# Patient Record
Sex: FEMALE | Race: WHITE | Employment: UNEMPLOYED | URBAN - METROPOLITAN AREA
[De-identification: names, ages, dates, MRNs, and addresses within clinical notes are randomized per-mention and may not be internally consistent; named-entity substitution may affect disease eponyms.]

---

## 2017-01-06 ENCOUNTER — TELEPHONE (OUTPATIENT)
Dept: FAMILY MEDICINE | Facility: CLINIC | Age: 52
End: 2017-01-06

## 2017-01-06 DIAGNOSIS — F10.21 ALCOHOLISM IN RECOVERY (H): Primary | ICD-10-CM

## 2017-01-06 NOTE — TELEPHONE ENCOUNTER
Reason for Call:  Medication or medication refill:    Do you use a [facility]?  Name of the pharmacy and phone number for the current request:  [facility]- [phone]    Name of the medication requested: rx for alcoholism    Other request: pt is wondering if she can get a rx for alcoholism without being seen. Pt has a pe scheduled with AF on 3/7/17.     Can we leave a detailed message on this number? YES    Phone number patient can be reached at:     Best Time:     Call taken on 1/6/2017 at 12:39 PM by Annie Ivory

## 2017-01-09 RX ORDER — DISULFIRAM 500 MG/1
1 TABLET ORAL
Qty: 90 TABLET | Refills: 3 | Status: ON HOLD | OUTPATIENT
Start: 2017-01-09 | End: 2017-03-30

## 2017-01-09 NOTE — TELEPHONE ENCOUNTER
I will ok a Rx for antabuse for her to start once daily.  She needs to keep her appointment with me in March.  Remind her that she has to have not drank for a minimum of 12 hours prior to using this medication or she will get sick.  She also will get very sick if she drinks while she is taking the medication.    Electronically signed by:  Efren Bustos M.D.  1/9/2017

## 2017-02-23 ENCOUNTER — TELEPHONE (OUTPATIENT)
Dept: FAMILY MEDICINE | Facility: CLINIC | Age: 52
End: 2017-02-23

## 2017-02-23 NOTE — TELEPHONE ENCOUNTER
I received this message via link and copied into patients chart.  This is nothing noted that we tried to call the patient .     Daniela PAGE

## 2017-02-23 NOTE — TELEPHONE ENCOUNTER
[2/23/2017 12:13 PM] Jessica Botello:   Hi there-our volunteer in the surgery [facility] received a call from a patient.   The patient said that the phone rang three times, then disconnected.    When the patient checked her phone it showed the number to our surgery lounge/volunteer's desk.    The patient is Monalisa Olguin.   I see that she has a PE coming up on 3-4-17.    I've been told that clinic does pre calls for physicals.   Starting with you to see if you can help?    Thanks-

## 2017-03-14 ENCOUNTER — OFFICE VISIT (OUTPATIENT)
Dept: FAMILY MEDICINE | Facility: CLINIC | Age: 52
End: 2017-03-14
Payer: COMMERCIAL

## 2017-03-14 ENCOUNTER — TELEPHONE (OUTPATIENT)
Dept: NURSING | Facility: CLINIC | Age: 52
End: 2017-03-14

## 2017-03-14 VITALS
HEART RATE: 97 BPM | DIASTOLIC BLOOD PRESSURE: 60 MMHG | TEMPERATURE: 98.4 F | WEIGHT: 106 LBS | OXYGEN SATURATION: 98 % | SYSTOLIC BLOOD PRESSURE: 108 MMHG | BODY MASS INDEX: 20.81 KG/M2 | HEIGHT: 60 IN | RESPIRATION RATE: 20 BRPM

## 2017-03-14 DIAGNOSIS — K13.1 CHEEK AND LIP BITING: Primary | ICD-10-CM

## 2017-03-14 DIAGNOSIS — F10.21 ALCOHOLISM IN RECOVERY (H): ICD-10-CM

## 2017-03-14 PROCEDURE — 99213 OFFICE O/P EST LOW 20 MIN: CPT | Performed by: FAMILY MEDICINE

## 2017-03-14 RX ORDER — DIPHENHYDRAMINE HYDROCHLORIDE AND LIDOCAINE HYDROCHLORIDE AND ALUMINUM HYDROXIDE AND MAGNESIUM HYDRO
5-10 KIT EVERY 6 HOURS PRN
Qty: 237 ML | Refills: 1 | Status: SHIPPED | OUTPATIENT
Start: 2017-03-14 | End: 2017-06-13

## 2017-03-14 ASSESSMENT — PAIN SCALES - GENERAL: PAINLEVEL: NO PAIN (0)

## 2017-03-14 NOTE — NURSING NOTE
Chief Complaint   Patient presents with     Mouth Lesions     started 2-3 days ago. Feels like road bumps.        Initial /60 (BP Location: Left arm, Patient Position: Chair, Cuff Size: Adult Regular)  Pulse 97  Temp 98.4  F (36.9  C)  Resp 20  Ht 5' (1.524 m)  Wt 106 lb (48.1 kg)  LMP 05/16/2012  SpO2 98%  Breastfeeding? No  BMI 20.7 kg/m2 Estimated body mass index is 20.7 kg/(m^2) as calculated from the following:    Height as of this encounter: 5' (1.524 m).    Weight as of this encounter: 106 lb (48.1 kg).  Medication Reconciliation: complete

## 2017-03-14 NOTE — PROGRESS NOTES
SUBJECTIVE:                                                    Monalisa Olguin is a 51 year old female who presents to clinic today for the following health issues:      Mouth sores for 2-3 days.     Problem list and histories reviewed & adjusted, as indicated.  Additional history: as documented    Reviewed and updated as needed this visit by clinical staff  Tobacco  Allergies  Meds  Problems  Med Hx  Surg Hx  Fam Hx  Soc Hx        Reviewed and updated as needed this visit by Provider  Allergies  Meds  Problems       Patient does not recall that she has been biting or chewing on her lip or cheek.  However, she does acknowledge a history of doing this and she notes that she currently is back to drinking 7-8 beers/day, mostly at night.  She knows for sure during the day she is not biting on the inside of her cheek or mouth.      The lesions started about 3 days ago, have been getting a little worse and she is noticing more of them.  She smokes.      Mild sore throat with some postnasal drip.      Patient has not been taking her abstinence medication for alcoholism.  She knows that she needs to get back into treatment.      ROS:  General: negative for, fever, chills, dizziness, fatigue, weakness  Skin: negative for skin lesions or for vaginal lesions that are similar to the ones in her mouth.    ENT: as above  Resp: No shortness of breath, No dyspnea on exertion and No cough    OBJECTIVE:                                                    /60 (BP Location: Left arm, Patient Position: Chair, Cuff Size: Adult Regular)  Pulse 97  Temp 98.4  F (36.9  C)  Resp 20  Ht 5' (1.524 m)  Wt 106 lb (48.1 kg)  LMP 05/16/2012  SpO2 98%  Breastfeeding? No  BMI 20.7 kg/m2  Body mass index is 20.7 kg/(m^2).  GENERAL APPEARANCE: healthy, alert and no distress  HENT: ear canals and TM's normal, mouth has sores noted on the inside of the cheek and on the inside of the lip, all in areas that are accessible to the teeth.   No lesions above or below the gumline or in the posterior aspect of the cheek.  Left side of the tongue has a traumatic ulcer noted.  The lesions are dark and appear consistent with blood blisters and are traumatic in nature.  NECK: no adenopathy, no asymmetry, masses, or scars and thyroid normal to palpation          ASSESSMENT/PLAN:                                                        ICD-10-CM    1. Cheek and lip biting K13.1 DPH-Lido-AlHydr-MgHydr-Simeth (FIRST-MOUTHWASH BLM) SUSP   2. Alcoholism in recovery (H) F10.20      PLAN:  1.  Lesions are the result of patient chewing on the inside of her cheek and lip and are the result of trauma.  It is likely that she does not realize she is doing this due to her intoxication from alcohol when she drinks.  Patient agrees with this assessment.  Will give her magic mouthwash for topical pain relief from sores.  She was encouraged to seek alcohol treatment as she mentioned today as she is likely doing this to herself when she is intoxicated.     Follow up with Provider - only if lesions do not improve over the next few days or are note completely resolved in 10-14 days.     Quirino Wood MD   [facility]

## 2017-03-14 NOTE — TELEPHONE ENCOUNTER
"Call Type: Triage Call    Presenting Problem: Heydi has \" black spots in mouth.\"  This started  last night.   Sore in mouth are painful.  [facility] Triage/Mouth  Lesions/disposition is to be seen within 24 hours and Heydi agreed.  Triage Note:  Guideline Title: Mouth Lesions  Recommended Disposition: See Provider within 24 hours  Original Inclination: Wanted to speak with a nurse  Override Disposition:  Intended Action: Call PCP/HCP  Physician Contacted: No  Unexplained, prolonged bleeding from gums AND not previously evaluated ?  YES  Any mouth, lip, or tooth injury ? NO  Symptoms resulting from face trauma ? NO  Creamy-white sore patches in the mouth or throat AND patches brush off or come off  when eating, leaving a new, bleeding surface ? NO  Signs of dehydration ? NO  Painful pale yellow or white spot(s) on the lining of the mouth, gums, or tongue  unresponsive to 72 hours of home care or that are unusually large ? NO  Gums are newly red, swollen, or painful ? NO  Tenderness, pain, burning, mild tingling OR crusted lesions on lips or other area  ? NO  Symptoms of jaw or teeth symptoms not related to injury ? NO  Physician Instructions:  Care Advice: Continue all prescription medication as recommended until  evaluated by provider.  CAUTIONS  Call provider if bleeding from gums is severe, chronic, continues after  treatment  or if having other unexplained symptoms.  SYMPTOM / CONDITION MANAGEMENT  List, or take, all current prescription(s), nonprescription or alternative  medication(s) to provider for evaluation.  Use a soft bristle toothbrush to gently brush teeth at least twice a day.  Floss at least once a day.  DO NOT take aspirin, or aspirin-containing medications, ibuprofen or  naproxen until consulting with provider.  These medications can increase  risk of bleeding or bruising and delays wound healing.  "

## 2017-03-14 NOTE — MR AVS SNAPSHOT
After Visit Summary   3/14/2017    Monalisa Olguin    MRN: [id number]           Patient Information     Date Of Birth          1965        Visit Information        Provider Department      3/14/2017 8:45 AM Quirino Wood MD [facility]        Today's Diagnoses     Cheek and lip biting    -  1    Alcoholism in recovery (H)           Follow-ups after your visit        Your next 10 appointments already scheduled     Apr 10, 2017  9:00 AM CDT   [facility] with Stella Vaughan [facility]-C   [facility]    [address] [place] [id number] [phone]              Who to contact     If you have questions or need follow up information about today's clinic visit or your schedule please contact [facility] directly at [phone].  Normal or non-critical lab and imaging results will be communicated to you by MyChart, letter or phone within 4 business days after the clinic has received the results. If you do not hear from us within 7 days, please contact the clinic through MyChart or phone. If you have a critical or abnormal lab result, we will notify you by phone as soon as possible.  Submit refill requests through [employer] or call your pharmacy and they will forward the refill request to us. Please allow 3 business days for your refill to be completed.          Additional Information About Your Visit        MyChart Information     [employer] gives you secure access to your electronic health record. If you see a primary care provider, you can also send messages to your care team and make appointments. If you have questions, please call your primary care clinic.  If you do not have a primary care provider, please call [phone] and they will assist you.        Care EveryWhere ID     This is your Care EveryWhere ID. This could be used by other organizations to access your [place] medical records  [id number]         Your Vitals Were     Pulse Temperature Respirations Height Last Period Pulse Oximetry    97 98.4  F (36.9  C) 20 5' (1.524 m) 05/16/2012 98%    Breastfeeding? BMI (Body Mass Index)                No 20.7 kg/m2           Blood Pressure from Last 3 Encounters:   03/14/17 108/60   10/31/16 (!) 144/92   06/14/16 129/84    Weight from Last 3 Encounters:   03/14/17 106 lb (48.1 kg)   10/31/16 117 lb (53.1 kg)   03/03/16 113 lb (51.3 kg)              Today, you had the following     No orders found for display         Today's Medication Changes          These changes are accurate as of: 3/14/17  2:05 PM.  If you have any questions, ask your nurse or doctor.               Start taking these medicines.        Dose/Directions    FIRST-MOUTHWASH BLM Susp   Used for:  Cheek and lip biting   Started by:  Quirino Wood MD        Dose:  5-10 mL   Swish and swallow 5-10 mLs in mouth every 6 hours as needed   Quantity:  237 mL   Refills:  1            Where to get your medicines      These medications were sent to [facility], [place] - [address] [place] [address] [address] [place] [address] [facility] [address]     Phone:  [phone]     FIRST-MOUTHWASH BLM Susp                Primary Care Provider Office Phone # Fax #    Efren Bustos [phone]       [address]   [place] [address]        Thank you!     Thank you for choosing [facility]  for your care. Our goal is always to provide you with excellent care. Hearing back from our patients is one way we can continue to improve our services. Please take a few minutes to complete the written survey that you may receive in the mail after your visit with us. Thank you!             Your Updated Medication List - Protect others around you: Learn how to safely use, store and throw away your medicines at www.disposemymeds.org.          This list is accurate as of: 3/14/17  2:05 PM.  Always use your most  recent med list.                   Brand Name Dispense Instructions for use    buPROPion 150 MG 12 hr tablet    WELLBUTRIN SR    180 tablet    Take 1 tablet (150 mg) by mouth 2 times daily       busPIRone 15 MG tablet    BUSPAR    180 tablet    Take 1 tablet (15 mg) by mouth 2 times daily       Disulfiram 500 MG Tabs     90 tablet    Take 1 tablet by mouth daily (with breakfast)       enalapril 10 MG tablet    VASOTEC    90 tablet    Take 1 tablet (10 mg) by mouth daily       FIRST-MOUTHWASH BLM Susp     237 mL    Swish and swallow 5-10 mLs in mouth every 6 hours as needed       FLUoxetine 20 MG capsule    PROzac    180 capsule    Take 2 capsules (40 mg) by mouth daily       Folic Acid-Vit B6-Vit B12 2.2-25-1 MG Tabs     90 tablet    Take 1 tablet by mouth daily       naltrexone 50 MG tablet    DEPADE;REVIA    90 tablet    Take 1 tablet (50 mg) by mouth daily Take 1/2 tablet by mouth daily for one week then take 1 tablet daily after       omeprazole 20 MG CR capsule    priLOSEC     Take by mouth daily       pantoprazole 40 MG EC tablet    PROTONIX    90 tablet    Take 1 tablet (40 mg) by mouth daily Take 1 tablet daily       simvastatin 10 MG tablet    ZOCOR    90 tablet    Take 1 tablet (10 mg) by mouth At Bedtime       temazepam 15 MG capsule    RESTORIL    60 capsule    Take 1-2 capsules (15-30 mg) by mouth nightly as needed for sleep       traMADol 50 MG tablet    ULTRAM    28 tablet    Take 1 tablet (50 mg) by mouth every 6 hours as needed for moderate pain       traZODone 50 MG tablet    DESYREL    180 tablet    1-2 tablets by mouth at bedtime as needed       Vitamin B1 100 MG Tabs     90 tablet    Take 100 mg by mouth daily

## 2017-03-20 ENCOUNTER — TELEPHONE (OUTPATIENT)
Dept: FAMILY MEDICINE | Facility: CLINIC | Age: 52
End: 2017-03-20

## 2017-03-22 DIAGNOSIS — G47.09 OTHER INSOMNIA: ICD-10-CM

## 2017-03-22 NOTE — TELEPHONE ENCOUNTER
traZODone (DESYREL) 50 MG tablet       Last Written Prescription Date: 4/18/16  Last Fill Quantity: 183; # refills: 3  Last Office Visit with [facility], [facility] or [facility] prescribing provider:  3/14/17   Next 5 appointments (look out 90 days)     Apr 10, 2017  9:00 AM CDT   PHYSICAL with Stella Vaughan PA-C   [facility]    [address] [place] [id number] [phone]                   Last PHQ-9 score on record=   PHQ-9 SCORE 8/18/2016   Total Score -   Total Score 12       Lab Results   Component Value Date     01/20/2016     Lab Results   Component Value Date    ALT 32 01/20/2016

## 2017-03-24 RX ORDER — TRAZODONE HYDROCHLORIDE 50 MG/1
TABLET, FILM COATED ORAL
Qty: 180 TABLET | Refills: 1 | Status: SHIPPED | OUTPATIENT
Start: 2017-03-24 | End: 2017-05-10 | Stop reason: ALTCHOICE

## 2017-03-24 NOTE — TELEPHONE ENCOUNTER
Prescription approved per [facility] Refill Protocol.    Kenyetta Sinclair RN

## 2017-03-26 ENCOUNTER — APPOINTMENT (OUTPATIENT)
Dept: ULTRASOUND IMAGING | Facility: CLINIC | Age: 52
DRG: 432 | End: 2017-03-26
Attending: PHYSICIAN ASSISTANT
Payer: COMMERCIAL

## 2017-03-26 ENCOUNTER — HOSPITAL ENCOUNTER (INPATIENT)
Facility: CLINIC | Age: 52
LOS: 4 days | Discharge: HOME | DRG: 432 | End: 2017-03-30
Attending: PHYSICIAN ASSISTANT | Admitting: INTERNAL MEDICINE
Payer: COMMERCIAL

## 2017-03-26 ENCOUNTER — APPOINTMENT (OUTPATIENT)
Dept: CT IMAGING | Facility: CLINIC | Age: 52
DRG: 432 | End: 2017-03-26
Attending: PHYSICIAN ASSISTANT
Payer: COMMERCIAL

## 2017-03-26 DIAGNOSIS — K43.9 VENTRAL HERNIA WITHOUT OBSTRUCTION OR GANGRENE: ICD-10-CM

## 2017-03-26 DIAGNOSIS — E05.90 HYPERTHYROIDISM: ICD-10-CM

## 2017-03-26 DIAGNOSIS — F10.20 ALCOHOLISM (H): ICD-10-CM

## 2017-03-26 DIAGNOSIS — K76.6 PORTAL HYPERTENSION (H): Primary | ICD-10-CM

## 2017-03-26 DIAGNOSIS — E87.6 HYPOKALEMIA: ICD-10-CM

## 2017-03-26 DIAGNOSIS — R17 JAUNDICE: ICD-10-CM

## 2017-03-26 DIAGNOSIS — R10.13 ABDOMINAL PAIN, EPIGASTRIC: ICD-10-CM

## 2017-03-26 DIAGNOSIS — I10 HYPERTENSION GOAL BP (BLOOD PRESSURE) < 130/80: ICD-10-CM

## 2017-03-26 DIAGNOSIS — D64.9 ANEMIA, UNSPECIFIED: ICD-10-CM

## 2017-03-26 DIAGNOSIS — K59.00 CONSTIPATION, UNSPECIFIED CONSTIPATION TYPE: ICD-10-CM

## 2017-03-26 DIAGNOSIS — E86.0 DEHYDRATION: ICD-10-CM

## 2017-03-26 PROBLEM — K81.0 ACUTE CHOLECYSTITIS: Status: ACTIVE | Noted: 2017-03-26

## 2017-03-26 PROBLEM — R91.8 PULMONARY NODULES: Status: ACTIVE | Noted: 2017-03-26

## 2017-03-26 PROBLEM — R11.2 NAUSEA AND VOMITING: Status: ACTIVE | Noted: 2017-03-26

## 2017-03-26 PROBLEM — Z72.0 TOBACCO ABUSE: Status: ACTIVE | Noted: 2017-03-26

## 2017-03-26 PROBLEM — K70.9 LIVER DISEASE, CHRONIC, DUE TO ALCOHOL (H): Status: ACTIVE | Noted: 2017-03-26

## 2017-03-26 PROBLEM — D69.6 THROMBOCYTOPENIA (H): Status: ACTIVE | Noted: 2017-03-26

## 2017-03-26 LAB
ALBUMIN SERPL-MCNC: 3.1 G/DL (ref 3.4–5)
ALP SERPL-CCNC: 673 U/L (ref 40–150)
ALT SERPL W P-5'-P-CCNC: 77 U/L (ref 0–50)
AMMONIA PLAS-SCNC: 28 UMOL/L (ref 10–50)
ANION GAP SERPL CALCULATED.3IONS-SCNC: 11 MMOL/L (ref 3–14)
APTT PPP: 40 SEC (ref 22–37)
AST SERPL W P-5'-P-CCNC: 193 U/L (ref 0–45)
BASOPHILS # BLD AUTO: 0 10E9/L (ref 0–0.2)
BASOPHILS NFR BLD AUTO: 0.2 %
BILIRUB DIRECT SERPL-MCNC: 8.8 MG/DL (ref 0–0.2)
BILIRUB SERPL-MCNC: 10.9 MG/DL (ref 0.2–1.3)
BILIRUB SERPL-MCNC: 11.1 MG/DL (ref 0.2–1.3)
BUN SERPL-MCNC: 2 MG/DL (ref 7–30)
CALCIUM SERPL-MCNC: 8.6 MG/DL (ref 8.5–10.1)
CHLORIDE SERPL-SCNC: 96 MMOL/L (ref 94–109)
CO2 SERPL-SCNC: 28 MMOL/L (ref 20–32)
CREAT SERPL-MCNC: 0.53 MG/DL (ref 0.52–1.04)
DIFFERENTIAL METHOD BLD: ABNORMAL
EOSINOPHIL # BLD AUTO: 0 10E9/L (ref 0–0.7)
EOSINOPHIL NFR BLD AUTO: 0 %
ERYTHROCYTE [DISTWIDTH] IN BLOOD BY AUTOMATED COUNT: 19.7 % (ref 10–15)
GFR SERPL CREATININE-BSD FRML MDRD: ABNORMAL ML/MIN/1.7M2
GGT SERPL-CCNC: 2823 U/L (ref 0–40)
GLUCOSE SERPL-MCNC: 98 MG/DL (ref 70–99)
HCT VFR BLD AUTO: 28.3 % (ref 35–47)
HEMOCCULT STL QL: NEGATIVE
HGB BLD-MCNC: 9.4 G/DL (ref 11.7–15.7)
IMM GRANULOCYTES # BLD: 0 10E9/L (ref 0–0.4)
IMM GRANULOCYTES NFR BLD: 0.5 %
INR PPP: 1.13 (ref 0.86–1.14)
LIPASE SERPL-CCNC: 163 U/L (ref 73–393)
LYMPHOCYTES # BLD AUTO: 0.5 10E9/L (ref 0.8–5.3)
LYMPHOCYTES NFR BLD AUTO: 11.7 %
MAGNESIUM SERPL-MCNC: 1.5 MG/DL (ref 1.6–2.3)
MCH RBC QN AUTO: 32.1 PG (ref 26.5–33)
MCHC RBC AUTO-ENTMCNC: 33.2 G/DL (ref 31.5–36.5)
MCV RBC AUTO: 97 FL (ref 78–100)
MONOCYTES # BLD AUTO: 1 10E9/L (ref 0–1.3)
MONOCYTES NFR BLD AUTO: 23.7 %
NEUTROPHILS # BLD AUTO: 2.6 10E9/L (ref 1.6–8.3)
NEUTROPHILS NFR BLD AUTO: 63.9 %
PHOSPHATE SERPL-MCNC: 1.3 MG/DL (ref 2.5–4.5)
PLATELET # BLD AUTO: 75 10E9/L (ref 150–450)
POTASSIUM SERPL-SCNC: 2.6 MMOL/L (ref 3.4–5.3)
PROT SERPL-MCNC: 6.1 G/DL (ref 6.8–8.8)
RBC # BLD AUTO: 2.93 10E12/L (ref 3.8–5.2)
RETICS # AUTO: 84.1 10E9/L (ref 25–95)
RETICS/RBC NFR AUTO: 2.8 % (ref 0.5–2)
SODIUM SERPL-SCNC: 135 MMOL/L (ref 133–144)
T4 FREE SERPL-MCNC: 1.58 NG/DL (ref 0.76–1.46)
TSH SERPL DL<=0.005 MIU/L-ACNC: 0.04 MU/L (ref 0.4–4)
WBC # BLD AUTO: 4 10E9/L (ref 4–11)

## 2017-03-26 PROCEDURE — 80053 COMPREHEN METABOLIC PANEL: CPT | Performed by: PHYSICIAN ASSISTANT

## 2017-03-26 PROCEDURE — 96375 TX/PRO/DX INJ NEW DRUG ADDON: CPT

## 2017-03-26 PROCEDURE — 82977 ASSAY OF GGT: CPT | Performed by: PHYSICIAN ASSISTANT

## 2017-03-26 PROCEDURE — 85025 COMPLETE CBC W/AUTO DIFF WBC: CPT | Performed by: PHYSICIAN ASSISTANT

## 2017-03-26 PROCEDURE — 85045 AUTOMATED RETICULOCYTE COUNT: CPT | Performed by: INTERNAL MEDICINE

## 2017-03-26 PROCEDURE — 82247 BILIRUBIN TOTAL: CPT | Performed by: INTERNAL MEDICINE

## 2017-03-26 PROCEDURE — 83690 ASSAY OF LIPASE: CPT | Performed by: PHYSICIAN ASSISTANT

## 2017-03-26 PROCEDURE — 25500064 ZZH RX 255 OP 636: Performed by: PHYSICIAN ASSISTANT

## 2017-03-26 PROCEDURE — 85610 PROTHROMBIN TIME: CPT | Performed by: PHYSICIAN ASSISTANT

## 2017-03-26 PROCEDURE — 12000007 ZZH R&B INTERMEDIATE

## 2017-03-26 PROCEDURE — 96376 TX/PRO/DX INJ SAME DRUG ADON: CPT

## 2017-03-26 PROCEDURE — 76705 ECHO EXAM OF ABDOMEN: CPT

## 2017-03-26 PROCEDURE — 85730 THROMBOPLASTIN TIME PARTIAL: CPT | Performed by: PHYSICIAN ASSISTANT

## 2017-03-26 PROCEDURE — 84439 ASSAY OF FREE THYROXINE: CPT | Performed by: PHYSICIAN ASSISTANT

## 2017-03-26 PROCEDURE — 99207 ZZC CDG-MDM COMPONENT: MEETS HIGH - UP CODED: CPT | Performed by: INTERNAL MEDICINE

## 2017-03-26 PROCEDURE — 84443 ASSAY THYROID STIM HORMONE: CPT | Performed by: PHYSICIAN ASSISTANT

## 2017-03-26 PROCEDURE — 71260 CT THORAX DX C+: CPT

## 2017-03-26 PROCEDURE — 96365 THER/PROPH/DIAG IV INF INIT: CPT

## 2017-03-26 PROCEDURE — 99285 EMERGENCY DEPT VISIT HI MDM: CPT | Mod: 25

## 2017-03-26 PROCEDURE — 96361 HYDRATE IV INFUSION ADD-ON: CPT

## 2017-03-26 PROCEDURE — 82248 BILIRUBIN DIRECT: CPT | Performed by: INTERNAL MEDICINE

## 2017-03-26 PROCEDURE — 82607 VITAMIN B-12: CPT | Performed by: INTERNAL MEDICINE

## 2017-03-26 PROCEDURE — 83550 IRON BINDING TEST: CPT | Performed by: INTERNAL MEDICINE

## 2017-03-26 PROCEDURE — 99223 1ST HOSP IP/OBS HIGH 75: CPT | Mod: AI | Performed by: INTERNAL MEDICINE

## 2017-03-26 PROCEDURE — 84100 ASSAY OF PHOSPHORUS: CPT | Performed by: INTERNAL MEDICINE

## 2017-03-26 PROCEDURE — 74177 CT ABD & PELVIS W/CONTRAST: CPT

## 2017-03-26 PROCEDURE — 25000125 ZZHC RX 250: Performed by: PHYSICIAN ASSISTANT

## 2017-03-26 PROCEDURE — 25000128 H RX IP 250 OP 636: Performed by: PHYSICIAN ASSISTANT

## 2017-03-26 PROCEDURE — 82728 ASSAY OF FERRITIN: CPT | Performed by: INTERNAL MEDICINE

## 2017-03-26 PROCEDURE — 82272 OCCULT BLD FECES 1-3 TESTS: CPT | Performed by: INTERNAL MEDICINE

## 2017-03-26 PROCEDURE — 83540 ASSAY OF IRON: CPT | Performed by: INTERNAL MEDICINE

## 2017-03-26 PROCEDURE — 99285 EMERGENCY DEPT VISIT HI MDM: CPT | Performed by: PHYSICIAN ASSISTANT

## 2017-03-26 PROCEDURE — 83735 ASSAY OF MAGNESIUM: CPT | Performed by: INTERNAL MEDICINE

## 2017-03-26 PROCEDURE — 82140 ASSAY OF AMMONIA: CPT | Performed by: PHYSICIAN ASSISTANT

## 2017-03-26 RX ORDER — TRAZODONE HYDROCHLORIDE 50 MG/1
50 TABLET, FILM COATED ORAL
Status: DISCONTINUED | OUTPATIENT
Start: 2017-03-26 | End: 2017-03-30 | Stop reason: HOSPADM

## 2017-03-26 RX ORDER — SODIUM CHLORIDE 9 MG/ML
INJECTION, SOLUTION INTRAVENOUS CONTINUOUS
Status: DISCONTINUED | OUTPATIENT
Start: 2017-03-26 | End: 2017-03-28

## 2017-03-26 RX ORDER — ONDANSETRON 2 MG/ML
4 INJECTION INTRAMUSCULAR; INTRAVENOUS EVERY 6 HOURS PRN
Status: DISCONTINUED | OUTPATIENT
Start: 2017-03-26 | End: 2017-03-30 | Stop reason: HOSPADM

## 2017-03-26 RX ORDER — POTASSIUM CHLORIDE 1500 MG/1
20-40 TABLET, EXTENDED RELEASE ORAL
Status: DISCONTINUED | OUTPATIENT
Start: 2017-03-26 | End: 2017-03-27

## 2017-03-26 RX ORDER — PROCHLORPERAZINE MALEATE 5 MG
5-10 TABLET ORAL EVERY 6 HOURS PRN
Status: DISCONTINUED | OUTPATIENT
Start: 2017-03-26 | End: 2017-03-30 | Stop reason: HOSPADM

## 2017-03-26 RX ORDER — HYDROMORPHONE HYDROCHLORIDE 1 MG/ML
0.2 INJECTION, SOLUTION INTRAMUSCULAR; INTRAVENOUS; SUBCUTANEOUS
Status: DISCONTINUED | OUTPATIENT
Start: 2017-03-26 | End: 2017-03-28

## 2017-03-26 RX ORDER — SODIUM CHLORIDE 9 MG/ML
1000 INJECTION, SOLUTION INTRAVENOUS CONTINUOUS
Status: DISCONTINUED | OUTPATIENT
Start: 2017-03-26 | End: 2017-03-26

## 2017-03-26 RX ORDER — NALOXONE HYDROCHLORIDE 0.4 MG/ML
.1-.4 INJECTION, SOLUTION INTRAMUSCULAR; INTRAVENOUS; SUBCUTANEOUS
Status: DISCONTINUED | OUTPATIENT
Start: 2017-03-26 | End: 2017-03-30 | Stop reason: HOSPADM

## 2017-03-26 RX ORDER — POTASSIUM CHLORIDE 1.5 G/1.58G
20-40 POWDER, FOR SOLUTION ORAL
Status: DISCONTINUED | OUTPATIENT
Start: 2017-03-26 | End: 2017-03-27

## 2017-03-26 RX ORDER — MAGNESIUM SULFATE HEPTAHYDRATE 40 MG/ML
4 INJECTION, SOLUTION INTRAVENOUS EVERY 4 HOURS PRN
Status: DISCONTINUED | OUTPATIENT
Start: 2017-03-26 | End: 2017-03-30 | Stop reason: HOSPADM

## 2017-03-26 RX ORDER — PROCHLORPERAZINE 25 MG
25 SUPPOSITORY, RECTAL RECTAL EVERY 12 HOURS PRN
Status: DISCONTINUED | OUTPATIENT
Start: 2017-03-26 | End: 2017-03-30 | Stop reason: HOSPADM

## 2017-03-26 RX ORDER — LIDOCAINE 40 MG/G
CREAM TOPICAL
Status: DISCONTINUED | OUTPATIENT
Start: 2017-03-26 | End: 2017-03-26

## 2017-03-26 RX ORDER — ONDANSETRON 4 MG/1
4 TABLET, ORALLY DISINTEGRATING ORAL EVERY 6 HOURS PRN
Status: DISCONTINUED | OUTPATIENT
Start: 2017-03-26 | End: 2017-03-30 | Stop reason: HOSPADM

## 2017-03-26 RX ORDER — ONDANSETRON 2 MG/ML
4 INJECTION INTRAMUSCULAR; INTRAVENOUS EVERY 30 MIN PRN
Status: DISCONTINUED | OUTPATIENT
Start: 2017-03-26 | End: 2017-03-26

## 2017-03-26 RX ORDER — POTASSIUM CHLORIDE 7.45 MG/ML
10 INJECTION INTRAVENOUS ONCE
Status: COMPLETED | OUTPATIENT
Start: 2017-03-26 | End: 2017-03-26

## 2017-03-26 RX ORDER — POTASSIUM CHLORIDE 29.8 MG/ML
20 INJECTION INTRAVENOUS
Status: DISCONTINUED | OUTPATIENT
Start: 2017-03-26 | End: 2017-03-26 | Stop reason: CLARIF

## 2017-03-26 RX ORDER — IOPAMIDOL 755 MG/ML
100 INJECTION, SOLUTION INTRAVASCULAR ONCE
Status: COMPLETED | OUTPATIENT
Start: 2017-03-26 | End: 2017-03-26

## 2017-03-26 RX ORDER — POTASSIUM CHLORIDE 7.45 MG/ML
10 INJECTION INTRAVENOUS
Status: DISCONTINUED | OUTPATIENT
Start: 2017-03-26 | End: 2017-03-27

## 2017-03-26 RX ORDER — NICOTINE 21 MG/24HR
1 PATCH, TRANSDERMAL 24 HOURS TRANSDERMAL DAILY
Status: DISCONTINUED | OUTPATIENT
Start: 2017-03-27 | End: 2017-03-28

## 2017-03-26 RX ORDER — HYDROMORPHONE HYDROCHLORIDE 1 MG/ML
0.5 INJECTION, SOLUTION INTRAMUSCULAR; INTRAVENOUS; SUBCUTANEOUS
Status: DISCONTINUED | OUTPATIENT
Start: 2017-03-26 | End: 2017-03-26

## 2017-03-26 RX ADMIN — SODIUM CHLORIDE 60 ML: 9 INJECTION, SOLUTION INTRAVENOUS at 19:42

## 2017-03-26 RX ADMIN — SODIUM CHLORIDE 1000 ML: 9 INJECTION, SOLUTION INTRAVENOUS at 20:08

## 2017-03-26 RX ADMIN — HYDROMORPHONE HYDROCHLORIDE 0.5 MG: 1 INJECTION, SOLUTION INTRAMUSCULAR; INTRAVENOUS; SUBCUTANEOUS at 20:57

## 2017-03-26 RX ADMIN — SODIUM CHLORIDE 1000 ML: 9 INJECTION, SOLUTION INTRAVENOUS at 20:56

## 2017-03-26 RX ADMIN — IOPAMIDOL 50 ML: 755 INJECTION, SOLUTION INTRAVENOUS at 19:42

## 2017-03-26 RX ADMIN — ONDANSETRON 4 MG: 2 INJECTION, SOLUTION INTRAMUSCULAR; INTRAVENOUS at 19:42

## 2017-03-26 RX ADMIN — POTASSIUM CHLORIDE 10 MEQ: 7.46 INJECTION, SOLUTION INTRAVENOUS at 20:07

## 2017-03-26 RX ADMIN — SODIUM CHLORIDE 1000 ML: 9 INJECTION, SOLUTION INTRAVENOUS at 19:06

## 2017-03-26 RX ADMIN — HYDROMORPHONE HYDROCHLORIDE 0.5 MG: 1 INJECTION, SOLUTION INTRAMUSCULAR; INTRAVENOUS; SUBCUTANEOUS at 19:42

## 2017-03-26 RX ADMIN — ONDANSETRON 4 MG: 2 INJECTION, SOLUTION INTRAMUSCULAR; INTRAVENOUS at 19:05

## 2017-03-26 ASSESSMENT — ENCOUNTER SYMPTOMS
COUGH: 1
VOMITING: 1
DYSURIA: 1
ABDOMINAL PAIN: 1
APPETITE CHANGE: 1

## 2017-03-26 NOTE — ED NOTES
Here with complaints of abd pain and jaundice. States her eyes have been yellow that started a week ago and has progressed

## 2017-03-26 NOTE — IP AVS SNAPSHOT
[address] [facility]    [address] [facility] [address] [place] [address]    Phone:  [phone]                                       After Visit Summary   3/26/2017    Monalisa Olguin    MRN: [id number]           After Visit Summary Signature Page     I have received my discharge instructions, and my questions have been answered. I have discussed any challenges I see with this plan with the nurse or doctor.    ..........................................................................................................................................  Patient/Patient Representative Signature      ..........................................................................................................................................  Patient Representative Print Name and Relationship to Patient    ..................................................               ................................................  Date                                            Time    ..........................................................................................................................................  Reviewed by Signature/Title    ...................................................              ..............................................  Date                                                            Time

## 2017-03-26 NOTE — ED PROVIDER NOTES
"  History     Chief Complaint   Patient presents with     Jaundice     The history is provided by the patient.     Monalisa Olguin is a 51 year old female who presents to the ED with jaundice and abdominal pain. She noticed yellowing of her eyes last week that has progressed since then. Her eye sight has gotten worse. She started having upper mid abdominal pain and bloating 2 days ago and mentions a bump to the area as well. She has a lack of appetite only been eating half a can of chicken noodle soup for the past 3 days. She mentions hot and cold flashes but this is not new, thinks it's from menopause. She has a decrease in fluid intake, noting last time she voided was this morning and she did have a burning sensation with it. She was dizzy earlier today but that has resolved. In the last 6 months she has lost 10-15 pounds unintentionally. She has a history of a chronic cough, denying blood. When she does cough she gets this \"tingle\" in her throat that causes her to vomit. She normally vomits 3 times a day. This is not new for her. Last emesis was 4-5 days ago. Patient has a history of alcoholism. She drinks about 8 cans of beer and a couple of glasses of whiskey a day. In the last week she has barely had an alcoholic drinks. Last drink was today only drinking 1.5 cans of beer. She has had withdrawals in the past, most recent one was last Sunday, 1 week ago. Patient denies being in withdrawal currently. She has no history of seizures. She is a smoker, smokes 0.5-1 pack a day. Patient is also concerned with a bruise to left side of back by kidney area with no known injury. She denies any lumps to breast. No recent falls. She has no history of jaundice. 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   .       Patient Active Problem List   Diagnosis     Kidney donor     Esophageal reflux     Moderate Depression [296.32]     HYPERLIPIDEMIA LDL GOAL <100     CKD (chronic kidney disease) stage 3, GFR 30-59 ml/min "     Hypertension goal BP (blood pressure) < 130/80     Anxiety     Crushing injury of thumb, left     Alcohol abuse     Alcoholism in recovery (H)     Laceration of head without foreign body, unspecified part of head, sequela     Alcohol induced liver disease     Acute cholecystitis - possible     Hypokalemia     Anemia     Thrombocytopenia (H)     Tobacco abuse     Abdominal pain, epigastric     Nausea and vomiting     Portal hypertension (H)     Pulmonary nodules     Hyperthyroidism     Past Medical History:   Diagnosis Date     Alcohol abuse 2013     Alcohol dependence 2013     Alcohol withdrawal 2013     Anxiety 10/10/2011     CKD (chronic kidney disease) stage 3, GFR 30-59 ml/min     last GFR was 42     Esophageal reflux 10/7/2002     Hypertension goal BP (blood pressure) < 130/80 2011     Moderate Depression [296.32] 2009    stable on wellbutrin     Other internal derangement of knee(717.89)     ACL Internal derangement, knee/ original injury in 5th grade, torn cartilage     Pap smear     no abnormals, due for paps q 2-3 yrs     Unspecified essential hypertension        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     HC KNEE SCOPE, DIAGNOSTIC  01    Arthroscopy, Lt Knee       Family History   Problem Relation Age of Onset     DIABETES Sister 17     older sister also had kidney and pancreas transplant     Hypertension Mother      HEART DISEASE Paternal Grandmother      HEART DISEASE Paternal Grandfather      CEREBROVASCULAR DISEASE Maternal Grandmother      CEREBROVASCULAR DISEASE Maternal Grandfather      Alcohol/Drug Maternal Grandfather      Substance Abuse Maternal Grandfather      HEART DISEASE Father      Silent MI stents x 2     HEART DISEASE Sister      MI secondary to diabetes     CANCER Paternal Aunt      ?kind     DIABETES Sister 9  "    youngest, juvenile type I (onset age 9 with no complications)     Genitourinary Problems Sister 43     kidney transplant from renal failure       Social History   Substance Use Topics     Smoking status: Current Every Day Smoker     Packs/day: 0.50     Years: 10.00     Smokeless tobacco: Never Used      Comment: pt quit 1992 after smoking for 8 yrs, started again in 2004     Alcohol use 0.0 oz/week     0 Standard drinks or equivalent per week      Comment: daily         Immunization History   Administered Date(s) Administered     Influenza (IIV3) 10/26/2005, 11/06/2006     Mantoux 08/26/2013     Pneumococcal 23 valent 05/26/2013     TDAP Vaccine (Adacel) 08/30/2007, 06/14/2016          Allergies   Allergen Reactions     Albuterol      Tongue \"hardened and painful\"       No current outpatient prescriptions on file.       I have reviewed the Medications, Allergies, Past Medical and Surgical History, and Social History in the Epic system.    Review of Systems   Constitutional: Positive for appetite change (decreased).        Positive for hot and cold flashes but that's not new   Eyes:        Positive for yellowing of the eyes, bilaterally.    Respiratory: Positive for cough (chronic, no blood noted).         She is a smoker.    Gastrointestinal: Positive for abdominal pain (epigastric, bump to the area ) and vomiting (3-4 days ago. this is not new).   Genitourinary: Positive for decreased urine volume and dysuria.   Skin:        Positive for a bruise to left side of lower back.    All other systems reviewed and are negative.      Physical Exam   BP: 120/76  Heart Rate: 103  Temp: 98.8  F (37.1  C)  Resp: 14  Weight: 47.6 kg (105 lb)  SpO2: 97 %  Physical Exam  Cachectic appearing.  Full body jaundice noted.  Scleral icterus.   Head: Normocephalic, atraumatic, nontender to palpation  Eyes: PERRLA, conjunctiva and sclera clear  Ears: Bilateral TM's and canals are clear.  TM's translucent without erythema or " effusion.  Nose: Nares normal and patent bilaterally.  Mucous membranes are non-erythematous and non-edematous.  No sinus tenderness.  Throat: Mucous membranes are clear.  No tonsilar hypertrophy, exudate, or erythema.  Neck: Supple.  FROM without pain.  No adenopathy.  No thyromegaly.   Heart:  RRR with normal S1 and S2.  No S3 or S4.  No murmur, rub, gallop, or click.  PMI is nondisplaced.   .LUNGS: negative findings: chest symmetric with normal A/P diameter, no chest deformities noted, normal respiratory rate and rhythm, no chest wall tenderness, positive findings: rhonchi R mid anterior and L mid anterior   ABDOMEN: Generalized abdominal bloating noted. She appears to have swelling and the epigastric area. Upon palpation there is a firm area that extends down to 2 cm superior to the umbilicus. I'm concerned that this may be a mass. She does have a vertical incision at that area which is related to her previous nephrectomy. She also has tenderness in the right upper quadrant with a positive Gaston sign. No rebound or guarding. Some generalized discomfort throughout the remainder of the abdomen, but not near as bad as the epigastric and right upper quadrant area.  Extremities: extremities, peripheral pulses and reflexes normal, no edema, redness or tenderness in the calves or thighs, Raulito's sign is negative, no sign of DVT.         ED Course     ED Course     Procedures             Critical Care time:  none        Results for orders placed or performed during the hospital encounter of 03/26/17   CT Chest/Abdomen/Pelvis w Contrast    Narrative    CT CHEST, ABDOMEN, PELVIS WITH CONTRAST 3/26/2017 7:59 PM    TECHNIQUE: Images from thoracic inlet to pubic symphysis 50 mL Isovue  370 IV contrast  Radiation dose for this scan was reduced using  automated exposure control, adjustment of the mA and/or kV according  to patient size, or iterative reconstruction technique.    HISTORY: Chronic cough, bilateral rhonchi,  smoker, large epigastric  mass, jaundice.    COMPARISON: 3/17/2015 chest x-ray.    FINDINGS:   Chest:  No mediastinal, hilar, or axillary adenopathy. 0.4 x 0.6 cm  nodular density right lower lobe series 3 image 35 is indeterminate.  The lungs are otherwise clear.    Abdomen and Pelvis: Slightly heterogeneous liver with diffuse fatty  infiltration. Borderline splenomegaly 13 cm in length. The gallbladder  is distended approximately 7 x 4.5 x 5 cm in size. Multiple  gallstones. Gallbladder wall appears mildly thickened.    There is small midline ventral hernia at the level of the gallbladder  containing fat and small vessels. There is some collateral vessels  compatible with portal hypertension.    Left nephrectomy changes. Normal-appearing right kidney and adrenal  glands. Pancreas appears normal. There is mild upper abdominal fat  stranding, no periaortic or pelvic adenopathy.    No acute bowel abnormality, there is some fat deposition in the wall  of the right colon which is nonspecific, can be seen with chronic or  previous colitis. No aggressive bone lesions.      Impression    IMPRESSION:  1. Gallbladder mildly distended with gallstones, suspect mild  gallbladder wall thickening. Cholecystitis could have this appearance  and if clinically indicated nuclear medicine hepatobiliary scan may be  helpful for further evaluation.  2. Diffuse liver disease with fatty infiltration and findings  suggesting portal hypertension. Borderline splenomegaly and vascular  collaterals.  3. Left nephrectomy.  4. Indeterminate nodular density right lung base up to 0.6 cm.  Recommendations for an incidental lung nodule = or > 6mm to 8mm:    Low risk patients: Initial follow-up CT at 6-12 months, then  consider CT at 18-24 months if no change.    High risk patients: Initial follow-up CT at 6-12 months, then CT at  18-24 months if no change.    *Low Risk: Minimal or absent history of smoking or other known risk  factors.  *Nonsolid  (ground-glass) or partly solid nodules may require longer  follow-up to exclude indolent adenocarcinoma.  *Recommendations based on Guidelines for the Management of Incidental  Pulmonary Nodules Detected at CT: From the [employer] Society 2017,  Radiology 2017.    JORDAN RAMOS MD   US Abdomen Limited    Narrative    US ABDOMEN LIMITED  3/26/2017 10:57 PM     HISTORY:  Right upper quadrant pain.    COMPARISON: None.    FINDINGS: Sludge in the gallbladder. There are some shadowing  gallstones in the gallbladder as well. Gallbladder wall is thickened  at 7 mm. Sonographic Gaston sign is present. No bile duct dilatation.  Findings suggest cholecystitis.    Liver has diffuse fatty infiltration, but is normal in size. Pancreas  not well seen.    Right kidney measures 13.3 cm pole to pole and appears normal. No mass  or obstruction.    The abdominal aorta and IVC are normal where seen, but partially  obscured.      Impression    IMPRESSION:   1 Probable cholecystitis with cholelithiasis and gallbladder wall  thickening with positive sonographic Gaston sign.  2. No bile duct dilatation.  3. Diffuse fatty infiltration of the liver.   CBC with platelets differential   Result Value Ref Range    WBC 4.0 4.0 - 11.0 10e9/L    RBC Count 2.93 (L) 3.8 - 5.2 10e12/L    Hemoglobin 9.4 (L) 11.7 - 15.7 g/dL    Hematocrit 28.3 (L) 35.0 - 47.0 %    MCV 97 78 - 100 fl    MCH 32.1 26.5 - 33.0 pg    MCHC 33.2 31.5 - 36.5 g/dL    RDW 19.7 (H) 10.0 - 15.0 %    Platelet Count 75 (L) 150 - 450 10e9/L    Diff Method Automated Method     % Neutrophils 63.9 %    % Lymphocytes 11.7 %    % Monocytes 23.7 %    % Eosinophils 0.0 %    % Basophils 0.2 %    % Immature Granulocytes 0.5 %    Absolute Neutrophil 2.6 1.6 - 8.3 10e9/L    Absolute Lymphocytes 0.5 (L) 0.8 - 5.3 10e9/L    Absolute Monocytes 1.0 0.0 - 1.3 10e9/L    Absolute Eosinophils 0.0 0.0 - 0.7 10e9/L    Absolute Basophils 0.0 0.0 - 0.2 10e9/L    Abs Immature Granulocytes 0.0 0 - 0.4  10e9/L   INR   Result Value Ref Range    INR 1.13 0.86 - 1.14   Partial thromboplastin time   Result Value Ref Range    PTT 40 (H) 22 - 37 sec   Comprehensive metabolic panel   Result Value Ref Range    Sodium 135 133 - 144 mmol/L    Potassium 2.6 (LL) 3.4 - 5.3 mmol/L    Chloride 96 94 - 109 mmol/L    Carbon Dioxide 28 20 - 32 mmol/L    Anion Gap 11 3 - 14 mmol/L    Glucose 98 70 - 99 mg/dL    Urea Nitrogen 2 (L) 7 - 30 mg/dL    Creatinine 0.53 0.52 - 1.04 mg/dL    GFR Estimate >90  Non  GFR Calc   >60 mL/min/1.7m2    GFR Estimate If Black >90   GFR Calc   >60 mL/min/1.7m2    Calcium 8.6 8.5 - 10.1 mg/dL    Bilirubin Total 11.1 (H) 0.2 - 1.3 mg/dL    Albumin 3.1 (L) 3.4 - 5.0 g/dL    Protein Total 6.1 (L) 6.8 - 8.8 g/dL    Alkaline Phosphatase 673 (H) 40 - 150 U/L    ALT 77 (H) 0 - 50 U/L     (H) 0 - 45 U/L   Lipase   Result Value Ref Range    Lipase 163 73 - 393 U/L   Ammonia   Result Value Ref Range    Ammonia 28 10 - 50 umol/L   TSH with free T4 reflex   Result Value Ref Range    TSH 0.04 (L) 0.40 - 4.00 mU/L   GGT   Result Value Ref Range    GGT 2823 (H) 0 - 40 U/L   T4 free   Result Value Ref Range    T4 Free 1.58 (H) 0.76 - 1.46 ng/dL   Reticulocyte count   Result Value Ref Range    % Retic 2.8 (H) 0.5 - 2.0 %    Absolute Retic 84.1 25 - 95 10e9/L        Medications   traZODone (DESYREL) tablet 50 mg (not administered)   naloxone (NARCAN) injection 0.1-0.4 mg (not administered)   0.9% sodium chloride infusion (not administered)   HYDROmorphone (PF) (DILAUDID) injection 0.2 mg (not administered)   ondansetron (ZOFRAN-ODT) ODT tab 4 mg (not administered)     Or   ondansetron (ZOFRAN) injection 4 mg (not administered)   prochlorperazine (COMPAZINE) injection 5-10 mg (not administered)     Or   prochlorperazine (COMPAZINE) tablet 5-10 mg (not administered)     Or   prochlorperazine (COMPAZINE) Suppository 25 mg (not administered)   potassium chloride  (K-DUR/KLOR-CON M) CR  tablet 20-40 mEq (not administered)   potassium chloride (KLOR-CON) Packet 20-40 mEq (not administered)   potassium chloride 10 mEq in 100 mL intermittent infusion (not administered)   potassium chloride 10 mEq in 100 mL intermittent infusion with 10 mg lidocaine (not administered)   magnesium sulfate 4 g in 100 mL sterile water (premade) (not administered)   potassium phosphate 15 mmol in D5W 250 mL intermittent infusion (not administered)   potassium phosphate 20 mmol in D5W 500 mL intermittent infusion (not administered)   potassium phosphate 25 mmol in D5W 500 mL intermittent infusion (not administered)   NaCl 0.9 % 1,000 mL with multivitamin-ADULT (INFUVITE) 10 mL, thiamine 100 mg, folic acid 1 mg infusion (not administered)   nicotine Patch in Place (not administered)   nicotine patch REMOVAL (not administered)   nicotine (NICODERM CQ) 14 MG/24HR 24 hr patch 1 patch (not administered)   metoprolol (LOPRESSOR) injection 5 mg (not administered)   pantoprazole (PROTONIX) 40 mg IV push injection (not administered)   melatonin tablet 3 mg (not administered)   piperacillin-tazobactam (ZOSYN) infusion 3.375 g (not administered)   0.9% sodium chloride BOLUS (0 mLs Intravenous Stopped 3/26/17 2052)     Followed by   0.9% sodium chloride BOLUS (0 mLs Intravenous Stopped 3/26/17 1944)   sodium chloride (PF) 0.9% PF flush 3 mL (3 mLs Intracatheter Given 3/26/17 1941)   sodium chloride 0.9 % for CT scan flush dose 500 mL (60 mLs Intravenous Given 3/26/17 1942)   iopamidol (ISOVUE-370) solution 100 mL (50 mLs Intravenous Given 3/26/17 1942)   potassium chloride 10 mEq in 100 mL intermittent infusion (0 mEq Intravenous Stopped 3/26/17 2110)         Assessments & Plan (with Medical Decision Making)     Abdominal pain, epigastric  Alcoholism (H)  Jaundice  Hypokalemia  Dehydration  Ventral hernia without obstruction or gangrene  Anemia, unspecified     51 year old female who has a history of chronic alcoholism presents for  evaluation of nausea, vomiting, unable to tolerate clear fluids, anorexia, jaundice, weight loss, and abdominal pain. Symptoms much worse in the past 2-3 days.  Patient generally drinks 8-10 alcoholic beverages per day. She denies ever having any withdrawal. She's been to treatment 2 different times, and has been abstinent for alcohol after treatment for a number of months. She has always return to drinking alcohol on a daily basis. On exam the patient has a mildly elevated heart rate and temperature of 99.9. Blood pressure and oxygen saturations are within normal limits. She has rather significant jaundice and scleral icterus noted. Rhonchi in the bilateral mid anterior region with pulmonary exam. Abdominal exam with a possible mass in the epigastric area, right upper quadrant/epigastric discomfort, and a positive Gaston sign. Generalized abdominal bloating noted is concerning for possible ascites.  Laboratory evaluation with a new normocytic number chromic anemia with hemoglobin down to 9.4 and thrombocytopenia with platelets down to 75,000.   Hypokalemia with a potassium of 2.6. Total bilirubin elevated to 11.1. Remainder of the LFTs are significantly elevated as well.  GGT with a rather significant elevation 2 2823 suggesting that alcoholic hepatitis likely has a contusion factor to her symptom set.  INR normal. TSH and T4 with findings suggestive of possible early hyperthyroidism.  CT of the abdomen showed a mildly distended gallbladder and possible stones. Cholecystitis could not be ruled out. Diffuse fatty infiltration of liver and possible portal hypertension findings. Right lung nodule, but no significant mass. The possible mass in the epigastric area appears to be a ventral hernia.  Initial treatment with 2 L of IV normal saline rehydration and Zofran for nausea. This did help her significantly. As soon as her potassium came back low, we initiated IV potassium to correct this. She requested pain medication  for her epigastric discomfort, therefore IV Dilaudid was given with good success.  Patient does not have any symptoms suggestive of hemorrhage at this time. I am uncertain if her anemia is related to GI hemorrhage versus other. Stool occult is pending at this time.  It could be related to nutritional deficiency as well, and further testing for this would be done as an inpatient. Given her multiple significant abnormalities, Dr. Bowen, inpatient hospitalist, was consulted regarding her condition. He agreed to accept her care as an inpatient. We ordered a right upper quadrant ultrasound to further evaluate the gallbladder abnormalities. IV antibiotics will likely be started given the concern for cholecystitis. She likely will need an MRCP in the morning. The patient was in agreement with this plan.      I have reviewed the nursing notes.    I have reviewed the findings, diagnosis, plan and need for follow up with the patient.    Current Discharge Medication List          Final diagnoses:   Abdominal pain, epigastric   Alcoholism (H)   Jaundice   Hypokalemia   Dehydration   Ventral hernia without obstruction or gangrene   Anemia, unspecified       This document serves as a record of services personally performed by Alon Nieves PA*. It was created on their behalf by Natalie Moeller, a trained medical scribe. The creation of this record is based on the provider's personal observations and the statements of the patient. This document has been checked and approved by the attending provider.   Note: Chart documentation done in part with Dragon Voice Recognition software. Although reviewed after completion, some word and grammatical errors may remain.        3/26/2017  Alon Nieves PA-C   [facility] EMERGENCY DEPARTMENT     Alon Nieves PA-C  03/26/17 [id number]

## 2017-03-26 NOTE — IP AVS SNAPSHOT
MRN:[id number]                      After Visit Summary   3/26/2017    Monalisa Olguin    MRN: [id number]           Thank you!     Thank you for choosing [facility] for your care. Our goal is always to provide you with excellent care. Hearing back from our patients is one way we can continue to improve our services. Please take a few minutes to complete the written survey that you may receive in the mail after you visit with us. Thank you!        Patient Information     Date Of Birth          1965        About your hospital stay     You were admitted on:  March 26, 2017 You last received care in the:  [address] [facility]    You were discharged on:  March 30, 2017        Reason for your hospital stay       Hospitalized due to liver injury and improved                  Who to Call     For medical emergencies, please call 911.  For non-urgent questions about your medical care, please call your primary care provider or clinic, [phone]          Attending Provider     Provider Specialty    Alon Nieves PA-C Family Practice    Mayur Moncada MD Internal Medicine       Primary Care Provider Office Phone # Fax #    Efren Bustos [phone]       [facility] [address] [place] [address]        After Care Instructions     Activity       Your activity upon discharge: activity as tolerated            Diet       Follow this diet upon discharge: Low fat                  Follow-up Appointments     Follow-up and recommended labs and tests        Follow up with primary care provider, Efren Bustos MD, within 7 days for hospital follow- up and regarding new diagnosis.  The following labs/tests are recommended: recheck LFTs within 4 days.  Recheck thyroid function tests in 4-6 weeks.                  Your next 10 appointments already scheduled     Apr 10, 2017  9:00 AM CDT   PHYSICAL with Stella Vaughan PA-C   [facility] ([facility])    [address] [id number] [phone]              Pending Results     No orders found from 3/24/2017 to 3/27/2017.            Statement of Approval     Ordered          03/30/17 1133  I have reviewed and agree with all the recommendations and orders detailed in this document.  EFFECTIVE NOW     Approved and electronically signed by:  Mayur Moncada MD             Admission Information     Date & Time Provider Department Dept. Phone    3/26/2017 Mayur Moncada MD [address] [facility] [phone]      Your Vitals Were     Blood Pressure Pulse Temperature Respirations Weight Last Period    95/54 76 99  F (37.2  C) (Oral) 16 49 kg (108 lb 0.4 oz) 05/16/2012    Pulse Oximetry BMI (Body Mass Index)                95% 21.1 kg/m2          MyChart Information     [facility] gives you secure access to your electronic health record. If you see a primary care provider, you can also send messages to your care team and make appointments. If you have questions, please call your primary care clinic.  If you do not have a primary care provider, please call [phone] and they will assist you.        Care EveryWhere ID     This is your Care EveryWhere ID. This could be used by other organizations to access your [facility] medical records  [id number]           Review of your medicines      START taking        Dose / Directions    propranolol 10 MG tablet   Commonly known as:  INDERAL   Used for:  Hypertension goal BP (blood pressure) < 130/80        Dose:  10 mg   Take 1 tablet (10 mg) by mouth 2 times daily   Quantity:  60 tablet   Refills:  0       senna-docusate 8.6-50 MG per tablet   Commonly known as:  SENOKOT-S;PERICOLACE   Used for:  Constipation, unspecified constipation type        Dose:  1 tablet   Take 1 tablet by mouth 2 times daily as needed for constipation   Quantity:  100 tablet   Refills:  0         CONTINUE these medicines  which have NOT CHANGED        Dose / Directions    buPROPion 150 MG 12 hr tablet   Commonly known as:  WELLBUTRIN SR   Used for:  Major depressive disorder, recurrent episode, moderate (H)        Dose:  150 mg   Take 1 tablet (150 mg) by mouth 2 times daily   Quantity:  180 tablet   Refills:  3       busPIRone 15 MG tablet   Commonly known as:  BUSPAR   Used for:  Anxiety        Dose:  15 mg   Take 1 tablet (15 mg) by mouth 2 times daily   Quantity:  180 tablet   Refills:  3       FIRST-MOUTHWASH BLM Susp   Used for:  Cheek and lip biting        Dose:  5-10 mL   Swish and swallow 5-10 mLs in mouth every 6 hours as needed   Quantity:  237 mL   Refills:  1       FLUoxetine 20 MG capsule   Commonly known as:  PROzac   Used for:  Anxiety        Dose:  40 mg   Take 2 capsules (40 mg) by mouth daily   Quantity:  180 capsule   Refills:  3       Folic Acid-Vit B6-Vit B12 2.2-25-1 MG Tabs   Used for:  Alcoholism in recovery (H)        Dose:  1 tablet   Take 1 tablet by mouth daily   Quantity:  90 tablet   Refills:  3       naltrexone 50 MG tablet   Commonly known as:  DEPADE;REVIA   Used for:  Alcohol dependence in remission (H)        Dose:  50 mg   Take 1 tablet (50 mg) by mouth daily Take 1/2 tablet by mouth daily for one week then take 1 tablet daily after   Quantity:  90 tablet   Refills:  3       omeprazole 20 MG CR capsule   Commonly known as:  priLOSEC        Take by mouth daily   Refills:  0       temazepam 15 MG capsule   Commonly known as:  RESTORIL   Used for:  Other insomnia        Dose:  15-30 mg   Take 1-2 capsules (15-30 mg) by mouth nightly as needed for sleep   Quantity:  60 capsule   Refills:  5       traMADol 50 MG tablet   Commonly known as:  ULTRAM   Used for:  Laceration of head without foreign body, unspecified part of head, sequela        Dose:  50 mg   Take 1 tablet (50 mg) by mouth every 6 hours as needed for moderate pain   Quantity:  28 tablet   Refills:  0       traZODone 50 MG tablet   Commonly  known as:  DESYREL   Used for:  Other insomnia        TAKE ONE OR TWO TABLETS BY MOUTH EVERY NIGHT AT BEDTIME AS NEEDED   Quantity:  180 tablet   Refills:  1       Vitamin B1 100 MG Tabs   Used for:  Alcohol dependence in remission (H)        Dose:  100 mg   Take 100 mg by mouth daily   Quantity:  90 tablet   Refills:  3         STOP taking     Disulfiram 500 MG Tabs           enalapril 10 MG tablet   Commonly known as:  VASOTEC           pantoprazole 40 MG EC tablet   Commonly known as:  PROTONIX           simvastatin 10 MG tablet   Commonly known as:  ZOCOR                Where to get your medicines      These medications were sent to [facility] [place], [place] - [address]   [address] , [facility] [address]     Phone:  [phone]     propranolol 10 MG tablet         Some of these will need a paper prescription and others can be bought over the counter. Ask your nurse if you have questions.     You don't need a prescription for these medications     senna-docusate 8.6-50 MG per tablet                Protect others around you: Learn how to safely use, store and throw away your medicines at www.disposemymeds.org.             Medication List: This is a list of all your medications and when to take them. Check marks below indicate your daily home schedule. Keep this list as a reference.      Medications           Morning Afternoon Evening Bedtime As Needed    buPROPion 150 MG 12 hr tablet   Commonly known as:  WELLBUTRIN SR   Take 1 tablet (150 mg) by mouth 2 times daily                                      busPIRone 15 MG tablet   Commonly known as:  BUSPAR   Take 1 tablet (15 mg) by mouth 2 times daily   Last time this was given:  15 mg on 3/30/2017  8:10 AM                                      FIRST-MOUTHWASH BLM Susp   Swish and swallow 5-10 mLs in mouth every 6 hours as needed                                   FLUoxetine 20 MG capsule   Commonly known as:  PROzac   Take 2 capsules (40  mg) by mouth daily                                   Folic Acid-Vit B6-Vit B12 2.2-25-1 MG Tabs   Take 1 tablet by mouth daily                                   naltrexone 50 MG tablet   Commonly known as:  DEPADE;REVIA   Take 1 tablet (50 mg) by mouth daily Take 1/2 tablet by mouth daily for one week then take 1 tablet daily after                                   omeprazole 20 MG CR capsule   Commonly known as:  priLOSEC   Take by mouth daily   Last time this was given:  20 mg on 3/30/2017  6:07 AM                                   propranolol 10 MG tablet   Commonly known as:  INDERAL   Take 1 tablet (10 mg) by mouth 2 times daily   Last time this was given:  10 mg on 3/30/2017  8:11 AM                                      senna-docusate 8.6-50 MG per tablet   Commonly known as:  SENOKOT-S;PERICOLACE   Take 1 tablet by mouth 2 times daily as needed for constipation   Last time this was given:  1 tablet on 3/30/2017  8:11 AM                                   temazepam 15 MG capsule   Commonly known as:  RESTORIL   Take 1-2 capsules (15-30 mg) by mouth nightly as needed for sleep                                   traMADol 50 MG tablet   Commonly known as:  ULTRAM   Take 1 tablet (50 mg) by mouth every 6 hours as needed for moderate pain                                   traZODone 50 MG tablet   Commonly known as:  DESYREL   TAKE ONE OR TWO TABLETS BY MOUTH EVERY NIGHT AT BEDTIME AS NEEDED   Last time this was given:  50 mg on 3/29/2017  9:14 PM                                   Vitamin B1 100 MG Tabs   Take 100 mg by mouth daily

## 2017-03-27 ENCOUNTER — APPOINTMENT (OUTPATIENT)
Dept: MRI IMAGING | Facility: CLINIC | Age: 52
DRG: 432 | End: 2017-03-27
Attending: INTERNAL MEDICINE
Payer: COMMERCIAL

## 2017-03-27 PROBLEM — E83.39 HYPOPHOSPHATEMIA: Status: ACTIVE | Noted: 2017-03-27

## 2017-03-27 PROBLEM — E46 MALNUTRITION (H): Status: ACTIVE | Noted: 2017-03-27

## 2017-03-27 LAB
ALBUMIN SERPL-MCNC: 2.5 G/DL (ref 3.4–5)
ALP SERPL-CCNC: 569 U/L (ref 40–150)
ALT SERPL W P-5'-P-CCNC: 61 U/L (ref 0–50)
ANION GAP SERPL CALCULATED.3IONS-SCNC: 9 MMOL/L (ref 3–14)
AST SERPL W P-5'-P-CCNC: 153 U/L (ref 0–45)
BILIRUB SERPL-MCNC: 9.6 MG/DL (ref 0.2–1.3)
BUN SERPL-MCNC: 2 MG/DL (ref 7–30)
CALCIUM SERPL-MCNC: 7.5 MG/DL (ref 8.5–10.1)
CHLORIDE SERPL-SCNC: 104 MMOL/L (ref 94–109)
CO2 SERPL-SCNC: 27 MMOL/L (ref 20–32)
CREAT SERPL-MCNC: 0.52 MG/DL (ref 0.52–1.04)
ERYTHROCYTE [DISTWIDTH] IN BLOOD BY AUTOMATED COUNT: 20.7 % (ref 10–15)
FERRITIN SERPL-MCNC: 349 NG/ML (ref 8–252)
FOLATE SERPL-MCNC: 18.5 NG/ML
GFR SERPL CREATININE-BSD FRML MDRD: ABNORMAL ML/MIN/1.7M2
GLUCOSE SERPL-MCNC: 123 MG/DL (ref 70–99)
HCT VFR BLD AUTO: 27.2 % (ref 35–47)
HGB BLD-MCNC: 8.9 G/DL (ref 11.7–15.7)
IRON SATN MFR SERPL: 55 % (ref 15–46)
IRON SERPL-MCNC: 122 UG/DL (ref 35–180)
MAGNESIUM SERPL-MCNC: 3.1 MG/DL (ref 1.6–2.3)
MCH RBC QN AUTO: 31.8 PG (ref 26.5–33)
MCHC RBC AUTO-ENTMCNC: 32.7 G/DL (ref 31.5–36.5)
MCV RBC AUTO: 97 FL (ref 78–100)
PHOSPHATE SERPL-MCNC: 1.6 MG/DL (ref 2.5–4.5)
PHOSPHATE SERPL-MCNC: 1.7 MG/DL (ref 2.5–4.5)
PLATELET # BLD AUTO: 75 10E9/L (ref 150–450)
POTASSIUM SERPL-SCNC: 3.9 MMOL/L (ref 3.4–5.3)
PROT SERPL-MCNC: 5 G/DL (ref 6.8–8.8)
RBC # BLD AUTO: 2.8 10E12/L (ref 3.8–5.2)
SODIUM SERPL-SCNC: 140 MMOL/L (ref 133–144)
TIBC SERPL-MCNC: 221 UG/DL (ref 240–430)
VIT B12 SERPL-MCNC: 2853 PG/ML (ref 193–986)
WBC # BLD AUTO: 3.5 10E9/L (ref 4–11)

## 2017-03-27 PROCEDURE — 99232 SBSQ HOSP IP/OBS MODERATE 35: CPT | Performed by: INTERNAL MEDICINE

## 2017-03-27 PROCEDURE — 74183 MRI ABD W/O CNTR FLWD CNTR: CPT

## 2017-03-27 PROCEDURE — 25000125 ZZHC RX 250: Performed by: INTERNAL MEDICINE

## 2017-03-27 PROCEDURE — 12000007 ZZH R&B INTERMEDIATE

## 2017-03-27 PROCEDURE — 36415 COLL VENOUS BLD VENIPUNCTURE: CPT | Performed by: PEDIATRICS

## 2017-03-27 PROCEDURE — 25000132 ZZH RX MED GY IP 250 OP 250 PS 637: Performed by: INTERNAL MEDICINE

## 2017-03-27 PROCEDURE — 36415 COLL VENOUS BLD VENIPUNCTURE: CPT | Performed by: INTERNAL MEDICINE

## 2017-03-27 PROCEDURE — 84100 ASSAY OF PHOSPHORUS: CPT | Performed by: PEDIATRICS

## 2017-03-27 PROCEDURE — 25500064 ZZH RX 255 OP 636: Performed by: RADIOLOGY

## 2017-03-27 PROCEDURE — 82746 ASSAY OF FOLIC ACID SERUM: CPT | Performed by: INTERNAL MEDICINE

## 2017-03-27 PROCEDURE — 85027 COMPLETE CBC AUTOMATED: CPT | Performed by: INTERNAL MEDICINE

## 2017-03-27 PROCEDURE — 80053 COMPREHEN METABOLIC PANEL: CPT | Performed by: INTERNAL MEDICINE

## 2017-03-27 PROCEDURE — 25000125 ZZHC RX 250: Performed by: RADIOLOGY

## 2017-03-27 PROCEDURE — A9585 GADOBUTROL INJECTION: HCPCS | Performed by: RADIOLOGY

## 2017-03-27 PROCEDURE — 25000128 H RX IP 250 OP 636: Performed by: INTERNAL MEDICINE

## 2017-03-27 PROCEDURE — 99222 1ST HOSP IP/OBS MODERATE 55: CPT | Performed by: SPECIALIST

## 2017-03-27 PROCEDURE — 83735 ASSAY OF MAGNESIUM: CPT | Performed by: INTERNAL MEDICINE

## 2017-03-27 RX ORDER — GADOBUTROL 604.72 MG/ML
7.5 INJECTION INTRAVENOUS ONCE
Status: COMPLETED | OUTPATIENT
Start: 2017-03-27 | End: 2017-03-27

## 2017-03-27 RX ADMIN — METOPROLOL TARTRATE 5 MG: 5 INJECTION INTRAVENOUS at 15:30

## 2017-03-27 RX ADMIN — FOLIC ACID: 5 INJECTION, SOLUTION INTRAMUSCULAR; INTRAVENOUS; SUBCUTANEOUS at 00:31

## 2017-03-27 RX ADMIN — POTASSIUM CHLORIDE 40 MEQ: 1500 TABLET, EXTENDED RELEASE ORAL at 02:03

## 2017-03-27 RX ADMIN — Medication 3 MG: at 00:06

## 2017-03-27 RX ADMIN — TAZOBACTAM SODIUM AND PIPERACILLIN SODIUM 3.38 G: 375; 3 INJECTION, SOLUTION INTRAVENOUS at 05:58

## 2017-03-27 RX ADMIN — MAGNESIUM SULFATE IN WATER 4 G: 40 INJECTION, SOLUTION INTRAVENOUS at 01:41

## 2017-03-27 RX ADMIN — POTASSIUM CHLORIDE 40 MEQ: 1500 TABLET, EXTENDED RELEASE ORAL at 00:05

## 2017-03-27 RX ADMIN — METOPROLOL TARTRATE 5 MG: 5 INJECTION INTRAVENOUS at 00:06

## 2017-03-27 RX ADMIN — TAZOBACTAM SODIUM AND PIPERACILLIN SODIUM 3.38 G: 375; 3 INJECTION, SOLUTION INTRAVENOUS at 00:31

## 2017-03-27 RX ADMIN — POTASSIUM PHOSPHATE, MONOBASIC AND POTASSIUM PHOSPHATE, DIBASIC 20 MMOL: 224; 236 INJECTION, SOLUTION INTRAVENOUS at 10:19

## 2017-03-27 RX ADMIN — SODIUM CHLORIDE 30 ML: 9 INJECTION, SOLUTION INTRAVENOUS at 12:13

## 2017-03-27 RX ADMIN — METOPROLOL TARTRATE 5 MG: 5 INJECTION INTRAVENOUS at 13:01

## 2017-03-27 RX ADMIN — POTASSIUM PHOSPHATE, MONOBASIC AND POTASSIUM PHOSPHATE, DIBASIC 20 MMOL: 224; 236 INJECTION, SOLUTION INTRAVENOUS at 21:49

## 2017-03-27 RX ADMIN — POTASSIUM PHOSPHATE, MONOBASIC AND POTASSIUM PHOSPHATE, DIBASIC 20 MMOL: 224; 236 INJECTION, SOLUTION INTRAVENOUS at 01:53

## 2017-03-27 RX ADMIN — PANTOPRAZOLE SODIUM 40 MG: 40 INJECTION, POWDER, FOR SOLUTION INTRAVENOUS at 09:09

## 2017-03-27 RX ADMIN — METOPROLOL TARTRATE 5 MG: 5 INJECTION INTRAVENOUS at 20:11

## 2017-03-27 RX ADMIN — TRAZODONE HYDROCHLORIDE 50 MG: 50 TABLET, FILM COATED ORAL at 21:28

## 2017-03-27 RX ADMIN — TAZOBACTAM SODIUM AND PIPERACILLIN SODIUM 3.38 G: 375; 3 INJECTION, SOLUTION INTRAVENOUS at 13:01

## 2017-03-27 RX ADMIN — GADOBUTROL 5 ML: 604.72 INJECTION INTRAVENOUS at 12:12

## 2017-03-27 RX ADMIN — METOPROLOL TARTRATE 5 MG: 5 INJECTION INTRAVENOUS at 03:49

## 2017-03-27 RX ADMIN — TAZOBACTAM SODIUM AND PIPERACILLIN SODIUM 3.38 G: 375; 3 INJECTION, SOLUTION INTRAVENOUS at 18:24

## 2017-03-27 NOTE — PLAN OF CARE
Problem: Goal Outcome Summary  Goal: Goal Outcome Summary  Outcome: Improving  Pt.has not had any nausea and vomiting.She is taking ice chips and has been told to only take few ice chips to moisten her mouth.Pt.is voiding in good amouts of margie clear urine.She complains of feeling hungry.Pt.has area of firmness in her mid upper abdomen.She had normal bowel sounds with one loose stool.Her color is jaundiced. will round of pt.later.

## 2017-03-27 NOTE — ED NOTES
ED Nursing criteria listed below was addressed during verbal handoff:     Abnormal vitals: Yes  Abnormal results: Yes  Med Reconciliation completed: Yes  Meds given in ED: Yes  Any Overdue Meds: Yes  Core Measures: Yes  Isolation: Yes  Special needs: Yes  Skin assessment: Yes    Observation Patient  Education provided: N/A

## 2017-03-27 NOTE — ED NOTES
Critical lab value called from lab;  Potassium 2.6.  Results to be given to provider and primary RN

## 2017-03-27 NOTE — ED NOTES
Pt up to the bathroom, tolerated well. States she had a small BM. Pt back in bed, reports pain in left shoulder is gone and pain in IV site is improved. Denies any nausea at this time. Pt is resting on cot watching TV.

## 2017-03-27 NOTE — PROGRESS NOTES
[facility]    Hospitalist Progress Note    Date of Service (when I saw the patient): 03/27/2017    Assessment & Plan   Monalisa Olguin is a 51 year old female who was admitted on 3/26/2017.     Active Problems:    Alcohol induced liver disease    Assessment: LFT's including her transminases, bili and alk phos are all improving.  Suspect her abnormal LFT's are most likely related to chronic liver disease from alcohol but suspect she does have acute cholecystitis as well based on low grade fever and US findings with stones and wall thickening.     Plan: continue etoh avoidance, follow lft's      Acute cholecystitis - probable    Assessment: US with wall thickening and gallstones in the context of acute onset of epigastric abdominal pain and low grade fever is consistent with acute cholecystitis.  Her bili was markedly elevated and is now coming down.  Suspect her LFT's could have been from heavy etoh use but could also have been due to a stone.      Plan: NPO, MRCP to look for stone, surgery consult, continue zosyn      Hypokalemia    Assessment: potassium now normal    Plan: stop telemetry and recheck potassium tomorrow am      Abdominal pain, epigastric    Assessment: improving but  on exam.  She has a reducible ventral hernia that does not seem tender.  Suspect her pain is related to acute cholecystitis.    Plan: continue zosyn and await surgery consult      Alcohol abuse    Assessment: chronic.  No signs of withdrawal.  Has been drinking steady for the last 9 months but has been through treatment 3 times previously.      Plan: continue thiamine and folic acid      Nausea and vomiting    Assessment: no longer vomiting    Plan: continue NPO except ice chips for now and would defer any advancement in her diet to surgery for now      Portal hypertension (H)    Assessment: noted on CT with mild splenomegaly.  No ascites.   Started IV metoprolol due to possible  hyperthyroidism and associated tachycardia and HTN but would for portal HTN as well    Plan: continue IV metoprolol for now      Hyperthyroidism    Assessment: has had weight loss but more likely related to poor oral intake.  She was tachycardic and hypertensive which has improved with IV beta blocker.      Plan: continue beta blocker for now and will need recheck TSH and free T4.  Would also add tapazole if repeat labs continue to be consistent with hyperthyroidism and could then complete her evaluation as an outpatient      Kidney donor    Assessment: noted past history    Plan: no acute intervention      Esophageal reflux    Assessment: chronic but controlled    Plan: IV PPI      Hypertension goal BP (blood pressure) < 130/80    Assessment: now improved with IV metoprolol    Plan: no change      Anemia    Assessment: stool heme negative.  Retic count appropriately elevated. Low iron binding capacity and suspect she has anemia of chronic inflammation and also related to malnutrition with associated drop in her WBC as well as platelets    Plan: follow      Thrombocytopenia (H)    Assessment: could be partly nutritional or also due to splenomegaly and sequestration    Plan: follow      Tobacco abuse    Assessment: chronic    Plan: continue nicotine patch      Pulmonary nodules    Assessment: incidental finding on CT    Plan: outpatient f/u    DVT Prophylaxis: Pneumatic Compression Devices  Code Status: Full Code    Disposition: Expected discharge in 2-3 days once acute cholecystitis resolving, tolerating advancing diet and plan in place from surgery.    Joey Bowen MD    Interval History   No longer vomiting.  Abdominal pain resolved except with palpation.     -Data reviewed today: I reviewed all new labs and imaging results over the last 24 hours. I personally reviewed no images or EKG's today.    Physical Exam   Temp: 99.2  F (37.3  C) Temp src: Oral BP: 104/66 Pulse: 86 Heart Rate: 86 Resp: 16 SpO2: 95 %  O2 Device: None (Room air)    Vitals:    03/26/17 1748   Weight: 47.6 kg (105 lb)     Vital Signs with Ranges  Temp:  [98.8  F (37.1  C)-100.4  F (38  C)] 99.2  F (37.3  C)  Pulse:  [] 86  Heart Rate:  [] 86  Resp:  [14-28] 16  BP: (104-154)/() 104/66  SpO2:  [89 %-97 %] 95 %       Lungs:  CTA auscultation throughout        Cardiovascular:   RRR with no murmur, rub or gallop     Abdomen:   +BS.  Firm with mild epigastric and RUQ tenderness.  Reducible ventral hernia present in her epigastrium     Remains jaundiced    Medications     NaCl 100 mL/hr at 03/27/17 0004       IV Fluid with vitamins   Intravenous Daily     nicotine   Transdermal Q8H     nicotine   Transdermal Daily     nicotine  1 patch Transdermal Daily     metoprolol  5 mg Intravenous Q4H     pantoprazole  40 mg Intravenous Daily     piperacillin-tazobactam  3.375 g Intravenous Q6H       Data     Recent Labs  Lab 03/27/17  0511 03/26/17  1840   WBC 3.5* 4.0   HGB 8.9* 9.4*   MCV 97 97   PLT 75* 75*   INR  --  1.13    135   POTASSIUM 3.9 2.6*   CHLORIDE 104 96   CO2 27 28   BUN 2* 2*   CR 0.52 0.53   ANIONGAP 9 11   ELSIE 7.5* 8.6   * 98   ALBUMIN 2.5* 3.1*   PROTTOTAL 5.0* 6.1*   BILITOTAL 9.6* 10.9*  11.1*   ALKPHOS 569* 673*   ALT 61* 77*   * 193*   LIPASE  --  163       Recent Results (from the past 24 hour(s))   CT Chest/Abdomen/Pelvis w Contrast    Narrative    CT CHEST, ABDOMEN, PELVIS WITH CONTRAST 3/26/2017 7:59 PM    TECHNIQUE: Images from thoracic inlet to pubic symphysis 50 mL Isovue  370 IV contrast  Radiation dose for this scan was reduced using  automated exposure control, adjustment of the mA and/or kV according  to patient size, or iterative reconstruction technique.    HISTORY: Chronic cough, bilateral rhonchi, smoker, large epigastric  mass, jaundice.    COMPARISON: 3/17/2015 chest x-ray.    FINDINGS:   Chest:  No mediastinal, hilar, or axillary adenopathy. 0.4 x 0.6 cm  nodular density right lower  lobe series 3 image 35 is indeterminate.  The lungs are otherwise clear.    Abdomen and Pelvis: Slightly heterogeneous liver with diffuse fatty  infiltration. Borderline splenomegaly 13 cm in length. The gallbladder  is distended approximately 7 x 4.5 x 5 cm in size. Multiple  gallstones. Gallbladder wall appears mildly thickened.    There is small midline ventral hernia at the level of the gallbladder  containing fat and small vessels. There is some collateral vessels  compatible with portal hypertension.    Left nephrectomy changes. Normal-appearing right kidney and adrenal  glands. Pancreas appears normal. There is mild upper abdominal fat  stranding, no periaortic or pelvic adenopathy.    No acute bowel abnormality, there is some fat deposition in the wall  of the right colon which is nonspecific, can be seen with chronic or  previous colitis. No aggressive bone lesions.      Impression    IMPRESSION:  1. Gallbladder mildly distended with gallstones, suspect mild  gallbladder wall thickening. Cholecystitis could have this appearance  and if clinically indicated nuclear medicine hepatobiliary scan may be  helpful for further evaluation.  2. Diffuse liver disease with fatty infiltration and findings  suggesting portal hypertension. Borderline splenomegaly and vascular  collaterals.  3. Left nephrectomy.  4. Indeterminate nodular density right lung base up to 0.6 cm.  Recommendations for an incidental lung nodule = or > 6mm to 8mm:    Low risk patients: Initial follow-up CT at 6-12 months, then  consider CT at 18-24 months if no change.    High risk patients: Initial follow-up CT at 6-12 months, then CT at  18-24 months if no change.    *Low Risk: Minimal or absent history of smoking or other known risk  factors.  *Nonsolid (ground-glass) or partly solid nodules may require longer  follow-up to exclude indolent adenocarcinoma.  *Recommendations based on Guidelines for the Management of Incidental  Pulmonary Nodules  Detected at CT: From the Fleischner Society 2017,  Radiology 2017.    JORDAN RAMOS MD   US Abdomen Limited    Narrative    US ABDOMEN LIMITED  3/26/2017 10:57 PM     HISTORY:  Right upper quadrant pain.    COMPARISON: None.    FINDINGS: Sludge in the gallbladder. There are some shadowing  gallstones in the gallbladder as well. Gallbladder wall is thickened  at 7 mm. Sonographic Gaston sign is present. No bile duct dilatation.  Findings suggest cholecystitis.    Liver has diffuse fatty infiltration, but is normal in size. Pancreas  not well seen.    Right kidney measures 13.3 cm pole to pole and appears normal. No mass  or obstruction.    The abdominal aorta and IVC are normal where seen, but partially  obscured.      Impression    IMPRESSION:   1 Probable cholecystitis with cholelithiasis and gallbladder wall  thickening with positive sonographic Gaston sign.  2. No bile duct dilatation.  3. Diffuse fatty infiltration of the liver.

## 2017-03-27 NOTE — ED NOTES
Pt reports having sores in her mouth for about a week was seen in the clinic and given magic mouth wash that has been helpful.

## 2017-03-27 NOTE — H&P
[facility]    History and Physical  Hospitalist       Date of Admission:  3/26/2017  Date of Service (when I saw the patient): 03/26/17    Assessment & Plan       Active Problems:    Alcohol induced liver disease    Assessment: markedly elevated LFT's with concerning hyperbilirubinemia, alk phos and GGT.  Transaminases also elevated but less so.  She has been actively drinking.  The LFT's are likely from etoh but with abrupt onset of abdominal pain and thickened gallbladder wall need to also consider gallstone induced liver disease.  She has ongoing nausea and vomiting and significant hypokalemia so will need inpatient management for further evaluation of her LFT's, treatment and further evaluation for possible acute cholecystitis and replacement of her potassium.    Plan: admit to inpatient, fractionate her bilirubin, follow lft's      Acute cholecystitis - possible    Assessment: wall thickening on CT with epigastric pain.  No fever or leucocytosis. She has hot and cold flashes but has had that for about one year that she attributes to menopause.  The thickened wall could be due to portal HTN but with abrupt onset of abdominal pain need to cover for possible acute cholecystitis for now.    Plan: start zosyn, will get RUQ US and order MRCP for tomorrow       Hypokalemia    Assessment: due to vomiting and poor po intake    Plan: replace and follow.  Monitor on tele      Abdominal pain, epigastric    Assessment: could be etoh induced pancreatitis or secondary to cholecystitis.  No evidence of pancreatitis.    Plan: IV PPI and w/u gallbladder as outlined above      Alcohol abuse    Assessment: chronic and ongoing.  Has been through treatment 3 times with the last one being last year.  She has been drinking about 8 drinks per day since last June.     Plan: start thiamine and folic acid and watch for signs of withdrawal      Nausea and vomiting    Assessment: due to etoh or  cholecystitis    Plan: fluids IV and antiemetics for now      Portal hypertension (H)    Assessment: noted on CT and suggests chronic liver disease.      Plan: start IV metoprolol for now with her elevated systemic and portal pressures as well as possible hyperthyroidism      Hyperthyroidism    Assessment: incidental finding on labs.  She has had some weight loss although she has not been eating due to nausea and vomiting.  Doubt she has symptomatic hyperthyroidism at this point.  No palpable nodules or goiter on exam    Plan: start IV metoprolol and will monitor for symptoms of hyperthyroidism and will need repeat labs at some point.  Hold off on methimazole at this point.        Kidney donor    Assessment: noted past history    Plan: no intervention      Esophageal reflux    Assessment: chronic    Plan: IV PPI      Hypertension goal BP (blood pressure) < 130/80    Assessment: BP elevated     Plan: IV metoprolol for now      Anemia    Assessment: no symptoms suggesting GIB but could have esophageal varices.  Could be related to chronic liver disease    Plan: check occult blood, retic count, check iron studies as well as B12 and folic acid      Thrombocytopenia (H)    Assessment: likely due to portal HTN and marrow suppression from etoh.  No active bleeding    Plan: follow      Tobacco abuse    Assessment: chronic    Plan: nicotine patch      Pulmonary nodules    Assessment: incidental finding on CT    Plan: outpatient monitoring    DVT Prophylaxis: Pneumatic Compression Devices  Code Status: Full Code    Disposition: Expected discharge in 2-3 days once potassium replaced, tolerating advancing diet, LFT's improving, abdominal pain improving.    Joey Bowen MD    Primary Care Physician   Efren Bustos MD    Chief Complaint   Abdominal pain    History is obtained from the patient    History of Present Illness   Monalisa Olguin is a 51 year old female who presents with abdominal pain. She has not felt  well for about one month.  She has had a poor appetite and has had vomiting intermittently over the last month. She has also been losing weight.  She has chronic loose stools that have become lighter in color.  Her skin started turning yellow about one week ago. She has chronic hot and cold flashes she attributes to menopause.  She has had a mild cough.  Early today she had the onset of epigastric abdominal pain that is sharp without radiation.  She has not tried to eat due to the pain.  She presented to the ED due to the pain is now admitted with plans as outlined above.     Past Medical History    I have reviewed this patient's medical history and updated it with pertinent information if needed.   Past Medical History:   Diagnosis Date     Alcohol abuse 2013     Alcohol dependence 2013     Alcohol withdrawal 2013     Anxiety 10/10/2011     CKD (chronic kidney disease) stage 3, GFR 30-59 ml/min     last GFR was 42     Esophageal reflux 10/7/2002     Hypertension goal BP (blood pressure) < 130/80 2011     Moderate Depression [296.32] 2009    stable on wellbutrin     Other internal derangement of knee(717.89)     ACL Internal derangement, knee/ original injury in 5th grade, torn cartilage     Pap smear     no abnormals, due for paps q 2-3 yrs     Unspecified essential hypertension        Past Surgical History   I have reviewed this patient's surgical history and updated it with pertinent information if needed.  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     HC KNEE SCOPE, DIAGNOSTIC  01    Arthroscopy, Lt Knee       Prior to Admission Medications   Prior to Admission Medications   Prescriptions Last Dose Informant Patient Reported? Taking?   DPH-Lido-AlHydr-MgHydr-Simeth (FIRST-MOUTHWASH BLM) SUSP Unknown at Unknown time  No No   Sig: Swish and  "swallow 5-10 mLs in mouth every 6 hours as needed   Disulfiram 500 MG TABS Unknown at Unknown time  No No   Sig: Take 1 tablet by mouth daily (with breakfast)   FLUoxetine (PROZAC) 20 MG capsule 3/26/2017 at 0800  No Yes   Sig: Take 2 capsules (40 mg) by mouth daily   Folic Acid-Vit B6-Vit B12 2.2-25-1 MG TABS 3/26/2017 at 0800  No Yes   Sig: Take 1 tablet by mouth daily   Thiamine Mononitrate (VITAMIN B1) 100 MG TABS 3/26/2017 at 0800  No Yes   Sig: Take 100 mg by mouth daily   buPROPion (WELLBUTRIN SR) 150 MG 12 hr tablet 3/26/2017 at 0800  No Yes   Sig: Take 1 tablet (150 mg) by mouth 2 times daily   busPIRone (BUSPAR) 15 MG tablet 3/26/2017 at 0800  No Yes   Sig: Take 1 tablet (15 mg) by mouth 2 times daily   enalapril (VASOTEC) 10 MG tablet 3/26/2017 at 0800  No Yes   Sig: Take 1 tablet (10 mg) by mouth daily   naltrexone (DEPADE;REVIA) 50 MG tablet 3/26/2017 at 0800  No Yes   Sig: Take 1 tablet (50 mg) by mouth daily Take 1/2 tablet by mouth daily for one week then take 1 tablet daily after   omeprazole (PRILOSEC) 20 MG capsule 3/26/2017 at 0800  Yes Yes   Sig: Take by mouth daily   pantoprazole (PROTONIX) 40 MG enteric coated tablet 3/26/2017 at 0800  No Yes   Sig: Take 1 tablet (40 mg) by mouth daily Take 1 tablet daily   simvastatin (ZOCOR) 10 MG tablet 3/25/2017 at 2100  No Yes   Sig: Take 1 tablet (10 mg) by mouth At Bedtime   temazepam (RESTORIL) 15 MG capsule 3/25/2017 at 2100  No Yes   Sig: Take 1-2 capsules (15-30 mg) by mouth nightly as needed for sleep   traMADol (ULTRAM) 50 MG tablet Unknown at Unknown time  No No   Sig: Take 1 tablet (50 mg) by mouth every 6 hours as needed for moderate pain   traZODone (DESYREL) 50 MG tablet 3/25/2017 at 2100  No Yes   Sig: TAKE ONE OR TWO TABLETS BY MOUTH EVERY NIGHT AT BEDTIME AS NEEDED      Facility-Administered Medications: None     Allergies   Allergies   Allergen Reactions     Albuterol      Tongue \"hardened and painful\"       Social History   I have " reviewed this patient's social history and updated it with pertinent information if needed. Monalisa Olguin  reports that she has been smoking.  She has a 5.00 pack-year smoking history. She has never used smokeless tobacco. She reports that she drinks alcohol. She reports that she does not use illicit drugs.    Family History   I have reviewed this patient's family history and updated it with pertinent information if needed.   Family History   Problem Relation Age of Onset     DIABETES Sister 17     older sister also had kidney and pancreas transplant     Hypertension Mother      HEART DISEASE Paternal Grandmother      HEART DISEASE Paternal Grandfather      CEREBROVASCULAR DISEASE Maternal Grandmother      CEREBROVASCULAR DISEASE Maternal Grandfather      Alcohol/Drug Maternal Grandfather      Substance Abuse Maternal Grandfather      HEART DISEASE Father      Silent MI stents x 2     HEART DISEASE Sister      MI secondary to diabetes     CANCER Paternal Aunt      ?kind     DIABETES Sister 9     youngest, juvenile type I (onset age 9 with no complications)     Genitourinary Problems Sister 43     kidney transplant from renal failure       Review of Systems   The 10 point Review of Systems is negative other than noted in the HPI or here.     Physical Exam   Temp: 98.8  F (37.1  C)   BP: (!) 154/93   Heart Rate: 95 Resp: 14 SpO2: 95 %      Vital Signs with Ranges  Temp:  [98.8  F (37.1  C)] 98.8  F (37.1  C)  Heart Rate:  [] 95  Resp:  [14-28] 14  BP: (120-154)/() 154/93  SpO2:  [94 %-97 %] 95 %  105 lbs 0 oz    Constitutional:   awake, alert, cooperative, no apparent distress, and appears stated age     Eyes:   Lids and lashes normal, pupils equal, round and reactive to light, extra ocular muscles intact, sclera icteric, conjunctiva normal     ENT:   Normocephalic, without obvious abnormality, atraumatic, sinuses nontender on palpation, external ears without lesions, oral pharynx with moist mucous  membranes, tonsils without erythema or exudates, gums normal and good dentition.     Neck:   Supple, symmetrical, trachea midline, no adenopathy, thyroid symmetric, not enlarged and no tenderness, skin normal     Hematologic / Lymphatic:   no cervical lymphadenopathy and no supraclavicular lymphadenopathy     Back:   Symmetric, no curvature, spinous processes are non-tender on palpation, paraspinous muscles are non-tender on palpation, no costal vertebral tenderness     Lungs:   No increased work of breathing, good air exchange, clear to auscultation bilaterally, no crackles or wheezing     Cardiovascular:   Normal apical impulse, regular rate and rhythm, normal S1 and S2, no S3 or S4, and no murmur noted     Abdomen:   +BS.  Firm.  Small ventral hernia palpable but reducible and nontender.  Liver edge palpable 3 fingers below the right lower rib.  No focal tenderness on exam.     Neurologic:   Awake, alert, oriented to name, place and time.  Cranial nerves II-XII are grossly intact.  Motor is 5 out of 5 bilaterally.    Sensory is intact.   No asterixis.     Skin:   Diffusely jaundiced.       Data   Data reviewed today:  I personally reviewed no images or EKG's today.    Recent Labs  Lab 03/26/17  1840   WBC 4.0   HGB 9.4*   MCV 97   PLT 75*   INR 1.13      POTASSIUM 2.6*   CHLORIDE 96   CO2 28   BUN 2*   CR 0.53   ANIONGAP 11   ELSIE 8.6   GLC 98   ALBUMIN 3.1*   PROTTOTAL 6.1*   BILITOTAL 11.1*   ALKPHOS 673*   ALT 77*   *   LIPASE 163       Recent Results (from the past 24 hour(s))   CT Chest/Abdomen/Pelvis w Contrast    Narrative    CT CHEST, ABDOMEN, PELVIS WITH CONTRAST 3/26/2017 7:59 PM    TECHNIQUE: Images from thoracic inlet to pubic symphysis 50 mL Isovue  370 IV contrast  Radiation dose for this scan was reduced using  automated exposure control, adjustment of the mA and/or kV according  to patient size, or iterative reconstruction technique.    HISTORY: Chronic cough, bilateral rhonchi,  smoker, large epigastric  mass, jaundice.    COMPARISON: 3/17/2015 chest x-ray.    FINDINGS:   Chest:  No mediastinal, hilar, or axillary adenopathy. 0.4 x 0.6 cm  nodular density right lower lobe series 3 image 35 is indeterminate.  The lungs are otherwise clear.    Abdomen and Pelvis: Slightly heterogeneous liver with diffuse fatty  infiltration. Borderline splenomegaly 13 cm in length. The gallbladder  is distended approximately 7 x 4.5 x 5 cm in size. Multiple  gallstones. Gallbladder wall appears mildly thickened.    There is small midline ventral hernia at the level of the gallbladder  containing fat and small vessels. There is some collateral vessels  compatible with portal hypertension.    Left nephrectomy changes. Normal-appearing right kidney and adrenal  glands. Pancreas appears normal. There is mild upper abdominal fat  stranding, no periaortic or pelvic adenopathy.    No acute bowel abnormality, there is some fat deposition in the wall  of the right colon which is nonspecific, can be seen with chronic or  previous colitis. No aggressive bone lesions.      Impression    IMPRESSION:  1. Gallbladder mildly distended with gallstones, suspect mild  gallbladder wall thickening. Cholecystitis could have this appearance  and if clinically indicated nuclear medicine hepatobiliary scan may be  helpful for further evaluation.  2. Diffuse liver disease with fatty infiltration and findings  suggesting portal hypertension. Borderline splenomegaly and vascular  collaterals.  3. Left nephrectomy.  4. Indeterminate nodular density right lung base up to 0.6 cm.  Recommendations for an incidental lung nodule = or > 6mm to 8mm:    Low risk patients: Initial follow-up CT at 6-12 months, then  consider CT at 18-24 months if no change.    High risk patients: Initial follow-up CT at 6-12 months, then CT at  18-24 months if no change.    *Low Risk: Minimal or absent history of smoking or other known risk  factors.  *Nonsolid  (ground-glass) or partly solid nodules may require longer  follow-up to exclude indolent adenocarcinoma.  *Recommendations based on Guidelines for the Management of Incidental  Pulmonary Nodules Detected at CT: From the [employer] 2017,  Radiology 2017.    JORDAN RAMOS MD

## 2017-03-27 NOTE — ED NOTES
Pt returned from CT. Placed on cardiac monitor. Pt 's potassium infusing without problems. Warm packs placed to eyes. Pt states they burn and itch. Nausea improved. Pain improved.

## 2017-03-27 NOTE — PLAN OF CARE
Problem: Goal Outcome Summary  Goal: Goal Outcome Summary  Outcome: No Change  Patients diet advanced to clear liquids tonight. Patient tolerated well. No complaints of pain or nausea at this time. Patients vitals stable on room air. Lungs clear. Bowels present and active throughout. Patients abdomen is distended, firm and a mass can be palpated in the mid region. Patients Phos will be rechecked at 1900.

## 2017-03-27 NOTE — CONSULTS
[facility] Surgery Consult    Monalisa Olguin MRN# [id number]   Age: 51 year old YOB: 1965     Date of Admission:  3/26/2017    Home clinic: [facility]  Primary care provider: Efren Bustos    Consult requested by Dr. Bowen    Reason for consultation - Acute Cholecystitis            Impression and Plan:   Impression:   This is a 51-year-old lady presenting with a one-week history of painless jaundice who developed right upper quadrant abdominal pain yesterday for which she was admitted. She drinks about 6 packs a day of beer. In the ER she had an ultrasound that reveals gallstones however ultrasound revealed a normal common duct and MRCP did not reveal any signs of common duct stones or ductal dilitation. She only has minimal tenderness on exam in the right upper quadrant. I suspect most recent symptoms are related to alcoholic hepatitis and not an acute cholecystitis.She has never had any prior gallbladder symptoms.  Though, she may have an underlying minimal chronic cholecystitis but that cannot be determined at this time.       Plan:   1) Would finish course of abx.  2) Ok to start clears  3) Consider elective cholecystectomy as outpatient.             Chief Complaint:   Painless Jaundice for 1 week.    History is obtained from the patient          History of Present Illness:   This 51 year old white female with a history of alcohol abuse who presents with a one-week history of jaundice. She also reports some anorexia and weight loss for the past year. She states this jaundice progressively worsened over the week and then she developed right upper quadrant pain yesterday which brought her to the ER. He denies any nausea vomiting fevers or chills.  She had an ultrasound that reveals gallstones which I'm now asked to see her. She currently states she is feeling much better since being admitted and is resting comfortably in bed.         Past Medical History:     Past  Medical History:   Diagnosis Date     Alcohol abuse 2013     Alcohol dependence 2013     Alcohol withdrawal 2013     Anxiety 10/10/2011     CKD (chronic kidney disease) stage 3, GFR 30-59 ml/min     last GFR was 42     Esophageal reflux 10/7/2002     Hypertension goal BP (blood pressure) < 130/80 2011     Moderate Depression [296.32] 2009    stable on wellbutrin     Other internal derangement of knee(717.89)     ACL Internal derangement, knee/ original injury in 5th grade, torn cartilage     Pap smear     no abnormals, due for paps q 2-3 yrs     Unspecified essential hypertension             Past Surgical History:     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     HC KNEE SCOPE, DIAGNOSTIC  01    Arthroscopy, Lt Knee            Social History:     Social History   Substance Use Topics     Smoking status: Current Every Day Smoker     Packs/day: 0.50     Years: 10.00     Smokeless tobacco: Never Used      Comment: pt quit  after smoking for 8 yrs, started again in      Alcohol use 0.0 oz/week     0 Standard drinks or equivalent per week      Comment: daily             Family History:     Family History   Problem Relation Age of Onset     DIABETES Sister 17     older sister also had kidney and pancreas transplant     Hypertension Mother      HEART DISEASE Paternal Grandmother      HEART DISEASE Paternal Grandfather      CEREBROVASCULAR DISEASE Maternal Grandmother      CEREBROVASCULAR DISEASE Maternal Grandfather      Alcohol/Drug Maternal Grandfather      Substance Abuse Maternal Grandfather      HEART DISEASE Father      Silent MI stents x 2     HEART DISEASE Sister      MI secondary to diabetes     CANCER Paternal Aunt      ?kind     DIABETES Sister 9     youngest, juvenile type I (onset age 9 with no complications)     Genitourinary  "Problems Sister 43     kidney transplant from renal failure            Immunizations:     VACCINE/DOSE   Diptheria   DPT   DTAP   HBIG   Hepatitis A   Hepatitis B   HIB   Influenza   Measles   Meningococcal   MMR   Mumps   Pneumococcal   Polio   Rubella   Small Pox   TDAP   Varicella   Zoster            Allergies:     Allergies   Allergen Reactions     Albuterol      Tongue \"hardened and painful\"            Medications:     Current Facility-Administered Medications   Medication     traZODone (DESYREL) tablet 50 mg     naloxone (NARCAN) injection 0.1-0.4 mg     0.9% sodium chloride infusion     HYDROmorphone (PF) (DILAUDID) injection 0.2 mg     ondansetron (ZOFRAN-ODT) ODT tab 4 mg    Or     ondansetron (ZOFRAN) injection 4 mg     prochlorperazine (COMPAZINE) injection 5-10 mg    Or     prochlorperazine (COMPAZINE) tablet 5-10 mg    Or     prochlorperazine (COMPAZINE) Suppository 25 mg     magnesium sulfate 4 g in 100 mL sterile water (premade)     potassium phosphate 15 mmol in D5W 250 mL intermittent infusion     potassium phosphate 20 mmol in D5W 500 mL intermittent infusion     potassium phosphate 25 mmol in D5W 500 mL intermittent infusion     NaCl 0.9 % 1,000 mL with multivitamin-ADULT (INFUVITE) 10 mL, thiamine 100 mg, folic acid 1 mg infusion     nicotine Patch in Place     nicotine patch REMOVAL     nicotine (NICODERM CQ) 14 MG/24HR 24 hr patch 1 patch     metoprolol (LOPRESSOR) injection 5 mg     pantoprazole (PROTONIX) 40 mg IV push injection     melatonin tablet 3 mg     piperacillin-tazobactam (ZOSYN) infusion 3.375 g             Review of Systems:   The review of systems was positive for the following findings.  Jaundice.    The remainder of the review of systems was unremarkable.          Physical Exam:   PE:  B/P: 109/68, T: 98.4, P: 85, R: 14  General: well developed, well nourished jaundiced WF who appears her stated age  HEENT: NC/AT, EOMI, (+)icterus, (-)injection  Neck: Supple, No JVD  Chest: " CTA  Heart: S1, S2, (-)m/r/g  Abd: Soft, non distended, minimal RUQ tenderness to deep palpation, well healed midline scar. No hernias  Ext; Warm, no edema  Psych: AAOx3  Neuro: No focal deficits            Data:   All laboratory data reviewed  Results for orders placed or performed during the hospital encounter of 03/26/17   CT Chest/Abdomen/Pelvis w Contrast    Narrative    CT CHEST, ABDOMEN, PELVIS WITH CONTRAST 3/26/2017 7:59 PM    TECHNIQUE: Images from thoracic inlet to pubic symphysis 50 mL Isovue  370 IV contrast  Radiation dose for this scan was reduced using  automated exposure control, adjustment of the mA and/or kV according  to patient size, or iterative reconstruction technique.    HISTORY: Chronic cough, bilateral rhonchi, smoker, large epigastric  mass, jaundice.    COMPARISON: 3/17/2015 chest x-ray.    FINDINGS:   Chest:  No mediastinal, hilar, or axillary adenopathy. 0.4 x 0.6 cm  nodular density right lower lobe series 3 image 35 is indeterminate.  The lungs are otherwise clear.    Abdomen and Pelvis: Slightly heterogeneous liver with diffuse fatty  infiltration. Borderline splenomegaly 13 cm in length. The gallbladder  is distended approximately 7 x 4.5 x 5 cm in size. Multiple  gallstones. Gallbladder wall appears mildly thickened.    There is small midline ventral hernia at the level of the gallbladder  containing fat and small vessels. There is some collateral vessels  compatible with portal hypertension.    Left nephrectomy changes. Normal-appearing right kidney and adrenal  glands. Pancreas appears normal. There is mild upper abdominal fat  stranding, no periaortic or pelvic adenopathy.    No acute bowel abnormality, there is some fat deposition in the wall  of the right colon which is nonspecific, can be seen with chronic or  previous colitis. No aggressive bone lesions.      Impression    IMPRESSION:  1. Gallbladder mildly distended with gallstones, suspect mild  gallbladder wall  thickening. Cholecystitis could have this appearance  and if clinically indicated nuclear medicine hepatobiliary scan may be  helpful for further evaluation.  2. Diffuse liver disease with fatty infiltration and findings  suggesting portal hypertension. Borderline splenomegaly and vascular  collaterals.  3. Left nephrectomy.  4. Indeterminate nodular density right lung base up to 0.6 cm.  Recommendations for an incidental lung nodule = or > 6mm to 8mm:    Low risk patients: Initial follow-up CT at 6-12 months, then  consider CT at 18-24 months if no change.    High risk patients: Initial follow-up CT at 6-12 months, then CT at  18-24 months if no change.    *Low Risk: Minimal or absent history of smoking or other known risk  factors.  *Nonsolid (ground-glass) or partly solid nodules may require longer  follow-up to exclude indolent adenocarcinoma.  *Recommendations based on Guidelines for the Management of Incidental  Pulmonary Nodules Detected at CT: From the Fleischner Society 2017,  Radiology 2017.    JORDAN RAMOS MD   US Abdomen Limited    Narrative    US ABDOMEN LIMITED  3/26/2017 10:57 PM     HISTORY:  Right upper quadrant pain.    COMPARISON: None.    FINDINGS: Sludge in the gallbladder. There are some shadowing  gallstones in the gallbladder as well. Gallbladder wall is thickened  at 7 mm. Sonographic Gaston sign is present. No bile duct dilatation.  Findings suggest cholecystitis.    Liver has diffuse fatty infiltration, but is normal in size. Pancreas  not well seen.    Right kidney measures 13.3 cm pole to pole and appears normal. No mass  or obstruction.    The abdominal aorta and IVC are normal where seen, but partially  obscured.      Impression    IMPRESSION:   1 Probable cholecystitis with cholelithiasis and gallbladder wall  thickening with positive sonographic Gaston sign.  2. No bile duct dilatation.  3. Diffuse fatty infiltration of the liver.    TIFFANY RAMIREZ MD   MR Abdomen MRCP w/o & w  Contrast    Narrative    MR ABDOMEN MRCP WITHOUT AND WITH CONTRAST   3/27/2017 12:14 PM     HISTORY: Hyperbilirubinemia with thickened gallbladder wall on CT.    TECHNIQUE: Multiplanar, multiecho MRI was performed using T1, T2, and  in- and out-of-phase imaging. Pre- and postcontrast T1 images were  acquired after the administration of contrast IV (5 mL Gadavist ).  Multisignal, multiplanar imaging is also performed through the biliary  system.    COMPARISON: CT abdomen and pelvis dated 3/26/2017 and ultrasound of  the abdomen dated 3/26/2017.    FINDINGS: Signal intensity of the liver is mildly heterogeneous. No  focal mass lesion in the liver is seen. No enhancing lesions are  identified. Minimal signal dropout in the liver is noted and could  represent diffuse fatty infiltration of the liver. No intrahepatic  biliary ductal dilatation.    The common bile duct is grossly of normal caliber and tapers normally  in the head of the pancreas. No filling defects are seen in the common  bile duct or proper hepatic duct. Biliary ducts are somewhat limited  in evaluation.    Dependently layering low-signal intensity structures within the  gallbladder likely represents sludge. Calculi are considered less  likely. No obvious gallbladder wall thickening is seen. There is  minimal gallbladder wall enhancement.    Small amount of free fluid is seen around the liver and around the  proximal small bowel as well as in the right paracolic gutter. Minimal  free fluid is also seen around the spleen. The spleen is mildly  enlarged and measures 12.5 x 7.7 x 12.9 cm in the AP, transverse, and  craniocaudal dimensions, respectively. Left kidney is absent.  Visualized portions of the upper abdominal contents are otherwise  unremarkable.      Impression    IMPRESSION:  1. No evidence for biliary ductal dilatation or intraluminal filling  defects within the common or proper hepatic duct to suggest  obstructive process. No mass in the head  of the pancreas is seen.  2. Gallbladder contains dependently layering material likely  representing sludge, although calculi are also possible.  3. Small amount of free fluid in the perihepatic and perisplenic  locations as well as in the right upper abdomen and in the right  paracolic gutter. This is of uncertain clinical significance and  etiology.  4. Slightly decreased signal on the out-of-phase imaging within the  liver could represent diffuse fatty infiltration of liver. Minimal  irregularity of the liver contour is noted.  5. The spleen is mildly enlarged. Portal venous hypertension is not  excluded.  6. No other significant abnormalities identified.   CBC with platelets differential   Result Value Ref Range    WBC 4.0 4.0 - 11.0 10e9/L    RBC Count 2.93 (L) 3.8 - 5.2 10e12/L    Hemoglobin 9.4 (L) 11.7 - 15.7 g/dL    Hematocrit 28.3 (L) 35.0 - 47.0 %    MCV 97 78 - 100 fl    MCH 32.1 26.5 - 33.0 pg    MCHC 33.2 31.5 - 36.5 g/dL    RDW 19.7 (H) 10.0 - 15.0 %    Platelet Count 75 (L) 150 - 450 10e9/L    Diff Method Automated Method     % Neutrophils 63.9 %    % Lymphocytes 11.7 %    % Monocytes 23.7 %    % Eosinophils 0.0 %    % Basophils 0.2 %    % Immature Granulocytes 0.5 %    Absolute Neutrophil 2.6 1.6 - 8.3 10e9/L    Absolute Lymphocytes 0.5 (L) 0.8 - 5.3 10e9/L    Absolute Monocytes 1.0 0.0 - 1.3 10e9/L    Absolute Eosinophils 0.0 0.0 - 0.7 10e9/L    Absolute Basophils 0.0 0.0 - 0.2 10e9/L    Abs Immature Granulocytes 0.0 0 - 0.4 10e9/L   INR   Result Value Ref Range    INR 1.13 0.86 - 1.14   Partial thromboplastin time   Result Value Ref Range    PTT 40 (H) 22 - 37 sec   Comprehensive metabolic panel   Result Value Ref Range    Sodium 135 133 - 144 mmol/L    Potassium 2.6 (LL) 3.4 - 5.3 mmol/L    Chloride 96 94 - 109 mmol/L    Carbon Dioxide 28 20 - 32 mmol/L    Anion Gap 11 3 - 14 mmol/L    Glucose 98 70 - 99 mg/dL    Urea Nitrogen 2 (L) 7 - 30 mg/dL    Creatinine 0.53 0.52 - 1.04 mg/dL    GFR  Estimate >90  Non  GFR Calc   >60 mL/min/1.7m2    GFR Estimate If Black >90   GFR Calc   >60 mL/min/1.7m2    Calcium 8.6 8.5 - 10.1 mg/dL    Bilirubin Total 11.1 (H) 0.2 - 1.3 mg/dL    Albumin 3.1 (L) 3.4 - 5.0 g/dL    Protein Total 6.1 (L) 6.8 - 8.8 g/dL    Alkaline Phosphatase 673 (H) 40 - 150 U/L    ALT 77 (H) 0 - 50 U/L     (H) 0 - 45 U/L   Lipase   Result Value Ref Range    Lipase 163 73 - 393 U/L   Ammonia   Result Value Ref Range    Ammonia 28 10 - 50 umol/L   TSH with free T4 reflex   Result Value Ref Range    TSH 0.04 (L) 0.40 - 4.00 mU/L   GGT   Result Value Ref Range    GGT 2823 (H) 0 - 40 U/L   T4 free   Result Value Ref Range    T4 Free 1.58 (H) 0.76 - 1.46 ng/dL   Occult blood stool   Result Value Ref Range    Occult Blood Negative NEG   Comprehensive metabolic panel   Result Value Ref Range    Sodium 140 133 - 144 mmol/L    Potassium 3.9 3.4 - 5.3 mmol/L    Chloride 104 94 - 109 mmol/L    Carbon Dioxide 27 20 - 32 mmol/L    Anion Gap 9 3 - 14 mmol/L    Glucose 123 (H) 70 - 99 mg/dL    Urea Nitrogen 2 (L) 7 - 30 mg/dL    Creatinine 0.52 0.52 - 1.04 mg/dL    GFR Estimate >90  Non  GFR Calc   >60 mL/min/1.7m2    GFR Estimate If Black >90   GFR Calc   >60 mL/min/1.7m2    Calcium 7.5 (L) 8.5 - 10.1 mg/dL    Bilirubin Total 9.6 (H) 0.2 - 1.3 mg/dL    Albumin 2.5 (L) 3.4 - 5.0 g/dL    Protein Total 5.0 (L) 6.8 - 8.8 g/dL    Alkaline Phosphatase 569 (H) 40 - 150 U/L    ALT 61 (H) 0 - 50 U/L     (H) 0 - 45 U/L   CBC with platelets   Result Value Ref Range    WBC 3.5 (L) 4.0 - 11.0 10e9/L    RBC Count 2.80 (L) 3.8 - 5.2 10e12/L    Hemoglobin 8.9 (L) 11.7 - 15.7 g/dL    Hematocrit 27.2 (L) 35.0 - 47.0 %    MCV 97 78 - 100 fl    MCH 31.8 26.5 - 33.0 pg    MCHC 32.7 31.5 - 36.5 g/dL    RDW 20.7 (H) 10.0 - 15.0 %    Platelet Count 75 (L) 150 - 450 10e9/L   Phosphorus   Result Value Ref Range    Phosphorus 1.3 (L) 2.5 - 4.5 mg/dL    Magnesium   Result Value Ref Range    Magnesium 1.5 (L) 1.6 - 2.3 mg/dL   Iron and iron binding capacity   Result Value Ref Range    Iron 122 35 - 180 ug/dL    Iron Binding Cap 221 (L) 240 - 430 ug/dL    Iron Saturation Index 55 (H) 15 - 46 %   Ferritin   Result Value Ref Range    Ferritin 349 (H) 8 - 252 ng/mL   Reticulocyte count   Result Value Ref Range    % Retic 2.8 (H) 0.5 - 2.0 %    Absolute Retic 84.1 25 - 95 10e9/L   Bilirubin Direct and Total   Result Value Ref Range    Bilirubin Direct 8.8 (H) 0.0 - 0.2 mg/dL    Bilirubin Total 10.9 (H) 0.2 - 1.3 mg/dL   Magnesium   Result Value Ref Range    Magnesium 3.1 (H) 1.6 - 2.3 mg/dL   Phosphorus   Result Value Ref Range    Phosphorus 1.6 (L) 2.5 - 4.5 mg/dL     All imaging studies reviewed by me.     Andrea Escalante MD, FACS

## 2017-03-27 NOTE — PROGRESS NOTES
S-(situation): Patient arrives to room 270 via cart from ED    B-(background): Liver disease-Alcohol induced    A-(assessment): Pt is A&O.  Having abdominal discomfort, especially when palpated.  Skin has a yellowish hue.  No open areas.  States was being treated for sores in her mouth but are better at this time. Lung sounds are diminished, sats are 94% on R/A.  Has pain in upper mid abdominal area when palpated.  The area of pain is firm.  No skin issues.    R-(recommendations): Orders reviewed with pt. Will monitor patient per MD orders.     Inpatient nursing criteria listed below were met:    Health care directives status obtained and documented: Yes  Core Measures assessed (SSI): Yes  SCD's Documented: Yes  Vaccine assessment done and vaccines ordered if appropriate: Yes  Skin issues/needs documented:NA  Isolation needs addressed, if appropriate: NA  Fall Prevention: Care plan updated, Education given and documented Yes  MRSA swab completed for patient 55 years and older (exclude WENDI and TKA): NA  My Chart patient sign up addressed and documented: No  Care Plan initiated: Yes  Education Assessment documented:Yes  Education Documented (Pre-existing chronic infection such as, MRSA/VRE need education on admission): Yes  New medication patient education completed and documented (Possible Side Effects of Common Medications handout): Yes  Home medications if not able to send immediately home with family stored here: NA   Reminder note placed in discharge instructions: NA  Discharge planning review completed (admission navigator) Yes

## 2017-03-28 PROBLEM — R10.13 ABDOMINAL PAIN, EPIGASTRIC: Status: RESOLVED | Noted: 2017-03-26 | Resolved: 2017-03-28

## 2017-03-28 PROBLEM — K70.11 ALCOHOLIC HEPATITIS WITH ASCITES (H): Status: ACTIVE | Noted: 2017-03-26

## 2017-03-28 PROBLEM — K82.8 SLUDGE IN GALLBLADDER: Status: ACTIVE | Noted: 2017-03-26

## 2017-03-28 LAB
ALBUMIN SERPL-MCNC: 2.5 G/DL (ref 3.4–5)
ALP SERPL-CCNC: 507 U/L (ref 40–150)
ALT SERPL W P-5'-P-CCNC: 52 U/L (ref 0–50)
ANION GAP SERPL CALCULATED.3IONS-SCNC: 9 MMOL/L (ref 3–14)
AST SERPL W P-5'-P-CCNC: 120 U/L (ref 0–45)
BILIRUB SERPL-MCNC: 11 MG/DL (ref 0.2–1.3)
BUN SERPL-MCNC: <1 MG/DL (ref 7–30)
CALCIUM SERPL-MCNC: 7.9 MG/DL (ref 8.5–10.1)
CHLORIDE SERPL-SCNC: 109 MMOL/L (ref 94–109)
CO2 SERPL-SCNC: 24 MMOL/L (ref 20–32)
CREAT SERPL-MCNC: 0.52 MG/DL (ref 0.52–1.04)
ERYTHROCYTE [DISTWIDTH] IN BLOOD BY AUTOMATED COUNT: 22.2 % (ref 10–15)
GFR SERPL CREATININE-BSD FRML MDRD: ABNORMAL ML/MIN/1.7M2
GLUCOSE SERPL-MCNC: 75 MG/DL (ref 70–99)
HCT VFR BLD AUTO: 27.4 % (ref 35–47)
HGB BLD-MCNC: 8.9 G/DL (ref 11.7–15.7)
MAGNESIUM SERPL-MCNC: 2 MG/DL (ref 1.6–2.3)
MCH RBC QN AUTO: 32 PG (ref 26.5–33)
MCHC RBC AUTO-ENTMCNC: 32.5 G/DL (ref 31.5–36.5)
MCV RBC AUTO: 99 FL (ref 78–100)
PHOSPHATE SERPL-MCNC: 2.3 MG/DL (ref 2.5–4.5)
PLATELET # BLD AUTO: 75 10E9/L (ref 150–450)
POTASSIUM SERPL-SCNC: 3.8 MMOL/L (ref 3.4–5.3)
PROT SERPL-MCNC: 4.8 G/DL (ref 6.8–8.8)
RBC # BLD AUTO: 2.78 10E12/L (ref 3.8–5.2)
SODIUM SERPL-SCNC: 142 MMOL/L (ref 133–144)
WBC # BLD AUTO: 3.9 10E9/L (ref 4–11)

## 2017-03-28 PROCEDURE — 84100 ASSAY OF PHOSPHORUS: CPT | Performed by: PEDIATRICS

## 2017-03-28 PROCEDURE — 25000125 ZZHC RX 250: Performed by: INTERNAL MEDICINE

## 2017-03-28 PROCEDURE — 25000132 ZZH RX MED GY IP 250 OP 250 PS 637: Performed by: INTERNAL MEDICINE

## 2017-03-28 PROCEDURE — 85027 COMPLETE CBC AUTOMATED: CPT | Performed by: PEDIATRICS

## 2017-03-28 PROCEDURE — 25000132 ZZH RX MED GY IP 250 OP 250 PS 637: Performed by: PEDIATRICS

## 2017-03-28 PROCEDURE — 80053 COMPREHEN METABOLIC PANEL: CPT | Performed by: PEDIATRICS

## 2017-03-28 PROCEDURE — 99233 SBSQ HOSP IP/OBS HIGH 50: CPT | Performed by: PEDIATRICS

## 2017-03-28 PROCEDURE — 12000000 ZZH R&B MED SURG/OB

## 2017-03-28 PROCEDURE — 36415 COLL VENOUS BLD VENIPUNCTURE: CPT | Performed by: PEDIATRICS

## 2017-03-28 PROCEDURE — 25000128 H RX IP 250 OP 636: Performed by: INTERNAL MEDICINE

## 2017-03-28 PROCEDURE — 83735 ASSAY OF MAGNESIUM: CPT | Performed by: PEDIATRICS

## 2017-03-28 RX ORDER — THIAMINE HCL 100 MG
100 TABLET ORAL DAILY
Status: DISCONTINUED | OUTPATIENT
Start: 2017-03-28 | End: 2017-03-28 | Stop reason: CLARIF

## 2017-03-28 RX ORDER — BUPROPION HYDROCHLORIDE 150 MG/1
150 TABLET, EXTENDED RELEASE ORAL 2 TIMES DAILY
Status: DISCONTINUED | OUTPATIENT
Start: 2017-03-28 | End: 2017-03-28 | Stop reason: CLARIF

## 2017-03-28 RX ORDER — PROPRANOLOL HYDROCHLORIDE 10 MG/1
10 TABLET ORAL 2 TIMES DAILY
Status: DISCONTINUED | OUTPATIENT
Start: 2017-03-28 | End: 2017-03-30 | Stop reason: HOSPADM

## 2017-03-28 RX ORDER — LANOLIN ALCOHOL/MO/W.PET/CERES
100 CREAM (GRAM) TOPICAL DAILY
Status: DISCONTINUED | OUTPATIENT
Start: 2017-03-28 | End: 2017-03-30 | Stop reason: HOSPADM

## 2017-03-28 RX ORDER — TRAMADOL HYDROCHLORIDE 50 MG/1
50 TABLET ORAL EVERY 6 HOURS PRN
Status: DISCONTINUED | OUTPATIENT
Start: 2017-03-28 | End: 2017-03-30 | Stop reason: HOSPADM

## 2017-03-28 RX ORDER — BUSPIRONE HYDROCHLORIDE 5 MG/1
15 TABLET ORAL 2 TIMES DAILY
Status: DISCONTINUED | OUTPATIENT
Start: 2017-03-28 | End: 2017-03-30 | Stop reason: HOSPADM

## 2017-03-28 RX ORDER — OXYCODONE HYDROCHLORIDE 5 MG/1
5 TABLET ORAL
Status: DISCONTINUED | OUTPATIENT
Start: 2017-03-28 | End: 2017-03-28

## 2017-03-28 RX ORDER — BUPROPION HYDROCHLORIDE 150 MG/1
150 TABLET, FILM COATED, EXTENDED RELEASE ORAL 2 TIMES DAILY
Status: DISCONTINUED | OUTPATIENT
Start: 2017-03-28 | End: 2017-03-30 | Stop reason: HOSPADM

## 2017-03-28 RX ADMIN — TAZOBACTAM SODIUM AND PIPERACILLIN SODIUM 3.38 G: 375; 3 INJECTION, SOLUTION INTRAVENOUS at 00:25

## 2017-03-28 RX ADMIN — POTASSIUM PHOSPHATE, MONOBASIC AND POTASSIUM PHOSPHATE, DIBASIC 15 MMOL: 224; 236 INJECTION, SOLUTION INTRAVENOUS at 05:11

## 2017-03-28 RX ADMIN — FOLIC ACID: 5 INJECTION, SOLUTION INTRAMUSCULAR; INTRAVENOUS; SUBCUTANEOUS at 00:25

## 2017-03-28 RX ADMIN — OMEPRAZOLE 20 MG: 20 CAPSULE, DELAYED RELEASE ORAL at 16:52

## 2017-03-28 RX ADMIN — SODIUM CHLORIDE: 9 INJECTION, SOLUTION INTRAVENOUS at 11:40

## 2017-03-28 RX ADMIN — Medication 100 MG: at 15:31

## 2017-03-28 RX ADMIN — TAZOBACTAM SODIUM AND PIPERACILLIN SODIUM 3.38 G: 375; 3 INJECTION, SOLUTION INTRAVENOUS at 05:14

## 2017-03-28 RX ADMIN — BUPROPION HYDROCHLORIDE 150 MG: 150 TABLET, FILM COATED, EXTENDED RELEASE ORAL at 21:28

## 2017-03-28 RX ADMIN — PROPRANOLOL HYDROCHLORIDE 10 MG: 10 TABLET ORAL at 21:29

## 2017-03-28 RX ADMIN — TAZOBACTAM SODIUM AND PIPERACILLIN SODIUM 3.38 G: 375; 3 INJECTION, SOLUTION INTRAVENOUS at 10:15

## 2017-03-28 RX ADMIN — BUSPIRONE HYDROCHLORIDE 15 MG: 5 TABLET ORAL at 21:28

## 2017-03-28 RX ADMIN — TRAZODONE HYDROCHLORIDE 50 MG: 50 TABLET, FILM COATED ORAL at 21:29

## 2017-03-28 RX ADMIN — PANTOPRAZOLE SODIUM 40 MG: 40 INJECTION, POWDER, FOR SOLUTION INTRAVENOUS at 09:45

## 2017-03-28 RX ADMIN — METOPROLOL TARTRATE 5 MG: 5 INJECTION INTRAVENOUS at 00:25

## 2017-03-28 NOTE — PLAN OF CARE
Problem: Goal Outcome Summary  Goal: Goal Outcome Summary  Outcome: Improving  Denies pain.  VSS, BP into 90's, lopressor held.  Voiding qs.  Taking clear liquids.  IVF continue.  Phosphorus replaced several times, see MAR.

## 2017-03-28 NOTE — PROGRESS NOTES
[facility]    Hospitalist Progress Note    Date of Service (when I saw the patient): 03/28/2017    Assessment & Plan   Monalisa Olguin is a 51 year old female who was admitted on 3/26/2017.  Clinical presentation and test results appear to be most consistent with alcoholic hepatitis with ascites.  There is now low suspicion for acute cholecystitis, biliary obstruction, or infection.  She is improving clinically.  She is pancytopenic probably due to a combination of alcoholism and splenomegaly.  Active bleeding or hemolysis have not been suspected.  Radiographic findings are consistent with portal hypertension from liver disease.  So far, acute GI bleeding has not been suspected.  She incidentally has been discovered to be hyperthyroid and was initially hypertensive and tachycardic.  Reported weight loss could be due to hyperthyroidism although her liver disease and alcoholism may also contribute to weight loss.  Malnutrition is suspected associated with alcoholism, and she has had recurring hypophosphatemia.  Although significant alcohol abuse is suspected, she has not demonstrated clinical course concerning for alcohol withdrawal so far.    Principal Problem:    Alcoholic hepatitis with ascites  Active Problems:    Alcohol abuse    Sludge in gallbladder    Hypokalemia    Anemia    Thrombocytopenia (H)    Nausea and vomiting    Portal hypertension (H)    Hyperthyroidism    Hypophosphatemia    Malnutrition (H) - probable    Kidney donor    Esophageal reflux    Hypertension goal BP (blood pressure) < 130/80    Tobacco abuse    Pulmonary nodules    Discontinue antibiotics at this time in the absence of a definite infection unless there is increasing suspicion for infection and/or sepsis  Discontinue IV fluids and advance to a low-fat diet today as tolerated, nutritional evaluation prior to discharge if available  Resume oral PPI and avoid NSAIDs  Start oral propranolol for treatment of  a combination of portal hypertension and hyperthyroidism and will not restart chronic Vasotec at this time  Advised against specific medication treatment of hyperthyroidism with either methimazole or propylthiouracil because of significant hepatitis and pancytopenia and advised close outpatient follow-up of thyroid function  Discussed chemical dependency evaluation and treatment and the patient declined any resources for this purpose at this time indicating she preferred to make arrangements of her own accord, advised complete abstinence from use of alcohol  Would not resume statin therapy at this time because of hepatitis    DVT Prophylaxis: Pneumatic Compression Devices  Code Status: Full Code    Disposition: Expected discharge in 1-2 days.    Total floor time today 43 minutes including 25 minutes spent in direct face-to-face counseling and discussion.  Mayur Moncada MD    Interval History   She is feeling better today overall.  She has no ongoing abdominal pain.  However, she is feeling more bloated after attempting to eat today.  She was hungry this morning.  She denies any nausea today and has not vomited.  She is starting to have loose stools again after eating.  She has no other new complaints.  She denies any bleeding.  She denies any confusion or tremor.  She denies any chest pain, dizziness, or palpitations.  She has not had fever for over 24 hours.  Heart rate and blood pressure have been generally stable.  Oxygenation has been normal.  Urine output has been excellent.  Alcohol use is again reviewed with her today and she reports that she had been drinking at least 8 alcoholic drinks per day for the last 6-9 months.  She reports that she previously has undergone chemical dependency evaluation and treatment of alcoholism on 3 occasions and says that she has the resources that she needs to pursue it after hospital discharge.    -Data reviewed today: I reviewed all new labs and imaging results over the  last 24 hours. I personally reviewed no images or EKG's today.    Physical Exam   Temp: 98.5  F (36.9  C) Temp src: Oral BP: 100/62 Pulse: 93 Heart Rate: 91 Resp: 20 SpO2: 97 % O2 Device: None (Room air)    Vitals:    03/26/17 1748 03/28/17 0517   Weight: 47.6 kg (105 lb) 48 kg (105 lb 13.1 oz)     Vital Signs with Ranges  Temp:  [97.8  F (36.6  C)-99.2  F (37.3  C)] 98.5  F (36.9  C)  Pulse:  [77-93] 93  Heart Rate:  [77-93] 91  Resp:  [14-20] 20  BP: ()/(62-73) 100/62  SpO2:  [94 %-97 %] 97 %  I/O last 3 completed shifts:  In: 5261 [P.O.:1600; I.V.:3661]  Out: 4900 [Urine:4900]    Constitutional: No acute distress, appears to be resting comfortably in bed  Respiratory: Normal respiratory effort, clear lungs  Cardiovascular: Regular rate and rhythm  GI: Hypoactive bowel sounds, soft abdomen, no significant tenderness  Skin/Integumen: Jaundice present  Neuro: Alert, normal mental status without confusion, no involuntary movements including tremor    Medications     NaCl 100 mL/hr at 03/28/17 1140       IV Fluid with vitamins   Intravenous Daily     nicotine   Transdermal Q8H     nicotine   Transdermal Daily     nicotine  1 patch Transdermal Daily     metoprolol  5 mg Intravenous Q4H     pantoprazole  40 mg Intravenous Daily     piperacillin-tazobactam  3.375 g Intravenous Q6H       Data   Data reviewed today:  I personally reviewed no images or EKG's today.    Recent Labs  Lab 03/28/17  0357 03/27/17  0511 03/26/17  1840   WBC 3.9* 3.5* 4.0   HGB 8.9* 8.9* 9.4*   MCV 99 97 97   PLT 75* 75* 75*   INR  --   --  1.13    140 135   POTASSIUM 3.8 3.9 2.6*   CHLORIDE 109 104 96   CO2 24 27 28   BUN <1* 2* 2*   CR 0.52 0.52 0.53   ANIONGAP 9 9 11   ELSIE 7.9* 7.5* 8.6   GLC 75 123* 98   ALBUMIN 2.5* 2.5* 3.1*   PROTTOTAL 4.8* 5.0* 6.1*   BILITOTAL 11.0* 9.6* 10.9*  11.1*   ALKPHOS 507* 569* 673*   ALT 52* 61* 77*   * 153* 193*   LIPASE  --   --  163

## 2017-03-28 NOTE — PROGRESS NOTES
CLINICAL NUTRITION SERVICES  -  ASSESSMENT NOTE      RECOMMENDATIONS FOR MD/PROVIDER TO ORDER:   None   Recommendations Ordered by Registered Dietitian (RD):   Recommend snacks between meals, will add AM and PM snack.   Future/Additional Recommendations:   None   Malnutrition: Severe malnutrition in the context of acute and chronic illness and environmental circumstances       REASON FOR ASSESSMENT  Monalisa Olguin is a 51 year old female seen by Registered Dietitian for Admission Nutrition Risk Screen - unintentional weight loss of 10 lbs or more in the past 2 months; reduced oral intake over the last month.      NUTRITION HISTORY  Information obtained from chart and pt. Per ED note- poor appetite, eating only soup within the last 3 days and unintentional wt loss of 10-15 lbs within the last 6 months. Pt has dx of alcohol induced liver disease and acute cholecystitis. Pt also has hx of hypokalemia, abdominal pain, alcohol abuse, HTN, hyperthyroidism, GERD, anemia, tobacco use. Initial complaints of nausea and vomiting, however as of this morning, pt denies any nausea, is hungry and wants to eat. Diet was advanced this morning to Low Fat. Pt agreed to adding snacks between meals: yogurt for AM snack and Ensure for PM snack.      CURRENT NUTRITION ORDERS  Diet Order:     Low Fat     Current Intake/Tolerance:  100% per flowsheets, pt states appetite is improving.      PHYSICAL FINDINGS  Observed  No nutrition-related physical findings observed  Obtained from Chart/Interdisciplinary Team  None noted    ANTHROPOMETRICS  Height: 5'  Weight: 105 lbs 13.13 oz  Body mass index is 20.67 kg/(m^2).  Weight Status:  Normal BMI  IBW: 100 lbs  % IBW: 105%  Weight History:   Wt Readings from Last 10 Encounters:   03/28/17 48 kg (105 lb 13.1 oz)   03/14/17 48.1 kg (106 lb)   10/31/16 53.1 kg (117 lb)   03/03/16 51.3 kg (113 lb)   04/25/15 52.6 kg (116 lb)   03/18/15 52.6 kg (116 lb)   03/18/15 53.5 kg (118 lb)   03/17/15 52.2 kg  (115 lb 1.6 oz)   03/02/15 56.2 kg (124 lb)   12/08/14 63 kg (139 lb)   Loss of 12 lbs in the past 5 months, which is 10.3% of BW.    LABS  Labs reviewed  Phos (L)- 2.3 (being replaced)  LFTs elevated    MEDICATIONS  Medications reviewed  Metoprolol, Protonix, Mg-Phos, K-Phos, Zofran  IVF @ 100mL/hr    Dosing Weight 105 lbs (47.7 kg)    ASSESSED NUTRITION NEEDS:  Estimated Energy Needs: 5324-1473 kcals (25-30 Kcal/Kg)  Justification: maintenance  Estimated Protein Needs: 38-48 grams protein (0.8-1 g pro/Kg)  Justification: maintenance  Estimated Fluid Needs: 3642-3510  mL (1 mL/Kcal)  Justification: maintenance    MALNUTRITION:  % Weight Loss:  > 10% in 6 months (severe malnutrition)  % Intake:  </= 50% for >/= 1 month (severe malnutrition)  Subcutaneous Fat Loss:  None observed  Muscle Loss:  None observed  Fluid Retention:  None noted    Malnutrition Diagnosis: Severe malnutrition  In Context of:  Acute illness or injury; Chronic illness or disease; Environmental or social circumstances    NUTRITION DIAGNOSIS:  Unintended weight loss related to decreased oral intake, episodes of nausea & vomiting as evidenced by loss 10.3% loss of BW over past 5 months, reports of poor intake.    NUTRITION INTERVENTIONS  Recommendations / Nutrition Prescription  -Recommend snacks between meals, will add AM and PM snack.  -Continue to monitor PO intake. Did discuss the importance of maintaining adequate energy/nutrient intake.     Implementation  Nutrition education: Provided education on maintaining adequate nutrient/energy intake.     Nutrition Goals  Intake of meals/snacks/beverages > 75%      MONITORING AND EVALUATION:  Progress towards goals will be monitored and evaluated per protocol and Practice Guidelines      Tabitha Garza RD, LD  Clinical Dietitian  [phone]

## 2017-03-28 NOTE — PROGRESS NOTES
S-(situation): Admitted 3/26/17, possible acute cholecystitis    B-(background): Admitted with pain and tenderness in epigastric region. Pt. Is jaundice, full code, allergy to albuterol, kidney donor, esophageal reflux, alcohol abuse, anxiety and depression in past medical history     A-(assessment): abdomen firm and rigid in all quadrants. Elevated LFT's. Active bowel sounds in all four quadrants, flatus present, last BM 3/28/17 was loose, no nausea currently.     R-(recommendations): Educate pt. On proper nutritional intake and meal options and examples in order to maintain proper diet and intake at home. Continue to monitor LFT's    SN Joshua

## 2017-03-29 PROBLEM — E87.6 HYPOKALEMIA: Status: RESOLVED | Noted: 2017-03-26 | Resolved: 2017-03-29

## 2017-03-29 PROBLEM — R65.10 SIRS (SYSTEMIC INFLAMMATORY RESPONSE SYNDROME) (H): Status: ACTIVE | Noted: 2017-03-29

## 2017-03-29 PROBLEM — E43 SEVERE MALNUTRITION (H): Status: ACTIVE | Noted: 2017-03-27

## 2017-03-29 PROBLEM — R11.2 NAUSEA AND VOMITING: Status: RESOLVED | Noted: 2017-03-26 | Resolved: 2017-03-29

## 2017-03-29 LAB
ALBUMIN SERPL-MCNC: 2.5 G/DL (ref 3.4–5)
ALP SERPL-CCNC: 443 U/L (ref 40–150)
ALT SERPL W P-5'-P-CCNC: 44 U/L (ref 0–50)
ANION GAP SERPL CALCULATED.3IONS-SCNC: 6 MMOL/L (ref 3–14)
AST SERPL W P-5'-P-CCNC: 96 U/L (ref 0–45)
BILIRUB SERPL-MCNC: 12.3 MG/DL (ref 0.2–1.3)
BUN SERPL-MCNC: 2 MG/DL (ref 7–30)
CALCIUM SERPL-MCNC: 8.5 MG/DL (ref 8.5–10.1)
CHLORIDE SERPL-SCNC: 108 MMOL/L (ref 94–109)
CO2 SERPL-SCNC: 29 MMOL/L (ref 20–32)
CREAT SERPL-MCNC: 0.65 MG/DL (ref 0.52–1.04)
GFR SERPL CREATININE-BSD FRML MDRD: ABNORMAL ML/MIN/1.7M2
GLUCOSE SERPL-MCNC: 85 MG/DL (ref 70–99)
HGB BLD-MCNC: 9 G/DL (ref 11.7–15.7)
PHOSPHATE SERPL-MCNC: 1.9 MG/DL (ref 2.5–4.5)
PHOSPHATE SERPL-MCNC: 3.5 MG/DL (ref 2.5–4.5)
PLATELET # BLD AUTO: 84 10E9/L (ref 150–450)
POTASSIUM SERPL-SCNC: 4.1 MMOL/L (ref 3.4–5.3)
PROT SERPL-MCNC: 4.8 G/DL (ref 6.8–8.8)
SODIUM SERPL-SCNC: 143 MMOL/L (ref 133–144)
WBC # BLD AUTO: 5 10E9/L (ref 4–11)

## 2017-03-29 PROCEDURE — 84100 ASSAY OF PHOSPHORUS: CPT | Performed by: PEDIATRICS

## 2017-03-29 PROCEDURE — 85027 COMPLETE CBC AUTOMATED: CPT | Performed by: PEDIATRICS

## 2017-03-29 PROCEDURE — 12000000 ZZH R&B MED SURG/OB

## 2017-03-29 PROCEDURE — 25000132 ZZH RX MED GY IP 250 OP 250 PS 637: Performed by: INTERNAL MEDICINE

## 2017-03-29 PROCEDURE — 36415 COLL VENOUS BLD VENIPUNCTURE: CPT | Performed by: PEDIATRICS

## 2017-03-29 PROCEDURE — 25000125 ZZHC RX 250: Performed by: INTERNAL MEDICINE

## 2017-03-29 PROCEDURE — 25000132 ZZH RX MED GY IP 250 OP 250 PS 637: Performed by: PEDIATRICS

## 2017-03-29 PROCEDURE — 99232 SBSQ HOSP IP/OBS MODERATE 35: CPT | Performed by: PEDIATRICS

## 2017-03-29 PROCEDURE — 80053 COMPREHEN METABOLIC PANEL: CPT | Performed by: PEDIATRICS

## 2017-03-29 RX ORDER — AMOXICILLIN 250 MG
1 CAPSULE ORAL 2 TIMES DAILY
Status: DISCONTINUED | OUTPATIENT
Start: 2017-03-29 | End: 2017-03-30 | Stop reason: HOSPADM

## 2017-03-29 RX ADMIN — Medication 100 MG: at 08:13

## 2017-03-29 RX ADMIN — BUPROPION HYDROCHLORIDE 150 MG: 150 TABLET, FILM COATED, EXTENDED RELEASE ORAL at 21:14

## 2017-03-29 RX ADMIN — Medication 3 MG: at 01:09

## 2017-03-29 RX ADMIN — BUPROPION HYDROCHLORIDE 150 MG: 150 TABLET, FILM COATED, EXTENDED RELEASE ORAL at 08:13

## 2017-03-29 RX ADMIN — POTASSIUM PHOSPHATE, MONOBASIC AND POTASSIUM PHOSPHATE, DIBASIC 20 MMOL: 224; 236 INJECTION, SOLUTION INTRAVENOUS at 09:42

## 2017-03-29 RX ADMIN — PROPRANOLOL HYDROCHLORIDE 10 MG: 10 TABLET ORAL at 08:13

## 2017-03-29 RX ADMIN — OMEPRAZOLE 20 MG: 20 CAPSULE, DELAYED RELEASE ORAL at 17:19

## 2017-03-29 RX ADMIN — BUSPIRONE HYDROCHLORIDE 15 MG: 5 TABLET ORAL at 08:12

## 2017-03-29 RX ADMIN — TRAZODONE HYDROCHLORIDE 50 MG: 50 TABLET, FILM COATED ORAL at 21:14

## 2017-03-29 RX ADMIN — PROPRANOLOL HYDROCHLORIDE 10 MG: 10 TABLET ORAL at 21:14

## 2017-03-29 RX ADMIN — SENNOSIDES AND DOCUSATE SODIUM 1 TABLET: 8.6; 5 TABLET ORAL at 21:14

## 2017-03-29 RX ADMIN — OMEPRAZOLE 20 MG: 20 CAPSULE, DELAYED RELEASE ORAL at 06:30

## 2017-03-29 RX ADMIN — SENNOSIDES AND DOCUSATE SODIUM 1 TABLET: 8.6; 5 TABLET ORAL at 11:12

## 2017-03-29 RX ADMIN — BUSPIRONE HYDROCHLORIDE 15 MG: 5 TABLET ORAL at 21:14

## 2017-03-29 NOTE — PROGRESS NOTES
S-(situation): Note    B-(background): Hepatitis with ascities    A-(assessment): Please review VS, pt did have a slight temp this michael.  Up walking in the hallways and states that her abd feels better after this activity.  Eating fairly well.  Vdg in 200-300ml amts.  Pt did shower this michael and vaughn the act well.    R-(recommendations): Cont. poc as ordered.

## 2017-03-29 NOTE — PLAN OF CARE
Problem: Goal Outcome Summary  Goal: Goal Outcome Summary  Outcome: No Change  Temp 100.4.  Pt on RA.  Denies pain, states has abd fullness.  Tolerating regular diet.  Has 2 saline locks.

## 2017-03-29 NOTE — PLAN OF CARE
Problem: Goal Outcome Summary  Goal: Goal Outcome Summary  Outcome: Therapy, progress toward functional goals as expected  Pt is A&O.  VSS.  Afebrile today.  Up and walking in hallway x3.  Denies discomfort.  Still has slight distention in upper mid abdomen.  Area is firm.  Pt has a yellow hue to her skin.  Will stay another night to monitor phosphorous levels.

## 2017-03-29 NOTE — PROGRESS NOTES
[facility]    Hospitalist Progress Note    Date of Service (when I saw the patient): 03/29/2017    Assessment & Plan   Monalisa Olguin is a 51 year old female who was admitted on 3/26/2017.  Alcoholic hepatitis with ascites is suspected, and she is clinically improved.  However, she continues to have fever with clinical course consistent with systemic inflammatory response syndrome.  Infection has not been identified with certainty, and fever could be due to hepatitis and/or thyrotoxicosis amongst other possibilities.  Nevertheless, infection and particularly acute cholangitis remains possible.  Her bilirubin has risen over the last 24 hours at the same time that fever has started to recur after discontinuing previous empiric antibiotics raising concern for possible cholangitis.  However, rising bilirubin could also be due to discontinuation of previous IV fluid therapy.  Oral intake is improving, but underlying severe malnutrition is suspected, and she continues to have recurring hypophosphatemia even after IV phosphorus replacement probably due to refeeding syndrome although her phosphorus level is gradually trending upward.  Signs of portal hypertension are stable, and active bleeding has not been suspected.  Hemodynamic signs of hyperthyroidism and probable mild thyrotoxicosis also appear stable with low-dose beta blocker therapy although cause for thyrotoxicosis remains uncertain.    Principal Problem:    Alcoholic hepatitis with ascites  Active Problems:    SIRS (systemic inflammatory response syndrome) (H)    Alcoholism (H)    Sludge in gallbladder    Thrombocytopenia (H)    Portal hypertension (H)    Hyperthyroidism    Hypophosphatemia    Severe malnutrition (H)    Kidney donor    Esophageal reflux    Hypertension goal BP (blood pressure) < 130/80    Anemia    Tobacco abuse    Pulmonary nodules    Advised ongoing hospitalization for close monitoring of fever, bilirubin, and  phosphorus  If fever 100.4 or higher recurs, will order blood cultures and pro-calcitonin level and may reconsider empiric antibiotic therapy for treatment of acute cholangitis depending upon her clinical status  If bilirubin continues to rise, and particularly if fever recurs, would also reconsider empiric antibiotic therapy for treatment of acute cholangitis  Replace phosphorus today, advance diet as tolerated, and recheck phosphorus tomorrow    DVT Prophylaxis: Low Risk/Ambulatory with no VTE prophylaxis indicated  Code Status: Full Code    Disposition: possible discharge in 1-2 days once fever has stabilized or resolved, bilirubin has stabilized or is trending downward, and phosphorus levels have stabilized.    Mayur Moncada MD    Interval History   She is feeling better today.  She continues to have some right upper abdominal discomfort, but the pain is less severe and less constant.  Tramadol seems to help relieve the pain.  Nausea has resolved and she is tolerating oral intake.  She has less abdominal bloating.  She reports that she had small firm bowel movements but is concerned that she may be constipated.  She developed fever overnight to 100.4 that resolved without intervention.  She remained stable hemodynamically after starting propranolol.  Her respiratory status has been stable.  She is voiding well.  She has no new complaints.    -Data reviewed today: I reviewed all new labs and imaging results over the last 24 hours. I personally reviewed no images or EKG's today.    Physical Exam   Temp: 98.9  F (37.2  C) Temp src: Oral BP: 101/64 Pulse: 77 Heart Rate: 86 Resp: 18 SpO2: 95 % O2 Device: None (Room air)    Vitals:    03/26/17 1748 03/28/17 0517 03/29/17 0100   Weight: 47.6 kg (105 lb) 48 kg (105 lb 13.1 oz) 49 kg (108 lb 0.4 oz)     Vital Signs with Ranges  Temp:  [98.5  F (36.9  C)-100.4  F (38  C)] 98.9  F (37.2  C)  Pulse:  [77-93] 77  Heart Rate:  [84-93] 86  Resp:  [18-20] 18  BP:  ()/(56-73) 101/64  SpO2:  [94 %-97 %] 95 %  I/O last 3 completed shifts:  In: 2630 [P.O.:1840; I.V.:790]  Out: 4500 [Urine:4500]    Constitutional: Obviously jaundiced, no acute distress  Respiratory: Normal respiratory effort, clear lungs  Cardiovascular: Regular rate and rhythm, good radial pulse, normal capillary refill  GI: Normal bowel sounds, no abdominal distention, soft abdomen, right upper quadrant tenderness present over the liver which is enlarged with nodular texture, no abdominal guarding or rebound tenderness  Skin/Integumen: Jaundice present, no rash  Neuro: Alert and maintains wakefulness and attention, no overt confusion, no involuntary movements    Medications        propranolol  10 mg Oral BID     omeprazole  20 mg Oral BID AC     busPIRone  15 mg Oral BID     sodium chloride (PF)  3 mL Intracatheter Q8H     thiamine  100 mg Oral Daily     buPROPion  150 mg Oral BID       Data   Data reviewed today:  I personally reviewed no images or EKG's today.    Recent Labs  Lab 03/29/17  0620 03/28/17  0357 03/27/17  0511 03/26/17  1840   WBC 5.0 3.9* 3.5* 4.0   HGB 9.0* 8.9* 8.9* 9.4*   MCV  --  99 97 97   PLT 84* 75* 75* 75*   INR  --   --   --  1.13    142 140 135   POTASSIUM 4.1 3.8 3.9 2.6*   CHLORIDE 108 109 104 96   CO2 29 24 27 28   BUN 2* <1* 2* 2*   CR 0.65 0.52 0.52 0.53   ANIONGAP 6 9 9 11   ELSIE 8.5 7.9* 7.5* 8.6   GLC 85 75 123* 98   ALBUMIN 2.5* 2.5* 2.5* 3.1*   PROTTOTAL 4.8* 4.8* 5.0* 6.1*   BILITOTAL 12.3* 11.0* 9.6* 10.9*  11.1*   ALKPHOS 443* 507* 569* 673*   ALT 44 52* 61* 77*   AST 96* 120* 153* 193*   LIPASE  --   --   --  163     Phosphorus level 1.9 today, decreased compared with 2.3 late yesterday but improved compared with 1.3 at admission

## 2017-03-30 VITALS
RESPIRATION RATE: 16 BRPM | DIASTOLIC BLOOD PRESSURE: 54 MMHG | HEART RATE: 76 BPM | TEMPERATURE: 99 F | WEIGHT: 108.03 LBS | SYSTOLIC BLOOD PRESSURE: 95 MMHG | BODY MASS INDEX: 21.1 KG/M2 | OXYGEN SATURATION: 95 %

## 2017-03-30 LAB
ALBUMIN SERPL-MCNC: 2.5 G/DL (ref 3.4–5)
ALP SERPL-CCNC: 416 U/L (ref 40–150)
ALT SERPL W P-5'-P-CCNC: 42 U/L (ref 0–50)
ANION GAP SERPL CALCULATED.3IONS-SCNC: 7 MMOL/L (ref 3–14)
AST SERPL W P-5'-P-CCNC: 94 U/L (ref 0–45)
BILIRUB SERPL-MCNC: 14 MG/DL (ref 0.2–1.3)
BUN SERPL-MCNC: 7 MG/DL (ref 7–30)
CALCIUM SERPL-MCNC: 8.8 MG/DL (ref 8.5–10.1)
CHLORIDE SERPL-SCNC: 105 MMOL/L (ref 94–109)
CO2 SERPL-SCNC: 30 MMOL/L (ref 20–32)
CREAT SERPL-MCNC: 0.63 MG/DL (ref 0.52–1.04)
GFR SERPL CREATININE-BSD FRML MDRD: ABNORMAL ML/MIN/1.7M2
GLUCOSE SERPL-MCNC: 80 MG/DL (ref 70–99)
HGB BLD-MCNC: 9.4 G/DL (ref 11.7–15.7)
INR PPP: 1.17 (ref 0.86–1.14)
PHOSPHATE SERPL-MCNC: 3.3 MG/DL (ref 2.5–4.5)
PLATELET # BLD AUTO: 104 10E9/L (ref 150–450)
POTASSIUM SERPL-SCNC: 4 MMOL/L (ref 3.4–5.3)
PROT SERPL-MCNC: 5.2 G/DL (ref 6.8–8.8)
SODIUM SERPL-SCNC: 142 MMOL/L (ref 133–144)
WBC # BLD AUTO: 6.2 10E9/L (ref 4–11)

## 2017-03-30 PROCEDURE — 85610 PROTHROMBIN TIME: CPT | Performed by: PEDIATRICS

## 2017-03-30 PROCEDURE — 80053 COMPREHEN METABOLIC PANEL: CPT | Performed by: PEDIATRICS

## 2017-03-30 PROCEDURE — 36415 COLL VENOUS BLD VENIPUNCTURE: CPT | Performed by: PEDIATRICS

## 2017-03-30 PROCEDURE — 84100 ASSAY OF PHOSPHORUS: CPT | Performed by: PEDIATRICS

## 2017-03-30 PROCEDURE — 90686 IIV4 VACC NO PRSV 0.5 ML IM: CPT | Performed by: PEDIATRICS

## 2017-03-30 PROCEDURE — 85027 COMPLETE CBC AUTOMATED: CPT | Performed by: PEDIATRICS

## 2017-03-30 PROCEDURE — 25000128 H RX IP 250 OP 636: Performed by: PEDIATRICS

## 2017-03-30 PROCEDURE — 99239 HOSP IP/OBS DSCHRG MGMT >30: CPT | Performed by: PEDIATRICS

## 2017-03-30 PROCEDURE — 25000132 ZZH RX MED GY IP 250 OP 250 PS 637: Performed by: INTERNAL MEDICINE

## 2017-03-30 PROCEDURE — 25000132 ZZH RX MED GY IP 250 OP 250 PS 637: Performed by: PEDIATRICS

## 2017-03-30 RX ORDER — AMOXICILLIN 250 MG
1 CAPSULE ORAL 2 TIMES DAILY PRN
Qty: 100 TABLET | COMMUNITY
Start: 2017-03-30 | End: 2017-06-13

## 2017-03-30 RX ORDER — PROPRANOLOL HYDROCHLORIDE 10 MG/1
10 TABLET ORAL 2 TIMES DAILY
Qty: 60 TABLET | Refills: 0 | Status: SHIPPED | OUTPATIENT
Start: 2017-03-30 | End: 2017-04-17

## 2017-03-30 RX ADMIN — BUSPIRONE HYDROCHLORIDE 15 MG: 5 TABLET ORAL at 08:10

## 2017-03-30 RX ADMIN — OMEPRAZOLE 20 MG: 20 CAPSULE, DELAYED RELEASE ORAL at 06:07

## 2017-03-30 RX ADMIN — Medication 3 MG: at 00:39

## 2017-03-30 RX ADMIN — Medication 100 MG: at 08:11

## 2017-03-30 RX ADMIN — SENNOSIDES AND DOCUSATE SODIUM 1 TABLET: 8.6; 5 TABLET ORAL at 08:11

## 2017-03-30 RX ADMIN — BUPROPION HYDROCHLORIDE 150 MG: 150 TABLET, FILM COATED, EXTENDED RELEASE ORAL at 08:10

## 2017-03-30 RX ADMIN — PROPRANOLOL HYDROCHLORIDE 10 MG: 10 TABLET ORAL at 08:11

## 2017-03-30 RX ADMIN — INFLUENZA A VIRUS A/CALIFORNIA/7/2009 X-179A (H1N1) ANTIGEN (FORMALDEHYDE INACTIVATED), INFLUENZA A VIRUS A/HONG KONG/4801/2014 X-263B (H3N2) ANTIGEN (FORMALDEHYDE INACTIVATED), INFLUENZA B VIRUS B/PHUKET/3073/2013 ANTIGEN (FORMALDEHYDE INACTIVATED), AND INFLUENZA B VIRUS B/BRISBANE/60/2008 ANTIGEN (FORMALDEHYDE INACTIVATED) 0.5 ML: 15; 15; 15; 15 INJECTION, SUSPENSION INTRAMUSCULAR at 12:39

## 2017-03-30 NOTE — DISCHARGE SUMMARY
[facility]    Discharge Summary  Hospitalist    Date of Admission:  3/26/2017  Date of Discharge:  3/30/2017  Discharging Provider: Mayur Moncada  Date of Service (when I saw the patient): 03/30/17    Discharge Diagnoses     Alcoholic hepatitis with ascites    SIRS (systemic inflammatory response syndrome) (H)    Alcoholism (H)    Sludge in gallbladder    Thrombocytopenia (H)    Portal hypertension (H)    Hyperthyroidism    Hypophosphatemia    Severe malnutrition (H)    Kidney donor    Esophageal reflux    Hypertension goal BP (blood pressure) < 130/80    Anemia    Tobacco abuse    Pulmonary nodules    Hypokalemia    Abdominal pain, epigastric    Nausea and vomiting      History of Present Illness   Monalisa Olguin is an 51 year old female with alcoholism who presented with one-month history of decreased appetite and weight loss, one week history of jaundice, and one day history of worsening abdominal pain and vomiting.  Because of initial concern for alcohol-induced liver disease versus cholecystitis, she was hospitalized. See admission history and physical for details.    Hospital Course   Monalisa Olguin was admitted on 3/26/2017.  The following problems were addressed during her hospitalization:    Problem #1 acute alcoholic hepatitis with ascites.  Hepatic panel was abnormal with elevated bilirubin, alkaline phosphatase, and mildly elevated AST and ALT.  GGT was elevated at 2823.  Lipase and ammonia level were normal.  INR was 1.13.  Abdomen CT demonstrated fatty liver, findings consistent with portal hypertension, possible gallstones, and possible gallbladder wall thickening.  Incidental note was made of a small amount of ascites.  Abdominal ultrasound also demonstrated gallbladder wall thickening and possible gallstones but no extrahepatic bile duct dilation.  MRCP did not demonstrate any gallbladder wall thickening or gallstones although incidental note was made of  sludge in the gallbladder.  There were no radiographic signs of bile duct obstruction on MRCP.  MRCP also demonstrated small amount of free fluid in the abdomen and splenomegaly.  She initially had low-grade fever with signs of systemic inflammatory response syndrome and was started empirically on parenteral antibiotics.  General surgery was consulted with clinical impression of alcoholic hepatitis and recommended nonoperative management.  Cholecystitis was not suspected after investigation, so antibiotics were subsequently discontinued.  Because of suspected portal hypertension, she was started on low-dose beta blocker therapy.  Chronic omeprazole therapy was continued.  She was strongly advised to completely abstain from use of alcohol.  Chronic statin therapy was discontinued at this time, and she was advised to not start disulfiram at this time.  On the day of discharge, case was discussed by telephone with Dr. Mcfarland of the hepatology service at the [facility].  Close outpatient follow-up of liver function was recommended, and serologic testing for viral hepatitis was suggested.  It was anticipated that alcoholic hepatitis would improve gradually over time as long as she abstained from alcohol use although the extent of hepatic recovery could not be predicted with certainty.    Problem #2 hyperthyroidism and possible thyrotoxicosis.  TSH was suppressed and free T4 was elevated.  Clinical thyroid examination was normal.  She was initially tachycardic and hypertensive and reported weight loss over the last month or so but did not otherwise demonstrate overt signs of severe thyrotoxicosis.  She was started on beta blocker therapy.  Outpatient follow-up of thyroid function in 4-6 weeks was recommended with additional radiographic imaging of the thyroid if clinically indicated.  It was unclear whether she was suffering from sick euthyroid syndrome in the context of other acute medical illness or other  cause of hyperthyroidism.    Problem #3 systemic inflammatory response syndrome.  She had low-grade fever and was leukopenic initially.  She was transiently treated with parenteral antibiotics, but infection was not identified, so antibiotic therapy was discontinued.  There was concern that antibiotic therapy could've exacerbated hyperbilirubinemia.  Fever has subsided for over 24 hrs. prior to discharge.  Clinical course was consistent with systemic inflammatory response syndrome, probably due to alcoholic hepatitis and possibly thyrotoxicosis.    Problem #4 alcoholism and severe malnutrition with hypophosphatemia and probable refeeding syndrome.  She reported heavy use of alcohol chronically.  Complete abstinence from use of alcohol was recommended.  She declined resources for chemical dependency evaluation and treatment but indicated that she and her  were investigating options for inpatient chemical dependency treatment at a medical facility after discharge.  She received nutritional and vitamin supplementation including thiamine supplementation during this hospital stay.  She was evaluated by a registered dietitian with nutritional evaluation consistent with severe malnutrition.  She was initially moderately to severely hypophosphatemic.  She received phosphorus replacement while advancing diet.  As she tolerated adequate oral intake by discharge, phosphorus level remained normal for over 24 hours without replacement.  Course was consistent with probable refeeding syndrome.    Problem #5 pancytopenia.  She was initially leukopenic and persistently anemic and thrombocytopenic during her hospital stay.  Leukopenia resolved as her other medical problems improved.  She remained anemic without overt signs of active bleeding during her hospital stay, and hemolysis was not suspected.  She also remain moderately thrombocytopenic with trend toward improvement by discharge.  Radiographic imaging demonstrated  splenomegaly and findings consistent with portal hypertension.  Pancytopenia was attributed to a combination of alcoholism and hypersplenism from portal hypertension.    Problem #6 pulmonary nodule.  Incidental note was made of a pulmonary nodule on radiographic imaging.  Because of her active smoking status, she was considered to be in a high risk category such that repeat radiographic imaging of her pulmonary nodule was advised by radiology in 6-12 months.    Mayur Moncada MD    Significant Results and Procedures   No procedures performed during this admission    Pending Results   none    Code Status   Full Code       Primary Care Physician   Efren Bustos MD    Physical Exam   108 lbs .41 oz  BP 95/54  Pulse 76  Temp 99  F (37.2  C) (Oral)  Resp 16  Wt 49 kg (108 lb 0.4 oz)  LMP 05/16/2012  SpO2 95%  BMI 21.1 kg/m2    No acute distress sitting in bed  Obvious scleral icterus and jaundice  Mild right upper abdominal tenderness with hepatosplenomegaly    Discharge Disposition   Discharged to home  Condition at discharge: Stable    Consultations This Hospital Stay   SURGERY GENERAL IP CONSULT    Time Spent on this Encounter   I, Mayur Moncada, personally saw the patient today and spent greater than 30 minutes discharging this patient.    Discharge Orders     Reason for your hospital stay   Hospitalized due to liver injury and improved     Follow-up and recommended labs and tests    Follow up with primary care provider, Efren Bustos MD, within 7 days for hospital follow- up and regarding new diagnosis.  The following labs/tests are recommended: recheck LFTs within 4 days.  Recheck thyroid function tests in 4-6 weeks.     Activity   Your activity upon discharge: activity as tolerated     Full Code     Diet   Follow this diet upon discharge: Low fat       Discharge Medications   Current Discharge Medication List      START taking these medications    Details   propranolol (INDERAL) 10 MG tablet  Take 1 tablet (10 mg) by mouth 2 times daily  Qty: 60 tablet, Refills: 0    Associated Diagnoses: Portal hypertension (H); Hyperthyroidism; Hypertension goal BP (blood pressure) < 130/80      senna-docusate (SENOKOT-S;PERICOLACE) 8.6-50 MG per tablet Take 1 tablet by mouth 2 times daily as needed for constipation  Qty: 100 tablet    Associated Diagnoses: Constipation, unspecified constipation type         CONTINUE these medications which have NOT CHANGED    Details   traZODone (DESYREL) 50 MG tablet TAKE ONE OR TWO TABLETS BY MOUTH EVERY NIGHT AT BEDTIME AS NEEDED  Qty: 180 tablet, Refills: 1    Associated Diagnoses: Other insomnia      omeprazole (PRILOSEC) 20 MG capsule Take by mouth daily      temazepam (RESTORIL) 15 MG capsule Take 1-2 capsules (15-30 mg) by mouth nightly as needed for sleep  Qty: 60 capsule, Refills: 5    Associated Diagnoses: Other insomnia      Folic Acid-Vit B6-Vit B12 2.2-25-1 MG TABS Take 1 tablet by mouth daily  Qty: 90 tablet, Refills: 3    Associated Diagnoses: Alcoholism in recovery (H)      FLUoxetine (PROZAC) 20 MG capsule Take 2 capsules (40 mg) by mouth daily  Qty: 180 capsule, Refills: 3    Associated Diagnoses: Anxiety      busPIRone (BUSPAR) 15 MG tablet Take 1 tablet (15 mg) by mouth 2 times daily  Qty: 180 tablet, Refills: 3    Associated Diagnoses: Anxiety      buPROPion (WELLBUTRIN SR) 150 MG 12 hr tablet Take 1 tablet (150 mg) by mouth 2 times daily  Qty: 180 tablet, Refills: 3    Associated Diagnoses: Major depressive disorder, recurrent episode, moderate (H)      naltrexone (DEPADE;REVIA) 50 MG tablet Take 1 tablet (50 mg) by mouth daily Take 1/2 tablet by mouth daily for one week then take 1 tablet daily after  Qty: 90 tablet, Refills: 3    Associated Diagnoses: Alcohol dependence in remission (H)      Thiamine Mononitrate (VITAMIN B1) 100 MG TABS Take 100 mg by mouth daily  Qty: 90 tablet, Refills: 3    Associated Diagnoses: Alcohol dependence in remission (H)     "  DPH-Lido-AlHydr-MgHydr-Simeth (FIRST-MOUTHWASH BLM) SUSP Swish and swallow 5-10 mLs in mouth every 6 hours as needed  Qty: 237 mL, Refills: 1    Associated Diagnoses: Cheek and lip biting      traMADol (ULTRAM) 50 MG tablet Take 1 tablet (50 mg) by mouth every 6 hours as needed for moderate pain  Qty: 28 tablet, Refills: 0    Associated Diagnoses: Laceration of head without foreign body, unspecified part of head, sequela         STOP taking these medications       Disulfiram 500 MG TABS Comments:   Reason for Stopping:         enalapril (VASOTEC) 10 MG tablet Comments:   Reason for Stopping:         simvastatin (ZOCOR) 10 MG tablet Comments:   Reason for Stopping:         pantoprazole (PROTONIX) 40 MG enteric coated tablet Comments:   Reason for Stopping:             Allergies   Allergies   Allergen Reactions     Albuterol      Tongue \"hardened and painful\"     Data   Most Recent 3 CBC's:  Recent Labs   Lab Test  03/30/17   0608  03/29/17   0620  03/28/17   0357  03/27/17   0511  03/26/17   1840   WBC  6.2  5.0  3.9*  3.5*  4.0   HGB  9.4*  9.0*  8.9*  8.9*  9.4*   MCV   --    --   99  97  97   PLT  104*  84*  75*  75*  75*      Most Recent 3 BMP's:  Recent Labs   Lab Test  03/30/17   0608  03/29/17   0620  03/28/17   0357   NA  142  143  142   POTASSIUM  4.0  4.1  3.8   CHLORIDE  105  108  109   CO2  30  29  24   BUN  7  2*  <1*   CR  0.63  0.65  0.52   ANIONGAP  7  6  9   ELSIE  8.8  8.5  7.9*   GLC  80  85  75     Most Recent 2 LFT's:  Recent Labs   Lab Test  03/30/17   0608  03/29/17   0620   AST  94*  96*   ALT  42  44   ALKPHOS  416*  443*   BILITOTAL  14.0*  12.3*     Most Recent INR's and Anticoagulation Dosing History:  Anticoagulation Dose History     Recent Dosing and Labs Latest Ref Rng & Units 5/2/2013 5/3/2013 5/6/2013 3/26/2017 3/30/2017    INR 0.86 - 1.14 0.89 0.93 0.90 1.13 1.17(H)        Most Recent TSH, T4 and A1c Labs:  Recent Labs   Lab Test  03/26/17   1840   TSH  0.04*   T4  1.58*     Results " for orders placed or performed during the hospital encounter of 03/26/17   CT Chest/Abdomen/Pelvis w Contrast    Narrative    CT CHEST, ABDOMEN, PELVIS WITH CONTRAST 3/26/2017 7:59 PM    TECHNIQUE: Images from thoracic inlet to pubic symphysis 50 mL Isovue  370 IV contrast  Radiation dose for this scan was reduced using  automated exposure control, adjustment of the mA and/or kV according  to patient size, or iterative reconstruction technique.    HISTORY: Chronic cough, bilateral rhonchi, smoker, large epigastric  mass, jaundice.    COMPARISON: 3/17/2015 chest x-ray.    FINDINGS:   Chest:  No mediastinal, hilar, or axillary adenopathy. 0.4 x 0.6 cm  nodular density right lower lobe series 3 image 35 is indeterminate.  The lungs are otherwise clear.    Abdomen and Pelvis: Slightly heterogeneous liver with diffuse fatty  infiltration. Borderline splenomegaly 13 cm in length. The gallbladder  is distended approximately 7 x 4.5 x 5 cm in size. Multiple  gallstones. Gallbladder wall appears mildly thickened.    There is small midline ventral hernia at the level of the gallbladder  containing fat and small vessels. There is some collateral vessels  compatible with portal hypertension.    Left nephrectomy changes. Normal-appearing right kidney and adrenal  glands. Pancreas appears normal. There is mild upper abdominal fat  stranding, no periaortic or pelvic adenopathy.    No acute bowel abnormality, there is some fat deposition in the wall  of the right colon which is nonspecific, can be seen with chronic or  previous colitis. No aggressive bone lesions.      Impression    IMPRESSION:  1. Gallbladder mildly distended with gallstones, suspect mild  gallbladder wall thickening. Cholecystitis could have this appearance  and if clinically indicated nuclear medicine hepatobiliary scan may be  helpful for further evaluation.  2. Diffuse liver disease with fatty infiltration and findings  suggesting portal hypertension.  Borderline splenomegaly and vascular  collaterals.  3. Left nephrectomy.  4. Indeterminate nodular density right lung base up to 0.6 cm.  Recommendations for an incidental lung nodule = or > 6mm to 8mm:    Low risk patients: Initial follow-up CT at 6-12 months, then  consider CT at 18-24 months if no change.    High risk patients: Initial follow-up CT at 6-12 months, then CT at  18-24 months if no change.    *Low Risk: Minimal or absent history of smoking or other known risk  factors.  *Nonsolid (ground-glass) or partly solid nodules may require longer  follow-up to exclude indolent adenocarcinoma.  *Recommendations based on Guidelines for the Management of Incidental  Pulmonary Nodules Detected at CT: From the Fleischner Society 2017,  Radiology 2017.    JORDAN RAMOS MD   US Abdomen Limited    Narrative    US ABDOMEN LIMITED  3/26/2017 10:57 PM     HISTORY:  Right upper quadrant pain.    COMPARISON: None.    FINDINGS: Sludge in the gallbladder. There are some shadowing  gallstones in the gallbladder as well. Gallbladder wall is thickened  at 7 mm. Sonographic Gaston sign is present. No bile duct dilatation.  Findings suggest cholecystitis.    Liver has diffuse fatty infiltration, but is normal in size. Pancreas  not well seen.    Right kidney measures 13.3 cm pole to pole and appears normal. No mass  or obstruction.    The abdominal aorta and IVC are normal where seen, but partially  obscured.      Impression    IMPRESSION:   1 Probable cholecystitis with cholelithiasis and gallbladder wall  thickening with positive sonographic Gaston sign.  2. No bile duct dilatation.  3. Diffuse fatty infiltration of the liver.    TIFFANY RAMIREZ MD   MR Abdomen MRCP w/o & w Contrast    Narrative    MR ABDOMEN MRCP WITHOUT AND WITH CONTRAST   3/27/2017 12:14 PM     HISTORY: Hyperbilirubinemia with thickened gallbladder wall on CT.    TECHNIQUE: Multiplanar, multiecho MRI was performed using T1, T2, and  in- and out-of-phase  imaging. Pre- and postcontrast T1 images were  acquired after the administration of contrast IV (5 mL Gadavist ).  Multisignal, multiplanar imaging is also performed through the biliary  system.    COMPARISON: CT abdomen and pelvis dated 3/26/2017 and ultrasound of  the abdomen dated 3/26/2017.    FINDINGS: Signal intensity of the liver is mildly heterogeneous. No  focal mass lesion in the liver is seen. No enhancing lesions are  identified. Minimal signal dropout in the liver is noted and could  represent diffuse fatty infiltration of the liver. No intrahepatic  biliary ductal dilatation.    The common bile duct is grossly of normal caliber and tapers normally  in the head of the pancreas. No filling defects are seen in the common  bile duct or proper hepatic duct. Biliary ducts are somewhat limited  in evaluation.    Dependently layering low-signal intensity structures within the  gallbladder likely represents sludge. Calculi are considered less  likely. No obvious gallbladder wall thickening is seen. There is  minimal gallbladder wall enhancement.    Small amount of free fluid is seen around the liver and around the  proximal small bowel as well as in the right paracolic gutter. Minimal  free fluid is also seen around the spleen. The spleen is mildly  enlarged and measures 12.5 x 7.7 x 12.9 cm in the AP, transverse, and  craniocaudal dimensions, respectively. Left kidney is absent.  Visualized portions of the upper abdominal contents are otherwise  unremarkable.      Impression    IMPRESSION:  1. No evidence for biliary ductal dilatation or intraluminal filling  defects within the common or proper hepatic duct to suggest  obstructive process. No mass in the head of the pancreas is seen.  2. Gallbladder contains dependently layering material likely  representing sludge, although calculi are also possible.  3. Small amount of free fluid in the perihepatic and perisplenic  locations as well as in the right upper  abdomen and in the right  paracolic gutter. This is of uncertain clinical significance and  etiology.  4. Slightly decreased signal on the out-of-phase imaging within the  liver could represent diffuse fatty infiltration of liver. Minimal  irregularity of the liver contour is noted.  5. The spleen is mildly enlarged. Portal venous hypertension is not  excluded.  6. No other significant abnormalities identified.    PRATIK LAKHANI MD

## 2017-03-30 NOTE — PLAN OF CARE
Problem: Goal Outcome Summary  Goal: Goal Outcome Summary  Outcome: Improving  S-(situation): shift note     B-(background): ETOH induced liver disease        A-(assessment): Pt is A&O.  Denies discomfort.  VSS.  Afebrile.  Up and walking in the hallway during the night.  States voiding in good amounts.  Independent in activity.  Not a fall risk at this time.  Upper abdomen is firm and still distended.       R-(recommendations): Will cont to monitor the above.

## 2017-03-30 NOTE — PLAN OF CARE
Problem: Individualization  Goal: Patient Preferences  Outcome: Improving  Pt is up in the hallway walking, states that she is feeling a lot better.  VSS.  Good urine output

## 2017-03-30 NOTE — PROGRESS NOTES
S-(situation): Note    B-(background): ETOH induced hepatitis    A-(assessment): VSS, and assessment is intake.  Note that pt is up walking in the hallway, minimal discomfort.  Eating fairly well.      R-(recommendations): Cont poc as ordered.

## 2017-04-03 ENCOUNTER — TELEPHONE (OUTPATIENT)
Dept: FAMILY MEDICINE | Facility: CLINIC | Age: 52
End: 2017-04-03

## 2017-04-03 ENCOUNTER — TELEPHONE (OUTPATIENT)
Dept: INTERNAL MEDICINE | Facility: CLINIC | Age: 52
End: 2017-04-03

## 2017-04-03 DIAGNOSIS — F10.20 ALCOHOLISM (H): ICD-10-CM

## 2017-04-03 DIAGNOSIS — R17 JAUNDICE: ICD-10-CM

## 2017-04-03 LAB
ALBUMIN SERPL-MCNC: 2.5 G/DL (ref 3.4–5)
ALP SERPL-CCNC: 369 U/L (ref 40–150)
ALT SERPL W P-5'-P-CCNC: 44 U/L (ref 0–50)
AST SERPL W P-5'-P-CCNC: 111 U/L (ref 0–45)
BILIRUB DIRECT SERPL-MCNC: 12.7 MG/DL (ref 0–0.2)
BILIRUB SERPL-MCNC: 15.9 MG/DL (ref 0.2–1.3)
PROT SERPL-MCNC: 5.4 G/DL (ref 6.8–8.8)

## 2017-04-03 PROCEDURE — 80076 HEPATIC FUNCTION PANEL: CPT | Performed by: PHYSICIAN ASSISTANT

## 2017-04-03 PROCEDURE — 36415 COLL VENOUS BLD VENIPUNCTURE: CPT | Performed by: PHYSICIAN ASSISTANT

## 2017-04-03 NOTE — TELEPHONE ENCOUNTER
Inpatient Visit Date: 03/30/17  Diagnosis / Reason for Visit: Portal Hypertension (H), Abdominal Pain, Epigastric

## 2017-04-03 NOTE — TELEPHONE ENCOUNTER
"Patient did ask RN if there was a certain number of grams she is suppose to stay under for a \"low fat diet.\". She said she is having a hard time finding things she likes. RN did huddle with Willam. He said as far as a certain \"number\" to stay under, no there is not. As little fat as she can in her diet. RN also reviewed dietician's note and it does not state a certain amount of fat a day.     Attempted to call patient no answer. Unable to leave message. If patient calls back, there is information below regarding low fat diets via clinic references.       Low-Fat Diet    A low-fat diet will help you lose weight. It also can lower cholesterol and prevent symptoms of gallbladder disease. The average American diet contains up to 50% fat. This means that 50% of all calories come from fat (80 grams to 100 grams of fat per day). Choosing normal portions of foods from the list below can help lower your fat intake. The [place] [employer] recommends 20% to 35% of your daily calories come from fat. The American Heart Association suggests limiting the amount of unhealthy fats in your diet to fewer than 7% of calories from saturated fats and fewer than 1% from trans fats. The remaining 65% to 80% of calories will come from protein and carbohydrates. This is much healthier for you.  Beverages  Ok: Nonfat milk, coffee, tea, carbonated beverages  Avoid: Whole and reduced-fat milk, evaporated and condensed milk; hot chocolate mixes, milk shakes, malts, eggnog  Bread  Ok: Whole wheat or rye bread, melanie or soda crackers, brain toast, plain rolls, bagels, English muffins  Avoid: Rolls and breads containing whole milk or egg, waffles, pancakes, biscuits, corn bread; cheese crackers, other flavored crackers, pastries, doughnuts  Cereal  Ok: Oatmeal, whole wheat, bran, multi grain, rice  Avoid: Granola or other cereals containing oil, coconut, or more than 2 grams of fat per serving  Desserts  Ok: Gelatin, slushy, kaiden food cake, " "puddings or sherbet made with nonfat milk, meringues, and nonfat yogurt  Avoid: Any other commercially prepared desserts or desserts containing fat, whole milk, cream, chocolate, and coconut  Fats  Ok: You may have up to 3 teaspoons of fat daily. This can be in the form of butter, margarine, mayonnaise, or healthy oils (canola or olive)  Avoid: Cream, non-dairy creams, cream cheese, gravies, and cream sauces  Fruits  Ok: All fruits prepared without fat  Avoid: Coconut, olives  Meats, poultry, fish  Ok: Limit meat to 6 oz daily (broiled, roasted, baked, grilled, or boiled). Select lean cuts, well-trimmed of fat: beef, fish, lamb, pork and canned fish packed in water; chicken and turkey with the skin removed.  Avoid: Fried meats, fish, poultry, fried eggs and fish canned in oils; fatty meats such as ewing, sausage, corned beef, hot dogs, luncheon meats, or meats with gravies and sauces  Cheese and eggs  Ok: Cheeses labeled \"low fat\"; 3 whole eggs per week, egg whites and egg substitutes as desired  Avoid: All other cheeses  Potatoes, beans, pasta  Ok: Dried beans, split peas, lentils, potatoes, rice, pasta prepared without added fat  Avoid: French fries, potato chips, potatoes prepared with butter, refried beans  Soups  Ok: Bouillon or broth soups without fat and with allowed vegetables  Avoid: Cream based soups  Vegetables  Ok: Fresh, frozen, canned or dried vegetables all prepared without added fat  Avoid: Fried vegetables and those prepared with butter, cream, sauces  Miscellaneous  Ok: Salt, sugar, jelly, hard candy, marshmallows, honey, syrup, spices and herbs, mustard, catsup, lemon, vinegar (to maintain an overall healthy diet, try to limit sweets and added sugars)  Avoid: Pizza, chocolate, nuts, coconut, cream candies, sunflower, sesame, and other seeds, fried foods, and cream sauces and gravies    3993-4733 [employer]. [address] [place] [address], [place] [address]. All rights reserved. This " information is not intended as a substitute for professional medical care. Always follow your healthcare professional's instructions.

## 2017-04-03 NOTE — TELEPHONE ENCOUNTER
Pt was notified of critical lab. She is going to come in for an appt with Internal Medicine in [place] on 4/4 at 9 am  Pt was instructed to drink lots of water until her appt tomorrow.  Per   Pt understood appt time and recommendations.  MP/MA

## 2017-04-03 NOTE — PROGRESS NOTES
Please call lab - not sure why these came to me.  Not my patient. May be an inpatient - otherwise Team B patient

## 2017-04-03 NOTE — TELEPHONE ENCOUNTER
"Hospital/TCU/ED for chronic condition Discharge Protocol    \"Hi, my name is Brissa Haines, a registered nurse, and I am calling from [facility] (formerly [facility]).  I am calling to follow up and see how things are going for you after your recent emergency visit/hospital/TCU stay.\"    Tell me how you are doing now that you are home?\" doing better. Still have some pain. I'm on a low fat diet which is hard to follow up on.\"      Discharge Instructions    \"Let's review your discharge instructions.  What is/are the follow-up recommendations?  Pt. Response: Follow up in clinic    \"Has an appointment with your primary care provider been scheduled?\"   Yes. (confirm)    \"When you see the provider, I would recommend that you bring your medications with you.\"    Medications    \"Tell me what changed about your medicines when you discharged?\"    Changes to chronic meds?    0-1    \"What questions do you have about your medications?\"    None     New diagnoses of heart failure, COPD, diabetes, or MI?    No              Medication reconciliation completed? Yes  Was MTM referral placed (*Make sure to put transitions as reason for referral)?   No, patient wanted to wait until she was seen by Stella on 4/10/17    Call Summary    \"What questions or concerns do you have about your recent visit and your follow-up care?\"     none    \"If you have questions or things don't continue to improve, we encourage you contact us through the main clinic number (give number).  Even if the clinic is not open, triage nurses are available 24/7 to help you.     We would like you to know that our clinic has extended hours (provide information).  We also have urgent care (provide details on closest location and hours/contact info)\"      \"Thank you for your time and take care!\"    Brissa Haines RN  [facility]  "

## 2017-04-04 ENCOUNTER — OFFICE VISIT (OUTPATIENT)
Dept: FAMILY MEDICINE | Facility: OTHER | Age: 52
End: 2017-04-04
Payer: COMMERCIAL

## 2017-04-04 VITALS
HEART RATE: 74 BPM | TEMPERATURE: 98 F | DIASTOLIC BLOOD PRESSURE: 62 MMHG | OXYGEN SATURATION: 98 % | WEIGHT: 104 LBS | SYSTOLIC BLOOD PRESSURE: 98 MMHG | HEIGHT: 60 IN | RESPIRATION RATE: 18 BRPM | BODY MASS INDEX: 20.42 KG/M2

## 2017-04-04 DIAGNOSIS — K70.11 ALCOHOLIC HEPATITIS WITH ASCITES (H): Primary | ICD-10-CM

## 2017-04-04 DIAGNOSIS — F10.20 ALCOHOLISM (H): ICD-10-CM

## 2017-04-04 DIAGNOSIS — G47.09 OTHER INSOMNIA: ICD-10-CM

## 2017-04-04 DIAGNOSIS — N18.30 CKD (CHRONIC KIDNEY DISEASE) STAGE 3, GFR 30-59 ML/MIN (H): ICD-10-CM

## 2017-04-04 DIAGNOSIS — F41.9 ANXIETY: ICD-10-CM

## 2017-04-04 LAB
ALBUMIN SERPL-MCNC: 2.5 G/DL (ref 3.4–5)
ALP SERPL-CCNC: 350 U/L (ref 40–150)
ALT SERPL W P-5'-P-CCNC: 46 U/L (ref 0–50)
ANION GAP SERPL CALCULATED.3IONS-SCNC: 9 MMOL/L (ref 3–14)
AST SERPL W P-5'-P-CCNC: 104 U/L (ref 0–45)
BILIRUB SERPL-MCNC: 14.9 MG/DL (ref 0.2–1.3)
BUN SERPL-MCNC: 7 MG/DL (ref 7–30)
CALCIUM SERPL-MCNC: 8.6 MG/DL (ref 8.5–10.1)
CHLORIDE SERPL-SCNC: 104 MMOL/L (ref 94–109)
CO2 SERPL-SCNC: 27 MMOL/L (ref 20–32)
CREAT SERPL-MCNC: 0.65 MG/DL (ref 0.52–1.04)
GFR SERPL CREATININE-BSD FRML MDRD: ABNORMAL ML/MIN/1.7M2
GLUCOSE SERPL-MCNC: 96 MG/DL (ref 70–99)
INR PPP: 1.11 (ref 0.86–1.14)
PHOSPHATE SERPL-MCNC: 1.9 MG/DL (ref 2.5–4.5)
POTASSIUM SERPL-SCNC: 3.7 MMOL/L (ref 3.4–5.3)
PROT SERPL-MCNC: 5.6 G/DL (ref 6.8–8.8)
SODIUM SERPL-SCNC: 140 MMOL/L (ref 133–144)

## 2017-04-04 PROCEDURE — 85610 PROTHROMBIN TIME: CPT | Performed by: INTERNAL MEDICINE

## 2017-04-04 PROCEDURE — 84100 ASSAY OF PHOSPHORUS: CPT | Performed by: INTERNAL MEDICINE

## 2017-04-04 PROCEDURE — 36415 COLL VENOUS BLD VENIPUNCTURE: CPT | Performed by: INTERNAL MEDICINE

## 2017-04-04 PROCEDURE — 80053 COMPREHEN METABOLIC PANEL: CPT | Performed by: INTERNAL MEDICINE

## 2017-04-04 PROCEDURE — 99214 OFFICE O/P EST MOD 30 MIN: CPT | Performed by: INTERNAL MEDICINE

## 2017-04-04 RX ORDER — TEMAZEPAM 7.5 MG/1
7.5 CAPSULE ORAL
Qty: 30 CAPSULE | Refills: 0 | Status: SHIPPED | OUTPATIENT
Start: 2017-04-04 | End: 2017-04-05

## 2017-04-04 ASSESSMENT — PAIN SCALES - GENERAL: PAINLEVEL: NO PAIN (0)

## 2017-04-04 NOTE — PROGRESS NOTES
SUBJECTIVE:                                                    Monalisa Olguin is a 51 year old female who presents to clinic today for the following health issues:  The patient is a pleasant 51-year-old female who has a chronic history of alcoholism. Review of her records suggest that this goes back greater than a decade. She notes that she was recently in the hospital with an exacerbation of hyperbilirubinemia and jaundice. I was asked to see her emergently due to her elevated bilirubin. She notes that she has gone through withdrawal already. She is not having any shakes or tremors. She is been alcohol free for about 5 days now. Her medications are reviewed. Her laboratory work is reviewed which demonstrates stable kidney function. It is of note that she is a kidney donor and only has one kidney to play with. Her phosphorus is now back up to normal. She has a normal INR with no significant bruising or bleeding. Her ammonia level is stable and she has completely intact cognition.        Hospital Follow-up Visit:    Hospital/Nursing Home/IP Rehab Facility: [facility]  Date of Admission: 3/26/2017  Date of Discharge: 3/28/2017  Reason(s) for Admission: abd pain             Problems taking medications regularly:  None       Medication changes since discharge: None       Problems adhering to non-medication therapy:  None    Summary of hospitalization:  [facility] discharge summary reviewed  Diagnostic Tests/Treatments reviewed.  Follow up needed: none  Other Healthcare Providers Involved in Patient s Care:         None  Update since discharge: worsened.     Post Discharge Medication Reconciliation: discharge medications reconciled, continue medications without change.  Plan of care communicated with patient     Coding guidelines for this visit:  Type of Medical   Decision Making Face-to-Face Visit       within 7 Days of discharge Face-to-Face Visit        within 14 days of discharge    Moderate Complexity [id number]   High Complexity [id number]          Medication Followup of     Taking Medication as prescribed: yes    Side Effects:  None    Medication Helping Symptoms:  yes         Problem list and histories reviewed & adjusted, as indicated.  Additional history: as documented    Patient Active Problem List   Diagnosis     Kidney donor     Esophageal reflux     Moderate Depression [296.32]     HYPERLIPIDEMIA LDL GOAL <100     CKD (chronic kidney disease) stage 3, GFR 30-59 ml/min     Hypertension goal BP (blood pressure) < 130/80     Anxiety     Crushing injury of thumb, left     Alcoholism (H)     Alcoholism in recovery (H)     Laceration of head without foreign body, unspecified part of head, sequela     Alcoholic hepatitis with ascites     Sludge in gallbladder     Anemia     Thrombocytopenia (H)     Tobacco abuse     Portal hypertension (H)     Pulmonary nodules     Hyperthyroidism     Hypophosphatemia     Severe malnutrition (H)     SIRS (systemic inflammatory response syndrome) (H)     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     HC KNEE SCOPE, DIAGNOSTIC  01    Arthroscopy, Lt Knee       Social History   Substance Use Topics     Smoking status: Current Every Day Smoker     Packs/day: 0.50     Years: 10.00     Smokeless tobacco: Never Used      Comment: pt quit  after smoking for 8 yrs, started again in      Alcohol use 0.0 oz/week     0 Standard drinks or equivalent per week      Comment: daily      Family History   Problem Relation Age of Onset     DIABETES Sister 17     older sister also had kidney and pancreas transplant     Hypertension Mother      HEART DISEASE Paternal Grandmother      HEART DISEASE Paternal Grandfather      CEREBROVASCULAR DISEASE Maternal Grandmother      CEREBROVASCULAR DISEASE Maternal Grandfather       "Alcohol/Drug Maternal Grandfather      Substance Abuse Maternal Grandfather      HEART DISEASE Father      Silent MI stents x 2     HEART DISEASE Sister      MI secondary to diabetes     CANCER Paternal Aunt      ?kind     DIABETES Sister 9     youngest, juvenile type I (onset age 9 with no complications)     Genitourinary Problems Sister 43     kidney transplant from renal failure         Current Outpatient Prescriptions   Medication Sig Dispense Refill     temazepam (RESTORIL) 7.5 MG capsule Take 1 capsule (7.5 mg) by mouth nightly as needed for sleep 30 capsule 0     propranolol (INDERAL) 10 MG tablet Take 1 tablet (10 mg) by mouth 2 times daily 60 tablet 0     senna-docusate (SENOKOT-S;PERICOLACE) 8.6-50 MG per tablet Take 1 tablet by mouth 2 times daily as needed for constipation 100 tablet      traZODone (DESYREL) 50 MG tablet TAKE ONE OR TWO TABLETS BY MOUTH EVERY NIGHT AT BEDTIME AS NEEDED 180 tablet 1     DPH-Lido-AlHydr-MgHydr-Simeth (FIRST-MOUTHWASH BLM) SUSP Swish and swallow 5-10 mLs in mouth every 6 hours as needed 237 mL 1     omeprazole (PRILOSEC) 20 MG capsule Take by mouth daily       traMADol (ULTRAM) 50 MG tablet Take 1 tablet (50 mg) by mouth every 6 hours as needed for moderate pain 28 tablet 0     Folic Acid-Vit B6-Vit B12 2.2-25-1 MG TABS Take 1 tablet by mouth daily 90 tablet 3     FLUoxetine (PROZAC) 20 MG capsule Take 2 capsules (40 mg) by mouth daily 180 capsule 3     busPIRone (BUSPAR) 15 MG tablet Take 1 tablet (15 mg) by mouth 2 times daily 180 tablet 3     buPROPion (WELLBUTRIN SR) 150 MG 12 hr tablet Take 1 tablet (150 mg) by mouth 2 times daily 180 tablet 3     naltrexone (DEPADE;REVIA) 50 MG tablet Take 1 tablet (50 mg) by mouth daily Take 1/2 tablet by mouth daily for one week then take 1 tablet daily after 90 tablet 3     Thiamine Mononitrate (VITAMIN B1) 100 MG TABS Take 100 mg by mouth daily 90 tablet 3     Allergies   Allergen Reactions     Albuterol      Tongue \"hardened and " "painful\"       Reviewed and updated as needed this visit by clinical staff  Tobacco  Allergies  Meds       Reviewed and updated as needed this visit by Provider         ROS:  C: NEGATIVE for fever, chills, change in weight  I: NEGATIVE for worrisome rashes, moles or lesions. She is a bright yellow and has appropriate jaundice  E: NEGATIVE for vision changes or irritation  E/M: NEGATIVE for ear, mouth and throat problems  R: NEGATIVE for significant cough or SOB  B: NEGATIVE for masses, tenderness or discharge  CV: NEGATIVE for chest pain, palpitations or peripheral edema  GI: NEGATIVE for nausea, abdominal pain, heartburn, or change in bowel habits  : NEGATIVE for frequency, dysuria, or hematuria  M: NEGATIVE for significant arthralgias or myalgia  N: NEGATIVE for weakness, dizziness or paresthesias  E: NEGATIVE for temperature intolerance, skin/hair changes  H: NEGATIVE for bleeding problems  P: NEGATIVE for changes in mood or affect    OBJECTIVE:                                                    BP 98/62 (BP Location: Left arm, Patient Position: Chair, Cuff Size: Adult Regular)  Pulse 74  Temp 98  F (36.7  C) (Tympanic)  Resp 18  Ht 5' (1.524 m)  Wt 104 lb (47.2 kg)  LMP 05/16/2012  SpO2 98%  BMI 20.31 kg/m2  Body mass index is 20.31 kg/(m^2).  GENERAL: healthy, alert and no distress  EYES: Eyes grossly normal to inspection, PERRL and conjunctivae and sclerae normal  HENT: ear canals and TM's normal, nose and mouth without ulcers or lesions  NECK: no adenopathy, no asymmetry, masses, or scars and thyroid normal to palpation  RESP: lungs clear to auscultation - no rales, rhonchi or wheezes  CV: regular rate and rhythm, normal S1 S2, no S3 or S4, no murmur, click or rub, no peripheral edema and peripheral pulses strong  ABDOMEN: soft, nontender, no hepatosplenomegaly, no masses and bowel sounds normal  MS: no gross musculoskeletal defects noted, no edema  SKIN: no suspicious lesions or rashes and " jaundice  NEURO: Normal strength and tone, mentation intact and speech normal  PSYCH: mentation appears normal, affect normal/bright    Diagnostic Test Results:  Pending      ASSESSMENT/PLAN:                                                          ICD-10-CM    1. Alcoholic hepatitis with ascites K70.11 INR   2. Anxiety F41.9    3. Other insomnia G47.09 temazepam (RESTORIL) 7.5 MG capsule   4. CKD (chronic kidney disease) stage 3, GFR 30-59 ml/min N18.3 Comprehensive metabolic panel     Phosphorus   5. Alcoholism (H) F10.20                                   FOLLOW UP    I have asked the patient to make an appointment for follow up with me or her primary care provider in the upcoming week. I have specifically told her that it is imperative that she drinks copious quantities of water and explained the concept of hepatorenal syndrome. Given her lower kidney, this is imperative. She'll be contacted with her lab results when available.    Valdez Mcallister, [facility]

## 2017-04-04 NOTE — MR AVS SNAPSHOT
After Visit Summary   4/4/2017    Monalisa Olguin    MRN: [id number]           Patient Information     Date Of Birth          1965        Visit Information        Provider Department      4/4/2017 9:00 AM Valdez Mcallister DO [facility]        Today's Diagnoses     Alcoholic hepatitis with ascites    -  1    Anxiety        Other insomnia        CKD (chronic kidney disease) stage 3, GFR 30-59 ml/min        Alcoholism (H)           Follow-ups after your visit        Your next 10 appointments already scheduled     May 10, 2017  3:40 PM CDT   PHYSICAL with Efren Bustos MD   [facility]    [address] [place] [address] [phone]              Who to contact     If you have questions or need follow up information about today's clinic visit or your schedule please contact [facility] directly at [phone].  Normal or non-critical lab and imaging results will be communicated to you by MyChart, letter or phone within 4 business days after the clinic has received the results. If you do not hear from us within 7 days, please contact the clinic through [employer] or phone. If you have a critical or abnormal lab result, we will notify you by phone as soon as possible.  Submit refill requests through [employer] or call your pharmacy and they will forward the refill request to us. Please allow 3 business days for your refill to be completed.          Additional Information About Your Visit        MyChart Information     [employer] gives you secure access to your electronic health record. If you see a primary care provider, you can also send messages to your care team and make appointments. If you have questions, please call your primary care clinic.  If you do not have a primary care provider, please call [phone] and they will assist you.        Care EveryWhere ID     This is your Care EveryWhere ID. This  could be used by other organizations to access your [facility] medical records  [id number]        Your Vitals Were     Pulse Temperature Respirations Height Last Period Pulse Oximetry    74 98  F (36.7  C) (Tympanic) 18 5' (1.524 m) 05/16/2012 98%    BMI (Body Mass Index)                   20.31 kg/m2            Blood Pressure from Last 3 Encounters:   04/04/17 98/62   03/30/17 95/54   03/14/17 108/60    Weight from Last 3 Encounters:   04/04/17 104 lb (47.2 kg)   03/29/17 108 lb 0.4 oz (49 kg)   03/14/17 106 lb (48.1 kg)              We Performed the Following     Comprehensive metabolic panel     DEPRESSION ACTION PLAN (DAP)     INR     Phosphorus          Today's Medication Changes          These changes are accurate as of: 4/4/17 11:59 PM.  If you have any questions, ask your nurse or doctor.               These medicines have changed or have updated prescriptions.        Dose/Directions    temazepam 7.5 MG capsule   Commonly known as:  RESTORIL   This may have changed:    - medication strength  - how much to take   Used for:  Other insomnia   Changed by:  Valdez Mcallister DO        Dose:  7.5 mg   Take 1 capsule (7.5 mg) by mouth nightly as needed for sleep   Quantity:  30 capsule   Refills:  0            Where to get your medicines      Some of these will need a paper prescription and others can be bought over the counter.  Ask your nurse if you have questions.     Bring a paper prescription for each of these medications     temazepam 7.5 MG capsule                Primary Care Provider Office Phone # Fax #    Efren Bustos [phone]       [address] [place] [address]        Thank you!     Thank you for choosing [facility]  for your care. Our goal is always to provide you with excellent care. Hearing back from our patients is one way we can continue to improve our services. Please take a few minutes to complete the written  survey that you may receive in the mail after your visit with us. Thank you!             Your Updated Medication List - Protect others around you: Learn how to safely use, store and throw away your medicines at www.disposemymeds.org.          This list is accurate as of: 4/4/17 11:59 PM.  Always use your most recent med list.                   Brand Name Dispense Instructions for use    Folic Acid-Vit B6-Vit B12 2.2-25-1 MG Tabs     90 tablet    Take 1 tablet by mouth daily       lidocaine visc 2% & diphenhydramine 12.5mg/5mL & maalox/mylanta w/simethicone (1:1:1 v/v/v) Susp compounding kit     237 mL    Swish and swallow 5-10 mLs in mouth every 6 hours as needed       naltrexone 50 MG tablet    DEPADE;REVIA    90 tablet    Take 1 tablet (50 mg) by mouth daily Take 1/2 tablet by mouth daily for one week then take 1 tablet daily after       omeprazole 20 MG CR capsule    priLOSEC     Take by mouth daily       propranolol 10 MG tablet    INDERAL    60 tablet    Take 1 tablet (10 mg) by mouth 2 times daily       senna-docusate 8.6-50 MG per tablet    SENOKOT-S;PERICOLACE    100 tablet    Take 1 tablet by mouth 2 times daily as needed for constipation       temazepam 7.5 MG capsule    RESTORIL    30 capsule    Take 1 capsule (7.5 mg) by mouth nightly as needed for sleep       thiamine 100 MG tablet     90 tablet    Take 100 mg by mouth daily       traMADol 50 MG tablet    ULTRAM    28 tablet    Take 1 tablet (50 mg) by mouth every 6 hours as needed for moderate pain       traZODone 50 MG tablet    DESYREL    180 tablet    TAKE ONE OR TWO TABLETS BY MOUTH EVERY NIGHT AT BEDTIME AS NEEDED

## 2017-04-05 ENCOUNTER — TELEPHONE (OUTPATIENT)
Dept: FAMILY MEDICINE | Facility: OTHER | Age: 52
End: 2017-04-05

## 2017-04-05 ENCOUNTER — TELEPHONE (OUTPATIENT)
Dept: FAMILY MEDICINE | Facility: CLINIC | Age: 52
End: 2017-04-05

## 2017-04-05 DIAGNOSIS — E83.39 HYPOPHOSPHATEMIA: Primary | ICD-10-CM

## 2017-04-05 DIAGNOSIS — F51.01 PRIMARY INSOMNIA: Primary | ICD-10-CM

## 2017-04-05 RX ORDER — ZOLPIDEM TARTRATE 5 MG/1
5 TABLET ORAL
Qty: 15 TABLET | Refills: 0 | Status: SHIPPED | OUTPATIENT
Start: 2017-04-05 | End: 2017-04-15

## 2017-04-05 ASSESSMENT — PATIENT HEALTH QUESTIONNAIRE - PHQ9: SUM OF ALL RESPONSES TO PHQ QUESTIONS 1-9: 11

## 2017-04-05 NOTE — TELEPHONE ENCOUNTER
Temazepam is non-formulary,   I called insurance   Formulary consists of Zolpidem, trazodone. Patient is already taking trazodone  She is requesting a new prescription for Zolpidem.     Thanks  Kayleigh Guzmán [facility]   [phone]

## 2017-04-05 NOTE — PROGRESS NOTES
"Monalisa Olguin  Gender: female  : 1965  [address]  [facility] [address] [phone] (home)     Medical Record: [id number]  Pharmacy:    [facility] - [place], [place] - [address]   [facility] [place], [place] - [address] [facility]   [facility]  [facility] [place] - [place], [place] - [address]  Primary Care Provider: Efren Bustos    Parent's names are: OSWALDO BAXTER (mother) and Data Unavailable (father).      [facility]  2017    Discharge Phone Call:  Key Words/Key Times      Introduction - AIDET (Acknowledge, Introduce, Duration, Explanation)      Empathy-   We are calling to see how you are since your recent stay in the hospital?     Call back COMMENTS: \"I have to make changes in my life.  I'm feeling a lot better than when I was in the hospital.\"       Clinical Questions -  (f/u appts, medication side effects/purpose, ability to care for self at home) \"For your safety, it is important to us that you understand the purpose and side effects of your medications, can you tell me what your new medications are?\"     Call back COMMENTS:  She understands her med changes.  Her F/U appt was yesterday.      Staff Recognition -  We like to recognize staff and physicians who have done an excellent job.  Do you remember any people from your care team that you would like recognize?     Call back COMMENTS:  \"Everyone was fabulous. I never felt like I was a burden or needing help too often.\"      Very Good Care -  We want to provide very good care to all patients.  How was your care?     Call back COMMENTS: very good      Opportunities for Improvement -  Our goal is to be the best.  Do you have any suggestions for things that we could improve upon?     Call back COMMENTS: no      Thank You            Edwina Coffman RN        17                "

## 2017-04-05 NOTE — TELEPHONE ENCOUNTER
"Since, I am never certain if people actually read their \"My Chart\" messages, could you please contact the patient and relay the below message?        Dear Monalisa, your recent test results are attached.  Your bilirubin is now down to 14.9 from the previous 15.9 only 2 days ago.  Your phosphorus level has once again dropped and is 1.9.   I called a prescription for a phosphate supplement to the [facility] pharmacy.  You should recheck your lab work in the upcoming week with either myself or your primary care provider.    Feel free to contact me via the office or My Chart if you have any questions regarding the above.    Sincerely,  Valdez Mcallister, DO [facility]  "

## 2017-04-05 NOTE — PROGRESS NOTES
Dear Monalisa, your recent test results are attached.  Your bilirubin is now down to 14.9 from the previous 15.9 only 2 days ago.  Your phosphorus level is once again dropped and is 1.9.   I called a prescription for a phosphate supplement to the [facility] pharmacy.  You should recheck your lab work in the upcoming week with either myself or your primary care provider.    Feel free to contact me via the office or My Chart if you have any questions regarding the above.    Sincerely,  Valdez Mcallister DO [facility]

## 2017-04-11 ENCOUNTER — TELEPHONE (OUTPATIENT)
Dept: OBGYN | Facility: CLINIC | Age: 52
End: 2017-04-11

## 2017-04-11 NOTE — TELEPHONE ENCOUNTER
Spoke to patient, she would like to just see Juli for a routine physical and an appt was scheduled  Tash Blas CMA

## 2017-04-11 NOTE — TELEPHONE ENCOUNTER
Per RM please inquire if patient was meaning to see him for her physical. Per chart see recently switched primary to Dr. Mcallister. Per RM we can see her for GYN related part of her physical including pelvic exam and mammo but she will need to see her primary for management outside of GYN.  Left message for patient to return call to clinic.  Tash Blas, CMA

## 2017-04-11 NOTE — TELEPHONE ENCOUNTER
Patient stated she normally see's Dr Bustos actually, but he cancelled her last physical appt with him and she can never get in to see him.  She was told after her hospital visit with Dr Mcallister regarding her liver issue, that she really needs to get in soon and have a physical.  Dr Cadena was the first provider in Dr Bustos's team with an opening.    Thank you,  Teresa AZVALA

## 2017-04-14 DIAGNOSIS — F41.9 ANXIETY: ICD-10-CM

## 2017-04-14 DIAGNOSIS — K21.9 GASTROESOPHAGEAL REFLUX DISEASE WITHOUT ESOPHAGITIS: ICD-10-CM

## 2017-04-14 DIAGNOSIS — F33.1 MAJOR DEPRESSIVE DISORDER, RECURRENT EPISODE, MODERATE (H): ICD-10-CM

## 2017-04-15 ENCOUNTER — TELEPHONE (OUTPATIENT)
Dept: FAMILY MEDICINE | Facility: CLINIC | Age: 52
End: 2017-04-15

## 2017-04-15 DIAGNOSIS — F51.01 PRIMARY INSOMNIA: ICD-10-CM

## 2017-04-15 NOTE — TELEPHONE ENCOUNTER
Ambien 5mg      Last Written Prescription Date: 04/05/2017  Last Fill Quantity: 15,  # refills: 0   Last Office Visit with G, [facility] or [facility] prescribing provider: 04/10/2017                                         Next 5 appointments (look out 90 days)     May 10, 2017  3:40 PM CDT   PHYSICAL with Efren Bustos MD   [facility]    [address] [place] [id number] [phone]                  Swati Amos CPhT  [facility] [place]  [phone]

## 2017-04-17 DIAGNOSIS — E05.90 HYPERTHYROIDISM: ICD-10-CM

## 2017-04-17 DIAGNOSIS — I10 HYPERTENSION GOAL BP (BLOOD PRESSURE) < 130/80: ICD-10-CM

## 2017-04-17 DIAGNOSIS — K76.6 PORTAL HYPERTENSION (H): ICD-10-CM

## 2017-04-17 RX ORDER — BUSPIRONE HYDROCHLORIDE 15 MG/1
TABLET ORAL
Qty: 180 TABLET | Refills: 0 | Status: SHIPPED | OUTPATIENT
Start: 2017-04-17 | End: 2017-05-10

## 2017-04-17 RX ORDER — BUPROPION HYDROCHLORIDE 150 MG/1
TABLET, EXTENDED RELEASE ORAL
Qty: 180 TABLET | Refills: 0 | Status: SHIPPED | OUTPATIENT
Start: 2017-04-17 | End: 2017-05-10 | Stop reason: ALTCHOICE

## 2017-04-17 RX ORDER — PANTOPRAZOLE SODIUM 40 MG/1
TABLET, DELAYED RELEASE ORAL
Qty: 90 TABLET | Refills: 0 | Status: SHIPPED | OUTPATIENT
Start: 2017-04-17 | End: 2017-05-10

## 2017-04-17 NOTE — TELEPHONE ENCOUNTER
This is not my patient-must be an error. Will forward to PCP-looks like she has an appointment early May.    Electronically signed by Stella Vaughan PA-C  4/17/2017

## 2017-04-17 NOTE — TELEPHONE ENCOUNTER
Buspirone (BUSPAR) 15 MG tablet       Last Written Prescription Date: 03/03/2016  Last Fill Quantity: 180; # refills: 3  Last Office Visit with [facility] or [facility] prescribing provider:  04/10/2017   Next 5 appointments (look out 90 days)     May 10, 2017  3:40 PM CDT   PHYSICAL with Efren Bustos MD   [facility]    [address] [place] [id number]   [phone]                   Last PHQ-9 score on record=   PHQ-9 SCORE 4/4/2017   Total Score -   Total Score 11       Lab Results   Component Value Date     04/04/2017     Lab Results   Component Value Date    ALT 46 04/04/2017     Bupropion (WELLBUTRIN SR) 150 MG 12 hr tablet       Last Written Prescription Date: 03/03/2016  Last Fill Quantity: 180; # refills: 3  Last Office Visit with [facility], [facility] or [facility] prescribing provider:  04/10/2017   Next 5 appointments (look out 90 days)     May 10, 2017  3:40 PM CDT   PHYSICAL with Efren Bustos MD   [facility] ([address] [id number] [phone]                   Last PHQ-9 score on record=   PHQ-9 SCORE 4/4/2017   Total Score -   Total Score 11       Lab Results   Component Value Date     04/04/2017     Lab Results   Component Value Date    ALT 46 04/04/2017     Fluoxetine (PROZAC) 20 MG capsule     Last Written Prescription Date: 03/03/2016  Last Fill Quantity: 180, # refills: 3  Last Office Visit with [facility] primary care provider:  04/10/2017   Next 5 appointments (look out 90 days)     May 10, 2017  3:40 PM CDT   PHYSICAL with Efren Bustos MD   [facility] ([address] [id number] [phone]                   Last PHQ-9 score on record=   PHQ-9 SCORE 4/4/2017   Total Score -   Total Score 11       Pntoprazole Sodum PO      Last Written Prescription Date:  03/03/2016  Last Fill Quantity:  unknown,   # refills: unknown  Last Office Visit with [facility] or [facility] prescribing provider: 04/10/2017  Future Office visit:    Next 5 appointments (look out 90 days)     May 10, 2017  3:40 PM CDT   PHYSICAL with Efren Bustos MD   [facility]    [address] [place] [id number]   [phone]                   Routing refill request to provider for review/approval because:  Drug not active on patient's medication list    Lazara Hamm, CMA

## 2017-04-17 NOTE — TELEPHONE ENCOUNTER
Buspar, Prozac and Wellbutrin Routing refill request to provider for review/approval because:  PHQ 9 completed on 04/04/2017, score of 11.     Protonix Routing refill request to provider for review/approval because:  Drug not active on patient's medication list.    PCP is currently out of office, will route to covering provider.  Patient does have physical scheduled with PCP on 05/10/2017.    Kenyetta Sinclair RN

## 2017-04-17 NOTE — TELEPHONE ENCOUNTER
Propranolol 10mg      Last Written Prescription Date: 3/30/2017 from hospital discharge  Last Fill Quantity: 60, # refills: 0    Last Office Visit with [facility] or [facility] prescribing provider:  4/10/2017   Future Office Visit:    Next 5 appointments (look out 90 days)     May 10, 2017  3:40 PM CDT   PHYSICAL with Efren Bustos MD   [facility]    [address] [place] [id number] [phone]                    BP Readings from Last 3 Encounters:   04/04/17 98/62   03/30/17 95/54   03/14/17 108/60     Unable to approve per medication refill protocol. Forwarded to PCP since hospital discharge prescription.    Gianna Stallworth [facility]  [phone]

## 2017-04-18 RX ORDER — ZOLPIDEM TARTRATE 5 MG/1
5 TABLET ORAL
Qty: 7 TABLET | Refills: 0 | Status: SHIPPED | OUTPATIENT
Start: 2017-04-18 | End: 2017-04-28

## 2017-04-18 RX ORDER — PROPRANOLOL HYDROCHLORIDE 10 MG/1
10 TABLET ORAL 2 TIMES DAILY
Qty: 60 TABLET | Refills: 0 | Status: SHIPPED | OUTPATIENT
Start: 2017-04-18 | End: 2017-06-13

## 2017-04-18 NOTE — TELEPHONE ENCOUNTER
She shouldn't be out of this med until the 30th of April, and Dr. Bustos will be back by then. I did approve 1 month but she shouldn't need it quite yet. Please follow up with Juli.   Candelaria Berrios MD

## 2017-04-18 NOTE — TELEPHONE ENCOUNTER
She is a higher risk patient due to liver failure and alcoholism.  I will fill 1 week worth of medication, she needs to follow up on this with her primary, as this is a new med.   Candelaria Berrios MD

## 2017-04-19 NOTE — TELEPHONE ENCOUNTER
Left message for patient to return call to clinic.(see message below)  Script brought to [facility] pharmacy.  Stella MOSQUEDA MA

## 2017-04-28 DIAGNOSIS — F51.01 PRIMARY INSOMNIA: ICD-10-CM

## 2017-04-28 NOTE — TELEPHONE ENCOUNTER
Ambien   Last Written Prescription Date: 04/19/2017  Last Fill Quantity: 7,  # refills: 0   Last Office Visit with [facility], [facility] or [facility] prescribing provider: 04/04/2017                                         Next 5 appointments (look out 90 days)     May 10, 2017  3:40 PM CDT   PHYSICAL with Efren Bustos MD   [facility]    [address] [place] [id number] [phone]                Thanks  Kayleigh Guzmán [facility]   [phone]

## 2017-05-01 RX ORDER — ZOLPIDEM TARTRATE 5 MG/1
5 TABLET ORAL
Qty: 30 TABLET | Refills: 5 | Status: SHIPPED | OUTPATIENT
Start: 2017-05-01 | End: 2017-05-10 | Stop reason: ALTCHOICE

## 2017-05-06 ENCOUNTER — TELEPHONE (OUTPATIENT)
Dept: FAMILY MEDICINE | Facility: CLINIC | Age: 52
End: 2017-05-06

## 2017-05-06 NOTE — TELEPHONE ENCOUNTER
Call Regarding Preventive Health Screening Colonoscopy and Mammogram    Attempt 1    Message on voicemail     Comments:       Outreach   Angelique Nava

## 2017-05-10 ENCOUNTER — OFFICE VISIT (OUTPATIENT)
Dept: FAMILY MEDICINE | Facility: CLINIC | Age: 52
End: 2017-05-10
Payer: COMMERCIAL

## 2017-05-10 ENCOUNTER — DOCUMENTATION ONLY (OUTPATIENT)
Dept: FAMILY MEDICINE | Facility: CLINIC | Age: 52
End: 2017-05-10

## 2017-05-10 VITALS
HEIGHT: 61 IN | DIASTOLIC BLOOD PRESSURE: 60 MMHG | BODY MASS INDEX: 20.01 KG/M2 | SYSTOLIC BLOOD PRESSURE: 106 MMHG | OXYGEN SATURATION: 100 % | RESPIRATION RATE: 18 BRPM | WEIGHT: 106 LBS | TEMPERATURE: 98.2 F | HEART RATE: 86 BPM

## 2017-05-10 DIAGNOSIS — Z12.39 SCREENING FOR MALIGNANT NEOPLASM OF BREAST: ICD-10-CM

## 2017-05-10 DIAGNOSIS — F10.21 ALCOHOLISM IN RECOVERY (H): ICD-10-CM

## 2017-05-10 DIAGNOSIS — F41.1 GAD (GENERALIZED ANXIETY DISORDER): ICD-10-CM

## 2017-05-10 DIAGNOSIS — K21.00 GASTROESOPHAGEAL REFLUX DISEASE WITH ESOPHAGITIS: ICD-10-CM

## 2017-05-10 DIAGNOSIS — G47.09 OTHER INSOMNIA: ICD-10-CM

## 2017-05-10 DIAGNOSIS — K70.11 ALCOHOLIC HEPATITIS WITH ASCITES (H): ICD-10-CM

## 2017-05-10 DIAGNOSIS — Z12.11 SPECIAL SCREENING FOR MALIGNANT NEOPLASMS, COLON: ICD-10-CM

## 2017-05-10 DIAGNOSIS — N18.30 CKD (CHRONIC KIDNEY DISEASE) STAGE 3, GFR 30-59 ML/MIN (H): ICD-10-CM

## 2017-05-10 DIAGNOSIS — Z00.01 ENCOUNTER FOR ROUTINE ADULT HEALTH EXAMINATION WITH ABNORMAL FINDINGS: Primary | ICD-10-CM

## 2017-05-10 DIAGNOSIS — F33.1 MAJOR DEPRESSIVE DISORDER, RECURRENT EPISODE, MODERATE (H): ICD-10-CM

## 2017-05-10 DIAGNOSIS — K82.8 SLUDGE IN GALLBLADDER: ICD-10-CM

## 2017-05-10 PROCEDURE — 99396 PREV VISIT EST AGE 40-64: CPT | Performed by: FAMILY MEDICINE

## 2017-05-10 RX ORDER — VENLAFAXINE HYDROCHLORIDE 75 MG/1
CAPSULE, EXTENDED RELEASE ORAL
Qty: 180 CAPSULE | Refills: 3 | Status: SHIPPED | OUTPATIENT
Start: 2017-05-10 | End: 2017-05-16

## 2017-05-10 RX ORDER — BUSPIRONE HYDROCHLORIDE 15 MG/1
7.5 TABLET ORAL
COMMUNITY
Start: 2015-12-26 | End: 2017-05-10 | Stop reason: DRUGHIGH

## 2017-05-10 RX ORDER — BUPROPION HYDROCHLORIDE 300 MG/1
300 TABLET ORAL EVERY MORNING
Qty: 90 TABLET | Refills: 3 | Status: SHIPPED | OUTPATIENT
Start: 2017-05-10 | End: 2018-06-05

## 2017-05-10 RX ORDER — CYANOCOBALAMIN/FOLIC AC/VIT B6 0.5-2.2-25
1 TABLET ORAL
COMMUNITY
Start: 2015-12-29 | End: 2017-05-23

## 2017-05-10 RX ORDER — BUSPIRONE HYDROCHLORIDE 15 MG/1
15 TABLET ORAL 2 TIMES DAILY
Qty: 180 TABLET | Refills: 3 | Status: SHIPPED | OUTPATIENT
Start: 2017-05-10 | End: 2018-06-05

## 2017-05-10 RX ORDER — TEMAZEPAM 15 MG/1
15-30 CAPSULE ORAL
Qty: 60 CAPSULE | Refills: 1 | Status: SHIPPED | OUTPATIENT
Start: 2017-05-10 | End: 2017-07-07

## 2017-05-10 ASSESSMENT — PAIN SCALES - GENERAL: PAINLEVEL: NO PAIN (0)

## 2017-05-10 ASSESSMENT — ANXIETY QUESTIONNAIRES
6. BECOMING EASILY ANNOYED OR IRRITABLE: SEVERAL DAYS
5. BEING SO RESTLESS THAT IT IS HARD TO SIT STILL: NOT AT ALL
IF YOU CHECKED OFF ANY PROBLEMS ON THIS QUESTIONNAIRE, HOW DIFFICULT HAVE THESE PROBLEMS MADE IT FOR YOU TO DO YOUR WORK, TAKE CARE OF THINGS AT HOME, OR GET ALONG WITH OTHER PEOPLE: SOMEWHAT DIFFICULT
7. FEELING AFRAID AS IF SOMETHING AWFUL MIGHT HAPPEN: NOT AT ALL
2. NOT BEING ABLE TO STOP OR CONTROL WORRYING: MORE THAN HALF THE DAYS
GAD7 TOTAL SCORE: 6
3. WORRYING TOO MUCH ABOUT DIFFERENT THINGS: SEVERAL DAYS
1. FEELING NERVOUS, ANXIOUS, OR ON EDGE: SEVERAL DAYS

## 2017-05-10 ASSESSMENT — PATIENT HEALTH QUESTIONNAIRE - PHQ9: 5. POOR APPETITE OR OVEREATING: SEVERAL DAYS

## 2017-05-10 NOTE — NURSING NOTE
"Chief Complaint   Patient presents with     Physical       Initial /60  Pulse 86  Temp 98.2  F (36.8  C) (Temporal)  Resp 18  Ht 5' 0.7\" (1.542 m)  Wt 106 lb (48.1 kg)  LMP 05/16/2012  SpO2 100%  BMI 20.23 kg/m2 Estimated body mass index is 20.23 kg/(m^2) as calculated from the following:    Height as of this encounter: 5' 0.7\" (1.542 m).    Weight as of this encounter: 106 lb (48.1 kg).  Medication Reconciliation: complete     Camryn Meek CMA      "

## 2017-05-10 NOTE — PROGRESS NOTES
Patient did not show up for lab. Orders were canceled and reordered as future for 1 week.  Please contact patient to come back in.  Thanks, Dana Garcia

## 2017-05-10 NOTE — MR AVS SNAPSHOT
After Visit Summary   5/10/2017    Monalisa Olguin    MRN: [id number]           Patient Information     Date Of Birth          1965        Visit Information        Provider Department      5/10/2017 3:40 PM Efren Bustos MD [facility]        Today's Diagnoses     Encounter for routine adult health examination with abnormal findings    -  1    Other insomnia        Screening for malignant neoplasm of breast        Special screening for malignant neoplasms, colon        CKD (chronic kidney disease) stage 3, GFR 30-59 ml/min        Alcoholism in recovery (H)        Alcoholic hepatitis with ascites        Sludge in gallbladder        Major depressive disorder, recurrent episode, moderate (H)        MONA (generalized anxiety disorder)        Gastroesophageal reflux disease with esophagitis          Care Instructions      Preventive Health Recommendations  Female Ages 50 - 64    Yearly exam: See your health care provider every year in order to  o Review health changes.   o Discuss preventive care.    o Review your medicines if your doctor has prescribed any.      Get a Pap test every three years (unless you have an abnormal result and your provider advises testing more often).    If you get Pap tests with HPV test, you only need to test every 5 years, unless you have an abnormal result.     You do not need a Pap test if your uterus was removed (hysterectomy) and you have not had cancer.    You should be tested each year for STDs (sexually transmitted diseases) if you're at risk.     Have a mammogram every 1 to 2 years.    Have a colonoscopy at age 50, or have a yearly FIT test (stool test). These exams screen for colon cancer.      Have a cholesterol test every 5 years, or more often if advised.    Have a diabetes test (fasting glucose) every three years. If you are at risk for diabetes, you should have this test more often.     If you are at risk for osteoporosis (brittle bone  disease), think about having a bone density scan (DEXA).    Shots: Get a flu shot each year. Get a tetanus shot every 10 years.    Nutrition:     Eat at least 5 servings of fruits and vegetables each day.    Eat whole-grain bread, whole-wheat pasta and brown rice instead of white grains and rice.    Talk to your provider about Calcium and Vitamin D.     Lifestyle    Exercise at least 150 minutes a week (30 minutes a day, 5 days a week). This will help you control your weight and prevent disease.    Limit alcohol to one drink per day.    No smoking.     Wear sunscreen to prevent skin cancer.     See your dentist every six months for an exam and cleaning.    See your eye doctor every 1 to 2 years.          Follow-ups after your visit        Additional Services     GASTROENTEROLOGY ADULT REF PROCEDURE ONLY       Last Lab Result: Creatinine (mg/dL)       Date                     Value                 04/04/2017               0.65             ----------  Body mass index is 20.23 kg/(m^2).     Needed:  No  Language:  English    Patient will be contacted to schedule procedure.     Please be aware that coverage of these services is subject to the terms and limitations of your health insurance plan.  Call member services at your health plan with any benefit or coverage questions.  Any procedures must be performed at a [place] facility OR coordinated by your clinic's referral office.    Please bring the following with you to your appointment:    (1) Any X-Rays, CTs or MRIs which have been performed.  Contact the facility where they were done to arrange for  prior to your scheduled appointment.    (2) List of current medications   (3) This referral request   (4) Any documents/labs given to you for this referral                  Your next 10 appointments already scheduled     May 17, 2017  8:15 AM CDT   MA SCREENING DIGITAL BILATERAL with PHMA1   [facility] ([facility])    [address] [id number] [phone]           Do not use any powder, lotion or deodorant under your arms or on your breast. If you do, we will ask you to remove it before your exam.  Wear comfortable, two-piece clothing.  If you have any allergies, tell your care team.  Bring any previous mammograms from other facilities or have them mailed to the breast center.            May 17, 2017  8:30 AM CDT   US ABDOMEN LIMITED with PHUS1   [facility] ([facility])    [address] [facility] [id number] [phone]           Please bring a list of your medicines (including vitamins, minerals and over-the-counter drugs). Also, tell your doctor about any allergies you may have. Wear comfortable clothes and leave your valuables at home.  Adults: No eating or drinking for 8 hours before the exam. You may take medicine with a small sip of water.  Children: - Children 6+ years: No food or drink for 6 hours before exam. - Children 1-5 years: No food or drink for 4 hours before exam. - Infants, breast-fed: may have breast milk up to 2 hours before exam. - Infants, formula: may have bottle until 4 hours before exam.  Please call the Imaging Department at your exam site with any questions.              Future tests that were ordered for you today     Open Future Orders        Priority Expected Expires Ordered    US Abdomen Limited Routine  5/10/2018 5/10/2017            Who to contact     If you have questions or need follow up information about today's clinic visit or your schedule please contact [facility] directly at [phone].  Normal or non-critical lab and imaging results will be communicated to you by MyChart, letter or phone within 4 business days after the clinic has received the results. If you do not hear from us within 7 days, please contact the clinic through MyChart or phone. If you have a critical or abnormal lab result, we will notify  "you by phone as soon as possible.  Submit refill requests through [employer] or call your pharmacy and they will forward the refill request to us. Please allow 3 business days for your refill to be completed.          Additional Information About Your Visit        [employer] Information     [employer] gives you secure access to your electronic health record. If you see a primary care provider, you can also send messages to your care team and make appointments. If you have questions, please call your primary care clinic.  If you do not have a primary care provider, please call [phone] and they will assist you.        Care EveryWhere ID     This is your Care EveryWhere ID. This could be used by other organizations to access your [facility] medical records  [id number]        Your Vitals Were     Pulse Temperature Respirations Height Last Period Pulse Oximetry    86 98.2  F (36.8  C) (Temporal) 18 5' 0.7\" (1.542 m) 05/16/2012 100%    BMI (Body Mass Index)                   20.23 kg/m2            Blood Pressure from Last 3 Encounters:   05/10/17 106/60   04/04/17 98/62   03/30/17 95/54    Weight from Last 3 Encounters:   05/10/17 106 lb (48.1 kg)   04/04/17 104 lb (47.2 kg)   03/29/17 108 lb 0.4 oz (49 kg)              We Performed the Following     *MA Screening Digital Bilateral     CBC with platelets     Comprehensive metabolic panel     GASTROENTEROLOGY ADULT REF PROCEDURE ONLY     Lipid panel reflex to direct LDL     Phosphorus          Today's Medication Changes          These changes are accurate as of: 5/10/17  6:03 PM.  If you have any questions, ask your nurse or doctor.               Start taking these medicines.        Dose/Directions    buPROPion 300 MG 24 hr tablet   Commonly known as:  WELLBUTRIN XL   Used for:  Major depressive disorder, recurrent episode, moderate (H)   Replaces:  buPROPion 150 MG 12 hr tablet   Started by:  Efren Bustos MD        Dose:  300 mg   Take 1 tablet (300 mg) by mouth every " morning   Quantity:  90 tablet   Refills:  3       temazepam 15 MG capsule   Commonly known as:  RESTORIL   Used for:  Other insomnia   Started by:  Efren Bustos MD        Dose:  15-30 mg   Take 1-2 capsules (15-30 mg) by mouth nightly as needed for sleep   Quantity:  60 capsule   Refills:  1       venlafaxine 75 MG 24 hr capsule   Commonly known as:  EFFEXOR-XR   Used for:  Major depressive disorder, recurrent episode, moderate (H)   Started by:  Efren Bustos MD        Take 1 capsule every morning for one week while weaning off of fluoxetine, then increase to 2 capsules every morning.   Quantity:  180 capsule   Refills:  3         These medicines have changed or have updated prescriptions.        Dose/Directions    busPIRone 15 MG tablet   Commonly known as:  BUSPAR   This may have changed:  See the new instructions.   Used for:  MONA (generalized anxiety disorder)   Changed by:  Efren Bustos MD        Dose:  15 mg   Take 1 tablet (15 mg) by mouth 2 times daily   Quantity:  180 tablet   Refills:  3       * omeprazole 20 MG CR capsule   Commonly known as:  priLOSEC   This may have changed:  Another medication with the same name was added. Make sure you understand how and when to take each.   Changed by:  Donna Serna MD        Take by mouth daily   Refills:  0       * omeprazole 20 MG CR capsule   Commonly known as:  priLOSEC   This may have changed:  You were already taking a medication with the same name, and this prescription was added. Make sure you understand how and when to take each.   Used for:  Gastroesophageal reflux disease with esophagitis   Changed by:  Efren Bustos MD        Dose:  20 mg   Take 1 capsule (20 mg) by mouth daily   Quantity:  90 capsule   Refills:  3       * Notice:  This list has 2 medication(s) that are the same as other medications prescribed for you. Read the directions carefully, and ask your doctor or other care provider to review them with you.       Stop taking these medicines if you haven't already. Please contact your care team if you have questions.     buPROPion 150 MG 12 hr tablet   Commonly known as:  WELLBUTRIN SR   Replaced by:  buPROPion 300 MG 24 hr tablet   Stopped by:  Efren Bustos MD           FLUoxetine 20 MG capsule   Commonly known as:  PROzac   Stopped by:  Efren Bustos MD           traZODone 50 MG tablet   Commonly known as:  DESYREL   Stopped by:  Efren Bustos MD           zolpidem 5 MG tablet   Commonly known as:  AMBIEN   Stopped by:  Efren Bustos MD                Where to get your medicines      These medications were sent to [facility] [place] - [address]   [address] [facility] [address]     Phone:  [phone]     buPROPion 300 MG 24 hr tablet    busPIRone 15 MG tablet    omeprazole 20 MG CR capsule    venlafaxine 75 MG 24 hr capsule         Some of these will need a paper prescription and others can be bought over the counter.  Ask your nurse if you have questions.     Bring a paper prescription for each of these medications     temazepam 15 MG capsule                Primary Care Provider Office Phone # Fax #    Efren Bustos [phone]       [place] [address] [place] [address]        Thank you!     Thank you for choosing [facility]  for your care. Our goal is always to provide you with excellent care. Hearing back from our patients is one way we can continue to improve our services. Please take a few minutes to complete the written survey that you may receive in the mail after your visit with us. Thank you!             Your Updated Medication List - Protect others around you: Learn how to safely use, store and throw away your medicines at www.disposemymeds.org.          This list is accurate as of: 5/10/17  6:03 PM.  Always use your most recent med list.                   Brand Name Dispense  Instructions for use    buPROPion 300 MG 24 hr tablet    WELLBUTRIN XL    90 tablet    Take 1 tablet (300 mg) by mouth every morning       busPIRone 15 MG tablet    BUSPAR    180 tablet    Take 1 tablet (15 mg) by mouth 2 times daily       * FA-Vitamin B-6-Vitamin B-12 2.2-25-0.5 MG Tabs      Take 1 tablet by mouth       * Folic Acid-Vit B6-Vit B12 2.2-25-1 MG Tabs     90 tablet    Take 1 tablet by mouth daily       lidocaine visc 2% & diphenhydramine 12.5mg/5mL & maalox/mylanta w/simethicone (1:1:1 v/v/v) Susp compounding kit     237 mL    Swish and swallow 5-10 mLs in mouth every 6 hours as needed       naltrexone 50 MG tablet    DEPADE;REVIA    90 tablet    Take 1 tablet (50 mg) by mouth daily Take 1/2 tablet by mouth daily for one week then take 1 tablet daily after       * omeprazole 20 MG CR capsule    priLOSEC     Take by mouth daily       * omeprazole 20 MG CR capsule    priLOSEC    90 capsule    Take 1 capsule (20 mg) by mouth daily       phosphorus tablet 250 mg 250 MG per tablet    K PHOS NEUTRAL    28 tablet    Take 2 tablets (500 mg) by mouth 2 times daily       propranolol 10 MG tablet    INDERAL    60 tablet    Take 1 tablet (10 mg) by mouth 2 times daily       senna-docusate 8.6-50 MG per tablet    SENOKOT-S;PERICOLACE    100 tablet    Take 1 tablet by mouth 2 times daily as needed for constipation       temazepam 15 MG capsule    RESTORIL    60 capsule    Take 1-2 capsules (15-30 mg) by mouth nightly as needed for sleep       thiamine 100 MG tablet     90 tablet    Take 100 mg by mouth daily       venlafaxine 75 MG 24 hr capsule    EFFEXOR-XR    180 capsule    Take 1 capsule every morning for one week while weaning off of fluoxetine, then increase to 2 capsules every morning.       * Notice:  This list has 4 medication(s) that are the same as other medications prescribed for you. Read the directions carefully, and ask your doctor or other care provider to review them with you.

## 2017-05-10 NOTE — PROGRESS NOTES
SUBJECTIVE:     CC: Monalisa Olguin is an 51 year old woman who presents for preventive health visit.     Healthy Habits:    Do you get at least three servings of calcium containing foods daily (dairy, green leafy vegetables, etc.)? No    Amount of exercise or daily activities, outside of work: Walking    Problems taking medications regularly No    Medication side effects: No    Have you had an eye exam in the past two years? yes    Do you see a dentist twice per year? yes    Do you have sleep apnea, excessive snoring or daytime drowsiness?yes        Hyperlipidemia Follow-Up      Rate your low fat/cholesterol diet?: good    Taking statin?  No    Other lipid medications/supplements?:  none     Hypertension Follow-up      Outpatient blood pressures are not being checked.    Low Salt Diet: no added salt       Today's PHQ-2 Score:   PHQ-2 ( 1999 Pfizer) 3/17/2015 12/8/2014   Q1: Little interest or pleasure in doing things 0 0   Q2: Feeling down, depressed or hopeless 0 0   PHQ-2 Score 0 0       Abuse: Current or Past(Physical, Sexual or Emotional)- No  Do you feel safe in your environment - Yes    Social History   Substance Use Topics     Smoking status: Current Every Day Smoker     Packs/day: 0.50     Years: 10.00     Smokeless tobacco: Never Used      Comment: pt quit 1992 after smoking for 8 yrs, started again in 2004     Alcohol use 0.0 oz/week     0 Standard drinks or equivalent per week      Comment: daily      The patient does not drink >3 drinks per day nor >7 drinks per week.    Recent Labs   Lab Test  03/03/16   0953  08/26/13   1037  06/04/13   1002   CHOL  133  170  235*   HDL  58  54  108   LDL  56  96  102   TRIG  95  104  122   CHOLHDLRATIO   --   3.0  2.0   NHDL  75   --    --        Reviewed orders with patient.  Reviewed health maintenance and updated orders accordingly - Yes    Mammo Decision Support:  Patient over age 50, mutual decision to screen reflected in health maintenance.    Pertinent  mammograms are reviewed under the imaging tab.  History of abnormal Pap smear: NO - age 30-65 PAP every 5 years with negative HPV co-testing recommended    Reviewed and updated as needed this visit by clinical staff  Tobacco  Allergies         Reviewed and updated as needed this visit by Provider        Past Medical History:   Diagnosis Date     Alcohol abuse 2013     Alcohol dependence 2013     Alcohol withdrawal 2013     Anxiety 10/10/2011     CKD (chronic kidney disease) stage 3, GFR 30-59 ml/min     last GFR was 42     Esophageal reflux 10/7/2002     Hypertension goal BP (blood pressure) < 130/80 2011     Moderate Depression [296.32] 2009    stable on wellbutrin     Other internal derangement of knee(717.89)     ACL Internal derangement, knee/ original injury in 5th grade, torn cartilage     Pap smear     no abnormals, due for paps q 2-3 yrs     Unspecified essential hypertension       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     HC KNEE SCOPE, DIAGNOSTIC  01    Arthroscopy, Lt Knee     Obstetric History       T3      TAB0   SAB2   E0   M0   L3       # Outcome Date GA Lbr Camron/2nd Weight Sex Delivery Anes PTL Lv   5 Term 05 38w0d 08:00 7 lb 10 oz (3.459 kg) M    Y      Name: Satinder   4 Term 96 40w0d 06:00 7 lb 6 oz (3.345 kg) F    Y      Name: Connie   3 SAB 95 4w0d    AB, COMPLETE      2 SAB 95 6w0d    AB, COMPLETE      1 Term 90 40w0d  7 lb 9 oz (3.43 kg) F CS   Y      Name: Dionicio      Obstetric Comments   LMP  04  Pt. age 38  Not a planned pregnancy       ROS:  C: NEGATIVE for fever, chills, change in weight  I: NEGATIVE for worrisome rashes, moles or lesions  E: NEGATIVE for vision changes or irritation  ENT: NEGATIVE for ear, mouth and throat problems  R: NEGATIVE for significant  cough or SOB  B: NEGATIVE for masses, tenderness or discharge  CV: NEGATIVE for chest pain, palpitations or peripheral edema  GI: NEGATIVE for nausea, abdominal pain, heartburn, or change in bowel habits  : NEGATIVE for unusual urinary or vaginal symptoms. Periods are regular.  M: NEGATIVE for significant arthralgias or myalgia  N: NEGATIVE for weakness, dizziness or paresthesias  PSYCHIATRIC: POSITIVE foranhedonia, depressed mood, hopelessness and hypersomnia    Problem list, Medication list, Allergies, and Medical/Social/Surgical histories reviewed in [facility] and updated as appropriate.  Labs reviewed in [facility]  BP Readings from Last 3 Encounters:   05/10/17 106/60   17 98/62   17 95/54    Wt Readings from Last 3 Encounters:   05/10/17 106 lb (48.1 kg)   17 104 lb (47.2 kg)   17 108 lb 0.4 oz (49 kg)                  Patient Active Problem List   Diagnosis     Kidney donor     Esophageal reflux     Moderate Depression [296.32]     HYPERLIPIDEMIA LDL GOAL <100     CKD (chronic kidney disease) stage 3, GFR 30-59 ml/min     Hypertension goal BP (blood pressure) < 130/80     Anxiety     Crushing injury of thumb, left     Alcoholism (H)     Alcoholism in recovery (H)     Laceration of head without foreign body, unspecified part of head, sequela     Alcoholic hepatitis with ascites     Sludge in gallbladder     Anemia     Thrombocytopenia (H)     Tobacco abuse     Portal hypertension (H)     Pulmonary nodules     Hyperthyroidism     Hypophosphatemia     Severe malnutrition (H)     SIRS (systemic inflammatory response syndrome) (H)     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     HC KNEE SCOPE, DIAGNOSTIC  01    Arthroscopy, Lt Knee       Social History   Substance Use Topics     Smoking status: Current Every Day Smoker     Packs/day: 0.50     Years: 10.00      Smokeless tobacco: Never Used      Comment: pt quit 1992 after smoking for 8 yrs, started again in 2004     Alcohol use 0.0 oz/week     0 Standard drinks or equivalent per week      Comment: daily      Family History   Problem Relation Age of Onset     Hypertension Mother      HEART DISEASE Paternal Grandmother      HEART DISEASE Paternal Grandfather      CEREBROVASCULAR DISEASE Maternal Grandmother      CEREBROVASCULAR DISEASE Maternal Grandfather      Alcohol/Drug Maternal Grandfather      Substance Abuse Maternal Grandfather      HEART DISEASE Father      Silent MI stents x 2     DIABETES Sister 17     older sister also had kidney and pancreas transplant     HEART DISEASE Sister      MI secondary to diabetes     Genitourinary Problems Sister 43     kidney transplant from renal failure     DIABETES Sister 9     youngest, juvenile type I (onset age 9 with no complications)     CANCER Paternal Aunt      ?kind         Current Outpatient Prescriptions   Medication Sig Dispense Refill     temazepam (RESTORIL) 15 MG capsule Take 1-2 capsules (15-30 mg) by mouth nightly as needed for sleep 60 capsule 1     venlafaxine (EFFEXOR-XR) 75 MG 24 hr capsule Take 1 capsule every morning for one week while weaning off of fluoxetine, then increase to 2 capsules every morning. 180 capsule 3     busPIRone (BUSPAR) 15 MG tablet Take 7.5 mg by mouth       Folic Acid-Vit B6-Vit B12 (FA-VITAMIN B-6-VITAMIN B-12) 2.2-25-0.5 MG TABS Take 1 tablet by mouth       busPIRone (BUSPAR) 15 MG tablet Take 1 tablet (15 mg) by mouth 2 times daily 180 tablet 3     buPROPion (WELLBUTRIN XL) 300 MG 24 hr tablet Take 1 tablet (300 mg) by mouth every morning 90 tablet 3     omeprazole (PRILOSEC) 20 MG CR capsule Take 1 capsule (20 mg) by mouth daily 90 capsule 3     zolpidem (AMBIEN) 5 MG tablet Take 1 tablet (5 mg) by mouth nightly as needed for sleep 30 tablet 5     propranolol (INDERAL) 10 MG tablet Take 1 tablet (10 mg) by mouth 2 times daily 60  "tablet 0     FLUoxetine (PROZAC) 20 MG capsule TAKE TWO CAPSULES BY MOUTH EVERY  capsule 0     buPROPion (WELLBUTRIN SR) 150 MG 12 hr tablet TAKE ONE TABLET BY MOUTH TWICE A  tablet 0     busPIRone (BUSPAR) 15 MG tablet TAKE ONE TABLET BY MOUTH TWICE A  tablet 0     phosphorus tablet 250 mg (K PHOS NEUTRAL) 250 MG per tablet Take 2 tablets (500 mg) by mouth 2 times daily 28 tablet 0     senna-docusate (SENOKOT-S;PERICOLACE) 8.6-50 MG per tablet Take 1 tablet by mouth 2 times daily as needed for constipation 100 tablet      traZODone (DESYREL) 50 MG tablet TAKE ONE OR TWO TABLETS BY MOUTH EVERY NIGHT AT BEDTIME AS NEEDED 180 tablet 1     DPH-Lido-AlHydr-MgHydr-Simeth (FIRST-MOUTHWASH BLM) SUSP Swish and swallow 5-10 mLs in mouth every 6 hours as needed 237 mL 1     omeprazole (PRILOSEC) 20 MG capsule Take by mouth daily       Folic Acid-Vit B6-Vit B12 2.2-25-1 MG TABS Take 1 tablet by mouth daily 90 tablet 3     naltrexone (DEPADE;REVIA) 50 MG tablet Take 1 tablet (50 mg) by mouth daily Take 1/2 tablet by mouth daily for one week then take 1 tablet daily after 90 tablet 3     Thiamine Mononitrate (VITAMIN B1) 100 MG TABS Take 100 mg by mouth daily 90 tablet 3     Allergies   Allergen Reactions     Albuterol      Tongue \"hardened and painful\"     Recent Labs   Lab Test  04/04/17   0937  04/03/17   1257  03/30/17   0608   03/26/17   1840  03/03/16   0953   08/26/13   1037  06/04/13   1002  05/24/13   2245   LDL   --    --    --    --    --   56   --   96  102   --    HDL   --    --    --    --    --   58   --   54  108   --    TRIG   --    --    --    --    --   95   --   104  122   --    ALT  46  44  42   < >  77*   --    < >  32  59*  80*   CR  0.65   --   0.63   < >  0.53  0.89   < >  1.12*   --   1.07*   GFRESTIMATED  >90  Non  GFR Calc     --   >90  Non  GFR Calc     < >  >90  Non  GFR Calc    67   < >  52*   --   55*   GFRESTBLACK  >90   " "American GFR Calc     --   >90   GFR Calc     < >  >90   GFR Calc    81   < >  63   --   67   POTASSIUM  3.7   --   4.0   < >  2.6*  3.7   < >  3.8   --   4.4   TSH   --    --    --    --   0.04*   --    --    --    --   0.64    < > = values in this interval not displayed.      OBJECTIVE:     /60  Pulse 86  Temp 98.2  F (36.8  C) (Temporal)  Resp 18  Ht 5' 0.7\" (1.542 m)  Wt 106 lb (48.1 kg)  LMP 05/16/2012  SpO2 100%  BMI 20.23 kg/m2  EXAM:  GENERAL: healthy, alert and no distress  EYES: Eyes grossly normal to inspection, PERRL and conjunctivae and sclerae are slightly icteric.  HENT: ear canals and TM's normal, nose and mouth without ulcers or lesions  NECK: no adenopathy, no asymmetry, masses, or scars and thyroid normal to palpation  RESP: lungs clear to auscultation - no rales, rhonchi or wheezes  BREAST: no breast exam done, discussed self breast awareness and what to be concerned about ie retraction of a nipple, dimpling of the skin or any nipple discharge or aerolar rash.   CV: regular rate and rhythm, normal S1 S2, no S3 or S4, no murmur, click or rub, no peripheral edema and peripheral pulses strong  ABDOMEN: soft, nontender, she has some hepatomegaly but no tenderness, no masses and bowel sounds normal   (female): Exam deferred, patient not due for a pap smear and she is without complaints or concerns today.   MS: no gross musculoskeletal defects noted, no edema  SKIN: no suspicious lesions or rashes, no jaundice noted.   NEURO: Normal strength and tone, mentation intact and speech normal  PSYCH: mentation appears normal, affect normal/bright    ASSESSMENT/PLAN:     (Z00.01) Encounter for routine adult health examination with abnormal findings  (primary encounter diagnosis)  Comment: sober since getting out of the hospital in March  Plan: depression is not great but she feels better not drinking and knows that this is the best for her. Encouraged her to get " "out walking and out of the house.  She doesn't have a lot of friends right now due to \"burning some bridges\" when she was drinking heavily.      (G47.09) Other insomnia  Comment: trazodone is not working as well for her  Plan: temazepam (RESTORIL) 15 MG capsule        Will switch her back to temazepam.     (Z12.39) Screening for malignant neoplasm of breast  Comment: due for a mammogram  Plan: *MA Screening Digital Bilateral        Scheduled.     (Z12.11) Special screening for malignant neoplasms, colon  Comment: due for her screening colonoscopy  Plan: GASTROENTEROLOGY ADULT REF PROCEDURE ONLY        Scheduled.     (N18.3) CKD (chronic kidney disease) stage 3, GFR 30-59 ml/min  Comment: this has actually improved.  So will change her diagnosis  Plan: change diagnosis in chart    (F10.20) Alcoholism in recovery (H)  (K70.11) Alcoholic hepatitis with ascites  Comment: stable, using naloxone  Plan: Comprehensive metabolic panel, Phosphorus, US         Abdomen Limited        Continue her naloxone for 6 months. Will get a follow up US to see if she has had some improvement ad to look at the gallbladder, obtain labs CBC with platelets, Lipid panel reflex to     (K82.8) Sludge in gallbladder  Comment: in the hospital her US showed sludge and stones, but her pain has improved but still periodically has pain.   Plan: will repeat US to look for GB wall thickening and see if the sludge has improved and if there really are stones.     (F33.1) Major depressive disorder, recurrent episode, moderate (H)  Comment: PHQ-9 is still elevated.   Plan: venlafaxine (EFFEXOR-XR) 75 MG 24 hr capsule,         buPROPion (WELLBUTRIN XL) 300 MG 24 hr tablet        Will continue the wellbutrin and wean her off the fluoxetine and start venlafaxine.     (F41.1) MONA (generalized anxiety disorder)  Comment: stable.   Plan: busPIRone (BUSPAR) 15 MG tablet, busPIRone         (BUSPAR) 15 MG tablet        Continue her buspar and change from fluoxetine " "to venlafaxine.      (K21.0) Gastroesophageal reflux disease with esophagitis  Comment: stable not drinking and on the PPI  Plan: omeprazole (PRILOSEC) 20 MG CR capsule        Refill sent.        COUNSELING:   Reviewed preventive health counseling, as reflected in patient instructions       Regular exercise       Healthy diet/nutrition       Vision screening       Alcohol Use         reports that she has been smoking.  She has a 5.00 pack-year smoking history. She has never used smokeless tobacco.  Tobacco Cessation Action Plan: Information offered: Patient not interested at this time  Estimated body mass index is 20.23 kg/(m^2) as calculated from the following:    Height as of this encounter: 5' 0.7\" (1.542 m).    Weight as of this encounter: 106 lb (48.1 kg).       Counseling Resources:  ATP IV Guidelines  Pooled Cohorts Equation Calculator  Breast Cancer Risk Calculator  FRAX Risk Assessment  ICSI Preventive Guidelines  Dietary Guidelines for Americans, 2010  USDA's MyPlate  ASA Prophylaxis  Lung CA Screening    Electronically signed by:  Efren Bustos M.D.  5/10/2017     "

## 2017-05-11 ENCOUNTER — TELEPHONE (OUTPATIENT)
Dept: FAMILY MEDICINE | Facility: CLINIC | Age: 52
End: 2017-05-11

## 2017-05-11 ASSESSMENT — PATIENT HEALTH QUESTIONNAIRE - PHQ9: SUM OF ALL RESPONSES TO PHQ QUESTIONS 1-9: 18

## 2017-05-11 ASSESSMENT — ANXIETY QUESTIONNAIRES: GAD7 TOTAL SCORE: 6

## 2017-05-11 NOTE — TELEPHONE ENCOUNTER
----- Message from Efren Bustos MD sent at 5/11/2017  9:17 AM CDT -----  Can you call and remind Heydi that she forgot to do her labs and that they are in as a future order.  She can have them done when she has her follow up US done.  She should be fasting.     Eliceo

## 2017-05-11 NOTE — TELEPHONE ENCOUNTER
Called patient and let her know provider message. She will come in for her labs when she does her US. Patient also scheduled a follow up visit with ADF per provider in 1 month on June 13th @ 3 pm.    Camryn Meek CMA

## 2017-05-12 ENCOUNTER — TELEPHONE (OUTPATIENT)
Dept: FAMILY MEDICINE | Facility: CLINIC | Age: 52
End: 2017-05-12

## 2017-05-12 NOTE — TELEPHONE ENCOUNTER
Left message for patient to return call to schedule EGD/colonoscopy. If Taniya or Tracy are not available, please transfer to same day surgery        Ok to schedule w alexa

## 2017-05-15 DIAGNOSIS — F10.21 ALCOHOL DEPENDENCE IN REMISSION (H): ICD-10-CM

## 2017-05-15 DIAGNOSIS — F10.21 ALCOHOLISM IN RECOVERY (H): ICD-10-CM

## 2017-05-15 NOTE — TELEPHONE ENCOUNTER
Called patient to schedule colonoscopy. Patient states that she needs to look in to cost before she schedules. States she will call when she is ready.

## 2017-05-16 ENCOUNTER — TELEPHONE (OUTPATIENT)
Dept: FAMILY MEDICINE | Facility: CLINIC | Age: 52
End: 2017-05-16

## 2017-05-16 ENCOUNTER — APPOINTMENT (OUTPATIENT)
Dept: CT IMAGING | Facility: CLINIC | Age: 52
End: 2017-05-16
Attending: FAMILY MEDICINE
Payer: COMMERCIAL

## 2017-05-16 ENCOUNTER — HOSPITAL ENCOUNTER (EMERGENCY)
Facility: CLINIC | Age: 52
Discharge: HOME | End: 2017-05-16
Attending: FAMILY MEDICINE | Admitting: FAMILY MEDICINE
Payer: COMMERCIAL

## 2017-05-16 VITALS
DIASTOLIC BLOOD PRESSURE: 83 MMHG | OXYGEN SATURATION: 100 % | RESPIRATION RATE: 18 BRPM | WEIGHT: 110.06 LBS | SYSTOLIC BLOOD PRESSURE: 119 MMHG | HEART RATE: 85 BPM | HEIGHT: 61 IN | BODY MASS INDEX: 20.78 KG/M2 | TEMPERATURE: 97.9 F

## 2017-05-16 DIAGNOSIS — R44.1 VISUAL HALLUCINATION: ICD-10-CM

## 2017-05-16 DIAGNOSIS — T50.905A MEDICATION REACTION, INITIAL ENCOUNTER: ICD-10-CM

## 2017-05-16 DIAGNOSIS — F32.9 MAJOR DEPRESSIVE DISORDER, SINGLE EPISODE, UNSPECIFIED: ICD-10-CM

## 2017-05-16 DIAGNOSIS — T43.215A ADVERSE EFFECT OF SELECTIVE SEROTONIN AND NOREPINEPHRINE REUPTAKE INHIBITORS, INITIAL ENCOUNTER: ICD-10-CM

## 2017-05-16 DIAGNOSIS — F32.2 SEVERE DEPRESSION (H): ICD-10-CM

## 2017-05-16 LAB
ALBUMIN SERPL-MCNC: 2.9 G/DL (ref 3.4–5)
ALBUMIN UR-MCNC: NEGATIVE MG/DL
ALP SERPL-CCNC: 273 U/L (ref 40–150)
ALT SERPL W P-5'-P-CCNC: 43 U/L (ref 0–50)
ANION GAP SERPL CALCULATED.3IONS-SCNC: 10 MMOL/L (ref 3–14)
APPEARANCE UR: ABNORMAL
AST SERPL W P-5'-P-CCNC: 75 U/L (ref 0–45)
BACTERIA #/AREA URNS HPF: ABNORMAL /HPF
BASOPHILS # BLD AUTO: 0 10E9/L (ref 0–0.2)
BASOPHILS NFR BLD AUTO: 0.6 %
BILIRUB SERPL-MCNC: 3 MG/DL (ref 0.2–1.3)
BILIRUB UR QL STRIP: NEGATIVE
BUN SERPL-MCNC: 10 MG/DL (ref 7–30)
CALCIUM SERPL-MCNC: 8.5 MG/DL (ref 8.5–10.1)
CHLORIDE SERPL-SCNC: 101 MMOL/L (ref 94–109)
CO2 SERPL-SCNC: 24 MMOL/L (ref 20–32)
COLOR UR AUTO: ABNORMAL
CREAT SERPL-MCNC: 0.74 MG/DL (ref 0.52–1.04)
DIFFERENTIAL METHOD BLD: ABNORMAL
EOSINOPHIL # BLD AUTO: 0 10E9/L (ref 0–0.7)
EOSINOPHIL NFR BLD AUTO: 0 %
ERYTHROCYTE [DISTWIDTH] IN BLOOD BY AUTOMATED COUNT: 13 % (ref 10–15)
ETHANOL SERPL-MCNC: <0.01 G/DL
GFR SERPL CREATININE-BSD FRML MDRD: 83 ML/MIN/1.7M2
GLUCOSE SERPL-MCNC: 82 MG/DL (ref 70–99)
GLUCOSE UR STRIP-MCNC: NEGATIVE MG/DL
HCT VFR BLD AUTO: 30.9 % (ref 35–47)
HGB BLD-MCNC: 9.7 G/DL (ref 11.7–15.7)
HGB UR QL STRIP: NEGATIVE
IMM GRANULOCYTES # BLD: 0 10E9/L (ref 0–0.4)
IMM GRANULOCYTES NFR BLD: 0.2 %
KETONES UR STRIP-MCNC: NEGATIVE MG/DL
LEUKOCYTE ESTERASE UR QL STRIP: ABNORMAL
LYMPHOCYTES # BLD AUTO: 1.8 10E9/L (ref 0.8–5.3)
LYMPHOCYTES NFR BLD AUTO: 28.4 %
MCH RBC QN AUTO: 33.3 PG (ref 26.5–33)
MCHC RBC AUTO-ENTMCNC: 31.4 G/DL (ref 31.5–36.5)
MCV RBC AUTO: 106 FL (ref 78–100)
MONOCYTES # BLD AUTO: 0.8 10E9/L (ref 0–1.3)
MONOCYTES NFR BLD AUTO: 11.8 %
MUCOUS THREADS #/AREA URNS LPF: PRESENT /LPF
NEUTROPHILS # BLD AUTO: 3.7 10E9/L (ref 1.6–8.3)
NEUTROPHILS NFR BLD AUTO: 59 %
NITRATE UR QL: NEGATIVE
PH UR STRIP: 7 PH (ref 5–7)
PLATELET # BLD AUTO: 132 10E9/L (ref 150–450)
POTASSIUM SERPL-SCNC: 4.1 MMOL/L (ref 3.4–5.3)
PROT SERPL-MCNC: 6.2 G/DL (ref 6.8–8.8)
RBC # BLD AUTO: 2.91 10E12/L (ref 3.8–5.2)
RBC #/AREA URNS AUTO: 0 /HPF (ref 0–2)
SODIUM SERPL-SCNC: 135 MMOL/L (ref 133–144)
SP GR UR STRIP: 1.01 (ref 1–1.03)
SQUAMOUS #/AREA URNS AUTO: 1 /HPF (ref 0–1)
URN SPEC COLLECT METH UR: ABNORMAL
UROBILINOGEN UR STRIP-MCNC: 4 MG/DL (ref 0–2)
WBC # BLD AUTO: 6.3 10E9/L (ref 4–11)
WBC #/AREA URNS AUTO: 7 /HPF (ref 0–2)

## 2017-05-16 PROCEDURE — 99285 EMERGENCY DEPT VISIT HI MDM: CPT | Mod: Z6 | Performed by: FAMILY MEDICINE

## 2017-05-16 PROCEDURE — 96361 HYDRATE IV INFUSION ADD-ON: CPT | Performed by: FAMILY MEDICINE

## 2017-05-16 PROCEDURE — 25000128 H RX IP 250 OP 636: Performed by: FAMILY MEDICINE

## 2017-05-16 PROCEDURE — 70450 CT HEAD/BRAIN W/O DYE: CPT

## 2017-05-16 PROCEDURE — 85025 COMPLETE CBC W/AUTO DIFF WBC: CPT | Performed by: FAMILY MEDICINE

## 2017-05-16 PROCEDURE — 80320 DRUG SCREEN QUANTALCOHOLS: CPT | Performed by: FAMILY MEDICINE

## 2017-05-16 PROCEDURE — 96360 HYDRATION IV INFUSION INIT: CPT | Performed by: FAMILY MEDICINE

## 2017-05-16 PROCEDURE — 81001 URINALYSIS AUTO W/SCOPE: CPT | Performed by: FAMILY MEDICINE

## 2017-05-16 PROCEDURE — 99284 EMERGENCY DEPT VISIT MOD MDM: CPT | Mod: 25 | Performed by: FAMILY MEDICINE

## 2017-05-16 PROCEDURE — 80053 COMPREHEN METABOLIC PANEL: CPT | Performed by: FAMILY MEDICINE

## 2017-05-16 RX ORDER — SODIUM CHLORIDE 9 MG/ML
INJECTION, SOLUTION INTRAVENOUS CONTINUOUS
Status: DISCONTINUED | OUTPATIENT
Start: 2017-05-16 | End: 2017-05-16 | Stop reason: HOSPADM

## 2017-05-16 RX ORDER — SERTRALINE HYDROCHLORIDE 25 MG/1
TABLET, FILM COATED ORAL
Qty: 30 TABLET | Refills: 0 | Status: SHIPPED | OUTPATIENT
Start: 2017-05-16 | End: 2017-05-26

## 2017-05-16 RX ORDER — LIDOCAINE 40 MG/G
CREAM TOPICAL
Status: DISCONTINUED | OUTPATIENT
Start: 2017-05-16 | End: 2017-05-16 | Stop reason: HOSPADM

## 2017-05-16 RX ADMIN — SODIUM CHLORIDE, PRESERVATIVE FREE: 5 INJECTION INTRAVENOUS at 12:09

## 2017-05-16 NOTE — ED AVS SNAPSHOT
[facility] Emergency Department    [address] [place] [address]    Phone:  [phone]    Fax:  [phone]                                       Monalisa Olguin   MRN: [id number]    Department:  [facility] Emergency Department   Date of Visit:  5/16/2017           Patient Information     Date Of Birth          1965        Your diagnoses for this visit were:     Medication reaction, initial encounter     Severe depression        You were seen by Joel Gary MD.      Follow-up Information     Follow up with Efren Bustos MD. Schedule an appointment as soon as possible for a visit in 2 days.    Specialty:  Family Practice    Why:  If not improving.    Contact information:    [facility]  [address] [place] [id number] [phone]        Discharge References/Attachments     DEPRESSION (ENGLISH)      Future Appointments        Provider Department Dept Phone Center    5/17/2017 8:30 AM [facility] ULTRASOUND ROOM 1 [facility] Ultrasound [phone] [facility]    5/17/2017  9:15 AM [facility] MAMMO ROOM 1 [facility] Imaging [phone] [facility]    6/13/2017 3:00 PM Efren Bustos MD, MD [facility] [phone] [facility]      24 Hour Appointment Hotline       To make an appointment at any [facility], call [phone] ([phone]). If you don't have a family doctor or clinic, we will help you find one. [facility] are conveniently located to serve the needs of you and your family.             Review of your medicines      START taking        Dose / Directions Last dose taken    sertraline 25 MG tablet   Commonly known as:  ZOLOFT   Quantity:  30 tablet        Take 1 tab PO Qday for 7 days then 2 tabs PO Qday   Refills:  0          Our records show that you are taking the medicines listed below. If these are incorrect, please call your family doctor or clinic.         Dose / Directions Last dose taken    buPROPion 300 MG 24 hr tablet   Commonly known as:  WELLBUTRIN XL   Dose:  300 mg   Quantity:  90 tablet        Take 1 tablet (300 mg) by mouth every morning   Refills:  3        busPIRone 15 MG tablet   Commonly known as:  BUSPAR   Dose:  15 mg   Quantity:  180 tablet        Take 1 tablet (15 mg) by mouth 2 times daily   Refills:  3        * FA-Vitamin B-6-Vitamin B-12 2.2-25-0.5 MG Tabs   Dose:  1 tablet        Take 1 tablet by mouth   Refills:  0        * Folic Acid-Vit B6-Vit B12 2.2-25-1 MG Tabs   Dose:  1 tablet   Quantity:  90 tablet        Take 1 tablet by mouth daily   Refills:  3        lidocaine visc 2% & diphenhydramine 12.5mg/5mL & maalox/mylanta w/simethicone (1:1:1 v/v/v) Susp compounding kit   Dose:  5-10 mL   Quantity:  237 mL        Swish and swallow 5-10 mLs in mouth every 6 hours as needed   Refills:  1        naltrexone 50 MG tablet   Commonly known as:  DEPADE;REVIA   Dose:  50 mg   Quantity:  90 tablet        Take 1 tablet (50 mg) by mouth daily Take 1/2 tablet by mouth daily for one week then take 1 tablet daily after   Refills:  3        * omeprazole 20 MG CR capsule   Commonly known as:  priLOSEC        Take by mouth daily   Refills:  0        * omeprazole 20 MG CR capsule   Commonly known as:  priLOSEC   Dose:  20 mg   Quantity:  90 capsule        Take 1 capsule (20 mg) by mouth daily   Refills:  3        phosphorus tablet 250 mg 250 MG per tablet   Commonly known as:  K PHOS NEUTRAL   Dose:  500 mg   Quantity:  28 tablet        Take 2 tablets (500 mg) by mouth 2 times daily   Refills:  0        propranolol 10 MG tablet   Commonly known as:  INDERAL   Dose:  10 mg   Quantity:  60 tablet        Take 1 tablet (10 mg) by mouth 2 times daily   Refills:  0        senna-docusate 8.6-50 MG per tablet   Commonly known as:  SENOKOT-S;PERICOLACE   Dose:  1 tablet   Quantity:  100 tablet        Take 1 tablet by mouth 2 times daily as needed for constipation    Refills:  0        temazepam 15 MG capsule   Commonly known as:  RESTORIL   Dose:  15-30 mg   Quantity:  60 capsule        Take 1-2 capsules (15-30 mg) by mouth nightly as needed for sleep   Refills:  1        thiamine 100 MG tablet   Dose:  100 mg   Quantity:  90 tablet        Take 100 mg by mouth daily   Refills:  3        * Notice:  This list has 4 medication(s) that are the same as other medications prescribed for you. Read the directions carefully, and ask your doctor or other care provider to review them with you.      STOP taking        Dose Reason for stopping Comments    venlafaxine 75 MG 24 hr capsule   Commonly known as:  EFFEXOR-XR                      Prescriptions were sent or printed at these locations (1 Prescription)                   [facility] [place], [place] - [address]    [address] , [place] [address]    Telephone:  [phone]   Fax:  [phone]   Hours:                  E-Prescribed (1 of 1)         sertraline (ZOLOFT) 25 MG tablet                Procedures and tests performed during your visit     Alcohol ethyl    CBC with platelets differential    CT Head w/o Contrast    Comprehensive metabolic panel    Peripheral IV catheter    UA with Microscopic      Orders Needing Specimen Collection     None      Pending Results     Date and Time Order Name Status Description    5/16/2017 1139 CT Head w/o Contrast Preliminary             Pending Culture Results     No orders found from 5/14/2017 to 5/17/2017.            Pending Results Instructions     If you had any lab results that were not finalized at the time of your Discharge, you can call the ED Lab Result RN at [phone]. You will be contacted by this team for any positive Lab results or changes in treatment. The nurses are available 7 days a week from 10A to 6:30P.  You can leave a message 24 hours per day and they will return your call.        Thank you for choosing [facility]       Thank you for choosing  [facility] for your care. Our goal is always to provide you with excellent care. Hearing back from our patients is one way we can continue to improve our services. Please take a few minutes to complete the written survey that you may receive in the mail after you visit with us. Thank you!        [facility] Information     [employer] gives you secure access to your electronic health record. If you see a primary care provider, you can also send messages to your care team and make appointments. If you have questions, please call your primary care clinic.  If you do not have a primary care provider, please call [phone] and they will assist you.        Care EveryWhere ID     This is your Care EveryWhere ID. This could be used by other organizations to access your [facility] medical records  [id number]        After Visit Summary       This is your record. Keep this with you and show to your community pharmacist(s) and doctor(s) at your next visit.

## 2017-05-16 NOTE — TELEPHONE ENCOUNTER
"Eron,  Charline Olguin, is a 51 year old female who calls with confusion.    NURSING ASSESSMENT:  Description:   is calling to report patient was seen last week on Wed, 5/10/17, and some of her medications were changed.  He came home from work on Friday, 5/12/17, to find out patient had fallen while trying to cook some supper but was also not preparing the food right.  He is reporting she \"has lost her mind\".  She is incoherent, and hallucinating.  Patient called 911 last night because she thought someone was breaking into their house,  is reporting they are in a very safe house and no one would ever break into their home.Her balance is very off and she has had to use a walker to walk.  Eron states he took away her medications on Saturday so he could monitor what she was taking.  She fell again on Saturday, 5/13/17, and hurt her ankle.  Eron gave her an ibuprofen and is reporting 30 minutes later was so much better that he believes the improvement to be from the ibuprofen.  He is instructed to take patient to the ED for further evaluation.  He does not want to go, and would like PCP to fit her in today.  He is again educated on the many reasons she needs to be seen in the ED, but again states he would like a call after PCP has been informed.  He states he will go to the ED and wait for a call on his cell phone from PCP.  Onset/duration:  5 days  Precip. factors:  EC believes it is due to medication changes  Associated symptoms:  See above  Improves/worsens symptoms:  worsening  Pain scale (0-10)   0/10  Last exam/Treatment:  5/10/17  Allergies:   Allergies   Allergen Reactions     Albuterol      Tongue \"hardened and painful\"       NURSING PLAN: Huddle with provider, plan includes patient needs to go to ED    RECOMMENDED DISPOSITION:  To ED, another person to drive   Will comply with recommendation: Yes  If further questions/concerns or if symptoms do not improve, worsen or new symptoms develop, " call your PCP or [facility] as soon as possible.      Guideline used:  confusion  Telephone Triage Protocols for Nurses, Fifth Edition, Savannah Landeros RN

## 2017-05-16 NOTE — TELEPHONE ENCOUNTER
Rerouting to triage as Eron would like a call sooner than later since Dr Bustos does not start clinic until after 9am.  Thank you,  Elva Quinonez  Patient Representative

## 2017-05-16 NOTE — TELEPHONE ENCOUNTER
Vit B,    Last Written Prescription Date: 3/3/16  Last Fill Quantity: 90,  # refills: 3   Last Office Visit with [facility], [facility] or [facility] prescribing provider: 5/10/17                                         Next 5 appointments (look out 90 days)     Jun 13, 2017  3:00 PM CDT   Office Visit with Efren Bustos MD   [facility] ([address] [address]   [phone]                    [facility]         Last Written Prescription Date: 3/17/16  Last Fill Quantity: 90,  # refills: 3   Last Office Visit with [facility], [facility] or [facility] prescribing provider: 5/10/17                                         Next 5 appointments (look out 90 days)     Jun 13, 2017  3:00 PM CDT   Office Visit with Efren Bustos MD   [facility] ([address] [id number] [phone]

## 2017-05-16 NOTE — ED NOTES
"Pt states the reason for the medication switch is because fluoxetine was no longer effective. \"I was becoming more isolated, I stopped bathing and washing my hair\". She turned to her spouse and stated. \"this  is all your fault\" referring to coming in to the ER. He stated that she didn't really have an awareness of how bad things had become.    She currently complains of a bad taste in her mouth even after brushing teeth.  "

## 2017-05-16 NOTE — ED AVS SNAPSHOT
[facility] Emergency Department    [address] [place] [address]    Phone:  [phone]    Fax:  [phone]                                       Monalisa Olguin   MRN: [id number]    Department:  [facility] Emergency Department   Date of Visit:  5/16/2017           After Visit Summary Signature Page     I have received my discharge instructions, and my questions have been answered. I have discussed any challenges I see with this plan with the nurse or doctor.    ..........................................................................................................................................  Patient/Patient Representative Signature      ..........................................................................................................................................  Patient Representative Print Name and Relationship to Patient    ..................................................               ................................................  Date                                            Time    ..........................................................................................................................................  Reviewed by Signature/Title    ...................................................              ..............................................  Date                                                            Time

## 2017-05-16 NOTE — ED NOTES
She is weaning off of fluoxetine and starting on venlafaxine. She has been seeing things and people and talking about things that aren't really going on.  Her  and daughter are very concerned.

## 2017-05-16 NOTE — TELEPHONE ENCOUNTER
"Reason for Call:  Medication or medication refill:    Do you use a [facility]?  Name of the pharmacy and phone number for the current request:      Name of the medication requested:  isn't sure, patient \"takes a lot\".  Eron states that since changing the medications last week that the patient is doing very crazy things & hallucinating, please advise.    Other request:     Can we leave a detailed message on this number? YES    Phone number patient can be reached at: Home number on file [phone] (home)  Eron    Best Time:     Call taken on 5/16/2017 at 8:03 AM by Elva Quinonez      "

## 2017-05-16 NOTE — ED PROVIDER NOTES
"  History     Chief Complaint   Patient presents with     Hallucinations     HPI  Monalisa Olguin is a 51 year old female who presents with concerns of new hallucinations and out of body experience.  Patient states this started about a week ago at the same time when her medications were being changed.  She is being weaned off of her Prozac and started on Effexor.  Patient states that some episodes where she was seeing things.  Last night she thought she was seen fuzzy animals and thought people were in the house and ends up calling the .  She states she is not having any auditory hallucinations and none of these hallucinations are telling her to herself or hurt anyone else.  She is not suicidal or homicidal.  Patient denies any other symptoms including headache, chest pain or shortness of breath.  She has had some urinary frequency though.    I have reviewed the Medications, Allergies, Past Medical and Surgical History, and Social History in the Epic system.    Review of Systems   All other systems reviewed and are negative.      Physical Exam   BP: 124/85  Pulse: 85  Temp: 97.9  F (36.6  C)  Resp: 18  Height: 154.9 cm (5' 1\")  Weight: 49.9 kg (110 lb 1 oz)  SpO2: 100 %  Physical Exam   Constitutional: She is oriented to person, place, and time. She appears well-developed and well-nourished. No distress.   HENT:   Mouth/Throat: Oropharynx is clear and moist.   Eyes: Conjunctivae are normal.   Neck: Normal range of motion. Neck supple.   Cardiovascular: Normal rate, regular rhythm, normal heart sounds and intact distal pulses.  Exam reveals no gallop and no friction rub.    No murmur heard.  Pulmonary/Chest: Effort normal and breath sounds normal. No respiratory distress. She has no wheezes. She has no rales. She exhibits no tenderness.   Abdominal: Soft. Bowel sounds are normal. She exhibits no distension and no mass. There is no tenderness. There is no guarding.   Musculoskeletal: Normal range of motion. " She exhibits no edema or tenderness.   Neurological: She is alert and oriented to person, place, and time.   Skin: Skin is warm and dry. No rash noted. She is not diaphoretic.   Psychiatric: She has a normal mood and affect. Judgment normal. Her mood appears not anxious. Thought content is paranoid. She expresses no homicidal and no suicidal ideation. She expresses no suicidal plans and no homicidal plans.   Nursing note and vitals reviewed.      ED Course     ED Course     Procedures         Results for orders placed or performed during the hospital encounter of 05/16/17   CT Head w/o Contrast    Narrative    CT SCAN OF THE HEAD WITHOUT CONTRAST   5/16/2017 12:58 PM     HISTORY: Dizziness, hallucinations.    TECHNIQUE:  Axial images of the head and coronal reformations without  IV contrast material. Radiation dose for this scan was reduced using  automated exposure control, adjustment of the mA and/or kV according  to patient size, or iterative reconstruction technique.    COMPARISON: 6/14/2016.    FINDINGS:  There is mild atrophy of the brain, unchanged. There is no  evidence of intracranial hemorrhage, mass, acute infarct or anomaly.     The visualized portions of the sinuses and mastoids appear normal.  There is no evidence of trauma.       Impression    IMPRESSION:     1. No acute abnormality.  2. Mild atrophy of the brain, unchanged.   CBC with platelets differential   Result Value Ref Range    WBC 6.3 4.0 - 11.0 10e9/L    RBC Count 2.91 (L) 3.8 - 5.2 10e12/L    Hemoglobin 9.7 (L) 11.7 - 15.7 g/dL    Hematocrit 30.9 (L) 35.0 - 47.0 %     (H) 78 - 100 fl    MCH 33.3 (H) 26.5 - 33.0 pg    MCHC 31.4 (L) 31.5 - 36.5 g/dL    RDW 13.0 10.0 - 15.0 %    Platelet Count 132 (L) 150 - 450 10e9/L    Diff Method Automated Method     % Neutrophils 59.0 %    % Lymphocytes 28.4 %    % Monocytes 11.8 %    % Eosinophils 0.0 %    % Basophils 0.6 %    % Immature Granulocytes 0.2 %    Absolute Neutrophil 3.7 1.6 - 8.3  10e9/L    Absolute Lymphocytes 1.8 0.8 - 5.3 10e9/L    Absolute Monocytes 0.8 0.0 - 1.3 10e9/L    Absolute Eosinophils 0.0 0.0 - 0.7 10e9/L    Absolute Basophils 0.0 0.0 - 0.2 10e9/L    Abs Immature Granulocytes 0.0 0 - 0.4 10e9/L   Comprehensive metabolic panel   Result Value Ref Range    Sodium 135 133 - 144 mmol/L    Potassium 4.1 3.4 - 5.3 mmol/L    Chloride 101 94 - 109 mmol/L    Carbon Dioxide 24 20 - 32 mmol/L    Anion Gap 10 3 - 14 mmol/L    Glucose 82 70 - 99 mg/dL    Urea Nitrogen 10 7 - 30 mg/dL    Creatinine 0.74 0.52 - 1.04 mg/dL    GFR Estimate 83 >60 mL/min/1.7m2    GFR Estimate If Black >90   GFR Calc   >60 mL/min/1.7m2    Calcium 8.5 8.5 - 10.1 mg/dL    Bilirubin Total 3.0 (H) 0.2 - 1.3 mg/dL    Albumin 2.9 (L) 3.4 - 5.0 g/dL    Protein Total 6.2 (L) 6.8 - 8.8 g/dL    Alkaline Phosphatase 273 (H) 40 - 150 U/L    ALT 43 0 - 50 U/L    AST 75 (H) 0 - 45 U/L   UA with Microscopic   Result Value Ref Range    Color Urine Esperanza     Appearance Urine Slightly Cloudy     Glucose Urine Negative NEG mg/dL    Bilirubin Urine Negative NEG    Ketones Urine Negative NEG mg/dL    Specific Gravity Urine 1.013 1.003 - 1.035    Blood Urine Negative NEG    pH Urine 7.0 5.0 - 7.0 pH    Protein Albumin Urine Negative NEG mg/dL    Urobilinogen mg/dL 4.0 (H) 0.0 - 2.0 mg/dL    Nitrite Urine Negative NEG    Leukocyte Esterase Urine Trace (A) NEG    Source Unspecified Urine     WBC Urine 7 (H) 0 - 2 /HPF    RBC Urine 0 0 - 2 /HPF    Bacteria Urine Many (A) NEG /HPF    Squamous Epithelial /HPF Urine 1 0 - 1 /HPF    Mucous Urine Present (A) NEG /LPF   Alcohol ethyl   Result Value Ref Range    Ethanol g/dL <0.01 <0.01 g/dL     review of the labs are unremarkable.  Her liver function tests appear to be stable.  CT of her head was also normal.  There doesn't appear to be any organic cause for the syncope.  I discussed the case with pharmacy staff in the state 3 to 5% of former alcoholics can have hallucinations on  the Effexor.  I discussed the case with the patient's primary care doctor, Dr. Bustos, and he recommended starting the patient on Zoloft 25 mg a day for week and then up to 50 mg thereafter.  Is hopeful that the hallucination should stop over the next couple of days.  If she continues to hallucinate, I will have the patient follow-up with her doctor and clinic.    Assessments & Plan (with Medical Decision Making)  medication reaction, severe depression      I have reviewed the nursing notes.    I have reviewed the findings, diagnosis, plan and need for follow up with the patient.        5/16/2017   [facility] EMERGENCY DEPARTMENT     Joel Gary MD  05/16/17 [id number]

## 2017-05-17 RX ORDER — THIAMINE MONONITRATE (VIT B1) 100 MG
TABLET ORAL
Qty: 90 TABLET | Refills: 3 | Status: SHIPPED | OUTPATIENT
Start: 2017-05-17 | End: 2018-06-05

## 2017-05-17 NOTE — TELEPHONE ENCOUNTER
Ok per Dr. Bustos to place the patient on his schedule on Thursday 05/17/17 @ 2:00pm for the medication reaction recheck.  Thank you   Daniela PAGE

## 2017-05-17 NOTE — TELEPHONE ENCOUNTER
Pt calling requesting an appointment within 2 days with  for an ER fu for reaction to Effexor. Only acute and DrKelseyOnly slots open- please call Heydi at home number - OK to Lm. Thank You.    Lazara Irving Scheduling

## 2017-05-17 NOTE — TELEPHONE ENCOUNTER
Prescription approved per [facility] Refill Protocol.    Kenyetta Sinclair RN

## 2017-05-18 ENCOUNTER — TELEPHONE (OUTPATIENT)
Dept: FAMILY MEDICINE | Facility: CLINIC | Age: 52
End: 2017-05-18

## 2017-05-18 NOTE — TELEPHONE ENCOUNTER
Reason for Call:  Other appointment    Detailed comments: patient calling, she missed her appt today and needs to be seen asap for her f/u from the ER, please let patient know if she can be worked in on Monday 5/22, as patient is aware that Dr. Bustos is out of the office tomorrow 5/19. Please call and advise patient is sorry she missed her appt today    Phone Number Patient can be reached at: Home number on file [phone] (home)    Best Time: any    Can we leave a detailed message on this number? YES    Call taken on 5/18/2017 at 3:15 PM by Shanique Martinez

## 2017-05-18 NOTE — TELEPHONE ENCOUNTER
You can use one of my acute visits on Tuesday the 23rd or 24th.     Electronically signed by:  Efren Bustos M.D.  5/18/2017

## 2017-05-23 ENCOUNTER — OFFICE VISIT (OUTPATIENT)
Dept: FAMILY MEDICINE | Facility: CLINIC | Age: 52
End: 2017-05-23
Payer: COMMERCIAL

## 2017-05-23 DIAGNOSIS — K70.11 ALCOHOLIC HEPATITIS WITH ASCITES (H): ICD-10-CM

## 2017-05-23 DIAGNOSIS — F10.21 ALCOHOLISM IN RECOVERY (H): ICD-10-CM

## 2017-05-23 DIAGNOSIS — R44.3 HALLUCINATIONS: ICD-10-CM

## 2017-05-23 DIAGNOSIS — R17 SCLERAL ICTERUS: ICD-10-CM

## 2017-05-23 LAB
ALBUMIN SERPL-MCNC: 2.9 G/DL (ref 3.4–5)
ALP SERPL-CCNC: 268 U/L (ref 40–150)
ALT SERPL W P-5'-P-CCNC: 34 U/L (ref 0–50)
AMMONIA PLAS-SCNC: 22 UMOL/L (ref 10–50)
ANION GAP SERPL CALCULATED.3IONS-SCNC: 8 MMOL/L (ref 3–14)
AST SERPL W P-5'-P-CCNC: 60 U/L (ref 0–45)
BILIRUB SERPL-MCNC: 2.1 MG/DL (ref 0.2–1.3)
BUN SERPL-MCNC: 10 MG/DL (ref 7–30)
CALCIUM SERPL-MCNC: 9.2 MG/DL (ref 8.5–10.1)
CHLORIDE SERPL-SCNC: 109 MMOL/L (ref 94–109)
CHOLEST SERPL-MCNC: 178 MG/DL
CO2 SERPL-SCNC: 27 MMOL/L (ref 20–32)
CREAT SERPL-MCNC: 0.87 MG/DL (ref 0.52–1.04)
ERYTHROCYTE [DISTWIDTH] IN BLOOD BY AUTOMATED COUNT: 13 % (ref 10–15)
GFR SERPL CREATININE-BSD FRML MDRD: 69 ML/MIN/1.7M2
GLUCOSE SERPL-MCNC: 106 MG/DL (ref 70–99)
HCT VFR BLD AUTO: 31.1 % (ref 35–47)
HDLC SERPL-MCNC: 71 MG/DL
HGB BLD-MCNC: 9.5 G/DL (ref 11.7–15.7)
LDLC SERPL CALC-MCNC: 95 MG/DL
MCH RBC QN AUTO: 32.4 PG (ref 26.5–33)
MCHC RBC AUTO-ENTMCNC: 30.5 G/DL (ref 31.5–36.5)
MCV RBC AUTO: 106 FL (ref 78–100)
NONHDLC SERPL-MCNC: 107 MG/DL
PHOSPHATE SERPL-MCNC: 4.6 MG/DL (ref 2.5–4.5)
PLATELET # BLD AUTO: 112 10E9/L (ref 150–450)
POTASSIUM SERPL-SCNC: 4.1 MMOL/L (ref 3.4–5.3)
PROT SERPL-MCNC: 5.8 G/DL (ref 6.8–8.8)
RBC # BLD AUTO: 2.93 10E12/L (ref 3.8–5.2)
SODIUM SERPL-SCNC: 144 MMOL/L (ref 133–144)
TRIGL SERPL-MCNC: 59 MG/DL
WBC # BLD AUTO: 6 10E9/L (ref 4–11)

## 2017-05-23 PROCEDURE — 36415 COLL VENOUS BLD VENIPUNCTURE: CPT | Performed by: FAMILY MEDICINE

## 2017-05-23 PROCEDURE — 85027 COMPLETE CBC AUTOMATED: CPT | Performed by: FAMILY MEDICINE

## 2017-05-23 PROCEDURE — 80061 LIPID PANEL: CPT | Performed by: FAMILY MEDICINE

## 2017-05-23 PROCEDURE — 80053 COMPREHEN METABOLIC PANEL: CPT | Performed by: FAMILY MEDICINE

## 2017-05-23 PROCEDURE — 82140 ASSAY OF AMMONIA: CPT | Performed by: FAMILY MEDICINE

## 2017-05-23 PROCEDURE — 84100 ASSAY OF PHOSPHORUS: CPT | Performed by: FAMILY MEDICINE

## 2017-05-23 PROCEDURE — 99214 OFFICE O/P EST MOD 30 MIN: CPT | Performed by: FAMILY MEDICINE

## 2017-05-23 NOTE — MR AVS SNAPSHOT
After Visit Summary   5/23/2017    Monalisa Olguin    MRN: [id number]           Patient Information     Date Of Birth          1965        Visit Information        Provider Department      5/23/2017 3:20 PM Efren Bustos MD [facility]        Today's Diagnoses     Alcoholism in recovery (H)        Alcoholic hepatitis with ascites           Follow-ups after your visit        Your next 10 appointments already scheduled     Jun 13, 2017  3:00 PM CDT   Office Visit with Efren Bustos MD   [facility]    [address] [place] [id number] [phone]           Bring a current list of meds and any records pertaining to this visit.  For Physicals, please bring immunization records and any forms needing to be filled out.  Please arrive 10 minutes early to complete paperwork.              Who to contact     If you have questions or need follow up information about today's clinic visit or your schedule please contact [facility] directly at [phone].  Normal or non-critical lab and imaging results will be communicated to you by [employer], letter or phone within 4 business days after the clinic has received the results. If you do not hear from us within 7 days, please contact the clinic through [employer] or phone. If you have a critical or abnormal lab result, we will notify you by phone as soon as possible.  Submit refill requests through [employer] or call your pharmacy and they will forward the refill request to us. Please allow 3 business days for your refill to be completed.          Additional Information About Your Visit        [employer] Information     [employer] gives you secure access to your electronic health record. If you see a primary care provider, you can also send messages to your care team and make appointments. If you have questions, please call your primary care clinic.  If you do not have a  primary care provider, please call [phone] and they will assist you.        Care EveryWhere ID     This is your Care EveryWhere ID. This could be used by other organizations to access your [facility] medical records  [id number]        Your Vitals Were     Last Period                   05/16/2012            Blood Pressure from Last 3 Encounters:   05/23/17 (P) 114/60   05/16/17 119/83   05/10/17 106/60    Weight from Last 3 Encounters:   05/23/17 (P) 111 lb (50.3 kg)   05/16/17 110 lb 1 oz (49.9 kg)   05/10/17 106 lb (48.1 kg)              We Performed the Following     Ammonia     CBC with platelets     Comprehensive metabolic panel (BMP + Alb, Alk Phos, ALT, AST, Total. Bili, TP)     Lipid Profile with reflex to direct LDL     Phosphorus        Primary Care Provider Office Phone # Fax #    Efren Bustos [phone]       [place] [address] [place] [address]        Thank you!     Thank you for choosing [facility]  for your care. Our goal is always to provide you with excellent care. Hearing back from our patients is one way we can continue to improve our services. Please take a few minutes to complete the written survey that you may receive in the mail after your visit with us. Thank you!             Your Updated Medication List - Protect others around you: Learn how to safely use, store and throw away your medicines at www.disposemymeds.org.          This list is accurate as of: 5/23/17  4:07 PM.  Always use your most recent med list.                   Brand Name Dispense Instructions for use    buPROPion 300 MG 24 hr tablet    WELLBUTRIN XL    90 tablet    Take 1 tablet (300 mg) by mouth every morning       busPIRone 15 MG tablet    BUSPAR    180 tablet    Take 1 tablet (15 mg) by mouth 2 times daily       * FA-Vitamin B-6-Vitamin B-12 2.2-25-0.5 MG Tabs      Take 1 tablet by mouth       * TL RICARDO RX 2.2-25-1 MG Tabs   Generic drug:  Folic  Acid-Vit B6-Vit B12     90 tablet    TAKE ONE TABLET BY MOUTH EVERY DAY       lidocaine visc 2% & diphenhydramine 12.5mg/5mL & maalox/mylanta w/simethicone (1:1:1 v/v/v) Susp compounding kit     237 mL    Swish and swallow 5-10 mLs in mouth every 6 hours as needed       naltrexone 50 MG tablet    DEPADE;REVIA    90 tablet    Take 1 tablet (50 mg) by mouth daily Take 1/2 tablet by mouth daily for one week then take 1 tablet daily after       * omeprazole 20 MG CR capsule    priLOSEC     Take by mouth daily       * omeprazole 20 MG CR capsule    priLOSEC    90 capsule    Take 1 capsule (20 mg) by mouth daily       phosphorus tablet 250 mg 250 MG per tablet    K PHOS NEUTRAL    28 tablet    Take 2 tablets (500 mg) by mouth 2 times daily       propranolol 10 MG tablet    INDERAL    60 tablet    Take 1 tablet (10 mg) by mouth 2 times daily       senna-docusate 8.6-50 MG per tablet    SENOKOT-S;PERICOLACE    100 tablet    Take 1 tablet by mouth 2 times daily as needed for constipation       sertraline 25 MG tablet    ZOLOFT    30 tablet    Take 1 tab PO Qday for 7 days then 2 tabs PO Qday       temazepam 15 MG capsule    RESTORIL    60 capsule    Take 1-2 capsules (15-30 mg) by mouth nightly as needed for sleep       thiamine 100 MG tablet     90 tablet    TAKE ONE TABLET BY MOUTH EVERY DAY       * Notice:  This list has 4 medication(s) that are the same as other medications prescribed for you. Read the directions carefully, and ask your doctor or other care provider to review them with you.

## 2017-05-23 NOTE — PROGRESS NOTES
Patient seen, note dictated, (ID#[id number]).  Electronically signed by:  Efren Bustos M.D.  5/23/2017

## 2017-05-25 ENCOUNTER — TELEPHONE (OUTPATIENT)
Dept: FAMILY MEDICINE | Facility: CLINIC | Age: 52
End: 2017-05-25

## 2017-05-26 DIAGNOSIS — F33.1 MAJOR DEPRESSIVE DISORDER, RECURRENT EPISODE, MODERATE (H): Primary | ICD-10-CM

## 2017-05-26 NOTE — TELEPHONE ENCOUNTER
Sertraline     Last Written Prescription Date: 05/16/2017  Last Fill Quantity: 30, # refills: 0  Last Office Visit with FM primary care provider:  05/23/2017   Next 5 appointments (look out 90 days)     Jun 13, 2017  3:00 PM CDT   [facility]    [address] [place] [id number] [phone]                   Last PHQ-9 score on record=   PHQ-9 SCORE 5/10/2017   Total Score -   Total Score 18           Celeste Espinoza CPhT  [facility]  Float Technician  [place]

## 2017-06-13 ENCOUNTER — OFFICE VISIT (OUTPATIENT)
Dept: FAMILY MEDICINE | Facility: CLINIC | Age: 52
End: 2017-06-13
Payer: COMMERCIAL

## 2017-06-13 ENCOUNTER — TELEPHONE (OUTPATIENT)
Dept: FAMILY MEDICINE | Facility: CLINIC | Age: 52
End: 2017-06-13

## 2017-06-13 VITALS
SYSTOLIC BLOOD PRESSURE: 122 MMHG | DIASTOLIC BLOOD PRESSURE: 64 MMHG | TEMPERATURE: 97.7 F | BODY MASS INDEX: 22.67 KG/M2 | HEART RATE: 98 BPM | WEIGHT: 120 LBS | OXYGEN SATURATION: 97 % | RESPIRATION RATE: 16 BRPM

## 2017-06-13 DIAGNOSIS — F32.9 MAJOR DEPRESSION: ICD-10-CM

## 2017-06-13 DIAGNOSIS — I10 HYPERTENSION GOAL BP (BLOOD PRESSURE) < 130/80: ICD-10-CM

## 2017-06-13 DIAGNOSIS — F33.1 MAJOR DEPRESSIVE DISORDER, RECURRENT EPISODE, MODERATE (H): ICD-10-CM

## 2017-06-13 DIAGNOSIS — R05.9 COUGH: ICD-10-CM

## 2017-06-13 DIAGNOSIS — E05.90 HYPERTHYROIDISM: ICD-10-CM

## 2017-06-13 DIAGNOSIS — D51.3 OTHER DIETARY VITAMIN B12 DEFICIENCY ANEMIA: ICD-10-CM

## 2017-06-13 DIAGNOSIS — K76.0 FATTY LIVER: ICD-10-CM

## 2017-06-13 DIAGNOSIS — F41.9 ANXIETY: Primary | ICD-10-CM

## 2017-06-13 DIAGNOSIS — F41.1 GAD (GENERALIZED ANXIETY DISORDER): ICD-10-CM

## 2017-06-13 DIAGNOSIS — K70.11 ALCOHOLIC HEPATITIS WITH ASCITES (H): ICD-10-CM

## 2017-06-13 DIAGNOSIS — K82.8 SLUDGE IN GALLBLADDER: ICD-10-CM

## 2017-06-13 DIAGNOSIS — K76.6 PORTAL HYPERTENSION (H): ICD-10-CM

## 2017-06-13 LAB
ALBUMIN SERPL-MCNC: 2.9 G/DL (ref 3.4–5)
ALP SERPL-CCNC: 280 U/L (ref 40–150)
ALT SERPL W P-5'-P-CCNC: 32 U/L (ref 0–50)
ANION GAP SERPL CALCULATED.3IONS-SCNC: 5 MMOL/L (ref 3–14)
AST SERPL W P-5'-P-CCNC: 55 U/L (ref 0–45)
BILIRUB SERPL-MCNC: 1.4 MG/DL (ref 0.2–1.3)
BUN SERPL-MCNC: 9 MG/DL (ref 7–30)
CALCIUM SERPL-MCNC: 8.8 MG/DL (ref 8.5–10.1)
CHLORIDE SERPL-SCNC: 113 MMOL/L (ref 94–109)
CO2 SERPL-SCNC: 28 MMOL/L (ref 20–32)
CREAT SERPL-MCNC: 0.84 MG/DL (ref 0.52–1.04)
ERYTHROCYTE [DISTWIDTH] IN BLOOD BY AUTOMATED COUNT: 13 % (ref 10–15)
GFR SERPL CREATININE-BSD FRML MDRD: 71 ML/MIN/1.7M2
GLUCOSE SERPL-MCNC: 99 MG/DL (ref 70–99)
HCT VFR BLD AUTO: 32.4 % (ref 35–47)
HGB BLD-MCNC: 9.6 G/DL (ref 11.7–15.7)
MCH RBC QN AUTO: 30.2 PG (ref 26.5–33)
MCHC RBC AUTO-ENTMCNC: 29.6 G/DL (ref 31.5–36.5)
MCV RBC AUTO: 102 FL (ref 78–100)
PLATELET # BLD AUTO: 120 10E9/L (ref 150–450)
POTASSIUM SERPL-SCNC: 4.2 MMOL/L (ref 3.4–5.3)
PROT SERPL-MCNC: 6.2 G/DL (ref 6.8–8.8)
RBC # BLD AUTO: 3.18 10E12/L (ref 3.8–5.2)
SODIUM SERPL-SCNC: 146 MMOL/L (ref 133–144)
WBC # BLD AUTO: 6.2 10E9/L (ref 4–11)

## 2017-06-13 PROCEDURE — 85027 COMPLETE CBC AUTOMATED: CPT | Performed by: FAMILY MEDICINE

## 2017-06-13 PROCEDURE — 36415 COLL VENOUS BLD VENIPUNCTURE: CPT | Performed by: FAMILY MEDICINE

## 2017-06-13 PROCEDURE — 99214 OFFICE O/P EST MOD 30 MIN: CPT | Performed by: FAMILY MEDICINE

## 2017-06-13 PROCEDURE — 80053 COMPREHEN METABOLIC PANEL: CPT | Performed by: FAMILY MEDICINE

## 2017-06-13 RX ORDER — PROPRANOLOL HYDROCHLORIDE 10 MG/1
10 TABLET ORAL 2 TIMES DAILY
Qty: 180 TABLET | Refills: 3 | Status: SHIPPED | OUTPATIENT
Start: 2017-06-13 | End: 2017-06-13 | Stop reason: DRUGHIGH

## 2017-06-13 RX ORDER — PROPRANOLOL HYDROCHLORIDE 10 MG/1
10 TABLET ORAL 2 TIMES DAILY
Qty: 180 TABLET | Refills: 3 | Status: ON HOLD | OUTPATIENT
Start: 2017-06-13 | End: 2019-01-01

## 2017-06-13 RX ORDER — SERTRALINE HYDROCHLORIDE 100 MG/1
100 TABLET, FILM COATED ORAL DAILY
Qty: 90 TABLET | Refills: 3 | Status: SHIPPED | OUTPATIENT
Start: 2017-06-13 | End: 2017-11-02 | Stop reason: ALTCHOICE

## 2017-06-13 ASSESSMENT — PATIENT HEALTH QUESTIONNAIRE - PHQ9: 5. POOR APPETITE OR OVEREATING: SEVERAL DAYS

## 2017-06-13 ASSESSMENT — ANXIETY QUESTIONNAIRES
6. BECOMING EASILY ANNOYED OR IRRITABLE: MORE THAN HALF THE DAYS
GAD7 TOTAL SCORE: 10
2. NOT BEING ABLE TO STOP OR CONTROL WORRYING: SEVERAL DAYS
5. BEING SO RESTLESS THAT IT IS HARD TO SIT STILL: MORE THAN HALF THE DAYS
3. WORRYING TOO MUCH ABOUT DIFFERENT THINGS: SEVERAL DAYS
1. FEELING NERVOUS, ANXIOUS, OR ON EDGE: MORE THAN HALF THE DAYS
IF YOU CHECKED OFF ANY PROBLEMS ON THIS QUESTIONNAIRE, HOW DIFFICULT HAVE THESE PROBLEMS MADE IT FOR YOU TO DO YOUR WORK, TAKE CARE OF THINGS AT HOME, OR GET ALONG WITH OTHER PEOPLE: VERY DIFFICULT
7. FEELING AFRAID AS IF SOMETHING AWFUL MIGHT HAPPEN: SEVERAL DAYS

## 2017-06-13 ASSESSMENT — PAIN SCALES - GENERAL: PAINLEVEL: NO PAIN (0)

## 2017-06-13 NOTE — PROGRESS NOTES
SUBJECTIVE:                                                    Monalisa Olguin is a 51 year old female who presents to clinic today for the following health issues:      Depression/Anxiety Followup    Status since last visit: Worsened     See PHQ-9 for current symptoms.  Other associated symptoms: None    Complicating factors:   Significant life event:  No   Current substance abuse:  None  Anxiety or Panic symptoms:  Yes-  panic    PHQ-9  English PHQ-9   Any Language            Amount of exercise or physical activity: None    Problems taking medications regularly: No    Medication side effects: none    Diet: regular (no restrictions)      PROBLEMS TO ADD ON...      She feels like she has had a bit of improvement but she is so tired all the time that she feels like she could sleep all day long, but then is up at night.    She is coughing more regularly since she stopped drinking and continues to smoke 1/2 to 1 ppd.  No street drugs or other illicit substances.  She is not producing any sputum and has no fevers chills or night sweats.     Problem list and histories reviewed & adjusted, as indicated.  Additional history: as documented    Patient Active Problem List   Diagnosis     Kidney donor     Esophageal reflux     Moderate Depression [296.32]     HYPERLIPIDEMIA LDL GOAL <100     Hypertension goal BP (blood pressure) < 130/80     Anxiety     Crushing injury of thumb, left     Alcoholism (H)     Alcoholism in recovery (H)     Laceration of head without foreign body, unspecified part of head, sequela     Alcoholic hepatitis with ascites     Sludge in gallbladder     Anemia     Thrombocytopenia (H)     Tobacco abuse     Portal hypertension (H)     Pulmonary nodules     Hyperthyroidism     Hypophosphatemia     Severe malnutrition (H)     SIRS (systemic inflammatory response syndrome) (H)     Scleral icterus     Hallucinations     Past Surgical History:   Procedure Laterality Date     C  DELIVERY ONLY       , Low Cervical     C RMV,KIDNEY,DONOR,LIVING      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     HC KNEE SCOPE, DIAGNOSTIC  01    Arthroscopy, Lt Knee       Social History   Substance Use Topics     Smoking status: Current Every Day Smoker     Packs/day: 0.50     Years: 10.00     Smokeless tobacco: Never Used      Comment: pt quit  after smoking for 8 yrs, started again in      Alcohol use No      Comment: stopped drinking 3/26/2017     Family History   Problem Relation Age of Onset     Hypertension Mother      HEART DISEASE Paternal Grandmother      HEART DISEASE Paternal Grandfather      CEREBROVASCULAR DISEASE Maternal Grandmother      CEREBROVASCULAR DISEASE Maternal Grandfather      Alcohol/Drug Maternal Grandfather      Substance Abuse Maternal Grandfather      HEART DISEASE Father      Silent MI stents x 2     DIABETES Sister 17     older sister also had kidney and pancreas transplant     HEART DISEASE Sister      MI secondary to diabetes     Genitourinary Problems Sister 43     kidney transplant from renal failure     DIABETES Sister 9     youngest, juvenile type I (onset age 9 with no complications)     CANCER Paternal Aunt      ?kind         Current Outpatient Prescriptions   Medication Sig Dispense Refill     [DISCONTINUED] propranolol (INDERAL) 10 MG tablet Take 1 tablet (10 mg) by mouth 2 times daily 180 tablet 3     [DISCONTINUED] sertraline (ZOLOFT) 50 MG tablet Take 2 tablets (100 mg) by mouth daily Note that I am changing her to the 50 mg tablet so she will only need to take one daily. 180 tablet 3     TL RICARDO RX 2.2-25-1 MG TABS TAKE ONE TABLET BY MOUTH EVERY DAY 90 tablet 3     VITAMIN B-1 100 MG tablet TAKE ONE TABLET BY MOUTH EVERY DAY 90 tablet 3     temazepam (RESTORIL) 15 MG capsule Take 1-2 capsules (15-30 mg) by mouth nightly as needed for sleep 60 capsule 1     busPIRone (BUSPAR) 15 MG tablet Take 1 tablet (15 mg) by mouth 2 times daily 180 tablet 3     buPROPion (WELLBUTRIN  "XL) 300 MG 24 hr tablet Take 1 tablet (300 mg) by mouth every morning 90 tablet 3     omeprazole (PRILOSEC) 20 MG CR capsule Take 1 capsule (20 mg) by mouth daily 90 capsule 3     naltrexone (DEPADE;REVIA) 50 MG tablet Take 1 tablet (50 mg) by mouth daily Take 1/2 tablet by mouth daily for one week then take 1 tablet daily after 90 tablet 3     sertraline (ZOLOFT) 100 MG tablet Take 1 tablet (100 mg) by mouth daily 90 tablet 3     propranolol (INDERAL) 10 MG tablet Take 1 tablet (10 mg) by mouth 2 times daily 180 tablet 3     [DISCONTINUED] sertraline (ZOLOFT) 50 MG tablet Take 1 tablet (50 mg) by mouth daily Note that I am changing her to the 50 mg tablet so she will only need to take one daily. 90 tablet 3     [DISCONTINUED] propranolol (INDERAL) 10 MG tablet Take 1 tablet (10 mg) by mouth 2 times daily 60 tablet 0     Allergies   Allergen Reactions     Albuterol      Tongue \"hardened and painful\"     Recent Labs   Lab Test  06/13/17   1602  05/23/17   1610  05/16/17   1202   03/26/17   1840  03/03/16   0953   08/26/13   1037   05/24/13   2245   LDL   --   95   --    --    --   56   --   96   < >   --    HDL   --   71   --    --    --   58   --   54   < >   --    TRIG   --   59   --    --    --   95   --   104   < >   --    ALT  32  34  43   < >  77*   --    < >  32   < >  80*   CR  0.84  0.87  0.74   < >  0.53  0.89   < >  1.12*   --   1.07*   GFRESTIMATED  71  69  83   < >  >90  Non  GFR Calc    67   < >  52*   --   55*   GFRESTBLACK  86  83  >90   GFR Calc     < >  >90   GFR Calc    81   < >  63   --   67   POTASSIUM  4.2  4.1  4.1   < >  2.6*  3.7   < >  3.8   --   4.4   TSH   --    --    --    --   0.04*   --    --    --    --   0.64    < > = values in this interval not displayed.      BP Readings from Last 3 Encounters:   06/13/17 122/64   05/23/17 (P) 114/60   05/16/17 119/83    Wt Readings from Last 3 Encounters:   06/13/17 120 lb (54.4 kg)   05/23/17 (P) 111 " lb (50.3 kg)   05/16/17 110 lb 1 oz (49.9 kg)                  Labs reviewed in EPIC    Reviewed and updated as needed this visit by clinical staff  Tobacco  Allergies  Meds  Problems       Reviewed and updated as needed this visit by Provider         ROS:  Constitutional, HEENT, cardiovascular, pulmonary, gi and gu systems are negative, except as otherwise noted.    OBJECTIVE:                                                    /64  Pulse 98  Temp 97.7  F (36.5  C) (Tympanic)  Resp 16  Wt 120 lb (54.4 kg)  LMP 05/16/2012  SpO2 97%  BMI (P) 22.67 kg/m2  Body mass index is 22.67 kg/(m^2) (pended).  GENERAL: healthy, alert and no distress  EYES: Eyes grossly normal to inspection and scleral icterus has resolved.   NECK: no adenopathy, no asymmetry, masses, or scars and thyroid normal to palpation  CV: regular rate and rhythm, normal S1 S2, no S3 or S4, no murmur, click or rub, no peripheral edema and peripheral pulses strong  ABDOMEN: soft, nontender, she still has some hepatomegaly and is slightly tender over the liver edge along the nipple line.  She has no masses and bowel sounds normal  MS: no gross musculoskeletal defects noted, no edema    Diagnostic Test Results:  Results for orders placed or performed in visit on 06/13/17 (from the past 24 hour(s))   Comprehensive metabolic panel   Result Value Ref Range    Sodium 146 (H) 133 - 144 mmol/L    Potassium 4.2 3.4 - 5.3 mmol/L    Chloride 113 (H) 94 - 109 mmol/L    Carbon Dioxide 28 20 - 32 mmol/L    Anion Gap 5 3 - 14 mmol/L    Glucose 99 70 - 99 mg/dL    Urea Nitrogen 9 7 - 30 mg/dL    Creatinine 0.84 0.52 - 1.04 mg/dL    GFR Estimate 71 >60 mL/min/1.7m2    GFR Estimate If Black 86 >60 mL/min/1.7m2    Calcium 8.8 8.5 - 10.1 mg/dL    Bilirubin Total 1.4 (H) 0.2 - 1.3 mg/dL    Albumin 2.9 (L) 3.4 - 5.0 g/dL    Protein Total 6.2 (L) 6.8 - 8.8 g/dL    Alkaline Phosphatase 280 (H) 40 - 150 U/L    ALT 32 0 - 50 U/L    AST 55 (H) 0 - 45 U/L   CBC with  platelets   Result Value Ref Range    WBC 6.2 4.0 - 11.0 10e9/L    RBC Count 3.18 (L) 3.8 - 5.2 10e12/L    Hemoglobin 9.6 (L) 11.7 - 15.7 g/dL    Hematocrit 32.4 (L) 35.0 - 47.0 %     (H) 78 - 100 fl    MCH 30.2 26.5 - 33.0 pg    MCHC 29.6 (L) 31.5 - 36.5 g/dL    RDW 13.0 10.0 - 15.0 %    Platelet Count 120 (L) 150 - 450 10e9/L     CXR - patient forgot to stop to have her xray done.      ASSESSMENT/PLAN:                                                    (F33.1) Major depressive disorder, recurrent episode, moderate (H)  (F41.1) MONA (generalized anxiety disorder)  Comment: some improvement in symptoms but not adequate yet.  She is not having any hallucinations any more.    Plan: sertraline (ZOLOFT) 100 MG tablet        Increase this to 100 mg daily and see me in a month    (I10) Hypertension goal BP (blood pressure) < 130/80  (K76.6) Portal hypertension (H)  Comment: stable  Plan:  propranolol (INDERAL) 10 MG         tablet        Continue the inderal    (E05.90) Hyperthyroidism  Comment: her TSH was suppressed and the free T4 mildly elevated back in March.  Plan:  propranolol (INDERAL) 10 MG         tablet        Will recheck labs at her next visit.     (D51.3) Other dietary vitamin B12 deficiency anemia  Comment: due to her alcohol use but this is stable.   Plan: CBC with platelets        Rechecked labs, will recheck again in 3-4 months. She continues to take her vitamin supplements.      (K82.8) Sludge in gallbladder  Comment: her US in March showed a thickened GB wall   Plan: US Abdomen Complete,          Will repeat the US to look for signs of chronic cholecystitis.      (K70.11) Alcoholic hepatitis with ascites  Comment: her labs have continued to improve since her hospitalization.   Plan: Comprehensive metabolic panel        Drawn, improved. Recheck in 3-4 months    (R05) Cough  Comment: new for her but lungs sound clear.    Plan: XR Chest 2 Views        She was supposed to have this done today but  forgot to stop after having her labs drawn.      (K76.0) Fatty liver  Comment: was seen on her last US  Plan: repeat the US to make sure the liver size is improving as is the appearance.           Tobacco Cessation:   reports that she has been smoking.  She has a 5.00 pack-year smoking history. She has never used smokeless tobacco.  Tobacco Cessation Action Plan: Information offered: Patient not interested at this time    FUTURE LABS:       - Schedule non-fasting labs in 3-4 months  FUTURE APPOINTMENTS:       - Follow-up visit in one month    Electronically signed by:  Efren Bustos M.D.  6/13/2017

## 2017-06-13 NOTE — NURSING NOTE
"Chief Complaint   Patient presents with     Depression     recheck and not feeling like she wants to do anything.       Initial /64  Pulse 98  Temp 97.7  F (36.5  C) (Tympanic)  Resp 16  Wt 120 lb (54.4 kg)  LMP 05/16/2012  SpO2 97%  BMI (P) 22.67 kg/m2 Estimated body mass index is 22.67 kg/(m^2) (pended) as calculated from the following:    Height as of 5/23/17: (P) 5' 1\" (1.549 m).    Weight as of this encounter: 120 lb (54.4 kg).  Medication Reconciliation: complete    "

## 2017-06-13 NOTE — MR AVS SNAPSHOT
After Visit Summary   6/13/2017    Monalisa Olguin    MRN: [id number]           Patient Information     Date Of Birth          1965        Visit Information        Provider Department      6/13/2017 3:00 PM Efren Bustos MD [facility]        Today's Diagnoses     Sludge in gallbladder    -  1    Portal hypertension (H)        Hyperthyroidism        Hypertension goal BP (blood pressure) < 130/80        Major depressive disorder, recurrent episode, moderate (H)        Other dietary vitamin B12 deficiency anemia        Alcoholic hepatitis with ascites        Cough        Fatty liver           Follow-ups after your visit        Your next 10 appointments already scheduled     Jun 21, 2017  9:10 AM CDT   US ABDOMEN COMPLETE with PHUS1   [facility] ([facility])    [address] [place] [address] [id number] [phone]           Please bring a list of your medicines (including vitamins, minerals and over-the-counter drugs). Also, tell your doctor about any allergies you may have. Wear comfortable clothes and leave your valuables at home.  Adults: No eating or drinking for 8 hours before the exam. You may take medicine with a small sip of water.  Children: - Children 6+ years: No food or drink for 6 hours before exam. - Children 1-5 years: No food or drink for 4 hours before exam. - Infants, breast-fed: may have breast milk up to 2 hours before exam. - Infants, formula: may have bottle until 4 hours before exam.  Please call the Imaging Department at your exam site with any questions.            Jul 11, 2017  3:00 PM CDT   [facility] Visit with Efren [facility]ustos MD   [facility]    [address] [facility] [id number] [phone]           Bring a current list of meds and any records pertaining to this visit.  For Physicals, please bring immunization records and any forms needing to be  filled out.  Please arrive 10 minutes early to complete paperwork.              Future tests that were ordered for you today     Open Future Orders        Priority Expected Expires Ordered    US Abdomen Complete Routine  6/13/2018 6/13/2017    US Abdomen Limited Routine  6/13/2018 6/13/2017            Who to contact     If you have questions or need follow up information about today's clinic visit or your schedule please contact [facility] directly at [phone].  Normal or non-critical lab and imaging results will be communicated to you by kissnofroghart, letter or phone within 4 business days after the clinic has received the results. If you do not hear from us within 7 days, please contact the clinic through "[employer] or phone. If you have a critical or abnormal lab result, we will notify you by phone as soon as possible.  Submit refill requests through "[employer] or call your pharmacy and they will forward the refill request to us. Please allow 3 business days for your refill to be completed.          Additional Information About Your Visit        "[employer] Information     "[employer] gives you secure access to your electronic health record. If you see a primary care provider, you can also send messages to your care team and make appointments. If you have questions, please call your primary care clinic.  If you do not have a primary care provider, please call [phone] and they will assist you.        Care EveryWhere ID     This is your Care EveryWhere ID. This could be used by other organizations to access your [facility] medical records  [id number]        Your Vitals Were     Pulse Temperature Respirations Last Period Pulse Oximetry BMI (Body Mass Index)    98 97.7  F (36.5  C) (Tympanic) 16 05/16/2012 97% 22.67 kg/m2       Blood Pressure from Last 3 Encounters:   06/13/17 122/64   05/23/17 (P) 114/60   05/16/17 119/83    Weight from Last 3 Encounters:   06/13/17 120 lb (54.4 kg)   05/23/17 (P) 111 lb (50.3  kg)   05/16/17 110 lb 1 oz (49.9 kg)              We Performed the Following     CBC with platelets     Comprehensive metabolic panel     XR Chest 2 Views          Today's Medication Changes          These changes are accurate as of: 6/13/17  3:58 PM.  If you have any questions, ask your nurse or doctor.               These medicines have changed or have updated prescriptions.        Dose/Directions    sertraline 50 MG tablet   Commonly known as:  ZOLOFT   This may have changed:  how much to take   Used for:  Major depressive disorder, recurrent episode, moderate (H)   Changed by:  Efren Bustos MD        Dose:  100 mg   Take 2 tablets (100 mg) by mouth daily Note that I am changing her to the 50 mg tablet so she will only need to take one daily.   Quantity:  180 tablet   Refills:  3         Stop taking these medicines if you haven't already. Please contact your care team if you have questions.     propranolol 10 MG tablet   Commonly known as:  INDERAL   Stopped by:  Efren Bustos MD                Where to get your medicines      These medications were sent to [address]   [address] [place] [address] , [facility] [address]     Phone:  [phone]     sertraline 50 MG tablet                Primary Care Provider Office Phone # Fax #    Efren Bustos [phone]       [address]   [facility] [place] [address]        Thank you!     Thank you for choosing [facility]  for your care. Our goal is always to provide you with excellent care. Hearing back from our patients is one way we can continue to improve our services. Please take a few minutes to complete the written survey that you may receive in the mail after your visit with us. Thank you!             Your Updated Medication List - Protect others around you: Learn how to safely use, store and throw away your medicines at www.disposemymeds.org.           This list is accurate as of: 6/13/17  3:58 PM.  Always use your most recent med list.                   Brand Name Dispense Instructions for use    buPROPion 300 MG 24 hr tablet    WELLBUTRIN XL    90 tablet    Take 1 tablet (300 mg) by mouth every morning       busPIRone 15 MG tablet    BUSPAR    180 tablet    Take 1 tablet (15 mg) by mouth 2 times daily       naltrexone 50 MG tablet    DEPADE;REVIA    90 tablet    Take 1 tablet (50 mg) by mouth daily Take 1/2 tablet by mouth daily for one week then take 1 tablet daily after       omeprazole 20 MG CR capsule    priLOSEC    90 capsule    Take 1 capsule (20 mg) by mouth daily       sertraline 50 MG tablet    ZOLOFT    180 tablet    Take 2 tablets (100 mg) by mouth daily Note that I am changing her to the 50 mg tablet so she will only need to take one daily.       temazepam 15 MG capsule    RESTORIL    60 capsule    Take 1-2 capsules (15-30 mg) by mouth nightly as needed for sleep       thiamine 100 MG tablet     90 tablet    TAKE ONE TABLET BY MOUTH EVERY DAY       TL RICARDO RX 2.2-25-1 MG Tabs   Generic drug:  Folic Acid-Vit B6-Vit B12     90 tablet    TAKE ONE TABLET BY MOUTH EVERY DAY

## 2017-06-13 NOTE — LETTER
[address]   [address]  Tel. [phone]/ Fax [phone]    October 12, 2017        Monalisa Olguin  [address] [address] [place] [id number]          Dear Ms. Monalisa Olguin:    Your previous orders for lab work have been extended.  Please call to schedule a lab appointment and return to the clinic to have the labs drawn at your earliest convenience.    If you are required to be fasting for these tests,  remember to have nothing by mouth (except water) after midnight on the night before your test.    If you have any questions or concerns, please contact our office.    Sincerely,       Efren Bustos M.D.

## 2017-06-14 ASSESSMENT — PATIENT HEALTH QUESTIONNAIRE - PHQ9: SUM OF ALL RESPONSES TO PHQ QUESTIONS 1-9: 15

## 2017-06-14 ASSESSMENT — ANXIETY QUESTIONNAIRES: GAD7 TOTAL SCORE: 10

## 2017-06-21 ENCOUNTER — HOSPITAL ENCOUNTER (OUTPATIENT)
Dept: ULTRASOUND IMAGING | Facility: CLINIC | Age: 52
Discharge: HOME | End: 2017-06-21
Attending: FAMILY MEDICINE | Admitting: FAMILY MEDICINE
Payer: COMMERCIAL

## 2017-06-21 ENCOUNTER — HOSPITAL ENCOUNTER (OUTPATIENT)
Dept: MAMMOGRAPHY | Facility: CLINIC | Age: 52
End: 2017-06-21
Attending: FAMILY MEDICINE
Payer: COMMERCIAL

## 2017-06-21 DIAGNOSIS — K82.8 SLUDGE IN GALLBLADDER: ICD-10-CM

## 2017-06-21 PROCEDURE — 76705 ECHO EXAM OF ABDOMEN: CPT

## 2017-06-21 PROCEDURE — G0202 SCR MAMMO BI INCL CAD: HCPCS

## 2017-06-22 NOTE — PROGRESS NOTES
Monalisa has a thickened gallbladder with a large gall stone.  She needs an appointment with the general surgeon to have her gallbladder removed.      Electronically signed by:  Efren Bustos M.D.  6/22/2017

## 2017-06-23 ENCOUNTER — TELEPHONE (OUTPATIENT)
Dept: FAMILY MEDICINE | Facility: CLINIC | Age: 52
End: 2017-06-23

## 2017-06-23 DIAGNOSIS — K80.20 GALL STONES: Primary | ICD-10-CM

## 2017-06-23 NOTE — TELEPHONE ENCOUNTER
----- Message from Efren Bustos MD sent at 6/22/2017 10:36 AM CDT -----  Monalisa has a thickened gallbladder with a large gall stone.  She needs an appointment with the general surgeon to have her gallbladder removed.      Electronically signed by:  Efren Bustos M.D.  6/22/2017

## 2017-06-23 NOTE — TELEPHONE ENCOUNTER
Called and left message for patient to call clinic.  Referral was placed and when patient calls back please give her the providers message and send her to specialty scheduling([phone]) to schedule her General Surgery Consult appointment  Parish Rodriguez MA

## 2017-07-07 DIAGNOSIS — G47.09 OTHER INSOMNIA: ICD-10-CM

## 2017-07-07 NOTE — TELEPHONE ENCOUNTER
Temazepam       Last Written Prescription Date: 05/10/2017  Last Fill Quantity: 60,  # refills: 1   Last Office Visit with G, [facility] or [facility] prescribing provider: 06/13/2017                                         Next 5 appointments (look out 90 days)     Jul 11, 2017  3:00 PM CDT   Office Visit with Efren Bustos MD   [facility] ([facility])    [address] [place] [id number] [phone]                  Thanks  Kayleigh Guzmán [facility]   [phone]

## 2017-07-10 RX ORDER — TEMAZEPAM 15 MG/1
15-30 CAPSULE ORAL
Qty: 60 CAPSULE | Refills: 5 | Status: SHIPPED | OUTPATIENT
Start: 2017-07-10 | End: 2017-09-12

## 2017-07-17 ENCOUNTER — OFFICE VISIT (OUTPATIENT)
Dept: SURGERY | Facility: CLINIC | Age: 52
End: 2017-07-17
Payer: COMMERCIAL

## 2017-07-17 VITALS — TEMPERATURE: 98 F | WEIGHT: 122.5 LBS | BODY MASS INDEX: 23.15 KG/M2 | OXYGEN SATURATION: 99 % | HEART RATE: 101 BPM

## 2017-07-17 DIAGNOSIS — K76.6 PORTAL HYPERTENSION (H): ICD-10-CM

## 2017-07-17 DIAGNOSIS — K70.11 ALCOHOLIC HEPATITIS WITH ASCITES (H): ICD-10-CM

## 2017-07-17 DIAGNOSIS — K80.10 CHRONIC CHOLECYSTITIS WITH CALCULUS: Primary | ICD-10-CM

## 2017-07-17 DIAGNOSIS — D69.6 THROMBOCYTOPENIA (H): ICD-10-CM

## 2017-07-17 LAB
ALBUMIN SERPL-MCNC: 3 G/DL (ref 3.4–5)
ALP SERPL-CCNC: 314 U/L (ref 40–150)
ALT SERPL W P-5'-P-CCNC: 36 U/L (ref 0–50)
AST SERPL W P-5'-P-CCNC: 73 U/L (ref 0–45)
BILIRUB DIRECT SERPL-MCNC: 0.6 MG/DL (ref 0–0.2)
BILIRUB SERPL-MCNC: 1 MG/DL (ref 0.2–1.3)
ERYTHROCYTE [DISTWIDTH] IN BLOOD BY AUTOMATED COUNT: 14.9 % (ref 10–15)
ETHANOL SERPL-MCNC: 0.04 G/DL
HCT VFR BLD AUTO: 28.9 % (ref 35–47)
HGB BLD-MCNC: 8.6 G/DL (ref 11.7–15.7)
INR PPP: 0.95 (ref 0.86–1.14)
LIPASE SERPL-CCNC: 617 U/L (ref 73–393)
MCH RBC QN AUTO: 28 PG (ref 26.5–33)
MCHC RBC AUTO-ENTMCNC: 29.8 G/DL (ref 31.5–36.5)
MCV RBC AUTO: 94 FL (ref 78–100)
PLATELET # BLD AUTO: 58 10E9/L (ref 150–450)
PROT SERPL-MCNC: 6.3 G/DL (ref 6.8–8.8)
RBC # BLD AUTO: 3.07 10E12/L (ref 3.8–5.2)
WBC # BLD AUTO: 4 10E9/L (ref 4–11)

## 2017-07-17 PROCEDURE — 85610 PROTHROMBIN TIME: CPT | Performed by: SPECIALIST

## 2017-07-17 PROCEDURE — 80076 HEPATIC FUNCTION PANEL: CPT | Performed by: SPECIALIST

## 2017-07-17 PROCEDURE — 83690 ASSAY OF LIPASE: CPT | Performed by: SPECIALIST

## 2017-07-17 PROCEDURE — 85027 COMPLETE CBC AUTOMATED: CPT | Performed by: SPECIALIST

## 2017-07-17 PROCEDURE — 36415 COLL VENOUS BLD VENIPUNCTURE: CPT | Performed by: SPECIALIST

## 2017-07-17 PROCEDURE — 80320 DRUG SCREEN QUANTALCOHOLS: CPT | Performed by: SPECIALIST

## 2017-07-17 PROCEDURE — 99204 OFFICE O/P NEW MOD 45 MIN: CPT | Performed by: SPECIALIST

## 2017-07-17 NOTE — MR AVS SNAPSHOT
After Visit Summary   7/17/2017    Monalisa Olguin    MRN: [id number]           Patient Information     Date Of Birth          1965        Visit Information        Provider Department      7/17/2017 11:00 AM Andrea Escalante MD [facility]        Today's Diagnoses     Chronic cholecystitis with calculus    -  1    Alcoholic hepatitis with ascites        Thrombocytopenia (H)        Portal hypertension (H)           Follow-ups after your visit        Who to contact     If you have questions or need follow up information about today's clinic visit or your schedule please contact [facility] directly at [phone].  Normal or non-critical lab and imaging results will be communicated to you by [employer], letter or phone within 4 business days after the clinic has received the results. If you do not hear from us within 7 days, please contact the clinic through [employer] or phone. If you have a critical or abnormal lab result, we will notify you by phone as soon as possible.  Submit refill requests through [employer] or call your pharmacy and they will forward the refill request to us. Please allow 3 business days for your refill to be completed.          Additional Information About Your Visit        MyChart Information     [employer] gives you secure access to your electronic health record. If you see a primary care provider, you can also send messages to your care team and make appointments. If you have questions, please call your primary care clinic.  If you do not have a primary care provider, please call [phone] and they will assist you.        Care EveryWhere ID     This is your Care EveryWhere ID. This could be used by other organizations to access your [place] medical records  [id number]        Your Vitals Were     Pulse Temperature Last Period Pulse Oximetry BMI (Body Mass Index)       101 98  F (36.7  C) (Temporal) 05/16/2012 99% 23.15 kg/m2        Blood  Pressure from Last 3 Encounters:   06/13/17 122/64   05/23/17 (P) 114/60   05/16/17 119/83    Weight from Last 3 Encounters:   07/17/17 122 lb 8 oz (55.6 kg)   06/13/17 120 lb (54.4 kg)   05/23/17 (P) 111 lb (50.3 kg)              We Performed the Following     Alcohol ethyl     CBC with platelets     Hepatic panel (Albumin, ALT, AST, Bili, Alk Phos, TP)     INR     Lipase        Primary Care Provider Office Phone # Fax #    Efren Bustos [phone]       [facility] [address] [place] [address]        Equal Access to Services     ADRIENNE GILLIAM : Hadii km granadoso Sanjuana, waaxda luqadaha, qaybta kaalmada adeegyada, salo jara. So [facility] [phone].    ATENCIÓN: Si habla español, tiene a perez disposición servicios gratuitos de asistencia lingüística. Llame al [phone].    We comply with applicable federal civil rights laws and [place] laws. We do not discriminate on the basis of race, color, national origin, age, disability sex, sexual orientation or gender identity.            Thank you!     Thank you for choosing [facility]  for your care. Our goal is always to provide you with excellent care. Hearing back from our patients is one way we can continue to improve our services. Please take a few minutes to complete the written survey that you may receive in the mail after your visit with us. Thank you!             Your Updated Medication List - Protect others around you: Learn how to safely use, store and throw away your medicines at www.disposemymeds.org.          This list is accurate as of: 7/17/17 12:09 PM.  Always use your most recent med list.                   Brand Name Dispense Instructions for use Diagnosis    buPROPion 300 MG 24 hr tablet    WELLBUTRIN XL    90 tablet    Take 1 tablet (300 mg) by mouth every morning    Major depressive disorder, recurrent episode, moderate (H)       busPIRone 15 MG  tablet    BUSPAR    180 tablet    Take 1 tablet (15 mg) by mouth 2 times daily    MONA (generalized anxiety disorder)       naltrexone 50 MG tablet    DEPADE;REVIA    90 tablet    Take 1 tablet (50 mg) by mouth daily Take 1/2 tablet by mouth daily for one week then take 1 tablet daily after    Alcohol dependence in remission (H)       omeprazole 20 MG CR capsule    priLOSEC    90 capsule    Take 1 capsule (20 mg) by mouth daily    Gastroesophageal reflux disease with esophagitis       propranolol 10 MG tablet    INDERAL    180 tablet    Take 1 tablet (10 mg) by mouth 2 times daily    Hypertension goal BP (blood pressure) < 130/80       sertraline 100 MG tablet    ZOLOFT    90 tablet    Take 1 tablet (100 mg) by mouth daily    Anxiety, Major depression       temazepam 15 MG capsule    RESTORIL    60 capsule    Take 1-2 capsules (15-30 mg) by mouth nightly as needed for sleep    Other insomnia       thiamine 100 MG tablet     90 tablet    TAKE ONE TABLET BY MOUTH EVERY DAY    Alcohol dependence in remission (H)       TL RICARDO RX 2.2-25-1 MG Tabs   Generic drug:  Folic Acid-Vit B6-Vit B12     90 tablet    TAKE ONE TABLET BY MOUTH EVERY DAY    Alcoholism in recovery (H)

## 2017-07-17 NOTE — NURSING NOTE
[facility] Surgical Services    Monalisa Olguin has been given the following teaching information:  Before Your Surgery booklet  [place]: Understanding Laproscopic Gallbladder Surgery  Instructions for Showering or Bathing before Surgery  Request for surgery sheet faxed to Daniela at [facility]

## 2017-07-17 NOTE — NURSING NOTE
"Chief Complaint   Patient presents with     Bloated     abdomen feels bloated     Consult     referring Juli       Initial Pulse 101  Temp 98  F (36.7  C) (Temporal)  Wt 122 lb 8 oz (55.6 kg)  LMP 05/16/2012  SpO2 99%  BMI (P) 23.15 kg/m2 Estimated body mass index is 23.15 kg/(m^2) (pended) as calculated from the following:    Height as of 5/23/17: (P) 5' 1\" (1.549 m).    Weight as of this encounter: 122 lb 8 oz (55.6 kg).  Medication Reconciliation: complete    "

## 2017-07-18 ENCOUNTER — TELEPHONE (OUTPATIENT)
Dept: SURGERY | Facility: OTHER | Age: 52
End: 2017-07-18

## 2017-07-18 NOTE — TELEPHONE ENCOUNTER
Surgery Scheduled    Date of Surgery 08/29/17 Time of Surgery 1:30pm  Procedure: Laparoscopic Cholecystectomy  Hospital/Surgical Facility: [facility]  Surgeon: Dr Escalante  Type of Anesthesia Anticipated: General  Pre-Op: 08/23/17 with Dr Zhang   Post-Op: 09/07/17 with Dr Escalante  Pre-Certification -to be completed  Consent Signed -to be completed  Hospital Stay -same day procedure    Surgery Packet (and/or) Colonscopy Prep (was given/or mailed) to patient. Patient was also instructed to arrive 1 hour(s) prior to surgery.  Patient understood and agrees to the plan.      Daniela Nagel  Specialty

## 2017-07-18 NOTE — TELEPHONE ENCOUNTER
Reason for Call:  Other appointment    Detailed comments: patient is to be getting a call by the end of the week to set up surgery with Dr Escalante patient is wanting to know if someone can call her soon to set up surgery.    Phone Number Patient can be reached at: Home number on file [phone] (home) or Cell number on file:    Telephone Information:   Mobile [phone]       Best Time: anytime    Can we leave a detailed message on this number? YES    Call taken on 7/18/2017 at 1:59 PM by Lou Sandy

## 2017-07-20 ENCOUNTER — TELEPHONE (OUTPATIENT)
Dept: SURGERY | Facility: OTHER | Age: 52
End: 2017-07-20

## 2017-07-20 DIAGNOSIS — K80.10 CALCULUS OF GALLBLADDER WITH CHOLECYSTITIS: Primary | ICD-10-CM

## 2017-07-20 NOTE — TELEPHONE ENCOUNTER
"VORB Dr. Escalante-\" call and inform patient as discussed at office visit, her platelet count is to low for surgery to be done at [place], will refer to [facility] .\"  VM left for patient to call back, when patient calls back please inform patient of Dr. Escalante's order and that referral has been placed for [facility].........................................................Laura Metz CMA  ([facility])  "

## 2017-07-24 NOTE — TELEPHONE ENCOUNTER
Call to patient-patient informed of Dr. Escalante's below verbal order, patient verbally states she understands and will await call back from [facility]. Patient verbally informed to call back if she does not hear from [facility] in a timely manner..........................Laura Metz CMA  ([facility])

## 2017-07-29 ENCOUNTER — NURSE TRIAGE (OUTPATIENT)
Dept: NURSING | Facility: CLINIC | Age: 52
End: 2017-07-29

## 2017-07-30 NOTE — TELEPHONE ENCOUNTER
"Pt scheduled for lap donna on 8/29. Saw surgeon for her cholecystitis w/ stones on 7/17. Having RUQ pain long term. Tonight pt says she is having \"a bad attack\". RUQ abd pain is very severe. Pt also noted at 7/17 visit to have alcoholic hepatitis and pancreatitis. Long hx ETOH abuse. Took ibuprofen 400mg for this pain 2 hrs ago w/ no relief. Advised pt she can take ibuprofen 600mg q 6 hrs if no hx GI bleeding but as pain is very severe rec ED. Amy Garcia RN/FNA    Reason for Disposition    [1] SEVERE pain (e.g., excruciating) AND [2] present > 1 hour    Additional Information    Negative: Severe difficulty breathing (e.g., struggling for each breath, speaks in single words)    Negative: Shock suspected (e.g., cold/pale/clammy skin, too weak to stand, low BP, rapid pulse)    Negative: Difficult to awaken or acting confused  (e.g., disoriented, slurred speech)    Negative: Passed out (i.e., lost consciousness, collapsed and was not responding)    Negative: Visible sweat on face or sweat dripping down face    Negative: Sounds like a life-threatening emergency to the triager    Negative: Followed an abdomen (stomach) injury    Negative: Chest pain    Protocols used: ABDOMINAL PAIN - UPPER-ADULT-    "

## 2017-07-31 NOTE — TELEPHONE ENCOUNTER
Called patient, she stated she was notified a week or so ago that she was unable to have her surgery in [place] due to low platelet count.  Dr Escalante referred her to [facility] and she is still waiting for a call from them.  I gave the patient the scheduling phone # for [facility] so she could contact them directly to get things set up per her schedule.  Also spoke with Marisol at [place] surgery department and cancelled her previously scheduled surgery on 08/29 with Dr Escalante.

## 2017-07-31 NOTE — TELEPHONE ENCOUNTER
Patient would like details of her surgery. She was not aware that she was set up in [place] and she actually thought she was supposed to have surgery in the [facility]? Please call her at [phone]

## 2017-08-01 ENCOUNTER — OFFICE VISIT (OUTPATIENT)
Dept: SURGERY | Facility: CLINIC | Age: 52
End: 2017-08-01

## 2017-08-01 VITALS
BODY MASS INDEX: 22.36 KG/M2 | HEART RATE: 89 BPM | HEIGHT: 61 IN | OXYGEN SATURATION: 97 % | SYSTOLIC BLOOD PRESSURE: 107 MMHG | WEIGHT: 118.4 LBS | DIASTOLIC BLOOD PRESSURE: 66 MMHG | TEMPERATURE: 99.4 F

## 2017-08-01 DIAGNOSIS — K80.20 GALLSTONES: Primary | ICD-10-CM

## 2017-08-01 ASSESSMENT — PAIN SCALES - GENERAL: PAINLEVEL: NO PAIN (0)

## 2017-08-01 NOTE — LETTER
8/1/2017       RE: Monalisa Olguin  [address]  [place] [address]     Dear Colleague,    Thank you for referring your patient, Monalisa Olguin, to the [facility] GENERAL SURGERY at [facility]. Please see a copy of my visit note below.    Monalisa Olguin is a 51 year old female with a 1 month history of abdominal pain localized to the right upper quadrant.  Symptoms are associated with fatty food intake.  Associated symptoms: nausea  Other constitutional symptoms:  none  Common duct symptoms:  None  Diet tolerance:  good  Patient was not seen in the Emergency Room for these symptoms.  LFT's:  abnormal    Image studies included:  Ultrasound  Findings:  Sluge and gallstones in the gallbladder observed    Past medical and surgical history, medications, allergies, family history, and social history were reviewed with the patient.  Of significance is history of hepatitis thought secondary to Alcohol abuse. Hx ascites and pancreatitis. Thrombocytopenia.    ROS: 10 point review of systems negative except noted in HPI  PHYSICAL EXAM  General appearance- alert, and in no distress.  Skin- Skin color, texture, and turgor decreased, some pallor.  Neck- Neck is supple with no obvious adenopathy.  Lungs- Respiratory effort unlabored.  Gait- Normal.  Abdomen - soft and non tender with well healed scar at the upper midline. No hernia appreciated today.    Impression: Symptomatic cholelithiasis in high risk liver disease patient who continues to abuse alcohol.  Rec:  Consult with Hepatology to eval extent of disease and any optimization that may lower risk for lap donna.  Understands high risks of surgery and need to abstain from alcohol.  Also recommend non fat diet.  Will see me after GI consult if decides wants to proceed with surgery.  The total time spent with this patient was 30 minutes.  Of this time, greater than 50% was spent counseling and coordinating care.           Again, thank you for allowing me to participate in the care of your patient.      Sincerely,    Romeo Pastor MD

## 2017-08-01 NOTE — MR AVS SNAPSHOT
After Visit Summary   8/1/2017    Monalisa Olguin    MRN: [id number]           Patient Information     Date Of Birth          1965        Visit Information        Provider Department      8/1/2017 3:30 PM Romeo Pastor MD [facility] Surgery        Today's Diagnoses     Gallstones    -  1      Care Instructions    Patient to be scheduled with Hepatology- Dr. Kovacs or colleague [phone] to evaluate liver              Follow-ups after your visit        Your next 10 appointments already scheduled     Aug 15, 2017  2:00 PM CDT   (Arrive by 1:45 PM)   Return Visit with Romeo Pastor MD   [facility] ([facility])    [address] [address] [phone]              Who to contact     Please call your clinic at [phone] to:    Ask questions about your health    Make or cancel appointments    Discuss your medicines    Learn about your test results    Speak to your doctor   If you have compliments or concerns about an experience at your clinic, or if you wish to file a complaint, please contact [facility] Patient Relations at [phone] or email us at [email or web address]         Additional Information About Your Visit        MyChart Information     [employer] gives you secure access to your electronic health record. If you see a primary care provider, you can also send messages to your care team and make appointments. If you have questions, please call your primary care clinic.  If you do not have a primary care provider, please call [phone] and they will assist you.      [employer] is an electronic gateway that provides easy, online access to your medical records. With [employer], you can request a clinic appointment, read your test results, renew a prescription or communicate with your care team.     To access your existing account, please contact your [place]  [facility] or call [phone] for assistance.        Care EveryWhere ID     This is your Care EveryWhere ID. This could be used by other organizations to access your [place] medical records  [id number]        Your Vitals Were     Pulse Temperature Height Last Period Pulse Oximetry BMI (Body Mass Index)    89 99.4  F (37.4  C) 1.549 m (5' 1\") 05/16/2012 97% 22.37 kg/m2       Blood Pressure from Last 3 Encounters:   08/01/17 107/66   06/13/17 122/64   05/23/17 (P) 114/60    Weight from Last 3 Encounters:   08/01/17 53.7 kg (118 lb 6.4 oz)   07/17/17 55.6 kg (122 lb 8 oz)   06/13/17 54.4 kg (120 lb)              Today, you had the following     No orders found for display       Primary Care Provider Office Phone # Fax #    Efren Bustos [phone]       [facility] [address] [place] [address]        Equal Access to Services     [facility]: Hadii km ku hadasho Soamilcar, waaxda luqadaha, qaybta kaalmada rajeev, salo viera . So [facility] [phone].    ATENCIÓN: Si habla español, tiene a perez disposición servicios gratuitos de asistencia lingüística. Nadira al [phone].    We comply with applicable federal civil rights laws and [place] laws. We do not discriminate on the basis of race, color, national origin, age, disability sex, sexual orientation or gender identity.            Thank you!     Thank you for choosing [facility]  for your care. Our goal is always to provide you with excellent care. Hearing back from our patients is one way we can continue to improve our services. Please take a few minutes to complete the written survey that you may receive in the mail after your visit with us. Thank you!             Your Updated Medication List - Protect others around you: Learn how to safely use, store and throw away your medicines at www.disposemymeds.org.          This list is accurate as of: " 8/1/17  4:35 PM.  Always use your most recent med list.                   Brand Name Dispense Instructions for use Diagnosis    buPROPion 300 MG 24 hr tablet    WELLBUTRIN XL    90 tablet    Take 1 tablet (300 mg) by mouth every morning    Major depressive disorder, recurrent episode, moderate (H)       busPIRone 15 MG tablet    BUSPAR    180 tablet    Take 1 tablet (15 mg) by mouth 2 times daily    MONA (generalized anxiety disorder)       naltrexone 50 MG tablet    DEPADE;REVIA    90 tablet    Take 1 tablet (50 mg) by mouth daily Take 1/2 tablet by mouth daily for one week then take 1 tablet daily after    Alcohol dependence in remission (H)       omeprazole 20 MG CR capsule    priLOSEC    90 capsule    Take 1 capsule (20 mg) by mouth daily    Gastroesophageal reflux disease with esophagitis       propranolol 10 MG tablet    INDERAL    180 tablet    Take 1 tablet (10 mg) by mouth 2 times daily    Hypertension goal BP (blood pressure) < 130/80       sertraline 100 MG tablet    ZOLOFT    90 tablet    Take 1 tablet (100 mg) by mouth daily    Anxiety, Major depression       temazepam 15 MG capsule    RESTORIL    60 capsule    Take 1-2 capsules (15-30 mg) by mouth nightly as needed for sleep    Other insomnia       thiamine 100 MG tablet     90 tablet    TAKE ONE TABLET BY MOUTH EVERY DAY    Alcohol dependence in remission (H)       TL RICARDO RX 2.2-25-1 MG Tabs   Generic drug:  Folic Acid-Vit B6-Vit B12     90 tablet    TAKE ONE TABLET BY MOUTH EVERY DAY    Alcoholism in recovery (H)

## 2017-08-01 NOTE — PATIENT INSTRUCTIONS
Patient to be scheduled with Hepatology- Dr. Kovacs or colleague [phone] to evaluate liver

## 2017-08-01 NOTE — PROGRESS NOTES
Monalisa Olguin is a 51 year old female with a 1 month history of abdominal pain localized to the right upper quadrant.  Symptoms are associated with fatty food intake.  Associated symptoms: nausea  Other constitutional symptoms:  none  Common duct symptoms:  None  Diet tolerance:  good  Patient was not seen in the Emergency Room for these symptoms.  LFT's:  abnormal    Image studies included:  Ultrasound  Findings:  Sluge and gallstones in the gallbladder observed    Past medical and surgical history, medications, allergies, family history, and social history were reviewed with the patient.  Of significance is history of hepatitis thought secondary to Alcohol abuse. Hx ascites and pancreatitis. Thrombocytopenia.    ROS: 10 point review of systems negative except noted in HPI  PHYSICAL EXAM  General appearance- alert, and in no distress.  Skin- Skin color, texture, and turgor decreased, some pallor.  Neck- Neck is supple with no obvious adenopathy.  Lungs- Respiratory effort unlabored.  Gait- Normal.  Abdomen - soft and non tender with well healed scar at the upper midline. No hernia appreciated today.    Impression: Symptomatic cholelithiasis in high risk liver disease patient who continues to abuse alcohol.  Rec:  Consult with Hepatology to eval extent of disease and any optimization that may lower risk for lap donna.  Understands high risks of surgery and need to abstain from alcohol.  Also recommend non fat diet.  Will see me after GI consult if decides wants to proceed with surgery.  The total time spent with this patient was 30 minutes.  Of this time, greater than 50% was spent counseling and coordinating care.

## 2017-08-07 DIAGNOSIS — F33.1 MAJOR DEPRESSIVE DISORDER, RECURRENT EPISODE, MODERATE (H): ICD-10-CM

## 2017-08-07 NOTE — TELEPHONE ENCOUNTER
sertraline (ZOLOFT) 100 MG tablet 90 tablet 3 6/13/2017  --     Sig: Take 1 tablet (100 mg) by mouth daily     Patient has refills.  Parish Elliott MA

## 2017-08-23 NOTE — TELEPHONE ENCOUNTER
8/23/2017      Patient scheduled for Colonoscopy and Mammogram was scheduled after outreach made an attempt.          Outreach ,  Rafaela Majano

## 2017-08-30 ENCOUNTER — ALLIED HEALTH/NURSE VISIT (OUTPATIENT)
Dept: PHARMACY | Facility: CLINIC | Age: 52
End: 2017-08-30
Payer: COMMERCIAL

## 2017-08-30 DIAGNOSIS — K76.6 PORTAL HYPERTENSION (H): ICD-10-CM

## 2017-08-30 DIAGNOSIS — K21.9 GASTROESOPHAGEAL REFLUX DISEASE, ESOPHAGITIS PRESENCE NOT SPECIFIED: ICD-10-CM

## 2017-08-30 DIAGNOSIS — F33.1 MAJOR DEPRESSIVE DISORDER, RECURRENT EPISODE, MODERATE (H): Primary | ICD-10-CM

## 2017-08-30 DIAGNOSIS — G47.00 INSOMNIA, UNSPECIFIED TYPE: ICD-10-CM

## 2017-08-30 DIAGNOSIS — F41.9 ANXIETY: ICD-10-CM

## 2017-08-30 DIAGNOSIS — E05.90 HYPERTHYROIDISM: ICD-10-CM

## 2017-08-30 DIAGNOSIS — F10.21 ALCOHOLISM IN RECOVERY (H): ICD-10-CM

## 2017-08-30 PROCEDURE — 99605 MTMS BY PHARM NP 15 MIN: CPT | Mod: U4 | Performed by: PHARMACIST

## 2017-08-30 PROCEDURE — 99607 MTMS BY PHARM ADDL 15 MIN: CPT | Mod: U4 | Performed by: PHARMACIST

## 2017-08-30 ASSESSMENT — PATIENT HEALTH QUESTIONNAIRE - PHQ9: SUM OF ALL RESPONSES TO PHQ QUESTIONS 1-9: 11

## 2017-08-30 NOTE — MR AVS SNAPSHOT
After Visit Summary   8/30/2017    Monalisa Olguin    MRN: [id number]           Patient Information     Date Of Birth          1965        Visit Information        Provider Department      8/30/2017 2:00 PM Robert Doan [facility]        Today's Diagnoses     Moderate Depression [296.32]    -  1    Insomnia, unspecified type        Alcoholism in recovery (H)        Portal hypertension (H)        Hyperthyroidism        Gastroesophageal reflux disease, esophagitis presence not specified        Anxiety           Follow-ups after your visit        Your next 10 appointments already scheduled     Sep 08, 2017   Procedure with Sai Johnston MD   [facility] ([facility])    [address] [place] [id number] [phone]              Who to contact     If you have questions or need follow up information about today's clinic visit or your schedule please contact [facility] directly at [phone].  Normal or non-critical lab and imaging results will be communicated to you by MyChart, letter or phone within 4 business days after the clinic has received the results. If you do not hear from us within 7 days, please contact the clinic through eshteryhart or phone. If you have a critical or abnormal lab result, we will notify you by phone as soon as possible.  Submit refill requests through [employer] or call your pharmacy and they will forward the refill request to us. Please allow 3 business days for your refill to be completed.          Additional Information About Your Visit        MyChart Information     [employer] gives you secure access to your electronic health record. If you see a primary care provider, you can also send messages to your care team and make appointments. If you have questions, please call your primary care clinic.  If you do not have a primary care provider, please call [phone] and they will  assist you.        Care EveryWhere ID     This is your Care EveryWhere ID. This could be used by other organizations to access your [facility] medical records  [id number]        Your Vitals Were     Last Period                   05/16/2012            Blood Pressure from Last 3 Encounters:   08/01/17 107/66   06/13/17 122/64   05/23/17 (P) 114/60    Weight from Last 3 Encounters:   08/01/17 118 lb 6.4 oz (53.7 kg)   07/17/17 122 lb 8 oz (55.6 kg)   06/13/17 120 lb (54.4 kg)              Today, you had the following     No orders found for display       Primary Care Provider Office Phone # Fax #    Efren Bustos [phone]       [address] [place] [address]        Equal Access to Services     [facility]: Hadii km hodgson hadasho Soomaali, waaxda luqadaha, qaybta kaalmada adeegyada, salo viera . So [facility] [phone].    ATENCIÓN: Si habla español, tiene a perez disposición servicios gratuitos de asistencia lingüística. Nadira al [phone].    We comply with applicable federal civil rights laws and [place] laws. We do not discriminate on the basis of race, color, national origin, age, disability sex, sexual orientation or gender identity.            Thank you!     Thank you for choosing [facility]  for your care. Our goal is always to provide you with excellent care. Hearing back from our patients is one way we can continue to improve our services. Please take a few minutes to complete the written survey that you may receive in the mail after your visit with us. Thank you!             Your Updated Medication List - Protect others around you: Learn how to safely use, store and throw away your medicines at www.disposemymeds.org.          This list is accurate as of: 8/30/17  3:55 PM.  Always use your most recent med list.                   Brand Name Dispense Instructions for use Diagnosis    buPROPion 300 MG 24 hr tablet    WELLBUTRIN XL     90 tablet    Take 1 tablet (300 mg) by mouth every morning    Major depressive disorder, recurrent episode, moderate (H)       busPIRone 15 MG tablet    BUSPAR    180 tablet    Take 1 tablet (15 mg) by mouth 2 times daily    MONA (generalized anxiety disorder)       naltrexone 50 MG tablet    DEPADE;REVIA    90 tablet    Take 1 tablet (50 mg) by mouth daily Take 1/2 tablet by mouth daily for one week then take 1 tablet daily after    Alcohol dependence in remission (H)       omeprazole 20 MG CR capsule    priLOSEC    90 capsule    Take 1 capsule (20 mg) by mouth daily    Gastroesophageal reflux disease with esophagitis       propranolol 10 MG tablet    INDERAL    180 tablet    Take 1 tablet (10 mg) by mouth 2 times daily    Hypertension goal BP (blood pressure) < 130/80       sertraline 100 MG tablet    ZOLOFT    90 tablet    Take 1 tablet (100 mg) by mouth daily    Anxiety, Major depression       temazepam 15 MG capsule    RESTORIL    60 capsule    Take 1-2 capsules (15-30 mg) by mouth nightly as needed for sleep    Other insomnia       thiamine 100 MG tablet     90 tablet    TAKE ONE TABLET BY MOUTH EVERY DAY    Alcohol dependence in remission (H)       TL RICARDO RX 2.2-25-1 MG Tabs   Generic drug:  Folic Acid-Vit B6-Vit B12     90 tablet    TAKE ONE TABLET BY MOUTH EVERY DAY    Alcoholism in recovery (H)

## 2017-08-30 NOTE — PROGRESS NOTES
SUBJECTIVE/OBJECTIVE:                           Monalisa Olguin is a 51 year old female coming in for an initial visit for Medication Therapy Management.  She was referred to me from her [employer] insurance plan.       Chief Complaint: Comprehensive Medication Review.    Allergies/ADRs: Reviewed in Epic  Tobacco: 0-1 pack per day - is not interested in quittingTobacco Cessation Action Plan: Information offered: Patient not interested at this time  Alcohol: history of alcohol dependence  Caffeine: 0-1 diet sodas/day  Activity: Low to medium activity level.   PMH: Reviewed in Epic    Medication Adherence: no issues reported and sets up own med boxes    Depression/Anxiety/Insomnia:  Current medications include: Bupropion  mg once daily, sertraline 100 mg daily (dose recently increase), Buspirone 15 mg twice daily, and temazepam 30 mg at bedtime PRN (uses every night). Pt reports that depression symptoms are improved. Still has her moments, but doing better and this has not stopped. Sleep has improved somewhat at current benzo dose - okay falling asleep, but gets up at least twice a night.  Denies any side effects.  PHQ-9 SCORE 4/4/2017 5/10/2017 6/13/2017   Total Score - - -   Total Score 11 18 15     Alcoholism: Pt is taking TL Sofiya vitamin daily and thiamine 100 mg daily. She is prescribed naltrexone 50 mg daily, but did not start taking.  She wonders if this is to make her sick if she drinks alcohol or just stem cravings (her counselor told her the latter, but she thinks she heard the former when it was prescribed).  States that she is on probation and is not supposed to drink. States she is doing well without cessation meds at this time, but thinks she may need some additional help to continue to remain abstinent.     Portal HTN/Hyperthyroidism: Current medications include propranolol 10 mg twice daily.  Patient does not self-monitor BP.  Denies any breathing issues. Pt is supposed to get TSH  rechecked. Patient reports no current medication side effects.    GERD: Current medications include: Prilosec (omeprazole) 20 mg once daily. Pt c/o no current symptoms.  Patient feels that current regimen is effective.    Current labs include:  BP Readings from Last 3 Encounters:   08/01/17 107/66   06/13/17 122/64   05/23/17 (P) 114/60     Today's Vitals: LMP 05/16/2012 - telephone encounter, no vitals    Lab Results   Component Value Date    CHOL 178 05/23/2017     Lab Results   Component Value Date    TRIG 59 05/23/2017     Lab Results   Component Value Date    HDL 71 05/23/2017     Lab Results   Component Value Date    LDL 95 05/23/2017       Liver Function Studies -   Recent Labs   Lab Test  07/17/17   1119   PROTTOTAL  6.3*   ALBUMIN  3.0*   BILITOTAL  1.0   ALKPHOS  314*   AST  73*   ALT  36       Lab Results   Component Value Date    UCRR 101 03/17/2010       Last Basic Metabolic Panel:  Lab Results   Component Value Date     06/13/2017      Lab Results   Component Value Date    POTASSIUM 4.2 06/13/2017     Lab Results   Component Value Date    CHLORIDE 113 06/13/2017     Lab Results   Component Value Date    BUN 9 06/13/2017     Lab Results   Component Value Date    CR 0.84 06/13/2017     GFR Estimate   Date Value Ref Range Status   06/13/2017 71 >60 mL/min/1.7m2 Final     Comment:     Non  GFR Calc   05/23/2017 69 >60 mL/min/1.7m2 Final     Comment:     Non  GFR Calc   05/16/2017 83 >60 mL/min/1.7m2 Final     Comment:     Non  GFR Calc     Most Recent Immunizations   Administered Date(s) Administered     Influenza (IIV3) 11/06/2006     Influenza Vaccine IM 3yrs+ 4 Valent IIV4 03/30/2017     Mantoux 08/26/2013     Pneumococcal 23 valent 05/26/2013     TDAP Vaccine (Adacel) 06/14/2016       ASSESSMENT:                             Current medications were reviewed today.     Medication Adherence: no issues identified    Depression/Anxiety/Insomnia:   Improved per patient. May benefit from further dose titration of sertraline, but patient prefers to wait until she sees PCP.    Alcoholism: Improved. Currently alcohol-free per patient. Discussed mechanism of action of naltrexone with patient.      Portal HTN/Hypertension: Stable. Pt is meeting BP goal of <140/90 mmHg. TSH lab already ordered - reminded patient.     GERD: Stable.     PLAN:                            1. Continue current medications.  2. Future consideration - sertraline dose increase?    I spent 30 minutes with this patient today. A copy of the visit note was provided to the patient's primary care provider.    Will follow up in 6 months or sooner if needed.    The patient was sent via [employer] a summary of these recommendations as an after visit summary.     Francis Doan PharmD  Medication Therapy Management Provider  Pager (voicemail): [phone]

## 2017-09-08 ENCOUNTER — HOSPITAL ENCOUNTER (OUTPATIENT)
Facility: CLINIC | Age: 52
Discharge: HOME | End: 2017-09-08
Attending: INTERNAL MEDICINE | Admitting: INTERNAL MEDICINE
Payer: COMMERCIAL

## 2017-09-08 ENCOUNTER — SURGERY (OUTPATIENT)
Age: 52
End: 2017-09-08

## 2017-09-08 VITALS
HEIGHT: 61 IN | SYSTOLIC BLOOD PRESSURE: 115 MMHG | DIASTOLIC BLOOD PRESSURE: 53 MMHG | WEIGHT: 120 LBS | RESPIRATION RATE: 16 BRPM | OXYGEN SATURATION: 97 % | TEMPERATURE: 98.5 F | BODY MASS INDEX: 22.66 KG/M2

## 2017-09-08 LAB — COLONOSCOPY: NORMAL

## 2017-09-08 PROCEDURE — 25000125 ZZHC RX 250: Performed by: INTERNAL MEDICINE

## 2017-09-08 PROCEDURE — 40000296 ZZH STATISTIC ENDO RECOVERY CLASS 1:2 FIRST HOUR: Performed by: INTERNAL MEDICINE

## 2017-09-08 PROCEDURE — 25000128 H RX IP 250 OP 636: Performed by: INTERNAL MEDICINE

## 2017-09-08 PROCEDURE — 40000297 ZZH STATISTIC ENDO RECOVERY CLASS 1:2 EACH ADDL HOUR: Performed by: INTERNAL MEDICINE

## 2017-09-08 PROCEDURE — G0500 MOD SEDAT ENDO SERVICE >5YRS: HCPCS

## 2017-09-08 PROCEDURE — 45378 DIAGNOSTIC COLONOSCOPY: CPT | Performed by: INTERNAL MEDICINE

## 2017-09-08 PROCEDURE — G0121 COLON CA SCRN NOT HI RSK IND: HCPCS | Mod: 74 | Performed by: INTERNAL MEDICINE

## 2017-09-08 RX ORDER — LIDOCAINE 40 MG/G
CREAM TOPICAL
Status: DISCONTINUED | OUTPATIENT
Start: 2017-09-08 | End: 2017-09-08 | Stop reason: HOSPADM

## 2017-09-08 RX ORDER — FENTANYL CITRATE 50 UG/ML
INJECTION, SOLUTION INTRAMUSCULAR; INTRAVENOUS PRN
Status: DISCONTINUED | OUTPATIENT
Start: 2017-09-08 | End: 2017-09-08 | Stop reason: HOSPADM

## 2017-09-08 RX ORDER — ONDANSETRON 2 MG/ML
4 INJECTION INTRAMUSCULAR; INTRAVENOUS
Status: DISCONTINUED | OUTPATIENT
Start: 2017-09-08 | End: 2017-09-08 | Stop reason: HOSPADM

## 2017-09-08 RX ADMIN — MIDAZOLAM HYDROCHLORIDE 1 MG: 1 INJECTION, SOLUTION INTRAMUSCULAR; INTRAVENOUS at 14:55

## 2017-09-08 RX ADMIN — MIDAZOLAM HYDROCHLORIDE 1 MG: 1 INJECTION, SOLUTION INTRAMUSCULAR; INTRAVENOUS at 14:51

## 2017-09-08 RX ADMIN — MIDAZOLAM HYDROCHLORIDE 1 MG: 1 INJECTION, SOLUTION INTRAMUSCULAR; INTRAVENOUS at 15:00

## 2017-09-08 RX ADMIN — FENTANYL CITRATE 25 MCG: 50 INJECTION, SOLUTION INTRAMUSCULAR; INTRAVENOUS at 14:58

## 2017-09-08 RX ADMIN — FENTANYL CITRATE 25 MCG: 50 INJECTION, SOLUTION INTRAMUSCULAR; INTRAVENOUS at 14:51

## 2017-09-08 RX ADMIN — MIDAZOLAM HYDROCHLORIDE 1 MG: 1 INJECTION, SOLUTION INTRAMUSCULAR; INTRAVENOUS at 14:53

## 2017-09-08 RX ADMIN — LIDOCAINE HYDROCHLORIDE 0.1 ML: 10 INJECTION, SOLUTION EPIDURAL; INFILTRATION; INTRACAUDAL; PERINEURAL at 13:59

## 2017-09-08 RX ADMIN — MIDAZOLAM HYDROCHLORIDE 1 MG: 1 INJECTION, SOLUTION INTRAMUSCULAR; INTRAVENOUS at 14:54

## 2017-09-08 NOTE — CONSULTS
"[facility] GI Pre-Procedure Physical Assessment    Monalisa Olguin MRN# [id number]   Age: 51 year old YOB: 1965      Date of Surgery: 9/8/2017  Location [facility]      Date of Exam 9/8/2017 Facility (Same day)       Primary care provider: Efren Bustos         Active problem list:   Patient Active Problem List   Diagnosis     Kidney donor     Esophageal reflux     Moderate Depression [296.32]     HYPERLIPIDEMIA LDL GOAL <100     Hypertension goal BP (blood pressure) < 130/80     Anxiety     Crushing injury of thumb, left     Alcoholism (H)     Alcoholism in recovery (H)     Laceration of head without foreign body, unspecified part of head, sequela     Alcoholic hepatitis with ascites     Sludge in gallbladder     Anemia     Thrombocytopenia (H)     Tobacco abuse     Portal hypertension (H)     Pulmonary nodules     Hyperthyroidism     Hypophosphatemia     Severe malnutrition (H)     SIRS (systemic inflammatory response syndrome) (H)     Scleral icterus     Hallucinations            Medications (include herbals and vitamins):   Any Plavix use in the last 7 days?  No     Current Facility-Administered Medications   Medication     lidocaine 1 % 1 mL     lidocaine (LMX4) kit     sodium chloride (PF) 0.9% PF flush 3 mL     sodium chloride (PF) 0.9% PF flush 3 mL     sodium chloride (PF) 0.9% PF flush 3 mL     ondansetron (ZOFRAN) injection 4 mg             Allergies:      Allergies   Allergen Reactions     Albuterol      Tongue \"hardened and painful\"     Allergy to Latex?  No  Allergy to tape?    No          Social History:     Social History   Substance Use Topics     Smoking status: Current Every Day Smoker     Packs/day: 0.50     Years: 10.00     Smokeless tobacco: Never Used      Comment: pt quit 1992 after smoking for 8 yrs, started again in 2004     Alcohol use No      Comment: stopped drinking 3/26/2017            Physical Exam:   All vitals have been reviewed  Blood " "pressure 107/72, temperature 98.5  F (36.9  C), temperature source Oral, resp. rate 16, height 5' 1\" (1.549 m), weight 120 lb (54.4 kg), last menstrual period 05/16/2012, SpO2 97 %, not currently breastfeeding.  Airway assessment:   Patient is able to stick out tongue      Lungs:   No increased work of breathing, good air exchange, clear to auscultation bilaterally, no crackles or wheezing      Cardiovascular:   Normal apical impulse, regular rate and rhythm, normal S1 and S2, no S3 or S4, and no murmur noted           Lab / Radiology Results:   All laboratory data reviewed          Assessment:   Appropriately NPO  Chief complaint or anatomic assessment of involved area: Screening         Plan:   Moderate (conscious) sedation     Risks, benefits, alternatives to sedation and blood explained and consent obtained  Risks, benefits, alternatives to procedure explained and consent obtained  Orders and progress notes are in the chart  Discharge from Phase 1 and / or Phase 2 recovery when patient meets criteria    I have reviewed the history and physical, lab finding(s), diagnostic data, medicaitons, and the plan for sedation.  I have determined this patient to be an appropriate candidate for the planned sedation / procedure and have reassessed the patient immediately prior to sedation / procedure.    I have personally and medically directed the administration of medications used.    Sai Johnston MD, MD     "

## 2017-09-11 ENCOUNTER — TELEPHONE (OUTPATIENT)
Dept: FAMILY MEDICINE | Facility: CLINIC | Age: 52
End: 2017-09-11

## 2017-09-11 NOTE — TELEPHONE ENCOUNTER
We an do the 1 pm doctor only slot on 9/27/17.    Electronically signed by:  Efren Bustos M.D.  9/11/2017

## 2017-09-11 NOTE — TELEPHONE ENCOUNTER
Reason for Call:  Same Day Appointment, Requested Provider:  Efren Bustos M.D.    PCP: Efren Bustos    Reason for visit: pre-op    Duration of symptoms: n/a    Have you been treated for this in the past? Yes    Additional comments: Patient is having a colonoscopy done on 9/29 here at [facility] and needs a pre-op physical done because she needs to put under. She is wondering if Dr. Bustos would be to work her into his schedule some time before 9/29 to have this done. Please advise.     Can we leave a detailed message on this number? YES    Phone number patient can be reached at: Home number on file [phone] (home)    Best Time: any    Call taken on 9/11/2017 at 11:53 AM by Rah Jeffers

## 2017-09-12 ENCOUNTER — NURSE TRIAGE (OUTPATIENT)
Dept: NURSING | Facility: CLINIC | Age: 52
End: 2017-09-12

## 2017-09-12 ENCOUNTER — HOSPITAL ENCOUNTER (EMERGENCY)
Facility: CLINIC | Age: 52
Discharge: HOME | End: 2017-09-12
Attending: FAMILY MEDICINE | Admitting: FAMILY MEDICINE
Payer: COMMERCIAL

## 2017-09-12 VITALS
BODY MASS INDEX: 22.67 KG/M2 | TEMPERATURE: 98.6 F | WEIGHT: 120 LBS | DIASTOLIC BLOOD PRESSURE: 74 MMHG | SYSTOLIC BLOOD PRESSURE: 127 MMHG | HEART RATE: 89 BPM | OXYGEN SATURATION: 96 % | RESPIRATION RATE: 14 BRPM

## 2017-09-12 DIAGNOSIS — N39.0 URINARY TRACT INFECTION, SITE UNSPECIFIED: ICD-10-CM

## 2017-09-12 DIAGNOSIS — K70.0 ALCOHOL INDUCED FATTY LIVER: ICD-10-CM

## 2017-09-12 DIAGNOSIS — R41.0 CONFUSION: ICD-10-CM

## 2017-09-12 LAB
ALBUMIN SERPL-MCNC: 3.2 G/DL (ref 3.4–5)
ALBUMIN UR-MCNC: NEGATIVE MG/DL
ALP SERPL-CCNC: 325 U/L (ref 40–150)
ALT SERPL W P-5'-P-CCNC: 42 U/L (ref 0–50)
AMMONIA PLAS-SCNC: 60 UMOL/L (ref 10–50)
AMPHETAMINES UR QL: NOT DETECTED NG/ML
ANION GAP SERPL CALCULATED.3IONS-SCNC: 13 MMOL/L (ref 3–14)
APPEARANCE UR: ABNORMAL
AST SERPL W P-5'-P-CCNC: 78 U/L (ref 0–45)
BACTERIA #/AREA URNS HPF: ABNORMAL /HPF
BARBITURATES UR QL SCN: NOT DETECTED NG/ML
BASOPHILS # BLD AUTO: 0 10E9/L (ref 0–0.2)
BASOPHILS NFR BLD AUTO: 0.5 %
BENZODIAZ UR QL SCN: ABNORMAL NG/ML
BILIRUB SERPL-MCNC: 1 MG/DL (ref 0.2–1.3)
BILIRUB UR QL STRIP: NEGATIVE
BUN SERPL-MCNC: 15 MG/DL (ref 7–30)
BUPRENORPHINE UR QL: NOT DETECTED NG/ML
CALCIUM SERPL-MCNC: 8.9 MG/DL (ref 8.5–10.1)
CANNABINOIDS UR QL: NOT DETECTED NG/ML
CHLORIDE SERPL-SCNC: 111 MMOL/L (ref 94–109)
CO2 SERPL-SCNC: 24 MMOL/L (ref 20–32)
COCAINE UR QL SCN: NOT DETECTED NG/ML
COLOR UR AUTO: YELLOW
CREAT SERPL-MCNC: 0.92 MG/DL (ref 0.52–1.04)
D-METHAMPHET UR QL: NOT DETECTED NG/ML
DIFFERENTIAL METHOD BLD: ABNORMAL
EOSINOPHIL # BLD AUTO: 0.3 10E9/L (ref 0–0.7)
EOSINOPHIL NFR BLD AUTO: 5.3 %
ERYTHROCYTE [DISTWIDTH] IN BLOOD BY AUTOMATED COUNT: 17 % (ref 10–15)
ETHANOL SERPL-MCNC: <0.01 G/DL
GFR SERPL CREATININE-BSD FRML MDRD: 64 ML/MIN/1.7M2
GLUCOSE SERPL-MCNC: 102 MG/DL (ref 70–99)
GLUCOSE UR STRIP-MCNC: NEGATIVE MG/DL
HCT VFR BLD AUTO: 29.7 % (ref 35–47)
HGB BLD-MCNC: 8.6 G/DL (ref 11.7–15.7)
HGB UR QL STRIP: NEGATIVE
IMM GRANULOCYTES # BLD: 0 10E9/L (ref 0–0.4)
IMM GRANULOCYTES NFR BLD: 0.2 %
INR PPP: 1.02 (ref 0.86–1.14)
KETONES UR STRIP-MCNC: NEGATIVE MG/DL
LEUKOCYTE ESTERASE UR QL STRIP: ABNORMAL
LYMPHOCYTES # BLD AUTO: 1.4 10E9/L (ref 0.8–5.3)
LYMPHOCYTES NFR BLD AUTO: 24.1 %
MCH RBC QN AUTO: 25.9 PG (ref 26.5–33)
MCHC RBC AUTO-ENTMCNC: 29 G/DL (ref 31.5–36.5)
MCV RBC AUTO: 90 FL (ref 78–100)
METHADONE UR QL SCN: NOT DETECTED NG/ML
MONOCYTES # BLD AUTO: 0.6 10E9/L (ref 0–1.3)
MONOCYTES NFR BLD AUTO: 10.5 %
MUCOUS THREADS #/AREA URNS LPF: PRESENT /LPF
NEUTROPHILS # BLD AUTO: 3.3 10E9/L (ref 1.6–8.3)
NEUTROPHILS NFR BLD AUTO: 59.4 %
NITRATE UR QL: POSITIVE
OPIATES UR QL SCN: NOT DETECTED NG/ML
OXYCODONE UR QL SCN: NOT DETECTED NG/ML
PCP UR QL SCN: NOT DETECTED NG/ML
PH UR STRIP: 6 PH (ref 5–7)
PLATELET # BLD AUTO: 111 10E9/L (ref 150–450)
POTASSIUM SERPL-SCNC: 3.7 MMOL/L (ref 3.4–5.3)
PROPOXYPH UR QL: NOT DETECTED NG/ML
PROT SERPL-MCNC: 6.3 G/DL (ref 6.8–8.8)
RBC # BLD AUTO: 3.32 10E12/L (ref 3.8–5.2)
RBC #/AREA URNS AUTO: <1 /HPF (ref 0–2)
SODIUM SERPL-SCNC: 148 MMOL/L (ref 133–144)
SOURCE: ABNORMAL
SP GR UR STRIP: 1.02 (ref 1–1.03)
SQUAMOUS #/AREA URNS AUTO: 2 /HPF (ref 0–1)
TRICYCLICS UR QL SCN: NOT DETECTED NG/ML
UROBILINOGEN UR STRIP-MCNC: 4 MG/DL (ref 0–2)
WBC # BLD AUTO: 5.6 10E9/L (ref 4–11)
WBC #/AREA URNS AUTO: 16 /HPF (ref 0–2)

## 2017-09-12 PROCEDURE — 87086 URINE CULTURE/COLONY COUNT: CPT | Performed by: FAMILY MEDICINE

## 2017-09-12 PROCEDURE — 85025 COMPLETE CBC W/AUTO DIFF WBC: CPT | Performed by: FAMILY MEDICINE

## 2017-09-12 PROCEDURE — 99283 EMERGENCY DEPT VISIT LOW MDM: CPT | Performed by: FAMILY MEDICINE

## 2017-09-12 PROCEDURE — 87088 URINE BACTERIA CULTURE: CPT | Performed by: FAMILY MEDICINE

## 2017-09-12 PROCEDURE — 81001 URINALYSIS AUTO W/SCOPE: CPT | Performed by: FAMILY MEDICINE

## 2017-09-12 PROCEDURE — 80320 DRUG SCREEN QUANTALCOHOLS: CPT | Performed by: FAMILY MEDICINE

## 2017-09-12 PROCEDURE — 99284 EMERGENCY DEPT VISIT MOD MDM: CPT | Mod: Z6 | Performed by: FAMILY MEDICINE

## 2017-09-12 PROCEDURE — 80306 DRUG TEST PRSMV INSTRMNT: CPT | Performed by: FAMILY MEDICINE

## 2017-09-12 PROCEDURE — 82140 ASSAY OF AMMONIA: CPT | Performed by: FAMILY MEDICINE

## 2017-09-12 PROCEDURE — 87186 SC STD MICRODIL/AGAR DIL: CPT | Performed by: FAMILY MEDICINE

## 2017-09-12 PROCEDURE — 80053 COMPREHEN METABOLIC PANEL: CPT | Performed by: FAMILY MEDICINE

## 2017-09-12 PROCEDURE — 85610 PROTHROMBIN TIME: CPT | Performed by: FAMILY MEDICINE

## 2017-09-12 RX ORDER — LIDOCAINE 40 MG/G
CREAM TOPICAL
Status: DISCONTINUED | OUTPATIENT
Start: 2017-09-12 | End: 2017-09-12 | Stop reason: HOSPADM

## 2017-09-12 RX ORDER — CIPROFLOXACIN 250 MG/1
250 TABLET, FILM COATED ORAL 2 TIMES DAILY
Qty: 14 TABLET | Refills: 0 | Status: SHIPPED | OUTPATIENT
Start: 2017-09-12 | End: 2017-09-14 | Stop reason: ALTCHOICE

## 2017-09-12 NOTE — TELEPHONE ENCOUNTER
called in and stated that patient has been dealing with some mild confusion over several months now but had colonoscopy on 09/08/17 and since then and after having anesthesia patient is much more confused, she is having hallucinations, worse at night and also her balance is very impaired. Recommended ER at [facility].  Reason for Disposition    [1] Longstanding confusion (e.g., dementia, stroke) AND [2] worsening    Additional Information    Negative: [1] Difficult to awaken or acting confused (disoriented, slurred speech) AND [2] present now AND [3] diabetic    Negative: [1] Difficult to awaken or acting confused (disoriented, slurred speech) AND [2] present now AND [3] new onset    Negative: [1] Weakness of the face, arm, or leg on one side of the body AND [2] new onset    Negative: [1] Numbness of the face, arm, or leg on one side of the body AND [2] new onset    Negative: [1] Loss of speech or garbled speech AND [2] new onset    Negative: Difficulty breathing or bluish lips    Negative: Shock suspected (e.g., cold/pale/clammy skin, too weak to stand, low BP, rapid pulse)    Negative: Seeing, hearing, or feeling things that are not there (i.e., visual, auditory, or tactile hallucinations)    Negative: Followed a head injury    Negative: Drug overdose suspected    Negative: Sounds like a life-threatening emergency to the triager    Negative: Alcohol use, abuse or dependence: question or problem related to    Negative: Drug abuse or dependence: question or problem related to    Negative: Headache or vomiting    Negative: Stiff neck (can't touch chin to chest)    Negative: Bizarre or paranoid behavior    Negative: Fever > 100.5 F (38.1 C)    Negative: Patient sounds very sick or weak to the triager    Negative: [1] Acting confused (e.g., disoriented, slurred speech) AND [2] brief (now gone)    Protocols used: CONFUSION - DELIRIUM-ADULT-    Esperanza Groves, RN, BSN  [facility]

## 2017-09-12 NOTE — ED AVS SNAPSHOT
[facility]    [address] [place] [address]    Phone:  [phone]    Fax:  [phone]                                       Monalisa Olguin   MRN: [id number]    Department:  [facility] Emergency Department   Date of Visit:  9/12/2017           After Visit Summary Signature Page     I have received my discharge instructions, and my questions have been answered. I have discussed any challenges I see with this plan with the nurse or doctor.    ..........................................................................................................................................  Patient/Patient Representative Signature      ..........................................................................................................................................  Patient Representative Print Name and Relationship to Patient    ..................................................               ................................................  Date                                            Time    ..........................................................................................................................................  Reviewed by Signature/Title    ...................................................              ..............................................  Date                                                            Time

## 2017-09-12 NOTE — ED AVS SNAPSHOT
[facility] Emergency Department    [address]     [place] [address]    Phone:  [phone]    Fax:  [phone]                                       Monalisa Olguin   MRN: [id number]    Department:  [facility] Emergency Department   Date of Visit:  9/12/2017           Patient Information     Date Of Birth          1965        Your diagnoses for this visit were:     Confusion     Urinary tract infection, site unspecified     Alcohol induced fatty liver        You were seen by Vianca Schultz MD.      Follow-up Information     Follow up with Efren Bustos MD In 3 days.    Specialty:  Family Practice    Why:  if not improving    Contact information:    [address]   [place] [address] [phone]          Follow up with [facility] Emergency Department.    Specialty:  EMERGENCY MEDICINE    Why:  If symptoms worsen    Contact information:    [address]   [place] [address] [phone]    Additional information:    From [address]: Exit at [address] on south side of [place]. Turn right on [address]. Turn left at stoplight on [address]. [facility] will be in view two blocks ahead        Discharge Instructions       Thank you for giving us the opportunity to see you. The impression is that you have a urinary tract infection, and this may be contributing to your increased confusion.    Take Cipro 250 mg twice a day for 7 days.    Drink plenty of fluids.    Or formal urine culture is pending.  We will call you if your plan of care needs to change.     If you are not seeing an improvement within 3-5 days, please follow up with your primary care provider or clinic.  Please follow-up for recheck regardless in 7-10 days.    After discharge, please closely monitor for any new or worsening symptoms. Return to the Emergency Department at any time if your symptoms worsen.        Discharge References/Attachments     URINARY  TRACT INFECTIONS (UTIS), UNDERSTANDING (ENGLISH)    CONFUSION (ENGLISH)      Future Appointments        Provider Department Dep Phone Center    9/27/2017 1:00 PM Efren Bustos MD, MD [facility] [phone] [facility]    11/28/2017 8:30 AM Edgardo Kovacs MD [facility] Hepatology [phone] [facility]      24 Hour Appointment Hotline       To make an appointment at any [facility], call [phone] ([phone]). If you don't have a family doctor or clinic, we will help you find one. [facility] are conveniently located to serve the needs of you and your family.             Review of your medicines      Our records show that you are taking the medicines listed below. If these are incorrect, please call your family doctor or clinic.        Dose / Directions Last dose taken    buPROPion 300 MG 24 hr tablet   Commonly known as:  WELLBUTRIN XL   Dose:  300 mg   Quantity:  90 tablet        Take 1 tablet (300 mg) by mouth every morning   Refills:  3        busPIRone 15 MG tablet   Commonly known as:  BUSPAR   Dose:  15 mg   Quantity:  180 tablet        Take 1 tablet (15 mg) by mouth 2 times daily   Refills:  3        naltrexone 50 MG tablet   Commonly known as:  DEPADE;REVIA   Quantity:  90 tablet        TAKE ONE-HALF TABLET BY MOUTH EVERY DAY FOR ONE WEEK THEN TAKE ONE TABLET DAILY AFTER   Refills:  3        omeprazole 20 MG CR capsule   Commonly known as:  priLOSEC   Dose:  20 mg   Quantity:  90 capsule        Take 1 capsule (20 mg) by mouth daily   Refills:  3        propranolol 10 MG tablet   Commonly known as:  INDERAL   Dose:  10 mg   Quantity:  180 tablet        Take 1 tablet (10 mg) by mouth 2 times daily   Refills:  3        sertraline 100 MG tablet   Commonly known as:  ZOLOFT   Dose:  100 mg   Quantity:  90 tablet        Take 1 tablet (100 mg) by mouth daily   Refills:  3        thiamine 100 MG tablet   Quantity:  90 tablet        TAKE ONE TABLET BY MOUTH EVERY DAY   Refills:  3         TL RICARDO RX 2.2-25-1 MG Tabs   Quantity:  90 tablet   Generic drug:  Folic Acid-Vit B6-Vit B12        TAKE ONE TABLET BY MOUTH EVERY DAY   Refills:  3                Procedures and tests performed during your visit     Alcohol ethyl    Ammonia    CBC with platelets differential    Comprehensive metabolic panel    INR    Peripheral IV catheter    UA reflex to Microscopic    Urine Culture Aerobic Bacterial    Urine Drugs of Abuse Screen Panel 13      Orders Needing Specimen Collection     None      Pending Results     Date and Time Order Name Status Description    9/12/2017 2100 Urine Culture Aerobic Bacterial In process             Pending Culture Results     Date and Time Order Name Status Description    9/12/2017 2100 Urine Culture Aerobic Bacterial In process             Pending Results Instructions     If you had any lab results that were not finalized at the time of your Discharge, you can call the ED Lab Result RN at [phone]. You will be contacted by this team for any positive Lab results or changes in treatment. The nurses are available 7 days a week from 10A to 6:30P.  You can leave a message 24 hours per day and they will return your call.        Thank you for choosing [facility]       Thank you for choosing [facility] for your care. Our goal is always to provide you with excellent care. Hearing back from our patients is one way we can continue to improve our services. Please take a few minutes to complete the written survey that you may receive in the mail after you visit with us. Thank you!        [employer] Information     [employer] gives you secure access to your electronic health record. If you see a primary care provider, you can also send messages to your care team and make appointments. If you have questions, please call your primary care clinic.  If you do not have a primary care provider, please call [phone] and they will assist you.        Care EveryWhere ID     This is your Care EveryWhere  ID. This could be used by other organizations to access your [facility] medical records  [id number]        Equal Access to Services     ADRIENNE GILLIAM : Obed Wei, rachna george, galileo boo, salo jara. So [facility] [phone].    ATENCIÓN: Si habla español, tiene a perez disposición servicios gratuitos de asistencia lingüística. Llame al [phone].    We comply with applicable federal civil rights laws and [place] laws. We do not discriminate on the basis of race, color, national origin, age, disability sex, sexual orientation or gender identity.            After Visit Summary       This is your record. Keep this with you and show to your community pharmacist(s) and doctor(s) at your next visit.

## 2017-09-13 ENCOUNTER — TELEPHONE (OUTPATIENT)
Dept: FAMILY MEDICINE | Facility: CLINIC | Age: 52
End: 2017-09-13

## 2017-09-13 NOTE — ED PROVIDER NOTES
[facility] ED Provider Note   CC:     Chief Complaint   Patient presents with     Altered Mental Status     HPI:  Monalisa Olguin is a 51 year old female with a history of cirrhosis, who presented to the emergency department with her  who has concerns of patients safety due to confusion, dizziness, and being lethargic. Patients  said that this is similar to her past experience and she had been confused, dizzy, fatigued, and has been running into walls while walking for the past 3-4 days. She notes that she had a colonoscopy 4 days ago and believes it could be a reaction to the medication.  said that she seemed to be oriented on Sunday, but Sunday night she woke him up thinking her sisters were in town. Patient stated she quit drinking alcohol about 3-4 weeks ago. She has had small amounts of blood in stool, dull abdominal pain, swollen ankles, and swollen abdomen, she also notes that she has gained 30 pounds and a poor appetite, and most of the weight around the abdomen.  Patient has not fallen recently and denies any recent head injury.   is concerned about the patient's walking downstairs and her unsteadiness.      Problem List:  Patient Active Problem List    Diagnosis     Scleral icterus     Hallucinations     SIRS (systemic inflammatory response syndrome) (H)     Hypophosphatemia     Severe malnutrition (H)     Alcoholic hepatitis with ascites     Sludge in gallbladder     Anemia     Thrombocytopenia (H)     Tobacco abuse     Portal hypertension (H)     Pulmonary nodules     Hyperthyroidism     Laceration of head without foreign body, unspecified part of head, sequela     Alcoholism in recovery (H)     Alcoholism (H)     Crushing injury of thumb, left     Anxiety     Hypertension goal BP (blood pressure) < 130/80     HYPERLIPIDEMIA LDL GOAL <100     Moderate Depression [296.32]     Esophageal reflux     Kidney  "donor       MEDS:   Previous Medications    BUPROPION (WELLBUTRIN XL) 300 MG 24 HR TABLET    Take 1 tablet (300 mg) by mouth every morning    BUSPIRONE (BUSPAR) 15 MG TABLET    Take 1 tablet (15 mg) by mouth 2 times daily    NALTREXONE (DEPADE;REVIA) 50 MG TABLET    TAKE ONE-HALF TABLET BY MOUTH EVERY DAY FOR ONE WEEK THEN TAKE ONE TABLET DAILY AFTER    OMEPRAZOLE (PRILOSEC) 20 MG CR CAPSULE    Take 1 capsule (20 mg) by mouth daily    PROPRANOLOL (INDERAL) 10 MG TABLET    Take 1 tablet (10 mg) by mouth 2 times daily    SERTRALINE (ZOLOFT) 100 MG TABLET    Take 1 tablet (100 mg) by mouth daily    TL RICARDO RX 2.2-25-1 MG TABS    TAKE ONE TABLET BY MOUTH EVERY DAY    VITAMIN B-1 100 MG TABLET    TAKE ONE TABLET BY MOUTH EVERY DAY       ALLERGIES:    Allergies   Allergen Reactions     Albuterol      Tongue \"hardened and painful\"       Past medical, surgical, and social histories, triage and nursing notes were all reviewed.    Review of Systems   All other systems were reviewed and are negative    Physical Exam   Vitals were reviewed  Temp: 98.6  F (37  C) Temp src: Oral BP: 127/74 Pulse: 89   Resp: 14 SpO2: 96 % O2 Device: None (Room air)      GENERAL APPEARANCE: Vitals noted.  Patient alert, oriented ×2, and in no acute distress.  FACE: normal facies  EYES: Pupils are equal; no definite icterus  HENT: normal external exam  NECK: no adenopathy or asymmetry  RESP: normal respiratory effort; clear breath sounds bilaterally  CV: regular rate and rhythm; no significant murmurs, gallops or rubs  ABD: soft, minimal tenderness; no rebound or guarding; bowel sounds are normal  MS: no gross deformities noted; normal muscle tone.  EXT: No calf tenderness or pitting edema  SKIN: no worrisome rash  NEURO: no facial droop; no focal deficits, speech is normal  PSYCH: normal mood and affect. Patient was oriented to person and place, but was disoriented to day and year. She stated it was Sunday and the year was 1917.      Available " Lab/Imaging Results     Results for orders placed or performed during the hospital encounter of 09/12/17 (from the past 24 hour(s))   CBC with platelets differential   Result Value Ref Range    WBC 5.6 4.0 - 11.0 10e9/L    RBC Count 3.32 (L) 3.8 - 5.2 10e12/L    Hemoglobin 8.6 (L) 11.7 - 15.7 g/dL    Hematocrit 29.7 (L) 35.0 - 47.0 %    MCV 90 78 - 100 fl    MCH 25.9 (L) 26.5 - 33.0 pg    MCHC 29.0 (L) 31.5 - 36.5 g/dL    RDW 17.0 (H) 10.0 - 15.0 %    Platelet Count 111 (L) 150 - 450 10e9/L    Diff Method Automated Method     % Neutrophils 59.4 %    % Lymphocytes 24.1 %    % Monocytes 10.5 %    % Eosinophils 5.3 %    % Basophils 0.5 %    % Immature Granulocytes 0.2 %    Absolute Neutrophil 3.3 1.6 - 8.3 10e9/L    Absolute Lymphocytes 1.4 0.8 - 5.3 10e9/L    Absolute Monocytes 0.6 0.0 - 1.3 10e9/L    Absolute Eosinophils 0.3 0.0 - 0.7 10e9/L    Absolute Basophils 0.0 0.0 - 0.2 10e9/L    Abs Immature Granulocytes 0.0 0 - 0.4 10e9/L   INR   Result Value Ref Range    INR 1.02 0.86 - 1.14   Comprehensive metabolic panel   Result Value Ref Range    Sodium 148 (H) 133 - 144 mmol/L    Potassium 3.7 3.4 - 5.3 mmol/L    Chloride 111 (H) 94 - 109 mmol/L    Carbon Dioxide 24 20 - 32 mmol/L    Anion Gap 13 3 - 14 mmol/L    Glucose 102 (H) 70 - 99 mg/dL    Urea Nitrogen 15 7 - 30 mg/dL    Creatinine 0.92 0.52 - 1.04 mg/dL    GFR Estimate 64 >60 mL/min/1.7m2    GFR Estimate If Black 78 >60 mL/min/1.7m2    Calcium 8.9 8.5 - 10.1 mg/dL    Bilirubin Total 1.0 0.2 - 1.3 mg/dL    Albumin 3.2 (L) 3.4 - 5.0 g/dL    Protein Total 6.3 (L) 6.8 - 8.8 g/dL    Alkaline Phosphatase 325 (H) 40 - 150 U/L    ALT 42 0 - 50 U/L    AST 78 (H) 0 - 45 U/L   Alcohol ethyl   Result Value Ref Range    Ethanol g/dL <0.01 <0.01 g/dL   Ammonia   Result Value Ref Range    Ammonia 60 (H) 10 - 50 umol/L   UA reflex to Microscopic   Result Value Ref Range    Color Urine Yellow     Appearance Urine Slightly Cloudy     Glucose Urine Negative NEG^Negative mg/dL     Bilirubin Urine Negative NEG^Negative    Ketones Urine Negative NEG^Negative mg/dL    Specific Gravity Urine 1.020 1.003 - 1.035    Blood Urine Negative NEG^Negative    pH Urine 6.0 5.0 - 7.0 pH    Protein Albumin Urine Negative NEG^Negative mg/dL    Urobilinogen mg/dL 4.0 (H) 0.0 - 2.0 mg/dL    Nitrite Urine Positive (A) NEG^Negative    Leukocyte Esterase Urine Moderate (A) NEG^Negative    Source Unspecified Urine     RBC Urine <1 0 - 2 /HPF    WBC Urine 16 (H) 0 - 2 /HPF    Bacteria Urine Many (A) NEG^Negative /HPF    Squamous Epithelial /HPF Urine 2 (H) 0 - 1 /HPF    Mucous Urine Present (A) NEG^Negative /LPF   Urine Drugs of Abuse Screen Panel 13   Result Value Ref Range    Cannabinoids (11-puy-9-carboxy-9-THC) Not Detected NDET^Not Detected ng/mL    Phencyclidine (Phencyclidine) Not Detected NDET^Not Detected ng/mL    Cocaine (Benzoylecgonine) Not Detected NDET^Not Detected ng/mL    Methamphetamine (d-Methamphetamine) Not Detected NDET^Not Detected ng/mL    Opiates (Morphine) Not Detected NDET^Not Detected ng/mL    Amphetamine (d-Amphetamine) Not Detected NDET^Not Detected ng/mL    Benzodiazepines (Nordiazepam) Detected, Abnormal Result (A) NDET^Not Detected ng/mL    Tricyclic Antidepressants (Desipramine) Not Detected NDET^Not Detected ng/mL    Methadone (Methadone) Not Detected NDET^Not Detected ng/mL    Barbiturates (Butalbital) Not Detected NDET^Not Detected ng/mL    Oxycodone (Oxycodone) Not Detected NDET^Not Detected ng/mL    Propoxyphene (Norpropoxyphene) Not Detected NDET^Not Detected ng/mL    Buprenorphine (Buprenorphine) Not Detected NDET^Not Detected ng/mL         Impression     Final diagnoses:   Confusion   Urinary tract infection, site unspecified   Alcohol induced fatty liver       ED Course & Medical Decision Making   Monalisa Olguin is a 51 year old female who presented to the emergency department with increase in confusion over the last 3-5 days.  Patient had a colonoscopy under sedation,  and her  noticed that she has been more confused since that procedure.  Her mental status seemed to clear Sunday, but it came back again Monday.  The  reports that she has been having some hallucinations, and unsteady gait.  Patient reports that she has had weight gain from fluid buildup in her abdomen.  Her last ultrasound did not reveal any ascites, and she had a nonspecific thickening of the gallbladder wall with gallstones.  She does not have any epigastric or right upper quadrant pain.  She has not had any fever, chills, chest pain, dysuria.  Patient was seen shortly after arrival.  She was disoriented to date thinking that it was 1917 and that it was Monday.  She was able to answer questions fairly directly and to the point.  Blood work revealed a normal CBC with unchanged hemoglobin of 8.6.  Platelet count is slightly low at 111.  INR levels 1.02, comprehensive metabolic panel reveals elevation of the alkaline phosphatase and AST.  Alcohol level is less than 0.01.  Ammonia level is 60, and urine drug screen is positive for benzodiazepines.  Urinalysis is positive for nitrites with 16 white blood cells, many bacteria, and 2 squamous epithelial cells.  A urine culture is pending.  Patient denies drinking for the last 3 weeks.  She should be beyond the window for developing DTs.  Her ammonia level is slightly elevated but she has no asterixis or other signs of hepatic encephalopathy.  I suspect that her confusion may be just due to her urinary tract infection.  Begin Cipro 250 mg twice a day for 7 days.  Drink plenty of fluids.  Recheck in the clinic in 2-3 days if not improving.  Regardless follow up in 7-10 days for recheck.  Turn to the ED at any time if symptoms worsen.      Written after-visit summary and instructions were given at the time of discharge.      New Prescriptions    No medications on file       This document serves as a record of services personally performed by Vianca Schultz  MD. It was created on their behalf by Jessie Gonzalez, a trained medical scribe. The creation of this record is based on the provider's personal observations and the statements of the patient. This document has been checked and approved by the attending provider.    This note was completed in part using Dragon voice recognition, and may contain word and grammatical errors.        Vianca Schultz MD  09/12/17 [id number]

## 2017-09-13 NOTE — ED NOTES
Pt comes in with  for complaints of confusion, dizziness, and lethargy.  states pt stopped drinking alcohol a couple weeks ago. Pt confirms this and states she is in outpatient treatment.  states pt has been having hallucinations.

## 2017-09-13 NOTE — TELEPHONE ENCOUNTER
is out until 9/18/2017. We need to schedule her with someone else.  Please schedule with another provider.  MP/MA

## 2017-09-13 NOTE — TELEPHONE ENCOUNTER
Reason for Call:  Same Day Appointment, Requested Provider:  Efren Bustos M.D.    PCP: Efren Bustos    Reason for visit: ED follow up - uti    Duration of symptoms: ongoing    Have you been treated for this in the past? Yes    Additional comments: patient has surgery coming up and was told she needs to be seen prior to that.   Patient is somewhat confused as to what she needs exactly.She is aware that doctor is out and is wondering if someone else can work her in during the next couple of days.     Can we leave a detailed message on this number? YES    Phone number patient can be reached at: Home number on file [phone] (home)    Best Time: any    Call taken on 9/13/2017 at 9:30 AM by Rahul Brooks

## 2017-09-13 NOTE — DISCHARGE INSTRUCTIONS
Thank you for giving us the opportunity to see you. The impression is that you have a urinary tract infection, and this may be contributing to your increased confusion.    Take Cipro 250 mg twice a day for 7 days.    Drink plenty of fluids.    Or formal urine culture is pending.  We will call you if your plan of care needs to change.     If you are not seeing an improvement within 3-5 days, please follow up with your primary care provider or clinic.  Please follow-up for recheck regardless in 7-10 days.    After discharge, please closely monitor for any new or worsening symptoms. Return to the Emergency Department at any time if your symptoms worsen.

## 2017-09-14 ENCOUNTER — TELEPHONE (OUTPATIENT)
Dept: EMERGENCY MEDICINE | Facility: CLINIC | Age: 52
End: 2017-09-14

## 2017-09-14 LAB
BACTERIA SPEC CULT: ABNORMAL
SPECIMEN SOURCE: ABNORMAL

## 2017-09-14 RX ORDER — NITROFURANTOIN 25; 75 MG/1; MG/1
100 CAPSULE ORAL 2 TIMES DAILY
Qty: 10 CAPSULE | Refills: 0 | Status: SHIPPED | OUTPATIENT
Start: 2017-09-14 | End: 2017-09-19

## 2017-09-14 NOTE — TELEPHONE ENCOUNTER
[facility] Emergency Department Lab result notification [Adult-Female]    [facility] ED lab result protocol used  Urine Culture    Reason for call  Notify of lab results, assess symptoms,  review ED providers recommendations/discharge instructions (if necessary) and advise per ED lab result f/u protocol    Lab Result (including Rx patient on, if applicable)  Final Urine Culture Report on 9/14/17  [facility] ED discharge antibiotic:  Ciprofloxacin (Cipro) 250 mg tablet,  1 tablet (250 mg) by mouth 2 times daily for 7 days  #1. Bacteria, >100,000 colonies/mL Escherichia coli,  is [RESISTANT] to ED discharge antibiotic.   Change in treatment as per [facility] ED Lab result protocol.  Information table from ED Provider visit on 9/13/17  ED diagnosis Confusion, UTI   ED provider Nicolás Schultz MD   Symptoms reported at ED visit (Chief complaint, HPI)   Altered Mental Status    HPI:  Monalisa Olguin is a 51 year old female with a history of cirrhosis, who presented to the emergency department with her  who has concerns of patients safety due to confusion, dizziness, and being lethargic. Patients  said that this is similar to her past experience and she had been confused, dizzy, fatigued, and has been running into walls while walking for the past 3-4 days. She notes that she had a colonoscopy 4 days ago and believes it could be a reaction to the medication.  said that she seemed to be oriented on Sunday, but Sunday night she woke him up thinking her sisters were in town. Patient stated she quit drinking alcohol about 3-4 weeks ago. She has had small amounts of blood in stool, dull abdominal pain, swollen ankles, and swollen abdomen, she also notes that she has gained 30 pounds and a poor appetite, and most of the weight around the abdomen.  Patient has not fallen recently and denies any recent head injury.   is concerned about the patient's walking downstairs and her unsteadiness.   ED  providers Impression and Plan (applicable information) Monalisa Olguin is a 51 year old female who presented to the emergency department with increase in confusion over the last 3-5 days.  Patient had a colonoscopy under sedation, and her  noticed that she has been more confused since that procedure.  Her mental status seemed to clear Sunday, but it came back again Monday.  The  reports that she has been having some hallucinations, and unsteady gait.  Patient reports that she has had weight gain from fluid buildup in her abdomen.  Her last ultrasound did not reveal any ascites, and she had a nonspecific thickening of the gallbladder wall with gallstones.  She does not have any epigastric or right upper quadrant pain.  She has not had any fever, chills, chest pain, dysuria.  Patient was seen shortly after arrival.  She was disoriented to date thinking that it was 1917 and that it was Monday.  She was able to answer questions fairly directly and to the point.  Blood work revealed a normal CBC with unchanged hemoglobin of 8.6.  Platelet count is slightly low at 111.  INR levels 1.02, comprehensive metabolic panel reveals elevation of the alkaline phosphatase and AST.  Alcohol level is less than 0.01.  Ammonia level is 60, and urine drug screen is positive for benzodiazepines.  Urinalysis is positive for nitrites with 16 white blood cells, many bacteria, and 2 squamous epithelial cells.  A urine culture is pending.  Patient denies drinking for the last 3 weeks.  She should be beyond the window for developing DTs.  Her ammonia level is slightly elevated but she has no asterixis or other signs of hepatic encephalopathy.  I suspect that her confusion may be just due to her urinary tract infection.  Begin Cipro 250 mg twice a day for 7 days.  Drink plenty of fluids.  Recheck in the clinic in 2-3 days if not improving.  Regardless follow up in 7-10 days for recheck.  Turn to the ED at any time if symptoms  "worsen.    Significant Medical hx, if applicable Kidney donor   Coumadin/Warfarin [Yes /No] No   Creatinine Level (mg/dl) 0.92   Creatinine clearance (ml/min), if applicable 62.86 mL/min      Pregnant (Yes/No/NA) No   Breastfeeding (Yes/No/NA) No   Allergies Albuterol   Weight, if applicable 120 1bs      RN Assessment (Patient s current Symptoms), include time called.  [Insert Left message here if message left]  10:50 am Pt states, \"I still feel a little goofy.\" Denies dysuria, frequency, fever, back or flank pain, nausea and vomiting.    RN Recommendations/Instructions per [facility] ED lab result protocol  Patient notified of lab result and treatment recommendations.  Rx for nitrofurantoin 100 mg BID for 5 days sent to [Pharmacy - [facility]].  RN reviewed information about stopping the Ciprofloxacin and starting the Nitrofurantoin. Advised to finish full course of antibiotics as prescribed and drink plenty of fluids. And follow up with your PCP as the ED provider recommended. Has appt with PCP tomorrow and will keep this.      Please Contact your PCP clinic or return to the Emergency department if your:    Symptoms return.    Symptoms do not improve after 3 days on antibiotic.    Symptoms do not resolve after completing antibiotic.    Symptoms worsen or other concerning symptom's.    PCP follow-up Questions asked: YES       [RN Name]  Jennifer Ravi RN  [facility] RN  Lung Nodule and ED Lab Result F/u RN  Epic pool (ED late result f/u RN): P [id number]  # [phone]      "

## 2017-09-15 ENCOUNTER — TELEPHONE (OUTPATIENT)
Dept: FAMILY MEDICINE | Facility: CLINIC | Age: 52
End: 2017-09-15

## 2017-09-15 NOTE — TELEPHONE ENCOUNTER
"Patient is calling to report she has been having hallucinations for some time, she does not know because she cannot remember. Her  is in the background telling patient she has had many hallucinations, but the only one she can remember is that she called the police because she thought there were \"fuzzy things running around the house\".  She states she must have been confused before going to the ED for a UTI, but she believes the abx made the hallucinations worse.  Patient couldn't remember she had an appointment today that she No Showed.  She is informed that her  should drive her to the ED so they can try to figure out what is going on.  Patient understands and agrees to this plan.  Closing this encounter.  Ericka Landeros RN      "

## 2017-09-22 ENCOUNTER — APPOINTMENT (OUTPATIENT)
Dept: GENERAL RADIOLOGY | Facility: CLINIC | Age: 52
DRG: 854 | End: 2017-09-22
Attending: INTERNAL MEDICINE
Payer: COMMERCIAL

## 2017-09-22 ENCOUNTER — HOSPITAL ENCOUNTER (INPATIENT)
Facility: CLINIC | Age: 52
LOS: 3 days | Discharge: HOME | DRG: 854 | End: 2017-09-25
Attending: INTERNAL MEDICINE | Admitting: STUDENT IN AN ORGANIZED HEALTH CARE EDUCATION/TRAINING PROGRAM
Payer: COMMERCIAL

## 2017-09-22 ENCOUNTER — APPOINTMENT (OUTPATIENT)
Dept: MRI IMAGING | Facility: CLINIC | Age: 52
DRG: 854 | End: 2017-09-22
Attending: INTERNAL MEDICINE
Payer: COMMERCIAL

## 2017-09-22 ENCOUNTER — HOSPITAL ENCOUNTER (EMERGENCY)
Facility: CLINIC | Age: 52
Discharge: ANOTHER HOSPITAL | End: 2017-09-22
Attending: FAMILY MEDICINE | Admitting: FAMILY MEDICINE
Payer: COMMERCIAL

## 2017-09-22 ENCOUNTER — APPOINTMENT (OUTPATIENT)
Dept: ULTRASOUND IMAGING | Facility: CLINIC | Age: 52
End: 2017-09-22
Attending: FAMILY MEDICINE
Payer: COMMERCIAL

## 2017-09-22 VITALS
OXYGEN SATURATION: 92 % | TEMPERATURE: 97.9 F | BODY MASS INDEX: 22.66 KG/M2 | SYSTOLIC BLOOD PRESSURE: 94 MMHG | RESPIRATION RATE: 23 BRPM | HEIGHT: 61 IN | DIASTOLIC BLOOD PRESSURE: 55 MMHG | WEIGHT: 120 LBS

## 2017-09-22 DIAGNOSIS — A41.9 SEPSIS, DUE TO UNSPECIFIED ORGANISM: ICD-10-CM

## 2017-09-22 DIAGNOSIS — R10.11 ABDOMINAL PAIN, RIGHT UPPER QUADRANT: ICD-10-CM

## 2017-09-22 DIAGNOSIS — K80.50 BILIARY COLIC: Primary | ICD-10-CM

## 2017-09-22 LAB
ALBUMIN SERPL-MCNC: 2.3 G/DL (ref 3.4–5)
ALBUMIN SERPL-MCNC: 2.8 G/DL (ref 3.4–5)
ALBUMIN UR-MCNC: NEGATIVE MG/DL
ALP SERPL-CCNC: 202 U/L (ref 40–150)
ALP SERPL-CCNC: 262 U/L (ref 40–150)
ALT SERPL W P-5'-P-CCNC: 38 U/L (ref 0–50)
ALT SERPL W P-5'-P-CCNC: 44 U/L (ref 0–50)
AMYLASE SERPL-CCNC: 34 U/L (ref 30–110)
ANION GAP SERPL CALCULATED.3IONS-SCNC: 10 MMOL/L (ref 3–14)
ANION GAP SERPL CALCULATED.3IONS-SCNC: 15 MMOL/L (ref 3–14)
APPEARANCE UR: CLEAR
APTT PPP: 35 SEC (ref 22–37)
AST SERPL W P-5'-P-CCNC: 70 U/L (ref 0–45)
AST SERPL W P-5'-P-CCNC: 86 U/L (ref 0–45)
BASOPHILS # BLD AUTO: 0 10E9/L (ref 0–0.2)
BASOPHILS NFR BLD AUTO: 0.3 %
BILIRUB DIRECT SERPL-MCNC: 1.1 MG/DL (ref 0–0.2)
BILIRUB SERPL-MCNC: 1.8 MG/DL (ref 0.2–1.3)
BILIRUB SERPL-MCNC: 1.9 MG/DL (ref 0.2–1.3)
BILIRUB UR QL STRIP: NEGATIVE
BUN SERPL-MCNC: 11 MG/DL (ref 7–30)
BUN SERPL-MCNC: 12 MG/DL (ref 7–30)
CALCIUM SERPL-MCNC: 7.2 MG/DL (ref 8.5–10.1)
CALCIUM SERPL-MCNC: 7.9 MG/DL (ref 8.5–10.1)
CHLORIDE SERPL-SCNC: 105 MMOL/L (ref 94–109)
CHLORIDE SERPL-SCNC: 108 MMOL/L (ref 94–109)
CO2 SERPL-SCNC: 18 MMOL/L (ref 20–32)
CO2 SERPL-SCNC: 19 MMOL/L (ref 20–32)
COLOR UR AUTO: ABNORMAL
CREAT SERPL-MCNC: 1.3 MG/DL (ref 0.52–1.04)
CREAT SERPL-MCNC: 1.33 MG/DL (ref 0.52–1.04)
CRP SERPL-MCNC: 26.9 MG/L (ref 0–8)
DIFFERENTIAL METHOD BLD: ABNORMAL
EOSINOPHIL # BLD AUTO: 0.5 10E9/L (ref 0–0.7)
EOSINOPHIL NFR BLD AUTO: 4.7 %
ERYTHROCYTE [DISTWIDTH] IN BLOOD BY AUTOMATED COUNT: 18.2 % (ref 10–15)
ERYTHROCYTE [DISTWIDTH] IN BLOOD BY AUTOMATED COUNT: 18.4 % (ref 10–15)
ERYTHROCYTE [SEDIMENTATION RATE] IN BLOOD BY WESTERGREN METHOD: 30 MM/H (ref 0–30)
GFR SERPL CREATININE-BSD FRML MDRD: 42 ML/MIN/1.7M2
GFR SERPL CREATININE-BSD FRML MDRD: 43 ML/MIN/1.7M2
GGT SERPL-CCNC: 478 U/L (ref 0–40)
GLUCOSE SERPL-MCNC: 90 MG/DL (ref 70–99)
GLUCOSE SERPL-MCNC: 91 MG/DL (ref 70–99)
GLUCOSE UR STRIP-MCNC: NEGATIVE MG/DL
HCT VFR BLD AUTO: 25.7 % (ref 35–47)
HCT VFR BLD AUTO: 27.3 % (ref 35–47)
HGB BLD-MCNC: 8 G/DL (ref 11.7–15.7)
HGB BLD-MCNC: 8.3 G/DL (ref 11.7–15.7)
HGB UR QL STRIP: NEGATIVE
HYALINE CASTS #/AREA URNS LPF: 4 /LPF (ref 0–2)
IMM GRANULOCYTES # BLD: 0 10E9/L (ref 0–0.4)
IMM GRANULOCYTES NFR BLD: 0.4 %
INR PPP: 1.17 (ref 0.86–1.14)
INR PPP: 1.54 (ref 0.86–1.14)
KETONES UR STRIP-MCNC: NEGATIVE MG/DL
LACTATE BLD-SCNC: 1.2 MMOL/L (ref 0.7–2)
LACTATE BLD-SCNC: 2.8 MMOL/L (ref 0.7–2)
LEUKOCYTE ESTERASE UR QL STRIP: NEGATIVE
LIPASE SERPL-CCNC: 80 U/L (ref 73–393)
LYMPHOCYTES # BLD AUTO: 0.4 10E9/L (ref 0.8–5.3)
LYMPHOCYTES NFR BLD AUTO: 4 %
MCH RBC QN AUTO: 25.7 PG (ref 26.5–33)
MCH RBC QN AUTO: 26.4 PG (ref 26.5–33)
MCHC RBC AUTO-ENTMCNC: 30.4 G/DL (ref 31.5–36.5)
MCHC RBC AUTO-ENTMCNC: 31.1 G/DL (ref 31.5–36.5)
MCV RBC AUTO: 85 FL (ref 78–100)
MCV RBC AUTO: 85 FL (ref 78–100)
MONOCYTES # BLD AUTO: 0.9 10E9/L (ref 0–1.3)
MONOCYTES NFR BLD AUTO: 9.4 %
MUCOUS THREADS #/AREA URNS LPF: PRESENT /LPF
NEUTROPHILS # BLD AUTO: 7.8 10E9/L (ref 1.6–8.3)
NEUTROPHILS NFR BLD AUTO: 81.2 %
NITRATE UR QL: NEGATIVE
PH UR STRIP: 5 PH (ref 5–7)
PLATELET # BLD AUTO: 65 10E9/L (ref 150–450)
PLATELET # BLD AUTO: 91 10E9/L (ref 150–450)
POTASSIUM SERPL-SCNC: 3.9 MMOL/L (ref 3.4–5.3)
POTASSIUM SERPL-SCNC: 4.1 MMOL/L (ref 3.4–5.3)
PROT SERPL-MCNC: 4.9 G/DL (ref 6.8–8.8)
PROT SERPL-MCNC: 5.7 G/DL (ref 6.8–8.8)
RBC # BLD AUTO: 3.03 10E12/L (ref 3.8–5.2)
RBC # BLD AUTO: 3.23 10E12/L (ref 3.8–5.2)
RBC #/AREA URNS AUTO: <1 /HPF (ref 0–2)
SODIUM SERPL-SCNC: 136 MMOL/L (ref 133–144)
SODIUM SERPL-SCNC: 138 MMOL/L (ref 133–144)
SOURCE: ABNORMAL
SP GR UR STRIP: 1.02 (ref 1–1.03)
SQUAMOUS #/AREA URNS AUTO: <1 /HPF (ref 0–1)
TSH SERPL DL<=0.005 MIU/L-ACNC: 0.5 MU/L (ref 0.4–4)
UROBILINOGEN UR STRIP-MCNC: 4 MG/DL (ref 0–2)
WBC # BLD AUTO: 7.5 10E9/L (ref 4–11)
WBC # BLD AUTO: 9.6 10E9/L (ref 4–11)
WBC #/AREA URNS AUTO: 8 /HPF (ref 0–2)

## 2017-09-22 PROCEDURE — 85610 PROTHROMBIN TIME: CPT | Performed by: FAMILY MEDICINE

## 2017-09-22 PROCEDURE — 85730 THROMBOPLASTIN TIME PARTIAL: CPT | Performed by: FAMILY MEDICINE

## 2017-09-22 PROCEDURE — 25000128 H RX IP 250 OP 636: Performed by: STUDENT IN AN ORGANIZED HEALTH CARE EDUCATION/TRAINING PROGRAM

## 2017-09-22 PROCEDURE — 99285 EMERGENCY DEPT VISIT HI MDM: CPT | Mod: 25 | Performed by: FAMILY MEDICINE

## 2017-09-22 PROCEDURE — 82248 BILIRUBIN DIRECT: CPT | Performed by: STUDENT IN AN ORGANIZED HEALTH CARE EDUCATION/TRAINING PROGRAM

## 2017-09-22 PROCEDURE — 86140 C-REACTIVE PROTEIN: CPT | Performed by: FAMILY MEDICINE

## 2017-09-22 PROCEDURE — 80053 COMPREHEN METABOLIC PANEL: CPT | Performed by: FAMILY MEDICINE

## 2017-09-22 PROCEDURE — 25000128 H RX IP 250 OP 636: Performed by: DERMATOLOGY

## 2017-09-22 PROCEDURE — 25000128 H RX IP 250 OP 636: Performed by: FAMILY MEDICINE

## 2017-09-22 PROCEDURE — 71020 XR CHEST 2 VW: CPT

## 2017-09-22 PROCEDURE — 25000132 ZZH RX MED GY IP 250 OP 250 PS 637: Performed by: DERMATOLOGY

## 2017-09-22 PROCEDURE — A9585 GADOBUTROL INJECTION: HCPCS | Performed by: STUDENT IN AN ORGANIZED HEALTH CARE EDUCATION/TRAINING PROGRAM

## 2017-09-22 PROCEDURE — 96365 THER/PROPH/DIAG IV INF INIT: CPT | Performed by: FAMILY MEDICINE

## 2017-09-22 PROCEDURE — 80053 COMPREHEN METABOLIC PANEL: CPT | Performed by: STUDENT IN AN ORGANIZED HEALTH CARE EDUCATION/TRAINING PROGRAM

## 2017-09-22 PROCEDURE — 12000006 ZZH R&B IMCU INTERMEDIATE UMMC

## 2017-09-22 PROCEDURE — 99291 CRITICAL CARE FIRST HOUR: CPT | Mod: Z6 | Performed by: FAMILY MEDICINE

## 2017-09-22 PROCEDURE — 85027 COMPLETE CBC AUTOMATED: CPT | Performed by: STUDENT IN AN ORGANIZED HEALTH CARE EDUCATION/TRAINING PROGRAM

## 2017-09-22 PROCEDURE — 87086 URINE CULTURE/COLONY COUNT: CPT | Performed by: FAMILY MEDICINE

## 2017-09-22 PROCEDURE — 82977 ASSAY OF GGT: CPT | Performed by: DERMATOLOGY

## 2017-09-22 PROCEDURE — 36415 COLL VENOUS BLD VENIPUNCTURE: CPT | Performed by: STUDENT IN AN ORGANIZED HEALTH CARE EDUCATION/TRAINING PROGRAM

## 2017-09-22 PROCEDURE — 85652 RBC SED RATE AUTOMATED: CPT | Performed by: FAMILY MEDICINE

## 2017-09-22 PROCEDURE — 87040 BLOOD CULTURE FOR BACTERIA: CPT | Performed by: STUDENT IN AN ORGANIZED HEALTH CARE EDUCATION/TRAINING PROGRAM

## 2017-09-22 PROCEDURE — 82150 ASSAY OF AMYLASE: CPT | Performed by: FAMILY MEDICINE

## 2017-09-22 PROCEDURE — 82977 ASSAY OF GGT: CPT | Performed by: STUDENT IN AN ORGANIZED HEALTH CARE EDUCATION/TRAINING PROGRAM

## 2017-09-22 PROCEDURE — 81001 URINALYSIS AUTO W/SCOPE: CPT | Performed by: FAMILY MEDICINE

## 2017-09-22 PROCEDURE — 84443 ASSAY THYROID STIM HORMONE: CPT | Performed by: STUDENT IN AN ORGANIZED HEALTH CARE EDUCATION/TRAINING PROGRAM

## 2017-09-22 PROCEDURE — 85610 PROTHROMBIN TIME: CPT | Performed by: STUDENT IN AN ORGANIZED HEALTH CARE EDUCATION/TRAINING PROGRAM

## 2017-09-22 PROCEDURE — 76705 ECHO EXAM OF ABDOMEN: CPT

## 2017-09-22 PROCEDURE — 25000132 ZZH RX MED GY IP 250 OP 250 PS 637: Performed by: FAMILY MEDICINE

## 2017-09-22 PROCEDURE — 99223 1ST HOSP IP/OBS HIGH 75: CPT | Mod: AI | Performed by: STUDENT IN AN ORGANIZED HEALTH CARE EDUCATION/TRAINING PROGRAM

## 2017-09-22 PROCEDURE — 96367 TX/PROPH/DG ADDL SEQ IV INF: CPT | Performed by: FAMILY MEDICINE

## 2017-09-22 PROCEDURE — 83690 ASSAY OF LIPASE: CPT | Performed by: FAMILY MEDICINE

## 2017-09-22 PROCEDURE — 96361 HYDRATE IV INFUSION ADD-ON: CPT | Performed by: FAMILY MEDICINE

## 2017-09-22 PROCEDURE — 25000132 ZZH RX MED GY IP 250 OP 250 PS 637: Performed by: STUDENT IN AN ORGANIZED HEALTH CARE EDUCATION/TRAINING PROGRAM

## 2017-09-22 PROCEDURE — 87040 BLOOD CULTURE FOR BACTERIA: CPT | Performed by: FAMILY MEDICINE

## 2017-09-22 PROCEDURE — 74183 MRI ABD W/O CNTR FLWD CNTR: CPT

## 2017-09-22 PROCEDURE — 83605 ASSAY OF LACTIC ACID: CPT | Performed by: FAMILY MEDICINE

## 2017-09-22 PROCEDURE — 85025 COMPLETE CBC W/AUTO DIFF WBC: CPT | Performed by: FAMILY MEDICINE

## 2017-09-22 RX ORDER — SODIUM CHLORIDE 9 MG/ML
1000 INJECTION, SOLUTION INTRAVENOUS CONTINUOUS
Status: DISCONTINUED | OUTPATIENT
Start: 2017-09-22 | End: 2017-09-22 | Stop reason: HOSPADM

## 2017-09-22 RX ORDER — VANCOMYCIN HYDROCHLORIDE 1 G/200ML
1000 INJECTION, SOLUTION INTRAVENOUS EVERY 24 HOURS
Status: DISCONTINUED | OUTPATIENT
Start: 2017-09-22 | End: 2017-09-22 | Stop reason: HOSPADM

## 2017-09-22 RX ORDER — LORAZEPAM 1 MG/1
1-4 TABLET ORAL EVERY 30 MIN PRN
Status: DISCONTINUED | OUTPATIENT
Start: 2017-09-22 | End: 2017-09-22

## 2017-09-22 RX ORDER — LANOLIN ALCOHOL/MO/W.PET/CERES
100 CREAM (GRAM) TOPICAL DAILY
Status: COMPLETED | OUTPATIENT
Start: 2017-09-22 | End: 2017-09-24

## 2017-09-22 RX ORDER — OXYCODONE HYDROCHLORIDE 5 MG/1
5-10 TABLET ORAL
Status: DISCONTINUED | OUTPATIENT
Start: 2017-09-22 | End: 2017-09-25 | Stop reason: HOSPADM

## 2017-09-22 RX ORDER — ACETAMINOPHEN 500 MG
500 TABLET ORAL ONCE
Status: COMPLETED | OUTPATIENT
Start: 2017-09-22 | End: 2017-09-22

## 2017-09-22 RX ORDER — SODIUM CHLORIDE 9 MG/ML
INJECTION, SOLUTION INTRAVENOUS CONTINUOUS
Status: DISCONTINUED | OUTPATIENT
Start: 2017-09-22 | End: 2017-09-23

## 2017-09-22 RX ORDER — SERTRALINE HYDROCHLORIDE 100 MG/1
100 TABLET, FILM COATED ORAL DAILY
Status: DISCONTINUED | OUTPATIENT
Start: 2017-09-22 | End: 2017-09-25 | Stop reason: HOSPADM

## 2017-09-22 RX ORDER — BUPROPION HYDROCHLORIDE 300 MG/1
300 TABLET ORAL EVERY MORNING
Status: DISCONTINUED | OUTPATIENT
Start: 2017-09-22 | End: 2017-09-25 | Stop reason: HOSPADM

## 2017-09-22 RX ORDER — NALOXONE HYDROCHLORIDE 0.4 MG/ML
.1-.4 INJECTION, SOLUTION INTRAMUSCULAR; INTRAVENOUS; SUBCUTANEOUS
Status: DISCONTINUED | OUTPATIENT
Start: 2017-09-22 | End: 2017-09-25 | Stop reason: HOSPADM

## 2017-09-22 RX ORDER — FOLIC ACID 1 MG/1
1 TABLET ORAL DAILY
Status: DISCONTINUED | OUTPATIENT
Start: 2017-09-22 | End: 2017-09-25 | Stop reason: HOSPADM

## 2017-09-22 RX ORDER — BUSPIRONE HYDROCHLORIDE 15 MG/1
15 TABLET ORAL 2 TIMES DAILY
Status: DISCONTINUED | OUTPATIENT
Start: 2017-09-22 | End: 2017-09-25 | Stop reason: HOSPADM

## 2017-09-22 RX ORDER — GADOBUTROL 604.72 MG/ML
7.5 INJECTION INTRAVENOUS ONCE
Status: COMPLETED | OUTPATIENT
Start: 2017-09-22 | End: 2017-09-22

## 2017-09-22 RX ORDER — AMOXICILLIN 250 MG
1-2 CAPSULE ORAL 2 TIMES DAILY
Status: DISCONTINUED | OUTPATIENT
Start: 2017-09-22 | End: 2017-09-23

## 2017-09-22 RX ORDER — PIPERACILLIN SODIUM, TAZOBACTAM SODIUM 3; .375 G/15ML; G/15ML
3.38 INJECTION, POWDER, LYOPHILIZED, FOR SOLUTION INTRAVENOUS EVERY 6 HOURS
Status: DISCONTINUED | OUTPATIENT
Start: 2017-09-22 | End: 2017-09-24

## 2017-09-22 RX ADMIN — ACETAMINOPHEN 500 MG: 500 TABLET ORAL at 01:52

## 2017-09-22 RX ADMIN — PIPERACILLIN AND TAZOBACTAM 3.38 G: 3; .375 INJECTION, POWDER, LYOPHILIZED, FOR SOLUTION INTRAVENOUS; PARENTERAL at 10:27

## 2017-09-22 RX ADMIN — OMEPRAZOLE 20 MG: 20 CAPSULE, DELAYED RELEASE ORAL at 10:37

## 2017-09-22 RX ADMIN — SODIUM CHLORIDE 1000 ML: 9 INJECTION, SOLUTION INTRAVENOUS at 10:27

## 2017-09-22 RX ADMIN — BUSPIRONE HYDROCHLORIDE 15 MG: 15 TABLET ORAL at 10:38

## 2017-09-22 RX ADMIN — Medication 100 MG: at 10:40

## 2017-09-22 RX ADMIN — TAZOBACTAM SODIUM AND PIPERACILLIN SODIUM 3.38 G: 375; 3 INJECTION, SOLUTION INTRAVENOUS at 02:03

## 2017-09-22 RX ADMIN — SODIUM CHLORIDE 1000 ML: 9 INJECTION, SOLUTION INTRAVENOUS at 01:28

## 2017-09-22 RX ADMIN — SODIUM CHLORIDE: 9 INJECTION, SOLUTION INTRAVENOUS at 16:14

## 2017-09-22 RX ADMIN — PIPERACILLIN AND TAZOBACTAM 3.38 G: 3; .375 INJECTION, POWDER, LYOPHILIZED, FOR SOLUTION INTRAVENOUS; PARENTERAL at 16:10

## 2017-09-22 RX ADMIN — BUSPIRONE HYDROCHLORIDE 15 MG: 15 TABLET ORAL at 22:55

## 2017-09-22 RX ADMIN — SODIUM CHLORIDE 100 ML: 9 INJECTION, SOLUTION INTRAVENOUS at 22:19

## 2017-09-22 RX ADMIN — PIPERACILLIN AND TAZOBACTAM 3.38 G: 3; .375 INJECTION, POWDER, LYOPHILIZED, FOR SOLUTION INTRAVENOUS; PARENTERAL at 22:50

## 2017-09-22 RX ADMIN — SODIUM CHLORIDE 1000 ML: 9 INJECTION, SOLUTION INTRAVENOUS at 04:22

## 2017-09-22 RX ADMIN — SODIUM CHLORIDE 1000 ML: 9 INJECTION, SOLUTION INTRAVENOUS at 03:21

## 2017-09-22 RX ADMIN — FOLIC ACID 1 MG: 1 TABLET ORAL at 10:40

## 2017-09-22 RX ADMIN — VANCOMYCIN HYDROCHLORIDE 1000 MG: 1 INJECTION, SOLUTION INTRAVENOUS at 02:51

## 2017-09-22 RX ADMIN — SODIUM CHLORIDE: 9 INJECTION, SOLUTION INTRAVENOUS at 10:27

## 2017-09-22 RX ADMIN — GADOBUTROL 7.5 ML: 604.72 INJECTION INTRAVENOUS at 22:19

## 2017-09-22 RX ADMIN — SERTRALINE HYDROCHLORIDE 100 MG: 100 TABLET ORAL at 10:37

## 2017-09-22 RX ADMIN — BUPROPION HYDROCHLORIDE 300 MG: 300 TABLET, FILM COATED, EXTENDED RELEASE ORAL at 10:38

## 2017-09-22 ASSESSMENT — PAIN DESCRIPTION - DESCRIPTORS
DESCRIPTORS: ACHING
DESCRIPTORS: CRAMPING

## 2017-09-22 NOTE — CONSULTS
GASTROENTEROLOGY CONSULTATION      Date of Admission:  9/22/2017          ASSESSMENT AND RECOMMENDATIONS:   Assessment:  51 year old female with a history of alcoholic hepatitis and cholelithiasis with right upper quadrant pain, fever, hypotension and elevated in LFTs. Ultrasound on 9/22 showed dilation of common bile duct to 7mm and cholelithiasis without evidence of gallstones outside of the gallbladder. Given her fever and hypotension, there is concern for biliary infection: cholecystitis versus cholangitis. However her pain may be due to cholelithiasis or choledocholithiasis. She has a history of cholelithiasis and was being worked up for elective cholecystectomy. Patient is clinically improving and is currently stable, but would benefit from further imaging of common bile duct at this time to rule out choledocholithiasis causing obstruction. At this time, MRCP is the best option. Endoscopic ultrasound is currently not indicated as patient did not tolerate sedation during screening colonoscopy and would therefore require MAC in an operating room for an EUS. If MRCP is negative for obstruction, would continue to monitor clinically. If she worsened or developed recurrent fevers with negative MRCP, we recommend scheduling EUS with possible ERCP in the OR. If MRCP shows evidence of obstruction, we recommend laparoscopic cholecystectomy with intraoperative cholangiogram, with subsequent ERCP if cholangiogram showed evidence of common bile duct stone. Her current MELD-Na is 17 and 30 day post-operative mortality risk is 6%. If patient is not a surgical candidate due to liver disease, she would need ERCP.  Cholangitis is of concern, however patient has chronic elevation in LFTs secondary to alcohol hepatitis and a normal white count. She does not meet criteria for urgent ERCP at this time.    Other possible causes of her abodminal pain, fever, and hypotension include hepatitis (alcohol verus viral versus autoimmune),  liver abscess, diverticulitis, colitis, peptic ulcer disease, right lower lobe pneumonia. No evidence of mass or stricture causing obstruction on ultrasound.     Recommendations  -MRCP stat  -General surgery consult for laparoscopic cholecystectomy with intraoperative cholangiogram  -C diff testing  -Continue IV antibiotics  -Continue to trend LFTs     Thank you for involving us in this patient's care. Please do not hesitate to contact the GI service with any questions or concerns.     Pt care plan discussed with Dr. Brewer, GI staff physician.    Pallavi Ann MD  Internal Medicine-Pediatrics, PGY-1  [phone]  -------------------------------------------------------------------------------------------------------------------          Chief Complaint:   We were asked by Dr. Che of internal medicine to evaluate this patient with fever, right upper quadrant pain concerning for ascending cholangitis.    History is obtained from the patient and the medical record.          History of Present Illness:   Monalisa Olguin is a 51 year old female with a history of alcoholic hepatitis, cholelithiasis, nephrectomy who presented to outside hospital with acute onset right upper quadrant pain at 6pm associated with abdominal cramping and nausea. For the past 2-4 months she reports intermittent abdominal pain in the right upper quadrants associated with decreased appetite, nausea, and a feeling of abdominal fullness. The evening before presentation, the pain was much worse than previous episodes and was associated with subjective fever, chills, sweating. She denies bloody or bilious emesis, diarrhea, blood in stool, cough, dysuria. She denies previous episodes of similar pain, and reports the closest experience she has had was childbirth. Her last drink was 3 weeks ago. She reports hallucinations and confusion about 1 week ago, that she attributes to the sedation medication for her colonoscopy. At that time she was  seen in the ED and treated for a UTI. She reports the colonoscopy was unable to be completed secondary to inability for her to be sedated adequately. She is a current everyday smoker, 1/2 pack per day. She denies illicit drug use. She donated a kidney to her sister about 15 years ago and has had a C section. She has a history of anxiety, depression, and reflux for which she takes the medications listed below.            Past Medical History:   Reviewed and edited as appropriate  Past Medical History:   Diagnosis Date     Alcohol abuse 2013     Alcohol dependence 2013     Alcohol withdrawal 2013     Anxiety 10/10/2011     CKD (chronic kidney disease) stage 3, GFR 30-59 ml/min     last GFR was 42     CKD (chronic kidney disease) stage 3, GFR 30-59 ml/min 2011     Esophageal reflux 10/7/2002     Hypertension goal BP (blood pressure) < 130/80 2011     Int derangement knee NEC     ACL Internal derangement, knee/ original injury in 5th grade, torn cartilage     Moderate Depression [296.32] 2009    stable on wellbutrin     Pap smear     no abnormals, due for paps q 2-3 yrs     Unspecified essential hypertension             Past Surgical History:   Reviewed and edited as appropriate   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     COLONOSCOPY N/A 2017    Procedure: COLONOSCOPY;  Colonoscopy;  Surgeon: Sai Johnston MD;  Location: [facility]     HC KNEE SCOPE, DIAGNOSTIC  01    Arthroscopy, Lt Knee            Previous Endoscopy:     Results for orders placed or performed during the hospital encounter of 17   COLONOSCOPY   Result Value Ref Range    COLONOSCOPY       [address] [id number] [phone]     Endoscopy  Department  _______________________________________________________________________________  Patient Name: Monalisa Olguin          Procedure Date: 9/8/2017 2:39 PM  MRN: [id number]                       Account Number: [id number]  YOB: 1965              Admit Type: Outpatient  Age: 51                               Room: Room 1  Gender: Female                        Note Status: Finalized  Attending MD: Sai Johnston MD      Total Sedation Time:   _______________________________________________________________________________     Procedure:           Colonoscopy  Indications:         Screening for colorectal malignant neoplasm, This is the                        patient's first colonoscopy  Providers:           Sai Johnston MD  Referring MD:        Efren Bustos MD  Medicines:           Midazolam 5 mg IV, Fentanyl 50 micrograms IV  Complications:       No immedia te complications.  _______________________________________________________________________________  Procedure:           Pre-Anesthesia Assessment:                       - ASA Grade Assessment: I - A normal, healthy patient.                       After obtaining informed consent, the colonoscope was                        passed under direct vision. Throughout the procedure,                        the patient's blood pressure, pulse, and oxygen                        saturations were monitored continuously. The Colonoscope                        was introduced through the anus with the intention of                        advancing to the cecum. The scope was advanced to the                        transverse colon before the procedure was aborted.                        Medications were given. The patient tolerated the                        procedure well. The quality of the bowel preparation was                        excellent.                                                                                    Findings:        Internal hemorrhoids were found during retroflexion. The hemorrhoids        were Grade I (internal hemorrhoids that do not prolapse).       The proximal transverse colon and the hepatic flexure were significantly        angulated and produced significant looping of the colonoscope despite        different maneuvers, positions, etc. The procedure was aborted due to        patient discomfort (additional medications were given to the point of        vital sgn tolerence and despite this patient discomfort continued).       No recurrent neoplastic or malignant processes were noted during this        study to the extent it was visualized.                                                                                   Moderate Sedation:       Moderate (conscious) sedation was administered by the endoscopy nurse        and supervised by the endoscopist. The following parameters were        monitored: oxygen saturation, heart rate, blood pressure, res piratory        rate, EKG, adequacy of pulmonary ventilation, and response to care.        Total physician intraservice time was 16 minutes.  Impression:          - Internal hemorrhoids.                       - There was significant looping of the colon.                       - No specimens collected.                       - The exam was suboptimal due to patient discomfort.  Recommendation:      - Discharge patient to home.                       - Reschedule colonoscopy with MAC                       - This patient may now resume his/her routine                        activities, diet, and medications.                       - Patient has a contact number available for                        emergencies. The signs and symptoms of potential delayed                        complications were discussed with the patient. Return to                        normal activities tomorrow. Written discharge                        instructions were provided to the patient.                    "                                                                   _________________  Sai Johnston MD  9/8/2017 3:15:28 PM  I was physically present for the entire viewing portion of the exam.Sai Johnston MD  Number of Addenda: 0    Note Initiated On: 9/8/2017 2:39 PM              Social History:   Reviewed and edited as appropriate  Social History     Social History     Marital status:      Spouse name: Eron     Number of children: 3     Years of education: 14     Occupational History     Homemaker      Social History Main Topics     Smoking status: Current Every Day Smoker     Packs/day: 0.50     Years: 10.00     Smokeless tobacco: Never Used      Comment: pt quit 1992 after smoking for 8 yrs, started again in 2004     Alcohol use No      Comment: stopped drinking 8/17     Drug use: No     Sexual activity: Yes     Partners: Male     Birth control/ protection: Surgical      Comment:  had vasectomy     Other Topics Concern      Service No     Blood Transfusions No     Caffeine Concern No     Occupational Exposure No     Hobby Hazards No     Sleep Concern No     Stress Concern No     Weight Concern No     Special Diet No     Back Care No     Exercise No     Bike Helmet No     Seat Belt Yes     Self-Exams No     Parent/Sibling W/ Cabg, Mi Or Angioplasty Before 65f 55m? Yes     Social History Narrative     and lives at home with her  and her son, her two daughters are out of the house            Family History:   Reviewed and edited as appropriate  No known history of gastrointestinal/liver disease or  gastrointestinal malignancies       Allergies:   Reviewed and edited as appropriate     Allergies   Allergen Reactions     Albuterol      Tongue \"hardened and painful\"            Medications:     Current Facility-Administered Medications   Medication     busPIRone (BUSPAR) tablet 15 mg     buPROPion (WELLBUTRIN XL) 24 hr tablet 300 mg     sertraline (ZOLOFT) tablet 100 mg     " omeprazole (priLOSEC) CR capsule 20 mg     naloxone (NARCAN) injection 0.1-0.4 mg     0.9% sodium chloride infusion     oxyCODONE (ROXICODONE) IR tablet 5-10 mg     senna-docusate (SENOKOT-S;PERICOLACE) 8.6-50 MG per tablet 1-2 tablet     piperacillin-tazobactam (ZOSYN) 3.375 g vial to attach to  mL bag     thiamine tablet 100 mg     folic acid (FOLVITE) tablet 1 mg     [START ON 9/23/2017] influenza quadrivalent (PF) vacc age 3 yrs and older (FLUZONE or Flulaval) injection 0.5 mL             Review of Systems:   A complete review of systems was performed and is negative except as noted in the HPI           Physical Exam:   BP (!) 85/51 (BP Location: Left arm)  Temp 98  F (36.7  C) (Oral)  Resp 17  Wt 58.2 kg (128 lb 3.2 oz)  LMP 05/16/2012  SpO2 98%  BMI 24.22 kg/m2  Wt:   Wt Readings from Last 2 Encounters:   09/22/17 58.2 kg (128 lb 3.2 oz)   09/22/17 54.4 kg (120 lb)      Constitutional: cooperative, pleasant, not dyspneic/diaphoretic, no acute distress  Eyes: Sclera icteric  Ears/nose/mouth/throat: Normal oropharynx without ulcers or exudate, mucus membranes moist, hearing intact  Neck: supple, thyroid normal size  CV: No edema  Respiratory: Unlabored breathing  Lymph: No axillary, submandibular, supraclavicular or inguinal lymphadenopathy  Abd: Nondistended, +bs, no hepatosplenomegaly, no peritoneal signs, tender to palpation in RUQ, negative Gaston's sign  Skin: warm, perfused, jaundiced  Neuro: AAO x 3, No asterixis  Psych: Normal affect  MSK: No gross deformities         Data:   Labs and imaging below were independently reviewed and interpreted    BMP  Recent Labs  Lab 09/22/17  0735 09/22/17  0120    138   POTASSIUM 3.9 4.1   CHLORIDE 108 105   ELSIE 7.2* 7.9*   CO2 19* 18*   BUN 12 11   CR 1.33* 1.30*   GLC 91 90     CBC  Recent Labs  Lab 09/22/17  0735 09/22/17  0120   WBC 7.5 9.6   RBC 3.03* 3.23*   HGB 8.0* 8.3*   HCT 25.7* 27.3*   MCV 85 85   MCH 26.4* 25.7*   MCHC 31.1* 30.4*   RDW  18.4* 18.2*   PLT 65* 91*     INR  Recent Labs  Lab 09/22/17  0735 09/22/17  0120   INR 1.54* 1.17*     LFTs  Recent Labs  Lab 09/22/17  0735 09/22/17  0120   ALKPHOS 202* 262*   AST 70* 86*   ALT 38 44   BILITOTAL 1.8* 1.9*   PROTTOTAL 4.9* 5.7*   ALBUMIN 2.3* 2.8*      PANC  Recent Labs  Lab 09/22/17  0120   LIPASE 80   AMYLASE 34       Imaging:  Abdominal ultrasound at outside facility 9/22  IMPRESSION:  1. Small mobile gallstone in the gallbladder. No evidence of acute  cholecystitis  2. Mild dilatation of the common duct which measures up to 0.7 cm in  diameter.

## 2017-09-22 NOTE — IP AVS SNAPSHOT
[address] [address]    Phone:  [phone]                                       After Visit Summary   9/22/2017    Monalisa Olguin    MRN: [id number]           After Visit Summary Signature Page     I have received my discharge instructions, and my questions have been answered. I have discussed any challenges I see with this plan with the nurse or doctor.    ..........................................................................................................................................  Patient/Patient Representative Signature      ..........................................................................................................................................  Patient Representative Print Name and Relationship to Patient    ..................................................               ................................................  Date                                            Time    ..........................................................................................................................................  Reviewed by Signature/Title    ...................................................              ..............................................  Date                                                            Time

## 2017-09-22 NOTE — PROGRESS NOTES
"CLINICAL NUTRITION SERVICES - ASSESSMENT NOTE     Nutrition Prescription    RECOMMENDATIONS FOR MDs/PROVIDERS TO ORDER:  Diet adv v nutrition support within 2-3 days.    Malnutrition Status:    Non-severe malnutrition in the context of acute on chronic illness    Recommendations already ordered by Registered Dietitian (RD):  None at this time    Future/Additional Recommendations:  If pts diet cannot be advanced in the next 2-3 days, would recommend nutrition support as follows: Isosource 1.5 @ goal 40 ml/hr (960 ml/day) to provide 1440 kcals (30 kcal/kg/day), 65 g PRO (1.4 g/kg/day), 730 ml free H2O, 169 g CHO and 14 g Fiber daily.       REASON FOR ASSESSMENT  Monalisa Olguin is a/an 51 year old female assessed by the dietitian for Admission Nutrition Risk Screen for reduced oral intake over the last month    NUTRITION HISTORY  Per chart review: history of alcoholic hepatitis and cholelithiasis, 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, For the past 2-4 months she reports intermittent abdominal pain in the right upper quadrants associated with decreased appetite, nausea, and a feeling of abdominal fullness.   Admitted for: right upper quadrant pain, fever, hypotension and elevated in LFTs, However her pain may be due to cholelithiasis or choledocholithiasis    Per Ulstrasound:   1. Small mobile gallstone in the gallbladder. No evidence of acute  cholecystitis  2. Mild dilatation of the common duct which measures up to 0.7 cm in  diameter.    CURRENT NUTRITION ORDERS  Diet: NPO  Intake/Tolerance: <75% of meals. Pt reports being able to only consume jello and popsicles and sometimes is able to have a more substantial meals of meat, potatoes and toasts. Her last meal was about 3-4 days ago and her appetite has not improved.     LABS  CRP: 26.9 (H), Alkphos: [phone] (H-trending down), Lipase: WNL, Labs reviewed    MEDICATIONS  Folic acid, thiamin, Medications reviewed    ANTHROPOMETRICS  Height: 5'1\"  Most Recent Weight: 58.2 " kg (128 lb 3.2 oz)    IBW: 47.7 kg  BMI: Normal BMI  Weight History: Pt reports being an alcoholic and so she was losing wt prior, but since she stopped drinking she has began to gain weight.    Wt Readings from Last 10 Encounters:   09/22/17 58.2 kg (128 lb 3.2 oz)   09/22/17 54.4 kg (120 lb)   09/12/17 54.4 kg (120 lb)   09/08/17 54.4 kg (120 lb)   08/01/17 53.7 kg (118 lb 6.4 oz)   07/17/17 55.6 kg (122 lb 8 oz)   06/13/17 54.4 kg (120 lb)   05/23/17 (P) 50.3 kg (111 lb)   05/16/17 49.9 kg (110 lb 1 oz)   05/10/17 48.1 kg (106 lb)     Dosing Weight: 48 kg (adjusted)    ASSESSED NUTRITION NEEDS  Estimated Energy Needs: 3212-9027 kcals/day (25 - 30 kcals/kg)  Justification: Maintenance  Estimated Protein Needs: 58--72 grams protein/day (1.2 - 1.5 grams of pro/kg)  Justification: Maintenance  Estimated Fluid Needs: 1 mL/kcal/day  Justification: Maintenance    PHYSICAL FINDINGS  See malnutrition section below.    MALNUTRITION  % Intake: < 75% for >/= 3 months (non-severe)  % Weight Loss: None noted  Subcutaneous Fat Loss: None observed  Muscle Loss: Temporal:  mild and Facial & jaw region:  mild  Fluid Accumulation/Edema: None noted  Malnutrition Diagnosis: Non-severe malnutrition in the context of acute on chronic illness    NUTRITION DIAGNOSIS  Inadequate oral intake related to decreased PO intakes, poor appetite, abdominal pain and early satiety as evidenced by pt consuming <75% of meals over the past 2-4 months as well as pt report.     INTERVENTIONS  Implementation  Nutrition Education: Provided education on role of RD and nutritional POC as well as a low fat diet and higher fiber. Provided pt with Gallbladder nutrition therapy handout.      Goals  Diet adv v nutrition support within 2-3 days.     Monitoring/Evaluation  Progress toward goals will be monitored and evaluated per protocol.      Lisy Garland RD, MS, LD  6B- Pager: [phone]

## 2017-09-22 NOTE — LETTER
Transition Communication Hand-off for Care Transitions to Next Level of Care Provider    Name: Monalisa Olguin  MRN #: [id number]  Primary Care Provider: Efren Bustos MD     Primary Clinic: [address] [place] [address]  [place] [address]     Reason for Hospitalization:  Fever, RUQ abd pain, sepsis  Hx HTN, alcoholic liver cirrhosis, esoph reflux, CKD, gallstone, sludge in GB  Choledocholithiasis  Choledocholithiasis  biliary colic  biliary colic  Admit Date/Time: 9/22/2017  6:50 AM  Discharge Date: 9/25/17  Payor Source: Payor: [employer] / Plan: [employer] OPEN ACCESS FULLY INSURED / Product Type: HMO /            Reason for Communication Hand-off Referral: continuation of cares    Discharge Plan: Home       Concern for non-adherence with plan of care:   Y/N n  Discharge Needs Assessment:      Already enrolled in Tele-monitoring program and name of program:    Follow-up specialty is recommended: Yes    Follow-up plan:  Future Appointments  Date Time Provider Department Center   9/27/2017 1:00 PM Efren Bustos MD [facility]   11/28/2017 8:30 AM Edgardo Kovacs MD [facility]       Any outstanding tests or procedures:              Key Recommendations:      Amanda Bray    AVS/Discharge Summary is the source of truth; this is a helpful guide for improved communication of patient story

## 2017-09-22 NOTE — IP AVS SNAPSHOT
MRN:[id number]                      After Visit Summary   9/22/2017    Monalisa Olguin    MRN: [id number]           Thank you!     Thank you for choosing [facility] for your care. Our goal is always to provide you with excellent care. Hearing back from our patients is one way we can continue to improve our services. Please take a few minutes to complete the written survey that you may receive in the mail after you visit with us. Thank you!        Patient Information     Date Of Birth          1965        Designated Caregiver       Most Recent Value    Caregiver    Will someone help with your care after discharge? yes    Name of designated caregiver Eron Olguin    Phone number of caregiver [phone](h)    Caregiver address [address] [address] [place] [address]. [place] [address]      About your hospital stay     You were admitted on:  September 22, 2017 You last received care in the:  [address]    You were discharged on:  September 25, 2017        Reason for your hospital stay       You were admitted to the hospital for increasing right upper abdominal pain with fever. No gall stones were identified on imaging studies, and it was recommended that you have you gall bladder removed due to concerns of infection of the gallbladder. Your gall bladder was removed and have been tolerating good oral intake without nausea and your pain has been well controlled with oral pain medication.    Return to the hospital or call 911 if the redness near your belly button continues to expand past the marked line or if you experience severe abdominal pain or increasing fevers/chills.                  Who to Call     For medical emergencies, please call 911.  For non-urgent questions about your medical care, please call your primary care provider or clinic, [phone]  For questions related to your surgery, please call your surgery clinic        Attending Provider     Provider Monica Bean MD  Internal Medicine    Eh Che MD Internal Medicine       Primary Care Provider Office Phone # Fax #    Efren ZAVALA MD Juli [phone]      After Care Instructions     Activity       Your activity upon discharge: Please see surgery discharge instructions            Diet       Follow this diet upon discharge: Orders Placed This Encounter      Please see surgery discharge instructions            Discharge Instructions       Activity  - No strenuous exercise for 4 weeks.  - No lifting, pushing, pulling more than 15 pounds for 4 weeks.   - Do not strain with bowel movements.  - Do not drive until you can press the brake pedal quickly and fully without pain.   - Do not operate a motor vehicle while taking narcotic pain medications.     Incisions  - You may shower and get incisions wet starting 48 hrs after surgery.  - Do not scrub incisions or submerge wounds (e.g. bath, pool, hot tub, etc.) for 2 weeks.   - Remove wound dressing 48 hours after surgery.   - If purple Dermabond glue was used, avoid applying any lotions or ointments.   - If steri-strips were used, they will fall off on their own.   - Leave incision open to air.  Cover with gauze only if needed for comfort or to protect clothing from drainage.     Medications  - Do not take any additional Tylenol (acetaminophen) while using a narcotic pain medication which includes acetaminophen.  - Do not take more than 4,000mg of acetaminophen in any 24 hour period, as this can cause liver damage.  - Take stool softeners such as Senna or Miralax while you are using narcotics, but stop if you develop diarrhea.   - Wean yourself off of narcotic pain medications.     Follow-Up:  - Call your primary care provider to touch base regarding your recent admission.    - Call or return sooner than your regularly scheduled visit if you develop any of the following: fever >101.5, uncontrolled pain, uncontrolled nausea or vomiting, as well as increased  redness, swelling, or drainage from your wound. Call [phone] and ask to speak with the Surgery resident on call if you are having troubles in the evenings, at night, or on weekends.    -  Follow-up with your surgeon team in 2-3 weeks. Call [phone] or [phone] to schedule your follow-up appointment. If you have questions about your follow-up appointment, please call the General Surgery Clinic at [phone].    If you are receiving home care please inform your home care nurse of our contact number.    You may also call the Surgery Call-Center to speak with a Triage Nurse or to make an appointment: [phone].                  Follow-up Appointments     Adult [facility] Follow-up and recommended labs and tests       Follow up with your primary care provider Dr. Efren Bustos on 9/27/17, please schedule for lab appointment (with hepatic panel) prior to seeing PCP.   Follow up with hepatologist Dr. Edgardo Kovacs on 11/28/17     Appointments on [place] and/or [facility] (with [facility] or [facility] provider or service). Call [phone] if you haven't heard regarding these appointments within 7 days of discharge.            Follow Up and recommended labs and tests       Liver enzymes                  Your next 10 appointments already scheduled     Sep 27, 2017  1:00 PM CDT   Pre-Op physical with Efren Bustos MD   [facility] ([facility])    [address] [place] [address]   [phone]            Sep 29, 2017   Procedure with Bryant Nunes MD   [facility] ([facility])    [address] [place] [address]   [phone]            Nov 28, 2017  8:30 AM CST   (Arrive by 8:15 AM)   New General Liver with Edgardo Kovacs MD   [facility] ([facility])    [address] [place] [address] [phone]              Future tests that were ordered for you      ABO/Rh Type and Screen           Platelets have available           Red blood cell have available           Hepatic panel                 Additional Information     If you use hormonal birth control (such as the pill, patch, ring or implants): You'll need a second form of birth control for 7 days (condoms, a diaphragm or contraceptive foam). While in the hospital, you received a medicine called Bridion. Your normal birth control will not work as well for a week after taking this medicine.          Pending Results     Date and Time Order Name Status Description    9/24/2017 0839 Surgical pathology exam In process     9/22/2017 0718 Blood culture Preliminary     9/22/2017 0718 Blood culture Preliminary     9/22/2017 0135 Blood culture Preliminary     9/22/2017 0135 Blood culture Preliminary             Statement of Approval     Ordered          09/25/17 1242  I have reviewed and agree with all the recommendations and orders detailed in this document.  EFFECTIVE NOW     Approved and electronically signed by:  Elijah Shen MD             Admission Information     Date & Time Provider Department Dept. Phone    9/22/2017 Eh Che MD [facility] [phone]      Your Vitals Were     Blood Pressure Temperature Respirations Weight Last Period Pulse Oximetry    125/72 (BP Location: Left arm) 97.5  F (36.4  C) (Oral) 19 60 kg (132 lb 3.2 oz) 05/16/2012 94%    BMI (Body Mass Index)                   24.98 kg/m2           MyChart Information     [facility] gives you secure access to your electronic health record. If you see a primary care provider, you can also send messages to your care team and make appointments. If you have questions, please call your primary care clinic.  If you do not have a primary care provider, please call [phone] and they will assist you.        Care EveryWhere ID     This is your Care EveryWhere ID. This could be used by other organizations to access your [facility]  medical records  [id number]        Equal Access to Services     ADRIENNE GILLIAM : Hadii aad ku hadtysonpablo Wei, walibbyda doris, qaybta mary annmadedrick boo, salo jara. So [facility] [phone].    ATENCIÓN: Si habla español, tiene a perez disposición servicios gratuitos de asistencia lingüística. Llame al [phone].    We comply with applicable federal civil rights laws and [place] laws. We do not discriminate on the basis of race, color, national origin, age, disability sex, sexual orientation or gender identity.               Review of your medicines      START taking        Dose / Directions    oxyCODONE 5 MG IR tablet   Commonly known as:  ROXICODONE   Used for:  Biliary colic        Dose:  5-10 mg   Take 1-2 tablets (5-10 mg) by mouth every 4 hours as needed for moderate to severe pain   Quantity:  2 tablet   Refills:  0         CONTINUE these medicines which have NOT CHANGED        Dose / Directions    buPROPion 300 MG 24 hr tablet   Commonly known as:  WELLBUTRIN XL   Used for:  Major depressive disorder, recurrent episode, moderate (H)        Dose:  300 mg   Take 1 tablet (300 mg) by mouth every morning   Quantity:  90 tablet   Refills:  3       busPIRone 15 MG tablet   Commonly known as:  BUSPAR   Used for:  MONA (generalized anxiety disorder)        Dose:  15 mg   Take 1 tablet (15 mg) by mouth 2 times daily   Quantity:  180 tablet   Refills:  3       naltrexone 50 MG tablet   Commonly known as:  DEPADE;REVIA   Used for:  Alcohol dependence in remission (H)        TAKE ONE-HALF TABLET BY MOUTH EVERY DAY FOR ONE WEEK THEN TAKE ONE TABLET DAILY AFTER   Quantity:  90 tablet   Refills:  3       omeprazole 20 MG CR capsule   Commonly known as:  priLOSEC   Used for:  Gastroesophageal reflux disease with esophagitis        Dose:  20 mg   Take 1 capsule (20 mg) by mouth daily   Quantity:  90 capsule   Refills:  3       propranolol 10 MG tablet   Commonly known as:  INDERAL   Used for:   Hypertension goal BP (blood pressure) < 130/80        Dose:  10 mg   Take 1 tablet (10 mg) by mouth 2 times daily   Quantity:  180 tablet   Refills:  3       sertraline 100 MG tablet   Commonly known as:  ZOLOFT   Used for:  Anxiety, Major depression        Dose:  100 mg   Take 1 tablet (100 mg) by mouth daily   Quantity:  90 tablet   Refills:  3       TEMAZEPAM PO        Dose:  15 mg   Take 15 mg by mouth nightly as needed for sleep   Refills:  0       thiamine 100 MG tablet   Used for:  Alcohol dependence in remission (H)        TAKE ONE TABLET BY MOUTH EVERY DAY   Quantity:  90 tablet   Refills:  3       TL RICARDO RX 2.2-25-1 MG Tabs   Used for:  Alcoholism in recovery (H)   Generic drug:  Folic Acid-Vit B6-Vit B12        TAKE ONE TABLET BY MOUTH EVERY DAY   Quantity:  90 tablet   Refills:  3            Where to get your medicines      Some of these will need a paper prescription and others can be bought over the counter. Ask your nurse if you have questions.     Bring a paper prescription for each of these medications     oxyCODONE 5 MG IR tablet                Protect others around you: Learn how to safely use, store and throw away your medicines at www.disposemymeds.org.             Medication List: This is a list of all your medications and when to take them. Check marks below indicate your daily home schedule. Keep this list as a reference.      Medications           Morning Afternoon Evening Bedtime As Needed    buPROPion 300 MG 24 hr tablet   Commonly known as:  WELLBUTRIN XL   Take 1 tablet (300 mg) by mouth every morning   Last time this was given:  300 mg on 9/25/2017  8:22 AM                                busPIRone 15 MG tablet   Commonly known as:  BUSPAR   Take 1 tablet (15 mg) by mouth 2 times daily   Last time this was given:  15 mg on 9/25/2017  8:23 AM                                naltrexone 50 MG tablet   Commonly known as:  DEPADE;REVIA   TAKE ONE-HALF TABLET BY MOUTH EVERY DAY FOR ONE WEEK  THEN TAKE ONE TABLET DAILY AFTER                                omeprazole 20 MG CR capsule   Commonly known as:  priLOSEC   Take 1 capsule (20 mg) by mouth daily   Last time this was given:  20 mg on 9/25/2017  8:23 AM                                oxyCODONE 5 MG IR tablet   Commonly known as:  ROXICODONE   Take 1-2 tablets (5-10 mg) by mouth every 4 hours as needed for moderate to severe pain   Last time this was given:  5 mg on 9/25/2017  1:50 PM                                propranolol 10 MG tablet   Commonly known as:  INDERAL   Take 1 tablet (10 mg) by mouth 2 times daily                                sertraline 100 MG tablet   Commonly known as:  ZOLOFT   Take 1 tablet (100 mg) by mouth daily   Last time this was given:  100 mg on 9/25/2017  8:22 AM                                TEMAZEPAM PO   Take 15 mg by mouth nightly as needed for sleep   Last time this was given:  15 mg on 9/24/2017  9:31 PM                                thiamine 100 MG tablet   TAKE ONE TABLET BY MOUTH EVERY DAY   Last time this was given:  100 mg on 9/24/2017  1:26 PM                                TL RICARDO RX 2.2-25-1 MG Tabs   TAKE ONE TABLET BY MOUTH EVERY DAY   Generic drug:  Folic Acid-Vit B6-Vit B12

## 2017-09-22 NOTE — ED NOTES
Truck keys left with security with directions to give to her  Eron, when he comes to get them and  the truck.

## 2017-09-22 NOTE — CONSULTS
General Surgery Consultation    Monalisa Olguin MRN# [id number]   Age: 51 year old YOB: 1965     Date of Admission:  9/22/2017    Date of Consult:   9/22/2017    Reason for consult: Abdominal pain, right upper quadrant       Requesting service: Medicine; requesting provider: Dr. Eh Che                    Assessment and Plan:   Assessment:   Monalisa Olguin is a 51 year old woman with history of liver disease due to chronic alcoholism who presented to OSH with acute onset abdominal pain and fever. She is currently afebrile and stable with imaging was negative for acute cholecystitis, but questionable findings of slight CBD dilatation without presence of stone. Likely this is biliary colic as symptoms and fever have resolved.        Plan:   - MRCP as per GI team  - Remains a poor surgical candidate  due to liver disease  - Would recommend surgery referral as outpatient for elective cholecystectomy after optimization of liver disease.      Discussed with staff, Dr. Pastor.      Bryant Caldwell MD  PGY-2 General Surgery  [facility]  General Surgery Service            Chief Complaint:   Abdominal pain         History of Present Illness:   Monalisa Olguin is a 51 year old woman with history of liver disease due to chronic alcoholism who presented to OSH with acute onset abdominal pain and fever. She has had chronic abdominal pain in her RUQ for several months but last night it became acutely worse and she developed a fever. She had been nauseated and had not eaten for several days prior to presentation. The pain was so intense she was unable to sleep at which point she presented to an OSH. There she was febrile met criteria for sepsis. They resuscitated her with IV fluids and started antibiotics and transferred her to [facility] for more definitive care. At time of consult, she was feeling much better. Her fever was gone and she was no longer having abdominal pain.               Past Medical  History:     Past Medical History:   Diagnosis Date     Alcohol abuse 2013     Alcohol dependence 2013     Alcohol withdrawal 2013     Anxiety 10/10/2011     CKD (chronic kidney disease) stage 3, GFR 30-59 ml/min     last GFR was 42     CKD (chronic kidney disease) stage 3, GFR 30-59 ml/min 2011     Esophageal reflux 10/7/2002     Hypertension goal BP (blood pressure) < 130/80 2011     Int derangement knee NEC     ACL Internal derangement, knee/ original injury in 5th grade, torn cartilage     Moderate Depression [296.32] 2009    stable on wellbutrin     Pap smear     no abnormals, due for paps q 2-3 yrs     Unspecified essential hypertension              Past Surgical History:     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     COLONOSCOPY N/A 2017    Procedure: COLONOSCOPY;  Colonoscopy;  Surgeon: Sai Johnston MD;  Location: [facility]     HC KNEE SCOPE, DIAGNOSTIC  01    Arthroscopy, Lt Knee             Social History:     Social History   Substance Use Topics     Smoking status: Current Every Day Smoker     Packs/day: 0.50     Years: 10.00     Smokeless tobacco: Never Used      Comment: pt quit  after smoking for 8 yrs, started again in      Alcohol use No      Comment: stopped drinking              Family History:     Family History   Problem Relation Age of Onset     Hypertension Mother      HEART DISEASE Paternal Grandmother      HEART DISEASE Paternal Grandfather      CEREBROVASCULAR DISEASE Maternal Grandmother      CEREBROVASCULAR DISEASE Maternal Grandfather      Alcohol/Drug Maternal Grandfather      Substance Abuse Maternal Grandfather      HEART DISEASE Father      Silent MI stents x 2     DIABETES Sister 17     older sister also had kidney and pancreas transplant     HEART DISEASE Sister      MI secondary to  "diabetes     Genitourinary Problems Sister 43     kidney transplant from renal failure     DIABETES Sister 9     youngest, juvenile type I (onset age 9 with no complications)     CANCER Paternal Aunt      ?kind                Allergies:     Allergies   Allergen Reactions     Albuterol      Tongue \"hardened and painful\"             Medications:     Current Facility-Administered Medications   Medication     busPIRone (BUSPAR) tablet 15 mg     buPROPion (WELLBUTRIN XL) 24 hr tablet 300 mg     sertraline (ZOLOFT) tablet 100 mg     omeprazole (priLOSEC) CR capsule 20 mg     naloxone (NARCAN) injection 0.1-0.4 mg     0.9% sodium chloride infusion     oxyCODONE (ROXICODONE) IR tablet 5-10 mg     senna-docusate (SENOKOT-S;PERICOLACE) 8.6-50 MG per tablet 1-2 tablet     piperacillin-tazobactam (ZOSYN) 3.375 g vial to attach to  mL bag     thiamine tablet 100 mg     folic acid (FOLVITE) tablet 1 mg     [START ON 9/23/2017] influenza quadrivalent (PF) vacc age 3 yrs and older (FLUZONE or Flulaval) injection 0.5 mL               Review of Systems:   10-point ROS otherwise negative except as noted above.          Physical Exam:   All vitals have been reviewed    Temp:  [97.3  F (36.3  C)-101.1  F (38.4  C)] 97.3  F (36.3  C)  Heart Rate:  [74-98] 74  Resp:  [12-26] 16  BP: (73-95)/(47-59) 94/55  SpO2:  [92 %-100 %] 100 %        Intake/Output Summary (Last 24 hours) at 09/22/17 1738  Last data filed at 09/22/17 1600   Gross per 24 hour   Intake             1210 ml   Output             2100 ml   Net             -890 ml         Physical Exam:  Gen: alert, responsive, NAD, comfortable in bed  Pulm: NLB on RA  Abd: Soft, non-distended with very minimal tenderness in the RUQ. No organomegaly appreciated.   Ext: WWP          Data:   All laboratory data reviewed      Results:  BMP  Recent Labs  Lab 09/22/17  0735 09/22/17  0120    138   POTASSIUM 3.9 4.1   CHLORIDE 108 105   CO2 19* 18*   BUN 12 11   CR 1.33* 1.30*   GLC 91 " 90     CBC  Recent Labs  Lab 09/22/17  0735 09/22/17  0120   WBC 7.5 9.6   HGB 8.0* 8.3*   PLT 65* 91*     LFT  Recent Labs  Lab 09/22/17  0735 09/22/17  0120   AST 70* 86*   ALT 38 44   ALKPHOS 202* 262*   BILITOTAL 1.8* 1.9*   ALBUMIN 2.3* 2.8*   INR 1.54* 1.17*       Recent Labs  Lab 09/22/17  0735 09/22/17  0120   GLC 91 90         Imaging:      Abdominal US    IMPRESSION:  1. Small mobile gallstone in the gallbladder. No evidence of acute  cholecystitis  2. Mild dilatation of the common duct which measures up to 0.7 cm in  diameter.     MARTY REYES MD

## 2017-09-22 NOTE — PHARMACY-VANCOMYCIN DOSING SERVICE
Pharmacy Vancomycin Initial Note  Date of Service 2017  Patient's  1965  51 year old, female    Indication: Sepsis    Current estimated CrCl = Estimated Creatinine Clearance: 44.5 mL/min (based on Cr of 1.3).    Creatinine for last 3 days  2017:  1:20 AM Creatinine 1.30 mg/dL    Recent Vancomycin Level(s) for last 3 days  No results found for requested labs within last 72 hours.      Vancomycin IV Administrations (past 72 hours)                   vancomycin (VANCOCIN) 1000 mg in dextrose 5% 200 mL PREMIX (mg) 1,000 mg New Bag 17 0251                Nephrotoxins and other renal medications (Future)    Start     Dose/Rate Route Frequency Ordered Stop    17 0215  vancomycin (VANCOCIN) 1000 mg in dextrose 5% 200 mL PREMIX      1,000 mg Intravenous EVERY 24 HOURS 17 0151            Contrast Orders - past 72 hours     None                Plan:  1.  Start vancomycin  1000 mg IV q24h.   2.  Goal Trough Level: 15-20 mg/L   3.  Pharmacy will check trough levels as appropriate in 3-5 Days.    4. Serum creatinine levels will be ordered daily for the first week of therapy and at least twice weekly for subsequent weeks.    5. [place] method utilized to dose vancomycin therapy: Method 2    Kj Li, PharmD  2017

## 2017-09-22 NOTE — H&P
[facility], [place]    Internal Medicine History and Physical - [facility] Service       Date of Admission:  9/22/2017    Chief Complaint   Right Upper Quadrant Pain and Fever     History is obtained from the patient and chart review    History of Present Illness         Monalisa Olguin is a 51 year old female who has a history of alcoholic hepatitis vs early EtOH cirrhosis, GERD, Depression and is admitted for right upper quadrant pain and fevers.         Per chart review patient has had several encounters over the last ~1 week for increased confusion after her attempted colonoscopy on 9/8/17. She presented to the ED for confusion on 9/12/17 and was treated for a UTI. She again called her primary care clinic on 9/15/17 because of increased hallucinations and was instructed to go to the ED at that time.         On 9/22/17 the patient presented to the ED for worsening RUQ pain over the last 2 days. Per patient she has had intermittent RUQ pain associated with food over the last several months, but over the last two days it has become almost constant. It is associated with lower abdominal fullness. She finally came to the ED because of a fever at home, in addition to chills over the last 2-3 days.         She states that she has been nauseous, and has had intermittent non-bloody vomiting (only sometimes when she takes food/medications) and has had decreased appetite.         She denies shortness of breath, chest pain, has chronic non-productive cough related to GERD, denies dysuria, increased urinary frequency, was constipated over the last 2-3 days, but over the last day has had 2-3 loose non-bloody stools. Denies any black or red stools. Denies any black or red in her low volume vomiting.     Review of Systems   The 10 point Review of Systems is negative other than noted in the HPI or here.     Past Medical History    I have reviewed this patient's medical history and updated it with  pertinent information if needed.   Past Medical History:   Diagnosis Date     Alcohol abuse 2013     Alcohol dependence 2013     Alcohol withdrawal 2013     Anxiety 10/10/2011     CKD (chronic kidney disease) stage 3, GFR 30-59 ml/min     last GFR was 42     CKD (chronic kidney disease) stage 3, GFR 30-59 ml/min 2011     Esophageal reflux 10/7/2002     Hypertension goal BP (blood pressure) < 130/80 2011     Int derangement knee NEC     ACL Internal derangement, knee/ original injury in 5th grade, torn cartilage     Moderate Depression [296.32] 2009    stable on wellbutrin     Pap smear     no abnormals, due for paps q 2-3 yrs     Unspecified essential hypertension      Past Surgical History   I have reviewed this patient's surgical history and updated it with pertinent information if needed.  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     COLONOSCOPY N/A 2017    Procedure: COLONOSCOPY;  Colonoscopy;  Surgeon: Sai Johnston MD;  Location: [facility]     HC KNEE SCOPE, DIAGNOSTIC  01    Arthroscopy, Lt Knee      Social History   Social History   Substance Use Topics     Smoking status: Current Every Day Smoker     Packs/day: 0.50     Years: 10.00     Smokeless tobacco: Never Used      Comment: pt quit  after smoking for 8 yrs, started again in      Alcohol use No      Comment: stopped drinking      Family History   I have reviewed this patient's family history and updated it with pertinent information if needed.   Family History   Problem Relation Age of Onset     Hypertension Mother      HEART DISEASE Paternal Grandmother      HEART DISEASE Paternal Grandfather      CEREBROVASCULAR DISEASE Maternal Grandmother      CEREBROVASCULAR DISEASE Maternal Grandfather      Alcohol/Drug Maternal Grandfather      Substance Abuse Maternal  "Grandfather      HEART DISEASE Father      Silent MI stents x 2     DIABETES Sister 17     older sister also had kidney and pancreas transplant     HEART DISEASE Sister      MI secondary to diabetes     Genitourinary Problems Sister 43     kidney transplant from renal failure     DIABETES Sister 9     youngest, juvenile type I (onset age 9 with no complications)     CANCER Paternal Aunt      ?kind     Prior to Admission Medications   Prior to Admission Medications   Prescriptions Last Dose Informant Patient Reported? Taking?   TL RICARDO RX 2.2-25-1 MG TABS   No No   Sig: TAKE ONE TABLET BY MOUTH EVERY DAY   VITAMIN B-1 100 MG tablet   No No   Sig: TAKE ONE TABLET BY MOUTH EVERY DAY   buPROPion (WELLBUTRIN XL) 300 MG 24 hr tablet   No No   Sig: Take 1 tablet (300 mg) by mouth every morning   busPIRone (BUSPAR) 15 MG tablet   No No   Sig: Take 1 tablet (15 mg) by mouth 2 times daily   naltrexone (DEPADE;REVIA) 50 MG tablet   No No   Sig: TAKE ONE-HALF TABLET BY MOUTH EVERY DAY FOR ONE WEEK THEN TAKE ONE TABLET DAILY AFTER   omeprazole (PRILOSEC) 20 MG CR capsule   No No   Sig: Take 1 capsule (20 mg) by mouth daily   propranolol (INDERAL) 10 MG tablet   No No   Sig: Take 1 tablet (10 mg) by mouth 2 times daily   sertraline (ZOLOFT) 100 MG tablet   No No   Sig: Take 1 tablet (100 mg) by mouth daily      Facility-Administered Medications: None     Allergies   Allergies   Allergen Reactions     Albuterol      Tongue \"hardened and painful\"     Physical Exam   Vital Signs: Temp: 98  F (36.7  C) Temp src: Oral BP: (!) 85/51   Heart Rate: 86 Resp: 17 SpO2: 98 % O2 Device: None (Room air)    Weight: 128 lbs 3.2 oz    General Appearance: Alert, NAD, Lying in bed   Eyes: no appreciable scleral icterus, PERRL  HEENT: mmm, no LAD  Respiratory: CTAB  Cardiovascular: RRR, no murmurs  GI: soft, mild tenderness in the RUQ, no rebound or guarding, +BS  Skin: clean, dry, intact  Musculoskeletal: normal muscle, bulk, and tone  Neurologic: " AOx3, facial muscles intact, no asterixis   Psychiatric: slowed speech, strange affect     Assessment & Plan   Monalisa Olguin is a 51 year old female admitted on 9/22/2017. She has a history of alcoholic hepatitis is admitted for worsening RUQ pain and fever.     # Sepsis, most likely secondary to cholecystitis vs cholangitis: Pattern is most consistent with a cholestatic rather than a hepatocellular picture. Less consistent with acute alcoholic hepatitis without significant AST/ALT elevation.        Fever and tachypnea on admission, though without elevated WBC. ALK Phos persistently elevated though stable from prior and only low level of T. Bili elevation. RUQ U/S with only minimally dilated common duct. No GB thickening and negative Gaston's sign. No evidence of ascites on exam. For other work-up for prior history of hepatitis: Prior negative Hep B, Hep C in 2013.   - GI consulted appreciate recs  - General Surgery consulted appreciate recs  - Pending MRCP   - Continue pip-tazo pending ongoing work-up   - Pending blood cultures x2  - Pending Urine culture though asymptomatic   - CXR most consistent with atelectasis, no signs or symptoms of pneumonia   - Hold propranolol   - Trend CMP, INR    MELD-Na score: 17 at 9/22/2017  7:35 AM  MELD score: 16 at 9/22/2017  7:35 AM  Calculated from:  Serum Creatinine: 1.33 mg/dL at 9/22/2017  7:35 AM  Serum Sodium: 136 mmol/L at 9/22/2017  7:35 AM  Total Bilirubin: 1.8 mg/dL at 9/22/2017  7:35 AM  INR(ratio): 1.54 at 9/22/2017  7:35 AM  Age: 51 years    # ALFREDO: Baseline Cr 0.92, currently up to 1.33. Most likely secondary to dehydration with recent decreased po intake and sepsis.    - monitor    # Alcohol Dependence: Per patient she has not been drinking for the last 3 weeks.   - [facility]  - Thiamine, Folate   - Hold naltrexone    # ?Confusion/Metabolic Encephalopathy: Recent history of confusion per chart review. No confusion on my exam, though patient seems to be a  poor historian. Recent confusion may have been related to sedation with procedure 9/7/17. No evidence of hepatic encephalopathy at this time.   - PT/OT     # Chronic Anemia/Thrombocytopenia: Chronic, mostly stable apart from dilution after fluid resuscitation. Likely related to underlying liver disease.   - Continue to monitor.     Chronic:  # Depression:   - Continue bupropion and buspirone  # GERD:   - Continue omeprazole 20mg daily     Diet: NPO per Anesthesia Guidelines for Procedure/Surgery Except for: Meds  Fluids: NS at 200 ml/hr  DVT Prophylaxis: Pneumatic Compression Devices  Code Status: Full Code    Disposition Plan   Expected discharge: 2 - 3 days; recommended to prior living arrangement once SIRS/Sepsis treated.     Entered: Sonia Cornejo 09/22/2017, 7:56 AM   Information in the above section will display in the discharge planner report.    The patient was discussed with Dr. Chinedu Cornejo  [address]   Pager: [phone]  Please see sticky note for cross cover information    Data   Data     Recent Labs  Lab 09/22/17  0735 09/22/17  0120   WBC 7.5 9.6   HGB 8.0* 8.3*   MCV 85 85   PLT 65* 91*   INR 1.54* 1.17*    138   POTASSIUM 3.9 4.1   CHLORIDE 108 105   CO2 19* 18*   BUN 12 11   CR 1.33* 1.30*   ANIONGAP 10 15*   ELSIE 7.2* 7.9*   GLC 91 90   ALBUMIN 2.3* 2.8*   PROTTOTAL 4.9* 5.7*   BILITOTAL 1.8* 1.9*   ALKPHOS 202* 262*   ALT 38 44   AST 70* 86*   LIPASE  --  80     Recent Results (from the past 24 hour(s))   US Abdomen Limited    Narrative    ULTRASOUND ABDOMEN LIMITED RIGHT UPPER QUADRANT  9/22/2017 2:54 AM     HISTORY: Right upper quadrant pain and fever.    COMPARISON: 6/21/2017.    FINDINGS: Normal hepatic echogenicity. No hepatic masses. 1.3 cm  mobile gallstone in the gallbladder. No gallbladder wall thickening or  pericholecystic fluid. No focal tenderness over the gallbladder. No  intrahepatic biliary dilatation. The common  duct is mildly dilated,  measuring 0.7 cm in diameter. The right kidney has normal size and  echogenicity, measuring 11.5 cm in length. No right intrarenal  collecting system dilatation, calculi or masses. No free fluid in the  upper right hemiabdomen.      Impression    IMPRESSION:  1. Small mobile gallstone in the gallbladder. No evidence of acute  cholecystitis  2. Mild dilatation of the common duct which measures up to 0.7 cm in  diameter.    MARTY REYES MD   XR Chest 2 Views    Narrative    EXAM: XR CHEST 2 VW  9/22/2017 11:10 AM     HISTORY:  patient admitted with fever, cough       COMPARISON:  Radiograph 3/17/2015    FINDINGS: PA and lateral radiographs of the chest. The mediastinal  border, cardiac silhouette, and pulmonary vasculature are within  normal limits. Poorly defined reticular opacities predominantly in the  lower lung fields. No pneumothorax. No pleural effusions. Surgical  clips project over the mid abdomen.      Impression    IMPRESSION: Poorly defined reticular opacities predominantly in the  lower lung field. Findings are nonspecific but include pulmonary edema  or atypical infection.    I have personally reviewed the examination and initial interpretation  and I agree with the findings.    TYRONE TILLEY MD

## 2017-09-22 NOTE — ED NOTES
Significant value called from Seven in lab:  Lactate 2.8  Read back by myself. Result to Dr. Gary.

## 2017-09-22 NOTE — PROGRESS NOTES
Admission          9/22/2017  6:50 AM  -----------------------------------------------------------  Reason for admission: Sepsis  Primary team notified of pt arrival.  Admitted from: OSH  Via: stretcher  Accompanied by: Ambulance personnel  Belongings: Placed in closet  Admission Profile: Pending  Teaching: orientation to unit and call light- call light within reach, call don't fall, use of console, meal times, when to call for the RN, and enforced importance of safety   Access: PIV  Telemetry:Placed on pt  Ht./Wt.: complete  2 RN Skin Assessment Completed By: Obdulia Miles RN  Pt status: Stable  BP (!) 85/51 (BP Location: Left arm)  Temp 98  F (36.7  C) (Oral)  Resp 17  Wt 58.2 kg (128 lb 3.2 oz)  LMP 05/16/2012  SpO2 98%  BMI 24.22 kg/m2

## 2017-09-22 NOTE — ED NOTES
Pt's BP has trended down since patient has arrived. I noticed pt had a regular sized BP cuff on, which is too large for her. I placed a small adult cuff on her.

## 2017-09-22 NOTE — ED NOTES
I spoke with Nas at [facility].[phone]. He said that we should be receiving a call back from Dr Monica Orellana.

## 2017-09-22 NOTE — ED PROVIDER NOTES
"  History     Chief Complaint   Patient presents with     Abdominal Pain     HPI  Monalisa Olguin is a 51 year old female who presents with recurrent right upper quadrant abdominal pain that started earlier this evening.  Patient also noticed a fever tonight.  Patient has known liver cirrhosis from alcohol abuse.  She states she's been getting intermittent right upper quadrant abdominal pain for a long time.  The reason she came in today is because she had a fever which is new for her.  She denies any nausea or vomiting.  She denies any change in urination, she states she does not normally urinate much anyway.  Patient denies any respiratory complaints, no shortness of breath or cough.  There been no URI like symptoms.  She states she's been eating and drinking normally recently.  She denies any abdominal distention.  She denies any back pain.  Patient has a known history of cholelithiasis.  She has had multiple ultrasounds of the Marge stone and her gallbladder.  She was seen by surgery down at the [facility] but they were wanting to hold off on surgery until they could decrease her risk factors because of her liver cirrhosis.  She is currently being evaluated by hepatology and supposed to be seeing them again in early November.    I have reviewed the Medications, Allergies, Past Medical and Surgical History, and Social History in the Epic system.    Allergies:   Allergies   Allergen Reactions     Albuterol      Tongue \"hardened and painful\"         No current facility-administered medications on file prior to encounter.   Current Outpatient Prescriptions on File Prior to Encounter:  naltrexone (DEPADE;REVIA) 50 MG tablet TAKE ONE-HALF TABLET BY MOUTH EVERY DAY FOR ONE WEEK THEN TAKE ONE TABLET DAILY AFTER   sertraline (ZOLOFT) 100 MG tablet Take 1 tablet (100 mg) by mouth daily   propranolol (INDERAL) 10 MG tablet Take 1 tablet (10 mg) by mouth 2 times daily   TL RICARDO RX 2.2-25-1 MG TABS TAKE ONE TABLET BY MOUTH " EVERY DAY   VITAMIN B-1 100 MG tablet TAKE ONE TABLET BY MOUTH EVERY DAY   busPIRone (BUSPAR) 15 MG tablet Take 1 tablet (15 mg) by mouth 2 times daily   buPROPion (WELLBUTRIN XL) 300 MG 24 hr tablet Take 1 tablet (300 mg) by mouth every morning   omeprazole (PRILOSEC) 20 MG CR capsule Take 1 capsule (20 mg) by mouth daily       Patient Active Problem List   Diagnosis     Kidney donor     Esophageal reflux     Moderate Depression [296.32]     HYPERLIPIDEMIA LDL GOAL <100     Hypertension goal BP (blood pressure) < 130/80     Anxiety     Crushing injury of thumb, left     Alcoholism (H)     Alcoholism in recovery (H)     Laceration of head without foreign body, unspecified part of head, sequela     Alcoholic hepatitis with ascites     Sludge in gallbladder     Anemia     Thrombocytopenia (H)     Tobacco abuse     Portal hypertension (H)     Pulmonary nodules     Hyperthyroidism     Hypophosphatemia     Severe malnutrition (H)     SIRS (systemic inflammatory response syndrome) (H)     Scleral icterus     Hallucinations       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     COLONOSCOPY N/A 2017    Procedure: COLONOSCOPY;  Colonoscopy;  Surgeon: Sai Johnston MD;  Location:  GI     HC KNEE SCOPE, DIAGNOSTIC  01    Arthroscopy, Lt Knee       Social History   Substance Use Topics     Smoking status: Current Every Day Smoker     Packs/day: 0.50     Years: 10.00     Smokeless tobacco: Never Used      Comment: pt quit  after smoking for 8 yrs, started again in      Alcohol use No      Comment: stopped drinking        Most Recent Immunizations   Administered Date(s) Administered     Influenza (IIV3) 2006     Influenza Vaccine IM 3yrs+ 4 Valent IIV4 2017     Mantoux 2013     Pneumococcal 23 valent 2013     TDAP Vaccine (Adacel) 2016  "      BMI: Estimated body mass index is 22.67 kg/(m^2) as calculated from the following:    Height as of this encounter: 1.549 m (5' 1\").    Weight as of this encounter: 54.4 kg (120 lb).        Review of Systems   All other systems reviewed and are negative.      Physical Exam   BP: 95/59  Heart Rate: 98  Temp: 101.1  F (38.4  C)  Resp: 22  Height: 154.9 cm (5' 1\")  Weight: 54.4 kg (120 lb)  SpO2: 96 %  Physical Exam   Constitutional: She appears well-developed and well-nourished. No distress.   HENT:   Head: Normocephalic and atraumatic.   Mouth/Throat: Oropharynx is clear and moist. No oropharyngeal exudate.   Eyes: Conjunctivae are normal.   Cardiovascular: Normal rate, regular rhythm and normal heart sounds.  Exam reveals no gallop and no friction rub.    No murmur heard.  Pulmonary/Chest: Effort normal and breath sounds normal. No respiratory distress. She has no wheezes. She has no rales. She exhibits no tenderness.   Abdominal: Soft. Bowel sounds are normal. She exhibits no distension and no mass. There is tenderness (right upper quadrant). There is no rebound and no guarding.   Musculoskeletal: Normal range of motion.   Skin: Skin is warm and dry. No rash noted. She is not diaphoretic.   Nursing note and vitals reviewed.      ED Course     ED Course     Procedures      Critical Care time:  was 30 minutes for this patient excluding procedures.      The patient has signs of Severe Sepsis as evidenced by:    1. 2 SIRS criteria, AND  2. Suspected infection, AND   3. Organ dysfunction:  SBP <90, MAP < 65, or SBP decrease of >40 from baseline due to infection, Lactic Acid >2 and Urine output <0.5/kg/hr for more than 2 hours despite fluid resuscitation    Time severe sepsis diagnosis confirmed = 145 as this was the time when SBP <90 or MAP <65 and Lactate resulted, and the level was >2      3 Hour Severe Sepsis Bundle Completion:  1. Initial Lactic Acid Result:   Recent Labs   Lab Test  09/22/17   0120   LACT  " 2.8*     2. Blood Cultures before Antibiotics: Yes  3. Broad Spectrum Antibiotics Administered: Yes     Anti-infectives (Future)    Start     Dose/Rate Route Frequency Ordered Stop    09/22/17 0215  vancomycin (VANCOCIN) 1000 mg in dextrose 5% 200 mL PREMIX      1,000 mg Intravenous EVERY 24 HOURS 09/22/17 0151          4. 2000 ml of IV fluids.    the patient was transferred out of the ED prior to the 6 hour swati.        Results for orders placed or performed during the hospital encounter of 09/22/17   US Abdomen Limited    Narrative    ULTRASOUND ABDOMEN LIMITED RIGHT UPPER QUADRANT  9/22/2017 2:54 AM     HISTORY: Right upper quadrant pain and fever.    COMPARISON: 6/21/2017.    FINDINGS: Normal hepatic echogenicity. No hepatic masses. 1.3 cm  mobile gallstone in the gallbladder. No gallbladder wall thickening or  pericholecystic fluid. No focal tenderness over the gallbladder. No  intrahepatic biliary dilatation. The common duct is mildly dilated,  measuring 0.7 cm in diameter. The right kidney has normal size and  echogenicity, measuring 11.5 cm in length. No right intrarenal  collecting system dilatation, calculi or masses. No free fluid in the  upper right hemiabdomen.      Impression    IMPRESSION:  1. Small mobile gallstone in the gallbladder. No evidence of acute  cholecystitis  2. Mild dilatation of the common duct which measures up to 0.7 cm in  diameter.    MARTY REYES MD   Comprehensive metabolic panel   Result Value Ref Range    Sodium 138 133 - 144 mmol/L    Potassium 4.1 3.4 - 5.3 mmol/L    Chloride 105 94 - 109 mmol/L    Carbon Dioxide 18 (L) 20 - 32 mmol/L    Anion Gap 15 (H) 3 - 14 mmol/L    Glucose 90 70 - 99 mg/dL    Urea Nitrogen 11 7 - 30 mg/dL    Creatinine 1.30 (H) 0.52 - 1.04 mg/dL    GFR Estimate 43 (L) >60 mL/min/1.7m2    GFR Estimate If Black 52 (L) >60 mL/min/1.7m2    Calcium 7.9 (L) 8.5 - 10.1 mg/dL    Bilirubin Total 1.9 (H) 0.2 - 1.3 mg/dL    Albumin 2.8 (L) 3.4 - 5.0 g/dL     Protein Total 5.7 (L) 6.8 - 8.8 g/dL    Alkaline Phosphatase 262 (H) 40 - 150 U/L    ALT 44 0 - 50 U/L    AST 86 (H) 0 - 45 U/L   CBC with platelets differential   Result Value Ref Range    WBC 9.6 4.0 - 11.0 10e9/L    RBC Count 3.23 (L) 3.8 - 5.2 10e12/L    Hemoglobin 8.3 (L) 11.7 - 15.7 g/dL    Hematocrit 27.3 (L) 35.0 - 47.0 %    MCV 85 78 - 100 fl    MCH 25.7 (L) 26.5 - 33.0 pg    MCHC 30.4 (L) 31.5 - 36.5 g/dL    RDW 18.2 (H) 10.0 - 15.0 %    Platelet Count 91 (L) 150 - 450 10e9/L    Diff Method Automated Method     % Neutrophils 81.2 %    % Lymphocytes 4.0 %    % Monocytes 9.4 %    % Eosinophils 4.7 %    % Basophils 0.3 %    % Immature Granulocytes 0.4 %    Absolute Neutrophil 7.8 1.6 - 8.3 10e9/L    Absolute Lymphocytes 0.4 (L) 0.8 - 5.3 10e9/L    Absolute Monocytes 0.9 0.0 - 1.3 10e9/L    Absolute Eosinophils 0.5 0.0 - 0.7 10e9/L    Absolute Basophils 0.0 0.0 - 0.2 10e9/L    Abs Immature Granulocytes 0.0 0 - 0.4 10e9/L   Lactic acid whole blood   Result Value Ref Range    Lactic Acid 2.8 (H) 0.7 - 2.0 mmol/L   UA with Microscopic   Result Value Ref Range    Color Urine Esperanza     Appearance Urine Clear     Glucose Urine Negative NEG^Negative mg/dL    Bilirubin Urine Negative NEG^Negative    Ketones Urine Negative NEG^Negative mg/dL    Specific Gravity Urine 1.019 1.003 - 1.035    Blood Urine Negative NEG^Negative    pH Urine 5.0 5.0 - 7.0 pH    Protein Albumin Urine Negative NEG^Negative mg/dL    Urobilinogen mg/dL 4.0 (H) 0.0 - 2.0 mg/dL    Nitrite Urine Negative NEG^Negative    Leukocyte Esterase Urine Negative NEG^Negative    Source Catheterized Urine     WBC Urine 8 (H) 0 - 2 /HPF    RBC Urine <1 0 - 2 /HPF    Squamous Epithelial /HPF Urine <1 0 - 1 /HPF    Mucous Urine Present (A) NEG^Negative /LPF    Hyaline Casts 4 (H) 0 - 2 /LPF   CRP inflammation   Result Value Ref Range    CRP Inflammation 26.9 (H) 0.0 - 8.0 mg/L   Erythrocyte sedimentation rate auto   Result Value Ref Range    Sed Rate 30 0 - 30  mm/h   Partial thromboplastin time   Result Value Ref Range    PTT 35 22 - 37 sec   INR   Result Value Ref Range    INR 1.17 (H) 0.86 - 1.14   Lipase   Result Value Ref Range    Lipase 80 73 - 393 U/L   Amylase   Result Value Ref Range    Amylase 34 30 - 110 U/L     Medications   sodium chloride (PF) 0.9% PF flush 3 mL (3 mLs Intravenous Given 9/22/17 0127)   vancomycin (VANCOCIN) 1000 mg in dextrose 5% 200 mL PREMIX (0 mg Intravenous Stopped 9/22/17 0355)   0.9% sodium chloride BOLUS (1,000 mLs Intravenous New Bag 9/22/17 0321)     Followed by   0.9% sodium chloride infusion (not administered)   0.9% sodium chloride BOLUS (0 mLs Intravenous Stopped 9/22/17 0227)   acetaminophen (TYLENOL) tablet 500 mg (500 mg Oral Given 9/22/17 0152)   piperacillin-tazobactam (ZOSYN) infusion 3.375 g (0 g Intravenous Stopped 9/22/17 0230)     when patient arrives she did have a temperature of over 101 and she was somewhat hypotensive but not tachycardic.  Sepsis protocol was started by nursing and labs were drawn.  Lactic acid did come back elevated.  While waiting for additional tests to come back, she was started on broad-spectrum antibiotics.  She was started on Zoysyn and vancomycin.  Initial labs do not show any source or .2 the source of the infection.  I did not do a chest x-ray is the patient had no respiratory complaints.  She did recently have a urinary tract infection that grew out E. coli about 10 days ago, a cath urine was done today but this showed no obvious signs of infection.  Her liver function tests are chronically elevated but about stable.  She does have the stone gallstones I did repeat the ultrasound to look for possible new signs of infection in this area.  The ultrasound actually looked improved, there is no signs of any wall thickening.  Her blood pressures continue to be soft after 1 L fluids and she was given an additional leader.  I was not being super aggressive with fluids as I was very worried about  her 3rd spacing.  With her liver disease and low albumin levels this is a big risk factor.  In summary, we have a patient with liver cirrhosis the presents with a fever and elevated lactic acid and soft blood pressures.  This all seems to be consistent with sepsis.  We don't have an obvious source but I'm very suspicious with her having right upper quadrant abdominal pain, unknown stone, this could be early cholecystitis or you have to even wonder about cholangitis.  I don't think this is spontaneous bacterial peritonitis as a patient does not have generalized peritoneal signs and there doesn't appear to be any obvious sign of ascites.  I had a long discussion with our hospitalist here about the case.  He does not feel comfortable keeping the patient here as he feels like if this is something to do with the gallbladder, this is a high-risk surgery that needs to be done at a higher level of care and the patient probably needs hepatology involvement also.  He would recommend transfer down to the [place] for this care.  I discussed the case with the hospitalist service, spoke with Dr. Orellana, will accept the pt.    Assessments & Plan (with Medical Decision Making)  Sepsis, ruq abd pain     I have reviewed the nursing notes.    I have reviewed the findings, diagnosis, plan and need for follow up with the patient.              9/22/2017   [facility] EMERGENCY DEPARTMENT     Joel Gary MD  09/22/17 0419

## 2017-09-22 NOTE — ED NOTES
One blood culture drawn by JUSTIN MARIE at 0120 Right upper arm. On hold in lab. No urine collected as of yet.

## 2017-09-22 NOTE — PLAN OF CARE
Afebrile, BPs 80s-90s/50s, HR 70s-80s, O2 sats mid-upper 90s on RA. Denies pain today, no nausea. A&Ox4, sleeping between cares. MSSA scores 4, 3. GI consult completed. NPO all day, MRCP planned for tonight sometime after 7:00. Watery, loose stools. Good urine output, margie.

## 2017-09-23 LAB
ALBUMIN SERPL-MCNC: 2.1 G/DL (ref 3.4–5)
ALP SERPL-CCNC: 191 U/L (ref 40–150)
ALT SERPL W P-5'-P-CCNC: 34 U/L (ref 0–50)
ANION GAP SERPL CALCULATED.3IONS-SCNC: 7 MMOL/L (ref 3–14)
AST SERPL W P-5'-P-CCNC: 49 U/L (ref 0–45)
BACTERIA SPEC CULT: NORMAL
BILIRUB SERPL-MCNC: 1.4 MG/DL (ref 0.2–1.3)
BUN SERPL-MCNC: 13 MG/DL (ref 7–30)
C DIFF TOX B STL QL: NEGATIVE
CALCIUM SERPL-MCNC: 7.5 MG/DL (ref 8.5–10.1)
CHLORIDE SERPL-SCNC: 121 MMOL/L (ref 94–109)
CO2 SERPL-SCNC: 16 MMOL/L (ref 20–32)
CREAT SERPL-MCNC: 1.04 MG/DL (ref 0.52–1.04)
ERYTHROCYTE [DISTWIDTH] IN BLOOD BY AUTOMATED COUNT: 18.7 % (ref 10–15)
GFR SERPL CREATININE-BSD FRML MDRD: 56 ML/MIN/1.7M2
GLUCOSE BLDC GLUCOMTR-MCNC: 73 MG/DL (ref 70–99)
GLUCOSE BLDC GLUCOMTR-MCNC: 73 MG/DL (ref 70–99)
GLUCOSE SERPL-MCNC: 65 MG/DL (ref 70–99)
HCT VFR BLD AUTO: 25.9 % (ref 35–47)
HGB BLD-MCNC: 7.8 G/DL (ref 11.7–15.7)
INR PPP: 1.56 (ref 0.86–1.14)
MCH RBC QN AUTO: 26 PG (ref 26.5–33)
MCHC RBC AUTO-ENTMCNC: 30.1 G/DL (ref 31.5–36.5)
MCV RBC AUTO: 86 FL (ref 78–100)
PLATELET # BLD AUTO: 71 10E9/L (ref 150–450)
POTASSIUM SERPL-SCNC: 3.8 MMOL/L (ref 3.4–5.3)
PROT SERPL-MCNC: 4.7 G/DL (ref 6.8–8.8)
RBC # BLD AUTO: 3 10E12/L (ref 3.8–5.2)
SODIUM SERPL-SCNC: 144 MMOL/L (ref 133–144)
SPECIMEN SOURCE: NORMAL
SPECIMEN SOURCE: NORMAL
WBC # BLD AUTO: 6 10E9/L (ref 4–11)

## 2017-09-23 PROCEDURE — 99233 SBSQ HOSP IP/OBS HIGH 50: CPT | Mod: GC | Performed by: STUDENT IN AN ORGANIZED HEALTH CARE EDUCATION/TRAINING PROGRAM

## 2017-09-23 PROCEDURE — 36415 COLL VENOUS BLD VENIPUNCTURE: CPT | Performed by: STUDENT IN AN ORGANIZED HEALTH CARE EDUCATION/TRAINING PROGRAM

## 2017-09-23 PROCEDURE — 85610 PROTHROMBIN TIME: CPT | Performed by: STUDENT IN AN ORGANIZED HEALTH CARE EDUCATION/TRAINING PROGRAM

## 2017-09-23 PROCEDURE — 25000132 ZZH RX MED GY IP 250 OP 250 PS 637: Performed by: STUDENT IN AN ORGANIZED HEALTH CARE EDUCATION/TRAINING PROGRAM

## 2017-09-23 PROCEDURE — 25000128 H RX IP 250 OP 636: Performed by: STUDENT IN AN ORGANIZED HEALTH CARE EDUCATION/TRAINING PROGRAM

## 2017-09-23 PROCEDURE — 00000146 ZZHCL STATISTIC GLUCOSE BY METER IP

## 2017-09-23 PROCEDURE — 25000132 ZZH RX MED GY IP 250 OP 250 PS 637: Performed by: DERMATOLOGY

## 2017-09-23 PROCEDURE — 12000006 ZZH R&B IMCU INTERMEDIATE UMMC

## 2017-09-23 PROCEDURE — 87493 C DIFF AMPLIFIED PROBE: CPT | Performed by: STUDENT IN AN ORGANIZED HEALTH CARE EDUCATION/TRAINING PROGRAM

## 2017-09-23 PROCEDURE — 85027 COMPLETE CBC AUTOMATED: CPT | Performed by: STUDENT IN AN ORGANIZED HEALTH CARE EDUCATION/TRAINING PROGRAM

## 2017-09-23 PROCEDURE — 80053 COMPREHEN METABOLIC PANEL: CPT | Performed by: STUDENT IN AN ORGANIZED HEALTH CARE EDUCATION/TRAINING PROGRAM

## 2017-09-23 RX ORDER — CEFAZOLIN SODIUM 1 G/3ML
1 INJECTION, POWDER, FOR SOLUTION INTRAMUSCULAR; INTRAVENOUS SEE ADMIN INSTRUCTIONS
Status: CANCELLED | OUTPATIENT
Start: 2017-09-23

## 2017-09-23 RX ORDER — CEFAZOLIN SODIUM 2 G/100ML
2 INJECTION, SOLUTION INTRAVENOUS
Status: CANCELLED | OUTPATIENT
Start: 2017-09-23

## 2017-09-23 RX ORDER — NICOTINE POLACRILEX 4 MG
15-30 LOZENGE BUCCAL
Status: DISCONTINUED | OUTPATIENT
Start: 2017-09-23 | End: 2017-09-25 | Stop reason: HOSPADM

## 2017-09-23 RX ORDER — TEMAZEPAM 15 MG/1
15 CAPSULE ORAL
Status: DISCONTINUED | OUTPATIENT
Start: 2017-09-23 | End: 2017-09-25 | Stop reason: HOSPADM

## 2017-09-23 RX ORDER — CALCIUM CARBONATE 500 MG/1
500 TABLET, CHEWABLE ORAL 3 TIMES DAILY PRN
Status: DISCONTINUED | OUTPATIENT
Start: 2017-09-23 | End: 2017-09-25 | Stop reason: HOSPADM

## 2017-09-23 RX ORDER — ACETAMINOPHEN 325 MG/1
325 TABLET ORAL EVERY 4 HOURS PRN
Status: DISCONTINUED | OUTPATIENT
Start: 2017-09-23 | End: 2017-09-25 | Stop reason: HOSPADM

## 2017-09-23 RX ORDER — DEXTROSE MONOHYDRATE 25 G/50ML
25-50 INJECTION, SOLUTION INTRAVENOUS
Status: DISCONTINUED | OUTPATIENT
Start: 2017-09-23 | End: 2017-09-25 | Stop reason: HOSPADM

## 2017-09-23 RX ORDER — SODIUM CHLORIDE, SODIUM LACTATE, POTASSIUM CHLORIDE, CALCIUM CHLORIDE 600; 310; 30; 20 MG/100ML; MG/100ML; MG/100ML; MG/100ML
INJECTION, SOLUTION INTRAVENOUS CONTINUOUS
Status: DISCONTINUED | OUTPATIENT
Start: 2017-09-24 | End: 2017-09-24

## 2017-09-23 RX ORDER — AMOXICILLIN 250 MG
1-2 CAPSULE ORAL 2 TIMES DAILY PRN
Status: DISCONTINUED | OUTPATIENT
Start: 2017-09-23 | End: 2017-09-25 | Stop reason: HOSPADM

## 2017-09-23 RX ADMIN — Medication 100 MG: at 07:51

## 2017-09-23 RX ADMIN — PIPERACILLIN AND TAZOBACTAM 3.38 G: 3; .375 INJECTION, POWDER, LYOPHILIZED, FOR SOLUTION INTRAVENOUS; PARENTERAL at 21:00

## 2017-09-23 RX ADMIN — OMEPRAZOLE 20 MG: 20 CAPSULE, DELAYED RELEASE ORAL at 07:50

## 2017-09-23 RX ADMIN — SODIUM CHLORIDE: 9 INJECTION, SOLUTION INTRAVENOUS at 06:30

## 2017-09-23 RX ADMIN — PIPERACILLIN AND TAZOBACTAM 3.38 G: 3; .375 INJECTION, POWDER, LYOPHILIZED, FOR SOLUTION INTRAVENOUS; PARENTERAL at 04:50

## 2017-09-23 RX ADMIN — PIPERACILLIN AND TAZOBACTAM 3.38 G: 3; .375 INJECTION, POWDER, LYOPHILIZED, FOR SOLUTION INTRAVENOUS; PARENTERAL at 10:02

## 2017-09-23 RX ADMIN — BUSPIRONE HYDROCHLORIDE 15 MG: 15 TABLET ORAL at 07:51

## 2017-09-23 RX ADMIN — FOLIC ACID 1 MG: 1 TABLET ORAL at 07:51

## 2017-09-23 RX ADMIN — PIPERACILLIN AND TAZOBACTAM 3.38 G: 3; .375 INJECTION, POWDER, LYOPHILIZED, FOR SOLUTION INTRAVENOUS; PARENTERAL at 15:23

## 2017-09-23 RX ADMIN — CALCIUM CARBONATE (ANTACID) CHEW TAB 500 MG 500 MG: 500 CHEW TAB at 11:12

## 2017-09-23 RX ADMIN — SODIUM CHLORIDE: 9 INJECTION, SOLUTION INTRAVENOUS at 01:43

## 2017-09-23 RX ADMIN — BUSPIRONE HYDROCHLORIDE 15 MG: 15 TABLET ORAL at 20:18

## 2017-09-23 RX ADMIN — TEMAZEPAM 15 MG: 15 CAPSULE ORAL at 23:31

## 2017-09-23 RX ADMIN — BUPROPION HYDROCHLORIDE 300 MG: 300 TABLET, FILM COATED, EXTENDED RELEASE ORAL at 07:50

## 2017-09-23 RX ADMIN — SERTRALINE HYDROCHLORIDE 100 MG: 100 TABLET ORAL at 07:51

## 2017-09-23 RX ADMIN — ACETAMINOPHEN 325 MG: 325 TABLET, FILM COATED ORAL at 20:59

## 2017-09-23 ASSESSMENT — PAIN DESCRIPTION - DESCRIPTORS: DESCRIPTORS: ACHING

## 2017-09-23 NOTE — PROVIDER NOTIFICATION
MD notified that Initial C. Diff negative. Will d/c enteric precautions unless otherwise instructed.

## 2017-09-23 NOTE — PLAN OF CARE
Neuro: A&Ox4.   Cardiac: SR, HR in the 70s, SBP 100s.   Respiratory: Sating sufficient on RA most of evening, about 0400 put on 2L NC to keep sats in mid 90s (desats only to 88-89%) No SOB reported.  GI/: Adequate urine output, multiple mixed stools/urine collections. Stool appeared green/bile colored, C diff sample sent was negative. MRCP completed yesterday evening. No N/V. Afebrile.  Diet/appetite: Tolerating NPO diet with ice chips.   Activity:  Assist of stand-by to bathroom.  Pain: No complaints of pain.  MIVF at 200ml/hr.  Skin: Intact, no new deficits noted.  Plan: Continue with POC. Notify primary team with changes.

## 2017-09-23 NOTE — PROGRESS NOTES
[facility], [place]    Internal Medicine [facility] Note [facility]    Main Plans for Today   - NPO at midnight for cholecystectomy on 9/24/17    Assessment & Plan   Monalisa Olguin is a 51 year old female admitted on 9/22/2017. She has a history of alcoholic hepatitis is admitted for worsening RUQ pain and fever.      # Sepsis, most likely secondary to cholecystitis vs cholangitis: Pattern is most consistent with a cholestatic rather than a hepatocellular picture. Less consistent with acute alcoholic hepatitis without significant AST/ALT elevation.        Fever and tachypnea on admission, though without elevated WBC. ALK Phos persistently elevated though stable from prior and only low level of T. Bili elevation. RUQ U/S with only minimally dilated common duct. No GB thickening and negative Gaston's sign. No evidence of ascites on exam. For other work-up for prior history of hepatitis: Prior negative Hep B, Hep C in 2013.   - GI consulted appreciate recs  - General Surgery consulted appreciate recs  - Pending final results of MRCP   - Continue pip-tazo pending ongoing work-up, treatment   - Likely transition to ciprofloxacin, metronidazole po    - Blood cultures NGTD, UCx mixed urogenital scot   - CXR most consistent with atelectasis, no signs or symptoms of pneumonia   - Hold propranolol   - Trend CMP, INR     MELD-Na score: 17 at 9/22/2017  7:35 AM  MELD score: 16 at 9/22/2017  7:35 AM  Calculated from:  Serum Creatinine: 1.33 mg/dL at 9/22/2017  7:35 AM  Serum Sodium: 136 mmol/L at 9/22/2017  7:35 AM  Total Bilirubin: 1.8 mg/dL at 9/22/2017  7:35 AM  INR(ratio): 1.54 at 9/22/2017  7:35 AM  Age: 51 years     # ALFREDO: Baseline Cr 0.92, currently up to 1.33. Most likely secondary to dehydration with recent decreased po intake and sepsis. Improved with iv fluids   - Monitor     # Alcohol Dependence: Per patient she has not been drinking for the last 3 weeks.   - [facility] protocol  -  Thiamine, Folate   - Hold naltrexone     # ?Confusion/Metabolic Encephalopathy: Recent history of confusion per chart review. No confusion on my exam, though patient seems to be a poor historian. Recent confusion may have been related to sedation with procedure 9/7/17. No evidence of hepatic encephalopathy at this time.   - PT/OT      # Chronic Anemia/Thrombocytopenia: Chronic, mostly stable apart from dilution after fluid resuscitation. Likely related to underlying liver disease.   - Continue to monitor.      Chronic:  # Depression:   - Continue bupropion and buspirone  # GERD:   - Continue omeprazole 20mg daily     Diet: Regular Diet Adult  NPO per Anesthesia Guidelines for Procedure/Surgery Except for: Meds  Fluids: Start  ml/hr at midnight when NPO prior to surgery   DVT Prophylaxis: Pneumatic Compression Devices  Code Status: Full Code    Disposition Plan   Expected discharge: 2 - 3 days, recommended to prior living arrangement once completion of surgery.     Entered: Sonia Cornejo 09/23/2017, 2:56 PM   Information in the above section will display in the discharge planner report.      The patient's care was discussed with the Attending Physician, Dr. Che.    Sonia Cornejo  [facility]  Maroon: 2  Pager: [phone]  Please see sticky note for cross cover information    Interval History       Per patient her RUQ pain is improved. She is now hungry. Denies significant nausea. Had few loose bowel movements over the last day. Afebrile overnight.       Nursing Notes Reviewed. 4-point ROS otherwise negative.     Physical Exam   Vital Signs: Temp: 96.9  F (36.1  C) Temp src: Axillary BP: 135/88   Heart Rate: 73 Resp: 16 SpO2: 92 % O2 Device: None (Room air) Oxygen Delivery: 2 LPM  Weight: 129 lbs 4.8 oz  General Appearance: Alert, NAD, lying in bed   Respiratory: CTAB, no crackles, no wheezes  Cardiovascular: RRR, no appreciable murmurs  GI: soft, mild tenderness throughout,  mildly distended, active bowel sounds  Skin: clean, dry, intact   Other: AOx3, normal affect      Data   Medications        busPIRone  15 mg Oral BID     buPROPion  300 mg Oral QAM     sertraline  100 mg Oral Daily     omeprazole  20 mg Oral Daily     piperacillin-tazobactam  3.375 g Intravenous Q6H     thiamine  100 mg Oral Daily     folic acid  1 mg Oral Daily     influenza quadrivalent (PF) vacc age 3 yrs and older  0.5 mL Intramuscular Prior to discharge     Data     Recent Labs  Lab 09/23/17  0609 09/22/17  0735 09/22/17  0120   WBC 6.0 7.5 9.6   HGB 7.8* 8.0* 8.3*   MCV 86 85 85   PLT 71* 65* 91*   INR 1.56* 1.54* 1.17*    136 138   POTASSIUM 3.8 3.9 4.1   CHLORIDE 121* 108 105   CO2 16* 19* 18*   BUN 13 12 11   CR 1.04 1.33* 1.30*   ANIONGAP 7 10 15*   ELSIE 7.5* 7.2* 7.9*   GLC 65* 91 90   ALBUMIN 2.1* 2.3* 2.8*   PROTTOTAL 4.7* 4.9* 5.7*   BILITOTAL 1.4* 1.8* 1.9*   ALKPHOS 191* 202* 262*   ALT 34 38 44   AST 49* 70* 86*   LIPASE  --   --  80     No results found for this or any previous visit (from the past 24 hour(s)).

## 2017-09-23 NOTE — PROGRESS NOTES
General Surgery Progress Note  Monalisa Olguin  [id number]  9/23/2017    Subjective:  No acute events overnight. Reports no pain. No fevers/chills, nausea/emesis. Voiding and stooling without issue.    Objective:  /78 (BP Location: Left arm)  Temp 96.6  F (35.9  C) (Axillary)  Resp 18  Wt 58.7 kg (129 lb 4.8 oz)  LMP 05/16/2012  SpO2 96%  BMI 24.43 kg/m2    I/O last 3 completed shifts:  In: 3526.67 [I.V.:3526.67]  Out: 3850 [Urine:400; Other:3400; Stool:50]    NAD, resting in bed, alert  RRR  NLB on RA  Abd soft, nondistended, nontender.  WWP    Labs: Reviewed.    Assessment/Plan:  51F with ETOH hepatitis and biliary colic. Pending MRCP read today, performed last night. If MRCP shows choledocholithiasis, recommend ERCP with GI. If MRCP negative for choledocholithiasis, we will plan for OR for laparoscopic cholecystectomy tomorrow.     If no procedure planned by GI, OK to eat today and NPO at midnight with IVF in preparation for lap donna tomorrow.      Discussed with staff, Dr Pastor.    Bryant Caldwell MD  PGY-2 General Surgery

## 2017-09-23 NOTE — PROGRESS NOTES
[facility]  GASTROENTEROLOGY PROGRESS NOTE  Monalisa Olguin [id number]     SUBJECTIVE:  Pt reports feeling better. Denies abdominal pain. No fevers or chills.    OBJECTIVE:  VS: /88 (BP Location: Left arm)  Temp 96.9  F (36.1  C) (Axillary)  Resp 16  Wt 58.7 kg (129 lb 4.8 oz)  LMP 05/16/2012  SpO2 92%  BMI 24.43 kg/m2   GEN: A&Ox3, NAD, comfortable  CV:  RRR, no M/G/R  PULM:  CTAB  ABD: normal BS, soft without guarding, rigidity or obvious fluid wave. Complains of tendness diffusely with deep palpation not localized to RUQ. No Gaston's.    REVIEW OF LABORATORY, PATHOLOGY AND IMAGING RESULTS:  BMP  Recent Labs  Lab 09/23/17  0609 09/22/17  0735 09/22/17  0120    136 138   POTASSIUM 3.8 3.9 4.1   CHLORIDE 121* 108 105   ELSIE 7.5* 7.2* 7.9*   CO2 16* 19* 18*   BUN 13 12 11   CR 1.04 1.33* 1.30*   GLC 65* 91 90     CBC  Recent Labs  Lab 09/23/17  0609 09/22/17  0735 09/22/17  0120   WBC 6.0 7.5 9.6   RBC 3.00* 3.03* 3.23*   HGB 7.8* 8.0* 8.3*   HCT 25.9* 25.7* 27.3*   MCV 86 85 85   MCH 26.0* 26.4* 25.7*   MCHC 30.1* 31.1* 30.4*   RDW 18.7* 18.4* 18.2*   PLT 71* 65* 91*     INR  Recent Labs  Lab 09/23/17  0609 09/22/17  0735 09/22/17  0120   INR 1.56* 1.54* 1.17*     LFTs  Recent Labs  Lab 09/23/17  0609 09/22/17  0735 09/22/17  0120   ALKPHOS 191* 202* 262*   AST 49* 70* 86*   ALT 34 38 44   BILITOTAL 1.4* 1.8* 1.9*   PROTTOTAL 4.7* 4.9* 5.7*   ALBUMIN 2.1* 2.3* 2.8*      PANC  Recent Labs  Lab 09/22/17  0120   LIPASE 80   AMYLASE 34       MRCP personally reviewed. No apparent CBD stone. Trace perihepatic ascites.    IMPRESSION:  Monalisa Olguin is a 51 year old female with known gallbladder stones previously planned for outpt surgical evaluation here due to history of liver disease.  Alcoholic hepatitis vs cirrhosis. Previous acute alcoholic hepatitis. No EtOH for 3+ weeks.  Admitted with RUQ pain and fevers.   LFTs were elevated mildly and now improving.  Abdomen benign, afebrile and  clinically stable.   DDx includes cholecystitis vs cholangitis. Difficult to interpret LFTs in setting of alcoholic liver disease. MRCP does not appear to show CBD stone, however may have passed.    Regarding liver disease, currently MELD = 13, which calculates to 4.6% 30 day operative mortality. Is Marky-Smith B, however this is due to low albumin which may also be nutritional. INR 1.56, bili 1.4. No significant ascites and no hx encephalopathy.    RECOMMENDATIONS:  Agree with plans for lap donna. No plans for endoscopic intervention at this time unless stone found on intraoperative cholangiogram.    BERTO Brewer MD  Associate Professor of Medicine  Division of Gastroenterology, Hepatology and Nutrition  [facility]

## 2017-09-23 NOTE — PLAN OF CARE
Assumed care of patient at 0700. A&Ox4. Denied pain. SR, rates 60-80s. Lungs clear with dim bases on RA. Denied nausea. Gave tums x1 for heartburn. Up independently in room and ambulated hallways x2. Adequate UOP, multiple loose/mucous/green BMs. Regular diet with plan to be NPO at [place] for gall bladder surgery in am. Continue with current plan of care and notify MD of any questions or changes.

## 2017-09-24 ENCOUNTER — ANESTHESIA EVENT (OUTPATIENT)
Dept: SURGERY | Facility: CLINIC | Age: 52
DRG: 854 | End: 2017-09-24
Payer: COMMERCIAL

## 2017-09-24 ENCOUNTER — APPOINTMENT (OUTPATIENT)
Dept: GENERAL RADIOLOGY | Facility: CLINIC | Age: 52
DRG: 854 | End: 2017-09-24
Attending: INTERNAL MEDICINE
Payer: COMMERCIAL

## 2017-09-24 ENCOUNTER — ANESTHESIA (OUTPATIENT)
Dept: SURGERY | Facility: CLINIC | Age: 52
DRG: 854 | End: 2017-09-24
Payer: COMMERCIAL

## 2017-09-24 LAB
ABO + RH BLD: NORMAL
ABO + RH BLD: NORMAL
ALBUMIN SERPL-MCNC: 2.2 G/DL (ref 3.4–5)
ALP SERPL-CCNC: 205 U/L (ref 40–150)
ALT SERPL W P-5'-P-CCNC: 35 U/L (ref 0–50)
ANION GAP SERPL CALCULATED.3IONS-SCNC: 7 MMOL/L (ref 3–14)
AST SERPL W P-5'-P-CCNC: 48 U/L (ref 0–45)
BILIRUB SERPL-MCNC: 1.4 MG/DL (ref 0.2–1.3)
BLD GP AB SCN SERPL QL: NORMAL
BLOOD BANK CMNT PATIENT-IMP: NORMAL
BUN SERPL-MCNC: 12 MG/DL (ref 7–30)
CALCIUM SERPL-MCNC: 8.1 MG/DL (ref 8.5–10.1)
CHLORIDE SERPL-SCNC: 115 MMOL/L (ref 94–109)
CO2 SERPL-SCNC: 19 MMOL/L (ref 20–32)
CREAT SERPL-MCNC: 1.04 MG/DL (ref 0.52–1.04)
ERYTHROCYTE [DISTWIDTH] IN BLOOD BY AUTOMATED COUNT: 18.7 % (ref 10–15)
GFR SERPL CREATININE-BSD FRML MDRD: 56 ML/MIN/1.7M2
GLUCOSE BLDC GLUCOMTR-MCNC: 127 MG/DL (ref 70–99)
GLUCOSE BLDC GLUCOMTR-MCNC: 66 MG/DL (ref 70–99)
GLUCOSE BLDC GLUCOMTR-MCNC: 81 MG/DL (ref 70–99)
GLUCOSE BLDC GLUCOMTR-MCNC: 87 MG/DL (ref 70–99)
GLUCOSE SERPL-MCNC: 201 MG/DL (ref 70–99)
HBA1C MFR BLD: NORMAL % (ref 4.3–6)
HCG UR QL: NEGATIVE
HCT VFR BLD AUTO: 25.8 % (ref 35–47)
HGB BLD-MCNC: 7.8 G/DL (ref 11.7–15.7)
INR PPP: 1.33 (ref 0.86–1.14)
MCH RBC QN AUTO: 25.8 PG (ref 26.5–33)
MCHC RBC AUTO-ENTMCNC: 30.2 G/DL (ref 31.5–36.5)
MCV RBC AUTO: 85 FL (ref 78–100)
PLATELET # BLD AUTO: 88 10E9/L (ref 150–450)
POTASSIUM SERPL-SCNC: 3.7 MMOL/L (ref 3.4–5.3)
PROT SERPL-MCNC: 4.8 G/DL (ref 6.8–8.8)
RBC # BLD AUTO: 3.02 10E12/L (ref 3.8–5.2)
SODIUM SERPL-SCNC: 141 MMOL/L (ref 133–144)
SPECIMEN EXP DATE BLD: NORMAL
WBC # BLD AUTO: 6.4 10E9/L (ref 4–11)

## 2017-09-24 PROCEDURE — 36000057 ZZH SURGERY LEVEL 3 1ST 30 MIN - UMMC: Performed by: SURGERY

## 2017-09-24 PROCEDURE — 37000008 ZZH ANESTHESIA TECHNICAL FEE, 1ST 30 MIN: Performed by: SURGERY

## 2017-09-24 PROCEDURE — 27210794 ZZH OR GENERAL SUPPLY STERILE: Performed by: SURGERY

## 2017-09-24 PROCEDURE — 25000128 H RX IP 250 OP 636: Performed by: SURGERY

## 2017-09-24 PROCEDURE — 25000132 ZZH RX MED GY IP 250 OP 250 PS 637: Performed by: STUDENT IN AN ORGANIZED HEALTH CARE EDUCATION/TRAINING PROGRAM

## 2017-09-24 PROCEDURE — 71000014 ZZH RECOVERY PHASE 1 LEVEL 2 FIRST HR: Performed by: SURGERY

## 2017-09-24 PROCEDURE — S0020 INJECTION, BUPIVICAINE HYDRO: HCPCS | Performed by: SURGERY

## 2017-09-24 PROCEDURE — 81025 URINE PREGNANCY TEST: CPT | Performed by: ANESTHESIOLOGY

## 2017-09-24 PROCEDURE — 25000125 ZZHC RX 250: Performed by: SURGERY

## 2017-09-24 PROCEDURE — 25000128 H RX IP 250 OP 636: Performed by: ANESTHESIOLOGY

## 2017-09-24 PROCEDURE — 83036 HEMOGLOBIN GLYCOSYLATED A1C: CPT | Performed by: STUDENT IN AN ORGANIZED HEALTH CARE EDUCATION/TRAINING PROGRAM

## 2017-09-24 PROCEDURE — 85027 COMPLETE CBC AUTOMATED: CPT | Performed by: STUDENT IN AN ORGANIZED HEALTH CARE EDUCATION/TRAINING PROGRAM

## 2017-09-24 PROCEDURE — 86850 RBC ANTIBODY SCREEN: CPT | Performed by: STUDENT IN AN ORGANIZED HEALTH CARE EDUCATION/TRAINING PROGRAM

## 2017-09-24 PROCEDURE — C9399 UNCLASSIFIED DRUGS OR BIOLOG: HCPCS | Performed by: NURSE ANESTHETIST, CERTIFIED REGISTERED

## 2017-09-24 PROCEDURE — 99232 SBSQ HOSP IP/OBS MODERATE 35: CPT | Mod: GC | Performed by: STUDENT IN AN ORGANIZED HEALTH CARE EDUCATION/TRAINING PROGRAM

## 2017-09-24 PROCEDURE — 36415 COLL VENOUS BLD VENIPUNCTURE: CPT | Performed by: STUDENT IN AN ORGANIZED HEALTH CARE EDUCATION/TRAINING PROGRAM

## 2017-09-24 PROCEDURE — 85610 PROTHROMBIN TIME: CPT | Performed by: STUDENT IN AN ORGANIZED HEALTH CARE EDUCATION/TRAINING PROGRAM

## 2017-09-24 PROCEDURE — 25000128 H RX IP 250 OP 636: Performed by: STUDENT IN AN ORGANIZED HEALTH CARE EDUCATION/TRAINING PROGRAM

## 2017-09-24 PROCEDURE — 36000059 ZZH SURGERY LEVEL 3 EA 15 ADDTL MIN UMMC: Performed by: SURGERY

## 2017-09-24 PROCEDURE — 80053 COMPREHEN METABOLIC PANEL: CPT | Performed by: STUDENT IN AN ORGANIZED HEALTH CARE EDUCATION/TRAINING PROGRAM

## 2017-09-24 PROCEDURE — 00000146 ZZHCL STATISTIC GLUCOSE BY METER IP

## 2017-09-24 PROCEDURE — 25000125 ZZHC RX 250: Performed by: NURSE ANESTHETIST, CERTIFIED REGISTERED

## 2017-09-24 PROCEDURE — 12000001 ZZH R&B MED SURG/OB UMMC

## 2017-09-24 PROCEDURE — 25000132 ZZH RX MED GY IP 250 OP 250 PS 637: Performed by: DERMATOLOGY

## 2017-09-24 PROCEDURE — 71010 XR CHEST PORT 1 VW: CPT

## 2017-09-24 PROCEDURE — 37000009 ZZH ANESTHESIA TECHNICAL FEE, EACH ADDTL 15 MIN: Performed by: SURGERY

## 2017-09-24 PROCEDURE — 25000128 H RX IP 250 OP 636: Performed by: NURSE ANESTHETIST, CERTIFIED REGISTERED

## 2017-09-24 PROCEDURE — 86900 BLOOD TYPING SEROLOGIC ABO: CPT | Performed by: STUDENT IN AN ORGANIZED HEALTH CARE EDUCATION/TRAINING PROGRAM

## 2017-09-24 PROCEDURE — 86901 BLOOD TYPING SEROLOGIC RH(D): CPT | Performed by: STUDENT IN AN ORGANIZED HEALTH CARE EDUCATION/TRAINING PROGRAM

## 2017-09-24 PROCEDURE — 27210995 ZZH RX 272: Performed by: SURGERY

## 2017-09-24 PROCEDURE — 40000170 ZZH STATISTIC PRE-PROCEDURE ASSESSMENT II: Performed by: SURGERY

## 2017-09-24 PROCEDURE — 88304 TISSUE EXAM BY PATHOLOGIST: CPT | Performed by: SURGERY

## 2017-09-24 PROCEDURE — 25800025 ZZH RX 258: Performed by: STUDENT IN AN ORGANIZED HEALTH CARE EDUCATION/TRAINING PROGRAM

## 2017-09-24 PROCEDURE — 25000566 ZZH SEVOFLURANE, EA 15 MIN: Performed by: SURGERY

## 2017-09-24 RX ORDER — DEXAMETHASONE SODIUM PHOSPHATE 4 MG/ML
INJECTION, SOLUTION INTRA-ARTICULAR; INTRALESIONAL; INTRAMUSCULAR; INTRAVENOUS; SOFT TISSUE PRN
Status: DISCONTINUED | OUTPATIENT
Start: 2017-09-24 | End: 2017-09-24

## 2017-09-24 RX ORDER — ONDANSETRON 4 MG/1
4 TABLET, ORALLY DISINTEGRATING ORAL EVERY 30 MIN PRN
Status: DISCONTINUED | OUTPATIENT
Start: 2017-09-24 | End: 2017-09-25 | Stop reason: HOSPADM

## 2017-09-24 RX ORDER — BUPIVACAINE HYDROCHLORIDE 5 MG/ML
INJECTION, SOLUTION PERINEURAL PRN
Status: DISCONTINUED | OUTPATIENT
Start: 2017-09-24 | End: 2017-09-24 | Stop reason: HOSPADM

## 2017-09-24 RX ORDER — ONDANSETRON 2 MG/ML
4 INJECTION INTRAMUSCULAR; INTRAVENOUS EVERY 30 MIN PRN
Status: DISCONTINUED | OUTPATIENT
Start: 2017-09-24 | End: 2017-09-25 | Stop reason: HOSPADM

## 2017-09-24 RX ORDER — MAGNESIUM HYDROXIDE 1200 MG/15ML
LIQUID ORAL PRN
Status: DISCONTINUED | OUTPATIENT
Start: 2017-09-24 | End: 2017-09-24 | Stop reason: HOSPADM

## 2017-09-24 RX ORDER — CEFAZOLIN SODIUM 2 G/100ML
2 INJECTION, SOLUTION INTRAVENOUS
Status: DISCONTINUED | OUTPATIENT
Start: 2017-09-24 | End: 2017-09-25 | Stop reason: HOSPADM

## 2017-09-24 RX ORDER — ONDANSETRON 2 MG/ML
INJECTION INTRAMUSCULAR; INTRAVENOUS PRN
Status: DISCONTINUED | OUTPATIENT
Start: 2017-09-24 | End: 2017-09-24

## 2017-09-24 RX ORDER — FENTANYL CITRATE 50 UG/ML
25-50 INJECTION, SOLUTION INTRAMUSCULAR; INTRAVENOUS EVERY 5 MIN PRN
Status: DISCONTINUED | OUTPATIENT
Start: 2017-09-24 | End: 2017-09-24 | Stop reason: HOSPADM

## 2017-09-24 RX ORDER — PROPOFOL 10 MG/ML
INJECTION, EMULSION INTRAVENOUS PRN
Status: DISCONTINUED | OUTPATIENT
Start: 2017-09-24 | End: 2017-09-24

## 2017-09-24 RX ORDER — HYDRALAZINE HYDROCHLORIDE 20 MG/ML
2.5-5 INJECTION INTRAMUSCULAR; INTRAVENOUS EVERY 10 MIN PRN
Status: DISCONTINUED | OUTPATIENT
Start: 2017-09-24 | End: 2017-09-24

## 2017-09-24 RX ORDER — SODIUM CHLORIDE, SODIUM LACTATE, POTASSIUM CHLORIDE, CALCIUM CHLORIDE 600; 310; 30; 20 MG/100ML; MG/100ML; MG/100ML; MG/100ML
INJECTION, SOLUTION INTRAVENOUS CONTINUOUS
Status: DISCONTINUED | OUTPATIENT
Start: 2017-09-24 | End: 2017-09-24

## 2017-09-24 RX ORDER — LABETALOL HYDROCHLORIDE 5 MG/ML
INJECTION, SOLUTION INTRAVENOUS PRN
Status: DISCONTINUED | OUTPATIENT
Start: 2017-09-24 | End: 2017-09-24

## 2017-09-24 RX ORDER — SODIUM CHLORIDE 9 MG/ML
INJECTION, SOLUTION INTRAVENOUS CONTINUOUS PRN
Status: DISCONTINUED | OUTPATIENT
Start: 2017-09-24 | End: 2017-09-24

## 2017-09-24 RX ORDER — CEFAZOLIN SODIUM 1 G/3ML
1 INJECTION, POWDER, FOR SOLUTION INTRAMUSCULAR; INTRAVENOUS SEE ADMIN INSTRUCTIONS
Status: DISCONTINUED | OUTPATIENT
Start: 2017-09-24 | End: 2017-09-25 | Stop reason: HOSPADM

## 2017-09-24 RX ORDER — SODIUM CHLORIDE, SODIUM LACTATE, POTASSIUM CHLORIDE, CALCIUM CHLORIDE 600; 310; 30; 20 MG/100ML; MG/100ML; MG/100ML; MG/100ML
INJECTION, SOLUTION INTRAVENOUS CONTINUOUS
Status: DISCONTINUED | OUTPATIENT
Start: 2017-09-24 | End: 2017-09-24 | Stop reason: HOSPADM

## 2017-09-24 RX ORDER — LABETALOL HYDROCHLORIDE 5 MG/ML
5 INJECTION, SOLUTION INTRAVENOUS
Status: DISCONTINUED | OUTPATIENT
Start: 2017-09-24 | End: 2017-09-24

## 2017-09-24 RX ORDER — LIDOCAINE HYDROCHLORIDE 20 MG/ML
INJECTION, SOLUTION INFILTRATION; PERINEURAL PRN
Status: DISCONTINUED | OUTPATIENT
Start: 2017-09-24 | End: 2017-09-24

## 2017-09-24 RX ORDER — HYDROMORPHONE HYDROCHLORIDE 1 MG/ML
.3-.5 INJECTION, SOLUTION INTRAMUSCULAR; INTRAVENOUS; SUBCUTANEOUS
Status: DISCONTINUED | OUTPATIENT
Start: 2017-09-24 | End: 2017-09-24 | Stop reason: HOSPADM

## 2017-09-24 RX ADMIN — OXYCODONE HYDROCHLORIDE 10 MG: 5 TABLET ORAL at 22:38

## 2017-09-24 RX ADMIN — FENTANYL CITRATE 150 MCG: 50 INJECTION, SOLUTION INTRAMUSCULAR; INTRAVENOUS at 08:00

## 2017-09-24 RX ADMIN — FOLIC ACID 1 MG: 1 TABLET ORAL at 13:20

## 2017-09-24 RX ADMIN — BUSPIRONE HYDROCHLORIDE 15 MG: 15 TABLET ORAL at 13:16

## 2017-09-24 RX ADMIN — HYDROMORPHONE HYDROCHLORIDE 0.5 MG: 1 INJECTION, SOLUTION INTRAMUSCULAR; INTRAVENOUS; SUBCUTANEOUS at 09:00

## 2017-09-24 RX ADMIN — Medication 25 ML: at 06:24

## 2017-09-24 RX ADMIN — BUPROPION HYDROCHLORIDE 300 MG: 300 TABLET, FILM COATED, EXTENDED RELEASE ORAL at 13:19

## 2017-09-24 RX ADMIN — TEMAZEPAM 15 MG: 15 CAPSULE ORAL at 21:31

## 2017-09-24 RX ADMIN — PROPOFOL 90 MG: 10 INJECTION, EMULSION INTRAVENOUS at 08:10

## 2017-09-24 RX ADMIN — HYDROMORPHONE HYDROCHLORIDE 0.5 MG: 1 INJECTION, SOLUTION INTRAMUSCULAR; INTRAVENOUS; SUBCUTANEOUS at 08:47

## 2017-09-24 RX ADMIN — LIDOCAINE HYDROCHLORIDE 100 MG: 20 INJECTION, SOLUTION INFILTRATION; PERINEURAL at 08:08

## 2017-09-24 RX ADMIN — LABETALOL HYDROCHLORIDE 10 MG: 5 INJECTION, SOLUTION INTRAVENOUS at 08:50

## 2017-09-24 RX ADMIN — SUGAMMADEX 120 MG: 100 INJECTION, SOLUTION INTRAVENOUS at 09:15

## 2017-09-24 RX ADMIN — ONDANSETRON 4 MG: 2 INJECTION INTRAMUSCULAR; INTRAVENOUS at 09:15

## 2017-09-24 RX ADMIN — SODIUM CHLORIDE: 9 INJECTION, SOLUTION INTRAVENOUS at 07:58

## 2017-09-24 RX ADMIN — OMEPRAZOLE 20 MG: 20 CAPSULE, DELAYED RELEASE ORAL at 13:20

## 2017-09-24 RX ADMIN — SODIUM CHLORIDE, POTASSIUM CHLORIDE, SODIUM LACTATE AND CALCIUM CHLORIDE: 600; 310; 30; 20 INJECTION, SOLUTION INTRAVENOUS at 00:09

## 2017-09-24 RX ADMIN — DEXAMETHASONE SODIUM PHOSPHATE 6 MG: 4 INJECTION, SOLUTION INTRA-ARTICULAR; INTRALESIONAL; INTRAMUSCULAR; INTRAVENOUS; SOFT TISSUE at 08:17

## 2017-09-24 RX ADMIN — OXYCODONE HYDROCHLORIDE 5 MG: 5 TABLET ORAL at 14:44

## 2017-09-24 RX ADMIN — OXYCODONE HYDROCHLORIDE 5 MG: 5 TABLET ORAL at 18:55

## 2017-09-24 RX ADMIN — PIPERACILLIN AND TAZOBACTAM 3.38 G: 3; .375 INJECTION, POWDER, LYOPHILIZED, FOR SOLUTION INTRAVENOUS; PARENTERAL at 03:36

## 2017-09-24 RX ADMIN — ROCURONIUM BROMIDE 40 MG: 10 INJECTION INTRAVENOUS at 08:12

## 2017-09-24 RX ADMIN — ACETAMINOPHEN 325 MG: 325 TABLET, FILM COATED ORAL at 13:40

## 2017-09-24 RX ADMIN — BUSPIRONE HYDROCHLORIDE 15 MG: 15 TABLET ORAL at 19:42

## 2017-09-24 RX ADMIN — SERTRALINE HYDROCHLORIDE 100 MG: 100 TABLET ORAL at 13:18

## 2017-09-24 RX ADMIN — PIPERACILLIN AND TAZOBACTAM 3.38 G: 3; .375 INJECTION, POWDER, LYOPHILIZED, FOR SOLUTION INTRAVENOUS; PARENTERAL at 10:15

## 2017-09-24 RX ADMIN — FENTANYL CITRATE 50 MCG: 50 INJECTION, SOLUTION INTRAMUSCULAR; INTRAVENOUS at 09:19

## 2017-09-24 RX ADMIN — Medication 100 MG: at 13:26

## 2017-09-24 RX ADMIN — SODIUM CHLORIDE, POTASSIUM CHLORIDE, SODIUM LACTATE AND CALCIUM CHLORIDE: 600; 310; 30; 20 INJECTION, SOLUTION INTRAVENOUS at 10:19

## 2017-09-24 RX ADMIN — OXYCODONE HYDROCHLORIDE 5 MG: 5 TABLET ORAL at 13:16

## 2017-09-24 RX ADMIN — CEFAZOLIN 1 G: 1 INJECTION, POWDER, FOR SOLUTION INTRAMUSCULAR; INTRAVENOUS at 08:26

## 2017-09-24 ASSESSMENT — PAIN DESCRIPTION - DESCRIPTORS
DESCRIPTORS: ACHING

## 2017-09-24 ASSESSMENT — ENCOUNTER SYMPTOMS
DYSRHYTHMIAS: 0
ORTHOPNEA: 0

## 2017-09-24 ASSESSMENT — LIFESTYLE VARIABLES: TOBACCO_USE: 1

## 2017-09-24 NOTE — PROVIDER NOTIFICATION
Terrence SPENCER cross-cover notified that going through OR check list-states to notify MD if pt was on beta blocker PTA which pt was taking propranolol at home but not ordered since admission. Also appear to need type/screen done.    Type and cross ordered, will discuss beta blocker with day team.

## 2017-09-24 NOTE — PROGRESS NOTES
POST OP CHECK       Pain well controlled, denies N/V, tolerating regular diet, voided independently and ambulated     Vitals:    09/24/17 1106 09/24/17 1152 09/24/17 1200 09/24/17 1539   BP: 117/85 127/85 126/84 (!) 133/92   BP Location: Left arm Left arm Left arm Left arm   Resp: 16 16 16 19   Temp: 97.5  F (36.4  C)   98.1  F (36.7  C)   TempSrc: Oral   Oral   SpO2: 94% 92% 92% 95%   Weight:           I/O last 3 completed shifts:  In: 2171.25 [P.O.:490; I.V.:1681.25]  Out: 3510 [Urine:1100; Other:2400; Blood:10]      Alert and oriented x3  Non labored breathing  Soft abdomen, minimal distension, appropriately tender, no signs of generalized peritonitis   No LE edema, bilateral palpable DP     A/P: 50 yo female POD 0 Prydeinig cholecystectomy doing well     -Continue cares    Adilene Mendoza, PGY-1

## 2017-09-24 NOTE — ANESTHESIA POSTPROCEDURE EVALUATION
Patient: Monalisa Olguin    Procedure(s):  laparoscopic cholecystectomy - Wound Class: II-Clean Contaminated    Diagnosis:biliary colic  Diagnosis Additional Information: No value filed.    Anesthesia Type:  General, ETT    Note:  Anesthesia Post Evaluation    Patient location during evaluation: PACU  Patient participation: Able to fully participate in evaluation  Level of consciousness: awake and alert  Pain management: satisfactory to patient  Airway patency: patent  Cardiovascular status: acceptable and stable  Respiratory status: acceptable and spontaneous ventilation  Hydration status: euvolemic  PONV: controlled     Anesthetic complications: None          Last vitals:  Vitals:    09/24/17 1000 09/24/17 1015 09/24/17 1030   BP: 119/72 108/74 124/78   Resp: 14 13 13   Temp: 36.4  C (97.5  F)  36.5  C (97.7  F)   SpO2: 93% 93% 94%         Electronically Signed By: Nando Amato MD  September 24, 2017  10:52 AM

## 2017-09-24 NOTE — PLAN OF CARE
Problem: Patient Care Overview  Goal: Plan of Care/Patient Progress Review  Pt left for surgery via a litter escorted by PACU staff.

## 2017-09-24 NOTE — ANESTHESIA PREPROCEDURE EVALUATION
Anesthesia Evaluation     . Pt has had prior anesthetic.     No history of anesthetic complications          ROS/MED HX    ENT/Pulmonary:     (+)tobacco use, Current use 0.5ppd packs/day  , . .    Neurologic: Comment: Multiple hallucinations recently s/p colonoscopy/endoscopy.     (+)delerium resolved Yes,     Cardiovascular:     (+) hypertension----. : . . . :. .      (-) SCHMIDT, orthopnea/PND, arrhythmias, irregular heartbeat/palpitations and valvular problems/murmurs   METS/Exercise Tolerance:     Hematologic:     (+) Anemia, -      Musculoskeletal:   (+) arthritis, , , -       GI/Hepatic: Comment: EtOH hepatitis with ascites    (+) GERD hepatitis type Alcoholic, liver disease,       Renal/Genitourinary: Comment: Stage III CRF  Previous kidney donor to sister    (+) chronic renal disease, type: CRI, Pt does not require dialysis,       Endo:     (+) thyroid problem  hyperthyroidism, .      Psychiatric:     (+) psychiatric history anxiety and depression      Infectious Disease:   (+) Recent Fever, Other Infectious Disease Sepsis with recent hospital admission      Malignancy:      - no malignancy   Other:                   Procedure: Procedure(s):  laparoscopic cholecystectomy - Wound Class:     HPI: Monalisa Olguin is a 51 year old female with complex PMH as noted above with recent RUQ pain now presenting for lap donna.     PMHx/PSHx:  Past Medical History:   Diagnosis Date     Alcohol abuse 5/2/2013     Alcohol dependence 5/25/2013     Alcohol withdrawal 5/2/2013     Anxiety 10/10/2011     CKD (chronic kidney disease) stage 3, GFR 30-59 ml/min 2006    last GFR was 42     CKD (chronic kidney disease) stage 3, GFR 30-59 ml/min 2/22/2011     Esophageal reflux 10/7/2002     Hypertension goal BP (blood pressure) < 130/80 7/12/2011     Int derangement knee NEC     ACL Internal derangement, knee/ original injury in 5th grade, torn cartilage     Moderate Depression [296.32] 12/22/2009    stable on wellbutrin     Pap smear  "    no abnormals, due for paps q 2-3 yrs     Unspecified essential hypertension        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     COLONOSCOPY N/A 2017    Procedure: COLONOSCOPY;  Colonoscopy;  Surgeon: Sai Johnston MD;  Location: [facility] KNEE SCOPE, DIAGNOSTIC  01    Arthroscopy, Lt Knee         No current facility-administered medications on file prior to encounter.   Current Outpatient Prescriptions on File Prior to Encounter:  naltrexone (DEPADE;REVIA) 50 MG tablet TAKE ONE-HALF TABLET BY MOUTH EVERY DAY FOR ONE WEEK THEN TAKE ONE TABLET DAILY AFTER   sertraline (ZOLOFT) 100 MG tablet Take 1 tablet (100 mg) by mouth daily   propranolol (INDERAL) 10 MG tablet Take 1 tablet (10 mg) by mouth 2 times daily   TL RICARDO RX 2.2-25-1 MG TABS TAKE ONE TABLET BY MOUTH EVERY DAY   VITAMIN B-1 100 MG tablet TAKE ONE TABLET BY MOUTH EVERY DAY   busPIRone (BUSPAR) 15 MG tablet Take 1 tablet (15 mg) by mouth 2 times daily   buPROPion (WELLBUTRIN XL) 300 MG 24 hr tablet Take 1 tablet (300 mg) by mouth every morning   omeprazole (PRILOSEC) 20 MG CR capsule Take 1 capsule (20 mg) by mouth daily       Social Hx:   Social History   Substance Use Topics     Smoking status: Current Every Day Smoker     Packs/day: 0.50     Years: 10.00     Smokeless tobacco: Never Used      Comment: pt quit  after smoking for 8 yrs, started again in      Alcohol use No      Comment: stopped drinking        Allergies:   Allergies   Allergen Reactions     Albuterol      Tongue \"hardened and painful\"         NPO Status: Per ASA Guidelines    Labs:    Blood Bank:  Lab Results   Component Value Date    ABO O 2005    RH  Pos 2005    AS Neg 2005     BMP:  Recent Labs   Lab Test  17   0609   NA  144   POTASSIUM  3.8   CHLORIDE  121*   CO2  16*   BUN  13   CR  1.04   GLC "  65*   ELSIE  7.5*     CBC:   Recent Labs   Lab Test  09/23/17   0609   WBC  6.0   RBC  3.00*   HGB  7.8*   HCT  25.9*   MCV  86   MCH  26.0*   MCHC  30.1*   RDW  18.7*   PLT  71*     Coags:  Recent Labs   Lab Test  09/23/17   0609   09/22/17   0120   INR  1.56*   < >  1.17*   PTT   --    --   35    < > = values in this interval not displayed.       Physical Exam  Normal systems: cardiovascular and pulmonary    Airway   Mallampati: II  TM distance: >3 FB  Neck ROM: full    Dental     Cardiovascular       Pulmonary                     Anesthesia Plan      History & Physical Review      ASA Status:  3 .        Plan for General and ETT with Intravenous induction. Maintenance will be Balanced.    PONV prophylaxis:  Ondansetron (or other 5HT-3) and Dexamethasone or Solumedrol  Additional equipment: 2nd IV      Postoperative Care  Postoperative pain management:  IV analgesics, Oral pain medications and Multi-modal analgesia.      Consents  Anesthetic plan, risks, benefits and alternatives discussed with:  Patient..        Anjel Farfan MD  Anesthesiology Resident CA2, PGY3

## 2017-09-24 NOTE — ANESTHESIA CARE TRANSFER NOTE
Patient: Monalisa Olguin    Procedure(s):  laparoscopic cholecystectomy - Wound Class: II-Clean Contaminated    Diagnosis: biliary colic  Diagnosis Additional Information: No value filed.    Anesthesia Type:   General, ETT     Note:  Airway :Face Mask  Patient transferred to:PACU  Comments: Anesthesia Care Transfer Note    Patient: Monalisa Olguin    Transferred to: PACU    Patient vital signs: stable    Airway: none    Monitors on, VSS, stable spontaneous respirations  Satinder Gerard CRNA  9/24/2017 9:37 AM            Vitals: (Last set prior to Anesthesia Care Transfer)    CRNA VITALS  9/24/2017 0901 - 9/24/2017 0937      9/24/2017             Pulse: 86    SpO2: 94 %    Resp Rate (observed): (!)  3                Electronically Signed By: IVETTE Mohamud CRNA  September 24, 2017  9:37 AM

## 2017-09-24 NOTE — BRIEF OP NOTE
[facility], [place]    Brief Operative Note    Pre-operative diagnosis: biliary colic  Post-operative diagnosis Biliary colic  Procedure: Procedure(s):  laparoscopic cholecystectomy - Wound Class: II-Clean Contaminated  Surgeon: Surgeon(s) and Role:     * Romeo Pastor MD - Primary     * Sheela Ferrell MD - Resident - Assisting  Anesthesia: General   Estimated blood loss: 10 cc  Drains: None  Specimens:   ID Type Source Tests Collected by Time Destination   A :  Tissue Gallbladder and Contents SURGICAL PATHOLOGY EXAM Romeo Pastor MD 9/24/2017  8:49 AM      Findings:   Cirrhotic, friable liver, minimally edematous gallbladder. Inherent risk of bleeding.  Complications: None.  Implants: None.

## 2017-09-24 NOTE — PLAN OF CARE
Neuro: A&Ox4.   Cardiac: SR HR 70s-80s. VSS.SBP 110s-130.   Respiratory: Sating 95%+ on RA, occ. 2L NC while asleep. Mild SOB in beginning of night, MD made aware, but resolved overnight.   GI/: Adequate urine output, frequent mixed urine/stool. Stool green/yellow. Glucose 66 this AM checked d/t NPO status. Was 66, gave 25mls D50 and up to 127.  Diet/appetite: Tolerating regular diet, NPO at midnight. No N/V.   Activity:  Up ad sundar to the bathroom. Very steady on her feet.  Pain: Mild headache, otherwise no complaints of pain.   Skin: Intact, no new deficits noted.     Plan: Continue with POC. Notify primary team with changes.

## 2017-09-24 NOTE — PLAN OF CARE
Problem: Patient Care Overview  Goal: Individualization & Mutuality  Pt came back to 6B after she had laparoscopic cholecystectomy in OR escorted by float float RN and house orderly via a litter. Pt is sleepy, awakes with verbal stimulation. AVSS.Denied pain.Abd incisions covered with tape, clean and dry.

## 2017-09-24 NOTE — PROGRESS NOTES
[facility], [place]    Internal Medicine Progress Note - [facility] Service    Main Plans for Today     Laparoscopic cholecystectomy    Assessment & Plan   Monalisa Olguin is a 51 year old female admitted on 9/22/2017. She has a history of alcoholic hepatitis is admitted for worsening RUQ pain and fever.       # Sepsis, most likely secondary to cholecystitis vs cholangitis: Pattern is most consistent with a cholestatic rather than a hepatocellular picture. Less consistent with acute alcoholic hepatitis without significant AST/ALT elevation.        Fever and tachypnea on admission, though without elevated WBC. ALK Phos persistently elevated though stable from prior and only low level of T. Bili elevation. RUQ U/S with only minimally dilated common duct. No GB thickening and negative Gaston's sign. No evidence of ascites on exam. For other work-up for prior history of hepatitis: Prior negative Hep B, Hep C in 2013.   - GI consulted appreciate recs  - General Surgery consulted appreciate recs  - MRCP without evidence of bile duct stone, will proceed with cholecystectomy   - Stop Abx following definitive treatment for cholecystitis    - Blood cultures NGTD, UCx mixed urogenital scot   - Hold propranolol   - Trend CMP, INR      MELD-Na score: 17 at 9/22/2017  7:35 AM  MELD score: 16 at 9/22/2017  7:35 AM  Calculated from:  Serum Creatinine: 1.33 mg/dL at 9/22/2017  7:35 AM  Serum Sodium: 136 mmol/L at 9/22/2017  7:35 AM  Total Bilirubin: 1.8 mg/dL at 9/22/2017  7:35 AM  INR(ratio): 1.54 at 9/22/2017  7:35 AM  Age: 51 years      # ALFREDO: Baseline Cr 0.92, currently up to 1.33. Most likely secondary to dehydration with recent decreased po intake and sepsis. Improved with iv fluids   - Monitor      # Alcohol Dependence: Per patient she has not been drinking for the last 3 weeks.   - [facility] protocol  - Thiamine, Folate   - Hold naltrexone      # ?Confusion/Metabolic Encephalopathy: Recent history of  confusion per chart review. No confusion on my exam, though patient seems to be a poor historian. Recent confusion may have been related to sedation with procedure 9/7/17. No evidence of hepatic encephalopathy at this time.   - PT/OT       # Chronic Anemia/Thrombocytopenia: Chronic, mostly stable apart from dilution after fluid resuscitation. Likely related to underlying liver disease.   - Continue to monitor.       Chronic:  # Depression:   - Continue bupropion and buspirone  # GERD:   - Continue omeprazole 20mg daily     Diet: Regular Diet Adult  Fluids: PO  DVT Prophylaxis: Low Risk/Ambulatory with no VTE prophylaxis indicated  Code Status: Full Code    Disposition Plan   Expected discharge: Tomorrow, recommended to prior living arrangement once adequate pain management/ tolerating PO medications.     Entered: Elijah Shen 09/24/2017, 12:35 PM   Information in the above section will display in the discharge planner report.      The patient's care was discussed with the Attending Physician, Dr. Che.    Elijah Shen  [facility]  Maroon: 2  Pager: [phone]  Please see sticky note for cross cover information    Interval History   Patient reports doing well overnight. She states that she is ready for surgery and has no concerns at this time. Patient denies f/c/ns/n/v/CP/SOB. She endorses BM and is voiding without issue.    Nursing notes reviewed. 4-point ROS negative unless otherwise stated above.    Physical Exam   Vital Signs: Temp: 97.5  F (36.4  C) Temp src: Oral BP: 126/84   Heart Rate: 80 Resp: 16 SpO2: 92 % O2 Device: Nasal cannula Oxygen Delivery: 2 LPM  Weight: 132 lbs 3.2 oz  General Appearance: NAD, walking around room at will  Respiratory: CTAB, no increased work of breathing  Cardiovascular: RRR, S1, S2, no murmurs appreciated  GI: mild RUQ pain, otherwise s/nt/nd  Skin: clean/intact, no erythema  Other: Alert and oriented, pleasant affect, no asterixis    Data     Recent  Labs  Lab 09/24/17  0630 09/23/17  0609 09/22/17  0735 09/22/17  0120   WBC 6.4 6.0 7.5 9.6   HGB 7.8* 7.8* 8.0* 8.3*   MCV 85 86 85 85   PLT 88* 71* 65* 91*   INR 1.33* 1.56* 1.54* 1.17*    144 136 138   POTASSIUM 3.7 3.8 3.9 4.1   CHLORIDE 115* 121* 108 105   CO2 19* 16* 19* 18*   BUN 12 13 12 11   CR 1.04 1.04 1.33* 1.30*   ANIONGAP 7 7 10 15*   ELSIE 8.1* 7.5* 7.2* 7.9*   * 65* 91 90   ALBUMIN 2.2* 2.1* 2.3* 2.8*   PROTTOTAL 4.8* 4.7* 4.9* 5.7*   BILITOTAL 1.4* 1.4* 1.8* 1.9*   ALKPHOS 205* 191* 202* 262*   ALT 35 34 38 44   AST 48* 49* 70* 86*   LIPASE  --   --   --  80     Physician Attestation   I, Eh Che, saw this patient with the resident and agree with the resident s findings and plan of care as documented in the resident s note with my edits above.      I personally reviewed vital signs, medications and labs.    Eh Che  Date of Service (when I saw the patient): 9/24/17

## 2017-09-24 NOTE — OR NURSING
Dr. Leal at bedside to assess pt. MD states ok to transfer to 6B and will place sign out when able.

## 2017-09-24 NOTE — PLAN OF CARE
Problem: Patient Care Overview  Goal: Plan of Care/Patient Progress Review  Pt is 51 years old S/P laparoscopic cholecystectomy this morning.Abd incision covered with tape, clean and dry.A&Ox4.Tacycardic at times. B/P runs high, hx of HTN .C/O right abd incisional pain, good pain relief with a total of 10 mg oxycodone tab po PRN and tylenol 325 mg tab po.Pt  had clear liquid diet ,tolerated and diet was advanced to regular, pt ate 50% of her lunch and 50% of her dinner and tolerated.Loose stool x2 today. Up to bathroom with stand by assist, voided adequate amount.pt is on cardiac monitor with sinus, sinus tach 's and a rare PAC.Report given to [facility] RN. Pt willl transfer to   via a wheelchair escorted by transport personnel. Plan for dc home tomorrow.

## 2017-09-25 ENCOUNTER — CARE COORDINATION (OUTPATIENT)
Dept: CARE COORDINATION | Facility: CLINIC | Age: 52
End: 2017-09-25

## 2017-09-25 VITALS
RESPIRATION RATE: 19 BRPM | BODY MASS INDEX: 24.98 KG/M2 | TEMPERATURE: 97.5 F | SYSTOLIC BLOOD PRESSURE: 125 MMHG | WEIGHT: 132.2 LBS | DIASTOLIC BLOOD PRESSURE: 72 MMHG | OXYGEN SATURATION: 94 %

## 2017-09-25 LAB
ALBUMIN SERPL-MCNC: 2.6 G/DL (ref 3.4–5)
ALP SERPL-CCNC: 236 U/L (ref 40–150)
ALT SERPL W P-5'-P-CCNC: 37 U/L (ref 0–50)
ANION GAP SERPL CALCULATED.3IONS-SCNC: 9 MMOL/L (ref 3–14)
AST SERPL W P-5'-P-CCNC: 49 U/L (ref 0–45)
BILIRUB SERPL-MCNC: 1.3 MG/DL (ref 0.2–1.3)
BUN SERPL-MCNC: 10 MG/DL (ref 7–30)
CALCIUM SERPL-MCNC: 8.3 MG/DL (ref 8.5–10.1)
CHLORIDE SERPL-SCNC: 115 MMOL/L (ref 94–109)
CO2 SERPL-SCNC: 21 MMOL/L (ref 20–32)
CREAT SERPL-MCNC: 0.88 MG/DL (ref 0.52–1.04)
ERYTHROCYTE [DISTWIDTH] IN BLOOD BY AUTOMATED COUNT: 18.7 % (ref 10–15)
GFR SERPL CREATININE-BSD FRML MDRD: 68 ML/MIN/1.7M2
GLUCOSE BLDC GLUCOMTR-MCNC: 161 MG/DL (ref 70–99)
GLUCOSE BLDC GLUCOMTR-MCNC: 99 MG/DL (ref 70–99)
GLUCOSE SERPL-MCNC: 121 MG/DL (ref 70–99)
HCT VFR BLD AUTO: 26.4 % (ref 35–47)
HGB BLD-MCNC: 7.9 G/DL (ref 11.7–15.7)
INR PPP: 1.25 (ref 0.86–1.14)
MCH RBC QN AUTO: 25.3 PG (ref 26.5–33)
MCHC RBC AUTO-ENTMCNC: 29.9 G/DL (ref 31.5–36.5)
MCV RBC AUTO: 85 FL (ref 78–100)
PLATELET # BLD AUTO: 91 10E9/L (ref 150–450)
POTASSIUM SERPL-SCNC: 3.8 MMOL/L (ref 3.4–5.3)
PROT SERPL-MCNC: 5.5 G/DL (ref 6.8–8.8)
RBC # BLD AUTO: 3.12 10E12/L (ref 3.8–5.2)
SODIUM SERPL-SCNC: 144 MMOL/L (ref 133–144)
WBC # BLD AUTO: 10.2 10E9/L (ref 4–11)

## 2017-09-25 PROCEDURE — 0FT44ZZ RESECTION OF GALLBLADDER, PERCUTANEOUS ENDOSCOPIC APPROACH: ICD-10-PCS | Performed by: SURGERY

## 2017-09-25 PROCEDURE — 25000132 ZZH RX MED GY IP 250 OP 250 PS 637: Performed by: STUDENT IN AN ORGANIZED HEALTH CARE EDUCATION/TRAINING PROGRAM

## 2017-09-25 PROCEDURE — 25000132 ZZH RX MED GY IP 250 OP 250 PS 637: Performed by: DERMATOLOGY

## 2017-09-25 PROCEDURE — 99239 HOSP IP/OBS DSCHRG MGMT >30: CPT | Mod: GC | Performed by: STUDENT IN AN ORGANIZED HEALTH CARE EDUCATION/TRAINING PROGRAM

## 2017-09-25 PROCEDURE — 80053 COMPREHEN METABOLIC PANEL: CPT | Performed by: STUDENT IN AN ORGANIZED HEALTH CARE EDUCATION/TRAINING PROGRAM

## 2017-09-25 PROCEDURE — 25000128 H RX IP 250 OP 636: Performed by: STUDENT IN AN ORGANIZED HEALTH CARE EDUCATION/TRAINING PROGRAM

## 2017-09-25 PROCEDURE — 36415 COLL VENOUS BLD VENIPUNCTURE: CPT | Performed by: STUDENT IN AN ORGANIZED HEALTH CARE EDUCATION/TRAINING PROGRAM

## 2017-09-25 PROCEDURE — 00000146 ZZHCL STATISTIC GLUCOSE BY METER IP

## 2017-09-25 PROCEDURE — 90686 IIV4 VACC NO PRSV 0.5 ML IM: CPT | Performed by: STUDENT IN AN ORGANIZED HEALTH CARE EDUCATION/TRAINING PROGRAM

## 2017-09-25 PROCEDURE — 85610 PROTHROMBIN TIME: CPT | Performed by: STUDENT IN AN ORGANIZED HEALTH CARE EDUCATION/TRAINING PROGRAM

## 2017-09-25 PROCEDURE — 85027 COMPLETE CBC AUTOMATED: CPT | Performed by: STUDENT IN AN ORGANIZED HEALTH CARE EDUCATION/TRAINING PROGRAM

## 2017-09-25 RX ORDER — OXYCODONE HYDROCHLORIDE 5 MG/1
5-10 TABLET ORAL EVERY 4 HOURS PRN
Qty: 2 TABLET | Refills: 0 | Status: SHIPPED | OUTPATIENT
Start: 2017-09-25 | End: 2017-09-27

## 2017-09-25 RX ADMIN — OXYCODONE HYDROCHLORIDE 5 MG: 5 TABLET ORAL at 13:50

## 2017-09-25 RX ADMIN — FOLIC ACID 1 MG: 1 TABLET ORAL at 08:23

## 2017-09-25 RX ADMIN — ACETAMINOPHEN 325 MG: 325 TABLET, FILM COATED ORAL at 13:50

## 2017-09-25 RX ADMIN — OMEPRAZOLE 20 MG: 20 CAPSULE, DELAYED RELEASE ORAL at 08:23

## 2017-09-25 RX ADMIN — BUPROPION HYDROCHLORIDE 300 MG: 300 TABLET, FILM COATED, EXTENDED RELEASE ORAL at 08:22

## 2017-09-25 RX ADMIN — SERTRALINE HYDROCHLORIDE 100 MG: 100 TABLET ORAL at 08:22

## 2017-09-25 RX ADMIN — BUSPIRONE HYDROCHLORIDE 15 MG: 15 TABLET ORAL at 08:23

## 2017-09-25 RX ADMIN — INFLUENZA A VIRUS A/MICHIGAN/45/2015 X-275 (H1N1) ANTIGEN (FORMALDEHYDE INACTIVATED), INFLUENZA A VIRUS A/HONG KONG/4801/2014 X-263B (H3N2) ANTIGEN (FORMALDEHYDE INACTIVATED), INFLUENZA B VIRUS B/PHUKET/3073/2013 ANTIGEN (FORMALDEHYDE INACTIVATED), AND INFLUENZA B VIRUS B/BRISBANE/60/2008 ANTIGEN (FORMALDEHYDE INACTIVATED) 0.5 ML: 15; 15; 15; 15 INJECTION, SUSPENSION INTRAMUSCULAR at 13:36

## 2017-09-25 NOTE — DISCHARGE SUMMARY
[facility], [place]    Internal Medicine Discharge Summary- [facility] Service    Date of Admission:  9/22/2017  Date of Discharge:  9/25/2017  Discharging Attending Provider: Eh Che MD  Discharge Team: Terrence 2    Discharge Diagnoses   Cholecystitis now s/p laparoscopic cholecystectomy  Sepsis  ALFREDO  Chronic alcohol dependence/ alcoholic hepatitis    Follow-ups Needed After Discharge   Follow up with primary care provider Dr. Efren Bustos 9/27/17 with hepatic panel prior  Follow up with surgery in two weeks for incision check  Follow up with hepatologist Dr. Edgardo Kovacs on 11/28/17    Hospital Course   Monalisa Olguin is a 51 year old female admitted on 9/22/2017. She has a history of alcoholic hepatitis and was admitted for worsening RUQ pain and fever.       # Sepsis   # Presumed cholecystitis  With RUQ pain and fever and cholestatic liver enzyme elevation there was a suspicion of hepatobiliary pathology. US did not show any common bile duct abnormalities, no cholelithiasis or gallbladder wall thickening. She was started on piperacillin/tazobactam, blood cultures negative. GI was consulted who recommended MRCP which did not reveal any evidence of bile duct stone, general surgery also consulted who elected to proceed with cholecystectomy. Patient tolerated procedure well and noted improvement in her symptoms following the surgery. Patient will f/up with general surgery in two weeks for incision check.     # Alcoholic Hepatitis  # Alcohol Dependence   # Chronic Anemia/Thrombocytopenia   Per patient she has not been drinking for the last 3 weeks. Did not exhibit signs of withdrawal during her hospitalization. Patient will follow up with PCP with hepatic panel within 1 week to ensure on-going improvement.     # ALFREDO  Baseline Cr 0.92, currently up to 1.33. Most likely secondary to dehydration with recent decreased po intake and sepsis. Improved with iv fluids.        Chronic:  #  Depression:   - Continued bupropion and buspirone  # GERD:   - Continued omeprazole 20mg daily     Consultations This Hospital Stay   GI PANCREATICOBILIARY ADULT IP CONSULT  SURGERY GENERAL ADULT IP CONSULT     Code Status   Full Code     The patient was discussed with Dr. Chinedu Patino, DO  IM PGY-3  ______________________________________________________________________    Physical Exam   Vital Signs: Temp: 97.5  F (36.4  C) Temp src: Oral BP: 125/72   Heart Rate: 78 Resp: 19 SpO2: 94 % O2 Device: None (Room air) Oxygen Delivery: 2 LPM  Weight: 132 lbs 3.2 oz    General Appearance: NAD, resting comfortably in bed  Respiratory: R lung CTA, mildly reduced lung sound lower left lobe, no increased work of breathing on RA  Cardiovascular: RRR, S1, S2, no murmurs appreciated  GI: slightly distended and tender- appropriate for 1 day s/p cholecystectomy; periumbilical erythema noted and marked with pen on morning rounds, noted not to have advanced by time of discharge  Skin: clean/intact, no breakdown noted  Other: alert and oriented, pleasant affect    Significant Results and Procedures      Most Recent 3 CBC's:  Recent Labs   Lab Test  09/25/17 0821 09/24/17   0630  09/23/17   0609   WBC  10.2  6.4  6.0   HGB  7.9*  7.8*  7.8*   MCV  85  85  86   PLT  91*  88*  71*   Most Recent 3 BMP's:  Recent Labs   Lab Test  09/25/17   0821  09/24/17   0630  09/23/17   0609   NA  144  141  144   POTASSIUM  3.8  3.7  3.8   CHLORIDE  115*  115*  121*   CO2  21  19*  16*   BUN  10  12  13   CR  0.88  1.04  1.04   ANIONGAP  9  7  7   ELSIE  8.3*  8.1*  7.5*   GLC  121*  201*  65*     Most Recent 2 LFT's:  Recent Labs   Lab Test  09/25/17   0821  09/24/17   0630   AST  49*  48*   ALT  37  35   ALKPHOS  236*  205*   BILITOTAL  1.3  1.4*     Results for orders placed or performed during the hospital encounter of 09/22/17   XR Chest 2 Views    Narrative    EXAM: XR CHEST 2 VW  9/22/2017 11:10 AM     HISTORY:  patient  admitted with fever, cough       COMPARISON:  Radiograph 3/17/2015    FINDINGS: PA and lateral radiographs of the chest. The mediastinal  border, cardiac silhouette, and pulmonary vasculature are within  normal limits. Poorly defined reticular opacities predominantly in the  lower lung fields. No pneumothorax. No pleural effusions. Surgical  clips project over the mid abdomen.      Impression    IMPRESSION: Poorly defined reticular opacities predominantly in the  lower lung field. Findings are nonspecific but include pulmonary edema  or atypical infection.    I have personally reviewed the examination and initial interpretation  and I agree with the findings.    TYRONE TILLEY MD   MR Abdomen MRCP w/o & w Contrast    Narrative    MRCP Without and With Contrast    CLINICAL HISTORY: Patient admitted with RUQ pain concerning for  cholecystitis vs choledocholithiasis    DATE: 9/22/2017 10:10 PM    TECHNIQUE:     Images were acquired with and without intravenous gadolinium contrast  through the upper abdomen. The following MR images were acquired  without intravenous contrast: TrueFISP, multiplanar T2-weighted, axial  T1 in/out of phase, T2-weighted MRCP images, axial diffusion-weighted  and axial apparent diffusion coefficient. T1-weighted images were  obtained before contrast at the multiple time points following  contrast injection. 3-D reformatted images were generated by the  technologist. Contrast dose: 7.5mL Gadavist    Comparison study: 9/22/2017 abdominal ultrasound; 3/27/2017 abdominal  MRI; 3/26/2017 CT abdomen/pelvis    FINDINGS:    Biliary Tree: Mild dilation of the common bile duct (up to 8 mm) and  common hepatic duct discretion without filling defect, similar to that  seen on 3/27/2017. No intrahepatic or ductal dilatation. Abrupt, but  fairly smooth tapering, of the common bile duct in the pancreatic head  near the ampulla (series 11, image 58 and series 8, image 11).     Pancreas: Normal T1 parenchymal  signal. No ductal dilation or mass. No  pancreas divisum.    Liver: Equivocal mild surface contour nodularity. No enhancing hepatic  lesion. No evidence of steatosis or iron deposition.  Somewhat  heterogeneous enhancement during the arterial phase becomes  homogeneous during the portal venous and more delayed phases. Findings  may be secondary to hepatic inflammation.    Gallbladder: Layering hypointense sludge and small stones in the  gallbladder lumen. No gallbladder wall thickening or pericholecystic  fluid.    Spleen: Enlarged, measuring up to 15.1 cm in craniocaudal dimension.    Kidneys: Absent left kidney. The right kidney is unremarkable.    Adrenal glands: Normal.    Bowel: Normal where visualized.    Lymph nodes: No abdominal lymphadenopathy.    Blood vessels: The major abdominal vessels are patent where  visualized. Perisplenic portosystemic collaterals.    Lung bases: Bibasilar linear atelectasis versus scarring. Trace  pleural effusions.    Bones and soft tissues: No aggressive osseous lesion.    Mesentery and abdominal wall: Subcutaneous edema about the abdominal  wall. No mesenteric mass. Subxiphoid ventral abdominal hernia  containing fat with mild inflammatory changes.    Ascites: Trace perihepatic and perisplenic ascites.      Impression    IMPRESSION:   1. Stable to minimally increased (since 3/27/2017 abdominal MRI) mild  dilation of the extrahepatic biliary tree. Relatively abrupt abrupt,  but fairly smooth, narrowing of the distal common bile duct at the  pancreatic head, more pronounced than on prior MRCP, though there is  no convincing evidence of choledocholithiasis or obstructing  pancreatic head lesion.  2. Layering hypointense stones and sludge in the gallbladder lumen. No  findings to suggest cholecystitis.  3a. Mildly nodular hepatic contour, compatible with chronic intrinsic  parenchymal disease and possible developing cirrhosis. Splenomegaly  and trace upper abdominal ascites  suggestive of portal hypertension.  3b. Mildly heterogeneous hepatic parenchymal signal and enhancement  may be secondary to inflammation.  4. Left nephrectomy.    I have personally reviewed the examination and initial interpretation  and I agree with the findings.    NILESH GARCIA MD   XR Chest Port 1 View    Narrative    Exam:  XR CHEST PORT 1 VW, 9/24/2017 6:55 PM    History: shortness of breath, recent fluid resuscitation    Comparison:  Chest radiograph from 9/20/2017.    Findings:  Single AP view chest. Trace left greater than right pleural  effusions. Trace bilateral pleural effusions. Increased left greater  than right basilar opacities. Mild perihilar interstitial pulmonary  opacities. No pneumothorax. Surgical clips in the upper abdomen.      Impression    Impression:   1. Increased bilateral retrocardiac atelectasis and/or consolidation.  2. Trace left greater than right pleural effusions and adjacent  opacities.    I have personally reviewed the examination and initial interpretation  and I agree with the findings.    TYRONE TILLEY MD     Pending Results   These results will be followed up by Jason Bustos and Fermín  Unresulted Labs Ordered in the Past 30 Days of this Admission     Date and Time Order Name Status Description    9/24/2017 0839 Surgical pathology exam In process     9/22/2017 0718 Blood culture Preliminary     9/22/2017 0718 Blood culture Preliminary     9/22/2017 0135 Blood culture Preliminary     9/22/2017 0135 Blood culture Preliminary         Primary Care Physician   Efren Bustos MD     Discharge Disposition   Discharged to home  Condition at discharge: Stable    Discharge Orders     Hepatic panel     Discharge Instructions   Activity  - No strenuous exercise for 4 weeks.  - No lifting, pushing, pulling more than 15 pounds for 4 weeks.   - Do not strain with bowel movements.  - Do not drive until you can press the brake pedal quickly and fully without pain.   - Do not operate a  motor vehicle while taking narcotic pain medications.     Incisions  - You may shower and get incisions wet starting 48 hrs after surgery.  - Do not scrub incisions or submerge wounds (e.g. bath, pool, hot tub, etc.) for 2 weeks.   - Remove wound dressing 48 hours after surgery.   - If purple Dermabond glue was used, avoid applying any lotions or ointments.   - If steri-strips were used, they will fall off on their own.   - Leave incision open to air.  Cover with gauze only if needed for comfort or to protect clothing from drainage.     Medications  - Do not take any additional Tylenol (acetaminophen) while using a narcotic pain medication which includes acetaminophen.  - Do not take more than 4,000mg of acetaminophen in any 24 hour period, as this can cause liver damage.  - Take stool softeners such as Senna or Miralax while you are using narcotics, but stop if you develop diarrhea.   - Wean yourself off of narcotic pain medications.     Follow-Up:  - Call your primary care provider to touch base regarding your recent admission.    - Call or return sooner than your regularly scheduled visit if you develop any of the following: fever >101.5, uncontrolled pain, uncontrolled nausea or vomiting, as well as increased redness, swelling, or drainage from your wound. Call [phone] and ask to speak with the Surgery resident on call if you are having troubles in the evenings, at night, or on weekends.    -  Follow-up with your surgeon team in 2-3 weeks. Call [phone] or [phone] to schedule your follow-up appointment. If you have questions about your follow-up appointment, please call the General Surgery Clinic at [phone].    If you are receiving home care please inform your home care nurse of our contact number.    You may also call the Surgery Call-Center to speak with a Triage Nurse or to make an appointment: [phone].     Reason for your hospital stay   You were admitted to the hospital for  increasing right upper abdominal pain with fever. No gall stones were identified on imaging studies, and it was recommended that you have you gall bladder removed due to concerns of infection of the gallbladder. Your gall bladder was removed and have been tolerating good oral intake without nausea and your pain has been well controlled with oral pain medication.    Return to the hospital or call 911 if the redness near your belly button continues to expand past the marked line or if you experience severe abdominal pain or increasing fevers/chills.     Adult [facility] Follow-up and recommended labs and tests   Follow up with your primary care provider Dr. Efren Bustos on 9/27/17, please schedule for lab appointment (with hepatic panel) prior to seeing PCP.   Follow up with hepatologist Dr. Edgardo Kovacs on 11/28/17     Appointments on [place] and/or [facility] (with [facility] or [facility] provider or service). Call [phone] if you haven't heard regarding these appointments within 7 days of discharge.     Follow Up and recommended labs and tests   Liver enzymes     Activity   Your activity upon discharge: Please see surgery discharge instructions     Full Code     ABO/Rh Type and Screen     Red blood cell have available     Platelets have available     Diet   Follow this diet upon discharge: Orders Placed This Encounter     Please see surgery discharge instructions       Discharge Medications   Current Discharge Medication List      START taking these medications    Details   oxyCODONE (ROXICODONE) 5 MG IR tablet Take 1-2 tablets (5-10 mg) by mouth every 4 hours as needed for moderate to severe pain  Qty: 2 tablet, Refills: 0    Associated Diagnoses: Biliary colic         CONTINUE these medications which have NOT CHANGED    Details   TEMAZEPAM PO Take 15 mg by mouth nightly as needed for sleep      naltrexone (DEPADE;REVIA) 50 MG tablet TAKE ONE-HALF TABLET BY MOUTH EVERY DAY FOR ONE WEEK THEN TAKE ONE TABLET DAILY  "AFTER  Qty: 90 tablet, Refills: 3    Associated Diagnoses: Alcohol dependence in remission (H)      sertraline (ZOLOFT) 100 MG tablet Take 1 tablet (100 mg) by mouth daily  Qty: 90 tablet, Refills: 3    Associated Diagnoses: Anxiety; Major depression      propranolol (INDERAL) 10 MG tablet Take 1 tablet (10 mg) by mouth 2 times daily  Qty: 180 tablet, Refills: 3    Associated Diagnoses: Hypertension goal BP (blood pressure) < 130/80      TL RICARDO RX 2.2-25-1 MG TABS TAKE ONE TABLET BY MOUTH EVERY DAY  Qty: 90 tablet, Refills: 3    Associated Diagnoses: Alcoholism in recovery (H)      VITAMIN B-1 100 MG tablet TAKE ONE TABLET BY MOUTH EVERY DAY  Qty: 90 tablet, Refills: 3    Associated Diagnoses: Alcohol dependence in remission (H)      busPIRone (BUSPAR) 15 MG tablet Take 1 tablet (15 mg) by mouth 2 times daily  Qty: 180 tablet, Refills: 3    Associated Diagnoses: MONA (generalized anxiety disorder)      buPROPion (WELLBUTRIN XL) 300 MG 24 hr tablet Take 1 tablet (300 mg) by mouth every morning  Qty: 90 tablet, Refills: 3    Associated Diagnoses: Major depressive disorder, recurrent episode, moderate (H)      omeprazole (PRILOSEC) 20 MG CR capsule Take 1 capsule (20 mg) by mouth daily  Qty: 90 capsule, Refills: 3    Associated Diagnoses: Gastroesophageal reflux disease with esophagitis           Allergies   Allergies   Allergen Reactions     Albuterol      Tongue \"hardened and painful\"     "

## 2017-09-25 NOTE — OP NOTE
DATE OF PROCEDURE:  09/24/2017      PREOPERATIVE DIAGNOSIS:  Biliary colic, cholelithiasis.      POSTOPERATIVE DIAGNOSIS:  Biliary colic, cholelithiasis.      PROCEDURE:  Laparoscopic cholecystectomy.      SURGEON:  Romeo Pastor MD      ANESTHESIA:  General endotracheal.      INDICATIONS FOR PROCEDURE:  Monalisa Olguin presents with medical condition of liver disease with biliary colic as well.  She was seen by the GI team who agreed that she was optimized for surgery, and as such I recommended semi-elective laparoscopic cholecystectomy.  The patient had full understanding of the risks, goals, potential complications and the increased risks given her liver disease.  She has abstained for the last 3 weeks and I encouraged her to continue that progress.      OPERATIVE FINDINGS:  Cirrhotic liver with some friability at the liver bed and gallbladder removal, elected to proceed with simple laparoscopic cholecystectomy when operative time seemed to be increased.  Refer to below.      DESCRIPTION OF PROCEDURE:  The patient was brought to the operating room, put under general anesthesia, abdomen widely prepped and draped in the usual sterile fashion.  Infraumbilical skin incision was made.  Open technique used.  Thomas placed, abdomen insufflated, and 5 ports placed in routine position and technique.  Findings as noted above.  There was some cirrhosis of the liver.  Gallbladder was pulled out laterally.  Cystic duct, cystic artery doubly clipped with a 5-mm clip applier and cut with some leakage of bile as we removed the liver from the liver bed.  There was some friability there given her liver disease.  I was not surprised but given this increased operative time I elected to proceed with simple laparoscopic cholecystectomy.  Gallbladder was placed in an Endocatch bag without difficulty.  The right upper quadrant was irrigated with copious amounts of normal saline.  We returned to the liver bed and used electrocautery to  gain hemostasis there.  Estimated blood loss possibly 10 mL.  The gallbladder was retrieved through the infraumbilical port site.  The abdomen was then deflated after 20 mL of Marcaine was placed at the liver bed for postoperative pain control.  Fascial defect was closed with 0 Vicryl, skin with subcuticular.  Steri-Strips were applied.  The patient tolerated the procedure well and was taken to the recovery room where she was without difficulty or apparent complication.         WAYNE SHERMAN MD             D: 2017 09:07   T: 2017 09:19   MT: alyssa      Name:     RANJEET WELLINGTON   MRN:      [id number]        Account:        [id number]   :      1965           Procedure Date: 2017      Document: [id number]       cc: [facility] Surgery Billing

## 2017-09-25 NOTE — LETTER
[address]   [address]      September 26, 2017      Monalisa Olguin  [address] [place] [facility] [address]    Dear MEL Sheldon a Clinic Care Coordinator that works with your [facility]. I wanted to thank you for spending the time to talk with me following your hospitalization.  Below is a description of what Clinic Care Coordination is and how I can further assist you.     The Clinic Care Coordinator role is a Registered Nurse and/or  who understands the health care system. The goal of Clinic Care Coordination is to help you manage your health and improve access to the [facility] in the most efficient manner.  The Registered Nurse can assist you in meeting your health care goals by providing education, coordinating services, and strengthening the communication among your providers. The  can assist you with financial, behavioral, psychosocial, and chemical dependency and counseling/psychiatric resources.    Please feel free to keep this letter and contact information to contact me at [phone] with any further questions or concerns that may arise. We at [facility] are focused on providing you with the highest-quality healthcare experience possible and that all starts with you.       Sincerely,     Melissa Behl BSN, RN, PHN  [facility] Care Coordinator  [phone]  [email or web address]      Enclosed: I have enclosed a copy of the Complex Care Plan. This has helpful information and goals that we have talked about. Please keep this in an easy to access place to use as needed.

## 2017-09-25 NOTE — LETTER
[facility] Home  Complex Care Plan  About Me  Patient Name:  Monalisa Wellington    YOB: 1965  Age:   51 year old   [place] MRN: [id number] Telephone Information:     Home Phone [phone]   Mobile [phone]       Address:    [address]  [place] [address] Email address:  [email or web address]      Emergency Contact(s)  Name Relationship Lgl Grd Work Phone Home Phone Mobile Phone   1. MICHAEL WELLINGTON Spouse   [phone]   2. OSWALDO BAXTER Mother   [phone]           Primary language:  English     needed? No   [facility] [employer]:  [phone] op. 1  Other communication barriers: No  Preferred Method of Communication:  LetterAjay, Phone  Current living arrangement: I live in a private home with family  Mobility Status/ Medical Equipment: Independent  Other information to know about me:    Health Maintenance  Health Maintenance Reviewed: Up to date    My Access Plan  Medical Emergency 911   Primary Clinic Line [facility]- [phone]   24 Hour Appointment Line [phone] or  [phone] ([phone]) (toll-free)   24 Hour Nurse Line [phone] (toll-free)   Preferred Urgent Care Other   Preferred Hospital [facility]  [phone]   Preferred Pharmacy [facility] - [place], [place] - [address] [facility] [address]      Behavioral Health Crisis Line The National Suicide Prevention Lifeline at 1-289.997.5732 or 911     My Care Team Members  Patient Care Team       Relationship Specialty Notifications Start End    Efren Bustos MD PCP - General   8/22/03     Phone: [phone] Fax: [phone]         [address] [place] [address]    Andrea Escalante MD Referring Physician General Surgery  7/31/17     Comment:  Referring to General Surgery    Phone: [phone] Fax: [phone]         [address] [place] [address]    Romeo Pastor  MD Rj MD Surgery  7/31/17     Phone: [phone] Fax: [phone] [address] [facility] [address]    Behl, Melissa K, RN Clinic Care Coordinator Nurse Admissions 9/26/17     Comment:  Phone: [phone]         My Care Plans  Self Management and Treatment Plan  Goals and (Comments)  Goal #1: I will follow up with my primary provider as ary    Action Plans on File: Depression  Advance Care Plans/Directives Type:        My Medical and Care Information  Problem List   Patient Active Problem List   Diagnosis     Kidney donor     Esophageal reflux     Moderate Depression [296.32]     HYPERLIPIDEMIA LDL GOAL <100     Hypertension goal BP (blood pressure) < 130/80     Anxiety     [place] injury of thumb, left     Alcoholism (H)     Alcoholism in recovery (H)     Laceration of head without foreign body, unspecified part of head, sequela     Alcoholic hepatitis with ascites     Sludge in gallbladder     Anemia     Thrombocytopenia (H)     Tobacco abuse     Portal hypertension (H)     Pulmonary nodules     Hyperthyroidism     Hypophosphatemia     Severe malnutrition (H)     SIRS (systemic inflammatory response syndrome) (H)     Scleral icterus     Hallucinations     Choledocholithiasis      Current Medications and Allergies:  See printed Medication Report.    Care Coordination Start Date: 09/26/17   Frequency of Care Coordination: every 1-2 weeks   Form Last Updated: 09/26/2017

## 2017-09-25 NOTE — PROGRESS NOTES
"Cross cover note:    This patient was seen after her nurse was concerned about increased confusion, and wandering around her room. She says it is difficult to sleep with all the different noises around, and she doesn't want to bug other people. Mental status exam revealed that she is pleasant, alert, oriented to person and place, but believed it was Monday, the 1st. She knew the president is \"Trump.\" She does become tangential at times, for instance, talking about how \"my  doesn't want me to clean.\" She also knew she was being treated for \"gallstones.\" She is redirectable. Neuro exam showed CN II-XII grossly intact, normal sensation and strength, and normal coordination. Little concern for any acute neurologic deficit at this time. Will discuss with the day team.    Duc Sullivan, MS4    Resident Addendum: Agree with medical student assessment and plan as documented above.    Elida Sommers MD PhD  PGY-1, Internal Medicine  [phone]     "

## 2017-09-25 NOTE — PLAN OF CARE
Problem: Patient Care Overview  Goal: Plan of Care/Patient Progress Review  Outcome: Declining  Pt A/Ox2 overnight. Disoriented to place and time intermittently. Pt appeared increasingly confused displaying odd tendencies. Pt would constantly fidgit with her cords and mumble about meeting up with people that were not in hospital. Cross cover paged to assess pt, MD's feel pt is at baseline confusion level. Pt had lap elizabeth done yesterday, 3 surgical sites on abd are CDI. Pt VSS, on CAPNO overnight. Pt C/O pain in abd, PRN oxy given x2 overnight per pt request, relief. On reg diet. R PIV SLKelsey Spencer is for pt to DC to home today pending pts medical stability.

## 2017-09-25 NOTE — PROGRESS NOTES
GENERAL SURGERY PROGRESS NOTE    Patient: Monalisa Olguin  MRN: [id number]    Subjective  No acute overnight events. Pain well controlled. Voiding. No BM. No flatus. No n/v. Feels bloated. Ate dinner last night.    Objective  Temp:  [96.3  F (35.7  C)-98.1  F (36.7  C)] 97.5  F (36.4  C)  Heart Rate:  [66-87] 78  Resp:  [13-19] 19  BP: (108-142)/(52-92) 125/72  SpO2:  [92 %-99 %] 94 %    Eating breakfast on room entry. Non-labored breathing on room air. Some echymoses around the umbilicus but appropriate. Soft, minimal&appropriate diffuse abdominal tenderness, non-distended.    I/O last 3 completed shifts:  In: 1720 [P.O.:720; I.V.:1000]  Out: 1760 [Urine:1250; Other:500; Blood:10]    Labs: Glucose 161    Assessment & Plan  52 y/o F w/ alcoholic hepatitis POD 1 s/p lap donna for biliary colic ready for discharge home.  - Discharge instructions & follow up instructions ordered  - Okay to discharge home today w/ appropriate pain management  - We will sign off now    Thank you for the consult. We appreciate the opportunity to take part in this patient's care.    --  Adrián Murphy MD  Gen Surg PGY1

## 2017-09-25 NOTE — PLAN OF CARE
A&Ox4, VSS on RA. D/c home with ride. All instructions for outpatient appointments given and patient stated she understood all instructions in AVS. Oxycodone Rx faxed to 3rd floor pharmacy. Pt aware and able to  prescription when leaving. All belongings with patient in bag. PIV removed, catheter in tact.

## 2017-09-25 NOTE — PROGRESS NOTES
Clinic Care Coordination Contact    Situation: Patient chart reviewed by care coordinator.    Background: RN CC received CTS referral for patient.    Assessment: Patient remains inpatient at [facility].    Plan/Recommendations: RN CC will monitor for hospital discharge and then outreach to patient.    Melissa Behl BSN, RN, N  [facility] Care Coordinator  [phone]  [email or web address]

## 2017-09-25 NOTE — PLAN OF CARE
Problem: Patient Care Overview  Goal: Plan of Care/Patient Progress Review  Outcome: Adequate for Discharge Date Met:  09/25/17  Afebrile, VSS, alert and oriented x4.  Oxycodone and Tylenol given for pain x1.  Denies nausea.  Lap sites CDI.  Appetite good.  Voiding adequate amounts.  Pt. will discharge home today.  Continue to monitor.

## 2017-09-25 NOTE — PROGRESS NOTES
9/25/2017 Gastroenterology Brief Note    Chart reviewed  Patient here with biliary colic in the setting of liver disease  MRCP 9/22 without evidence for choledocholithiasis or obstructing lesion  S/p lap cholecystectomy yesterday  Tbili improving    No further intervention from advanced endoscopy standpoint  Patient likely discharging home today    The inpatient gastroenterology service will sign off at this time. Please call or repost with questions or if status changes. Thank you for allowing us to participate in the care of this patient.    Follow up with hepatology as scheduled (Dr. Kovacs, 11/28)    Above in collaboration with Dr. Ricardo Valdes PA-C  Advanced Endoscopy/Pancreaticobiliary Service  Pager *[phone]

## 2017-09-26 ENCOUNTER — CARE COORDINATION (OUTPATIENT)
Dept: CARE COORDINATION | Facility: CLINIC | Age: 52
End: 2017-09-26

## 2017-09-26 ENCOUNTER — CARE COORDINATION (OUTPATIENT)
Dept: SURGERY | Facility: CLINIC | Age: 52
End: 2017-09-26

## 2017-09-26 NOTE — PROGRESS NOTES
Monalisa Wellington is a patient of Dr. Romeo Sherman that underwent a Laparoscopic cholecystectomy approximately 2 days (9/24) ago.  Attempted to contact patient via telephone for a status update and review post op teaching.  LM on  to call office.  Await return call.      Of note:  Pathology:  See below  Wound:  Steri-Strips  Follow-up:  Routine, Follow up with primary care provider Dr. Efren Bustos 9/27/17 with hepatic panel prior, Follow up with hepatologist Dr. Edgardo Kovacs on 11/28/17  Restrictions:  Routine  New medications:  Oxycodone      Patient Name: MONALISA WELLINGTON   MR#: [id number]   Specimen #: [id number]   Collected: 9/24/2017   Received: 9/24/2017   Reported: 9/28/2017 10:32   Ordering Phy(s): ROMEO SHERMAN     For improved result formatting, select 'View Enhanced Report Format'   under Linked Documents section.     SPECIMEN(S):   Gallbladder and contents     FINAL DIAGNOSIS:   GALLBLADDER AND CONTENTS, CHOLECYSTECTOMY:   - Chronic cholecystitis   - One benign lymph node     I have personally reviewed all specimens and/or slides, including the   listed special stains, and used them with my medical judgement to   determine or confirm the final diagnosis.     Electronically signed out by:     Pasha Kline M.D

## 2017-09-26 NOTE — PROGRESS NOTES
Clinic Care Coordination Contact  OUTREACH    Referral Information:  Referral Source: CTS  Reason for Contact: RN CC call to patient for hospital follow up  Care Conference: No     Universal Utilization:   ED Visits in last year: 4  Hospital visits in last year: 2  Last PCP appointment: 06/13/17  Missed Appointments: 5  Concerns: frequent ED/IP   Multiple Providers or Specialists: yes    Clinical Concerns:  Current Medical Concerns: Patient was inpatient at [facility] 9/22/17-9/25/17 for SIRS/Sepsis, acute kidney injury, confusion, alcohol dependence, chronic anemia/thrombocytopenia, chronic depression and GERD who underwent a cholecystectomy on 9/24/17.  Patient reports pain in her abdomen since discharge, 7-8/10 with activity and 3/10 at rest.  Patient took oxycodone last night for pain and has been utilizing Tylenol for pain today.  RN CC advised patient not to use Tylenol unless instructed due to her liver issues.  RN CC will send message to PCP to inquire what pain medication patient may use.    Patient states her incisions are free from signs/symptoms of infection.  Patient states her umbilical incision had redness around it yesterday, but the provider had marked the redness and patient states it is improving.  Patient denies fever and has had a bowel movement since home.    Current Behavioral Concerns: Patient with diagnoses of moderate depression, anxiety and alcoholism in recovery.    Education Provided to patient: RN CC reviewed hospital discharge instructions and educated patient on care coordination services and only using pain medications if approved by a provider due to her multiple health issues.   Clinical Pathway Name: None    Medication Management:  Patient using Tylenol at this time for pain.  RN CC will inquire with PCP what pain medication is advised due to patient's alcoholic hepatitis and recent acute kidney injury.     Functional Status:  Mobility Status: Independent     Transportation: Patient's  spouse provides.           Psychosocial:  Current living arrangement:: I live in a private home with family  Financial/Insurance: No concerns at this time.       Resources and Interventions:  Current Resources:  ;          Advanced Care Plans/Directives on file:: No        Goals:   Goal 1 Statement: I will follow up with my primary provider as ary   Goal 1 Progression Percent: 0%  Goal 1 Progression Date: 09/26/17              Barriers: mental health diagnoses, pain  Strengths: Patient willing to engage with care coordination, has support of spouse  Patient/Caregiver understanding: Patient verbalized understanding of care coordination services and hospital discharge instructions.  Patient states she was not aware that Tylenol could affect her liver.  Frequency of Care Coordination: every 1-2 weeks  Upcoming appointment: 09/27/17     Plan:   Patient will see PCP tomorrow as planned for hospital f/u.  Patient will only take pain medications if advised/prescribed by a provider.  RN CC will mail out introduction letter, brochure and complex care plan.  RN CC will forward message to PCP regarding patient's Tylenol usage and clarify what pain medication patient may use.  RN CC will then follow up with patient regarding provider's response.    Melissa Behl BSN, RN, PHN  [facility] Care Coordinator  [phone]  [email or web address]

## 2017-09-26 NOTE — LETTER
Patient:  Monalisa Wellington  :   1965  MRN:     [id number]        Ms.Barbara Brooke Wellington  [address] [facility] [address]        2017    Dear ,    We are writing to inform you of your pathology results following your recent gallbladder surgery. The results show chronic cholecystitis. This means you had a chronically inflamed gallbladder. This is a normal finding following surgery, and no follow up is needed based on the pathology results.    If you have any further questions or concerns, please contact the General Surgery Clinic at [phone], option 3.    Sincerely,        Dr. Romeo Sherman                        Patient Name: MONALISA WELLINGTON   MR#: [id number]   Specimen #: [id number]   Collected: 2017   Received: 2017   Reported: 2017 10:32   Ordering Phy(s): ROMEO HSERMAN     For improved result formatting, select 'View Enhanced Report Format'   under Linked Documents section.     SPECIMEN(S):   Gallbladder and contents     FINAL DIAGNOSIS:   GALLBLADDER AND CONTENTS, CHOLECYSTECTOMY:   - Chronic cholecystitis   - One benign lymph node     I have personally reviewed all specimens and/or slides, including the   listed special stains, and used them with my medical judgement to   determine or confirm the final diagnosis.     Electronically signed out by:     Pasha Kline M.D

## 2017-09-26 NOTE — PROGRESS NOTES
Patient has clinic visit within 24-48 hours of Discharge so no post DC follow up call is needed              Sep 27, 2017  1:00 PM CDT   Pre-Op physical with Efren Bustos MD   [facility] ([facility])     [address] [place] [id number] [phone]                 Sep 29, 2017   Procedure with Bryant Nunes MD   [facility] ([facility])     [address] [place] [id number] [phone]

## 2017-09-26 NOTE — PROGRESS NOTES
We can use oxycodone 5 mg tabs 1 po q 6 hours prn pain.  We can give her #30 and I want her to use them sparingly.    Electronically signed by:  Efren Bustos M.D.  9/26/2017

## 2017-09-26 NOTE — PROGRESS NOTES
Patient was inpatient at [facility] 9/22/17-9/25/17 for SIRS/Sepsis, acute kidney injury, confusion, alcohol dependence, chronic anemia/thrombocytopenia, chronic depression and GERD who underwent a cholecystectomy on 9/24/17.  Patient reports pain in her abdomen since discharge, 7-8/10 with activity and 3/10 at rest.  Patient took oxycodone last night for pain and has been utilizing Tylenol for pain today.    Please advise what patient may use for pain control, as she was only dispensed #2 oxycodone and has been using Tylenol.    Melissa Behl BSN, RN, PHN  [facility] Care Coordinator  [phone]  [email or web address]

## 2017-09-27 ENCOUNTER — OFFICE VISIT (OUTPATIENT)
Dept: FAMILY MEDICINE | Facility: CLINIC | Age: 52
End: 2017-09-27
Payer: COMMERCIAL

## 2017-09-27 ENCOUNTER — TELEPHONE (OUTPATIENT)
Dept: FAMILY MEDICINE | Facility: CLINIC | Age: 52
End: 2017-09-27

## 2017-09-27 VITALS
HEART RATE: 77 BPM | DIASTOLIC BLOOD PRESSURE: 56 MMHG | TEMPERATURE: 97.3 F | WEIGHT: 130 LBS | RESPIRATION RATE: 16 BRPM | OXYGEN SATURATION: 99 % | BODY MASS INDEX: 24.56 KG/M2 | SYSTOLIC BLOOD PRESSURE: 92 MMHG

## 2017-09-27 DIAGNOSIS — K70.31 ALCOHOLIC CIRRHOSIS OF LIVER WITH ASCITES (H): Primary | ICD-10-CM

## 2017-09-27 DIAGNOSIS — R30.0 DYSURIA: ICD-10-CM

## 2017-09-27 DIAGNOSIS — K80.50 BILIARY COLIC: ICD-10-CM

## 2017-09-27 DIAGNOSIS — Z98.890 POST-OPERATIVE STATE: ICD-10-CM

## 2017-09-27 DIAGNOSIS — D69.6 THROMBOCYTOPENIA (H): ICD-10-CM

## 2017-09-27 DIAGNOSIS — D50.8 OTHER IRON DEFICIENCY ANEMIA: ICD-10-CM

## 2017-09-27 LAB
ALBUMIN SERPL-MCNC: 2.5 G/DL (ref 3.4–5)
ALBUMIN UR-MCNC: NEGATIVE MG/DL
ALP SERPL-CCNC: 223 U/L (ref 40–150)
ALT SERPL W P-5'-P-CCNC: 40 U/L (ref 0–50)
ANION GAP SERPL CALCULATED.3IONS-SCNC: 7 MMOL/L (ref 3–14)
APPEARANCE UR: ABNORMAL
AST SERPL W P-5'-P-CCNC: 64 U/L (ref 0–45)
BACTERIA #/AREA URNS HPF: ABNORMAL /HPF
BILIRUB SERPL-MCNC: 1.9 MG/DL (ref 0.2–1.3)
BILIRUB UR QL STRIP: NEGATIVE
BUN SERPL-MCNC: 9 MG/DL (ref 7–30)
CALCIUM SERPL-MCNC: 8 MG/DL (ref 8.5–10.1)
CHLORIDE SERPL-SCNC: 110 MMOL/L (ref 94–109)
CO2 SERPL-SCNC: 25 MMOL/L (ref 20–32)
COLOR UR AUTO: YELLOW
CREAT SERPL-MCNC: 0.88 MG/DL (ref 0.52–1.04)
ERYTHROCYTE [DISTWIDTH] IN BLOOD BY AUTOMATED COUNT: 19.7 % (ref 10–15)
GFR SERPL CREATININE-BSD FRML MDRD: 68 ML/MIN/1.7M2
GLUCOSE SERPL-MCNC: 99 MG/DL (ref 70–99)
GLUCOSE UR STRIP-MCNC: NEGATIVE MG/DL
HCT VFR BLD AUTO: 28.4 % (ref 35–47)
HGB BLD-MCNC: 8.4 G/DL (ref 11.7–15.7)
HGB UR QL STRIP: NEGATIVE
KETONES UR STRIP-MCNC: NEGATIVE MG/DL
LEUKOCYTE ESTERASE UR QL STRIP: ABNORMAL
MCH RBC QN AUTO: 25.7 PG (ref 26.5–33)
MCHC RBC AUTO-ENTMCNC: 29.6 G/DL (ref 31.5–36.5)
MCV RBC AUTO: 87 FL (ref 78–100)
NITRATE UR QL: NEGATIVE
PH UR STRIP: 6 PH (ref 5–7)
PLATELET # BLD AUTO: 99 10E9/L (ref 150–450)
POTASSIUM SERPL-SCNC: 3.7 MMOL/L (ref 3.4–5.3)
PROT SERPL-MCNC: 5.1 G/DL (ref 6.8–8.8)
RBC # BLD AUTO: 3.27 10E12/L (ref 3.8–5.2)
RBC #/AREA URNS AUTO: 1 /HPF (ref 0–2)
SODIUM SERPL-SCNC: 142 MMOL/L (ref 133–144)
SOURCE: ABNORMAL
SP GR UR STRIP: 1.01 (ref 1–1.03)
SQUAMOUS #/AREA URNS AUTO: 19 /HPF (ref 0–1)
TRANS CELLS #/AREA URNS HPF: 3 /HPF
UROBILINOGEN UR STRIP-MCNC: 0 MG/DL (ref 0–2)
WBC # BLD AUTO: 10.3 10E9/L (ref 4–11)
WBC #/AREA URNS AUTO: 7 /HPF (ref 0–2)

## 2017-09-27 PROCEDURE — 81001 URINALYSIS AUTO W/SCOPE: CPT | Performed by: FAMILY MEDICINE

## 2017-09-27 PROCEDURE — 85027 COMPLETE CBC AUTOMATED: CPT | Performed by: FAMILY MEDICINE

## 2017-09-27 PROCEDURE — 36415 COLL VENOUS BLD VENIPUNCTURE: CPT | Performed by: FAMILY MEDICINE

## 2017-09-27 PROCEDURE — 80053 COMPREHEN METABOLIC PANEL: CPT | Performed by: FAMILY MEDICINE

## 2017-09-27 PROCEDURE — 99214 OFFICE O/P EST MOD 30 MIN: CPT | Performed by: FAMILY MEDICINE

## 2017-09-27 RX ORDER — OXYCODONE HYDROCHLORIDE 5 MG/1
5-10 TABLET ORAL EVERY 4 HOURS PRN
Qty: 30 TABLET | Refills: 0 | Status: SHIPPED | OUTPATIENT
Start: 2017-09-27 | End: 2017-10-17

## 2017-09-27 RX ORDER — OXYCODONE HYDROCHLORIDE 5 MG/1
5-10 TABLET ORAL EVERY 4 HOURS PRN
Qty: 2 TABLET | Refills: 0 | Status: SHIPPED | OUTPATIENT
Start: 2017-09-27 | End: 2017-09-27

## 2017-09-27 ASSESSMENT — PAIN SCALES - GENERAL: PAINLEVEL: MODERATE PAIN (4)

## 2017-09-27 NOTE — MR AVS SNAPSHOT
After Visit Summary   9/27/2017    Monalisa Olguin    MRN: [id number]           Patient Information     Date Of Birth          1965        Visit Information        Provider Department      9/27/2017 1:00 PM Efren Bustos MD [facility]        Today's Diagnoses     Post-operative state        Dysuria        Biliary colic           Follow-ups after your visit        Your next 10 appointments already scheduled     Nov 28, 2017  8:30 AM CST   (Arrive by 8:15 AM)   New General Liver with Edgardo Kovacs MD   [facility] ([facility])    [address] [place] [address] [phone]              Who to contact     If you have questions or need follow up information about today's clinic visit or your schedule please contact [facility] directly at [phone].  Normal or non-critical lab and imaging results will be communicated to you by MyChart, letter or phone within 4 business days after the clinic has received the results. If you do not hear from us within 7 days, please contact the clinic through [employer] or phone. If you have a critical or abnormal lab result, we will notify you by phone as soon as possible.  Submit refill requests through [employer] or call your pharmacy and they will forward the refill request to us. Please allow 3 business days for your refill to be completed.          Additional Information About Your Visit        MyChart Information     [employer] gives you secure access to your electronic health record. If you see a primary care provider, you can also send messages to your care team and make appointments. If you have questions, please call your primary care clinic.  If you do not have a primary care provider, please call [phone] and they will assist you.        Care EveryWhere ID     This is your Care EveryWhere ID. This could be used by other organizations to access your [place]  medical records  [id number]        Your Vitals Were     Pulse Temperature Respirations Last Period Pulse Oximetry BMI (Body Mass Index)    77 97.3  F (36.3  C) (Tympanic) 16 05/16/2012 99% 24.56 kg/m2       Blood Pressure from Last 3 Encounters:   09/27/17 92/56   09/25/17 125/72   09/22/17 94/55    Weight from Last 3 Encounters:   09/27/17 130 lb (59 kg)   09/24/17 132 lb 3.2 oz (60 kg)   09/22/17 120 lb (54.4 kg)              We Performed the Following     *UA reflex to Microscopic and Culture ([place]; [facility]; [facility]; [facility]; [facility]; [facility]; [place]; [place])     CBC with platelets     Comprehensive metabolic panel          Where to get your medicines      Some of these will need a paper prescription and others can be bought over the counter.  Ask your nurse if you have questions.     Bring a paper prescription for each of these medications     oxyCODONE 5 MG IR tablet          Primary Care Provider Office Phone # Fax #    Efren Bustos [phone]       [address] [place] [address]        Equal Access to Services     ADRIENNE GILLIAM AH: Obed granadoso Sokeithali, waaxda luqadaha, qaybta kaalmada adeegyada, salo jara. So [facility] [phone].    ATENCIÓN: Si habla español, tiene a perez disposición servicios gratuitos de asistencia lingüística. Llame al [phone].    We comply with applicable federal civil rights laws and [place] laws. We do not discriminate on the basis of race, color, national origin, age, disability sex, sexual orientation or gender identity.            Thank you!     Thank you for choosing [facility]  for your care. Our goal is always to provide you with excellent care. Hearing back from our patients is one way we can continue to improve our services. Please take a few minutes to complete the written survey that you may receive in the mail after your visit with us. Thank  you!             Your Updated Medication List - Protect others around you: Learn how to safely use, store and throw away your medicines at www.disposemymeds.org.          This list is accurate as of: 9/27/17  1:30 PM.  Always use your most recent med list.                   Brand Name Dispense Instructions for use Diagnosis    buPROPion 300 MG 24 hr tablet    WELLBUTRIN XL    90 tablet    Take 1 tablet (300 mg) by mouth every morning    Major depressive disorder, recurrent episode, moderate (H)       busPIRone 15 MG tablet    BUSPAR    180 tablet    Take 1 tablet (15 mg) by mouth 2 times daily    MONA (generalized anxiety disorder)       naltrexone 50 MG tablet    DEPADE;REVIA    90 tablet    TAKE ONE-HALF TABLET BY MOUTH EVERY DAY FOR ONE WEEK THEN TAKE ONE TABLET DAILY AFTER    Alcohol dependence in remission (H)       omeprazole 20 MG CR capsule    priLOSEC    90 capsule    Take 1 capsule (20 mg) by mouth daily    Gastroesophageal reflux disease with esophagitis       oxyCODONE 5 MG IR tablet    ROXICODONE    2 tablet    Take 1-2 tablets (5-10 mg) by mouth every 4 hours as needed for moderate to severe pain    Biliary colic       propranolol 10 MG tablet    INDERAL    180 tablet    Take 1 tablet (10 mg) by mouth 2 times daily    Hypertension goal BP (blood pressure) < 130/80       sertraline 100 MG tablet    ZOLOFT    90 tablet    Take 1 tablet (100 mg) by mouth daily    Anxiety, Major depression       TEMAZEPAM PO      Take 15 mg by mouth nightly as needed for sleep        thiamine 100 MG tablet     90 tablet    TAKE ONE TABLET BY MOUTH EVERY DAY    Alcohol dependence in remission (H)       TL RICARDO RX 2.2-25-1 MG Tabs   Generic drug:  Folic Acid-Vit B6-Vit B12     90 tablet    TAKE ONE TABLET BY MOUTH EVERY DAY    Alcoholism in recovery (H)

## 2017-09-27 NOTE — NURSING NOTE
"Chief Complaint   Patient presents with     Surgical Followup     laparoscopic cholecystectomy  9/25/2017       Initial BP 92/56  Pulse 77  Temp 97.3  F (36.3  C) (Tympanic)  Resp 16  Wt 130 lb (59 kg)  LMP 05/16/2012  SpO2 99%  BMI 24.56 kg/m2 Estimated body mass index is 24.56 kg/(m^2) as calculated from the following:    Height as of 9/22/17: 5' 1\" (1.549 m).    Weight as of this encounter: 130 lb (59 kg).  Medication Reconciliation: complete    "

## 2017-09-27 NOTE — TELEPHONE ENCOUNTER
I gave her there wrong number on her oxycodone, so need to refill it for her.  Should have given her 30 not 2.    Electronically signed by:  Efren Bustos M.D.  9/27/2017

## 2017-09-27 NOTE — LETTER
[address]   [address]  Tel. [phone]/ Fax [phone]    December 12, 2017        Monalisa Olguin  [address] [facility] [address]          Dear Ms. Monalisa Olguin:    Your previous orders for lab work have been extended.  Please call to schedule a lab appointment and return to the clinic to have the labs drawn at your earliest convenience.    If you are required to be fasting for these tests,  remember to have nothing by mouth (except water) after midnight on the night before your test.    If you have any questions or concerns, please contact our office.    Sincerely,       Efren Bustos M.D.

## 2017-09-27 NOTE — PROGRESS NOTES
Clinic Care Coordination Contact    Situation: Patient chart reviewed by care coordinator.    Background: RN CC monitoring for provider response from message sent on 9/26/17.    Assessment: Patient was seen by PCP today, oxycodone prescribed during visit.    Plan/Recommendations: RN CC will follow up with patient next week.    Melissa Behl BSN, RN, PHN  [facility] Care Coordinator  [phone]  [email or web address]

## 2017-09-27 NOTE — TELEPHONE ENCOUNTER
Reason for Call:  Other prescription    Detailed comments: Patient states wrong prescription was refilled.  Oxycodone should not have been refilled.  Codeine without Tylenol should have been.  [facility]    Phone Number Patient can be reached at: Home number on file [phone] (home)    Best Time: any    Can we leave a detailed message on this number? YES    Call taken on 9/27/2017 at 2:26 PM by Rahul Brooks

## 2017-09-27 NOTE — PROGRESS NOTES
ICD-10-CM    1. Post-operative state Z98.890 Comprehensive metabolic panel     CBC with platelets   2. Dysuria R30.0 *UA reflex to Microscopic and Culture ([place]; [facility]; [facility]; [facility]; [facility]; [facility]; [place]; [place])   3. Biliary colic K80.50 DISCONTINUED: oxyCODONE (ROXICODONE) 5 MG IR tablet   4. Thrombocytopenia (H) D69.6    5. Other iron deficiency anemia D50.8       I forgot to address her thrombocytopenia and anemia. Both of which could be related to her alcoholic liver disease and chronic illness.  We will need to follow this up to see if it is recovering.  She has liver disease from her drinking so hopefully this will recover as she recovers from her alcoholism.  As her diet improves hopefully so will the blood indices.  We can recheck labs in 1-2 months.        Patient seen, note dictated, (ID#[id number]).  Electronically signed by:  Efren Bustos M.D.  9/27/2017

## 2017-09-28 LAB
BACTERIA SPEC CULT: NO GROWTH
BACTERIA SPEC CULT: NO GROWTH
BACTERIA SPEC CULT: NORMAL
BACTERIA SPEC CULT: NORMAL
COPATH REPORT: NORMAL
Lab: NORMAL
Lab: NORMAL
SPECIMEN SOURCE: NORMAL

## 2017-09-28 NOTE — PROGRESS NOTES
SUBJECTIVE:  Monalisa Olguin is in for followup of her cholecystectomy.  She had an urgent cholecystectomy for increasing abdominal pain.  It was done down at the .  She was evaluated initially on 09/22 for biliary colic.  She had some sepsis and was transferred down to the [facility] for that sepsis.  She ended up having an urgent laparoscopic cholecystectomy and then was discharged, it looks like, on 09/25.  She said her pain is better.  Initially, they had sent her home only was 2 oxycodone tablets, and she used those the night that she got home.   She is completely out now.  Her pain is better.  It seems to be okay when she is just kind of sitting and resting, but if she tries to get up and do anything around the house, the increased activity does increase her pain.  She had more last night when she tried to sleep, so did not sleep very well.  She has some redness around the umbilical incision, but that has resolved.  She has some bruising and some swelling in the lower part of the abdomen.  It looks like some edema.  Her appetite is not the greatest, but she has been trying to eat.  She is having a bowel movement and voiding without difficulty.  She wanted to do a UA just because she had a little bit of burning, so that is pending.      OBJECTIVE:  On exam, her vital signs are stable with a blood pressure of 92/56, pulse of 77 and respirations of 16.  She looks pretty good.  She has decent color.  She has no jaundice and no scleral icterus, although she looks a little pale.  Her hemoglobin when she left the hospital was only 7.9.  She also had a low albumin level.         Initially, her electrolytes were off.  Chloride was 115, bicarbonate was 19 and glucose was 206.  GFR was 56.  Her calcium was 8.1.  Bilirubin was 1.4, albumin 2.2, protein 4.8 and alkaline phosphatase 205.  ALT was normal.  AST was 48.  The day after surgery, those numbers came down a little bit.  Her sugar was back down to 121.  Chloride was  still at 115.  Calcium was a little bit lower at 8.3.  Bilirubin level was normal.  Albumin was 2.6, protein 5.5, alkaline phosphatase 236, ALT normal and AST 14.9.  Hemoglobin was 7.9 at discharge.  Platelets were 91.  White count was 3.1.        Her lungs are clear to auscultation throughout the anterior and posterior lung fields.  Heart is regular without murmur.  She has no scleral icterus or jaundice.  She has bruising around all 3 of the port sites, but there is no discharge or drainage.  She has a little bit of ecchymosis on the lower part of the right abdomen just from extravasation of blood into the subcutaneous tissue and a little bit of edema, but she has good bowel sounds throughout.  Her pain is appropriate for being 2 days out from surgery.      Repeat labs show her micro today showed 7 white cells, a few bacteria, 1 red cell and small leukocytes.  Culture is pending.  Comprehensive profile showed chloride is down to 110.  All the other electrolytes are normal.  Her GFR is back up to normal.  Calcium is still low at 8.  Total bilirubin is up a little bit at 1.9.  Albumin is 2.5 and protein 5.1.  Alkaline phosphatase is down at 223.  ALT is 40 and AST 64.  Those are all stable.  Her red count is up a little bit at 3.25.  Hemoglobin is 8.4.  Her platelets are still 99,000.  Labs are stable.      ASSESSMENT:  Postop followup, status post urgent cholecystectomy for acute cholecystitis and sepsis.      PLAN:  She is going to use Roxicodone 5 mg tablets 1-2 at a time just when she absolutely needs it for moderate to severe pain.  I told her that she should use it at bedtime if she needs to if that is her worst time of the day.  I reassured her that she should continue to get better and better.  I congratulated the patient for being sober for more than 2 months now.  She says that her naltrexone really helps that, and she takes every day faithfully.  She has no urge or desire to drink at all right now.         I spent 25 minutes with the patient, with more than 50% of that time discussing her surgery and postoperative cares.         PETER PATTERSON MD             D: 2017 16:37   T: 2017 13:05   MT: [id number]      Name:     RANJEET WELLINGTON   MRN:      [id number]        Account:      [id number]   :      1965           Visit Date:   2017      Document: [id number]

## 2017-10-09 DIAGNOSIS — F41.9 ANXIETY: ICD-10-CM

## 2017-10-09 DIAGNOSIS — E83.39 HYPOPHOSPHATEMIA: ICD-10-CM

## 2017-10-09 NOTE — TELEPHONE ENCOUNTER
Prozac      Last Written Prescription Date:  4/17/17  Last Fill Quantity: 180,   # refills: 0  Last Office Visit with [facility], [facility] or [facility] prescribing provider: 9/27/17  Future Office visit:       Routing refill request to provider for review/approval because:  Drug not active on patient's medication list                Phosphorus          Last Written Prescription Date:  4/5/17  Last Fill Quantity: 28,   # refills: 0  Last Office Visit with [facility], [facility] or [facility] prescribing provider: 9/27/17  Future Office visit:       Routing refill request to provider for review/approval because:  Drug not active on patient's medication list

## 2017-10-10 DIAGNOSIS — K70.11 ALCOHOLIC HEPATITIS WITH ASCITES (H): Primary | ICD-10-CM

## 2017-10-10 RX ORDER — SOD PHOS DI, MONO/K PHOS MONO 250 MG
TABLET ORAL
Qty: 120 TABLET | Refills: 3 | Status: SHIPPED | OUTPATIENT
Start: 2017-10-10 | End: 2017-11-02

## 2017-10-12 ENCOUNTER — TELEPHONE (OUTPATIENT)
Dept: FAMILY MEDICINE | Facility: CLINIC | Age: 52
End: 2017-10-12

## 2017-10-12 ENCOUNTER — CARE COORDINATION (OUTPATIENT)
Dept: CARE COORDINATION | Facility: CLINIC | Age: 52
End: 2017-10-12

## 2017-10-12 DIAGNOSIS — R41.89 COGNITIVE CHANGE: Primary | ICD-10-CM

## 2017-10-12 NOTE — TELEPHONE ENCOUNTER
"Therapist has told her that she may have \"Wet Brain\" and she is wondering if there is anything that can be done to confirm this or not.  She is seeing the Hepatologist for her liver disease and is wondering if she can hold off on the upper and lower endoscopies for now.  I told her that those are less critical but if she has met her deductible for the year it may be worth it to get them done before the end of the calendar year.     Electronically signed by:  Efren Bustos M.D.  10/12/2017    "

## 2017-10-12 NOTE — PROGRESS NOTES
Clinic Care Coordination Contact  [facility]/Voicemail    Referral Source: CTS  Clinical Data: Care Coordinator Outreach  Outreach attempted x 1.  Left message on voicemail with call back information and requested return call.  Plan: Care Coordinator mailed out care coordination introduction letter on 9/26/17. Care Coordinator will try to reach patient again in 5-10 business days.    Melissa Behl BSN, RN, N  [facility] Care Coordinator  [phone]  [email or web address]

## 2017-10-12 NOTE — TELEPHONE ENCOUNTER
Reason for Call:  Other questions    Detailed comments: patient would like a call back from Dr. Bustos to discuss what is going on with her health, having a lot of tests done for alcoholism. Patient is wanted to know  what order she should do it. Patient did have kidneys removed.     Phone Number Patient can be reached at: Home number on file [phone] (home)    Best Time: any     Can we leave a detailed message on this number? YES    Call taken on 10/12/2017 at 4:20 PM by Shanique Martinez

## 2017-10-17 ENCOUNTER — HOSPITAL ENCOUNTER (EMERGENCY)
Facility: CLINIC | Age: 52
Discharge: HOME | End: 2017-10-18
Attending: FAMILY MEDICINE | Admitting: FAMILY MEDICINE
Payer: COMMERCIAL

## 2017-10-17 DIAGNOSIS — R41.82 ALTERED MENTAL STATUS, UNSPECIFIED ALTERED MENTAL STATUS TYPE: ICD-10-CM

## 2017-10-17 LAB
ALBUMIN SERPL-MCNC: 3.2 G/DL (ref 3.4–5)
ALBUMIN UR-MCNC: NEGATIVE MG/DL
ALP SERPL-CCNC: 291 U/L (ref 40–150)
ALT SERPL W P-5'-P-CCNC: 79 U/L (ref 0–50)
AMPHETAMINES UR QL: NOT DETECTED NG/ML
ANION GAP SERPL CALCULATED.3IONS-SCNC: 7 MMOL/L (ref 3–14)
APAP SERPL-MCNC: <2 MG/L (ref 10–20)
APPEARANCE UR: CLEAR
AST SERPL W P-5'-P-CCNC: 206 U/L (ref 0–45)
BARBITURATES UR QL SCN: NOT DETECTED NG/ML
BASOPHILS # BLD AUTO: 0 10E9/L (ref 0–0.2)
BASOPHILS NFR BLD AUTO: 0.5 %
BENZODIAZ UR QL SCN: ABNORMAL NG/ML
BILIRUB SERPL-MCNC: 1.5 MG/DL (ref 0.2–1.3)
BILIRUB UR QL STRIP: NEGATIVE
BUN SERPL-MCNC: 11 MG/DL (ref 7–30)
BUPRENORPHINE UR QL: NOT DETECTED NG/ML
CALCIUM SERPL-MCNC: 8.6 MG/DL (ref 8.5–10.1)
CANNABINOIDS UR QL: NOT DETECTED NG/ML
CHLORIDE SERPL-SCNC: 110 MMOL/L (ref 94–109)
CO2 SERPL-SCNC: 25 MMOL/L (ref 20–32)
COCAINE UR QL SCN: NOT DETECTED NG/ML
COLOR UR AUTO: YELLOW
CREAT SERPL-MCNC: 1.06 MG/DL (ref 0.52–1.04)
D-METHAMPHET UR QL: NOT DETECTED NG/ML
DIFFERENTIAL METHOD BLD: ABNORMAL
EOSINOPHIL # BLD AUTO: 0.1 10E9/L (ref 0–0.7)
EOSINOPHIL NFR BLD AUTO: 2.5 %
ERYTHROCYTE [DISTWIDTH] IN BLOOD BY AUTOMATED COUNT: 19.6 % (ref 10–15)
ETHANOL SERPL-MCNC: <0.01 G/DL
GFR SERPL CREATININE-BSD FRML MDRD: 55 ML/MIN/1.7M2
GLUCOSE SERPL-MCNC: 112 MG/DL (ref 70–99)
GLUCOSE UR STRIP-MCNC: NEGATIVE MG/DL
HCT VFR BLD AUTO: 28.2 % (ref 35–47)
HGB BLD-MCNC: 8.1 G/DL (ref 11.7–15.7)
HGB UR QL STRIP: NEGATIVE
IMM GRANULOCYTES # BLD: 0 10E9/L (ref 0–0.4)
IMM GRANULOCYTES NFR BLD: 0 %
KETONES UR STRIP-MCNC: NEGATIVE MG/DL
LEUKOCYTE ESTERASE UR QL STRIP: NEGATIVE
LYMPHOCYTES # BLD AUTO: 1.4 10E9/L (ref 0.8–5.3)
LYMPHOCYTES NFR BLD AUTO: 35.3 %
MCH RBC QN AUTO: 25.5 PG (ref 26.5–33)
MCHC RBC AUTO-ENTMCNC: 28.7 G/DL (ref 31.5–36.5)
MCV RBC AUTO: 89 FL (ref 78–100)
METHADONE UR QL SCN: NOT DETECTED NG/ML
MONOCYTES # BLD AUTO: 0.6 10E9/L (ref 0–1.3)
MONOCYTES NFR BLD AUTO: 13.5 %
NEUTROPHILS # BLD AUTO: 2 10E9/L (ref 1.6–8.3)
NEUTROPHILS NFR BLD AUTO: 48.2 %
NITRATE UR QL: NEGATIVE
OPIATES UR QL SCN: NOT DETECTED NG/ML
OXYCODONE UR QL SCN: NOT DETECTED NG/ML
PCP UR QL SCN: NOT DETECTED NG/ML
PH UR STRIP: 7 PH (ref 5–7)
PLATELET # BLD AUTO: 100 10E9/L (ref 150–450)
POTASSIUM SERPL-SCNC: 3.8 MMOL/L (ref 3.4–5.3)
PROPOXYPH UR QL: NOT DETECTED NG/ML
PROT SERPL-MCNC: 6.3 G/DL (ref 6.8–8.8)
RBC # BLD AUTO: 3.18 10E12/L (ref 3.8–5.2)
RBC #/AREA URNS AUTO: <1 /HPF (ref 0–2)
SALICYLATES SERPL-MCNC: <2 MG/DL
SODIUM SERPL-SCNC: 142 MMOL/L (ref 133–144)
SOURCE: ABNORMAL
SP GR UR STRIP: 1.01 (ref 1–1.03)
TRICYCLICS UR QL SCN: NOT DETECTED NG/ML
UROBILINOGEN UR STRIP-MCNC: 4 MG/DL (ref 0–2)
WBC # BLD AUTO: 4.1 10E9/L (ref 4–11)
WBC #/AREA URNS AUTO: <1 /HPF (ref 0–2)

## 2017-10-17 PROCEDURE — 85025 COMPLETE CBC W/AUTO DIFF WBC: CPT | Performed by: FAMILY MEDICINE

## 2017-10-17 PROCEDURE — 80329 ANALGESICS NON-OPIOID 1 OR 2: CPT | Performed by: FAMILY MEDICINE

## 2017-10-17 PROCEDURE — 93010 ELECTROCARDIOGRAM REPORT: CPT | Mod: Z6 | Performed by: FAMILY MEDICINE

## 2017-10-17 PROCEDURE — 80053 COMPREHEN METABOLIC PANEL: CPT | Performed by: FAMILY MEDICINE

## 2017-10-17 PROCEDURE — 99285 EMERGENCY DEPT VISIT HI MDM: CPT | Mod: 25 | Performed by: FAMILY MEDICINE

## 2017-10-17 PROCEDURE — 80306 DRUG TEST PRSMV INSTRMNT: CPT | Performed by: FAMILY MEDICINE

## 2017-10-17 PROCEDURE — 25000128 H RX IP 250 OP 636: Performed by: FAMILY MEDICINE

## 2017-10-17 PROCEDURE — 96361 HYDRATE IV INFUSION ADD-ON: CPT | Performed by: FAMILY MEDICINE

## 2017-10-17 PROCEDURE — 80320 DRUG SCREEN QUANTALCOHOLS: CPT | Performed by: FAMILY MEDICINE

## 2017-10-17 PROCEDURE — 96360 HYDRATION IV INFUSION INIT: CPT | Performed by: FAMILY MEDICINE

## 2017-10-17 PROCEDURE — 99284 EMERGENCY DEPT VISIT MOD MDM: CPT | Mod: 25 | Performed by: FAMILY MEDICINE

## 2017-10-17 PROCEDURE — 90791 PSYCH DIAGNOSTIC EVALUATION: CPT

## 2017-10-17 PROCEDURE — 93005 ELECTROCARDIOGRAM TRACING: CPT | Performed by: FAMILY MEDICINE

## 2017-10-17 PROCEDURE — 81001 URINALYSIS AUTO W/SCOPE: CPT | Mod: 59 | Performed by: FAMILY MEDICINE

## 2017-10-17 RX ORDER — SODIUM CHLORIDE 9 MG/ML
1000 INJECTION, SOLUTION INTRAVENOUS CONTINUOUS
Status: DISCONTINUED | OUTPATIENT
Start: 2017-10-17 | End: 2017-10-18 | Stop reason: HOSPADM

## 2017-10-17 RX ORDER — TRAZODONE HYDROCHLORIDE 50 MG/1
50 TABLET, FILM COATED ORAL DAILY
COMMUNITY
Start: 2017-10-09 | End: 2017-11-02

## 2017-10-17 RX ADMIN — SODIUM CHLORIDE 1000 ML: 9 INJECTION, SOLUTION INTRAVENOUS at 22:57

## 2017-10-17 ASSESSMENT — ENCOUNTER SYMPTOMS
NAUSEA: 0
VOMITING: 0
FREQUENCY: 0
DYSURIA: 0
COUGH: 0
FEVER: 0
ABDOMINAL PAIN: 0
SHORTNESS OF BREATH: 0
DIFFICULTY URINATING: 0

## 2017-10-17 NOTE — ED AVS SNAPSHOT
[facility] Emergency Department    [address] [place] [address]    Phone:  [phone]    Fax:  [phone]                                       Monalisa Olguin   MRN: [id number]    Department:  [facility] Emergency Department   Date of Visit:  10/17/2017           After Visit Summary Signature Page     I have received my discharge instructions, and my questions have been answered. I have discussed any challenges I see with this plan with the nurse or doctor.    ..........................................................................................................................................  Patient/Patient Representative Signature      ..........................................................................................................................................  Patient Representative Print Name and Relationship to Patient    ..................................................               ................................................  Date                                            Time    ..........................................................................................................................................  Reviewed by Signature/Title    ...................................................              ..............................................  Date                                                            Time

## 2017-10-17 NOTE — TELEPHONE ENCOUNTER
Referral placed for patient to see a neuropsychologist for evaluation per Dr. Bustos.   Daniela PAGE

## 2017-10-17 NOTE — TELEPHONE ENCOUNTER
I spoke with Lazara Mckeon the patients alcohol and drug counselor.  She states that Heydi complains of cognitive, balance and memory issues and gets easily confused in group counseling sessions during some of the lesions they go through together.  I would recommend that we do a Neurologic assessment of Heydi to see if these cognitive issues are related to her chronic alcoholism or if there is anything else going on. Can you set her up for that evaluation?    Electronically signed by:  Efren Bustos M.D.  10/17/2017

## 2017-10-17 NOTE — ED AVS SNAPSHOT
[facility] Emergency Department    [address] [place] [address]    Phone:  [phone]    Fax:  [phone]                                       Monalisa Olguin   MRN: [id number]    Department:  [facility] Emergency Department   Date of Visit:  10/17/2017           Patient Information     Date Of Birth          1965        Your diagnoses for this visit were:     Altered mental status, unspecified altered mental status type due to meds       You were seen by Patrick Jurado MD.      Follow-up Information     Follow up with Efren Bustos MD.    Specialty:  Family Practice    Contact information:    [address] [place] [id number] [phone]          Discharge Instructions       Take your medications as directed.    Recheck in clinic with Dr. Bustos.  Your blood tests were reassuring tonight.  It was nice visiting with you again.  I wish you the very best going forward.    Thank you for choosing [facility]. We appreciate the opportunity to meet your urgent medical needs. Please let us know if we could have done anything to make your stay more satisfying.    After discharge, please closely monitor for any new or worsening symptoms. Return to the Emergency Department if you develop any acute worsening signs or symptoms.    If you had lab work, cultures or imaging studies done during your stay, the final results may still be pending. We will call you if your plan of care needs to change. However, if you are not improving as expected, please follow up with your primary care provider or clinic.     Start any prescription medications that were prescribed to you and take them as directed.     Please see additional handouts that may be pertinent to your condition.        Future Appointments        Provider Department Dept Phone Center    11/28/2017 8:30 AM Edgardo Kovacs MD [facility] Hepatology [phone] [facility]      24 Hour Appointment  Hotline       To make an appointment at any [facility]3], call [phone] ([phone]). If you don't have a family doctor or clinic, we will help you find one. [facility] are conveniently located to serve the needs of you and your family.             Review of your medicines      Our records show that you are taking the medicines listed below. If these are incorrect, please call your family doctor or clinic.        Dose / Directions Last dose taken    buPROPion 300 MG 24 hr tablet   Commonly known as:  WELLBUTRIN XL   Dose:  300 mg   Quantity:  90 tablet        Take 1 tablet (300 mg) by mouth every morning   Refills:  3        busPIRone 15 MG tablet   Commonly known as:  BUSPAR   Dose:  15 mg   Quantity:  180 tablet        Take 1 tablet (15 mg) by mouth 2 times daily   Refills:  3        FLUoxetine 20 MG capsule   Commonly known as:  PROzac   Quantity:  180 capsule        TAKE TWO CAPSULES BY MOUTH EVERY DAY   Refills:  3        naltrexone 50 MG tablet   Commonly known as:  DEPADE;REVIA   Quantity:  90 tablet        TAKE ONE-HALF TABLET BY MOUTH EVERY DAY FOR ONE WEEK THEN TAKE ONE TABLET DAILY AFTER   Refills:  3        omeprazole 20 MG CR capsule   Commonly known as:  priLOSEC   Dose:  20 mg   Quantity:  90 capsule        Take 1 capsule (20 mg) by mouth daily   Refills:  3        propranolol 10 MG tablet   Commonly known as:  INDERAL   Dose:  10 mg   Quantity:  180 tablet        Take 1 tablet (10 mg) by mouth 2 times daily   Refills:  3        sertraline 100 MG tablet   Commonly known as:  ZOLOFT   Dose:  100 mg   Quantity:  90 tablet        Take 1 tablet (100 mg) by mouth daily   Refills:  3        TEMAZEPAM PO   Dose:  15 mg        Take 15 mg by mouth nightly as needed for sleep   Refills:  0        thiamine 100 MG tablet   Quantity:  90 tablet        TAKE ONE TABLET BY MOUTH EVERY DAY   Refills:  3        TL RICARDO RX 2.2-25-1 MG Tabs   Quantity:  90 tablet   Generic drug:  Folic Acid-Vit B6-Vit  B12        TAKE ONE TABLET BY MOUTH EVERY DAY   Refills:  3        traZODone 50 MG tablet   Commonly known as:  DESYREL   Dose:  50 mg        Take 50 mg by mouth daily   Refills:  0        VIRT-PHOS 250 NEUTRAL 250 MG per tablet   Quantity:  120 tablet   Generic drug:  phosphorus tablet 250 mg        TAKE TWO TABLETS BY MOUTH TWICE A DAY   Refills:  3                Procedures and tests performed during your visit     Acetaminophen level    CBC with platelets differential    Cardiac Continuous Monitoring    Comprehensive metabolic panel    EKG 12-lead, tracing only    Ethanol level    Peripheral IV: Standard    Pulse oximetry nursing    Salicylate level    UA with Microscopic    Urine Drugs of Abuse Screen Panel 13      Orders Needing Specimen Collection     None      Pending Results     No orders found for last 3 day(s).            Pending Culture Results     No orders found for last 3 day(s).            Pending Results Instructions     If you had any lab results that were not finalized at the time of your Discharge, you can call the ED Lab Result RN at [phone]. You will be contacted by this team for any positive Lab results or changes in treatment. The nurses are available 7 days a week from 10A to 6:30P.  You can leave a message 24 hours per day and they will return your call.        Thank you for choosing [facility]       Thank you for choosing [facility] for your care. Our goal is always to provide you with excellent care. Hearing back from our patients is one way we can continue to improve our services. Please take a few minutes to complete the written survey that you may receive in the mail after you visit with us. Thank you!        [facility] Information     [facility] gives you secure access to your electronic health record. If you see a primary care provider, you can also send messages to your care team and make appointments. If you have questions, please call your primary care clinic.  If you do not have a  primary care provider, please call [phone] and they will assist you.        Care EveryWhere ID     This is your Care EveryWhere ID. This could be used by other organizations to access your [facility] medical records  [id number]        Equal Access to Services     ADRIENNE GILLIAM : Obed Wei, rachna george, salo moseley. So [facility] [phone].    ATENCIÓN: Si habla español, tiene a perez disposición servicios gratuitos de asistencia lingüística. Llame al [phone].    We comply with applicable federal civil rights laws and [place] laws. We do not discriminate on the basis of race, color, national origin, age, disability, sex, sexual orientation, or gender identity.            After Visit Summary       This is your record. Keep this with you and show to your community pharmacist(s) and doctor(s) at your next visit.

## 2017-10-18 ENCOUNTER — CARE COORDINATION (OUTPATIENT)
Dept: CARE COORDINATION | Facility: CLINIC | Age: 52
End: 2017-10-18

## 2017-10-18 ENCOUNTER — TELEPHONE (OUTPATIENT)
Dept: FAMILY MEDICINE | Facility: CLINIC | Age: 52
End: 2017-10-18

## 2017-10-18 VITALS
OXYGEN SATURATION: 94 % | RESPIRATION RATE: 20 BRPM | HEIGHT: 61 IN | WEIGHT: 130 LBS | SYSTOLIC BLOOD PRESSURE: 124 MMHG | BODY MASS INDEX: 24.55 KG/M2 | DIASTOLIC BLOOD PRESSURE: 76 MMHG | HEART RATE: 85 BPM

## 2017-10-18 NOTE — ED NOTES
While completing ordered tasks at bedside, pt has been sharing that she has had a lot of stress recently, that the police were at her home regarding a pending half-way sentencing and that she has recently completed her second outpatient detox program for ETOH.  She reports that she has been sober for 3 months.  She states that since her second sleep aid was prescribed, she has been having auditory hallucinations.  She states that she can hear her mother and her sister speaking to each other in the room and sometimes to her.  She denies any auditory suggestions, just that she hears them and they keep her company.

## 2017-10-18 NOTE — ED NOTES
Pt in with altered mental status,  states she has slurred speech and he is concerned she took to many of her medications

## 2017-10-18 NOTE — DISCHARGE INSTRUCTIONS
Take your medications as directed.    Recheck in clinic with Dr. Bustos.  Your blood tests were reassuring tonight.  It was nice visiting with you again.  I wish you the very best going forward.    Thank you for choosing [facility]. We appreciate the opportunity to meet your urgent medical needs. Please let us know if we could have done anything to make your stay more satisfying.    After discharge, please closely monitor for any new or worsening symptoms. Return to the Emergency Department if you develop any acute worsening signs or symptoms.    If you had lab work, cultures or imaging studies done during your stay, the final results may still be pending. We will call you if your plan of care needs to change. However, if you are not improving as expected, please follow up with your primary care provider or clinic.     Start any prescription medications that were prescribed to you and take them as directed.     Please see additional handouts that may be pertinent to your condition.

## 2017-10-18 NOTE — PROGRESS NOTES
Clinic Care Coordination Contact  [facility]/Voicemail    Referral Source: CTS  Clinical Data: Care Coordinator Outreach  Outreach attempted x 1.  Left message on voicemail with call back information and requested return call.  Plan: Care Coordinator mailed out care coordination introduction letter on 9/26/17. Care Coordinator will try to reach patient again in 1-2 business days.    Melissa Behl BSN, RN, N  [facility] Care Coordinator  [phone]  [email or web address]

## 2017-10-18 NOTE — TELEPHONE ENCOUNTER
Attempted to contact Lazara, but unavailable. I left her a message to call me tomorrow.      Electronically signed by:  Efren Bustos M.D.  10/18/2017

## 2017-10-18 NOTE — ED PROVIDER NOTES
"  History     Chief Complaint   Patient presents with     Altered Mental Status     Drug Overdose     The history is provided by the patient.     Monalisa Olguin is a 52 year old female with a history of depression, anxiety, alcoholism, anemia, hyperthyroidism, hypophosphatemia, and hallucinations, who presents to the emergency department via EMS with concerns of an altered mental status and possible drug overdose. Patient  states that she has slurred speech. He is also concerned that she took to many of her medications.  Patient reports that her counselor is worried about her, but is unsure why. She denies alcohol use or abuse of her medication.     She is a bit somnolent but she awakes and is able to carry on a coherent conversation.  She states she took her morning meds as usual and did take her evening meds a little sooner than normal because she wanted to gets better rest   She states that she is supposed to take 1 or 2 trazodone tabs at bedtime to help her sleep but she took 3 tonight so she could get better sleep as she has lots of things going on.  She wasn't trying to harm herself in any way.      She has an upcoming court date next week and may be looking at some intermediate time.  Denies being suicidal.  She was recently hospitalized with sepsis and transferred to the [facility] where she underwent a laparoscopic cholecystectomy.  She does have alcoholic liver disease as well.  She denies any recent fevers.  No cough or sore throat.  Some constipation.  No troubles urinating.  No chest pain, shortness of breath or abdominal pain.  She does get some leg pain if she sits too long while doing crafts.      I have reviewed the Medications, Allergies, Past Medical and Surgical History, and Social History in the Epic system.    Allergies:   Allergies   Allergen Reactions     Albuterol      Tongue \"hardened and painful\"         No current facility-administered medications on file prior to encounter. "   Current Outpatient Prescriptions on File Prior to Encounter:  VIRT-PHOS 250 NEUTRAL [id number] MG per tablet TAKE TWO TABLETS BY MOUTH TWICE A DAY   FLUoxetine (PROZAC) 20 MG capsule TAKE TWO CAPSULES BY MOUTH EVERY DAY   TEMAZEPAM PO Take 15 mg by mouth nightly as needed for sleep   naltrexone (DEPADE;REVIA) 50 MG tablet TAKE ONE-HALF TABLET BY MOUTH EVERY DAY FOR ONE WEEK THEN TAKE ONE TABLET DAILY AFTER   sertraline (ZOLOFT) 100 MG tablet Take 1 tablet (100 mg) by mouth daily   propranolol (INDERAL) 10 MG tablet Take 1 tablet (10 mg) by mouth 2 times daily   TL RICARDO RX 2.2-25-1 MG TABS TAKE ONE TABLET BY MOUTH EVERY DAY   VITAMIN B-1 100 MG tablet TAKE ONE TABLET BY MOUTH EVERY DAY   busPIRone (BUSPAR) 15 MG tablet Take 1 tablet (15 mg) by mouth 2 times daily   buPROPion (WELLBUTRIN XL) 300 MG 24 hr tablet Take 1 tablet (300 mg) by mouth every morning   omeprazole (PRILOSEC) 20 MG CR capsule Take 1 capsule (20 mg) by mouth daily       Patient Active Problem List   Diagnosis     Kidney donor     Esophageal reflux     Moderate Depression [296.32]     HYPERLIPIDEMIA LDL GOAL <100     Hypertension goal BP (blood pressure) < 130/80     Anxiety     Crushing injury of thumb, left     Alcoholism (H)     Alcoholism in recovery (H)     Laceration of head without foreign body, unspecified part of head, sequela     Alcoholic hepatitis with ascites     Sludge in gallbladder     Anemia     Thrombocytopenia (H)     Tobacco abuse     Portal hypertension (H)     Pulmonary nodules     Hyperthyroidism     Hypophosphatemia     Severe malnutrition (H)     SIRS (systemic inflammatory response syndrome) (H)     Scleral icterus     Hallucinations     Choledocholithiasis       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     COLONOSCOPY N/A 2017    Procedure: COLONOSCOPY;  Colonoscopy;  Surgeon:  "Sai Johnston MD;  Location:  GI     HC KNEE SCOPE, DIAGNOSTIC  11/14/01    Arthroscopy, Lt Knee     LAPAROSCOPIC CHOLECYSTECTOMY N/A 9/24/2017    Procedure: LAPAROSCOPIC CHOLECYSTECTOMY;  laparoscopic cholecystectomy;  Surgeon: Romeo Pastor MD;  Location:  OR       Social History   Substance Use Topics     Smoking status: Current Every Day Smoker     Packs/day: 0.50     Years: 10.00     Smokeless tobacco: Never Used      Comment: pt quit 1992 after smoking for 8 yrs, started again in 2004     Alcohol use No      Comment: stopped drinking 8/17       Most Recent Immunizations   Administered Date(s) Administered     Influenza (IIV3) 11/06/2006     Influenza Vaccine IM 3yrs+ 4 Valent IIV4 09/25/2017     Mantoux 08/26/2013     Pneumococcal 23 valent 05/26/2013     TDAP Vaccine (Adacel) 06/14/2016       BMI: Estimated body mass index is 24.56 kg/(m^2) as calculated from the following:    Height as of this encounter: 1.549 m (5' 1\").    Weight as of this encounter: 59 kg (130 lb).      Review of Systems   Constitutional: Negative for fever.   Respiratory: Negative for cough and shortness of breath.    Gastrointestinal: Negative for abdominal pain, nausea and vomiting.   Genitourinary: Negative for difficulty urinating, dysuria and frequency.   All other systems reviewed and are negative.      Physical Exam   BP: (P) 99/64  Heart Rate: 80  Resp: 16  Height: 154.9 cm (5' 1\")  Weight: 59 kg (130 lb)  SpO2: 99 %       Physical Exam   Constitutional: She is oriented to person, place, and time. She appears well-developed and well-nourished. No distress.   Somnolent but arouses to voice and is able to carry on a coherent conversation.  She was chewing gum and then ended up swallowing it.   HENT:   Mouth/Throat: Oropharynx is clear and moist.   Eyes: EOM are normal.   Neck: Neck supple.   Cardiovascular: Normal rate, regular rhythm and normal heart sounds.    No murmur heard.  Pulmonary/Chest: Effort normal and " breath sounds normal. No respiratory distress.   Abdominal: Soft. There is tenderness (RLQ since kidney surgery). There is no rebound.   Musculoskeletal: Normal range of motion. She exhibits no edema or tenderness.   Neurological: She is oriented to person, place, and time. She has normal strength. She is not disoriented. No cranial nerve deficit or sensory deficit. GCS eye subscore is 3. GCS verbal subscore is 5. GCS motor subscore is 6.   Skin: Skin is warm and dry.   Psychiatric: She has a normal mood and affect. Her speech is delayed and slurred (slight). She is actively hallucinating (hears voices of relatives.  not threatening, they keep her company). She expresses no suicidal (denies) ideation.       ED Course      IV placed.  Labs drawn.  1 L normal saline started while waiting for labs to come back.  UA will be sent along with a urine tox screen.      Further history is obtained.  She opened up the nursing staff and does admit to hearing some voices of relatives including her sister who lives in [place], and her mom who lives in [place], [place].  The voices are not threatening and she states that they keep her company.  This has been going on since she started her sleep medications.  She has been sober for 3 months and the intermediate time that she is looking at his related to her alcohol use in the past.  Will ask DEC to assess her tonight.    DEC  spoke with her.  She is not suicidal.  She tends to be a bit forgetful and has poor memory likely due to being a bit overmedicated.  She is remorseful about the problems that her alcohol use has caused for her family.  She has a 13-year-old child that she loves dearly and wants to be around for.  The voices that she hears on occasion are pleasant.  The DEC  was unable to determine whether they are true hallucinations or just thoughts in her mind, possibly due to her medications.  In any event they are not distressing and she is not suicidal.  She is more  awake and feels ready to go home.         ED Course     Procedures               EKG Interpretation:      Interpreted by Patrick Jurado  Time reviewed: 2230  Symptoms at time of EKG: Somnolence   Rhythm: normal sinus   Rate: 86  Axis: normal  Ectopy: none  Conduction: normal  ST Segments/ T Waves: No ST-T wave changes  Q Waves: none  Comparison to prior: Unchanged from 2011    Clinical Impression: normal EKG            Critical Care time:  none        Results for orders placed or performed during the hospital encounter of 10/17/17 (from the past 24 hour(s))   Urine Drugs of Abuse Screen Panel 13   Result Value Ref Range    Cannabinoids (40-gqt-0-carboxy-9-THC) Not Detected NDET^Not Detected ng/mL    Phencyclidine (Phencyclidine) Not Detected NDET^Not Detected ng/mL    Cocaine (Benzoylecgonine) Not Detected NDET^Not Detected ng/mL    Methamphetamine (d-Methamphetamine) Not Detected NDET^Not Detected ng/mL    Opiates (Morphine) Not Detected NDET^Not Detected ng/mL    Amphetamine (d-Amphetamine) Not Detected NDET^Not Detected ng/mL    Benzodiazepines (Nordiazepam) Detected, Abnormal Result (A) NDET^Not Detected ng/mL    Tricyclic Antidepressants (Desipramine) Not Detected NDET^Not Detected ng/mL    Methadone (Methadone) Not Detected NDET^Not Detected ng/mL    Barbiturates (Butalbital) Not Detected NDET^Not Detected ng/mL    Oxycodone (Oxycodone) Not Detected NDET^Not Detected ng/mL    Propoxyphene (Norpropoxyphene) Not Detected NDET^Not Detected ng/mL    Buprenorphine (Buprenorphine) Not Detected NDET^Not Detected ng/mL   UA with Microscopic   Result Value Ref Range    Color Urine Yellow     Appearance Urine Clear     Glucose Urine Negative NEG^Negative mg/dL    Bilirubin Urine Negative NEG^Negative    Ketones Urine Negative NEG^Negative mg/dL    Specific Gravity Urine 1.009 1.003 - 1.035    Blood Urine Negative NEG^Negative    pH Urine 7.0 5.0 - 7.0 pH    Protein Albumin Urine Negative NEG^Negative mg/dL     Urobilinogen mg/dL 4.0 (H) 0.0 - 2.0 mg/dL    Nitrite Urine Negative NEG^Negative    Leukocyte Esterase Urine Negative NEG^Negative    Source Catheterized Urine     WBC Urine <1 0 - 2 /HPF    RBC Urine <1 0 - 2 /HPF   CBC with platelets differential   Result Value Ref Range    WBC 4.1 4.0 - 11.0 10e9/L    RBC Count 3.18 (L) 3.8 - 5.2 10e12/L    Hemoglobin 8.1 (L) 11.7 - 15.7 g/dL    Hematocrit 28.2 (L) 35.0 - 47.0 %    MCV 89 78 - 100 fl    MCH 25.5 (L) 26.5 - 33.0 pg    MCHC 28.7 (L) 31.5 - 36.5 g/dL    RDW 19.6 (H) 10.0 - 15.0 %    Platelet Count 100 (L) 150 - 450 10e9/L    Diff Method Automated Method     % Neutrophils 48.2 %    % Lymphocytes 35.3 %    % Monocytes 13.5 %    % Eosinophils 2.5 %    % Basophils 0.5 %    % Immature Granulocytes 0.0 %    Absolute Neutrophil 2.0 1.6 - 8.3 10e9/L    Absolute Lymphocytes 1.4 0.8 - 5.3 10e9/L    Absolute Monocytes 0.6 0.0 - 1.3 10e9/L    Absolute Eosinophils 0.1 0.0 - 0.7 10e9/L    Absolute Basophils 0.0 0.0 - 0.2 10e9/L    Abs Immature Granulocytes 0.0 0 - 0.4 10e9/L   Comprehensive metabolic panel   Result Value Ref Range    Sodium 142 133 - 144 mmol/L    Potassium 3.8 3.4 - 5.3 mmol/L    Chloride 110 (H) 94 - 109 mmol/L    Carbon Dioxide 25 20 - 32 mmol/L    Anion Gap 7 3 - 14 mmol/L    Glucose 112 (H) 70 - 99 mg/dL    Urea Nitrogen 11 7 - 30 mg/dL    Creatinine 1.06 (H) 0.52 - 1.04 mg/dL    GFR Estimate 55 (L) >60 mL/min/1.7m2    GFR Estimate If Black 66 >60 mL/min/1.7m2    Calcium 8.6 8.5 - 10.1 mg/dL    Bilirubin Total 1.5 (H) 0.2 - 1.3 mg/dL    Albumin 3.2 (L) 3.4 - 5.0 g/dL    Protein Total 6.3 (L) 6.8 - 8.8 g/dL    Alkaline Phosphatase 291 (H) 40 - 150 U/L    ALT 79 (H) 0 - 50 U/L     (H) 0 - 45 U/L   Salicylate level   Result Value Ref Range    Salicylate Level <2 mg/dL   Acetaminophen level   Result Value Ref Range    Acetaminophen Level <2 mg/L   Ethanol level   Result Value Ref Range    Ethanol g/dL <0.01 <0.01 g/dL         Medications   0.9% sodium  chloride BOLUS (0 mLs Intravenous Stopped 10/18/17 0031)     Followed by   0.9% sodium chloride infusion (not administered)         Assessments & Plan (with Medical Decision Making)    (R41.82) Altered mental status, unspecified altered mental status type  Comment: due to meds  Plan: She will follow-up with her primary physician for medication modification.  She should only use her medications as prescribed.  Was likely a bit more somnolent tonight because she took an extra trazodone hoping to get better sleep.          I have reviewed the nursing notes.    I have reviewed the findings, diagnosis, plan and need for follow up with the patient.       New Prescriptions    No medications on file       Final diagnoses:   Altered mental status, unspecified altered mental status type - due to meds     This document serves as a record of services personally performed by Patrick Jurado MD. It was created on their behalf by Jessie Gonzalez, a trained medical scribe. The creation of this record is based on the provider's personal observations and the statements of the patient. This document has been checked and approved by the attending provider.  Note: Chart documentation done in part with Dragon Voice Recognition software. Although reviewed after completion, some word and grammatical errors may remain.  10/17/2017   [facility] EMERGENCY DEPARTMENT     Patrick Jurado MD  10/18/17 0037       Patrick Jurado MD  10/18/17 0038

## 2017-10-18 NOTE — ED NOTES
Pt reports that she has a lot going on right now and wanted to get some sleep tonight so she took an extra sleeping pill, took her meds earlier tonight than usual.  Pt reports a hx of dizziness and insominia.  She uses a pill organizer so states she is not sure what medications she takes by name, unsure of what exactly she took this evening but states that she was not trying to harm herself, just wanted to get some much needed sleep. She is lethargic and closes her eyes between interactions but converses appropriately.

## 2017-10-18 NOTE — TELEPHONE ENCOUNTER
Reason for Call:  Other call back    Detailed comments: watson from the [facility] is calling and requesting a call back from Dr. Bustos     Phone Number Patient can be reached at: Home number on file [phone] (home)    Best Time: any    Can we leave a detailed message on this number? YES    Call taken on 10/18/2017 at 1:08 PM by Radha Garcia

## 2017-10-19 NOTE — PROGRESS NOTES
Clinic Care Coordination Contact  [facility]/Voicemail    Referral Source: CTS  Clinical Data: Care Coordinator Outreach  Outreach attempted x 2.  Left message on voicemail with call back information and requested return call.  Plan: Care Coordinator mailed out care coordination introduction letter on 9/26/17. Care Coordinator will try to reach patient again in 5-10 business days.    Melissa Behl BSN, RN, N  [facility] Care Coordinator  [phone]  [email or web address]

## 2017-10-20 ENCOUNTER — TELEPHONE (OUTPATIENT)
Dept: FAMILY MEDICINE | Facility: CLINIC | Age: 52
End: 2017-10-20

## 2017-10-20 NOTE — TELEPHONE ENCOUNTER
Spoke to patient, per patient she thinks things are getting better. She believes that she was having troubles with some medication. She cut back on all of her medications except her omeprazole. Upon more conversation she has stopped all her other medications except omeprazole. She plans on wanting to reintroduce vitamins first and then consider her other medications,. Her balance is better now and overall if feeling better. Will route to PCP as VIOLETA Blas, RAFAEL

## 2017-10-26 ENCOUNTER — TELEPHONE (OUTPATIENT)
Dept: FAMILY MEDICINE | Facility: CLINIC | Age: 52
End: 2017-10-26

## 2017-10-26 NOTE — TELEPHONE ENCOUNTER
": 1965  PHONE #'s: [phone] (home)     PRESENTING PROBLEM:  I was trying to get in to see Dr. Bustos. I need a CT scan of my head. MY ETOH counselor said I need one- \" called it a wet brain Syndrome. Wondering if that is making me having some of my symptoms of dizziness? \"    NURSING ASSESSMENT  Description:  SX, I get dizzy and I bump into things when I walk and turn around and might get dizzy and bump into something. GOing on for at least 2 weeks.   Onset/duration:  2 weeks. Weakness in arms and knees. When I am cleaning the house I get exhausted. I get drained.   Precip. factors:  Has many PMH issues.  Anxiety, Depression, alcoholism   Assoc. Sx:  NO headaches. Is having some blurred vision. I did get glasses.   Improves/worsens Sx:  same  Pain scale (1-10)   0/10  Sx specific meds:  Trazadone, Prozac, Temazepam, Inderal Buspar, Wellbutrin  LMP/preg/breast feeding:  NA  Last exam/Tx:  17 with Dr. Bustos    RECOMMENDED DISPOSITION:  See within 2 weeks - Dr. Bustos has already left for today and will NOT be back in office until 17.  RN gave her appt for 17 at 11:30 AM as wants to ask him to order a head CT. \" I was just in to see him end of last month.\"  Instructed to be seen in the ER if she should suddenly become very weak, feeling faint, passed out, chest pain, difficulty speaking, or walking.    Seek Emergency Care Immediately If Any of the Following Occur:    * Weakness on one side of the body, face, arm or legs.   * Difficulty speaking  * Difficulty swallowing  * One side of face is drooping, cannot smile.  * While holding both arms out in front of yourself, one arm will drift downward.   * Confusion or disorientation  * Severe headache  * Visual problems/disturbances    Will comply with recommendation: YESYES   If further questions/concerns or if Sx do not improve, worsen or new Sx develop, call your PCP or [facility] as soon as possible.    NOTES:  Disposition was " determined by the first positive assessment question, therefore all previous assessment questions were negative.  Informed to check provider manual or call insurance company to assure coverage.    Guideline used: Dizziness   Telephone Triage Protocols for Nurses, Fifth Edition, Savannah Byrd RN

## 2017-10-30 ENCOUNTER — TELEPHONE (OUTPATIENT)
Dept: FAMILY MEDICINE | Facility: CLINIC | Age: 52
End: 2017-10-30

## 2017-10-30 NOTE — TELEPHONE ENCOUNTER
Pt calling back from last Thursday to see if Dr. Bustos has decided anything about ordering a CT scan for her?  RN informed pt that Dr. Bustos is out of office until 11/1/17.  RN will resend another message to be sure he is getting it on Wednesday , 11/1/17.......................................DANY Carvalho

## 2017-11-02 ENCOUNTER — OFFICE VISIT (OUTPATIENT)
Dept: FAMILY MEDICINE | Facility: CLINIC | Age: 52
End: 2017-11-02
Payer: COMMERCIAL

## 2017-11-02 VITALS
BODY MASS INDEX: 23.62 KG/M2 | WEIGHT: 125 LBS | RESPIRATION RATE: 16 BRPM | SYSTOLIC BLOOD PRESSURE: 106 MMHG | HEART RATE: 88 BPM | TEMPERATURE: 97.9 F | DIASTOLIC BLOOD PRESSURE: 66 MMHG | OXYGEN SATURATION: 99 %

## 2017-11-02 DIAGNOSIS — R40.4 TRANSIENT ALTERATION OF AWARENESS: Primary | ICD-10-CM

## 2017-11-02 DIAGNOSIS — E83.39 HYPOPHOSPHATEMIA: ICD-10-CM

## 2017-11-02 LAB
ALBUMIN SERPL-MCNC: 3.5 G/DL (ref 3.4–5)
ALP SERPL-CCNC: 267 U/L (ref 40–150)
ALT SERPL W P-5'-P-CCNC: 68 U/L (ref 0–50)
AMMONIA PLAS-SCNC: 37 UMOL/L (ref 10–50)
ANION GAP SERPL CALCULATED.3IONS-SCNC: 6 MMOL/L (ref 3–14)
AST SERPL W P-5'-P-CCNC: 139 U/L (ref 0–45)
BILIRUB SERPL-MCNC: 1.5 MG/DL (ref 0.2–1.3)
BUN SERPL-MCNC: 9 MG/DL (ref 7–30)
CALCIUM SERPL-MCNC: 9.1 MG/DL (ref 8.5–10.1)
CHLORIDE SERPL-SCNC: 111 MMOL/L (ref 94–109)
CO2 SERPL-SCNC: 28 MMOL/L (ref 20–32)
CREAT SERPL-MCNC: 0.91 MG/DL (ref 0.52–1.04)
GFR SERPL CREATININE-BSD FRML MDRD: 65 ML/MIN/1.7M2
GLUCOSE SERPL-MCNC: 97 MG/DL (ref 70–99)
POTASSIUM SERPL-SCNC: 3.7 MMOL/L (ref 3.4–5.3)
PROT SERPL-MCNC: 6.6 G/DL (ref 6.8–8.8)
SODIUM SERPL-SCNC: 145 MMOL/L (ref 133–144)

## 2017-11-02 PROCEDURE — 99213 OFFICE O/P EST LOW 20 MIN: CPT | Performed by: FAMILY MEDICINE

## 2017-11-02 PROCEDURE — 80053 COMPREHEN METABOLIC PANEL: CPT | Performed by: FAMILY MEDICINE

## 2017-11-02 PROCEDURE — 82140 ASSAY OF AMMONIA: CPT | Performed by: FAMILY MEDICINE

## 2017-11-02 PROCEDURE — 36415 COLL VENOUS BLD VENIPUNCTURE: CPT | Performed by: FAMILY MEDICINE

## 2017-11-02 ASSESSMENT — PAIN SCALES - GENERAL: PAINLEVEL: MODERATE PAIN (5)

## 2017-11-02 NOTE — MR AVS SNAPSHOT
After Visit Summary   11/2/2017    Monalisa Olguin    MRN: [id number]           Patient Information     Date Of Birth          1965        Visit Information        Provider Department      11/2/2017 11:30 AM Efren Bustos MD [facility]        Today's Diagnoses     Transient alteration of awareness    -  1    Hypophosphatemia           Follow-ups after your visit        Your next 10 appointments already scheduled     Nov 06, 2017  7:15 PM CST   MR BRAIN W/O & W CONTRAST with PHMR1   [facility] ([facility])    [address] [place] [id number] [phone]           Take your medicines as usual, unless your doctor tells you not to. Bring a list of your current medicines to your exam (including vitamins, minerals and over-the-counter drugs).  You will be given intravenous contrast for this exam. To prepare:   The day before your exam, drink extra fluids at least six 8-ounce glasses (unless your doctor tells you to restrict your fluids).   Have a blood test (creatinine test) within 30 days of your exam. Go to your clinic or Diagnostic Imaging Department for this test.  The MRI machine uses a strong magnet. Please wear clothes without metal (snaps, zippers). A sweatsuit works well, or we may give you a hospital gown.  Please remove any body piercings and hair extensions before you arrive. You will also remove watches, jewelry, hairpins, wallets, dentures, partial dental plates and hearing aids. You may wear contact lenses, and you may be able to wear your rings. We have a safe place to keep your personal items, but it is safer to leave them at home.   **IMPORTANT** THE INSTRUCTIONS BELOW ARE ONLY FOR THOSE PATIENTS WHO HAVE BEEN TOLD THEY WILL RECEIVE SEDATION OR GENERAL ANESTHESIA DURING THEIR MRI PROCEDURE:  IF YOU WILL RECEIVE SEDATION (take medicine to help you relax during your exam):   You must get the medicine from your doctor  before you arrive. Bring the medicine to the exam. Do not take it at home.   Arrive one hour early. Bring someone who can take you home after the test. Your medicine will make you sleepy. After the exam, you may not drive, take a bus or take a taxi by yourself.   No eating 8 hours before your exam. You may have clear liquids up until 4 hours before your exam. (Clear liquids include water, clear tea, black coffee and fruit juice without pulp.)  IF YOU WILL RECEIVE ANESTHESIA (be asleep for your exam):   Arrive 1 1/2 hours early. Bring someone who can take you home after the test. You may not drive, take a bus or take a taxi by yourself.   No eating 8 hours before your exam. You may have clear liquids up until 4 hours before your exam. (Clear liquids include water, clear tea, black coffee and fruit juice without pulp.)  Please call the Imaging Department at your exam site with any questions.            Nov 28, 2017  8:30 AM CST   (Arrive by 8:15 AM)   [facility] Liver with Edgardo Kovacs MD   [facility] ([facility])    [address] [place] [id number] [phone]              Future tests that were ordered for you today     Open Future Orders        Priority Expected Expires Ordered    MR Brain w/o & w Contrast Routine  11/2/2018 11/2/2017            Who to contact     If you have questions or need follow up information about today's clinic visit or your schedule please contact [facility] directly at [phone].  Normal or non-critical lab and imaging results will be communicated to you by MyChart, letter or phone within 4 business days after the clinic has received the results. If you do not hear from us within 7 days, please contact the clinic through [employer] or phone. If you have a critical or abnormal lab result, we will notify you by phone as soon as possible.  Submit refill requests through [employer] or call your pharmacy and they will  forward the refill request to us. Please allow 3 business days for your refill to be completed.          Additional Information About Your Visit        [employer] Information     [employer] gives you secure access to your electronic health record. If you see a primary care provider, you can also send messages to your care team and make appointments. If you have questions, please call your primary care clinic.  If you do not have a primary care provider, please call [phone] and they will assist you.        Care EveryWhere ID     This is your Care EveryWhere ID. This could be used by other organizations to access your [facility] medical records  [id number]        Your Vitals Were     Pulse Temperature Respirations Last Period Pulse Oximetry BMI (Body Mass Index)    88 97.9  F (36.6  C) (Tympanic) 16 05/16/2012 99% 23.62 kg/m2       Blood Pressure from Last 3 Encounters:   11/02/17 106/66   10/18/17 124/76   09/27/17 92/56    Weight from Last 3 Encounters:   11/02/17 125 lb (56.7 kg)   10/17/17 130 lb (59 kg)   09/27/17 130 lb (59 kg)              We Performed the Following     Ammonia     Comprehensive metabolic panel          Today's Medication Changes          These changes are accurate as of: 11/2/17 12:13 PM.  If you have any questions, ask your nurse or doctor.               These medicines have changed or have updated prescriptions.        Dose/Directions    phosphorus tablet 250 mg 250 MG per tablet   Commonly known as:  VIRT-PHOS 250 NEUTRAL   This may have changed:  See the new instructions.   Used for:  Hypophosphatemia   Changed by:  Efren Bustos MD        TAKE TWO TABLETS BY MOUTH TWICE A DAY   Quantity:  120 tablet   Refills:  3         Stop taking these medicines if you haven't already. Please contact your care team if you have questions.     sertraline 100 MG tablet   Commonly known as:  ZOLOFT   Stopped by:  Efren Bustos MD                Where to get your medicines      These medications  were sent to [facility] [place] - [address]  [address] [facility] [address]     Phone:  [phone]     phosphorus tablet 250 mg 250 MG per tablet                Primary Care Provider Office Phone # Fax #    Efren Bustos [phone]       [address]   [facility] [id number]        Equal Access to Services     [facility]: Hadii aad ku hadasho Soomaali, waaxda luqadaha, qaybta kaalmada adeegyada, waxay idiin haynabiln adeeg khluis enriqueanthony laevita . So [facility] [phone].    ATENCIÓN: Si virginiala rajat, tiene a perez disposición servicios gratuitos de asistencia lingüística. [facility] [phone].    We comply with applicable federal civil rights laws and [place] laws. We do not discriminate on the basis of race, color, national origin, age, disability, sex, sexual orientation, or gender identity.            Thank you!     Thank you for choosing [facility]  for your care. Our goal is always to provide you with excellent care. Hearing back from our patients is one way we can continue to improve our services. Please take a few minutes to complete the written survey that you may receive in the mail after your visit with us. Thank you!             Your Updated Medication List - Protect others around you: Learn how to safely use, store and throw away your medicines at www.disposemymeds.org.          This list is accurate as of: 11/2/17 12:13 PM.  Always use your most recent med list.                   Brand Name Dispense Instructions for use Diagnosis    buPROPion 300 MG 24 hr tablet    WELLBUTRIN XL    90 tablet    Take 1 tablet (300 mg) by mouth every morning    Major depressive disorder, recurrent episode, moderate (H)       busPIRone 15 MG tablet    BUSPAR    180 tablet    Take 1 tablet (15 mg) by mouth 2 times daily    MONA (generalized anxiety disorder)       FLUoxetine 20 MG capsule    PROzac    180 capsule    TAKE TWO CAPSULES BY MOUTH EVERY  DAY    Anxiety       naltrexone 50 MG tablet    DEPADE;REVIA    90 tablet    TAKE ONE-HALF TABLET BY MOUTH EVERY DAY FOR ONE WEEK THEN TAKE ONE TABLET DAILY AFTER    Alcohol dependence in remission (H)       omeprazole 20 MG CR capsule    priLOSEC    90 capsule    Take 1 capsule (20 mg) by mouth daily    Gastroesophageal reflux disease with esophagitis       phosphorus tablet 250 mg 250 MG per tablet    VIRT-PHOS 250 NEUTRAL    120 tablet    TAKE TWO TABLETS BY MOUTH TWICE A DAY    Hypophosphatemia       propranolol 10 MG tablet    INDERAL    180 tablet    Take 1 tablet (10 mg) by mouth 2 times daily    Hypertension goal BP (blood pressure) < 130/80       thiamine 100 MG tablet     90 tablet    TAKE ONE TABLET BY MOUTH EVERY DAY    Alcohol dependence in remission (H)       TL RICARDO RX 2.2-25-1 MG Tabs   Generic drug:  Folic Acid-Vit B6-Vit B12     90 tablet    TAKE ONE TABLET BY MOUTH EVERY DAY    Alcoholism in recovery (H)

## 2017-11-02 NOTE — PROGRESS NOTES
SUBJECTIVE:   Monalisa Olguin is a 52 year old female who presents to clinic today for the following health issues:  Chief Complaint   Patient presents with     Orders     Wants to get a CT of the Head and talk about other things as well.         Problem list and histories reviewed & adjusted, as indicated.  Additional history: Heydi has been experiencing altered mental status and was recently reported to the police by other drivers due to erratic driving. She endorses feeling confused and her  has noticed the change in her mental status. He was concerned that her sleeping pills were the cause of her symptoms so she has not been taking them for a few days. Heydi also has a history of alcohol abuse and has been sober for about 3 months. She was previously switched from venlafaxine to fluoxetine and instructed to discontinue venlafaxine, but has continued to take both of these nedications as well as sertraline which was prescribed by another provider. Other associated symptoms include poor sleep secondary to discontinuation of her sleeping aid and dry mouth.    Patient Active Problem List   Diagnosis     Kidney donor     Esophageal reflux     Moderate Depression [296.32]     HYPERLIPIDEMIA LDL GOAL <100     Hypertension goal BP (blood pressure) < 130/80     Anxiety     Crushing injury of thumb, left     Alcoholism (H)     Alcoholism in recovery (H)     Laceration of head without foreign body, unspecified part of head, sequela     Alcoholic hepatitis with ascites     Sludge in gallbladder     Anemia     Thrombocytopenia (H)     Tobacco abuse     Portal hypertension (H)     Pulmonary nodules     Hyperthyroidism     Hypophosphatemia     Severe malnutrition (H)     SIRS (systemic inflammatory response syndrome) (H)     Scleral icterus     Hallucinations     Choledocholithiasis     Past Surgical History:   Procedure Laterality Date     C  DELIVERY ONLY      , Low Cervical     C  RMV,KIDNEY,DONOR,LIVING  2000    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     COLONOSCOPY N/A 9/8/2017    Procedure: COLONOSCOPY;  Colonoscopy;  Surgeon: Sai Johnston MD;  Location: [facility]     HC KNEE SCOPE, DIAGNOSTIC  11/14/01    Arthroscopy, Lt Knee     LAPAROSCOPIC CHOLECYSTECTOMY N/A 9/24/2017    Procedure: LAPAROSCOPIC CHOLECYSTECTOMY;  laparoscopic cholecystectomy;  Surgeon: Romeo Pastor MD;  Location: [facility] OR       Social History   Substance Use Topics     Smoking status: Current Every Day Smoker     Packs/day: 0.50     Years: 10.00     Smokeless tobacco: Never Used      Comment: pt quit 1992 after smoking for 8 yrs, started again in 2004     Alcohol use No      Comment: stopped drinking 8/17     Family History   Problem Relation Age of Onset     Hypertension Mother      HEART DISEASE Paternal Grandmother      HEART DISEASE Paternal Grandfather      CEREBROVASCULAR DISEASE Maternal Grandmother      CEREBROVASCULAR DISEASE Maternal Grandfather      Alcohol/Drug Maternal Grandfather      Substance Abuse Maternal Grandfather      HEART DISEASE Father      Silent MI stents x 2     DIABETES Sister 17     older sister also had kidney and pancreas transplant     HEART DISEASE Sister      MI secondary to diabetes     Genitourinary Problems Sister 43     kidney transplant from renal failure     DIABETES Sister 9     youngest, juvenile type I (onset age 9 with no complications)     CANCER Paternal Aunt      ?kind         Current Outpatient Prescriptions   Medication Sig Dispense Refill     phosphorus tablet 250 mg (VIRT-PHOS 250 NEUTRAL) 250 MG per tablet TAKE TWO TABLETS BY MOUTH TWICE A  tablet 3     FLUoxetine (PROZAC) 20 MG capsule TAKE TWO CAPSULES BY MOUTH EVERY  capsule 3     naltrexone (DEPADE;REVIA) 50 MG tablet TAKE ONE-HALF TABLET BY MOUTH EVERY DAY FOR ONE WEEK THEN TAKE ONE TABLET DAILY AFTER 90 tablet 3     propranolol (INDERAL) 10 MG tablet Take 1 tablet (10 mg) by mouth 2 times daily 180 tablet  3     TL RICARDO RX 2.2-25-1 MG TABS TAKE ONE TABLET BY MOUTH EVERY DAY 90 tablet 3     VITAMIN B-1 100 MG tablet TAKE ONE TABLET BY MOUTH EVERY DAY 90 tablet 3     busPIRone (BUSPAR) 15 MG tablet Take 1 tablet (15 mg) by mouth 2 times daily 180 tablet 3     buPROPion (WELLBUTRIN XL) 300 MG 24 hr tablet Take 1 tablet (300 mg) by mouth every morning 90 tablet 3     omeprazole (PRILOSEC) 20 MG CR capsule Take 1 capsule (20 mg) by mouth daily 90 capsule 3         Reviewed and updated as needed this visit by clinical staffTobacco  Allergies  Meds  Problems  Med Hx  Surg Hx  Fam Hx  Soc Hx        Reviewed and updated as needed this visit by Provider         ROS:  Constitutional, HEENT, cardiovascular, pulmonary, gi and gu systems are negative, except as otherwise noted.      OBJECTIVE:   /66  Pulse 88  Temp 97.9  F (36.6  C) (Tympanic)  Resp 16  Wt 125 lb (56.7 kg)  LMP 05/16/2012  SpO2 99%  BMI 23.62 kg/m2  Body mass index is 23.62 kg/(m^2).  GENERAL: healthy, alert and no distress  RESP: lungs clear to auscultation - no rales, rhonchi or wheezes  CV: regular rate and rhythm, normal S1 S2, no S3 or S4, no murmur, click or rub, no peripheral edema and peripheral pulses strong  ABDOMEN: soft, nontender, no hepatosplenomegaly, no masses and bowel sounds normal  MS: no gross musculoskeletal defects noted, no edema  NEURO: Normal strength and tone, mentation intact and speech normal  PSYCH: mentation appears normal, affect normal/bright    Diagnostic Test Results:  none     ASSESSMENT/PLAN:     (R40.4) Transient alteration of awareness  (primary encounter diagnosis)  Comment: Recent confusion noticed by patient and her . Likely related to the use of several SSRIs simultaneously. Also possibly related to her history of alcohol abuse which could have caused some encephalopathy. We also need to rule out any other intracranial pathology.   Plan: Disposed of the venlafaxine and sertraline that was brought  in by the patient. Discussed with her that she should only be taking fluoxetine and bupropion for her depression management.    Comprehensive metabolic panel, Ammonia,              MR Brain w/o & w Contrast.  The patient should not be driving until we figure out why she is having issues with her cognition and balance. She will not only put herself at risk but those around her.            (E83.39) Hypophosphatemia  Comment: chronic   Plan: phosphorus tablet 250 mg (VIRT-PHOS 250         NEUTRAL) 250 MG per tablet            Patient was seen and examined by myself and Dr. Bustos.  The note was then scribed by me.     Christine Mesa, MS3    This patient was seen and examined by myself as well as the medical student.  The medical student scribed the note and I have reviewed it, edited it appropriately, and agree with the final documentation.     Electronically signed by:  Efren Bustos M.D.  11/2/2017

## 2017-11-03 NOTE — PROGRESS NOTES
Let Heydi know that her labs are stable and not so off that I think they are contributing to her confusion and instability with gait and balance.  We will await the MRI results.  She really should not be driving at this time until we figure out why this is happening.  This information should be called to her and her .  If she gets into a car and has an accident she could get a DUI despite not drinking any more.  The consequences could be devastating.  I do not recommend that she drive at all.      Electronically signed by:  Efren Bustos M.D.  11/3/2017

## 2017-11-06 ENCOUNTER — TELEPHONE (OUTPATIENT)
Dept: FAMILY MEDICINE | Facility: CLINIC | Age: 52
End: 2017-11-06

## 2017-11-06 ENCOUNTER — HOSPITAL ENCOUNTER (OUTPATIENT)
Dept: MRI IMAGING | Facility: CLINIC | Age: 52
Discharge: HOME | End: 2017-11-06
Attending: FAMILY MEDICINE | Admitting: FAMILY MEDICINE
Payer: COMMERCIAL

## 2017-11-06 DIAGNOSIS — R40.4 TRANSIENT ALTERATION OF AWARENESS: ICD-10-CM

## 2017-11-06 PROCEDURE — 70553 MRI BRAIN STEM W/O & W/DYE: CPT

## 2017-11-06 PROCEDURE — A9585 GADOBUTROL INJECTION: HCPCS | Performed by: RADIOLOGY

## 2017-11-06 PROCEDURE — 25000128 H RX IP 250 OP 636: Performed by: RADIOLOGY

## 2017-11-06 RX ORDER — GADOBUTROL 604.72 MG/ML
7.5 INJECTION INTRAVENOUS ONCE
Status: COMPLETED | OUTPATIENT
Start: 2017-11-06 | End: 2017-11-06

## 2017-11-06 RX ADMIN — GADOBUTROL 6 ML: 604.72 INJECTION INTRAVENOUS at 19:50

## 2017-11-06 NOTE — TELEPHONE ENCOUNTER
----- Message from Efren Bustos MD sent at 11/3/2017 12:09 PM CDT -----  Let Heydi know that her labs are stable and not so off that I think they are contributing to her confusion and instability with gait and balance.  We will await the MRI results.  She really should not be driving at this time until we figure out why this is happening.  This information should be called to her and her .  If she gets into a car and has an accident she could get a DUI despite not drinking any more.  The consequences could be devastating.  I do not recommend that she drive at all.      Electronically signed by:  Efren Bustos M.D.  11/3/2017

## 2017-11-06 NOTE — TELEPHONE ENCOUNTER
Pt  Eron was advised of the results and recommendations from Dr. Bustos and verbalized understanding. Pt was advised that once MRI results are reviewed they would be notified. Pt  was in agreement.

## 2017-11-07 ENCOUNTER — CARE COORDINATION (OUTPATIENT)
Dept: CARE COORDINATION | Facility: CLINIC | Age: 52
End: 2017-11-07

## 2017-11-07 NOTE — PROGRESS NOTES
Clinic Care Coordination Contact  [facility]/Voicemail    Referral Source: CTS  Clinical Data: Care Coordinator Outreach  Outreach attempted x 3.  Left message on voicemail with call back information and requested return call.  Plan: Care Coordinator mailed out care coordination introduction letter on 9/26/17. Care Coordinator will do no further outreaches at this time.    Melissa Behl BSN, RN, N  [facility] Care Coordinator  [phone]  [email or web address]

## 2017-11-08 ENCOUNTER — TELEPHONE (OUTPATIENT)
Dept: FAMILY MEDICINE | Facility: CLINIC | Age: 52
End: 2017-11-08

## 2017-11-08 NOTE — TELEPHONE ENCOUNTER
----- Message from Efren Bustos MD sent at 11/8/2017  6:46 AM CST -----  Call Heydi and let her know that the MRI did not show anything acute like a stroke or previous stroke.  There is also no significant cerebral (white matter) atrophy noted.  If she continues to have confusion and instability, we will need to get her into the Neurologist for an evaluation.  I think a lot of her issues were due to the fact that she was taking all three of those antidepressants when she should have only been on the one.      Electronically signed by:  Efren Bustos M.D.  11/8/2017

## 2017-11-09 ENCOUNTER — TELEPHONE (OUTPATIENT)
Dept: FAMILY MEDICINE | Facility: CLINIC | Age: 52
End: 2017-11-09

## 2017-11-09 DIAGNOSIS — R41.82 ALTERED MENTAL STATUS, UNSPECIFIED ALTERED MENTAL STATUS TYPE: Primary | ICD-10-CM

## 2017-11-09 NOTE — TELEPHONE ENCOUNTER
Daniela, can you see how soon it would be to get  Monalisa in to see a Neurologist?  Her MRI was essentially normal and she is still having balance issues and confusion.  Thanks, Eliceo     Electronically signed by:  Efren Bustos M.D.  11/9/2017

## 2017-11-09 NOTE — TELEPHONE ENCOUNTER
Reason for Call:  Other call back    Detailed comments: Pt is calling wondering if her MRI has been sent to a neurologist? Where you at with it? Please call her back and advise.     Phone Number Patient can be reached at: Home number on file [phone] (home)    Best Time: anytime    Can we leave a detailed message on this number? YES    Call taken on 11/9/2017 at 2:50 PM by Mai Sahu

## 2017-11-10 NOTE — TELEPHONE ENCOUNTER
Referral placed information faxed to [facility] clinic they will contact patient or spouse with date and time of appointment for the soonest consult.     Daniela PAGE

## 2017-11-10 NOTE — TELEPHONE ENCOUNTER
[facility] called and states they received the referral and called the patient to schedule but they had no idea about the referral and has a lot of questions. She is wondering if a nurse could reach out to the patient and relay the results message below to her as to why she was referred to Neurology. Please advise.     Thank you  Rah Jeffers  Patient Representative

## 2017-11-13 DIAGNOSIS — G47.09 OTHER INSOMNIA: ICD-10-CM

## 2017-11-13 NOTE — TELEPHONE ENCOUNTER
Spoke to patient and she now understands and will call specialty back if she needs too.   Noa Little MA 11/13/2017

## 2017-11-14 ENCOUNTER — TRANSFERRED RECORDS (OUTPATIENT)
Dept: HEALTH INFORMATION MANAGEMENT | Facility: CLINIC | Age: 52
End: 2017-11-14

## 2017-11-14 ENCOUNTER — RADIANT APPOINTMENT (OUTPATIENT)
Dept: GENERAL RADIOLOGY | Facility: CLINIC | Age: 52
End: 2017-11-14
Attending: ORTHOPAEDIC SURGERY
Payer: COMMERCIAL

## 2017-11-14 ENCOUNTER — OFFICE VISIT (OUTPATIENT)
Dept: ORTHOPEDICS | Facility: CLINIC | Age: 52
End: 2017-11-14
Payer: COMMERCIAL

## 2017-11-14 VITALS — TEMPERATURE: 98.6 F | WEIGHT: 125 LBS | BODY MASS INDEX: 23.6 KG/M2 | HEIGHT: 61 IN

## 2017-11-14 DIAGNOSIS — S69.91XA WRIST INJURY, RIGHT, INITIAL ENCOUNTER: Primary | ICD-10-CM

## 2017-11-14 DIAGNOSIS — S62.101A WRIST FRACTURE, RIGHT, CLOSED, INITIAL ENCOUNTER: ICD-10-CM

## 2017-11-14 DIAGNOSIS — S52.591A OTHER CLOSED FRACTURE OF DISTAL END OF RIGHT RADIUS, INITIAL ENCOUNTER: ICD-10-CM

## 2017-11-14 DIAGNOSIS — S69.91XA WRIST INJURY, RIGHT, INITIAL ENCOUNTER: ICD-10-CM

## 2017-11-14 PROCEDURE — 73110 X-RAY EXAM OF WRIST: CPT | Mod: TC

## 2017-11-14 PROCEDURE — 29075 APPL CST ELBW FNGR SHORT ARM: CPT | Performed by: ORTHOPAEDIC SURGERY

## 2017-11-14 PROCEDURE — 99203 OFFICE O/P NEW LOW 30 MIN: CPT | Mod: 25 | Performed by: ORTHOPAEDIC SURGERY

## 2017-11-14 RX ORDER — HYDROCODONE BITARTRATE AND ACETAMINOPHEN 5; 325 MG/1; MG/1
1-2 TABLET ORAL EVERY 6 HOURS PRN
Qty: 30 TABLET | Refills: 0 | Status: SHIPPED | OUTPATIENT
Start: 2017-11-14 | End: 2017-11-21

## 2017-11-14 ASSESSMENT — PAIN SCALES - GENERAL: PAINLEVEL: MODERATE PAIN (4)

## 2017-11-14 NOTE — NURSING NOTE
"Chief Complaint   Patient presents with     Consult       Initial Temp 98.6  F (37  C) (Temporal)  Ht 1.549 m (5' 1\")  Wt 56.7 kg (125 lb)  LMP 05/16/2012  BMI 23.62 kg/m2 Estimated body mass index is 23.62 kg/(m^2) as calculated from the following:    Height as of this encounter: 1.549 m (5' 1\").    Weight as of this encounter: 56.7 kg (125 lb).  Medication Reconciliation: complete   JOSE RAMON Toscano  "

## 2017-11-14 NOTE — PROGRESS NOTES
ORTHOPEDIC CLINIC CONSULT      Monalisa Olguin is a 52 year old female who is seen in consultation at the request of Self.  History of Present illness:  Monalisa presents for evaluation of:   1.) Right wrist injury    Onset:  17 (s/p 1 day)    Symptoms brought on by Fell while changing a light bulb.    Character:  sharp, throbbing and swelling.    Progression of symptoms:  Better as long as it is not moved.    Previous similar pain: no .   Pain Level:  4/10.   Previous treatments:  rest/inactivity, support wrap and Ibuprofen.  Currently on Blood thinners? No  Diagnosis of Diabetes? No    Patient was standing on her bed while trying to change that light bulb when she fell onto the floor. This occurred yesterday. She has pain and swelling anytime she moves it.    Patient's past medical, surgical, social and family histories reviewed.       Past Medical History:   Diagnosis Date     Alcohol abuse 2013     Alcohol dependence 2013     Alcohol withdrawal 2013     Anxiety 10/10/2011     CKD (chronic kidney disease) stage 3, GFR 30-59 ml/min     last GFR was 42     CKD (chronic kidney disease) stage 3, GFR 30-59 ml/min 2011     Esophageal reflux 10/7/2002     Hypertension goal BP (blood pressure) < 130/80 2011     Moderate Depression [296.32] 2009    stable on wellbutrin     Other internal derangement of knee(717.89)     ACL Internal derangement, knee/ original injury in 5th grade, torn cartilage     Pap smear     no abnormals, due for paps q 2-3 yrs     Unspecified essential hypertension        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     COLONOSCOPY N/A 2017    Procedure: COLONOSCOPY;  Colonoscopy;  Surgeon: Sai Johnston MD;  Location: [facility]     HC KNEE SCOPE, DIAGNOSTIC  01    Arthroscopy, Lt Knee     LAPAROSCOPIC CHOLECYSTECTOMY  "N/A 9/24/2017    Procedure: LAPAROSCOPIC CHOLECYSTECTOMY;  laparoscopic cholecystectomy;  Surgeon: Romeo Pastor MD;  Location: [facility]       Home Medications:  Prior to Admission medications    Medication Sig Start Date End Date Taking? Authorizing Provider   HYDROcodone-acetaminophen (NORCO) 5-325 MG per tablet Take 1-2 tablets by mouth every 6 hours as needed for moderate to severe pain 11/14/17  Yes eJn Pathak PA-C   phosphorus tablet 250 mg (VIRT-PHOS 250 NEUTRAL) 250 MG per tablet TAKE TWO TABLETS BY MOUTH TWICE A DAY 11/2/17  Yes Efren Bustos MD   FLUoxetine (PROZAC) 20 MG capsule TAKE TWO CAPSULES BY MOUTH EVERY DAY 10/10/17  Yes Efren Bustos MD   naltrexone (DEPADE;REVIA) 50 MG tablet TAKE ONE-HALF TABLET BY MOUTH EVERY DAY FOR ONE WEEK THEN TAKE ONE TABLET DAILY AFTER 9/1/17  Yes Efren Bustos MD   propranolol (INDERAL) 10 MG tablet Take 1 tablet (10 mg) by mouth 2 times daily 6/13/17  Yes Efren Bustos MD   TL RICARDO RX 2.2-25-1 MG TABS TAKE ONE TABLET BY MOUTH EVERY DAY 5/17/17  Yes Efren Bustos MD   VITAMIN B-1 100 MG tablet TAKE ONE TABLET BY MOUTH EVERY DAY 5/17/17  Yes Efren Bustos MD   busPIRone (BUSPAR) 15 MG tablet Take 1 tablet (15 mg) by mouth 2 times daily 5/10/17  Yes Efren Bustos MD   buPROPion (WELLBUTRIN XL) 300 MG 24 hr tablet Take 1 tablet (300 mg) by mouth every morning 5/10/17  Yes Efren Bustos MD   omeprazole (PRILOSEC) 20 MG CR capsule Take 1 capsule (20 mg) by mouth daily 5/10/17  Yes Efren Bustos MD       Allergies   Allergen Reactions     Albuterol      Tongue \"hardened and painful\"       Social History     Occupational History     Homemaker      Social History Main Topics     Smoking status: Current Every Day Smoker     Packs/day: 0.50     Years: 10.00     Smokeless tobacco: Never Used      Comment: pt quit 1992 after smoking for 8 yrs, started again in 2004     Alcohol use No      Comment: stopped drinking " "8/17     Drug use: No     Sexual activity: Yes     Partners: Male     Birth control/ protection: Surgical      Comment:  had vasectomy       Family History   Problem Relation Age of Onset     Hypertension Mother      HEART DISEASE Paternal Grandmother      HEART DISEASE Paternal Grandfather      CEREBROVASCULAR DISEASE Maternal Grandmother      CEREBROVASCULAR DISEASE Maternal Grandfather      Alcohol/Drug Maternal Grandfather      Substance Abuse Maternal Grandfather      HEART DISEASE Father      Silent MI stents x 2     DIABETES Sister 17     older sister also had kidney and pancreas transplant     HEART DISEASE Sister      MI secondary to diabetes     Genitourinary Problems Sister 43     kidney transplant from renal failure     DIABETES Sister 9     youngest, juvenile type I (onset age 9 with no complications)     CANCER Paternal Aunt      ?kind       REVIEW OF SYSTEMS    General: negative for fever or fatigue    Psych:  positive for history of anxiety and depression, patient with a history of alcoholism in remission since August 2017    Ophthalmic:  Corrective lenses?  Yes    ENT:  Hearing difficulty? 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    GENERAL APPEARANCE:  Healthy, alert, no distress    SKIN:  negative for suspicious lesions or rashes    NEURO: Normal strength and tone, mentation intact and speech normal    PSYCH:   Mentation appears Normal and affect " normal/bright    RESPIRATORY: positive for respiratory distress.    EYES: negative for Conjunctivitis    JOINT/EXTREMITIES:    Right wrist.  No visible deformity.  Patient holding wrist with left arm.   No redness or cuts  Pain with movement of arm thumb or touch  Ring present.  This was removed without excoriation    Radiographs: Right wrist x-ray: Patient with impact type injury with transverse fracture to the distal radius. Alignment remains relatively well-maintained with no involvement of the joint line at the present time. There is a nondisplaced ulnar styloid fracture.   Independent visualization of the films was made.     Impression:      ICD-10-CM    1. Wrist injury, right, initial encounter S69.91XA XR Wrist Right G/E 3 Views     HYDROcodone-acetaminophen (NORCO) 5-325 MG per tablet   2. Wrist fracture, right, closed, initial encounter S62.101A XR Wrist Right G/E 3 Views     HYDROcodone-acetaminophen (NORCO) 5-325 MG per tablet     Right wrist fracture that is maintained in good alignment.     Plan:  Short arm waterproof cast was placed. Care of the cast was explained to the patient. Patient was given some moleskin for comfort of the fiber glass cast near the thumb.  This cast was molded   Small script of Norco provided. (30 tablets)  Patient follow up 1 week with repeat x-rays    Patient is evaluated with and plan developed in conjunction with Dr. Almendarez    Scribed by  Jen Pathak PA-C   11/14/2017  4:06 PM        I attest I have seen and evaluated the patient.  I agree with above impression and plan.    Edgardo Almendarez MD

## 2017-11-14 NOTE — LETTER
2017         RE: Monalisa Olguin  [id number] [facility] [address]        Dear Colleague,    Thank you for referring your patient, Monalisa Olguin, to the [facility]. Please see a copy of my visit note below.    ORTHOPEDIC CLINIC CONSULT      Monalisa Olguin is a 52 year old female who is seen in consultation at the request of Self.  History of Present illness:  Monalisa presents for evaluation of:   1.) Right wrist injury    Onset:  18 (s/p 1 day)    Symptoms brought on by Fell while changing a light bulb.    Character:  sharp, throbbing and swelling.    Progression of symptoms:  Better as long as it is not moved.    Previous similar pain: no .   Pain Level:  4/10.   Previous treatments:  rest/inactivity, support wrap and Ibuprofen.  Currently on Blood thinners? No  Diagnosis of Diabetes? No    Patient was standing on her bed while trying to change that light bulb when she fell onto the floor. This occurred yesterday. She has pain and swelling anytime she moves it.    Patient's past medical, surgical, social and family histories reviewed.       Past Medical History:   Diagnosis Date     Alcohol abuse 2013     Alcohol dependence 2013     Alcohol withdrawal 2013     Anxiety 10/10/2011     CKD (chronic kidney disease) stage 3, GFR 30-59 ml/min     last GFR was 42     CKD (chronic kidney disease) stage 3, GFR 30-59 ml/min 2011     Esophageal reflux 10/7/2002     Hypertension goal BP (blood pressure) < 130/80 2011     Moderate Depression [296.32] 2009    stable on wellbutrin     Other internal derangement of knee(717.89)     ACL Internal derangement, knee/ original injury in 5th grade, torn cartilage     Pap smear     no abnormals, due for paps q 2-3 yrs     Unspecified essential hypertension        Past Surgical History:   Procedure Laterality Date     C  DELIVERY ONLY      , Low Cervical     C  "RMV,KIDNEY,DONOR,LIVING  2000    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     COLONOSCOPY N/A 9/8/2017    Procedure: COLONOSCOPY;  Colonoscopy;  Surgeon: Sai Johnston MD;  Location: [facility]     HC KNEE SCOPE, DIAGNOSTIC  11/14/01    Arthroscopy, Lt Knee     LAPAROSCOPIC CHOLECYSTECTOMY N/A 9/24/2017    Procedure: LAPAROSCOPIC CHOLECYSTECTOMY;  laparoscopic cholecystectomy;  Surgeon: Romeo Pastor MD;  Location: [facility]       Home Medications:  Prior to Admission medications    Medication Sig Start Date End Date Taking? Authorizing Provider   HYDROcodone-acetaminophen (NORCO) 5-325 MG per tablet Take 1-2 tablets by mouth every 6 hours as needed for moderate to severe pain 11/14/17  Yes Jen Pathak PA-C   phosphorus tablet 250 mg (VIRT-PHOS 250 NEUTRAL) 250 MG per tablet TAKE TWO TABLETS BY MOUTH TWICE A DAY 11/2/17  Yes Efren Bustos MD   FLUoxetine (PROZAC) 20 MG capsule TAKE TWO CAPSULES BY MOUTH EVERY DAY 10/10/17  Yes Efren Bustos MD   naltrexone (DEPADE;REVIA) 50 MG tablet TAKE ONE-HALF TABLET BY MOUTH EVERY DAY FOR ONE WEEK THEN TAKE ONE TABLET DAILY AFTER 9/1/17  Yes Efren Bustos MD   propranolol (INDERAL) 10 MG tablet Take 1 tablet (10 mg) by mouth 2 times daily 6/13/17  Yes Efren Bustos MD   TL RICARDO RX 2.2-25-1 MG TABS TAKE ONE TABLET BY MOUTH EVERY DAY 5/17/17  Yes Efren Bustos MD   VITAMIN B-1 100 MG tablet TAKE ONE TABLET BY MOUTH EVERY DAY 5/17/17  Yes Efren Bustos MD   busPIRone (BUSPAR) 15 MG tablet Take 1 tablet (15 mg) by mouth 2 times daily 5/10/17  Yes Efren Bustos MD   buPROPion (WELLBUTRIN XL) 300 MG 24 hr tablet Take 1 tablet (300 mg) by mouth every morning 5/10/17  Yes Efren Bustos MD   omeprazole (PRILOSEC) 20 MG CR capsule Take 1 capsule (20 mg) by mouth daily 5/10/17  Yes Efren Bustos MD       Allergies   Allergen Reactions     Albuterol      Tongue \"hardened and painful\"       Social History     Occupational History     Homemaker  " "    Social History Main Topics     Smoking status: Current Every Day Smoker     Packs/day: 0.50     Years: 10.00     Smokeless tobacco: Never Used      Comment: pt quit 1992 after smoking for 8 yrs, started again in 2004     Alcohol use No      Comment: stopped drinking 8/17     Drug use: No     Sexual activity: Yes     Partners: Male     Birth control/ protection: Surgical      Comment:  had vasectomy       Family History   Problem Relation Age of Onset     Hypertension Mother      HEART DISEASE Paternal Grandmother      HEART DISEASE Paternal Grandfather      CEREBROVASCULAR DISEASE Maternal Grandmother      CEREBROVASCULAR DISEASE Maternal Grandfather      Alcohol/Drug Maternal Grandfather      Substance Abuse Maternal Grandfather      HEART DISEASE Father      Silent MI stents x 2     DIABETES Sister 17     older sister also had kidney and pancreas transplant     HEART DISEASE Sister      MI secondary to diabetes     Genitourinary Problems Sister 43     kidney transplant from renal failure     DIABETES Sister 9     youngest, juvenile type I (onset age 9 with no complications)     CANCER Paternal Aunt      ?kind       REVIEW OF SYSTEMS    General: negative for fever or fatigue    Psych:  positive for history of anxiety and depression, patient with a history of alcoholism in remission since August 2017    Ophthalmic:  Corrective lenses?  Yes    ENT:  Hearing difficulty? No     CV: Patient with history of hypertension only    Endocrine:  negative diabetes     Urology:  negative kidney disease, one kidney since donor year 2000    Resp:  Normal respiratory effort     Skin: negative for cuts/sores or redness    Musculoskeletal: as above    Neurologic:negative for numbness/tingling    Hematologic: negative for bleeding disorder, does not use of prescription anticoagulants       Physical Exam:    Vitals: Temp 98.6  F (37  C) (Temporal)  Ht 1.549 m (5' 1\")  Wt 56.7 kg (125 lb)  LMP 05/16/2012  BMI 23.62 " kg/m2  BMI= Body mass index is 23.62 kg/(m^2).    GENERAL APPEARANCE:  Healthy, alert, no distress    SKIN:  negative for suspicious lesions or rashes    NEURO: Normal strength and tone, mentation intact and speech normal    PSYCH:   Mentation appears Normal and affect normal/bright    RESPIRATORY: positive for respiratory distress.    EYES: negative for Conjunctivitis    JOINT/EXTREMITIES:    Right wrist.  No visible deformity.  Patient holding wrist with left arm.   No redness or cuts  Pain with movement of arm thumb or touch  Ring present.  This was removed without excoriation    Radiographs: Right wrist x-ray: Patient with impact type injury with transverse fracture to the distal radius. Alignment remains relatively well-maintained with no involvement of the joint line at the present time. There is a nondisplaced ulnar styloid fracture.   Independent visualization of the films was made.     Impression:      ICD-10-CM    1. Wrist injury, right, initial encounter S69.91XA XR Wrist Right G/E 3 Views     HYDROcodone-acetaminophen (NORCO) 5-325 MG per tablet   2. Wrist fracture, right, closed, initial encounter S62.101A XR Wrist Right G/E 3 Views     HYDROcodone-acetaminophen (NORCO) 5-325 MG per tablet     Right wrist fracture that is maintained in good alignment.     Plan:  Short arm waterproof cast was placed. Care of the cast was explained to the patient. Patient was given some moleskin for comfort of the fiber glass cast near the thumb.  This cast was molded   Small script of Norco provided. (30 tablets)  Patient follow up 1 week with repeat x-rays    Patient is evaluated with and plan developed in conjunction with Dr. Almendarez    Scribed by  Jen Pathak PA-C   11/14/2017  4:06 PM        I attest I have seen and evaluated the patient.  I agree with above impression and plan.    Edgardo Almendarez MD              Again, thank you for allowing me to participate in the care of your patient.        Sincerely,        Edgardo  Kendall Almendarez MD

## 2017-11-14 NOTE — MR AVS SNAPSHOT
After Visit Summary   11/14/2017    Monalisa Olguin    MRN: [id number]           Patient Information     Date Of Birth          1965        Visit Information        Provider Department      11/14/2017 3:50 PM Edgardo Almendarez MD [facility]        Today's Diagnoses     Wrist injury, right, initial encounter    -  1    Wrist fracture, right, closed, initial encounter        Other closed fracture of distal end of right radius, initial encounter           Follow-ups after your visit        Your next 10 appointments already scheduled     Dec 12, 2017  1:45 PM CST   XR WRIST RIGHT G/E 3 VIEWS with [id number]   [facility])    [address] [place] [id number]              Please bring a list of your current medicines to your exam. (Include vitamins, minerals and over-thecounter medicines.) Leave your valuables at home.  Tell your doctor if there is a chance you may be pregnant.  You do not need to do anything special for this exam.            Dec 12, 2017  2:10 PM CST   [facility] Visit with Edgardo [facility]mendarez MD   [facility]    [address] [place] [id number]   [phone]            Dec 14, 2017 10:00 AM CST   Pre-Op physical with IVETTE Verdin CNP   [address] [address]   [phone]            Dec 27, 2017   Procedure with Sai Johnston MD   [facility] ([facility])    [address] [place] [id number]   [phone]            May 21, 2018  1:30 PM CDT   (Arrive by 1:15 PM)   Return General Liver with Edgardo Kovacs MD   [facility] ([facility])    [address] [address] [phone]              Future tests that were ordered for you today     Open Future Orders   "      Priority Expected Expires Ordered    XR Wrist Right G/E 3 Views Routine 12/8/2017 12/8/2018 12/8/2017            Who to contact     If you have questions or need follow up information about today's clinic visit or your schedule please contact [facility] directly at [phone].  Normal or non-critical lab and imaging results will be communicated to you by MyChart, letter or phone within 4 business days after the clinic has received the results. If you do not hear from us within 7 days, please contact the clinic through [employer] or phone. If you have a critical or abnormal lab result, we will notify you by phone as soon as possible.  Submit refill requests through [employer]! or call your pharmacy and they will forward the refill request to us. Please allow 3 business days for your refill to be completed.          Additional Information About Your Visit        MyChart Information     [employer]! gives you secure access to your electronic health record. If you see a primary care provider, you can also send messages to your care team and make appointments. If you have questions, please call your primary care clinic.  If you do not have a primary care provider, please call [phone] and they will assist you.        Care EveryWhere ID     This is your Care EveryWhere ID. This could be used by other organizations to access your [place] medical records  [id number]        Your Vitals Were     Temperature Height Last Period BMI (Body Mass Index)          98.6  F (37  C) (Temporal) 1.549 m (5' 1\") 05/16/2012 23.62 kg/m2         Blood Pressure from Last 3 Encounters:   11/29/17 115/68   11/28/17 112/74   11/28/17 122/72    Weight from Last 3 Encounters:   11/29/17 54.9 kg (121 lb)   11/28/17 54.9 kg (121 lb)   11/28/17 54.7 kg (120 lb 9.6 oz)              We Performed the Following     Forearm (Shortarm) Cast     Short Arm Cast Supplies          Today's Medication Changes          These changes are accurate " as of: 11/14/17 11:59 PM.  If you have any questions, ask your nurse or doctor.               Start taking these medicines.        Dose/Directions    HYDROcodone-acetaminophen 5-325 MG per tablet   Commonly known as:  NORCO   Used for:  Wrist injury, right, initial encounter, Wrist fracture, right, closed, initial encounter   Started by:  Edgardo Almendarez MD        Dose:  1-2 tablet   Take 1-2 tablets by mouth every 6 hours as needed for moderate to severe pain   Quantity:  30 tablet   Refills:  0            Where to get your medicines      Some of these will need a paper prescription and others can be bought over the counter.  Ask your nurse if you have questions.     Bring a paper prescription for each of these medications     HYDROcodone-acetaminophen 5-325 MG per tablet                Primary Care Provider Office Phone # Fax #    Efren Bustos [phone]       [address] [place] [address]        Equal Access to Services     [facility]: Hadii km hodgson hadasho Soamilcar, waaxda luqadaha, qaybta kaalmada adeegyada, salo ramos hayjo viera . So [facility] [phone].    ATENCIÓN: Si habla español, tiene a perez disposición servicios gratuitos de asistencia lingüística. Nadira al [phone].    We comply with applicable federal civil rights laws and [place] laws. We do not discriminate on the basis of race, color, national origin, age, disability, sex, sexual orientation, or gender identity.            Thank you!     Thank you for choosing [facility]  for your care. Our goal is always to provide you with excellent care. Hearing back from our patients is one way we can continue to improve our services. Please take a few minutes to complete the written survey that you may receive in the mail after your visit with us. Thank you!             Your Updated Medication List - Protect others around you: Learn how to safely use, store and throw away your  medicines at www.disposemymeds.org.          This list is accurate as of: 11/14/17 11:59 PM.  Always use your most recent med list.                   Brand Name Dispense Instructions for use Diagnosis    buPROPion 300 MG 24 hr tablet    WELLBUTRIN XL    90 tablet    Take 1 tablet (300 mg) by mouth every morning    Major depressive disorder, recurrent episode, moderate (H)       busPIRone 15 MG tablet    BUSPAR    180 tablet    Take 1 tablet (15 mg) by mouth 2 times daily    MONA (generalized anxiety disorder)       HYDROcodone-acetaminophen 5-325 MG per tablet    NORCO    30 tablet    Take 1-2 tablets by mouth every 6 hours as needed for moderate to severe pain    Wrist injury, right, initial encounter, Wrist fracture, right, closed, initial encounter       naltrexone 50 MG tablet    DEPADE;REVIA    90 tablet    TAKE ONE-HALF TABLET BY MOUTH EVERY DAY FOR ONE WEEK THEN TAKE ONE TABLET DAILY AFTER    Alcohol dependence in remission (H)       omeprazole 20 MG CR capsule    priLOSEC    90 capsule    Take 1 capsule (20 mg) by mouth daily    Gastroesophageal reflux disease with esophagitis       phosphorus tablet 250 mg 250 MG per tablet    VIRT-PHOS 250 NEUTRAL    120 tablet    TAKE TWO TABLETS BY MOUTH TWICE A DAY    Hypophosphatemia       propranolol 10 MG tablet    INDERAL    180 tablet    Take 1 tablet (10 mg) by mouth 2 times daily    Hypertension goal BP (blood pressure) < 130/80       thiamine 100 MG tablet     90 tablet    TAKE ONE TABLET BY MOUTH EVERY DAY    Alcohol dependence in remission (H)       TL RICARDO RX 2.2-25-1 MG Tabs   Generic drug:  Folic Acid-Vit B6-Vit B12     90 tablet    TAKE ONE TABLET BY MOUTH EVERY DAY    Alcoholism in recovery (H)

## 2017-11-15 NOTE — TELEPHONE ENCOUNTER
Requested Prescriptions   Pending Prescriptions Disp Refills     traZODone (DESYREL) 50 MG tablet [Pharmacy Med Name: TRAZODONE HCL 50MG TABS] 180 tablet 1     Sig: TAKE ONE TO TWO TABLETS BY MOUTH EVERY NIGHT AT BEDTIME AS NEEDED    Serotonin Modulators Passed    11/13/2017  2:05 PM       Passed - Recent or future visit with authorizing provider's specialty    Patient had office visit in the last year or has a visit in the next 30 days with authorizing provider.  See chart review.              Passed - Patient is age 18 or older       Passed - No active pregnancy on record       Passed - No positive pregnancy test in past 12 months        Routing refill request to provider for review/approval because:  Drug not active on patient's medication list.    Kenyetta Sinclair RN

## 2017-11-16 ENCOUNTER — TELEPHONE (OUTPATIENT)
Dept: FAMILY MEDICINE | Facility: CLINIC | Age: 52
End: 2017-11-16

## 2017-11-16 DIAGNOSIS — F41.9 ANXIETY: ICD-10-CM

## 2017-11-16 DIAGNOSIS — F33.1 MAJOR DEPRESSIVE DISORDER, RECURRENT EPISODE, MODERATE (H): Primary | ICD-10-CM

## 2017-11-16 RX ORDER — TRAZODONE HYDROCHLORIDE 50 MG/1
50 TABLET, FILM COATED ORAL AT BEDTIME
Qty: 90 TABLET | Refills: 3 | Status: SHIPPED | OUTPATIENT
Start: 2017-11-16 | End: 2017-11-28

## 2017-11-16 NOTE — TELEPHONE ENCOUNTER
Patient is due for a PHQ-9.  Index start date:10/10/2017  Index end date:12/10/2017    Please call patient.

## 2017-11-21 ENCOUNTER — OFFICE VISIT (OUTPATIENT)
Dept: ORTHOPEDICS | Facility: CLINIC | Age: 52
End: 2017-11-21
Payer: COMMERCIAL

## 2017-11-21 ENCOUNTER — RADIANT APPOINTMENT (OUTPATIENT)
Dept: GENERAL RADIOLOGY | Facility: CLINIC | Age: 52
End: 2017-11-21
Attending: ORTHOPAEDIC SURGERY
Payer: COMMERCIAL

## 2017-11-21 VITALS — HEIGHT: 61 IN | TEMPERATURE: 97.6 F

## 2017-11-21 DIAGNOSIS — S62.101A WRIST FRACTURE, RIGHT, CLOSED, INITIAL ENCOUNTER: ICD-10-CM

## 2017-11-21 DIAGNOSIS — S62.101A WRIST FRACTURE, RIGHT, CLOSED, INITIAL ENCOUNTER: Primary | ICD-10-CM

## 2017-11-21 PROCEDURE — 99213 OFFICE O/P EST LOW 20 MIN: CPT | Performed by: ORTHOPAEDIC SURGERY

## 2017-11-21 PROCEDURE — 73110 X-RAY EXAM OF WRIST: CPT | Mod: TC

## 2017-11-21 ASSESSMENT — PAIN SCALES - GENERAL: PAINLEVEL: NO PAIN (0)

## 2017-11-21 NOTE — MR AVS SNAPSHOT
After Visit Summary   11/21/2017    Monalisa Olguin    MRN: [id number]           Patient Information     Date Of Birth          1965        Visit Information        Provider Department      11/21/2017 3:00 PM Edgardo Almendarez MD [facility]        Today's Diagnoses     Wrist fracture, right, closed, initial encounter    -  1       Follow-ups after your visit        Your next 10 appointments already scheduled     Nov 28, 2017  8:30 AM CST   (Arrive by 8:15 AM)   New General Liver with Edgardo Kovacs MD   [facility] ([facility])    [address] [place] [id number] [phone]            Nov 28, 2017  1:00 PM CST   Return Visit with Edgardo Almendarez MD   [facility]    [address] [place] [address] [phone]              Who to contact     If you have questions or need follow up information about today's clinic visit or your schedule please contact [facility] directly at [phone].  Normal or non-critical lab and imaging results will be communicated to you by [employer], letter or phone within 4 business days after the clinic has received the results. If you do not hear from us within 7 days, please contact the clinic through [employer] or phone. If you have a critical or abnormal lab result, we will notify you by phone as soon as possible.  Submit refill requests through [employer] or call your pharmacy and they will forward the refill request to us. Please allow 3 business days for your refill to be completed.          Additional Information About Your Visit        MyChart Information     [employer] gives you secure access to your electronic health record. If you see a primary care provider, you can also send messages to your care team and make appointments. If you have questions, please call your primary care clinic.  If you do not have a  "primary care provider, please call [phone] and they will assist you.        Care EveryWhere ID     This is your Care EveryWhere ID. This could be used by other organizations to access your [facility] medical records  [id number]        Your Vitals Were     Temperature Height Last Period             97.6  F (36.4  C) (Temporal) 1.549 m (5' 1\") 05/16/2012          Blood Pressure from Last 3 Encounters:   11/02/17 106/66   10/18/17 124/76   09/27/17 92/56    Weight from Last 3 Encounters:   11/14/17 56.7 kg (125 lb)   11/02/17 56.7 kg (125 lb)   10/17/17 59 kg (130 lb)               Primary Care Provider Office Phone # Fax #    Efren Bustos [phone] [address] [place] [address]        Equal Access to Services     [facility] : Hadii aad ku hadasho Soomaali, waaxda luqadaha, qaybta kaalmada adeegyada, waxay venicein haynabiln mary jimenesn . So [facility] [phone].    ATENCIÓN: Si habla español, tiene a perez disposición servicios gratuitos de asistencia lingüística. Llame al [phone].    We comply with applicable federal civil rights laws and [place] laws. We do not discriminate on the basis of race, color, national origin, age, disability, sex, sexual orientation, or gender identity.            Thank you!     Thank you for choosing [facility]  for your care. Our goal is always to provide you with excellent care. Hearing back from our patients is one way we can continue to improve our services. Please take a few minutes to complete the written survey that you may receive in the mail after your visit with us. Thank you!             Your Updated Medication List - Protect others around you: Learn how to safely use, store and throw away your medicines at www.disposemymeds.org.          This list is accurate as of: 11/21/17  5:02 PM.  Always use your most recent med list.                   Brand Name Dispense Instructions for use Diagnosis    buPROPion 300 " MG 24 hr tablet    WELLBUTRIN XL    90 tablet    Take 1 tablet (300 mg) by mouth every morning    Major depressive disorder, recurrent episode, moderate (H)       busPIRone 15 MG tablet    BUSPAR    180 tablet    Take 1 tablet (15 mg) by mouth 2 times daily    MONA (generalized anxiety disorder)       FLUoxetine 20 MG capsule    PROzac    180 capsule    TAKE TWO CAPSULES BY MOUTH EVERY DAY    Anxiety       naltrexone 50 MG tablet    DEPADE;REVIA    90 tablet    TAKE ONE-HALF TABLET BY MOUTH EVERY DAY FOR ONE WEEK THEN TAKE ONE TABLET DAILY AFTER    Alcohol dependence in remission (H)       omeprazole 20 MG CR capsule    priLOSEC    90 capsule    Take 1 capsule (20 mg) by mouth daily    Gastroesophageal reflux disease with esophagitis       phosphorus tablet 250 mg 250 MG per tablet    VIRT-PHOS 250 NEUTRAL    120 tablet    TAKE TWO TABLETS BY MOUTH TWICE A DAY    Hypophosphatemia       propranolol 10 MG tablet    INDERAL    180 tablet    Take 1 tablet (10 mg) by mouth 2 times daily    Hypertension goal BP (blood pressure) < 130/80       thiamine 100 MG tablet     90 tablet    TAKE ONE TABLET BY MOUTH EVERY DAY    Alcohol dependence in remission (H)       TL RICARDO RX 2.2-25-1 MG Tabs   Generic drug:  Folic Acid-Vit B6-Vit B12     90 tablet    TAKE ONE TABLET BY MOUTH EVERY DAY    Alcoholism in recovery (H)       traZODone 50 MG tablet    DESYREL    90 tablet    Take 1 tablet (50 mg) by mouth At Bedtime    Other insomnia

## 2017-11-21 NOTE — LETTER
"    11/21/2017         RE: Monalisa Olguin  [address] [facility] [address]        Dear Colleague,    Thank you for referring your patient, Monalisa Olguin, to the [facility]. Please see a copy of my visit note below.    HISTORY OF PRESENT ILLNESS:    Monalisa Olguin is a 52 year old female who is seen in follow up for   Chief Complaint   Patient presents with     RECHECK     Right wrist injury.  Onset:  11/13/17 (s/p 8 days)  Symptoms brought on by Fell while changing a light bulb.     Present symptoms: Swelling is gone down quite a bit. Pain levels are improved. No issues with the cast. She has utilized the moleskin to make things comfortable especially at the thumb  Treatments tried to this point: Immobilization with a short arm cast  Patient evaluation done with Dr. Almendarez    Physical Exam:  Vitals: Temp 97.6  F (36.4  C) (Temporal)  Ht 1.549 m (5' 1\")  LMP 05/16/2012  BMI= There is no height or weight on file to calculate BMI.  Constitutional: healthy, alert and no acute distress   Psychiatric: mentation appears normal and affect normal/bright  NEURO: no focal deficits  SKIN: no excoriation or erythema. No signs of infection.  JOINT/EXTREMITIES:  Affected extremity pulses are easily palpable.  Right Wrist Exam: WRIST:  Inspection: Cast remains clean and in good condition. Patient has moleskin in areas typically known for rubbing such as the thumb and one area at the proximal forearm  Range of Motion: able to mobilize fingers readily    IMAGING INTERPRETATION:  No change in alignment from x-rays one week ago on Right wrist x-rays.   Independent visualization of the images was performed.     ASSESSMENT:  Chief Complaint   Patient presents with     RECHECK     Right wrist injury.  Onset:  11/13/17 (s/p 8 days)  Symptoms brought on by Fell while changing a light bulb.         ICD-10-CM    1. Wrist fracture, right, closed, initial encounter S62.101A XR Wrist Right G/E 3 Views "     Patient with right wrist fracture that has maintained alignment for the past week    Plan:   Patient should not weight-bear with the right wrist.  Patient can utilize the right for light activity such as computer work  Return to clinic in 1 week with repeat x-rays    Scribed by  Jen Pathak PA-C   11/21/2017  4:43 PM      I attest I have seen and evaluated the patient.  I agree with above impression and plan.    Edgardo Almendarez MD    Again, thank you for allowing me to participate in the care of your patient.        Sincerely,        Edgardo Almendarez MD

## 2017-11-21 NOTE — PROGRESS NOTES
"HISTORY OF PRESENT ILLNESS:    Monalisa Olguin is a 52 year old female who is seen in follow up for   Chief Complaint   Patient presents with     RECHECK     Right wrist injury.  Onset:  11/13/17 (s/p 8 days)  Symptoms brought on by Fell while changing a light bulb.     Present symptoms: Swelling is gone down quite a bit. Pain levels are improved. No issues with the cast. She has utilized the moleskin to make things comfortable especially at the thumb  Treatments tried to this point: Immobilization with a short arm cast  Patient evaluation done with Dr. Almendarez    Physical Exam:  Vitals: Temp 97.6  F (36.4  C) (Temporal)  Ht 1.549 m (5' 1\")  LMP 05/16/2012  BMI= There is no height or weight on file to calculate BMI.  Constitutional: healthy, alert and no acute distress   Psychiatric: mentation appears normal and affect normal/bright  NEURO: no focal deficits  SKIN: no excoriation or erythema. No signs of infection.  JOINT/EXTREMITIES:  Affected extremity pulses are easily palpable.  Right Wrist Exam: WRIST:  Inspection: Cast remains clean and in good condition. Patient has moleskin in areas typically known for rubbing such as the thumb and one area at the proximal forearm  Range of Motion: able to mobilize fingers readily    IMAGING INTERPRETATION:  No change in alignment from x-rays one week ago on Right wrist x-rays.   Independent visualization of the images was performed.     ASSESSMENT:  Chief Complaint   Patient presents with     RECHECK     Right wrist injury.  Onset:  11/13/17 (s/p 8 days)  Symptoms brought on by Fell while changing a light bulb.         ICD-10-CM    1. Wrist fracture, right, closed, initial encounter S62.101A XR Wrist Right G/E 3 Views     Patient with right wrist fracture that has maintained alignment for the past week    Plan:   Patient should not weight-bear with the right wrist.  Patient can utilize the right for light activity such as computer work  Return to clinic in 1 week with " repeat x-rays    Scribed by  Jen Pathak PA-C   11/21/2017  4:43 PM      I attest I have seen and evaluated the patient.  I agree with above impression and plan.    Edgardo Almendarez MD

## 2017-11-21 NOTE — NURSING NOTE
"Chief Complaint   Patient presents with     RECHECK     Right wrist injury.  Onset:  11/13/17 (s/p 8 days)  Symptoms brought on by Fell while changing a light bulb.         Initial Temp 97.6  F (36.4  C) (Temporal)  Ht 1.549 m (5' 1\")  LMP 05/16/2012 Estimated body mass index is 23.62 kg/(m^2) as calculated from the following:    Height as of 11/14/17: 1.549 m (5' 1\").    Weight as of 11/14/17: 56.7 kg (125 lb).  Medication Reconciliation: complete   JOSE RAMON Toscano  "

## 2017-11-22 ENCOUNTER — TELEPHONE (OUTPATIENT)
Dept: GASTROENTEROLOGY | Facility: CLINIC | Age: 52
End: 2017-11-22

## 2017-11-22 ASSESSMENT — PATIENT HEALTH QUESTIONNAIRE - PHQ9: SUM OF ALL RESPONSES TO PHQ QUESTIONS 1-9: 12

## 2017-11-22 NOTE — TELEPHONE ENCOUNTER
Pt completed PHQ-9. Will route to PCP due to score being above 5. Lili Weldon CMA ([facility])

## 2017-11-22 NOTE — TELEPHONE ENCOUNTER
Left message for pt reminding them of upcoming appointment.  Instructed pt to bring updated medications list.  Evens Morton, CMA

## 2017-11-24 ENCOUNTER — TELEPHONE (OUTPATIENT)
Dept: ORTHOPEDICS | Facility: CLINIC | Age: 52
End: 2017-11-24

## 2017-11-24 NOTE — TELEPHONE ENCOUNTER
Reason for Call:  Other     Detailed comments: Lazara, Chemical dependency counselor, is calling from the [facility] in [facility] regarding recent medication of opiates that was prescribed by Dr. Almendarez. Counselor wants to make sure you are aware of chemical dependency issues and would like you to call her. They will be faxing a release of info as well. Please call her at [phone]         Can we leave a detailed message on this number? YES    Call taken on 11/24/2017 at 1:19 PM by Ericka Garza

## 2017-11-27 NOTE — TELEPHONE ENCOUNTER
I have attempted to contact pt in regards to message below. I left a message for her to return my call. I will call back another time. Lili Weldon CMA ([facility])

## 2017-11-27 NOTE — TELEPHONE ENCOUNTER
I would consider increasing her fluoxetine to 60 mg q hs.  See if Heydi would like to do that or if she wants to wait and see how she is doing in a few more months?     Electronically signed by:  Efren Bustos M.D.  11/27/2017

## 2017-11-27 NOTE — TELEPHONE ENCOUNTER
Yes, we are aware of many people who are chemically dependent and need opioids for a short term.  She had a new broken wrist and is appropriate to order these for the first couple weeks.  She has not yet asked for any refills.

## 2017-11-27 NOTE — TELEPHONE ENCOUNTER
Patient calling back, returning Lili's call. I tried to reach Lili and was unable. Please advise [phone] may leave a message.

## 2017-11-28 ENCOUNTER — RADIANT APPOINTMENT (OUTPATIENT)
Dept: GENERAL RADIOLOGY | Facility: CLINIC | Age: 52
End: 2017-11-28
Attending: ORTHOPAEDIC SURGERY
Payer: COMMERCIAL

## 2017-11-28 ENCOUNTER — OFFICE VISIT (OUTPATIENT)
Dept: GASTROENTEROLOGY | Facility: CLINIC | Age: 52
End: 2017-11-28
Attending: INTERNAL MEDICINE
Payer: COMMERCIAL

## 2017-11-28 ENCOUNTER — OFFICE VISIT (OUTPATIENT)
Dept: FAMILY MEDICINE | Facility: CLINIC | Age: 52
End: 2017-11-28
Payer: COMMERCIAL

## 2017-11-28 ENCOUNTER — OFFICE VISIT (OUTPATIENT)
Dept: ORTHOPEDICS | Facility: CLINIC | Age: 52
End: 2017-11-28
Payer: COMMERCIAL

## 2017-11-28 VITALS
TEMPERATURE: 97.9 F | OXYGEN SATURATION: 99 % | SYSTOLIC BLOOD PRESSURE: 112 MMHG | HEART RATE: 87 BPM | DIASTOLIC BLOOD PRESSURE: 74 MMHG | BODY MASS INDEX: 22.86 KG/M2 | WEIGHT: 121 LBS

## 2017-11-28 VITALS
SYSTOLIC BLOOD PRESSURE: 122 MMHG | WEIGHT: 120.6 LBS | HEIGHT: 61 IN | DIASTOLIC BLOOD PRESSURE: 72 MMHG | OXYGEN SATURATION: 98 % | TEMPERATURE: 98 F | HEART RATE: 78 BPM | BODY MASS INDEX: 22.77 KG/M2

## 2017-11-28 VITALS — HEIGHT: 61 IN | TEMPERATURE: 98 F | WEIGHT: 120.6 LBS | BODY MASS INDEX: 22.77 KG/M2

## 2017-11-28 DIAGNOSIS — K70.30 ALCOHOLIC CIRRHOSIS OF LIVER WITHOUT ASCITES (H): Primary | ICD-10-CM

## 2017-11-28 DIAGNOSIS — S62.101A WRIST FRACTURE, RIGHT, CLOSED, INITIAL ENCOUNTER: ICD-10-CM

## 2017-11-28 DIAGNOSIS — S62.101D CLOSED FRACTURE OF RIGHT WRIST WITH ROUTINE HEALING, SUBSEQUENT ENCOUNTER: Primary | ICD-10-CM

## 2017-11-28 DIAGNOSIS — Z01.818 PREOP GENERAL PHYSICAL EXAM: Primary | ICD-10-CM

## 2017-11-28 DIAGNOSIS — F10.20 ALCOHOLISM (H): ICD-10-CM

## 2017-11-28 DIAGNOSIS — S62.101A WRIST FRACTURE, RIGHT, CLOSED, INITIAL ENCOUNTER: Primary | ICD-10-CM

## 2017-11-28 DIAGNOSIS — Z53.9 ERRONEOUS ENCOUNTER--DISREGARD: ICD-10-CM

## 2017-11-28 DIAGNOSIS — K70.11 ALCOHOLIC HEPATITIS WITH ASCITES (H): ICD-10-CM

## 2017-11-28 DIAGNOSIS — K70.30 ALCOHOLIC CIRRHOSIS OF LIVER WITHOUT ASCITES (H): ICD-10-CM

## 2017-11-28 DIAGNOSIS — I10 HYPERTENSION GOAL BP (BLOOD PRESSURE) < 130/80: ICD-10-CM

## 2017-11-28 DIAGNOSIS — F10.21 ALCOHOLISM IN RECOVERY (H): ICD-10-CM

## 2017-11-28 LAB
ALBUMIN UR-MCNC: NEGATIVE MG/DL
APPEARANCE UR: ABNORMAL
BILIRUB UR QL STRIP: NEGATIVE
CAOX CRY #/AREA URNS HPF: ABNORMAL /HPF
COLOR UR AUTO: YELLOW
CREAT UR-MCNC: 229 MG/DL
GLUCOSE UR STRIP-MCNC: NEGATIVE MG/DL
HGB UR QL STRIP: NEGATIVE
HYALINE CASTS #/AREA URNS LPF: 1 /LPF (ref 0–2)
KETONES UR STRIP-MCNC: NEGATIVE MG/DL
LEUKOCYTE ESTERASE UR QL STRIP: NEGATIVE
MUCOUS THREADS #/AREA URNS LPF: PRESENT /LPF
NITRATE UR QL: NEGATIVE
PH UR STRIP: 5 PH (ref 5–7)
PROT UR-MCNC: 0.19 G/L
PROT/CREAT 24H UR: 0.08 G/G CR (ref 0–0.2)
RBC #/AREA URNS AUTO: <1 /HPF (ref 0–2)
SOURCE: ABNORMAL
SP GR UR STRIP: 1.02 (ref 1–1.03)
SQUAMOUS #/AREA URNS AUTO: 1 /HPF (ref 0–1)
UROBILINOGEN UR STRIP-MCNC: 0 MG/DL (ref 0–2)
WBC #/AREA URNS AUTO: <1 /HPF (ref 0–2)

## 2017-11-28 PROCEDURE — 90670 PCV13 VACCINE IM: CPT | Mod: ZF | Performed by: INTERNAL MEDICINE

## 2017-11-28 PROCEDURE — 81001 URINALYSIS AUTO W/SCOPE: CPT | Performed by: ORTHOPAEDIC SURGERY

## 2017-11-28 PROCEDURE — 99212 OFFICE O/P EST SF 10 MIN: CPT | Mod: 25,ZF

## 2017-11-28 PROCEDURE — 84156 ASSAY OF PROTEIN URINE: CPT | Performed by: ORTHOPAEDIC SURGERY

## 2017-11-28 PROCEDURE — 73110 X-RAY EXAM OF WRIST: CPT | Mod: TC

## 2017-11-28 PROCEDURE — 25000128 H RX IP 250 OP 636: Mod: ZF | Performed by: INTERNAL MEDICINE

## 2017-11-28 PROCEDURE — G0009 ADMIN PNEUMOCOCCAL VACCINE: HCPCS | Mod: ZF

## 2017-11-28 PROCEDURE — 80321 ALCOHOLS BIOMARKERS 1OR 2: CPT | Performed by: INTERNAL MEDICINE

## 2017-11-28 PROCEDURE — 99214 OFFICE O/P EST MOD 30 MIN: CPT | Performed by: FAMILY MEDICINE

## 2017-11-28 PROCEDURE — 99213 OFFICE O/P EST LOW 20 MIN: CPT | Mod: 57 | Performed by: ORTHOPAEDIC SURGERY

## 2017-11-28 RX ADMIN — PNEUMOCOCCAL 13-VALENT CONJUGATE VACCINE 0.5 ML: 2.2; 2.2; 2.2; 2.2; 2.2; 4.4; 2.2; 2.2; 2.2; 2.2; 2.2; 2.2; 2.2 INJECTION, SUSPENSION INTRAMUSCULAR at 11:54

## 2017-11-28 ASSESSMENT — PAIN SCALES - GENERAL
PAINLEVEL: NO PAIN (0)

## 2017-11-28 NOTE — MR AVS SNAPSHOT
After Visit Summary   11/28/2017    Monalisa Olguin    MRN: [id number]           Patient Information     Date Of Birth          1965        Visit Information        Provider Department      11/28/2017 1:20 PM Edgardo Almendarez MD [facility]        Today's Diagnoses     Closed fracture of right wrist with routine healing, subsequent encounter    -  1       Follow-ups after your visit        Your next 10 appointments already scheduled     Nov 28, 2017  2:40 PM CST   [facility] with Edwin Lee [facility]   [facility]    [address] [place] [id number]   [phone]            Dec 11, 2017  2:30 PM CST   Return Visit with Edgardo Almendarez MD   [facility]    [address] [place] [id number] [phone]            May 21, 2018  1:30 PM CDT   (Arrive by 1:15 PM)   Return General Liver with Edgardo Kovacs MD   [facility] Hep[facility] ([facility] [place])    [address] [place] [address] [phone]              Who to contact     If you have questions or need follow up information about today's clinic visit or your schedule please contact [facility] directly at [phone].  Normal or non-critical lab and imaging results will be communicated to you by MyChart, letter or phone within 4 business days after the clinic has received the results. If you do not hear from us within 7 days, please contact the clinic through MyChart or phone. If you have a critical or abnormal lab result, we will notify you by phone as soon as possible.  Submit refill requests through [employer] or call your pharmacy and they will forward the refill request to us. Please allow 3 business days for your refill to be completed.          Additional Information About Your Visit        [employer] Information     [employer] gives you secure  "access to your electronic health record. If you see a primary care provider, you can also send messages to your care team and make appointments. If you have questions, please call your primary care clinic.  If you do not have a primary care provider, please call [phone] and they will assist you.        Care EveryWhere ID     This is your Care EveryWhere ID. This could be used by other organizations to access your [facility] medical records  [id number]        Your Vitals Were     Temperature Height Last Period BMI (Body Mass Index)          98  F (36.7  C) (Temporal) 1.549 m (5' 1\") 05/16/2012 22.79 kg/m2         Blood Pressure from Last 3 Encounters:   11/28/17 122/72   11/02/17 106/66   10/18/17 124/76    Weight from Last 3 Encounters:   11/28/17 54.7 kg (120 lb 9.6 oz)   11/28/17 54.7 kg (120 lb 9.6 oz)   11/14/17 56.7 kg (125 lb)              Today, you had the following     No orders found for display       Primary Care Provider Office Phone # Fax #    Efren Bustos [phone]       [address] [place] [address]        Equal Access to Services     ADRIENNE GILLIAM : Hadii aad ku hadasho Soomaali, waaxda luqadaha, qaybta kaalmada adeegyada, waxay venicein haynabiln mary vela laevita ah. So [facility] [phone].    ATENCIÓN: Si habla español, tiene a perez disposición servicios gratuitos de asistencia lingüística. Llame al [phone].    We comply with applicable federal civil rights laws and [place] laws. We do not discriminate on the basis of race, color, national origin, age, disability, sex, sexual orientation, or gender identity.            Thank you!     Thank you for choosing [facility]  for your care. Our goal is always to provide you with excellent care. Hearing back from our patients is one way we can continue to improve our services. Please take a few minutes to complete the written survey that you may receive in the mail after your visit with us. Thank " you!             Your Updated Medication List - Protect others around you: Learn how to safely use, store and throw away your medicines at www.disposemymeds.org.          This list is accurate as of: 11/28/17  2:18 PM.  Always use your most recent med list.                   Brand Name Dispense Instructions for use Diagnosis    buPROPion 300 MG 24 hr tablet    WELLBUTRIN XL    90 tablet    Take 1 tablet (300 mg) by mouth every morning    Major depressive disorder, recurrent episode, moderate (H)       busPIRone 15 MG tablet    BUSPAR    180 tablet    Take 1 tablet (15 mg) by mouth 2 times daily    MONA (generalized anxiety disorder)       FLUoxetine 20 MG capsule    PROzac    180 capsule    TAKE TWO CAPSULES BY MOUTH EVERY DAY    Anxiety       naltrexone 50 MG tablet    DEPADE;REVIA    90 tablet    TAKE ONE-HALF TABLET BY MOUTH EVERY DAY FOR ONE WEEK THEN TAKE ONE TABLET DAILY AFTER    Alcohol dependence in remission (H)       omeprazole 20 MG CR capsule    priLOSEC    90 capsule    Take 1 capsule (20 mg) by mouth daily    Gastroesophageal reflux disease with esophagitis       phosphorus tablet 250 mg 250 MG per tablet    VIRT-PHOS 250 NEUTRAL    120 tablet    TAKE TWO TABLETS BY MOUTH TWICE A DAY    Hypophosphatemia       propranolol 10 MG tablet    INDERAL    180 tablet    Take 1 tablet (10 mg) by mouth 2 times daily    Hypertension goal BP (blood pressure) < 130/80       thiamine 100 MG tablet     90 tablet    TAKE ONE TABLET BY MOUTH EVERY DAY    Alcohol dependence in remission (H)       TL RICARDO RX 2.2-25-1 MG Tabs   Generic drug:  Folic Acid-Vit B6-Vit B12     90 tablet    TAKE ONE TABLET BY MOUTH EVERY DAY    Alcoholism in recovery (H)

## 2017-11-28 NOTE — PROGRESS NOTES
"HISTORY OF PRESENT ILLNESS:    Monalisa Olguin is a 52 year old female who is seen in follow up for   Chief Complaint   Patient presents with     RECHECK     Right wrist injury.  Onset:  11/13/17 (s/p 15 days)  Symptoms brought on by Fell while changing a light bulb.     Present symptoms: Patient reports she is currently comfortable in her cast. She has not needed to use any other narcotic pain medications prescribed to her in the past week. Patient did come last evening to obtain a copy of her narcotic pain medication and why it was prescribed. She needs to submit this to her chemical dependency counselor.  Treatments tried to this point: Short arm cast  Patient evaluation done with Dr. Almendarez    Physical Exam:  Vitals: Temp 98  F (36.7  C) (Temporal)  Ht 1.549 m (5' 1\")  Wt 54.7 kg (120 lb 9.6 oz)  LMP 05/16/2012  BMI 22.79 kg/m2  BMI= Body mass index is 22.79 kg/(m^2).  Constitutional: healthy, alert and no acute distress   Psychiatric: mentation appears normal and affect normal/bright  NEURO: no focal deficits  SKIN: no excoriation or erythema. No signs of infection.  JOINT/EXTREMITIES:  Fingers are blanchable  Right wrist:  Cast intact and in good condition. Patient is well able to wiggle her fingers.    IMAGING INTERPRETATION:  X-ray images in comparison to initial x-rays does show that the fracture is losing alignment and distal segment slipping dorsally.  Independent visualization of the images was performed.     ASSESSMENT:    Chief Complaint   Patient presents with     RECHECK     Right wrist injury.  Onset:  11/13/17 (s/p 15 days)  Symptoms brought on by Fell while changing a light bulb.         ICD-10-CM    1. Closed fracture of right wrist with routine healing, subsequent encounter S62.101D      Patient's x-rays today indicate that the fracture is slipping out of acceptable alignment.    Plan:   It is recommended patient undergo open reduction internal fixation with plate fixation of the right " wrist. Patient will be splinted postoperatively.  We will like to do this procedure as soon as possible and hopefully by tomorrow.  Return to clinic one week postoperatively.    Scribed by  Jen Pathak PA-C   2017  1:55 PM      I attest I have seen and evaluated the patient.  I agree with above impression and plan.    Edgardo Almendarez MD    Please schedule for surgery, pre op H&P, and post ops.      Patient Name:  Monalisa Olguin ([id number]).  :  1965  Gender:  female  Patient Type:  Same Day Surgery  Surgeon:  Edgardo Almendarez MD  Physician requests assist from:  PA    Procedures:    Right wrist open reduction internal fixation     Diagnosis:  Closed fracture of right wrist with routine healing, subsequent encounter    Mini C-arm:   Yes  Special instruments/supplies:  Variable plate system, splint supplies likely four-inch  Anesthesia:  Gen.    Time needed:  90 minutes    [facility] Discussed:  na    Post op 1:

## 2017-11-28 NOTE — LETTER
11/28/2017         RE: Monalisa Olguin  [address] [facility] [address]        Dear Colleague,    Thank you for referring your patient, Monalisa Olguin, to the [facility]. Please see a copy of my visit note below.      This encounter was opened in error. Please disregard.    Again, thank you for allowing me to participate in the care of your patient.        Sincerely,        Edgardo Almendarez MD

## 2017-11-28 NOTE — MR AVS SNAPSHOT
After Visit Summary   11/28/2017    Monalisa Olguin    MRN: [id number]           Patient Information     Date Of Birth          1965        Visit Information        Provider Department      11/28/2017 2:40 PM Edwin Lee MD [employer]        Today's Diagnoses     Preop general physical exam    -  1    Hypertension goal BP (blood pressure) < 130/80        Alcoholism in recovery (H)        Wrist fracture, right, closed, initial encounter          Care Instructions      Before Your Surgery      Call your surgeon if there is any change in your health. This includes signs of a cold or flu (such as a sore throat, runny nose, cough, rash or fever).    Do not smoke, drink alcohol or take over the counter medicine (unless your surgeon or primary care doctor tells you to) for the 24 hours before and after surgery.    If you take prescribed drugs: Follow your doctor s orders about which medicines to take and which to stop until after surgery.    Eating and drinking prior to surgery: follow the instructions from your surgeon    Take a shower or bath the night before surgery. Use the soap your surgeon gave you to gently clean your skin. If you do not have soap from your surgeon, use your regular soap. Do not shave or scrub the surgery site.  Wear clean pajamas and have clean sheets on your bed.           Follow-ups after your visit        Your next 10 appointments already scheduled     Nov 29, 2017   Procedure with Edgardo Almendarez MD   [facility] ([facility])    [address] [place] [address] Dr Viet ALLEN [address]   [phone]           From [address]: Exit at [address] on south side of [place]. Turn right on [address]. Turn left at stoplight on [address]. [facility] will be in view two blocks ahead            Nov 29, 2017  1:45 PM CST   XR SURGERY ISA LESS THAN 5 MIN FLUORO W STILLS with PHMINI   [facility]  [place] ([facility])    [address] [place] [id number] [phone]           Please bring a list of your current medicines to your exam. (Include vitamins, minerals and over-thecounter medicines.) Leave your valuables at home.  Tell your doctor if there is a chance you may be pregnant.  You do not need to do anything special for this exam.            Dec 11, 2017  2:30 PM CST   Return Visit with Edgardo Almendarez MD   [facility] ([facility])    [address] [place] [id number] [phone]            May 21, 2018  1:30 PM CDT   (Arrive by 1:15 PM)   Return General Liver with Edgardo Kovacs MD   [facility] Hepatology ([facility] [place])    [address] [place] [id number] [phone]              Future tests that were ordered for you today     Open Future Orders        Priority Expected Expires Ordered    XR Surgery ISA L/T 5 Min Fluoro w Stills Routine 11/29/2017 11/29/2018 11/29/2017            Who to contact     If you have questions or need follow up information about today's clinic visit or your schedule please contact [facility] directly at [phone].  Normal or non-critical lab and imaging results will be communicated to you by LawBitehart, letter or phone within 4 business days after the clinic has received the results. If you do not hear from us within 7 days, please contact the clinic through LawBitehart or phone. If you have a critical or abnormal lab result, we will notify you by phone as soon as possible.  Submit refill requests through AgeCheq or call your pharmacy and they will forward the refill request to us. Please allow 3 business days for your refill to be completed.          Additional Information About Your Visit        AgeCheq Information     AgeCheq gives you secure access to your electronic health record. If you see a primary care provider, you can also  send messages to your care team and make appointments. If you have questions, please call your primary care clinic.  If you do not have a primary care provider, please call [phone] and they will assist you.        Care EveryWhere ID     This is your Care EveryWhere ID. This could be used by other organizations to access your [place] medical records  [id number]        Your Vitals Were     Pulse Temperature Last Period Pulse Oximetry BMI (Body Mass Index)       87 97.9  F (36.6  C) (Temporal) 05/16/2012 99% 22.86 kg/m2        Blood Pressure from Last 3 Encounters:   11/28/17 112/74   11/28/17 122/72   11/02/17 106/66    Weight from Last 3 Encounters:   11/28/17 121 lb (54.9 kg)   11/28/17 120 lb 9.6 oz (54.7 kg)   11/28/17 120 lb 9.6 oz (54.7 kg)              Today, you had the following     No orders found for display       Primary Care Provider Office Phone # Fax #    Efren Bustos [phone]       [address] [place] [address]        Equal Access to Services     [facility] AH: Hadii aad ku hadasho Sokeithali, waaxda luqadaha, qaybta kaalmada adeegyada, salo viera ah. So [facility] [phone].    ATENCIÓN: Si habla español, tiene a perez disposición servicios gratuitos de asistencia lingüística. Llame al [phone].    We comply with applicable federal civil rights laws and [place] laws. We do not discriminate on the basis of race, color, national origin, age, disability, sex, sexual orientation, or gender identity.            Thank you!     Thank you for choosing [facility]  for your care. Our goal is always to provide you with excellent care. Hearing back from our patients is one way we can continue to improve our services. Please take a few minutes to complete the written survey that you may receive in the mail after your visit with us. Thank you!             Your Updated Medication List - Protect others around you: Learn how to  safely use, store and throw away your medicines at www.disposemymeds.org.          This list is accurate as of: 11/28/17 11:59 PM.  Always use your most recent med list.                   Brand Name Dispense Instructions for use Diagnosis    ALEVE PO      Take 220 mg by mouth 2 times daily as needed for moderate pain        buPROPion 300 MG 24 hr tablet    WELLBUTRIN XL    90 tablet    Take 1 tablet (300 mg) by mouth every morning    Major depressive disorder, recurrent episode, moderate (H)       busPIRone 15 MG tablet    BUSPAR    180 tablet    Take 1 tablet (15 mg) by mouth 2 times daily    MONA (generalized anxiety disorder)       FLUoxetine 20 MG capsule    PROzac    180 capsule    TAKE TWO CAPSULES BY MOUTH EVERY DAY    Anxiety       naltrexone 50 MG tablet    DEPADE;REVIA    90 tablet    TAKE ONE-HALF TABLET BY MOUTH EVERY DAY FOR ONE WEEK THEN TAKE ONE TABLET DAILY AFTER    Alcohol dependence in remission (H)       omeprazole 20 MG CR capsule    priLOSEC    90 capsule    Take 1 capsule (20 mg) by mouth daily    Gastroesophageal reflux disease with esophagitis       phosphorus tablet 250 mg 250 MG per tablet    VIRT-PHOS 250 NEUTRAL    120 tablet    TAKE TWO TABLETS BY MOUTH TWICE A DAY    Hypophosphatemia       propranolol 10 MG tablet    INDERAL    180 tablet    Take 1 tablet (10 mg) by mouth 2 times daily    Hypertension goal BP (blood pressure) < 130/80       thiamine 100 MG tablet     90 tablet    TAKE ONE TABLET BY MOUTH EVERY DAY    Alcohol dependence in remission (H)       TL RICARDO RX 2.2-25-1 MG Tabs   Generic drug:  Folic Acid-Vit B6-Vit B12     90 tablet    TAKE ONE TABLET BY MOUTH EVERY DAY    Alcoholism in recovery (H)

## 2017-11-28 NOTE — LETTER
2017      RE: Monalisa Olguin  [address] [facility] [address]       I had the pleasure of seeing Monalisa Olguin for consultation in the Liver Clinic at the [facility] on 2017.  Ms. Olguin presents for consultation regarding alcoholic liver disease.      It sounds as though she has had a long struggle with alcoholism which has included 2 DUIs, as well as some shelter time as consequence of the DUIs.  She presented to her local hospital in April with jaundice at that time.  A clinical diagnosis of alcoholic hepatitis was made, and she appears to have largely stopped drinking at that period of time.  With that, her liver tests improved to the point where bilirubin and transaminases were actually normal.  Most recently, her transaminases have been somewhat abnormal, and her bilirubin has crept up as well.  She did have a positive blood alcohol level in July.  She is currently involved with outpatient alcohol treatment, and she states that many times she has gone through inpatient treatment.      At this point, she feels fairly well.  She denies any abdominal pain, itching or skin rash or fatigue.  She denies any increased abdominal girth or lower extremity edema.  She denies any fevers or chills, cough or shortness of breath.  She denies any nausea, vomiting, diarrhea or constipation.  Her appetite has been diminished, but her weight has remained relatively stable.  She has not had any gastrointestinal or any overt signs of encephalopathy and has not had an upper GI endoscopy.  She had been scheduled for one, but she had to cancel it.        Past medical history significant for previous laparoscopic cholecystectomy which was done here.  She has had a previous  and knee surgery.  She did donate a kidney to her sister.  Her other medical problems include some depression, and she has had some hallucinations, whether that was related to the alcohol or not is  unclear.  She also has had hypertension in the past, as well as some hyperlipidemia.        Social History:  She is currently not working.  She previously worked as a .  She does smoke about a half pack per day.  She came to the clinic appointment with her .  As I mentioned, she a very long history of alcoholism and is currently in outpatient treatment.        Family history is negative for liver disease and for alcoholism.        A complete review of systems is negative other than that noted above.     Current Outpatient Prescriptions   Medication     phosphorus tablet 250 mg (VIRT-PHOS 250 NEUTRAL) 250 MG per tablet     FLUoxetine (PROZAC) 20 MG capsule     naltrexone (DEPADE;REVIA) 50 MG tablet     propranolol (INDERAL) 10 MG tablet     TL RICARDO RX 2.2-25-1 MG TABS     VITAMIN B-1 100 MG tablet     busPIRone (BUSPAR) 15 MG tablet     buPROPion (WELLBUTRIN XL) 300 MG 24 hr tablet     omeprazole (PRILOSEC) 20 MG CR capsule     No current facility-administered medications for this visit.      B/P: 122/72, T: 98, P: 78, R: Data Unavailable    In general, she appears healthy.  HEENT exam shows no scleral icterus or temple muscle wasting.  Neck is without thyromegaly.  Chest is clear.  Cardiac exam reveals no S3, S4 or murmur.  Her abdominal exam shows no obvious ascites.  No masses or tenderness to palpation are present.  Liver is 10-11 cm in span with a prominent left lobe.  No spleen tip is palpable, and extremity exam shows no edema.  Skin exam shows some palmar erythema with 1-2 spider angioma on her chest.  Neurologic exam shows no asterixis.     Recent Results (from the past 168 hour(s))   Routine UA with micro reflex to culture    Collection Time: 11/28/17  9:40 AM   Result Value Ref Range    Color Urine Yellow     Appearance Urine Slightly Cloudy     Glucose Urine Negative NEG^Negative mg/dL    Bilirubin Urine Negative NEG^Negative    Ketones Urine Negative NEG^Negative mg/dL    Specific  Gravity Urine 1.025 1.003 - 1.035    Blood Urine Negative NEG^Negative    pH Urine 5.0 5.0 - 7.0 pH    Protein Albumin Urine Negative NEG^Negative mg/dL    Urobilinogen mg/dL 0.0 0.0 - 2.0 mg/dL    Nitrite Urine Negative NEG^Negative    Leukocyte Esterase Urine Negative NEG^Negative    Source Midstream Urine     WBC Urine <1 0 - 2 /HPF    RBC Urine <1 0 - 2 /HPF    Squamous Epithelial /HPF Urine 1 0 - 1 /HPF    Mucous Urine Present (A) NEG^Negative /LPF    Hyaline Casts 1 0 - 2 /LPF    Calcium Oxalate Moderate (A) NEG^Negative /HPF      Her other recent laboratory tests show her white count is 4.1, hemoglobin is 8.1, platelets were 100,000.  Sodium 145, potassium 3.7, BUN is 9, creatinine 0.9, AST is 139, ALT is 68, alkaline phosphatase is 67, albumin is 3.5 with a total protein of 6.6 and total bilirubin is 1.5.      My impression is that Ms. Olguin has alcoholic cirrhosis.  She did have an MRI in September that showed no mass lesions in the liver, and this certainly is for her HCC screening.  She does need an upper GI endoscopy to screen for esophageal varices.  She will get Prevnar 13 vaccine and will then be updated on her vaccination schedule.  I have also recommended she have a bone density study in [place], as she is at risk being menopausal and with alcoholic liver disease.  We did spend more than 50% of our 50-minute visit discussing the fact that what is going to make the major difference in her disease course is her ability to abstain from alcohol.  I did point out that her liver tests are pretty good right now, but she will need ongoing screening for HCC, esophageal varices and will need to stay up to date on her vaccines.  I have encouraged her to continue with her outpatient alcohol treatment.  My plan will be to see the patient back in the clinic again in 6 months.      Thank you very much for allowing me to participate in the care of this patient.  If you have any questions regarding my  recommendations, please do not hesitate to contact me.       Edgardo Kovacs MD      Professor of Medicine  [employer]      Executive Medical Director, Solid Organ Transplant Program  [facility]     Edgardo Kovacs MD

## 2017-11-28 NOTE — MR AVS SNAPSHOT
After Visit Summary   11/28/2017    Monalisa Olguin    MRN: [id number]           Patient Information     Date Of Birth          1965        Visit Information        Provider Department      11/28/2017 8:30 AM Edgardo Kovacs MD [facility]        Today's Diagnoses     Alcoholic cirrhosis of liver without ascites (H)    -  1    Alcoholic hepatitis with ascites        Alcoholism (H)           Follow-ups after your visit        Follow-up notes from your care team     Return in about 6 months (around 5/28/2018).      Your next 10 appointments already scheduled     Nov 30, 2017  9:45 AM CST   XR WRIST RIGHT G/E 3 VIEWS with PHXRSP1   [facility])    [address] [place] [phone]              Please bring a list of your current medicines to your exam. (Include vitamins, minerals and over-thecounter medicines.) Leave your valuables at home.  Tell your doctor if there is a chance you may be pregnant.  You do not need to do anything special for this exam.            Nov 30, 2017 10:00 AM CST   Return Visit with Edgardo Almendarez MD   [facility] ([facility])    [address] [place] [id number] [phone]            May 21, 2018  1:30 PM CDT   (Arrive by 1:15 PM)   Return General Liver with Edgardo Kovacs MD   [facility] ([facility])    [address] [place] [id number] [phone]              Future tests that were ordered for you today     Open Future Orders        Priority Expected Expires Ordered    Routine UA with micro reflex to culture Routine  12/28/2017 11/28/2017    Protein  random urine with Creat Ratio Routine  12/28/2017 11/28/2017    XR Wrist Right G/E 3 Views Routine 11/24/2017 11/24/2018 11/24/2017            Who to contact     If you have questions or need follow up information about today's clinic visit or your  "schedule please contact [facility]PAT[facility] directly at [phone].  Normal or non-critical lab and imaging results will be communicated to you by MyChart, letter or phone within 4 business days after the clinic has received the results. If you do not hear from us within 7 days, please contact the clinic through Adilityhart or phone. If you have a critical or abnormal lab result, we will notify you by phone as soon as possible.  Submit refill requests through Myfacepage or call your pharmacy and they will forward the refill request to us. Please allow 3 business days for your refill to be completed.          Additional Information About Your Visit        AdilityharpluriSelect Information     Myfacepage gives you secure access to your electronic health record. If you see a primary care provider, you can also send messages to your care team and make appointments. If you have questions, please call your primary care clinic.  If you do not have a primary care provider, please call [phone] and they will assist you.        Care EveryWhere ID     This is your Care EveryWhere ID. This could be used by other organizations to access your [place] medical records  [id number]        Your Vitals Were     Pulse Temperature Height Last Period Pulse Oximetry BMI (Body Mass Index)    78 98  F (36.7  C) (Oral) 1.549 m (5' 1\") 05/16/2012 98% 22.79 kg/m2       Blood Pressure from Last 3 Encounters:   11/28/17 122/72   11/02/17 106/66   10/18/17 124/76    Weight from Last 3 Encounters:   11/28/17 54.7 kg (120 lb 9.6 oz)   11/14/17 56.7 kg (125 lb)   11/02/17 56.7 kg (125 lb)              We Performed the Following     Ethyl Glucuronide Urine        Primary Care Provider Office Phone # Fax #    Efren Bustos [phone]        [facility] [address] [address]        Equal Access to Services     [facility]RIEN[facility]ILL[facility] AH: Obed Wei, waaxda luqadaha, qaybta kaalmasalo cash " ah. So [facility] [phone].    ATENCIÓN: Si fannie earl, tiene a perez disposición servicios gratuitos de asistencia lingüística. Nadira al [phone].    We comply with applicable federal civil rights laws and [place] laws. We do not discriminate on the basis of race, color, national origin, age, disability, sex, sexual orientation, or gender identity.            Thank you!     Thank you for choosing [facility]PAT[facility]  for your care. Our goal is always to provide you with excellent care. Hearing back from our patients is one way we can continue to improve our services. Please take a few minutes to complete the written survey that you may receive in the mail after your visit with us. Thank you!             Your Updated Medication List - Protect others around you: Learn how to safely use, store and throw away your medicines at www.disposemymeds.org.          This list is accurate as of: 11/28/17  9:56 AM.  Always use your most recent med list.                   Brand Name Dispense Instructions for use Diagnosis    buPROPion 300 MG 24 hr tablet    WELLBUTRIN XL    90 tablet    Take 1 tablet (300 mg) by mouth every morning    Major depressive disorder, recurrent episode, moderate (H)       busPIRone 15 MG tablet    BUSPAR    180 tablet    Take 1 tablet (15 mg) by mouth 2 times daily    MONA (generalized anxiety disorder)       FLUoxetine 20 MG capsule    PROzac    180 capsule    TAKE TWO CAPSULES BY MOUTH EVERY DAY    Anxiety       naltrexone 50 MG tablet    DEPADE;REVIA    90 tablet    TAKE ONE-HALF TABLET BY MOUTH EVERY DAY FOR ONE WEEK THEN TAKE ONE TABLET DAILY AFTER    Alcohol dependence in remission (H)       omeprazole 20 MG CR capsule    priLOSEC    90 capsule    Take 1 capsule (20 mg) by mouth daily    Gastroesophageal reflux disease with esophagitis       phosphorus tablet 250 mg 250 MG per tablet    VIRT-PHOS 250 NEUTRAL    120 tablet    TAKE TWO TABLETS BY MOUTH TWICE A DAY    Hypophosphatemia        propranolol 10 MG tablet    INDERAL    180 tablet    Take 1 tablet (10 mg) by mouth 2 times daily    Hypertension goal BP (blood pressure) < 130/80       thiamine 100 MG tablet     90 tablet    TAKE ONE TABLET BY MOUTH EVERY DAY    Alcohol dependence in remission (H)       TL RICARDO RX 2.2-25-1 MG Tabs   Generic drug:  Folic Acid-Vit B6-Vit B12     90 tablet    TAKE ONE TABLET BY MOUTH EVERY DAY    Alcoholism in recovery (H)

## 2017-11-28 NOTE — LETTER
2017       RE: Mnoalisa Olguin  [address]  [facility] [address]     Dear Colleague,    Thank you for referring your patient, Monalisa Olguin, to the [facility] HEPATOLOGY at [facility]. Please see a copy of my visit note below.    I had the pleasure of seeing Monalisa Olguin for consultation in the Liver Clinic at the [facility] on 2017.  Ms. Olguin presents for consultation regarding alcoholic liver disease.      It sounds as though she has had a long struggle with alcoholism which has included 2 DUIs, as well as some [facility] time as consequence of the DUIs.  She presented to her local hospital in April with jaundice at that time.  A clinical diagnosis of alcoholic hepatitis was made, and she appears to have largely stopped drinking at that period of time.  With that, her liver tests improved to the point where bilirubin and transaminases were actually normal.  Most recently, her transaminases have been somewhat abnormal, and her bilirubin has crept up as well.  She did have a positive blood alcohol level in July.  She is currently involved with outpatient alcohol treatment, and she states that many times she has gone through inpatient treatment.      At this point, she feels fairly well.  She denies any abdominal pain, itching or skin rash or fatigue.  She denies any increased abdominal girth or lower extremity edema.  She denies any fevers or chills, cough or shortness of breath.  She denies any nausea, vomiting, diarrhea or constipation.  Her appetite has been diminished, but her weight has remained relatively stable.  She has not had any gastrointestinal or any overt signs of encephalopathy and has not had an upper GI endoscopy.  She had been scheduled for one, but she had to cancel it.        Past medical history significant for previous laparoscopic cholecystectomy which was done here.  She has had a previous  and  knee surgery.  She did donate a kidney to her sister.  Her other medical problems include some depression, and she has had some hallucinations, whether that was related to the alcohol or not is unclear.  She also has had hypertension in the past, as well as some hyperlipidemia.        Social History:  She is currently not working.  She previously worked as a .  She does smoke about a half pack per day.  She came to the clinic appointment with her .  As I mentioned, she a very long history of alcoholism and is currently in outpatient treatment.        Family history is negative for liver disease and for alcoholism.        A complete review of systems is negative other than that noted above.     Current Outpatient Prescriptions   Medication     phosphorus tablet 250 mg (VIRT-PHOS 250 NEUTRAL) 250 MG per tablet     FLUoxetine (PROZAC) 20 MG capsule     naltrexone (DEPADE;REVIA) 50 MG tablet     propranolol (INDERAL) 10 MG tablet     TL RICARDO RX 2.2-25-1 MG TABS     VITAMIN B-1 100 MG tablet     busPIRone (BUSPAR) 15 MG tablet     buPROPion (WELLBUTRIN XL) 300 MG 24 hr tablet     omeprazole (PRILOSEC) 20 MG CR capsule     No current facility-administered medications for this visit.      B/P: 122/72, T: 98, P: 78, R: Data Unavailable    In general, she appears healthy.  HEENT exam shows no scleral icterus or temple muscle wasting.  Neck is without thyromegaly.  Chest is clear.  Cardiac exam reveals no S3, S4 or murmur.  Her abdominal exam shows no obvious ascites.  No masses or tenderness to palpation are present.  Liver is 10-11 cm in span with a prominent left lobe.  No spleen tip is palpable, and extremity exam shows no edema.  Skin exam shows some palmar erythema with 1-2 spider angioma on her chest.  Neurologic exam shows no asterixis.     Recent Results (from the past 168 hour(s))   Routine UA with micro reflex to culture    Collection Time: 11/28/17  9:40 AM   Result Value Ref Range    Color  Urine Yellow     Appearance Urine Slightly Cloudy     Glucose Urine Negative NEG^Negative mg/dL    Bilirubin Urine Negative NEG^Negative    Ketones Urine Negative NEG^Negative mg/dL    Specific Gravity Urine 1.025 1.003 - 1.035    Blood Urine Negative NEG^Negative    pH Urine 5.0 5.0 - 7.0 pH    Protein Albumin Urine Negative NEG^Negative mg/dL    Urobilinogen mg/dL 0.0 0.0 - 2.0 mg/dL    Nitrite Urine Negative NEG^Negative    Leukocyte Esterase Urine Negative NEG^Negative    Source Midstream Urine     WBC Urine <1 0 - 2 /HPF    RBC Urine <1 0 - 2 /HPF    Squamous Epithelial /HPF Urine 1 0 - 1 /HPF    Mucous Urine Present (A) NEG^Negative /LPF    Hyaline Casts 1 0 - 2 /LPF    Calcium Oxalate Moderate (A) NEG^Negative /HPF      Her other recent laboratory tests show her white count is 4.1, hemoglobin is 8.1, platelets were 100,000.  Sodium 145, potassium 3.7, BUN is 9, creatinine 0.9, AST is 139, ALT is 68, alkaline phosphatase is 67, albumin is 3.5 with a total protein of 6.6 and total bilirubin is 1.5.      My impression is that Ms. Olguin has alcoholic cirrhosis.  She did have an MRI in September that showed no mass lesions in the liver, and this certainly is for her HCC screening.  She does need an upper GI endoscopy to screen for esophageal varices.  She will get Prevnar 13 vaccine and will then be updated on her vaccination schedule.  I have also recommended she have a bone density study in [place], as she is at risk being menopausal and with alcoholic liver disease.  We did spend more than 50% of our 50-minute visit discussing the fact that what is going to make the major difference in her disease course is her ability to abstain from alcohol.  I did point out that her liver tests are pretty good right now, but she will need ongoing screening for HCC, esophageal varices and will need to stay up to date on her vaccines.  I have encouraged her to continue with her outpatient alcohol treatment.  My plan will be  to see the patient back in the clinic again in 6 months.      Thank you very much for allowing me to participate in the care of this patient.  If you have any questions regarding my recommendations, please do not hesitate to contact me.       Edgardo Kovacs MD      Professor of Medicine  [employer] [place] [employer]      Executive Medical Director, Solid Organ Transplant Program  [facility]     Again, thank you for allowing me to participate in the care of your patient.      Sincerely,    Edgardo Kovacs MD

## 2017-11-28 NOTE — MR AVS SNAPSHOT
After Visit Summary   11/28/2017    Monalisa Olguin    MRN: [id number]           Patient Information     Date Of Birth          1965        Visit Information        Provider Department      11/28/2017 1:00 PM Edgardo Almendarez MD [facility]        Today's Diagnoses     Wrist fracture, right, closed, initial encounter    -  1    Alcoholic cirrhosis of liver without ascites (H)        ERRONEOUS ENCOUNTER--DISREGARD           Follow-ups after your visit        Your next 10 appointments already scheduled     Dec 11, 2017  2:30 PM CST   [facility] with Edg[facility]arez [facility]   [facility]    [address] [place] [id number] [phone]            May 21, 2018  1:30 PM CDT   (Arrive by 1:15 PM)   Return General Liver with Edgardo Kovacs MD   [facility] ([facility])    [address] [place] [id number] [phone]              Who to contact     If you have questions or need follow up information about today's clinic visit or your schedule please contact [facility] directly at [phone].  Normal or non-critical lab and imaging results will be communicated to you by MyChart, letter or phone within 4 business days after the clinic has received the results. If you do not hear from us within 7 days, please contact the clinic through [employer] or phone. If you have a critical or abnormal lab result, we will notify you by phone as soon as possible.  Submit refill requests through [email or web address] or call your pharmacy and they will forward the refill request to us. Please allow 3 business days for your refill to be completed.          Additional Information About Your Visit        MyChart Information     [email or web address] gives you secure access to your electronic health record. If you see a primary care provider, you can also send messages to your care team and make  appointments. If you have questions, please call your primary care clinic.  If you do not have a primary care provider, please call [phone] and they will assist you.        Care EveryWhere ID     This is your Care EveryWhere ID. This could be used by other organizations to access your [place] medical records  [id number]        Your Vitals Were     Last Period                   05/16/2012            Blood Pressure from Last 3 Encounters:   11/29/17 115/68   11/28/17 112/74   11/28/17 122/72    Weight from Last 3 Encounters:   11/29/17 54.9 kg (121 lb)   11/28/17 54.9 kg (121 lb)   11/28/17 54.7 kg (120 lb 9.6 oz)              We Performed the Following     Creatinine urine calculation only     Protein  random urine with Creat Ratio     Routine UA with micro reflex to culture        Primary Care Provider Office Phone # Fax #    Efren Bustos [phone]       [address] [place] [address]        Equal Access to Services     ADRIENNE GILLIAM AH: Hadii aad ku hadasho Soomaali, waaxda luqadaha, qaybta kaalmada adeegyada, waxay venicein hayjo viera . So [facility] [phone].    ATENCIÓN: Si habla español, tiene a perez disposición servicios gratuitos de asistencia lingüística. [facility] [phone].    We comply with applicable federal civil rights laws and [place] laws. We do not discriminate on the basis of race, color, national origin, age, disability, sex, sexual orientation, or gender identity.            Thank you!     Thank you for choosing [facility]  for your care. Our goal is always to provide you with excellent care. Hearing back from our patients is one way we can continue to improve our services. Please take a few minutes to complete the written survey that you may receive in the mail after your visit with us. Thank you!             Your Updated Medication List - Protect others around you: Learn how to safely use, store and throw away your  medicines at www.disposemymeds.org.          This list is accurate as of: 11/28/17 11:59 PM.  Always use your most recent med list.                   Brand Name Dispense Instructions for use Diagnosis    ALEVE PO      Take 220 mg by mouth 2 times daily as needed for moderate pain        buPROPion 300 MG 24 hr tablet    WELLBUTRIN XL    90 tablet    Take 1 tablet (300 mg) by mouth every morning    Major depressive disorder, recurrent episode, moderate (H)       busPIRone 15 MG tablet    BUSPAR    180 tablet    Take 1 tablet (15 mg) by mouth 2 times daily    MONA (generalized anxiety disorder)       FLUoxetine 20 MG capsule    PROzac    180 capsule    TAKE TWO CAPSULES BY MOUTH EVERY DAY    Anxiety       hydrOXYzine 25 MG tablet    ATARAX    30 tablet    Take 1 tablet (25 mg) by mouth every 6 hours as needed for itching or anxiety    Wrist fracture, right, closed, initial encounter       naltrexone 50 MG tablet    DEPADE;REVIA    90 tablet    TAKE ONE-HALF TABLET BY MOUTH EVERY DAY FOR ONE WEEK THEN TAKE ONE TABLET DAILY AFTER    Alcohol dependence in remission (H)       omeprazole 20 MG CR capsule    priLOSEC    90 capsule    Take 1 capsule (20 mg) by mouth daily    Gastroesophageal reflux disease with esophagitis       phosphorus tablet 250 mg 250 MG per tablet    VIRT-PHOS 250 NEUTRAL    120 tablet    TAKE TWO TABLETS BY MOUTH TWICE A DAY    Hypophosphatemia       propranolol 10 MG tablet    INDERAL    180 tablet    Take 1 tablet (10 mg) by mouth 2 times daily    Hypertension goal BP (blood pressure) < 130/80       senna-docusate 8.6-50 MG per tablet    SENOKOT-S;PERICOLACE    30 tablet    Take 1-2 tablets by mouth 2 times daily Take while on oral narcotics to prevent or treat constipation.    Wrist fracture, right, closed, initial encounter       sertraline 100 MG tablet    ZOLOFT          temazepam 15 MG capsule    RESTORIL          thiamine 100 MG tablet     90 tablet    TAKE ONE TABLET BY MOUTH EVERY DAY     Alcohol dependence in remission (H)       TL RICARDO RX 2.2-25-1 MG Tabs   Generic drug:  Folic Acid-Vit B6-Vit B12     90 tablet    TAKE ONE TABLET BY MOUTH EVERY DAY    Alcoholism in recovery (H)       traMADol 50 MG tablet    ULTRAM    20 tablet    Take 1-2 tablets ( mg) by mouth every 6 hours as needed for pain maximum 4 tablet(s) per day    Wrist fracture, right, closed, initial encounter

## 2017-11-28 NOTE — LETTER
December 1, 2017       TO: Monalisa Olguin  [address] [facility] [address]       Dear Ms. Olguin,    We are writing to inform you of your test results. Looks OK.     Resulted Orders   Protein  random urine with Creat Ratio   Result Value Ref Range    Protein Random Urine 0.19 g/L    Protein Total Urine g/gr Creatinine 0.08 0 - 0.2 g/g Cr   Routine UA with micro reflex to culture   Result Value Ref Range    Color Urine Yellow     Appearance Urine Slightly Cloudy     Glucose Urine Negative NEG^Negative mg/dL    Bilirubin Urine Negative NEG^Negative    Ketones Urine Negative NEG^Negative mg/dL    Specific Gravity Urine 1.025 1.003 - 1.035    Blood Urine Negative NEG^Negative    pH Urine 5.0 5.0 - 7.0 pH    Protein Albumin Urine Negative NEG^Negative mg/dL    Urobilinogen mg/dL 0.0 0.0 - 2.0 mg/dL    Nitrite Urine Negative NEG^Negative    Leukocyte Esterase Urine Negative NEG^Negative    Source Midstream Urine     WBC Urine <1 0 - 2 /HPF    RBC Urine <1 0 - 2 /HPF    Squamous Epithelial /HPF Urine 1 0 - 1 /HPF    Mucous Urine Present (A) NEG^Negative /LPF    Hyaline Casts 1 0 - 2 /LPF    Calcium Oxalate Moderate (A) NEG^Negative /HPF   Creatinine urine calculation only   Result Value Ref Range    Creatinine Urine 229 mg/dL         It was a pleasure to see you at your recent visit. Please let me know if you have any questions or concerns.     Clinic Staff - [phone]     Sincerely,     Edgardo Kovacs MD  [address] [place] [id number]

## 2017-11-28 NOTE — LETTER
"    11/28/2017         RE: Monalisa Olguin  [address] [facility] [address]        Dear Colleague,    Thank you for referring your patient, Monalisa Olguin, to the [facility]. Please see a copy of my visit note below.    HISTORY OF PRESENT ILLNESS:    Monalisa Olguin is a 52 year old female who is seen in follow up for   Chief Complaint   Patient presents with     RECHECK     Right wrist injury.  Onset:  11/13/17 (s/p 15 days)  Symptoms brought on by [place]ell while changing a light bulb.     Present symptoms: Patient reports she is currently comfortable in her cast. She has not needed to use any other narcotic pain medications prescribed to her in the past week. Patient did come last evening to obtain a copy of her narcotic pain medication and why it was prescribed. She needs to submit this to her chemical dependency counselor.  Treatments tried to this point: Short arm cast  Patient evaluation done with Dr. Almendarez    Physical Exam:  Vitals: Temp 98  F (36.7  C) (Temporal)  Ht 1.549 m (5' 1\")  Wt 54.7 kg (120 lb 9.6 oz)  LMP 05/16/2012  BMI 22.79 kg/m2  BMI= Body mass index is 22.79 kg/(m^2).  Constitutional: healthy, alert and no acute distress   Psychiatric: mentation appears normal and affect normal/bright  NEURO: no focal deficits  SKIN: no excoriation or erythema. No signs of infection.  JOINT/EXTREMITIES:  Fingers are blanchable  Right wrist:  Cast intact and in good condition. Patient is well able to wiggle her fingers.    IMAGING INTERPRETATION:  X-ray images in comparison to initial x-rays does show that the fracture is losing alignment and distal segment slipping dorsally.  Independent visualization of the images was performed.     ASSESSMENT:    Chief Complaint   Patient presents with     RECHECK     Right wrist injury.  Onset:  11/13/17 (s/p 15 days)  Symptoms brought on by [place]ell while changing a light bulb.         ICD-10-CM    1. Closed fracture of right wrist with " routine healing, subsequent encounter S62.101D      Patient's x-rays today indicate that the fracture is slipping out of acceptable alignment.    Plan:   It is recommended patient undergo open reduction internal fixation with plate fixation of the right wrist. Patient will be splinted postoperatively.  We will like to do this procedure as soon as possible and hopefully by tomorrow.  Return to clinic one week postoperatively.    Scribed by  Jen Pathak PA-C   2017  1:55 PM      I attest I have seen and evaluated the patient.  I agree with above impression and plan.    Edgardo Almendarez MD    Please schedule for surgery, pre op H&P, and post ops.      Patient Name:  Monalisa Olguin ([id number]).  :  1965  Gender:  female  Patient Type:  Same Day Surgery  Surgeon:  Edgardo Almendarez MD  Physician requests assist from:  PA    Procedures:    Right wrist open reduction internal fixation     Diagnosis:  Closed fracture of right wrist with routine healing, subsequent encounter    Mini C-arm:   Yes  Special instruments/supplies:  Variable plate system, splint supplies likely four-inch  Anesthesia:  Gen.    Time needed:  90 minutes    [facility] Discussed:  na    Post op 1:              Again, thank you for allowing me to participate in the care of your patient.        Sincerely,        Edgardo Almendarez MD

## 2017-11-28 NOTE — NURSING NOTE
"Chief Complaint   Patient presents with     RECHECK     Right wrist injury.  Onset:  11/13/17 (s/p 15 days)  Symptoms brought on by [place]ell while changing a light bulb.         Initial Temp 98  F (36.7  C) (Temporal)  Ht 1.549 m (5' 1\")  Wt 54.7 kg (120 lb 9.6 oz)  LMP 05/16/2012  BMI 22.79 kg/m2 Estimated body mass index is 22.79 kg/(m^2) as calculated from the following:    Height as of this encounter: 1.549 m (5' 1\").    Weight as of this encounter: 54.7 kg (120 lb 9.6 oz).  Medication Reconciliation: complete   JOSE RAMON Toscano  "

## 2017-11-28 NOTE — PROGRESS NOTES
[address] [address]  [phone]  Dept: [phone]    PRE-OP EVALUATION:  Today's date: 2017    Monalisa Olguin (: 1965) presents for pre-operative evaluation assessment as requested by Dr. Almendarez.  She requires evaluation and anesthesia risk assessment prior to undergoing surgery/procedure for treatment of Right wrist pain .  Proposed procedure: Open reduction internal fixation wrist    Date of Surgery/ Procedure: 2017  Time of Surgery/ Procedure: 1:45pm  Hospital/Surgical Facility: [facility]    Primary Physician: Efren Bustos  Type of Anesthesia Anticipated: General    Patient has a Health Care Directive or Living Will:  NO    Preop Questions 2017   1.  Do you have a history of heart attack, stroke, stent, bypass or surgery on an artery in the head, neck, heart or legs? No   2.  Do you ever have any pain or discomfort in your chest? No   3.  Do you have a history of  Heart Failure? No   4.   Are you troubled by shortness of breath when:  walking on a level surface, or up a slight hill, or at night? No   5.  Do you currently have a cold, bronchitis or other respiratory infection? No   6.  Do you have a cough, shortness of breath, or wheezing? No   7.  Do you sometimes get pains in the calves of your legs when you walk? No   8. Do you or anyone in your family have previous history of blood clots? No   9.  Do you or does anyone in your family have a serious bleeding problem such as prolonged bleeding following surgeries or cuts? No   10. Have you ever had problems with anemia or been told to take iron pills? No   11. Have you had any abnormal blood loss such as black, tarry or bloody stools, or abnormal vaginal bleeding? No   12. Have you ever had a blood transfusion? No   13. Have you or any of your relatives ever had problems with anesthesia? No   14. Do you have sleep apnea, excessive snoring or daytime drowsiness? No   15.  Do you have any prosthetic heart valves? No   16. Do you have prosthetic joints? No   17. Is there any chance that you may be pregnant? No           HPI:                                                      Brief HPI related to upcoming procedure: needs L wrist fixed      Can get up a single flight of stairs without dyspnea. Estimated METS > 4.      MEDICAL HISTORY:                                                    Patient Active Problem List    Diagnosis Date Noted     Closed fracture of right wrist with routine healing, subsequent encounter 11/28/2017     Priority: Medium     Wrist fracture, right, closed, initial encounter 11/14/2017     Priority: Medium     Choledocholithiasis 09/22/2017     Priority: Medium     Scleral icterus 05/23/2017     Priority: Medium     Hallucinations 05/23/2017     Priority: Medium     SIRS (systemic inflammatory response syndrome) (H) 03/29/2017     Priority: Medium     Hypophosphatemia 03/27/2017     Priority: Medium     Severe malnutrition (H) 03/27/2017     Priority: Medium     Alcoholic hepatitis with ascites 03/26/2017     Priority: Medium     Sludge in gallbladder 03/26/2017     Priority: Medium     Anemia 03/26/2017     Priority: Medium     Thrombocytopenia (H) 03/26/2017     Priority: Medium     Tobacco abuse 03/26/2017     Priority: Medium     Portal hypertension (H) 03/26/2017     Priority: Medium     Pulmonary nodules 03/26/2017     Priority: Medium     Hyperthyroidism 03/26/2017     Priority: Medium     Laceration of head without foreign body, unspecified part of head, sequela 06/14/2016     Priority: Medium     Alcoholism in recovery (H) 03/17/2016     Priority: Medium     Alcoholism (H) 05/02/2013     Priority: Medium     Crushing injury of thumb, left 06/25/2012     Priority: Medium     Anxiety 10/10/2011     Priority: Medium     Hypertension goal BP (blood pressure) < 130/80 07/12/2011     Priority: Medium     HYPERLIPIDEMIA LDL GOAL <100 10/31/2010     Priority:  Medium     Moderate Depression [296.32] 2009     Priority: Medium     Esophageal reflux 10/07/2002     Priority: Medium     Kidney donor 2001     Priority: Medium      Past Medical History:   Diagnosis Date     Alcohol abuse 2013     Alcohol dependence 2013     Alcohol withdrawal 2013     Anxiety 10/10/2011     CKD (chronic kidney disease) stage 3, GFR 30-59 ml/min     last GFR was 42     CKD (chronic kidney disease) stage 3, GFR 30-59 ml/min 2011     Esophageal reflux 10/7/2002     Hypertension goal BP (blood pressure) < 130/80 2011     Moderate Depression [296.32] 2009    stable on wellbutrin     Other internal derangement of knee(717.89)     ACL Internal derangement, knee/ original injury in 5th grade, torn cartilage     Pap smear     no abnormals, due for paps q 2-3 yrs     Unspecified essential hypertension      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     COLONOSCOPY N/A 2017    Procedure: COLONOSCOPY;  Colonoscopy;  Surgeon: Sai Johnston MD;  Location: [facility]     HC KNEE SCOPE, DIAGNOSTIC  01    Arthroscopy, Lt Knee     LAPAROSCOPIC CHOLECYSTECTOMY N/A 2017    Procedure: LAPAROSCOPIC CHOLECYSTECTOMY;  laparoscopic cholecystectomy;  Surgeon: Romeo Pastor MD;  Location: [facility]     Current Outpatient Prescriptions   Medication Sig Dispense Refill     phosphorus tablet 250 mg (VIRT-PHOS 250 NEUTRAL) 250 MG per tablet TAKE TWO TABLETS BY MOUTH TWICE A  tablet 3     FLUoxetine (PROZAC) 20 MG capsule TAKE TWO CAPSULES BY MOUTH EVERY  capsule 3     naltrexone (DEPADE;REVIA) 50 MG tablet TAKE ONE-HALF TABLET BY MOUTH EVERY DAY FOR ONE WEEK THEN TAKE ONE TABLET DAILY AFTER 90 tablet 3     propranolol (INDERAL) 10 MG tablet Take 1 tablet (10 mg) by mouth 2 times daily 180 tablet 3     TL RICARDO RX  "2.2-25-1 MG TABS TAKE ONE TABLET BY MOUTH EVERY DAY 90 tablet 3     VITAMIN B-1 100 MG tablet TAKE ONE TABLET BY MOUTH EVERY DAY 90 tablet 3     busPIRone (BUSPAR) 15 MG tablet Take 1 tablet (15 mg) by mouth 2 times daily 180 tablet 3     buPROPion (WELLBUTRIN XL) 300 MG 24 hr tablet Take 1 tablet (300 mg) by mouth every morning 90 tablet 3     omeprazole (PRILOSEC) 20 MG CR capsule Take 1 capsule (20 mg) by mouth daily 90 capsule 3     OTC products: NSAIDS    Allergies   Allergen Reactions     Albuterol      Tongue \"hardened and painful\"      Latex Allergy: NO    Social History   Substance Use Topics     Smoking status: Current Every Day Smoker     Packs/day: 0.50     Years: 10.00     Smokeless tobacco: Never Used      Comment: pt quit 1992 after smoking for 8 yrs, started again in 2004     Alcohol use No      Comment: stopped drinking 8/17     History   Drug Use No       REVIEW OF SYSTEMS:                                                    C: NEGATIVE for fever, chills, change in weight  E/M: NEGATIVE for ear, mouth and throat problems  R: NEGATIVE for significant cough or SOB  CV: NEGATIVE for chest pain, palpitations or peripheral edema    EXAM:                                                    /74 (BP Location: Right arm, Patient Position: Chair, Cuff Size: Adult Regular)  Pulse 87  Temp 97.9  F (36.6  C) (Temporal)  Wt 121 lb (54.9 kg)  LMP 05/16/2012  SpO2 99%  BMI 22.86 kg/m2  GENERAL APPEARANCE: healthy, alert and no distress  HENT: ear canals and TM's normal and nose and mouth without ulcers or lesions  RESP: lungs clear to auscultation - no rales, rhonchi or wheezes  CV: regular rate and rhythm, normal S1 S2, no S3 or S4 and no murmur, click or rub   ABDOMEN: soft, nontender, no HSM or masses and bowel sounds normal  NEURO: Normal strength and tone, sensory exam grossly normal, mentation intact and speech normal    DIAGNOSTICS:                                                    EKG: Not " indicated due to non-vascular surgery and low risk of event (age <65 and without cardiac risk factors)    Recent Labs   Lab Test  11/02/17   1222  10/17/17   2245  09/27/17   1337  09/25/17   0821  09/24/17   0630   HGB   --   8.1*  8.4*  7.9*  7.8*   PLT   --   100*  99*  91*  88*   INR   --    --    --   1.25*  1.33*   NA  145*  142  142  144  141   POTASSIUM  3.7  3.8  3.7  3.8  3.7   CR  0.91  1.06*  0.88  0.88  1.04   A1C   --    --    --    --   Canceled, Test credited        IMPRESSION:                                                        The proposed surgical procedure is considered LOW risk.    REVISED CARDIAC RISK INDEX  The patient has the following serious cardiovascular risks for perioperative complications such as (MI, PE, VFib and 3  AV Block):  No serious cardiac risks  INTERPRETATION: 0 risks: Class I (very low risk - 0.4% complication rate)    The patient has the following additional risks for perioperative complications:  No identified additional risks      ICD-10-CM    1. Preop general physical exam Z01.818    2. Hypertension goal BP (blood pressure) < 130/80 I10    3. Alcoholism in recovery (H) F10.20    4. Wrist fracture, right, closed, initial encounter S62.101A        RECOMMENDATIONS:                                                    BP has normalized with sobriety  Sober for > 3mos      APPROVAL GIVEN to proceed with proposed procedure, without further diagnostic evaluation       Signed Electronically by: Edwin Lee MD    Copy of this evaluation report is provided to requesting physician.    [facility] Preop Guidelines

## 2017-11-28 NOTE — PROGRESS NOTES
I had the pleasure of seeing Monalisa Olguin for consultation in the Liver Clinic at the [facility] on 2017.  Ms. Olguin presents for consultation regarding alcoholic liver disease.      It sounds as though she has had a long struggle with alcoholism which has included 2 DUIs, as well as some [facility] time as consequence of the DUIs.  She presented to her local hospital in April with jaundice at that time.  A clinical diagnosis of alcoholic hepatitis was made, and she appears to have largely stopped drinking at that period of time.  With that, her liver tests improved to the point where bilirubin and transaminases were actually normal.  Most recently, her transaminases have been somewhat abnormal, and her bilirubin has crept up as well.  She did have a positive blood alcohol level in July.  She is currently involved with outpatient alcohol treatment, and she states that many times she has gone through inpatient treatment.      At this point, she feels fairly well.  She denies any abdominal pain, itching or skin rash or fatigue.  She denies any increased abdominal girth or lower extremity edema.  She denies any fevers or chills, cough or shortness of breath.  She denies any nausea, vomiting, diarrhea or constipation.  Her appetite has been diminished, but her weight has remained relatively stable.  She has not had any gastrointestinal or any overt signs of encephalopathy and has not had an upper GI endoscopy.  She had been scheduled for one, but she had to cancel it.        Past medical history significant for previous laparoscopic cholecystectomy which was done here.  She has had a previous  and knee surgery.  She did donate a kidney to her sister.  Her other medical problems include some depression, and she has had some hallucinations, whether that was related to the alcohol or not is unclear.  She also has had hypertension in the past, as well as some hyperlipidemia.         Social History:  She is currently not working.  She previously worked as a .  She does smoke about a half pack per day.  She came to the clinic appointment with her .  As I mentioned, she a very long history of alcoholism and is currently in outpatient treatment.        Family history is negative for liver disease and for alcoholism.        A complete review of systems is negative other than that noted above.     Current Outpatient Prescriptions   Medication     phosphorus tablet 250 mg (VIRT-PHOS 250 NEUTRAL) 250 MG per tablet     FLUoxetine (PROZAC) 20 MG capsule     naltrexone (DEPADE;REVIA) 50 MG tablet     propranolol (INDERAL) 10 MG tablet     TL RICARDO RX 2.2-25-1 MG TABS     VITAMIN B-1 100 MG tablet     busPIRone (BUSPAR) 15 MG tablet     buPROPion (WELLBUTRIN XL) 300 MG 24 hr tablet     omeprazole (PRILOSEC) 20 MG CR capsule     No current facility-administered medications for this visit.      B/P: 122/72, T: 98, P: 78, R: Data Unavailable    In general, she appears healthy.  HEENT exam shows no scleral icterus or temple muscle wasting.  Neck is without thyromegaly.  Chest is clear.  Cardiac exam reveals no S3, S4 or murmur.  Her abdominal exam shows no obvious ascites.  No masses or tenderness to palpation are present.  Liver is 10-11 cm in span with a prominent left lobe.  No spleen tip is palpable, and extremity exam shows no edema.  Skin exam shows some palmar erythema with 1-2 spider angioma on her chest.  Neurologic exam shows no asterixis.     Recent Results (from the past 168 hour(s))   Routine UA with micro reflex to culture    Collection Time: 11/28/17  9:40 AM   Result Value Ref Range    Color Urine Yellow     Appearance Urine Slightly Cloudy     Glucose Urine Negative NEG^Negative mg/dL    Bilirubin Urine Negative NEG^Negative    Ketones Urine Negative NEG^Negative mg/dL    Specific Gravity Urine 1.025 1.003 - 1.035    Blood Urine Negative NEG^Negative    pH Urine 5.0 5.0 -  7.0 pH    Protein Albumin Urine Negative NEG^Negative mg/dL    Urobilinogen mg/dL 0.0 0.0 - 2.0 mg/dL    Nitrite Urine Negative NEG^Negative    Leukocyte Esterase Urine Negative NEG^Negative    Source Midstream Urine     WBC Urine <1 0 - 2 /HPF    RBC Urine <1 0 - 2 /HPF    Squamous Epithelial /HPF Urine 1 0 - 1 /HPF    Mucous Urine Present (A) NEG^Negative /LPF    Hyaline Casts 1 0 - 2 /LPF    Calcium Oxalate Moderate (A) NEG^Negative /HPF      Her other recent laboratory tests show her white count is 4.1, hemoglobin is 8.1, platelets were 100,000.  Sodium 145, potassium 3.7, BUN is 9, creatinine 0.9, AST is 139, ALT is 68, alkaline phosphatase is 67, albumin is 3.5 with a total protein of 6.6 and total bilirubin is 1.5.      My impression is that Ms. Olguin has alcoholic cirrhosis.  She did have an MRI in September that showed no mass lesions in the liver, and this certainly is for her HCC screening.  She does need an upper GI endoscopy to screen for esophageal varices.  She will get Prevnar 13 vaccine and will then be updated on her vaccination schedule.  I have also recommended she have a bone density study in [place], as she is at risk being menopausal and with alcoholic liver disease.  We did spend more than 50% of our 50-minute visit discussing the fact that what is going to make the major difference in her disease course is her ability to abstain from alcohol.  I did point out that her liver tests are pretty good right now, but she will need ongoing screening for HCC, esophageal varices and will need to stay up to date on her vaccines.  I have encouraged her to continue with her outpatient alcohol treatment.  My plan will be to see the patient back in the clinic again in 6 months.      Thank you very much for allowing me to participate in the care of this patient.  If you have any questions regarding my recommendations, please do not hesitate to contact me.       Edgardo Kovacs MD      Professor of  Medicine  [employer]      Executive Medical Director, Solid Organ Transplant Program  [facility]

## 2017-11-28 NOTE — NURSING NOTE
Chief Complaint   Patient presents with     Consult     Alcoholic hepatitis with ascites   Pt roomed, vitals, meds, and allergies reviewed with pt. Pt ready for provider.  Evens Morton, CMA

## 2017-11-28 NOTE — NURSING NOTE
"Chief Complaint   Patient presents with     Pre-Op Exam       Initial /74 (BP Location: Right arm, Patient Position: Chair, Cuff Size: Adult Regular)  Pulse 87  Temp 97.9  F (36.6  C) (Temporal)  Wt 121 lb (54.9 kg)  LMP 05/16/2012  SpO2 99%  BMI 22.86 kg/m2 Estimated body mass index is 22.86 kg/(m^2) as calculated from the following:    Height as of an earlier encounter on 11/28/17: 5' 1\" (1.549 m).    Weight as of this encounter: 121 lb (54.9 kg).  Medication Reconciliation: complete   Lazara Hamm CMA    There are no preventive care reminders to display for this patient.    Health Maintenance reviewed at today's visit patient asked to schedule/complete:   Patient is not due for any HM issues.       "

## 2017-11-29 ENCOUNTER — ANESTHESIA EVENT (OUTPATIENT)
Dept: SURGERY | Facility: CLINIC | Age: 52
End: 2017-11-29
Payer: COMMERCIAL

## 2017-11-29 ENCOUNTER — ANESTHESIA (OUTPATIENT)
Dept: SURGERY | Facility: CLINIC | Age: 52
End: 2017-11-29
Payer: COMMERCIAL

## 2017-11-29 ENCOUNTER — HOSPITAL ENCOUNTER (OUTPATIENT)
Facility: CLINIC | Age: 52
Discharge: HOME | End: 2017-11-29
Attending: ORTHOPAEDIC SURGERY | Admitting: ORTHOPAEDIC SURGERY
Payer: COMMERCIAL

## 2017-11-29 ENCOUNTER — HOSPITAL ENCOUNTER (OUTPATIENT)
Dept: GENERAL RADIOLOGY | Facility: CLINIC | Age: 52
End: 2017-11-29
Attending: ORTHOPAEDIC SURGERY | Admitting: ORTHOPAEDIC SURGERY
Payer: COMMERCIAL

## 2017-11-29 VITALS
DIASTOLIC BLOOD PRESSURE: 68 MMHG | HEART RATE: 78 BPM | WEIGHT: 121 LBS | OXYGEN SATURATION: 95 % | BODY MASS INDEX: 22.84 KG/M2 | RESPIRATION RATE: 16 BRPM | TEMPERATURE: 98.1 F | HEIGHT: 61 IN | SYSTOLIC BLOOD PRESSURE: 115 MMHG

## 2017-11-29 DIAGNOSIS — S62.101A WRIST FRACTURE, RIGHT, CLOSED, INITIAL ENCOUNTER: Primary | ICD-10-CM

## 2017-11-29 DIAGNOSIS — S62.101A: ICD-10-CM

## 2017-11-29 PROCEDURE — S0020 INJECTION, BUPIVICAINE HYDRO: HCPCS | Performed by: ORTHOPAEDIC SURGERY

## 2017-11-29 PROCEDURE — 25400 REPAIR RADIUS OR ULNA: CPT | Mod: AS | Performed by: PHYSICIAN ASSISTANT

## 2017-11-29 PROCEDURE — 71000015 ZZH RECOVERY PHASE 1 LEVEL 2 EA ADDTL HR: Performed by: ORTHOPAEDIC SURGERY

## 2017-11-29 PROCEDURE — 25000125 ZZHC RX 250: Performed by: ORTHOPAEDIC SURGERY

## 2017-11-29 PROCEDURE — 27211024 ZZHC OR SUPPLY OTHER OPNP: Performed by: ORTHOPAEDIC SURGERY

## 2017-11-29 PROCEDURE — 37000008 ZZH ANESTHESIA TECHNICAL FEE, 1ST 30 MIN: Performed by: ORTHOPAEDIC SURGERY

## 2017-11-29 PROCEDURE — 25000132 ZZH RX MED GY IP 250 OP 250 PS 637: Performed by: ORTHOPAEDIC SURGERY

## 2017-11-29 PROCEDURE — 25000125 ZZHC RX 250: Performed by: NURSE ANESTHETIST, CERTIFIED REGISTERED

## 2017-11-29 PROCEDURE — 36000060 ZZH SURGERY LEVEL 3 W FLUORO 1ST 30 MIN: Performed by: ORTHOPAEDIC SURGERY

## 2017-11-29 PROCEDURE — 25000128 H RX IP 250 OP 636: Performed by: NURSE ANESTHETIST, CERTIFIED REGISTERED

## 2017-11-29 PROCEDURE — 40000306 ZZH STATISTIC PRE PROC ASSESS II: Performed by: ORTHOPAEDIC SURGERY

## 2017-11-29 PROCEDURE — 25000566 ZZH SEVOFLURANE, EA 15 MIN: Performed by: ORTHOPAEDIC SURGERY

## 2017-11-29 PROCEDURE — 36000058 ZZH SURGERY LEVEL 3 EA 15 ADDTL MIN: Performed by: ORTHOPAEDIC SURGERY

## 2017-11-29 PROCEDURE — 27210995 ZZH RX 272: Performed by: ORTHOPAEDIC SURGERY

## 2017-11-29 PROCEDURE — 37000009 ZZH ANESTHESIA TECHNICAL FEE, EACH ADDTL 15 MIN: Performed by: ORTHOPAEDIC SURGERY

## 2017-11-29 PROCEDURE — 71000027 ZZH RECOVERY PHASE 2 EACH 15 MINS: Performed by: ORTHOPAEDIC SURGERY

## 2017-11-29 PROCEDURE — 25400 REPAIR RADIUS OR ULNA: CPT | Mod: RT | Performed by: ORTHOPAEDIC SURGERY

## 2017-11-29 PROCEDURE — C1713 ANCHOR/SCREW BN/BN,TIS/BN: HCPCS | Performed by: ORTHOPAEDIC SURGERY

## 2017-11-29 PROCEDURE — 25000128 H RX IP 250 OP 636: Performed by: PHYSICIAN ASSISTANT

## 2017-11-29 PROCEDURE — 71000014 ZZH RECOVERY PHASE 1 LEVEL 2 FIRST HR: Performed by: ORTHOPAEDIC SURGERY

## 2017-11-29 PROCEDURE — 40000277 XR SURGERY CARM FLUORO LESS THAN 5 MIN W STILLS

## 2017-11-29 PROCEDURE — 27210794 ZZH OR GENERAL SUPPLY STERILE: Performed by: ORTHOPAEDIC SURGERY

## 2017-11-29 PROCEDURE — 25000128 H RX IP 250 OP 636: Performed by: ORTHOPAEDIC SURGERY

## 2017-11-29 RX ORDER — CEFAZOLIN SODIUM 2 G/100ML
2 INJECTION, SOLUTION INTRAVENOUS
Status: COMPLETED | OUTPATIENT
Start: 2017-11-29 | End: 2017-11-29

## 2017-11-29 RX ORDER — HYDROMORPHONE HYDROCHLORIDE 1 MG/ML
.3-.5 INJECTION, SOLUTION INTRAMUSCULAR; INTRAVENOUS; SUBCUTANEOUS EVERY 10 MIN PRN
Status: DISCONTINUED | OUTPATIENT
Start: 2017-11-29 | End: 2017-11-29 | Stop reason: HOSPADM

## 2017-11-29 RX ORDER — ACETAMINOPHEN 325 MG/1
650 TABLET ORAL
Status: DISCONTINUED | OUTPATIENT
Start: 2017-11-29 | End: 2017-11-29 | Stop reason: HOSPADM

## 2017-11-29 RX ORDER — AMOXICILLIN 250 MG
1-2 CAPSULE ORAL 2 TIMES DAILY
Qty: 30 TABLET | Refills: 0 | Status: SHIPPED | OUTPATIENT
Start: 2017-11-29 | End: 2017-12-20

## 2017-11-29 RX ORDER — MEPERIDINE HYDROCHLORIDE 25 MG/ML
12.5 INJECTION INTRAMUSCULAR; INTRAVENOUS; SUBCUTANEOUS
Status: DISCONTINUED | OUTPATIENT
Start: 2017-11-29 | End: 2017-11-29 | Stop reason: HOSPADM

## 2017-11-29 RX ORDER — HYDROXYZINE HYDROCHLORIDE 25 MG/1
25 TABLET, FILM COATED ORAL
Status: COMPLETED | OUTPATIENT
Start: 2017-11-29 | End: 2017-11-29

## 2017-11-29 RX ORDER — SERTRALINE HYDROCHLORIDE 100 MG/1
TABLET, FILM COATED ORAL
Status: ON HOLD | COMMUNITY
Start: 2017-10-25 | End: 2018-03-28

## 2017-11-29 RX ORDER — TEMAZEPAM 15 MG/1
CAPSULE ORAL
Status: ON HOLD | COMMUNITY
Start: 2017-11-13 | End: 2018-03-28

## 2017-11-29 RX ORDER — SODIUM CHLORIDE, SODIUM LACTATE, POTASSIUM CHLORIDE, CALCIUM CHLORIDE 600; 310; 30; 20 MG/100ML; MG/100ML; MG/100ML; MG/100ML
INJECTION, SOLUTION INTRAVENOUS CONTINUOUS
Status: DISCONTINUED | OUTPATIENT
Start: 2017-11-29 | End: 2017-11-29 | Stop reason: HOSPADM

## 2017-11-29 RX ORDER — BUPIVACAINE HYDROCHLORIDE 5 MG/ML
INJECTION, SOLUTION PERINEURAL PRN
Status: DISCONTINUED | OUTPATIENT
Start: 2017-11-29 | End: 2017-11-29 | Stop reason: HOSPADM

## 2017-11-29 RX ORDER — NALOXONE HYDROCHLORIDE 0.4 MG/ML
.1-.4 INJECTION, SOLUTION INTRAMUSCULAR; INTRAVENOUS; SUBCUTANEOUS
Status: DISCONTINUED | OUTPATIENT
Start: 2017-11-29 | End: 2017-11-29 | Stop reason: HOSPADM

## 2017-11-29 RX ORDER — ONDANSETRON 2 MG/ML
INJECTION INTRAMUSCULAR; INTRAVENOUS PRN
Status: DISCONTINUED | OUTPATIENT
Start: 2017-11-29 | End: 2017-11-29

## 2017-11-29 RX ORDER — LIDOCAINE HYDROCHLORIDE 20 MG/ML
INJECTION, SOLUTION INFILTRATION; PERINEURAL PRN
Status: DISCONTINUED | OUTPATIENT
Start: 2017-11-29 | End: 2017-11-29

## 2017-11-29 RX ORDER — CEFAZOLIN SODIUM 1 G/3ML
1 INJECTION, POWDER, FOR SOLUTION INTRAMUSCULAR; INTRAVENOUS SEE ADMIN INSTRUCTIONS
Status: DISCONTINUED | OUTPATIENT
Start: 2017-11-29 | End: 2017-11-29 | Stop reason: HOSPADM

## 2017-11-29 RX ORDER — FENTANYL CITRATE 50 UG/ML
INJECTION, SOLUTION INTRAMUSCULAR; INTRAVENOUS PRN
Status: DISCONTINUED | OUTPATIENT
Start: 2017-11-29 | End: 2017-11-29

## 2017-11-29 RX ORDER — DEXAMETHASONE SODIUM PHOSPHATE 10 MG/ML
INJECTION, SOLUTION INTRAMUSCULAR; INTRAVENOUS PRN
Status: DISCONTINUED | OUTPATIENT
Start: 2017-11-29 | End: 2017-11-29

## 2017-11-29 RX ORDER — DIMENHYDRINATE 50 MG/ML
25 INJECTION, SOLUTION INTRAMUSCULAR; INTRAVENOUS
Status: DISCONTINUED | OUTPATIENT
Start: 2017-11-29 | End: 2017-11-29 | Stop reason: HOSPADM

## 2017-11-29 RX ORDER — PROPOFOL 10 MG/ML
INJECTION, EMULSION INTRAVENOUS PRN
Status: DISCONTINUED | OUTPATIENT
Start: 2017-11-29 | End: 2017-11-29

## 2017-11-29 RX ORDER — OXYCODONE HYDROCHLORIDE 5 MG/1
5 TABLET ORAL
Status: COMPLETED | OUTPATIENT
Start: 2017-11-29 | End: 2017-11-29

## 2017-11-29 RX ORDER — TRAMADOL HYDROCHLORIDE 50 MG/1
50-100 TABLET ORAL EVERY 6 HOURS PRN
Qty: 20 TABLET | Refills: 0 | Status: SHIPPED | OUTPATIENT
Start: 2017-11-29 | End: 2017-12-20

## 2017-11-29 RX ORDER — HYDROXYZINE HYDROCHLORIDE 25 MG/1
25 TABLET, FILM COATED ORAL EVERY 6 HOURS PRN
Qty: 30 TABLET | Refills: 1 | Status: SHIPPED | OUTPATIENT
Start: 2017-11-29 | End: 2017-12-20

## 2017-11-29 RX ORDER — ONDANSETRON 2 MG/ML
4 INJECTION INTRAMUSCULAR; INTRAVENOUS EVERY 30 MIN PRN
Status: DISCONTINUED | OUTPATIENT
Start: 2017-11-29 | End: 2017-11-29 | Stop reason: HOSPADM

## 2017-11-29 RX ORDER — FENTANYL CITRATE 50 UG/ML
25-50 INJECTION, SOLUTION INTRAMUSCULAR; INTRAVENOUS
Status: DISCONTINUED | OUTPATIENT
Start: 2017-11-29 | End: 2017-11-29 | Stop reason: HOSPADM

## 2017-11-29 RX ORDER — ONDANSETRON 4 MG/1
4 TABLET, ORALLY DISINTEGRATING ORAL EVERY 30 MIN PRN
Status: DISCONTINUED | OUTPATIENT
Start: 2017-11-29 | End: 2017-11-29 | Stop reason: HOSPADM

## 2017-11-29 RX ADMIN — LIDOCAINE HYDROCHLORIDE 0.2 ML: 10 INJECTION, SOLUTION EPIDURAL; INFILTRATION; INTRACAUDAL; PERINEURAL at 13:54

## 2017-11-29 RX ADMIN — ACETAMINOPHEN 650 MG: 325 TABLET ORAL at 18:15

## 2017-11-29 RX ADMIN — FENTANYL CITRATE 25 MCG: 50 INJECTION, SOLUTION INTRAMUSCULAR; INTRAVENOUS at 14:26

## 2017-11-29 RX ADMIN — SODIUM CHLORIDE, POTASSIUM CHLORIDE, SODIUM LACTATE AND CALCIUM CHLORIDE: 600; 310; 30; 20 INJECTION, SOLUTION INTRAVENOUS at 13:54

## 2017-11-29 RX ADMIN — HYDROMORPHONE HYDROCHLORIDE 0.5 MG: 1 INJECTION, SOLUTION INTRAMUSCULAR; INTRAVENOUS; SUBCUTANEOUS at 16:59

## 2017-11-29 RX ADMIN — HYDROMORPHONE HYDROCHLORIDE 0.5 MG: 1 INJECTION, SOLUTION INTRAMUSCULAR; INTRAVENOUS; SUBCUTANEOUS at 16:18

## 2017-11-29 RX ADMIN — FENTANYL CITRATE 50 MCG: 50 INJECTION, SOLUTION INTRAMUSCULAR; INTRAVENOUS at 17:08

## 2017-11-29 RX ADMIN — HYDROMORPHONE HYDROCHLORIDE 0.5 MG: 1 INJECTION, SOLUTION INTRAMUSCULAR; INTRAVENOUS; SUBCUTANEOUS at 14:43

## 2017-11-29 RX ADMIN — LIDOCAINE HYDROCHLORIDE 60 MG: 20 INJECTION, SOLUTION INFILTRATION; PERINEURAL at 14:19

## 2017-11-29 RX ADMIN — HYDROMORPHONE HYDROCHLORIDE 0.5 MG: 1 INJECTION, SOLUTION INTRAMUSCULAR; INTRAVENOUS; SUBCUTANEOUS at 16:28

## 2017-11-29 RX ADMIN — FENTANYL CITRATE 50 MCG: 50 INJECTION, SOLUTION INTRAMUSCULAR; INTRAVENOUS at 17:13

## 2017-11-29 RX ADMIN — FENTANYL CITRATE 50 MCG: 50 INJECTION, SOLUTION INTRAMUSCULAR; INTRAVENOUS at 16:07

## 2017-11-29 RX ADMIN — HYDROMORPHONE HYDROCHLORIDE 0.5 MG: 1 INJECTION, SOLUTION INTRAMUSCULAR; INTRAVENOUS; SUBCUTANEOUS at 17:17

## 2017-11-29 RX ADMIN — PROPOFOL 150 MG: 10 INJECTION, EMULSION INTRAVENOUS at 14:22

## 2017-11-29 RX ADMIN — MIDAZOLAM HYDROCHLORIDE 2 MG: 1 INJECTION, SOLUTION INTRAMUSCULAR; INTRAVENOUS at 14:14

## 2017-11-29 RX ADMIN — HYDROXYZINE HYDROCHLORIDE 25 MG: 25 TABLET ORAL at 18:15

## 2017-11-29 RX ADMIN — SODIUM CHLORIDE, POTASSIUM CHLORIDE, SODIUM LACTATE AND CALCIUM CHLORIDE: 600; 310; 30; 20 INJECTION, SOLUTION INTRAVENOUS at 14:00

## 2017-11-29 RX ADMIN — HYDROMORPHONE HYDROCHLORIDE 0.5 MG: 1 INJECTION, SOLUTION INTRAMUSCULAR; INTRAVENOUS; SUBCUTANEOUS at 17:27

## 2017-11-29 RX ADMIN — OXYCODONE HYDROCHLORIDE 5 MG: 5 TABLET ORAL at 17:33

## 2017-11-29 RX ADMIN — ONDANSETRON 4 MG: 2 INJECTION INTRAMUSCULAR; INTRAVENOUS at 14:53

## 2017-11-29 RX ADMIN — DEXAMETHASONE SODIUM PHOSPHATE 10 MG: 10 INJECTION, SOLUTION INTRAMUSCULAR; INTRAVENOUS at 14:51

## 2017-11-29 RX ADMIN — CEFAZOLIN SODIUM 2 G: 2 INJECTION, SOLUTION INTRAVENOUS at 14:23

## 2017-11-29 RX ADMIN — ROCURONIUM BROMIDE 10 MG: 10 INJECTION INTRAVENOUS at 14:40

## 2017-11-29 RX ADMIN — SODIUM CHLORIDE, POTASSIUM CHLORIDE, SODIUM LACTATE AND CALCIUM CHLORIDE: 600; 310; 30; 20 INJECTION, SOLUTION INTRAVENOUS at 14:53

## 2017-11-29 RX ADMIN — FENTANYL CITRATE 50 MCG: 50 INJECTION, SOLUTION INTRAMUSCULAR; INTRAVENOUS at 14:28

## 2017-11-29 RX ADMIN — FENTANYL CITRATE 50 MCG: 50 INJECTION, SOLUTION INTRAMUSCULAR; INTRAVENOUS at 16:00

## 2017-11-29 RX ADMIN — FENTANYL CITRATE 25 MCG: 50 INJECTION, SOLUTION INTRAMUSCULAR; INTRAVENOUS at 14:21

## 2017-11-29 ASSESSMENT — LIFESTYLE VARIABLES: TOBACCO_USE: 1

## 2017-11-29 NOTE — IP AVS SNAPSHOT
[facility]    [address] [place] [address]    Phone:  [phone]                                       After Visit Summary   11/29/2017    Monalisa Olguin    MRN: [id number]           After Visit Summary Signature Page     I have received my discharge instructions, and my questions have been answered. I have discussed any challenges I see with this plan with the nurse or doctor.    ..........................................................................................................................................  Patient/Patient Representative Signature      ..........................................................................................................................................  Patient Representative Print Name and Relationship to Patient    ..................................................               ................................................  Date                                            Time    ..........................................................................................................................................  Reviewed by Signature/Title    ...................................................              ..............................................  Date                                                            Time

## 2017-11-29 NOTE — IP AVS SNAPSHOT
MRN:[id number]                      After Visit Summary   11/29/2017    Monalisa Olguin    MRN: [id number]           Thank you!     Thank you for choosing [facility] for your care. Our goal is always to provide you with excellent care. Hearing back from our patients is one way we can continue to improve our services. Please take a few minutes to complete the written survey that you may receive in the mail after you visit with us. Thank you!        Patient Information     Date Of Birth          1965        About your hospital stay     You were admitted on:  November 29, 2017 You last received care in the:  [facility]    You were discharged on:  November 29, 2017       Who to Call     For medical emergencies, please call 911.  For non-urgent questions about your medical care, please call your primary care provider or clinic, [phone]  For questions related to your surgery, please call your surgery clinic        Attending Provider     Provider Specialty    Edgardo Almendarez MD Orthopedics       Primary Care Provider Office Phone # Fax #    Efren LUCAS Bustos [phone]      After Care Instructions     Discharge Instructions       Review outpatient procedure discharge instructions with patient as directed by Provider                  Your next 10 appointments already scheduled     Dec 11, 2017  2:30 PM CST   [facility]    [address] [place] [id number] [phone]            May 21, 2018  1:30 PM CDT   (Arrive by 1:15 PM)   Return General Liver with Edgardo Kovacs MD   [facility] ([facility])    [address] [place] [id number] [phone]              Further instructions from your care team       [facility] Orthopedic Discharge Instructions For Wrist  Fracture Surgery    Call the Bone and Joint Service Line for after-surgery issues:    [phone]  Pain Control: New home prescriptions Tramadol 50 mg tablet:  Use one tablet every 6 hours if needed for pain.  Atarax 25 mg tablet:  Take one tablet every 6 hours if needed for pain.  Senakot s:   Stool softener.  Take as directed.       Take your pain medications as prescribed. These medications may make you sleepy. Do not drive, operate equipment, or drink alcohol when taking these.  You may take Tylenol (Generic name is acetaminophen) as directed on the bottle for additional relief or in place of the prescribed pain medications as your pain gets better. Ibuprofen,and Aleve can be used in supplement to the pain prescription and tylenol if you need . If the medications cause a reaction such as nausea or skin rash, stop taking them and contact your doctor. Please plan accordingly, pain medications will not be re-filled on the weekends or at night. Call the office during the day if you need more medications.   General Care After surgery you may feel tired/sleepy. This is normal. Please have someone stay with you for 24 hours after surgery. You should avoid driving for 1-2 days after surgery, as your reaction time may be slow. You should not drive at all if you have had surgery on your arms, right leg and/or are taking narcotic pain medications until released by your doctor. If you have any question along the way please contact the office. If you feel it is an issue cannot wait for normal office hours, contact the on-call physician.   Normal Findings after Surgery Your fingers may be slightly swollen.  You will have some difficulty making a fist.  Some minor tingling may be present from local anesthetic injected at surgery.  This should improve.  Low grade fevers less than 100.5 degrees Fahrenheit are normal.      Wound Care/Dressings Leave the splint in place.  The splint can be loosened if needed.  Call the office if  you have questions.   Bathing You cannot get the splint wet.  Cover the splint carefully if you are going to shower.   Diet Start with non-alcoholic liquids at first, particularly water or sports drinks after surgery. Progress to bland foods such as crackers and bread and finally to your normal diet if you have no problems.    Activity Keep your hand elevated above your heart as much as you can.  I encourage you to move your fingers.  You can use the hand for light activities.   Braces    Physical Therapy This will be discussed in the future.   Additional Instructions    Follow up Appointment This should have already been made for you.  If not, call the office and make an appointment for 7-10 days after surgery.     [facility]   Adult Discharge Orders & Instructions     For 24 hours after surgery    1. Get plenty of rest.  A responsible adult must stay with you for at least 24 hours after you leave the hospital.   2. Do not drive or use heavy equipment.  If you have weakness or tingling, don't drive or use heavy equipment until this feeling goes away.  3. Do not drink alcohol.  4. Avoid strenuous or risky activities.  Ask for help when climbing stairs.   5. You may feel lightheaded.  IF so, sit for a few minutes before standing.  Have someone help you get up.   6. If you have nausea (feel sick to your stomach): Drink only clear liquids such as apple juice, ginger ale, broth or 7-Up.  Rest may also help.  Be sure to drink enough fluids.  Move to a regular diet as you feel able.  7. You may have a slight fever. Call the doctor if your fever is over 100 F (37.7 C) (taken under the tongue) or lasts longer than 24 hours.  8. You may have a dry mouth, a sore throat, muscle aches or trouble sleeping.  These should go away after 24 hours.  9. Do not make important or legal decisions.   Call your doctor for any of the followin.  Signs of infection (fever, growing tenderness at the surgery site, a large  "amount of drainage or bleeding, severe pain, foul-smelling drainage, redness, swelling).    2. It has been over 8 to 10 hours since surgery and you are still not able to urinate (pass water).    3.  Headache for over 24 hours.    4.  Numbness, tingling or weakness the day after surgery (if you had spinal anesthesia).  Nurse advice line [phone]        Pending Results     Date and Time Order Name Status Description    11/29/2017 1350 XR Surgery ISA L/T 5 Min Fluoro w Stills In process     11/28/2017 0913 ETHYL GLUCURONIDE URINE In process             Admission Information     Date & Time Provider Department Dept. Phone    11/29/2017 Edgardo Almendarez MD [facility] [phone]      Your Vitals Were     Blood Pressure Pulse Temperature Respirations Height Weight    111/65 78 98.1  F (36.7  C) (Oral) 15 1.549 m (5' 1\") 54.9 kg (121 lb)    Last Period Pulse Oximetry BMI (Body Mass Index)             05/16/2012 95% 22.86 kg/m2         [employer] Information     [employer] gives you secure access to your electronic health record. If you see a primary care provider, you can also send messages to your care team and make appointments. If you have questions, please call your primary care clinic.  If you do not have a primary care provider, please call [phone] and they will assist you.        Care EveryWhere ID     This is your Care EveryWhere ID. This could be used by other organizations to access your [facility] medical records  [id number]        Equal Access to Services     ADRIENNE GILLIAM : Hadii aad ku hadasho Soomaali, waaxda luqadaha, qaybta kaalmada adeegyada, salo viera . So [facility] [phone].    ATENCIÓN: Si habla español, tiene a perez disposición servicios gratuitos de asistencia lingüística. Llame al [phone].    We comply with applicable federal civil rights laws and [place] laws. We do not discriminate on the basis of race, color, national origin, " age, disability, sex, sexual orientation, or gender identity.               Review of your medicines      START taking        Dose / Directions    hydrOXYzine 25 MG tablet   Commonly known as:  ATARAX   Used for:  Wrist fracture, right, closed, initial encounter        Dose:  25 mg   Take 1 tablet (25 mg) by mouth every 6 hours as needed for itching or anxiety   Quantity:  30 tablet   Refills:  1       senna-docusate 8.6-50 MG per tablet   Commonly known as:  SENOKOT-S;PERICOLACE   Used for:  Wrist fracture, right, closed, initial encounter        Dose:  1-2 tablet   Take 1-2 tablets by mouth 2 times daily Take while on oral narcotics to prevent or treat constipation.   Quantity:  30 tablet   Refills:  0       traMADol 50 MG tablet   Commonly known as:  ULTRAM   Used for:  Wrist fracture, right, closed, initial encounter        Dose:   mg   Take 1-2 tablets ( mg) by mouth every 6 hours as needed for pain maximum 4 tablet(s) per day   Quantity:  20 tablet   Refills:  0         CONTINUE these medicines which have NOT CHANGED        Dose / Directions    ALEVE PO        Dose:  220 mg   Take 220 mg by mouth 2 times daily as needed for moderate pain   Refills:  0       buPROPion 300 MG 24 hr tablet   Commonly known as:  WELLBUTRIN XL   Used for:  Major depressive disorder, recurrent episode, moderate (H)        Dose:  300 mg   Take 1 tablet (300 mg) by mouth every morning   Quantity:  90 tablet   Refills:  3       busPIRone 15 MG tablet   Commonly known as:  BUSPAR   Used for:  MONA (generalized anxiety disorder)        Dose:  15 mg   Take 1 tablet (15 mg) by mouth 2 times daily   Quantity:  180 tablet   Refills:  3       FLUoxetine 20 MG capsule   Commonly known as:  PROzac   Used for:  Anxiety        TAKE TWO CAPSULES BY MOUTH EVERY DAY   Quantity:  180 capsule   Refills:  3       naltrexone 50 MG tablet   Commonly known as:  DEPADE;REVIA   Used for:  Alcohol dependence in remission (H)        TAKE ONE-HALF  TABLET BY MOUTH EVERY DAY FOR ONE WEEK THEN TAKE ONE TABLET DAILY AFTER   Quantity:  90 tablet   Refills:  3       omeprazole 20 MG CR capsule   Commonly known as:  priLOSEC   Used for:  Gastroesophageal reflux disease with esophagitis        Dose:  20 mg   Take 1 capsule (20 mg) by mouth daily   Quantity:  90 capsule   Refills:  3       phosphorus tablet 250 mg 250 MG per tablet   Commonly known as:  VIRT-PHOS 250 NEUTRAL   Used for:  Hypophosphatemia        TAKE TWO TABLETS BY MOUTH TWICE A DAY   Quantity:  120 tablet   Refills:  3       propranolol 10 MG tablet   Commonly known as:  INDERAL   Used for:  Hypertension goal BP (blood pressure) < 130/80        Dose:  10 mg   Take 1 tablet (10 mg) by mouth 2 times daily   Quantity:  180 tablet   Refills:  3       sertraline 100 MG tablet   Commonly known as:  ZOLOFT        Refills:  0       temazepam 15 MG capsule   Commonly known as:  RESTORIL        Refills:  0       thiamine 100 MG tablet   Used for:  Alcohol dependence in remission (H)        TAKE ONE TABLET BY MOUTH EVERY DAY   Quantity:  90 tablet   Refills:  3       TL RICARDO RX 2.2-25-1 MG Tabs   Used for:  Alcoholism in recovery (H)   Generic drug:  Folic Acid-Vit B6-Vit B12        TAKE ONE TABLET BY MOUTH EVERY DAY   Quantity:  90 tablet   Refills:  3            Where to get your medicines      Some of these will need a paper prescription and others can be bought over the counter. Ask your nurse if you have questions.     Bring a paper prescription for each of these medications     hydrOXYzine 25 MG tablet    senna-docusate 8.6-50 MG per tablet    traMADol 50 MG tablet                Protect others around you: Learn how to safely use, store and throw away your medicines at www.disposemymeds.org.             Medication List: This is a list of all your medications and when to take them. Check marks below indicate your daily home schedule. Keep this list as a reference.      Medications           Morning  Afternoon Evening Bedtime As Needed    ALEVE PO   Take 220 mg by mouth 2 times daily as needed for moderate pain                                buPROPion 300 MG 24 hr tablet   Commonly known as:  WELLBUTRIN XL   Take 1 tablet (300 mg) by mouth every morning                                busPIRone 15 MG tablet   Commonly known as:  BUSPAR   Take 1 tablet (15 mg) by mouth 2 times daily                                FLUoxetine 20 MG capsule   Commonly known as:  PROzac   TAKE TWO CAPSULES BY MOUTH EVERY DAY                                hydrOXYzine 25 MG tablet   Commonly known as:  ATARAX   Take 1 tablet (25 mg) by mouth every 6 hours as needed for itching or anxiety   Last time this was given:  25 mg on 11/29/2017  6:15 PM   Next Dose Due:  12:15am                                  naltrexone 50 MG tablet   Commonly known as:  DEPADE;REVIA   TAKE ONE-HALF TABLET BY MOUTH EVERY DAY FOR ONE WEEK THEN TAKE ONE TABLET DAILY AFTER                                omeprazole 20 MG CR capsule   Commonly known as:  priLOSEC   Take 1 capsule (20 mg) by mouth daily                                phosphorus tablet 250 mg 250 MG per tablet   Commonly known as:  VIRT-PHOS 250 NEUTRAL   TAKE TWO TABLETS BY MOUTH TWICE A DAY                                propranolol 10 MG tablet   Commonly known as:  INDERAL   Take 1 tablet (10 mg) by mouth 2 times daily                                senna-docusate 8.6-50 MG per tablet   Commonly known as:  SENOKOT-S;PERICOLACE   Take 1-2 tablets by mouth 2 times daily Take while on oral narcotics to prevent or treat constipation.                                sertraline 100 MG tablet   Commonly known as:  ZOLOFT                                temazepam 15 MG capsule   Commonly known as:  RESTORIL                                thiamine 100 MG tablet   TAKE ONE TABLET BY MOUTH EVERY DAY                                TL RICARDO RX 2.2-25-1 MG Tabs   TAKE ONE TABLET BY MOUTH EVERY DAY   Generic  drug:  Folic Acid-Vit B6-Vit B12                                traMADol 50 MG tablet   Commonly known as:  ULTRAM   Take 1-2 tablets ( mg) by mouth every 6 hours as needed for pain maximum 4 tablet(s) per day

## 2017-11-29 NOTE — ANESTHESIA CARE TRANSFER NOTE
Patient: Monalisa Olguin    Procedure(s):  OPEN REDUCTION INTERNAL FIXATION RIGHT WRIST - Wound Class: I-Clean    Diagnosis: closed fracture right wrist with routine healing  Diagnosis Additional Information: No value filed.    Anesthesia Type:   General, LMA     Note:  Airway :Nasal Cannula  Patient transferred to:PACU  Handoff Report: Identifed the Patient, Identified the Reponsible Provider, Reviewed the pertinent medical history, Discussed the surgical course, Reviewed Intra-OP anesthesia mangement and issues during anesthesia, Set expectations for post-procedure period and Allowed opportunity for questions and acknowledgement of understanding      Vitals: (Last set prior to Anesthesia Care Transfer)    CRNA VITALS  11/29/2017 1516 - 11/29/2017 1551      11/29/2017             Pulse: 93    SpO2: 100 %                Electronically Signed By: IVETTE Pulido CRNA  November 29, 2017  3:51 PM

## 2017-11-29 NOTE — BRIEF OP NOTE
[facility] Orthopedic Brief Operative Note    Pre-operative diagnosis: closed fracture right wrist with mal alignment   Post-operative diagnosis: Same   Procedure: Procedure(s):  OPEN REDUCTION INTERNAL FIXATION WRIST   Surgeon: Edgardo Almendarez MD   Assistant(s): Jessi Pathak   Anesthesia: Local anesthesia and LMA   Estimated blood loss: Less than 50 ml   Total IV fluids: (See anesthesia record)   Drains: None   Specimens: None   Implants: See op note   Findings:    Complications: None   Weight bearing status: Non-weight bearing   Comments: See dictated operative report for full details

## 2017-11-29 NOTE — H&P (VIEW-ONLY)
[address] [address]  [phone]  Dept: [phone]    PRE-OP EVALUATION:  Today's date: 2017    Monalisa Olguin (: 1965) presents for pre-operative evaluation assessment as requested by Dr. Almendarez.  She requires evaluation and anesthesia risk assessment prior to undergoing surgery/procedure for treatment of Right wrist pain .  Proposed procedure: Open reduction internal fixation wrist    Date of Surgery/ Procedure: 2017  Time of Surgery/ Procedure: 1:45pm  Hospital/Surgical Facility: [facility]    Primary Physician: Efren Bustos  Type of Anesthesia Anticipated: General    Patient has a Health Care Directive or Living Will:  NO    Preop Questions 2017   1.  Do you have a history of heart attack, stroke, stent, bypass or surgery on an artery in the head, neck, heart or legs? No   2.  Do you ever have any pain or discomfort in your chest? No   3.  Do you have a history of  Heart Failure? No   4.   Are you troubled by shortness of breath when:  walking on a level surface, or up a slight hill, or at night? No   5.  Do you currently have a cold, bronchitis or other respiratory infection? No   6.  Do you have a cough, shortness of breath, or wheezing? No   7.  Do you sometimes get pains in the calves of your legs when you walk? No   8. Do you or anyone in your family have previous history of blood clots? No   9.  Do you or does anyone in your family have a serious bleeding problem such as prolonged bleeding following surgeries or cuts? No   10. Have you ever had problems with anemia or been told to take iron pills? No   11. Have you had any abnormal blood loss such as black, tarry or bloody stools, or abnormal vaginal bleeding? No   12. Have you ever had a blood transfusion? No   13. Have you or any of your relatives ever had problems with anesthesia? No   14. Do you have sleep apnea, excessive snoring or daytime drowsiness? No   15.  Do you have any prosthetic heart valves? No   16. Do you have prosthetic joints? No   17. Is there any chance that you may be pregnant? No           HPI:                                                      Brief HPI related to upcoming procedure: needs L wrist fixed      Can get up a single flight of stairs without dyspnea. Estimated METS > 4.      MEDICAL HISTORY:                                                    Patient Active Problem List    Diagnosis Date Noted     Closed fracture of right wrist with routine healing, subsequent encounter 11/28/2017     Priority: Medium     Wrist fracture, right, closed, initial encounter 11/14/2017     Priority: Medium     Choledocholithiasis 09/22/2017     Priority: Medium     Scleral icterus 05/23/2017     Priority: Medium     Hallucinations 05/23/2017     Priority: Medium     SIRS (systemic inflammatory response syndrome) (H) 03/29/2017     Priority: Medium     Hypophosphatemia 03/27/2017     Priority: Medium     Severe malnutrition (H) 03/27/2017     Priority: Medium     Alcoholic hepatitis with ascites 03/26/2017     Priority: Medium     Sludge in gallbladder 03/26/2017     Priority: Medium     Anemia 03/26/2017     Priority: Medium     Thrombocytopenia (H) 03/26/2017     Priority: Medium     Tobacco abuse 03/26/2017     Priority: Medium     Portal hypertension (H) 03/26/2017     Priority: Medium     Pulmonary nodules 03/26/2017     Priority: Medium     Hyperthyroidism 03/26/2017     Priority: Medium     Laceration of head without foreign body, unspecified part of head, sequela 06/14/2016     Priority: Medium     Alcoholism in recovery (H) 03/17/2016     Priority: Medium     Alcoholism (H) 05/02/2013     Priority: Medium     Crushing injury of thumb, left 06/25/2012     Priority: Medium     Anxiety 10/10/2011     Priority: Medium     Hypertension goal BP (blood pressure) < 130/80 07/12/2011     Priority: Medium     HYPERLIPIDEMIA LDL GOAL <100 10/31/2010     Priority:  Medium     Moderate Depression [296.32] 2009     Priority: Medium     Esophageal reflux 10/07/2002     Priority: Medium     Kidney donor 2001     Priority: Medium      Past Medical History:   Diagnosis Date     Alcohol abuse 2013     Alcohol dependence 2013     Alcohol withdrawal 2013     Anxiety 10/10/2011     CKD (chronic kidney disease) stage 3, GFR 30-59 ml/min     last GFR was 42     CKD (chronic kidney disease) stage 3, GFR 30-59 ml/min 2011     Esophageal reflux 10/7/2002     Hypertension goal BP (blood pressure) < 130/80 2011     Moderate Depression [296.32] 2009    stable on wellbutrin     Other internal derangement of knee(717.89)     ACL Internal derangement, knee/ original injury in 5th grade, torn cartilage     Pap smear     no abnormals, due for paps q 2-3 yrs     Unspecified essential hypertension      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     COLONOSCOPY N/A 2017    Procedure: COLONOSCOPY;  Colonoscopy;  Surgeon: Sai Johnston MD;  Location: [facility]     HC KNEE SCOPE, DIAGNOSTIC  01    Arthroscopy, Lt Knee     LAPAROSCOPIC CHOLECYSTECTOMY N/A 2017    Procedure: LAPAROSCOPIC CHOLECYSTECTOMY;  laparoscopic cholecystectomy;  Surgeon: Romeo Pastor MD;  Location: [facility]     Current Outpatient Prescriptions   Medication Sig Dispense Refill     phosphorus tablet 250 mg (VIRT-PHOS 250 NEUTRAL) 250 MG per tablet TAKE TWO TABLETS BY MOUTH TWICE A  tablet 3     FLUoxetine (PROZAC) 20 MG capsule TAKE TWO CAPSULES BY MOUTH EVERY  capsule 3     naltrexone (DEPADE;REVIA) 50 MG tablet TAKE ONE-HALF TABLET BY MOUTH EVERY DAY FOR ONE WEEK THEN TAKE ONE TABLET DAILY AFTER 90 tablet 3     propranolol (INDERAL) 10 MG tablet Take 1 tablet (10 mg) by mouth 2 times daily 180 tablet 3     TL RICARDO RX  "2.2-25-1 MG TABS TAKE ONE TABLET BY MOUTH EVERY DAY 90 tablet 3     VITAMIN B-1 100 MG tablet TAKE ONE TABLET BY MOUTH EVERY DAY 90 tablet 3     busPIRone (BUSPAR) 15 MG tablet Take 1 tablet (15 mg) by mouth 2 times daily 180 tablet 3     buPROPion (WELLBUTRIN XL) 300 MG 24 hr tablet Take 1 tablet (300 mg) by mouth every morning 90 tablet 3     omeprazole (PRILOSEC) 20 MG CR capsule Take 1 capsule (20 mg) by mouth daily 90 capsule 3     OTC products: NSAIDS    Allergies   Allergen Reactions     Albuterol      Tongue \"hardened and painful\"      Latex Allergy: NO    Social History   Substance Use Topics     Smoking status: Current Every Day Smoker     Packs/day: 0.50     Years: 10.00     Smokeless tobacco: Never Used      Comment: pt quit 1992 after smoking for 8 yrs, started again in 2004     Alcohol use No      Comment: stopped drinking 8/17     History   Drug Use No       REVIEW OF SYSTEMS:                                                    C: NEGATIVE for fever, chills, change in weight  E/M: NEGATIVE for ear, mouth and throat problems  R: NEGATIVE for significant cough or SOB  CV: NEGATIVE for chest pain, palpitations or peripheral edema    EXAM:                                                    /74 (BP Location: Right arm, Patient Position: Chair, Cuff Size: Adult Regular)  Pulse 87  Temp 97.9  F (36.6  C) (Temporal)  Wt 121 lb (54.9 kg)  LMP 05/16/2012  SpO2 99%  BMI 22.86 kg/m2  GENERAL APPEARANCE: healthy, alert and no distress  HENT: ear canals and TM's normal and nose and mouth without ulcers or lesions  RESP: lungs clear to auscultation - no rales, rhonchi or wheezes  CV: regular rate and rhythm, normal S1 S2, no S3 or S4 and no murmur, click or rub   ABDOMEN: soft, nontender, no HSM or masses and bowel sounds normal  NEURO: Normal strength and tone, sensory exam grossly normal, mentation intact and speech normal    DIAGNOSTICS:                                                    EKG: Not " indicated due to non-vascular surgery and low risk of event (age <65 and without cardiac risk factors)    Recent Labs   Lab Test  11/02/17   1222  10/17/17   2245  09/27/17   1337  09/25/17   0821  09/24/17   0630   HGB   --   8.1*  8.4*  7.9*  7.8*   PLT   --   100*  99*  91*  88*   INR   --    --    --   1.25*  1.33*   NA  145*  142  142  144  141   POTASSIUM  3.7  3.8  3.7  3.8  3.7   CR  0.91  1.06*  0.88  0.88  1.04   A1C   --    --    --    --   Canceled, Test credited        IMPRESSION:                                                        The proposed surgical procedure is considered LOW risk.    REVISED CARDIAC RISK INDEX  The patient has the following serious cardiovascular risks for perioperative complications such as (MI, PE, VFib and 3  AV Block):  No serious cardiac risks  INTERPRETATION: 0 risks: Class I (very low risk - 0.4% complication rate)    The patient has the following additional risks for perioperative complications:  No identified additional risks      ICD-10-CM    1. Preop general physical exam Z01.818    2. Hypertension goal BP (blood pressure) < 130/80 I10    3. Alcoholism in recovery (H) F10.20    4. Wrist fracture, right, closed, initial encounter S62.101A        RECOMMENDATIONS:                                                    BP has normalized with sobriety  Sober for > 3mos      APPROVAL GIVEN to proceed with proposed procedure, without further diagnostic evaluation       Signed Electronically by: Edwin Lee MD    Copy of this evaluation report is provided to requesting physician.    [facility] Preop Guidelines

## 2017-11-29 NOTE — ANESTHESIA PREPROCEDURE EVALUATION
Anesthesia Evaluation     . Pt has had prior anesthetic. Type: General           ROS/MED HX    ENT/Pulmonary:     (+)tobacco use, Current use , . .    Neurologic:       Cardiovascular:     (+) hypertension-range: 130/80, ---. : . . . :. .       METS/Exercise Tolerance:     Hematologic:     (+) Anemia, Other Hematologic Disorder-Systemic inflammatory response syndrome / Thrombocytopenia      Musculoskeletal:   (+) , , other musculoskeletal- closed right wrist fx      GI/Hepatic: Comment: Portal hypertension    (+) GERD Asymptomatic on medication, cholecystitis/cholelithiasis, hepatitis type Alcoholic, liver disease,       Renal/Genitourinary:     (+) chronic renal disease, type: CRI,       Endo:     (+) thyroid problem  hyperthyroidism, .      Psychiatric: Comment: Alcoholism- in recovery since Sept 2017    (+) psychiatric history anxiety and depression      Infectious Disease:  - neg infectious disease ROS       Malignancy:      - no malignancy   Other:    - neg other ROS                 Physical Exam  Normal systems: cardiovascular, pulmonary and dental    Airway   Mallampati: I  TM distance: >3 FB  Neck ROM: full    Dental   (+) chipped    Cardiovascular   Rhythm and rate: regular and normal      Pulmonary    breath sounds clear to auscultation                    Anesthesia Plan      History & Physical Review  History and physical reviewed and following examination; no interval change.    ASA Status:  3 .    NPO Status:  > 8 hours    Plan for General and LMA with Intravenous and Propofol induction. Maintenance will be Inhalation.    PONV prophylaxis:  Ondansetron (or other 5HT-3) and Dexamethasone or Solumedrol       Postoperative Care  Postoperative pain management:  IV analgesics and Oral pain medications.      Consents  Anesthetic plan, risks, benefits and alternatives discussed with:  Patient..                          .

## 2017-11-29 NOTE — ANESTHESIA POSTPROCEDURE EVALUATION
Patient: Monalisa Olguin    Procedure(s):  OPEN REDUCTION INTERNAL FIXATION RIGHT WRIST - Wound Class: I-Clean    Diagnosis:closed fracture right wrist with routine healing  Diagnosis Additional Information: No value filed.    Anesthesia Type:  General, LMA    Note:  Anesthesia Post Evaluation    Patient location during evaluation: PACU  Patient participation: Able to fully participate in evaluation  Level of consciousness: awake and alert  Pain management: adequate  Airway patency: patent  Cardiovascular status: acceptable  Respiratory status: acceptable  Hydration status: acceptable  PONV: none     Anesthetic complications: None          Last vitals:  Vitals:    11/29/17 1610 11/29/17 1615 11/29/17 1620   BP: 118/73 114/74 117/76   Pulse:      Resp: 15 15 13   Temp:   97.9  F (36.6  C)   SpO2: 99% 95% 96%         Electronically Signed By: IVETTE Pulido CRNA  November 29, 2017  4:26 PM

## 2017-11-29 NOTE — DISCHARGE INSTRUCTIONS
[facility] Discharge Instructions For Wrist Fracture Surgery    Call the Bone and Joint Service Line for after-surgery issues:    [phone]  Pain Control: New home prescriptions Tramadol 50 mg tablet:  Use one tablet every 6 hours if needed for pain.  Atarax 25 mg tablet:  Take one tablet every 6 hours if needed for pain.  Senakot s:   Stool softener.  Take as directed.       Take your pain medications as prescribed. These medications may make you sleepy. Do not drive, operate equipment, or drink alcohol when taking these.  You may take Tylenol (Generic name is acetaminophen) as directed on the bottle for additional relief or in place of the prescribed pain medications as your pain gets better. Ibuprofen,and Aleve can be used in supplement to the pain prescription and tylenol if you need . If the medications cause a reaction such as nausea or skin rash, stop taking them and contact your doctor. Please plan accordingly, pain medications will not be re-filled on the weekends or at night. Call the office during the day if you need more medications.   General Care After surgery you may feel tired/sleepy. This is normal. Please have someone stay with you for 24 hours after surgery. You should avoid driving for 1-2 days after surgery, as your reaction time may be slow. You should not drive at all if you have had surgery on your arms, right leg and/or are taking narcotic pain medications until released by your doctor. If you have any question along the way please contact the office. If you feel it is an issue cannot wait for normal office hours, contact the on-call physician.   Normal Findings after Surgery Your fingers may be slightly swollen.  You will have some difficulty making a fist.  Some minor tingling may be present from local anesthetic injected at surgery.  This should improve.  Low grade fevers less than 100.5 degrees Fahrenheit are normal.      Wound Care/Dressings Leave  the splint in place.  The splint can be loosened if needed.  Call the office if you have questions.   Bathing You cannot get the splint wet.  Cover the splint carefully if you are going to shower.   Diet Start with non-alcoholic liquids at first, particularly water or sports drinks after surgery. Progress to bland foods such as crackers and bread and finally to your normal diet if you have no problems.    Activity Keep your hand elevated above your heart as much as you can.  I encourage you to move your fingers.  You can use the hand for light activities.   Braces    Physical Therapy This will be discussed in the future.   Additional Instructions    Follow up Appointment This should have already been made for you.  If not, call the office and make an appointment for 7-10 days after surgery.     [facility]   Adult Discharge Orders & Instructions     For 24 hours after surgery    1. Get plenty of rest.  A responsible adult must stay with you for at least 24 hours after you leave the hospital.   2. Do not drive or use heavy equipment.  If you have weakness or tingling, don't drive or use heavy equipment until this feeling goes away.  3. Do not drink alcohol.  4. Avoid strenuous or risky activities.  Ask for help when climbing stairs.   5. You may feel lightheaded.  IF so, sit for a few minutes before standing.  Have someone help you get up.   6. If you have nausea (feel sick to your stomach): Drink only clear liquids such as apple juice, ginger ale, broth or 7-Up.  Rest may also help.  Be sure to drink enough fluids.  Move to a regular diet as you feel able.  7. You may have a slight fever. Call the doctor if your fever is over 100 F (37.7 C) (taken under the tongue) or lasts longer than 24 hours.  8. You may have a dry mouth, a sore throat, muscle aches or trouble sleeping.  These should go away after 24 hours.  9. Do not make important or legal decisions.   Call your doctor for any of the  followin.  Signs of infection (fever, growing tenderness at the surgery site, a large amount of drainage or bleeding, severe pain, foul-smelling drainage, redness, swelling).    2. It has been over 8 to 10 hours since surgery and you are still not able to urinate (pass water).    3.  Headache for over 24 hours.    4.  Numbness, tingling or weakness the day after surgery (if you had spinal anesthesia).  Nurse advice line [phone]

## 2017-11-30 LAB — ETHYL GLUCURONIDE UR QL: NORMAL

## 2017-11-30 NOTE — OP NOTE
DATE OF PROCEDURE:  11/29/2017      PREOPERATIVE DIAGNOSIS:  Two weeks status post right distal radius fracture, with malalignment.      POSTOPERATIVE DIAGNOSIS:  Two weeks status post right distal radius fracture, with malalignment.      PROCEDURE:  Osteoclesis with open reduction and internal fixation of the right distal radius fracture using a [place] VariAx  distal radius plating system.      SURGEON:  Edgardo Almendarez MD      ANESTHESIA:  General with local infiltration 0.5% Marcaine.      ASSISTANT:  JOSELINE Alvarez, who aided with retraction and maintenance of reduction during fixation technique.      INDICATIONS:  Ms. Monalisa Olguin is a 52-year-old woman who sustained a distal radius fracture was initially treated with short arm casting and molding.  The fracture was recognized as having dorsal comminution and was therefore followed closely with serial x-rays in the office.  With the dorsal comminution, the fracture displaced dorsally 1 mm and then the normal orientation of the distal articular surface was lost.  In order to try and avoid any morbidity, it was felt that reduction and internal fixation was necessary.  The rationale for this was explained to the patient in the office.  The decision was made to proceed with a surgical approach.  She was seen in the preoperative hold area.  Her  was present.  The extremity marked and consent was obtained.  The cast was split in the preoperative hold area.      DETAILS OF PROCEDURE:  The patient was brought to the operating room, placed upon the operating table and general anesthesia was induced.  Right upper arm tourniquet was applied.  The cast was now removed.  Timeout protocol was followed.      Skin condition was excellent.      We performed at this point an osteoclesis in order to loosen up the fracture site.  We then prepped and draped the arm in a standard fashion for wrist surgery.      We utilized x-ray to see whether or not we are approaching  acceptable alignment with the osteoclesis alone.  This was felt to be the case.      We now exsanguinated the extremity with an Esmarch.  Tourniquet was inflated.      A longitudinal incision was made following the flexor carpi radialis tendon sheath.  Sharp dissection was being made through skin only.  Blunt dissection was carried down to the tendon.  The overlying tenosynovium and sheath was incised longitudinally, which allowed us to mobilize the flexor carpi radialis.  With this mobilized, we then incised the deeper fascia longitudinally, which then allowed us to mobilize the flexor wand from radial to ulnar.  We placed Hohmann retractors.      The fracture site was palpable at this point.      A longitudinal incision was made along the radial aspect of the pronator quadratus, which was then peeled off the volar radius.  This was mobilized enough so that the entire fracture site could be visualized as well as the volar surface of the distal radius fragment.  It was recognized that the fracture wanted to be displaced dorsally.  Therefore, we introduced a [place] into the fracture site which allowed us to lead the distal fragment from its dorsal position into a more anatomic position.  This also allowed us more freedom in manipulating the wrist and loosening up the dorsal comminution and healing that probably had occurred.      We then, at this point, studied our reduction with this simple open approach and we were very satisfied with the improvement in alignment.      We then chose the appropriate size plate inlay along the volar surface of the distal radius.  We placed a single screw in the proximal hole cluster that allowed us to reposition the plate if necessary.  We then obtained an x-ray was showing us the positioning of the plate.  We did reposition the plate slightly more distal.  We also recognized what assistance was necessary to align the fracture appropriately.      With my assistant performing  longitudinal traction and radial and ulnar deviation, we then placed a single nonlocking screw into the distal hole cluster (which had excellent purchase).  With the proximal distal screw placed, we repeated x-ray to verify a reduction that was acceptable.      We filled the remaining 2 distal screw holes with locking screws and another single proximal screw hole with a nonlocking screw.      The image was used to verify positioning of all components.  We were careful to reposition the forearm to make sure that the screws were not within the joint.      We also assessed wrist range of motion in flexion, extension, supination and pronation and were very satisfied that there were no restrictions.      At this point in time, the wound was irrigated.  We did infiltrate the soft tissues with 0.5% Marcaine.      We repaired the pronator quadratus using #2-0 Vicryl.  We repaired the bed of the flexor carpi radialis with 2-0 Vicryl and then repaired the superficial sheath over the flexor carpi radialis.      Tourniquet was released at this point.  No unusual bleeding was encountered.  Pressure alone was able to control bleeding.      The wound was again irrigated.  The skin was closed with subcutaneous 2-0 Vicryl and Steri-Strips.  Dressings of Xeroform, 4 x 4 and sterile Webril were applied followed by a well-padded volar and dorsal plaster splint.      The patient was then awakened, transferred to the [facility] and taken to the recovery room where stable vital signs were noted.         TYE FRANK MD             D: 2017 15:56   T: 2017 22:29   MT: [id number]      Name:     RANJEET WELLINGTON   MRN:      [id number]        Account:        [id number]   :      1965           Procedure Date: 2017      Document: [id number]

## 2017-12-04 ENCOUNTER — TELEPHONE (OUTPATIENT)
Dept: FAMILY MEDICINE | Facility: CLINIC | Age: 52
End: 2017-12-04

## 2017-12-04 DIAGNOSIS — K21.9 GASTROESOPHAGEAL REFLUX DISEASE, ESOPHAGITIS PRESENCE NOT SPECIFIED: Primary | ICD-10-CM

## 2017-12-04 DIAGNOSIS — M85.9 DISORDER OF BONE DENSITY AND STRUCTURE, UNSPECIFIED: ICD-10-CM

## 2017-12-04 NOTE — TELEPHONE ENCOUNTER
Efren Bustos MD Patten, Kimberly K                   Can you make sure that she is set up for an upper endoscopy to evaluate her for esophageal varices?  She also needs a DEXA scan for bone density.  Thanks, Eliceo

## 2017-12-04 NOTE — TELEPHONE ENCOUNTER
Patient was notified and she will start taking 60 mg every day. She stated she doesn't want to run out. So I put in order for you to sign.   'Noa Little MA 12/4/2017

## 2017-12-05 ENCOUNTER — HOSPITAL ENCOUNTER (OUTPATIENT)
Dept: BONE DENSITY | Facility: CLINIC | Age: 52
Discharge: HOME | End: 2017-12-05
Attending: FAMILY MEDICINE | Admitting: FAMILY MEDICINE
Payer: COMMERCIAL

## 2017-12-05 DIAGNOSIS — M85.9 DISORDER OF BONE DENSITY AND STRUCTURE, UNSPECIFIED: ICD-10-CM

## 2017-12-05 PROCEDURE — 77080 DXA BONE DENSITY AXIAL: CPT

## 2017-12-06 ENCOUNTER — TELEPHONE (OUTPATIENT)
Dept: FAMILY MEDICINE | Facility: CLINIC | Age: 52
End: 2017-12-06

## 2017-12-07 ENCOUNTER — TELEPHONE (OUTPATIENT)
Dept: FAMILY MEDICINE | Facility: CLINIC | Age: 52
End: 2017-12-07

## 2017-12-07 DIAGNOSIS — M85.80 OSTEOPENIA, UNSPECIFIED LOCATION: Primary | ICD-10-CM

## 2017-12-07 RX ORDER — ALENDRONATE SODIUM 35 MG/1
TABLET ORAL
Qty: 12 TABLET | Refills: 3 | Status: SHIPPED | OUTPATIENT
Start: 2017-12-07 | End: 2018-06-05

## 2017-12-07 NOTE — TELEPHONE ENCOUNTER
Heydi returns call, nurse not available.  Relayed message from Dr Bustos as written.  Patient is agreeable to starting medication.  Uses [facility] pharmacy and she will check there later.

## 2017-12-07 NOTE — TELEPHONE ENCOUNTER
Rx sent to the pharmacy for her.  Remind her that it is only once a week, (every Monday).    Electronically signed by:  Efren Busots M.D.  12/7/2017

## 2017-12-07 NOTE — TELEPHONE ENCOUNTER
----- Message from Efren Bustos MD sent at 12/6/2017  4:48 PM CST -----  Monalisa has some mild thinning of her hip bone on the right bu the spine and left hip is normal.  We should consider starting her on a medication to help build back up her bone density.  If she is interested in that, especially since she just had a fall with a broken bone, she should let me know.    Electronically signed by:  Efren Bustos M.D.  12/6/2017

## 2017-12-08 ENCOUNTER — TELEPHONE (OUTPATIENT)
Dept: SURGERY | Facility: CLINIC | Age: 52
End: 2017-12-08

## 2017-12-08 NOTE — TELEPHONE ENCOUNTER
Called pt to notify her that the procedure she is having Monday 12/11/2017 (EGD) needs to be with MAC (anesthesia) per  who works with [facility]. Left message for pt to call back

## 2017-12-11 ENCOUNTER — ANESTHESIA (OUTPATIENT)
Dept: GASTROENTEROLOGY | Facility: CLINIC | Age: 52
End: 2017-12-11
Payer: COMMERCIAL

## 2017-12-11 ENCOUNTER — ANESTHESIA EVENT (OUTPATIENT)
Dept: GASTROENTEROLOGY | Facility: CLINIC | Age: 52
End: 2017-12-11
Payer: COMMERCIAL

## 2017-12-11 ASSESSMENT — LIFESTYLE VARIABLES: TOBACCO_USE: 1

## 2017-12-11 NOTE — ANESTHESIA PREPROCEDURE EVALUATION
Anesthesia Evaluation     . Pt has had prior anesthetic. Type: General           ROS/MED HX    ENT/Pulmonary:     (+)tobacco use, Current use , . .    Neurologic:       Cardiovascular:     (+) hypertension-range: 130/80, ---. : . . . :. .       METS/Exercise Tolerance:     Hematologic:     (+) Anemia, Other Hematologic Disorder-Systemic inflammatory response syndrome / Thrombocytopenia      Musculoskeletal:   (+) , , other musculoskeletal- closed right wrist fx      GI/Hepatic: Comment: Portal hypertension    (+) GERD Asymptomatic on medication, cholecystitis/cholelithiasis, hepatitis type Alcoholic, liver disease,       Renal/Genitourinary:     (+) chronic renal disease, type: CRI,       Endo:     (+) thyroid problem  hyperthyroidism, .      Psychiatric: Comment: Alcoholism- in recovery since Sept 2017    (+) psychiatric history anxiety and depression      Infectious Disease:  - neg infectious disease ROS       Malignancy:      - no malignancy   Other:    (+) No chance of pregnancy C-spine cleared: N/A, no H/O Chronic Pain,  - neg other ROS                 Physical Exam  Normal systems: cardiovascular, pulmonary and dental    Airway   Mallampati: II  TM distance: >3 FB  Neck ROM: full    Dental     Cardiovascular   Rhythm and rate: regular and normal      Pulmonary    breath sounds clear to auscultation                    Anesthesia Plan      History & Physical Review  History and physical reviewed and following examination; no interval change.    ASA Status:  3 .    NPO Status:  > 8 hours    Plan for MAC with Intravenous and Propofol induction. Maintenance will be TIVA.  Reason for MAC:  Deep or markedly invasive procedure (G8)         Postoperative Care  Postoperative pain management:  IV analgesics and Oral pain medications.      Consents  Anesthetic plan, risks, benefits and alternatives discussed with:  Patient and Spouse..                          .

## 2017-12-11 NOTE — H&P (VIEW-ONLY)
[address] [address]  [phone]  Dept: [phone]    PRE-OP EVALUATION:  Today's date: 2017    Monalisa Olguin (: 1965) presents for pre-operative evaluation assessment as requested by Dr. Almendarez.  She requires evaluation and anesthesia risk assessment prior to undergoing surgery/procedure for treatment of Right wrist pain .  Proposed procedure: Open reduction internal fixation wrist    Date of Surgery/ Procedure: 2017  Time of Surgery/ Procedure: 1:45pm  Hospital/Surgical Facility: [facility]    Primary Physician: Efren Bustos  Type of Anesthesia Anticipated: General    Patient has a Health Care Directive or Living Will:  NO    Preop Questions 2017   1.  Do you have a history of heart attack, stroke, stent, bypass or surgery on an artery in the head, neck, heart or legs? No   2.  Do you ever have any pain or discomfort in your chest? No   3.  Do you have a history of  Heart Failure? No   4.   Are you troubled by shortness of breath when:  walking on a level surface, or up a slight hill, or at night? No   5.  Do you currently have a cold, bronchitis or other respiratory infection? No   6.  Do you have a cough, shortness of breath, or wheezing? No   7.  Do you sometimes get pains in the calves of your legs when you walk? No   8. Do you or anyone in your family have previous history of blood clots? No   9.  Do you or does anyone in your family have a serious bleeding problem such as prolonged bleeding following surgeries or cuts? No   10. Have you ever had problems with anemia or been told to take iron pills? No   11. Have you had any abnormal blood loss such as black, tarry or bloody stools, or abnormal vaginal bleeding? No   12. Have you ever had a blood transfusion? No   13. Have you or any of your relatives ever had problems with anesthesia? No   14. Do you have sleep apnea, excessive snoring or daytime drowsiness? No   15.  Do you have any prosthetic heart valves? No   16. Do you have prosthetic joints? No   17. Is there any chance that you may be pregnant? No           HPI:                                                      Brief HPI related to upcoming procedure: needs L wrist fixed      Can get up a single flight of stairs without dyspnea. Estimated METS > 4.      MEDICAL HISTORY:                                                    Patient Active Problem List    Diagnosis Date Noted     Closed fracture of right wrist with routine healing, subsequent encounter 11/28/2017     Priority: Medium     Wrist fracture, right, closed, initial encounter 11/14/2017     Priority: Medium     Choledocholithiasis 09/22/2017     Priority: Medium     Scleral icterus 05/23/2017     Priority: Medium     Hallucinations 05/23/2017     Priority: Medium     SIRS (systemic inflammatory response syndrome) (H) 03/29/2017     Priority: Medium     Hypophosphatemia 03/27/2017     Priority: Medium     Severe malnutrition (H) 03/27/2017     Priority: Medium     Alcoholic hepatitis with ascites 03/26/2017     Priority: Medium     Sludge in gallbladder 03/26/2017     Priority: Medium     Anemia 03/26/2017     Priority: Medium     Thrombocytopenia (H) 03/26/2017     Priority: Medium     Tobacco abuse 03/26/2017     Priority: Medium     Portal hypertension (H) 03/26/2017     Priority: Medium     Pulmonary nodules 03/26/2017     Priority: Medium     Hyperthyroidism 03/26/2017     Priority: Medium     Laceration of head without foreign body, unspecified part of head, sequela 06/14/2016     Priority: Medium     Alcoholism in recovery (H) 03/17/2016     Priority: Medium     Alcoholism (H) 05/02/2013     Priority: Medium     Crushing injury of thumb, left 06/25/2012     Priority: Medium     Anxiety 10/10/2011     Priority: Medium     Hypertension goal BP (blood pressure) < 130/80 07/12/2011     Priority: Medium     HYPERLIPIDEMIA LDL GOAL <100 10/31/2010     Priority:  Medium     Moderate Depression [296.32] 2009     Priority: Medium     Esophageal reflux 10/07/2002     Priority: Medium     Kidney donor 2001     Priority: Medium      Past Medical History:   Diagnosis Date     Alcohol abuse 2013     Alcohol dependence 2013     Alcohol withdrawal 2013     Anxiety 10/10/2011     CKD (chronic kidney disease) stage 3, GFR 30-59 ml/min     last GFR was 42     CKD (chronic kidney disease) stage 3, GFR 30-59 ml/min 2011     Esophageal reflux 10/7/2002     Hypertension goal BP (blood pressure) < 130/80 2011     Moderate Depression [296.32] 2009    stable on wellbutrin     Other internal derangement of knee(717.89)     ACL Internal derangement, knee/ original injury in 5th grade, torn cartilage     Pap smear     no abnormals, due for paps q 2-3 yrs     Unspecified essential hypertension      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     COLONOSCOPY N/A 2017    Procedure: COLONOSCOPY;  Colonoscopy;  Surgeon: Sai Johnston MD;  Location: [facility]     HC KNEE SCOPE, DIAGNOSTIC  01    Arthroscopy, Lt Knee     LAPAROSCOPIC CHOLECYSTECTOMY N/A 2017    Procedure: LAPAROSCOPIC CHOLECYSTECTOMY;  laparoscopic cholecystectomy;  Surgeon: Romeo Pastor MD;  Location: [facility]     Current Outpatient Prescriptions   Medication Sig Dispense Refill     phosphorus tablet 250 mg (VIRT-PHOS 250 NEUTRAL) 250 MG per tablet TAKE TWO TABLETS BY MOUTH TWICE A  tablet 3     FLUoxetine (PROZAC) 20 MG capsule TAKE TWO CAPSULES BY MOUTH EVERY  capsule 3     naltrexone (DEPADE;REVIA) 50 MG tablet TAKE ONE-HALF TABLET BY MOUTH EVERY DAY FOR ONE WEEK THEN TAKE ONE TABLET DAILY AFTER 90 tablet 3     propranolol (INDERAL) 10 MG tablet Take 1 tablet (10 mg) by mouth 2 times daily 180 tablet 3     TL RICARDO RX  "2.2-25-1 MG TABS TAKE ONE TABLET BY MOUTH EVERY DAY 90 tablet 3     VITAMIN B-1 100 MG tablet TAKE ONE TABLET BY MOUTH EVERY DAY 90 tablet 3     busPIRone (BUSPAR) 15 MG tablet Take 1 tablet (15 mg) by mouth 2 times daily 180 tablet 3     buPROPion (WELLBUTRIN XL) 300 MG 24 hr tablet Take 1 tablet (300 mg) by mouth every morning 90 tablet 3     omeprazole (PRILOSEC) 20 MG CR capsule Take 1 capsule (20 mg) by mouth daily 90 capsule 3     OTC products: NSAIDS    Allergies   Allergen Reactions     Albuterol      Tongue \"hardened and painful\"      Latex Allergy: NO    Social History   Substance Use Topics     Smoking status: Current Every Day Smoker     Packs/day: 0.50     Years: 10.00     Smokeless tobacco: Never Used      Comment: pt quit 1992 after smoking for 8 yrs, started again in 2004     Alcohol use No      Comment: stopped drinking 8/17     History   Drug Use No       REVIEW OF SYSTEMS:                                                    C: NEGATIVE for fever, chills, change in weight  E/M: NEGATIVE for ear, mouth and throat problems  R: NEGATIVE for significant cough or SOB  CV: NEGATIVE for chest pain, palpitations or peripheral edema    EXAM:                                                    /74 (BP Location: Right arm, Patient Position: Chair, Cuff Size: Adult Regular)  Pulse 87  Temp 97.9  F (36.6  C) (Temporal)  Wt 121 lb (54.9 kg)  LMP 05/16/2012  SpO2 99%  BMI 22.86 kg/m2  GENERAL APPEARANCE: healthy, alert and no distress  HENT: ear canals and TM's normal and nose and mouth without ulcers or lesions  RESP: lungs clear to auscultation - no rales, rhonchi or wheezes  CV: regular rate and rhythm, normal S1 S2, no S3 or S4 and no murmur, click or rub   ABDOMEN: soft, nontender, no HSM or masses and bowel sounds normal  NEURO: Normal strength and tone, sensory exam grossly normal, mentation intact and speech normal    DIAGNOSTICS:                                                    EKG: Not " indicated due to non-vascular surgery and low risk of event (age <65 and without cardiac risk factors)    Recent Labs   Lab Test  11/02/17   1222  10/17/17   2245  09/27/17   1337  09/25/17   0821  09/24/17   0630   HGB   --   8.1*  8.4*  7.9*  7.8*   PLT   --   100*  99*  91*  88*   INR   --    --    --   1.25*  1.33*   NA  145*  142  142  144  141   POTASSIUM  3.7  3.8  3.7  3.8  3.7   CR  0.91  1.06*  0.88  0.88  1.04   A1C   --    --    --    --   Canceled, Test credited        IMPRESSION:                                                        The proposed surgical procedure is considered LOW risk.    REVISED CARDIAC RISK INDEX  The patient has the following serious cardiovascular risks for perioperative complications such as (MI, PE, VFib and 3  AV Block):  No serious cardiac risks  INTERPRETATION: 0 risks: Class I (very low risk - 0.4% complication rate)    The patient has the following additional risks for perioperative complications:  No identified additional risks      ICD-10-CM    1. Preop general physical exam Z01.818    2. Hypertension goal BP (blood pressure) < 130/80 I10    3. Alcoholism in recovery (H) F10.20    4. Wrist fracture, right, closed, initial encounter S62.101A        RECOMMENDATIONS:                                                    BP has normalized with sobriety  Sober for > 3mos      APPROVAL GIVEN to proceed with proposed procedure, without further diagnostic evaluation       Signed Electronically by: Edwin Lee MD    Copy of this evaluation report is provided to requesting physician.    [facility] Preop Guidelines

## 2017-12-12 ENCOUNTER — RADIANT APPOINTMENT (OUTPATIENT)
Dept: GENERAL RADIOLOGY | Facility: CLINIC | Age: 52
End: 2017-12-12
Attending: ORTHOPAEDIC SURGERY
Payer: COMMERCIAL

## 2017-12-12 ENCOUNTER — OFFICE VISIT (OUTPATIENT)
Dept: ORTHOPEDICS | Facility: CLINIC | Age: 52
End: 2017-12-12
Payer: COMMERCIAL

## 2017-12-12 VITALS — TEMPERATURE: 97.7 F | HEIGHT: 61 IN

## 2017-12-12 DIAGNOSIS — S62.101D CLOSED FRACTURE OF RIGHT WRIST WITH ROUTINE HEALING, SUBSEQUENT ENCOUNTER: Primary | ICD-10-CM

## 2017-12-12 DIAGNOSIS — S62.101D CLOSED FRACTURE OF RIGHT WRIST WITH ROUTINE HEALING, SUBSEQUENT ENCOUNTER: ICD-10-CM

## 2017-12-12 PROCEDURE — 99024 POSTOP FOLLOW-UP VISIT: CPT | Performed by: PHYSICIAN ASSISTANT

## 2017-12-12 PROCEDURE — 73110 X-RAY EXAM OF WRIST: CPT | Mod: TC

## 2017-12-12 ASSESSMENT — PAIN SCALES - GENERAL: PAINLEVEL: MILD PAIN (3)

## 2017-12-12 NOTE — PROGRESS NOTES
"HISTORY OF PRESENT ILLNESS:    Monalisa Olguin is a 52 year old female who is seen in follow up for   Chief Complaint   Patient presents with     RECHECK     OPEN REDUCTION INTERNAL FIXATION RIGHT WRIST.  DOS: 11/29/17 (13 days postop)     Surgical Followup     PREOPERATIVE DIAGNOSIS:  Two weeks status post right distal radius fracture, with malalignment. POSTOPERATIVE DIAGNOSIS:  Two weeks status post right distal radius fracture, with malalignment. PROCEDURE:  Osteoclesis with open reduction and internal fixation of the right distal radius fracture using a [employer] VariAx  distal radius plating system.   Interval: Patient reports her pain levels are much improved since obtaining surgery.  She states she did have to remove her splint within this past 2 week period due to irritation of her skin underneath.  She allowed it to rest in the air and the irritation did go away.  She did rewrap over the incisions and was able to replace the splint with Ace bandage.  This did provide some good support.  Patient is no longer taking any narcotic pain medication.    Physical Exam:  Vitals: Temp 97.7  F (36.5  C) (Temporal)  Ht 1.549 m (5' 1\")  LMP 05/16/2012  BMI= There is no height or weight on file to calculate BMI.  Constitutional: healthy, alert and no acute distress   Psychiatric: mentation appears normal and affect normal/bright  NEURO: no focal deficits  Location of surgery: Right wrist  Incision sites: Patient's postsurgical wrist splint was removed.  Patient does have Kerlix wrap around her incision.  The Steri-Strips are still intact.  Skin appears to be in good condition with no irritation or lesions.  Range of motion: Patient is able to flex her wrist and extend a little stiff.  She states some mild discomfort with radial and ulnar deviation.    IMAGING INTERPRETATION: Patient's wrist x-rays today reveal the plate remains well positioned.  The fracture maintains an acceptable alignment.  The lateral view " maintains a neutral joint alignment.  Independent visualization of the images was performed.     ASSESSMENT:    Chief Complaint   Patient presents with     RECHECK     OPEN REDUCTION INTERNAL FIXATION RIGHT WRIST.  DOS: 11/29/17 (13 days postop)     Surgical Followup     PREOPERATIVE DIAGNOSIS:  Two weeks status post right distal radius fracture, with malalignment. POSTOPERATIVE DIAGNOSIS:  Two weeks status post right distal radius fracture, with malalignment. PROCEDURE:  Osteoclesis with open reduction and internal fixation of the right distal radius fracture using a [employer] VariAx  distal radius plating system.       ICD-10-CM    1. Closed fracture of right wrist with routine healing, subsequent encounter S62.101D XR Wrist Right G/E 3 Views     Patient is 13 days status post open reduction internal fixation of right distal radius fracture.  Patient is reporting very little pain.  Her fracture alignment is well-maintained.    Plan:   Postoperative splint was removed and a removable wrist splint is provided to the patient.  Patient should utilize the splint most of the time only removing for showering and to provide gentle range of motion in the next several weeks.  Patient is advised that the Steri-Strips will gradually follow up with showering.  There is enough healing of the incision to help them along.  Patient does not need any refills of pain medication.  She is using only over-the-counter pain medication sparingly.  Return to clinic 3-4 weeks at which time repeat x-rays will be obtained.  Will assess patient's range of motion at that time.    Jen Pathak PA-C   12/12/2017  5:08 PM

## 2017-12-12 NOTE — LETTER
"    12/12/2017         RE: Monalisa Olguin  [id number] [facility] [address]        Dear Colleague,    Thank you for referring your patient, Monalisa Olguin, to the [facility]. Please see a copy of my visit note below.    HISTORY OF PRESENT ILLNESS:    Monalisa Olguin is a 52 year old female who is seen in follow up for   Chief Complaint   Patient presents with     RECHECK     OPEN REDUCTION INTERNAL FIXATION RIGHT WRIST.  DOS: 11/29/17 (13 days postop)     Surgical Followup     PREOPERATIVE DIAGNOSIS:  Two weeks status post right distal radius fracture, with malalignment. POSTOPERATIVE DIAGNOSIS:  Two weeks status post right distal radius fracture, with malalignment. PROCEDURE:  Osteoclesis with open reduction and internal fixation of the right distal radius fracture using a [email or web address] VariAx  distal radius plating system.   Interval: Patient reports her pain levels are much improved since obtaining surgery.  She states she did have to remove her splint within this past 2 week period due to irritation of her skin underneath.  She allowed it to rest in the air and the irritation did go away.  She did rewrap over the incisions and was able to replace the splint with Ace bandage.  This did provide some good support.  Patient is no longer taking any narcotic pain medication.    Physical Exam:  Vitals: Temp 97.7  F (36.5  C) (Temporal)  Ht 1.549 m (5' 1\")  LMP 05/16/2012  BMI= There is no height or weight on file to calculate BMI.  Constitutional: healthy, alert and no acute distress   Psychiatric: mentation appears normal and affect normal/bright  NEURO: no focal deficits  Location of surgery: Right wrist  Incision sites: Patient's postsurgical wrist splint was removed.  Patient does have Kerlix wrap around her incision.  The Steri-Strips are still intact.  Skin appears to be in good condition with no irritation or lesions.  Range of motion: Patient is able to flex her wrist and extend a " little stiff.  She states some mild discomfort with radial and ulnar deviation.    IMAGING INTERPRETATION: Patient's wrist x-rays today reveal the plate remains well positioned.  The fracture maintains an acceptable alignment.  The lateral view maintains a neutral joint alignment.  Independent visualization of the images was performed.     ASSESSMENT:    Chief Complaint   Patient presents with     RECHECK     OPEN REDUCTION INTERNAL FIXATION RIGHT WRIST.  DOS: 11/29/17 (13 days postop)     Surgical Followup     PREOPERATIVE DIAGNOSIS:  Two weeks status post right distal radius fracture, with malalignment. POSTOPERATIVE DIAGNOSIS:  Two weeks status post right distal radius fracture, with malalignment. PROCEDURE:  Osteoclesis with open reduction and internal fixation of the right distal radius fracture using a [employer] VariAx  distal radius plating system.       ICD-10-CM    1. Closed fracture of right wrist with routine healing, subsequent encounter S62.101D XR Wrist Right G/E 3 Views     Patient is 13 days status post open reduction internal fixation of right distal radius fracture.  Patient is reporting very little pain.  Her fracture alignment is well-maintained.    Plan:   Postoperative splint was removed and a removable wrist splint is provided to the patient.  Patient should utilize the splint most of the time only removing for showering and to provide gentle range of motion in the next several weeks.  Patient is advised that the Steri-Strips will gradually follow up with showering.  There is enough healing of the incision to help them along.  Patient does not need any refills of pain medication.  She is using only over-the-counter pain medication sparingly.  Return to clinic 3-4 weeks at which time repeat x-rays will be obtained.  Will assess patient's range of motion at that time.    Jen Pathak PA-C   12/12/2017  5:08 PM        Again, thank you for allowing me to participate in the care of your patient.         Sincerely,        Edgardo Almendarez MD

## 2017-12-12 NOTE — NURSING NOTE
"Chief Complaint   Patient presents with     RECHECK     OPEN REDUCTION INTERNAL FIXATION RIGHT WRIST.  DOS: 11/29/17 (13 days postop)     Surgical Followup     PREOPERATIVE DIAGNOSIS:  Two weeks status post right distal radius fracture, with malalignment. POSTOPERATIVE DIAGNOSIS:  Two weeks status post right distal radius fracture, with malalignment. PROCEDURE:  Osteoclesis with open reduction and internal fixation of the right distal radius fracture using a [employer] VariAx  distal radius plating system.       Initial Temp 97.7  F (36.5  C) (Temporal)  Ht 1.549 m (5' 1\")  LMP 05/16/2012 Estimated body mass index is 22.86 kg/(m^2) as calculated from the following:    Height as of 11/29/17: 1.549 m (5' 1\").    Weight as of 11/29/17: 54.9 kg (121 lb).  Medication Reconciliation: complete   JOSE RAMON Toscano  "

## 2017-12-19 ENCOUNTER — TRANSFERRED RECORDS (OUTPATIENT)
Dept: HEALTH INFORMATION MANAGEMENT | Facility: CLINIC | Age: 52
End: 2017-12-19

## 2017-12-20 ENCOUNTER — OFFICE VISIT (OUTPATIENT)
Dept: FAMILY MEDICINE | Facility: CLINIC | Age: 52
End: 2017-12-20
Payer: COMMERCIAL

## 2017-12-20 VITALS
WEIGHT: 128.4 LBS | TEMPERATURE: 97.3 F | SYSTOLIC BLOOD PRESSURE: 102 MMHG | HEART RATE: 91 BPM | BODY MASS INDEX: 24.26 KG/M2 | DIASTOLIC BLOOD PRESSURE: 64 MMHG | OXYGEN SATURATION: 99 %

## 2017-12-20 DIAGNOSIS — D69.6 THROMBOCYTOPENIA (H): ICD-10-CM

## 2017-12-20 DIAGNOSIS — K70.31 ALCOHOLIC CIRRHOSIS OF LIVER WITH ASCITES (H): ICD-10-CM

## 2017-12-20 DIAGNOSIS — K21.9 GASTROESOPHAGEAL REFLUX DISEASE, ESOPHAGITIS PRESENCE NOT SPECIFIED: ICD-10-CM

## 2017-12-20 DIAGNOSIS — F10.21 ALCOHOLISM IN RECOVERY (H): ICD-10-CM

## 2017-12-20 DIAGNOSIS — D50.8 OTHER IRON DEFICIENCY ANEMIA: ICD-10-CM

## 2017-12-20 DIAGNOSIS — Z01.818 PREOP GENERAL PHYSICAL EXAM: Primary | ICD-10-CM

## 2017-12-20 DIAGNOSIS — E05.90 HYPERTHYROIDISM: ICD-10-CM

## 2017-12-20 PROBLEM — K82.8 SLUDGE IN GALLBLADDER: Status: RESOLVED | Noted: 2017-03-26 | Resolved: 2017-12-20

## 2017-12-20 PROBLEM — R44.3 HALLUCINATIONS: Status: RESOLVED | Noted: 2017-05-23 | Resolved: 2017-12-20

## 2017-12-20 PROBLEM — R65.10 SIRS (SYSTEMIC INFLAMMATORY RESPONSE SYNDROME) (H): Status: RESOLVED | Noted: 2017-03-29 | Resolved: 2017-12-20

## 2017-12-20 PROBLEM — R17 SCLERAL ICTERUS: Status: RESOLVED | Noted: 2017-05-23 | Resolved: 2017-12-20

## 2017-12-20 PROBLEM — K80.50 CHOLEDOCHOLITHIASIS: Status: RESOLVED | Noted: 2017-09-22 | Resolved: 2017-12-20

## 2017-12-20 PROBLEM — E83.39 HYPOPHOSPHATEMIA: Status: RESOLVED | Noted: 2017-03-27 | Resolved: 2017-12-20

## 2017-12-20 PROBLEM — S62.101D CLOSED FRACTURE OF RIGHT WRIST WITH ROUTINE HEALING, SUBSEQUENT ENCOUNTER: Status: RESOLVED | Noted: 2017-11-28 | Resolved: 2017-12-20

## 2017-12-20 PROBLEM — E43 SEVERE MALNUTRITION (H): Status: RESOLVED | Noted: 2017-03-27 | Resolved: 2017-12-20

## 2017-12-20 PROBLEM — S62.101A WRIST FRACTURE, RIGHT, CLOSED, INITIAL ENCOUNTER: Status: RESOLVED | Noted: 2017-11-14 | Resolved: 2017-12-20

## 2017-12-20 LAB
ALBUMIN SERPL-MCNC: 3 G/DL (ref 3.4–5)
ALP SERPL-CCNC: 222 U/L (ref 40–150)
ALT SERPL W P-5'-P-CCNC: 23 U/L (ref 0–50)
ANION GAP SERPL CALCULATED.3IONS-SCNC: 5 MMOL/L (ref 3–14)
AST SERPL W P-5'-P-CCNC: 32 U/L (ref 0–45)
BILIRUB SERPL-MCNC: 1.1 MG/DL (ref 0.2–1.3)
BUN SERPL-MCNC: 7 MG/DL (ref 7–30)
CALCIUM SERPL-MCNC: 8.1 MG/DL (ref 8.5–10.1)
CHLORIDE SERPL-SCNC: 113 MMOL/L (ref 94–109)
CO2 SERPL-SCNC: 26 MMOL/L (ref 20–32)
CREAT SERPL-MCNC: 1.02 MG/DL (ref 0.52–1.04)
ERYTHROCYTE [DISTWIDTH] IN BLOOD BY AUTOMATED COUNT: 17.2 % (ref 10–15)
FERRITIN SERPL-MCNC: 11 NG/ML (ref 8–252)
GFR SERPL CREATININE-BSD FRML MDRD: 57 ML/MIN/1.7M2
GLUCOSE SERPL-MCNC: 93 MG/DL (ref 70–99)
HCT VFR BLD AUTO: 27.2 % (ref 35–47)
HGB BLD-MCNC: 7.9 G/DL (ref 11.7–15.7)
IRON SATN MFR SERPL: 10 % (ref 15–46)
IRON SERPL-MCNC: 38 UG/DL (ref 35–180)
MCH RBC QN AUTO: 25.7 PG (ref 26.5–33)
MCHC RBC AUTO-ENTMCNC: 29 G/DL (ref 31.5–36.5)
MCV RBC AUTO: 89 FL (ref 78–100)
PLATELET # BLD AUTO: 125 10E9/L (ref 150–450)
POTASSIUM SERPL-SCNC: 3.7 MMOL/L (ref 3.4–5.3)
PROT SERPL-MCNC: 6.4 G/DL (ref 6.8–8.8)
RBC # BLD AUTO: 3.07 10E12/L (ref 3.8–5.2)
SODIUM SERPL-SCNC: 144 MMOL/L (ref 133–144)
TIBC SERPL-MCNC: 400 UG/DL (ref 240–430)
TSH SERPL DL<=0.005 MIU/L-ACNC: 1.02 MU/L (ref 0.4–4)
WBC # BLD AUTO: 3.8 10E9/L (ref 4–11)

## 2017-12-20 PROCEDURE — 84443 ASSAY THYROID STIM HORMONE: CPT | Performed by: FAMILY MEDICINE

## 2017-12-20 PROCEDURE — 99214 OFFICE O/P EST MOD 30 MIN: CPT | Performed by: FAMILY MEDICINE

## 2017-12-20 PROCEDURE — 36415 COLL VENOUS BLD VENIPUNCTURE: CPT | Performed by: FAMILY MEDICINE

## 2017-12-20 PROCEDURE — 80053 COMPREHEN METABOLIC PANEL: CPT | Performed by: FAMILY MEDICINE

## 2017-12-20 PROCEDURE — 85027 COMPLETE CBC AUTOMATED: CPT | Performed by: FAMILY MEDICINE

## 2017-12-20 PROCEDURE — 82728 ASSAY OF FERRITIN: CPT | Performed by: FAMILY MEDICINE

## 2017-12-20 PROCEDURE — 83550 IRON BINDING TEST: CPT | Performed by: FAMILY MEDICINE

## 2017-12-20 PROCEDURE — 83540 ASSAY OF IRON: CPT | Performed by: FAMILY MEDICINE

## 2017-12-20 ASSESSMENT — PAIN SCALES - GENERAL: PAINLEVEL: NO PAIN (0)

## 2017-12-20 NOTE — NURSING NOTE
"Chief Complaint   Patient presents with     Pre-Op Exam       Initial /64 (BP Location: Right arm, Patient Position: Chair, Cuff Size: Adult Regular)  Pulse 91  Temp 97.3  F (36.3  C) (Temporal)  Wt 128 lb 6.4 oz (58.2 kg)  LMP 05/16/2012  SpO2 99%  BMI 24.26 kg/m2 Estimated body mass index is 24.26 kg/(m^2) as calculated from the following:    Height as of 12/12/17: 5' 1\" (1.549 m).    Weight as of this encounter: 128 lb 6.4 oz (58.2 kg).  Medication Reconciliation: complete   Lazara Hamm CMA    There are no preventive care reminders to display for this patient.    Health Maintenance reviewed at today's visit patient asked to schedule/complete:   Patient is not due for any HM issues.       "

## 2017-12-20 NOTE — MR AVS SNAPSHOT
After Visit Summary   12/20/2017    Monalisa Olguin    MRN: [id number]           Patient Information     Date Of Birth          1965        Visit Information        Provider Department      12/20/2017 1:20 PM Alexis Pedraza MD [facility]        Today's Diagnoses     Preop general physical exam    -  1    Alcoholism in recovery (H)        Gastroesophageal reflux disease, esophagitis presence not specified          Care Instructions      Before Your Surgery      Call your surgeon if there is any change in your health. This includes signs of a cold or flu (such as a sore throat, runny nose, cough, rash or fever).    Do not smoke, drink alcohol or take over the counter medicine (unless your surgeon or primary care doctor tells you to) for the 24 hours before and after surgery.    If you take prescribed drugs: Follow your doctor s orders about which medicines to take and which to stop until after surgery.    Eating and drinking prior to surgery: follow the instructions from your surgeon    Take a shower or bath the night before surgery. Use the soap your surgeon gave you to gently clean your skin. If you do not have soap from your surgeon, use your regular soap. Do not shave or scrub the surgery site.  Wear clean pajamas and have clean sheets on your bed.           Follow-ups after your visit        Your next 10 appointments already scheduled     Dec 27, 2017   Procedure with Sai Johnston MD   [facility] ([facility])    [address] [place] [id number]   [phone]            Jan 02, 2018  2:10 PM CST   Return Visit with Edgardo Almendarez MD   [facility] ([facility]    [address] [place] [address]   [phone]            May 21, 2018  1:30 PM CDT   (Arrive by 1:15 PM)   Return General Liver with Edgardo Kovacs MD   [facility] ([facility] [place])    [address] [place] [address]  [address]  [address] [facility] [address] [phone]              Who to contact     If you have questions or need follow up information about today's clinic visit or your schedule please contact [facility] directly at [phone].  Normal or non-critical lab and imaging results will be communicated to you by MyChart, letter or phone within 4 business days after the clinic has received the results. If you do not hear from us within 7 days, please contact the clinic through MyChart or phone. If you have a critical or abnormal lab result, we will notify you by phone as soon as possible.  Submit refill requests through [employer] or call your pharmacy and they will forward the refill request to us. Please allow 3 business days for your refill to be completed.          Additional Information About Your Visit        MyChart Information     [employer] gives you secure access to your electronic health record. If you see a primary care provider, you can also send messages to your care team and make appointments. If you have questions, please call your primary care clinic.  If you do not have a primary care provider, please call [phone] and they will assist you.        Care EveryWhere ID     This is your Care EveryWhere ID. This could be used by other organizations to access your [place] medical records  [id number]        Your Vitals Were     Pulse Temperature Last Period Pulse Oximetry BMI (Body Mass Index)       91 97.3  F (36.3  C) (Temporal) 05/16/2012 99% 24.26 kg/m2        Blood Pressure from Last 3 Encounters:   12/20/17 102/64   11/29/17 115/68   11/28/17 112/74    Weight from Last 3 Encounters:   12/20/17 128 lb 6.4 oz (58.2 kg)   11/29/17 121 lb (54.9 kg)   11/28/17 121 lb (54.9 kg)              Today, you had the following     No orders found for display       Primary Care Provider Office Phone # Fax #    Efren Bustos [phone] [address] [place]  [place] [address]        Equal Access to Services     [facility] : Hadii km hodgson jessy Wei, walibbyda luqkamala, qaamericota katishasalo cashluis enriqueanthony jara. [facility] [phone].    ATENCIÓN: Si habla español, tiene a perez disposición servicios gratuitos de asistencia lingüística. Annelame al [phone].    We comply with applicable federal civil rights laws and [place] laws. We do not discriminate on the basis of race, color, national origin, age, disability, sex, sexual orientation, or gender identity.            Thank you!     Thank you for choosing [facility]  for your care. Our goal is always to provide you with excellent care. Hearing back from our patients is one way we can continue to improve our services. Please take a few minutes to complete the written survey that you may receive in the mail after your visit with us. Thank you!             Your Updated Medication List - Protect others around you: Learn how to safely use, store and throw away your medicines at www.disposemymeds.org.          This list is accurate as of: 12/20/17  1:46 PM.  Always use your most recent med list.                   Brand Name Dispense Instructions for use Diagnosis    alendronate 35 MG tablet    FOSAMAX    12 tablet    Take 1 tablet (35 mg) by mouth with 8oz water every Monday (once every 7 days) 30 minutes before breakfast and remain upright during this time.    Osteopenia, unspecified location       ALEVE PO      Take 220 mg by mouth 2 times daily as needed for moderate pain        buPROPion 300 MG 24 hr tablet    WELLBUTRIN XL    90 tablet    Take 1 tablet (300 mg) by mouth every morning    Major depressive disorder, recurrent episode, moderate (H)       busPIRone 15 MG tablet    BUSPAR    180 tablet    Take 1 tablet (15 mg) by mouth 2 times daily    MONA (generalized anxiety disorder)       FLUoxetine 20 MG capsule    PROzac    180 capsule    Take 3 capsules (60 mg) by mouth daily     Anxiety, Major depressive disorder, recurrent episode, moderate (H)       naltrexone 50 MG tablet    DEPADE;REVIA    90 tablet    TAKE ONE-HALF TABLET BY MOUTH EVERY DAY FOR ONE WEEK THEN TAKE ONE TABLET DAILY AFTER    Alcohol dependence in remission (H)       omeprazole 20 MG CR capsule    priLOSEC    90 capsule    Take 1 capsule (20 mg) by mouth daily    Gastroesophageal reflux disease with esophagitis       phosphorus tablet 250 mg 250 MG per tablet    VIRT-PHOS 250 NEUTRAL    120 tablet    TAKE TWO TABLETS BY MOUTH TWICE A DAY    Hypophosphatemia       propranolol 10 MG tablet    INDERAL    180 tablet    Take 1 tablet (10 mg) by mouth 2 times daily    Hypertension goal BP (blood pressure) < 130/80       sertraline 100 MG tablet    ZOLOFT          temazepam 15 MG capsule    RESTORIL          thiamine 100 MG tablet     90 tablet    TAKE ONE TABLET BY MOUTH EVERY DAY    Alcohol dependence in remission (H)       TL RICARDO RX 2.2-25-1 MG Tabs   Generic drug:  Folic Acid-Vit B6-Vit B12     90 tablet    TAKE ONE TABLET BY MOUTH EVERY DAY    Alcoholism in recovery (H)

## 2017-12-26 NOTE — H&P (VIEW-ONLY)
[address] [address]  [phone]  Dept: [phone]    PRE-OP EVALUATION:  Today's date: 2017    Monalisa Olguin (: 1965) presents for pre-operative evaluation assessment as requested by Dr. Johnston.  She requires evaluation and anesthesia risk assessment prior to undergoing surgery/procedure for treatment of EGD .  Proposed procedure: Combined esophagoscopy, gastroscopy, duodenoscopy    Date of Surgery/ Procedure: 2017  Time of Surgery/ Procedure: 1:00pm   Hospital/Surgical Facility: [facility]    Primary Physician: Efren Bustos  Type of Anesthesia Anticipated: to be determined    Patient has a Health Care Directive or Living Will:  NO    Preop Questions 2017   1.  Do you have a history of heart attack, stroke, stent, bypass or surgery on an artery in the head, neck, heart or legs? No   2.  Do you ever have any pain or discomfort in your chest? No   3.  Do you have a history of  Heart Failure? YES - father  from a heart attack   4.   Are you troubled by shortness of breath when:  walking on a level surface, or up a slight hill, or at night? No   5.  Do you currently have a cold, bronchitis or other respiratory infection? YES - states that she has a cold right now   6.  Do you have a cough, shortness of breath, or wheezing? YES - cough   7.  Do you sometimes get pains in the calves of your legs when you walk? YES - when she is walking for long periods of time   8. Do you or anyone in your family have previous history of blood clots? No   9.  Do you or does anyone in your family have a serious bleeding problem such as prolonged bleeding following surgeries or cuts? No   10. Have you ever had problems with anemia or been told to take iron pills? No   11. Have you had any abnormal blood loss such as black, tarry or bloody stools, or abnormal vaginal bleeding? No   12. Have you ever had a blood transfusion? No   13. Have you or  any of your relatives ever had problems with anesthesia? YES - self when she has colonoscopy was unable to be put under with the medication they used   14. Do you have sleep apnea, excessive snoring or daytime drowsiness? No   15. Do you have any prosthetic heart valves? No   16. Do you have prosthetic joints? No   17. Is there any chance that you may be pregnant? No           HPI:                                                      Brief HPI related to upcoming procedure:     Monalisa is here today for pre-op physical for upper endoscopy for history of alcoholism and GERD/gastritis.  It expected be the same day procedure with MAC/general anesthesia. There is no personal or family history of anesthesia complication. There is no family or personal history of pre-matured CAD or MI. She has a long hx of alcohol abuse; been sobered for 4 months.  Also on multiple medication for depression/anxiety and is doing well.  As not been on steroid orally in the last 6 months.  She does not take Aspirin or other form of blood thinner.  She takes Aleve rarely in the last pill was weeks ago.  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  Stated that she was well informed about the procedure and is ready to have the procedure done.    She generally is doing well and has no concern today.  She is getting over a cold.  No headache or dizziness. No running nose, nasal congestion, ST, coughing, fever or chill.  No chest pain or SOB.  No N/V/D/C and denies of having problem with urination.  She smokes about 1/2 ppd but denies of having breathing problem.  She is menopausal, her last menstrual period was 10 years ago.    MEDICAL HISTORY:                                                    Patient Active Problem List    Diagnosis Date Noted     Alcoholic hepatitis with ascites 03/26/2017     Priority: Medium     Anemia 03/26/2017     Priority: Medium     Thrombocytopenia (H) 03/26/2017     Priority: Medium     Tobacco  abuse 2017     Priority: Medium     Portal hypertension (H) 2017     Priority: Medium     Pulmonary nodules 2017     Priority: Medium     Hyperthyroidism 2017     Priority: Medium     Alcoholism in recovery (H) 2016     Priority: Medium     Anxiety 10/10/2011     Priority: Medium     Hypertension goal BP (blood pressure) < 130/80 2011     Priority: Medium     HYPERLIPIDEMIA LDL GOAL <100 10/31/2010     Priority: Medium     Moderate Depression [296.32] 2009     Priority: Medium     Esophageal reflux 10/07/2002     Priority: Medium     Kidney donor 2001     Priority: Medium      Past Medical History:   Diagnosis Date     Alcohol abuse 2013     Alcohol dependence 2013     Alcohol withdrawal 2013     Anxiety 10/10/2011     CKD (chronic kidney disease) stage 3, GFR 30-59 ml/min     last GFR was 42     CKD (chronic kidney disease) stage 3, GFR 30-59 ml/min 2011     Depressive disorder      Esophageal reflux 10/7/2002     Hypertension goal BP (blood pressure) < 130/80 2011     Moderate Depression [296.32] 2009    stable on wellbutrin     Other internal derangement of knee(717.89)     ACL Internal derangement, knee/ original injury in 5th grade, torn cartilage     Pap smear     no abnormals, due for paps q 2-3 yrs     Unspecified essential hypertension      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     COLONOSCOPY N/A 2017    Procedure: COLONOSCOPY;  Colonoscopy;  Surgeon: Sai Johnston MD;  Location: [facility]     HC KNEE SCOPE, DIAGNOSTIC  01    Arthroscopy, Lt Knee     LAPAROSCOPIC CHOLECYSTECTOMY N/A 2017    Procedure: LAPAROSCOPIC CHOLECYSTECTOMY;  laparoscopic cholecystectomy;  Surgeon: Romeo Pastor MD;  Location: [facility]     OPEN REDUCTION INTERNAL FIXATION WRIST Right 2017  "   Procedure: OPEN REDUCTION INTERNAL FIXATION WRIST;  OPEN REDUCTION INTERNAL FIXATION RIGHT WRIST;  Surgeon: Edgardo Almendarez MD;  Location: [facility]     Current Outpatient Prescriptions   Medication Sig Dispense Refill     alendronate (FOSAMAX) 35 MG tablet Take 1 tablet (35 mg) by mouth with 8oz water every Monday (once every 7 days) 30 minutes before breakfast and remain upright during this time. 12 tablet 3     FLUoxetine (PROZAC) 20 MG capsule Take 3 capsules (60 mg) by mouth daily 180 capsule 3     temazepam (RESTORIL) 15 MG capsule        sertraline (ZOLOFT) 100 MG tablet        Naproxen Sodium (ALEVE PO) Take 220 mg by mouth 2 times daily as needed for moderate pain       phosphorus tablet 250 mg (VIRT-PHOS 250 NEUTRAL) 250 MG per tablet TAKE TWO TABLETS BY MOUTH TWICE A  tablet 3     naltrexone (DEPADE;REVIA) 50 MG tablet TAKE ONE-HALF TABLET BY MOUTH EVERY DAY FOR ONE WEEK THEN TAKE ONE TABLET DAILY AFTER 90 tablet 3     propranolol (INDERAL) 10 MG tablet Take 1 tablet (10 mg) by mouth 2 times daily 180 tablet 3     TL RICARDO RX 2.2-25-1 MG TABS TAKE ONE TABLET BY MOUTH EVERY DAY 90 tablet 3     VITAMIN B-1 100 MG tablet TAKE ONE TABLET BY MOUTH EVERY DAY 90 tablet 3     busPIRone (BUSPAR) 15 MG tablet Take 1 tablet (15 mg) by mouth 2 times daily 180 tablet 3     buPROPion (WELLBUTRIN XL) 300 MG 24 hr tablet Take 1 tablet (300 mg) by mouth every morning 90 tablet 3     omeprazole (PRILOSEC) 20 MG CR capsule Take 1 capsule (20 mg) by mouth daily 90 capsule 3     OTC products: None, except as noted above    Allergies   Allergen Reactions     Albuterol      Tongue \"hardened and painful\"      Latex Allergy: NO    Social History   Substance Use Topics     Smoking status: Current Every Day Smoker     Packs/day: 0.50     Years: 10.00     Smokeless tobacco: Never Used      Comment: pt quit 1992 after smoking for 8 yrs, started again in 2004     Alcohol use No      Comment: stopped drinking 8/17     History "   Drug Use No       REVIEW OF SYSTEMS:                                                    Constitutional, neuro, ENT, endocrine, pulmonary, cardiac, gastrointestinal, genitourinary, musculoskeletal, integument and psychiatric systems are negative, except as otherwise noted.      EXAM:                                                    /64 (BP Location: Right arm, Patient Position: Chair, Cuff Size: Adult Regular)  Pulse 91  Temp 97.3  F (36.3  C) (Temporal)  Wt 128 lb 6.4 oz (58.2 kg)  LMP 05/16/2012  SpO2 99%  BMI 24.26 kg/m2      GENERAL APPEARANCE: healthy, alert and no distress     EYES: EOMI,- PERRL     HENT: ear canals and TM's normal and nose and mouth without ulcers or lesions. Nares are non-congested. Oropharynx is pink and moist. No tender with palpation to the sinuses.     NECK: no adenopathy, no asymmetry and thyroid normal to palpation.  No tender with palpation to the cervical spine and its para-spinous muscle bilaterally.     RESP: lungs clear to auscultation - no rales, rhonchi or wheezes     CV: regular rates and rhythm and no murmur.     ABDOMEN:  soft, nontender, no palpable masses and bowel sounds normal     MS: extremities normal- no gross deformities noted.  No edema.  All 4 extremities  are equally in strength.     NEURO: Normal strength and tone,  mentation intact and speech normal     PSYCH: mentation appears normal. and affect normal/bright     LYMPHATICS: No cervical adenopathy    DIAGNOSTICS:                                                      EKG: Not indicated due to non-vascular surgery and last ekg on 11/2017 showed normal showed normal sinus rhythm with no change in ST segments are T-wave (within 30 days for CAD history or last year for cardiac risk factors)     Labs Resulted Today:  none      Recent Labs   Lab Test  11/02/17   1222  10/17/17   2245  09/27/17   1337  09/25/17   0821  09/24/17   0630   HGB   --   8.1*  8.4*  7.9*  7.8*   PLT   --   100*  99*  91*  88*    INR   --    --    --   1.25*  1.33*   NA  145*  142  142  144  141   POTASSIUM  3.7  3.8  3.7  3.8  3.7   CR  0.91  1.06*  0.88  0.88  1.04   A1C   --    --    --    --   Canceled, Test credited     No pregnancy test was needed because he is post menopause with the last LMP was 10 years ago    IMPRESSION:                                                    Reason for surgery/procedure: History of alcohol abuse with GERD/gastritis  Diagnosis/reason for consult: pre-op physical to evaluate for anesthesia and its krea-operative risks.    The proposed surgical procedure is considered INTERMEDIATE risk.    REVISED CARDIAC RISK INDEX  The patient has the following serious cardiovascular risks for perioperative complications such as (MI, PE, VFib and 3  AV Block):  No serious cardiac risks  INTERPRETATION: 1 risks: Class II (low risk - 0.9% complication rate)    The patient has the following additional risks for perioperative complications:  No identified additional risks      ICD-10-CM    1. Preop general physical exam Z01.818    2. Alcoholism in recovery (H) F10.20    3. Gastroesophageal reflux disease, esophagitis presence not specified K21.9        RECOMMENDATIONS:                                                      APPROVAL GIVEN to proceed with proposed procedure, without further diagnostic evaluation.    Monalisa is overall doing well.  She is clear for the procedure as scheduled.  No further work up is needed - recent EKG and labs were normal.  Instructed her to fast at least 8 hrs before the procedure time. Not take any blood thinner.   I recommend to stay away from ASA and NSAIDs for 7 days before the procedure. I went over her medication - may take all of prescribed medications as prescribed. A written instruction was given as well. Recommended appropriate DVT prophylactic during and after the surgery per hospital's protocol.  All of her questions were answered.         Signed Electronically by: Alexis Mae Mai  MD    Copy of this evaluation report is provided to requesting physician.    [place] Preop Guidelines

## 2017-12-27 ENCOUNTER — HOSPITAL ENCOUNTER (OUTPATIENT)
Facility: CLINIC | Age: 52
Discharge: HOME | End: 2017-12-27
Attending: INTERNAL MEDICINE | Admitting: INTERNAL MEDICINE
Payer: COMMERCIAL

## 2017-12-27 VITALS
RESPIRATION RATE: 16 BRPM | DIASTOLIC BLOOD PRESSURE: 77 MMHG | HEIGHT: 61 IN | WEIGHT: 128.4 LBS | OXYGEN SATURATION: 97 % | SYSTOLIC BLOOD PRESSURE: 112 MMHG | BODY MASS INDEX: 24.24 KG/M2

## 2017-12-27 LAB — UPPER GI ENDOSCOPY: NORMAL

## 2017-12-27 PROCEDURE — 40000296 ZZH STATISTIC ENDO RECOVERY CLASS 1:2 FIRST HOUR: Performed by: INTERNAL MEDICINE

## 2017-12-27 PROCEDURE — 25000128 H RX IP 250 OP 636: Performed by: NURSE ANESTHETIST, CERTIFIED REGISTERED

## 2017-12-27 PROCEDURE — 25000125 ZZHC RX 250: Performed by: NURSE ANESTHETIST, CERTIFIED REGISTERED

## 2017-12-27 PROCEDURE — 25000125 ZZHC RX 250: Performed by: INTERNAL MEDICINE

## 2017-12-27 PROCEDURE — 88305 TISSUE EXAM BY PATHOLOGIST: CPT | Mod: 26 | Performed by: INTERNAL MEDICINE

## 2017-12-27 PROCEDURE — 43239 EGD BIOPSY SINGLE/MULTIPLE: CPT | Performed by: INTERNAL MEDICINE

## 2017-12-27 PROCEDURE — 88305 TISSUE EXAM BY PATHOLOGIST: CPT | Performed by: INTERNAL MEDICINE

## 2017-12-27 RX ORDER — LIDOCAINE 40 MG/G
CREAM TOPICAL
Status: DISCONTINUED | OUTPATIENT
Start: 2017-12-27 | End: 2017-12-27 | Stop reason: HOSPADM

## 2017-12-27 RX ORDER — PROPOFOL 10 MG/ML
INJECTION, EMULSION INTRAVENOUS PRN
Status: DISCONTINUED | OUTPATIENT
Start: 2017-12-27 | End: 2017-12-27

## 2017-12-27 RX ORDER — LIDOCAINE HYDROCHLORIDE 20 MG/ML
INJECTION, SOLUTION INFILTRATION; PERINEURAL PRN
Status: DISCONTINUED | OUTPATIENT
Start: 2017-12-27 | End: 2017-12-27

## 2017-12-27 RX ORDER — ONDANSETRON 2 MG/ML
4 INJECTION INTRAMUSCULAR; INTRAVENOUS
Status: DISCONTINUED | OUTPATIENT
Start: 2017-12-27 | End: 2017-12-27 | Stop reason: HOSPADM

## 2017-12-27 RX ORDER — ONDANSETRON 2 MG/ML
4 INJECTION INTRAMUSCULAR; INTRAVENOUS EVERY 30 MIN PRN
Status: DISCONTINUED | OUTPATIENT
Start: 2017-12-27 | End: 2017-12-27 | Stop reason: HOSPADM

## 2017-12-27 RX ORDER — NALOXONE HYDROCHLORIDE 0.4 MG/ML
.1-.4 INJECTION, SOLUTION INTRAMUSCULAR; INTRAVENOUS; SUBCUTANEOUS
Status: DISCONTINUED | OUTPATIENT
Start: 2017-12-27 | End: 2017-12-27 | Stop reason: HOSPADM

## 2017-12-27 RX ORDER — SODIUM CHLORIDE, SODIUM LACTATE, POTASSIUM CHLORIDE, CALCIUM CHLORIDE 600; 310; 30; 20 MG/100ML; MG/100ML; MG/100ML; MG/100ML
INJECTION, SOLUTION INTRAVENOUS CONTINUOUS
Status: DISCONTINUED | OUTPATIENT
Start: 2017-12-27 | End: 2017-12-27 | Stop reason: HOSPADM

## 2017-12-27 RX ORDER — ONDANSETRON 4 MG/1
4 TABLET, ORALLY DISINTEGRATING ORAL EVERY 30 MIN PRN
Status: DISCONTINUED | OUTPATIENT
Start: 2017-12-27 | End: 2017-12-27 | Stop reason: HOSPADM

## 2017-12-27 RX ORDER — PROPOFOL 10 MG/ML
INJECTION, EMULSION INTRAVENOUS CONTINUOUS PRN
Status: DISCONTINUED | OUTPATIENT
Start: 2017-12-27 | End: 2017-12-27

## 2017-12-27 RX ORDER — MEPERIDINE HYDROCHLORIDE 25 MG/ML
12.5 INJECTION INTRAMUSCULAR; INTRAVENOUS; SUBCUTANEOUS
Status: DISCONTINUED | OUTPATIENT
Start: 2017-12-27 | End: 2017-12-27 | Stop reason: HOSPADM

## 2017-12-27 RX ADMIN — PROPOFOL 150 MCG/KG/MIN: 10 INJECTION, EMULSION INTRAVENOUS at 13:52

## 2017-12-27 RX ADMIN — SODIUM CHLORIDE, POTASSIUM CHLORIDE, SODIUM LACTATE AND CALCIUM CHLORIDE: 600; 310; 30; 20 INJECTION, SOLUTION INTRAVENOUS at 12:36

## 2017-12-27 RX ADMIN — PROPOFOL 70 MG: 10 INJECTION, EMULSION INTRAVENOUS at 13:50

## 2017-12-27 RX ADMIN — LIDOCAINE HYDROCHLORIDE 1 ML: 10 INJECTION, SOLUTION EPIDURAL; INFILTRATION; INTRACAUDAL; PERINEURAL at 12:36

## 2017-12-27 RX ADMIN — LIDOCAINE HYDROCHLORIDE 80 MG: 20 INJECTION, SOLUTION INFILTRATION; PERINEURAL at 13:49

## 2017-12-27 NOTE — ANESTHESIA POSTPROCEDURE EVALUATION
Patient: Monalisa Olguin    Procedure(s):  ESOPHAGOSCOPY, GASTROSCOPY, DUODENOSCOPY (EGD) with biopsies - Wound Class: II-Clean Contaminated    Diagnosis:gerd  Diagnosis Additional Information: No value filed.    Anesthesia Type:  MAC    Note:  Anesthesia Post Evaluation    Last vitals:  Vitals:    12/27/17 1222 12/27/17 1410   BP: 108/62 105/72   Resp: 14 14   SpO2: 99% 96%         Electronically Signed By: IVETTE Navarro CRNA  December 27, 2017  2:25 PM

## 2017-12-27 NOTE — ANESTHESIA POSTPROCEDURE EVALUATION
Patient: Monalisa Olguin    Procedure(s):  ESOPHAGOSCOPY, GASTROSCOPY, DUODENOSCOPY (EGD) with biopsies - Wound Class: II-Clean Contaminated    Diagnosis:gerd  Diagnosis Additional Information: No value filed.    Anesthesia Type:  MAC    Note:  Anesthesia Post Evaluation    Patient location during evaluation: Phase 2  Patient participation: Able to fully participate in evaluation  Level of consciousness: awake and alert  Pain management: adequate  Airway patency: patent  Cardiovascular status: blood pressure returned to baseline  Respiratory status: spontaneous ventilation  Hydration status: euvolemic  PONV: none     Anesthetic complications: None    Comments: Patient resting without complaint.  She was very happy with her anesthetic, no anesthesia complications.         Last vitals:  Vitals:    12/27/17 1222 12/27/17 1410   BP: 108/62 105/72   Resp: 14 14   SpO2: 99% 96%         Electronically Signed By: IVETTE Navarro CRNA  December 27, 2017  2:26 PM

## 2017-12-28 ENCOUNTER — MEDICAL CORRESPONDENCE (OUTPATIENT)
Dept: HEALTH INFORMATION MANAGEMENT | Facility: CLINIC | Age: 52
End: 2017-12-28

## 2017-12-29 LAB — COPATH REPORT: NORMAL

## 2018-01-04 DIAGNOSIS — K70.9 LIVER DISEASE, CHRONIC, DUE TO ALCOHOL (H): Primary | ICD-10-CM

## 2018-01-16 ENCOUNTER — TRANSFERRED RECORDS (OUTPATIENT)
Dept: HEALTH INFORMATION MANAGEMENT | Facility: CLINIC | Age: 53
End: 2018-01-16

## 2018-02-26 ENCOUNTER — TELEPHONE (OUTPATIENT)
Dept: FAMILY MEDICINE | Facility: CLINIC | Age: 53
End: 2018-02-26

## 2018-02-26 NOTE — TELEPHONE ENCOUNTER
Received a letter from Patients insurance company that she is overdue on her refill for Naltrexone. Dr uBstos wanted me to call and see if she is taking it still and if she is not then why did she stop it.     Called patient and she said that she has not been taking the Naltrexone. She doesn't have cravings anymore. She said that she met with an RN or someone and they agreed that she didn't have to take it if she wasn't having cravings.  I asked where this was and she said she didn't remember.     Talked with  and he said to take it off her medication list.  MP/MA

## 2018-03-21 DIAGNOSIS — K70.9 LIVER DISEASE, CHRONIC, DUE TO ALCOHOL (H): ICD-10-CM

## 2018-03-21 LAB
ALBUMIN SERPL-MCNC: 2.8 G/DL (ref 3.4–5)
ALP SERPL-CCNC: 199 U/L (ref 40–150)
ALT SERPL W P-5'-P-CCNC: 19 U/L (ref 0–50)
AMMONIA PLAS-SCNC: 38 UMOL/L (ref 10–50)
AST SERPL W P-5'-P-CCNC: 38 U/L (ref 0–45)
BILIRUB DIRECT SERPL-MCNC: 0.8 MG/DL (ref 0–0.2)
BILIRUB SERPL-MCNC: 1.6 MG/DL (ref 0.2–1.3)
BUN SERPL-MCNC: 8 MG/DL (ref 7–30)
CREAT SERPL-MCNC: 0.93 MG/DL (ref 0.52–1.04)
ERYTHROCYTE [DISTWIDTH] IN BLOOD BY AUTOMATED COUNT: 19.2 % (ref 10–15)
GFR SERPL CREATININE-BSD FRML MDRD: 63 ML/MIN/1.7M2
HCT VFR BLD AUTO: 22.8 % (ref 35–47)
HGB BLD-MCNC: 6 G/DL (ref 11.7–15.7)
IRON SATN MFR SERPL: 7 % (ref 15–46)
IRON SERPL-MCNC: 27 UG/DL (ref 35–180)
MCH RBC QN AUTO: 22.7 PG (ref 26.5–33)
MCHC RBC AUTO-ENTMCNC: 26.3 G/DL (ref 31.5–36.5)
MCV RBC AUTO: 86 FL (ref 78–100)
PLATELET # BLD AUTO: 91 10E9/L (ref 150–450)
PROT SERPL-MCNC: 5.6 G/DL (ref 6.8–8.8)
RBC # BLD AUTO: 2.64 10E12/L (ref 3.8–5.2)
TIBC SERPL-MCNC: 410 UG/DL (ref 240–430)
WBC # BLD AUTO: 3.1 10E9/L (ref 4–11)

## 2018-03-21 PROCEDURE — 82140 ASSAY OF AMMONIA: CPT | Performed by: INTERNAL MEDICINE

## 2018-03-21 PROCEDURE — 86803 HEPATITIS C AB TEST: CPT | Performed by: INTERNAL MEDICINE

## 2018-03-21 PROCEDURE — 86706 HEP B SURFACE ANTIBODY: CPT | Performed by: INTERNAL MEDICINE

## 2018-03-21 PROCEDURE — 84520 ASSAY OF UREA NITROGEN: CPT | Performed by: INTERNAL MEDICINE

## 2018-03-21 PROCEDURE — 82565 ASSAY OF CREATININE: CPT | Performed by: INTERNAL MEDICINE

## 2018-03-21 PROCEDURE — 80076 HEPATIC FUNCTION PANEL: CPT | Performed by: INTERNAL MEDICINE

## 2018-03-21 PROCEDURE — 36415 COLL VENOUS BLD VENIPUNCTURE: CPT | Performed by: INTERNAL MEDICINE

## 2018-03-21 PROCEDURE — 85027 COMPLETE CBC AUTOMATED: CPT | Performed by: INTERNAL MEDICINE

## 2018-03-21 PROCEDURE — 83550 IRON BINDING TEST: CPT | Performed by: INTERNAL MEDICINE

## 2018-03-21 PROCEDURE — 87340 HEPATITIS B SURFACE AG IA: CPT | Performed by: INTERNAL MEDICINE

## 2018-03-21 PROCEDURE — 83540 ASSAY OF IRON: CPT | Performed by: INTERNAL MEDICINE

## 2018-03-22 LAB
HBV SURFACE AB SERPL IA-ACNC: 0.54 M[IU]/ML
HBV SURFACE AG SERPL QL IA: NONREACTIVE
HCV AB SERPL QL IA: NONREACTIVE

## 2018-03-27 ENCOUNTER — HOSPITAL ENCOUNTER (OUTPATIENT)
Facility: CLINIC | Age: 53
Setting detail: OBSERVATION
Discharge: HOME | End: 2018-03-28
Attending: FAMILY MEDICINE | Admitting: FAMILY MEDICINE
Payer: COMMERCIAL

## 2018-03-27 ENCOUNTER — TELEPHONE (OUTPATIENT)
Dept: FAMILY MEDICINE | Facility: CLINIC | Age: 53
End: 2018-03-27

## 2018-03-27 DIAGNOSIS — D50.0 CHRONIC BLOOD LOSS ANEMIA: ICD-10-CM

## 2018-03-27 DIAGNOSIS — K29.21 ALCOHOLIC GASTRITIS WITH HEMORRHAGE, UNSPECIFIED CHRONICITY: ICD-10-CM

## 2018-03-27 DIAGNOSIS — D69.6 THROMBOCYTOPENIA (H): Primary | ICD-10-CM

## 2018-03-27 DIAGNOSIS — K70.30 ALCOHOLIC CIRRHOSIS OF LIVER WITHOUT ASCITES (H): Primary | ICD-10-CM

## 2018-03-27 DIAGNOSIS — D62 ANEMIA DUE TO BLOOD LOSS, ACUTE: ICD-10-CM

## 2018-03-27 DIAGNOSIS — R04.0 EPISTAXIS: ICD-10-CM

## 2018-03-27 DIAGNOSIS — D50.8 OTHER IRON DEFICIENCY ANEMIA: ICD-10-CM

## 2018-03-27 PROBLEM — F10.229 ACUTE ALCOHOLIC INTOXICATION IN ALCOHOLISM (H): Status: ACTIVE | Noted: 2018-03-27

## 2018-03-27 PROBLEM — K92.2 GI BLEED: Status: ACTIVE | Noted: 2018-03-27

## 2018-03-27 LAB
ABO + RH BLD: NORMAL
ABO + RH BLD: NORMAL
ALBUMIN SERPL-MCNC: 3.2 G/DL (ref 3.4–5)
ALP SERPL-CCNC: 248 U/L (ref 40–150)
ALT SERPL W P-5'-P-CCNC: 20 U/L (ref 0–50)
AMMONIA PLAS-SCNC: 35 UMOL/L (ref 10–50)
ANION GAP SERPL CALCULATED.3IONS-SCNC: 10 MMOL/L (ref 3–14)
AST SERPL W P-5'-P-CCNC: 46 U/L (ref 0–45)
BASOPHILS # BLD AUTO: 0 10E9/L (ref 0–0.2)
BASOPHILS NFR BLD AUTO: 0.4 %
BILIRUB SERPL-MCNC: 1.2 MG/DL (ref 0.2–1.3)
BLD GP AB SCN SERPL QL: NORMAL
BLD PROD TYP BPU: NORMAL
BLD UNIT ID BPU: 0
BLOOD BANK CMNT PATIENT-IMP: NORMAL
BLOOD PRODUCT CODE: NORMAL
BPU ID: NORMAL
BUN SERPL-MCNC: 6 MG/DL (ref 7–30)
CALCIUM SERPL-MCNC: 7.5 MG/DL (ref 8.5–10.1)
CHLORIDE SERPL-SCNC: 104 MMOL/L (ref 94–109)
CO2 SERPL-SCNC: 22 MMOL/L (ref 20–32)
CREAT SERPL-MCNC: 0.69 MG/DL (ref 0.52–1.04)
DIFFERENTIAL METHOD BLD: ABNORMAL
EOSINOPHIL # BLD AUTO: 0 10E9/L (ref 0–0.7)
EOSINOPHIL NFR BLD AUTO: 0 %
ERYTHROCYTE [DISTWIDTH] IN BLOOD BY AUTOMATED COUNT: 18.6 % (ref 10–15)
ETHANOL SERPL-MCNC: 0.23 G/DL
GFR SERPL CREATININE-BSD FRML MDRD: 90 ML/MIN/1.7M2
GLUCOSE SERPL-MCNC: 90 MG/DL (ref 70–99)
HCT VFR BLD AUTO: 22.1 % (ref 35–47)
HEMOCCULT STL QL: POSITIVE
HGB BLD-MCNC: 6.2 G/DL (ref 11.7–15.7)
IMM GRANULOCYTES # BLD: 0 10E9/L (ref 0–0.4)
IMM GRANULOCYTES NFR BLD: 0.2 %
LYMPHOCYTES # BLD AUTO: 2.5 10E9/L (ref 0.8–5.3)
LYMPHOCYTES NFR BLD AUTO: 44.4 %
MCH RBC QN AUTO: 23.2 PG (ref 26.5–33)
MCHC RBC AUTO-ENTMCNC: 28.1 G/DL (ref 31.5–36.5)
MCV RBC AUTO: 83 FL (ref 78–100)
MONOCYTES # BLD AUTO: 0.4 10E9/L (ref 0–1.3)
MONOCYTES NFR BLD AUTO: 7.7 %
NEUTROPHILS # BLD AUTO: 2.6 10E9/L (ref 1.6–8.3)
NEUTROPHILS NFR BLD AUTO: 47.3 %
NUM BPU REQUESTED: 2
PLATELET # BLD AUTO: 126 10E9/L (ref 150–450)
POTASSIUM SERPL-SCNC: 3.5 MMOL/L (ref 3.4–5.3)
PROT SERPL-MCNC: 6 G/DL (ref 6.8–8.8)
RBC # BLD AUTO: 2.67 10E12/L (ref 3.8–5.2)
SODIUM SERPL-SCNC: 136 MMOL/L (ref 133–144)
SPECIMEN EXP DATE BLD: NORMAL
TRANSFUSION STATUS PATIENT QL: NORMAL
WBC # BLD AUTO: 5.6 10E9/L (ref 4–11)

## 2018-03-27 PROCEDURE — 25000125 ZZHC RX 250: Performed by: FAMILY MEDICINE

## 2018-03-27 PROCEDURE — 99207 ZZC CDG-MDM COMPONENT: MEETS HIGH - UP CODED: CPT | Performed by: FAMILY MEDICINE

## 2018-03-27 PROCEDURE — 25000132 ZZH RX MED GY IP 250 OP 250 PS 637: Performed by: FAMILY MEDICINE

## 2018-03-27 PROCEDURE — G0378 HOSPITAL OBSERVATION PER HR: HCPCS

## 2018-03-27 PROCEDURE — 85025 COMPLETE CBC W/AUTO DIFF WBC: CPT | Performed by: FAMILY MEDICINE

## 2018-03-27 PROCEDURE — 86900 BLOOD TYPING SEROLOGIC ABO: CPT | Performed by: FAMILY MEDICINE

## 2018-03-27 PROCEDURE — 82272 OCCULT BLD FECES 1-3 TESTS: CPT | Performed by: FAMILY MEDICINE

## 2018-03-27 PROCEDURE — 86901 BLOOD TYPING SEROLOGIC RH(D): CPT | Performed by: FAMILY MEDICINE

## 2018-03-27 PROCEDURE — P9016 RBC LEUKOCYTES REDUCED: HCPCS | Performed by: FAMILY MEDICINE

## 2018-03-27 PROCEDURE — 80053 COMPREHEN METABOLIC PANEL: CPT | Performed by: FAMILY MEDICINE

## 2018-03-27 PROCEDURE — 96374 THER/PROPH/DIAG INJ IV PUSH: CPT

## 2018-03-27 PROCEDURE — 99285 EMERGENCY DEPT VISIT HI MDM: CPT | Mod: 25 | Performed by: FAMILY MEDICINE

## 2018-03-27 PROCEDURE — 86850 RBC ANTIBODY SCREEN: CPT | Performed by: FAMILY MEDICINE

## 2018-03-27 PROCEDURE — 99220 ZZC INITIAL OBSERVATION CARE,LEVL III: CPT | Performed by: FAMILY MEDICINE

## 2018-03-27 PROCEDURE — 36430 TRANSFUSION BLD/BLD COMPNT: CPT

## 2018-03-27 PROCEDURE — 82140 ASSAY OF AMMONIA: CPT | Performed by: FAMILY MEDICINE

## 2018-03-27 PROCEDURE — 86923 COMPATIBILITY TEST ELECTRIC: CPT | Performed by: FAMILY MEDICINE

## 2018-03-27 PROCEDURE — 96376 TX/PRO/DX INJ SAME DRUG ADON: CPT

## 2018-03-27 PROCEDURE — 80320 DRUG SCREEN QUANTALCOHOLS: CPT | Performed by: FAMILY MEDICINE

## 2018-03-27 PROCEDURE — 99285 EMERGENCY DEPT VISIT HI MDM: CPT | Mod: 25

## 2018-03-27 PROCEDURE — 25000128 H RX IP 250 OP 636: Performed by: FAMILY MEDICINE

## 2018-03-27 RX ORDER — PROPRANOLOL HYDROCHLORIDE 10 MG/1
10 TABLET ORAL 2 TIMES DAILY
Status: DISCONTINUED | OUTPATIENT
Start: 2018-03-27 | End: 2018-03-28 | Stop reason: HOSPADM

## 2018-03-27 RX ORDER — MULTIPLE VITAMINS W/ MINERALS TAB 9MG-400MCG
1 TAB ORAL DAILY
Status: DISCONTINUED | OUTPATIENT
Start: 2018-03-28 | End: 2018-03-28 | Stop reason: HOSPADM

## 2018-03-27 RX ORDER — ONDANSETRON 4 MG/1
4 TABLET, ORALLY DISINTEGRATING ORAL EVERY 6 HOURS PRN
Status: DISCONTINUED | OUTPATIENT
Start: 2018-03-27 | End: 2018-03-28 | Stop reason: HOSPADM

## 2018-03-27 RX ORDER — TEMAZEPAM 7.5 MG/1
7.5 CAPSULE ORAL
Status: DISCONTINUED | OUTPATIENT
Start: 2018-03-27 | End: 2018-03-28 | Stop reason: HOSPADM

## 2018-03-27 RX ORDER — BUPROPION HYDROCHLORIDE 150 MG/1
300 TABLET ORAL EVERY MORNING
Status: DISCONTINUED | OUTPATIENT
Start: 2018-03-28 | End: 2018-03-28 | Stop reason: HOSPADM

## 2018-03-27 RX ORDER — ONDANSETRON 2 MG/ML
4 INJECTION INTRAMUSCULAR; INTRAVENOUS EVERY 6 HOURS PRN
Status: DISCONTINUED | OUTPATIENT
Start: 2018-03-27 | End: 2018-03-28 | Stop reason: HOSPADM

## 2018-03-27 RX ORDER — LANOLIN ALCOHOL/MO/W.PET/CERES
100 CREAM (GRAM) TOPICAL DAILY
Status: DISCONTINUED | OUTPATIENT
Start: 2018-03-28 | End: 2018-03-28 | Stop reason: HOSPADM

## 2018-03-27 RX ORDER — ACETAMINOPHEN 325 MG/1
650 TABLET ORAL EVERY 4 HOURS PRN
Status: DISCONTINUED | OUTPATIENT
Start: 2018-03-27 | End: 2018-03-28 | Stop reason: HOSPADM

## 2018-03-27 RX ORDER — BUSPIRONE HYDROCHLORIDE 5 MG/1
15 TABLET ORAL 2 TIMES DAILY
Status: DISCONTINUED | OUTPATIENT
Start: 2018-03-27 | End: 2018-03-28 | Stop reason: HOSPADM

## 2018-03-27 RX ORDER — NALOXONE HYDROCHLORIDE 0.4 MG/ML
.1-.4 INJECTION, SOLUTION INTRAMUSCULAR; INTRAVENOUS; SUBCUTANEOUS
Status: DISCONTINUED | OUTPATIENT
Start: 2018-03-27 | End: 2018-03-28 | Stop reason: HOSPADM

## 2018-03-27 RX ORDER — ACETAMINOPHEN 650 MG/1
650 SUPPOSITORY RECTAL EVERY 4 HOURS PRN
Status: DISCONTINUED | OUTPATIENT
Start: 2018-03-27 | End: 2018-03-27

## 2018-03-27 RX ADMIN — SODIUM CHLORIDE 80 MG: 9 INJECTION, SOLUTION INTRAVENOUS at 17:25

## 2018-03-27 RX ADMIN — SODIUM CHLORIDE 8 MG/HR: 9 INJECTION, SOLUTION INTRAVENOUS at 18:43

## 2018-03-27 RX ADMIN — PROPRANOLOL HYDROCHLORIDE 10 MG: 10 TABLET ORAL at 21:27

## 2018-03-27 RX ADMIN — Medication 1 MG: at 21:27

## 2018-03-27 RX ADMIN — ACETAMINOPHEN 650 MG: 325 TABLET ORAL at 18:48

## 2018-03-27 RX ADMIN — BUSPIRONE HYDROCHLORIDE 15 MG: 5 TABLET ORAL at 21:27

## 2018-03-27 ASSESSMENT — PAIN DESCRIPTION - DESCRIPTORS: DESCRIPTORS: DULL;DISCOMFORT

## 2018-03-27 NOTE — PROGRESS NOTES
S-(situation): Patient registered to Observation. Patient arrived to room 271 via cart from ED    B-(background): GI bleed; anemia; hgb 6.2     A-(assessment): VSS on room air.  Pt denies dizziness, lightheadedness, SOB.  Denies pain.  Blood product transfusing upon arrival    R-(recommendations): Orders and observation goals reviewed with patient    Nursing Observation criteria listed below was met:    Skin issues/needs documented:Yes  Isolation needs addressed, if appropriate: NA  Fall Prevention: Education given and documented: Yes  Education Assessment documented:Yes  Education Documented (Pre-existing chronic infection such as, MRSA/VRE need education on admission): Yes  New medication patient education completed and documented (Possible Side Effects of Common Medications handout): Yes  Home medications if not able to send immediately home with family stored here: yes; omeprazole, melatonin, tylenol  Reminder note placed in discharge instructions: yes  Patient has discharge needs (If yes, please explain): No

## 2018-03-27 NOTE — IP AVS SNAPSHOT
[address] [facility]    [address] [place] [address]    Phone:  [phone]                                       After Visit Summary   3/27/2018    Monalisa Olguin    MRN: [id number]           After Visit Summary Signature Page     I have received my discharge instructions, and my questions have been answered. I have discussed any challenges I see with this plan with the nurse or doctor.    ..........................................................................................................................................  Patient/Patient Representative Signature      ..........................................................................................................................................  Patient Representative Print Name and Relationship to Patient    ..................................................               ................................................  Date                                            Time    ..........................................................................................................................................  Reviewed by Signature/Title    ...................................................              ..............................................  Date                                                            Time

## 2018-03-27 NOTE — TELEPHONE ENCOUNTER
I got a phone call from Dr. Jose Miguel SANCHEZ stating that Heydi's CBC was significantly abnormal with a hemoglobin of 6.0 and platelets of 91,000 with a white count of 3.1.  He thought that she should come into the emergency department to be evaluated and admitted for blood transfusions to correct her lab abnormalities.  I told him that I would contact the patient and see if she would come in to the ED.  I tried calling her phone at home and there was no answer the left a message for her to call the clinic.  They did contact her  to him at work and related to him the lab abnormalities and the importance of getting her to the hospital for an evaluation.  He did state that she has been doing well up until last Friday when he came home and she had been drinking.  She has chronic alcoholic liver disease.  He does not think she has been drinking as heavily as she had been prior to quitting.    Daniela stated that he would try to get a hold of her or go home and bring her in to the emergency department for evaluation.  I did contact the charge nurse Camryn in the ED and told her that they should be expecting this patient's arrival sometime this afternoon.    Electronically signed by:  Efren Bustos M.D.  3/27/2018

## 2018-03-27 NOTE — ED NOTES
ED Nursing criteria listed below was addressed during verbal handoff:     Abnormal vitals: No  Abnormal results: Yes  Med Reconciliation completed: Yes  Meds given in ED: Yes  Any Overdue Meds: No  Core Measures: N/A  Isolation: N/A  Special needs: No  Skin assessment: N/A    Observation Patient  Education provided: Yes

## 2018-03-27 NOTE — TELEPHONE ENCOUNTER
Reason for Call:  Request for results:    Name of test or procedure: Labs    Date of test of procedure: 03/21    Location of the test or procedure: [facility] to leave the result message on voice mail or with a family member? YES    Phone number Patient can be reached at:  Home number on file [phone] (home)    Additional comments: Pt states she was told by Dr Moody office that her Hemoglobin is very low and to f/u with her PCP.  Monalisa made an appt for her physical with Dr Bustos in June but was told this might be to long to wait to deal with her hemoglobin.  Please call and advise.    Call taken on 3/27/2018 at 9:11 AM by Joseline Hall

## 2018-03-27 NOTE — ED PROVIDER NOTES
[facility] ED Provider Note   CC:     Chief Complaint   Patient presents with     Abnormal Labs     Alcohol Intoxication     HPI:  Monalisa Olguin is a 52 year old female who presented to the emergency department with abnormal lab tests that were drawn last Thursday.  Patient had a drop in her hemoglobin to 6.0.  Her previous hemoglobin level was 7.9 on December 20, and prior to that, her hemoglobin was 8.0.  Patient denies any recent bleeding except for some intermittent mild nosebleeds.  Patient states that she does not have any current abdominal pain today.  She has pain about every other day.  She has a history of alcohol dependence, and has been court ordered to remain sober.  She had been sober up until about 10 days ago and started drinking again.  She is drinking heavily on the weekends.  She is accompanied by her  who states that she started drinking bourbon again, and thinks that she drank more in the time that he received a phone call to the time that they brought her in.  Patient appears to be more intoxicated now.  She denies any significant nosebleed today, vomiting of any blood, melena or bloody stools.  She had a brownish colored bowel movement today.  Patient has a history of DWI, from 2014, and has had prolonged court ordered testing due to the fact that she broke her probation conditions.  Patient has a history of cirrhosis related to alcohol abuse.  Patient had a upper endoscopy in December 2017 showing Arevalo's esophagus.    Problem List:  Patient Active Problem List    Diagnosis     Alcoholic hepatitis with ascites     Anemia     Thrombocytopenia (H)     Tobacco abuse     Portal hypertension (H)     Pulmonary nodules     Hyperthyroidism     Alcoholism in recovery (H)     Anxiety     Hypertension goal BP (blood pressure) < 130/80     HYPERLIPIDEMIA LDL GOAL <100     Moderate Depression [296.32]     Esophageal reflux      Kidney donor       MEDS:   Current Discharge Medication List      CONTINUE these medications which have NOT CHANGED    Details   alendronate (FOSAMAX) 35 MG tablet Take 1 tablet (35 mg) by mouth with 8oz water every Monday (once every 7 days) 30 minutes before breakfast and remain upright during this time.  Qty: 12 tablet, Refills: 3    Associated Diagnoses: Osteopenia, unspecified location      FLUoxetine (PROZAC) 20 MG capsule Take 3 capsules (60 mg) by mouth daily  Qty: 180 capsule, Refills: 3    Associated Diagnoses: Anxiety; Major depressive disorder, recurrent episode, moderate (H)      temazepam (RESTORIL) 15 MG capsule       sertraline (ZOLOFT) 100 MG tablet       phosphorus tablet 250 mg (VIRT-PHOS 250 NEUTRAL) 250 MG per tablet TAKE TWO TABLETS BY MOUTH TWICE A DAY  Qty: 120 tablet, Refills: 3    Associated Diagnoses: Hypophosphatemia      propranolol (INDERAL) 10 MG tablet Take 1 tablet (10 mg) by mouth 2 times daily  Qty: 180 tablet, Refills: 3    Associated Diagnoses: Hypertension goal BP (blood pressure) < 130/80      TL RICARDO RX 2.2-25-1 MG TABS TAKE ONE TABLET BY MOUTH EVERY DAY  Qty: 90 tablet, Refills: 3    Associated Diagnoses: Alcoholism in recovery (H)      VITAMIN B-1 100 MG tablet TAKE ONE TABLET BY MOUTH EVERY DAY  Qty: 90 tablet, Refills: 3    Associated Diagnoses: Alcohol dependence in remission (H)      busPIRone (BUSPAR) 15 MG tablet Take 1 tablet (15 mg) by mouth 2 times daily  Qty: 180 tablet, Refills: 3    Associated Diagnoses: MONA (generalized anxiety disorder)      buPROPion (WELLBUTRIN XL) 300 MG 24 hr tablet Take 1 tablet (300 mg) by mouth every morning  Qty: 90 tablet, Refills: 3    Associated Diagnoses: Major depressive disorder, recurrent episode, moderate (H)      omeprazole (PRILOSEC) 20 MG CR capsule Take 1 capsule (20 mg) by mouth daily  Qty: 90 capsule, Refills: 3    Associated Diagnoses: Gastroesophageal reflux disease with esophagitis      Naproxen Sodium (ALEVE PO)  "Take 220 mg by mouth 2 times daily as needed for moderate pain             ALLERGIES:    Allergies   Allergen Reactions     Albuterol      Tongue \"hardened and painful\"       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     COLONOSCOPY N/A 2017    Procedure: COLONOSCOPY;  Colonoscopy;  Surgeon: Sai Johnston MD;  Location:  GI     ESOPHAGOSCOPY, GASTROSCOPY, DUODENOSCOPY (EGD), COMBINED N/A 2017    Procedure: COMBINED ESOPHAGOSCOPY, GASTROSCOPY, DUODENOSCOPY (EGD), BIOPSY SINGLE OR MULTIPLE;  ESOPHAGOSCOPY, GASTROSCOPY, DUODENOSCOPY (EGD) with biopsies;  Surgeon: Sai Johnston MD;  Location: [facility]     HC KNEE SCOPE, DIAGNOSTIC  01    Arthroscopy, Lt Knee     LAPAROSCOPIC CHOLECYSTECTOMY N/A 2017    Procedure: LAPAROSCOPIC CHOLECYSTECTOMY;  laparoscopic cholecystectomy;  Surgeon: Romeo Pastor MD;  Location: [facility] OR     OPEN REDUCTION INTERNAL FIXATION WRIST Right 2017    Procedure: OPEN REDUCTION INTERNAL FIXATION WRIST;  OPEN REDUCTION INTERNAL FIXATION RIGHT WRIST;  Surgeon: Edgardo Almendarez MD;  Location: [facility] OR       Social History   Substance Use Topics     Smoking status: Current Every Day Smoker     Packs/day: 0.25     Years: 10.00     Smokeless tobacco: Never Used      Comment: pt quit  after smoking for 8 yrs, started again in      Alcohol use 0.0 oz/week     0 Standard drinks or equivalent per week      Comment: started drinking 2 weeks ago         Review of Systems   Except as noted in HPI, all other systems were reviewed and are negative    Physical Exam     Vitals were reviewed  Patient Vitals for the past 8 hrs:   BP Temp Temp src Pulse Heart Rate Resp SpO2 Height Weight   18 1940 99/53 97.1  F (36.2  C) - - 87 14 96 % - -   18 1818 122/66 97.8  F (36.6  C) Oral - 84 14 97 % 1.549 m (5' 1\") 58.7 kg (129 lb 6.4 " "oz)   03/27/18 1730 119/78 97.2  F (36.2  C) Oral 82 82 - 99 % - -   03/27/18 1700 114/69 97.9  F (36.6  C) Oral 91 91 16 98 % - -   03/27/18 1645 109/72 98.2  F (36.8  C) Oral 87 87 16 96 % - -   03/27/18 1556 - - - - - - 96 % - -   03/27/18 1554 113/66 - - - - - - - -   03/27/18 1450 121/71 98.4  F (36.9  C) Oral 94 - 15 98 % 1.549 m (5' 1\") 59 kg (130 lb)     GENERAL APPEARANCE: Alert, slurred speech  FACE: normal facies  EYES: Pupils are equal; sclera is icteric  HENT: normal external exam  NECK: no adenopathy or asymmetry  RESP: normal respiratory effort; clear breath sounds bilaterally  CV: regular rate and rhythm; no significant murmurs, gallops or rubs  ABD: soft, abdominal distention; mild epigastric tenderness; no rebound or guarding; bowel sounds are normal  MS: no gross deformities noted; normal muscle tone.  EXT: No calf tenderness or pitting edema  SKIN: no worrisome rash  NEURO: no facial droop; no focal deficits, speech is slurred  PSYCH: normal mood and affect      Available Lab/Imaging Results     Results for orders placed or performed during the hospital encounter of 03/27/18 (from the past 24 hour(s))   CBC with platelets differential   Result Value Ref Range    WBC 5.6 4.0 - 11.0 10e9/L    RBC Count 2.67 (L) 3.8 - 5.2 10e12/L    Hemoglobin 6.2 (LL) 11.7 - 15.7 g/dL    Hematocrit 22.1 (L) 35.0 - 47.0 %    MCV 83 78 - 100 fl    MCH 23.2 (L) 26.5 - 33.0 pg    MCHC 28.1 (L) 31.5 - 36.5 g/dL    RDW 18.6 (H) 10.0 - 15.0 %    Platelet Count 126 (L) 150 - 450 10e9/L    Diff Method Automated Method     % Neutrophils 47.3 %    % Lymphocytes 44.4 %    % Monocytes 7.7 %    % Eosinophils 0.0 %    % Basophils 0.4 %    % Immature Granulocytes 0.2 %    Absolute Neutrophil 2.6 1.6 - 8.3 10e9/L    Absolute Lymphocytes 2.5 0.8 - 5.3 10e9/L    Absolute Monocytes 0.4 0.0 - 1.3 10e9/L    Absolute Eosinophils 0.0 0.0 - 0.7 10e9/L    Absolute Basophils 0.0 0.0 - 0.2 10e9/L    Abs Immature Granulocytes 0.0 0 - 0.4 " 10e9/L   ABO/Rh type and screen   Result Value Ref Range    Units Ordered 2     ABO O     RH(D) Pos     Antibody Screen Neg     Test Valid Only At [facility]        Specimen Expires 03/30/2018     Crossmatch Red Blood Cells    Comprehensive metabolic panel   Result Value Ref Range    Sodium 136 133 - 144 mmol/L    Potassium 3.5 3.4 - 5.3 mmol/L    Chloride 104 94 - 109 mmol/L    Carbon Dioxide 22 20 - 32 mmol/L    Anion Gap 10 3 - 14 mmol/L    Glucose 90 70 - 99 mg/dL    Urea Nitrogen 6 (L) 7 - 30 mg/dL    Creatinine 0.69 0.52 - 1.04 mg/dL    GFR Estimate 90 >60 mL/min/1.7m2    GFR Estimate If Black >90 >60 mL/min/1.7m2    Calcium 7.5 (L) 8.5 - 10.1 mg/dL    Bilirubin Total 1.2 0.2 - 1.3 mg/dL    Albumin 3.2 (L) 3.4 - 5.0 g/dL    Protein Total 6.0 (L) 6.8 - 8.8 g/dL    Alkaline Phosphatase 248 (H) 40 - 150 U/L    ALT 20 0 - 50 U/L    AST 46 (H) 0 - 45 U/L   Ammonia   Result Value Ref Range    Ammonia 35 10 - 50 umol/L   Alcohol level blood   Result Value Ref Range    Ethanol g/dL 0.23 (H) <0.01 g/dL   Blood component   Result Value Ref Range    Unit Number [id number]     Blood Component Type Red Blood Cells Leukocyte Reduced     Division Number 00     Status of Unit Released to care unit 03/27/2018 1642     Blood Product Code [id number]     Unit Status ISS    Blood component   Result Value Ref Range    Unit Number [id number]     Blood Component Type Red Blood Cells Leukocyte Reduced     Division Number 00     Status of Unit Ready for patient 03/27/2018 1628     Blood Product Code [id number]     Unit Status LUIS    Occult blood stool   Result Value Ref Range    Occult Blood Positive (A) NEG^Negative         Impression     Final diagnoses:   Anemia due to blood loss, acute   Alcoholic gastritis with hemorrhage       ED Course & Medical Decision Making   Monalisa Olguin is a 52 year old female who presented to the emergency department with low hemoglobin after her blood was drawn last Thursday.   Patient was notified on Friday that she should come to the emergency room, but she refused.  She states that she did not want to pay the money to come to the emergency department but was willing to follow-up in the clinic.  She has been binge drinking on the weekends over the last 2 weekends.  Dr. Bustos was able to get a hold of the patient's  today and convinced him to bring the patient into the emergency department.  Patient denied any active bleeding except for intermittent nosebleed last week.  She had bumped her head and her nose and had some intermittent bleeding last Sunday, 8 days ago.  She states that she has not had any melena or hematochezia and there is been no hematemesis.  Patient had been drinking up until the time she came to the ED, the  states that she seems more slurred and intoxicated then when he 1st picked her up.  Patient was seen shortly after arrival.  Vital signs were stable with blood pressure 121/71, heart rate of 94, temperature 98.4.  Patient has known cirrhosis and has mild abdominal distention and tenderness in the epigastric and right upper quadrant.  She has no rebound tenderness.  Her rectal exam reveals light brown stool but is positive for blood.  The patient's hemoglobin was 6.0 last Thursday and 6.2 today.  Patient has a stable GI bleed.  She was given Protonix 80 mg IV, followed by 8 mg per hour drip.  Patient also has acute alcohol intoxication.  Patient will receive 2 units of packed red blood cells today.  I discussed the case with Dr. Cummings who has accepted care for the patient.  Observation orders have been written.  We will repeat a CBC in the morning.  Patient needs to continue with sobriety.          Current Discharge Medication List            This note was completed in part using Dragon voice recognition, and may contain word and grammatical errors.        Vianca Schultz MD  03/27/18 2003

## 2018-03-27 NOTE — TELEPHONE ENCOUNTER
Heydi needs to get into see the hematologist to figure out why her hemoglobin continues to drop.  She may need transfusions but I would like her to see the hematologist first so that we do not mess anything up by giving her blood products.  I did order a few other labs that need to be drawn too.  we need her seen ASAP.    Electronically signed by:  Efren Bustos M.D.  3/27/2018

## 2018-03-27 NOTE — IP AVS SNAPSHOT
MRN:[id number]                      After Visit Summary   3/27/2018    Monalisa Olguin    MRN: [id number]           Thank you!     Thank you for choosing [facility] for your care. Our goal is always to provide you with excellent care. Hearing back from our patients is one way we can continue to improve our services. Please take a few minutes to complete the written survey that you may receive in the mail after you visit with us. Thank you!        Patient Information     Date Of Birth          1965        About your hospital stay     You were admitted on:  March 27, 2018 You last received care in the:  [address] [facility]    You were discharged on:  March 28, 2018       Who to Call     For medical emergencies, please call 911.  For non-urgent questions about your medical care, please call your primary care provider or clinic, [phone]          Attending Provider     Provider Specialty    Vianca Schultz MD Emergency Medicine    Brandon Cummings MD Family Practice       Primary Care Provider Office Phone # Fax #    Efren Bustos [phone]      After Care Instructions     Activity       Your activity upon discharge: activity as tolerated            Diet       Follow this diet upon discharge: Orders Placed This Encounter      Regular Diet Adult                  Follow-up Appointments     Follow-up and recommended labs and tests        Follow up with primary care provider, Efren Bustos MD, within 2 weeks, for hospital follow- up. The following labs/tests are recommended: hemoglobin, consider iron studies. Follow up with nose specialist for evaluation and treatment of nosebleeds.                  Your next 10 appointments already scheduled     Mar 28, 2018  1:45 PM CDT   LAB with NL LAB [facility] ([facility])    [address] [place] [id number]   [phone]           Please do not eat  10-12 hours before your appointment if you are coming in fasting for labs on lipids, cholesterol, or glucose (sugar). This does not apply to pregnant women. Water, hot tea and black coffee (with nothing added) are okay. Do not drink other fluids, diet soda or chew gum.            Apr 03, 2018 10:30 AM CDT   New Visit with Marc Sullivan MD   [facility] ([address] [address]   [phone]            Apr 11, 2018 11:15 AM CDT   Office Visit with Efren Bustos MD   [facility] ([facility]    [address] [place] [id number] [phone]           Bring a current list of meds and any records pertaining to this visit. For Physicals, please bring immunization records and any forms needing to be filled out. Please arrive 10 minutes early to complete paperwork.            Jun 05, 2018  1:20 PM CDT   PHYSICAL with Efren Bustos MD   [facility] ([facility])    [address] [place] [id number] [phone]            Jun 06, 2018  3:00 PM CDT   (Arrive by 2:45 PM)   Return General Liver with Edgardo Kovacs MD   [facility] ([facility] [place])    [address] [place] [address]  [address] [place] [id number] [phone]              Additional Services     OTOLARYNGOLOGY REFERRAL       Your provider has referred you to: [facility]: [facility] [phone]   [email or web address]    Please be aware that coverage of these services is subject to the terms and limitations of your health insurance plan.  Call member services at your health plan with any benefit or coverage questions.      Please bring the following with you to your appointment:    (1) Any X-Rays, CTs or MRIs which have been performed.  Contact the facility where they were done to arrange for  prior to your  "scheduled appointment.   (2) List of current medications  (3) This referral request   (4) Any documents/labs given to you for this referral                  Pending Results     Date and Time Order Name Status Description    3/27/2018 2030 Red blood cell prepare order unit In process             Statement of Approval     Ordered          03/28/18 0944  I have reviewed and agree with all the recommendations and orders detailed in this document.  EFFECTIVE NOW     Approved and electronically signed by:  Mayur Moncada MD             Admission Information     Date & Time Provider Department Dept. Phone    3/27/2018 Brandon Cummings MD [address] [facility] [phone]      Your Vitals Were     Blood Pressure Pulse Temperature Respirations Height Weight    112/67 (BP Location: Left arm) 82 98.3  F (36.8  C) (Oral) 16 1.549 m (5' 1\") 58.7 kg (129 lb 6.4 oz)    Last Period Pulse Oximetry BMI (Body Mass Index)             05/16/2012 94% 24.45 kg/m2         [employer] Information     [employer] gives you secure access to your electronic health record. If you see a primary care provider, you can also send messages to your care team and make appointments. If you have questions, please call your primary care clinic.  If you do not have a primary care provider, please call [phone] and they will assist you.        Care EveryWhere ID     This is your Care EveryWhere ID. This could be used by other organizations to access your [facility] medical records  [id number]        Equal Access to Services     ADRIENNE GILLIAM : Hadii aad ku hadasho Sokeithali, waaxda luqadaha, qaybta kaalmada adeegyada, salo jara. So [facility] [phone].    ATENCIÓN: Si habla español, tiene a perez disposición servicios gratuitos de asistencia lingüística. Llame al [phone].    We comply with applicable federal civil rights laws and [place] laws. We do not discriminate on the basis of race, color, national " origin, age, disability, sex, sexual orientation, or gender identity.               Review of your medicines      START taking        Dose / Directions    acetaminophen 325 MG tablet   Commonly known as:  TYLENOL        Dose:  650 mg   Take 2 tablets (650 mg) by mouth every 4 hours as needed for mild pain   Quantity:  100 tablet   Refills:  0         CONTINUE these medicines which may have CHANGED, or have new prescriptions. If we are uncertain of the size of tablets/capsules you have at home, strength may be listed as something that might have changed.        Dose / Directions    sertraline 100 MG tablet   Commonly known as:  ZOLOFT   This may have changed:  additional instructions        Do not restart this medication until confirming with [facility] whether you should be combining this medication with fluoxetine   Quantity:  30 tablet   Refills:  0       temazepam 15 MG capsule   Commonly known as:  RESTORIL   This may have changed:    - how much to take  - how to take this  - when to take this  - reasons to take this        Dose:  15 mg   Take 1 capsule (15 mg) by mouth nightly as needed for sleep   Quantity:  30 capsule   Refills:  0         CONTINUE these medicines which have NOT CHANGED        Dose / Directions    alendronate 35 MG tablet   Commonly known as:  FOSAMAX   Used for:  Osteopenia, unspecified location        Take 1 tablet (35 mg) by mouth with 8oz water every Monday (once every 7 days) 30 minutes before breakfast and remain upright during this time.   Quantity:  12 tablet   Refills:  3       buPROPion 300 MG 24 hr tablet   Commonly known as:  WELLBUTRIN XL   Used for:  Major depressive disorder, recurrent episode, moderate (H)        Dose:  300 mg   Take 1 tablet (300 mg) by mouth every morning   Quantity:  90 tablet   Refills:  3       busPIRone 15 MG tablet   Commonly known as:  BUSPAR   Used for:  MONA (generalized anxiety disorder)        Dose:  15 mg   Take 1 tablet (15 mg) by mouth  2 times daily   Quantity:  180 tablet   Refills:  3       FLUoxetine 20 MG capsule   Commonly known as:  PROzac   Used for:  Anxiety, Major depressive disorder, recurrent episode, moderate (H)        Dose:  60 mg   Take 3 capsules (60 mg) by mouth daily   Quantity:  180 capsule   Refills:  3       omeprazole 20 MG CR capsule   Commonly known as:  priLOSEC   Used for:  Gastroesophageal reflux disease with esophagitis        Dose:  20 mg   Take 1 capsule (20 mg) by mouth daily   Quantity:  90 capsule   Refills:  3       phosphorus tablet 250 mg 250 MG per tablet   Commonly known as:  VIRT-PHOS 250 NEUTRAL   Used for:  Hypophosphatemia        TAKE TWO TABLETS BY MOUTH TWICE A DAY   Quantity:  120 tablet   Refills:  3       propranolol 10 MG tablet   Commonly known as:  INDERAL   Used for:  Hypertension goal BP (blood pressure) < 130/80        Dose:  10 mg   Take 1 tablet (10 mg) by mouth 2 times daily   Quantity:  180 tablet   Refills:  3       thiamine 100 MG tablet   Used for:  Alcohol dependence in remission (H)        TAKE ONE TABLET BY MOUTH EVERY DAY   Quantity:  90 tablet   Refills:  3       TL RICARDO RX 2.2-25-1 MG Tabs   Used for:  Alcoholism in recovery (H)   Generic drug:  Folic Acid-Vit B6-Vit B12        TAKE ONE TABLET BY MOUTH EVERY DAY   Quantity:  90 tablet   Refills:  3         STOP taking     ALEVE PO                Where to get your medicines      Some of these will need a paper prescription and others can be bought over the counter. Ask your nurse if you have questions.     You don't need a prescription for these medications     acetaminophen 325 MG tablet                Protect others around you: Learn how to safely use, store and throw away your medicines at www.disposemymeds.org.             Medication List: This is a list of all your medications and when to take them. Check marks below indicate your daily home schedule. Keep this list as a reference.      Medications           Morning Afternoon  Evening Bedtime As Needed    acetaminophen 325 MG tablet   Commonly known as:  TYLENOL   Take 2 tablets (650 mg) by mouth every 4 hours as needed for mild pain   Last time this was given:  650 mg on 3/28/2018  9:57 AM                                   alendronate 35 MG tablet   Commonly known as:  FOSAMAX   Take 1 tablet (35 mg) by mouth with 8oz water every Monday (once every 7 days) 30 minutes before breakfast and remain upright during this time.                                   buPROPion 300 MG 24 hr tablet   Commonly known as:  WELLBUTRIN XL   Take 1 tablet (300 mg) by mouth every morning   Last time this was given:  300 mg on 3/28/2018  9:11 AM                                   busPIRone 15 MG tablet   Commonly known as:  BUSPAR   Take 1 tablet (15 mg) by mouth 2 times daily   Last time this was given:  15 mg on 3/28/2018  9:11 AM                                      FLUoxetine 20 MG capsule   Commonly known as:  PROzac   Take 3 capsules (60 mg) by mouth daily   Last time this was given:  60 mg on 3/28/2018  9:11 AM                                   omeprazole 20 MG CR capsule   Commonly known as:  priLOSEC   Take 1 capsule (20 mg) by mouth daily                                   phosphorus tablet 250 mg 250 MG per tablet   Commonly known as:  VIRT-PHOS 250 NEUTRAL   TAKE TWO TABLETS BY MOUTH TWICE A DAY                                      propranolol 10 MG tablet   Commonly known as:  INDERAL   Take 1 tablet (10 mg) by mouth 2 times daily   Last time this was given:  10 mg on 3/28/2018  9:11 AM                                      sertraline 100 MG tablet   Commonly known as:  ZOLOFT   Do not restart this medication until confirming with [facility] whether you should be combining this medication with fluoxetine                                   temazepam 15 MG capsule   Commonly known as:  RESTORIL   Take 1 capsule (15 mg) by mouth nightly as needed for sleep                                    thiamine 100 MG tablet   TAKE ONE TABLET BY MOUTH EVERY DAY   Last time this was given:  100 mg on 3/28/2018  9:11 AM                                   TL RICARDO RX 2.2-25-1 MG Tabs   TAKE ONE TABLET BY MOUTH EVERY DAY   Generic drug:  Folic Acid-Vit B6-Vit B12

## 2018-03-27 NOTE — PROGRESS NOTES
Return home medications to patient at discharge. Medications are currently stored in patient's medication bin

## 2018-03-27 NOTE — ED NOTES
Has been drinking through the weekend from Thursday-Saturday and then started again today.  Drinking C[employer]am throughout day today.  Had blood work last Thursday clinic called Friday wanting her to come in for low Hgb but she didn't want to come in and clinic called with no answer today then called spouse at work.  Feeling tired but states it is not unusual

## 2018-03-28 VITALS
BODY MASS INDEX: 24.43 KG/M2 | OXYGEN SATURATION: 94 % | RESPIRATION RATE: 16 BRPM | HEART RATE: 82 BPM | SYSTOLIC BLOOD PRESSURE: 112 MMHG | TEMPERATURE: 98.3 F | HEIGHT: 61 IN | WEIGHT: 129.4 LBS | DIASTOLIC BLOOD PRESSURE: 67 MMHG

## 2018-03-28 PROBLEM — K70.30 ALCOHOLIC CIRRHOSIS OF LIVER WITHOUT ASCITES (H): Status: ACTIVE | Noted: 2018-03-28

## 2018-03-28 PROBLEM — D50.0 CHRONIC BLOOD LOSS ANEMIA: Status: ACTIVE | Noted: 2017-03-26

## 2018-03-28 PROBLEM — R19.5 HEME POSITIVE STOOL: Status: ACTIVE | Noted: 2018-03-27

## 2018-03-28 PROBLEM — R04.0 EPISTAXIS: Status: ACTIVE | Noted: 2018-03-28

## 2018-03-28 PROBLEM — R16.1 SPLENOMEGALY: Status: ACTIVE | Noted: 2018-03-28

## 2018-03-28 LAB
ERYTHROCYTE [DISTWIDTH] IN BLOOD BY AUTOMATED COUNT: 17.8 % (ref 10–15)
HCT VFR BLD AUTO: 26 % (ref 35–47)
HGB BLD-MCNC: 7.7 G/DL (ref 11.7–15.7)
HGB BLD-MCNC: 7.9 G/DL (ref 11.7–15.7)
HGB BLD-MCNC: 8.1 G/DL (ref 11.7–15.7)
MCH RBC QN AUTO: 25.6 PG (ref 26.5–33)
MCHC RBC AUTO-ENTMCNC: 30.4 G/DL (ref 31.5–36.5)
MCV RBC AUTO: 84 FL (ref 78–100)
PLATELET # BLD AUTO: 97 10E9/L (ref 150–450)
RBC # BLD AUTO: 3.09 10E12/L (ref 3.8–5.2)
WBC # BLD AUTO: 4.2 10E9/L (ref 4–11)

## 2018-03-28 PROCEDURE — 36415 COLL VENOUS BLD VENIPUNCTURE: CPT | Performed by: FAMILY MEDICINE

## 2018-03-28 PROCEDURE — 25000128 H RX IP 250 OP 636: Performed by: FAMILY MEDICINE

## 2018-03-28 PROCEDURE — 85018 HEMOGLOBIN: CPT | Performed by: FAMILY MEDICINE

## 2018-03-28 PROCEDURE — 25000132 ZZH RX MED GY IP 250 OP 250 PS 637: Performed by: FAMILY MEDICINE

## 2018-03-28 PROCEDURE — 85027 COMPLETE CBC AUTOMATED: CPT | Performed by: FAMILY MEDICINE

## 2018-03-28 PROCEDURE — 85018 HEMOGLOBIN: CPT | Mod: 91 | Performed by: PEDIATRICS

## 2018-03-28 PROCEDURE — 99217 ZZC OBSERVATION CARE DISCHARGE: CPT | Performed by: PEDIATRICS

## 2018-03-28 PROCEDURE — 25000125 ZZHC RX 250: Performed by: FAMILY MEDICINE

## 2018-03-28 PROCEDURE — 36415 COLL VENOUS BLD VENIPUNCTURE: CPT | Performed by: PEDIATRICS

## 2018-03-28 PROCEDURE — 96376 TX/PRO/DX INJ SAME DRUG ADON: CPT

## 2018-03-28 PROCEDURE — G0378 HOSPITAL OBSERVATION PER HR: HCPCS

## 2018-03-28 RX ORDER — SERTRALINE HYDROCHLORIDE 100 MG/1
TABLET, FILM COATED ORAL
Qty: 30 TABLET | COMMUNITY
Start: 2018-03-28 | End: 2018-04-11 | Stop reason: ALTCHOICE

## 2018-03-28 RX ORDER — ACETAMINOPHEN 325 MG/1
650 TABLET ORAL EVERY 4 HOURS PRN
Qty: 100 TABLET | Status: ON HOLD | COMMUNITY
Start: 2018-03-28 | End: 2019-01-01

## 2018-03-28 RX ORDER — TEMAZEPAM 15 MG/1
15 CAPSULE ORAL
Qty: 30 CAPSULE | COMMUNITY
Start: 2018-03-28 | End: 2018-06-05

## 2018-03-28 RX ADMIN — MULTIPLE VITAMINS W/ MINERALS TAB 1 TABLET: TAB at 09:11

## 2018-03-28 RX ADMIN — ACETAMINOPHEN 650 MG: 325 TABLET ORAL at 09:57

## 2018-03-28 RX ADMIN — BUSPIRONE HYDROCHLORIDE 15 MG: 5 TABLET ORAL at 09:11

## 2018-03-28 RX ADMIN — SODIUM CHLORIDE 8 MG/HR: 9 INJECTION, SOLUTION INTRAVENOUS at 02:45

## 2018-03-28 RX ADMIN — Medication 100 MG: at 09:11

## 2018-03-28 RX ADMIN — FLUOXETINE 60 MG: 20 CAPSULE ORAL at 09:11

## 2018-03-28 RX ADMIN — PROPRANOLOL HYDROCHLORIDE 10 MG: 10 TABLET ORAL at 09:11

## 2018-03-28 RX ADMIN — BUPROPION HYDROCHLORIDE 300 MG: 150 TABLET, FILM COATED, EXTENDED RELEASE ORAL at 09:11

## 2018-03-28 NOTE — PLAN OF CARE
"Problem: Patient Care Overview  Goal: Plan of Care/Patient Progress Review  Outcome: Improving  S-(situation): Patient discharged to home via self with     B-(background): Observation goals met YES    A-(assessment): VSS, afebrile.  Denies pain.  No signs or symptoms of bleeding.  Patient reports feeling \"good\".      R-(recommendations): Discharge instructions reviewed with patient. Listed belongings gathered and returned to patient.   Patient Education resolved: Yes  New medications-Pt. Has been educated about reason of use and side effects Yes  Home and hospital acquired medications returned to patient NA  Medication Bin checked and emptied on discharge Yes            "

## 2018-03-28 NOTE — PLAN OF CARE
Problem: Patient Care Overview  Goal: Plan of Care/Patient Progress Review  S-(situation): shift note    B-(background): GI bleed    A-(assessment): Pt is A&O.  VSS.  Afebrile.  No stools noted this shift.  Hgb up to 7.7.  No dizziness noted when up.  Denies discomfort.  Up with stand by assist of 1.      R-(recommendations): Will cont to monitor the above.

## 2018-03-28 NOTE — DISCHARGE SUMMARY
[facility] Observation Discharge Summary    Monalisa Olguin MRN# [id number]   Age: 52 year old YOB: 1965     Date of Observation:  3/27/2018  Date of Discharge:  3/28/2018   Initial Physician:  Brandon Cummings MD  Discharge Physician:  Mayur Moncada MD     Home clinic: [facility]  Primary care provider: Efren Bustos          Admission Diagnoses:   Anemia due to blood loss, acute [D62]  Alcoholic gastritis with hemorrhage, unspecified chronicity [K29.21]          Discharge Diagnoses:   Principal diagnosis: Chronic blood loss anemia    Secondary diagnoses:    Chronic blood loss anemia    Thrombocytopenia (H)    Portal hypertension (H)    Heme positive stool    Alcoholic cirrhosis of liver without ascites (H) - per Hepatology consult Nov 2017    Epistaxis    Moderate Depression [296.32]    Tobacco abuse    Acute alcoholic intoxication in alcoholism (H)    Splenomegaly    * No resolved hospital problems. *            Procedures:   No procedures performed during this hospital stay           Discharge Medications:     Outpatient Prescriptions Marked as Taking for the 3/27/18 encounter (Hospital Encounter)   Medication Sig     acetaminophen (TYLENOL) 325 MG tablet Take 2 tablets (650 mg) by mouth every 4 hours as needed for mild pain     sertraline (ZOLOFT) 100 MG tablet Do not restart this medication until confirming with [facility] whether you should be combining this medication with fluoxetine     temazepam (RESTORIL) 15 MG capsule Take 1 capsule (15 mg) by mouth nightly as needed for sleep     alendronate (FOSAMAX) 35 MG tablet Take 1 tablet (35 mg) by mouth with 8oz water every Monday (once every 7 days) 30 minutes before breakfast and remain upright during this time.     FLUoxetine (PROZAC) 20 MG capsule Take 3 capsules (60 mg) by mouth daily     phosphorus tablet 250 mg (VIRT-PHOS 250 NEUTRAL) 250 MG per tablet TAKE TWO TABLETS BY MOUTH TWICE A DAY      propranolol (INDERAL) 10 MG tablet Take 1 tablet (10 mg) by mouth 2 times daily     TL RICARDO RX 2.2-25-1 MG TABS TAKE ONE TABLET BY MOUTH EVERY DAY     VITAMIN B-1 100 MG tablet TAKE ONE TABLET BY MOUTH EVERY DAY     busPIRone (BUSPAR) 15 MG tablet Take 1 tablet (15 mg) by mouth 2 times daily     buPROPion (WELLBUTRIN XL) 300 MG 24 hr tablet Take 1 tablet (300 mg) by mouth every morning     omeprazole (PRILOSEC) 20 MG CR capsule Take 1 capsule (20 mg) by mouth daily       Current Discharge Medication List      START taking these medications    Details   acetaminophen (TYLENOL) 325 MG tablet Take 2 tablets (650 mg) by mouth every 4 hours as needed for mild pain  Qty: 100 tablet    Associated Diagnoses: Alcoholic cirrhosis of liver without ascites (H)         CONTINUE these medications which have CHANGED    Details   sertraline (ZOLOFT) 100 MG tablet Do not restart this medication until confirming with [facility] whether you should be combining this medication with fluoxetine  Qty: 30 tablet      temazepam (RESTORIL) 15 MG capsule Take 1 capsule (15 mg) by mouth nightly as needed for sleep  Qty: 30 capsule         CONTINUE these medications which have NOT CHANGED    Details   alendronate (FOSAMAX) 35 MG tablet Take 1 tablet (35 mg) by mouth with 8oz water every Monday (once every 7 days) 30 minutes before breakfast and remain upright during this time.  Qty: 12 tablet, Refills: 3    Associated Diagnoses: Osteopenia, unspecified location      FLUoxetine (PROZAC) 20 MG capsule Take 3 capsules (60 mg) by mouth daily  Qty: 180 capsule, Refills: 3    Associated Diagnoses: Anxiety; Major depressive disorder, recurrent episode, moderate (H)      phosphorus tablet 250 mg (VIRT-PHOS 250 NEUTRAL) 250 MG per tablet TAKE TWO TABLETS BY MOUTH TWICE A DAY  Qty: 120 tablet, Refills: 3    Associated Diagnoses: Hypophosphatemia      propranolol (INDERAL) 10 MG tablet Take 1 tablet (10 mg) by mouth 2 times daily  Qty: 180  tablet, Refills: 3    Associated Diagnoses: Hypertension goal BP (blood pressure) < 130/80      TL RICARDO RX 2.2-25-1 MG TABS TAKE ONE TABLET BY MOUTH EVERY DAY  Qty: 90 tablet, Refills: 3    Associated Diagnoses: Alcoholism in recovery (H)      VITAMIN B-1 100 MG tablet TAKE ONE TABLET BY MOUTH EVERY DAY  Qty: 90 tablet, Refills: 3    Associated Diagnoses: Alcohol dependence in remission (H)      busPIRone (BUSPAR) 15 MG tablet Take 1 tablet (15 mg) by mouth 2 times daily  Qty: 180 tablet, Refills: 3    Associated Diagnoses: MONA (generalized anxiety disorder)      buPROPion (WELLBUTRIN XL) 300 MG 24 hr tablet Take 1 tablet (300 mg) by mouth every morning  Qty: 90 tablet, Refills: 3    Associated Diagnoses: Major depressive disorder, recurrent episode, moderate (H)      omeprazole (PRILOSEC) 20 MG CR capsule Take 1 capsule (20 mg) by mouth daily  Qty: 90 capsule, Refills: 3    Associated Diagnoses: Gastroesophageal reflux disease with esophagitis         STOP taking these medications       Naproxen Sodium (ALEVE PO) Comments:   Reason for Stopping:                     Consultations:   No consultations were requested during this hospital stay          Brief History of Illness:   52 year old female patient with alcoholism, suspected alcoholic cirrhosis with portal hypertension and splenomegaly, and chronic mental health problems presented to the ER after she had been advised to go to the ER by her PCP because  her hemoglobin was found to be 6.  Due to concern for severe anemia and heme positive stool complicating her chronic liver problems, hospitalization for observation was advised. See ER note and history and physical for details.          Hospital Course:   Patient was observed in the hospital.  Initial hemoglobin was 6.2 and she was mildly thrombocytopenic.  Stool testing for occult blood was positive although she did not have grossly bloody stool.  During her hospital stay, she reported chronically recurring  frequent nosebleeds 1 of which had been particularly heavy recently.  She did not have active epistaxis during this hospital stay.  She was hemodynamically stable.  She was initially acutely intoxicated with alcohol.  She was transfused 2 units packed red blood cells.  Repeat hemoglobin subsequently trended upward.  She otherwise had an unremarkable clinical course during her hospital stay.  After investigation, chronic blood loss anemia and heme positive stool due to frequent epistaxis with swallowed blood was suspected.  GI bleeding was not suspected.  Outpatient follow-up with otolaryngology to evaluate cause for recurring nosebleeds was recommended.  Outpatient follow-up with her PCP for reevaluation of anemia including possible determination of iron studies was recommended.  Abstinence from use of alcohol was recommended.  Resources for chemical dependency evaluation and treatment were offered but declined by the patient.  During her hospital stay, it was noted that she appeared to be taking a moderate dose of sertraline and a moderate to high dose of fluoxetine.  It was unclear as to whether this was intentional.  She did not exhibit overt signs or symptoms of serotonin syndrome.  Nevertheless, because of risk of it, she was advised to not resume sertraline therapy until follow-up with her psychiatrist.    I evaluated this patient on the morning of discharge.  Vital signs were stable.  She appeared to be resting comfortably without signs of distress.  She was alert and answered questions appropriately.  She was not tremulous.  Nares were dry without blood.    Data   Most Recent 3 CBC's:  Recent Labs   Lab Test  03/28/18   0920  03/28/18   0513  03/28/18   0056  03/27/18   1510  03/21/18   1331   WBC   --   4.2   --   5.6  3.1*   HGB  8.1*  7.9*  7.7*  6.2*  6.0*   MCV   --   84   --   83  86   PLT   --   97*   --   126*  91*      Most Recent 3 BMP's:  Recent Labs   Lab Test  03/27/18   1510  03/21/18   1331   12/20/17   1403  11/02/17   1222   NA  136   --   144  145*   POTASSIUM  3.5   --   3.7  3.7   CHLORIDE  104   --   113*  111*   CO2  22   --   26  28   BUN  6*  8  7  9   CR  0.69  0.93  1.02  0.91   ANIONGAP  10   --   5  6   ELSIE  7.5*   --   8.1*  9.1   GLC  90   --   93  97     Most Recent 2 LFT's:  Recent Labs   Lab Test  03/27/18   1510  03/21/18   1331   AST  46*  38   ALT  20  19   ALKPHOS  248*  199*   BILITOTAL  1.2  1.6*             Discharge Instructions and Follow-Up:   Discharge diet: Regular   Discharge activity: Activity as tolerated   Discharge follow-up: Follow up with primary care provider in 1-2 weeks  Follow-up with otolaryngology for evaluation of recurrent epistaxis      Pending test results:   Unresulted Labs Ordered in the Past 30 Days of this Admission     Date and Time Order Name Status Description    3/27/2018 2030 Red blood cell prepare order unit In process                Discharge Disposition:   Discharged to home      Attestation:  I have reviewed today's vital signs, notes, medications, and test results.    Mayur Moncada MD

## 2018-03-28 NOTE — H&P
[facility]    History and Physical  Hospitalist       Date of Admission:  3/27/2018    Assessment & Plan   Monalisa Olguin is a 52 year old female who presents with a low hemoglobin.  Routine follow-up labs scheduled last week with a hemoglobin noted to be low at 6.0 she did not respond to phone calls and ultimately was brought to the ER today by her  after another phone call by her primary care provider, Dr. Bustos.  In the emergency department her vital signs are stable.  Hemoglobin is low at 6.2.  She is mildly intoxicated with an alcohol level of 0.23 with a recent 2 week history of increased alcohol usage after being sober for some months after court initiated sobriety.  She denies any obvious bleeding other than some nosebleeds.  She does not feel weak, dizzy, lightheaded, she denies any blood in the stool and except  for the nosebleeds, would not think that there was a problem.  History significant for a normal EGD performed last December.  Colonoscopy performed in September was suboptimal and she is will have a follow-up colonoscopy with MAC anesthesia.  Patient will be registered observation and is in the process of receiving 2 units of packed red blood cells.  Will monitor hemoglobin overnight and she is been started on a Protonix infusion overnight.  The plan would be if she is stable, to discharge tomorrow and continue oral omeprazole and with a follow-up colonoscopy.  If if she were to suddenly start bleeding, might  need transfer for more emergent treatment.    Active Problems:    Anemia    Assessment: Present with a hemoglobin of 6.0.  Otherwise asymptomatic with no obvious source of blood loss other than some nosebleeds and her ongoing alcohol yesterday.  2 units of packed red blood cells ordered.  Will recheck hemoglobin later.    Plan: Treat for GI bleed now.  Will need outpatient follow-up colonoscopy.  Intoxicated.  Recent    Acute alcoholic intoxication  in alcoholism (H)    Assessment: Drinking at home despite court-ordered sobriety    Plan: Encouraged outpatient management with her treatment team no obvious signs of    GI bleed-possible    Assessment: Obvious bleeding other than nosebleeds.  Stool was mildly guaiac positive in the ED but no obvious melena.  EGD in December was unremarkable.  Colonoscopy in September was suboptimal    Plan: Monitor hemoglobin as above.  Will need outpatient colonoscopy with MAC anesthesia    Moderate Depression [296.32]    Assessment: Currently stable, taking a number of antidepressants and antianxiety medicines, monitored and treated by [facility]    Plan: Continue home meds    Thrombocytopenia (H)    Assessment: Mildly low, stable    Plan: Monitor    Tobacco abuse    Assessment: Minimal use    Plan: Declines nicotine patch  # Pain Assessment:  Current Pain Score 3/27/2018   Patient currently in pain? denies   Pain score (0-10) 0   Pain location -   Pain descriptors -   Monalisa delgado pain level was assessed and she currently denies pain.      DVT Prophylaxis: Low Risk/Ambulatory with no VTE prophylaxis indicated  Code Status: Full Code    Disposition: Expected discharge in 1 days once hemoglobin stable.    Brandon Cummings MD    Primary Care Physician   Efren Bustos MD    Chief Complaint    52-year-old female with low hemoglobin    History is obtained from the patient, electronic health record and emergency department physician    History of Present Illness   Monalisa Olguin is a 52 year old female who presents with a low hemoglobin. She has routine lab work ordered last week for follow-up of alcohol-induced cirrhosis  with a hemoglobin at that time noted to be low at 6.1.  She had not responded to phone calls for follow-up in today for  was called and brought her to the emergency department.  She has been drinking more heavily over the past 2 weeks.  She has had court ordered sobriety since 2014 after a  [place].  Had been sober up until 2 weeks ago but then started drinking again.  She drank heavily last week and then has been drinking today and comes emergency department inebriated.  She has had some nosebleeds, pretty much every day but denies any obvious blood in her stools.  She had a normal EGD  in December of last year and a suboptimal colonoscopy in September with a need for a follow-up colonoscopy with MAC anesthesia.  Her hemoglobin in December was 7.9.  She denies feeling weak, dizzy, short of air, chest pain, abdominal pain.  She has depression/anxiety, followed by [facility].  She was seen in outpatient treatment for her alcoholism and will need follow-up with them.    Past Medical History    I have reviewed this patient's medical history and updated it with pertinent information if needed.   Past Medical History:   Diagnosis Date     Alcohol abuse 2013     Alcohol dependence 2013     Alcohol withdrawal 2013     Anxiety 10/10/2011     CKD (chronic kidney disease) stage 3, GFR 30-59 ml/min     last GFR was 42     CKD (chronic kidney disease) stage 3, GFR 30-59 ml/min 2011     Depressive disorder      Esophageal reflux 10/7/2002     Hypertension goal BP (blood pressure) < 130/80 2011     Moderate Depression [296.32] 2009    stable on wellbutrin     Other internal derangement of knee(717.89)     ACL Internal derangement, knee/ original injury in 5th grade, torn cartilage     Pap smear     no abnormals, due for paps q 2-3 yrs     Unspecified essential hypertension        Past Surgical History   I have reviewed this patient's surgical history and updated it with pertinent information if needed.  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     COLONOSCOPY N/A 2017    Procedure: COLONOSCOPY;  Colonoscopy;  Surgeon: Sai Johnston,  MD;  Location: [facility]     ESOPHAGOSCOPY, GASTROSCOPY, DUODENOSCOPY (EGD), COMBINED N/A 12/27/2017    Procedure: COMBINED ESOPHAGOSCOPY, GASTROSCOPY, DUODENOSCOPY (EGD), BIOPSY SINGLE OR MULTIPLE;  ESOPHAGOSCOPY, GASTROSCOPY, DUODENOSCOPY (EGD) with biopsies;  Surgeon: Sai Johnston MD;  Location: [facility]     HC KNEE SCOPE, DIAGNOSTIC  11/14/01    Arthroscopy, Lt Knee     LAPAROSCOPIC CHOLECYSTECTOMY N/A 9/24/2017    Procedure: LAPAROSCOPIC CHOLECYSTECTOMY;  laparoscopic cholecystectomy;  Surgeon: Romeo Pastor MD;  Location: [facility]     OPEN REDUCTION INTERNAL FIXATION WRIST Right 11/29/2017    Procedure: OPEN REDUCTION INTERNAL FIXATION WRIST;  OPEN REDUCTION INTERNAL FIXATION RIGHT WRIST;  Surgeon: Edgardo Almendarez MD;  Location: [facility]       Prior to Admission Medications   Prior to Admission Medications   Prescriptions Last Dose Informant Patient Reported? Taking?   FLUoxetine (PROZAC) 20 MG capsule 3/27/2018 at 0800  No Yes   Sig: Take 3 capsules (60 mg) by mouth daily   Naproxen Sodium (ALEVE PO) Unknown at Unknown time Self Yes No   Sig: Take 220 mg by mouth 2 times daily as needed for moderate pain   TL RICARDO RX 2.2-25-1 MG TABS 3/27/2018 at 0800  No Yes   Sig: TAKE ONE TABLET BY MOUTH EVERY DAY   VITAMIN B-1 100 MG tablet 3/27/2018 at 0800  No Yes   Sig: TAKE ONE TABLET BY MOUTH EVERY DAY   alendronate (FOSAMAX) 35 MG tablet 3/26/2018 at 0700  No Yes   Sig: Take 1 tablet (35 mg) by mouth with 8oz water every Monday (once every 7 days) 30 minutes before breakfast and remain upright during this time.   buPROPion (WELLBUTRIN XL) 300 MG 24 hr tablet 3/27/2018 at 0800  No Yes   Sig: Take 1 tablet (300 mg) by mouth every morning   busPIRone (BUSPAR) 15 MG tablet 3/27/2018 at 0800  No Yes   Sig: Take 1 tablet (15 mg) by mouth 2 times daily   omeprazole (PRILOSEC) 20 MG CR capsule 3/27/2018 at 0800  No Yes   Sig: Take 1 capsule (20 mg) by mouth daily   phosphorus tablet 250 mg (VIRT-PHOS 250 NEUTRAL) 250 MG  "per tablet 3/27/2018 at 0800  No Yes   Sig: TAKE TWO TABLETS BY MOUTH TWICE A DAY   propranolol (INDERAL) 10 MG tablet 3/27/2018 at 0800  No Yes   Sig: Take 1 tablet (10 mg) by mouth 2 times daily   sertraline (ZOLOFT) 100 MG tablet 3/27/2018 at 0800  Yes Yes   temazepam (RESTORIL) 15 MG capsule 3/27/2018 at 0800  Yes Yes      Facility-Administered Medications: None     Allergies   Allergies   Allergen Reactions     Albuterol      Tongue \"hardened and painful\"       Social History   I have reviewed this patient's social history and updated it with pertinent information if needed. Monalisa Olguin  reports that she has been smoking.  She has a 2.50 pack-year smoking history. She has never used smokeless tobacco. She reports that she drinks alcohol. She reports that she does not use illicit drugs.    Family History   I have reviewed this patient's family history and updated it with pertinent information if needed.   Family History   Problem Relation Age of Onset     Hypertension Mother      HEART DISEASE Paternal Grandmother      HEART DISEASE Paternal Grandfather      CEREBROVASCULAR DISEASE Maternal Grandmother      CEREBROVASCULAR DISEASE Maternal Grandfather      Alcohol/Drug Maternal Grandfather      Substance Abuse Maternal Grandfather      HEART DISEASE Father      Silent MI stents x 2     DIABETES Sister 17     older sister also had kidney and pancreas transplant     HEART DISEASE Sister      MI secondary to diabetes     Genitourinary Problems Sister 43     kidney transplant from renal failure     DIABETES Sister 9     youngest, juvenile type I (onset age 9 with no complications)     CANCER Paternal Aunt      ?kind       Review of Systems   The 10 point Review of Systems is negative other than noted in the HPI or here.     Physical Exam   Temp: 97.8  F (36.6  C) Temp src: Oral BP: 122/66 Pulse: 82 Heart Rate: 84 Resp: 14 SpO2: 97 % O2 Device: None (Room air)    Vital Signs with Ranges  Temp:  [97.2  F (36.2 "  C)-98.4  F (36.9  C)] 97.8  F (36.6  C)  Pulse:  [82-94] 82  Heart Rate:  [82-91] 84  Resp:  [14-16] 14  BP: (109-122)/(66-78) 122/66  SpO2:  [96 %-99 %] 97 %  129 lbs 6.4 oz    Constitutional: No obvious distress, sitting upright.  Seems mildly intoxicated  Eyes: Nonicteric normal range of motion  HEENT:  normal mouth and throat, no signs of any bleeding  Respiratory:  lungs are clear  Cardiovascular: Regular rate rhythm, no murmur heard   GI: Soft, nontender  Lymph/Hematologic: Normal cervical nodes  Genitourinary: Not examined.  ER notes guaiac positive, brown stool   Skin: no lesions or rashes  Musculoskeletal: Moving arms and legs without difficulty, no deformity  Neurologic: No focal deficits.  Cranial nerves normal  Psychiatric: Seems mildly intoxicated, otherwise alert and oriented, pleasant    Data   Data reviewed today:  I personally reviewed no images or EKG's today.    Recent Labs  Lab 03/27/18  1510 03/21/18  1331   WBC 5.6 3.1*   HGB 6.2* 6.0*   MCV 83 86   * 91*     --    POTASSIUM 3.5  --    CHLORIDE 104  --    CO2 22  --    BUN 6* 8   CR 0.69 0.93   ANIONGAP 10  --    ELSIE 7.5*  --    GLC 90  --    ALBUMIN 3.2* 2.8*   PROTTOTAL 6.0* 5.6*   BILITOTAL 1.2 1.6*   ALKPHOS 248* 199*   ALT 20 19   AST 46* 38       No results found for this or any previous visit (from the past 24 hour(s)).

## 2018-03-29 ENCOUNTER — TELEPHONE (OUTPATIENT)
Dept: FAMILY MEDICINE | Facility: CLINIC | Age: 53
End: 2018-03-29

## 2018-03-29 NOTE — TELEPHONE ENCOUNTER
ED / Discharge Outreach Protocol    Patient Contact    Attempt # 1    Was call answered?  No.  Unable to leave message.    Kenyetta Sinclair RN

## 2018-03-29 NOTE — TELEPHONE ENCOUNTER
Patient called to schedule an appointment for a hospital follow-up or appeared on a report showing that they were recently discharged from the hospital.    Patient was admitted to [facility]:    Discharged date: 03/28/18  Reason for hospital admission:  alcoholic cirrhosis of liver without ascites/anemia due to acute blood loss  Does patient have future appointment scheduled with provider? Yes   Date of future appointment:  04/05/18      This information will be used to help the care team plan for the patients upcoming visit.  The triage RN may determine that a follow up call is necessary and reach out to the patient via the phone number listed in the chart.     Please route this message on routine priority to the Triage RN pool.

## 2018-04-03 NOTE — TELEPHONE ENCOUNTER
Follow up encounter documentation:    Reason for follow up: Monalisa Olguin appeared on our list for recent discharge from an Emergency Room, inpatient admission, or TCU/nursing home facility.    Visit date: 3/28/2018  Location: [facility]   Reason for visit: alcoholic cirrhosis of liver without blood loss  Discharge instructions include: Follow up in clinic with provider  Follow up phone call indicated? Yes. Details: I spoke with pt. She is feeling better. Follow up scheduled for 4/11/2018  No further questions or concerns.  Matilde Howard, RN, BSN

## 2018-04-08 ENCOUNTER — NURSE TRIAGE (OUTPATIENT)
Dept: NURSING | Facility: CLINIC | Age: 53
End: 2018-04-08

## 2018-04-08 ENCOUNTER — TELEPHONE (OUTPATIENT)
Dept: FAMILY MEDICINE | Facility: CLINIC | Age: 53
End: 2018-04-08

## 2018-04-08 DIAGNOSIS — D50.0 CHRONIC BLOOD LOSS ANEMIA: Primary | ICD-10-CM

## 2018-04-09 DIAGNOSIS — D69.6 THROMBOCYTOPENIA (H): ICD-10-CM

## 2018-04-09 DIAGNOSIS — D50.8 OTHER IRON DEFICIENCY ANEMIA: ICD-10-CM

## 2018-04-09 LAB
BASOPHILS # BLD AUTO: 0 10E9/L (ref 0–0.2)
BASOPHILS NFR BLD AUTO: 0.2 %
DIFFERENTIAL METHOD BLD: ABNORMAL
EOSINOPHIL # BLD AUTO: 0 10E9/L (ref 0–0.7)
EOSINOPHIL NFR BLD AUTO: 0 %
ERYTHROCYTE [DISTWIDTH] IN BLOOD BY AUTOMATED COUNT: 19.4 % (ref 10–15)
FOLATE SERPL-MCNC: >100 NG/ML
HCT VFR BLD AUTO: 26 % (ref 35–47)
HGB BLD-MCNC: 7.8 G/DL (ref 11.7–15.7)
IMM GRANULOCYTES # BLD: 0 10E9/L (ref 0–0.4)
IMM GRANULOCYTES NFR BLD: 0.2 %
LYMPHOCYTES # BLD AUTO: 1.7 10E9/L (ref 0.8–5.3)
LYMPHOCYTES NFR BLD AUTO: 41.4 %
MCH RBC QN AUTO: 25.8 PG (ref 26.5–33)
MCHC RBC AUTO-ENTMCNC: 30 G/DL (ref 31.5–36.5)
MCV RBC AUTO: 86 FL (ref 78–100)
MONOCYTES # BLD AUTO: 0.3 10E9/L (ref 0–1.3)
MONOCYTES NFR BLD AUTO: 8.4 %
NEUTROPHILS # BLD AUTO: 2 10E9/L (ref 1.6–8.3)
NEUTROPHILS NFR BLD AUTO: 49.8 %
PLATELET # BLD AUTO: 95 10E9/L (ref 150–450)
RBC # BLD AUTO: 3.02 10E12/L (ref 3.8–5.2)
RETICS # AUTO: 79.1 10E9/L (ref 25–95)
RETICS/RBC NFR AUTO: 2.6 % (ref 0.5–2)
WBC # BLD AUTO: 4.1 10E9/L (ref 4–11)

## 2018-04-09 PROCEDURE — 85025 COMPLETE CBC W/AUTO DIFF WBC: CPT | Performed by: FAMILY MEDICINE

## 2018-04-09 PROCEDURE — 85045 AUTOMATED RETICULOCYTE COUNT: CPT | Performed by: FAMILY MEDICINE

## 2018-04-09 PROCEDURE — 82746 ASSAY OF FOLIC ACID SERUM: CPT | Performed by: FAMILY MEDICINE

## 2018-04-09 PROCEDURE — 85060 BLOOD SMEAR INTERPRETATION: CPT | Performed by: FAMILY MEDICINE

## 2018-04-09 PROCEDURE — 99000 SPECIMEN HANDLING OFFICE-LAB: CPT | Performed by: FAMILY MEDICINE

## 2018-04-09 PROCEDURE — 84425 ASSAY OF VITAMIN B-1: CPT | Mod: 90 | Performed by: FAMILY MEDICINE

## 2018-04-09 PROCEDURE — 36415 COLL VENOUS BLD VENIPUNCTURE: CPT | Performed by: FAMILY MEDICINE

## 2018-04-09 NOTE — TELEPHONE ENCOUNTER
I have contacted pt. She would like her CBC and Hemoglobin rechecked as she was just in the hospital for blood loss. Okay for orders? Lili Weldon CMA ([facility])

## 2018-04-09 NOTE — TELEPHONE ENCOUNTER
Clinic Action Needed: YES. Please follow up with Heydi at [phone]. She is requesting a LAB ONLY appointment for tomorrow 4/8/18 s/p hospitalization. Heydi declined triage.     She has a wide open schedule tomorrow except for a dental appt from 11am-12pm, which she said she could cancel if needed.    [facility] Triage Call  Presenting Problem: LAB appt    Guideline Used: information only- adult    Patient Recommendations/Teaching: follow up with clinic tomorrow.     Routed to: Dr. Bustos's care team    Kristen Guy, RN  [facility]

## 2018-04-09 NOTE — TELEPHONE ENCOUNTER
Heydi calling to request a LAB ONLY appointment. Requested a message be sent to Dr. Bustos. This nurse to send EPIC message to PCP. Caller declined triage. Advised if life threatening symptoms, seek medical care.    Kristen Guy RN  [facility]    Reason for Disposition    Caller requesting an appointment, triage offered and declined     Requesting LAB ONLY appointment, declined triage    Additional Information    Negative: Lab calling with strep throat test results and triager can call in prescription    Negative: Lab calling with urinalysis test results and triager can call in prescription    Negative: Medication questions    Negative: ED call to PCP    Negative: Physician call to PCP    Negative: Call about patient who is currently hospitalized    Negative: Lab or radiology calling with CRITICAL test results    Negative: [1] Prescription not at pharmacy AND [2] was prescribed today by PCP    Negative: [1] Follow-up call from patient regarding patient's clinical status AND [2] information urgent    Negative: [1] Caller requests to speak ONLY to PCP AND [2] URGENT question    Negative: [1] Caller requests to speak to PCP now AND [2] won't tell us reason for call  (Exception: if 10 pm to 6 am, caller must first discuss reason for the call)    Negative: Notification of hospital admission    Negative: Notification of death    Negative: Caller requesting lab results    Negative: Lab or radiology calling with test results    Negative: [1] Follow-up call from patient regarding patient's clinical status AND [2] information NON-URGENT    Negative: [1] Caller requests to speak ONLY to PCP AND [2] NON-URGENT question    Protocols used: [facility]CP CALL - [facility]

## 2018-04-10 LAB — COPATH REPORT: NORMAL

## 2018-04-11 ENCOUNTER — OFFICE VISIT (OUTPATIENT)
Dept: FAMILY MEDICINE | Facility: CLINIC | Age: 53
End: 2018-04-11
Payer: COMMERCIAL

## 2018-04-11 VITALS
BODY MASS INDEX: 23.81 KG/M2 | OXYGEN SATURATION: 98 % | TEMPERATURE: 98.5 F | SYSTOLIC BLOOD PRESSURE: 114 MMHG | HEART RATE: 88 BPM | DIASTOLIC BLOOD PRESSURE: 68 MMHG | RESPIRATION RATE: 16 BRPM | WEIGHT: 126 LBS

## 2018-04-11 DIAGNOSIS — F10.21 ALCOHOL DEPENDENCE IN REMISSION (H): ICD-10-CM

## 2018-04-11 DIAGNOSIS — Z23 NEED FOR VACCINATION: ICD-10-CM

## 2018-04-11 DIAGNOSIS — D50.8 OTHER IRON DEFICIENCY ANEMIA: ICD-10-CM

## 2018-04-11 DIAGNOSIS — K70.30 ALCOHOLIC CIRRHOSIS OF LIVER WITHOUT ASCITES (H): ICD-10-CM

## 2018-04-11 DIAGNOSIS — D69.6 THROMBOCYTOPENIA (H): ICD-10-CM

## 2018-04-11 PROCEDURE — 90471 IMMUNIZATION ADMIN: CPT | Performed by: FAMILY MEDICINE

## 2018-04-11 PROCEDURE — 99214 OFFICE O/P EST MOD 30 MIN: CPT | Mod: 25 | Performed by: FAMILY MEDICINE

## 2018-04-11 PROCEDURE — 90746 HEPB VACCINE 3 DOSE ADULT IM: CPT | Performed by: FAMILY MEDICINE

## 2018-04-11 PROCEDURE — 90632 HEPA VACCINE ADULT IM: CPT | Performed by: FAMILY MEDICINE

## 2018-04-11 PROCEDURE — 90472 IMMUNIZATION ADMIN EACH ADD: CPT | Performed by: FAMILY MEDICINE

## 2018-04-11 RX ORDER — NALTREXONE HYDROCHLORIDE 50 MG/1
50 TABLET, FILM COATED ORAL DAILY
Qty: 90 TABLET | Refills: 3 | Status: SHIPPED | OUTPATIENT
Start: 2018-04-11 | End: 2018-09-19

## 2018-04-11 ASSESSMENT — ANXIETY QUESTIONNAIRES
6. BECOMING EASILY ANNOYED OR IRRITABLE: MORE THAN HALF THE DAYS
7. FEELING AFRAID AS IF SOMETHING AWFUL MIGHT HAPPEN: NOT AT ALL
IF YOU CHECKED OFF ANY PROBLEMS ON THIS QUESTIONNAIRE, HOW DIFFICULT HAVE THESE PROBLEMS MADE IT FOR YOU TO DO YOUR WORK, TAKE CARE OF THINGS AT HOME, OR GET ALONG WITH OTHER PEOPLE: VERY DIFFICULT
3. WORRYING TOO MUCH ABOUT DIFFERENT THINGS: SEVERAL DAYS
2. NOT BEING ABLE TO STOP OR CONTROL WORRYING: MORE THAN HALF THE DAYS
5. BEING SO RESTLESS THAT IT IS HARD TO SIT STILL: SEVERAL DAYS
GAD7 TOTAL SCORE: 9
1. FEELING NERVOUS, ANXIOUS, OR ON EDGE: MORE THAN HALF THE DAYS

## 2018-04-11 ASSESSMENT — PAIN SCALES - GENERAL: PAINLEVEL: NO PAIN (0)

## 2018-04-11 ASSESSMENT — PATIENT HEALTH QUESTIONNAIRE - PHQ9: 5. POOR APPETITE OR OVEREATING: SEVERAL DAYS

## 2018-04-11 NOTE — MR AVS SNAPSHOT
After Visit Summary   4/11/2018    Monalisa Olguin    MRN: [id number]           Patient Information     Date Of Birth          1965        Visit Information        Provider Department      4/11/2018 11:15 AM Efren Bustos MD [facility]         Follow-ups after your visit        Your next 10 appointments already scheduled     Jun 05, 2018  1:20 PM CDT   PHYSICAL with Efren Bustos MD   [facility] ([facility]    [address] [place] [id number] [phone]            Jun 06, 2018  3:00 PM CDT   (Arrive by 2:45 PM)   Return General Liver with Edgardo Kovacs MD   [facility] ([facility])    [address] [place] [id number] [phone]              Who to contact     If you have questions or need follow up information about today's clinic visit or your schedule please contact [facility] directly at [phone].  Normal or non-critical lab and imaging results will be communicated to you by MyChart, letter or phone within 4 business days after the clinic has received the results. If you do not hear from us within 7 days, please contact the clinic through [employer] or phone. If you have a critical or abnormal lab result, we will notify you by phone as soon as possible.  Submit refill requests through [employer] or call your pharmacy and they will forward the refill request to us. Please allow 3 business days for your refill to be completed.          Additional Information About Your Visit        [employer] Information     [employer] gives you secure access to your electronic health record. If you see a primary care provider, you can also send messages to your care team and make appointments. If you have questions, please call your primary care clinic.  If you do not have a primary care provider, please call [phone] and they will assist you.        Care  EveryWhere ID     This is your Care EveryWhere ID. This could be used by other organizations to access your [facility] medical records  [id number]        Your Vitals Were     Pulse Temperature Respirations Last Period Pulse Oximetry BMI (Body Mass Index)    88 98.5  F (36.9  C) (Temporal) 16 05/16/2012 98% 23.81 kg/m2       Blood Pressure from Last 3 Encounters:   04/11/18 114/68   03/28/18 112/67   12/27/17 112/77    Weight from Last 3 Encounters:   04/11/18 126 lb (57.2 kg)   03/27/18 129 lb 6.4 oz (58.7 kg)   12/27/17 128 lb 6.4 oz (58.2 kg)              Today, you had the following     No orders found for display       Primary Care Provider Office Phone # Fax #    Efren Bustos [phone]       [address] [place] [address]        Equal Access to Services     ADRIENNE GILLIAM : Hadii km ku hadasho Soomaali, waaxda luqadaha, qaybta kaalmada adeegyada, salo viera . So [facility] [phone].    ATENCIÓN: Si habla español, tiene a perez disposición servicios gratuitos de asistencia lingüística. Llame al [phone].    We comply with applicable federal civil rights laws and [place] laws. We do not discriminate on the basis of race, color, national origin, age, disability, sex, sexual orientation, or gender identity.            Thank you!     Thank you for choosing [facility]  for your care. Our goal is always to provide you with excellent care. Hearing back from our patients is one way we can continue to improve our services. Please take a few minutes to complete the written survey that you may receive in the mail after your visit with us. Thank you!             Your Updated Medication List - Protect others around you: Learn how to safely use, store and throw away your medicines at www.disposemymeds.org.          This list is accurate as of 4/11/18 11:33 AM.  Always use your most recent med list.                   Brand Name Dispense  Instructions for use Diagnosis    acetaminophen 325 MG tablet    TYLENOL    100 tablet    Take 2 tablets (650 mg) by mouth every 4 hours as needed for mild pain    Alcoholic cirrhosis of liver without ascites (H)       alendronate 35 MG tablet    FOSAMAX    12 tablet    Take 1 tablet (35 mg) by mouth with 8oz water every Monday (once every 7 days) 30 minutes before breakfast and remain upright during this time.    Osteopenia, unspecified location       buPROPion 300 MG 24 hr tablet    WELLBUTRIN XL    90 tablet    Take 1 tablet (300 mg) by mouth every morning    Major depressive disorder, recurrent episode, moderate (H)       busPIRone 15 MG tablet    BUSPAR    180 tablet    Take 1 tablet (15 mg) by mouth 2 times daily    MONA (generalized anxiety disorder)       FLUoxetine 20 MG capsule    PROzac    180 capsule    Take 3 capsules (60 mg) by mouth daily    Anxiety, Major depressive disorder, recurrent episode, moderate (H)       omeprazole 20 MG CR capsule    priLOSEC    90 capsule    Take 1 capsule (20 mg) by mouth daily    Gastroesophageal reflux disease with esophagitis       phosphorus tablet 250 mg 250 MG per tablet    VIRT-PHOS 250 NEUTRAL    120 tablet    TAKE TWO TABLETS BY MOUTH TWICE A DAY    Hypophosphatemia       propranolol 10 MG tablet    INDERAL    180 tablet    Take 1 tablet (10 mg) by mouth 2 times daily    Hypertension goal BP (blood pressure) < 130/80       sertraline 100 MG tablet    ZOLOFT    30 tablet    Do not restart this medication until confirming with [facility] whether you should be combining this medication with fluoxetine        temazepam 15 MG capsule    RESTORIL    30 capsule    Take 1 capsule (15 mg) by mouth nightly as needed for sleep        thiamine 100 MG tablet     90 tablet    TAKE ONE TABLET BY MOUTH EVERY DAY    Alcohol dependence in remission (H)       TL RICARDO RX 2.2-25-1 MG Tabs   Generic drug:  Folic Acid-Vit B6-Vit B12     90 tablet    TAKE ONE TABLET BY MOUTH  EVERY DAY    Alcoholism in recovery (H)

## 2018-04-11 NOTE — NURSING NOTE
Prior to injection verified patient identity using patient's name and date of birth.  Due to injection administration, patient instructed to remain in clinic for 15 minutes  afterwards, and to report any adverse reaction to me immediately.  Tash Blas CMA

## 2018-04-11 NOTE — NURSING NOTE
"Chief Complaint   Patient presents with     Hospital F/U     [facility]. Anemia 3/27/2018-3/28/2018       Initial /68  Pulse 88  Temp 98.5  F (36.9  C) (Temporal)  Resp 16  Wt 126 lb (57.2 kg)  LMP 05/16/2012  SpO2 98%  BMI 23.81 kg/m2 Estimated body mass index is 23.81 kg/(m^2) as calculated from the following:    Height as of 3/27/18: 5' 1\" (1.549 m).    Weight as of this encounter: 126 lb (57.2 kg).  Medication Reconciliation: complete    "

## 2018-04-11 NOTE — PROGRESS NOTES
SUBJECTIVE:   Monalisa Olguin is a 52 year old female who presents to clinic today for the following health issues:          Hospital Follow-up Visit:    Hospital/Nursing Home/IP Rehab Facility: [facility]  Date of Admission: 3/27/2018  Date of Discharge: 3/28/2018  Reason(s) for Admission: Anemia            Problems taking medications regularly:  None       Medication changes since discharge: None       Problems adhering to non-medication therapy:  None    Summary of hospitalization:  [facility] discharge summary reviewed  Diagnostic Tests/Treatments reviewed.  Follow up needed: none  Other Healthcare Providers Involved in Patient s Care:         None  Update since discharge: stable, but continues to drink beer    Post Discharge Medication Reconciliation: discharge medications reconciled, continue medications without change.  Plan of care communicated with patient     Coding guidelines for this visit:  Type of Medical   Decision Making Face-to-Face Visit       within 7 Days of discharge Face-to-Face Visit        within 14 days of discharge   Moderate Complexity [id number]   High Complexity 30059 [id number]              PROBLEMS TO ADD ON...  The patient does admit to drinking beer.  She states she is not drinking every day but many days throughout the week.  She was sober for 7 months while she was on probation but once her probation was up she started drinking again.  She knows that this is detrimental to her health but has a hard time resisting the urge to drink.  She is not taking her naltrexone anymore.  I spoke with the patient at length about her drinking and what it is doing to her health.  She is a 13-year-old son at home and 2 grown daughters.  She gets him off to school in the morning and then goes back to bed due to her fatigue.  She will sleep until 930-1030 and then is up then is having problems sleeping at nighttime.    She states that she has been seeing a specialist at  [facility] and is seeing them on a monthly basis.  She does have a follow-up with Dr. Kovacs the liver specialist done at the [facility].  Her follow-up in May was canceled and rescheduled to  of this year.  She has not had follow-up with a hematologist for her pancytopenia.    When she was in the hospital she received 2 units of packed red cells that brought her hemoglobin up slightly but not significantly.  She does state that she has been having bloody noses almost on a daily basis.  Even slight blowing will cause her nose to bleed.  She bleeds almost every night while in bed asleep and gets blood all over her pillowcases.  She has had no other spontaneous bleeding from anywhere else.    Problem list and histories reviewed & adjusted, as indicated.  Additional history: as documented    Patient Active Problem List   Diagnosis     Kidney donor     Esophageal reflux     Moderate Depression [296.32]     HYPERLIPIDEMIA LDL GOAL <100     Hypertension goal BP (blood pressure) < 130/80     Anxiety     Alcoholism in recovery (H)     Alcoholic hepatitis with ascites     Chronic blood loss anemia     Thrombocytopenia (H)     Tobacco abuse     Portal hypertension (H)     Pulmonary nodules     Hyperthyroidism     Acute alcoholic intoxication in alcoholism (H)     Heme positive stool     Alcoholic cirrhosis of liver without ascites (H) - per Hepatology consult 2017     Splenomegaly     Epistaxis     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     COLONOSCOPY N/A 2017    Procedure: COLONOSCOPY;  Colonoscopy;  Surgeon: Sai Johnston MD;  Location:  GI     ESOPHAGOSCOPY, GASTROSCOPY, DUODENOSCOPY (EGD), COMBINED N/A 2017    Procedure: COMBINED ESOPHAGOSCOPY, GASTROSCOPY, DUODENOSCOPY (EGD), BIOPSY SINGLE OR MULTIPLE;  ESOPHAGOSCOPY, GASTROSCOPY,  DUODENOSCOPY (EGD) with biopsies;  Surgeon: Sai Johnston MD;  Location: [facility]     HC KNEE SCOPE, DIAGNOSTIC  11/14/01    Arthroscopy, Lt Knee     LAPAROSCOPIC CHOLECYSTECTOMY N/A 9/24/2017    Procedure: LAPAROSCOPIC CHOLECYSTECTOMY;  laparoscopic cholecystectomy;  Surgeon: Romeo Pastor MD;  Location: [facility] OR     OPEN REDUCTION INTERNAL FIXATION WRIST Right 11/29/2017    Procedure: OPEN REDUCTION INTERNAL FIXATION WRIST;  OPEN REDUCTION INTERNAL FIXATION RIGHT WRIST;  Surgeon: Edgardo Almendarez MD;  Location: [facility] OR       Social History   Substance Use Topics     Smoking status: Current Every Day Smoker     Packs/day: 0.25     Years: 10.00     Smokeless tobacco: Never Used      Comment: pt quit 1992 after smoking for 8 yrs, started again in 2004     Alcohol use 0.0 oz/week     0 Standard drinks or equivalent per week      Comment: started drinking 2 weeks ago     Family History   Problem Relation Age of Onset     Hypertension Mother      HEART DISEASE Paternal Grandmother      HEART DISEASE Paternal Grandfather      CEREBROVASCULAR DISEASE Maternal Grandmother      CEREBROVASCULAR DISEASE Maternal Grandfather      Alcohol/Drug Maternal Grandfather      Substance Abuse Maternal Grandfather      HEART DISEASE Father      Silent MI stents x 2     DIABETES Sister 17     older sister also had kidney and pancreas transplant     HEART DISEASE Sister      MI secondary to diabetes     Genitourinary Problems Sister 43     kidney transplant from renal failure     DIABETES Sister 9     youngest, juvenile type I (onset age 9 with no complications)     CANCER Paternal Aunt      ?kind         Current Outpatient Prescriptions   Medication Sig Dispense Refill     naltrexone (DEPADE;REVIA) 50 MG tablet Take 1 tablet (50 mg) by mouth daily 90 tablet 3     acetaminophen (TYLENOL) 325 MG tablet Take 2 tablets (650 mg) by mouth every 4 hours as needed for mild pain 100 tablet      temazepam (RESTORIL) 15 MG capsule Take 1  "capsule (15 mg) by mouth nightly as needed for sleep 30 capsule      alendronate (FOSAMAX) 35 MG tablet Take 1 tablet (35 mg) by mouth with 8oz water every Monday (once every 7 days) 30 minutes before breakfast and remain upright during this time. 12 tablet 3     FLUoxetine (PROZAC) 20 MG capsule Take 3 capsules (60 mg) by mouth daily 180 capsule 3     phosphorus tablet 250 mg (VIRT-PHOS 250 NEUTRAL) 250 MG per tablet TAKE TWO TABLETS BY MOUTH TWICE A  tablet 3     propranolol (INDERAL) 10 MG tablet Take 1 tablet (10 mg) by mouth 2 times daily 180 tablet 3     TL RICARDO RX 2.2-25-1 MG TABS TAKE ONE TABLET BY MOUTH EVERY DAY 90 tablet 3     VITAMIN B-1 100 MG tablet TAKE ONE TABLET BY MOUTH EVERY DAY 90 tablet 3     busPIRone (BUSPAR) 15 MG tablet Take 1 tablet (15 mg) by mouth 2 times daily 180 tablet 3     buPROPion (WELLBUTRIN XL) 300 MG 24 hr tablet Take 1 tablet (300 mg) by mouth every morning 90 tablet 3     omeprazole (PRILOSEC) 20 MG CR capsule Take 1 capsule (20 mg) by mouth daily 90 capsule 3     Allergies   Allergen Reactions     Albuterol      Tongue \"hardened and painful\"     Recent Labs   Lab Test  03/27/18   1510  03/21/18   1331  12/20/17   1403   09/24/17   0630   09/22/17   0735   05/23/17   1610   03/03/16   0953   08/26/13   1037   A1C   --    --    --    --   Canceled, Test credited   --    --    --    --    --    --    --    --    LDL   --    --    --    --    --    --    --    --   95   --   56   --   96   HDL   --    --    --    --    --    --    --    --   71   --   58   --   54   TRIG   --    --    --    --    --    --    --    --   59   --   95   --   104   ALT  20  19  23   < >  35   < >  38   < >  34   < >   --    < >  32   CR  0.69  0.93  1.02   < >  1.04   < >  1.33*   < >  0.87   < >  0.89   < >  1.12*   GFRESTIMATED  90  63  57*   < >  56*   < >  42*   < >  69   < >  67   < >  52*   GFRESTBLACK  >90  77  69   < >  68   < >  51*   < >  83   < >  81   < >  63   POTASSIUM  3.5   --  "  3.7   < >  3.7   < >  3.9   < >  4.1   < >  3.7   < >  3.8   TSH   --    --   1.02   --    --    --   0.50   --    --    < >   --    --    --     < > = values in this interval not displayed.      BP Readings from Last 3 Encounters:   04/11/18 114/68   03/28/18 112/67   12/27/17 112/77    Wt Readings from Last 3 Encounters:   04/11/18 126 lb (57.2 kg)   03/27/18 129 lb 6.4 oz (58.7 kg)   12/27/17 128 lb 6.4 oz (58.2 kg)                  Labs reviewed in EPIC    Reviewed and updated as needed this visit by clinical staff  Tobacco  Allergies  Meds       Reviewed and updated as needed this visit by Provider         ROS:  Constitutional, HEENT, cardiovascular, pulmonary, gi and gu systems are negative, except as otherwise noted.    OBJECTIVE:     /68  Pulse 88  Temp 98.5  F (36.9  C) (Temporal)  Resp 16  Wt 126 lb (57.2 kg)  LMP 05/16/2012  SpO2 98%  BMI 23.81 kg/m2  Body mass index is 23.81 kg/(m^2).  GENERAL: healthy, alert and no distress  EYES: Eyes grossly normal to inspection, PERRL and conjunctivae and sclerae normal  HENT: Inspection of her nose reveals areas of recent bleeding from both septal regions.  She has had recent bleeding from the left.  She was agreeable to have both sides cauterized with silver nitrate sticks.  She tolerated this quite well.  There is no active bleeding noted.  Her eyes have mild scleral icterus but otherwise she does not have any jaundice.  RESP: lungs clear to auscultation - no rales, rhonchi or wheezes  CV: regular rate and rhythm, normal S1 S2, no S3 or S4, no murmur, click or rub, no peripheral edema and peripheral pulses strong  ABDOMEN: Bowel sounds are present throughout.  She has a palpable liver edge 2-3 fingerbreadths below the costal margin.  It is slightly tender.    Diagnostic Test Results:  none     ASSESSMENT/PLAN:   (K70.30) Alcoholic cirrhosis of liver without ascites (H) - per Hepatology consult Nov 2017  Comment: Patient with alcoholic cirrhosis  who had been sober for close to 8 months and now is drinking periodically again.  Plan: HEPATITIS A VACCINE, ADULT  [[id number]], HEPATITIS         B VACCINE,  ADULT  [[id number]], 1st  Administration        [[id number]], Each additional admin.  (Right click         and add QUANTITY)  [[id number]]        The hepatologist that she saw Dr. Fermín meehan at the [facility] had recommended that we vaccinate her against hepatitis B and A.  These will be started today.    (F10.21) Alcohol dependence in remission (H)  Comment: She had been taking naltrexone 50 mg daily up until a few months ago.  She is agreeable to restart this medication and knows that she cannot drink while on it or she will have nausea and vomiting and get very sick from it.  Plan: naltrexone (DEPADE;REVIA) 50 MG tablet        She will restart the 50 mg tablets 1 daily.    (Z23) Need for vaccination  Comment: Recommendations for her vaccines were made today and she is agreeable.  Plan: HEPATITIS A VACCINE, ADULT  [[id number]], HEPATITIS         B VACCINE,  ADULT  [[id number]], 1st  Administration        [58956], Each additional admin.  (Right click         and add QUANTITY)  [[id number]]        Both hepatitis a and B were started she can get her next dose of hep A in 6 months for next dose of hep B in 2 months.    (D50.8) Other iron deficiency anemia  Comment: Her anemia is most likely secondary to her chronic alcoholism and suppression of the bone marrow.  She has not had any significant bleeding but does have a history of esophageal varices and now recent nosebleeds.  Plan: She needs to see the hematologist here at [facility] for follow-up to discuss further treatment.    (D69.6) Thrombocytopenia (H)  Comment: Her platelets are 95,000 on blood draw from the other day this could explain some of her nosebleeds.   Plan:  Morphology done on her blood smear essentially showed pancytopenia and the pathologist thought it could be secondary to chronic alcohol use.  I did discuss this with  the patient today that she is most likely suppressing her own bone marrow.  Again she needs to see the hematologist to discuss therapeutic options.    Follow-up will be with the hematologist as noted above and Dr Kovacs, in June.  I did recommend that she continue seeing the therapist at [facility].    Electronically signed by:  Efren Bustos M.D.  4/11/2018

## 2018-04-12 LAB — VIT B1 SERPL-MCNC: 55 NMOL/L (ref 4–15)

## 2018-04-12 ASSESSMENT — PATIENT HEALTH QUESTIONNAIRE - PHQ9: SUM OF ALL RESPONSES TO PHQ QUESTIONS 1-9: 18

## 2018-04-12 ASSESSMENT — ANXIETY QUESTIONNAIRES: GAD7 TOTAL SCORE: 9

## 2018-04-17 DIAGNOSIS — K70.11 ALCOHOLIC HEPATITIS WITH ASCITES (H): Primary | ICD-10-CM

## 2018-04-18 ENCOUNTER — TRANSFERRED RECORDS (OUTPATIENT)
Dept: HEALTH INFORMATION MANAGEMENT | Facility: CLINIC | Age: 53
End: 2018-04-18

## 2018-05-03 ENCOUNTER — ONCOLOGY VISIT (OUTPATIENT)
Dept: ONCOLOGY | Facility: CLINIC | Age: 53
End: 2018-05-03
Payer: COMMERCIAL

## 2018-05-03 ENCOUNTER — HOSPITAL ENCOUNTER (OUTPATIENT)
Dept: LAB | Facility: CLINIC | Age: 53
Discharge: HOME | End: 2018-05-03
Attending: INTERNAL MEDICINE | Admitting: INTERNAL MEDICINE
Payer: COMMERCIAL

## 2018-05-03 VITALS
RESPIRATION RATE: 18 BRPM | BODY MASS INDEX: 23.39 KG/M2 | DIASTOLIC BLOOD PRESSURE: 66 MMHG | HEIGHT: 61 IN | SYSTOLIC BLOOD PRESSURE: 105 MMHG | TEMPERATURE: 98 F | WEIGHT: 123.9 LBS | HEART RATE: 80 BPM | OXYGEN SATURATION: 99 %

## 2018-05-03 DIAGNOSIS — K70.30 ALCOHOLIC CIRRHOSIS OF LIVER WITHOUT ASCITES (H): ICD-10-CM

## 2018-05-03 DIAGNOSIS — D50.0 CHRONIC BLOOD LOSS ANEMIA: ICD-10-CM

## 2018-05-03 DIAGNOSIS — D50.0 IRON DEFICIENCY ANEMIA DUE TO CHRONIC BLOOD LOSS: ICD-10-CM

## 2018-05-03 DIAGNOSIS — D50.8 OTHER IRON DEFICIENCY ANEMIA: ICD-10-CM

## 2018-05-03 DIAGNOSIS — D69.6 THROMBOCYTOPENIA (H): Primary | ICD-10-CM

## 2018-05-03 DIAGNOSIS — R16.1 SPLENOMEGALY: ICD-10-CM

## 2018-05-03 LAB
ABO + RH BLD: NORMAL
ABO + RH BLD: NORMAL
BASOPHILS # BLD AUTO: 0 10E9/L (ref 0–0.2)
BASOPHILS NFR BLD AUTO: 0.3 %
BLD GP AB SCN SERPL QL: NORMAL
BLD PROD TYP BPU: NORMAL
BLOOD BANK CMNT PATIENT-IMP: NORMAL
DIFFERENTIAL METHOD BLD: ABNORMAL
EOSINOPHIL # BLD AUTO: 0 10E9/L (ref 0–0.7)
EOSINOPHIL NFR BLD AUTO: 0 %
ERYTHROCYTE [DISTWIDTH] IN BLOOD BY AUTOMATED COUNT: 18.7 % (ref 10–15)
HCT VFR BLD AUTO: 22.2 % (ref 35–47)
HGB BLD-MCNC: 6.3 G/DL (ref 11.7–15.7)
IMM GRANULOCYTES # BLD: 0 10E9/L (ref 0–0.4)
IMM GRANULOCYTES NFR BLD: 0.3 %
LYMPHOCYTES # BLD AUTO: 1.5 10E9/L (ref 0.8–5.3)
LYMPHOCYTES NFR BLD AUTO: 38.6 %
MCH RBC QN AUTO: 25.7 PG (ref 26.5–33)
MCHC RBC AUTO-ENTMCNC: 28.4 G/DL (ref 31.5–36.5)
MCV RBC AUTO: 91 FL (ref 78–100)
MONOCYTES # BLD AUTO: 0.3 10E9/L (ref 0–1.3)
MONOCYTES NFR BLD AUTO: 8.2 %
NEUTROPHILS # BLD AUTO: 2 10E9/L (ref 1.6–8.3)
NEUTROPHILS NFR BLD AUTO: 52.6 %
NUM BPU REQUESTED: 2
PLATELET # BLD AUTO: 77 10E9/L (ref 150–450)
RBC # BLD AUTO: 2.45 10E12/L (ref 3.8–5.2)
SPECIMEN EXP DATE BLD: NORMAL
WBC # BLD AUTO: 3.8 10E9/L (ref 4–11)

## 2018-05-03 PROCEDURE — 36415 COLL VENOUS BLD VENIPUNCTURE: CPT | Performed by: INTERNAL MEDICINE

## 2018-05-03 PROCEDURE — 99204 OFFICE O/P NEW MOD 45 MIN: CPT | Performed by: INTERNAL MEDICINE

## 2018-05-03 PROCEDURE — 86900 BLOOD TYPING SEROLOGIC ABO: CPT | Performed by: INTERNAL MEDICINE

## 2018-05-03 PROCEDURE — 86901 BLOOD TYPING SEROLOGIC RH(D): CPT | Performed by: INTERNAL MEDICINE

## 2018-05-03 PROCEDURE — 86850 RBC ANTIBODY SCREEN: CPT | Performed by: INTERNAL MEDICINE

## 2018-05-03 PROCEDURE — 85025 COMPLETE CBC W/AUTO DIFF WBC: CPT | Performed by: INTERNAL MEDICINE

## 2018-05-03 RX ORDER — FERROUS SULFATE 325(65) MG
325 TABLET ORAL 2 TIMES DAILY
Qty: 60 TABLET | Refills: 2 | Status: SHIPPED | OUTPATIENT
Start: 2018-05-03 | End: 2018-10-24

## 2018-05-03 ASSESSMENT — PAIN SCALES - GENERAL
PAINLEVEL: NO PAIN (0)
PAINLEVEL: NO PAIN (0)

## 2018-05-03 NOTE — LETTER
5/3/2018         RE: Monalisa Olguin  [address] [facility] [id number]        Dear Colleague,    Thank you for referring your patient, Monalisa Olguin, to the [facility]. Please see a copy of my visit note below.    DATE OF VISIT: May 3, 2018    REASON FOR REFERRAL:   Anemia  Thrombocytopenia    CHIEF COMPLAINT:   Chief Complaint   Patient presents with     Hematology     Thrombocytopenia and Anemia     Consult     Ref: Denisegersoni       HISTORY OF PRESENT ILLNESS:     60-year-old female with past medical history significant for alcohol abuse, chronic cirrhosis with splenomegaly, anxiety/depression, CK, hypertension and was referred to hematology clinic today for further evaluation of chronic thrombocytopenia and normocytic anemia.    On review of medical records, patient has had thrombocytopenia since 2015 and anemia since 2017. She has been following with gastroenterology for underlying alcoholic cirrhosis as well as some. Also has undergone workup for GI blood loss with EGD and colonoscopy in December 2017 which didn't show any obvious bleeding site. Since then patient has been admitted to the hospital in March with alcohol intoxication and was noted to be severely anemic with hemoglobin in the 6's. She was transfused through 2 units of packed red blood cells. Stool guaic was positive for blood. Patient was discharged home after hemoglobin stabilized. Iron studies performed indicated iron deficiency with ferritin at 11 and saturation 7%.    Presents to the clinic today to establish care. Continues to drink alcohol almost daily. Complains of chronic intermittent epistaxis for which she is had cauterization done by her primary care provider and is in the process of seeing an ENT physician. Also notices intermittent dark stools. Denies presence of blood in the stool or melena, hematochezia, nausea/vomiting, dizziness. Complains of chronic generalized fatigue and decreased energy  levels.      REVIEW OF SYSTEMS:      ROS: 14 point ROS neg other than the symptoms noted above in the HPI.    PAST MEDICAL HISTORY:   Past Medical History:   Diagnosis Date     Alcohol abuse 2013     Alcohol dependence 2013     Alcohol withdrawal 2013     Anxiety 10/10/2011     CKD (chronic kidney disease) stage 3, GFR 30-59 ml/min     last GFR was 42     CKD (chronic kidney disease) stage 3, GFR 30-59 ml/min 2011     Depressive disorder      Esophageal reflux 10/7/2002     Hypertension goal BP (blood pressure) < 130/80 2011     Moderate Depression [296.32] 2009    stable on wellbutrin     Other internal derangement of knee(717.89)     ACL Internal derangement, knee/ original injury in 5th grade, torn cartilage     Pap smear     no abnormals, due for paps q 2-3 yrs     Unspecified essential hypertension        PAST SURGICAL HISTORY:   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     COLONOSCOPY N/A 2017    Procedure: COLONOSCOPY;  Colonoscopy;  Surgeon: Sai Johnston MD;  Location:  GI     ESOPHAGOSCOPY, GASTROSCOPY, DUODENOSCOPY (EGD), COMBINED N/A 2017    Procedure: COMBINED ESOPHAGOSCOPY, GASTROSCOPY, DUODENOSCOPY (EGD), BIOPSY SINGLE OR MULTIPLE;  ESOPHAGOSCOPY, GASTROSCOPY, DUODENOSCOPY (EGD) with biopsies;  Surgeon: Sai Johnston MD;  Location:  GI     HC KNEE SCOPE, DIAGNOSTIC  01    Arthroscopy, Lt Knee     LAPAROSCOPIC CHOLECYSTECTOMY N/A 2017    Procedure: LAPAROSCOPIC CHOLECYSTECTOMY;  laparoscopic cholecystectomy;  Surgeon: Romeo Pastor MD;  Location: [facility] OR     OPEN REDUCTION INTERNAL FIXATION WRIST Right 2017    Procedure: OPEN REDUCTION INTERNAL FIXATION WRIST;  OPEN REDUCTION INTERNAL FIXATION RIGHT WRIST;  Surgeon: Edgardo Almendarez MD;  Location:  OR       ALLERGIES:   Allergies as of  05/03/2018 - Jonny as Reviewed 05/03/2018   Allergen Reaction Noted     Albuterol  08/12/2011       MEDICATIONS:   Current Outpatient Prescriptions   Medication Sig Dispense Refill     acetaminophen (TYLENOL) 325 MG tablet Take 2 tablets (650 mg) by mouth every 4 hours as needed for mild pain 100 tablet      alendronate (FOSAMAX) 35 MG tablet Take 1 tablet (35 mg) by mouth with 8oz water every Monday (once every 7 days) 30 minutes before breakfast and remain upright during this time. 12 tablet 3     buPROPion (WELLBUTRIN XL) 300 MG 24 hr tablet Take 1 tablet (300 mg) by mouth every morning 90 tablet 3     busPIRone (BUSPAR) 15 MG tablet Take 1 tablet (15 mg) by mouth 2 times daily 180 tablet 3     ferrous sulfate (IRON) 325 (65 Fe) MG tablet Take 1 tablet (325 mg) by mouth 2 times daily 60 tablet 2     FLUoxetine (PROZAC) 20 MG capsule Take 3 capsules (60 mg) by mouth daily 180 capsule 3     naltrexone (DEPADE;REVIA) 50 MG tablet Take 1 tablet (50 mg) by mouth daily 90 tablet 3     omeprazole (PRILOSEC) 20 MG CR capsule Take 1 capsule (20 mg) by mouth daily 90 capsule 3     phosphorus tablet 250 mg (VIRT-PHOS 250 NEUTRAL) 250 MG per tablet TAKE TWO TABLETS BY MOUTH TWICE A  tablet 3     propranolol (INDERAL) 10 MG tablet Take 1 tablet (10 mg) by mouth 2 times daily 180 tablet 3     temazepam (RESTORIL) 15 MG capsule Take 1 capsule (15 mg) by mouth nightly as needed for sleep 30 capsule      TL RICARDO RX 2.2-25-1 MG TABS TAKE ONE TABLET BY MOUTH EVERY DAY 90 tablet 3     VITAMIN B-1 100 MG tablet TAKE ONE TABLET BY MOUTH EVERY DAY 90 tablet 3        FAMILY HISTORY:   Family History   Problem Relation Age of Onset     Hypertension Mother      HEART DISEASE Paternal Grandmother      HEART DISEASE Paternal Grandfather      CEREBROVASCULAR DISEASE Maternal Grandmother      CEREBROVASCULAR DISEASE Maternal Grandfather      Alcohol/Drug Maternal Grandfather      Substance Abuse Maternal Grandfather      HEART DISEASE  "Father      Silent MI stents x 2     DIABETES Sister 17     older sister also had kidney and pancreas transplant     HEART DISEASE Sister      MI secondary to diabetes     Genitourinary Problems Sister 43     kidney transplant from renal failure     DIABETES Sister 9     youngest, juvenile type I (onset age 9 with no complications)     CANCER Paternal Aunt      ?kind       SOCIAL HISTORY:   Social History     Social History     Marital status:      Spouse name: Eron     Number of children: 3     Years of education: 14     Occupational History     Homemaker      Social History Main Topics     Smoking status: Current Every Day Smoker     Packs/day: 0.25     Years: 10.00     Smokeless tobacco: Never Used      Comment: pt quit 1992 after smoking for 8 yrs, started again in 2004     Alcohol use 0.0 oz/week     0 Standard drinks or equivalent per week      Comment: started drinking 2 weeks ago     Drug use: No     Sexual activity: Yes     Partners: Male     Birth control/ protection: Surgical      Comment:  had vasectomy     Other Topics Concern      Service No     Blood Transfusions No     Caffeine Concern No     Occupational Exposure No     Hobby Hazards No     Sleep Concern No     Stress Concern No     Weight Concern No     Special Diet No     Back Care No     Exercise No     Bike Helmet No     Seat Belt Yes     Self-Exams No     Parent/Sibling W/ Cabg, Mi Or Angioplasty Before 65f 55m? Yes     Social History Narrative     and lives at home with her  and her son, her two daughters are out of the house       PHYSICAL EXAMINATION:   /66 (BP Location: Right arm, Patient Position: Chair, Cuff Size: Adult Regular)  Pulse 80  Temp 98  F (36.7  C) (Temporal)  Resp 18  Ht 1.549 m (5' 1\")  Wt 56.2 kg (123 lb 14.4 oz)  LMP 05/16/2012  SpO2 99%  BMI 23.41 kg/m2  Wt Readings from Last 10 Encounters:   05/03/18 56.2 kg (123 lb 14.4 oz)   04/11/18 57.2 kg (126 lb)   03/27/18 58.7 kg " (129 lb 6.4 oz)   12/27/17 58.2 kg (128 lb 6.4 oz)   12/20/17 58.2 kg (128 lb 6.4 oz)   11/29/17 54.9 kg (121 lb)   11/28/17 54.9 kg (121 lb)   11/28/17 54.7 kg (120 lb 9.6 oz)   11/28/17 54.7 kg (120 lb 9.6 oz)   11/14/17 56.7 kg (125 lb)      Exam:  Constitutional: alert and no distress.   Head: Normocephalic. No masses, lesions, tenderness or abnormalities  Neck: Neck supple. No adenopathy.  ENT: ENT exam normal, no neck nodes or sinus tenderness  Cardiovascular: RRR. No murmurs, clicks gallops or rub  Respiratory:  Lungs clear  Gastrointestinal: Abdomen soft, non-tender. BS normal.   Musculoskeletal: extremities normal  Skin: no suspicious lesions or rashes  Neurologic: Gait normal. Sensation grossly WNL.  Psychiatric: mentation appears normal and affect normal/bright  Hematologic/Lymphatic/Immunologic: Normal cervical lymph nodes        LABORATORY RESULTS:    Recent Labs   Lab Test  03/27/18   1510  03/21/18   1331  12/20/17   1403   05/23/17   1610   04/04/17   0937   03/28/17   0357   03/27/17   0511   NA  136   --   144   < >  144   < >  140   < >  142   --   140   POTASSIUM  3.5   --   3.7   < >  4.1   < >  3.7   < >  3.8   --   3.9   CHLORIDE  104   --   113*   < >  109   < >  104   < >  109   --   104   BUN  6*  8  7   < >  10   < >  7   < >  <1*   --   2*   CR  0.69  0.93  1.02   < >  0.87   < >  0.65   < >  0.52   --   0.52   GLC  90   --   93   < >  106*   < >  96   < >  75   --   123*   ELSIE  7.5*   --   8.1*   < >  9.2   < >  8.6   < >  7.9*   --   7.5*   MAG   --    --    --    --    --    --    --    --   2.0   --   3.1*   PHOS   --    --    --    --   4.6*   --   1.9*   < >  2.3*   < >   --     < > = values in this interval not displayed.     Recent Labs   Lab Test  04/09/18   1203  03/28/18   0920  03/28/18   0513   03/27/18   1510   10/17/17   6385   WBC  4.1   --   4.2   --   5.6   < >  4.1   HGB  7.8*  8.1*  7.9*   < >  6.2*   < >  8.1*   PLT  95*   --   97*   --   126*   < >  100*   MCV  86    --   84   --   83   < >  89   NEUTROPHIL  49.8   --    --    --   47.3   --   48.2    < > = values in this interval not displayed.     Recent Labs   Lab Test  03/27/18   1510  03/21/18   1331  12/20/17   1403   BILITOTAL  1.2  1.6*  1.1   ALKPHOS  248*  199*  222*   ALT  20  19  23   AST  46*  38  32   ALBUMIN  3.2*  2.8*  3.0*     TSH   Date Value Ref Range Status   12/20/2017 1.02 0.40 - 4.00 mU/L Final     Component      Latest Ref Rng & Units 12/20/2017 3/21/2018 3/27/2018   Iron      35 - 180 ug/dL 38 27 (L)    Iron Binding Cap      240 - 430 ug/dL 400 410    Iron Saturation Index      15 - 46 % 10 (L) 7 (L)    Ferritin      8 - 252 ng/mL 11     Occult Blood      NEG:Negative   Positive (A)       Peripheral Smear Morphology:   - Marked normocytic hypochromic anemia.   - Thrombocytopenia.     COMMENT:   The findings along with results of the current iron studies are indicative    of iron deficiency.  Sources of low   grade bleeding may need to be investigated.   Thrombocytopenia may be seen in association with conditions of decreased   production by the bone marrow (e.g.   marrow infiltrative process, myelodysplasia, suppression by alcohol)and/or conditions of increased platelets destruction (e.g. ITP, TTP, DIC, SLE, drugs, infections including anaplasmosis, lymphomas), among other   conditions.  There is no evidence of platelets clumping and/or satellitism    (as seen in spurious thrombocytopenia).  There is no evidence of red cell fragments (as seen in  DIC or TTP).  Clinical correlation is recommended.     IMAGING RESULTS:  MRI abdomen 09/2017      1. Stable to minimally increased (since 3/27/2017 abdominal MRI) mild dilation of the extrahepatic biliary tree. Relatively abrupt abrupt, but fairly smooth, narrowing of the distal common bile duct at the pancreatic head, more pronounced than on prior MRCP, though there is no convincing evidence of choledocholithiasis or obstructing  pancreatic head lesion.    2.  Layering hypointense stones and sludge in the gallbladder lumen. No findings to suggest cholecystitis.    3a. Mildly nodular hepatic contour, compatible with chronic intrinsic parenchymal disease and possible developing cirrhosis. Splenomegaly and trace upper abdominal ascites suggestive of portal hypertension.    3b. Mildly heterogeneous hepatic parenchymal signal and enhancement may be secondary to inflammation.    4. Left nephrectomy    ASSESSMENT AND PLAN:    52-year-old female with past medical history significant for alcoholic cirrhosis with splenomegaly was referred to hematology clinic today for further option of normocytic anemia and moderate thrombocytopenia.      Normocytic anemia    It is multifactorial in nature. Contributing factors are iron deficiency anemia from blood loss ( GI tract and epistaxis) in combination with marrow suppression from alcohol and underlying cirrhosis with hypersplenism. The patient has had EGD and colonoscopy in December 2017 with no obvious bleeding identified. However the stool guaiac was positive for blood. Iron studies showed iron deficiency with ferritin at 11 and iron saturation 7%    Plan: CBC with platelets differential today.   Start her on p.o. ferrous sulfate (IRON) 325 (65 Fe) MG tablet at least twice daily. We'll recheck iron levels in 2 months to assess response and consider IV iron if response is not adequate or patient has not tolerated oral iron.  Patient is meeting with GI in June and can consider further workup for GI tract blood loss if indicated.     Thrombocytopenia   Secondary to alcohol abuse and underlying cirrhosis with hypersplenism  Monitor    Alcoholic cirrhosis of liver with portal hypertension /Splenomegaly  Councellad on alcohol abstinence  Following with gastroenterology    RTC in 2 months with follow-up with labs.     The patient is ready to learn, no apparent learning barriers were identified, Diagnosis and treatment plans were explained to  the patient. The patient expressed understanding of the content. The patient questions were answered to her satisfaction.    Chart documentation with Dragon Voice recognition Software. Although reviewed after completion, some words and grammatical errors may remain.    Usman Lee MD  Attending Physician   Hematology/Medical Oncology        Called infusion to schedule.  Patient to come in tomorrow (5/4/18) at 8:00.   left for patient to return call.    Lester Do RN, BSN, OCN  5/3/2018, 3:43 PM     Again, thank you for allowing me to participate in the care of your patient.        Sincerely,        Usman Lee MD

## 2018-05-03 NOTE — MR AVS SNAPSHOT
After Visit Summary   5/3/2018    Monalisa Olguin    MRN: [id number]           Patient Information     Date Of Birth          1965        Visit Information        Provider Department      5/3/2018 9:00 AM Usman Lee MD [facility]        Today's Diagnoses     Chronic blood loss anemia    -  1    Thrombocytopenia (H)        Alcoholic cirrhosis of liver without ascites (H) - per Hepatology consult Nov 2017        Splenomegaly          Care Instructions      Please follow up with Dr. Lee in 2 months.  Please schedule labs about a week prior to follow up appointment.    Labs today!    Start Iron pills!    Lab Date/Time:    Follow Up Date/Time:     If you have any questions or concerns please feel free to call.    If you need to reschedule please call:  Clinic or Lab Appointment - [phone]  Infusion - [phone]  Imaging - [phone]    Lester Do RN, BSN, OCN  [facility]   Oncology/Hematology Care Coordinator RN  [facility]  [phone]              Follow-ups after your visit        Your next 10 appointments already scheduled     Jun 05, 2018  1:20 PM CDT   PHYSICAL with Efren Bustos MD   [facility] ([facility])    [address] [place] [id number] [phone]            Jun 06, 2018  3:00 PM CDT   (Arrive by 2:45 PM)   Return General Liver with Edgardo Kovacs MD   [facility] ([facility])    [address]  [address] [place] [id number] [phone]              Future tests that were ordered for you today     Open Future Orders        Priority Expected Expires Ordered    CBC with platelets differential Routine 7/3/2018 5/3/2019 5/3/2018    Iron and iron binding capacity Routine 7/3/2018 5/3/2019 5/3/2018    Ferritin Routine 7/3/2018 5/3/2019 5/3/2018            Who to contact     If you have questions or need follow up information about today's  "clinic visit or your schedule please contact [facility] directly at [phone].  Normal or non-critical lab and imaging results will be communicated to you by MyChart, letter or phone within 4 business days after the clinic has received the results. If you do not hear from us within 7 days, please contact the clinic through [employer] or phone. If you have a critical or abnormal lab result, we will notify you by phone as soon as possible.  Submit refill requests through "[employer] or call your pharmacy and they will forward the refill request to us. Please allow 3 business days for your refill to be completed.          Additional Information About Your Visit        [facility] Information     "[employer] gives you secure access to your electronic health record. If you see a primary care provider, you can also send messages to your care team and make appointments. If you have questions, please call your primary care clinic.  If you do not have a primary care provider, please call [phone] and they will assist you.        Care EveryWhere ID     This is your Care EveryWhere ID. This could be used by other organizations to access your [place] medical records  [id number]        Your Vitals Were     Pulse Temperature Respirations Height Last Period Pulse Oximetry    80 98  F (36.7  C) (Temporal) 18 1.549 m (5' 1\") 05/16/2012 99%    BMI (Body Mass Index)                   23.41 kg/m2            Blood Pressure from Last 3 Encounters:   05/03/18 105/66   04/11/18 114/68   03/28/18 112/67    Weight from Last 3 Encounters:   05/03/18 56.2 kg (123 lb 14.4 oz)   04/11/18 57.2 kg (126 lb)   03/27/18 58.7 kg (129 lb 6.4 oz)              We Performed the Following     CBC with platelets differential        Primary Care Provider Office Phone # Fax #    Efren Bustos [phone]       [address] [place] [phone]        Equal Access to Services     ADRIENNE GILLIAM AH: Obed jiménez " Soamilcar, walibbyda luqadaha, qaybta kaalmada rajeev, salo jimenesandres marek. So [facility] [phone].    ATENCIÓN: Si fannie earl, tiene a perez disposición servicios gratuitos de asistencia lingüística. Nadira al [phone].    We comply with applicable federal civil rights laws and [place] laws. We do not discriminate on the basis of race, color, national origin, age, disability, sex, sexual orientation, or gender identity.            Thank you!     Thank you for choosing [facility]  for your care. Our goal is always to provide you with excellent care. Hearing back from our patients is one way we can continue to improve our services. Please take a few minutes to complete the written survey that you may receive in the mail after your visit with us. Thank you!             Your Updated Medication List - Protect others around you: Learn how to safely use, store and throw away your medicines at www.disposemymeds.org.          This list is accurate as of 5/3/18  9:46 AM.  Always use your most recent med list.                   Brand Name Dispense Instructions for use Diagnosis    acetaminophen 325 MG tablet    TYLENOL    100 tablet    Take 2 tablets (650 mg) by mouth every 4 hours as needed for mild pain    Alcoholic cirrhosis of liver without ascites (H)       alendronate 35 MG tablet    FOSAMAX    12 tablet    Take 1 tablet (35 mg) by mouth with 8oz water every Monday (once every 7 days) 30 minutes before breakfast and remain upright during this time.    Osteopenia, unspecified location       buPROPion 300 MG 24 hr tablet    WELLBUTRIN XL    90 tablet    Take 1 tablet (300 mg) by mouth every morning    Major depressive disorder, recurrent episode, moderate (H)       busPIRone 15 MG tablet    BUSPAR    180 tablet    Take 1 tablet (15 mg) by mouth 2 times daily    MONA (generalized anxiety disorder)       FLUoxetine 20 MG capsule    PROzac    180 capsule    Take 3 capsules (60 mg) by  mouth daily    Anxiety, Major depressive disorder, recurrent episode, moderate (H)       naltrexone 50 MG tablet    DEPADE;REVIA    90 tablet    Take 1 tablet (50 mg) by mouth daily    Alcohol dependence in remission (H)       omeprazole 20 MG CR capsule    priLOSEC    90 capsule    Take 1 capsule (20 mg) by mouth daily    Gastroesophageal reflux disease with esophagitis       phosphorus tablet 250 mg 250 MG per tablet    VIRT-PHOS 250 NEUTRAL    120 tablet    TAKE TWO TABLETS BY MOUTH TWICE A DAY    Hypophosphatemia       propranolol 10 MG tablet    INDERAL    180 tablet    Take 1 tablet (10 mg) by mouth 2 times daily    Hypertension goal BP (blood pressure) < 130/80       temazepam 15 MG capsule    RESTORIL    30 capsule    Take 1 capsule (15 mg) by mouth nightly as needed for sleep        thiamine 100 MG tablet     90 tablet    TAKE ONE TABLET BY MOUTH EVERY DAY    Alcohol dependence in remission (H)       TL RICARDO RX 2.2-25-1 MG Tabs   Generic drug:  Folic Acid-Vit B6-Vit B12     90 tablet    TAKE ONE TABLET BY MOUTH EVERY DAY    Alcoholism in recovery (H)

## 2018-05-03 NOTE — NURSING NOTE
"Oncology Rooming Note    May 3, 2018 9:15 AM   Monalisa Olguin is a 52 year old female who presents for:    Chief Complaint   Patient presents with     Hematology     Thrombocytopenia and Anemia     Consult     Ref: Juli     Initial Vitals: /66 (BP Location: Right arm, Patient Position: Chair, Cuff Size: Adult Regular)  Pulse 80  Temp 98  F (36.7  C) (Temporal)  Resp 18  Ht 1.549 m (5' 1\")  Wt 56.2 kg (123 lb 14.4 oz)  LMP 05/16/2012  SpO2 99%  BMI 23.41 kg/m2 Estimated body mass index is 23.41 kg/(m^2) as calculated from the following:    Height as of this encounter: 1.549 m (5' 1\").    Weight as of this encounter: 56.2 kg (123 lb 14.4 oz). Body surface area is 1.56 meters squared.  No Pain (0) Comment: Data Unavailable   Patient's last menstrual period was 05/16/2012.  Allergies reviewed: Yes  Medications reviewed: Yes    Medications: Medication refills not needed today.  Pharmacy name entered into [facility]:    [facility] [address]     Nausea, Vomiting: No  Fever/Chills:  No  Mouth Sores: No  Diarrhea/Constipation: Yes (Please explain): Diarrhea past 4 days  Urination: No Concerns  Skin/Excessive dryness: No Concerns  Cracking, Peeling: Yes (Please explain):  Dry cracking  Unusual Bruising/Bleeding: Yes (Please explain): Nose bleeds since december  Respiratory/SOB: Concerns (explain) - SOB  Neuropathy/Numbness&Tingling-Hands/Feet: No  Cognitive Disturbance-Memory: Yes (Please explain):   Sleep Concerns: Concerns (explain) - Insomnia  Night Sweats:  Yes (Please explain):   Fatigue/Weakness: Yes (Please explain):   Light Headed or Dizzy: Yes (Please explain): since august  Appetite:  Concerns (explain) - poor  Able to drink 6 to 8 glasses of fluid: No Concerns- trying to  Sexuality: Concerns (explain) - dryness in the vagina      Clinical concerns: See above Dr. armenta was notified.    10  minutes for nursing intake (face to face time)     Kusum JULIAN" CMA

## 2018-05-03 NOTE — NURSING NOTE
DISCHARGE PLAN:  Next appointments: See patient instruction section  Departure Mode: Ambulatory  Accompanied by: self  10 minutes for nursing discharge (face to face time)     Monalisa Olguin is here today for hematology consult for thrombocytopenia & anemia.  Writing nurse seen patient after Medical Oncology appointment to address questions/concerns/coordinate care. Patient ambulated by nurse to  to schedule follow up and/or lab appointments. Labs today. Start iron supplement today, sent to pharmacy. Pt to have a follow up in 2 weeks with labs. See patient instructions and Oncologist's Progress note for further details. Questions and concerns addressed to patient's satisfaction. Patient verbalized and demonstrated understanding of plan.  Contact information provided and patient is encouraged to call with any that arise in the interim of care.    Kusum JULIAN [facility]    Oncology/Hematology at [facility]  [phone]  5/3/2018, 3:30 PM

## 2018-05-03 NOTE — PROGRESS NOTES
Called infusion to schedule.  Patient to come in tomorrow (5/4/18) at 8:00.   left for patient to return call.    Lester Do RN, BSN, OCN  5/3/2018, 3:43 PM

## 2018-05-03 NOTE — PROGRESS NOTES
DATE OF VISIT: May 3, 2018    REASON FOR REFERRAL:   Anemia  Thrombocytopenia    CHIEF COMPLAINT:   Chief Complaint   Patient presents with     Hematology     Thrombocytopenia and Anemia     Consult     Ref: Juli       HISTORY OF PRESENT ILLNESS:     60-year-old female with past medical history significant for alcohol abuse, chronic cirrhosis with splenomegaly, anxiety/depression, CK, hypertension and was referred to hematology clinic today for further evaluation of chronic thrombocytopenia and normocytic anemia.    On review of medical records, patient has had thrombocytopenia since 2015 and anemia since 2017. She has been following with gastroenterology for underlying alcoholic cirrhosis as well as some. Also has undergone workup for GI blood loss with EGD and colonoscopy in December 2017 which didn't show any obvious bleeding site. Since then patient has been admitted to the hospital in March with alcohol intoxication and was noted to be severely anemic with hemoglobin in the 6's. She was transfused through 2 units of packed red blood cells. Stool guaic was positive for blood. Patient was discharged home after hemoglobin stabilized. Iron studies performed indicated iron deficiency with ferritin at 11 and saturation 7%.    Presents to the clinic today to establish care. Continues to drink alcohol almost daily. Complains of chronic intermittent epistaxis for which she is had cauterization done by her primary care provider and is in the process of seeing an ENT physician. Also notices intermittent dark stools. Denies presence of blood in the stool or melena, hematochezia, nausea/vomiting, dizziness. Complains of chronic generalized fatigue and decreased energy levels.      REVIEW OF SYSTEMS:      ROS: 14 point ROS neg other than the symptoms noted above in the HPI.    PAST MEDICAL HISTORY:   Past Medical History:   Diagnosis Date     Alcohol abuse 5/2/2013     Alcohol dependence 5/25/2013     Alcohol withdrawal  2013     Anxiety 10/10/2011     CKD (chronic kidney disease) stage 3, GFR 30-59 ml/min     last GFR was 42     CKD (chronic kidney disease) stage 3, GFR 30-59 ml/min 2011     Depressive disorder      Esophageal reflux 10/7/2002     Hypertension goal BP (blood pressure) < 130/80 2011     Moderate Depression [296.32] 2009    stable on wellbutrin     Other internal derangement of knee(717.89)     ACL Internal derangement, knee/ original injury in 5th grade, torn cartilage     Pap smear     no abnormals, due for paps q 2-3 yrs     Unspecified essential hypertension        PAST SURGICAL HISTORY:   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     COLONOSCOPY N/A 2017    Procedure: COLONOSCOPY;  Colonoscopy;  Surgeon: Sai Johnston MD;  Location: [facility]     ESOPHAGOSCOPY, GASTROSCOPY, DUODENOSCOPY (EGD), COMBINED N/A 2017    Procedure: COMBINED ESOPHAGOSCOPY, GASTROSCOPY, DUODENOSCOPY (EGD), BIOPSY SINGLE OR MULTIPLE;  ESOPHAGOSCOPY, GASTROSCOPY, DUODENOSCOPY (EGD) with biopsies;  Surgeon: Sai Johnston MD;  Location: [facility]     HC KNEE SCOPE, DIAGNOSTIC  01    Arthroscopy, Lt Knee     LAPAROSCOPIC CHOLECYSTECTOMY N/A 2017    Procedure: LAPAROSCOPIC CHOLECYSTECTOMY;  laparoscopic cholecystectomy;  Surgeon: Romeo Pastor MD;  Location: [facility]     OPEN REDUCTION INTERNAL FIXATION WRIST Right 2017    Procedure: OPEN REDUCTION INTERNAL FIXATION WRIST;  OPEN REDUCTION INTERNAL FIXATION RIGHT WRIST;  Surgeon: Edgardo Almendarez MD;  Location: [facility]       ALLERGIES:   Allergies as of 2018 - Jonny as Reviewed 2018   Allergen Reaction Noted     Albuterol  2011       MEDICATIONS:   Current Outpatient Prescriptions   Medication Sig Dispense Refill     acetaminophen (TYLENOL) 325 MG tablet Take 2 tablets (650 mg) by mouth  every 4 hours as needed for mild pain 100 tablet      alendronate (FOSAMAX) 35 MG tablet Take 1 tablet (35 mg) by mouth with 8oz water every Monday (once every 7 days) 30 minutes before breakfast and remain upright during this time. 12 tablet 3     buPROPion (WELLBUTRIN XL) 300 MG 24 hr tablet Take 1 tablet (300 mg) by mouth every morning 90 tablet 3     busPIRone (BUSPAR) 15 MG tablet Take 1 tablet (15 mg) by mouth 2 times daily 180 tablet 3     ferrous sulfate (IRON) 325 (65 Fe) MG tablet Take 1 tablet (325 mg) by mouth 2 times daily 60 tablet 2     FLUoxetine (PROZAC) 20 MG capsule Take 3 capsules (60 mg) by mouth daily 180 capsule 3     naltrexone (DEPADE;REVIA) 50 MG tablet Take 1 tablet (50 mg) by mouth daily 90 tablet 3     omeprazole (PRILOSEC) 20 MG CR capsule Take 1 capsule (20 mg) by mouth daily 90 capsule 3     phosphorus tablet 250 mg (VIRT-PHOS 250 NEUTRAL) 250 MG per tablet TAKE TWO TABLETS BY MOUTH TWICE A  tablet 3     propranolol (INDERAL) 10 MG tablet Take 1 tablet (10 mg) by mouth 2 times daily 180 tablet 3     temazepam (RESTORIL) 15 MG capsule Take 1 capsule (15 mg) by mouth nightly as needed for sleep 30 capsule      TL RICARDO RX 2.2-25-1 MG TABS TAKE ONE TABLET BY MOUTH EVERY DAY 90 tablet 3     VITAMIN B-1 100 MG tablet TAKE ONE TABLET BY MOUTH EVERY DAY 90 tablet 3        FAMILY HISTORY:   Family History   Problem Relation Age of Onset     Hypertension Mother      HEART DISEASE Paternal Grandmother      HEART DISEASE Paternal Grandfather      CEREBROVASCULAR DISEASE Maternal Grandmother      CEREBROVASCULAR DISEASE Maternal Grandfather      Alcohol/Drug Maternal Grandfather      Substance Abuse Maternal Grandfather      HEART DISEASE Father      Silent MI stents x 2     DIABETES Sister 17     older sister also had kidney and pancreas transplant     HEART DISEASE Sister      MI secondary to diabetes     Genitourinary Problems Sister 43     kidney transplant from renal failure      "DIABETES Sister 9     youngest, juvenile type I (onset age 9 with no complications)     CANCER Paternal Aunt      ?kind       SOCIAL HISTORY:   Social History     Social History     Marital status:      Spouse name: Eron     Number of children: 3     Years of education: 14     Occupational History     Homemaker      Social History Main Topics     Smoking status: Current Every Day Smoker     Packs/day: 0.25     Years: 10.00     Smokeless tobacco: Never Used      Comment: pt quit 1992 after smoking for 8 yrs, started again in 2004     Alcohol use 0.0 oz/week     0 Standard drinks or equivalent per week      Comment: started drinking 2 weeks ago     Drug use: No     Sexual activity: Yes     Partners: Male     Birth control/ protection: Surgical      Comment:  had vasectomy     Other Topics Concern      Service No     Blood Transfusions No     Caffeine Concern No     Occupational Exposure No     Hobby Hazards No     Sleep Concern No     Stress Concern No     Weight Concern No     Special Diet No     Back Care No     Exercise No     Bike Helmet No     Seat Belt Yes     Self-Exams No     Parent/Sibling W/ Cabg, Mi Or Angioplasty Before 65f 55m? Yes     Social History Narrative     and lives at home with her  and her son, her two daughters are out of the house       PHYSICAL EXAMINATION:   /66 (BP Location: Right arm, Patient Position: Chair, Cuff Size: Adult Regular)  Pulse 80  Temp 98  F (36.7  C) (Temporal)  Resp 18  Ht 1.549 m (5' 1\")  Wt 56.2 kg (123 lb 14.4 oz)  LMP 05/16/2012  SpO2 99%  BMI 23.41 kg/m2  Wt Readings from Last 10 Encounters:   05/03/18 56.2 kg (123 lb 14.4 oz)   04/11/18 57.2 kg (126 lb)   03/27/18 58.7 kg (129 lb 6.4 oz)   12/27/17 58.2 kg (128 lb 6.4 oz)   12/20/17 58.2 kg (128 lb 6.4 oz)   11/29/17 54.9 kg (121 lb)   11/28/17 54.9 kg (121 lb)   11/28/17 54.7 kg (120 lb 9.6 oz)   11/28/17 54.7 kg (120 lb 9.6 oz)   11/14/17 56.7 kg (125 lb)    "   Exam:  Constitutional: alert and no distress.   Head: Normocephalic. No masses, lesions, tenderness or abnormalities  Neck: Neck supple. No adenopathy.  ENT: ENT exam normal, no neck nodes or sinus tenderness  Cardiovascular: RRR. No murmurs, clicks gallops or rub  Respiratory:  Lungs clear  Gastrointestinal: Abdomen soft, non-tender. BS normal.   Musculoskeletal: extremities normal  Skin: no suspicious lesions or rashes  Neurologic: Gait normal. Sensation grossly WNL.  Psychiatric: mentation appears normal and affect normal/bright  Hematologic/Lymphatic/Immunologic: Normal cervical lymph nodes        LABORATORY RESULTS:    Recent Labs   Lab Test  03/27/18   1510  03/21/18   1331  12/20/17   1403   05/23/17   1610   04/04/17   0937   03/28/17   0357   03/27/17   0511   NA  136   --   144   < >  144   < >  140   < >  142   --   140   POTASSIUM  3.5   --   3.7   < >  4.1   < >  3.7   < >  3.8   --   3.9   CHLORIDE  104   --   113*   < >  109   < >  104   < >  109   --   104   BUN  6*  8  7   < >  10   < >  7   < >  <1*   --   2*   CR  0.69  0.93  1.02   < >  0.87   < >  0.65   < >  0.52   --   0.52   GLC  90   --   93   < >  106*   < >  96   < >  75   --   123*   ELSIE  7.5*   --   8.1*   < >  9.2   < >  8.6   < >  7.9*   --   7.5*   MAG   --    --    --    --    --    --    --    --   2.0   --   3.1*   PHOS   --    --    --    --   4.6*   --   1.9*   < >  2.3*   < >   --     < > = values in this interval not displayed.     Recent Labs   Lab Test  04/09/18   1203  03/28/18   0920  03/28/18   0513   03/27/18   1510   10/17/17   2245   WBC  4.1   --   4.2   --   5.6   < >  4.1   HGB  7.8*  8.1*  7.9*   < >  6.2*   < >  8.1*   PLT  95*   --   97*   --   126*   < >  100*   MCV  86   --   84   --   83   < >  89   NEUTROPHIL  49.8   --    --    --   47.3   --   48.2    < > = values in this interval not displayed.     Recent Labs   Lab Test  03/27/18   1510  03/21/18   1331  12/20/17   1403   BILITOTAL  1.2  1.6*  1.1    ALKPHOS  248*  199*  222*   ALT  20  19  23   AST  46*  38  32   ALBUMIN  3.2*  2.8*  3.0*     TSH   Date Value Ref Range Status   12/20/2017 1.02 0.40 - 4.00 mU/L Final     Component      Latest Ref Rng & Units 12/20/2017 3/21/2018 3/27/2018   Iron      35 - 180 ug/dL 38 27 (L)    Iron Binding Cap      240 - 430 ug/dL 400 410    Iron Saturation Index      15 - 46 % 10 (L) 7 (L)    Ferritin      8 - 252 ng/mL 11     Occult Blood      NEG:Negative   Positive (A)       Peripheral Smear Morphology:   - Marked normocytic hypochromic anemia.   - Thrombocytopenia.     COMMENT:   The findings along with results of the current iron studies are indicative    of iron deficiency.  Sources of low   grade bleeding may need to be investigated.   Thrombocytopenia may be seen in association with conditions of decreased   production by the bone marrow (e.g.   marrow infiltrative process, myelodysplasia, suppression by alcohol)and/or conditions of increased platelets destruction (e.g. ITP, TTP, DIC, SLE, drugs, infections including anaplasmosis, lymphomas), among other   conditions.  There is no evidence of platelets clumping and/or satellitism    (as seen in spurious thrombocytopenia).  There is no evidence of red cell fragments (as seen in  DIC or TTP).  Clinical correlation is recommended.     IMAGING RESULTS:  MRI abdomen 09/2017      1. Stable to minimally increased (since 3/27/2017 abdominal MRI) mild dilation of the extrahepatic biliary tree. Relatively abrupt abrupt, but fairly smooth, narrowing of the distal common bile duct at the pancreatic head, more pronounced than on prior MRCP, though there is no convincing evidence of choledocholithiasis or obstructing  pancreatic head lesion.    2. Layering hypointense stones and sludge in the gallbladder lumen. No findings to suggest cholecystitis.    3a. Mildly nodular hepatic contour, compatible with chronic intrinsic parenchymal disease and possible developing cirrhosis.  Splenomegaly and trace upper abdominal ascites suggestive of portal hypertension.    3b. Mildly heterogeneous hepatic parenchymal signal and enhancement may be secondary to inflammation.    4. Left nephrectomy    ASSESSMENT AND PLAN:    52-year-old female with past medical history significant for alcoholic cirrhosis with splenomegaly was referred to hematology clinic today for further option of normocytic anemia and moderate thrombocytopenia.      Normocytic anemia    It is multifactorial in nature. Contributing factors are iron deficiency anemia from blood loss ( GI tract and epistaxis) in combination with marrow suppression from alcohol and underlying cirrhosis with hypersplenism. The patient has had EGD and colonoscopy in December 2017 with no obvious bleeding identified. However the stool guaiac was positive for blood. Iron studies showed iron deficiency with ferritin at 11 and iron saturation 7%    Plan: CBC with platelets differential today.   Start her on p.o. ferrous sulfate (IRON) 325 (65 Fe) MG tablet at least twice daily. We'll recheck iron levels in 2 months to assess response and consider IV iron if response is not adequate or patient has not tolerated oral iron.  Patient is meeting with GI in June and can consider further workup for GI tract blood loss if indicated.     Thrombocytopenia   Secondary to alcohol abuse and underlying cirrhosis with hypersplenism  Monitor    Alcoholic cirrhosis of liver with portal hypertension /Splenomegaly  Councellad on alcohol abstinence  Following with gastroenterology    RTC in 2 months with follow-up with labs.     The patient is ready to learn, no apparent learning barriers were identified, Diagnosis and treatment plans were explained to the patient. The patient expressed understanding of the content. The patient questions were answered to her satisfaction.    Chart documentation with Dragon Voice recognition Software. Although reviewed after completion, some words  and grammatical errors may remain.    Usman Lee MD  Attending Physician   Hematology/Medical Oncology

## 2018-05-03 NOTE — PATIENT INSTRUCTIONS
Please follow up with Dr. Lee in 2 months.  Please schedule labs about a week prior to follow up appointment.    Labs today!    Start Iron pills!    Lab Date/Time:    Follow Up Date/Time:     If you have any questions or concerns please feel free to call.    If you need to reschedule please call:  Clinic or Lab Appointment - [phone]  Infusion - [phone]  Imaging - [phone]    Lester Do RN, BSN, OCN  [facility]   Oncology/Hematology Care Coordinator RN  [facility]  [phone]

## 2018-05-04 ENCOUNTER — DOCUMENTATION ONLY (OUTPATIENT)
Dept: SPIRITUAL SERVICES | Facility: CLINIC | Age: 53
End: 2018-05-04

## 2018-05-04 ENCOUNTER — INFUSION THERAPY VISIT (OUTPATIENT)
Dept: INFUSION THERAPY | Facility: CLINIC | Age: 53
End: 2018-05-04
Attending: INTERNAL MEDICINE
Payer: COMMERCIAL

## 2018-05-04 VITALS
HEART RATE: 84 BPM | SYSTOLIC BLOOD PRESSURE: 102 MMHG | DIASTOLIC BLOOD PRESSURE: 49 MMHG | TEMPERATURE: 99.7 F | RESPIRATION RATE: 20 BRPM | OXYGEN SATURATION: 96 % | HEIGHT: 61 IN | BODY MASS INDEX: 23.22 KG/M2 | WEIGHT: 123 LBS

## 2018-05-04 DIAGNOSIS — D50.9 IDA (IRON DEFICIENCY ANEMIA): Primary | ICD-10-CM

## 2018-05-04 DIAGNOSIS — Z71.81 SPIRITUAL OR RELIGIOUS COUNSELING: Primary | ICD-10-CM

## 2018-05-04 DIAGNOSIS — D50.8 OTHER IRON DEFICIENCY ANEMIA: ICD-10-CM

## 2018-05-04 DIAGNOSIS — D50.0 CHRONIC BLOOD LOSS ANEMIA: ICD-10-CM

## 2018-05-04 LAB
BLD PROD TYP BPU: NORMAL
BLD UNIT ID BPU: 0
BLOOD PRODUCT CODE: NORMAL
BPU ID: NORMAL
TRANSFUSION STATUS PATIENT QL: NORMAL

## 2018-05-04 PROCEDURE — 25000128 H RX IP 250 OP 636: Performed by: INTERNAL MEDICINE

## 2018-05-04 PROCEDURE — 86923 COMPATIBILITY TEST ELECTRIC: CPT | Performed by: INTERNAL MEDICINE

## 2018-05-04 PROCEDURE — 96365 THER/PROPH/DIAG IV INF INIT: CPT

## 2018-05-04 PROCEDURE — 36430 TRANSFUSION BLD/BLD COMPNT: CPT

## 2018-05-04 PROCEDURE — P9016 RBC LEUKOCYTES REDUCED: HCPCS | Performed by: INTERNAL MEDICINE

## 2018-05-04 RX ADMIN — SODIUM CHLORIDE 250 ML: 9 INJECTION, SOLUTION INTRAVENOUS at 08:39

## 2018-05-04 RX ADMIN — FERRIC CARBOXYMALTOSE INJECTION 750 MG: 50 INJECTION, SOLUTION INTRAVENOUS at 08:41

## 2018-05-04 ASSESSMENT — PAIN SCALES - GENERAL: PAINLEVEL: MODERATE PAIN (4)

## 2018-05-04 NOTE — PROGRESS NOTES
"SPIRITUAL HEALTH SERVICES  SPIRITUAL ASSESSMENT Progress Note  [facility]       introduced himself to Monalisa Reyesconsuelo and informed her of his availability.  Rounding - Pt indicated she was \":very satisfied\" with her care.    Mehran Espino M.Div., [facility]  Staff   Office tel: [phone]    "

## 2018-05-04 NOTE — PROGRESS NOTES
Patient here today for Injectafer and 2 units PRBC's. No premeds given and tolerated infusion well. No signs or symptoms of reaction. Patients Hgb-6.3. Tolerated 2 units of blood. Lung sounds clear pre/post each unit of blood.  Discharged to home in stable condition.

## 2018-05-04 NOTE — MR AVS SNAPSHOT
After Visit Summary   5/4/2018    Monalisa Olguin    MRN: [id number]           Patient Information     Date Of Birth          1965        Visit Information        Provider Department      5/4/2018 8:00 AM NL INFUSION CHAIR 2 [facility]        Today's Diagnoses     SINAI (iron deficiency anemia)    -  1    Other iron deficiency anemia        Chronic blood loss anemia          Care Instructions    Patient to follow up with infusion as needed.          Follow-ups after your visit        Your next 10 appointments already scheduled     Jun 05, 2018  1:20 PM CDT   PHYSICAL with Efren Bustos MD   [facility])    [address] [place] [address]   [phone]            Jun 06, 2018  3:00 PM CDT   (Arrive by 2:45 PM)   Return General Liver with Edgardo Kovacs MD   [facility] ([facility])    [address] [place] [address]   [phone]            Jun 28, 2018  9:30 AM CDT   [facility]    [address] [place] [address]   [phone]           Please do not eat 10-12 hours before your appointment if you are coming in fasting for labs on lipids, cholesterol, or glucose (sugar). This does not apply to pregnant women. Water, hot tea and black coffee (with nothing added) are okay. Do not drink other fluids, diet soda or chew gum.            Jul 05, 2018 10:00 AM CDT   [facility]    [address] [place] [address]   [phone]              Future tests that were ordered for you today     Open Future Orders        Priority Expected Expires Ordered    Hemoglobin Routine 5/7/2018 5/3/2019 5/3/2018    CBC with platelets differential Routine 7/3/2018 5/3/2019 5/3/2018    Iron and iron binding  "capacity Routine 7/3/2018 5/3/2019 5/3/2018    Ferritin Routine 7/3/2018 5/3/2019 5/3/2018            Who to contact     If you have questions or need follow up information about today's clinic visit or your schedule please contact [facility] directly at [phone].  Normal or non-critical lab and imaging results will be communicated to you by MyChart, letter or phone within 4 business days after the clinic has received the results. If you do not hear from us within 7 days, please contact the clinic through [employer] or phone. If you have a critical or abnormal lab result, we will notify you by phone as soon as possible.  Submit refill requests through [employer] or call your pharmacy and they will forward the refill request to us. Please allow 3 business days for your refill to be completed.          Additional Information About Your Visit        [employer] Information     [employer] gives you secure access to your electronic health record. If you see a primary care provider, you can also send messages to your care team and make appointments. If you have questions, please call your primary care clinic.  If you do not have a primary care provider, please call [phone] and they will assist you.        Care EveryWhere ID     This is your Care EveryWhere ID. This could be used by other organizations to access your [place] medical records  [id number]        Your Vitals Were     Pulse Temperature Respirations Height Last Period Pulse Oximetry    84 99.7  F (37.6  C) (Oral) 20 1.549 m (5' 1\") 05/16/2012 96%    BMI (Body Mass Index)                   23.24 kg/m2            Blood Pressure from Last 3 Encounters:   05/04/18 102/49   05/03/18 105/66   04/11/18 114/68    Weight from Last 3 Encounters:   05/04/18 55.8 kg (123 lb)   05/03/18 56.2 kg (123 lb 14.4 oz)   04/11/18 57.2 kg (126 lb)              We Performed the Following     ABO/Rh type and screen     Blood component     Blood component     " Blood component     Blood component     Red blood cell prepare order unit     Transfuse red blood cell unit     Transfuse red blood cell unit        Primary Care Provider Office Phone # Fax #    Efren Bustos [phone] [address] [place] [address]        Equal Access to Services     [facility]: Hadii aad ku hadasho Soomaali, waaxda luqadaha, qaybta kaalmada adeegyada, waxay idiin hayaan adeeg khluis enriquesh la'nabiln . So [facility] [phone].    ATENCIÓN: Si habla español, tiene a perez disposición servicios gratuitos de asistencia lingüística. Llame al [phone].    We comply with applicable federal civil rights laws and [place] laws. We do not discriminate on the basis of race, color, national origin, age, disability, sex, sexual orientation, or gender identity.            Thank you!     Thank you for choosing [facility]  for your care. Our goal is always to provide you with excellent care. Hearing back from our patients is one way we can continue to improve our services. Please take a few minutes to complete the written survey that you may receive in the mail after your visit with us. Thank you!             Your Updated Medication List - Protect others around you: Learn how to safely use, store and throw away your medicines at www.disposemymeds.org.          This list is accurate as of 5/4/18  3:40 PM.  Always use your most recent med list.                   Brand Name Dispense Instructions for use Diagnosis    acetaminophen 325 MG tablet    TYLENOL    100 tablet    Take 2 tablets (650 mg) by mouth every 4 hours as needed for mild pain    Alcoholic cirrhosis of liver without ascites (H)       alendronate 35 MG tablet    FOSAMAX    12 tablet    Take 1 tablet (35 mg) by mouth with 8oz water every Monday (once every 7 days) 30 minutes before breakfast and remain upright during this time.    Osteopenia, unspecified location       ASPIRIN PO      Take 325  mg by mouth daily        buPROPion 300 MG 24 hr tablet    WELLBUTRIN XL    90 tablet    Take 1 tablet (300 mg) by mouth every morning    Major depressive disorder, recurrent episode, moderate (H)       busPIRone 15 MG tablet    BUSPAR    180 tablet    Take 1 tablet (15 mg) by mouth 2 times daily    MONA (generalized anxiety disorder)       ferrous sulfate 325 (65 Fe) MG tablet    IRON    60 tablet    Take 1 tablet (325 mg) by mouth 2 times daily        FLUoxetine 20 MG capsule    PROzac    180 capsule    Take 3 capsules (60 mg) by mouth daily    Anxiety, Major depressive disorder, recurrent episode, moderate (H)       naltrexone 50 MG tablet    DEPADE;REVIA    90 tablet    Take 1 tablet (50 mg) by mouth daily    Alcohol dependence in remission (H)       omeprazole 20 MG CR capsule    priLOSEC    90 capsule    Take 1 capsule (20 mg) by mouth daily    Gastroesophageal reflux disease with esophagitis       phosphorus tablet 250 mg 250 MG per tablet    VIRT-PHOS 250 NEUTRAL    120 tablet    TAKE TWO TABLETS BY MOUTH TWICE A DAY    Hypophosphatemia       propranolol 10 MG tablet    INDERAL    180 tablet    Take 1 tablet (10 mg) by mouth 2 times daily    Hypertension goal BP (blood pressure) < 130/80       temazepam 15 MG capsule    RESTORIL    30 capsule    Take 1 capsule (15 mg) by mouth nightly as needed for sleep        thiamine 100 MG tablet     90 tablet    TAKE ONE TABLET BY MOUTH EVERY DAY    Alcohol dependence in remission (H)       TL RICARDO RX 2.2-25-1 MG Tabs   Generic drug:  Folic Acid-Vit B6-Vit B12     90 tablet    TAKE ONE TABLET BY MOUTH EVERY DAY    Alcoholism in recovery (H)

## 2018-05-10 ENCOUNTER — TELEPHONE (OUTPATIENT)
Dept: ONCOLOGY | Facility: CLINIC | Age: 53
End: 2018-05-10

## 2018-05-10 NOTE — TELEPHONE ENCOUNTER
Writing nurse returning call.  Reviewed Dr. Lee's plan.  Patient reported that it probably wasn't him that talked about it.  Again nothing noted in chart and verbally reviewed with Dr. Lee and nothing was discussed.  Patient is ok with this and will try and figure out who she talked to and in the mean time is ok without getting a scan.  No other questions or concerns.  Did review upcoming hematology appointments.    Lester Do RN, BSN, OCN  5/10/2018, 4:59 PM

## 2018-05-10 NOTE — TELEPHONE ENCOUNTER
Reason for Call: Request for an order or referral:    Order or referral being requested: MRI of the abdomen- Radiology called to state that the patient called them to set up the MRI due to what Dr. Lee had suggested but there is no order placed for this?     Date needed: at your convenience    Has the patient been seen by the PCP for this problem? YES    Additional comments: none    Phone number Patient can be reached at:  Home number on file [phone] (home)    Best Time:  any    Can we leave a detailed message on this number?  YES    Call taken on 5/10/2018 at 10:11 AM by Ericka Garza

## 2018-06-05 ENCOUNTER — OFFICE VISIT (OUTPATIENT)
Dept: FAMILY MEDICINE | Facility: CLINIC | Age: 53
End: 2018-06-05
Payer: COMMERCIAL

## 2018-06-05 VITALS
HEART RATE: 82 BPM | BODY MASS INDEX: 23.62 KG/M2 | RESPIRATION RATE: 16 BRPM | SYSTOLIC BLOOD PRESSURE: 108 MMHG | TEMPERATURE: 98.7 F | WEIGHT: 125 LBS | DIASTOLIC BLOOD PRESSURE: 70 MMHG | OXYGEN SATURATION: 97 %

## 2018-06-05 DIAGNOSIS — F10.20 ALCOHOL DEPENDENCE, CONTINUOUS DRINKING BEHAVIOR (H): ICD-10-CM

## 2018-06-05 DIAGNOSIS — M85.80 OSTEOPENIA, UNSPECIFIED LOCATION: ICD-10-CM

## 2018-06-05 DIAGNOSIS — T14.8XXA BRUISING: ICD-10-CM

## 2018-06-05 DIAGNOSIS — K70.9 LIVER DISEASE, CHRONIC, DUE TO ALCOHOL (H): ICD-10-CM

## 2018-06-05 DIAGNOSIS — F41.1 GAD (GENERALIZED ANXIETY DISORDER): ICD-10-CM

## 2018-06-05 DIAGNOSIS — G47.09 OTHER INSOMNIA: ICD-10-CM

## 2018-06-05 DIAGNOSIS — F33.1 MAJOR DEPRESSIVE DISORDER, RECURRENT EPISODE, MODERATE (H): ICD-10-CM

## 2018-06-05 DIAGNOSIS — Z00.01 ENCOUNTER FOR ROUTINE ADULT HEALTH EXAMINATION WITH ABNORMAL FINDINGS: Primary | ICD-10-CM

## 2018-06-05 DIAGNOSIS — E83.39 HYPOPHOSPHATEMIA: ICD-10-CM

## 2018-06-05 LAB
ALBUMIN SERPL-MCNC: 3.2 G/DL (ref 3.4–5)
ALP SERPL-CCNC: 267 U/L (ref 40–150)
ALT SERPL W P-5'-P-CCNC: 24 U/L (ref 0–50)
AST SERPL W P-5'-P-CCNC: 50 U/L (ref 0–45)
BILIRUB DIRECT SERPL-MCNC: 1.1 MG/DL (ref 0–0.2)
BILIRUB SERPL-MCNC: 2.1 MG/DL (ref 0.2–1.3)
ERYTHROCYTE [DISTWIDTH] IN BLOOD BY AUTOMATED COUNT: 21.6 % (ref 10–15)
GGT SERPL-CCNC: 733 U/L (ref 0–40)
HCT VFR BLD AUTO: 33.3 % (ref 35–47)
HGB BLD-MCNC: 10.4 G/DL (ref 11.7–15.7)
MCH RBC QN AUTO: 31.4 PG (ref 26.5–33)
MCHC RBC AUTO-ENTMCNC: 31.2 G/DL (ref 31.5–36.5)
MCV RBC AUTO: 101 FL (ref 78–100)
PLATELET # BLD AUTO: 75 10E9/L (ref 150–450)
PROT SERPL-MCNC: 6.4 G/DL (ref 6.8–8.8)
RBC # BLD AUTO: 3.31 10E12/L (ref 3.8–5.2)
WBC # BLD AUTO: 4.1 10E9/L (ref 4–11)

## 2018-06-05 PROCEDURE — 36415 COLL VENOUS BLD VENIPUNCTURE: CPT | Performed by: FAMILY MEDICINE

## 2018-06-05 PROCEDURE — 82977 ASSAY OF GGT: CPT | Performed by: FAMILY MEDICINE

## 2018-06-05 PROCEDURE — 85027 COMPLETE CBC AUTOMATED: CPT | Performed by: FAMILY MEDICINE

## 2018-06-05 PROCEDURE — 99213 OFFICE O/P EST LOW 20 MIN: CPT | Mod: 25 | Performed by: FAMILY MEDICINE

## 2018-06-05 PROCEDURE — 80076 HEPATIC FUNCTION PANEL: CPT | Performed by: FAMILY MEDICINE

## 2018-06-05 PROCEDURE — 99396 PREV VISIT EST AGE 40-64: CPT | Performed by: FAMILY MEDICINE

## 2018-06-05 RX ORDER — TRAZODONE HYDROCHLORIDE 50 MG/1
100 TABLET, FILM COATED ORAL
Qty: 60 TABLET | Refills: 5 | Status: SHIPPED | OUTPATIENT
Start: 2018-06-05 | End: 2019-07-01

## 2018-06-05 RX ORDER — BUPROPION HYDROCHLORIDE 300 MG/1
300 TABLET ORAL EVERY MORNING
Qty: 90 TABLET | Refills: 3 | Status: SHIPPED | OUTPATIENT
Start: 2018-06-05 | End: 2018-09-19

## 2018-06-05 RX ORDER — BUSPIRONE HYDROCHLORIDE 15 MG/1
15 TABLET ORAL 2 TIMES DAILY
Qty: 180 TABLET | Refills: 3 | Status: ON HOLD | OUTPATIENT
Start: 2018-06-05 | End: 2019-01-01

## 2018-06-05 RX ORDER — LANOLIN ALCOHOL/MO/W.PET/CERES
100 CREAM (GRAM) TOPICAL DAILY
Qty: 90 TABLET | Refills: 3 | Status: ON HOLD | OUTPATIENT
Start: 2018-06-05 | End: 2019-01-01

## 2018-06-05 RX ORDER — ALENDRONATE SODIUM 35 MG/1
TABLET ORAL
Qty: 12 TABLET | Refills: 3 | Status: ON HOLD | OUTPATIENT
Start: 2018-06-05 | End: 2019-01-01

## 2018-06-05 ASSESSMENT — PAIN SCALES - GENERAL: PAINLEVEL: SEVERE PAIN (7)

## 2018-06-05 ASSESSMENT — PATIENT HEALTH QUESTIONNAIRE - PHQ9
SUM OF ALL RESPONSES TO PHQ QUESTIONS 1-9: 14
10. IF YOU CHECKED OFF ANY PROBLEMS, HOW DIFFICULT HAVE THESE PROBLEMS MADE IT FOR YOU TO DO YOUR WORK, TAKE CARE OF THINGS AT HOME, OR GET ALONG WITH OTHER PEOPLE: VERY DIFFICULT
SUM OF ALL RESPONSES TO PHQ QUESTIONS 1-9: 14

## 2018-06-05 NOTE — PROGRESS NOTES
SUBJECTIVE:   CC: Monalisa Olguin is an 52 year old woman who presents for preventive health visit.     Physical   Annual:     Getting at least 3 servings of Calcium per day::  NO    Bi-annual eye exam::  Yes    Dental care twice a year::  Yes    Sleep apnea or symptoms of sleep apnea::  Daytime drowsiness and Excessive snoring    Diet::  Regular (no restrictions)    Frequency of exercise::  None    Taking medications regularly::  Yes    Medication side effects::  Muscle aches and Lightheadedness    Additional concerns today::  No                Today's PHQ-2 Score:   PHQ-2 ( 1999 [employer]) 6/5/2018   Q1: Little interest or pleasure in doing things 2   Q2: Feeling down, depressed or hopeless 2   PHQ-2 Score 4   Q1: Little interest or pleasure in doing things More than half the days   Q2: Feeling down, depressed or hopeless More than half the days   PHQ-2 Score 4       Abuse: Current or Past(Physical, Sexual or Emotional)- No  Do you feel safe in your environment - Yes    Social History   Substance Use Topics     Smoking status: Current Every Day Smoker     Packs/day: 0.25     Years: 10.00     Smokeless tobacco: Never Used      Comment: pt quit 1992 after smoking for 8 yrs, started again in 2004     Alcohol use 0.0 oz/week     0 Standard drinks or equivalent per week      Comment: started drinking 2 weeks ago     Alcohol Use 6/5/2018   If you drink alcohol do you typically have greater than 3 drinks per day OR greater than 7 drinks per week? Yes   AUDIT SCORE  20     AUDIT - Alcohol Use Disorders Identification Test - Reproduced from the World Health Organization Audit 2001 (Second Edition) 6/5/2018   1.  How often do you have a drink containing alcohol? 4 or more times a week   2.  How many drinks containing alcohol do you have on a typical day when you are drinking? 3 or 4   3.  How often do you have five or more drinks on one occasion? Less than monthly   4.  How often during the last year have you found that  you were not able to stop drinking once you had started? Less than monthly   5.  How often during the last year have you failed to do what was normally expected of you because of drinking? Less than monthly   6.  How often during the last year have you needed a first drink in the morning to get yourself going after a heavy drinking session? Never   7.  How often during the last year have you had a feeling of guilt or remorse after drinking? Monthly   8.  How often during the last year have you been unable to remember what happened the night before because of your drinking? Monthly   9.  Have you or someone else been injured because of your drinking? Yes, during the last year   10. Has a relative, friend, doctor or other health care worker been concerned about your drinking or suggested you cut down? Yes, during the last year   TOTAL SCORE 20       Reviewed orders with patient.  Reviewed health maintenance and updated orders accordingly - Yes  Labs reviewed in [facility]    Patient over age 50, mutual decision to screen reflected in health maintenance.    Pertinent mammograms are reviewed under the imaging tab.  History of abnormal Pap smear: NO - age 30-65 PAP every 5 years with negative HPV co-testing recommended    Reviewed and updated as needed this visit by clinical staff  Tobacco  Allergies  Meds         Reviewed and updated as needed this visit by Provider        Past Medical History:   Diagnosis Date     Alcohol abuse 5/2/2013     Alcohol dependence 5/25/2013     Alcohol withdrawal 5/2/2013     Anxiety 10/10/2011     CKD (chronic kidney disease) stage 3, GFR 30-59 ml/min 2006    last GFR was 42     CKD (chronic kidney disease) stage 3, GFR 30-59 ml/min 2/22/2011     Depressive disorder      Esophageal reflux 10/7/2002     Hypertension goal BP (blood pressure) < 130/80 7/12/2011     Moderate Depression [296.32] 12/22/2009    stable on wellbutrin     Other internal derangement of knee(717.89)     ACL Internal  derangement, knee/ original injury in 5th grade, torn cartilage     Pap smear 2011    no abnormals, due for paps q 2-3 yrs     Unspecified essential hypertension         Review of Systems  CONSTITUTIONAL: NEGATIVE for fever, chills, change in weight  INTEGUMENTARY/SKIN: NEGATIVE for worrisome rashes, moles or lesions  EYES: NEGATIVE for vision changes or irritation  ENT: NEGATIVE for ear, mouth and throat problems  RESP: NEGATIVE for significant cough or SOB  BREAST: NEGATIVE for masses, tenderness or discharge  CV: NEGATIVE for chest pain, palpitations or peripheral edema  GI: NEGATIVE for nausea, abdominal pain, heartburn, or change in bowel habits  : NEGATIVE for unusual urinary or vaginal symptoms. No vaginal bleeding.  MUSCULOSKELETAL: NEGATIVE for significant arthralgias or myalgia  NEURO: NEGATIVE for weakness, dizziness or paresthesias  PSYCHIATRIC: NEGATIVE for changes in mood or affect      OBJECTIVE:   /70  Pulse 82  Temp 98.7  F (37.1  C) (Temporal)  Resp 16  Wt 125 lb (56.7 kg)  LMP 05/16/2012  SpO2 97%  BMI 23.62 kg/m2  Physical Exam  GENERAL APPEARANCE: alert and no distress  EYES: mild scleral icterus. Eyes otherwise normal on inspection  HENT: ear canals and TM's normal, nose and mouth without ulcers or lesions, oropharynx clear and oral mucous membranes moist  NECK: no adenopathy, no asymmetry, masses, or scars and thyroid normal to palpation  RESP: lungs clear to auscultation - no rales, rhonchi or wheezes  CV: regular rate and rhythm, normal S1 S2, no S3 or S4, no murmur, click or rub, no peripheral edema and peripheral pulses strong  ABDOMEN: soft. Hepatomegaly 3 cm below the rib cage. Tender to palpation over the liver edge. no masses and bowel sounds normal  MS: no musculoskeletal defects are noted and gait is age appropriate without ataxia  SKIN: telangiectasias present on her chest.   NEURO: Normal strength and tone, sensory exam grossly normal, mentation intact and speech  normal  PSYCH: mentation appears normal, affect somewhat blunted    ASSESSMENT/PLAN:   (Z00.01) Encounter for routine adult health examination with abnormal findings  (primary encounter diagnosis)  Comment: Patient presents for issues as described below. She is up to date on preventative screening.  Plan: continue yearly well exams    (F10.20) Alcohol dependence, continuous drinking behavior (H)  Comment: Patient has returned to her drinking behavior and drinks only beer, about 24 cans weekly. She is aware of the danger this poses to her health and finds it very difficult to quit. She has associated anemia for which she has received PRBC infusions recently and is being managed by Dr. Lee for this condition. She has an appointment with him in 1 month. She also has an appointment with GI tomorrow regarding her GI complications  Plan: thiamine (VITAMIN B-1) 100 MG tablet, CBC with         platelets, GGT. we got her set up for her right upper quadrant ultrasound that Dr. Kovacs wanted for her follow-up appointment tomorrow.            (M85.80) Osteopenia, unspecified location  Comment: Patient with previous diagnosis and need for a refill of her medication  Plan: alendronate (FOSAMAX) 35 MG tablet            (E83.39) Hypophosphatemia  Comment: Likely a complication of her alcohol use.   Plan: phosphorus tablet 250 mg (PHOSPHA 250 NEUTRAL)         250 MG per tablet            (G47.09) Other insomnia  Comment: The patient describes great difficulty falling asleep and staying asleep. She has been on several medications for this in the past. She was recently on temazepam and did not like this medication. Trazodone has worked the best for her but she has had varying doses of this medication over the past few months.   Plan: traZODone (DESYREL) 50 MG tablet       To be taken nightly to aid in sleeping.      (T14.8XXA) Bruising  Comment: Patient describes easy bruising lately possibly related to poor synthetic function as a  result of alcoholism  Plan: CBC with platelets            (K70.9) Liver disease, chronic, due to alcohol (H)  Comment: Patient with history of alcoholic liver disease who has recently resumed drinking behavior and has right upper quadrant pain with palpation of her liver edge and an enlarged liver.  Plan: **Hepatic panel FUTURE 2mo, GGT            (F33.1) Major depressive disorder, recurrent episode, moderate (H)  Comment:  The patient describes her depressive symptoms as better since April but still moderate in severity which is reflected in her PHQ-9 scores. Her symptoms are worsened by the fact that she is now unemployed and finds it hard to stay motivated to do things. She knows that her drinking behavior is not helping her depression and is likely making it worse but still finds it very hard to quit drinking. She is in counseling therapy as well at [facility] and enjoys her relationship with her counselor. She is on maximum medical therapy with 3 agents at high doses.   Plan: buPROPion (WELLBUTRIN XL) 300 MG 24 hr tablet        Continue buspar and fluoxetine as well.    (F41.1) MONA (generalized anxiety disorder)  Comment: stable but is an ongoing problem for the patient. She is on maximal medical therapy for this and sees a counselor.   Plan: busPIRone (BUSPAR) 15 MG tablet            I am very concerned about Monalisa, she continues to drink despite knowing that this is going to kill her.  She asked me today if death from cirrhosis or liver failure is painful.  She is getting counseling on a weekly basis but until she stopped drinking I do not think any of her medications for anxiety depression or the therapy are to make a difference.    COUNSELING:  Reviewed preventive health counseling, as reflected in patient instructions       Regular exercise       Healthy diet/nutrition       Alcohol Use       Osteoporosis Prevention/Bone Health       Colon cancer screening         reports that she has been smoking.  She has  "a 2.50 pack-year smoking history. She has never used smokeless tobacco.  Tobacco Cessation Action Plan: Information offered: Patient not interested at this time  Estimated body mass index is 23.62 kg/(m^2) as calculated from the following:    Height as of 5/4/18: 5' 1\" (1.549 m).    Weight as of this encounter: 125 lb (56.7 kg).       Counseling Resources:  ATP IV Guidelines  Pooled Cohorts Equation Calculator  Breast Cancer Risk Calculator  FRAX Risk Assessment  ICSI Preventive Guidelines  Dietary Guidelines for Americans, 2010  USDA's MyPlate  ASA Prophylaxis  Lung CA Screening       This document serves as a record of the services and decisions personally performed by Efren Bustos MD. It was created on his behalf by Christine Mesa, a trained medical student. The creation of this record is based on the provider's observations and the statements of the patient.      Christine Mesa, MS4  June 5, 2018    I agree with the PFSH and ROS as completed by the MS.  The remainder of the encounter was performed by me and scribed by the MS.  The scribed note accurately reflects my personal services and the decisions made by me.     Electronically signed by:  Efren Bustos M.D.  6/5/2018     Answers for HPI/ROS submitted by the patient on 6/5/2018   PHQ-2 Score: 4  If you checked off any problems, how difficult have these problems made it for you to do your work, take care of things at home, or get along with other people?: Very difficult  PHQ9 TOTAL SCORE: 14    "

## 2018-06-05 NOTE — MR AVS SNAPSHOT
After Visit Summary   6/5/2018    Monalisa Olguin    MRN: [id number]           Patient Information     Date Of Birth          1965        Visit Information        Provider Department      6/5/2018 1:20 PM Efren Bustos MD [employer]        Today's Diagnoses     Encounter for routine adult health examination with abnormal findings    -  1    Alcohol dependence, continuous drinking behavior (H)        Osteopenia, unspecified location        Hypophosphatemia        Other insomnia        Bruising        Liver disease, chronic, due to alcohol (H)        Major depressive disorder, recurrent episode, moderate (H)        MONA (generalized anxiety disorder)          Care Instructions      Preventive Health Recommendations  Female Ages 50 - 64    Yearly exam: See your health care provider every year in order to  o Review health changes.   o Discuss preventive care.    o Review your medicines if your doctor has prescribed any.      Get a Pap test every three years (unless you have an abnormal result and your provider advises testing more often).    If you get Pap tests with HPV test, you only need to test every 5 years, unless you have an abnormal result.     You do not need a Pap test if your uterus was removed (hysterectomy) and you have not had cancer.    You should be tested each year for STDs (sexually transmitted diseases) if you're at risk.     Have a mammogram every 1 to 2 years.    Have a colonoscopy at age 50, or have a yearly FIT test (stool test). These exams screen for colon cancer.      Have a cholesterol test every 5 years, or more often if advised.    Have a diabetes test (fasting glucose) every three years. If you are at risk for diabetes, you should have this test more often.     If you are at risk for osteoporosis (brittle bone disease), think about having a bone density scan (DEXA).    Shots: Get a flu shot each year. Get a tetanus shot every 10 years.    Nutrition:      Eat at least 5 servings of fruits and vegetables each day.    Eat whole-grain bread, whole-wheat pasta and brown rice instead of white grains and rice.    Talk to your provider about Calcium and Vitamin D.     Lifestyle    Exercise at least 150 minutes a week (30 minutes a day, 5 days a week). This will help you control your weight and prevent disease.    Limit alcohol to one drink per day.    No smoking.     Wear sunscreen to prevent skin cancer.     See your dentist every six months for an exam and cleaning.    See your eye doctor every 1 to 2 years.            Follow-ups after your visit        Your next 10 appointments already scheduled     Jun 06, 2018 11:15 AM CDT   US ABDOMEN COMPLETE with BEUS1   [facility]    [address] [facility] [address] [facility] [address] [phone]           Please bring a list of your medicines (including vitamins, minerals and over-the-counter drugs). Also, tell your doctor about any allergies you may have. Wear comfortable clothes and leave your valuables at home.  Adults: No eating or drinking for 8 hours before the exam. You may take medicine with a small sip of water.  Children: - Children 6+ years: No food or drink for 6 hours before exam. - Children 1-5 years: No food or drink for 4 hours before exam. - Infants, breast-fed: may have breast milk up to 2 hours before exam. - Infants, formula: may have bottle until 4 hours before exam.  Please call the Imaging Department at your exam site with any questions.            Jun 06, 2018  3:00 PM CDT   (Arrive by 2:45 PM)   Return General Liver with Edgardo Kovacs MD   [facility] ([facility])    [address] [place] [address]  [address] [place] [address] [phone]            Jun 28, 2018  9:30 AM CDT   LAB with NL LAB [facility] ([facility])    [address] [place] [address] [phone]           Please  do not eat 10-12 hours before your appointment if you are coming in fasting for labs on lipids, cholesterol, or glucose (sugar). This does not apply to pregnant women. Water, hot tea and black coffee (with nothing added) are okay. Do not drink other fluids, diet soda or chew gum.            Jul 05, 2018 10:00 AM CDT   [facility] with Us[facility] Lee MD   [facility]    [address] [place] [address] [phone]              Who to contact     If you have questions or need follow up information about today's clinic visit or your schedule please contact [facility] directly at [phone].  Normal or non-critical lab and imaging results will be communicated to you by Gamarhart, letter or phone within 4 business days after the clinic has received the results. If you do not hear from us within 7 days, please contact the clinic through Nomikut or phone. If you have a critical or abnormal lab result, we will notify you by phone as soon as possible.  Submit refill requests through Umbrella Here or call your pharmacy and they will forward the refill request to us. Please allow 3 business days for your refill to be completed.          Additional Information About Your Visit        Umbrella Here Information     Umbrella Here gives you secure access to your electronic health record. If you see a primary care provider, you can also send messages to your care team and make appointments. If you have questions, please call your primary care clinic.  If you do not have a primary care provider, please call [phone] and they will assist you.        Care EveryWhere ID     This is your Care EveryWhere ID. This could be used by other organizations to access your [facility] medical records  [id number]        Your Vitals Were     Pulse Temperature Respirations Last Period Pulse Oximetry BMI (Body Mass Index)    82 98.7  F (37.1  C) (Temporal) 16 05/16/2012 97% 23.62 kg/m2        Blood Pressure from Last 3 Encounters:   06/05/18 108/70   05/04/18 102/49   05/03/18 105/66    Weight from Last 3 Encounters:   06/05/18 125 lb (56.7 kg)   05/04/18 123 lb (55.8 kg)   05/03/18 123 lb 14.4 oz (56.2 kg)              We Performed the Following     **Hepatic panel FUTURE 2mo     CBC with platelets     GGT          Today's Medication Changes          These changes are accurate as of 6/5/18  4:36 PM.  If you have any questions, ask your nurse or doctor.               Start taking these medicines.        Dose/Directions    traZODone 50 MG tablet   Commonly known as:  DESYREL   Used for:  Other insomnia   Started by:  Efren Bustos MD        Dose:  100 mg   Take 2 tablets (100 mg) by mouth nightly as needed for sleep   Quantity:  60 tablet   Refills:  5         These medicines have changed or have updated prescriptions.        Dose/Directions    thiamine 100 MG tablet   This may have changed:  See the new instructions.   Used for:  Alcohol dependence, continuous drinking behavior (H)   Changed by:  Efren Bustos MD        Dose:  100 mg   Take 1 tablet (100 mg) by mouth daily   Quantity:  90 tablet   Refills:  3            Where to get your medicines      These medications were sent to [facility] [place] [place] [facility] [address] [address]     Phone:  [phone]     buPROPion 300 MG 24 hr tablet    busPIRone 15 MG tablet    phosphorus tablet 250 mg 250 MG per tablet    thiamine 100 MG tablet    traZODone 50 MG tablet         Some of these will need a paper prescription and others can be bought over the counter.  Ask your nurse if you have questions.     Bring a paper prescription for each of these medications     alendronate 35 MG tablet                Primary Care Provider Office Phone # Fax #    Efren Bustos [phone]       [address] [address]        Equal Access to Services     ADRIENNE GILLIAM AH: Obed  km Wei, walibbyda luglenisadaha, qaybta kaalmada rajeev, waxstaci idiin haynabilandres chenluis enriqueanthony viera marek. So [facility] [phone].    ATENCIÓN: Si habla rajat, tiene a perez disposición servicios gratuitos de asistencia lingüística. Nadira al [phone].    We comply with applicable federal civil rights laws and [place] laws. We do not discriminate on the basis of race, color, national origin, age, disability, sex, sexual orientation, or gender identity.            Thank you!     Thank you for choosing [facility]  for your care. Our goal is always to provide you with excellent care. Hearing back from our patients is one way we can continue to improve our services. Please take a few minutes to complete the written survey that you may receive in the mail after your visit with us. Thank you!             Your Updated Medication List - Protect others around you: Learn how to safely use, store and throw away your medicines at www.disposemymeds.org.          This list is accurate as of 6/5/18  4:36 PM.  Always use your most recent med list.                   Brand Name Dispense Instructions for use Diagnosis    acetaminophen 325 MG tablet    TYLENOL    100 tablet    Take 2 tablets (650 mg) by mouth every 4 hours as needed for mild pain    Alcoholic cirrhosis of liver without ascites (H)       alendronate 35 MG tablet    FOSAMAX    12 tablet    Take 1 tablet (35 mg) by mouth with 8oz water every Monday (once every 7 days) 30 minutes before breakfast and remain upright during this time.    Osteopenia, unspecified location       ASPIRIN PO      Take 325 mg by mouth daily        buPROPion 300 MG 24 hr tablet    WELLBUTRIN XL    90 tablet    Take 1 tablet (300 mg) by mouth every morning    Major depressive disorder, recurrent episode, moderate (H)       busPIRone 15 MG tablet    BUSPAR    180 tablet    Take 1 tablet (15 mg) by mouth 2 times daily    MONA (generalized anxiety disorder)       ferrous sulfate  325 (65 Fe) MG tablet    IRON    60 tablet    Take 1 tablet (325 mg) by mouth 2 times daily        FLUoxetine 20 MG capsule    PROzac    180 capsule    Take 3 capsules (60 mg) by mouth daily    Anxiety, Major depressive disorder, recurrent episode, moderate (H)       naltrexone 50 MG tablet    DEPADE;REVIA    90 tablet    Take 1 tablet (50 mg) by mouth daily    Alcohol dependence in remission (H)       omeprazole 20 MG CR capsule    priLOSEC    90 capsule    Take 1 capsule (20 mg) by mouth daily    Gastroesophageal reflux disease with esophagitis       phosphorus tablet 250 mg 250 MG per tablet    PHOSPHA 250 NEUTRAL    120 tablet    TAKE TWO TABLETS BY MOUTH TWICE A DAY    Hypophosphatemia       propranolol 10 MG tablet    INDERAL    180 tablet    Take 1 tablet (10 mg) by mouth 2 times daily    Hypertension goal BP (blood pressure) < 130/80       thiamine 100 MG tablet     90 tablet    Take 1 tablet (100 mg) by mouth daily    Alcohol dependence, continuous drinking behavior (H)       TL RICARDO RX 2.2-25-1 MG Tabs   Generic drug:  Folic Acid-Vit B6-Vit B12     90 tablet    TAKE ONE TABLET BY MOUTH EVERY DAY    Alcoholism in recovery (H)       traZODone 50 MG tablet    DESYREL    60 tablet    Take 2 tablets (100 mg) by mouth nightly as needed for sleep    Other insomnia

## 2018-06-06 ENCOUNTER — RADIANT APPOINTMENT (OUTPATIENT)
Dept: ULTRASOUND IMAGING | Facility: CLINIC | Age: 53
End: 2018-06-06
Attending: INTERNAL MEDICINE
Payer: COMMERCIAL

## 2018-06-06 ENCOUNTER — OFFICE VISIT (OUTPATIENT)
Dept: GASTROENTEROLOGY | Facility: CLINIC | Age: 53
End: 2018-06-06
Attending: INTERNAL MEDICINE
Payer: COMMERCIAL

## 2018-06-06 VITALS
DIASTOLIC BLOOD PRESSURE: 58 MMHG | OXYGEN SATURATION: 96 % | HEART RATE: 83 BPM | SYSTOLIC BLOOD PRESSURE: 103 MMHG | TEMPERATURE: 98.2 F | RESPIRATION RATE: 16 BRPM | WEIGHT: 126.4 LBS | BODY MASS INDEX: 23.86 KG/M2 | HEIGHT: 61 IN

## 2018-06-06 DIAGNOSIS — K70.11 ALCOHOLIC HEPATITIS WITH ASCITES (H): ICD-10-CM

## 2018-06-06 DIAGNOSIS — K70.30 ALCOHOLIC CIRRHOSIS OF LIVER WITHOUT ASCITES (H): Primary | ICD-10-CM

## 2018-06-06 PROCEDURE — 76700 US EXAM ABDOM COMPLETE: CPT

## 2018-06-06 PROCEDURE — G0463 HOSPITAL OUTPT CLINIC VISIT: HCPCS | Mod: ZF

## 2018-06-06 ASSESSMENT — PAIN SCALES - GENERAL: PAINLEVEL: SEVERE PAIN (6)

## 2018-06-06 ASSESSMENT — PATIENT HEALTH QUESTIONNAIRE - PHQ9: SUM OF ALL RESPONSES TO PHQ QUESTIONS 1-9: 14

## 2018-06-06 NOTE — MR AVS SNAPSHOT
After Visit Summary   6/6/2018    Monalisa Olguin    MRN: [id number]           Patient Information     Date Of Birth          1965        Visit Information        Provider Department      6/6/2018 3:00 PM Edgardo Kovacs MD [facility]        Today's Diagnoses     Alcoholic cirrhosis of liver without ascites (H)    -  1       Follow-ups after your visit        Follow-up notes from your care team     Return in about 6 months (around 12/6/2018).      Your next 10 appointments already scheduled     Jun 28, 2018  9:30 AM CDT   LAB with NL LAB [facility] ([facility])    [address] [place] [id number]   [phone]           Please do not eat 10-12 hours before your appointment if you are coming in fasting for labs on lipids, cholesterol, or glucose (sugar). This does not apply to pregnant women. Water, hot tea and black coffee (with nothing added) are okay. Do not drink other fluids, diet soda or chew gum.            Jul 05, 2018 10:00 AM CDT   [facility]man Lee [facility]   [facility]    [address] [place] [id number]   [phone]            Dec 04, 2018  8:45 AM CST   (Arrive by 8:30 AM)   Return General Liver with MD BEV [facility] ([facility])    [address] [place] [id number] [phone]              Future tests that were ordered for you today     Open Standing Orders        Priority Remaining Interval Expires Ordered    US abdomen complete [GUM986] Routine 2/2 Every 6 Months 6/6/2019 6/6/2018          Open Future Orders        Priority Expected Expires Ordered    Hepatic Panel [LAB20] Routine 12/3/2018 6/6/2019 6/6/2018    Basic metabolic panel [LAB15] Routine 12/3/2018 6/6/2019 6/6/2018    CBC with platelets [UTO342] Routine 12/3/2018 6/6/2019 6/6/2018    INR [TBY5002] Routine 12/3/2018  "6/6/2019 6/6/2018    AFP tumor marker [IDD788] Routine 12/3/2018 6/6/2019 6/6/2018            Who to contact     If you have questions or need follow up information about today's clinic visit or your schedule please contact [facility] directly at [phone].  Normal or non-critical lab and imaging results will be communicated to you by MyChart, letter or phone within 4 business days after the clinic has received the results. If you do not hear from us within 7 days, please contact the clinic through [employer] or phone. If you have a critical or abnormal lab result, we will notify you by phone as soon as possible.  Submit refill requests through [employer] or call your pharmacy and they will forward the refill request to us. Please allow 3 business days for your refill to be completed.          Additional Information About Your Visit        [employer] Information     [employer] gives you secure access to your electronic health record. If you see a primary care provider, you can also send messages to your care team and make appointments. If you have questions, please call your primary care clinic.  If you do not have a primary care provider, please call [phone] and they will assist you.        Care EveryWhere ID     This is your Care EveryWhere ID. This could be used by other organizations to access your [place] medical records  [id number]        Your Vitals Were     Pulse Temperature Respirations Height Last Period Pulse Oximetry    83 98.2  F (36.8  C) (Oral) 16 1.549 m (5' 1\") 05/16/2012 96%    BMI (Body Mass Index)                   23.88 kg/m2            Blood Pressure from Last 3 Encounters:   06/06/18 103/58   06/05/18 108/70   05/04/18 102/49    Weight from Last 3 Encounters:   06/06/18 57.3 kg (126 lb 6.4 oz)   06/05/18 56.7 kg (125 lb)   05/04/18 55.8 kg (123 lb)              We Performed the Following     Schedule follow up appointments        Primary Care Provider Office Phone # Fax #    Efren ZAVALA " MD Juli [phone]       [address] [place] [address]        Equal Access to Services     ADRIENNE GILLIAM : Obed km hodgson jessy Wei, domodedrick riversharryha, galileo kadavid boo, salo jimenesandres marek. So [facility] [phone].    ATENCIÓN: Si habla español, tiene a perez disposición servicios gratuitos de asistencia lingüística. Llame al [phone].    We comply with applicable federal civil rights laws and [place] laws. We do not discriminate on the basis of race, color, national origin, age, disability, sex, sexual orientation, or gender identity.            Thank you!     Thank you for choosing [facility]  for your care. Our goal is always to provide you with excellent care. Hearing back from our patients is one way we can continue to improve our services. Please take a few minutes to complete the written survey that you may receive in the mail after your visit with us. Thank you!             Your Updated Medication List - Protect others around you: Learn how to safely use, store and throw away your medicines at www.disposemymeds.org.          This list is accurate as of 6/6/18  4:01 PM.  Always use your most recent med list.                   Brand Name Dispense Instructions for use Diagnosis    acetaminophen 325 MG tablet    TYLENOL    100 tablet    Take 2 tablets (650 mg) by mouth every 4 hours as needed for mild pain    Alcoholic cirrhosis of liver without ascites (H)       alendronate 35 MG tablet    FOSAMAX    12 tablet    Take 1 tablet (35 mg) by mouth with 8oz water every Monday (once every 7 days) 30 minutes before breakfast and remain upright during this time.    Osteopenia, unspecified location       ASPIRIN PO      Take 325 mg by mouth daily        buPROPion 300 MG 24 hr tablet    WELLBUTRIN XL    90 tablet    Take 1 tablet (300 mg) by mouth every morning    Major depressive disorder, recurrent episode, moderate (H)       busPIRone 15 MG  tablet    BUSPAR    180 tablet    Take 1 tablet (15 mg) by mouth 2 times daily    MONA (generalized anxiety disorder)       ferrous sulfate 325 (65 Fe) MG tablet    IRON    60 tablet    Take 1 tablet (325 mg) by mouth 2 times daily        FLUoxetine 20 MG capsule    PROzac    180 capsule    Take 3 capsules (60 mg) by mouth daily    Anxiety, Major depressive disorder, recurrent episode, moderate (H)       naltrexone 50 MG tablet    DEPADE;REVIA    90 tablet    Take 1 tablet (50 mg) by mouth daily    Alcohol dependence in remission (H)       omeprazole 20 MG CR capsule    priLOSEC    90 capsule    Take 1 capsule (20 mg) by mouth daily    Gastroesophageal reflux disease with esophagitis       phosphorus tablet 250 mg 250 MG per tablet    PHOSPHA 250 NEUTRAL    120 tablet    TAKE TWO TABLETS BY MOUTH TWICE A DAY    Hypophosphatemia       propranolol 10 MG tablet    INDERAL    180 tablet    Take 1 tablet (10 mg) by mouth 2 times daily    Hypertension goal BP (blood pressure) < 130/80       thiamine 100 MG tablet     90 tablet    Take 1 tablet (100 mg) by mouth daily    Alcohol dependence, continuous drinking behavior (H)       TL RICARDO RX 2.2-25-1 MG Tabs   Generic drug:  Folic Acid-Vit B6-Vit B12     90 tablet    TAKE ONE TABLET BY MOUTH EVERY DAY    Alcoholism in recovery (H)       traZODone 50 MG tablet    DESYREL    60 tablet    Take 2 tablets (100 mg) by mouth nightly as needed for sleep    Other insomnia

## 2018-06-06 NOTE — LETTER
6/6/2018      RE: Monalisa Olguin  [address] [facility] [address]       [facility]    Hepatology follow-up    Follow-up visit for alcoholic cirrhosis.     Subjective:  52 year old female with PMH of alcoholic cirrhosis, alcohol abuse, anxiety/depression, hypertension, chronic anemia and thrombocytopenia.     Ms. Olguin initially presented to the hospital in April 2017 with jaundice and a diagnosis of alcoholic hepatitis was made. She mentions she had an outpatient EGD in 12/2017 and colonoscopy in 09/2017 following that at [facility] last fall which was wnl. No varices were visible on EGD. Her last US was done 06/06/18 which did not reveal any focal liver lesion. She had trace ascites adjacent to the right liver and coarse liver along with mild splenomegaly. Her last DEXA scan was on 12/5/17 which revealed moderate osteopenia of the right hip and mild osteopenia of the left hip and normal bone mineral density of the lumbar spine.    Currently the patient denies any lower extremity edema, jaundice, pruritis, confusion, fevers or chills, hematochezia and hematemesis but does acknowledge melena intermittently. She is currently taking Ibuprofen 4 tabs a day along with aspirin full dose for headaches. She also feels weak and mentions she has noticed some abdominal distention. She is currently still drinking alcohol currently. She drinks about 3 cans of beer a day. She quit alcohol for 9 months but then resumed as she mentions she was significantly depressed. Weight is currently stable.    The patient continues to be anemic, she has had a colonoscopy and EGD with GI in 12/2017 which did not reveal any obvious bleeding source she has been transfused with multiple PRBC transfusions both in the  inpatient and outatpatient settings and her iron studies reveal a picture of iron deficiency anemia. She is currently on PO iron supplementation.     Medical hx Surgical hx   Past  Medical History:   Diagnosis Date     Alcohol abuse 2013     Alcohol dependence 2013     Alcohol withdrawal 2013     Anxiety 10/10/2011     CKD (chronic kidney disease) stage 3, GFR 30-59 ml/min      CKD (chronic kidney disease) stage 3, GFR 30-59 ml/min 2011     Depressive disorder      Esophageal reflux 10/7/2002     Hypertension goal BP (blood pressure) < 130/80 2011     Moderate Depression [296.32] 2009     Other internal derangement of knee(717.89)      Pap smear      Unspecified essential hypertension       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     COLONOSCOPY N/A 2017    Procedure: COLONOSCOPY;  Colonoscopy;  Surgeon: Sai Johnston MD;  Location: [facility]     ESOPHAGOSCOPY, GASTROSCOPY, DUODENOSCOPY (EGD), COMBINED N/A 2017    Procedure: COMBINED ESOPHAGOSCOPY, GASTROSCOPY, DUODENOSCOPY (EGD), BIOPSY SINGLE OR MULTIPLE;  ESOPHAGOSCOPY, GASTROSCOPY, DUODENOSCOPY (EGD) with biopsies;  Surgeon: Sai Johnston MD;  Location: [facility]     HC KNEE SCOPE, DIAGNOSTIC  01    Arthroscopy, Lt Knee     LAPAROSCOPIC CHOLECYSTECTOMY N/A 2017    Procedure: LAPAROSCOPIC CHOLECYSTECTOMY;  laparoscopic cholecystectomy;  Surgeon: Romeo Pastor MD;  Location: [facility]     OPEN REDUCTION INTERNAL FIXATION WRIST Right 2017    Procedure: OPEN REDUCTION INTERNAL FIXATION WRIST;  OPEN REDUCTION INTERNAL FIXATION RIGHT WRIST;  Surgeon: Edgardo Almendarez MD;  Location: [facility]          Medications  Prior to Admission medications    Medication Sig Start Date End Date Taking? Authorizing Provider   acetaminophen (TYLENOL) 325 MG tablet Take 2 tablets (650 mg) by mouth every 4 hours as needed for mild pain 3/28/18  Yes Mayru Moncada MD   alendronate (FOSAMAX) 35 MG tablet Take 1 tablet (35 mg) by mouth with 8oz water every Monday  "(once every 7 days) 30 minutes before breakfast and remain upright during this time. 6/5/18  Yes Efren Bustos MD   ASPIRIN PO Take 325 mg by mouth daily   Yes Reported, Patient   buPROPion (WELLBUTRIN XL) 300 MG 24 hr tablet Take 1 tablet (300 mg) by mouth every morning 6/5/18  Yes Efren Bustos MD   busPIRone (BUSPAR) 15 MG tablet Take 1 tablet (15 mg) by mouth 2 times daily 6/5/18  Yes Efren Bustos MD   ferrous sulfate (IRON) 325 (65 Fe) MG tablet Take 1 tablet (325 mg) by mouth 2 times daily 5/3/18  Yes Usman Lee MD   FLUoxetine (PROZAC) 20 MG capsule Take 3 capsules (60 mg) by mouth daily 12/4/17  Yes Efren Bustos MD   naltrexone (DEPADE;REVIA) 50 MG tablet Take 1 tablet (50 mg) by mouth daily 4/11/18  Yes Efren Bustos MD   omeprazole (PRILOSEC) 20 MG CR capsule Take 1 capsule (20 mg) by mouth daily 5/10/17  Yes Efren Bustos MD   phosphorus tablet 250 mg (PHOSPHA 250 NEUTRAL) 250 MG per tablet TAKE TWO TABLETS BY MOUTH TWICE A DAY 6/5/18  Yes Efren Bustos MD   propranolol (INDERAL) 10 MG tablet Take 1 tablet (10 mg) by mouth 2 times daily 6/13/17  Yes Efren Bustos MD   thiamine (VITAMIN B-1) 100 MG tablet Take 1 tablet (100 mg) by mouth daily 6/5/18  Yes Efren Bustos MD   TL RICARDO RX 2.2-25-1 MG TABS TAKE ONE TABLET BY MOUTH EVERY DAY 5/17/17  Yes Efren Bustos MD   traZODone (DESYREL) 50 MG tablet Take 2 tablets (100 mg) by mouth nightly as needed for sleep 6/5/18  Yes Efren Bustos MD       Allergies  Allergies   Allergen Reactions     Albuterol      Tongue \"hardened and painful\"       Review of systems  A 10-point review of systems was negative    Examination  /58 (BP Location: Right arm, Patient Position: Sitting, Cuff Size: Adult Regular)  Pulse 83  Temp 98.2  F (36.8  C) (Oral)  Resp 16  Ht 1.549 m (5' 1\")  Wt 57.3 kg (126 lb 6.4 oz)  LMP 05/16/2012  SpO2 96%  BMI 23.88 kg/m2  Body mass index is 23.88 " kg/(m^2).    Gen- well, NAD, A+Ox3, normal color  Lym- no palpable LAD  CVS- RRR  RS- CTA  Abd- Soft, mildly tender in the RUQ, non distended, + BS.   Extr- hands normal, no DONELL  Skin- no rash or jaundice  Neuro- no asterixis  Psych- normal mood    Laboratory  Lab Results   Component Value Date     03/27/2018    POTASSIUM 3.5 03/27/2018    CHLORIDE 104 03/27/2018    CO2 22 03/27/2018    BUN 6 03/27/2018    CR 0.69 03/27/2018       Lab Results   Component Value Date    BILITOTAL 2.1 06/05/2018    ALT 24 06/05/2018    AST 50 06/05/2018    ALKPHOS 267 06/05/2018       Lab Results   Component Value Date    ALBUMIN 3.2 06/05/2018    PROTTOTAL 6.4 06/05/2018        Lab Results   Component Value Date    WBC 4.1 06/05/2018    HGB 10.4 06/05/2018     06/05/2018    PLT 75 06/05/2018       Lab Results   Component Value Date    INR 1.25 09/25/2017       Radiology    Assessment and Plan.   52 year old female with PMH of alcohol abuse, anxiety/depression, hypertension, chronic anemia and thrombocytopenia presents today for follow up of alcoholic cirrhosis.   She is currently uptodate on her vaccinations, her DEXA scan was on 12/5/17 which revealed moderate osteopenia of the right hip and mild osteopenia of the left hip and normal bone mineral density of the lumbar spine. We recommended discontinuing her Fosomax.   Her last colonoscopy was in December of 2017 along with her EGD which did not reveal any esophageal varices and as such will recommend discontinuing the propranolol.   Discontinuing the propranolol might also help her with her depression. She is currently on Fluoxetine 20 mg and we recommend that the dose be doubled. She was counseled it may take a few weeks before she see an effect.   Her last US was today on 06/06/17 which reveal trace ascites but no mass, she will follow up with a repeat US in 6 months.   She was counseled to stop drinking alcohol absolutely and that the increase in fluoxetine to help  her with her symptoms of depression.   She was also recommended to start taking Vitamin C 500mg to help with iron absorption while she is on the PPI.   She was also counseled on stopping Ibuprofen and full dose ASA, she can take tylenol < 3 gm.       Imelda Balderas MD, Resident PGY-2, Int Medicine.   Hepatology  [facility]    The patient was seen and examined.  The above assessment and plan was developed jointly with the fellow.      Edgardo Kovacs MD      Professor of Medicine  [employer]      Executive Medical Director, Solid Organ Transplant Program  [facility]

## 2018-06-06 NOTE — NURSING NOTE
"Chief Complaint   Patient presents with     RECHECK     alcohol hepatitis       /58 (BP Location: Right arm, Patient Position: Sitting, Cuff Size: Adult Regular)  Pulse 83  Temp 98.2  F (36.8  C) (Oral)  Resp 16  Ht 1.549 m (5' 1\")  Wt 57.3 kg (126 lb 6.4 oz)  LMP 05/16/2012  SpO2 96%  BMI 23.88 kg/m2    Teri Marti [facility]  6/6/2018 2:58 PM      "

## 2018-06-06 NOTE — LETTER
6/6/2018       RE: Monalisa Olguin  [address] [place] [address]     Dear Colleague,    Thank you for referring your patient, Monalisa Olguin, to the [facility] HEPATOLOGY at [facility]. Please see a copy of my visit note below.    [facility]    Hepatology follow-up    Follow-up visit for alcoholic cirrhosis.     Subjective:  52 year old female with PMH of alcoholic cirrhosis, alcohol abuse, anxiety/depression, hypertension, chronic anemia and thrombocytopenia.     Ms. Olguin initially presented to the hospital in April 2017 with jaundice and a diagnosis of alcoholic hepatitis was made. She mentions she had an outpatient EGD in 12/2017 and colonoscopy in 09/2017 following that at [facility] last fall which was wnl. No varices were visible on EGD. Her last US was done 06/06/18 which did not reveal any focal liver lesion. She had trace ascites adjacent to the right liver and coarse liver along with mild splenomegaly. Her last DEXA scan was on 12/5/17 which revealed moderate osteopenia of the right hip and mild osteopenia of the left hip and normal bone mineral density of the lumbar spine.    Currently the patient denies any lower extremity edema, jaundice, pruritis, confusion, fevers or chills, hematochezia and hematemesis but does acknowledge melena intermittently. She is currently taking Ibuprofen 4 tabs a day along with aspirin full dose for headaches. She also feels weak and mentions she has noticed some abdominal distention. She is currently still drinking alcohol currently. She drinks about 3 cans of beer a day. She quit alcohol for 9 months but then resumed as she mentions she was significantly depressed. Weight is currently stable.    The patient continues to be anemic, she has had a colonoscopy and EGD with GI in 12/2017 which did not reveal any obvious bleeding source she has been transfused with multiple PRBC transfusions  both in the  inpatient and outatpatient settings and her iron studies reveal a picture of iron deficiency anemia. She is currently on PO iron supplementation.     Medical hx Surgical hx   Past Medical History:   Diagnosis Date     Alcohol abuse 2013     Alcohol dependence 2013     Alcohol withdrawal 2013     Anxiety 10/10/2011     CKD (chronic kidney disease) stage 3, GFR 30-59 ml/min      CKD (chronic kidney disease) stage 3, GFR 30-59 ml/min 2011     Depressive disorder      Esophageal reflux 10/7/2002     Hypertension goal BP (blood pressure) < 130/80 2011     Moderate Depression [296.32] 2009     Other internal derangement of knee(717.89)      Pap smear      Unspecified essential hypertension       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     COLONOSCOPY N/A 2017    Procedure: COLONOSCOPY;  Colonoscopy;  Surgeon: Sai Johnston MD;  Location:  GI     ESOPHAGOSCOPY, GASTROSCOPY, DUODENOSCOPY (EGD), COMBINED N/A 2017    Procedure: COMBINED ESOPHAGOSCOPY, GASTROSCOPY, DUODENOSCOPY (EGD), BIOPSY SINGLE OR MULTIPLE;  ESOPHAGOSCOPY, GASTROSCOPY, DUODENOSCOPY (EGD) with biopsies;  Surgeon: Sai Johnston MD;  Location: [facility]     HC KNEE SCOPE, DIAGNOSTIC  01    Arthroscopy, Lt Knee     LAPAROSCOPIC CHOLECYSTECTOMY N/A 2017    Procedure: LAPAROSCOPIC CHOLECYSTECTOMY;  laparoscopic cholecystectomy;  Surgeon: Romeo Pastor MD;  Location: [facility] OR     OPEN REDUCTION INTERNAL FIXATION WRIST Right 2017    Procedure: OPEN REDUCTION INTERNAL FIXATION WRIST;  OPEN REDUCTION INTERNAL FIXATION RIGHT WRIST;  Surgeon: Edgardo Almendarez MD;  Location:  OR          Medications  Prior to Admission medications    Medication Sig Start Date End Date Taking? Authorizing Provider   acetaminophen (TYLENOL) 325 MG tablet Take 2  "tablets (650 mg) by mouth every 4 hours as needed for mild pain 3/28/18  Yes Mayur Moncada MD   alendronate (FOSAMAX) 35 MG tablet Take 1 tablet (35 mg) by mouth with 8oz water every Monday (once every 7 days) 30 minutes before breakfast and remain upright during this time. 6/5/18  Yes Efren Bustos MD   ASPIRIN PO Take 325 mg by mouth daily   Yes Reported, Patient   buPROPion (WELLBUTRIN XL) 300 MG 24 hr tablet Take 1 tablet (300 mg) by mouth every morning 6/5/18  Yes Efren Bustos MD   busPIRone (BUSPAR) 15 MG tablet Take 1 tablet (15 mg) by mouth 2 times daily 6/5/18  Yes Efren Bustos MD   ferrous sulfate (IRON) 325 (65 Fe) MG tablet Take 1 tablet (325 mg) by mouth 2 times daily 5/3/18  Yes Usman Lee MD   FLUoxetine (PROZAC) 20 MG capsule Take 3 capsules (60 mg) by mouth daily 12/4/17  Yes Efren Bustos MD   naltrexone (DEPADE;REVIA) 50 MG tablet Take 1 tablet (50 mg) by mouth daily 4/11/18  Yes Efren Bustos MD   omeprazole (PRILOSEC) 20 MG CR capsule Take 1 capsule (20 mg) by mouth daily 5/10/17  Yes Efren Bustos MD   phosphorus tablet 250 mg (PHOSPHA 250 NEUTRAL) 250 MG per tablet TAKE TWO TABLETS BY MOUTH TWICE A DAY 6/5/18  Yes Efren Bustos MD   propranolol (INDERAL) 10 MG tablet Take 1 tablet (10 mg) by mouth 2 times daily 6/13/17  Yes Efren Bustos MD   thiamine (VITAMIN B-1) 100 MG tablet Take 1 tablet (100 mg) by mouth daily 6/5/18  Yes Efren Bustos MD   TL RICARDO RX 2.2-25-1 MG TABS TAKE ONE TABLET BY MOUTH EVERY DAY 5/17/17  Yes Efren Bustos MD   traZODone (DESYREL) 50 MG tablet Take 2 tablets (100 mg) by mouth nightly as needed for sleep 6/5/18  Yes Efren Bustos MD       Allergies  Allergies   Allergen Reactions     Albuterol      Tongue \"hardened and painful\"       Review of systems  A 10-point review of systems was negative    Examination  /58 (BP Location: Right arm, Patient Position: Sitting, Cuff Size: Adult " "Regular)  Pulse 83  Temp 98.2  F (36.8  C) (Oral)  Resp 16  Ht 1.549 m (5' 1\")  Wt 57.3 kg (126 lb 6.4 oz)  LMP 05/16/2012  SpO2 96%  BMI 23.88 kg/m2  Body mass index is 23.88 kg/(m^2).    Gen- well, NAD, A+Ox3, normal color  Lym- no palpable LAD  CVS- RRR  RS- CTA  Abd- Soft, mildly tender in the RUQ, non distended, + BS.   Extr- hands normal, no DONELL  Skin- no rash or jaundice  Neuro- no asterixis  Psych- normal mood    Laboratory  Lab Results   Component Value Date     03/27/2018    POTASSIUM 3.5 03/27/2018    CHLORIDE 104 03/27/2018    CO2 22 03/27/2018    BUN 6 03/27/2018    CR 0.69 03/27/2018       Lab Results   Component Value Date    BILITOTAL 2.1 06/05/2018    ALT 24 06/05/2018    AST 50 06/05/2018    ALKPHOS 267 06/05/2018       Lab Results   Component Value Date    ALBUMIN 3.2 06/05/2018    PROTTOTAL 6.4 06/05/2018        Lab Results   Component Value Date    WBC 4.1 06/05/2018    HGB 10.4 06/05/2018     06/05/2018    PLT 75 06/05/2018       Lab Results   Component Value Date    INR 1.25 09/25/2017       Radiology    Assessment and Plan.   52 year old female with PMH of alcohol abuse, anxiety/depression, hypertension, chronic anemia and thrombocytopenia presents today for follow up of alcoholic cirrhosis.   She is currently uptodate on her vaccinations, her DEXA scan was on 12/5/17 which revealed moderate osteopenia of the right hip and mild osteopenia of the left hip and normal bone mineral density of the lumbar spine. We recommended discontinuing her Fosomax.   Her last colonoscopy was in December of 2017 along with her EGD which did not reveal any esophageal varices and as such will recommend discontinuing the propranolol.   Discontinuing the propranolol might also help her with her depression. She is currently on Fluoxetine 20 mg and we recommend that the dose be doubled. She was counseled it may take a few weeks before she see an effect.   Her last US was today on 06/06/17 which " reveal trace ascites but no mass, she will follow up with a repeat US in 6 months.   She was counseled to stop drinking alcohol absolutely and that the increase in fluoxetine to help her with her symptoms of depression.   She was also recommended to start taking Vitamin C 500mg to help with iron absorption while she is on the PPI.   She was also counseled on stopping Ibuprofen and full dose ASA, she can take tylenol < 3 gm.       Imelda Balderas MD, Resident PGY-2, Int Medicine.   Hepatology  [facility]    The patient was seen and examined.  The above assessment and plan was developed jointly with the fellow.      Edgardo Kovacs MD      Professor of Medicine  [employer]      Executive Medical Director, Solid Organ Transplant Program  [facility]

## 2018-06-06 NOTE — PROGRESS NOTES
[facility]    Hepatology follow-up    Follow-up visit for alcoholic cirrhosis.     Subjective:  52 year old female with PMH of alcoholic cirrhosis, alcohol abuse, anxiety/depression, hypertension, chronic anemia and thrombocytopenia.     Ms. Olguin initially presented to the hospital in April 2017 with jaundice and a diagnosis of alcoholic hepatitis was made. She mentions she had an outpatient EGD in 12/2017 and colonoscopy in 09/2017 following that at [facility] last fall which was wnl. No varices were visible on EGD. Her last US was done 06/06/18 which did not reveal any focal liver lesion. She had trace ascites adjacent to the right liver and coarse liver along with mild splenomegaly. Her last DEXA scan was on 12/5/17 which revealed moderate osteopenia of the right hip and mild osteopenia of the left hip and normal bone mineral density of the lumbar spine.    Currently the patient denies any lower extremity edema, jaundice, pruritis, confusion, fevers or chills, hematochezia and hematemesis but does acknowledge melena intermittently. She is currently taking Ibuprofen 4 tabs a day along with aspirin full dose for headaches. She also feels weak and mentions she has noticed some abdominal distention. She is currently still drinking alcohol currently. She drinks about 3 cans of beer a day. She quit alcohol for 9 months but then resumed as she mentions she was significantly depressed. Weight is currently stable.    The patient continues to be anemic, she has had a colonoscopy and EGD with GI in 12/2017 which did not reveal any obvious bleeding source she has been transfused with multiple PRBC transfusions both in the  inpatient and outatpatient settings and her iron studies reveal a picture of iron deficiency anemia. She is currently on PO iron supplementation.     Medical hx Surgical hx   Past Medical History:   Diagnosis Date     Alcohol abuse 5/2/2013     Alcohol dependence 5/25/2013      Alcohol withdrawal 2013     Anxiety 10/10/2011     CKD (chronic kidney disease) stage 3, GFR 30-59 ml/min      CKD (chronic kidney disease) stage 3, GFR 30-59 ml/min 2011     Depressive disorder      Esophageal reflux 10/7/2002     Hypertension goal BP (blood pressure) < 130/80 2011     Moderate Depression [296.32] 2009     Other internal derangement of knee(717.89)      Pap smear      Unspecified essential hypertension       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     COLONOSCOPY N/A 2017    Procedure: COLONOSCOPY;  Colonoscopy;  Surgeon: Sai Johnston MD;  Location: [facility]     ESOPHAGOSCOPY, GASTROSCOPY, DUODENOSCOPY (EGD), COMBINED N/A 2017    Procedure: COMBINED ESOPHAGOSCOPY, GASTROSCOPY, DUODENOSCOPY (EGD), BIOPSY SINGLE OR MULTIPLE;  ESOPHAGOSCOPY, GASTROSCOPY, DUODENOSCOPY (EGD) with biopsies;  Surgeon: Sai Johnston MD;  Location: [facility]     HC KNEE SCOPE, DIAGNOSTIC  01    Arthroscopy, Lt Knee     LAPAROSCOPIC CHOLECYSTECTOMY N/A 2017    Procedure: LAPAROSCOPIC CHOLECYSTECTOMY;  laparoscopic cholecystectomy;  Surgeon: Romeo aPstor MD;  Location: [facility] OR     OPEN REDUCTION INTERNAL FIXATION WRIST Right 2017    Procedure: OPEN REDUCTION INTERNAL FIXATION WRIST;  OPEN REDUCTION INTERNAL FIXATION RIGHT WRIST;  Surgeon: Edgardo Almendarez MD;  Location: [facility]          Medications  Prior to Admission medications    Medication Sig Start Date End Date Taking? Authorizing Provider   acetaminophen (TYLENOL) 325 MG tablet Take 2 tablets (650 mg) by mouth every 4 hours as needed for mild pain 3/28/18  Yes Mayur Moncada MD   alendronate (FOSAMAX) 35 MG tablet Take 1 tablet (35 mg) by mouth with 8oz water every Monday (once every 7 days) 30 minutes before breakfast and remain upright during this time. 18  Yes  "Efren Bustos MD   ASPIRIN PO Take 325 mg by mouth daily   Yes Reported, Patient   buPROPion (WELLBUTRIN XL) 300 MG 24 hr tablet Take 1 tablet (300 mg) by mouth every morning 6/5/18  Yes Efren Bustos MD   busPIRone (BUSPAR) 15 MG tablet Take 1 tablet (15 mg) by mouth 2 times daily 6/5/18  Yes Efren Bustos MD   ferrous sulfate (IRON) 325 (65 Fe) MG tablet Take 1 tablet (325 mg) by mouth 2 times daily 5/3/18  Yes Usman Lee MD   FLUoxetine (PROZAC) 20 MG capsule Take 3 capsules (60 mg) by mouth daily 12/4/17  Yes Efren Bustos MD   naltrexone (DEPADE;REVIA) 50 MG tablet Take 1 tablet (50 mg) by mouth daily 4/11/18  Yes Efren Bustos MD   omeprazole (PRILOSEC) 20 MG CR capsule Take 1 capsule (20 mg) by mouth daily 5/10/17  Yes Efren Bustos MD   phosphorus tablet 250 mg (PHOSPHA 250 NEUTRAL) 250 MG per tablet TAKE TWO TABLETS BY MOUTH TWICE A DAY 6/5/18  Yes Efrne Bustos MD   propranolol (INDERAL) 10 MG tablet Take 1 tablet (10 mg) by mouth 2 times daily 6/13/17  Yes Efren Bustos MD   thiamine (VITAMIN B-1) 100 MG tablet Take 1 tablet (100 mg) by mouth daily 6/5/18  Yes Efren Bustos MD   TL RICARDO RX 2.2-25-1 MG TABS TAKE ONE TABLET BY MOUTH EVERY DAY 5/17/17  Yes Efren Bustos MD   traZODone (DESYREL) 50 MG tablet Take 2 tablets (100 mg) by mouth nightly as needed for sleep 6/5/18  Yes Efren Bustos MD       Allergies  Allergies   Allergen Reactions     Albuterol      Tongue \"hardened and painful\"       Review of systems  A 10-point review of systems was negative    Examination  /58 (BP Location: Right arm, Patient Position: Sitting, Cuff Size: Adult Regular)  Pulse 83  Temp 98.2  F (36.8  C) (Oral)  Resp 16  Ht 1.549 m (5' 1\")  Wt 57.3 kg (126 lb 6.4 oz)  LMP 05/16/2012  SpO2 96%  BMI 23.88 kg/m2  Body mass index is 23.88 kg/(m^2).    Gen- well, NAD, A+Ox3, normal color  Lym- no palpable LAD  CVS- RRR  RS- CTA  Abd- Soft, mildly " tender in the RUQ, non distended, + BS.   Extr- hands normal, no DONELL  Skin- no rash or jaundice  Neuro- no asterixis  Psych- normal mood    Laboratory  Lab Results   Component Value Date     03/27/2018    POTASSIUM 3.5 03/27/2018    CHLORIDE 104 03/27/2018    CO2 22 03/27/2018    BUN 6 03/27/2018    CR 0.69 03/27/2018       Lab Results   Component Value Date    BILITOTAL 2.1 06/05/2018    ALT 24 06/05/2018    AST 50 06/05/2018    ALKPHOS 267 06/05/2018       Lab Results   Component Value Date    ALBUMIN 3.2 06/05/2018    PROTTOTAL 6.4 06/05/2018        Lab Results   Component Value Date    WBC 4.1 06/05/2018    HGB 10.4 06/05/2018     06/05/2018    PLT 75 06/05/2018       Lab Results   Component Value Date    INR 1.25 09/25/2017       Radiology    Assessment and Plan.   52 year old female with PMH of alcohol abuse, anxiety/depression, hypertension, chronic anemia and thrombocytopenia presents today for follow up of alcoholic cirrhosis.   She is currently uptodate on her vaccinations, her DEXA scan was on 12/5/17 which revealed moderate osteopenia of the right hip and mild osteopenia of the left hip and normal bone mineral density of the lumbar spine. We recommended discontinuing her Fosomax.   Her last colonoscopy was in December of 2017 along with her EGD which did not reveal any esophageal varices and as such will recommend discontinuing the propranolol.   Discontinuing the propranolol might also help her with her depression. She is currently on Fluoxetine 20 mg and we recommend that the dose be doubled. She was counseled it may take a few weeks before she see an effect.   Her last US was today on 06/06/17 which reveal trace ascites but no mass, she will follow up with a repeat US in 6 months.   She was counseled to stop drinking alcohol absolutely and that the increase in fluoxetine to help her with her symptoms of depression.   She was also recommended to start taking Vitamin C 500mg to help with  iron absorption while she is on the PPI.   She was also counseled on stopping Ibuprofen and full dose ASA, she can take tylenol < 3 gm.       Imelda Balderas MD, Resident PGY-2, Int Medicine.   Hepatology  [facility]    The patient was seen and examined.  The above assessment and plan was developed jointly with the fellow.      Edgardo Kovacs MD      Professor of Medicine  [employer]      Executive Medical Director, Solid Organ Transplant Program  [facility]

## 2018-06-06 NOTE — PATIENT INSTRUCTIONS
Please stop taking ibuprofen and full dose aspirin.  You can take Tylenol up to 3 g per day.  Please stop drinking alcohol.  Please discuss with your primary care physician about increasing the dose of fluoxetine to 40 mg daily.  Please start taking vitamin C 500 mg to help with iron absorption.  Please discuss with your primary care physician about stopping Fosamax and propranolol.

## 2018-06-21 DIAGNOSIS — S62.101A WRIST FRACTURE, RIGHT, CLOSED, INITIAL ENCOUNTER: ICD-10-CM

## 2018-06-21 RX ORDER — AMOXICILLIN 250 MG
1-2 CAPSULE ORAL 2 TIMES DAILY
Qty: 90 TABLET | Refills: 6 | Status: SHIPPED | OUTPATIENT
Start: 2018-06-21 | End: 2018-09-19

## 2018-06-21 NOTE — TELEPHONE ENCOUNTER
Senna-docusate       Last Written Prescription Date:  3/30/17  Last Fill Quantity: 100,   # refills: 0  Last Office Visit: 6/5/18  Future Office visit:    Next 5 appointments (look out 90 days)     Jul 05, 2018 10:00 AM CDT   Return Visit with Usman Lee MD   [facility]    [address] [place] [id number]   [phone]                   Routing refill request to provider for review/approval because:  Drug not active on patient's medication list

## 2018-07-05 ENCOUNTER — ONCOLOGY VISIT (OUTPATIENT)
Dept: ONCOLOGY | Facility: CLINIC | Age: 53
End: 2018-07-05
Payer: COMMERCIAL

## 2018-07-05 VITALS
DIASTOLIC BLOOD PRESSURE: 62 MMHG | TEMPERATURE: 96.6 F | WEIGHT: 126.2 LBS | SYSTOLIC BLOOD PRESSURE: 104 MMHG | RESPIRATION RATE: 16 BRPM | BODY MASS INDEX: 23.83 KG/M2 | HEART RATE: 94 BPM | HEIGHT: 61 IN | OXYGEN SATURATION: 98 %

## 2018-07-05 DIAGNOSIS — D69.6 THROMBOCYTOPENIA (H): ICD-10-CM

## 2018-07-05 DIAGNOSIS — D50.8 OTHER IRON DEFICIENCY ANEMIA: Primary | ICD-10-CM

## 2018-07-05 LAB
BASOPHILS # BLD AUTO: 0 10E9/L (ref 0–0.2)
BASOPHILS NFR BLD AUTO: 0 %
DIFFERENTIAL METHOD BLD: ABNORMAL
EOSINOPHIL NFR BLD AUTO: 0 %
ERYTHROCYTE [DISTWIDTH] IN BLOOD BY AUTOMATED COUNT: 16.2 % (ref 10–15)
FERRITIN SERPL-MCNC: 86 NG/ML (ref 8–252)
HCT VFR BLD AUTO: 27.2 % (ref 35–47)
HGB BLD-MCNC: 8.5 G/DL (ref 11.7–15.7)
IMM GRANULOCYTES # BLD: 0 10E9/L (ref 0–0.4)
IMM GRANULOCYTES NFR BLD: 0.5 %
IRON SATN MFR SERPL: 13 % (ref 15–46)
IRON SERPL-MCNC: 33 UG/DL (ref 35–180)
LYMPHOCYTES # BLD AUTO: 0.8 10E9/L (ref 0.8–5.3)
LYMPHOCYTES NFR BLD AUTO: 20.1 %
MCH RBC QN AUTO: 33.7 PG (ref 26.5–33)
MCHC RBC AUTO-ENTMCNC: 31.3 G/DL (ref 31.5–36.5)
MCV RBC AUTO: 108 FL (ref 78–100)
MONOCYTES # BLD AUTO: 0.5 10E9/L (ref 0–1.3)
MONOCYTES NFR BLD AUTO: 13.4 %
NEUTROPHILS # BLD AUTO: 2.5 10E9/L (ref 1.6–8.3)
NEUTROPHILS NFR BLD AUTO: 66 %
NRBC # BLD AUTO: 0 10*3/UL
NRBC BLD AUTO-RTO: 0 /100
PLATELET # BLD AUTO: 53 10E9/L (ref 150–450)
RBC # BLD AUTO: 2.52 10E12/L (ref 3.8–5.2)
TIBC SERPL-MCNC: 263 UG/DL (ref 240–430)
WBC # BLD AUTO: 3.7 10E9/L (ref 4–11)

## 2018-07-05 PROCEDURE — 83540 ASSAY OF IRON: CPT | Performed by: INTERNAL MEDICINE

## 2018-07-05 PROCEDURE — 36415 COLL VENOUS BLD VENIPUNCTURE: CPT | Performed by: INTERNAL MEDICINE

## 2018-07-05 PROCEDURE — 83550 IRON BINDING TEST: CPT | Performed by: INTERNAL MEDICINE

## 2018-07-05 PROCEDURE — 99213 OFFICE O/P EST LOW 20 MIN: CPT | Performed by: INTERNAL MEDICINE

## 2018-07-05 PROCEDURE — 82728 ASSAY OF FERRITIN: CPT | Performed by: INTERNAL MEDICINE

## 2018-07-05 PROCEDURE — 85025 COMPLETE CBC W/AUTO DIFF WBC: CPT | Performed by: INTERNAL MEDICINE

## 2018-07-05 NOTE — Clinical Note
7/5/2018         RE: Monalisa Olguin  [address] [facility] [address]        Dear Colleague,    Thank you for referring your patient, Monalisa Olguin, to the [facility]. Please see a copy of my visit note below.      FOLLOW-UP VISIT NOTE    PATIENT NAME: Monalisa Olguin MRN # [id number]  DATE OF VISIT: Jul 5, 2018 YOB: 1965    REFERRING PROVIDER: No referring provider defined for this encounter.    Reason for follow up  - Pancytopenia secondary to underlying cirrhosis /Hypersplensim/Alcolohol abuse  - Iron def anemia from chronic GI blood loss      HISTORY:    60-year-old female with past medical history significant for alcohol abuse, chronic cirrhosis with splenomegaly, anxiety/depression, CKD, hypertension who has had thrombocytopenia since 2015 and anemia since 2017. She has been following with gastroenterology for underlying alcoholic cirrhosis as well as some. Also has undergone workup for GI blood loss with EGD and colonoscopy in December 2017 which didn't show any obvious bleeding site. Since then patient has been admitted to the hospital in March with alcohol intoxication and was noted to be severely anemic with hemoglobin in 6's. She was transfused 2 units of packed red blood cells. Stool guaic was positive for blood. Patient was discharged home after hemoglobin stabilized. Iron studies performed indicated iron deficiency with ferritin at 11 and saturation 7%.    - 05/03/18  seen in hematology clinic. She was noted to be severely anemic with hemoglobin at 6.3. Orders were placed for 2 units of blood transfusion and IV iron infusion.      SUBJECTIVE     She is here for two-month follow-up. Continues to have generalized fatigue. States that she has cut down on alcohol intake but continues still continues to drink every day. States that she is compliant with oral iron supplementation. Denies blood in stools, abdominal pain, nausea/vomiting, diarrhea,  dizziness/lightheadedness.      PAST MEDICAL HISTORY     Past Medical History:   Diagnosis Date     Alcohol abuse 5/2/2013     Alcohol dependence 5/25/2013     Alcohol withdrawal 5/2/2013     Anxiety 10/10/2011     CKD (chronic kidney disease) stage 3, GFR 30-59 ml/min 2006    last GFR was 42     CKD (chronic kidney disease) stage 3, GFR 30-59 ml/min 2/22/2011     Depressive disorder      Esophageal reflux 10/7/2002     Hypertension goal BP (blood pressure) < 130/80 7/12/2011     Moderate Depression [296.32] 12/22/2009    stable on wellbutrin     Other internal derangement of knee(717.89)     ACL Internal derangement, knee/ original injury in 5th grade, torn cartilage     Pap smear 2011    no abnormals, due for paps q 2-3 yrs     Unspecified essential hypertension          CURRENT OUTPATIENT MEDICATIONS     Current Outpatient Prescriptions   Medication Sig Dispense Refill     acetaminophen (TYLENOL) 325 MG tablet Take 2 tablets (650 mg) by mouth every 4 hours as needed for mild pain 100 tablet      alendronate (FOSAMAX) 35 MG tablet Take 1 tablet (35 mg) by mouth with 8oz water every Monday (once every 7 days) 30 minutes before breakfast and remain upright during this time. 12 tablet 3     ASPIRIN PO Take 325 mg by mouth daily       buPROPion (WELLBUTRIN XL) 300 MG 24 hr tablet Take 1 tablet (300 mg) by mouth every morning 90 tablet 3     busPIRone (BUSPAR) 15 MG tablet Take 1 tablet (15 mg) by mouth 2 times daily 180 tablet 3     ferrous sulfate (IRON) 325 (65 Fe) MG tablet Take 1 tablet (325 mg) by mouth 2 times daily 60 tablet 2     FLUoxetine (PROZAC) 20 MG capsule Take 3 capsules (60 mg) by mouth daily 180 capsule 3     naltrexone (DEPADE;REVIA) 50 MG tablet Take 1 tablet (50 mg) by mouth daily 90 tablet 3     omeprazole (PRILOSEC) 20 MG CR capsule Take 1 capsule (20 mg) by mouth daily 90 capsule 3     phosphorus tablet 250 mg (PHOSPHA 250 NEUTRAL) 250 MG per tablet TAKE TWO TABLETS BY MOUTH TWICE A   "tablet 3     propranolol (INDERAL) 10 MG tablet Take 1 tablet (10 mg) by mouth 2 times daily 180 tablet 3     senna-docusate (SENOKOT-S;PERICOLACE) 8.6-50 MG per tablet Take 1-2 tablets by mouth 2 times daily Take while on oral narcotics to prevent or treat constipation. 90 tablet 6     thiamine (VITAMIN B-1) 100 MG tablet Take 1 tablet (100 mg) by mouth daily 90 tablet 3     TL RICARDO RX 2.2-25-1 MG TABS TAKE ONE TABLET BY MOUTH EVERY DAY 90 tablet 3     traZODone (DESYREL) 50 MG tablet Take 2 tablets (100 mg) by mouth nightly as needed for sleep 60 tablet 5        ALLERGIES     Allergies   Allergen Reactions     Albuterol      Tongue \"hardened and painful\"        REVIEW OF SYSTEMS   As above in the HPI, o/w complete 12-point ROS was negative.     PHYSICAL EXAM   B/P: 104/62, T: 96.6, P: 94, R: 16  SpO2 Readings from Last 4 Encounters:   07/05/18 98%   06/06/18 96%   06/05/18 97%   05/04/18 96%     Wt Readings from Last 3 Encounters:   07/05/18 57.2 kg (126 lb 3.2 oz)   06/06/18 57.3 kg (126 lb 6.4 oz)   06/05/18 56.7 kg (125 lb)     GEN: NAD  EYES:PERRLA  Mouth/ENT: Oropharynx is clear.  NECK: no cervical or supraclavicular lymphadenopathy  LUNGS: clear bilaterally  CV: regular, no murmurs, rubs, or gallops  ABDOMEN: soft, non-tender, non-distended, normal bowel sounds, no hepatosplenomegaly by percussion or palpation  EXT: warm, well perfused, no edema  NEURO: alert  SKIN: no rashes     LABORATORY AND IMAGING STUDIES     Recent Labs   Lab Test  03/27/18   1510  03/21/18   1331  12/20/17   1403   05/23/17   1610   04/04/17   0937   03/28/17   0357   03/27/17   0511   NA  136   --   144   < >  144   < >  140   < >  142   --   140   POTASSIUM  3.5   --   3.7   < >  4.1   < >  3.7   < >  3.8   --   3.9   CHLORIDE  104   --   113*   < >  109   < >  104   < >  109   --   104   CO2  22   --   26   < >  27   < >  27   < >  24   --   27   ANIONGAP  10   --   5   < >  8   < >  9   < >  9   --   9   BUN  6*  8  7   < >  10   " < >  7   < >  <1*   --   2*   CR  0.69  0.93  1.02   < >  0.87   < >  0.65   < >  0.52   --   0.52   GLC  90   --   93   < >  106*   < >  96   < >  75   --   123*   ELSIE  7.5*   --   8.1*   < >  9.2   < >  8.6   < >  7.9*   --   7.5*   MAG   --    --    --    --    --    --    --    --   2.0   --   3.1*   PHOS   --    --    --    --   4.6*   --   1.9*   < >  2.3*   < >   --     < > = values in this interval not displayed.     Recent Labs   Lab Test  07/05/18   0945  06/05/18   1432  05/03/18   0952  04/09/18   1203   WBC  3.7*  4.1  3.8*  4.1   HGB  8.5*  10.4*  6.3*  7.8*   PLT  53*  75*  77*  95*   MCV  108*  101*  91  86   NEUTROPHIL  66.0   --   52.6  49.8     Recent Labs   Lab Test  06/05/18   1432  03/27/18   1510  03/21/18   1331   BILITOTAL  2.1*  1.2  1.6*   ALKPHOS  267*  248*  199*   ALT  24  20  19   AST  50*  46*  38   ALBUMIN  3.2*  3.2*  2.8*     TSH   Date Value Ref Range Status   12/20/2017 1.02 0.40 - 4.00 mU/L Final   ]      Results for orders placed or performed in visit on 06/06/18   US abdomen complete    Narrative    ULTRASOUND ABDOMEN COMPLETE June 6, 2018 11:44 AM     HISTORY: Alcoholic hepatitis with ascites.    COMPARISON: Ultrasound abdomen 9/22/2017.    FINDINGS:    Gallbladder: Cholecystectomy.    Bile ducts: CHD is normal diameter. No intrahepatic biliary  dilatation.    Liver: No focal hepatic lesions identified. Somewhat coarse  echogenicity of the liver could represent underlying chronic liver  disease. Small ascites adjacent to the right liver.    Pancreas: Partially obscured but grossly unremarkable.     Spleen: Enlarged size of the spleen measuring 15.8 cm.    Right kidney: Normal.     Left kidney: Absent.    Aorta and IVC: Not specifically assessed.        Impression    IMPRESSION:    1. No focal liver lesion.  2. Trace ascites adjacent to the right liver. Somewhat coarse liver  that may represent underlying chronic liver disease. Mild  splenomegaly.  3. Cholecystectomy. Absent  left kidney.    JOVI MARLEY MD         ASSESSMENT AND PLAN     52-year-old female with    1.  Pancytopenia   2.  Iron def anemia from chronic GI blood loss  3.  Macrocytosis     1. Secondary to underlying alcoholic cirrhosis /Hypersplensim/Alcolohol abuse  Councellad on alcohol abstinence.   Following with gastroenterology    2.  Iron deficiency likely from chronic GI blood loss but no obvious source on the EGD and colonoscopy in December 2017. Patient has seen gastroenterology last month with no further diagnostic workup planned at this time.  Iron studies today showed improved numbers with normal ferritin but iron saturations still low with  hemoglobin at 8.5 indicating persistent iron deficiency anemia. We'll scheduled for repeat IV iron infusions next week     3. Secondary to alcohol abuse and underlying liver disease      Return to clinic in 10 weeks with labs.     Chart documentation with Dragon Voice recognition Software. Although reviewed after completion, some words and grammatical errors may remain.  Usman Lee MD  Attending Physician   Hematology/Medical Oncology    Again, thank you for allowing me to participate in the care of your patient.        Sincerely,        Usman Lee MD

## 2018-07-05 NOTE — PATIENT INSTRUCTIONS
Please follow up with Dr. Lee in 10 weeks.  Please come 15- 20 minutes prior to follow up appointment to complete labs.    Infusion for iron Date/Time:  July 12, 2018  2:30pm infusion therapy.    Follow Up Date/Time: 9/13/18 at 10:30 am    If you have any questions or concerns please feel free to call.    If you need to reschedule please call:  Clinic or Lab Appointment - [phone]  Infusion - [phone]  Imaging - [phone]    [place] JULIAN [facility]  Oncology/Hematology at [facility]  [phone]

## 2018-07-05 NOTE — NURSING NOTE
"Oncology Rooming Note    July 5, 2018 10:22 AM   Monalisa Olguin is a 52 year old female who presents for:    Chief Complaint   Patient presents with     Hematology     2 month follow up thrombocytopenia and anemia     Results     labs today!     Initial Vitals: /62 (BP Location: Right arm, Patient Position: Sitting, Cuff Size: Adult Large)  Pulse 94  Temp 96.6  F (35.9  C) (Temporal)  Resp 16  Ht 1.549 m (5' 1\")  Wt 57.2 kg (126 lb 3.2 oz)  LMP 05/16/2012  SpO2 98%  BMI 23.85 kg/m2 Estimated body mass index is 23.85 kg/(m^2) as calculated from the following:    Height as of this encounter: 1.549 m (5' 1\").    Weight as of this encounter: 57.2 kg (126 lb 3.2 oz). Body surface area is 1.57 meters squared.  Data Unavailable Comment: Data Unavailable   Patient's last menstrual period was 05/16/2012.  Allergies reviewed: Yes  Medications reviewed: Yes    Medications: Medication refills not needed today.  Pharmacy name entered into [facility]:    [facility] - [place], [place] - [address]  [facility] [place], [place] - [address]   [facility]  [facility] [place], [place] - [address]  [facility]    Nausea, Vomiting: Yes (Please explain):   Fever/Chills:  Yes (Please explain):   Mouth Sores: No  Diarrhea/Constipation: Yes (Please explain): diarrhea  Urination: No Concerns  Skin/Excessive dryness: No Concerns  Cracking, Peeling: No  Unusual Bruising/Bleeding: No  Respiratory/SOB: Concerns (explain) - SOB  Neuropathy/Numbness&Tingling-Hands/Feet: Yes (Please explain):  Cognitive Disturbance-Memory: No  Sleep Concerns: Concerns (explain) - waking up in the middle  Night Sweats:  Yes (Please explain):   Fatigue/Weakness: Yes (Please explain):   Light Headed or Dizzy: No  Appetite:  Concerns (explain) -Pt states that is not good.  Able to drink 6 to 8 glasses of fluid in a day: No " Concerns      Clinical concerns: See above. Concerns reviewed with Dr. Lee     8 minutes for nursing intake (face to face time)     K[place]um JULIAN CMA

## 2018-07-05 NOTE — NURSING NOTE
DISCHARGE PLAN:  Next appointments: See patient instruction section  Departure Mode: Ambulatory  Accompanied by: self  10 minutes for nursing discharge (face to face time)     Monalisa Brooke Olguin is here today for 2 follow up for anemia & thrombocytopenia.  Writing nurse seen patient after Medical Oncology appointment to address questions/concerns/coordinate care. Appointments scheduled. Patient to complete infusion for IV iron. Patient follow up in 10 weeks with labs prior. See patient instructions and Oncologist's Progress note for further details. Questions and concerns addressed to patient's satisfaction. Patient verbalized and demonstrated understanding of plan.  Contact information provided and patient is encouraged to call with any that arise in the interim of care.    [place]um JULIAN [facility]    Oncology/Hematology at [facility]  [phone]  7/5/2018, 10:37 AM

## 2018-07-05 NOTE — MR AVS SNAPSHOT
After Visit Summary   7/5/2018    Monalisa Olguin    MRN: [id number]           Patient Information     Date Of Birth          1965        Visit Information        Provider Department      7/5/2018 10:00 AM Usman Lee MD [facility]        Today's Diagnoses     Other iron deficiency anemia    -  1      Care Instructions    Please follow up with Dr. Lee in 10 weeks.  Please come 15- 20 minutes prior to follow up appointment to complete labs.    Infusion for iron Date/Time:  July 12, 2018  2:30pm infusion therapy.    Follow Up Date/Time: 9/13/18 at 10:30 am    If you have any questions or concerns please feel free to call.    If you need to reschedule please call:  Clinic or Lab Appointment - [phone]  Infusion - [phone]  Imaging - [phone]    [place] JULIAN [facility]  Oncology/Hematology at [facility]  [phone]            Follow-ups after your visit        Your next 10 appointments already scheduled     Jul 12, 2018  2:30 PM CDT   Level 1 with NL INFUSION CHAIR 4   [facility] ([facility])    [address] [place], [place] [address] [address]   [phone]            Sep 13, 2018 10:30 AM CDT   Return Visit with Usman Lee MD   [facility] ([facility])    [address] [place] [address]   [phone]            Dec 04, 2018  8:45 AM CST   (Arrive by 8:30 AM)   Return General Liver with Edgardo Kovacs MD   [facility] ([facility])    [address] [place] [address]  [address]  [facility] [id number] [phone]              Future tests that were ordered for you today     Open Future Orders        Priority Expected Expires Ordered    CBC with platelets differential Routine 9/19/2018 7/5/2019 7/5/2018    Iron and iron binding capacity Routine 9/19/2018 7/5/2019 7/5/2018    Ferritin Routine 9/19/2018 7/5/2019 7/5/2018            Who to  "contact     If you have questions or need follow up information about today's clinic visit or your schedule please contact [facility] directly at [phone].  Normal or non-critical lab and imaging results will be communicated to you by MyChart, letter or phone within 4 business days after the clinic has received the results. If you do not hear from us within 7 days, please contact the clinic through MyChart or phone. If you have a critical or abnormal lab result, we will notify you by phone as soon as possible.  Submit refill requests through [employer] or call your pharmacy and they will forward the refill request to us. Please allow 3 business days for your refill to be completed.          Additional Information About Your Visit        [employer] Information     [employer] gives you secure access to your electronic health record. If you see a primary care provider, you can also send messages to your care team and make appointments. If you have questions, please call your primary care clinic.  If you do not have a primary care provider, please call [phone] and they will assist you.        Care EveryWhere ID     This is your Care EveryWhere ID. This could be used by other organizations to access your [facility] medical records  [id number]        Your Vitals Were     Pulse Temperature Respirations Height Last Period Pulse Oximetry    94 96.6  F (35.9  C) (Temporal) 16 1.549 m (5' 1\") 05/16/2012 98%    BMI (Body Mass Index)                   23.85 kg/m2            Blood Pressure from Last 3 Encounters:   07/05/18 104/62   06/06/18 103/58   06/05/18 108/70    Weight from Last 3 Encounters:   07/05/18 57.2 kg (126 lb 3.2 oz)   06/06/18 57.3 kg (126 lb 6.4 oz)   06/05/18 56.7 kg (125 lb)              We Performed the Following     CBC with platelets differential     Ferritin     Iron and iron binding capacity        Primary Care Provider Office Phone # Fax #    Efren Bustos [phone] " [phone]       [address] [place] [address]        Equal Access to Services     ADRIENNE GILLIAM : Hadii aad ku hadtysonpablo Soamilcar, waaxda luqadaha, qaybta kaalmada lesyvette, salo richard alejandraandres saldanalupe aprilluis enriqueanthony jara. So [facility] [phone].    ATENCIÓN: Si habla español, tiene a perez disposición servicios gratuitos de asistencia lingüística. Llame al [phone].    We comply with applicable federal civil rights laws and [place] laws. We do not discriminate on the basis of race, color, national origin, age, disability, sex, sexual orientation, or gender identity.            Thank you!     Thank you for choosing [facility]  for your care. Our goal is always to provide you with excellent care. Hearing back from our patients is one way we can continue to improve our services. Please take a few minutes to complete the written survey that you may receive in the mail after your visit with us. Thank you!             Your Updated Medication List - Protect others around you: Learn how to safely use, store and throw away your medicines at www.disposemymeds.org.          This list is accurate as of 7/5/18 11:02 AM.  Always use your most recent med list.                   Brand Name Dispense Instructions for use Diagnosis    acetaminophen 325 MG tablet    TYLENOL    100 tablet    Take 2 tablets (650 mg) by mouth every 4 hours as needed for mild pain    Alcoholic cirrhosis of liver without ascites (H)       alendronate 35 MG tablet    FOSAMAX    12 tablet    Take 1 tablet (35 mg) by mouth with 8oz water every Monday (once every 7 days) 30 minutes before breakfast and remain upright during this time.    Osteopenia, unspecified location       ASPIRIN PO      Take 325 mg by mouth daily        buPROPion 300 MG 24 hr tablet    WELLBUTRIN XL    90 tablet    Take 1 tablet (300 mg) by mouth every morning    Major depressive disorder, recurrent episode, moderate (H)       busPIRone 15 MG tablet    BUSPAR     180 tablet    Take 1 tablet (15 mg) by mouth 2 times daily    MONA (generalized anxiety disorder)       ferrous sulfate 325 (65 Fe) MG tablet    IRON    60 tablet    Take 1 tablet (325 mg) by mouth 2 times daily        FLUoxetine 20 MG capsule    PROzac    180 capsule    Take 3 capsules (60 mg) by mouth daily    Anxiety, Major depressive disorder, recurrent episode, moderate (H)       naltrexone 50 MG tablet    DEPADE;REVIA    90 tablet    Take 1 tablet (50 mg) by mouth daily    Alcohol dependence in remission (H)       omeprazole 20 MG CR capsule    priLOSEC    90 capsule    Take 1 capsule (20 mg) by mouth daily    Gastroesophageal reflux disease with esophagitis       phosphorus tablet 250 mg 250 MG per tablet    PHOSPHA 250 NEUTRAL    120 tablet    TAKE TWO TABLETS BY MOUTH TWICE A DAY    Hypophosphatemia       propranolol 10 MG tablet    INDERAL    180 tablet    Take 1 tablet (10 mg) by mouth 2 times daily    Hypertension goal BP (blood pressure) < 130/80       senna-docusate 8.6-50 MG per tablet    SENOKOT-S;PERICOLACE    90 tablet    Take 1-2 tablets by mouth 2 times daily Take while on oral narcotics to prevent or treat constipation.    Wrist fracture, right, closed, initial encounter       thiamine 100 MG tablet     90 tablet    Take 1 tablet (100 mg) by mouth daily    Alcohol dependence, continuous drinking behavior (H)       TL RICARDO RX 2.2-25-1 MG Tabs   Generic drug:  Folic Acid-Vit B6-Vit B12     90 tablet    TAKE ONE TABLET BY MOUTH EVERY DAY    Alcoholism in recovery (H)       traZODone 50 MG tablet    DESYREL    60 tablet    Take 2 tablets (100 mg) by mouth nightly as needed for sleep    Other insomnia

## 2018-07-05 NOTE — PROGRESS NOTES
FOLLOW-UP VISIT NOTE    PATIENT NAME: Monalisa Olguin MRN # [id number]  DATE OF VISIT: Jul 5, 2018 YOB: 1965    REFERRING PROVIDER: No referring provider defined for this encounter.    Reason for follow up  - Pancytopenia secondary to underlying cirrhosis /Hypersplensim/Alcolohol abuse  - Iron def anemia from chronic GI blood loss      HISTORY:    60-year-old female with past medical history significant for alcohol abuse, chronic cirrhosis with splenomegaly, anxiety/depression, CKD, hypertension who has had thrombocytopenia since 2015 and anemia since 2017. She has been following with gastroenterology for underlying alcoholic cirrhosis as well as some. Also has undergone workup for GI blood loss with EGD and colonoscopy in December 2017 which didn't show any obvious bleeding site. Since then patient has been admitted to the hospital in March with alcohol intoxication and was noted to be severely anemic with hemoglobin in 6's. She was transfused 2 units of packed red blood cells. Stool guaic was positive for blood. Patient was discharged home after hemoglobin stabilized. Iron studies performed indicated iron deficiency with ferritin at 11 and saturation 7%.    - 05/03/18  seen in hematology clinic. She was noted to be severely anemic with hemoglobin at 6.3. Orders were placed for 2 units of blood transfusion and IV iron infusion.      SUBJECTIVE     She is here for two-month follow-up. Continues to have generalized fatigue. States that she has cut down on alcohol intake but continues still continues to drink every day. States that she is compliant with oral iron supplementation. Denies blood in stools, abdominal pain, nausea/vomiting, diarrhea, dizziness/lightheadedness.      PAST MEDICAL HISTORY     Past Medical History:   Diagnosis Date     Alcohol abuse 5/2/2013     Alcohol dependence 5/25/2013     Alcohol withdrawal 5/2/2013     Anxiety 10/10/2011     CKD (chronic kidney disease) stage 3, GFR  30-59 ml/min 2006    last GFR was 42     CKD (chronic kidney disease) stage 3, GFR 30-59 ml/min 2/22/2011     Depressive disorder      Esophageal reflux 10/7/2002     Hypertension goal BP (blood pressure) < 130/80 7/12/2011     Moderate Depression [296.32] 12/22/2009    stable on wellbutrin     Other internal derangement of knee(717.89)     ACL Internal derangement, knee/ original injury in 5th grade, torn cartilage     Pap smear 2011    no abnormals, due for paps q 2-3 yrs     Unspecified essential hypertension          CURRENT OUTPATIENT MEDICATIONS     Current Outpatient Prescriptions   Medication Sig Dispense Refill     acetaminophen (TYLENOL) 325 MG tablet Take 2 tablets (650 mg) by mouth every 4 hours as needed for mild pain 100 tablet      alendronate (FOSAMAX) 35 MG tablet Take 1 tablet (35 mg) by mouth with 8oz water every Monday (once every 7 days) 30 minutes before breakfast and remain upright during this time. 12 tablet 3     ASPIRIN PO Take 325 mg by mouth daily       buPROPion (WELLBUTRIN XL) 300 MG 24 hr tablet Take 1 tablet (300 mg) by mouth every morning 90 tablet 3     busPIRone (BUSPAR) 15 MG tablet Take 1 tablet (15 mg) by mouth 2 times daily 180 tablet 3     ferrous sulfate (IRON) 325 (65 Fe) MG tablet Take 1 tablet (325 mg) by mouth 2 times daily 60 tablet 2     FLUoxetine (PROZAC) 20 MG capsule Take 3 capsules (60 mg) by mouth daily 180 capsule 3     naltrexone (DEPADE;REVIA) 50 MG tablet Take 1 tablet (50 mg) by mouth daily 90 tablet 3     omeprazole (PRILOSEC) 20 MG CR capsule Take 1 capsule (20 mg) by mouth daily 90 capsule 3     phosphorus tablet 250 mg (PHOSPHA 250 NEUTRAL) 250 MG per tablet TAKE TWO TABLETS BY MOUTH TWICE A  tablet 3     propranolol (INDERAL) 10 MG tablet Take 1 tablet (10 mg) by mouth 2 times daily 180 tablet 3     senna-docusate (SENOKOT-S;PERICOLACE) 8.6-50 MG per tablet Take 1-2 tablets by mouth 2 times daily Take while on oral narcotics to prevent or treat  "constipation. 90 tablet 6     thiamine (VITAMIN B-1) 100 MG tablet Take 1 tablet (100 mg) by mouth daily 90 tablet 3     TL RICARDO RX 2.2-25-1 MG TABS TAKE ONE TABLET BY MOUTH EVERY DAY 90 tablet 3     traZODone (DESYREL) 50 MG tablet Take 2 tablets (100 mg) by mouth nightly as needed for sleep 60 tablet 5        ALLERGIES     Allergies   Allergen Reactions     Albuterol      Tongue \"hardened and painful\"        REVIEW OF SYSTEMS   As above in the HPI, o/w complete 12-point ROS was negative.     PHYSICAL EXAM   B/P: 104/62, T: 96.6, P: 94, R: 16  SpO2 Readings from Last 4 Encounters:   07/05/18 98%   06/06/18 96%   06/05/18 97%   05/04/18 96%     Wt Readings from Last 3 Encounters:   07/05/18 57.2 kg (126 lb 3.2 oz)   06/06/18 57.3 kg (126 lb 6.4 oz)   06/05/18 56.7 kg (125 lb)     GEN: NAD  EYES:PERRLA  Mouth/ENT: Oropharynx is clear.  NECK: no cervical or supraclavicular lymphadenopathy  LUNGS: clear bilaterally  CV: regular, no murmurs, rubs, or gallops  ABDOMEN: soft, non-tender, non-distended, normal bowel sounds, no hepatosplenomegaly by percussion or palpation  EXT: warm, well perfused, no edema  NEURO: alert  SKIN: no rashes     LABORATORY AND IMAGING STUDIES     Recent Labs   Lab Test  03/27/18   1510  03/21/18   1331  12/20/17   1403   05/23/17   1610   04/04/17   0937   03/28/17   0357   03/27/17   0511   NA  136   --   144   < >  144   < >  140   < >  142   --   140   POTASSIUM  3.5   --   3.7   < >  4.1   < >  3.7   < >  3.8   --   3.9   CHLORIDE  104   --   113*   < >  109   < >  104   < >  109   --   104   CO2  22   --   26   < >  27   < >  27   < >  24   --   27   ANIONGAP  10   --   5   < >  8   < >  9   < >  9   --   9   BUN  6*  8  7   < >  10   < >  7   < >  <1*   --   2*   CR  0.69  0.93  1.02   < >  0.87   < >  0.65   < >  0.52   --   0.52   GLC  90   --   93   < >  106*   < >  96   < >  75   --   123*   ELSIE  7.5*   --   8.1*   < >  9.2   < >  8.6   < >  7.9*   --   7.5*   MAG   --    --    --    " --    --    --    --    --   2.0   --   3.1*   PHOS   --    --    --    --   4.6*   --   1.9*   < >  2.3*   < >   --     < > = values in this interval not displayed.     Recent Labs   Lab Test  07/05/18   0945  06/05/18   1432  05/03/18   0952  04/09/18   1203   WBC  3.7*  4.1  3.8*  4.1   HGB  8.5*  10.4*  6.3*  7.8*   PLT  53*  75*  77*  95*   MCV  108*  101*  91  86   NEUTROPHIL  66.0   --   52.6  49.8     Recent Labs   Lab Test  06/05/18   1432  03/27/18   1510  03/21/18   1331   BILITOTAL  2.1*  1.2  1.6*   ALKPHOS  267*  248*  199*   ALT  24  20  19   AST  50*  46*  38   ALBUMIN  3.2*  3.2*  2.8*     TSH   Date Value Ref Range Status   12/20/2017 1.02 0.40 - 4.00 mU/L Final   ]      Results for orders placed or performed in visit on 06/06/18   US abdomen complete    Narrative    ULTRASOUND ABDOMEN COMPLETE June 6, 2018 11:44 AM     HISTORY: Alcoholic hepatitis with ascites.    COMPARISON: Ultrasound abdomen 9/22/2017.    FINDINGS:    Gallbladder: Cholecystectomy.    Bile ducts: CHD is normal diameter. No intrahepatic biliary  dilatation.    Liver: No focal hepatic lesions identified. Somewhat coarse  echogenicity of the liver could represent underlying chronic liver  disease. Small ascites adjacent to the right liver.    Pancreas: Partially obscured but grossly unremarkable.     Spleen: Enlarged size of the spleen measuring 15.8 cm.    Right kidney: Normal.     Left kidney: Absent.    Aorta and IVC: Not specifically assessed.        Impression    IMPRESSION:    1. No focal liver lesion.  2. Trace ascites adjacent to the right liver. Somewhat coarse liver  that may represent underlying chronic liver disease. Mild  splenomegaly.  3. Cholecystectomy. Absent left kidney.    JOVI MARLEY MD         ASSESSMENT AND PLAN     52-year-old female with    1.  Pancytopenia   2.  Iron def anemia from chronic GI blood loss  3.  Macrocytosis     1. Secondary to underlying alcoholic cirrhosis /Hypersplensim/Alcolohol  abuse  [place] on alcohol abstinence.   Following with gastroenterology    2.  Iron deficiency likely from chronic GI blood loss but no obvious source on the EGD and colonoscopy in December 2017. Patient has seen gastroenterology last month with no further diagnostic workup planned at this time.  Iron studies today showed improved numbers with normal ferritin but iron saturations still low with  hemoglobin at 8.5 indicating persistent iron deficiency anemia. We'll scheduled for repeat IV iron infusions next week     3. Secondary to alcohol abuse and underlying liver disease      Return to clinic in 10 weeks with labs.     Chart documentation with Dragon Voice recognition Software. Although reviewed after completion, some words and grammatical errors may remain.  Usman Lee MD  Attending Physician   Hematology/Medical Oncology

## 2018-07-12 ENCOUNTER — INFUSION THERAPY VISIT (OUTPATIENT)
Dept: INFUSION THERAPY | Facility: CLINIC | Age: 53
End: 2018-07-12
Attending: INTERNAL MEDICINE
Payer: COMMERCIAL

## 2018-07-12 VITALS
TEMPERATURE: 99.7 F | WEIGHT: 129.5 LBS | OXYGEN SATURATION: 95 % | BODY MASS INDEX: 24.47 KG/M2 | RESPIRATION RATE: 18 BRPM | DIASTOLIC BLOOD PRESSURE: 55 MMHG | HEART RATE: 90 BPM | SYSTOLIC BLOOD PRESSURE: 101 MMHG

## 2018-07-12 DIAGNOSIS — D50.8 OTHER IRON DEFICIENCY ANEMIA: Primary | ICD-10-CM

## 2018-07-12 DIAGNOSIS — D50.0 CHRONIC BLOOD LOSS ANEMIA: ICD-10-CM

## 2018-07-12 PROCEDURE — 96365 THER/PROPH/DIAG IV INF INIT: CPT

## 2018-07-12 PROCEDURE — 25000128 H RX IP 250 OP 636: Performed by: INTERNAL MEDICINE

## 2018-07-12 RX ADMIN — SODIUM CHLORIDE 250 ML: 9 INJECTION, SOLUTION INTRAVENOUS at 14:57

## 2018-07-12 RX ADMIN — FERRIC CARBOXYMALTOSE INJECTION 750 MG: 50 INJECTION, SOLUTION INTRAVENOUS at 14:59

## 2018-07-12 ASSESSMENT — PAIN SCALES - GENERAL: PAINLEVEL: MODERATE PAIN (5)

## 2018-07-12 NOTE — PROGRESS NOTES
Infusion Nursing Note:  Monalisa Olguin presents today for Injectafer.    Patient seen by provider today: No   present during visit today: Not Applicable.    Note: N/A.    Intravenous Access:  Peripheral IV placed.    Treatment Conditions:  Not Applicable.      Post Infusion Assessment:  Patient tolerated infusion without incident.  Patient observed for 30 minutes post infusion per protocol.  Site patent and intact, free from redness, edema or discomfort.  No evidence of extravasations.  Access discontinued per protocol.    Discharge Plan:   Discharge instructions reviewed with: Patient.  Patient and/or family verbalized understanding of discharge instructions and all questions answered.  Copy of AVS reviewed with patient and/or family.  Patient will return 7/19/18 for next appointment.  Patient discharged in stable condition accompanied by: self and .  Departure Mode: Ambulatory.    Lay Colorado RN

## 2018-07-12 NOTE — MR AVS SNAPSHOT
After Visit Summary   7/12/2018    Monalisa Olguin    MRN: [id number]           Patient Information     Date Of Birth          1965        Visit Information        Provider Department      7/12/2018 2:30 PM NL INFUSION CHAIR 4 [facility] Infusion [facility]        Today's Diagnoses     Other iron deficiency anemia    -  1    Chronic blood loss anemia           Follow-ups after your visit        Your next 10 appointments already scheduled     Jul 19, 2018  2:30 PM CDT   Level 1 with NL INFUSION CHAIR 3   [facility] Infusion [facility] ([facility])    [address] [place] [address]   [phone]            Sep 13, 2018 10:30 AM CDT   Return Visit with Usman Lee MD   [facility] ([facility])    [address] [place] [id number] [phone]            Dec 04, 2018  8:45 AM CST   (Arrive by 8:30 AM)   Return General Liver with Edgardo Kovacs MD   [facility] Hepatology ([facility])    [address] [place] [address]  [address] [place] [address] [phone]              Who to contact     If you have questions or need follow up information about today's clinic visit or your schedule please contact [facility] INFUSION [facility] directly at [phone].  Normal or non-critical lab and imaging results will be communicated to you by Frevvohart, letter or phone within 4 business days after the clinic has received the results. If you do not hear from us within 7 days, please contact the clinic through Frevvohart or phone. If you have a critical or abnormal lab result, we will notify you by phone as soon as possible.  Submit refill requests through [employer] or call your pharmacy and they will forward the refill request to us. Please allow 3 business days for your refill to be completed.          Additional Information About Your Visit        [employer] Information     [employer] gives you secure access  to your electronic health record. If you see a primary care provider, you can also send messages to your care team and make appointments. If you have questions, please call your primary care clinic.  If you do not have a primary care provider, please call [phone] and they will assist you.        Care EveryWhere ID     This is your Care EveryWhere ID. This could be used by other organizations to access your [facility] medical records  [id number]        Your Vitals Were     Pulse Temperature Respirations Last Period Pulse Oximetry BMI (Body Mass Index)    90 99.7  F (37.6  C) (Oral) 18 05/16/2012 95% 24.47 kg/m2       Blood Pressure from Last 3 Encounters:   07/12/18 101/55   07/05/18 104/62   06/06/18 103/58    Weight from Last 3 Encounters:   07/12/18 58.7 kg (129 lb 8 oz)   07/05/18 57.2 kg (126 lb 3.2 oz)   06/06/18 57.3 kg (126 lb 6.4 oz)              Today, you had the following     No orders found for display       Primary Care Provider Office Phone # Fax #    Efren Bustos [phone]       [address] [place] [address]        Equal Access to Services     ADRIENNE GILLIAM : Hadii km ku hadasho Soomaali, waaxda luqadaha, qaybta kaalmada adeegyada, salo ramos haynabiln mary jara. So [facility] [phone].    ATENCIÓN: Si habla español, tiene a perez disposición servicios gratuitos de asistencia lingüística. Llame al [phone].    We comply with applicable federal civil rights laws and [place] laws. We do not discriminate on the basis of race, color, national origin, age, disability, sex, sexual orientation, or gender identity.            Thank you!     Thank you for choosing [facility] SERVICES  for your care. Our goal is always to provide you with excellent care. Hearing back from our patients is one way we can continue to improve our services. Please take a few minutes to complete the written survey that you may receive in the mail after your visit  with us. Thank you!             Your Updated Medication List - Protect others around you: Learn how to safely use, store and throw away your medicines at www.disposemymeds.org.          This list is accurate as of 7/12/18  4:32 PM.  Always use your most recent med list.                   Brand Name Dispense Instructions for use Diagnosis    acetaminophen 325 MG tablet    TYLENOL    100 tablet    Take 2 tablets (650 mg) by mouth every 4 hours as needed for mild pain    Alcoholic cirrhosis of liver without ascites (H)       alendronate 35 MG tablet    FOSAMAX    12 tablet    Take 1 tablet (35 mg) by mouth with 8oz water every Monday (once every 7 days) 30 minutes before breakfast and remain upright during this time.    Osteopenia, unspecified location       ASPIRIN PO      Take 325 mg by mouth daily        buPROPion 300 MG 24 hr tablet    WELLBUTRIN XL    90 tablet    Take 1 tablet (300 mg) by mouth every morning    Major depressive disorder, recurrent episode, moderate (H)       busPIRone 15 MG tablet    BUSPAR    180 tablet    Take 1 tablet (15 mg) by mouth 2 times daily    MONA (generalized anxiety disorder)       ferrous sulfate 325 (65 Fe) MG tablet    IRON    60 tablet    Take 1 tablet (325 mg) by mouth 2 times daily        FLUoxetine 20 MG capsule    PROzac    180 capsule    Take 3 capsules (60 mg) by mouth daily    Anxiety, Major depressive disorder, recurrent episode, moderate (H)       naltrexone 50 MG tablet    DEPADE;REVIA    90 tablet    Take 1 tablet (50 mg) by mouth daily    Alcohol dependence in remission (H)       omeprazole 20 MG CR capsule    priLOSEC    90 capsule    Take 1 capsule (20 mg) by mouth daily    Gastroesophageal reflux disease with esophagitis       phosphorus tablet 250 mg 250 MG per tablet    PHOSPHA 250 NEUTRAL    120 tablet    TAKE TWO TABLETS BY MOUTH TWICE A DAY    Hypophosphatemia       propranolol 10 MG tablet    INDERAL    180 tablet    Take 1 tablet (10 mg) by mouth 2 times  daily    Hypertension goal BP (blood pressure) < 130/80       senna-docusate 8.6-50 MG per tablet    SENOKOT-S;PERICOLACE    90 tablet    Take 1-2 tablets by mouth 2 times daily Take while on oral narcotics to prevent or treat constipation.    Wrist fracture, right, closed, initial encounter       thiamine 100 MG tablet     90 tablet    Take 1 tablet (100 mg) by mouth daily    Alcohol dependence, continuous drinking behavior (H)       TL RICARDO RX 2.2-25-1 MG Tabs   Generic drug:  Folic Acid-Vit B6-Vit B12     90 tablet    TAKE ONE TABLET BY MOUTH EVERY DAY    Alcoholism in recovery (H)       traZODone 50 MG tablet    DESYREL    60 tablet    Take 2 tablets (100 mg) by mouth nightly as needed for sleep    Other insomnia

## 2018-07-19 ENCOUNTER — INFUSION THERAPY VISIT (OUTPATIENT)
Dept: INFUSION THERAPY | Facility: CLINIC | Age: 53
End: 2018-07-19
Attending: INTERNAL MEDICINE
Payer: COMMERCIAL

## 2018-07-19 VITALS
SYSTOLIC BLOOD PRESSURE: 91 MMHG | TEMPERATURE: 98.2 F | RESPIRATION RATE: 18 BRPM | OXYGEN SATURATION: 94 % | BODY MASS INDEX: 25.21 KG/M2 | DIASTOLIC BLOOD PRESSURE: 50 MMHG | WEIGHT: 133.4 LBS | HEART RATE: 86 BPM

## 2018-07-19 DIAGNOSIS — D50.8 OTHER IRON DEFICIENCY ANEMIA: Primary | ICD-10-CM

## 2018-07-19 DIAGNOSIS — D50.0 CHRONIC BLOOD LOSS ANEMIA: ICD-10-CM

## 2018-07-19 PROCEDURE — 96365 THER/PROPH/DIAG IV INF INIT: CPT

## 2018-07-19 PROCEDURE — 25000128 H RX IP 250 OP 636: Performed by: INTERNAL MEDICINE

## 2018-07-19 RX ADMIN — SODIUM CHLORIDE 250 ML: 9 INJECTION, SOLUTION INTRAVENOUS at 14:59

## 2018-07-19 RX ADMIN — FERRIC CARBOXYMALTOSE INJECTION 750 MG: 50 INJECTION, SOLUTION INTRAVENOUS at 15:02

## 2018-07-19 ASSESSMENT — PAIN SCALES - GENERAL: PAINLEVEL: NO PAIN (0)

## 2018-07-19 NOTE — MR AVS SNAPSHOT
After Visit Summary   7/19/2018    Monalisa Olguin    MRN: [id number]           Patient Information     Date Of Birth          1965        Visit Information        Provider Department      7/19/2018 2:30 PM NL INFUSION CHAIR 3 [facility] Infusion [facility]        Today's Diagnoses     Other iron deficiency anemia    -  1    Chronic blood loss anemia          Care Instructions    Follow up with DR. Lee.           Follow-ups after your visit        Your next 10 appointments already scheduled     Sep 13, 2018 10:30 AM CDT   Return Visit with Usman Lee MD   [facility] ([facility])    [address] [place] [id number] [phone]            Dec 04, 2018  8:45 AM CST   (Arrive by 8:30 AM)   Return General Liver with Edgardo Kovacs MD   [facility] Hepatology ([facility])    [address] [place] [address] [phone]              Who to contact     If you have questions or need follow up information about today's clinic visit or your schedule please contact [facility] directly at [phone].  Normal or non-critical lab and imaging results will be communicated to you by MyChart, letter or phone within 4 business days after the clinic has received the results. If you do not hear from us within 7 days, please contact the clinic through CyberDefenderhart or phone. If you have a critical or abnormal lab result, we will notify you by phone as soon as possible.  Submit refill requests through [employer] or call your pharmacy and they will forward the refill request to us. Please allow 3 business days for your refill to be completed.          Additional Information About Your Visit        MyChart Information     [employer] gives you secure access to your electronic health record. If you see a primary care provider, you can also send messages to your care team and make appointments. If you  have questions, please call your primary care clinic.  If you do not have a primary care provider, please call [phone] and they will assist you.        Care EveryWhere ID     This is your Care EveryWhere ID. This could be used by other organizations to access your [place] medical records  [id number]        Your Vitals Were     Pulse Temperature Respirations Last Period Pulse Oximetry BMI (Body Mass Index)    86 98.2  F (36.8  C) (Temporal) 18 05/16/2012 94% 25.21 kg/m2       Blood Pressure from Last 3 Encounters:   07/19/18 91/50   07/12/18 101/55   07/05/18 104/62    Weight from Last 3 Encounters:   07/19/18 60.5 kg (133 lb 6.4 oz)   07/12/18 58.7 kg (129 lb 8 oz)   07/05/18 57.2 kg (126 lb 3.2 oz)              Today, you had the following     No orders found for display       Primary Care Provider Office Phone # Fax #    Efren Bustos [phone]       [address] [place] [address]        Equal Access to Services     [facility] Medicine[facility] : Hadii aad ku hadasho Sokeithali, waaxda luqadaha, qaybta kaalmada adelupeyada, salo viera . So [facility] [phone].    ATENCIÓN: Si habla español, tiene a perez disposición servicios gratuitos de asistencia lingüística. [facility] [phone].    We comply with applicable federal civil rights laws and [place] laws. We do not discriminate on the basis of race, color, national origin, age, disability, sex, sexual orientation, or gender identity.            Thank you!     Thank you for choosing [facility] INFUSION SERVICES  for your care. Our goal is always to provide you with excellent care. Hearing back from our patients is one way we can continue to improve our services. Please take a few minutes to complete the written survey that you may receive in the mail after your visit with us. Thank you!             Your Updated Medication List - Protect others around you: Learn how to safely use, store and throw away your  medicines at www.disposemymeds.org.          This list is accurate as of 7/19/18  4:29 PM.  Always use your most recent med list.                   Brand Name Dispense Instructions for use Diagnosis    acetaminophen 325 MG tablet    TYLENOL    100 tablet    Take 2 tablets (650 mg) by mouth every 4 hours as needed for mild pain    Alcoholic cirrhosis of liver without ascites (H)       alendronate 35 MG tablet    FOSAMAX    12 tablet    Take 1 tablet (35 mg) by mouth with 8oz water every Monday (once every 7 days) 30 minutes before breakfast and remain upright during this time.    Osteopenia, unspecified location       ASPIRIN PO      Take 325 mg by mouth daily        buPROPion 300 MG 24 hr tablet    WELLBUTRIN XL    90 tablet    Take 1 tablet (300 mg) by mouth every morning    Major depressive disorder, recurrent episode, moderate (H)       busPIRone 15 MG tablet    BUSPAR    180 tablet    Take 1 tablet (15 mg) by mouth 2 times daily    MONA (generalized anxiety disorder)       ferrous sulfate 325 (65 Fe) MG tablet    IRON    60 tablet    Take 1 tablet (325 mg) by mouth 2 times daily        FLUoxetine 20 MG capsule    PROzac    180 capsule    Take 3 capsules (60 mg) by mouth daily    Anxiety, Major depressive disorder, recurrent episode, moderate (H)       naltrexone 50 MG tablet    DEPADE;REVIA    90 tablet    Take 1 tablet (50 mg) by mouth daily    Alcohol dependence in remission (H)       omeprazole 20 MG CR capsule    priLOSEC    90 capsule    Take 1 capsule (20 mg) by mouth daily    Gastroesophageal reflux disease with esophagitis       phosphorus tablet 250 mg 250 MG per tablet    PHOSPHA 250 NEUTRAL    120 tablet    TAKE TWO TABLETS BY MOUTH TWICE A DAY    Hypophosphatemia       propranolol 10 MG tablet    INDERAL    180 tablet    Take 1 tablet (10 mg) by mouth 2 times daily    Hypertension goal BP (blood pressure) < 130/80       senna-docusate 8.6-50 MG per tablet    SENOKOT-S;PERICOLACE    90 tablet    Take  1-2 tablets by mouth 2 times daily Take while on oral narcotics to prevent or treat constipation.    Wrist fracture, right, closed, initial encounter       thiamine 100 MG tablet     90 tablet    Take 1 tablet (100 mg) by mouth daily    Alcohol dependence, continuous drinking behavior (H)       TL RICARDO RX 2.2-25-1 MG Tabs   Generic drug:  Folic Acid-Vit B6-Vit B12     90 tablet    TAKE ONE TABLET BY MOUTH EVERY DAY    Alcoholism in recovery (H)       traZODone 50 MG tablet    DESYREL    60 tablet    Take 2 tablets (100 mg) by mouth nightly as needed for sleep    Other insomnia

## 2018-07-19 NOTE — PROGRESS NOTES
Infusion Nursing Note:  Monalisa Olguin presents today for INJECTAFER.    Patient seen by provider today: No   present during visit today: Not Applicable.    Note: N/A.    Intravenous Access:  Peripheral IV placed.    Treatment Conditions:  Not Applicable.      Post Infusion Assessment:  Patient tolerated infusion without incident.  Patient observed for 30 minutes post INJECTAFER per protocol.  Site patent and intact, free from redness, edema or discomfort.  Access discontinued per protocol.    Discharge Plan:   Discharge instructions reviewed with: Patient.  Patient and/or family verbalized understanding of discharge instructions and all questions answered.  Copy of AVS reviewed with patient and/or family.  Patient will return TO CLINIC  for next appointment.  Patient discharged in stable condition accompanied by: self.  Departure Mode: Ambulatory.    Lay [place] RN

## 2018-07-27 ENCOUNTER — TELEPHONE (OUTPATIENT)
Dept: FAMILY MEDICINE | Facility: CLINIC | Age: 53
End: 2018-07-27

## 2018-07-27 ENCOUNTER — HOSPITAL ENCOUNTER (EMERGENCY)
Facility: CLINIC | Age: 53
Discharge: HOME | End: 2018-07-27
Attending: EMERGENCY MEDICINE | Admitting: EMERGENCY MEDICINE
Payer: COMMERCIAL

## 2018-07-27 VITALS
RESPIRATION RATE: 16 BRPM | DIASTOLIC BLOOD PRESSURE: 54 MMHG | HEART RATE: 85 BPM | WEIGHT: 137 LBS | SYSTOLIC BLOOD PRESSURE: 98 MMHG | OXYGEN SATURATION: 98 % | TEMPERATURE: 98.4 F | BODY MASS INDEX: 25.89 KG/M2

## 2018-07-27 DIAGNOSIS — E87.6 HYPOKALEMIA: ICD-10-CM

## 2018-07-27 DIAGNOSIS — R18.8 ASCITES OF LIVER: ICD-10-CM

## 2018-07-27 LAB
ALBUMIN SERPL-MCNC: 2.2 G/DL (ref 3.4–5)
ALP SERPL-CCNC: 296 U/L (ref 40–150)
ALT SERPL W P-5'-P-CCNC: 17 U/L (ref 0–50)
AMMONIA PLAS-SCNC: 22 UMOL/L (ref 10–50)
ANION GAP SERPL CALCULATED.3IONS-SCNC: 10 MMOL/L (ref 3–14)
APTT PPP: 21 SEC (ref 22–37)
AST SERPL W P-5'-P-CCNC: 49 U/L (ref 0–45)
BASOPHILS # BLD AUTO: 0 10E9/L (ref 0–0.2)
BASOPHILS NFR BLD AUTO: 0.1 %
BILIRUB SERPL-MCNC: 6.7 MG/DL (ref 0.2–1.3)
BUN SERPL-MCNC: 13 MG/DL (ref 7–30)
CALCIUM SERPL-MCNC: 7.5 MG/DL (ref 8.5–10.1)
CHLORIDE SERPL-SCNC: 100 MMOL/L (ref 94–109)
CO2 SERPL-SCNC: 27 MMOL/L (ref 20–32)
CREAT SERPL-MCNC: 1.14 MG/DL (ref 0.52–1.04)
DIFFERENTIAL METHOD BLD: ABNORMAL
EOSINOPHIL NFR BLD AUTO: 0 %
ERYTHROCYTE [DISTWIDTH] IN BLOOD BY AUTOMATED COUNT: 19.6 % (ref 10–15)
GFR SERPL CREATININE-BSD FRML MDRD: 50 ML/MIN/1.7M2
GLUCOSE SERPL-MCNC: 91 MG/DL (ref 70–99)
HCT VFR BLD AUTO: 26.1 % (ref 35–47)
HGB BLD-MCNC: 8.1 G/DL (ref 11.7–15.7)
IMM GRANULOCYTES # BLD: 0.1 10E9/L (ref 0–0.4)
IMM GRANULOCYTES NFR BLD: 1 %
INR PPP: 1.08 (ref 0.86–1.14)
LYMPHOCYTES # BLD AUTO: 1.3 10E9/L (ref 0.8–5.3)
LYMPHOCYTES NFR BLD AUTO: 13.9 %
MCH RBC QN AUTO: 34 PG (ref 26.5–33)
MCHC RBC AUTO-ENTMCNC: 31 G/DL (ref 31.5–36.5)
MCV RBC AUTO: 110 FL (ref 78–100)
MONOCYTES # BLD AUTO: 0.6 10E9/L (ref 0–1.3)
MONOCYTES NFR BLD AUTO: 6.6 %
NEUTROPHILS # BLD AUTO: 7.4 10E9/L (ref 1.6–8.3)
NEUTROPHILS NFR BLD AUTO: 78.4 %
NRBC # BLD AUTO: 0 10*3/UL
NRBC BLD AUTO-RTO: 0 /100
PLATELET # BLD AUTO: 78 10E9/L (ref 150–450)
POTASSIUM SERPL-SCNC: 2.7 MMOL/L (ref 3.4–5.3)
PROT SERPL-MCNC: 5.5 G/DL (ref 6.8–8.8)
RBC # BLD AUTO: 2.38 10E12/L (ref 3.8–5.2)
SODIUM SERPL-SCNC: 137 MMOL/L (ref 133–144)
WBC # BLD AUTO: 9.4 10E9/L (ref 4–11)

## 2018-07-27 PROCEDURE — 93005 ELECTROCARDIOGRAM TRACING: CPT | Performed by: EMERGENCY MEDICINE

## 2018-07-27 PROCEDURE — 99284 EMERGENCY DEPT VISIT MOD MDM: CPT | Mod: 25 | Performed by: EMERGENCY MEDICINE

## 2018-07-27 PROCEDURE — 93010 ELECTROCARDIOGRAM REPORT: CPT | Mod: Z6 | Performed by: EMERGENCY MEDICINE

## 2018-07-27 PROCEDURE — 85610 PROTHROMBIN TIME: CPT | Performed by: EMERGENCY MEDICINE

## 2018-07-27 PROCEDURE — 82140 ASSAY OF AMMONIA: CPT | Performed by: EMERGENCY MEDICINE

## 2018-07-27 PROCEDURE — 85025 COMPLETE CBC W/AUTO DIFF WBC: CPT | Performed by: EMERGENCY MEDICINE

## 2018-07-27 PROCEDURE — 36415 COLL VENOUS BLD VENIPUNCTURE: CPT | Performed by: EMERGENCY MEDICINE

## 2018-07-27 PROCEDURE — 99284 EMERGENCY DEPT VISIT MOD MDM: CPT | Performed by: EMERGENCY MEDICINE

## 2018-07-27 PROCEDURE — 85730 THROMBOPLASTIN TIME PARTIAL: CPT | Performed by: EMERGENCY MEDICINE

## 2018-07-27 PROCEDURE — 80053 COMPREHEN METABOLIC PANEL: CPT | Performed by: EMERGENCY MEDICINE

## 2018-07-27 RX ORDER — POTASSIUM CHLORIDE 1500 MG/1
40 TABLET, EXTENDED RELEASE ORAL 3 TIMES DAILY
Qty: 60 TABLET | Refills: 1 | Status: SHIPPED | OUTPATIENT
Start: 2018-07-27 | End: 2018-08-01

## 2018-07-27 NOTE — ED AVS SNAPSHOT
[facility] Emergency Department    [address] [place] [address]    Phone:  [phone]    Fax:  [phone]                                       Monalisa Olguin   MRN: [id number]    Department:  [facility] Emergency Department   Date of Visit:  7/27/2018           After Visit Summary Signature Page     I have received my discharge instructions, and my questions have been answered. I have discussed any challenges I see with this plan with the nurse or doctor.    ..........................................................................................................................................  Patient/Patient Representative Signature      ..........................................................................................................................................  Patient Representative Print Name and Relationship to Patient    ..................................................               ................................................  Date                                            Time    ..........................................................................................................................................  Reviewed by Signature/Title    ...................................................              ..............................................  Date                                                            Time

## 2018-07-27 NOTE — ED PROVIDER NOTES
History     Chief Complaint   Patient presents with     Abdominal Pain     The history is provided by the patient and the spouse.     Monalisa Olguin is a 52 year old female who presents to the emergency department for abdominal pain. Patient reports having abdominal pain with distention, nausea and shortness of breath for about 2 weeks. Patient states her distended abdomen makes moving painful and she gets winded easily. She states she has jaundice and bilateral ankle swelling. Patients  states her eyes are more jaundice, she has more confusion and is slow to respond to conversations. She denies any recent fevers.     Problem List:    Patient Active Problem List    Diagnosis Date Noted     Other iron deficiency anemia 04/11/2018     Priority: Medium     Alcoholic cirrhosis of liver without ascites (H) - per Hepatology consult Nov 2017 03/28/2018     Priority: Medium     Splenomegaly 03/28/2018     Priority: Medium     Epistaxis 03/28/2018     Priority: Medium     Acute alcoholic intoxication in alcoholism (H) 03/27/2018     Priority: Medium     Heme positive stool 03/27/2018     Priority: Medium     Alcoholic hepatitis with ascites 03/26/2017     Priority: Medium     Chronic blood loss anemia 03/26/2017     Priority: Medium     Thrombocytopenia (H) 03/26/2017     Priority: Medium     Tobacco abuse 03/26/2017     Priority: Medium     Portal hypertension (H) 03/26/2017     Priority: Medium     Pulmonary nodules 03/26/2017     Priority: Medium     Hyperthyroidism 03/26/2017     Priority: Medium     Alcoholism in recovery (H) 03/17/2016     Priority: Medium     Anxiety 10/10/2011     Priority: Medium     Hypertension goal BP (blood pressure) < 130/80 07/12/2011     Priority: Medium     HYPERLIPIDEMIA LDL GOAL <100 10/31/2010     Priority: Medium     Moderate Depression [296.32] 12/22/2009     Priority: Medium     Esophageal reflux 10/07/2002     Priority: Medium     Kidney donor 12/04/2001     Priority:  Medium        Past Medical History:    Past Medical History:   Diagnosis Date     Alcohol abuse 2013     Alcohol dependence 2013     Alcohol withdrawal 2013     Anxiety 10/10/2011     CKD (chronic kidney disease) stage 3, GFR 30-59 ml/min      CKD (chronic kidney disease) stage 3, GFR 30-59 ml/min 2011     Depressive disorder      Esophageal reflux 10/7/2002     Hypertension goal BP (blood pressure) < 130/80 2011     Moderate Depression [296.32] 2009     Other internal derangement of knee(717.89)      Pap smear      Unspecified essential hypertension        Past Surgical History:    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     COLONOSCOPY N/A 2017    Procedure: COLONOSCOPY;  Colonoscopy;  Surgeon: Sai Johnston MD;  Location:  GI     ESOPHAGOSCOPY, GASTROSCOPY, DUODENOSCOPY (EGD), COMBINED N/A 2017    Procedure: COMBINED ESOPHAGOSCOPY, GASTROSCOPY, DUODENOSCOPY (EGD), BIOPSY SINGLE OR MULTIPLE;  ESOPHAGOSCOPY, GASTROSCOPY, DUODENOSCOPY (EGD) with biopsies;  Surgeon: Sai Johnston MD;  Location:  GI     HC KNEE SCOPE, DIAGNOSTIC  01    Arthroscopy, Lt Knee     LAPAROSCOPIC CHOLECYSTECTOMY N/A 2017    Procedure: LAPAROSCOPIC CHOLECYSTECTOMY;  laparoscopic cholecystectomy;  Surgeon: Romeo Pastor MD;  Location: [facility] OR     OPEN REDUCTION INTERNAL FIXATION WRIST Right 2017    Procedure: OPEN REDUCTION INTERNAL FIXATION WRIST;  OPEN REDUCTION INTERNAL FIXATION RIGHT WRIST;  Surgeon: Edgardo Almendarez MD;  Location:  OR       Family History:    Family History   Problem Relation Age of Onset     Hypertension Mother      HEART DISEASE Paternal Grandmother      HEART DISEASE Paternal Grandfather      Cerebrovascular Disease Maternal Grandmother      Cerebrovascular Disease Maternal Grandfather       Alcohol/Drug Maternal Grandfather      Substance Abuse Maternal Grandfather      HEART DISEASE Father      Silent MI stents x 2     Diabetes Sister 17     older sister also had kidney and pancreas transplant     HEART DISEASE Sister      MI secondary to diabetes     Genitourinary Problems Sister 43     kidney transplant from renal failure     Diabetes Sister 9     youngest, juvenile type I (onset age 9 with no complications)     Cancer Paternal Aunt      ?kind       Social History:  Marital Status:   [2]  Social History   Substance Use Topics     Smoking status: Current Every Day Smoker     Packs/day: 0.50     Years: 10.00     Smokeless tobacco: Never Used      Comment: pt quit 1992 after smoking for 8 yrs, started again in 2004     Alcohol use 0.0 oz/week     0 Standard drinks or equivalent per week      Comment: 2 beers per day        Medications:      Potassium Chloride ER 20 MEQ TBCR   acetaminophen (TYLENOL) 325 MG tablet   alendronate (FOSAMAX) 35 MG tablet   ASPIRIN PO   buPROPion (WELLBUTRIN XL) 300 MG 24 hr tablet   busPIRone (BUSPAR) 15 MG tablet   ferrous sulfate (IRON) 325 (65 Fe) MG tablet   FLUoxetine (PROZAC) 20 MG capsule   naltrexone (DEPADE;REVIA) 50 MG tablet   omeprazole (PRILOSEC) 20 MG CR capsule   phosphorus tablet 250 mg (PHOSPHA 250 NEUTRAL) 250 MG per tablet   propranolol (INDERAL) 10 MG tablet   senna-docusate (SENOKOT-S;PERICOLACE) 8.6-50 MG per tablet   thiamine (VITAMIN B-1) 100 MG tablet   TL RICARDO RX 2.2-25-1 MG TABS   traZODone (DESYREL) 50 MG tablet         Review of Systems   All other systems reviewed and are negative.      Physical Exam   BP: 98/54  Pulse: 85  Temp: 98.4  F (36.9  C)  Resp: 16  Weight: 62.1 kg (137 lb)  SpO2: 98 %      Physical Exam   Constitutional: She is oriented to person, place, and time. She appears well-developed and well-nourished. No distress.   HENT:   Head: Normocephalic and atraumatic.   Mouth/Throat: Oropharynx is clear and moist. No  oropharyngeal exudate.   Eyes: Conjunctivae and EOM are normal. Scleral icterus is present.   Neck: Normal range of motion. Neck supple. No thyromegaly present.   Cardiovascular: Normal rate, regular rhythm, normal heart sounds and intact distal pulses.    Pulmonary/Chest: Effort normal and breath sounds normal. No respiratory distress. She has no wheezes. She has no rales. She exhibits no tenderness.   Abdominal: Soft. Bowel sounds are normal. She exhibits distension. She exhibits no mass. There is no tenderness. There is no rebound and no guarding.   Patients abdomin distended with ascities fluid.    Musculoskeletal: Normal range of motion. She exhibits no edema, tenderness or deformity.   Lymphadenopathy:     She has no cervical adenopathy.   Neurological: She is alert and oriented to person, place, and time.   2+ peripheral edema.   Skin: Skin is warm and dry. No rash noted. She is not diaphoretic.   Psychiatric:   Patient is slow to respond verbally and somewhat confused.   Nursing note and vitals reviewed.      ED Course     ED Course     Procedures               Critical Care time:  none  EKG reveals sinus rhythm at 80 bpm.  No acute ST segment or T-wave changes noted.  Interpreted by myself.            Results for orders placed or performed during the hospital encounter of 07/27/18 (from the past 24 hour(s))   CBC with platelets differential   Result Value Ref Range    WBC 9.4 4.0 - 11.0 10e9/L    RBC Count 2.38 (L) 3.8 - 5.2 10e12/L    Hemoglobin 8.1 (L) 11.7 - 15.7 g/dL    Hematocrit 26.1 (L) 35.0 - 47.0 %     (H) 78 - 100 fl    MCH 34.0 (H) 26.5 - 33.0 pg    MCHC 31.0 (L) 31.5 - 36.5 g/dL    RDW 19.6 (H) 10.0 - 15.0 %    Platelet Count 78 (L) 150 - 450 10e9/L    Diff Method Automated Method     % Neutrophils 78.4 %    % Lymphocytes 13.9 %    % Monocytes 6.6 %    % Eosinophils 0.0 %    % Basophils 0.1 %    % Immature Granulocytes 1.0 %    Nucleated RBCs 0 0 /100    Absolute Neutrophil 7.4 1.6 - 8.3  10e9/L    Absolute Lymphocytes 1.3 0.8 - 5.3 10e9/L    Absolute Monocytes 0.6 0.0 - 1.3 10e9/L    Absolute Basophils 0.0 0.0 - 0.2 10e9/L    Abs Immature Granulocytes 0.1 0 - 0.4 10e9/L    Absolute Nucleated RBC 0.0    Comprehensive metabolic panel   Result Value Ref Range    Sodium 137 133 - 144 mmol/L    Potassium 2.7 (L) 3.4 - 5.3 mmol/L    Chloride 100 94 - 109 mmol/L    Carbon Dioxide 27 20 - 32 mmol/L    Anion Gap 10 3 - 14 mmol/L    Glucose 91 70 - 99 mg/dL    Urea Nitrogen 13 7 - 30 mg/dL    Creatinine 1.14 (H) 0.52 - 1.04 mg/dL    GFR Estimate 50 (L) >60 mL/min/1.7m2    GFR Estimate If Black 61 >60 mL/min/1.7m2    Calcium 7.5 (L) 8.5 - 10.1 mg/dL    Bilirubin Total 6.7 (H) 0.2 - 1.3 mg/dL    Albumin 2.2 (L) 3.4 - 5.0 g/dL    Protein Total 5.5 (L) 6.8 - 8.8 g/dL    Alkaline Phosphatase 296 (H) 40 - 150 U/L    ALT 17 0 - 50 U/L    AST 49 (H) 0 - 45 U/L   Ammonia   Result Value Ref Range    Ammonia 22 10 - 50 umol/L   INR   Result Value Ref Range    INR 1.08 0.86 - 1.14   Partial thromboplastin time   Result Value Ref Range    PTT 21 (L) 22 - 37 sec       Medications - No data to display    Assessments & Plan (with Medical Decision Making)  52-year-old female with known alcoholic cirrhosis who presents with some increased edema of the lower extremities and some increased abdominal girth.  Hemodynamically stable here.  Afebrile.  Ascites noted.  Chronic anemia noted.  Hypokalemia which is new.  No acute EKG changes.  She continues to drink daily alcohol.  Have recommended discontinuing this if she can.  She appears stable for outpatient hypokalemic management.  Will initiate on potassium chloride 40 mEq 3 times daily.  An appointment is established in primary care for follow-up on Tuesday (4 days).  Also have ordered an outpatient paracentesis via ultrasound.  She should follow-up sooner if condition worsens.     I have reviewed the nursing notes.    I have reviewed the findings, diagnosis, plan and need for  follow up with the patient.       Discharge Medication List as of 7/27/2018  8:13 PM      START taking these medications    Details   Potassium Chloride ER 20 MEQ TBCR Take 2 tablets (40 mEq) by mouth 3 times daily for 5 days, Disp-60 tablet, R-1, E-Prescribe             Final diagnoses:   Hypokalemia   Ascites of liver     This document serves as a record of services personally performed by Tommy Goetz MD. It was created on their behalf by Gabbie Perry, a trained medical scribe. The creation of this record is based on the provider's personal observations and the statements of the patient. This document has been checked and approved by the attending provider.    Note: Chart documentation done in part with Dragon Voice Recognition software. Although reviewed after completion, some word and grammatical errors may remain.    7/27/2018   [facility] EMERGENCY DEPARTMENT     Tommy Goetz MD  07/28/18 0009

## 2018-07-27 NOTE — TELEPHONE ENCOUNTER
"Monalisa Olguin is a 52 year old female who calls with concerns of SOB.  She states she has one kidney and she is in active liver failure.  Her skin and eyes are yellow, she sleeps a lot and she looks like she is 6 months pregnant.       Last exam/Treatment:  7/5/2018  Allergies:   Allergies   Allergen Reactions     Albuterol      Tongue \"hardened and painful\"         RECOMMENDED DISPOSITION:  To ED, another person to drive   Will comply with recommendation: Yes  If further questions/concerns or if symptoms do not improve, worsen or new symptoms develop, call your PCP or [facility] as soon as possible.      Guideline used:  Telephone Triage Protocols for Nurses, Fifth Edition, Savannah Colorado RN    "

## 2018-07-27 NOTE — ED TRIAGE NOTES
Pt presents with acute on chronic abdominal pain with distention, nausea and shortness of breath.  She reports that she was diagnosed with liver failure 18 months ago.  She states that her abdomen has been getting more distended for the last week, difficult to move without pain, gets winded easy. No hx of paracentesis.

## 2018-07-27 NOTE — ED AVS SNAPSHOT
[facility]    [address] [place] [address]    Phone:  [phone]    Fax:  [phone]                                       Monalisa Olguin   MRN: [id number]    Department:  [facility]   Date of Visit:  7/27/2018           Patient Information     Date Of Birth          1965        Your diagnoses for this visit were:     Hypokalemia     Ascites of liver        You were seen by Tommy Goetz MD.      Follow-up Information     Follow up with Efren Bustso MD In 4 days.    Specialty:  Family Practice    Contact information:    [address] [place] [id number] [phone]          Discharge Instructions         Hypokalemia  Hypokalemia means a low level of potassium in the blood. This most often occurs in people who take water pills (diuretics). It can also occur because of severe vomiting or diarrhea. You may also have it if you take laxatives for long periods of time. It sometimes happens if you have low magnesium (hypomagnesemia). If you have this, your healthcare provider will treat the low magnesium first.  A mild case of hypokalemia usually causes no symptoms. It is only found with blood testing. More severe potassium loss causes overall weakness, muscle or abdominal cramps, rapid or irregular heartbeats (heart palpitations), low blood pressure, and muscle weakness.   Home care    Take any potassium supplements as prescribed.    Eat foods rich in potassium. The highest amount is found in avocado, baked potatoes, spinach, cantaloupe, cod, halibut, salmon, and scallops. White, red, or blanc beans are also very good sources. A modest amount of potassium is found in orange juice, bananas, carrots, and tomato juice.    If you take certain types of diuretics, you will also need to take potassium supplements. If you take a diuretic, discuss potassium supplements with your doctor.  Follow-up care  Follow up with  your healthcare provider for a repeat blood test within the next week, or as advised by our staff.  When to seek medical advice  Call your healthcare provider right away if any of the following occur:    Increased weakness, fatigue, or muscle cramps    Dizziness  Call 911  Call 911 if any of the following occur:    Irregular heartbeat, extra beats, or very fast heart rate    Loss of consciousness  Date Last Reviewed: 7/1/2017 2000-2017 [employer]. [address] [place] [address]. All rights reserved. This information is not intended as a substitute for professional medical care. Always follow your healthcare professional's instructions.          Ascites    Ascites (pronounced gj-mhos-vukh) is fluid collecting in the abdomen (stomach area). Symptoms include swelling of the abdomen and a feeling of pressure. Shortness of breath may also occur. In severe cases, the feet, ankles and legs may also swell.   There are many causes of ascites. The most common are related to the liver. They include:    Cirrhosis of the liver, which may be due to hepatitis B, hepatitis C, long-term alcohol abuse, nonalcoholic steatohepatitis (GUADARRAMA) and others    Diseases such as heart failure, kidney failure, pancreatitis, or cancer  To treat the condition, a low-salt diet may be recommended. Medicines that help fluid leave the body (diuretics) may be prescribed. In some cases, a procedure is done to drain the abdomen of fluid. This is called paracentesis. Unless the underlying cause is treated, the fluid is likely to return.  If liver damage is due to alcohol, stopping all alcohol will help slow the progress of the disease. If liver damage is from hepatitis B or C, treatments may be given to fight the virus. If liver damage becomes life threatening, a liver transplant may be needed.  Home care    Certain medicines can worsen liver damage. Talk to your healthcare provider or pharmacist about any medicines you  currently take. Ask your healthcare provider or pharmacist before taking any new medicines. Also ask before taking herbs, vitamins, or minerals. Certain ones affect the liver.    Do not taking acetaminophen or ibuprofen without taking to your healthcare provider first. Both can affect your liver.     Stop all alcohol use. If you abuse alcohol, talk to your healthcare provider about getting help and support to stop.     If you use IV drugs, seek help to stop. Never share needles or other equipment.      If you have cirrhosis from diabetes, obesity, or metabolic syndrome (associated with non-alcoholic steatohepatitis), treatment of the underlying condition is critical. So is exercise and weight loss.  Follow-up care  Follow up with your healthcare provider as advised. If a culture was done on the ascitic fluid, or imaging, or other labs were done, call as directed for the results. Depending on the results, your treatment may change.  The following sources can tell you more about ascites and help you find support.    American Liver Foundation 360-689-5942 www.liverfoundation.org    Hepatitis Foundation International www.hepfi.org    Alcoholics Anonymous www.aa.org    National [place] on Alcoholism and Drug Dependence 479-394-2248 www.ncadd.org  When to seek medical advice  Call your healthcare provider right away if you have any of the following:    Sudden weight gain with increased size of your abdomen or leg swelling    Increasing jaundice (yellowing of skin or eyes)    Excess bleeding from cuts or injuries    Blood in vomit or stool (black or red color)    Trouble breathing    Increasing abdominal pain    Confusion    Fever of 100.4 F (38 C) or higher, or as directed by your healthcare provider  Date Last Reviewed: 6/1/2017 2000-2017 [employer]. [address] [place] [address], [place] [address]. All rights reserved. This information is not intended as a substitute for professional medical care. Always  follow your healthcare professional's instructions.          Your next 10 appointments already scheduled     Jul 31, 2018  4:00 PM CDT   Office Visit with Efren Bustos MD   [facility] ([facility]    [address] [place] [id number]   [phone]           Bring a current list of meds and any records pertaining to this visit. For Physicals, please bring immunization records and any forms needing to be filled out. Please arrive 10 minutes early to complete paperwork.            Aug 01, 2018  2:30 PM CDT   Return Visit with Usman Lee MD   [facility] ([facility])    [address] [place] [id number]   [phone]            Sep 13, 2018 10:30 AM CDT   Return Visit with Usman Lee MD   [facility] ([address] [address]   [phone]            Dec 04, 2018  8:45 AM CST   (Arrive by 8:30 AM)   Return General Liver with Edgardo Kovacs MD   [facility] ([facility])    [address] [place] [address]  [address]  [facility] [address] [phone]              24 Hour Appointment Hotline       To make an appointment at any [facility], call [phone] ([phone]). If you don't have a family doctor or clinic, we will help you find one. [facility] are conveniently located to serve the needs of you and your family.          ED Discharge Orders     US Paracentesis                    Review of your medicines      START taking        Dose / Directions Last dose taken    Potassium Chloride ER 20 MEQ Tbcr   Dose:  40 mEq   Quantity:  60 tablet        Take 2 tablets (40 mEq) by mouth 3 times daily for 5 days   Refills:  1          Our records show that you are taking the medicines listed below. If these are incorrect, please call your family doctor or clinic.        Dose / Directions Last dose taken    acetaminophen 325  MG tablet   Commonly known as:  TYLENOL   Dose:  650 mg   Quantity:  100 tablet        Take 2 tablets (650 mg) by mouth every 4 hours as needed for mild pain   Refills:  0        alendronate 35 MG tablet   Commonly known as:  FOSAMAX   Quantity:  12 tablet        Take 1 tablet (35 mg) by mouth with 8oz water every Monday (once every 7 days) 30 minutes before breakfast and remain upright during this time.   Refills:  3        ASPIRIN PO   Dose:  325 mg        Take 325 mg by mouth daily   Refills:  0        buPROPion 300 MG 24 hr tablet   Commonly known as:  WELLBUTRIN XL   Dose:  300 mg   Quantity:  90 tablet        Take 1 tablet (300 mg) by mouth every morning   Refills:  3        busPIRone 15 MG tablet   Commonly known as:  BUSPAR   Dose:  15 mg   Quantity:  180 tablet        Take 1 tablet (15 mg) by mouth 2 times daily   Refills:  3        ferrous sulfate 325 (65 Fe) MG tablet   Commonly known as:  IRON   Dose:  325 mg   Quantity:  60 tablet        Take 1 tablet (325 mg) by mouth 2 times daily   Refills:  2        FLUoxetine 20 MG capsule   Commonly known as:  PROzac   Dose:  60 mg   Quantity:  180 capsule        Take 3 capsules (60 mg) by mouth daily   Refills:  3        naltrexone 50 MG tablet   Commonly known as:  DEPADE;REVIA   Dose:  50 mg   Quantity:  90 tablet        Take 1 tablet (50 mg) by mouth daily   Refills:  3        omeprazole 20 MG CR capsule   Commonly known as:  priLOSEC   Dose:  20 mg   Quantity:  90 capsule        Take 1 capsule (20 mg) by mouth daily   Refills:  3        phosphorus tablet 250 mg 250 MG per tablet   Commonly known as:  PHOSPHA 250 NEUTRAL   Quantity:  120 tablet        TAKE TWO TABLETS BY MOUTH TWICE A DAY   Refills:  3        propranolol 10 MG tablet   Commonly known as:  INDERAL   Dose:  10 mg   Quantity:  180 tablet        Take 1 tablet (10 mg) by mouth 2 times daily   Refills:  3        senna-docusate 8.6-50 MG per tablet   Commonly known as:  SENOKOT-S;PERICOLACE   Dose:   1-2 tablet   Quantity:  90 tablet        Take 1-2 tablets by mouth 2 times daily Take while on oral narcotics to prevent or treat constipation.   Refills:  6        thiamine 100 MG tablet   Dose:  100 mg   Quantity:  90 tablet        Take 1 tablet (100 mg) by mouth daily   Refills:  3        TL RICARDO RX 2.2-25-1 MG Tabs   Quantity:  90 tablet   Generic drug:  Folic Acid-Vit B6-Vit B12        TAKE ONE TABLET BY MOUTH EVERY DAY   Refills:  3        traZODone 50 MG tablet   Commonly known as:  DESYREL   Dose:  100 mg   Quantity:  60 tablet        Take 2 tablets (100 mg) by mouth nightly as needed for sleep   Refills:  5                Prescriptions were sent or printed at these locations (1 Prescription)                   [facility] [address]    [address] , [place] [address]    Telephone:  [phone]   Fax:  [phone]   Hours:                  E-Prescribed (1 of 1)         Potassium Chloride ER 20 MEQ TBCR                Procedures and tests performed during your visit     Ammonia    CBC with platelets differential    Comprehensive metabolic panel    EKG 12-lead, tracing only    INR    Partial thromboplastin time      Orders Needing Specimen Collection     None      Pending Results     No orders found from 7/25/2018 to 7/28/2018.            Pending Culture Results     No orders found from 7/25/2018 to 7/28/2018.            Pending Results Instructions     If you had any lab results that were not finalized at the time of your Discharge, you can call the ED Lab Result RN at [phone]. You will be contacted by this team for any positive Lab results or changes in treatment. The nurses are available 7 days a week from 10A to 6:30P.  You can leave a message 24 hours per day and they will return your call.        Thank you for choosing [facility]       Thank you for choosing [facility] for your care. Our goal is always to provide you with excellent care. Hearing back from our  patients is one way we can continue to improve our services. Please take a few minutes to complete the written survey that you may receive in the mail after you visit with us. Thank you!        [employer] Information     [employer] gives you secure access to your electronic health record. If you see a primary care provider, you can also send messages to your care team and make appointments. If you have questions, please call your primary care clinic.  If you do not have a primary care provider, please call [phone] and they will assist you.        Care EveryWhere ID     This is your Care EveryWhere ID. This could be used by other organizations to access your [facility] medical records  [id number]        Equal Access to Services     ADRIENNE GILLIAM : Obed Wei, rachna george, galileo boo, salo jara. So [facility] [phone].    ATENCIÓN: Si habla español, tiene a perez disposición servicios gratuitos de asistencia lingüística. Llame al [phone].    We comply with applicable federal civil rights laws and [place] laws. We do not discriminate on the basis of race, color, national origin, age, disability, sex, sexual orientation, or gender identity.            After Visit Summary       This is your record. Keep this with you and show to your community pharmacist(s) and doctor(s) at your next visit.

## 2018-07-28 NOTE — ED NOTES
Pt's IV blew after insertion.  I was able to pull her labs off it, will insert another IV after provider reviews lab results if needed

## 2018-07-28 NOTE — DISCHARGE INSTRUCTIONS
Hypokalemia  Hypokalemia means a low level of potassium in the blood. This most often occurs in people who take water pills (diuretics). It can also occur because of severe vomiting or diarrhea. You may also have it if you take laxatives for long periods of time. It sometimes happens if you have low magnesium (hypomagnesemia). If you have this, your healthcare provider will treat the low magnesium first.  A mild case of hypokalemia usually causes no symptoms. It is only found with blood testing. More severe potassium loss causes overall weakness, muscle or abdominal cramps, rapid or irregular heartbeats (heart palpitations), low blood pressure, and muscle weakness.   Home care    Take any potassium supplements as prescribed.    Eat foods rich in potassium. The highest amount is found in avocado, baked potatoes, spinach, cantaloupe, cod, halibut, salmon, and scallops. White, red, or blanc beans are also very good sources. A modest amount of potassium is found in orange juice, bananas, carrots, and tomato juice.    If you take certain types of diuretics, you will also need to take potassium supplements. If you take a diuretic, discuss potassium supplements with your doctor.  Follow-up care  Follow up with your healthcare provider for a repeat blood test within the next week, or as advised by our staff.  When to seek medical advice  Call your healthcare provider right away if any of the following occur:    Increased weakness, fatigue, or muscle cramps    Dizziness  Call 911  Call 911 if any of the following occur:    Irregular heartbeat, extra beats, or very fast heart rate    Loss of consciousness  Date Last Reviewed: 7/1/2017 2000-2017 [employer]. [address] [place] [address]. All rights reserved. This information is not intended as a substitute for professional medical care. Always follow your healthcare professional's instructions.          Ascites    Ascites (pronounced  sf-jemj-kwdt) is fluid collecting in the abdomen (stomach area). Symptoms include swelling of the abdomen and a feeling of pressure. Shortness of breath may also occur. In severe cases, the feet, ankles and legs may also swell.   There are many causes of ascites. The most common are related to the liver. They include:    Cirrhosis of the liver, which may be due to hepatitis B, hepatitis C, long-term alcohol abuse, nonalcoholic steatohepatitis (GUADARRAMA) and others    Diseases such as heart failure, kidney failure, pancreatitis, or cancer  To treat the condition, a low-salt diet may be recommended. Medicines that help fluid leave the body (diuretics) may be prescribed. In some cases, a procedure is done to drain the abdomen of fluid. This is called paracentesis. Unless the underlying cause is treated, the fluid is likely to return.  If liver damage is due to alcohol, stopping all alcohol will help slow the progress of the disease. If liver damage is from hepatitis B or C, treatments may be given to fight the virus. If liver damage becomes life threatening, a liver transplant may be needed.  Home care    Certain medicines can worsen liver damage. Talk to your healthcare provider or pharmacist about any medicines you currently take. Ask your healthcare provider or pharmacist before taking any new medicines. Also ask before taking herbs, vitamins, or minerals. Certain ones affect the liver.    Do not taking acetaminophen or ibuprofen without taking to your healthcare provider first. Both can affect your liver.     Stop all alcohol use. If you abuse alcohol, talk to your healthcare provider about getting help and support to stop.     If you use IV drugs, seek help to stop. Never share needles or other equipment.      If you have cirrhosis from diabetes, obesity, or metabolic syndrome (associated with non-alcoholic steatohepatitis), treatment of the underlying condition is critical. So is exercise and weight loss.  Follow-up  care  Follow up with your healthcare provider as advised. If a culture was done on the ascitic fluid, or imaging, or other labs were done, call as directed for the results. Depending on the results, your treatment may change.  The following sources can tell you more about ascites and help you find support.    American Liver Foundation 110-033-1474 www.liverfoundation.org    Hepatitis Foundation International www.hepfi.org    Alcoholics Anonymous www.aa.org    National [place] on Alcoholism and Drug Dependence [phone] www.ncadd.org  When to seek medical advice  Call your healthcare provider right away if you have any of the following:    Sudden weight gain with increased size of your abdomen or leg swelling    Increasing jaundice (yellowing of skin or eyes)    Excess bleeding from cuts or injuries    Blood in vomit or stool (black or red color)    Trouble breathing    Increasing abdominal pain    Confusion    Fever of 100.4 F (38 C) or higher, or as directed by your healthcare provider  Date Last Reviewed: 6/1/2017 2000-2017 [employer]. [address] [place] [address], [place] [address]. All rights reserved. This information is not intended as a substitute for professional medical care. Always follow your healthcare professional's instructions.

## 2018-07-31 ENCOUNTER — OFFICE VISIT (OUTPATIENT)
Dept: FAMILY MEDICINE | Facility: CLINIC | Age: 53
End: 2018-07-31
Payer: COMMERCIAL

## 2018-07-31 VITALS
SYSTOLIC BLOOD PRESSURE: 110 MMHG | BODY MASS INDEX: 26.07 KG/M2 | RESPIRATION RATE: 16 BRPM | DIASTOLIC BLOOD PRESSURE: 68 MMHG | WEIGHT: 138 LBS | TEMPERATURE: 98.1 F | OXYGEN SATURATION: 97 % | HEART RATE: 76 BPM

## 2018-07-31 DIAGNOSIS — E87.6 HYPOKALEMIA: ICD-10-CM

## 2018-07-31 DIAGNOSIS — D50.8 OTHER IRON DEFICIENCY ANEMIA: ICD-10-CM

## 2018-07-31 DIAGNOSIS — K70.11 ALCOHOLIC HEPATITIS WITH ASCITES (H): ICD-10-CM

## 2018-07-31 LAB
ALBUMIN SERPL-MCNC: 2.2 G/DL (ref 3.4–5)
ALP SERPL-CCNC: 286 U/L (ref 40–150)
ALT SERPL W P-5'-P-CCNC: 16 U/L (ref 0–50)
ANION GAP SERPL CALCULATED.3IONS-SCNC: 8 MMOL/L (ref 3–14)
APTT PPP: 30 SEC (ref 22–37)
AST SERPL W P-5'-P-CCNC: 60 U/L (ref 0–45)
BILIRUB DIRECT SERPL-MCNC: 5.1 MG/DL (ref 0–0.2)
BILIRUB SERPL-MCNC: 6.9 MG/DL (ref 0.2–1.3)
BUN SERPL-MCNC: 13 MG/DL (ref 7–30)
CALCIUM SERPL-MCNC: 7.8 MG/DL (ref 8.5–10.1)
CHLORIDE SERPL-SCNC: 107 MMOL/L (ref 94–109)
CO2 SERPL-SCNC: 25 MMOL/L (ref 20–32)
CREAT SERPL-MCNC: 1.28 MG/DL (ref 0.52–1.04)
FERRITIN SERPL-MCNC: 1146 NG/ML (ref 8–252)
GFR SERPL CREATININE-BSD FRML MDRD: 44 ML/MIN/1.7M2
GLUCOSE SERPL-MCNC: 93 MG/DL (ref 70–99)
HGB BLD-MCNC: 8.5 G/DL (ref 11.7–15.7)
INR PPP: 1.26 (ref 0.86–1.14)
IRON SATN MFR SERPL: 53 % (ref 15–46)
IRON SERPL-MCNC: 71 UG/DL (ref 35–180)
POTASSIUM SERPL-SCNC: 3.7 MMOL/L (ref 3.4–5.3)
PROT SERPL-MCNC: 5.5 G/DL (ref 6.8–8.8)
SODIUM SERPL-SCNC: 140 MMOL/L (ref 133–144)
TIBC SERPL-MCNC: 134 UG/DL (ref 240–430)

## 2018-07-31 PROCEDURE — 36415 COLL VENOUS BLD VENIPUNCTURE: CPT | Performed by: FAMILY MEDICINE

## 2018-07-31 PROCEDURE — 83550 IRON BINDING TEST: CPT | Performed by: FAMILY MEDICINE

## 2018-07-31 PROCEDURE — 82728 ASSAY OF FERRITIN: CPT | Performed by: FAMILY MEDICINE

## 2018-07-31 PROCEDURE — 85610 PROTHROMBIN TIME: CPT | Performed by: FAMILY MEDICINE

## 2018-07-31 PROCEDURE — 80048 BASIC METABOLIC PNL TOTAL CA: CPT | Performed by: FAMILY MEDICINE

## 2018-07-31 PROCEDURE — 80076 HEPATIC FUNCTION PANEL: CPT | Performed by: FAMILY MEDICINE

## 2018-07-31 PROCEDURE — 85730 THROMBOPLASTIN TIME PARTIAL: CPT | Performed by: FAMILY MEDICINE

## 2018-07-31 PROCEDURE — 85018 HEMOGLOBIN: CPT | Performed by: FAMILY MEDICINE

## 2018-07-31 PROCEDURE — 99214 OFFICE O/P EST MOD 30 MIN: CPT | Performed by: FAMILY MEDICINE

## 2018-07-31 PROCEDURE — 83540 ASSAY OF IRON: CPT | Performed by: FAMILY MEDICINE

## 2018-07-31 ASSESSMENT — PAIN SCALES - GENERAL: PAINLEVEL: SEVERE PAIN (7)

## 2018-07-31 NOTE — MR AVS SNAPSHOT
After Visit Summary   7/31/2018    Monalisa Olguin    MRN: [id number]           Patient Information     Date Of Birth          1965        Visit Information        Provider Department      7/31/2018 4:00 PM Efren Bustos MD [facility]        Today's Diagnoses     Hypokalemia    -  1    Other iron deficiency anemia        Alcoholic hepatitis with ascites           Follow-ups after your visit        Your next 10 appointments already scheduled     Aug 01, 2018  2:30 PM CDT   [facility]    [address] [place] [id number]   [phone]            Aug 03, 2018  9:40 AM CDT   US PARACENTESIS with PHUS1, PH RAD, PH IMAGING NURSE   [facility] Ultrasound ([facility])    [address] [place] [id number]   [phone]           Bring a list of your medicines to the exam. Include vitamins, minerals and over-the-counter drugs.  Tell your doctor in advance:   If you are or may be pregnant.   If you are taking Coumadin (or any other blood thinners) 5 days prior to the exam for any special instructions.   If you are diabetic to determine if your insulin needs have to be adjusted for the exam.  IF YOUR DOCTOR ALSO PRESCRIBED SEDATION DURING THE EXAM (medicine to help you relax): You will receive separate instructions about driving, eating, and additional tests that may be necessary prior to your exam day.  Please call the Imaging Department at your exam site with any questions.            Sep 13, 2018 10:30 AM CDT   [facility]    [address] [place] [id number]   [phone]            Dec 04, 2018  8:45 AM CST   (Arrive by 8:30 AM)   Return General Liver with Edgardo Kovacs MD   [facility] Hepatology ([facility] [place])    [address] [place] [address]  [address]  [facility] [id number] [phone]              Future tests that were ordered for you today     Open Future Orders        Priority Expected Expires Ordered    US Paracentesis Routine  7/31/2019 7/31/2018    Cell count with differential CSF: Tube 3 Routine 8/7/2018 8/7/2018 7/31/2018    Glucose fluid Routine 8/7/2018 8/7/2018 7/31/2018    CSF Culture Aerobic Bacterial Routine 8/7/2018 8/7/2018 7/31/2018    Gram stain Routine 8/7/2018 8/7/2018 7/31/2018    Fluid Culture Aerobic Bacterial Routine 8/7/2018 8/7/2018 7/31/2018    Protein fluid Routine 8/7/2018 8/7/2018 7/31/2018    Cytology non gyn Routine 8/7/2018 8/7/2018 7/31/2018            Who to contact     If you have questions or need follow up information about today's clinic visit or your schedule please contact [facility] directly at [phone].  Normal or non-critical lab and imaging results will be communicated to you by SolarWindshart, letter or phone within 4 business days after the clinic has received the results. If you do not hear from us within 7 days, please contact the clinic through SolarWindshart or phone. If you have a critical or abnormal lab result, we will notify you by phone as soon as possible.  Submit refill requests through Stylefie or call your pharmacy and they will forward the refill request to us. Please allow 3 business days for your refill to be completed.          Additional Information About Your Visit        Stylefie Information     Stylefie gives you secure access to your electronic health record. If you see a primary care provider, you can also send messages to your care team and make appointments. If you have questions, please call your primary care clinic.  If you do not have a primary care provider, please call [phone] and they will assist you.        Care EveryWhere ID     This is your Care EveryWhere ID. This could be used by other organizations to access your [facility] medical records  [id number]         Your Vitals Were     Pulse Temperature Respirations Last Period Pulse Oximetry BMI (Body Mass Index)    76 98.1  F (36.7  C) (Temporal) 16 05/16/2012 97% 26.07 kg/m2       Blood Pressure from Last 3 Encounters:   07/31/18 110/68   07/27/18 98/54   07/19/18 91/50    Weight from Last 3 Encounters:   07/31/18 138 lb (62.6 kg)   07/27/18 137 lb (62.1 kg)   07/19/18 133 lb 6.4 oz (60.5 kg)              We Performed the Following     Basic metabolic panel     Ferritin     Hemoglobin     Hepatic panel     INR     Iron and iron binding capacity     Partial thromboplastin time        Primary Care Provider Office Phone # Fax #    Efren Bustos [phone]       [address] [place] [address]        Equal Access to Services     CRAIG [facility] HospitalPETRON[facility] : Hadii aad ku hadasho Soomaali, waaxda luqadaha, qaybta kaalmada adelupeyadedrick, salo viera . So [facility] [phone].    ATENCIÓN: Si habla español, tiene a perez disposición servicios gratuitos de asistencia lingüística. Llame al [phone].    We comply with applicable federal civil rights laws and [place] laws. We do not discriminate on the basis of race, color, national origin, age, disability, sex, sexual orientation, or gender identity.            Thank you!     Thank you for choosing [facility]  for your care. Our goal is always to provide you with excellent care. Hearing back from our patients is one way we can continue to improve our services. Please take a few minutes to complete the written survey that you may receive in the mail after your visit with us. Thank you!             Your Updated Medication List - Protect others around you: Learn how to safely use, store and throw away your medicines at www.disposemymeds.org.          This list is accurate as of 7/31/18  5:11 PM.  Always use your most recent med list.                   Brand Name Dispense Instructions for use Diagnosis    acetaminophen 325 MG  tablet    TYLENOL    100 tablet    Take 2 tablets (650 mg) by mouth every 4 hours as needed for mild pain    Alcoholic cirrhosis of liver without ascites (H)       alendronate 35 MG tablet    FOSAMAX    12 tablet    Take 1 tablet (35 mg) by mouth with 8oz water every Monday (once every 7 days) 30 minutes before breakfast and remain upright during this time.    Osteopenia, unspecified location       ASPIRIN PO      Take 325 mg by mouth daily        buPROPion 300 MG 24 hr tablet    WELLBUTRIN XL    90 tablet    Take 1 tablet (300 mg) by mouth every morning    Major depressive disorder, recurrent episode, moderate (H)       busPIRone 15 MG tablet    BUSPAR    180 tablet    Take 1 tablet (15 mg) by mouth 2 times daily    MONA (generalized anxiety disorder)       ferrous sulfate 325 (65 Fe) MG tablet    IRON    60 tablet    Take 1 tablet (325 mg) by mouth 2 times daily        FLUoxetine 20 MG capsule    PROzac    180 capsule    Take 3 capsules (60 mg) by mouth daily    Anxiety, Major depressive disorder, recurrent episode, moderate (H)       naltrexone 50 MG tablet    DEPADE;REVIA    90 tablet    Take 1 tablet (50 mg) by mouth daily    Alcohol dependence in remission (H)       omeprazole 20 MG CR capsule    priLOSEC    90 capsule    Take 1 capsule (20 mg) by mouth daily    Gastroesophageal reflux disease with esophagitis       phosphorus tablet 250 mg 250 MG per tablet    PHOSPHA 250 NEUTRAL    120 tablet    TAKE TWO TABLETS BY MOUTH TWICE A DAY    Hypophosphatemia       Potassium Chloride ER 20 MEQ Tbcr     60 tablet    Take 2 tablets (40 mEq) by mouth 3 times daily for 5 days        propranolol 10 MG tablet    INDERAL    180 tablet    Take 1 tablet (10 mg) by mouth 2 times daily    Hypertension goal BP (blood pressure) < 130/80       senna-docusate 8.6-50 MG per tablet    SENOKOT-S;PERICOLACE    90 tablet    Take 1-2 tablets by mouth 2 times daily Take while on oral narcotics to prevent or treat constipation.    Wrist  fracture, right, closed, initial encounter       thiamine 100 MG tablet     90 tablet    Take 1 tablet (100 mg) by mouth daily    Alcohol dependence, continuous drinking behavior (H)       TL RICARDO RX 2.2-25-1 MG Tabs   Generic drug:  Folic Acid-Vit B6-Vit B12     90 tablet    TAKE ONE TABLET BY MOUTH EVERY DAY    Alcoholism in recovery (H)       traZODone 50 MG tablet    DESYREL    60 tablet    Take 2 tablets (100 mg) by mouth nightly as needed for sleep    Other insomnia

## 2018-07-31 NOTE — PROGRESS NOTES
SUBJECTIVE:   Monalisa Olguin is a 52 year old female who presents to clinic today for the following health issues:      ED/UC Followup:    Facility:  [place]  Date of visit: 7/29/2018  Reason for visit: Hypokalemia   Current Status: getting worse she feels like.          PROBLEMS TO ADD ON...  Patient was seen in the ED by Dr. Hutchinson on 7/28/2018.  He diagnosed her with hypokalemia and ascites secondary to liver disease.  He had ordered a paracentesis but did not set that up for her with ultrasound and the patient and her  are here today expecting to have a paracentesis done.  I informed him that I do not do the procedure and that we need to do it through the radiology department that we will get that scheduled for them.  She does not feel any better and feels like she is getting more distended having more abdominal discomfort.  She feels a little bit more short of breath but is not having trouble getting oxygen.  She is very tired and sleepy and slept most of the day yesterday and today.  She is not sleeping well at night because she is sleeping so much during the day.  She appears more jaundiced and has significant scleral icterus.  Her skin is a odonnell bronze consistent with her liver disease.  She is markedly distended in the abdomen.  She is coherent and talking but slower than normal.  She does not seem confused.  Her  is with her today and states that she does not seem as confused as she has in the past.  She states that she has not been drinking since she saw Dr. Goetz in the ED but she is aware that this is probably too little too late.    Problem list and histories reviewed & adjusted, as indicated.  Additional history: as documented    Patient Active Problem List   Diagnosis     Kidney donor     Esophageal reflux     Moderate Depression [296.32]     HYPERLIPIDEMIA LDL GOAL <100     Hypertension goal BP (blood pressure) < 130/80     Anxiety     Alcoholism in recovery (H)     Alcoholic  hepatitis with ascites     Chronic blood loss anemia     Thrombocytopenia (H)     Tobacco abuse     Portal hypertension (H)     Pulmonary nodules     Hyperthyroidism     Acute alcoholic intoxication in alcoholism (H)     Heme positive stool     Alcoholic cirrhosis of liver without ascites (H) - per Hepatology consult 2017     Splenomegaly     Epistaxis     Other iron deficiency anemia     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     COLONOSCOPY N/A 2017    Procedure: COLONOSCOPY;  Colonoscopy;  Surgeon: Sai Johnston MD;  Location: [facility]     ESOPHAGOSCOPY, GASTROSCOPY, DUODENOSCOPY (EGD), COMBINED N/A 2017    Procedure: COMBINED ESOPHAGOSCOPY, GASTROSCOPY, DUODENOSCOPY (EGD), BIOPSY SINGLE OR MULTIPLE;  ESOPHAGOSCOPY, GASTROSCOPY, DUODENOSCOPY (EGD) with biopsies;  Surgeon: Sai Johnston MD;  Location: [facility]     HC KNEE SCOPE, DIAGNOSTIC  01    Arthroscopy, Lt Knee     LAPAROSCOPIC CHOLECYSTECTOMY N/A 2017    Procedure: LAPAROSCOPIC CHOLECYSTECTOMY;  laparoscopic cholecystectomy;  Surgeon: Romeo Pastor MD;  Location: [facility]     OPEN REDUCTION INTERNAL FIXATION WRIST Right 2017    Procedure: OPEN REDUCTION INTERNAL FIXATION WRIST;  OPEN REDUCTION INTERNAL FIXATION RIGHT WRIST;  Surgeon: Edgardo Almendarez MD;  Location:  OR       Social History   Substance Use Topics     Smoking status: Current Every Day Smoker     Packs/day: 0.50     Years: 10.00     Smokeless tobacco: Never Used      Comment: pt quit  after smoking for 8 yrs, started again in      Alcohol use 0.0 oz/week     0 Standard drinks or equivalent per week      Comment: 2 beers per day     Family History   Problem Relation Age of Onset     Hypertension Mother      HEART DISEASE Paternal Grandmother      HEART DISEASE Paternal Grandfather      Cerebrovascular Disease  Maternal Grandmother      Cerebrovascular Disease Maternal Grandfather      Alcohol/Drug Maternal Grandfather      Substance Abuse Maternal Grandfather      HEART DISEASE Father      Silent MI stents x 2     Diabetes Sister 17     older sister also had kidney and pancreas transplant     HEART DISEASE Sister      MI secondary to diabetes     Genitourinary Problems Sister 43     kidney transplant from renal failure     Diabetes Sister 9     youngest, juvenile type I (onset age 9 with no complications)     Cancer Paternal Aunt      ?kind         Current Outpatient Prescriptions   Medication Sig Dispense Refill     acetaminophen (TYLENOL) 325 MG tablet Take 2 tablets (650 mg) by mouth every 4 hours as needed for mild pain 100 tablet      alendronate (FOSAMAX) 35 MG tablet Take 1 tablet (35 mg) by mouth with 8oz water every Monday (once every 7 days) 30 minutes before breakfast and remain upright during this time. 12 tablet 3     ASPIRIN PO Take 325 mg by mouth daily       buPROPion (WELLBUTRIN XL) 300 MG 24 hr tablet Take 1 tablet (300 mg) by mouth every morning 90 tablet 3     busPIRone (BUSPAR) 15 MG tablet Take 1 tablet (15 mg) by mouth 2 times daily 180 tablet 3     ferrous sulfate (IRON) 325 (65 Fe) MG tablet Take 1 tablet (325 mg) by mouth 2 times daily 60 tablet 2     FLUoxetine (PROZAC) 20 MG capsule Take 3 capsules (60 mg) by mouth daily 180 capsule 3     naltrexone (DEPADE;REVIA) 50 MG tablet Take 1 tablet (50 mg) by mouth daily 90 tablet 3     omeprazole (PRILOSEC) 20 MG CR capsule Take 1 capsule (20 mg) by mouth daily 90 capsule 3     phosphorus tablet 250 mg (PHOSPHA 250 NEUTRAL) 250 MG per tablet TAKE TWO TABLETS BY MOUTH TWICE A  tablet 3     Potassium Chloride ER 20 MEQ TBCR Take 2 tablets (40 mEq) by mouth 3 times daily for 5 days 60 tablet 1     propranolol (INDERAL) 10 MG tablet Take 1 tablet (10 mg) by mouth 2 times daily 180 tablet 3     senna-docusate (SENOKOT-S;PERICOLACE) 8.6-50 MG per  "tablet Take 1-2 tablets by mouth 2 times daily Take while on oral narcotics to prevent or treat constipation. 90 tablet 6     thiamine (VITAMIN B-1) 100 MG tablet Take 1 tablet (100 mg) by mouth daily 90 tablet 3     TL RICARDO RX 2.2-25-1 MG TABS TAKE ONE TABLET BY MOUTH EVERY DAY 90 tablet 3     traZODone (DESYREL) 50 MG tablet Take 2 tablets (100 mg) by mouth nightly as needed for sleep 60 tablet 5     Allergies   Allergen Reactions     Albuterol      Tongue \"hardened and painful\"     Recent Labs   Lab Test  07/31/18   1723  07/27/18   1805  06/05/18   1432   12/20/17   1403   09/24/17   0630   09/22/17   0735   05/23/17   1610   03/03/16   0953   08/26/13   1037   A1C   --    --    --    --    --    --   Canceled, Test credited   --    --    --    --    --    --    --    --    LDL   --    --    --    --    --    --    --    --    --    --   95   --   56   --   96   HDL   --    --    --    --    --    --    --    --    --    --   71   --   58   --   54   TRIG   --    --    --    --    --    --    --    --    --    --   59   --   95   --   104   ALT  16  17  24   < >  23   < >  35   < >  38   < >  34   < >   --    < >  32   CR  1.28*  1.14*   --    < >  1.02   < >  1.04   < >  1.33*   < >  0.87   < >  0.89   < >  1.12*   GFRESTIMATED  44*  50*   --    < >  57*   < >  56*   < >  42*   < >  69   < >  67   < >  52*   GFRESTBLACK  53*  61   --    < >  69   < >  68   < >  51*   < >  83   < >  81   < >  63   POTASSIUM  3.7  2.7*   --    < >  3.7   < >  3.7   < >  3.9   < >  4.1   < >  3.7   < >  3.8   TSH   --    --    --    --   1.02   --    --    --   0.50   --    --    < >   --    --    --     < > = values in this interval not displayed.      BP Readings from Last 3 Encounters:   07/31/18 110/68   07/27/18 98/54   07/19/18 91/50    Wt Readings from Last 3 Encounters:   07/31/18 138 lb (62.6 kg)   07/27/18 137 lb (62.1 kg)   07/19/18 133 lb 6.4 oz (60.5 kg)                  Labs reviewed in EPIC    Reviewed and updated " as needed this visit by clinical staff  Tobacco  Allergies  Meds       Reviewed and updated as needed this visit by Provider         ROS:  Constitutional, HEENT, cardiovascular, pulmonary, gi and gu systems are negative, except as otherwise noted.    OBJECTIVE:     /68  Pulse 76  Temp 98.1  F (36.7  C) (Temporal)  Resp 16  Wt 138 lb (62.6 kg)  LMP 05/16/2012  SpO2 97%  BMI 26.07 kg/m2  Body mass index is 26.07 kg/(m^2).  GENERAL: healthy, alert and no distress  EYES: She has significant scleral icterus  SKIN: Her skin is bronze in color and almost has a grayish green tint to it.  NECK: no adenopathy, no asymmetry, masses, or scars and thyroid normal to palpation  RESP: lungs clear to auscultation - no rales, rhonchi or wheezes  CV: regular rate and rhythm, normal S1 S2, no S3 or S4, no murmur, click or rub, no peripheral edema and peripheral pulses strong  ABDOMEN: Her abdomen is markedly distended.  Laying down she has an obvious fluid wave consistent with ascites.  She is tender in the upper quadrants and epigastric region.  MS: no gross musculoskeletal defects noted, no edema    Diagnostic Test Results:  Results for orders placed or performed in visit on 07/31/18 (from the past 24 hour(s))   Basic metabolic panel   Result Value Ref Range    Sodium 140 133 - 144 mmol/L    Potassium 3.7 3.4 - 5.3 mmol/L    Chloride 107 94 - 109 mmol/L    Carbon Dioxide 25 20 - 32 mmol/L    Anion Gap 8 3 - 14 mmol/L    Glucose 93 70 - 99 mg/dL    Urea Nitrogen 13 7 - 30 mg/dL    Creatinine 1.28 (H) 0.52 - 1.04 mg/dL    GFR Estimate 44 (L) >60 mL/min/1.7m2    GFR Estimate If Black 53 (L) >60 mL/min/1.7m2    Calcium 7.8 (L) 8.5 - 10.1 mg/dL   Hemoglobin   Result Value Ref Range    Hemoglobin 8.5 (L) 11.7 - 15.7 g/dL   Hepatic panel   Result Value Ref Range    Bilirubin Direct 5.1 (H) 0.0 - 0.2 mg/dL    Bilirubin Total 6.9 (H) 0.2 - 1.3 mg/dL    Albumin 2.2 (L) 3.4 - 5.0 g/dL    Protein Total 5.5 (L) 6.8 - 8.8 g/dL     Alkaline Phosphatase 286 (H) 40 - 150 U/L    ALT 16 0 - 50 U/L    AST 60 (H) 0 - 45 U/L   Iron and iron binding capacity   Result Value Ref Range    Iron 71 35 - 180 ug/dL    Iron Binding Cap 134 (L) 240 - 430 ug/dL    Iron Saturation Index 53 (H) 15 - 46 %   Ferritin   Result Value Ref Range    Ferritin 1146 (H) 8 - 252 ng/mL   INR   Result Value Ref Range    INR 1.26 (H) 0.86 - 1.14   Partial thromboplastin time   Result Value Ref Range    PTT 30 22 - 37 sec       ASSESSMENT/PLAN:   (K70.11) Alcoholic hepatitis with ascites  Comment: Patient most likely has an end-stage liver disease now with significant abdominal ascites.  Plan: Cell count with differential CSF: Tube 3,         Glucose fluid, CSF Culture Aerobic Bacterial,         Gram stain, Fluid Culture Aerobic Bacterial,         Protein fluid, Cytology non gyn, Partial         thromboplastin time, US Paracentesis        We did get her scheduled for a paracentesis both therapeutic and diagnostic this Friday here at the radiology department.  That was the soonest they could get her in.  I have all the orders in including the labs to be done off the fluid.  She did do labs today and her potassium level is improved but her bilirubin level is elevated.  Her protein and albumin are low which we expect and her AST is elevated but ALT is normal.  I am guessing that her liver is just sort of burned out.  Her ferritin level is elevated at 1146 and her INR is slightly elevated at 1.26.  Her PTT is normal.  She has an appointment with Dr. Lee from hematology oncology tomorrow afternoon so I will be interested to get his input on her condition.  She has seen a hepatologist in the past and they really have not had great recommendations for her so not certain if she would even be a candidate for liver transplant.    (E87.6) Hypokalemia  Comment: Her hypokalemia is improved on the supplements.  Plan: Basic metabolic panel, US Paracentesis        I will have her decrease  the supplements to once daily.    (D50.8) Other iron deficiency anemia  Comment: Her hemoglobin is stable at 8.5 so it has not really significantly improved or decreased.  Plan: Hemoglobin        She is elevated iron stores so she just not utilizing the iron that she has been given to create new blood cells which is probably secondary to her chronic liver disease.     Electronically signed by:  Efren Bustos M.D.  7/31/2018

## 2018-07-31 NOTE — NURSING NOTE
Chief Complaint   Patient presents with     ER F/U     7/29/2018 Hypokalemia      Edema     in both legs     Abdominal Pain     ongoing for about 10 days now     Gas     [place]     MP/MA

## 2018-08-01 ENCOUNTER — TELEPHONE (OUTPATIENT)
Dept: FAMILY MEDICINE | Facility: CLINIC | Age: 53
End: 2018-08-01

## 2018-08-01 ENCOUNTER — ONCOLOGY VISIT (OUTPATIENT)
Dept: ONCOLOGY | Facility: CLINIC | Age: 53
End: 2018-08-01
Payer: COMMERCIAL

## 2018-08-01 VITALS
OXYGEN SATURATION: 96 % | RESPIRATION RATE: 18 BRPM | WEIGHT: 139.7 LBS | BODY MASS INDEX: 26.38 KG/M2 | HEIGHT: 61 IN | SYSTOLIC BLOOD PRESSURE: 86 MMHG | DIASTOLIC BLOOD PRESSURE: 60 MMHG | TEMPERATURE: 97.4 F | HEART RATE: 81 BPM

## 2018-08-01 DIAGNOSIS — K70.11 ALCOHOLIC HEPATITIS WITH ASCITES (H): Primary | ICD-10-CM

## 2018-08-01 DIAGNOSIS — D50.0 CHRONIC BLOOD LOSS ANEMIA: Primary | ICD-10-CM

## 2018-08-01 DIAGNOSIS — R10.84 ABDOMINAL PAIN, GENERALIZED: ICD-10-CM

## 2018-08-01 PROCEDURE — 99213 OFFICE O/P EST LOW 20 MIN: CPT | Performed by: INTERNAL MEDICINE

## 2018-08-01 ASSESSMENT — PATIENT HEALTH QUESTIONNAIRE - PHQ9: SUM OF ALL RESPONSES TO PHQ QUESTIONS 1-9: 15

## 2018-08-01 ASSESSMENT — PAIN SCALES - GENERAL: PAINLEVEL: EXTREME PAIN (8)

## 2018-08-01 NOTE — MR AVS SNAPSHOT
After Visit Summary   8/1/2018    Monalisa Olguin    MRN: [id number]           Patient Information     Date Of Birth          1965        Visit Information        Provider Department      8/1/2018 2:30 PM Usman Lee MD [facility]        Care Instructions      Please reschedule follow up with Dr. Lee for 2 months.  Please come 20-30 minutes early for labs.    Follow Up Date/Time:     If you have any questions or concerns please feel free to call.    If you need to reschedule please call:  Clinic or Lab Appointment - [phone]  Infusion - [phone]  Imaging - [phone]    Lester Do, RN, BSN, OCN  [facility]   Oncology/Hematology Care Coordinator RN  [facility]  [phone]              Follow-ups after your visit        Your next 10 appointments already scheduled     Aug 03, 2018  9:40 AM CDT   US PARACENTESIS with PHUS1, PH RAD, PH IMAGING NURSE   [facility] Ultrasound ([facility])    [address] [address] [phone]           Bring a list of your medicines to the exam. Include vitamins, minerals and over-the-counter drugs.  Tell your doctor in advance:   If you are or may be pregnant.   If you are taking Coumadin (or any other blood thinners) 5 days prior to the exam for any special instructions.   If you are diabetic to determine if your insulin needs have to be adjusted for the exam.  IF YOUR DOCTOR ALSO PRESCRIBED SEDATION DURING THE EXAM (medicine to help you relax): You will receive separate instructions about driving, eating, and additional tests that may be necessary prior to your exam day.  Please call the Imaging Department at your exam site with any questions.            Sep 13, 2018 10:30 AM CDT   [facility]   [facility]    [address] [id number] [phone]            Dec 04, 2018  8:45  AM CST   (Arrive by 8:30 AM)   Return General Liver with Edgardo Kovacs MD   [facility] Hepatology ([facility] [place])    [address]  [address]  [place] [address] [phone]              Future tests that were ordered for you today     Open Future Orders        Priority Expected Expires Ordered    US Paracentesis Routine  7/31/2019 7/31/2018    Cell count with differential CSF: Tube 3 Routine 8/7/2018 8/7/2018 7/31/2018    Glucose fluid Routine 8/7/2018 8/7/2018 7/31/2018    CSF Culture Aerobic Bacterial Routine 8/7/2018 8/7/2018 7/31/2018    Gram stain Routine 8/7/2018 8/7/2018 7/31/2018    Fluid Culture Aerobic Bacterial Routine 8/7/2018 8/7/2018 7/31/2018    Protein fluid Routine 8/7/2018 8/7/2018 7/31/2018    Cytology non gyn Routine 8/7/2018 8/7/2018 7/31/2018            Who to contact     If you have questions or need follow up information about today's clinic visit or your schedule please contact [facility] directly at [phone].  Normal or non-critical lab and imaging results will be communicated to you by MyChart, letter or phone within 4 business days after the clinic has received the results. If you do not hear from us within 7 days, please contact the clinic through [employer] or phone. If you have a critical or abnormal lab result, we will notify you by phone as soon as possible.  Submit refill requests through [employer] or call your pharmacy and they will forward the refill request to us. Please allow 3 business days for your refill to be completed.          Additional Information About Your Visit        [employer] Information     [employer] gives you secure access to your electronic health record. If you see a primary care provider, you can also send messages to your care team and make appointments. If you have questions, please call your primary care clinic.  If you do not have a primary care provider, please call [phone] and they will assist you.       "  Care EveryWhere ID     This is your Care EveryWhere ID. This could be used by other organizations to access your [place] medical records  [id number]        Your Vitals Were     Pulse Temperature Respirations Height Last Period Pulse Oximetry    81 97.4  F (36.3  C) (Temporal) 18 1.549 m (5' 1\") 05/16/2012 96%    BMI (Body Mass Index)                   26.4 kg/m2            Blood Pressure from Last 3 Encounters:   08/01/18 (!) 86/60   07/31/18 110/68   07/27/18 98/54    Weight from Last 3 Encounters:   08/01/18 63.4 kg (139 lb 11.2 oz)   07/31/18 62.6 kg (138 lb)   07/27/18 62.1 kg (137 lb)              Today, you had the following     No orders found for display       Primary Care Provider Office Phone # Fax #    Efren Bustos [phone]       [address] [place] [address]        Equal Access to Services     [facility]: Hadii km hodgson hadasho Soomaali, waaxda luqadaha, qaybta kaalmada adeegyada, salo viera . So [facility] [phone].    ATENCIÓN: Si habla español, tiene a perez disposición servicios gratuitos de asistencia lingüística. Llame al [phone].    We comply with applicable federal civil rights laws and [place] laws. We do not discriminate on the basis of race, color, national origin, age, disability, sex, sexual orientation, or gender identity.            Thank you!     Thank you for choosing [facility]  for your care. Our goal is always to provide you with excellent care. Hearing back from our patients is one way we can continue to improve our services. Please take a few minutes to complete the written survey that you may receive in the mail after your visit with us. Thank you!             Your Updated Medication List - Protect others around you: Learn how to safely use, store and throw away your medicines at www.disposemymeds.org.          This list is accurate as of 8/1/18  2:55 PM.  Always use your most recent med " list.                   Brand Name Dispense Instructions for use Diagnosis    acetaminophen 325 MG tablet    TYLENOL    100 tablet    Take 2 tablets (650 mg) by mouth every 4 hours as needed for mild pain    Alcoholic cirrhosis of liver without ascites (H)       alendronate 35 MG tablet    FOSAMAX    12 tablet    Take 1 tablet (35 mg) by mouth with 8oz water every Monday (once every 7 days) 30 minutes before breakfast and remain upright during this time.    Osteopenia, unspecified location       ASPIRIN PO      Take 325 mg by mouth daily        buPROPion 300 MG 24 hr tablet    WELLBUTRIN XL    90 tablet    Take 1 tablet (300 mg) by mouth every morning    Major depressive disorder, recurrent episode, moderate (H)       busPIRone 15 MG tablet    BUSPAR    180 tablet    Take 1 tablet (15 mg) by mouth 2 times daily    MONA (generalized anxiety disorder)       ferrous sulfate 325 (65 Fe) MG tablet    IRON    60 tablet    Take 1 tablet (325 mg) by mouth 2 times daily        FLUoxetine 20 MG capsule    PROzac    180 capsule    Take 3 capsules (60 mg) by mouth daily    Anxiety, Major depressive disorder, recurrent episode, moderate (H)       naltrexone 50 MG tablet    DEPADE;REVIA    90 tablet    Take 1 tablet (50 mg) by mouth daily    Alcohol dependence in remission (H)       omeprazole 20 MG CR capsule    priLOSEC    90 capsule    Take 1 capsule (20 mg) by mouth daily    Gastroesophageal reflux disease with esophagitis       phosphorus tablet 250 mg 250 MG per tablet    PHOSPHA 250 NEUTRAL    120 tablet    TAKE TWO TABLETS BY MOUTH TWICE A DAY    Hypophosphatemia       Potassium Chloride ER 20 MEQ Tbcr     60 tablet    Take 2 tablets (40 mEq) by mouth 3 times daily for 5 days        propranolol 10 MG tablet    INDERAL    180 tablet    Take 1 tablet (10 mg) by mouth 2 times daily    Hypertension goal BP (blood pressure) < 130/80       senna-docusate 8.6-50 MG per tablet    SENOKOT-S;PERICOLACE    90 tablet    Take 1-2  tablets by mouth 2 times daily Take while on oral narcotics to prevent or treat constipation.    Wrist fracture, right, closed, initial encounter       thiamine 100 MG tablet     90 tablet    Take 1 tablet (100 mg) by mouth daily    Alcohol dependence, continuous drinking behavior (H)       TL RICARDO RX 2.2-25-1 MG Tabs   Generic drug:  Folic Acid-Vit B6-Vit B12     90 tablet    TAKE ONE TABLET BY MOUTH EVERY DAY    Alcoholism in recovery (H)       traZODone 50 MG tablet    DESYREL    60 tablet    Take 2 tablets (100 mg) by mouth nightly as needed for sleep    Other insomnia

## 2018-08-01 NOTE — Clinical Note
8/1/2018         RE: Monalisa Olguin  [address] [facility] [address]        Dear Colleague,    Thank you for referring your patient, Monalisa Olguin, to the [facility]. Please see a copy of my visit note below.      FOLLOW-UP VISIT NOTE    PATIENT NAME: Monalisa Olguin MRN # [id number]  DATE OF VISIT: Aug 1, 2018 YOB: 1965    REFERRING PROVIDER: No referring provider defined for this encounter.    Reason for follow up  - Pancytopenia secondary to underlying cirrhosis /Hypersplensim/Alcolohol abuse  - Iron def anemia from chronic GI blood loss      HISTORY:    60-year-old female with past medical history significant for alcohol abuse, chronic cirrhosis with splenomegaly, anxiety/depression, CKD, hypertension who has had thrombocytopenia since 2015 and anemia since 2017. She has been following with gastroenterology for underlying alcoholic cirrhosis as well as some. Also has undergone workup for GI blood loss with EGD and colonoscopy in December 2017 which didn't show any obvious bleeding site. Since then patient has been admitted to the hospital in March with alcohol intoxication and was noted to be severely anemic with hemoglobin in 6's. She was transfused 2 units of packed red blood cells. Stool guaic was positive for blood. Patient was discharged home after hemoglobin stabilized. Iron studies performed indicated iron deficiency with ferritin at 11 and saturation 7%.    - 05/03/18  seen in hematology clinic. She was noted to be severely anemic with hemoglobin at 6.3. Orders were placed for 2 units of blood transfusion and IV iron infusion.    07/17/2018 - IV iron repeated      SUBJECTIVE     She is here for follow-up. Was recently seen in the ER for abdominal distention secondary to ascites. She was discharged home and has seen primary care provider yesterday who scheduled her for paracentesis on Friday.  Patient complains of abdominal distention over the last 2  weeks associated with mild abdominal discomfort/pain. Denies fevers/chills. Complaints of diarrhea and dark stools.  Has finally stopped drinking alcohol since last Sunday      PAST MEDICAL HISTORY     Past Medical History:   Diagnosis Date     Alcohol abuse 5/2/2013     Alcohol dependence 5/25/2013     Alcohol withdrawal 5/2/2013     Anxiety 10/10/2011     CKD (chronic kidney disease) stage 3, GFR 30-59 ml/min 2006    last GFR was 42     CKD (chronic kidney disease) stage 3, GFR 30-59 ml/min 2/22/2011     Depressive disorder      Esophageal reflux 10/7/2002     Hypertension goal BP (blood pressure) < 130/80 7/12/2011     Moderate Depression [296.32] 12/22/2009    stable on wellbutrin     Other internal derangement of knee(717.89)     ACL Internal derangement, knee/ original injury in 5th grade, torn cartilage     Pap smear 2011    no abnormals, due for paps q 2-3 yrs     Unspecified essential hypertension          CURRENT OUTPATIENT MEDICATIONS     Current Outpatient Prescriptions   Medication Sig Dispense Refill     acetaminophen (TYLENOL) 325 MG tablet Take 2 tablets (650 mg) by mouth every 4 hours as needed for mild pain 100 tablet      alendronate (FOSAMAX) 35 MG tablet Take 1 tablet (35 mg) by mouth with 8oz water every Monday (once every 7 days) 30 minutes before breakfast and remain upright during this time. 12 tablet 3     ASPIRIN PO Take 325 mg by mouth daily       buPROPion (WELLBUTRIN XL) 300 MG 24 hr tablet Take 1 tablet (300 mg) by mouth every morning 90 tablet 3     busPIRone (BUSPAR) 15 MG tablet Take 1 tablet (15 mg) by mouth 2 times daily 180 tablet 3     ferrous sulfate (IRON) 325 (65 Fe) MG tablet Take 1 tablet (325 mg) by mouth 2 times daily 60 tablet 2     FLUoxetine (PROZAC) 20 MG capsule Take 3 capsules (60 mg) by mouth daily 180 capsule 3     naltrexone (DEPADE;REVIA) 50 MG tablet Take 1 tablet (50 mg) by mouth daily 90 tablet 3     omeprazole (PRILOSEC) 20 MG CR capsule Take 1 capsule (20  "mg) by mouth daily 90 capsule 3     phosphorus tablet 250 mg (PHOSPHA 250 NEUTRAL) 250 MG per tablet TAKE TWO TABLETS BY MOUTH TWICE A  tablet 3     Potassium Chloride ER 20 MEQ TBCR Take 2 tablets (40 mEq) by mouth 3 times daily for 5 days 60 tablet 1     propranolol (INDERAL) 10 MG tablet Take 1 tablet (10 mg) by mouth 2 times daily 180 tablet 3     senna-docusate (SENOKOT-S;PERICOLACE) 8.6-50 MG per tablet Take 1-2 tablets by mouth 2 times daily Take while on oral narcotics to prevent or treat constipation. 90 tablet 6     thiamine (VITAMIN B-1) 100 MG tablet Take 1 tablet (100 mg) by mouth daily 90 tablet 3     TL RICARDO RX 2.2-25-1 MG TABS TAKE ONE TABLET BY MOUTH EVERY DAY 90 tablet 3     traZODone (DESYREL) 50 MG tablet Take 2 tablets (100 mg) by mouth nightly as needed for sleep 60 tablet 5        ALLERGIES     Allergies   Allergen Reactions     Albuterol      Tongue \"hardened and painful\"        REVIEW OF SYSTEMS   As above in the HPI, o/w complete 12-point ROS was negative.     PHYSICAL EXAM   B/P: 104/62, T: 96.6, P: 94, R: 16  SpO2 Readings from Last 4 Encounters:   08/01/18 96%   07/31/18 97%   07/27/18 98%   07/19/18 94%     Wt Readings from Last 3 Encounters:   08/01/18 63.4 kg (139 lb 11.2 oz)   07/31/18 62.6 kg (138 lb)   07/27/18 62.1 kg (137 lb)     GEN: NAD  EYES:Scleral icterus  Mouth/ENT: Oropharynx is clear.  NECK: no  lymphadenopathy  LUNGS: clear bilaterally  CV: regular  ABDOMEN: distended, mildly tender to palpation  EXT: no edema  NEURO: alert  SKIN: no rashes     LABORATORY AND IMAGING STUDIES     Lab Results   Component Value Date    WBC 9.4 07/27/2018     Lab Results   Component Value Date    RBC 2.38 07/27/2018     Lab Results   Component Value Date    HGB 8.5 07/31/2018     Lab Results   Component Value Date    HCT 26.1 07/27/2018     No components found for: MCT  Lab Results   Component Value Date     07/27/2018     Lab Results   Component Value Date    MCH 34.0 " 07/27/2018     Lab Results   Component Value Date    MCHC 31.0 07/27/2018     Lab Results   Component Value Date    RDW 19.6 07/27/2018     Lab Results   Component Value Date    PLT 78 07/27/2018     Recent Labs   Lab Test  07/31/18   1723  07/27/18   1805   NA  140  137   POTASSIUM  3.7  2.7*   CHLORIDE  107  100   CO2  25  27   ANIONGAP  8  10   GLC  93  91   BUN  13  13   CR  1.28*  1.14*   ELSIE  7.8*  7.5*     Liver Function Studies -   Recent Labs   Lab Test  07/31/18   1723   PROTTOTAL  5.5*   ALBUMIN  2.2*   BILITOTAL  6.9*   ALKPHOS  286*   AST  60*   ALT  16             Ferritin Latest Ref Range: 8 - 252 ng/mL 1146 (H)   Iron Latest Ref Range: 35 - 180 ug/dL 71   Iron Binding Cap Latest Ref Range: 240 - 430 ug/dL 134 (L)   Iron Saturation Index Latest Ref Range: 15 - 46 % 53 (H)      ASSESSMENT AND PLAN     52-year-old female with    1.  Pancytopenia   2.  Iron def anemia from chronic GI blood loss  3.  Macrocytosis  4.  Decompensated ESLD with ascites     1. Secondary to underlying alcoholic cirrhosis /Hypersplensim/Alcolohol abuse  Councellad on alcohol abstinence.   Following with gastroenterology    2.  Iron deficiency likely from chronic GI blood loss but no obvious source on the EGD and    IV iron infusions 2 weeks   ago  Ferritin 1146 secondary to recent iron infusion  We will repeat blood counts in 2 months as well as iron studies     3. Secondary to alcohol abuse and underlying liver disease    4. Scheduled for paracentesis on Friday . Continue follow up with GI/PCP    Return to clinic in 8 weeks with labs.     Chart documentation with Dragon Voice recognition Software. Although reviewed after completion, some words and grammatical errors may remain.  Usman Lee MD  Attending Physician   Hematology/Medical Oncology    Again, thank you for allowing me to participate in the care of your patient.        Sincerely,        Usman Lee MD

## 2018-08-01 NOTE — PROGRESS NOTES
FOLLOW-UP VISIT NOTE    PATIENT NAME: Monalisa Olguin MRN # [id number]  DATE OF VISIT: Aug 1, 2018 YOB: 1965    REFERRING PROVIDER: No referring provider defined for this encounter.    Reason for follow up  - Pancytopenia secondary to underlying cirrhosis /Hypersplensim/Alcolohol abuse  - Iron def anemia from chronic GI blood loss      HISTORY:    60-year-old female with past medical history significant for alcohol abuse, chronic cirrhosis with splenomegaly, anxiety/depression, CKD, hypertension who has had thrombocytopenia since 2015 and anemia since 2017. She has been following with gastroenterology for underlying alcoholic cirrhosis as well as some. Also has undergone workup for GI blood loss with EGD and colonoscopy in December 2017 which didn't show any obvious bleeding site. Since then patient has been admitted to the hospital in March with alcohol intoxication and was noted to be severely anemic with hemoglobin in 6's. She was transfused 2 units of packed red blood cells. Stool guaic was positive for blood. Patient was discharged home after hemoglobin stabilized. Iron studies performed indicated iron deficiency with ferritin at 11 and saturation 7%.    - 05/03/18  seen in hematology clinic. She was noted to be severely anemic with hemoglobin at 6.3. Orders were placed for 2 units of blood transfusion and IV iron infusion.    07/17/2018 - IV iron repeated      SUBJECTIVE     She is here for follow-up. Was recently seen in the ER for abdominal distention secondary to ascites. She was discharged home and has seen primary care provider yesterday who scheduled her for paracentesis on Friday.  Patient complains of abdominal distention over the last 2 weeks associated with mild abdominal discomfort/pain. Denies fevers/chills. Complaints of diarrhea and dark stools.  Has finally stopped drinking alcohol since last Sunday      PAST MEDICAL HISTORY     Past Medical History:   Diagnosis Date      Alcohol abuse 5/2/2013     Alcohol dependence 5/25/2013     Alcohol withdrawal 5/2/2013     Anxiety 10/10/2011     CKD (chronic kidney disease) stage 3, GFR 30-59 ml/min 2006    last GFR was 42     CKD (chronic kidney disease) stage 3, GFR 30-59 ml/min 2/22/2011     Depressive disorder      Esophageal reflux 10/7/2002     Hypertension goal BP (blood pressure) < 130/80 7/12/2011     Moderate Depression [296.32] 12/22/2009    stable on wellbutrin     Other internal derangement of knee(717.89)     ACL Internal derangement, knee/ original injury in 5th grade, torn cartilage     Pap smear 2011    no abnormals, due for paps q 2-3 yrs     Unspecified essential hypertension          CURRENT OUTPATIENT MEDICATIONS     Current Outpatient Prescriptions   Medication Sig Dispense Refill     acetaminophen (TYLENOL) 325 MG tablet Take 2 tablets (650 mg) by mouth every 4 hours as needed for mild pain 100 tablet      alendronate (FOSAMAX) 35 MG tablet Take 1 tablet (35 mg) by mouth with 8oz water every Monday (once every 7 days) 30 minutes before breakfast and remain upright during this time. 12 tablet 3     ASPIRIN PO Take 325 mg by mouth daily       buPROPion (WELLBUTRIN XL) 300 MG 24 hr tablet Take 1 tablet (300 mg) by mouth every morning 90 tablet 3     busPIRone (BUSPAR) 15 MG tablet Take 1 tablet (15 mg) by mouth 2 times daily 180 tablet 3     ferrous sulfate (IRON) 325 (65 Fe) MG tablet Take 1 tablet (325 mg) by mouth 2 times daily 60 tablet 2     FLUoxetine (PROZAC) 20 MG capsule Take 3 capsules (60 mg) by mouth daily 180 capsule 3     naltrexone (DEPADE;REVIA) 50 MG tablet Take 1 tablet (50 mg) by mouth daily 90 tablet 3     omeprazole (PRILOSEC) 20 MG CR capsule Take 1 capsule (20 mg) by mouth daily 90 capsule 3     phosphorus tablet 250 mg (PHOSPHA 250 NEUTRAL) 250 MG per tablet TAKE TWO TABLETS BY MOUTH TWICE A  tablet 3     Potassium Chloride ER 20 MEQ TBCR Take 2 tablets (40 mEq) by mouth 3 times daily for 5  "days 60 tablet 1     propranolol (INDERAL) 10 MG tablet Take 1 tablet (10 mg) by mouth 2 times daily 180 tablet 3     senna-docusate (SENOKOT-S;PERICOLACE) 8.6-50 MG per tablet Take 1-2 tablets by mouth 2 times daily Take while on oral narcotics to prevent or treat constipation. 90 tablet 6     thiamine (VITAMIN B-1) 100 MG tablet Take 1 tablet (100 mg) by mouth daily 90 tablet 3     TL RICARDO RX 2.2-25-1 MG TABS TAKE ONE TABLET BY MOUTH EVERY DAY 90 tablet 3     traZODone (DESYREL) 50 MG tablet Take 2 tablets (100 mg) by mouth nightly as needed for sleep 60 tablet 5        ALLERGIES     Allergies   Allergen Reactions     Albuterol      Tongue \"hardened and painful\"        REVIEW OF SYSTEMS   As above in the HPI, o/w complete 12-point ROS was negative.     PHYSICAL EXAM   B/P: 104/62, T: 96.6, P: 94, R: 16  SpO2 Readings from Last 4 Encounters:   08/01/18 96%   07/31/18 97%   07/27/18 98%   07/19/18 94%     Wt Readings from Last 3 Encounters:   08/01/18 63.4 kg (139 lb 11.2 oz)   07/31/18 62.6 kg (138 lb)   07/27/18 62.1 kg (137 lb)     GEN: NAD  EYES:Scleral icterus  Mouth/ENT: Oropharynx is clear.  NECK: no  lymphadenopathy  LUNGS: clear bilaterally  CV: regular  ABDOMEN: distended, mildly tender to palpation  EXT: no edema  NEURO: alert  SKIN: no rashes     LABORATORY AND IMAGING STUDIES     Lab Results   Component Value Date    WBC 9.4 07/27/2018     Lab Results   Component Value Date    RBC 2.38 07/27/2018     Lab Results   Component Value Date    HGB 8.5 07/31/2018     Lab Results   Component Value Date    HCT 26.1 07/27/2018     No components found for: MCT  Lab Results   Component Value Date     07/27/2018     Lab Results   Component Value Date    MCH 34.0 07/27/2018     Lab Results   Component Value Date    MCHC 31.0 07/27/2018     Lab Results   Component Value Date    RDW 19.6 07/27/2018     Lab Results   Component Value Date    PLT 78 07/27/2018     Recent Labs   Lab Test  07/31/18   1723  07/27/18   " 1805   NA  140  137   POTASSIUM  3.7  2.7*   CHLORIDE  107  100   CO2  25  27   ANIONGAP  8  10   GLC  93  91   BUN  13  13   CR  1.28*  1.14*   ELSIE  7.8*  7.5*     Liver Function Studies -   Recent Labs   Lab Test  07/31/18   1723   PROTTOTAL  5.5*   ALBUMIN  2.2*   BILITOTAL  6.9*   ALKPHOS  286*   AST  60*   ALT  16             Ferritin Latest Ref Range: 8 - 252 ng/mL 1146 (H)   Iron Latest Ref Range: 35 - 180 ug/dL 71   Iron Binding Cap Latest Ref Range: 240 - 430 ug/dL 134 (L)   Iron Saturation Index Latest Ref Range: 15 - 46 % 53 (H)      ASSESSMENT AND PLAN     52-year-old female with    1.  Pancytopenia   2.  Iron def anemia from chronic GI blood loss  3.  Macrocytosis  4.  Decompensated ESLD with ascites     1. Secondary to underlying alcoholic cirrhosis /Hypersplensim/Alcolohol abuse  Councellad on alcohol abstinence.   Following with gastroenterology    2.  Iron deficiency likely from chronic GI blood loss but no obvious source on the EGD and    IV iron infusions 2 weeks   ago  Ferritin 1146 secondary to recent iron infusion  We will repeat blood counts in 2 months as well as iron studies     3. Secondary to alcohol abuse and underlying liver disease    4. Scheduled for paracentesis on Friday . Continue follow up with GI/PCP    Return to clinic in 8 weeks with labs.     Chart documentation with Dragon Voice recognition Software. Although reviewed after completion, some words and grammatical errors may remain.  sUman Lee MD  Attending Physician   Hematology/Medical Oncology

## 2018-08-01 NOTE — TELEPHONE ENCOUNTER
----- Message from Efren Bustos MD sent at 8/1/2018  5:39 AM CDT -----  Please call patient with following results.  Her hemoglobin is stable, as is her kidney function.  Her bilirubin levels are higher which we suspected due to the jaundice.  I want her to keep the appointment with Dr. Lee today so we can get his input and see if he thinks we can use anything for her pain.       Electronically signed by:  Efren Bustos M.D.  8/1/2018

## 2018-08-01 NOTE — TELEPHONE ENCOUNTER
Patient informed and nothing further at this time. She will go to her appointment.   Aminata Sánchez MA

## 2018-08-01 NOTE — PATIENT INSTRUCTIONS
Please reschedule follow up with Dr. Lee for 2 months.  Please come 20-30 minutes early for labs.    Follow Up Date/Time:     If you have any questions or concerns please feel free to call.    If you need to reschedule please call:  Clinic or Lab Appointment - [phone]  Infusion - [phone]  Imaging - [phone]    Lester Do RN, BSN, OCN  [facility]   Oncology/Hematology Care Coordinator RN  [facility]  [phone]

## 2018-08-01 NOTE — NURSING NOTE
"Oncology Rooming Note    August 1, 2018 2:37 PM   Monalisa Olguin is a 52 year old female who presents for:    No chief complaint on file.    Initial Vitals: BP (!) 86/60 (BP Location: Right arm, Patient Position: Chair, Cuff Size: Adult Regular)  Pulse 81  Temp 97.4  F (36.3  C) (Temporal)  Resp 18  Ht 1.549 m (5' 1\")  Wt 63.4 kg (139 lb 11.2 oz)  LMP 05/16/2012  SpO2 96%  BMI 26.4 kg/m2 Estimated body mass index is 26.4 kg/(m^2) as calculated from the following:    Height as of this encounter: 1.549 m (5' 1\").    Weight as of this encounter: 63.4 kg (139 lb 11.2 oz). Body surface area is 1.65 meters squared.  Extreme Pain (8) Comment: Data Unavailable   Patient's last menstrual period was 05/16/2012.  Allergies reviewed: Yes  Medications reviewed: Yes    Medications: Medication refills not needed today.  Pharmacy name entered into PhoneGuard:    [facility] - [place], [place] - [address]  [facility] [place] [place] [facility] [address]   [facility]  [facility] [place], [place] - [address]  [facility]    Nausea, Vomiting: Yes (Please explain): both  Fever/Chills:  Yes (Please explain): chills  Mouth Sores: No  Diarrhea/Constipation: Yes (Please explain): both  Urination: Concerns (explain) - not urinating  Skin/Excessive dryness: No Concerns  Cracking, Peeling: No  Unusual Bruising/Bleeding: Yes (Please explain): bruise easy  Respiratory/SOB: Concerns (explain) - SOB  Neuropathy/Numbness&Tingling-Hands/Feet: Yes (Please explain):   Cognitive Disturbance-Memory: No  Sleep Concerns: Concerns (explain) -   Night Sweats:  Yes (Please explain):   Fatigue/Weakness: Yes (Please explain):   Light Headed or Dizzy: Yes (Please explain): some  Appetite:  Concerns (explain) - is poor  Able to drink 6 to 8 glasses of fluid in a day: No Concerns      Clinical concerns: See above. Concerns reviewed with " Dr. Lee     10 minutes for nursing intake (face to face time)     Kusum JULIAN CMA

## 2018-08-02 ENCOUNTER — TELEPHONE (OUTPATIENT)
Dept: GENERAL RADIOLOGY | Facility: CLINIC | Age: 53
End: 2018-08-02

## 2018-08-02 RX ORDER — OXYCODONE HYDROCHLORIDE 5 MG/1
2.5-5 TABLET ORAL EVERY 6 HOURS PRN
Qty: 10 TABLET | Refills: 0 | Status: SHIPPED | OUTPATIENT
Start: 2018-08-02 | End: 2018-08-15

## 2018-08-02 NOTE — TELEPHONE ENCOUNTER
Spoke with spouse informed of the medication at the pharmacy and also the date and time of the GI appointment sooner than December.  Told spouse that they will need to return for labs less than 7 days prior to that appointment.   Daniela PAGE

## 2018-08-02 NOTE — NURSING NOTE
DISCHARGE PLAN:  Next appointments: See patient instruction section  Departure Mode: Ambulatory  Accompanied by:   8 minutes for nursing discharge (face to face time)     Monalisa Olguin is here today for Hematology follow up.  Writing nurse seen patient after Medical Oncology appointment to address questions/concerns/coordinate care. Patient to continue with follow up in 2 months.  Will have paracentesis this Friday.  Did discuss financial concerns due to patient not able to afford pain medication.  Discussed calling around for pricing.  They will reach out to PCP when they will need Rx.  Also reviewed meeting with Financial counselors.  Patient reports that they have done this.  Encouraged them to also reach out to all bills to update them with health changes to see if they have any programs. Patient ambulated by nurse to  to schedule follow up and/or lab appointments.  Imaging scheduled if ordered. See patient instructions and Oncologist's Progress note for further details. Questions and concerns addressed to patient's satisfaction. Patient verbalized and demonstrated understanding of plan.  Contact information provided and patient is encouraged to call with any that arise in the interim of care.    Lester Do, RN, BSN, OCN   Oncology Care Coordinator RN  [facility]  [phone]  8/2/2018, 9:11 AM

## 2018-08-02 NOTE — TELEPHONE ENCOUNTER
I will give her oxycodone tablets and she should start with a half a tablet to see if that helps with her pain.  Daniela, can you see when her next follow-up with GI is?  She really needs to see them soon due to her advanced liver disease and ascites.    Electronically signed by:  Efren Bustos M.D.  8/2/2018

## 2018-08-02 NOTE — TELEPHONE ENCOUNTER
Heydi is requesting a refill of Oxycodone 5mg, this medication is not active in [facility], therefore pharmacy staff cannot select for renewal.   Last filled 09/28/2017 : #30 with directions: take 1-2 tablets by mouth every 4-6 hours as needed for moderate to severe pain. Maximum of 8 tablets per day.     Thanks  Kayleigh Guzmán [facility]   [phone]

## 2018-08-03 ENCOUNTER — HOSPITAL ENCOUNTER (OUTPATIENT)
Dept: ULTRASOUND IMAGING | Facility: CLINIC | Age: 53
Discharge: HOME | End: 2018-08-03
Attending: EMERGENCY MEDICINE | Admitting: EMERGENCY MEDICINE
Payer: COMMERCIAL

## 2018-08-03 VITALS
RESPIRATION RATE: 16 BRPM | TEMPERATURE: 98.7 F | HEART RATE: 84 BPM | DIASTOLIC BLOOD PRESSURE: 61 MMHG | SYSTOLIC BLOOD PRESSURE: 107 MMHG | OXYGEN SATURATION: 96 %

## 2018-08-03 DIAGNOSIS — E87.6 HYPOKALEMIA: ICD-10-CM

## 2018-08-03 DIAGNOSIS — K70.11 ALCOHOLIC HEPATITIS WITH ASCITES (H): ICD-10-CM

## 2018-08-03 LAB
APPEARANCE FLD: CLEAR
BACTERIA SPEC CULT: NORMAL
COLOR FLD: YELLOW
GLUCOSE FLD-MCNC: 106 MG/DL
GRAM STN SPEC: NORMAL
HCT VFR BLD AUTO: 25.9 % (ref 35–47)
HGB BLD-MCNC: 7.9 G/DL (ref 11.7–15.7)
LYMPHOCYTES NFR FLD MANUAL: 52 %
Lab: NORMAL
MONOS+MACROS NFR FLD MANUAL: 40 %
NEUTS BAND NFR FLD MANUAL: 8 %
PROT FLD-MCNC: 0.5 G/DL
SPECIMEN SOURCE FLD: NORMAL
SPECIMEN SOURCE: NORMAL
SPECIMEN SOURCE: NORMAL
WBC # FLD AUTO: 214 /UL

## 2018-08-03 PROCEDURE — 84157 ASSAY OF PROTEIN OTHER: CPT | Performed by: FAMILY MEDICINE

## 2018-08-03 PROCEDURE — 88112 CYTOPATH CELL ENHANCE TECH: CPT | Performed by: FAMILY MEDICINE

## 2018-08-03 PROCEDURE — 82945 GLUCOSE OTHER FLUID: CPT | Performed by: FAMILY MEDICINE

## 2018-08-03 PROCEDURE — 00000155 ZZHCL STATISTIC H-CELL BLOCK W/STAIN: Performed by: FAMILY MEDICINE

## 2018-08-03 PROCEDURE — 87015 SPECIMEN INFECT AGNT CONCNTJ: CPT | Performed by: FAMILY MEDICINE

## 2018-08-03 PROCEDURE — 85014 HEMATOCRIT: CPT | Performed by: RADIOLOGY

## 2018-08-03 PROCEDURE — 88305 TISSUE EXAM BY PATHOLOGIST: CPT | Performed by: FAMILY MEDICINE

## 2018-08-03 PROCEDURE — 87070 CULTURE OTHR SPECIMN AEROBIC: CPT | Performed by: FAMILY MEDICINE

## 2018-08-03 PROCEDURE — 49083 ABD PARACENTESIS W/IMAGING: CPT

## 2018-08-03 PROCEDURE — 89051 BODY FLUID CELL COUNT: CPT | Performed by: FAMILY MEDICINE

## 2018-08-03 PROCEDURE — 85018 HEMOGLOBIN: CPT | Performed by: RADIOLOGY

## 2018-08-03 PROCEDURE — 36415 COLL VENOUS BLD VENIPUNCTURE: CPT | Performed by: RADIOLOGY

## 2018-08-03 PROCEDURE — 00000102 ZZHCL STATISTIC CYTO WRIGHT STAIN TC: Performed by: FAMILY MEDICINE

## 2018-08-03 PROCEDURE — 87205 SMEAR GRAM STAIN: CPT | Performed by: FAMILY MEDICINE

## 2018-08-03 RX ORDER — LIDOCAINE HYDROCHLORIDE 10 MG/ML
10 INJECTION, SOLUTION EPIDURAL; INFILTRATION; INTRACAUDAL; PERINEURAL ONCE
Status: COMPLETED | OUTPATIENT
Start: 2018-08-03 | End: 2018-08-03

## 2018-08-03 RX ORDER — LIDOCAINE 40 MG/G
CREAM TOPICAL
Status: DISCONTINUED | OUTPATIENT
Start: 2018-08-03 | End: 2018-08-04 | Stop reason: HOSPADM

## 2018-08-03 RX ADMIN — LIDOCAINE HYDROCHLORIDE 7 ML: 10 INJECTION, SOLUTION EPIDURAL; INFILTRATION; INTRACAUDAL; PERINEURAL at 10:40

## 2018-08-03 NOTE — PROGRESS NOTES
S-(situation): 3 hours post procedure    B-(background): paracentesis    A-(assessment): Blood pressures low but stable in post procedure, Dr. Oseguera notified who ordered labs and saw patient. Blood pressures stabilized throughout her recovery. Labs stable. Dr. Oseguera was notified multiple times throughout patients stay who gave the okay for discharge.     R-(recommendations): Discharge instructions reviewed with patient and spouse. Emphasized the importance of going to the ER if patient starts to become light headed or dizzy.

## 2018-08-03 NOTE — PROGRESS NOTES
Pt transferred to xray via w/c. VSS. pIV inserted pre-paracentesis. Pt's spouse present and appears supportive.

## 2018-08-07 DIAGNOSIS — K21.00 GASTROESOPHAGEAL REFLUX DISEASE WITH ESOPHAGITIS: ICD-10-CM

## 2018-08-07 DIAGNOSIS — R10.84 ABDOMINAL PAIN, GENERALIZED: ICD-10-CM

## 2018-08-07 DIAGNOSIS — K70.11 ALCOHOLIC HEPATITIS WITH ASCITES (H): ICD-10-CM

## 2018-08-07 NOTE — TELEPHONE ENCOUNTER
Prescription approved per [facility]ill Protocol.    Kenyetta Sinclair RN

## 2018-08-07 NOTE — TELEPHONE ENCOUNTER
"Requested Prescriptions   Pending Prescriptions Disp Refills     omeprazole (PRILOSEC) 20 MG CR capsule [Pharmacy Med Name: OMEPRAZOLE 20MG CPDR]  Last Written Prescription Date:  5/10/17  Last Fill Quantity: 90,  # refills: 3   Last office visit: 7/31/2018 with prescribing provider: 7/31/18    Future Office Visit:   Next 5 appointments (look out 90 days)     Oct 03, 2018  3:30 PM CDT   Return Visit with Usman Lee MD   [facility] ([address] [id number] [phone]                  90 capsule 3     Sig: TAKE ONE CAPSULE BY MOUTH EVERY DAY    PPI Protocol Passed    8/7/2018  8:54 AM       Passed - Not on Clopidogrel (unless Pantoprazole ordered)       Passed - No diagnosis of osteoporosis on record       Passed - Recent (12 mo) or future (30 days) visit within the authorizing provider's specialty    Patient had office visit in the last 12 months or has a visit in the next 30 days with authorizing provider or within the authorizing provider's specialty.  See \"Patient Info\" tab in inbasket, or \"Choose Columns\" in Meds & Orders section of the refill encounter.           Passed - Patient is age 18 or older       Passed - No active pregnacy on record       Passed - No positive pregnancy test in past 12 months          "

## 2018-08-08 LAB
BACTERIA SPEC CULT: NO GROWTH
Lab: NORMAL
SPECIMEN SOURCE: NORMAL

## 2018-08-08 NOTE — TELEPHONE ENCOUNTER
Oxycodone       Last Written Prescription Date:  8/2/18  Last Fill Quantity: 10,   # refills: 0  Last Office Visit: 7/31/18  Future Office visit:    Next 5 appointments (look out 90 days)     Oct 03, 2018  3:30 PM CDT   [facility]    [address] [place] [id number]   [phone]                   Routing refill request to provider for review/approval because:  Drug not on the [facility], [facility] or [facility] refill protocol or controlled substance

## 2018-08-09 ENCOUNTER — TELEPHONE (OUTPATIENT)
Dept: FAMILY MEDICINE | Facility: CLINIC | Age: 53
End: 2018-08-09

## 2018-08-09 LAB — COPATH REPORT: NORMAL

## 2018-08-09 NOTE — TELEPHONE ENCOUNTER
----- Message from Efren Bustos MD sent at 8/9/2018 12:26 PM CDT -----  Please call patient with following results.  Let Heydi know that all of the testing done on the fluid drawn off of her abdomen came back normal.  No infection or evidence of abnormal cells.      Electronically signed by:  Efren Bustos M.D.  8/9/2018

## 2018-08-09 NOTE — LETTER
[address] [id number] [phone]        August 10, 2018    Monalisa Olguin  [address] [place] [facility] [address]          Dear Monalisa,    We have attempted to reach you by phone with your results. All testing done on the fluild drawn off of her abdomen came back normal. No infection or evidence of abnormal cells. Please call us if you have any other questions or concerns.       Sincerely,        Efren Bustos M.D. Care Team

## 2018-08-10 ENCOUNTER — OFFICE VISIT (OUTPATIENT)
Dept: GASTROENTEROLOGY | Facility: CLINIC | Age: 53
End: 2018-08-10
Attending: INTERNAL MEDICINE
Payer: COMMERCIAL

## 2018-08-10 ENCOUNTER — TELEPHONE (OUTPATIENT)
Dept: GASTROENTEROLOGY | Facility: CLINIC | Age: 53
End: 2018-08-10

## 2018-08-10 VITALS
HEIGHT: 61 IN | SYSTOLIC BLOOD PRESSURE: 109 MMHG | OXYGEN SATURATION: 98 % | RESPIRATION RATE: 22 BRPM | HEART RATE: 88 BPM | BODY MASS INDEX: 26.62 KG/M2 | WEIGHT: 141 LBS | TEMPERATURE: 97.9 F | DIASTOLIC BLOOD PRESSURE: 63 MMHG

## 2018-08-10 DIAGNOSIS — K70.30 ALCOHOLIC CIRRHOSIS OF LIVER WITHOUT ASCITES (H): Primary | ICD-10-CM

## 2018-08-10 DIAGNOSIS — K70.30 ALCOHOLIC CIRRHOSIS OF LIVER WITHOUT ASCITES (H): ICD-10-CM

## 2018-08-10 DIAGNOSIS — K70.31 ALCOHOLIC CIRRHOSIS OF LIVER WITH ASCITES (H): ICD-10-CM

## 2018-08-10 DIAGNOSIS — K76.82 HEPATIC ENCEPHALOPATHY (H): ICD-10-CM

## 2018-08-10 LAB
ALBUMIN SERPL-MCNC: 2 G/DL (ref 3.4–5)
ALP SERPL-CCNC: 273 U/L (ref 40–150)
ALT SERPL W P-5'-P-CCNC: 19 U/L (ref 0–50)
ANION GAP SERPL CALCULATED.3IONS-SCNC: 10 MMOL/L (ref 3–14)
AST SERPL W P-5'-P-CCNC: 45 U/L (ref 0–45)
BILIRUB DIRECT SERPL-MCNC: 4.6 MG/DL (ref 0–0.2)
BILIRUB SERPL-MCNC: 6.6 MG/DL (ref 0.2–1.3)
BUN SERPL-MCNC: 13 MG/DL (ref 7–30)
CALCIUM SERPL-MCNC: 7.9 MG/DL (ref 8.5–10.1)
CHLORIDE SERPL-SCNC: 106 MMOL/L (ref 94–109)
CO2 SERPL-SCNC: 19 MMOL/L (ref 20–32)
CREAT SERPL-MCNC: 1.45 MG/DL (ref 0.52–1.04)
ERYTHROCYTE [DISTWIDTH] IN BLOOD BY AUTOMATED COUNT: 17.7 % (ref 10–15)
GFR SERPL CREATININE-BSD FRML MDRD: 38 ML/MIN/1.7M2
GLUCOSE SERPL-MCNC: 101 MG/DL (ref 70–99)
HCT VFR BLD AUTO: 28.4 % (ref 35–47)
HGB BLD-MCNC: 8.6 G/DL (ref 11.7–15.7)
INR PPP: 1.3 (ref 0.86–1.14)
MCH RBC QN AUTO: 34.5 PG (ref 26.5–33)
MCHC RBC AUTO-ENTMCNC: 30.3 G/DL (ref 31.5–36.5)
MCV RBC AUTO: 114 FL (ref 78–100)
PLATELET # BLD AUTO: 78 10E9/L (ref 150–450)
POTASSIUM SERPL-SCNC: 3.8 MMOL/L (ref 3.4–5.3)
PROT SERPL-MCNC: 5.3 G/DL (ref 6.8–8.8)
RBC # BLD AUTO: 2.49 10E12/L (ref 3.8–5.2)
SODIUM SERPL-SCNC: 135 MMOL/L (ref 133–144)
WBC # BLD AUTO: 9.8 10E9/L (ref 4–11)

## 2018-08-10 PROCEDURE — 80048 BASIC METABOLIC PNL TOTAL CA: CPT | Performed by: PHYSICIAN ASSISTANT

## 2018-08-10 PROCEDURE — 36415 COLL VENOUS BLD VENIPUNCTURE: CPT | Performed by: PHYSICIAN ASSISTANT

## 2018-08-10 PROCEDURE — 85610 PROTHROMBIN TIME: CPT | Performed by: PHYSICIAN ASSISTANT

## 2018-08-10 PROCEDURE — 80076 HEPATIC FUNCTION PANEL: CPT | Performed by: PHYSICIAN ASSISTANT

## 2018-08-10 PROCEDURE — G0463 HOSPITAL OUTPT CLINIC VISIT: HCPCS | Mod: ZF

## 2018-08-10 PROCEDURE — 85027 COMPLETE CBC AUTOMATED: CPT | Performed by: PHYSICIAN ASSISTANT

## 2018-08-10 RX ORDER — ALBUMIN (HUMAN) 12.5 G/50ML
75 SOLUTION INTRAVENOUS ONCE
Status: CANCELLED
Start: 2018-08-10 | End: 2018-08-10

## 2018-08-10 ASSESSMENT — PAIN SCALES - GENERAL: PAINLEVEL: NO PAIN (0)

## 2018-08-10 NOTE — TELEPHONE ENCOUNTER
Patient called back, message per PCP was relayed to patient, no further questions.  Thank you,  Elva Quinonez  Patient Representative

## 2018-08-10 NOTE — PROGRESS NOTES
Hepatology Follow-up Clinic note  Monalisa Olguin   Date of Birth 1965  Date of Service 8/10/2018    Reason for follow-up: ETOH cirrhosis         Assessment/plan:   Monalisa Olguin is a 52 year old female with ETOH cirrhosis. Recently decompensated with ascites secondary to continued alcohol use. She also has some mild asterixis on exam. She had her first paracentesis last week, which was consistent with cirrhotic ascites. She has had worsening liver and kidney function in the past few weeks. Her MELD on 7/30/2018 was MELD 19. Will avoid starting lactulose at this due to diarrhea. She is a kidney donor, so will . We discussed worsening of her liver disease and the importance of abstinence from alcohol. She is up to date with HCC screening. She will need variceal screening EGD sooner due to recent decompensation    1. Alcohol cessation  2. 2000 mg sodium/high protein diet   3. Hepatic Encephalopathy  - Start Rifaxamin 550 mg twice daily   4. Ascites  - Labs today to assess ability to start diuretics  - Will set up paracentesis as needed, limit 7L with albumin replacement  5. No more than 3 gm of Tylenol a day  6. Follow-up in clinic with Dr. Kovacs in December as previously scheduled    Chau Castillo PA-C   [facility]    Case discussed with Dr. Edgardo Kovacs    ADDENDUM:   - Labs reveal worsening renal function, Cr increasing 1.45 GFR 38  - Will plan for 3-day 75 gm albumin infusion   - Repeat BMP in one week  -----------------------------------------------------       HPI:   Monalisa Olguin is a 52 year old female presenting for follow-up.     ETOH Cirrhosis  Complicated by:  - Ascites  - HE  - No history of GI bleed  EGD: 12/2017, no varices   HCC: 6/6/2018, no liver lesion    Patient was last seen by Dr. Kovacs and GI resident on March 2018 . She was sober for 11 months and she relapsed and started drinking again in April. She last drank alcohol two weeks. She was told to go to the  ER for symptoms of weakness a few weeks ago, labs noted worsening liver function and hypokalemia as well as ascites. She had her first paracentesis on 8/3/2018. Cell counts consistent with cirrhotic ascites. Cytology negative for malignancy. She has had worsening lower extremity edema in the last few weeks. For the past week, she has been having loose runny bowel movements. She admits to confusion. She has had weight gain due to fluid accumulation. She otherwise has a poor appetite. She is not taking any diuretics.     Patient also denies melena, hematochezia or hematemesis. Patient denies fevers, sweats or chills. She finished 6 months of inpatient treatment last year. She currently denies any need for further support.     Medical hx Surgical hx   Past Medical History:   Diagnosis Date     Alcohol abuse 2013     Alcohol dependence 2013     Alcohol withdrawal 2013     Anxiety 10/10/2011     CKD (chronic kidney disease) stage 3, GFR 30-59 ml/min      CKD (chronic kidney disease) stage 3, GFR 30-59 ml/min 2011     Depressive disorder      Esophageal reflux 10/7/2002     Hypertension goal BP (blood pressure) < 130/80 2011     Moderate Depression [296.32] 2009     Other internal derangement of knee(717.89)      Pap smear      Unspecified essential hypertension     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     COLONOSCOPY N/A 2017    Procedure: COLONOSCOPY;  Colonoscopy;  Surgeon: Sai Johnston MD;  Location:  GI     ESOPHAGOSCOPY, GASTROSCOPY, DUODENOSCOPY (EGD), COMBINED N/A 2017    Procedure: COMBINED ESOPHAGOSCOPY, GASTROSCOPY, DUODENOSCOPY (EGD), BIOPSY SINGLE OR MULTIPLE;  ESOPHAGOSCOPY, GASTROSCOPY, DUODENOSCOPY (EGD) with biopsies;  Surgeon: Sai Johnston MD;  Location:  GI     HC KNEE SCOPE, DIAGNOSTIC  01     "Arthroscopy, Lt Knee     LAPAROSCOPIC CHOLECYSTECTOMY N/A 9/24/2017    Procedure: LAPAROSCOPIC CHOLECYSTECTOMY;  laparoscopic cholecystectomy;  Surgeon: Romeo Pastor MD;  Location: [facility] OR     OPEN REDUCTION INTERNAL FIXATION WRIST Right 11/29/2017    Procedure: OPEN REDUCTION INTERNAL FIXATION WRIST;  OPEN REDUCTION INTERNAL FIXATION RIGHT WRIST;  Surgeon: Edgardo Almendarez MD;  Location:  OR                 Medications:     Current Outpatient Prescriptions   Medication     acetaminophen (TYLENOL) 325 MG tablet     alendronate (FOSAMAX) 35 MG tablet     ASPIRIN PO     buPROPion (WELLBUTRIN XL) 300 MG 24 hr tablet     busPIRone (BUSPAR) 15 MG tablet     ferrous sulfate (IRON) 325 (65 Fe) MG tablet     FLUoxetine (PROZAC) 20 MG capsule     naltrexone (DEPADE;REVIA) 50 MG tablet     omeprazole (PRILOSEC) 20 MG CR capsule     oxyCODONE IR (ROXICODONE) 5 MG tablet     phosphorus tablet 250 mg (PHOSPHA 250 NEUTRAL) 250 MG per tablet     propranolol (INDERAL) 10 MG tablet     senna-docusate (SENOKOT-S;PERICOLACE) 8.6-50 MG per tablet     thiamine (VITAMIN B-1) 100 MG tablet     TL RICARDO RX 2.2-25-1 MG TABS     traZODone (DESYREL) 50 MG tablet     No current facility-administered medications for this visit.             Allergies:     Allergies   Allergen Reactions     Albuterol      Tongue \"hardened and painful\"            Review of Systems:   10 points ROS was obtained and highlighted in the HPI, otherwise negative.          Physical Exam:   VS:  /63 (BP Location: Right arm, Patient Position: Chair, Cuff Size: Adult Regular)  Pulse 88  Temp 97.9  F (36.6  C) (Oral)  Resp 22  Ht 1.549 m (5' 1\")  Wt 64 kg (141 lb)  LMP 05/16/2012  SpO2 98%  BMI 26.64 kg/m2      Gen: A&Ox3, NAD, jaundiced, ill-appearing  HEENT: non-icteric, no cervical lymphadenopathy, no lesions or ulcers in oropharynx  CV: RRR, no overt murmurs  Lung: CTA Bilatererally, no wheezing or crackles.   Lym- no palpable " lymphadenopathy  Abd: soft, NT, ND, moderate ascites   Ext: no edema, intact pulses.   Skin: No rash, no palmar erythema, telangiectasias or jaundice  Neuro: grossly intact, no asterixis   Psych: appropriate mood and affects           Data:   Reviewed in person and significant for:    Lab Results   Component Value Date     07/31/2018      Lab Results   Component Value Date    POTASSIUM 3.7 07/31/2018     Lab Results   Component Value Date    CHLORIDE 107 07/31/2018     Lab Results   Component Value Date    CO2 25 07/31/2018     Lab Results   Component Value Date    BUN 13 07/31/2018     Lab Results   Component Value Date    CR 1.28 07/31/2018       Lab Results   Component Value Date    WBC 9.4 07/27/2018     Lab Results   Component Value Date    HGB 7.9 08/03/2018     Lab Results   Component Value Date    HCT 25.9 08/03/2018     Lab Results   Component Value Date     07/27/2018     Lab Results   Component Value Date    PLT 78 07/27/2018       Lab Results   Component Value Date    AST 60 07/31/2018     Lab Results   Component Value Date    ALT 16 07/31/2018     Lab Results   Component Value Date    BILICONJ 0.0 06/04/2013      Lab Results   Component Value Date    BILITOTAL 6.9 07/31/2018       Lab Results   Component Value Date    ALBUMIN 2.2 07/31/2018     Lab Results   Component Value Date    PROTTOTAL 5.5 07/31/2018      Lab Results   Component Value Date    ALKPHOS 286 07/31/2018       Lab Results   Component Value Date    INR 1.26 07/31/2018

## 2018-08-10 NOTE — TELEPHONE ENCOUNTER
Creatinine came back elevated. Will hold on starting diuretics. Plan to order 3 day albumin challenge of 75g daily in addition to the standard albumin replacement on paracentesis days. Writer spoke with [facility] @ [phone] (Scheduling).    Patient is scheduled for Tuesday @ 1000; Wednesday @ 0900; Thursday @ 1400. Advised orders should be entered as a therapy plan. Left voicemail with patient with the details and contact number for the infusion center. Also updated on the paracentesis order and the number for scheduling @ [phone] (Central Imaging Scheduling).    Left clinic number for call to verify message received.

## 2018-08-10 NOTE — TELEPHONE ENCOUNTER
Unable to reach patient by phone left message to call back and also send patient letter.   Aminata Sánchez MA

## 2018-08-10 NOTE — LETTER
8/10/2018      RE: Monalisa Olguin  [address] [facility] [address]       Hepatology Follow-up Clinic note  Monalisa Olguin   Date of Birth 1965  Date of Service 8/10/2018    Reason for follow-up: ETOH cirrhosis         Assessment/plan:   Monalisa Olguin is a 52 year old female with ETOH cirrhosis. Recently decompensated with ascites secondary to continued alcohol use. She also has some mild asterixis on exam. She had her first paracentesis last week, which was consistent with cirrhotic ascites. She has had worsening liver and kidney function in the past few weeks. Her MELD on 7/30/2018 was MELD 19. Will avoid starting lactulose at this due to diarrhea. She is a kidney donor, so will . We discussed worsening of her liver disease and the importance of abstinence from alcohol. She is up to date with HCC screening. She will need variceal screening EGD sooner due to recent decompensation    1. Alcohol cessation  2. 2000 mg sodium/high protein diet   3. Hepatic Encephalopathy  - Start Rifaxamin 550 mg twice daily   4. Ascites  - Labs today to assess ability to start diuretics  - Will set up paracentesis as needed, limit 7L with albumin replacement  5. No more than 3 gm of Tylenol a day  6. Follow-up in clinic with Dr. Kovacs in December as previously scheduled    Chau Castillo PA-C   [facility] Hepatology clinic    Case discussed with Dr. Edgardo Kovacs    ADDENDUM:   - Labs reveal worsening renal function, Cr increasing 1.45 GFR 38  - Will plan for 3-day 75 gm albumin infusion   - Repeat BMP in one week  -----------------------------------------------------       HPI:   Monalisa Olguin is a 52 year old female presenting for follow-up.     ETOH Cirrhosis  Complicated by:  - Ascites  - HE  - No history of GI bleed  EGD: 12/2017, no varices   HCC: 6/6/2018, no liver lesion    Patient was last seen by Dr. Kovacs and GI resident on March 2018 . She was sober for 11 months and she relapsed and  started drinking again in April. She last drank alcohol two weeks. She was told to go to the ER for symptoms of weakness a few weeks ago, labs noted worsening liver function and hypokalemia as well as ascites. She had her first paracentesis on 8/3/2018. Cell counts consistent with cirrhotic ascites. Cytology negative for malignancy. She has had worsening lower extremity edema in the last few weeks. For the past week, she has been having loose runny bowel movements. She admits to confusion. She has had weight gain due to fluid accumulation. She otherwise has a poor appetite. She is not taking any diuretics.     Patient also denies melena, hematochezia or hematemesis. Patient denies fevers, sweats or chills. She finished 6 months of inpatient treatment last year. She currently denies any need for further support.     Medical hx Surgical hx   Past Medical History:   Diagnosis Date     Alcohol abuse 2013     Alcohol dependence 2013     Alcohol withdrawal 2013     Anxiety 10/10/2011     CKD (chronic kidney disease) stage 3, GFR 30-59 ml/min      CKD (chronic kidney disease) stage 3, GFR 30-59 ml/min 2011     Depressive disorder      Esophageal reflux 10/7/2002     Hypertension goal BP (blood pressure) < 130/80 2011     Moderate Depression [296.32] 2009     Other internal derangement of knee(717.89)      Pap smear      Unspecified essential hypertension     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     COLONOSCOPY N/A 2017    Procedure: COLONOSCOPY;  Colonoscopy;  Surgeon: Sai Johnston MD;  Location: [facility]     ESOPHAGOSCOPY, GASTROSCOPY, DUODENOSCOPY (EGD), COMBINED N/A 2017    Procedure: COMBINED ESOPHAGOSCOPY, GASTROSCOPY, DUODENOSCOPY (EGD), BIOPSY SINGLE OR MULTIPLE;  ESOPHAGOSCOPY, GASTROSCOPY, DUODENOSCOPY (EGD) with biopsies;   "Surgeon: Sai Johnston MD;  Location: [facility]     HC KNEE SCOPE, DIAGNOSTIC  11/14/01    Arthroscopy, Lt Knee     LAPAROSCOPIC CHOLECYSTECTOMY N/A 9/24/2017    Procedure: LAPAROSCOPIC CHOLECYSTECTOMY;  laparoscopic cholecystectomy;  Surgeon: Romeo Pastor MD;  Location: [facility]     OPEN REDUCTION INTERNAL FIXATION WRIST Right 11/29/2017    Procedure: OPEN REDUCTION INTERNAL FIXATION WRIST;  OPEN REDUCTION INTERNAL FIXATION RIGHT WRIST;  Surgeon: Edgardo Almendarez MD;  Location: [facility]                 Medications:     Current Outpatient Prescriptions   Medication     acetaminophen (TYLENOL) 325 MG tablet     alendronate (FOSAMAX) 35 MG tablet     ASPIRIN PO     buPROPion (WELLBUTRIN XL) 300 MG 24 hr tablet     busPIRone (BUSPAR) 15 MG tablet     ferrous sulfate (IRON) 325 (65 Fe) MG tablet     FLUoxetine (PROZAC) 20 MG capsule     naltrexone (DEPADE;REVIA) 50 MG tablet     omeprazole (PRILOSEC) 20 MG CR capsule     oxyCODONE IR (ROXICODONE) 5 MG tablet     phosphorus tablet 250 mg (PHOSPHA 250 NEUTRAL) 250 MG per tablet     propranolol (INDERAL) 10 MG tablet     senna-docusate (SENOKOT-S;PERICOLACE) 8.6-50 MG per tablet     thiamine (VITAMIN B-1) 100 MG tablet     TL RICARDO RX 2.2-25-1 MG TABS     traZODone (DESYREL) 50 MG tablet     No current facility-administered medications for this visit.             Allergies:     Allergies   Allergen Reactions     Albuterol      Tongue \"hardened and painful\"            Review of Systems:   10 points ROS was obtained and highlighted in the HPI, otherwise negative.          Physical Exam:   VS:  /63 (BP Location: Right arm, Patient Position: Chair, Cuff Size: Adult Regular)  Pulse 88  Temp 97.9  F (36.6  C) (Oral)  Resp 22  Ht 1.549 m (5' 1\")  Wt 64 kg (141 lb)  LMP 05/16/2012  SpO2 98%  BMI 26.64 kg/m2      Gen: A&Ox3, NAD, jaundiced, ill-appearing  HEENT: non-icteric, no cervical lymphadenopathy, no lesions or ulcers in oropharynx  CV: RRR, no overt " murmurs  Lung: CTA Bilatererally, no wheezing or crackles.   Lym- no palpable lymphadenopathy  Abd: soft, NT, ND, moderate ascites   Ext: no edema, intact pulses.   Skin: No rash, no palmar erythema, telangiectasias or jaundice  Neuro: grossly intact, no asterixis   Psych: appropriate mood and affects           Data:   Reviewed in person and significant for:    Lab Results   Component Value Date     07/31/2018      Lab Results   Component Value Date    POTASSIUM 3.7 07/31/2018     Lab Results   Component Value Date    CHLORIDE 107 07/31/2018     Lab Results   Component Value Date    CO2 25 07/31/2018     Lab Results   Component Value Date    BUN 13 07/31/2018     Lab Results   Component Value Date    CR 1.28 07/31/2018       Lab Results   Component Value Date    WBC 9.4 07/27/2018     Lab Results   Component Value Date    HGB 7.9 08/03/2018     Lab Results   Component Value Date    HCT 25.9 08/03/2018     Lab Results   Component Value Date     07/27/2018     Lab Results   Component Value Date    PLT 78 07/27/2018       Lab Results   Component Value Date    AST 60 07/31/2018     Lab Results   Component Value Date    ALT 16 07/31/2018     Lab Results   Component Value Date    BILICONJ 0.0 06/04/2013      Lab Results   Component Value Date    BILITOTAL 6.9 07/31/2018       Lab Results   Component Value Date    ALBUMIN 2.2 07/31/2018     Lab Results   Component Value Date    PROTTOTAL 5.5 07/31/2018      Lab Results   Component Value Date    ALKPHOS 286 07/31/2018       Lab Results   Component Value Date    INR 1.26 07/31/2018       Chau Castillo PA-C

## 2018-08-10 NOTE — MR AVS SNAPSHOT
After Visit Summary   8/10/2018    Monalisa Olguin    MRN: [id number]           Patient Information     Date Of Birth          1965        Visit Information        Provider Department      8/10/2018 9:45 AM Chau Castillo PA-C [facility] Hepatology        Today's Diagnoses     Alcoholic cirrhosis of liver without ascites (H) - per Hepatology consult Nov 2017    -  1    Alcoholic cirrhosis of liver with ascites (H)        Hepatic encephalopathy (H)           Follow-ups after your visit        Your next 10 appointments already scheduled     Aug 15, 2018  1:30 PM CDT   [facility]LE[facility]ICINE with [facility] ([facility])    [address] [facility] [address]   [phone]           Note: this is not an onsite visit; there is no need to come to the facility.            Aug 21, 2018 10:00 AM CDT   Level 2 with NL INFUSION CHAIR 3   [facility] ([facility])    [address] [place] [address] [address] [place] [address]   [phone]            Aug 22, 2018  9:00 AM CDT   Level 2 with NL INFUSION CHAIR 3   [facility] ([facility])    [address] [place] [address] [address] [place] [address]   [phone]            Aug 23, 2018  2:00 PM CDT   Level 2 with NL INFUSION CHAIR 4   [facility] ([facility])    [address] [place] [address] [address] [place] [address]   [phone]            Oct 03, 2018  3:30 PM CDT   Return Visit with Usman Lee MD   [facility] ([facility])    [address] [place] [address]   [phone]              Future tests that were ordered for you today     Open Standing Orders        Priority Remaining Interval Expires Ordered    US Paracentesis Routine 24/24  8/10/2019 8/10/2018            Who to contact     If you have questions or need follow up information about today's clinic visit  "or your schedule please contact [facility] HEPATOLOGY directly at [phone].  Normal or non-critical lab and imaging results will be communicated to you by [employer], letter or phone within 4 business days after the clinic has received the results. If you do not hear from us within 7 days, please contact the clinic through [employer] or phone. If you have a critical or abnormal lab result, we will notify you by phone as soon as possible.  Submit refill requests through [employer] or call your pharmacy and they will forward the refill request to us. Please allow 3 business days for your refill to be completed.          Additional Information About Your Visit        [employer] Information     [employer] gives you secure access to your electronic health record. If you see a primary care provider, you can also send messages to your care team and make appointments. If you have questions, please call your primary care clinic.  If you do not have a primary care provider, please call [phone] and they will assist you.        Care EveryWhere ID     This is your Care EveryWhere ID. This could be used by other organizations to access your [facility] medical records  [id number]        Your Vitals Were     Pulse Temperature Respirations Height Last Period Pulse Oximetry    88 97.9  F (36.6  C) (Oral) 22 1.549 m (5' 1\") 05/16/2012 98%    BMI (Body Mass Index)                   26.64 kg/m2            Blood Pressure from Last 3 Encounters:   08/10/18 109/63   08/03/18 107/61   08/01/18 (!) 86/60    Weight from Last 3 Encounters:   08/10/18 64 kg (141 lb)   08/01/18 63.4 kg (139 lb 11.2 oz)   07/31/18 62.6 kg (138 lb)                 Today's Medication Changes          These changes are accurate as of 8/10/18  1:55 PM.  If you have any questions, ask your nurse or doctor.               Start taking these medicines.        Dose/Directions    rifaximin 550 MG Tabs tablet   Commonly known as:  XIFAXAN   Used for:  Alcoholic cirrhosis of liver " without ascites (H)   Started by:  Chau Castillo PA-C        Dose:  550 mg   Take 1 tablet (550 mg) by mouth 2 times daily   Quantity:  60 tablet   Refills:  3            Where to get your medicines      These medications were sent to [facility] [place], [place] - [address]   [address] [place] [address]     Phone:  [phone]     rifaximin 550 MG Tabs tablet                Primary Care Provider Office Phone # Fax #    Efren Bustos [phone]       [address] [place] [address]        Equal Access to Services     [facility]: Hadii aad rema hadasho Soamilcar, waaxda luqadaha, qaybta kaalmada adeegyada, salo viera . So [facility] [phone].    ATENCIÓN: Si habla español, tiene a perez disposición servicios gratuitos de asistencia lingüística. [facility] [phone].    We comply with applicable federal civil rights laws and [place] laws. We do not discriminate on the basis of race, color, national origin, age, disability, sex, sexual orientation, or gender identity.            Thank you!     Thank you for choosing [facility] HEPATOLOGY  for your care. Our goal is always to provide you with excellent care. Hearing back from our patients is one way we can continue to improve our services. Please take a few minutes to complete the written survey that you may receive in the mail after your visit with us. Thank you!             Your Updated Medication List - Protect others around you: Learn how to safely use, store and throw away your medicines at www.disposemymeds.org.          This list is accurate as of 8/10/18  1:55 PM.  Always use your most recent med list.                   Brand Name Dispense Instructions for use Diagnosis    acetaminophen 325 MG tablet    TYLENOL    100 tablet    Take 2 tablets (650 mg) by mouth every 4 hours as needed for mild pain    Alcoholic cirrhosis of liver without ascites (H)        alendronate 35 MG tablet    FOSAMAX    12 tablet    Take 1 tablet (35 mg) by mouth with 8oz water every Monday (once every 7 days) 30 minutes before breakfast and remain upright during this time.    Osteopenia, unspecified location       ASPIRIN PO      Take 325 mg by mouth daily        buPROPion 300 MG 24 hr tablet    WELLBUTRIN XL    90 tablet    Take 1 tablet (300 mg) by mouth every morning    Major depressive disorder, recurrent episode, moderate (H)       busPIRone 15 MG tablet    BUSPAR    180 tablet    Take 1 tablet (15 mg) by mouth 2 times daily    MONA (generalized anxiety disorder)       ferrous sulfate 325 (65 Fe) MG tablet    IRON    60 tablet    Take 1 tablet (325 mg) by mouth 2 times daily        FLUoxetine 20 MG capsule    PROzac    180 capsule    Take 3 capsules (60 mg) by mouth daily    Anxiety, Major depressive disorder, recurrent episode, moderate (H)       naltrexone 50 MG tablet    DEPADE;REVIA    90 tablet    Take 1 tablet (50 mg) by mouth daily    Alcohol dependence in remission (H)       omeprazole 20 MG CR capsule    priLOSEC    90 capsule    TAKE ONE CAPSULE BY MOUTH EVERY DAY    Gastroesophageal reflux disease with esophagitis       oxyCODONE IR 5 MG tablet    ROXICODONE    10 tablet    Take 0.5-1 tablets (2.5-5 mg) by mouth every 6 hours as needed for moderate to severe pain    Alcoholic hepatitis with ascites, Abdominal pain, generalized       phosphorus tablet 250 mg 250 MG per tablet    PHOSPHA 250 NEUTRAL    120 tablet    TAKE TWO TABLETS BY MOUTH TWICE A DAY    Hypophosphatemia       propranolol 10 MG tablet    INDERAL    180 tablet    Take 1 tablet (10 mg) by mouth 2 times daily    Hypertension goal BP (blood pressure) < 130/80       rifaximin 550 MG Tabs tablet    XIFAXAN    60 tablet    Take 1 tablet (550 mg) by mouth 2 times daily    Alcoholic cirrhosis of liver without ascites (H)       senna-docusate 8.6-50 MG per tablet    SENOKOT-S;PERICOLACE    90 tablet    Take 1-2 tablets  by mouth 2 times daily Take while on oral narcotics to prevent or treat constipation.    Wrist fracture, right, closed, initial encounter       thiamine 100 MG tablet     90 tablet    Take 1 tablet (100 mg) by mouth daily    Alcohol dependence, continuous drinking behavior (H)       TL RICARDO RX 2.2-25-1 MG Tabs   Generic drug:  Folic Acid-Vit B6-Vit B12     90 tablet    TAKE ONE TABLET BY MOUTH EVERY DAY    Alcoholism in recovery (H)       traZODone 50 MG tablet    DESYREL    60 tablet    Take 2 tablets (100 mg) by mouth nightly as needed for sleep    Other insomnia

## 2018-08-13 NOTE — TELEPHONE ENCOUNTER
Attempted to reach patient this morning but no answer. Left voicemail this morning.    Reached patient this afternoon and patient is able to make all 3 albumin infusions at her local [facility]. Patient has both numbers for Infusion Center and Central [facility] Scheduling as well as clinic number.    No further actions needed at this time.

## 2018-08-14 ENCOUNTER — INFUSION THERAPY VISIT (OUTPATIENT)
Dept: INFUSION THERAPY | Facility: CLINIC | Age: 53
End: 2018-08-14
Attending: PHYSICIAN ASSISTANT
Payer: COMMERCIAL

## 2018-08-14 ENCOUNTER — TELEPHONE (OUTPATIENT)
Dept: GASTROENTEROLOGY | Facility: CLINIC | Age: 53
End: 2018-08-14

## 2018-08-14 VITALS
OXYGEN SATURATION: 95 % | HEIGHT: 61 IN | BODY MASS INDEX: 27.23 KG/M2 | WEIGHT: 144.2 LBS | RESPIRATION RATE: 16 BRPM | DIASTOLIC BLOOD PRESSURE: 52 MMHG | TEMPERATURE: 97.2 F | SYSTOLIC BLOOD PRESSURE: 88 MMHG | HEART RATE: 73 BPM

## 2018-08-14 DIAGNOSIS — K76.82 HEPATIC ENCEPHALOPATHY (H): Primary | ICD-10-CM

## 2018-08-14 DIAGNOSIS — K70.30 ALCOHOLIC CIRRHOSIS OF LIVER WITHOUT ASCITES (H): ICD-10-CM

## 2018-08-14 DIAGNOSIS — K70.11 ALCOHOLIC HEPATITIS WITH ASCITES (H): Primary | ICD-10-CM

## 2018-08-14 PROCEDURE — 25000128 H RX IP 250 OP 636: Performed by: PHYSICIAN ASSISTANT

## 2018-08-14 PROCEDURE — P9047 ALBUMIN (HUMAN), 25%, 50ML: HCPCS | Performed by: PHYSICIAN ASSISTANT

## 2018-08-14 PROCEDURE — 96366 THER/PROPH/DIAG IV INF ADDON: CPT

## 2018-08-14 PROCEDURE — 96365 THER/PROPH/DIAG IV INF INIT: CPT

## 2018-08-14 RX ORDER — ALBUMIN (HUMAN) 12.5 G/50ML
75 SOLUTION INTRAVENOUS ONCE
Status: CANCELLED
Start: 2018-08-14 | End: 2018-08-14

## 2018-08-14 RX ORDER — ALBUMIN (HUMAN) 12.5 G/50ML
75 SOLUTION INTRAVENOUS ONCE
Status: COMPLETED | OUTPATIENT
Start: 2018-08-14 | End: 2018-08-14

## 2018-08-14 RX ADMIN — ALBUMIN HUMAN 75 G: 0.25 SOLUTION INTRAVENOUS at 11:54

## 2018-08-14 RX ADMIN — SODIUM CHLORIDE 250 ML: 9 INJECTION, SOLUTION INTRAVENOUS at 11:50

## 2018-08-14 ASSESSMENT — PAIN SCALES - GENERAL: PAINLEVEL: SEVERE PAIN (7)

## 2018-08-14 NOTE — MR AVS SNAPSHOT
After Visit Summary   8/14/2018    Monalisa Olguin    MRN: [id number]           Patient Information     Date Of Birth          1965        Visit Information        Provider Department      8/14/2018 11:30 AM NL INFUSION CHAIR 2 [facility]        Today's Diagnoses     Alcoholic hepatitis with ascites    -  1      Care Instructions    Pt to return on 08/15/18 for day 2 albumin. Copies of medication list and upcoming appointments given prior to discharge.             Follow-ups after your visit        Your next 10 appointments already scheduled     Aug 15, 2018 10:30 AM CDT   TELEMEDICINE with Robert Doan [facility] ([facility])    [address] [place] [address]   [phone]           Note: this is not an onsite visit; there is no need to come to the facility.            Aug 15, 2018 11:30 AM CDT   Level 3 with NL INFUSION CHAIR 5   [facility] ([facility])    [address] [place] [address] [place] [address]   [phone]            Aug 16, 2018 11:30 AM CDT   Level 3 with NL INFUSION CHAIR 4   [facility] ([facility])    [address] [place] [address] [place] [address]   [phone]            Oct 03, 2018  3:30 PM CDT   Return Visit with Usman Lee MD   [facility]    [address] [place] [address]   [phone]            Dec 03, 2018  3:30 PM CST   (Arrive by 3:15 PM)   Return General Liver with Edgardo Kovacs MD   [facility] ([facility])    [address] [place] [address]  [address]  [facility] [address] [phone]              Who to contact     If you have questions or need follow up information about today's clinic visit or your schedule please contact [facility] directly at [phone].  Normal or non-critical  "lab and imaging results will be communicated to you by MyChart, letter or phone within 4 business days after the clinic has received the results. If you do not hear from us within 7 days, please contact the clinic through Zad[employer] or phone. If you have a critical or abnormal lab result, we will notify you by phone as soon as possible.  Submit refill requests through [employer] or call your pharmacy and they will forward the refill request to us. Please allow 3 business days for your refill to be completed.          Additional Information About Your Visit        LocalCustomerharIzooble Information     [employer] gives you secure access to your electronic health record. If you see a primary care provider, you can also send messages to your care team and make appointments. If you have questions, please call your primary care clinic.  If you do not have a primary care provider, please call [phone] and they will assist you.        Care EveryWhere ID     This is your Care EveryWhere ID. This could be used by other organizations to access your [facility] medical records  [id number]        Your Vitals Were     Pulse Temperature Respirations Height Last Period Pulse Oximetry    73 97.2  F (36.2  C) (Temporal) 16 1.549 m (5' 1\") 05/16/2012 95%    BMI (Body Mass Index)                   27.25 kg/m2            Blood Pressure from Last 3 Encounters:   08/14/18 (!) 88/52   08/10/18 109/63   08/03/18 107/61    Weight from Last 3 Encounters:   08/14/18 65.4 kg (144 lb 3.2 oz)   08/10/18 64 kg (141 lb)   08/01/18 63.4 kg (139 lb 11.2 oz)              Today, you had the following     No orders found for display         Where to get your medicines      These medications were sent to [facility] [place]  [place], [place] - [address]  [address], [facility] [address]     Phone:  [phone]     rifaximin 550 MG Tabs tablet          Primary Care Provider Office Phone # Fax #    Efren Bustos [phone]       [address] [place] [address]        Equal Access to Services     ADRIENNE GILLIAM : Hadii aad ku hadtysonpablo Soamilcar, waaxda luqadaha, qaybta kaalmadedrick boo, salo chenluis enriqueanthony jara. So [facility] [phone].    ATENCIÓN: Si habla español, tiene a perez disposición servicios gratuitos de asistencia lingüística. [facility] [phone].    We comply with applicable federal civil rights laws and [place] laws. We do not discriminate on the basis of race, color, national origin, age, disability, sex, sexual orientation, or gender identity.            Thank you!     Thank you for choosing [facility]  for your care. Our goal is always to provide you with excellent care. Hearing back from our patients is one way we can continue to improve our services. Please take a few minutes to complete the written survey that you may receive in the mail after your visit with us. Thank you!             Your Updated Medication List - Protect others around you: Learn how to safely use, store and throw away your medicines at www.disposemymeds.org.          This list is accurate as of 8/14/18  3:18 PM.  Always use your most recent med list.                   Brand Name Dispense Instructions for use Diagnosis    acetaminophen 325 MG tablet    TYLENOL    100 tablet    Take 2 tablets (650 mg) by mouth every 4 hours as needed for mild pain    Alcoholic cirrhosis of liver without ascites (H)       alendronate 35 MG tablet    FOSAMAX    12 tablet    Take 1 tablet (35 mg) by mouth with 8oz water every Monday (once every 7 days) 30 minutes before breakfast and remain upright during this time.    Osteopenia, unspecified location       ASPIRIN PO      Take 325 mg by mouth daily        buPROPion 300 MG 24 hr tablet    WELLBUTRIN XL    90 tablet    Take 1 tablet (300 mg) by mouth every morning    Major depressive disorder, recurrent episode, moderate (H)       busPIRone 15 MG tablet    BUSPAR    180 tablet     Take 1 tablet (15 mg) by mouth 2 times daily    MONA (generalized anxiety disorder)       ferrous sulfate 325 (65 Fe) MG tablet    IRON    60 tablet    Take 1 tablet (325 mg) by mouth 2 times daily        FLUoxetine 20 MG capsule    PROzac    180 capsule    Take 3 capsules (60 mg) by mouth daily    Anxiety, Major depressive disorder, recurrent episode, moderate (H)       naltrexone 50 MG tablet    DEPADE;REVIA    90 tablet    Take 1 tablet (50 mg) by mouth daily    Alcohol dependence in remission (H)       omeprazole 20 MG CR capsule    priLOSEC    90 capsule    TAKE ONE CAPSULE BY MOUTH EVERY DAY    Gastroesophageal reflux disease with esophagitis       oxyCODONE IR 5 MG tablet    ROXICODONE    10 tablet    Take 0.5-1 tablets (2.5-5 mg) by mouth every 6 hours as needed for moderate to severe pain    Alcoholic hepatitis with ascites, Abdominal pain, generalized       phosphorus tablet 250 mg 250 MG per tablet    PHOSPHA 250 NEUTRAL    120 tablet    TAKE TWO TABLETS BY MOUTH TWICE A DAY    Hypophosphatemia       propranolol 10 MG tablet    INDERAL    180 tablet    Take 1 tablet (10 mg) by mouth 2 times daily    Hypertension goal BP (blood pressure) < 130/80       rifaximin 550 MG Tabs tablet    XIFAXAN    60 tablet    Take 1 tablet (550 mg) by mouth 2 times daily    Alcoholic cirrhosis of liver without ascites (H), Hepatic encephalopathy (H)       senna-docusate 8.6-50 MG per tablet    SENOKOT-S;PERICOLACE    90 tablet    Take 1-2 tablets by mouth 2 times daily Take while on oral narcotics to prevent or treat constipation.    Wrist fracture, right, closed, initial encounter       thiamine 100 MG tablet     90 tablet    Take 1 tablet (100 mg) by mouth daily    Alcohol dependence, continuous drinking behavior (H)       TL RICARDO RX 2.2-25-1 MG Tabs   Generic drug:  Folic Acid-Vit B6-Vit B12     90 tablet    TAKE ONE TABLET BY MOUTH EVERY DAY    Alcoholism in recovery (H)       traZODone 50 MG tablet    DESYREL    60  tablet    Take 2 tablets (100 mg) by mouth nightly as needed for sleep    Other insomnia

## 2018-08-14 NOTE — PROGRESS NOTES
Infusion Nursing Note:  Monalisa Brooke Reyeslisa presents today for Day 1 Albumin.    Patient seen by provider today: No   present during visit today: Not Applicable.    Note: Patient arrived with , slept most of infusion.    Intravenous Access:  Peripheral IV placed.    Treatment Conditions:  Not Applicable.      Post Infusion Assessment:  Patient tolerated infusion without incident.  Blood return noted pre and post infusion.  No evidence of extravasations.  Access discontinued per protocol.    Discharge Plan:   Discharge instructions reviewed with: Patient and Family.  Patient and/or family verbalized understanding of discharge instructions and all questions answered.  Patient discharged in stable condition accompanied by: self and .  Departure Mode: Ambulatory.    Elizabeth Rios RN

## 2018-08-14 NOTE — TELEPHONE ENCOUNTER
Prior Authorization Specialty Medication Request    Medication/Dose: Xifaxan 550mg BID  ICD code (if different than what is on RX):  Hepatic encephalopathy K72.90  Previously Tried and Failed:  NA - Patient has frequent runny stools. Unable to trial lactulose.    Important Lab Values: Total Bilirubin 6.6; Direct bilirubin 4.6; Albumin 2.0  Rationale: NA - Patient has frequent runny stools. Unable to trial lactulose.    Insurance Name:   Insurance ID:   Insurance Phone Number:     Pharmacy Information (if different than what is on RX)  Name:    Phone:

## 2018-08-15 ENCOUNTER — ALLIED HEALTH/NURSE VISIT (OUTPATIENT)
Dept: PHARMACY | Facility: CLINIC | Age: 53
End: 2018-08-15
Payer: COMMERCIAL

## 2018-08-15 ENCOUNTER — INFUSION THERAPY VISIT (OUTPATIENT)
Dept: INFUSION THERAPY | Facility: CLINIC | Age: 53
End: 2018-08-15
Attending: PHYSICIAN ASSISTANT
Payer: COMMERCIAL

## 2018-08-15 ENCOUNTER — TELEPHONE (OUTPATIENT)
Dept: PHARMACY | Facility: CLINIC | Age: 53
End: 2018-08-15

## 2018-08-15 VITALS
OXYGEN SATURATION: 96 % | HEART RATE: 90 BPM | TEMPERATURE: 97.5 F | SYSTOLIC BLOOD PRESSURE: 99 MMHG | DIASTOLIC BLOOD PRESSURE: 62 MMHG | RESPIRATION RATE: 18 BRPM

## 2018-08-15 VITALS — DIASTOLIC BLOOD PRESSURE: 64 MMHG | SYSTOLIC BLOOD PRESSURE: 104 MMHG

## 2018-08-15 DIAGNOSIS — G47.00 INSOMNIA, UNSPECIFIED TYPE: ICD-10-CM

## 2018-08-15 DIAGNOSIS — K70.11 ALCOHOLIC HEPATITIS WITH ASCITES (H): Primary | ICD-10-CM

## 2018-08-15 DIAGNOSIS — F33.1 MAJOR DEPRESSIVE DISORDER, RECURRENT EPISODE, MODERATE (H): Primary | ICD-10-CM

## 2018-08-15 DIAGNOSIS — M85.80 OSTEOPENIA, UNSPECIFIED LOCATION: ICD-10-CM

## 2018-08-15 DIAGNOSIS — K70.11 ALCOHOLIC HEPATITIS WITH ASCITES (H): ICD-10-CM

## 2018-08-15 DIAGNOSIS — K21.9 GASTROESOPHAGEAL REFLUX DISEASE, ESOPHAGITIS PRESENCE NOT SPECIFIED: ICD-10-CM

## 2018-08-15 DIAGNOSIS — D50.8 OTHER IRON DEFICIENCY ANEMIA: ICD-10-CM

## 2018-08-15 DIAGNOSIS — E83.39 HYPOPHOSPHATEMIA: ICD-10-CM

## 2018-08-15 DIAGNOSIS — E05.90 HYPERTHYROIDISM: ICD-10-CM

## 2018-08-15 DIAGNOSIS — F41.9 ANXIETY: ICD-10-CM

## 2018-08-15 DIAGNOSIS — K76.6 PORTAL HYPERTENSION (H): ICD-10-CM

## 2018-08-15 PROCEDURE — 96366 THER/PROPH/DIAG IV INF ADDON: CPT

## 2018-08-15 PROCEDURE — 96365 THER/PROPH/DIAG IV INF INIT: CPT

## 2018-08-15 PROCEDURE — P9047 ALBUMIN (HUMAN), 25%, 50ML: HCPCS | Performed by: PHYSICIAN ASSISTANT

## 2018-08-15 PROCEDURE — 99605 MTMS BY PHARM NP 15 MIN: CPT | Performed by: PHARMACIST

## 2018-08-15 PROCEDURE — 99607 MTMS BY PHARM ADDL 15 MIN: CPT | Performed by: PHARMACIST

## 2018-08-15 PROCEDURE — 25000128 H RX IP 250 OP 636: Performed by: PHYSICIAN ASSISTANT

## 2018-08-15 RX ORDER — ALBUMIN (HUMAN) 12.5 G/50ML
75 SOLUTION INTRAVENOUS ONCE
Status: CANCELLED
Start: 2018-08-15 | End: 2018-08-15

## 2018-08-15 RX ORDER — ALBUMIN (HUMAN) 12.5 G/50ML
75 SOLUTION INTRAVENOUS ONCE
Status: COMPLETED | OUTPATIENT
Start: 2018-08-15 | End: 2018-08-15

## 2018-08-15 RX ADMIN — SODIUM CHLORIDE 250 ML: 9 INJECTION, SOLUTION INTRAVENOUS at 12:06

## 2018-08-15 RX ADMIN — ALBUMIN HUMAN 75 G: 0.25 SOLUTION INTRAVENOUS at 12:11

## 2018-08-15 ASSESSMENT — PAIN SCALES - GENERAL: PAINLEVEL: SEVERE PAIN (7)

## 2018-08-15 NOTE — TELEPHONE ENCOUNTER
Pt had infusion appointment so actually arrived for appointment in person at 10:37 AM.  Disregard previous message/need to follow-up.    Francis Doan, LatanyaD, [facility]  Medication Therapy Management Pharmacist  Pager: [phone]

## 2018-08-15 NOTE — PROGRESS NOTES
Received response from GI team:     Yes, agree with stopping Senna and see how bowel movements respond. If decreased, low dose lactulose could be started. Dose to 3-5 bowel movements a day.  Please let me know if you need anything else.     Chau     Contacted patient/ and instructed them to stop Senna-S (Senexon-S).  Will follow-up in next few weeks to confirm improvement in symptoms.    Francis Doan, LatanyaD, [facility]  Medication Therapy Management Pharmacist  Pager: [phone]

## 2018-08-15 NOTE — PROGRESS NOTES
Infusion Nursing Note:  Monalisa Olguin presents today for Albumin.    Patient seen by provider today: No   present during visit today: Not Applicable.    Note: Arrived with .    Intravenous Access:  Peripheral IV placed.    Treatment Conditions:  Not Applicable.      Post Infusion Assessment:  Patient tolerated infusion without incident.  Blood return noted pre and post infusion.  Site patent and intact, free from redness, edema or discomfort.    Discharge Plan:   Discharge instructions reviewed with: Patient and .  Patient and/or family verbalized understanding of discharge instructions and all questions answered.  Copy of AVS reviewed with patient and/or family.  Patient will return tomorrow for next appointment.  Patient discharged in stable condition accompanied by: self and .  Departure Mode: Ambulatory.    Lay [place] RN

## 2018-08-15 NOTE — MR AVS SNAPSHOT
After Visit Summary   8/15/2018    Monalisa Olguin    MRN: [id number]           Patient Information     Date Of Birth          1965        Visit Information        Provider Department      8/15/2018 10:30 AM Robert Doan, [facility] MTM        Care Instructions    Recommendations from today's MTM visit:                                                    MTM (medication therapy management) is a service provided by a clinical pharmacist designed to help you get the most of out of your medicines.   Today we reviewed what your medicines are for, how to know if they are working, that your medicines are safe and how to make your medicine regimen as easy as possible.     1. Make sure you stop taking ibuprofen and aspirin.    2. You could start taking acetaminophen 1000 mg at bedtime (do not exceed 1000 mg per dose).  You liver doctor did not want you to exceed 3000 mg per day, so you could consider taking 1000 mg two to three times daily if you felt like you needed it.     3. Make sure to take your whole fluoxetine dose in the morning.  Do not take this later in the day to avoid the activating effects late at night.     4. I will talk to Dr. Csatillo and your liver team about whether or not they want you to continue you Senna-S.  This can contribute to your loose stools, but they may want this effect as this may be controlling your ammonia levels.     Next MTM visit: I will call with what I hear back from Dr. Kovacs or Chau Castillo.      To schedule another MTM appointment, please call the clinic directly or you may call the MTM scheduling line at [phone] or toll-free at [phone].     My Clinical Pharmacist's contact information:                                                      It was a pleasure seeing you today!  Please feel free to contact me with any questions or concerns you have.      Francis Doan, PharmD, BCACP  Medication Therapy Management Pharmacist  Pager:  [phone]      You may receive a survey about the [facility] services you received.  I would appreciate your feedback to help me serve you better in the future. Please fill it out and return it when you can. Your comments will be anonymous.              Follow-ups after your visit        Your next 10 appointments already scheduled     Aug 15, 2018 11:30 AM CDT   Level 3 with NL INFUSION CHAIR 5   [facility] ([facility])    [address] [place] [address]   [phone]            Aug 16, 2018 11:30 AM CDT   Level 3 with NL INFUSION CHAIR 4   [facility] ([facility])    [address]   [place] [address]   [phone]            Oct 03, 2018  3:30 PM CDT   Return Visit with Usman Lee MD   [facility]    [address] [place] [address]   [phone]            Dec 03, 2018  3:30 PM CST   (Arrive by 3:15 PM)   Return General Liver with Edgardo Kovacs MD   [facility] ([email or web address] [place])    [address] [place] [address]  [address]  [facility] [address] [phone]              Who to contact     If you have questions or need follow up information about today's clinic visit or your schedule please contact [facility] directly at [phone].  Normal or non-critical lab and imaging results will be communicated to you by MyChart, letter or phone within 4 business days after the clinic has received the results. If you do not hear from us within 7 days, please contact the clinic through MyChart or phone. If you have a critical or abnormal lab result, we will notify you by phone as soon as possible.  Submit refill requests through [employer] or call your pharmacy and they will forward the refill request to us. Please allow 3 business days for your refill to be completed.          Additional Information About Your Visit         DailyBoothharNexio Information     [employer] gives you secure access to your electronic health record. If you see a primary care provider, you can also send messages to your care team and make appointments. If you have questions, please call your primary care clinic.  If you do not have a primary care provider, please call [phone] and they will assist you.        Care EveryWhere ID     This is your Care EveryWhere ID. This could be used by other organizations to access your [facility] medical records  [id number]        Your Vitals Were     Last Period                   05/16/2012            Blood Pressure from Last 3 Encounters:   08/14/18 (!) 88/52   08/10/18 109/63   08/03/18 107/61    Weight from Last 3 Encounters:   08/14/18 144 lb 3.2 oz (65.4 kg)   08/10/18 141 lb (64 kg)   08/01/18 139 lb 11.2 oz (63.4 kg)              Today, you had the following     No orders found for display       Primary Care Provider Office Phone # Fax #    Efrenpablo Bustos [phone]       [address] [place] [address]        Equal Access to Services     [facility]: Hadii aad ku hadasho Soomaali, waaxda luqadaha, qaybta kaalmada adeegyada, salo ramos haynabiln mary viera . So [facility] [phone].    ATENCIÓN: Si habla español, tiene a perez disposición servicios gratuitos de asistencia lingüística. Llame al [phone].    We comply with applicable federal civil rights laws and [place] laws. We do not discriminate on the basis of race, color, national origin, age, disability, sex, sexual orientation, or gender identity.            Thank you!     Thank you for choosing [facility]  for your care. Our goal is always to provide you with excellent care. Hearing back from our patients is one way we can continue to improve our services. Please take a few minutes to complete the written survey that you may receive in the mail after your visit with us. Thank you!             Your Updated  Medication List - Protect others around you: Learn how to safely use, store and throw away your medicines at www.disposemymeds.org.          This list is accurate as of 8/15/18 11:16 AM.  Always use your most recent med list.                   Brand Name Dispense Instructions for use Diagnosis    acetaminophen 325 MG tablet    TYLENOL    100 tablet    Take 2 tablets (650 mg) by mouth every 4 hours as needed for mild pain    Alcoholic cirrhosis of liver without ascites (H)       alendronate 35 MG tablet    FOSAMAX    12 tablet    Take 1 tablet (35 mg) by mouth with 8oz water every Monday (once every 7 days) 30 minutes before breakfast and remain upright during this time.    Osteopenia, unspecified location       buPROPion 300 MG 24 hr tablet    WELLBUTRIN XL    90 tablet    Take 1 tablet (300 mg) by mouth every morning    Major depressive disorder, recurrent episode, moderate (H)       busPIRone 15 MG tablet    BUSPAR    180 tablet    Take 1 tablet (15 mg) by mouth 2 times daily    MONA (generalized anxiety disorder)       ferrous sulfate 325 (65 Fe) MG tablet    IRON    60 tablet    Take 1 tablet (325 mg) by mouth 2 times daily        FLUoxetine 20 MG capsule    PROzac    180 capsule    Take 3 capsules (60 mg) by mouth daily    Anxiety, Major depressive disorder, recurrent episode, moderate (H)       naltrexone 50 MG tablet    DEPADE;REVIA    90 tablet    Take 1 tablet (50 mg) by mouth daily    Alcohol dependence in remission (H)       omeprazole 20 MG CR capsule    priLOSEC    90 capsule    TAKE ONE CAPSULE BY MOUTH EVERY DAY    Gastroesophageal reflux disease with esophagitis       phosphorus tablet 250 mg 250 MG per tablet    PHOSPHA 250 NEUTRAL    120 tablet    TAKE TWO TABLETS BY MOUTH TWICE A DAY    Hypophosphatemia       propranolol 10 MG tablet    INDERAL    180 tablet    Take 1 tablet (10 mg) by mouth 2 times daily    Hypertension goal BP (blood pressure) < 130/80       rifaximin 550 MG Tabs tablet     XIFAXAN    60 tablet    Take 1 tablet (550 mg) by mouth 2 times daily    Alcoholic cirrhosis of liver without ascites (H), Hepatic encephalopathy (H)       senna-docusate 8.6-50 MG per tablet    SENOKOT-S;PERICOLACE    90 tablet    Take 1-2 tablets by mouth 2 times daily Take while on oral narcotics to prevent or treat constipation.    Wrist fracture, right, closed, initial encounter       thiamine 100 MG tablet     90 tablet    Take 1 tablet (100 mg) by mouth daily    Alcohol dependence, continuous drinking behavior (H)       TL RICARDO RX 2.2-25-1 MG Tabs   Generic drug:  Folic Acid-Vit B6-Vit B12     90 tablet    TAKE ONE TABLET BY MOUTH EVERY DAY    Alcoholism in recovery (H)       traZODone 50 MG tablet    DESYREL    60 tablet    Take 2 tablets (100 mg) by mouth nightly as needed for sleep    Other insomnia

## 2018-08-15 NOTE — PATIENT INSTRUCTIONS
Recommendations from today's MTM visit:                                                    MTM (medication therapy management) is a service provided by a clinical pharmacist designed to help you get the most of out of your medicines.   Today we reviewed what your medicines are for, how to know if they are working, that your medicines are safe and how to make your medicine regimen as easy as possible.     1. Make sure you stop taking ibuprofen and aspirin.    2. You could start taking acetaminophen 1000 mg at bedtime (do not exceed 1000 mg per dose).  You liver doctor did not want you to exceed 3000 mg per day, so you could consider taking 1000 mg two to three times daily if you felt like you needed it.     3. Make sure to take your whole fluoxetine dose in the morning.  Do not take this later in the day to avoid the activating effects late at night.     4. I will talk to Dr. Castillo and your liver team about whether or not they want you to continue you Senna-S.  This can contribute to your loose stools, but they may want this effect as this may be controlling your ammonia levels.     Next MTM visit: I will call with what I hear back from Dr. Kovacs or Chau Castillo.      To schedule another MTM appointment, please call the clinic directly or you may call the MTM scheduling line at [phone] or toll-free at [phone].     My Clinical Pharmacist's contact information:                                                      It was a pleasure seeing you today!  Please feel free to contact me with any questions or concerns you have.      Francis Doan, Cristino, [facility]  Medication Therapy Management Pharmacist  Pager: [phone]      You may receive a survey about the MTM services you received.  I would appreciate your feedback to help me serve you better in the future. Please fill it out and return it when you can. Your comments will be anonymous.

## 2018-08-15 NOTE — MR AVS SNAPSHOT
After Visit Summary   8/15/2018    Monalisa Olguin    MRN: [id number]           Patient Information     Date Of Birth          1965        Visit Information        Provider Department      8/15/2018 11:30 AM NL INFUSION CHAIR 5 [facility]        Today's Diagnoses     Alcoholic hepatitis with ascites    -  1       Follow-ups after your visit        Your next 10 appointments already scheduled     Aug 16, 2018 11:30 AM CDT   Level 4 with NL INFUSION CHAIR 4   [facility] ([facility])    [address] [place] [address]   [phone]            Oct 03, 2018  3:30 PM CDT   Return Visit with Usman Lee MD   [facility] ([facility])    [address] [address] [phone]            Dec 03, 2018  3:30 PM CST   (Arrive by 3:15 PM)   Return General Liver with Edgardo Kovacs MD   [facility] Hepatology ([facility] [place])    [address] [place] [address]  [address]  [facility] [address] [phone]              Who to contact     If you have questions or need follow up information about today's clinic visit or your schedule please contact [facility] directly at [phone].  Normal or non-critical lab and imaging results will be communicated to you by zSouphart, letter or phone within 4 business days after the clinic has received the results. If you do not hear from us within 7 days, please contact the clinic through zSouphart or phone. If you have a critical or abnormal lab result, we will notify you by phone as soon as possible.  Submit refill requests through [employer] or call your pharmacy and they will forward the refill request to us. Please allow 3 business days for your refill to be completed.          Additional Information About Your Visit        zSoupharDecalog Information     [employer] gives you secure access to your electronic health  record. If you see a primary care provider, you can also send messages to your care team and make appointments. If you have questions, please call your primary care clinic.  If you do not have a primary care provider, please call [phone] and they will assist you.        Care EveryWhere ID     This is your Care EveryWhere ID. This could be used by other organizations to access your [facility] medical records  [id number]        Your Vitals Were     Pulse Last Period                90 05/16/2012           Blood Pressure from Last 3 Encounters:   08/15/18 99/62   08/15/18 104/64   08/14/18 (!) 88/52    Weight from Last 3 Encounters:   08/14/18 65.4 kg (144 lb 3.2 oz)   08/10/18 64 kg (141 lb)   08/01/18 63.4 kg (139 lb 11.2 oz)              Today, you had the following     No orders found for display       Primary Care Provider Office Phone # Fax #    Efren Bustos [phone]       [address] [place] [address]        Equal Access to Services     [facility]: Hadii aad ku hadasho Soomaali, waaxda luqadaha, qaybta kaalmada adeegyada, salo viera . So [facility] [phone].    ATENCIÓN: Si habla español, tiene a perez disposición servicios gratuitos de asistencia lingüística. Nadira al [phone].    We comply with applicable federal civil rights laws and [place] laws. We do not discriminate on the basis of race, color, national origin, age, disability, sex, sexual orientation, or gender identity.            Thank you!     Thank you for choosing [facility] INFUSION SERVICES  for your care. Our goal is always to provide you with excellent care. Hearing back from our patients is one way we can continue to improve our services. Please take a few minutes to complete the written survey that you may receive in the mail after your visit with us. Thank you!             Your Updated Medication List - Protect others around you: Learn how to safely use, store  and throw away your medicines at www.disposemymeds.org.          This list is accurate as of 8/15/18  3:58 PM.  Always use your most recent med list.                   Brand Name Dispense Instructions for use Diagnosis    acetaminophen 325 MG tablet    TYLENOL    100 tablet    Take 2 tablets (650 mg) by mouth every 4 hours as needed for mild pain    Alcoholic cirrhosis of liver without ascites (H)       alendronate 35 MG tablet    FOSAMAX    12 tablet    Take 1 tablet (35 mg) by mouth with 8oz water every Monday (once every 7 days) 30 minutes before breakfast and remain upright during this time.    Osteopenia, unspecified location       buPROPion 300 MG 24 hr tablet    WELLBUTRIN XL    90 tablet    Take 1 tablet (300 mg) by mouth every morning    Major depressive disorder, recurrent episode, moderate (H)       busPIRone 15 MG tablet    BUSPAR    180 tablet    Take 1 tablet (15 mg) by mouth 2 times daily    MONA (generalized anxiety disorder)       ferrous sulfate 325 (65 Fe) MG tablet    IRON    60 tablet    Take 1 tablet (325 mg) by mouth 2 times daily        FLUoxetine 20 MG capsule    PROzac    180 capsule    Take 3 capsules (60 mg) by mouth daily    Anxiety, Major depressive disorder, recurrent episode, moderate (H)       naltrexone 50 MG tablet    DEPADE;REVIA    90 tablet    Take 1 tablet (50 mg) by mouth daily    Alcohol dependence in remission (H)       omeprazole 20 MG CR capsule    priLOSEC    90 capsule    TAKE ONE CAPSULE BY MOUTH EVERY DAY    Gastroesophageal reflux disease with esophagitis       phosphorus tablet 250 mg 250 MG per tablet    PHOSPHA 250 NEUTRAL    120 tablet    TAKE TWO TABLETS BY MOUTH TWICE A DAY    Hypophosphatemia       propranolol 10 MG tablet    INDERAL    180 tablet    Take 1 tablet (10 mg) by mouth 2 times daily    Hypertension goal BP (blood pressure) < 130/80       rifaximin 550 MG Tabs tablet    XIFAXAN    60 tablet    Take 1 tablet (550 mg) by mouth 2 times daily    Alcoholic  cirrhosis of liver without ascites (H), Hepatic encephalopathy (H)       senna-docusate 8.6-50 MG per tablet    SENOKOT-S;PERICOLACE    90 tablet    Take 1-2 tablets by mouth 2 times daily Take while on oral narcotics to prevent or treat constipation.    Wrist fracture, right, closed, initial encounter       thiamine 100 MG tablet     90 tablet    Take 1 tablet (100 mg) by mouth daily    Alcohol dependence, continuous drinking behavior (H)       TL RICARDO RX 2.2-25-1 MG Tabs   Generic drug:  Folic Acid-Vit B6-Vit B12     90 tablet    TAKE ONE TABLET BY MOUTH EVERY DAY    Alcoholism in recovery (H)       traZODone 50 MG tablet    DESYREL    60 tablet    Take 2 tablets (100 mg) by mouth nightly as needed for sleep    Other insomnia

## 2018-08-15 NOTE — PROGRESS NOTES
"SUBJECTIVE/OBJECTIVE:                           Monalisa Olguin is a 52 year old female coming in for an initial visit for Medication Therapy Management.  She was referred to me from her [employer] insurance plan. Joined today by , Eron.      Chief Complaint: Comprehensive medication review.    Allergies/ADRs: Reviewed in Epic  Tobacco: 0-1 pack per day - is not interested in quittingTobacco Cessation Action Plan: Information offered: Patient not interested at this time  Alcohol: history of alcohol dependence    Medication Adherence: no issues reported and sets up own med boxes    Depression/Anxiety/Insomnia:  Current medications include: Bupropion  mg once daily, fluoxetine 60 mg daily (splits dose between day and night), Buspirone 15 mg twice daily, and trazodone 100 mg at bedtime (having trouble sleeping, but thinks pain is larger cause). Pt reports that depression symptoms are manageable.  Does get emotional during today's visit and states \"I don't want to die\".  Denies any side effects.  PHQ-9 SCORE 4/11/2018 6/5/2018 7/31/2018   Total Score - - -   Total Score MyChart - 14 (Moderate depression) -   Total Score 18 14 15     Alcoholic Cirrhosis w/ ascites: Pt is taking TL Sofiya vitamin daily and thiamine 100 mg daily. She is prescribed naltrexone 50 mg daily, but is not taking right now.  She continues to abstain from alcohol. Her abdomen is very distended and she is undergoing albumin infusions at the infusion center at [facility].  She was taking ibuprofen for pain associated with ascites, but GI told her to use acetaminophen instead (up to 3 grams per day, but pt states pain is mainly bothersome at night).   notes confusion ongoing for some time, but recently worsened in the past month with worsening ascites. Underwent labs to determine possibility of diuretics.  Pt has been struggling with loose stools and diarrhea, so they did not start lactulose. Pt is off oxycodone, but has " continued Senna-S which may be contributing to ongoing loose stools.  A prescription for Xifaxan was placed, but this is undergoing PA.      Portal HTN/Hyperthyroidism/Hypophosphetemia: Current medications include propranolol 10 mg twice daily and phosphorus 500 mg twice daily.  Patient does not self-monitor BP.  Denies any breathing issues. Pt is supposed to get TSH rechecked. Patient reports no current medication side effects.    GERD: Current medications include: Prilosec (omeprazole) 20 mg once daily. Pt c/o no current symptoms.  Patient feels that current regimen is effective.    Osteopenia: Pt is taking alendronate 35 mg once weekly on Mondays.  Pt does sometimes get nauseous after taking. Denies any other issues.    Anemia: Pt is taking ferrous sulfate 325 mg twice daily. Denies any issues.  Hemoglobin   Date Value Ref Range Status   08/10/2018 8.6 (L) 11.7 - 15.7 g/dL Final     Today's Vitals: /64  LMP 05/16/2012  BP Readings from Last 3 Encounters:   08/15/18 99/62   08/15/18 104/64   08/14/18 (!) 88/52     Last Comprehensive Metabolic Panel:  Sodium   Date Value Ref Range Status   08/10/2018 135 133 - 144 mmol/L Final     Potassium   Date Value Ref Range Status   08/10/2018 3.8 3.4 - 5.3 mmol/L Final     Chloride   Date Value Ref Range Status   08/10/2018 106 94 - 109 mmol/L Final     Carbon Dioxide   Date Value Ref Range Status   08/10/2018 19 (L) 20 - 32 mmol/L Final     Anion Gap   Date Value Ref Range Status   08/10/2018 10 3 - 14 mmol/L Final     Glucose   Date Value Ref Range Status   08/10/2018 101 (H) 70 - 99 mg/dL Final     Urea Nitrogen   Date Value Ref Range Status   08/10/2018 13 7 - 30 mg/dL Final     Creatinine   Date Value Ref Range Status   08/10/2018 1.45 (H) 0.52 - 1.04 mg/dL Final     GFR Estimate   Date Value Ref Range Status   08/10/2018 38 (L) >60 mL/min/1.7m2 Final     Comment:     Non  GFR Calc     Calcium   Date Value Ref Range Status   08/10/2018 7.9 (L) 8.5  - 10.1 mg/dL Final     Lab Results   Component Value Date    AST 45 08/10/2018     Lab Results   Component Value Date    ALT 19 08/10/2018       ASSESSMENT:                             Current medications were reviewed today.     Medication Adherence: good, no issues identified    Depression/Anxiety/Insomnia:  Stable per patient. Sleep issue likely related to pain.      Alcoholic Cirrhosis w/ ascites: Needs Improvement.  Pt needed clarification on pain medication recommendations from GI provider. Would benefit from taking lower dose of acetaminophen vs NSAID.  Also, too early to assess benefit of Xifaxan but could consider lactulose if Senna-S is cause of loose stools as no clear indication to continue at this time.  Lactulose can do more than just increase excretion of ammonia (i.e. Increased ionization of ammonia).     Portal HTN/Hyperthyroidism/Hypophosphetemia: Stable.     GERD: Stable.     Osteopenia: Stable.     Anemia: Needs Improvement. Pt is undergoing iron therapy and tolerating.     PLAN:                            Will send plan to Chau Castillo PA-C/Dr. Kovacs for consideration of the followin. Stop ibuprofen.  2. Pt to start taking acetaminophen 1000 mg with bedtime. Can take up to 3 grams per day per GI. Clarified Chau Castillo PA-C's recommendations with patient.   3. MTM to discuss stopping Senna-S with GI/determine if lactulose should still be considered.  4. Pt to take total fluoxetine dose in the morning.     I spent 60 minutes with this patient today. I offer these suggestions for consideration by Chau Castillo PA-C/Dr. Kovacs. A copy of the visit note was provided to the patient's GI/primary care provider.    Will follow up in 2-4 weeks or sooner if needed.    The patient was given a summary of these recommendations as an after visit summary.     Francis Doan, PharmD, BCACP  Medication Therapy Management Pharmacist  Pager: [phone]

## 2018-08-16 ENCOUNTER — TELEPHONE (OUTPATIENT)
Dept: GASTROENTEROLOGY | Facility: CLINIC | Age: 53
End: 2018-08-16

## 2018-08-16 ENCOUNTER — INFUSION THERAPY VISIT (OUTPATIENT)
Dept: INFUSION THERAPY | Facility: CLINIC | Age: 53
End: 2018-08-16
Attending: PHYSICIAN ASSISTANT
Payer: COMMERCIAL

## 2018-08-16 ENCOUNTER — TELEPHONE (OUTPATIENT)
Dept: FAMILY MEDICINE | Facility: CLINIC | Age: 53
End: 2018-08-16

## 2018-08-16 VITALS
HEART RATE: 99 BPM | BODY MASS INDEX: 27.49 KG/M2 | SYSTOLIC BLOOD PRESSURE: 111 MMHG | OXYGEN SATURATION: 96 % | WEIGHT: 145.5 LBS | TEMPERATURE: 98.2 F | RESPIRATION RATE: 20 BRPM | DIASTOLIC BLOOD PRESSURE: 60 MMHG

## 2018-08-16 DIAGNOSIS — K76.82 HEPATIC ENCEPHALOPATHY (H): Primary | ICD-10-CM

## 2018-08-16 DIAGNOSIS — K70.11 ALCOHOLIC HEPATITIS WITH ASCITES (H): Primary | ICD-10-CM

## 2018-08-16 DIAGNOSIS — I89.0 LYMPHEDEMA OF BOTH LOWER EXTREMITIES: ICD-10-CM

## 2018-08-16 PROCEDURE — 25000128 H RX IP 250 OP 636: Performed by: PHYSICIAN ASSISTANT

## 2018-08-16 PROCEDURE — 96365 THER/PROPH/DIAG IV INF INIT: CPT

## 2018-08-16 PROCEDURE — 96366 THER/PROPH/DIAG IV INF ADDON: CPT

## 2018-08-16 PROCEDURE — P9047 ALBUMIN (HUMAN), 25%, 50ML: HCPCS | Performed by: PHYSICIAN ASSISTANT

## 2018-08-16 RX ORDER — ALBUMIN (HUMAN) 12.5 G/50ML
75 SOLUTION INTRAVENOUS ONCE
Status: COMPLETED | OUTPATIENT
Start: 2018-08-16 | End: 2018-08-16

## 2018-08-16 RX ORDER — ALBUMIN (HUMAN) 12.5 G/50ML
75 SOLUTION INTRAVENOUS ONCE
Status: CANCELLED
Start: 2018-08-16 | End: 2018-08-16

## 2018-08-16 RX ADMIN — SODIUM CHLORIDE 250 ML: 9 INJECTION, SOLUTION INTRAVENOUS at 11:58

## 2018-08-16 RX ADMIN — ALBUMIN HUMAN 75 G: 0.25 SOLUTION INTRAVENOUS at 12:02

## 2018-08-16 ASSESSMENT — PAIN SCALES - GENERAL: PAINLEVEL: MODERATE PAIN (5)

## 2018-08-16 NOTE — TELEPHONE ENCOUNTER
Writer spoke with patient regarding BMP tomorrow. Patient ok to get lab drawn tomorrow. Will be able to get drawn by 3:30pm. Since it is later on a Friday we might not have updates if results don't get back early enough.    Patient stated difficulty breathing last night after she noticed swelling in her feet. She raised her feet for a while and had some difficulty breathing. Once patient stood up again she was able to recover. Patient still has not made an appointment for a paracentesis. Patient given the number to schedule for a paracentesis again.

## 2018-08-16 NOTE — TELEPHONE ENCOUNTER
"Monalisa Olguin is a 52 year old female who calls with SOB, Difficulty breathing, abdominal swelling, lower/middle chest pressure following infusion yesterday. Dyspnea, difficulty speaking. Pt stated she had to concentrate to breathe and took many pauses to speak.    NURSING ASSESSMENT:  Description:  SOB, Difficulty breathing, Chest pressure  Onset/duration:  Since Yesterday   Precip. factors:  Infusion  Associated symptoms:  See above  Improves/worsens symptoms:  Lying down worsens symptoms  Pain scale (0-10)  Unable to rate  Last exam/Treatment:  7/31/18   Allergies:   Allergies   Allergen Reactions     Albuterol      Tongue \"hardened and painful\"       NURSING PLAN: Routed to provider Yes as FYI     RECOMMENDED DISPOSITION:  To ED, another person to drive - Pt reports her  is with her and could drive to ED. She wants to avoid the ED and stated \"I'll think about it\" when recommended to seek care in ED.   Will comply with recommendation: No- Barriers to comply with plan of care wants to avoid ED. States she hates it. .Pt stated she wants to rest for a while at home. Warned patient this is an urgent situation, and she should not wait.   If further questions/concerns or if symptoms do not improve, worsen or new symptoms develop, call your PCP or [facility] as soon as possible.      Guideline used:Breathing Problems  Telephone Triage Protocols for Nurses, Fifth Edition, Savannah Yousif RN  "

## 2018-08-16 NOTE — PROGRESS NOTES
Infusion Nursing Note:  Monalisa Olguin presents today for Day 3 Albumin.    Patient seen by provider today: No   present during visit today: Not Applicable.    Note: N/A.    Intravenous Access:  Peripheral IV in place.    Treatment Conditions:  Not Applicable.      Post Infusion Assessment:  Patient tolerated infusion without incident.  Blood return noted pre and post infusion.  Site patent and intact, free from redness, edema or discomfort.  No evidence of extravasations.  Access discontinued per protocol.    Discharge Plan:   Discharge instructions reviewed with: Patient and .  Patient and/or family verbalized understanding of discharge instructions and all questions answered.  Copy of AVS reviewed with patient and/or family.  Patient will have labs checked tomorrow  for next appointment.  Patient discharged in stable condition accompanied by: self and .  Departure Mode: Ambulatory.    Lay [place] RN

## 2018-08-16 NOTE — MR AVS SNAPSHOT
After Visit Summary   8/16/2018    Monalisa Olguin    MRN: [id number]           Patient Information     Date Of Birth          1965        Visit Information        Provider Department      8/16/2018 11:30 AM NL INFUSION CHAIR 4 [facility]        Today's Diagnoses     Alcoholic hepatitis with ascites    -  1       Follow-ups after your visit        Your next 10 appointments already scheduled     Oct 03, 2018  3:30 PM CDT   [facility]   [facility]    [address] [place] [address] [phone]            Dec 03, 2018  3:30 PM CST   (Arrive by 3:15 PM)   Return General Liver with Edgardo Kovacs MD   [facility] Hepatology ([facility] [place])    [address] [place] [address]  [address] [place] [address] [phone]              Who to contact     If you have questions or need follow up information about today's clinic visit or your schedule please contact [facility] directly at [phone].  Normal or non-critical lab and imaging results will be communicated to you by MyChart, letter or phone within 4 business days after the clinic has received the results. If you do not hear from us within 7 days, please contact the clinic through [email or web address] or phone. If you have a critical or abnormal lab result, we will notify you by phone as soon as possible.  Submit refill requests through [employer] or call your pharmacy and they will forward the refill request to us. Please allow 3 business days for your refill to be completed.          Additional Information About Your Visit        MyChart Information     [employer] gives you secure access to your electronic health record. If you see a primary care provider, you can also send messages to your care team and make appointments. If you have questions, please call your primary care clinic.  If you do not have a primary  care provider, please call [phone] and they will assist you.        Care EveryWhere ID     This is your Care EveryWhere ID. This could be used by other organizations to access your [employer] medical records  [id number]        Your Vitals Were     Pulse Temperature Respirations Last Period Pulse Oximetry BMI (Body Mass Index)    99 98.2  F (36.8  C) (Temporal) 20 05/16/2012 96% 27.49 kg/m2       Blood Pressure from Last 3 Encounters:   08/16/18 111/60   08/15/18 99/62   08/15/18 104/64    Weight from Last 3 Encounters:   08/16/18 66 kg (145 lb 8 oz)   08/14/18 65.4 kg (144 lb 3.2 oz)   08/10/18 64 kg (141 lb)              Today, you had the following     No orders found for display       Primary Care Provider Office Phone # Fax #    Efren Bustos [phone]       [address] [place] [address]        Equal Access to Services     [facility] : Hadii aad ku hadasho Soomaali, waaxda luqadaha, qaybta kaalmada adeegyada, waxay venicein hayjo viera . So [facility] [phone].    ATENCIÓN: Si habla español, tiene a perez disposición servicios gratuitos de asistencia lingüística. Llame al [phone].    We comply with applicable federal civil rights laws and [place] laws. We do not discriminate on the basis of race, color, national origin, age, disability, sex, sexual orientation, or gender identity.            Thank you!     Thank you for choosing [facility] INFUSION SERVICES  for your care. Our goal is always to provide you with excellent care. Hearing back from our patients is one way we can continue to improve our services. Please take a few minutes to complete the written survey that you may receive in the mail after your visit with us. Thank you!             Your Updated Medication List - Protect others around you: Learn how to safely use, store and throw away your medicines at www.disposemymeds.org.          This list is accurate as of 8/16/18  3:27 PM.   Always use your most recent med list.                   Brand Name Dispense Instructions for use Diagnosis    acetaminophen 325 MG tablet    TYLENOL    100 tablet    Take 2 tablets (650 mg) by mouth every 4 hours as needed for mild pain    Alcoholic cirrhosis of liver without ascites (H)       alendronate 35 MG tablet    FOSAMAX    12 tablet    Take 1 tablet (35 mg) by mouth with 8oz water every Monday (once every 7 days) 30 minutes before breakfast and remain upright during this time.    Osteopenia, unspecified location       buPROPion 300 MG 24 hr tablet    WELLBUTRIN XL    90 tablet    Take 1 tablet (300 mg) by mouth every morning    Major depressive disorder, recurrent episode, moderate (H)       busPIRone 15 MG tablet    BUSPAR    180 tablet    Take 1 tablet (15 mg) by mouth 2 times daily    MONA (generalized anxiety disorder)       ferrous sulfate 325 (65 Fe) MG tablet    IRON    60 tablet    Take 1 tablet (325 mg) by mouth 2 times daily        FLUoxetine 20 MG capsule    PROzac    180 capsule    Take 3 capsules (60 mg) by mouth daily    Anxiety, Major depressive disorder, recurrent episode, moderate (H)       naltrexone 50 MG tablet    DEPADE;REVIA    90 tablet    Take 1 tablet (50 mg) by mouth daily    Alcohol dependence in remission (H)       omeprazole 20 MG CR capsule    priLOSEC    90 capsule    TAKE ONE CAPSULE BY MOUTH EVERY DAY    Gastroesophageal reflux disease with esophagitis       phosphorus tablet 250 mg 250 MG per tablet    PHOSPHA 250 NEUTRAL    120 tablet    TAKE TWO TABLETS BY MOUTH TWICE A DAY    Hypophosphatemia       propranolol 10 MG tablet    INDERAL    180 tablet    Take 1 tablet (10 mg) by mouth 2 times daily    Hypertension goal BP (blood pressure) < 130/80       rifaximin 550 MG Tabs tablet    XIFAXAN    60 tablet    Take 1 tablet (550 mg) by mouth 2 times daily    Alcoholic cirrhosis of liver without ascites (H), Hepatic encephalopathy (H)       senna-docusate 8.6-50 MG per tablet     SENOKOT-S;PERICOLACE    90 tablet    Take 1-2 tablets by mouth 2 times daily Take while on oral narcotics to prevent or treat constipation.    Wrist fracture, right, closed, initial encounter       thiamine 100 MG tablet     90 tablet    Take 1 tablet (100 mg) by mouth daily    Alcohol dependence, continuous drinking behavior (H)       TL RICARDO RX 2.2-25-1 MG Tabs   Generic drug:  Folic Acid-Vit B6-Vit B12     90 tablet    TAKE ONE TABLET BY MOUTH EVERY DAY    Alcoholism in recovery (H)       traZODone 50 MG tablet    DESYREL    60 tablet    Take 2 tablets (100 mg) by mouth nightly as needed for sleep    Other insomnia

## 2018-08-17 ENCOUNTER — HOSPITAL ENCOUNTER (EMERGENCY)
Facility: CLINIC | Age: 53
Discharge: HOME | End: 2018-08-17
Attending: EMERGENCY MEDICINE | Admitting: EMERGENCY MEDICINE
Payer: COMMERCIAL

## 2018-08-17 VITALS
SYSTOLIC BLOOD PRESSURE: 110 MMHG | TEMPERATURE: 98.2 F | DIASTOLIC BLOOD PRESSURE: 54 MMHG | RESPIRATION RATE: 26 BRPM | OXYGEN SATURATION: 94 %

## 2018-08-17 DIAGNOSIS — K70.31 ALCOHOLIC CIRRHOSIS OF LIVER WITH ASCITES (H): ICD-10-CM

## 2018-08-17 DIAGNOSIS — D50.0 IRON DEFICIENCY ANEMIA DUE TO CHRONIC BLOOD LOSS: ICD-10-CM

## 2018-08-17 LAB
ALBUMIN SERPL-MCNC: 3.8 G/DL (ref 3.4–5)
ALP SERPL-CCNC: 163 U/L (ref 40–150)
ALT SERPL W P-5'-P-CCNC: 16 U/L (ref 0–50)
ANION GAP SERPL CALCULATED.3IONS-SCNC: 10 MMOL/L (ref 3–14)
AST SERPL W P-5'-P-CCNC: 44 U/L (ref 0–45)
BASOPHILS # BLD AUTO: 0 10E9/L (ref 0–0.2)
BASOPHILS NFR BLD AUTO: 0.2 %
BILIRUB SERPL-MCNC: 5.3 MG/DL (ref 0.2–1.3)
BUN SERPL-MCNC: 13 MG/DL (ref 7–30)
CALCIUM SERPL-MCNC: 8.3 MG/DL (ref 8.5–10.1)
CHLORIDE SERPL-SCNC: 114 MMOL/L (ref 94–109)
CO2 SERPL-SCNC: 17 MMOL/L (ref 20–32)
CREAT SERPL-MCNC: 1.29 MG/DL (ref 0.52–1.04)
DIFFERENTIAL METHOD BLD: ABNORMAL
EOSINOPHIL NFR BLD AUTO: 0 %
ERYTHROCYTE [DISTWIDTH] IN BLOOD BY AUTOMATED COUNT: 16.6 % (ref 10–15)
GFR SERPL CREATININE-BSD FRML MDRD: 43 ML/MIN/1.7M2
GLUCOSE SERPL-MCNC: 92 MG/DL (ref 70–99)
HCT VFR BLD AUTO: 25.8 % (ref 35–47)
HGB BLD-MCNC: 7.1 G/DL (ref 11.7–15.7)
HGB BLD-MCNC: 7.7 G/DL (ref 11.7–15.7)
IMM GRANULOCYTES # BLD: 0.1 10E9/L (ref 0–0.4)
IMM GRANULOCYTES NFR BLD: 1.5 %
LYMPHOCYTES # BLD AUTO: 1.2 10E9/L (ref 0.8–5.3)
LYMPHOCYTES NFR BLD AUTO: 21.5 %
MCH RBC QN AUTO: 33.3 PG (ref 26.5–33)
MCHC RBC AUTO-ENTMCNC: 29.8 G/DL (ref 31.5–36.5)
MCV RBC AUTO: 112 FL (ref 78–100)
MONOCYTES # BLD AUTO: 0.4 10E9/L (ref 0–1.3)
MONOCYTES NFR BLD AUTO: 7.6 %
NEUTROPHILS # BLD AUTO: 3.7 10E9/L (ref 1.6–8.3)
NEUTROPHILS NFR BLD AUTO: 69.2 %
NRBC # BLD AUTO: 0 10*3/UL
NRBC BLD AUTO-RTO: 0 /100
PLATELET # BLD AUTO: 70 10E9/L (ref 150–450)
POTASSIUM SERPL-SCNC: 4 MMOL/L (ref 3.4–5.3)
PROT SERPL-MCNC: 6.1 G/DL (ref 6.8–8.8)
RBC # BLD AUTO: 2.31 10E12/L (ref 3.8–5.2)
SODIUM SERPL-SCNC: 141 MMOL/L (ref 133–144)
WBC # BLD AUTO: 5.4 10E9/L (ref 4–11)

## 2018-08-17 PROCEDURE — 49083 ABD PARACENTESIS W/IMAGING: CPT | Mod: Z6 | Performed by: EMERGENCY MEDICINE

## 2018-08-17 PROCEDURE — 36415 COLL VENOUS BLD VENIPUNCTURE: CPT | Performed by: INTERNAL MEDICINE

## 2018-08-17 PROCEDURE — 76705 ECHO EXAM OF ABDOMEN: CPT | Mod: 76 | Performed by: EMERGENCY MEDICINE

## 2018-08-17 PROCEDURE — 99285 EMERGENCY DEPT VISIT HI MDM: CPT | Mod: 25 | Performed by: EMERGENCY MEDICINE

## 2018-08-17 PROCEDURE — 80053 COMPREHEN METABOLIC PANEL: CPT | Performed by: EMERGENCY MEDICINE

## 2018-08-17 PROCEDURE — 49083 ABD PARACENTESIS W/IMAGING: CPT | Performed by: EMERGENCY MEDICINE

## 2018-08-17 PROCEDURE — 85018 HEMOGLOBIN: CPT | Performed by: INTERNAL MEDICINE

## 2018-08-17 PROCEDURE — 85025 COMPLETE CBC W/AUTO DIFF WBC: CPT | Performed by: EMERGENCY MEDICINE

## 2018-08-17 PROCEDURE — 76705 ECHO EXAM OF ABDOMEN: CPT | Mod: 59 | Performed by: EMERGENCY MEDICINE

## 2018-08-17 NOTE — ED AVS SNAPSHOT
[facility] Emergency Department    [address] [place] [address]    Phone:  [phone]    Fax:  [phone]                                       Monalisa Olguin   MRN: [id number]    Department:  [facility] Emergency Department   Date of Visit:  8/17/2018           After Visit Summary Signature Page     I have received my discharge instructions, and my questions have been answered. I have discussed any challenges I see with this plan with the nurse or doctor.    ..........................................................................................................................................  Patient/Patient Representative Signature      ..........................................................................................................................................  Patient Representative Print Name and Relationship to Patient    ..................................................               ................................................  Date                                            Time    ..........................................................................................................................................  Reviewed by Signature/Title    ...................................................              ..............................................  Date                                                            Time

## 2018-08-17 NOTE — TELEPHONE ENCOUNTER
Left message for patient stating that Dr. Schoen was able to get her in at 1:10 today other grimes Dr. Bustos is not in until 8/20. If she gets worse she needs to be seen by CHRISTIANO Sánchez MA

## 2018-08-17 NOTE — ED AVS SNAPSHOT
[facility] Emergency Department    [address]     [place] [address]    Phone:  [phone]    Fax:  [phone]                                       Monalisa Olguin   MRN: [id number]    Department:  [facility] Emergency Department   Date of Visit:  8/17/2018           Patient Information     Date Of Birth          1965        Your diagnoses for this visit were:     Alcoholic cirrhosis of liver with ascites (H)        You were seen by Joey Farley MD.      Follow-up Information     Schedule an appointment as soon as possible for a visit with Efren Bustos MD.    Specialty:  Family Practice    Why:  ER follow up    Contact information:    [address] [place] [address] [phone]          Discharge Instructions         Ascites    Ascites (pronounced [id number]) is fluid collecting in the abdomen (stomach area). Symptoms include swelling of the abdomen and a feeling of pressure. Shortness of breath may also occur. In severe cases, the feet, ankles and legs may also swell.   There are many causes of ascites. The most common are related to the liver. They include:    Cirrhosis of the liver, which may be due to hepatitis B, hepatitis C, long-term alcohol abuse, nonalcoholic steatohepatitis (GUADARRAMA) and others    Diseases such as heart failure, kidney failure, pancreatitis, or cancer  To treat the condition, a low-salt diet may be recommended. Medicines that help fluid leave the body (diuretics) may be prescribed. In some cases, a procedure is done to drain the abdomen of fluid. This is called paracentesis. Unless the underlying cause is treated, the fluid is likely to return.  If liver damage is due to alcohol, stopping all alcohol will help slow the progress of the disease. If liver damage is from hepatitis B or C, treatments may be given to fight the virus. If liver damage becomes life threatening, a liver transplant may be needed.  Home care    Certain  medicines can worsen liver damage. Talk to your healthcare provider or pharmacist about any medicines you currently take. Ask your healthcare provider or pharmacist before taking any new medicines. Also ask before taking herbs, vitamins, or minerals. Certain ones affect the liver.    Do not taking acetaminophen or ibuprofen without taking to your healthcare provider first. Both can affect your liver.     Stop all alcohol use. If you abuse alcohol, talk to your healthcare provider about getting help and support to stop.     If you use IV drugs, seek help to stop. Never share needles or other equipment.      If you have cirrhosis from diabetes, obesity, or metabolic syndrome (associated with non-alcoholic steatohepatitis), treatment of the underlying condition is critical. So is exercise and weight loss.  Follow-up care  Follow up with your healthcare provider as advised. If a culture was done on the ascitic fluid, or imaging, or other labs were done, call as directed for the results. Depending on the results, your treatment may change.  The following sources can tell you more about ascites and help you find support.    American [employer] [phone] www.liverfoundation.org    Hepatitis Foundation International www.hepfi.org    Alcoholics Anonymous www.aa.org    National [place] on Alcoholism and Drug Dependence [phone] www.ncadd.org  When to seek medical advice  Call your healthcare provider right away if you have any of the following:    Sudden weight gain with increased size of your abdomen or leg swelling    Increasing jaundice (yellowing of skin or eyes)    Excess bleeding from cuts or injuries    Blood in vomit or stool (black or red color)    Trouble breathing    Increasing abdominal pain    Confusion    Fever of 100.4 F (38 C) or higher, or as directed by your healthcare provider  Date Last Reviewed: 6/1/2017 2000-2017 [employer]. [address] [place] [address]. All  rights reserved. This information is not intended as a substitute for professional medical care. Always follow your healthcare professional's instructions.          Your next 10 appointments already scheduled     Oct 03, 2018  3:30 PM CDT   Return Visit with Usman Lee MD   [facility] ([facility])    [address] [place] [address]   [phone]            Dec 03, 2018  3:30 PM CST   (Arrive by 3:15 PM)   Return General Liver with Edgardo Kovacs MD   [facility] Hepatology ([facility])    [address] [place] [address]  [address] [place] [address] [phone]              24 Hour Appointment Hotline       To make an appointment at any [facility], call [phone] ([phone]). If you don't have a family doctor or clinic, we will help you find one. [facility] are conveniently located to serve the needs of you and your family.             Review of your medicines      Our records show that you are taking the medicines listed below. If these are incorrect, please call your family doctor or clinic.        Dose / Directions Last dose taken    acetaminophen 325 MG tablet   Commonly known as:  TYLENOL   Dose:  650 mg   Quantity:  100 tablet        Take 2 tablets (650 mg) by mouth every 4 hours as needed for mild pain   Refills:  0        alendronate 35 MG tablet   Commonly known as:  FOSAMAX   Quantity:  12 tablet        Take 1 tablet (35 mg) by mouth with 8oz water every Monday (once every 7 days) 30 minutes before breakfast and remain upright during this time.   Refills:  3        buPROPion 300 MG 24 hr tablet   Commonly known as:  WELLBUTRIN XL   Dose:  300 mg   Quantity:  90 tablet        Take 1 tablet (300 mg) by mouth every morning   Refills:  3        busPIRone 15 MG tablet   Commonly known as:  BUSPAR   Dose:  15 mg   Quantity:  180 tablet        Take 1 tablet (15 mg) by mouth 2 times daily   Refills:  3        ferrous sulfate  325 (65 Fe) MG tablet   Commonly known as:  IRON   Dose:  325 mg   Quantity:  60 tablet        Take 1 tablet (325 mg) by mouth 2 times daily   Refills:  2        FLUoxetine 20 MG capsule   Commonly known as:  PROzac   Dose:  60 mg   Quantity:  180 capsule        Take 3 capsules (60 mg) by mouth daily   Refills:  3        naltrexone 50 MG tablet   Commonly known as:  DEPADE;REVIA   Dose:  50 mg   Quantity:  90 tablet        Take 1 tablet (50 mg) by mouth daily   Refills:  3        omeprazole 20 MG CR capsule   Commonly known as:  priLOSEC   Quantity:  90 capsule        TAKE ONE CAPSULE BY MOUTH EVERY DAY   Refills:  3        phosphorus tablet 250 mg 250 MG per tablet   Commonly known as:  PHOSPHA 250 NEUTRAL   Quantity:  120 tablet        TAKE TWO TABLETS BY MOUTH TWICE A DAY   Refills:  3        propranolol 10 MG tablet   Commonly known as:  INDERAL   Dose:  10 mg   Quantity:  180 tablet        Take 1 tablet (10 mg) by mouth 2 times daily   Refills:  3        rifaximin 550 MG Tabs tablet   Commonly known as:  XIFAXAN   Dose:  550 mg   Quantity:  60 tablet        Take 1 tablet (550 mg) by mouth 2 times daily   Refills:  11        senna-docusate 8.6-50 MG per tablet   Commonly known as:  SENOKOT-S;PERICOLACE   Dose:  1-2 tablet   Quantity:  90 tablet        Take 1-2 tablets by mouth 2 times daily Take while on oral narcotics to prevent or treat constipation.   Refills:  6        thiamine 100 MG tablet   Dose:  100 mg   Quantity:  90 tablet        Take 1 tablet (100 mg) by mouth daily   Refills:  3        TL RICARDO RX 2.2-25-1 MG Tabs   Quantity:  90 tablet   Generic drug:  Folic Acid-Vit B6-Vit B12        TAKE ONE TABLET BY MOUTH EVERY DAY   Refills:  3        traZODone 50 MG tablet   Commonly known as:  DESYREL   Dose:  100 mg   Quantity:  60 tablet        Take 2 tablets (100 mg) by mouth nightly as needed for sleep   Refills:  5                Procedures and tests performed during your visit     CBC with platelets  differential    Comprehensive metabolic panel    Obtain affirmation of informed consent    Saline Lock IV      Orders Needing Specimen Collection     None      Pending Results     No orders found from 8/15/2018 to 8/18/2018.            Pending Culture Results     No orders found from 8/15/2018 to 8/18/2018.            Pending Results Instructions     If you had any lab results that were not finalized at the time of your Discharge, you can call the ED Lab Result RN at [phone]. You will be contacted by this team for any positive Lab results or changes in treatment. The nurses are available 7 days a week from 10A to 6:30P.  You can leave a message 24 hours per day and they will return your call.        Thank you for choosing [facility]       Thank you for choosing [facility] for your care. Our goal is always to provide you with excellent care. Hearing back from our patients is one way we can continue to improve our services. Please take a few minutes to complete the written survey that you may receive in the mail after you visit with us. Thank you!        [employer] Information     [employer] gives you secure access to your electronic health record. If you see a primary care provider, you can also send messages to your care team and make appointments. If you have questions, please call your primary care clinic.  If you do not have a primary care provider, please call [phone] and they will assist you.        Care EveryWhere ID     This is your Care EveryWhere ID. This could be used by other organizations to access your [facility] medical records  [id number]        Equal Access to Services     ADRIENNE GILLIAM : Hadii km Wei, rachna george, qaybmariel triplettalsalo billings . So [facility] [phone].    ATENCIÓN: Si habla español, tiene a perez disposición servicios gratuitos de asistencia lingüística. Llame al [phone].    We comply with applicable federal civil rights laws  and [place] laws. We do not discriminate on the basis of race, color, national origin, age, disability, sex, sexual orientation, or gender identity.            After Visit Summary       This is your record. Keep this with you and show to your community pharmacist(s) and doctor(s) at your next visit.

## 2018-08-17 NOTE — TELEPHONE ENCOUNTER
"Patient complains of increased fluid/swelling and pain in lower extremities, abdominal distention, and shortness of breath continues. She has not yet scheduled a paracentesis but states \"they are working on getting that scheduled for next week.\" Patient states she is feeling confused and has poor memory recently. Instructed patient to be seen in ER today, as soon as possible. Patient states she will have her  take her to the ER when he arrives home from work shortly.   "

## 2018-08-17 NOTE — ED NOTES
Care re-assumed on patient at this time. RN to room 12 and paracentesis needle is in place and ED Tech is monitoring fluid in glass bottle. Patient is alert and talking. EDT states Dr Farley will be notified when fluid is done draining.

## 2018-08-17 NOTE — TELEPHONE ENCOUNTER
Patient called stating that she is unable to get to appt today as scheduled.  RN reviewed Dr Schoen's recommendation with the patient.  She states she does feel a bit better. She will wait unit her  gets home from work and decide then if she still needs an RN visit.    Fay Colorado RN

## 2018-08-17 NOTE — ED TRIAGE NOTES
Pt reports SOB and increasing swelling in abd and legs.  Pt has liver failure.  Pt had last paracentesis was last Friday at same day surgery.

## 2018-08-17 NOTE — ED PROVIDER NOTES
History     Chief Complaint   Patient presents with     Shortness of Breath     Abdominal Swelling     HPI  Monalisa Olguin is a 52 year old female who presents emergency department complaining of abdominal distention.  She has a history of alcoholic cirrhosis with ascites and has had one paracentesis so far.  She is stop drinking alcohol.  She does not feel weak or dizzy or lightheaded but complains of bilateral lower extremity edema along with abdominal distention making it difficult to breathe.  She would like a paracentesis.  She has not had a fever, severe abdominal pain, congestion, cough, chest pain or other new symptoms.  She got her blood drawn today because they are following her hemoglobin.    Problem List:    Patient Active Problem List    Diagnosis Date Noted     Other iron deficiency anemia 04/11/2018     Priority: Medium     Alcoholic cirrhosis of liver without ascites (H) - per Hepatology consult Nov 2017 03/28/2018     Priority: Medium     Splenomegaly 03/28/2018     Priority: Medium     Epistaxis 03/28/2018     Priority: Medium     Acute alcoholic intoxication in alcoholism (H) 03/27/2018     Priority: Medium     Heme positive stool 03/27/2018     Priority: Medium     Alcoholic hepatitis with ascites 03/26/2017     Priority: Medium     Chronic blood loss anemia 03/26/2017     Priority: Medium     Thrombocytopenia (H) 03/26/2017     Priority: Medium     Tobacco abuse 03/26/2017     Priority: Medium     Portal hypertension (H) 03/26/2017     Priority: Medium     Pulmonary nodules 03/26/2017     Priority: Medium     Hyperthyroidism 03/26/2017     Priority: Medium     Alcoholism in recovery (H) 03/17/2016     Priority: Medium     Anxiety 10/10/2011     Priority: Medium     Hypertension goal BP (blood pressure) < 130/80 07/12/2011     Priority: Medium     HYPERLIPIDEMIA LDL GOAL <100 10/31/2010     Priority: Medium     Moderate Depression [296.32] 12/22/2009     Priority: Medium     Esophageal  reflux 10/07/2002     Priority: Medium     Kidney donor 2001     Priority: Medium        Past Medical History:    Past Medical History:   Diagnosis Date     Alcohol abuse 2013     Alcohol dependence 2013     Alcohol withdrawal 2013     Anxiety 10/10/2011     CKD (chronic kidney disease) stage 3, GFR 30-59 ml/min      CKD (chronic kidney disease) stage 3, GFR 30-59 ml/min 2011     Depressive disorder      Esophageal reflux 10/7/2002     Hypertension goal BP (blood pressure) < 130/80 2011     Moderate Depression [296.32] 2009     Other internal derangement of knee(717.89)      Pap smear      Unspecified essential hypertension        Past Surgical History:    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     COLONOSCOPY N/A 2017    Procedure: COLONOSCOPY;  Colonoscopy;  Surgeon: Sai Johnston MD;  Location:  GI     ESOPHAGOSCOPY, GASTROSCOPY, DUODENOSCOPY (EGD), COMBINED N/A 2017    Procedure: COMBINED ESOPHAGOSCOPY, GASTROSCOPY, DUODENOSCOPY (EGD), BIOPSY SINGLE OR MULTIPLE;  ESOPHAGOSCOPY, GASTROSCOPY, DUODENOSCOPY (EGD) with biopsies;  Surgeon: Sai Johsnton MD;  Location: [facility]     HC KNEE SCOPE, DIAGNOSTIC  01    Arthroscopy, Lt Knee     LAPAROSCOPIC CHOLECYSTECTOMY N/A 2017    Procedure: LAPAROSCOPIC CHOLECYSTECTOMY;  laparoscopic cholecystectomy;  Surgeon: Romeo Pastor MD;  Location: [facility] OR     OPEN REDUCTION INTERNAL FIXATION WRIST Right 2017    Procedure: OPEN REDUCTION INTERNAL FIXATION WRIST;  OPEN REDUCTION INTERNAL FIXATION RIGHT WRIST;  Surgeon: Edgardo Almendarez MD;  Location:  OR       Family History:    Family History   Problem Relation Age of Onset     Hypertension Mother      HEART DISEASE Paternal Grandmother      HEART DISEASE Paternal Grandfather      Cerebrovascular Disease  Maternal Grandmother      Cerebrovascular Disease Maternal Grandfather      Alcohol/Drug Maternal Grandfather      Substance Abuse Maternal Grandfather      HEART DISEASE Father      Silent MI stents x 2     Diabetes Sister 17     older sister also had kidney and pancreas transplant     HEART DISEASE Sister      MI secondary to diabetes     Genitourinary Problems Sister 43     kidney transplant from renal failure     Diabetes Sister 9     youngest, juvenile type I (onset age 9 with no complications)     Cancer Paternal Aunt      ?kind       Social History:  Marital Status:   [2]  Social History   Substance Use Topics     Smoking status: Current Every Day Smoker     Packs/day: 0.50     Years: 10.00     Smokeless tobacco: Never Used      Comment: pt quit 1992 after smoking for 8 yrs, started again in 2004     Alcohol use 0.0 oz/week     0 Standard drinks or equivalent per week      Comment: 2 beers per day        Medications:      acetaminophen (TYLENOL) 325 MG tablet   alendronate (FOSAMAX) 35 MG tablet   buPROPion (WELLBUTRIN XL) 300 MG 24 hr tablet   busPIRone (BUSPAR) 15 MG tablet   ferrous sulfate (IRON) 325 (65 Fe) MG tablet   FLUoxetine (PROZAC) 20 MG capsule   naltrexone (DEPADE;REVIA) 50 MG tablet   omeprazole (PRILOSEC) 20 MG CR capsule   phosphorus tablet 250 mg (PHOSPHA 250 NEUTRAL) 250 MG per tablet   propranolol (INDERAL) 10 MG tablet   rifaximin (XIFAXAN) 550 MG TABS tablet   senna-docusate (SENOKOT-S;PERICOLACE) 8.6-50 MG per tablet   thiamine (VITAMIN B-1) 100 MG tablet   TL RICARDO RX 2.2-25-1 MG TABS   traZODone (DESYREL) 50 MG tablet         Review of Systems   All other systems reviewed and are negative.      Physical Exam   BP: 113/73  Heart Rate: 84  Temp: 98.2  F (36.8  C)  Resp: 26  SpO2: 96 %      Physical Exam   Constitutional: She appears well-developed and well-nourished. No distress.   HENT:   Head: Normocephalic and atraumatic.   Nose: Nose normal.   Mouth/Throat: Oropharynx is clear  and moist. No oropharyngeal exudate.   Eyes: Conjunctivae and EOM are normal. Right eye exhibits no discharge. Left eye exhibits no discharge. No scleral icterus.   Neck: Normal range of motion.   Cardiovascular: Normal rate and normal heart sounds.  Exam reveals no gallop and no friction rub.    No murmur heard.  Pulmonary/Chest: Effort normal. No stridor. No respiratory distress.   Abdominal: Soft. She exhibits distension. There is no tenderness. There is no rebound and no guarding.   Abdominal distention with fluid wave.   Musculoskeletal: Normal range of motion. She exhibits no edema.   Neurological: She is alert. No cranial nerve deficit. Coordination normal.   Skin: Skin is warm. No rash noted. She is not diaphoretic. No erythema. No pallor.   Psychiatric: She has a normal mood and affect. Her behavior is normal.   Nursing note and vitals reviewed.      ED Course     ED Course     Procedures          Bedside ultrasound was performed to visualize moderate ascites.  The patient wanted to proceed with a paracentesis of that was performed under ultrasound guidance after risk benefit discussion.    Paracentesis:  After risk-benefit discussion the area and the was prepped and draped in sterile fashion.  1% lidocaine was used for local anesthetic.  Catheter over needle technique was used to insert a catheter in the left lower quadrant with the largest pocket of ascites was.  This was done under ultrasound guidance.  Upon entering the ascites straw-colored fluid drained.  The needle was removed and the catheter was hooked up to the tubing and ascitic fluid drained freely but only 2-1/2 L were drained before most of the ascites was gone.  I repeated ultrasound at the bedside after the drainage and there was minimal ascites left.  The patient tolerated the procedure well and there were no complications.         Results for orders placed or performed during the hospital encounter of 08/17/18 (from the past 24 hour(s))    CBC with platelets differential   Result Value Ref Range    WBC 5.4 4.0 - 11.0 10e9/L    RBC Count 2.31 (L) 3.8 - 5.2 10e12/L    Hemoglobin 7.7 (L) 11.7 - 15.7 g/dL    Hematocrit 25.8 (L) 35.0 - 47.0 %     (H) 78 - 100 fl    MCH 33.3 (H) 26.5 - 33.0 pg    MCHC 29.8 (L) 31.5 - 36.5 g/dL    RDW 16.6 (H) 10.0 - 15.0 %    Platelet Count 70 (L) 150 - 450 10e9/L    Diff Method Automated Method     % Neutrophils 69.2 %    % Lymphocytes 21.5 %    % Monocytes 7.6 %    % Eosinophils 0.0 %    % Basophils 0.2 %    % Immature Granulocytes 1.5 %    Nucleated RBCs 0 0 /100    Absolute Neutrophil 3.7 1.6 - 8.3 10e9/L    Absolute Lymphocytes 1.2 0.8 - 5.3 10e9/L    Absolute Monocytes 0.4 0.0 - 1.3 10e9/L    Absolute Basophils 0.0 0.0 - 0.2 10e9/L    Abs Immature Granulocytes 0.1 0 - 0.4 10e9/L    Absolute Nucleated RBC 0.0    Comprehensive metabolic panel   Result Value Ref Range    Sodium 141 133 - 144 mmol/L    Potassium 4.0 3.4 - 5.3 mmol/L    Chloride 114 (H) 94 - 109 mmol/L    Carbon Dioxide 17 (L) 20 - 32 mmol/L    Anion Gap 10 3 - 14 mmol/L    Glucose 92 70 - 99 mg/dL    Urea Nitrogen 13 7 - 30 mg/dL    Creatinine 1.29 (H) 0.52 - 1.04 mg/dL    GFR Estimate 43 (L) >60 mL/min/1.7m2    GFR Estimate If Black 53 (L) >60 mL/min/1.7m2    Calcium 8.3 (L) 8.5 - 10.1 mg/dL    Bilirubin Total 5.3 (H) 0.2 - 1.3 mg/dL    Albumin 3.8 3.4 - 5.0 g/dL    Protein Total 6.1 (L) 6.8 - 8.8 g/dL    Alkaline Phosphatase 163 (H) 40 - 150 U/L    ALT 16 0 - 50 U/L    AST 44 0 - 45 U/L       Medications   lidocaine 1 % 5 mL (not administered)   0.9% sodium chloride BOLUS (not administered)       Assessments & Plan (with Medical Decision Making)  Versus with ascites, drained via paracentesis as above.  Anemia is chronic and lower than previous trauma but the patient is asymptomatic we will follow-up with her doctor regarding that.  She has chronic liver failure with a bilirubin of 5.3 today.  No other new findings.  The patient is discharged in  stable condition for follow-up with primary care early next week.     I have reviewed the nursing notes.    I have reviewed the findings, diagnosis, plan and need for follow up with the patient.      New Prescriptions    No medications on file       Final diagnoses:   Alcoholic cirrhosis of liver with ascites (H)       8/17/2018   [facility] EMERGENCY DEPARTMENT     Joey Farley MD  08/17/18 2018

## 2018-08-17 NOTE — TELEPHONE ENCOUNTER
Thread of phone call messages reviewed. It seems prudent that this patient go to the ER to provide optimal care for what she needs.  She has know severe ascites from her alcoholic cirrhosis and is due for a paracentesis (removal of fluid from her abdomen).  This in and of itself suggests that her diaphragmatic motion is compromised and causing breathing issues.  It further appears that since being in infusion yesterday, her breathing has further worsened to the point of not being able to speak in full sentences, suggesting pulmonary edema from fluid overload.  Her condition does not appear safe/appropriate to manage in the clinic setting and it is in her best interests to go to the ER now rather than wait an hour to go to the clinic.  Please contact her with this message.  If she still refuses and shows up in the clinic, she needs immediate RN assessment upon arrival.  Electronically signed by Greg Schoen, MD

## 2018-08-18 ENCOUNTER — TELEPHONE (OUTPATIENT)
Dept: INTERNAL MEDICINE | Facility: CLINIC | Age: 53
End: 2018-08-18

## 2018-08-18 DIAGNOSIS — K70.11 ALCOHOLIC HEPATITIS WITH ASCITES (H): Primary | ICD-10-CM

## 2018-08-18 NOTE — ED NOTES
Patient had bedside Gastrocentesis done patient had 2.5 liters of clear fluid drained from abdomen

## 2018-08-18 NOTE — TELEPHONE ENCOUNTER
Clinic Action Needed:Yes-Please call patient Monday  Reason for Call: Pt did have a paracentesis done at the ED on 8/17/18. She would like a call from the clinic to discuss having a weekly standing order for this to be done.   Routed to: Dr Bustos's care team    Jennifer Ravi RN, [facility]

## 2018-08-18 NOTE — DISCHARGE INSTRUCTIONS
Ascites    Ascites (pronounced hf-purm-axtx) is fluid collecting in the abdomen (stomach area). Symptoms include swelling of the abdomen and a feeling of pressure. Shortness of breath may also occur. In severe cases, the feet, ankles and legs may also swell.   There are many causes of ascites. The most common are related to the liver. They include:    Cirrhosis of the liver, which may be due to hepatitis B, hepatitis C, long-term alcohol abuse, nonalcoholic steatohepatitis (GUADARRAMA) and others    Diseases such as heart failure, kidney failure, pancreatitis, or cancer  To treat the condition, a low-salt diet may be recommended. Medicines that help fluid leave the body (diuretics) may be prescribed. In some cases, a procedure is done to drain the abdomen of fluid. This is called paracentesis. Unless the underlying cause is treated, the fluid is likely to return.  If liver damage is due to alcohol, stopping all alcohol will help slow the progress of the disease. If liver damage is from hepatitis B or C, treatments may be given to fight the virus. If liver damage becomes life threatening, a liver transplant may be needed.  Home care    Certain medicines can worsen liver damage. Talk to your healthcare provider or pharmacist about any medicines you currently take. Ask your healthcare provider or pharmacist before taking any new medicines. Also ask before taking herbs, vitamins, or minerals. Certain ones affect the liver.    Do not taking acetaminophen or ibuprofen without taking to your healthcare provider first. Both can affect your liver.     Stop all alcohol use. If you abuse alcohol, talk to your healthcare provider about getting help and support to stop.     If you use IV drugs, seek help to stop. Never share needles or other equipment.      If you have cirrhosis from diabetes, obesity, or metabolic syndrome (associated with non-alcoholic steatohepatitis), treatment of the underlying condition is critical. So is  exercise and weight loss.  Follow-up care  Follow up with your healthcare provider as advised. If a culture was done on the ascitic fluid, or imaging, or other labs were done, call as directed for the results. Depending on the results, your treatment may change.  The following sources can tell you more about ascites and help you find support.    American Liver Foundation 525-611-8110 www.liverfoundation.org    Hepatitis Foundation International www.hepfi.org    Alcoholics Anonymous www.aa.org    National [place] on Alcoholism and Drug Dependence 290-771-1579 www.ncadd.org  When to seek medical advice  Call your healthcare provider right away if you have any of the following:    Sudden weight gain with increased size of your abdomen or leg swelling    Increasing jaundice (yellowing of skin or eyes)    Excess bleeding from cuts or injuries    Blood in vomit or stool (black or red color)    Trouble breathing    Increasing abdominal pain    Confusion    Fever of 100.4 F (38 C) or higher, or as directed by your healthcare provider  Date Last Reviewed: 6/1/2017 2000-2017 [employer]. [address] [place] [address]. All rights reserved. This information is not intended as a substitute for professional medical care. Always follow your healthcare professional's instructions.

## 2018-08-20 NOTE — TELEPHONE ENCOUNTER
Patient called back. She has not heard anything back about this yet. Can PCP or nurse call her when they have a chance.  Thank you,  Cinthia Ruiz   for [facility]

## 2018-08-22 NOTE — TELEPHONE ENCOUNTER
Patient was notified and she likes the idea will let TC know so she can set up.  Noa Little MA 8/22/2018

## 2018-08-22 NOTE — TELEPHONE ENCOUNTER
Friday 08/24/18 @1045 am patient is to have nothing to eat or drink 8 hrs before.     Daniela PAGE

## 2018-08-22 NOTE — TELEPHONE ENCOUNTER
If she continues to have reaccumulation of fluid, we will need to document this with ultrasound and then set up an order for the paracentesis on a recurring basis.  Let us go ahead and order her an ultrasound for follow-up to see how much fluid is in the belly and then we can place an order for a paracentesis with the idea that it be set up at a time when the paracentesis can be done if it is indicated per the ultrasound.  Does not make sense?    Electronically signed by:  Efren Bustos M.D.  8/22/2018

## 2018-08-23 ENCOUNTER — TELEPHONE (OUTPATIENT)
Dept: GENERAL RADIOLOGY | Facility: CLINIC | Age: 53
End: 2018-08-23

## 2018-08-23 RX ORDER — LACTULOSE 10 G/15ML
10 SOLUTION ORAL 3 TIMES DAILY PRN
Qty: 500 ML | Refills: 3 | Status: ON HOLD | OUTPATIENT
Start: 2018-08-23 | End: 2019-01-01

## 2018-08-23 RX ORDER — LACTULOSE 10 G/15ML
10 SOLUTION ORAL 3 TIMES DAILY PRN
Qty: 500 ML | Refills: 3 | Status: SHIPPED | OUTPATIENT
Start: 2018-08-23 | End: 2018-08-23

## 2018-08-23 NOTE — TELEPHONE ENCOUNTER
Patient had paracentesis last week and states she is going in again tomorrow to her local [place] for ultrasound, and will perform para if needed. She continues to have lower extremity swelling and abdomen continues to be distended. She is currently taking rifaximin twice daily, but not taking lactulose. She had previously been having loose stools but in the past 2 days she has not had a bowel movement. Per Chau Castillo, begin lactulose 15 ml daily, titrate as needed to maintain 2-3 bowel movments daily. Spoke to [facility] scheduling to ensure they follow paracentesis orders per Chau Castillo. Patient requests lymphedema therapy referral, will review with Chau.

## 2018-08-24 ENCOUNTER — HOSPITAL ENCOUNTER (OUTPATIENT)
Dept: ULTRASOUND IMAGING | Facility: CLINIC | Age: 53
End: 2018-08-24
Attending: FAMILY MEDICINE
Payer: COMMERCIAL

## 2018-08-24 ENCOUNTER — TELEPHONE (OUTPATIENT)
Dept: FAMILY MEDICINE | Facility: CLINIC | Age: 53
End: 2018-08-24

## 2018-08-24 DIAGNOSIS — I89.0 LYMPHEDEMA OF BOTH LOWER EXTREMITIES: Primary | ICD-10-CM

## 2018-08-24 DIAGNOSIS — K70.11 ALCOHOLIC HEPATITIS WITH ASCITES (H): Primary | ICD-10-CM

## 2018-08-24 DIAGNOSIS — K70.11 ALCOHOLIC HEPATITIS WITH ASCITES (H): ICD-10-CM

## 2018-08-24 PROCEDURE — 76700 US EXAM ABDOM COMPLETE: CPT

## 2018-08-24 NOTE — TELEPHONE ENCOUNTER
Lymphedema therapy order placed per Chau GHOSH. Patient is aware the department will call to schedule.

## 2018-08-24 NOTE — TELEPHONE ENCOUNTER
----- Message from Efren Bustos MD sent at 8/24/2018  1:24 PM CDT -----  Please call patient with following results.  Dr. Oseguera recommends that we do a CT of the abdomen and pelvis with contrast due to some of the findings he saw on the ultrasound that he did when he did her paracentesis.  They thought they saw something called omental caking on the ultrasound but the cells from the paracentesis did not show anything that would be consistent with malignancy.  The  CT scan is much better at looking at the soft tissues of the abdomen to make sure everything looks normal.    Electronically signed by:  Efren Bustos M.D.  8/24/2018    
Patient was informed that Dr. Oseguera and Dr. Bustos recommends that a CT of the abdomen and pelvis with contrast be completed due to some of the findings on the ultrasound that he did during the paracentesis.  They thought they saw something called omental caking on the ultrasound but the cells from the paracentesis did not show anything that would be consistent with malignancy.  The CT scan is much better at looking at the soft tissues of the abdomen to make sure everything looks normal.      Patient will call to schedule the CT Scan with contrast.    Brian Boyce, CMA    
home

## 2018-08-24 NOTE — TELEPHONE ENCOUNTER
Reason for Call: Request for an order or referral:    Order or referral being requested: for occupational therapy     Date needed: as soon as possible    Has the patient been seen by the PCP for this problem? YES    Additional comments: patient would like this for lower extremities for Lymphedema patient is aware that provider is out of the office until 9/04/18, patient would like to know if another provider can write the order for her. Please call and advise     Phone number Patient can be reached at:  Home number on file [phone] (home)    Best Time:  any    Can we leave a detailed message on this number?  YES    Call taken on 8/24/2018 at 12:19 PM by Shanique Martinez

## 2018-08-24 NOTE — TELEPHONE ENCOUNTER
Routing to covering provider in absence of Dr. Bustos out of office until 9/4. Please see if you can address.   Aminata Sánchez MA

## 2018-08-24 NOTE — TELEPHONE ENCOUNTER
Please inform that a referral was placed and they will be contacting her to set up an appointment.   Electronically signed by Greg Schoen, MD

## 2018-09-04 RX ORDER — OXYCODONE HYDROCHLORIDE 5 MG/1
2.5-5 TABLET ORAL EVERY 6 HOURS PRN
Qty: 10 TABLET | Refills: 0 | OUTPATIENT
Start: 2018-09-04

## 2018-09-07 ENCOUNTER — TELEPHONE (OUTPATIENT)
Dept: FAMILY MEDICINE | Facility: CLINIC | Age: 53
End: 2018-09-07

## 2018-09-07 NOTE — TELEPHONE ENCOUNTER
I had the patient scheduled for 08/29/18 and patient no showed rescheduled and cancelled her appointment.  Also looks like she has cancelled her specialist appointments too for this upcoming week.   Daniela PAGE

## 2018-09-07 NOTE — TELEPHONE ENCOUNTER
----- Message from Efren Bustos MD sent at 9/6/2018  5:23 PM CDT -----  The radiologist had recommended that we get a follow-up CT scan of Heydi's abdomen and I see that it was ordered on 8/30/2018 but is still has not been done.  Can someone contact Heydi to see if that was actually scheduled for her yet or not?  Thank you,    Electronically signed by:  Efren Bustos M.D.  9/6/2018

## 2018-09-11 NOTE — TELEPHONE ENCOUNTER
Heydi states she just forgot about it and does still want to do it.  Pt is transferred to Radiology to reschedule this appt.

## 2018-09-11 NOTE — TELEPHONE ENCOUNTER
Can someone please call Monalisa and find out specifically why she canceled her CT scan and her specialty appointments?  Does she just not want to do anything else anymore or is there another issue?    Electronically signed by:  Efren Bustos M.D.  9/10/2018

## 2018-09-12 ENCOUNTER — HOSPITAL ENCOUNTER (OUTPATIENT)
Dept: CT IMAGING | Facility: CLINIC | Age: 53
Discharge: HOME | End: 2018-09-12
Attending: FAMILY MEDICINE | Admitting: FAMILY MEDICINE
Payer: COMMERCIAL

## 2018-09-12 DIAGNOSIS — K70.11 ALCOHOLIC HEPATITIS WITH ASCITES (H): ICD-10-CM

## 2018-09-12 LAB
CREAT BLD-MCNC: 0.8 MG/DL (ref 0.52–1.04)
GFR SERPL CREATININE-BSD FRML MDRD: 76 ML/MIN/1.7M2

## 2018-09-12 PROCEDURE — 25000128 H RX IP 250 OP 636: Performed by: FAMILY MEDICINE

## 2018-09-12 PROCEDURE — 74177 CT ABD & PELVIS W/CONTRAST: CPT

## 2018-09-12 PROCEDURE — 25000125 ZZHC RX 250: Performed by: FAMILY MEDICINE

## 2018-09-12 PROCEDURE — 82565 ASSAY OF CREATININE: CPT

## 2018-09-12 RX ORDER — IOPAMIDOL 755 MG/ML
500 INJECTION, SOLUTION INTRAVASCULAR ONCE
Status: COMPLETED | OUTPATIENT
Start: 2018-09-12 | End: 2018-09-12

## 2018-09-12 RX ADMIN — SODIUM CHLORIDE 60 ML: 9 INJECTION, SOLUTION INTRAVENOUS at 14:52

## 2018-09-12 RX ADMIN — IOPAMIDOL 71 ML: 755 INJECTION, SOLUTION INTRAVENOUS at 14:52

## 2018-09-19 ENCOUNTER — OFFICE VISIT (OUTPATIENT)
Dept: PHARMACY | Facility: CLINIC | Age: 53
End: 2018-09-19
Payer: COMMERCIAL

## 2018-09-19 DIAGNOSIS — K70.11 ALCOHOLIC HEPATITIS WITH ASCITES (H): ICD-10-CM

## 2018-09-19 DIAGNOSIS — G47.00 INSOMNIA, UNSPECIFIED TYPE: ICD-10-CM

## 2018-09-19 DIAGNOSIS — F41.9 ANXIETY: ICD-10-CM

## 2018-09-19 DIAGNOSIS — F33.1 MAJOR DEPRESSIVE DISORDER, RECURRENT EPISODE, MODERATE (H): Primary | ICD-10-CM

## 2018-09-19 PROCEDURE — 99606 MTMS BY PHARM EST 15 MIN: CPT | Mod: U4 | Performed by: PHARMACIST

## 2018-09-19 PROCEDURE — 99607 MTMS BY PHARM ADDL 15 MIN: CPT | Mod: U4 | Performed by: PHARMACIST

## 2018-09-19 RX ORDER — ARIPIPRAZOLE 5 MG/1
5 TABLET ORAL DAILY
COMMUNITY
End: 2019-01-01

## 2018-09-19 NOTE — PROGRESS NOTES
SUBJECTIVE/OBJECTIVE:                Monalisa Olguin is a 52 year old female called for a follow-up visit for Medication Therapy Management.  She was referred to me from her [employer] insurance plan.     Chief Complaint: Follow up from our visit on 8/15/18.  Med change follow-Up  Tobacco: 0-1 pack per day - is not interested in quittingTobacco Cessation Action Plan: Information offered: Patient not interested at this time  Alcohol: history of alcohol dependence    Medication Adherence: no issues reported and sets up own med boxes    Depression/Anxiety/Insomnia:  Current medications include: aripiprazole 5 mg daily (new), fluoxetine 60 mg daily, Buspirone 15 mg twice daily, and trazodone 100 mg at bedtime. Pt reports that depression symptoms are not great.  She has appointment with psychiatric nurse practitioner on Friday.  Denies any side effects.  PHQ-9 SCORE 4/11/2018 6/5/2018 7/31/2018   Total Score - - -   Total Score MyChart - 14 (Moderate depression) -   Total Score 18 14 15     Alcoholic Cirrhosis w/ ascites: Pt is taking TL Sofiya vitamin daily and thiamine 100 mg daily.  Started on Xifaxan BID and lactulose as directed to target 2-3 bowel movements (most days reaching this). She continues to abstain from alcohol. Her abdomen has improved and has minimal swelling in her legs.  On the phone today she is very clear and answers questions without hesitation.     Today's Vitals: LMP 05/16/2012 - telephone encounter, no vitals      ASSESSMENT:              Current medications were reviewed today as discussed above.      Medication Adherence: good, no issues identified    Depression/Anxiety/Insomnia:  Needs improvement. Follow-up with psychiatry in place.     Alcoholic Cirrhosis w/ ascites: Improved.     PLAN:                  1. Continue current therapy.     I spent 30 minutes with this patient today. A copy of the visit note was provided to the patient's primary care provider.     Will follow up in 1 month  via phone.    The patient declined a summary of these recommendations as an after visit summary.    Francis Doan, LatanyaD, [facility]  Medication Therapy Management Pharmacist  Pager: [phone]

## 2018-09-19 NOTE — MR AVS SNAPSHOT
After Visit Summary   9/19/2018    Monalisa Olguin    MRN: [id number]           Patient Information     Date Of Birth          1965        Visit Information        Provider Department      9/19/2018 12:00 PM Robert Doan [facility]        Today's Diagnoses     Moderate Depression [296.32]    -  1    Anxiety        Insomnia, unspecified type        Alcoholic hepatitis with ascites          Care Instructions    Continue current therapy.           Follow-ups after your visit        Your next 10 appointments already scheduled     Sep 20, 2018  1:00 PM CDT   Lymphedema Evaluation with Angelique Al, PT, NL LYMPHEDEMA REHAB ROOM   [facility] ([facility])    [address] [place] [id number]   [phone]            Oct 03, 2018  3:30 PM CDT   Return Visit with Usman Lee MD   [facility]    [address] [facility] [address]   [phone]            Dec 03, 2018  3:30 PM CST   (Arrive by 3:15 PM)   Return General Liver with Edgardo Kovacs MD   [facility] ([facility])    [address] [place] [address]  [address]  [facility] [address] [phone]              Who to contact     If you have questions or need follow up information about today's clinic visit or your schedule please contact [facility] directly at [phone].  Normal or non-critical lab and imaging results will be communicated to you by MyChart, letter or phone within 4 business days after the clinic has received the results. If you do not hear from us within 7 days, please contact the clinic through MyChart or phone. If you have a critical or abnormal lab result, we will notify you by phone as soon as possible.  Submit refill requests through [employer] or call your pharmacy and they will forward the refill request to us. Please allow 3 business days for  your refill to be completed.          Additional Information About Your Visit        MyChart Information     [employer] gives you secure access to your electronic health record. If you see a primary care provider, you can also send messages to your care team and make appointments. If you have questions, please call your primary care clinic.  If you do not have a primary care provider, please call [phone] and they will assist you.        Care EveryWhere ID     This is your Care EveryWhere ID. This could be used by other organizations to access your [facility] medical records  [id number]        Your Vitals Were     Last Period                   05/16/2012            Blood Pressure from Last 3 Encounters:   08/17/18 110/54   08/16/18 111/60   08/15/18 99/62    Weight from Last 3 Encounters:   08/16/18 145 lb 8 oz (66 kg)   08/14/18 144 lb 3.2 oz (65.4 kg)   08/10/18 141 lb (64 kg)              Today, you had the following     No orders found for display       Primary Care Provider Office Phone # Fax #    Efren Bustos [phone]       [address] [place] [address]        Equal Access to Services     [facility] AH: Hadii aad ku hadasho Soomaali, waaxda luqadaha, qaybta kaalmada adeegyada, aslo millardin haynabiln mary viera ah. So [facility] [phone].    ATENCIÓN: Si habla español, tiene a perez disposición servicios gratuitos de asistencia lingüística. Llame al [phone].    We comply with applicable federal civil rights laws and [place] laws. We do not discriminate on the basis of race, color, national origin, age, disability, sex, sexual orientation, or gender identity.            Thank you!     Thank you for choosing [facility]  for your care. Our goal is always to provide you with excellent care. Hearing back from our patients is one way we can continue to improve our services. Please take a few minutes to complete the written survey that you may receive in  the mail after your visit with us. Thank you!             Your Updated Medication List - Protect others around you: Learn how to safely use, store and throw away your medicines at www.disposemymeds.org.          This list is accurate as of 9/19/18  4:31 PM.  Always use your most recent med list.                   Brand Name Dispense Instructions for use Diagnosis    acetaminophen 325 MG tablet    TYLENOL    100 tablet    Take 2 tablets (650 mg) by mouth every 4 hours as needed for mild pain    Alcoholic cirrhosis of liver without ascites (H)       alendronate 35 MG tablet    FOSAMAX    12 tablet    Take 1 tablet (35 mg) by mouth with 8oz water every Monday (once every 7 days) 30 minutes before breakfast and remain upright during this time.    Osteopenia, unspecified location       ARIPiprazole 5 MG tablet    ABILIFY     Take 5 mg by mouth daily        busPIRone 15 MG tablet    BUSPAR    180 tablet    Take 1 tablet (15 mg) by mouth 2 times daily    MONA (generalized anxiety disorder)       ferrous sulfate 325 (65 Fe) MG tablet    IRON    60 tablet    Take 1 tablet (325 mg) by mouth 2 times daily        FLUoxetine 20 MG capsule    PROzac    180 capsule    Take 3 capsules (60 mg) by mouth daily    Anxiety, Major depressive disorder, recurrent episode, moderate (H)       lactulose 10 GM/15ML solution    CHRONULAC    500 mL    Take 15 mLs (10 g) by mouth 3 times daily as needed for constipation Start with 15 ml daily, titrate as needed for 2-3 BM's daily.    Hepatic encephalopathy (H)       omeprazole 20 MG CR capsule    priLOSEC    90 capsule    TAKE ONE CAPSULE BY MOUTH EVERY DAY    Gastroesophageal reflux disease with esophagitis       phosphorus tablet 250 mg 250 MG per tablet    PHOSPHA 250 NEUTRAL    120 tablet    TAKE TWO TABLETS BY MOUTH TWICE A DAY    Hypophosphatemia       propranolol 10 MG tablet    INDERAL    180 tablet    Take 1 tablet (10 mg) by mouth 2 times daily    Hypertension goal BP (blood pressure) <  130/80       rifaximin 550 MG Tabs tablet    XIFAXAN    60 tablet    Take 1 tablet (550 mg) by mouth 2 times daily    Alcoholic cirrhosis of liver without ascites (H), Hepatic encephalopathy (H)       thiamine 100 MG tablet     90 tablet    Take 1 tablet (100 mg) by mouth daily    Alcohol dependence, continuous drinking behavior (H)       TL RICARDO RX 2.2-25-1 MG Tabs   Generic drug:  Folic Acid-Vit B6-Vit B12     90 tablet    TAKE ONE TABLET BY MOUTH EVERY DAY    Alcoholism in recovery (H)       traZODone 50 MG tablet    DESYREL    60 tablet    Take 2 tablets (100 mg) by mouth nightly as needed for sleep    Other insomnia

## 2018-09-20 ENCOUNTER — HOSPITAL ENCOUNTER (OUTPATIENT)
Dept: PHYSICAL THERAPY | Facility: CLINIC | Age: 53
Setting detail: THERAPIES SERIES
End: 2018-09-20
Attending: NURSE PRACTITIONER
Payer: COMMERCIAL

## 2018-09-20 PROCEDURE — 97162 PT EVAL MOD COMPLEX 30 MIN: CPT | Mod: GP

## 2018-09-20 PROCEDURE — 40000449 ZZHC STATISTIC PT VISIT, LYMPHEDEMA

## 2018-09-20 NOTE — PROGRESS NOTES
09/20/18 1308   Rehab Discipline   Discipline PT   Type of Visit   Type of visit Initial Edema Evaluation       present No   General Information   Start of care 09/20/18   Orders Evaluate and treat as indicated   Medical diagnosis Lymphedema   Onset of illness / date of surgery 08/20/18   Edema onset 08/20/18   Affected body parts RLE;LLE;LUE   Edema etiology Surgery  (Liver failure recently)   Edema etiology comments Patient had kidney removed in 2000, now currently experiencing liver failure and lone kidney is not sufficient enough   Pertinent history of current problem (PT: include personal factors and/or comorbidities that impact the POC; OT: include additional occupational profile info) Liver failure, Kidney Removal, No history of Cellulitis, Experienced rapid development of lymphedema two months ago in BLE up to the groin that was relieved by elevation. This is second bout of lymphedema and is slowly progressing and only involving feet, ankles and below calfs   Surgical / medical history reviewed Yes   Prior level of functional mobility Independent   Prior treatment Elevation   Community support Family / friend caregiver   Patient role / employment history Employed   Psychosocial concerns Financial concerns;Impaired sleep   [facility] [place] [facility]   General observations Swelling minimal, +1 pitting edema, negative stemmer sign, no involvment at calf or above   Fall Risk Screen   Fall screen completed by PT   Have you fallen 2 or more times in the past year? Yes   Have you fallen and had an injury in the past year? Yes   Timed Up and Go score (seconds) To be performed next   Is patient a fall risk? Yes   Fall screen comments Perform balance tests next visit   Abuse Risk Screen   QUESTION TO PATIENT:  Has a member of your family or a partner(now or in the past) intimidated, hurt, manipulated, or controlled you in any way? no   QUESTION TO PATIENT: Do you feel safe going back to  "the place where you are living? yes   OBSERVATION: Is there reason to believe there has been maltreatment of a vulnerable adult (ie. Physical/Sexual/Emotional abuse, self neglect, lack of adequate food, shelter, medical care, or financial exploitation)? no   System Outcome Measures   Outcome Measures Lymphedema   Lymphedema Life Impact Scale (score range 0-72). A higher score indicates greater impairment. 28   Subjective Report   Patient report of symptoms Symptoms are minimal and patient only experienced swelling once in the past. However, first time limbs swelled up excessively, rapidly and painfully. Pitting has returned slowly and below the calf.  Patient decided \"better see Angelique Cox before she retires\" to get a specialist to look at it.   Precautions   Precautions Renal Insufficiency   Precautions comments Missing a kidney   Patient / Family Goals   Patient / family goals statement Return to walking without limitation, Improve balance, prevent swelling or severe involvment, avoid painful swollen legs   Pain   Patient currently in pain No   Pain comments Patient did experience pain a month ago with initial bout of swelling   Cognitive Status   Orientation Orientation to person, place and time   Level of consciousness Alert   Follows commands and answers questions 100% of the time   Personal safety and judgement Intact   Memory Intact   Edema Exam / Assessment   Skin condition Pitting   Pitting 1+   Pitting location BLE below calf at distal leg, ankle and foot   Scar No   Dorsal pedal pulse Symmetrical   Stemmer sign Negative   Ulceration No   Girth Measurements   Girth Measurements Refer to separate girth measurement flowsheet   Volume LE   Right LE (mL) 2390.7   Left LE (mL) 2604.49   LE volume comparison LLE volume greater than RLE volume   Head / neck volume comments No   Trunk volume comments No   Genital volume comments No   Range of Motion   ROM No deficits were identified   Strength   Strength No deficits " were identified   Posture   Posture Normal   Palpation   Palpation See above for details   Activities of Daily Living   Activities of Daily Living No limitations   Sensory   Sensory perception comments No change in sensation   Vascular Assessment   Vascular Assessment Comments To be examined later   Planned Edema Interventions   Planned edema interventions Manual lymph drainage;Gradient compression bandaging;Fit for compression garment;Exercises;Precautions to prevent infection / exacerbation;Education;Home management program development;Other   Planned edema intervention comments Work on mobilty and balance as well    Clinical Impression   Criteria for skilled therapeutic intervention met Yes   Therapy diagnosis Presents like Lymphedema BLE below calf down to foot potentially related to renal insufficiency and liver failure   Influenced by the following impairments / conditions Stage 1   Functional limitations due to impairments / conditions Pain and swelling make activity and mobility difficulty for this individual who already has fall and mobility concerns   Clinical Presentation Evolving/Changing   Clinical Presentation Rationale Based on observation, examination and clinical reasoning   Clinical Decision Making (Complexity) Moderate complexity   Treatment frequency 3 times / week   Treatment duration 5-6 weeks   Patient / family and/or staff in agreement with plan of care Yes   Risks and benefits of therapy have been explained Yes   Clinical impression comments Patient is coming early in lymphedema development making her a probably candidate for successful management of the condition   Education Assessment   Barriers to learning No barriers   Goals   Edema Eval Goals 1;2;3;4   Goal 1   Goal identifier Ambulation/Mobility   Goal description Patient will be able to ambulate one mile without limitations or gait deviations   Target date 11/08/18   Goal 2   Goal identifier Balance   Goal description Patient will  score a 56/56 on the [place] as well as score below 11 seconds on the TUG   Target date 11/08/18   Goal 3   Goal identifier Swelling   Goal description Reduce limb volume by 5% bilateral for improved mobility, balance and prevention of infection.   Target date 11/08/18   Goal 4   Goal identifier Pain   Goal description Patient will not experience pain due to swelling, edema, or lymphedema   Target date 11/08/18   Total Evaluation Time   Total evaluation time 51

## 2018-09-25 ENCOUNTER — TELEPHONE (OUTPATIENT)
Dept: FAMILY MEDICINE | Facility: CLINIC | Age: 53
End: 2018-09-25

## 2018-09-25 ENCOUNTER — HOSPITAL ENCOUNTER (OUTPATIENT)
Dept: PHYSICAL THERAPY | Facility: CLINIC | Age: 53
Setting detail: THERAPIES SERIES
End: 2018-09-25
Attending: NURSE PRACTITIONER
Payer: COMMERCIAL

## 2018-09-25 PROCEDURE — 97140 MANUAL THERAPY 1/> REGIONS: CPT | Mod: GP

## 2018-09-25 PROCEDURE — 40000449 ZZHC STATISTIC PT VISIT, LYMPHEDEMA

## 2018-09-25 NOTE — TELEPHONE ENCOUNTER
----- Message from Efren Bustos MD sent at 9/25/2018  4:54 PM CDT -----  Please call patient with following results.  Let Heydi know that the CT scan did not show any evidence of cancer within the abdomen which was excellent.  She does have findings consistent with cirrhosis of the liver and portal hypertension, which is just elevated pressures with an the portal vein, all of these are secondary to her overuse of alcohol over the years.      Electronically signed by:  Efren Bustos M.D.  9/25/2018

## 2018-09-26 ENCOUNTER — HOSPITAL ENCOUNTER (OUTPATIENT)
Dept: PHYSICAL THERAPY | Facility: CLINIC | Age: 53
Setting detail: THERAPIES SERIES
End: 2018-09-26
Attending: NURSE PRACTITIONER
Payer: COMMERCIAL

## 2018-09-26 PROCEDURE — 97140 MANUAL THERAPY 1/> REGIONS: CPT | Mod: GP

## 2018-09-26 PROCEDURE — 40000449 ZZHC STATISTIC PT VISIT, LYMPHEDEMA

## 2018-09-26 NOTE — TELEPHONE ENCOUNTER
Patient informed and understood. Patient wanted Dr. Bustos to know that she is seeing PT for the lymphedema on her legs and she is doing better with not drinking alcohol. Sending FYI to pcp per patient.   Aminata Sánchez MA

## 2018-10-02 ENCOUNTER — HOSPITAL ENCOUNTER (OUTPATIENT)
Dept: PHYSICAL THERAPY | Facility: CLINIC | Age: 53
Setting detail: THERAPIES SERIES
End: 2018-10-02
Attending: NURSE PRACTITIONER
Payer: COMMERCIAL

## 2018-10-02 PROCEDURE — 40000449 ZZHC STATISTIC PT VISIT, LYMPHEDEMA

## 2018-10-02 PROCEDURE — 97112 NEUROMUSCULAR REEDUCATION: CPT | Mod: GP

## 2018-10-03 ENCOUNTER — ONCOLOGY VISIT (OUTPATIENT)
Dept: ONCOLOGY | Facility: CLINIC | Age: 53
End: 2018-10-03
Payer: COMMERCIAL

## 2018-10-03 VITALS
BODY MASS INDEX: 23.92 KG/M2 | OXYGEN SATURATION: 95 % | TEMPERATURE: 98.6 F | HEIGHT: 61 IN | HEART RATE: 106 BPM | WEIGHT: 126.7 LBS | RESPIRATION RATE: 18 BRPM | DIASTOLIC BLOOD PRESSURE: 67 MMHG | SYSTOLIC BLOOD PRESSURE: 105 MMHG

## 2018-10-03 DIAGNOSIS — D69.6 THROMBOCYTOPENIA (H): Primary | ICD-10-CM

## 2018-10-03 DIAGNOSIS — R16.1 SPLENOMEGALY: ICD-10-CM

## 2018-10-03 DIAGNOSIS — D50.8 OTHER IRON DEFICIENCY ANEMIA: ICD-10-CM

## 2018-10-03 LAB
BASOPHILS # BLD AUTO: 0 10E9/L (ref 0–0.2)
BASOPHILS NFR BLD AUTO: 0.5 %
DIFFERENTIAL METHOD BLD: ABNORMAL
EOSINOPHIL NFR BLD AUTO: 0.2 %
ERYTHROCYTE [DISTWIDTH] IN BLOOD BY AUTOMATED COUNT: 15 % (ref 10–15)
FERRITIN SERPL-MCNC: 382 NG/ML (ref 8–252)
HCT VFR BLD AUTO: 30.7 % (ref 35–47)
HGB BLD-MCNC: 9.7 G/DL (ref 11.7–15.7)
IMM GRANULOCYTES # BLD: 0 10E9/L (ref 0–0.4)
IMM GRANULOCYTES NFR BLD: 0.3 %
IRON SATN MFR SERPL: 21 % (ref 15–46)
IRON SERPL-MCNC: 37 UG/DL (ref 35–180)
LYMPHOCYTES # BLD AUTO: 1.9 10E9/L (ref 0.8–5.3)
LYMPHOCYTES NFR BLD AUTO: 31.1 %
MCH RBC QN AUTO: 34.8 PG (ref 26.5–33)
MCHC RBC AUTO-ENTMCNC: 31.6 G/DL (ref 31.5–36.5)
MCV RBC AUTO: 110 FL (ref 78–100)
MONOCYTES # BLD AUTO: 0.7 10E9/L (ref 0–1.3)
MONOCYTES NFR BLD AUTO: 10.8 %
NEUTROPHILS # BLD AUTO: 3.5 10E9/L (ref 1.6–8.3)
NEUTROPHILS NFR BLD AUTO: 57.1 %
NRBC # BLD AUTO: 0 10*3/UL
NRBC BLD AUTO-RTO: 0 /100
PLATELET # BLD AUTO: 69 10E9/L (ref 150–450)
RBC # BLD AUTO: 2.79 10E12/L (ref 3.8–5.2)
TIBC SERPL-MCNC: 174 UG/DL (ref 240–430)
WBC # BLD AUTO: 6.1 10E9/L (ref 4–11)

## 2018-10-03 PROCEDURE — 83550 IRON BINDING TEST: CPT | Performed by: INTERNAL MEDICINE

## 2018-10-03 PROCEDURE — 83540 ASSAY OF IRON: CPT | Performed by: INTERNAL MEDICINE

## 2018-10-03 PROCEDURE — 99213 OFFICE O/P EST LOW 20 MIN: CPT | Performed by: INTERNAL MEDICINE

## 2018-10-03 PROCEDURE — 85025 COMPLETE CBC W/AUTO DIFF WBC: CPT | Performed by: INTERNAL MEDICINE

## 2018-10-03 PROCEDURE — 36415 COLL VENOUS BLD VENIPUNCTURE: CPT | Performed by: INTERNAL MEDICINE

## 2018-10-03 PROCEDURE — 82728 ASSAY OF FERRITIN: CPT | Performed by: INTERNAL MEDICINE

## 2018-10-03 NOTE — NURSING NOTE
"Oncology Rooming Note    October 3, 2018 3:38 PM   Monalisa Olguin is a 52 year old female who presents for:    Chief Complaint   Patient presents with     Hematology     2 month follow up Iron def anemia from chronic GI blood loss & Thrombocytopenia     Results     labs completed today     Initial Vitals: /67 (BP Location: Right arm, Patient Position: Chair, Cuff Size: Adult Regular)  Pulse 106  Temp 98.6  F (37  C) (Temporal)  Resp 18  Ht 1.549 m (5' 1\")  Wt 57.5 kg (126 lb 11.2 oz)  LMP 05/16/2012  SpO2 95%  BMI 23.94 kg/m2 Estimated body mass index is 23.94 kg/(m^2) as calculated from the following:    Height as of this encounter: 1.549 m (5' 1\").    Weight as of this encounter: 57.5 kg (126 lb 11.2 oz). Body surface area is 1.57 meters squared.  Data Unavailable Comment: Data Unavailable   Patient's last menstrual period was 05/16/2012.  Allergies reviewed: Yes  Medications reviewed: Yes    Medications: Medication refills not needed today.  Pharmacy name entered into [facility]:    [facility] - [place], [place] - [address]  [facility] [place], [place] [address] [facility]  [facility] [place], [place] - [address]  [facility]      8 minutes for nursing intake (face to face time)     Kusum DIALLO CMA                    "

## 2018-10-03 NOTE — PROGRESS NOTES
FOLLOW-UP VISIT NOTE    PATIENT NAME: Monalisa Olguin MRN # [id number]  DATE OF VISIT: Oct 3, 2018 YOB: 1965    REFERRING PROVIDER: No referring provider defined for this encounter.    Reason for follow up  - Pancytopenia secondary to underlying cirrhosis /Hypersplensim/Alcolohol abuse  - Iron def anemia from chronic GI blood loss      HISTORY:    60-year-old female with past medical history significant for alcohol abuse, chronic cirrhosis with splenomegaly, anxiety/depression, CKD, hypertension who has had thrombocytopenia since 2015 and anemia since 2017. She has been following with gastroenterology for underlying alcoholic cirrhosis as well as some. Also has undergone workup for GI blood loss with EGD and colonoscopy in December 2017 which didn't show any obvious bleeding site. Since then patient has been admitted to the hospital in March with alcohol intoxication and was noted to be severely anemic with hemoglobin in 6's. She was transfused 2 units of packed red blood cells. Stool guaic was positive for blood. Patient was discharged home after hemoglobin stabilized. Iron studies performed indicated iron deficiency with ferritin at 11 and saturation 7%.    - 05/03/18  seen in hematology clinic. She was noted to be severely anemic with hemoglobin at 6.3. Orders were placed for 2 units of blood transfusion and IV iron infusion.    07/17/2018 - IV iron repeated      SUBJECTIVE     She is here for 2 month  follow-up. He needs to drink all call although states that she has cut down on it soon. Underwent paracentesis last month. Recently has noticed slight increasing abdominal distention. Denies abdominal pain, nausea/vomiting, bleeding, melena, hematemesis or any other complaints      PAST MEDICAL HISTORY     Past Medical History:   Diagnosis Date     Alcohol abuse 5/2/2013     Alcohol dependence 5/25/2013     Alcohol withdrawal 5/2/2013     Anxiety 10/10/2011     CKD (chronic kidney disease) stage  3, GFR 30-59 ml/min 2006    last GFR was 42     CKD (chronic kidney disease) stage 3, GFR 30-59 ml/min 2/22/2011     Depressive disorder      Esophageal reflux 10/7/2002     Hypertension goal BP (blood pressure) < 130/80 7/12/2011     Moderate Depression [296.32] 12/22/2009    stable on wellbutrin     Other internal derangement of knee(717.89)     ACL Internal derangement, knee/ original injury in 5th grade, torn cartilage     Pap smear 2011    no abnormals, due for paps q 2-3 yrs     Unspecified essential hypertension          CURRENT OUTPATIENT MEDICATIONS     Current Outpatient Prescriptions   Medication Sig Dispense Refill     acetaminophen (TYLENOL) 325 MG tablet Take 2 tablets (650 mg) by mouth every 4 hours as needed for mild pain 100 tablet      alendronate (FOSAMAX) 35 MG tablet Take 1 tablet (35 mg) by mouth with 8oz water every Monday (once every 7 days) 30 minutes before breakfast and remain upright during this time. 12 tablet 3     ARIPiprazole (ABILIFY) 5 MG tablet Take 5 mg by mouth daily       busPIRone (BUSPAR) 15 MG tablet Take 1 tablet (15 mg) by mouth 2 times daily 180 tablet 3     ferrous sulfate (IRON) 325 (65 Fe) MG tablet Take 1 tablet (325 mg) by mouth 2 times daily 60 tablet 2     FLUoxetine (PROZAC) 20 MG capsule Take 3 capsules (60 mg) by mouth daily 180 capsule 3     lactulose (CHRONULAC) 10 GM/15ML solution Take 15 mLs (10 g) by mouth 3 times daily as needed for constipation Start with 15 ml daily, titrate as needed for 2-3 BM's daily. 500 mL 3     omeprazole (PRILOSEC) 20 MG CR capsule TAKE ONE CAPSULE BY MOUTH EVERY DAY 90 capsule 3     phosphorus tablet 250 mg (PHOSPHA 250 NEUTRAL) 250 MG per tablet TAKE TWO TABLETS BY MOUTH TWICE A  tablet 3     propranolol (INDERAL) 10 MG tablet Take 1 tablet (10 mg) by mouth 2 times daily 180 tablet 3     rifaximin (XIFAXAN) 550 MG TABS tablet Take 1 tablet (550 mg) by mouth 2 times daily 60 tablet 11     thiamine (VITAMIN B-1) 100 MG  "tablet Take 1 tablet (100 mg) by mouth daily 90 tablet 3     TL RICARDO RX 2.2-25-1 MG TABS TAKE ONE TABLET BY MOUTH EVERY DAY 90 tablet 3     traZODone (DESYREL) 50 MG tablet Take 2 tablets (100 mg) by mouth nightly as needed for sleep 60 tablet 5        ALLERGIES     Allergies   Allergen Reactions     Albuterol      Tongue \"hardened and painful\"        REVIEW OF SYSTEMS   As above in the HPI, o/w complete 12-point ROS was negative.     PHYSICAL EXAM   B/P: 104/62, T: 96.6, P: 94, R: 16  SpO2 Readings from Last 4 Encounters:   08/17/18 94%   08/16/18 96%   08/15/18 96%   08/14/18 95%     Wt Readings from Last 3 Encounters:   08/16/18 66 kg (145 lb 8 oz)   08/14/18 65.4 kg (144 lb 3.2 oz)   08/10/18 64 kg (141 lb)     GEN: NAD  EYES:Scleral icterus  Mouth/ENT: Oropharynx is clear.  ABDOMEN: distended, mildly tender  EXT: no edema  NEURO: alert  SKIN: small bruise right btreast     LABORATORY AND IMAGING STUDIES     Lab Results   Component Value Date    WBC 6.1 10/03/2018     Lab Results   Component Value Date    RBC 2.79 10/03/2018     Lab Results   Component Value Date    HGB 9.7 10/03/2018     Lab Results   Component Value Date    HCT 30.7 10/03/2018     No components found for: MCT  Lab Results   Component Value Date     10/03/2018     Lab Results   Component Value Date    MCH 34.8 10/03/2018     Lab Results   Component Value Date    MCHC 31.6 10/03/2018     Lab Results   Component Value Date    RDW 15.0 10/03/2018     Lab Results   Component Value Date    PLT 69 10/03/2018        ASSESSMENT AND PLAN     52-year-old female with    1.  Pancytopenia   2.  Iron def anemia from chronic GI blood loss  3.  Macrocytosis  4.  Decompensated ESLD secondary to alcohol abuse with ascites     1. Secondary to underlying alcoholic cirrhosis /Hypersplensim/Alcolohol abuse  Recommended alcohol abstinence.     2.  Iron deficiency likely from chronic GI blood loss but no obvious source on the EGD and  Colonoscopy  Last IV iron " 07/2018  Hb improved at 9.7 today  Iron levels pending today    3. Secondary to alcohol abuse and underlying liver disease    4. Continue follow up with GI/PCP    Return to clinic in 12 weeks with labs.     Chart documentation with Dragon Voice recognition Software. Although reviewed after completion, some words and grammatical errors may remain.  Usman Lee MD  Attending Physician   Hematology/Medical Oncology

## 2018-10-03 NOTE — Clinical Note
10/3/2018         RE: Monalisa Olguin  [address] [facility] [address]        Dear Colleague,    Thank you for referring your patient, Monalisa Olguin, to the [facility]. Please see a copy of my visit note below.      FOLLOW-UP VISIT NOTE    PATIENT NAME: Monalisa Olguin MRN # [id number]  DATE OF VISIT: Oct 3, 2018 YOB: 1965    REFERRING PROVIDER: No referring provider defined for this encounter.    Reason for follow up  - Pancytopenia secondary to underlying cirrhosis /Hypersplensim/Alcolohol abuse  - Iron def anemia from chronic GI blood loss      HISTORY:    60-year-old female with past medical history significant for alcohol abuse, chronic cirrhosis with splenomegaly, anxiety/depression, CKD, hypertension who has had thrombocytopenia since 2015 and anemia since 2017. She has been following with gastroenterology for underlying alcoholic cirrhosis as well as some. Also has undergone workup for GI blood loss with EGD and colonoscopy in December 2017 which didn't show any obvious bleeding site. Since then patient has been admitted to the hospital in March with alcohol intoxication and was noted to be severely anemic with hemoglobin in 6's. She was transfused 2 units of packed red blood cells. Stool guaic was positive for blood. Patient was discharged home after hemoglobin stabilized. Iron studies performed indicated iron deficiency with ferritin at 11 and saturation 7%.    - 05/03/18  seen in hematology clinic. She was noted to be severely anemic with hemoglobin at 6.3. Orders were placed for 2 units of blood transfusion and IV iron infusion.    07/17/2018 - IV iron repeated      SUBJECTIVE     She is here for 2 month  follow-up. He needs to drink all call although states that she has cut down on it soon. Underwent paracentesis last month. Recently has noticed slight increasing abdominal distention. Denies abdominal pain, nausea/vomiting, bleeding, melena,  hematemesis or any other complaints      PAST MEDICAL HISTORY     Past Medical History:   Diagnosis Date     Alcohol abuse 5/2/2013     Alcohol dependence 5/25/2013     Alcohol withdrawal 5/2/2013     Anxiety 10/10/2011     CKD (chronic kidney disease) stage 3, GFR 30-59 ml/min 2006    last GFR was 42     CKD (chronic kidney disease) stage 3, GFR 30-59 ml/min 2/22/2011     Depressive disorder      Esophageal reflux 10/7/2002     Hypertension goal BP (blood pressure) < 130/80 7/12/2011     Moderate Depression [296.32] 12/22/2009    stable on wellbutrin     Other internal derangement of knee(717.89)     ACL Internal derangement, knee/ original injury in 5th grade, torn cartilage     Pap smear 2011    no abnormals, due for paps q 2-3 yrs     Unspecified essential hypertension          CURRENT OUTPATIENT MEDICATIONS     Current Outpatient Prescriptions   Medication Sig Dispense Refill     acetaminophen (TYLENOL) 325 MG tablet Take 2 tablets (650 mg) by mouth every 4 hours as needed for mild pain 100 tablet      alendronate (FOSAMAX) 35 MG tablet Take 1 tablet (35 mg) by mouth with 8oz water every Monday (once every 7 days) 30 minutes before breakfast and remain upright during this time. 12 tablet 3     ARIPiprazole (ABILIFY) 5 MG tablet Take 5 mg by mouth daily       busPIRone (BUSPAR) 15 MG tablet Take 1 tablet (15 mg) by mouth 2 times daily 180 tablet 3     ferrous sulfate (IRON) 325 (65 Fe) MG tablet Take 1 tablet (325 mg) by mouth 2 times daily 60 tablet 2     FLUoxetine (PROZAC) 20 MG capsule Take 3 capsules (60 mg) by mouth daily 180 capsule 3     lactulose (CHRONULAC) 10 GM/15ML solution Take 15 mLs (10 g) by mouth 3 times daily as needed for constipation Start with 15 ml daily, titrate as needed for 2-3 BM's daily. 500 mL 3     omeprazole (PRILOSEC) 20 MG CR capsule TAKE ONE CAPSULE BY MOUTH EVERY DAY 90 capsule 3     phosphorus tablet 250 mg (PHOSPHA 250 NEUTRAL) 250 MG per tablet TAKE TWO TABLETS BY MOUTH  "TWICE A  tablet 3     propranolol (INDERAL) 10 MG tablet Take 1 tablet (10 mg) by mouth 2 times daily 180 tablet 3     rifaximin (XIFAXAN) 550 MG TABS tablet Take 1 tablet (550 mg) by mouth 2 times daily 60 tablet 11     thiamine (VITAMIN B-1) 100 MG tablet Take 1 tablet (100 mg) by mouth daily 90 tablet 3     TL RICARDO RX 2.2-25-1 MG TABS TAKE ONE TABLET BY MOUTH EVERY DAY 90 tablet 3     traZODone (DESYREL) 50 MG tablet Take 2 tablets (100 mg) by mouth nightly as needed for sleep 60 tablet 5        ALLERGIES     Allergies   Allergen Reactions     Albuterol      Tongue \"hardened and painful\"        REVIEW OF SYSTEMS   As above in the HPI, o/w complete 12-point ROS was negative.     PHYSICAL EXAM   B/P: 104/62, T: 96.6, P: 94, R: 16  SpO2 Readings from Last 4 Encounters:   08/17/18 94%   08/16/18 96%   08/15/18 96%   08/14/18 95%     Wt Readings from Last 3 Encounters:   08/16/18 66 kg (145 lb 8 oz)   08/14/18 65.4 kg (144 lb 3.2 oz)   08/10/18 64 kg (141 lb)     GEN: NAD  EYES:Scleral icterus  Mouth/ENT: Oropharynx is clear.  ABDOMEN: distended, mildly tender  EXT: no edema  NEURO: alert  SKIN: small bruise right btreast     LABORATORY AND IMAGING STUDIES     Lab Results   Component Value Date    WBC 6.1 10/03/2018     Lab Results   Component Value Date    RBC 2.79 10/03/2018     Lab Results   Component Value Date    HGB 9.7 10/03/2018     Lab Results   Component Value Date    HCT 30.7 10/03/2018     No components found for: MCT  Lab Results   Component Value Date     10/03/2018     Lab Results   Component Value Date    MCH 34.8 10/03/2018     Lab Results   Component Value Date    MCHC 31.6 10/03/2018     Lab Results   Component Value Date    RDW 15.0 10/03/2018     Lab Results   Component Value Date    PLT 69 10/03/2018        ASSESSMENT AND PLAN     52-year-old female with    1.  Pancytopenia   2.  Iron def anemia from chronic GI blood loss  3.  Macrocytosis  4.  Decompensated ESLD secondary to " alcohol abuse with ascites     1. Secondary to underlying alcoholic cirrhosis /Hypersplensim/Alcolohol abuse  Recommended alcohol abstinence.     2.  Iron deficiency likely from chronic GI blood loss but no obvious source on the EGD and  Colonoscopy  Last IV iron 07/2018  Hb improved at 9.7 today  Iron levels pending today    3. Secondary to alcohol abuse and underlying liver disease    4. Continue follow up with GI/PCP    Return to clinic in 12 weeks with labs.     Chart documentation with Dragon Voice recognition Software. Although reviewed after completion, some words and grammatical errors may remain.  Usman Lee MD  Attending Physician   Hematology/Medical Oncology    Again, thank you for allowing me to participate in the care of your patient.        Sincerely,        Usman Lee MD

## 2018-10-03 NOTE — MR AVS SNAPSHOT
After Visit Summary   10/3/2018    Monalisa Olguin    MRN: [id number]           Patient Information     Date Of Birth          1965        Visit Information        Provider Department      10/3/2018 3:30 PM Usman Lee MD [facility]        Today's Diagnoses     Thrombocytopenia (H)    -  1    Other iron deficiency anemia        Splenomegaly          Care Instructions    Please follow up with Dr. Lee in 3 months.  Please schedule labs 1-2 days prior to follow up appointment.    Lab Date/Time:    Follow Up Date/Time:     If you have any questions or concerns please feel free to call.    If you need to reschedule please call:  Clinic or Lab Appointment - [phone]  Infusion - [phone]  Imaging - [phone]    Kusum DIALLO CMA  [facility]  Oncology/Hematology at [facility]  [phone]            Follow-ups after your visit        Your next 10 appointments already scheduled     Oct 04, 2018  1:00 PM CDT   Lymphedema Treatment with Angelique Al, PT, NL LYMPHEDEMA REHAB ROOM   [facility] ([facility])    [address] [place] [address] [address] [place] [address]   [phone]            Oct 16, 2018 10:30 AM CDT   Lymphedema Treatment with Duc Martinez, PT, NL LYMPHEDEMA REHAB ROOM   [facility] ([facility])    [address] [place] [address] [address] [place] [address]   [phone]            Dec 03, 2018  3:30 PM CST   (Arrive by 3:15 PM)   Return General Liver with Edgardo Kovacs MD   [facility] ([facility])    [address] [place] [address]  [address]  [facility] [address] [phone]              Future tests that were ordered for you today     Open Future Orders        Priority Expected Expires Ordered    CBC with platelets Routine 1/3/2019 10/3/2019 10/3/2018    Iron and iron binding capacity Routine 1/3/2019 10/3/2019 10/3/2018    Ferritin Routine 1/3/2019 10/3/2019  "10/3/2018            Who to contact     If you have questions or need follow up information about today's clinic visit or your schedule please contact [facility] directly at [phone].  Normal or non-critical lab and imaging results will be communicated to you by MyChart, letter or phone within 4 business days after the clinic has received the results. If you do not hear from us within 7 days, please contact the clinic through MyChart or phone. If you have a critical or abnormal lab result, we will notify you by phone as soon as possible.  Submit refill requests through [employer] or call your pharmacy and they will forward the refill request to us. Please allow 3 business days for your refill to be completed.          Additional Information About Your Visit        [employer] Information     [employer] gives you secure access to your electronic health record. If you see a primary care provider, you can also send messages to your care team and make appointments. If you have questions, please call your primary care clinic.  If you do not have a primary care provider, please call [phone] and they will assist you.        Care EveryWhere ID     This is your Care EveryWhere ID. This could be used by other organizations to access your [facility] medical records  [id number]        Your Vitals Were     Pulse Temperature Respirations Height Last Period Pulse Oximetry    106 98.6  F (37  C) (Temporal) 18 1.549 m (5' 1\") 05/16/2012 95%    BMI (Body Mass Index)                   23.94 kg/m2            Blood Pressure from Last 3 Encounters:   10/03/18 105/67   08/17/18 110/54   08/16/18 111/60    Weight from Last 3 Encounters:   10/03/18 57.5 kg (126 lb 11.2 oz)   08/16/18 66 kg (145 lb 8 oz)   08/14/18 65.4 kg (144 lb 3.2 oz)              We Performed the Following     CBC with platelets differential     Ferritin     Iron and iron binding capacity        Primary Care Provider Office Phone # Fax #    Efren ZAVALA " MD Juli [phone]       [address] [place] [address]        Equal Access to Services     ADRIENNE GILLIAM : Obed aad ku hadtysonpablo Sanjuana, domodedrick riversharryha, brianamariel katishada rajeev, salo jimenez leslupe vela larubenandres jara. So [facility] [phone].    ATENCIÓN: Si habla español, tiene a perez disposición servicios gratuitos de asistencia lingüística. Llame al [phone].    We comply with applicable federal civil rights laws and [place] laws. We do not discriminate on the basis of race, color, national origin, age, disability, sex, sexual orientation, or gender identity.            Thank you!     Thank you for choosing [facility]  for your care. Our goal is always to provide you with excellent care. Hearing back from our patients is one way we can continue to improve our services. Please take a few minutes to complete the written survey that you may receive in the mail after your visit with us. Thank you!             Your Updated Medication List - Protect others around you: Learn how to safely use, store and throw away your medicines at www.disposemymeds.org.          This list is accurate as of 10/3/18  3:52 PM.  Always use your most recent med list.                   Brand Name Dispense Instructions for use Diagnosis    acetaminophen 325 MG tablet    TYLENOL    100 tablet    Take 2 tablets (650 mg) by mouth every 4 hours as needed for mild pain    Alcoholic cirrhosis of liver without ascites (H)       alendronate 35 MG tablet    FOSAMAX    12 tablet    Take 1 tablet (35 mg) by mouth with 8oz water every Monday (once every 7 days) 30 minutes before breakfast and remain upright during this time.    Osteopenia, unspecified location       ARIPiprazole 5 MG tablet    ABILIFY     Take 5 mg by mouth daily        busPIRone 15 MG tablet    BUSPAR    180 tablet    Take 1 tablet (15 mg) by mouth 2 times daily    MONA (generalized anxiety disorder)       ferrous sulfate 325 (65 Fe)  MG tablet    IRON    60 tablet    Take 1 tablet (325 mg) by mouth 2 times daily        FLUoxetine 20 MG capsule    PROzac    180 capsule    Take 3 capsules (60 mg) by mouth daily    Anxiety, Major depressive disorder, recurrent episode, moderate (H)       lactulose 10 GM/15ML solution    CHRONULAC    500 mL    Take 15 mLs (10 g) by mouth 3 times daily as needed for constipation Start with 15 ml daily, titrate as needed for 2-3 BM's daily.    Hepatic encephalopathy (H)       omeprazole 20 MG CR capsule    priLOSEC    90 capsule    TAKE ONE CAPSULE BY MOUTH EVERY DAY    Gastroesophageal reflux disease with esophagitis       phosphorus tablet 250 mg 250 MG per tablet    PHOSPHA 250 NEUTRAL    120 tablet    TAKE TWO TABLETS BY MOUTH TWICE A DAY    Hypophosphatemia       propranolol 10 MG tablet    INDERAL    180 tablet    Take 1 tablet (10 mg) by mouth 2 times daily    Hypertension goal BP (blood pressure) < 130/80       rifaximin 550 MG Tabs tablet    XIFAXAN    60 tablet    Take 1 tablet (550 mg) by mouth 2 times daily    Alcoholic cirrhosis of liver without ascites (H), Hepatic encephalopathy (H)       thiamine 100 MG tablet     90 tablet    Take 1 tablet (100 mg) by mouth daily    Alcohol dependence, continuous drinking behavior (H)       TL RICARDO RX 2.2-25-1 MG Tabs   Generic drug:  Folic Acid-Vit B6-Vit B12     90 tablet    TAKE ONE TABLET BY MOUTH EVERY DAY    Alcoholism in recovery (H)       traZODone 50 MG tablet    DESYREL    60 tablet    Take 2 tablets (100 mg) by mouth nightly as needed for sleep    Other insomnia

## 2018-10-03 NOTE — PATIENT INSTRUCTIONS
Please follow up with Dr. Lee in 3 months.  Please schedule labs 1-2 days prior to follow up appointment.    Lab Date/Time:    Follow Up Date/Time:     If you have any questions or concerns please feel free to call.    If you need to reschedule please call:  Clinic or Lab Appointment - [phone]  Infusion - [phone]  Imaging - [phone]    Kusum DIALLO [facility]  Oncology/Hematology at [facility]  [phone]

## 2018-10-05 ENCOUNTER — TELEPHONE (OUTPATIENT)
Dept: ONCOLOGY | Facility: CLINIC | Age: 53
End: 2018-10-05

## 2018-10-05 NOTE — TELEPHONE ENCOUNTER
Per Dr. Lee: Iron levels adequate. RTC in 3 months with labs.    Called patient and let her know lab levels were adequate and to RTC in 3 months with labs.    Kusum Gutiérrez, CMA

## 2018-10-16 ENCOUNTER — HOSPITAL ENCOUNTER (OUTPATIENT)
Dept: PHYSICAL THERAPY | Facility: CLINIC | Age: 53
Setting detail: THERAPIES SERIES
End: 2018-10-16
Attending: NURSE PRACTITIONER
Payer: COMMERCIAL

## 2018-10-16 PROCEDURE — 97750 PHYSICAL PERFORMANCE TEST: CPT | Mod: GP,XU

## 2018-10-16 PROCEDURE — 97140 MANUAL THERAPY 1/> REGIONS: CPT | Mod: GP

## 2018-10-16 PROCEDURE — 40000449 ZZHC STATISTIC PT VISIT, LYMPHEDEMA

## 2018-10-16 PROCEDURE — 97112 NEUROMUSCULAR REEDUCATION: CPT | Mod: GP

## 2018-10-16 NOTE — PROGRESS NOTES
Outpatient Physical Therapy Discharge Note     Patient: Monalisa Olguin  : 1965    Beginning/End Dates of Reporting Period:  18 to 10/16/2018    Referring Provider: Chau Castillo PA-C    Therapy Diagnosis: Lymphedema of Both Lower Extremities     Client Self Report: Patient responded very well to compression garments. Patient will be D/C and sent home with balance HEP    Objective Measurements:  Objective Measure: Limb Volume  Details: [id number] reduced to [phone] respectively                                    Outcome Measures (most recent score):  Lymphedema Life Impact Scale (score range 0-72). A higher score indicates greater impairment.: 12    Goals:  Goal Identifier Ambulation/Mobility   Goal Description [place].   Target Date 18   Date Met  10/02/18   Progress:     Goal Identifier Balance   Goal Description Patient scored a 53/56 on the [place] Balance Scale   Target Date 18   Date Met   (Deferred)   Progress:     Goal Identifier Swelling   Goal Description Patient was able to reduce limb volume by 28% on the right and 37% on the left   Target Date 18   Date Met  10/02/18   Progress:     Goal Identifier Pain   Goal Description Patient has not felt pain, discomfort and strain on her LEs throughtout her lymphedema management    Target Date 18   Date Met  10/02/18   Progress:       Progress Toward Goals:   Progress this reporting period: Lymphedema well managed as indicated by LLIS and volume reduction      Plan:  Discharge from therapy.    Discharge:    Reason for Discharge: Patient has met all lymphedema goals.  Patient chooses to discontinue therapy and utilized HEP for balance.    Equipment Issued:       Discharge Plan: Patient to continue home program.

## 2018-10-18 ENCOUNTER — TELEPHONE (OUTPATIENT)
Dept: FAMILY MEDICINE | Facility: CLINIC | Age: 53
End: 2018-10-18

## 2018-10-18 NOTE — TELEPHONE ENCOUNTER
Evens from [employer] calling to check the status of the form they mailed and then faxed on October 1st. Please call her back at [phone].    Thank you,  Nuria Palmer- Pt Rep.

## 2018-10-18 NOTE — TELEPHONE ENCOUNTER
Spoke to Evens we do not fill out this paperwork she was notified and stated to disregard the paperwork. Paperwork was shredded.  Noa Little MA 10/18/2018

## 2018-10-24 DIAGNOSIS — D50.8 OTHER IRON DEFICIENCY ANEMIA: Primary | ICD-10-CM

## 2018-10-24 RX ORDER — FERROUS SULFATE 325(65) MG
325 TABLET ORAL 2 TIMES DAILY
Qty: 60 TABLET | Refills: 2 | Status: SHIPPED | OUTPATIENT
Start: 2018-10-24 | End: 2019-03-21

## 2018-10-24 NOTE — TELEPHONE ENCOUNTER
Ferrous sulfate      Last Written Prescription Date:  5/3/18  Last Fill Quantity: 60,   # refills: 2  Last Office Visit: 10/3/18  Future Office visit:    Next 5 appointments (look out 90 days)     Jan 03, 2019  3:00 PM CST   [facility] Usman [facility]    [address] [place] [id number]   [phone]                   Routing refill request to provider for review/approval because:  Refill.  Ericka Jeff RN on 10/24/2018 at 12:49 PM

## 2018-10-31 ENCOUNTER — ALLIED HEALTH/NURSE VISIT (OUTPATIENT)
Dept: PHARMACY | Facility: CLINIC | Age: 53
End: 2018-10-31
Payer: COMMERCIAL

## 2018-10-31 DIAGNOSIS — F41.9 ANXIETY: ICD-10-CM

## 2018-10-31 DIAGNOSIS — K70.30 ALCOHOLIC CIRRHOSIS OF LIVER WITHOUT ASCITES (H): Primary | ICD-10-CM

## 2018-10-31 DIAGNOSIS — K70.11 ALCOHOLIC HEPATITIS WITH ASCITES (H): ICD-10-CM

## 2018-10-31 DIAGNOSIS — F33.1 MAJOR DEPRESSIVE DISORDER, RECURRENT EPISODE, MODERATE (H): Primary | ICD-10-CM

## 2018-10-31 DIAGNOSIS — G47.00 INSOMNIA, UNSPECIFIED TYPE: ICD-10-CM

## 2018-10-31 PROCEDURE — 99606 MTMS BY PHARM EST 15 MIN: CPT | Mod: U4 | Performed by: PHARMACIST

## 2018-10-31 RX ORDER — GABAPENTIN 100 MG/1
300 CAPSULE ORAL AT BEDTIME
Status: ON HOLD | COMMUNITY
Start: 2018-10-24 | End: 2019-01-01

## 2018-10-31 NOTE — MR AVS SNAPSHOT
After Visit Summary   10/31/2018    Monalisa Olguin    MRN: [id number]           Patient Information     Date Of Birth          1965        Visit Information        Provider Department      10/31/2018 2:30 PM Robert Doan, [facility]        Today's Diagnoses     Moderate Depression [296.32]    -  1    Anxiety        Insomnia, unspecified type        Alcoholic hepatitis with ascites           Follow-ups after your visit        Your next 10 appointments already scheduled     Dec 03, 2018  3:30 PM CST   (Arrive by 3:15 PM)   Return General Liver with Edgardo Kovacs MD   [facility] ([facility])    [address] [place] [address]  [address]  [facility] [place] [address]   [phone]            Jan 02, 2019  2:00 PM CST   LAB with NL LAB [facility] ([facility])    [address] [place] [id number] [phone]           Please do not eat 10-12 hours before your appointment if you are coming in fasting for labs on lipids, cholesterol, or glucose (sugar). This does not apply to pregnant women. Water, hot tea and black coffee (with nothing added) are okay. Do not drink other fluids, diet soda or chew gum.            Jan 03, 2019  3:00 PM CST   Return Visit with Usman Lee MD   [facility]    [address] [place] [id number] [phone]              Future tests that were ordered for you today     Open Future Orders        Priority Expected Expires Ordered    Hepatic panel Routine 10/31/2018 1/29/2019 10/31/2018    CBC with platelets Routine 10/31/2018 1/29/2019 10/31/2018    INR Routine 10/31/2018 1/29/2019 10/31/2018    Basic metabolic panel Routine 10/31/2018 1/29/2019 10/31/2018            Who to contact     If you have questions or need follow up information about today's clinic visit or your schedule please contact [facility]  CLINIC [facility] directly at [phone].  Normal or non-critical lab and imaging results will be communicated to you by MyChart, letter or phone within 4 business days after the clinic has received the results. If you do not hear from us within 7 days, please contact the clinic through MyChart or phone. If you have a critical or abnormal lab result, we will notify you by phone as soon as possible.  Submit refill requests through [employer] or call your pharmacy and they will forward the refill request to us. Please allow 3 business days for your refill to be completed.          Additional Information About Your Visit        m[employer]loghar[employer] Information     [employer] gives you secure access to your electronic health record. If you see a primary care provider, you can also send messages to your care team and make appointments. If you have questions, please call your primary care clinic.  If you do not have a primary care provider, please call [phone] and they will assist you.        Care EveryWhere ID     This is your Care EveryWhere ID. This could be used by other organizations to access your [facility] medical records  [id number]        Your Vitals Were     Last Period                   05/16/2012            Blood Pressure from Last 3 Encounters:   10/03/18 105/67   08/17/18 110/54   08/16/18 111/60    Weight from Last 3 Encounters:   10/03/18 126 lb 11.2 oz (57.5 kg)   08/16/18 145 lb 8 oz (66 kg)   08/14/18 144 lb 3.2 oz (65.4 kg)              Today, you had the following     No orders found for display       Primary Care Provider Office Phone # Fax #    Efren Bustos [phone]       [address] [place] [phone]        Equal Access to Services     [facility]: Hadii km jiménez Soamilcar, waaxda luqadaha, qaybta kaalmada adelupeyada, salo jara. So [facility] [phone].    ATENCIÓN: Si habla español, tiene a perez disposición servicios gratuitos de asistencia  lingüística. Nadira al [phone].    We comply with applicable federal civil rights laws and [place] laws. We do not discriminate on the basis of race, color, national origin, age, disability, sex, sexual orientation, or gender identity.            Thank you!     Thank you for choosing [facility]  for your care. Our goal is always to provide you with excellent care. Hearing back from our patients is one way we can continue to improve our services. Please take a few minutes to complete the written survey that you may receive in the mail after your visit with us. Thank you!             Your Updated Medication List - Protect others around you: Learn how to safely use, store and throw away your medicines at www.disposemymeds.org.          This list is accurate as of 10/31/18  3:43 PM.  Always use your most recent med list.                   Brand Name Dispense Instructions for use Diagnosis    acetaminophen 325 MG tablet    TYLENOL    100 tablet    Take 2 tablets (650 mg) by mouth every 4 hours as needed for mild pain    Alcoholic cirrhosis of liver without ascites (H)       alendronate 35 MG tablet    FOSAMAX    12 tablet    Take 1 tablet (35 mg) by mouth with 8oz water every Monday (once every 7 days) 30 minutes before breakfast and remain upright during this time.    Osteopenia, unspecified location       ARIPiprazole 5 MG tablet    ABILIFY     Take 5 mg by mouth daily        busPIRone 15 MG tablet    BUSPAR    180 tablet    Take 1 tablet (15 mg) by mouth 2 times daily    MONA (generalized anxiety disorder)       ferrous sulfate 325 (65 Fe) MG tablet    IRON    60 tablet    Take 1 tablet (325 mg) by mouth 2 times daily    Other iron deficiency anemia       FLUoxetine 20 MG capsule    PROzac    180 capsule    Take 3 capsules (60 mg) by mouth daily    Anxiety, Major depressive disorder, recurrent episode, moderate (H)       gabapentin 100 MG capsule    NEURONTIN     Take 300 mg by mouth At Bedtime         lactulose 10 GM/15ML solution    CHRONULAC    500 mL    Take 15 mLs (10 g) by mouth 3 times daily as needed for constipation Start with 15 ml daily, titrate as needed for 2-3 BM's daily.    Hepatic encephalopathy (H)       omeprazole 20 MG CR capsule    priLOSEC    90 capsule    TAKE ONE CAPSULE BY MOUTH EVERY DAY    Gastroesophageal reflux disease with esophagitis       phosphorus tablet 250 mg 250 MG per tablet    PHOSPHA 250 NEUTRAL    120 tablet    TAKE TWO TABLETS BY MOUTH TWICE A DAY    Hypophosphatemia       propranolol 10 MG tablet    INDERAL    180 tablet    Take 1 tablet (10 mg) by mouth 2 times daily    Hypertension goal BP (blood pressure) < 130/80       rifaximin 550 MG Tabs tablet    XIFAXAN    60 tablet    Take 1 tablet (550 mg) by mouth 2 times daily    Alcoholic cirrhosis of liver without ascites (H), Hepatic encephalopathy (H)       thiamine 100 MG tablet     90 tablet    Take 1 tablet (100 mg) by mouth daily    Alcohol dependence, continuous drinking behavior (H)       TL RICARDO RX 2.2-25-1 MG Tabs   Generic drug:  Folic Acid-Vit B6-Vit B12     90 tablet    TAKE ONE TABLET BY MOUTH EVERY DAY    Alcoholism in recovery (H)       traZODone 50 MG tablet    DESYREL    60 tablet    Take 2 tablets (100 mg) by mouth nightly as needed for sleep    Other insomnia

## 2018-11-01 DIAGNOSIS — Z53.9 ERRONEOUS ENCOUNTER--DISREGARD: Primary | ICD-10-CM

## 2018-11-28 ENCOUNTER — TELEPHONE (OUTPATIENT)
Dept: FAMILY MEDICINE | Facility: CLINIC | Age: 53
End: 2018-11-28

## 2018-11-28 NOTE — TELEPHONE ENCOUNTER
Patient is due for a PHQ-9.  Index start date:10/05/2018  Index end date:2/05/2019    Please call patient.

## 2019-01-01 ENCOUNTER — HOSPITAL ENCOUNTER (OUTPATIENT)
Dept: ULTRASOUND IMAGING | Facility: CLINIC | Age: 54
Discharge: HOME | End: 2019-11-01
Attending: FAMILY MEDICINE | Admitting: FAMILY MEDICINE
Payer: COMMERCIAL

## 2019-01-01 ENCOUNTER — TELEPHONE (OUTPATIENT)
Dept: GASTROENTEROLOGY | Facility: CLINIC | Age: 54
End: 2019-01-01

## 2019-01-01 ENCOUNTER — TELEPHONE (OUTPATIENT)
Dept: FAMILY MEDICINE | Facility: CLINIC | Age: 54
End: 2019-01-01

## 2019-01-01 ENCOUNTER — HOSPITAL ENCOUNTER (OUTPATIENT)
Dept: ULTRASOUND IMAGING | Facility: CLINIC | Age: 54
Discharge: HOME | End: 2019-11-26
Attending: HOSPITALIST | Admitting: HOSPITALIST
Payer: COMMERCIAL

## 2019-01-01 ENCOUNTER — APPOINTMENT (OUTPATIENT)
Dept: OCCUPATIONAL THERAPY | Facility: CLINIC | Age: 54
DRG: 871 | End: 2019-01-01
Attending: PEDIATRICS
Payer: COMMERCIAL

## 2019-01-01 ENCOUNTER — APPOINTMENT (OUTPATIENT)
Dept: GENERAL RADIOLOGY | Facility: CLINIC | Age: 54
DRG: 871 | End: 2019-01-01
Attending: HOSPITALIST
Payer: COMMERCIAL

## 2019-01-01 ENCOUNTER — APPOINTMENT (OUTPATIENT)
Dept: OCCUPATIONAL THERAPY | Facility: CLINIC | Age: 54
DRG: 871 | End: 2019-01-01
Payer: COMMERCIAL

## 2019-01-01 ENCOUNTER — MYC REFILL (OUTPATIENT)
Dept: FAMILY MEDICINE | Facility: CLINIC | Age: 54
End: 2019-01-01

## 2019-01-01 ENCOUNTER — MYC REFILL (OUTPATIENT)
Dept: GASTROENTEROLOGY | Facility: CLINIC | Age: 54
End: 2019-01-01

## 2019-01-01 ENCOUNTER — NURSE TRIAGE (OUTPATIENT)
Dept: NURSING | Facility: CLINIC | Age: 54
End: 2019-01-01

## 2019-01-01 ENCOUNTER — HOSPITAL ENCOUNTER (OUTPATIENT)
Dept: ULTRASOUND IMAGING | Facility: CLINIC | Age: 54
Discharge: HOME | End: 2019-10-10
Attending: FAMILY MEDICINE | Admitting: FAMILY MEDICINE
Payer: COMMERCIAL

## 2019-01-01 ENCOUNTER — HOSPITAL ENCOUNTER (OUTPATIENT)
Dept: ULTRASOUND IMAGING | Facility: CLINIC | Age: 54
Discharge: HOME | End: 2019-09-23
Attending: FAMILY MEDICINE | Admitting: FAMILY MEDICINE
Payer: COMMERCIAL

## 2019-01-01 ENCOUNTER — PATIENT OUTREACH (OUTPATIENT)
Dept: CARE COORDINATION | Facility: CLINIC | Age: 54
End: 2019-01-01

## 2019-01-01 ENCOUNTER — OFFICE VISIT (OUTPATIENT)
Dept: GASTROENTEROLOGY | Facility: CLINIC | Age: 54
End: 2019-01-01
Attending: INTERNAL MEDICINE
Payer: COMMERCIAL

## 2019-01-01 ENCOUNTER — OFFICE VISIT (OUTPATIENT)
Dept: FAMILY MEDICINE | Facility: CLINIC | Age: 54
End: 2019-01-01
Payer: COMMERCIAL

## 2019-01-01 ENCOUNTER — HOSPITAL ENCOUNTER (OUTPATIENT)
Dept: ULTRASOUND IMAGING | Facility: CLINIC | Age: 54
Discharge: HOME | End: 2019-10-23
Attending: FAMILY MEDICINE | Admitting: FAMILY MEDICINE
Payer: COMMERCIAL

## 2019-01-01 ENCOUNTER — APPOINTMENT (OUTPATIENT)
Dept: PHYSICAL THERAPY | Facility: CLINIC | Age: 54
DRG: 871 | End: 2019-01-01
Attending: PEDIATRICS
Payer: COMMERCIAL

## 2019-01-01 ENCOUNTER — APPOINTMENT (OUTPATIENT)
Dept: PHYSICAL THERAPY | Facility: CLINIC | Age: 54
DRG: 871 | End: 2019-01-01
Payer: COMMERCIAL

## 2019-01-01 ENCOUNTER — HOSPITAL ENCOUNTER (OUTPATIENT)
Dept: MAMMOGRAPHY | Facility: CLINIC | Age: 54
End: 2019-10-03
Attending: FAMILY MEDICINE
Payer: COMMERCIAL

## 2019-01-01 ENCOUNTER — APPOINTMENT (OUTPATIENT)
Dept: ULTRASOUND IMAGING | Facility: CLINIC | Age: 54
DRG: 871 | End: 2019-01-01
Attending: NURSE PRACTITIONER
Payer: COMMERCIAL

## 2019-01-01 ENCOUNTER — TELEPHONE (OUTPATIENT)
Dept: ONCOLOGY | Facility: CLINIC | Age: 54
End: 2019-01-01

## 2019-01-01 ENCOUNTER — APPOINTMENT (OUTPATIENT)
Dept: ULTRASOUND IMAGING | Facility: CLINIC | Age: 54
DRG: 871 | End: 2019-01-01
Attending: PEDIATRICS
Payer: COMMERCIAL

## 2019-01-01 ENCOUNTER — DOCUMENTATION ONLY (OUTPATIENT)
Dept: CARE COORDINATION | Facility: CLINIC | Age: 54
End: 2019-01-01

## 2019-01-01 ENCOUNTER — HEALTH MAINTENANCE LETTER (OUTPATIENT)
Age: 54
End: 2019-01-01

## 2019-01-01 ENCOUNTER — HOSPITAL ENCOUNTER (OUTPATIENT)
Dept: ULTRASOUND IMAGING | Facility: CLINIC | Age: 54
Discharge: HOME | End: 2019-12-06
Attending: FAMILY MEDICINE | Admitting: FAMILY MEDICINE
Payer: COMMERCIAL

## 2019-01-01 ENCOUNTER — APPOINTMENT (OUTPATIENT)
Dept: ULTRASOUND IMAGING | Facility: CLINIC | Age: 54
DRG: 871 | End: 2019-01-01
Attending: FAMILY MEDICINE
Payer: COMMERCIAL

## 2019-01-01 ENCOUNTER — HOSPITAL ENCOUNTER (OUTPATIENT)
Dept: ULTRASOUND IMAGING | Facility: CLINIC | Age: 54
Discharge: HOME | End: 2019-10-03
Attending: FAMILY MEDICINE | Admitting: FAMILY MEDICINE
Payer: COMMERCIAL

## 2019-01-01 ENCOUNTER — ANCILLARY PROCEDURE (OUTPATIENT)
Dept: ULTRASOUND IMAGING | Facility: CLINIC | Age: 54
End: 2019-01-01
Attending: INTERNAL MEDICINE
Payer: COMMERCIAL

## 2019-01-01 ENCOUNTER — DOCUMENTATION ONLY (OUTPATIENT)
Dept: FAMILY MEDICINE | Facility: CLINIC | Age: 54
End: 2019-01-01

## 2019-01-01 ENCOUNTER — HOSPITAL ENCOUNTER (OUTPATIENT)
Dept: ULTRASOUND IMAGING | Facility: CLINIC | Age: 54
Discharge: HOME | End: 2019-11-29
Attending: INTERNAL MEDICINE | Admitting: INTERNAL MEDICINE
Payer: COMMERCIAL

## 2019-01-01 ENCOUNTER — HOSPITAL ENCOUNTER (OUTPATIENT)
Dept: ULTRASOUND IMAGING | Facility: CLINIC | Age: 54
Discharge: HOME | End: 2019-09-11
Attending: FAMILY MEDICINE | Admitting: FAMILY MEDICINE
Payer: COMMERCIAL

## 2019-01-01 ENCOUNTER — HOSPITAL ENCOUNTER (INPATIENT)
Facility: CLINIC | Age: 54
LOS: 7 days | Discharge: HOME HEALTH | DRG: 871 | End: 2019-11-18
Attending: FAMILY MEDICINE | Admitting: PEDIATRICS
Payer: COMMERCIAL

## 2019-01-01 VITALS
RESPIRATION RATE: 18 BRPM | OXYGEN SATURATION: 99 % | WEIGHT: 123 LBS | SYSTOLIC BLOOD PRESSURE: 102 MMHG | BODY MASS INDEX: 23.24 KG/M2 | DIASTOLIC BLOOD PRESSURE: 56 MMHG | HEART RATE: 86 BPM | TEMPERATURE: 98.1 F

## 2019-01-01 VITALS
TEMPERATURE: 98.7 F | SYSTOLIC BLOOD PRESSURE: 90 MMHG | HEART RATE: 92 BPM | DIASTOLIC BLOOD PRESSURE: 59 MMHG | RESPIRATION RATE: 16 BRPM | OXYGEN SATURATION: 99 %

## 2019-01-01 VITALS
TEMPERATURE: 97.7 F | WEIGHT: 128 LBS | DIASTOLIC BLOOD PRESSURE: 60 MMHG | BODY MASS INDEX: 24.19 KG/M2 | HEART RATE: 72 BPM | RESPIRATION RATE: 18 BRPM | SYSTOLIC BLOOD PRESSURE: 96 MMHG | OXYGEN SATURATION: 97 %

## 2019-01-01 VITALS
OXYGEN SATURATION: 98 % | RESPIRATION RATE: 16 BRPM | TEMPERATURE: 98.1 F | HEART RATE: 86 BPM | DIASTOLIC BLOOD PRESSURE: 52 MMHG | SYSTOLIC BLOOD PRESSURE: 93 MMHG

## 2019-01-01 VITALS
SYSTOLIC BLOOD PRESSURE: 108 MMHG | DIASTOLIC BLOOD PRESSURE: 67 MMHG | TEMPERATURE: 98.3 F | HEART RATE: 78 BPM | BODY MASS INDEX: 24.28 KG/M2 | RESPIRATION RATE: 16 BRPM | HEIGHT: 61 IN | WEIGHT: 128.6 LBS | OXYGEN SATURATION: 98 %

## 2019-01-01 VITALS
SYSTOLIC BLOOD PRESSURE: 121 MMHG | TEMPERATURE: 97.4 F | HEART RATE: 82 BPM | OXYGEN SATURATION: 99 % | RESPIRATION RATE: 15 BRPM | WEIGHT: 123.9 LBS | BODY MASS INDEX: 23.39 KG/M2 | HEIGHT: 61 IN | DIASTOLIC BLOOD PRESSURE: 61 MMHG

## 2019-01-01 VITALS
OXYGEN SATURATION: 98 % | SYSTOLIC BLOOD PRESSURE: 100 MMHG | RESPIRATION RATE: 16 BRPM | DIASTOLIC BLOOD PRESSURE: 48 MMHG | HEART RATE: 91 BPM

## 2019-01-01 VITALS
HEART RATE: 112 BPM | HEIGHT: 60 IN | OXYGEN SATURATION: 95 % | DIASTOLIC BLOOD PRESSURE: 74 MMHG | TEMPERATURE: 97.9 F | WEIGHT: 128.7 LBS | BODY MASS INDEX: 25.27 KG/M2 | SYSTOLIC BLOOD PRESSURE: 123 MMHG

## 2019-01-01 VITALS
SYSTOLIC BLOOD PRESSURE: 108 MMHG | HEART RATE: 89 BPM | TEMPERATURE: 98.5 F | RESPIRATION RATE: 16 BRPM | DIASTOLIC BLOOD PRESSURE: 67 MMHG | OXYGEN SATURATION: 97 %

## 2019-01-01 VITALS
RESPIRATION RATE: 18 BRPM | DIASTOLIC BLOOD PRESSURE: 50 MMHG | HEIGHT: 61 IN | OXYGEN SATURATION: 94 % | SYSTOLIC BLOOD PRESSURE: 93 MMHG | HEART RATE: 96 BPM | BODY MASS INDEX: 24.3 KG/M2

## 2019-01-01 VITALS
OXYGEN SATURATION: 100 % | SYSTOLIC BLOOD PRESSURE: 98 MMHG | TEMPERATURE: 98.4 F | RESPIRATION RATE: 16 BRPM | DIASTOLIC BLOOD PRESSURE: 58 MMHG

## 2019-01-01 VITALS
TEMPERATURE: 97.7 F | SYSTOLIC BLOOD PRESSURE: 92 MMHG | HEART RATE: 91 BPM | OXYGEN SATURATION: 95 % | DIASTOLIC BLOOD PRESSURE: 57 MMHG

## 2019-01-01 VITALS
RESPIRATION RATE: 20 BRPM | TEMPERATURE: 98.1 F | OXYGEN SATURATION: 100 % | SYSTOLIC BLOOD PRESSURE: 88 MMHG | DIASTOLIC BLOOD PRESSURE: 37 MMHG | HEART RATE: 94 BPM

## 2019-01-01 VITALS
RESPIRATION RATE: 20 BRPM | DIASTOLIC BLOOD PRESSURE: 63 MMHG | TEMPERATURE: 98.4 F | SYSTOLIC BLOOD PRESSURE: 105 MMHG | HEART RATE: 82 BPM | OXYGEN SATURATION: 100 %

## 2019-01-01 DIAGNOSIS — I95.1 ORTHOSTATIC HYPOTENSION: ICD-10-CM

## 2019-01-01 DIAGNOSIS — K70.30 ALCOHOLIC CIRRHOSIS OF LIVER WITHOUT ASCITES (H): ICD-10-CM

## 2019-01-01 DIAGNOSIS — K70.30 ALCOHOLIC CIRRHOSIS OF LIVER WITHOUT ASCITES (H): Primary | ICD-10-CM

## 2019-01-01 DIAGNOSIS — E87.6 HYPOKALEMIA: ICD-10-CM

## 2019-01-01 DIAGNOSIS — K70.31 ALCOHOLIC CIRRHOSIS OF LIVER WITH ASCITES (H): ICD-10-CM

## 2019-01-01 DIAGNOSIS — F41.9 ANXIETY: Primary | ICD-10-CM

## 2019-01-01 DIAGNOSIS — F33.1 MAJOR DEPRESSIVE DISORDER, RECURRENT EPISODE, MODERATE (H): ICD-10-CM

## 2019-01-01 DIAGNOSIS — K70.31 ALCOHOLIC CIRRHOSIS OF LIVER WITH ASCITES (H): Primary | ICD-10-CM

## 2019-01-01 DIAGNOSIS — K76.82 HEPATIC ENCEPHALOPATHY (H): ICD-10-CM

## 2019-01-01 DIAGNOSIS — F41.1 GAD (GENERALIZED ANXIETY DISORDER): ICD-10-CM

## 2019-01-01 DIAGNOSIS — K76.82 HEPATIC ENCEPHALOPATHY (H): Primary | ICD-10-CM

## 2019-01-01 DIAGNOSIS — F10.20 ALCOHOL DEPENDENCE, CONTINUOUS DRINKING BEHAVIOR (H): ICD-10-CM

## 2019-01-01 DIAGNOSIS — K70.11 ALCOHOLIC HEPATITIS WITH ASCITES (H): ICD-10-CM

## 2019-01-01 DIAGNOSIS — G47.09 OTHER INSOMNIA: ICD-10-CM

## 2019-01-01 DIAGNOSIS — F41.9 ANXIETY: ICD-10-CM

## 2019-01-01 DIAGNOSIS — K21.00 GASTROESOPHAGEAL REFLUX DISEASE WITH ESOPHAGITIS: ICD-10-CM

## 2019-01-01 DIAGNOSIS — M85.80 OSTEOPENIA, UNSPECIFIED LOCATION: ICD-10-CM

## 2019-01-01 DIAGNOSIS — K74.60 CIRRHOSIS OF LIVER WITHOUT ASCITES, UNSPECIFIED HEPATIC CIRRHOSIS TYPE (H): ICD-10-CM

## 2019-01-01 DIAGNOSIS — Z12.31 VISIT FOR SCREENING MAMMOGRAM: ICD-10-CM

## 2019-01-01 DIAGNOSIS — Z09 HOSPITAL DISCHARGE FOLLOW-UP: Primary | ICD-10-CM

## 2019-01-01 DIAGNOSIS — K59.01 SLOW TRANSIT CONSTIPATION: Primary | ICD-10-CM

## 2019-01-01 DIAGNOSIS — G62.9 NEUROPATHY: Primary | ICD-10-CM

## 2019-01-01 DIAGNOSIS — G62.9 NEUROPATHY: ICD-10-CM

## 2019-01-01 DIAGNOSIS — F10.21 ALCOHOLISM IN RECOVERY (H): ICD-10-CM

## 2019-01-01 DIAGNOSIS — F17.203 NICOTINE DEPENDENCE WITH WITHDRAWAL, UNSPECIFIED NICOTINE PRODUCT TYPE: ICD-10-CM

## 2019-01-01 DIAGNOSIS — N17.9 ACUTE KIDNEY INJURY (H): ICD-10-CM

## 2019-01-01 DIAGNOSIS — Z12.31 ENCOUNTER FOR SCREENING MAMMOGRAM FOR BREAST CANCER: ICD-10-CM

## 2019-01-01 DIAGNOSIS — K52.9 GASTROENTERITIS: ICD-10-CM

## 2019-01-01 DIAGNOSIS — Z00.00 ROUTINE GENERAL MEDICAL EXAMINATION AT A HEALTH CARE FACILITY: Primary | ICD-10-CM

## 2019-01-01 DIAGNOSIS — D50.8 OTHER IRON DEFICIENCY ANEMIA: ICD-10-CM

## 2019-01-01 LAB
ABO + RH BLD: NORMAL
ABO + RH BLD: NORMAL
ALBUMIN FLD-MCNC: 0.4 G/DL
ALBUMIN FLD-MCNC: 0.4 G/DL
ALBUMIN SERPL-MCNC: 1.8 G/DL (ref 3.4–5)
ALBUMIN SERPL-MCNC: 2 G/DL (ref 3.4–5)
ALBUMIN SERPL-MCNC: 2.1 G/DL (ref 3.4–5)
ALBUMIN SERPL-MCNC: 2.3 G/DL (ref 3.4–5)
ALBUMIN SERPL-MCNC: 2.4 G/DL (ref 3.4–5)
ALBUMIN SERPL-MCNC: 2.6 G/DL (ref 3.4–5)
ALBUMIN SERPL-MCNC: 2.7 G/DL (ref 3.4–5)
ALBUMIN SERPL-MCNC: 3.2 G/DL (ref 3.4–5)
ALBUMIN UR-MCNC: NEGATIVE MG/DL
ALP SERPL-CCNC: 138 U/L (ref 40–150)
ALP SERPL-CCNC: 149 U/L (ref 40–150)
ALP SERPL-CCNC: 150 U/L (ref 40–150)
ALP SERPL-CCNC: 152 U/L (ref 40–150)
ALP SERPL-CCNC: 169 U/L (ref 40–150)
ALP SERPL-CCNC: 181 U/L (ref 40–150)
ALP SERPL-CCNC: 197 U/L (ref 40–150)
ALP SERPL-CCNC: 202 U/L (ref 40–150)
ALP SERPL-CCNC: 204 U/L (ref 40–150)
ALP SERPL-CCNC: 238 U/L (ref 40–150)
ALT SERPL W P-5'-P-CCNC: 13 U/L (ref 0–50)
ALT SERPL W P-5'-P-CCNC: 14 U/L (ref 0–50)
ALT SERPL W P-5'-P-CCNC: 15 U/L (ref 0–50)
ALT SERPL W P-5'-P-CCNC: 16 U/L (ref 0–50)
ALT SERPL W P-5'-P-CCNC: 16 U/L (ref 0–50)
ALT SERPL W P-5'-P-CCNC: 20 U/L (ref 0–50)
ALT SERPL W P-5'-P-CCNC: 23 U/L (ref 0–50)
ALT SERPL W P-5'-P-CCNC: 27 U/L (ref 0–50)
AMMONIA PLAS-SCNC: 22 UMOL/L (ref 10–50)
AMMONIA PLAS-SCNC: 56 UMOL/L (ref 10–50)
ANION GAP SERPL CALCULATED.3IONS-SCNC: 11 MMOL/L (ref 3–14)
ANION GAP SERPL CALCULATED.3IONS-SCNC: 6 MMOL/L (ref 3–14)
ANION GAP SERPL CALCULATED.3IONS-SCNC: 7 MMOL/L (ref 3–14)
ANION GAP SERPL CALCULATED.3IONS-SCNC: 8 MMOL/L (ref 3–14)
ANION GAP SERPL CALCULATED.3IONS-SCNC: 9 MMOL/L (ref 3–14)
APPEARANCE FLD: CLEAR
APPEARANCE FLD: CLEAR
APPEARANCE FLD: NORMAL
APPEARANCE UR: ABNORMAL
APTT PPP: 27 SEC (ref 22–37)
APTT PPP: 34 SEC (ref 22–37)
AST SERPL W P-5'-P-CCNC: 22 U/L (ref 0–45)
AST SERPL W P-5'-P-CCNC: 22 U/L (ref 0–45)
AST SERPL W P-5'-P-CCNC: 23 U/L (ref 0–45)
AST SERPL W P-5'-P-CCNC: 31 U/L (ref 0–45)
AST SERPL W P-5'-P-CCNC: 34 U/L (ref 0–45)
AST SERPL W P-5'-P-CCNC: 34 U/L (ref 0–45)
AST SERPL W P-5'-P-CCNC: 39 U/L (ref 0–45)
AST SERPL W P-5'-P-CCNC: 43 U/L (ref 0–45)
AST SERPL W P-5'-P-CCNC: 53 U/L (ref 0–45)
AST SERPL W P-5'-P-CCNC: 54 U/L (ref 0–45)
BACTERIA #/AREA URNS HPF: ABNORMAL /HPF
BACTERIA SPEC CULT: ABNORMAL
BACTERIA SPEC CULT: NO GROWTH
BASE DEFICIT BLDV-SCNC: 0.3 MMOL/L
BASOPHILS # BLD AUTO: 0.1 10E9/L (ref 0–0.2)
BASOPHILS NFR BLD AUTO: 0.7 %
BILIRUB DIRECT SERPL-MCNC: 3 MG/DL (ref 0–0.2)
BILIRUB DIRECT SERPL-MCNC: 3.2 MG/DL (ref 0–0.2)
BILIRUB DIRECT SERPL-MCNC: 4.5 MG/DL (ref 0–0.2)
BILIRUB DIRECT SERPL-MCNC: 7.4 MG/DL (ref 0–0.2)
BILIRUB SERPL-MCNC: 4.5 MG/DL (ref 0.2–1.3)
BILIRUB SERPL-MCNC: 5.3 MG/DL (ref 0.2–1.3)
BILIRUB SERPL-MCNC: 5.5 MG/DL (ref 0.2–1.3)
BILIRUB SERPL-MCNC: 5.6 MG/DL (ref 0.2–1.3)
BILIRUB SERPL-MCNC: 5.7 MG/DL (ref 0.2–1.3)
BILIRUB SERPL-MCNC: 6.1 MG/DL (ref 0.2–1.3)
BILIRUB SERPL-MCNC: 7 MG/DL (ref 0.2–1.3)
BILIRUB SERPL-MCNC: 7.9 MG/DL (ref 0.2–1.3)
BILIRUB SERPL-MCNC: 8.2 MG/DL (ref 0.2–1.3)
BILIRUB SERPL-MCNC: 9.2 MG/DL (ref 0.2–1.3)
BILIRUB UR QL STRIP: ABNORMAL
BLD GP AB SCN SERPL QL: NORMAL
BLOOD BANK CMNT PATIENT-IMP: NORMAL
BUN SERPL-MCNC: 11 MG/DL (ref 7–30)
BUN SERPL-MCNC: 12 MG/DL (ref 7–30)
BUN SERPL-MCNC: 13 MG/DL (ref 7–30)
BUN SERPL-MCNC: 14 MG/DL (ref 7–30)
BUN SERPL-MCNC: 20 MG/DL (ref 7–30)
BUN SERPL-MCNC: 27 MG/DL (ref 7–30)
BUN SERPL-MCNC: 27 MG/DL (ref 7–30)
BUN SERPL-MCNC: 39 MG/DL (ref 7–30)
BUN SERPL-MCNC: 44 MG/DL (ref 7–30)
BUN SERPL-MCNC: 45 MG/DL (ref 7–30)
CALCIUM SERPL-MCNC: 7.3 MG/DL (ref 8.5–10.1)
CALCIUM SERPL-MCNC: 7.4 MG/DL (ref 8.5–10.1)
CALCIUM SERPL-MCNC: 7.6 MG/DL (ref 8.5–10.1)
CALCIUM SERPL-MCNC: 7.7 MG/DL (ref 8.5–10.1)
CALCIUM SERPL-MCNC: 7.7 MG/DL (ref 8.5–10.1)
CALCIUM SERPL-MCNC: 7.9 MG/DL (ref 8.5–10.1)
CALCIUM SERPL-MCNC: 8 MG/DL (ref 8.5–10.1)
CALCIUM SERPL-MCNC: 8.2 MG/DL (ref 8.5–10.1)
CALCIUM SERPL-MCNC: 8.2 MG/DL (ref 8.5–10.1)
CALCIUM SERPL-MCNC: 8.4 MG/DL (ref 8.5–10.1)
CHLORIDE SERPL-SCNC: 106 MMOL/L (ref 94–109)
CHLORIDE SERPL-SCNC: 109 MMOL/L (ref 94–109)
CHLORIDE SERPL-SCNC: 110 MMOL/L (ref 94–109)
CHLORIDE SERPL-SCNC: 110 MMOL/L (ref 94–109)
CHLORIDE SERPL-SCNC: 113 MMOL/L (ref 94–109)
CHLORIDE SERPL-SCNC: 113 MMOL/L (ref 94–109)
CHLORIDE SERPL-SCNC: 115 MMOL/L (ref 94–109)
CHLORIDE SERPL-SCNC: 116 MMOL/L (ref 94–109)
CHLORIDE SERPL-SCNC: 96 MMOL/L (ref 94–109)
CHLORIDE SERPL-SCNC: 99 MMOL/L (ref 94–109)
CO2 SERPL-SCNC: 18 MMOL/L (ref 20–32)
CO2 SERPL-SCNC: 19 MMOL/L (ref 20–32)
CO2 SERPL-SCNC: 20 MMOL/L (ref 20–32)
CO2 SERPL-SCNC: 22 MMOL/L (ref 20–32)
CO2 SERPL-SCNC: 22 MMOL/L (ref 20–32)
CO2 SERPL-SCNC: 23 MMOL/L (ref 20–32)
CO2 SERPL-SCNC: 24 MMOL/L (ref 20–32)
CO2 SERPL-SCNC: 26 MMOL/L (ref 20–32)
COLOR FLD: YELLOW
COLOR UR AUTO: ABNORMAL
CREAT SERPL-MCNC: 0.94 MG/DL (ref 0.52–1.04)
CREAT SERPL-MCNC: 1.02 MG/DL (ref 0.52–1.04)
CREAT SERPL-MCNC: 1.05 MG/DL (ref 0.52–1.04)
CREAT SERPL-MCNC: 1.08 MG/DL (ref 0.52–1.04)
CREAT SERPL-MCNC: 1.29 MG/DL (ref 0.52–1.04)
CREAT SERPL-MCNC: 1.38 MG/DL (ref 0.52–1.04)
CREAT SERPL-MCNC: 1.62 MG/DL (ref 0.52–1.04)
CREAT SERPL-MCNC: 2.32 MG/DL (ref 0.52–1.04)
CREAT SERPL-MCNC: 2.56 MG/DL (ref 0.52–1.04)
CREAT SERPL-MCNC: 2.57 MG/DL (ref 0.52–1.04)
CREAT SERPL-MCNC: 2.63 MG/DL (ref 0.52–1.04)
CREAT UR-MCNC: 176 MG/DL
DIFFERENTIAL METHOD BLD: ABNORMAL
EOSINOPHIL NFR BLD AUTO: 0.4 %
ERYTHROCYTE [DISTWIDTH] IN BLOOD BY AUTOMATED COUNT: 15.4 % (ref 10–15)
ERYTHROCYTE [DISTWIDTH] IN BLOOD BY AUTOMATED COUNT: 17 % (ref 10–15)
ERYTHROCYTE [DISTWIDTH] IN BLOOD BY AUTOMATED COUNT: 17 % (ref 10–15)
ERYTHROCYTE [DISTWIDTH] IN BLOOD BY AUTOMATED COUNT: 17.8 % (ref 10–15)
ERYTHROCYTE [DISTWIDTH] IN BLOOD BY AUTOMATED COUNT: 17.9 % (ref 10–15)
ERYTHROCYTE [DISTWIDTH] IN BLOOD BY AUTOMATED COUNT: 17.9 % (ref 10–15)
ERYTHROCYTE [DISTWIDTH] IN BLOOD BY AUTOMATED COUNT: 18 % (ref 10–15)
ERYTHROCYTE [DISTWIDTH] IN BLOOD BY AUTOMATED COUNT: 19.7 % (ref 10–15)
ETHANOL SERPL-MCNC: <0.01 G/DL
FRACT EXCRET NA UR+SERPL-RTO: 0.1 %
GFR SERPL CREATININE-BSD FRML MDRD: 20 ML/MIN/{1.73_M2}
GFR SERPL CREATININE-BSD FRML MDRD: 21 ML/MIN/{1.73_M2}
GFR SERPL CREATININE-BSD FRML MDRD: 21 ML/MIN/{1.73_M2}
GFR SERPL CREATININE-BSD FRML MDRD: 23 ML/MIN/{1.73_M2}
GFR SERPL CREATININE-BSD FRML MDRD: 36 ML/MIN/{1.73_M2}
GFR SERPL CREATININE-BSD FRML MDRD: 44 ML/MIN/{1.73_M2}
GFR SERPL CREATININE-BSD FRML MDRD: 47 ML/MIN/{1.73_M2}
GFR SERPL CREATININE-BSD FRML MDRD: 59 ML/MIN/{1.73_M2}
GFR SERPL CREATININE-BSD FRML MDRD: 61 ML/MIN/{1.73_M2}
GFR SERPL CREATININE-BSD FRML MDRD: 63 ML/MIN/{1.73_M2}
GFR SERPL CREATININE-BSD FRML MDRD: 69 ML/MIN/{1.73_M2}
GLUCOSE SERPL-MCNC: 100 MG/DL (ref 70–99)
GLUCOSE SERPL-MCNC: 108 MG/DL (ref 70–99)
GLUCOSE SERPL-MCNC: 113 MG/DL (ref 70–99)
GLUCOSE SERPL-MCNC: 129 MG/DL (ref 70–99)
GLUCOSE SERPL-MCNC: 79 MG/DL (ref 70–99)
GLUCOSE SERPL-MCNC: 83 MG/DL (ref 70–99)
GLUCOSE SERPL-MCNC: 86 MG/DL (ref 70–99)
GLUCOSE SERPL-MCNC: 92 MG/DL (ref 70–99)
GLUCOSE SERPL-MCNC: 93 MG/DL (ref 70–99)
GLUCOSE SERPL-MCNC: 96 MG/DL (ref 70–99)
GLUCOSE UR STRIP-MCNC: NEGATIVE MG/DL
GRAM STN SPEC: NORMAL
GRAN CASTS #/AREA URNS LPF: 21 /LPF
HCO3 BLDV-SCNC: 24 MMOL/L (ref 21–28)
HCT VFR BLD AUTO: 24.7 % (ref 35–47)
HCT VFR BLD AUTO: 25.1 % (ref 35–47)
HCT VFR BLD AUTO: 25.8 % (ref 35–47)
HCT VFR BLD AUTO: 25.9 % (ref 35–47)
HCT VFR BLD AUTO: 26.2 % (ref 35–47)
HCT VFR BLD AUTO: 26.4 % (ref 35–47)
HCT VFR BLD AUTO: 28.9 % (ref 35–47)
HCT VFR BLD AUTO: 29.2 % (ref 35–47)
HCT VFR BLD AUTO: 30.8 % (ref 35–47)
HEMOCCULT SP1 STL QL: NEGATIVE
HGB BLD-MCNC: 7.7 G/DL (ref 11.7–15.7)
HGB BLD-MCNC: 7.8 G/DL (ref 11.7–15.7)
HGB BLD-MCNC: 8 G/DL (ref 11.7–15.7)
HGB BLD-MCNC: 8.1 G/DL (ref 11.7–15.7)
HGB BLD-MCNC: 8.1 G/DL (ref 11.7–15.7)
HGB BLD-MCNC: 8.2 G/DL (ref 11.7–15.7)
HGB BLD-MCNC: 8.3 G/DL (ref 11.7–15.7)
HGB BLD-MCNC: 8.4 G/DL (ref 11.7–15.7)
HGB BLD-MCNC: 8.7 G/DL (ref 11.7–15.7)
HGB BLD-MCNC: 8.8 G/DL (ref 11.7–15.7)
HGB BLD-MCNC: 9.7 G/DL (ref 11.7–15.7)
HGB BLD-MCNC: 9.8 G/DL (ref 11.7–15.7)
HGB BLD-MCNC: 9.8 G/DL (ref 11.7–15.7)
HGB UR QL STRIP: NEGATIVE
HYALINE CASTS #/AREA URNS LPF: 10 /LPF (ref 0–2)
IMM GRANULOCYTES # BLD: 0.1 10E9/L (ref 0–0.4)
IMM GRANULOCYTES NFR BLD: 0.7 %
INR PPP: 1.18 (ref 0.86–1.14)
INR PPP: 1.24 (ref 0.86–1.14)
INR PPP: 1.25 (ref 0.86–1.14)
INR PPP: 1.26 (ref 0.86–1.14)
INR PPP: 1.26 (ref 0.86–1.14)
INR PPP: 1.28 (ref 0.86–1.14)
INR PPP: 1.3 (ref 0.86–1.14)
INR PPP: 1.39 (ref 0.86–1.14)
INR PPP: 1.42 (ref 0.86–1.14)
INR PPP: 1.42 (ref 0.86–1.14)
KETONES UR STRIP-MCNC: NEGATIVE MG/DL
LACTATE BLD-SCNC: 0.8 MMOL/L (ref 0.7–2)
LDH FLD L TO P-CCNC: 49 U/L
LDH SERPL L TO P-CCNC: 196 U/L (ref 81–234)
LEUKOCYTE ESTERASE UR QL STRIP: NEGATIVE
LIPASE SERPL-CCNC: 57 U/L (ref 73–393)
LYMPHOCYTES # BLD AUTO: 1.3 10E9/L (ref 0.8–5.3)
LYMPHOCYTES NFR BLD AUTO: 18.8 %
LYMPHOCYTES NFR FLD MANUAL: 37 %
LYMPHOCYTES NFR FLD MANUAL: 46 %
LYMPHOCYTES NFR FLD MANUAL: 5 %
Lab: ABNORMAL
Lab: ABNORMAL
Lab: NORMAL
MAGNESIUM SERPL-MCNC: 1.9 MG/DL (ref 1.6–2.3)
MCH RBC QN AUTO: 35.3 PG (ref 26.5–33)
MCH RBC QN AUTO: 35.4 PG (ref 26.5–33)
MCH RBC QN AUTO: 35.5 PG (ref 26.5–33)
MCH RBC QN AUTO: 35.6 PG (ref 26.5–33)
MCH RBC QN AUTO: 35.9 PG (ref 26.5–33)
MCH RBC QN AUTO: 35.9 PG (ref 26.5–33)
MCH RBC QN AUTO: 36.1 PG (ref 26.5–33)
MCH RBC QN AUTO: 37.7 PG (ref 26.5–33)
MCHC RBC AUTO-ENTMCNC: 31 G/DL (ref 31.5–36.5)
MCHC RBC AUTO-ENTMCNC: 31.1 G/DL (ref 31.5–36.5)
MCHC RBC AUTO-ENTMCNC: 31.3 G/DL (ref 31.5–36.5)
MCHC RBC AUTO-ENTMCNC: 31.7 G/DL (ref 31.5–36.5)
MCHC RBC AUTO-ENTMCNC: 31.8 G/DL (ref 31.5–36.5)
MCHC RBC AUTO-ENTMCNC: 31.8 G/DL (ref 31.5–36.5)
MCHC RBC AUTO-ENTMCNC: 33.2 G/DL (ref 31.5–36.5)
MCHC RBC AUTO-ENTMCNC: 33.2 G/DL (ref 31.5–36.5)
MCV RBC AUTO: 108 FL (ref 78–100)
MCV RBC AUTO: 109 FL (ref 78–100)
MCV RBC AUTO: 111 FL (ref 78–100)
MCV RBC AUTO: 112 FL (ref 78–100)
MCV RBC AUTO: 113 FL (ref 78–100)
MCV RBC AUTO: 115 FL (ref 78–100)
MCV RBC AUTO: 116 FL (ref 78–100)
MCV RBC AUTO: 119 FL (ref 78–100)
MONOCYTES # BLD AUTO: 0.7 10E9/L (ref 0–1.3)
MONOCYTES NFR BLD AUTO: 9.3 %
MONOS+MACROS NFR FLD MANUAL: 35 %
MONOS+MACROS NFR FLD MANUAL: 37 %
MONOS+MACROS NFR FLD MANUAL: 59 %
MUCOUS THREADS #/AREA URNS LPF: PRESENT /LPF
NEUTROPHILS # BLD AUTO: 4.9 10E9/L (ref 1.6–8.3)
NEUTROPHILS NFR BLD AUTO: 70.1 %
NEUTS BAND NFR FLD MANUAL: 19 %
NEUTS BAND NFR FLD MANUAL: 4 %
NEUTS BAND NFR FLD MANUAL: 58 %
NITRATE UR QL: NEGATIVE
NRBC # BLD AUTO: 0 10*3/UL
NRBC BLD AUTO-RTO: 0 /100
O2/TOTAL GAS SETTING VFR VENT: 21 %
PCO2 BLDV: 37 MM HG (ref 40–50)
PH BLDV: 7.42 PH (ref 7.32–7.43)
PH UR STRIP: 5 PH (ref 5–7)
PHOSPHATE SERPL-MCNC: 3 MG/DL (ref 2.5–4.5)
PLATELET # BLD AUTO: 37 10E9/L (ref 150–450)
PLATELET # BLD AUTO: 40 10E9/L (ref 150–450)
PLATELET # BLD AUTO: 41 10E9/L (ref 150–450)
PLATELET # BLD AUTO: 45 10E9/L (ref 150–450)
PLATELET # BLD AUTO: 57 10E9/L (ref 150–450)
PLATELET # BLD AUTO: 58 10E9/L (ref 150–450)
PLATELET # BLD AUTO: 59 10E9/L (ref 150–450)
PLATELET # BLD AUTO: 59 10E9/L (ref 150–450)
PLATELET # BLD AUTO: 61 10E9/L (ref 150–450)
PLATELET # BLD AUTO: 62 10E9/L (ref 150–450)
PLATELET # BLD AUTO: 64 10E9/L (ref 150–450)
PLATELET # BLD AUTO: 67 10E9/L (ref 150–450)
PLATELET # BLD AUTO: 67 10E9/L (ref 150–450)
PLATELET # BLD AUTO: 71 10E9/L (ref 150–450)
PLATELET # BLD AUTO: 73 10E9/L (ref 150–450)
PO2 BLDV: 44 MM HG (ref 25–47)
POTASSIUM SERPL-SCNC: 3.2 MMOL/L (ref 3.4–5.3)
POTASSIUM SERPL-SCNC: 3.3 MMOL/L (ref 3.4–5.3)
POTASSIUM SERPL-SCNC: 3.4 MMOL/L (ref 3.4–5.3)
POTASSIUM SERPL-SCNC: 3.6 MMOL/L (ref 3.4–5.3)
POTASSIUM SERPL-SCNC: 3.6 MMOL/L (ref 3.4–5.3)
POTASSIUM SERPL-SCNC: 3.7 MMOL/L (ref 3.4–5.3)
POTASSIUM SERPL-SCNC: 3.8 MMOL/L (ref 3.4–5.3)
POTASSIUM SERPL-SCNC: 4 MMOL/L (ref 3.4–5.3)
POTASSIUM SERPL-SCNC: 4 MMOL/L (ref 3.4–5.3)
POTASSIUM SERPL-SCNC: 4.1 MMOL/L (ref 3.4–5.3)
PROCALCITONIN SERPL-MCNC: 0.08 NG/ML
PROCALCITONIN SERPL-MCNC: 0.26 NG/ML
PROCALCITONIN SERPL-MCNC: 0.56 NG/ML
PROCALCITONIN SERPL-MCNC: 0.8 NG/ML
PROT FLD-MCNC: 0.7 G/DL
PROT FLD-MCNC: 0.8 G/DL
PROT SERPL-MCNC: 4.2 G/DL (ref 6.8–8.8)
PROT SERPL-MCNC: 4.4 G/DL (ref 6.8–8.8)
PROT SERPL-MCNC: 4.5 G/DL (ref 6.8–8.8)
PROT SERPL-MCNC: 4.6 G/DL (ref 6.8–8.8)
PROT SERPL-MCNC: 4.6 G/DL (ref 6.8–8.8)
PROT SERPL-MCNC: 4.7 G/DL (ref 6.8–8.8)
PROT SERPL-MCNC: 4.9 G/DL (ref 6.8–8.8)
PROT SERPL-MCNC: 5.1 G/DL (ref 6.8–8.8)
PROT SERPL-MCNC: 5.3 G/DL (ref 6.8–8.8)
PROT SERPL-MCNC: 5.6 G/DL (ref 6.8–8.8)
RBC # BLD AUTO: 2.19 10E12/L (ref 3.8–5.2)
RBC # BLD AUTO: 2.23 10E12/L (ref 3.8–5.2)
RBC # BLD AUTO: 2.27 10E12/L (ref 3.8–5.2)
RBC # BLD AUTO: 2.29 10E12/L (ref 3.8–5.2)
RBC # BLD AUTO: 2.34 10E12/L (ref 3.8–5.2)
RBC # BLD AUTO: 2.38 10E12/L (ref 3.8–5.2)
RBC # BLD AUTO: 2.6 10E12/L (ref 3.8–5.2)
RBC # BLD AUTO: 2.7 10E12/L (ref 3.8–5.2)
RBC #/AREA URNS AUTO: 1 /HPF (ref 0–2)
SODIUM SERPL-SCNC: 131 MMOL/L (ref 133–144)
SODIUM SERPL-SCNC: 131 MMOL/L (ref 133–144)
SODIUM SERPL-SCNC: 134 MMOL/L (ref 133–144)
SODIUM SERPL-SCNC: 135 MMOL/L (ref 133–144)
SODIUM SERPL-SCNC: 137 MMOL/L (ref 133–144)
SODIUM SERPL-SCNC: 138 MMOL/L (ref 133–144)
SODIUM SERPL-SCNC: 140 MMOL/L (ref 133–144)
SODIUM SERPL-SCNC: 140 MMOL/L (ref 133–144)
SODIUM SERPL-SCNC: 141 MMOL/L (ref 133–144)
SODIUM SERPL-SCNC: 141 MMOL/L (ref 133–144)
SODIUM SERPL-SCNC: 145 MMOL/L (ref 133–144)
SODIUM UR-SCNC: 11 MMOL/L
SOURCE: ABNORMAL
SP GR UR STRIP: 1.01 (ref 1–1.03)
SPECIMEN EXP DATE BLD: NORMAL
SPECIMEN SOURCE FLD: NORMAL
SPECIMEN SOURCE: ABNORMAL
SPECIMEN SOURCE: ABNORMAL
SPECIMEN SOURCE: NORMAL
SQUAMOUS #/AREA URNS AUTO: 9 /HPF (ref 0–1)
UROBILINOGEN UR STRIP-MCNC: 4 MG/DL (ref 0–2)
VIT B12 SERPL-MCNC: 1790 PG/ML (ref 193–986)
WBC # BLD AUTO: 3.4 10E9/L (ref 4–11)
WBC # BLD AUTO: 4 10E9/L (ref 4–11)
WBC # BLD AUTO: 4 10E9/L (ref 4–11)
WBC # BLD AUTO: 4.1 10E9/L (ref 4–11)
WBC # BLD AUTO: 4.2 10E9/L (ref 4–11)
WBC # BLD AUTO: 5 10E9/L (ref 4–11)
WBC # BLD AUTO: 5.2 10E9/L (ref 4–11)
WBC # BLD AUTO: 6.3 10E9/L (ref 4–11)
WBC # BLD AUTO: 7 10E9/L (ref 4–11)
WBC # FLD AUTO: 183 /UL
WBC # FLD AUTO: 191 /UL
WBC # FLD AUTO: 612 /UL
WBC #/AREA URNS AUTO: 4 /HPF (ref 0–5)
WBC CLUMPS #/AREA URNS HPF: PRESENT /HPF

## 2019-01-01 PROCEDURE — 25000128 H RX IP 250 OP 636: Performed by: RADIOLOGY

## 2019-01-01 PROCEDURE — 85730 THROMBOPLASTIN TIME PARTIAL: CPT | Performed by: FAMILY MEDICINE

## 2019-01-01 PROCEDURE — 12000000 ZZH R&B MED SURG/OB

## 2019-01-01 PROCEDURE — 25000132 ZZH RX MED GY IP 250 OP 250 PS 637: Performed by: PEDIATRICS

## 2019-01-01 PROCEDURE — G0378 HOSPITAL OBSERVATION PER HR: HCPCS

## 2019-01-01 PROCEDURE — 97535 SELF CARE MNGMENT TRAINING: CPT | Mod: GO | Performed by: OCCUPATIONAL THERAPIST

## 2019-01-01 PROCEDURE — 85018 HEMOGLOBIN: CPT | Performed by: PEDIATRICS

## 2019-01-01 PROCEDURE — 80048 BASIC METABOLIC PNL TOTAL CA: CPT | Performed by: FAMILY MEDICINE

## 2019-01-01 PROCEDURE — 81001 URINALYSIS AUTO W/SCOPE: CPT | Performed by: FAMILY MEDICINE

## 2019-01-01 PROCEDURE — 86901 BLOOD TYPING SEROLOGIC RH(D): CPT | Performed by: PEDIATRICS

## 2019-01-01 PROCEDURE — 25000128 H RX IP 250 OP 636: Performed by: FAMILY MEDICINE

## 2019-01-01 PROCEDURE — 85610 PROTHROMBIN TIME: CPT | Performed by: RADIOLOGY

## 2019-01-01 PROCEDURE — P9047 ALBUMIN (HUMAN), 25%, 50ML: HCPCS | Performed by: FAMILY MEDICINE

## 2019-01-01 PROCEDURE — 82803 BLOOD GASES ANY COMBINATION: CPT | Performed by: FAMILY MEDICINE

## 2019-01-01 PROCEDURE — 36415 COLL VENOUS BLD VENIPUNCTURE: CPT | Performed by: FAMILY MEDICINE

## 2019-01-01 PROCEDURE — 85027 COMPLETE CBC AUTOMATED: CPT | Performed by: INTERNAL MEDICINE

## 2019-01-01 PROCEDURE — 36415 COLL VENOUS BLD VENIPUNCTURE: CPT | Performed by: HOSPITALIST

## 2019-01-01 PROCEDURE — 84145 PROCALCITONIN (PCT): CPT | Performed by: FAMILY MEDICINE

## 2019-01-01 PROCEDURE — P9047 ALBUMIN (HUMAN), 25%, 50ML: HCPCS | Performed by: PEDIATRICS

## 2019-01-01 PROCEDURE — 74018 RADEX ABDOMEN 1 VIEW: CPT | Mod: TC

## 2019-01-01 PROCEDURE — 27210190 US PARACENTESIS

## 2019-01-01 PROCEDURE — 25800030 ZZH RX IP 258 OP 636: Performed by: PEDIATRICS

## 2019-01-01 PROCEDURE — 36415 COLL VENOUS BLD VENIPUNCTURE: CPT | Performed by: INTERNAL MEDICINE

## 2019-01-01 PROCEDURE — 80053 COMPREHEN METABOLIC PANEL: CPT | Performed by: FAMILY MEDICINE

## 2019-01-01 PROCEDURE — 85049 AUTOMATED PLATELET COUNT: CPT | Performed by: PEDIATRICS

## 2019-01-01 PROCEDURE — 80048 BASIC METABOLIC PNL TOTAL CA: CPT | Performed by: INTERNAL MEDICINE

## 2019-01-01 PROCEDURE — 85610 PROTHROMBIN TIME: CPT | Performed by: HOSPITALIST

## 2019-01-01 PROCEDURE — 85049 AUTOMATED PLATELET COUNT: CPT | Performed by: RADIOLOGY

## 2019-01-01 PROCEDURE — 25000128 H RX IP 250 OP 636: Performed by: PEDIATRICS

## 2019-01-01 PROCEDURE — 82607 VITAMIN B-12: CPT | Performed by: HOSPITALIST

## 2019-01-01 PROCEDURE — C9113 INJ PANTOPRAZOLE SODIUM, VIA: HCPCS | Performed by: PEDIATRICS

## 2019-01-01 PROCEDURE — 82570 ASSAY OF URINE CREATININE: CPT | Performed by: PEDIATRICS

## 2019-01-01 PROCEDURE — 87086 URINE CULTURE/COLONY COUNT: CPT | Performed by: PEDIATRICS

## 2019-01-01 PROCEDURE — 85014 HEMATOCRIT: CPT | Performed by: RADIOLOGY

## 2019-01-01 PROCEDURE — 85610 PROTHROMBIN TIME: CPT | Performed by: INTERNAL MEDICINE

## 2019-01-01 PROCEDURE — 25000125 ZZHC RX 250: Performed by: RADIOLOGY

## 2019-01-01 PROCEDURE — 36415 COLL VENOUS BLD VENIPUNCTURE: CPT | Performed by: PEDIATRICS

## 2019-01-01 PROCEDURE — 89051 BODY FLUID CELL COUNT: CPT | Performed by: PEDIATRICS

## 2019-01-01 PROCEDURE — 77067 SCR MAMMO BI INCL CAD: CPT

## 2019-01-01 PROCEDURE — 25000125 ZZHC RX 250

## 2019-01-01 PROCEDURE — 0W9G3ZZ DRAINAGE OF PERITONEAL CAVITY, PERCUTANEOUS APPROACH: ICD-10-PCS | Performed by: RADIOLOGY

## 2019-01-01 PROCEDURE — 85027 COMPLETE CBC AUTOMATED: CPT | Performed by: PEDIATRICS

## 2019-01-01 PROCEDURE — 87800 DETECT AGNT MULT DNA DIREC: CPT | Performed by: PEDIATRICS

## 2019-01-01 PROCEDURE — 25000132 ZZH RX MED GY IP 250 OP 250 PS 637: Performed by: FAMILY MEDICINE

## 2019-01-01 PROCEDURE — 84100 ASSAY OF PHOSPHORUS: CPT | Performed by: PEDIATRICS

## 2019-01-01 PROCEDURE — 80320 DRUG SCREEN QUANTALCOHOLS: CPT | Performed by: FAMILY MEDICINE

## 2019-01-01 PROCEDURE — 97110 THERAPEUTIC EXERCISES: CPT | Mod: GP | Performed by: PHYSICAL THERAPIST

## 2019-01-01 PROCEDURE — 82042 OTHER SOURCE ALBUMIN QUAN EA: CPT | Performed by: PEDIATRICS

## 2019-01-01 PROCEDURE — 25800030 ZZH RX IP 258 OP 636: Performed by: FAMILY MEDICINE

## 2019-01-01 PROCEDURE — 80076 HEPATIC FUNCTION PANEL: CPT | Performed by: INTERNAL MEDICINE

## 2019-01-01 PROCEDURE — G0463 HOSPITAL OUTPT CLINIC VISIT: HCPCS | Mod: ZF

## 2019-01-01 PROCEDURE — 84157 ASSAY OF PROTEIN OTHER: CPT | Performed by: HOSPITALIST

## 2019-01-01 PROCEDURE — 87077 CULTURE AEROBIC IDENTIFY: CPT | Performed by: PEDIATRICS

## 2019-01-01 PROCEDURE — 99232 SBSQ HOSP IP/OBS MODERATE 35: CPT | Performed by: HOSPITALIST

## 2019-01-01 PROCEDURE — 87205 SMEAR GRAM STAIN: CPT | Performed by: PEDIATRICS

## 2019-01-01 PROCEDURE — 83615 LACTATE (LD) (LDH) ENZYME: CPT | Performed by: PEDIATRICS

## 2019-01-01 PROCEDURE — 25000128 H RX IP 250 OP 636: Performed by: HOSPITALIST

## 2019-01-01 PROCEDURE — 99223 1ST HOSP IP/OBS HIGH 75: CPT | Mod: AI | Performed by: PEDIATRICS

## 2019-01-01 PROCEDURE — 97127 ZZHC OT THERAPEUTIC INTERVENTION W/FOCUS ON COGNITIVE FUNCTION,EA 15 MIN: CPT | Mod: GO

## 2019-01-01 PROCEDURE — 80053 COMPREHEN METABOLIC PANEL: CPT | Performed by: PEDIATRICS

## 2019-01-01 PROCEDURE — 87070 CULTURE OTHR SPECIMN AEROBIC: CPT | Performed by: FAMILY MEDICINE

## 2019-01-01 PROCEDURE — 80076 HEPATIC FUNCTION PANEL: CPT | Performed by: FAMILY MEDICINE

## 2019-01-01 PROCEDURE — 99495 TRANSJ CARE MGMT MOD F2F 14D: CPT | Performed by: FAMILY MEDICINE

## 2019-01-01 PROCEDURE — 85027 COMPLETE CBC AUTOMATED: CPT | Performed by: FAMILY MEDICINE

## 2019-01-01 PROCEDURE — 84132 ASSAY OF SERUM POTASSIUM: CPT | Performed by: FAMILY MEDICINE

## 2019-01-01 PROCEDURE — 84300 ASSAY OF URINE SODIUM: CPT | Performed by: PEDIATRICS

## 2019-01-01 PROCEDURE — 84145 PROCALCITONIN (PCT): CPT | Performed by: PEDIATRICS

## 2019-01-01 PROCEDURE — 99285 EMERGENCY DEPT VISIT HI MDM: CPT | Mod: 25

## 2019-01-01 PROCEDURE — 99285 EMERGENCY DEPT VISIT HI MDM: CPT | Mod: Z6 | Performed by: FAMILY MEDICINE

## 2019-01-01 PROCEDURE — 82274 ASSAY TEST FOR BLOOD FECAL: CPT | Performed by: PEDIATRICS

## 2019-01-01 PROCEDURE — 97167 OT EVAL HIGH COMPLEX 60 MIN: CPT | Mod: GO

## 2019-01-01 PROCEDURE — 97530 THERAPEUTIC ACTIVITIES: CPT | Mod: GO

## 2019-01-01 PROCEDURE — 25000125 ZZHC RX 250: Performed by: FAMILY MEDICINE

## 2019-01-01 PROCEDURE — 96361 HYDRATE IV INFUSION ADD-ON: CPT

## 2019-01-01 PROCEDURE — 87205 SMEAR GRAM STAIN: CPT | Performed by: FAMILY MEDICINE

## 2019-01-01 PROCEDURE — 25000132 ZZH RX MED GY IP 250 OP 250 PS 637: Performed by: HOSPITALIST

## 2019-01-01 PROCEDURE — P9047 ALBUMIN (HUMAN), 25%, 50ML: HCPCS | Performed by: RADIOLOGY

## 2019-01-01 PROCEDURE — 99239 HOSP IP/OBS DSCHRG MGMT >30: CPT | Performed by: HOSPITALIST

## 2019-01-01 PROCEDURE — 87040 BLOOD CULTURE FOR BACTERIA: CPT | Performed by: PEDIATRICS

## 2019-01-01 PROCEDURE — P9047 ALBUMIN (HUMAN), 25%, 50ML: HCPCS | Performed by: HOSPITALIST

## 2019-01-01 PROCEDURE — 84157 ASSAY OF PROTEIN OTHER: CPT | Performed by: PEDIATRICS

## 2019-01-01 PROCEDURE — 82140 ASSAY OF AMMONIA: CPT | Performed by: FAMILY MEDICINE

## 2019-01-01 PROCEDURE — 97112 NEUROMUSCULAR REEDUCATION: CPT | Mod: GP | Performed by: PHYSICAL THERAPIST

## 2019-01-01 PROCEDURE — 36415 COLL VENOUS BLD VENIPUNCTURE: CPT | Performed by: RADIOLOGY

## 2019-01-01 PROCEDURE — 97110 THERAPEUTIC EXERCISES: CPT | Mod: GO | Performed by: OCCUPATIONAL THERAPIST

## 2019-01-01 PROCEDURE — 89051 BODY FLUID CELL COUNT: CPT | Performed by: HOSPITALIST

## 2019-01-01 PROCEDURE — 86900 BLOOD TYPING SEROLOGIC ABO: CPT | Performed by: PEDIATRICS

## 2019-01-01 PROCEDURE — 83605 ASSAY OF LACTIC ACID: CPT | Performed by: PEDIATRICS

## 2019-01-01 PROCEDURE — 87186 SC STD MICRODIL/AGAR DIL: CPT | Performed by: PEDIATRICS

## 2019-01-01 PROCEDURE — 96374 THER/PROPH/DIAG INJ IV PUSH: CPT

## 2019-01-01 PROCEDURE — 97530 THERAPEUTIC ACTIVITIES: CPT | Mod: GP | Performed by: PHYSICAL THERAPIST

## 2019-01-01 PROCEDURE — 90682 RIV4 VACC RECOMBINANT DNA IM: CPT | Performed by: FAMILY MEDICINE

## 2019-01-01 PROCEDURE — 99233 SBSQ HOSP IP/OBS HIGH 50: CPT | Performed by: FAMILY MEDICINE

## 2019-01-01 PROCEDURE — 82042 OTHER SOURCE ALBUMIN QUAN EA: CPT | Performed by: HOSPITALIST

## 2019-01-01 PROCEDURE — 85025 COMPLETE CBC W/AUTO DIFF WBC: CPT | Performed by: FAMILY MEDICINE

## 2019-01-01 PROCEDURE — 83690 ASSAY OF LIPASE: CPT | Performed by: FAMILY MEDICINE

## 2019-01-01 PROCEDURE — 85610 PROTHROMBIN TIME: CPT | Performed by: PEDIATRICS

## 2019-01-01 PROCEDURE — 82565 ASSAY OF CREATININE: CPT | Performed by: PEDIATRICS

## 2019-01-01 PROCEDURE — 99213 OFFICE O/P EST LOW 20 MIN: CPT | Mod: 25 | Performed by: FAMILY MEDICINE

## 2019-01-01 PROCEDURE — 99396 PREV VISIT EST AGE 40-64: CPT | Mod: 25 | Performed by: FAMILY MEDICINE

## 2019-01-01 PROCEDURE — 87088 URINE BACTERIA CULTURE: CPT | Performed by: PEDIATRICS

## 2019-01-01 PROCEDURE — 85610 PROTHROMBIN TIME: CPT | Performed by: FAMILY MEDICINE

## 2019-01-01 PROCEDURE — 97162 PT EVAL MOD COMPLEX 30 MIN: CPT | Mod: GP | Performed by: PHYSICAL THERAPIST

## 2019-01-01 PROCEDURE — 97535 SELF CARE MNGMENT TRAINING: CPT | Mod: GO

## 2019-01-01 PROCEDURE — 84295 ASSAY OF SERUM SODIUM: CPT | Performed by: PEDIATRICS

## 2019-01-01 PROCEDURE — 86850 RBC ANTIBODY SCREEN: CPT | Performed by: PEDIATRICS

## 2019-01-01 PROCEDURE — 80053 COMPREHEN METABOLIC PANEL: CPT | Performed by: HOSPITALIST

## 2019-01-01 PROCEDURE — 87015 SPECIMEN INFECT AGNT CONCNTJ: CPT | Performed by: FAMILY MEDICINE

## 2019-01-01 PROCEDURE — 99233 SBSQ HOSP IP/OBS HIGH 50: CPT | Performed by: PEDIATRICS

## 2019-01-01 PROCEDURE — 85018 HEMOGLOBIN: CPT | Performed by: RADIOLOGY

## 2019-01-01 PROCEDURE — 83735 ASSAY OF MAGNESIUM: CPT | Performed by: PEDIATRICS

## 2019-01-01 PROCEDURE — 90471 IMMUNIZATION ADMIN: CPT | Performed by: FAMILY MEDICINE

## 2019-01-01 PROCEDURE — 99207 ZZC APP CREDIT; MD BILLING SHARED VISIT: CPT | Performed by: FAMILY MEDICINE

## 2019-01-01 PROCEDURE — 85027 COMPLETE CBC AUTOMATED: CPT | Performed by: HOSPITALIST

## 2019-01-01 PROCEDURE — 82140 ASSAY OF AMMONIA: CPT | Performed by: PEDIATRICS

## 2019-01-01 PROCEDURE — 87070 CULTURE OTHR SPECIMN AEROBIC: CPT | Performed by: PEDIATRICS

## 2019-01-01 PROCEDURE — 85730 THROMBOPLASTIN TIME PARTIAL: CPT | Performed by: PEDIATRICS

## 2019-01-01 PROCEDURE — 40000893 ZZH STATISTIC PT IP EVAL DEFER: Performed by: PHYSICAL THERAPIST

## 2019-01-01 PROCEDURE — 89051 BODY FLUID CELL COUNT: CPT | Performed by: FAMILY MEDICINE

## 2019-01-01 PROCEDURE — 87015 SPECIMEN INFECT AGNT CONCNTJ: CPT | Performed by: PEDIATRICS

## 2019-01-01 RX ORDER — NICOTINE POLACRILEX 4 MG
15-30 LOZENGE BUCCAL
Status: DISCONTINUED | OUTPATIENT
Start: 2019-01-01 | End: 2019-01-01

## 2019-01-01 RX ORDER — FERROUS SULFATE 324(65)MG
TABLET, DELAYED RELEASE (ENTERIC COATED) ORAL
Qty: 60 TABLET | Refills: 5 | Status: ON HOLD | OUTPATIENT
Start: 2019-01-01 | End: 2019-01-01

## 2019-01-01 RX ORDER — DEXTROSE MONOHYDRATE 25 G/50ML
25-50 INJECTION, SOLUTION INTRAVENOUS
Status: DISCONTINUED | OUTPATIENT
Start: 2019-01-01 | End: 2019-01-01 | Stop reason: HOSPADM

## 2019-01-01 RX ORDER — CEFTRIAXONE SODIUM 2 G/50ML
2 INJECTION, SOLUTION INTRAVENOUS EVERY 24 HOURS
Status: DISCONTINUED | OUTPATIENT
Start: 2019-01-01 | End: 2019-01-01 | Stop reason: CLARIF

## 2019-01-01 RX ORDER — NICOTINE POLACRILEX 4 MG
15-30 LOZENGE BUCCAL
Status: DISCONTINUED | OUTPATIENT
Start: 2019-01-01 | End: 2019-01-01 | Stop reason: HOSPADM

## 2019-01-01 RX ORDER — ACAMPROSATE CALCIUM 333 MG/1
666 TABLET, DELAYED RELEASE ORAL 3 TIMES DAILY
Qty: 540 TABLET | Refills: 0 | Status: SHIPPED | OUTPATIENT
Start: 2019-01-01 | End: 2019-01-01

## 2019-01-01 RX ORDER — ALBUMIN (HUMAN) 12.5 G/50ML
25 SOLUTION INTRAVENOUS ONCE
Status: COMPLETED | OUTPATIENT
Start: 2019-01-01 | End: 2019-01-01

## 2019-01-01 RX ORDER — SODIUM CHLORIDE 9 MG/ML
1000 INJECTION, SOLUTION INTRAVENOUS CONTINUOUS
Status: DISCONTINUED | OUTPATIENT
Start: 2019-01-01 | End: 2019-01-01

## 2019-01-01 RX ORDER — ALBUMIN (HUMAN) 12.5 G/50ML
37.5 SOLUTION INTRAVENOUS ONCE
Status: DISCONTINUED | OUTPATIENT
Start: 2019-01-01 | End: 2019-01-01 | Stop reason: CLARIF

## 2019-01-01 RX ORDER — FUROSEMIDE 40 MG
40 TABLET ORAL DAILY
Qty: 90 TABLET | Refills: 3 | Status: SHIPPED | OUTPATIENT
Start: 2019-01-01 | End: 2019-01-01

## 2019-01-01 RX ORDER — NICOTINE POLACRILEX 4 MG
15-30 LOZENGE BUCCAL
Status: CANCELLED | OUTPATIENT
Start: 2019-01-01

## 2019-01-01 RX ORDER — LIDOCAINE HYDROCHLORIDE 10 MG/ML
10 INJECTION, SOLUTION EPIDURAL; INFILTRATION; INTRACAUDAL; PERINEURAL ONCE
Status: COMPLETED | OUTPATIENT
Start: 2019-01-01 | End: 2019-01-01

## 2019-01-01 RX ORDER — NICOTINE 21 MG/24HR
1 PATCH, TRANSDERMAL 24 HOURS TRANSDERMAL DAILY
Qty: 30 PATCH | Refills: 0 | Status: SHIPPED | OUTPATIENT
Start: 2019-01-01 | End: 2019-01-01

## 2019-01-01 RX ORDER — LACTULOSE 10 G/15ML
10 SOLUTION ORAL 3 TIMES DAILY PRN
Qty: 500 ML | Refills: 3 | Status: SHIPPED | OUTPATIENT
Start: 2019-01-01 | End: 2020-01-01

## 2019-01-01 RX ORDER — POTASSIUM CL/LIDO/0.9 % NACL 10MEQ/0.1L
10 INTRAVENOUS SOLUTION, PIGGYBACK (ML) INTRAVENOUS
Status: DISCONTINUED | OUTPATIENT
Start: 2019-01-01 | End: 2019-01-01 | Stop reason: HOSPADM

## 2019-01-01 RX ORDER — FOLIC ACID 1 MG/1
1 TABLET ORAL DAILY
Status: DISCONTINUED | OUTPATIENT
Start: 2019-01-01 | End: 2019-01-01 | Stop reason: HOSPADM

## 2019-01-01 RX ORDER — SPIRONOLACTONE 50 MG/1
50 TABLET, FILM COATED ORAL DAILY
COMMUNITY
Start: 2019-01-01 | End: 2020-01-01

## 2019-01-01 RX ORDER — POTASSIUM CHLORIDE 750 MG/1
10 TABLET, EXTENDED RELEASE ORAL DAILY
Status: DISCONTINUED | OUTPATIENT
Start: 2019-01-01 | End: 2019-01-01 | Stop reason: HOSPADM

## 2019-01-01 RX ORDER — TRAZODONE HYDROCHLORIDE 50 MG/1
100 TABLET, FILM COATED ORAL
Qty: 180 TABLET | Refills: 3 | Status: ON HOLD | OUTPATIENT
Start: 2019-01-01 | End: 2019-01-01

## 2019-01-01 RX ORDER — NICOTINE 21 MG/24HR
1 PATCH, TRANSDERMAL 24 HOURS TRANSDERMAL DAILY
Qty: 30 PATCH | Refills: 0 | COMMUNITY
Start: 2019-01-01 | End: 2019-01-01

## 2019-01-01 RX ORDER — LACTULOSE 10 G/15ML
10 SOLUTION ORAL 3 TIMES DAILY PRN
Qty: 500 ML | Refills: 0 | Status: SHIPPED | OUTPATIENT
Start: 2019-01-01 | End: 2019-01-01

## 2019-01-01 RX ORDER — GABAPENTIN 100 MG/1
300 CAPSULE ORAL AT BEDTIME
Qty: 270 CAPSULE | Refills: 3 | Status: ON HOLD | OUTPATIENT
Start: 2019-01-01 | End: 2020-01-01

## 2019-01-01 RX ORDER — ALBUMIN (HUMAN) 12.5 G/50ML
37.5 SOLUTION INTRAVENOUS ONCE
Status: COMPLETED | OUTPATIENT
Start: 2019-01-01 | End: 2019-01-01

## 2019-01-01 RX ORDER — WATER 10 ML/10ML
INJECTION INTRAMUSCULAR; INTRAVENOUS; SUBCUTANEOUS
Status: COMPLETED
Start: 2019-01-01 | End: 2019-01-01

## 2019-01-01 RX ORDER — LIDOCAINE 40 MG/G
CREAM TOPICAL
Status: DISCONTINUED | OUTPATIENT
Start: 2019-01-01 | End: 2019-01-01 | Stop reason: HOSPADM

## 2019-01-01 RX ORDER — POTASSIUM CHLORIDE 750 MG/1
10 TABLET, EXTENDED RELEASE ORAL DAILY
Qty: 90 TABLET | Refills: 3 | Status: ON HOLD | OUTPATIENT
Start: 2019-01-01 | End: 2020-01-01

## 2019-01-01 RX ORDER — MIDODRINE HYDROCHLORIDE 5 MG/1
5 TABLET ORAL
Qty: 90 TABLET | Refills: 0 | Status: SHIPPED | OUTPATIENT
Start: 2019-01-01 | End: 2019-01-01

## 2019-01-01 RX ORDER — PROCHLORPERAZINE 25 MG
25 SUPPOSITORY, RECTAL RECTAL EVERY 12 HOURS PRN
Status: DISCONTINUED | OUTPATIENT
Start: 2019-01-01 | End: 2019-01-01

## 2019-01-01 RX ORDER — NALOXONE HYDROCHLORIDE 0.4 MG/ML
.1-.4 INJECTION, SOLUTION INTRAMUSCULAR; INTRAVENOUS; SUBCUTANEOUS
Status: DISCONTINUED | OUTPATIENT
Start: 2019-01-01 | End: 2019-01-01

## 2019-01-01 RX ORDER — LIDOCAINE 40 MG/G
CREAM TOPICAL
Status: CANCELLED | OUTPATIENT
Start: 2019-01-01

## 2019-01-01 RX ORDER — DEXTROSE MONOHYDRATE 25 G/50ML
25-50 INJECTION, SOLUTION INTRAVENOUS
Status: DISCONTINUED | OUTPATIENT
Start: 2019-01-01 | End: 2019-01-01

## 2019-01-01 RX ORDER — ONDANSETRON 2 MG/ML
4 INJECTION INTRAMUSCULAR; INTRAVENOUS EVERY 6 HOURS PRN
Status: DISCONTINUED | OUTPATIENT
Start: 2019-01-01 | End: 2019-01-01 | Stop reason: HOSPADM

## 2019-01-01 RX ORDER — LIDOCAINE HYDROCHLORIDE 10 MG/ML
7 INJECTION, SOLUTION EPIDURAL; INFILTRATION; INTRACAUDAL; PERINEURAL ONCE
Status: COMPLETED | OUTPATIENT
Start: 2019-01-01 | End: 2019-01-01

## 2019-01-01 RX ORDER — FUROSEMIDE 40 MG
40 TABLET ORAL DAILY
Qty: 30 TABLET | Refills: 0 | Status: SHIPPED | OUTPATIENT
Start: 2019-01-01 | End: 2019-01-01

## 2019-01-01 RX ORDER — DEXTROSE MONOHYDRATE 25 G/50ML
25-50 INJECTION, SOLUTION INTRAVENOUS
Status: CANCELLED | OUTPATIENT
Start: 2019-01-01

## 2019-01-01 RX ORDER — FOLIC ACID 1 MG/1
1 TABLET ORAL DAILY
Qty: 30 TABLET | Refills: 0 | COMMUNITY
Start: 2019-01-01 | End: 2019-01-01

## 2019-01-01 RX ORDER — FERROUS SULFATE 324(65)MG
324 TABLET, DELAYED RELEASE (ENTERIC COATED) ORAL 2 TIMES DAILY
Qty: 60 TABLET | Refills: 5 | OUTPATIENT
Start: 2019-01-01

## 2019-01-01 RX ORDER — ACETAMINOPHEN 325 MG/1
650 TABLET ORAL EVERY 4 HOURS PRN
Status: DISCONTINUED | OUTPATIENT
Start: 2019-01-01 | End: 2019-01-01

## 2019-01-01 RX ORDER — POTASSIUM CHLORIDE 1.5 G/1.58G
20-40 POWDER, FOR SOLUTION ORAL
Status: DISCONTINUED | OUTPATIENT
Start: 2019-01-01 | End: 2019-01-01 | Stop reason: HOSPADM

## 2019-01-01 RX ORDER — POTASSIUM CHLORIDE 750 MG/1
10 TABLET, EXTENDED RELEASE ORAL DAILY
Qty: 30 TABLET | Refills: 0 | Status: SHIPPED | OUTPATIENT
Start: 2019-01-01 | End: 2019-01-01

## 2019-01-01 RX ORDER — ACAMPROSATE CALCIUM 333 MG/1
666 TABLET, DELAYED RELEASE ORAL 3 TIMES DAILY
Qty: 540 TABLET | Refills: 3 | Status: SHIPPED | OUTPATIENT
Start: 2019-01-01 | End: 2020-01-01

## 2019-01-01 RX ORDER — ACAMPROSATE CALCIUM 333 MG/1
666 TABLET, DELAYED RELEASE ORAL 3 TIMES DAILY
Qty: 180 TABLET | Refills: 3 | Status: ON HOLD | OUTPATIENT
Start: 2019-01-01 | End: 2019-01-01

## 2019-01-01 RX ORDER — ALENDRONATE SODIUM 35 MG/1
TABLET ORAL
Qty: 12 TABLET | Refills: 0 | Status: SHIPPED | OUTPATIENT
Start: 2019-01-01 | End: 2020-01-01

## 2019-01-01 RX ORDER — LANOLIN ALCOHOL/MO/W.PET/CERES
100 CREAM (GRAM) TOPICAL DAILY
Qty: 30 TABLET | Refills: 0 | Status: SHIPPED | OUTPATIENT
Start: 2019-01-01 | End: 2019-01-01

## 2019-01-01 RX ORDER — CEFTRIAXONE 2 G/1
2 INJECTION, POWDER, FOR SOLUTION INTRAMUSCULAR; INTRAVENOUS EVERY 24 HOURS
Status: DISCONTINUED | OUTPATIENT
Start: 2019-01-01 | End: 2019-01-01 | Stop reason: HOSPADM

## 2019-01-01 RX ORDER — ARIPIPRAZOLE 5 MG/1
5 TABLET ORAL AT BEDTIME
Qty: 30 TABLET | Refills: 0 | Status: SHIPPED | OUTPATIENT
Start: 2019-01-01 | End: 2019-01-01

## 2019-01-01 RX ORDER — POTASSIUM CHLORIDE 1500 MG/1
20-40 TABLET, EXTENDED RELEASE ORAL
Status: DISCONTINUED | OUTPATIENT
Start: 2019-01-01 | End: 2019-01-01 | Stop reason: HOSPADM

## 2019-01-01 RX ORDER — FOLIC ACID 1 MG/1
1 TABLET ORAL DAILY
Qty: 30 TABLET | Refills: 0 | Status: SHIPPED | OUTPATIENT
Start: 2019-01-01 | End: 2019-01-01

## 2019-01-01 RX ORDER — POTASSIUM CHLORIDE 29.8 MG/ML
20 INJECTION INTRAVENOUS
Status: DISCONTINUED | OUTPATIENT
Start: 2019-01-01 | End: 2019-01-01 | Stop reason: CLARIF

## 2019-01-01 RX ORDER — MIDODRINE HYDROCHLORIDE 2.5 MG/1
5 TABLET ORAL
Status: DISCONTINUED | OUTPATIENT
Start: 2019-01-01 | End: 2019-01-01 | Stop reason: HOSPADM

## 2019-01-01 RX ORDER — ONDANSETRON 2 MG/ML
4 INJECTION INTRAMUSCULAR; INTRAVENOUS EVERY 30 MIN PRN
Status: DISCONTINUED | OUTPATIENT
Start: 2019-01-01 | End: 2019-01-01

## 2019-01-01 RX ORDER — URSODIOL 500 MG/1
500 TABLET, FILM COATED ORAL 2 TIMES DAILY
Qty: 180 TABLET | Refills: 3 | Status: ON HOLD | OUTPATIENT
Start: 2019-01-01 | End: 2020-01-01

## 2019-01-01 RX ORDER — ONDANSETRON 4 MG/1
4 TABLET, ORALLY DISINTEGRATING ORAL EVERY 6 HOURS PRN
Status: DISCONTINUED | OUTPATIENT
Start: 2019-01-01 | End: 2019-01-01 | Stop reason: HOSPADM

## 2019-01-01 RX ORDER — ACETAMINOPHEN 325 MG/1
325 TABLET ORAL EVERY 8 HOURS PRN
Qty: 30 TABLET | Refills: 0 | COMMUNITY
Start: 2019-01-01 | End: 2019-01-01

## 2019-01-01 RX ORDER — ALBUMIN (HUMAN) 12.5 G/50ML
50 SOLUTION INTRAVENOUS ONCE
Status: COMPLETED | OUTPATIENT
Start: 2019-01-01 | End: 2019-01-01

## 2019-01-01 RX ORDER — MIDODRINE HYDROCHLORIDE 5 MG/1
5 TABLET ORAL
Qty: 270 TABLET | Refills: 3 | Status: ON HOLD | OUTPATIENT
Start: 2019-01-01 | End: 2020-01-01

## 2019-01-01 RX ORDER — LANOLIN ALCOHOL/MO/W.PET/CERES
100 CREAM (GRAM) TOPICAL DAILY
Status: DISCONTINUED | OUTPATIENT
Start: 2019-01-01 | End: 2019-01-01 | Stop reason: HOSPADM

## 2019-01-01 RX ORDER — BUSPIRONE HYDROCHLORIDE 15 MG/1
15 TABLET ORAL 2 TIMES DAILY
Qty: 180 TABLET | Refills: 3 | Status: ON HOLD | OUTPATIENT
Start: 2019-01-01 | End: 2020-01-01

## 2019-01-01 RX ORDER — THIAMINE HYDROCHLORIDE 100 MG/ML
100 INJECTION, SOLUTION INTRAMUSCULAR; INTRAVENOUS DAILY
Status: DISCONTINUED | OUTPATIENT
Start: 2019-01-01 | End: 2019-01-01 | Stop reason: HOSPADM

## 2019-01-01 RX ORDER — ARIPIPRAZOLE 5 MG/1
5 TABLET ORAL AT BEDTIME
Qty: 90 TABLET | Refills: 3 | Status: ON HOLD | OUTPATIENT
Start: 2019-01-01 | End: 2020-01-01

## 2019-01-01 RX ORDER — ARIPIPRAZOLE 5 MG/1
5 TABLET ORAL AT BEDTIME
Qty: 30 TABLET | Refills: 3 | Status: ON HOLD | OUTPATIENT
Start: 2019-01-01 | End: 2019-01-01

## 2019-01-01 RX ORDER — POTASSIUM CHLORIDE 7.45 MG/ML
10 INJECTION INTRAVENOUS
Status: DISCONTINUED | OUTPATIENT
Start: 2019-01-01 | End: 2019-01-01 | Stop reason: HOSPADM

## 2019-01-01 RX ORDER — LACTULOSE 10 G/15ML
20 SOLUTION ORAL 3 TIMES DAILY
Status: DISCONTINUED | OUTPATIENT
Start: 2019-01-01 | End: 2019-01-01 | Stop reason: HOSPADM

## 2019-01-01 RX ORDER — RIFAXIMIN 550 MG/1
TABLET ORAL
Qty: 60 TABLET | Refills: 3 | Status: SHIPPED | OUTPATIENT
Start: 2019-01-01 | End: 2019-01-01

## 2019-01-01 RX ORDER — LANOLIN ALCOHOL/MO/W.PET/CERES
100 CREAM (GRAM) TOPICAL DAILY
Qty: 90 TABLET | Refills: 3 | Status: SHIPPED | OUTPATIENT
Start: 2019-01-01 | End: 2020-01-01

## 2019-01-01 RX ORDER — TRAZODONE HYDROCHLORIDE 50 MG/1
100 TABLET, FILM COATED ORAL
Qty: 30 TABLET | Refills: 0 | Status: ON HOLD | OUTPATIENT
Start: 2019-01-01 | End: 2020-01-01

## 2019-01-01 RX ORDER — NICOTINE 21 MG/24HR
1 PATCH, TRANSDERMAL 24 HOURS TRANSDERMAL DAILY
Status: DISCONTINUED | OUTPATIENT
Start: 2019-01-01 | End: 2019-01-01 | Stop reason: HOSPADM

## 2019-01-01 RX ORDER — ACAMPROSATE CALCIUM 333 MG/1
666 TABLET, DELAYED RELEASE ORAL 3 TIMES DAILY
Qty: 90 TABLET | Refills: 0 | Status: SHIPPED | OUTPATIENT
Start: 2019-01-01 | End: 2019-01-01

## 2019-01-01 RX ORDER — FOLIC ACID 1 MG/1
1 TABLET ORAL DAILY
Qty: 90 TABLET | Refills: 3 | Status: ON HOLD | OUTPATIENT
Start: 2019-01-01 | End: 2020-01-01

## 2019-01-01 RX ORDER — BUSPIRONE HYDROCHLORIDE 15 MG/1
15 TABLET ORAL 2 TIMES DAILY
Qty: 60 TABLET | Refills: 0 | Status: SHIPPED | OUTPATIENT
Start: 2019-01-01 | End: 2019-01-01

## 2019-01-01 RX ORDER — PROCHLORPERAZINE MALEATE 5 MG
10 TABLET ORAL EVERY 6 HOURS PRN
Status: DISCONTINUED | OUTPATIENT
Start: 2019-01-01 | End: 2019-01-01

## 2019-01-01 RX ORDER — URSODIOL 500 MG/1
500 TABLET, FILM COATED ORAL 2 TIMES DAILY
Qty: 60 TABLET | Refills: 5 | Status: ON HOLD | OUTPATIENT
Start: 2019-01-01 | End: 2019-01-01

## 2019-01-01 RX ORDER — MIDODRINE HYDROCHLORIDE 5 MG/1
5 TABLET ORAL
Qty: 90 TABLET | Refills: 0 | COMMUNITY
Start: 2019-01-01 | End: 2019-01-01

## 2019-01-01 RX ORDER — FUROSEMIDE 20 MG
20 TABLET ORAL DAILY
Status: DISCONTINUED | OUTPATIENT
Start: 2019-01-01 | End: 2019-01-01

## 2019-01-01 RX ORDER — FUROSEMIDE 40 MG
40 TABLET ORAL DAILY
Qty: 90 TABLET | Refills: 3 | Status: ON HOLD | OUTPATIENT
Start: 2019-01-01 | End: 2020-01-01

## 2019-01-01 RX ORDER — SENNA AND DOCUSATE SODIUM 50; 8.6 MG/1; MG/1
1 TABLET, FILM COATED ORAL 2 TIMES DAILY
Qty: 60 TABLET | Refills: 11 | Status: SHIPPED | OUTPATIENT
Start: 2019-01-01 | End: 2020-01-01

## 2019-01-01 RX ORDER — URSODIOL 500 MG/1
500 TABLET, FILM COATED ORAL 2 TIMES DAILY
Qty: 60 TABLET | Refills: 0 | Status: SHIPPED | OUTPATIENT
Start: 2019-01-01 | End: 2019-01-01

## 2019-01-01 RX ORDER — FUROSEMIDE 20 MG
20 TABLET ORAL
Status: DISCONTINUED | OUTPATIENT
Start: 2019-01-01 | End: 2019-01-01 | Stop reason: HOSPADM

## 2019-01-01 RX ORDER — ONDANSETRON 4 MG/1
4 TABLET, FILM COATED ORAL EVERY 6 HOURS PRN
Qty: 30 TABLET | Refills: 0 | Status: SHIPPED | OUTPATIENT
Start: 2019-01-01 | End: 2020-01-01

## 2019-01-01 RX ORDER — ACETAMINOPHEN 325 MG/1
325 TABLET ORAL EVERY 8 HOURS PRN
Qty: 30 TABLET | Refills: 0 | Status: SHIPPED | OUTPATIENT
Start: 2019-01-01 | End: 2020-01-01

## 2019-01-01 RX ORDER — LACTULOSE 10 G/15ML
10 SOLUTION ORAL 3 TIMES DAILY PRN
Status: DISCONTINUED | OUTPATIENT
Start: 2019-01-01 | End: 2019-01-01

## 2019-01-01 RX ORDER — GABAPENTIN 100 MG/1
300 CAPSULE ORAL AT BEDTIME
Qty: 30 CAPSULE | Refills: 0 | Status: SHIPPED | OUTPATIENT
Start: 2019-01-01 | End: 2019-01-01

## 2019-01-01 RX ORDER — ALBUMIN (HUMAN) 12.5 G/50ML
12.5 SOLUTION INTRAVENOUS ONCE
Status: COMPLETED | OUTPATIENT
Start: 2019-01-01 | End: 2019-01-01

## 2019-01-01 RX ADMIN — FOLIC ACID 1 MG: 1 TABLET ORAL at 09:07

## 2019-01-01 RX ADMIN — LIDOCAINE HYDROCHLORIDE 0.1 ML: 10 INJECTION, SOLUTION EPIDURAL; INFILTRATION; INTRACAUDAL; PERINEURAL at 08:51

## 2019-01-01 RX ADMIN — LIDOCAINE HYDROCHLORIDE 7 ML: 10 INJECTION, SOLUTION EPIDURAL; INFILTRATION; INTRACAUDAL; PERINEURAL at 11:24

## 2019-01-01 RX ADMIN — POTASSIUM CHLORIDE 20 MEQ: 1500 TABLET, EXTENDED RELEASE ORAL at 09:00

## 2019-01-01 RX ADMIN — CEFTRIAXONE SODIUM 2 G: 2 INJECTION, POWDER, FOR SOLUTION INTRAMUSCULAR; INTRAVENOUS at 17:09

## 2019-01-01 RX ADMIN — POTASSIUM CHLORIDE 20 MEQ: 1500 TABLET, EXTENDED RELEASE ORAL at 10:51

## 2019-01-01 RX ADMIN — ALBUMIN HUMAN 25 G: 0.25 SOLUTION INTRAVENOUS at 14:43

## 2019-01-01 RX ADMIN — MIDODRINE HYDROCHLORIDE 5 MG: 2.5 TABLET ORAL at 17:47

## 2019-01-01 RX ADMIN — ALBUMIN HUMAN 25 G: 0.25 SOLUTION INTRAVENOUS at 12:43

## 2019-01-01 RX ADMIN — OMEPRAZOLE 20 MG: 20 CAPSULE, DELAYED RELEASE ORAL at 06:40

## 2019-01-01 RX ADMIN — OMEPRAZOLE 20 MG: 20 CAPSULE, DELAYED RELEASE ORAL at 06:59

## 2019-01-01 RX ADMIN — POTASSIUM CHLORIDE 40 MEQ: 1500 TABLET, EXTENDED RELEASE ORAL at 06:51

## 2019-01-01 RX ADMIN — LACTULOSE 20 G: 10 SOLUTION ORAL at 21:29

## 2019-01-01 RX ADMIN — LACTULOSE 20 G: 10 SOLUTION ORAL at 10:08

## 2019-01-01 RX ADMIN — LIDOCAINE HYDROCHLORIDE 10 ML: 10 INJECTION, SOLUTION EPIDURAL; INFILTRATION; INTRACAUDAL; PERINEURAL at 11:06

## 2019-01-01 RX ADMIN — RIFAXIMIN 550 MG: 550 TABLET ORAL at 22:05

## 2019-01-01 RX ADMIN — LACTULOSE 20 G: 10 SOLUTION ORAL at 14:29

## 2019-01-01 RX ADMIN — PANTOPRAZOLE SODIUM 40 MG: 40 INJECTION, POWDER, LYOPHILIZED, FOR SOLUTION INTRAVENOUS at 09:21

## 2019-01-01 RX ADMIN — ONDANSETRON 4 MG: 4 TABLET, ORALLY DISINTEGRATING ORAL at 06:08

## 2019-01-01 RX ADMIN — ALBUMIN HUMAN 12.5 G: 0.25 SOLUTION INTRAVENOUS at 13:25

## 2019-01-01 RX ADMIN — THIAMINE HCL TAB 100 MG 100 MG: 100 TAB at 09:21

## 2019-01-01 RX ADMIN — CEFTRIAXONE SODIUM 2 G: 2 INJECTION, POWDER, FOR SOLUTION INTRAMUSCULAR; INTRAVENOUS at 16:12

## 2019-01-01 RX ADMIN — THIAMINE HCL TAB 100 MG 100 MG: 100 TAB at 17:13

## 2019-01-01 RX ADMIN — MIDODRINE HYDROCHLORIDE 5 MG: 2.5 TABLET ORAL at 08:05

## 2019-01-01 RX ADMIN — FOLIC ACID 1 MG: 1 TABLET ORAL at 08:04

## 2019-01-01 RX ADMIN — MIDODRINE HYDROCHLORIDE 5 MG: 2.5 TABLET ORAL at 18:01

## 2019-01-01 RX ADMIN — RIFAXIMIN 550 MG: 550 TABLET ORAL at 08:05

## 2019-01-01 RX ADMIN — MIDODRINE HYDROCHLORIDE 5 MG: 2.5 TABLET ORAL at 09:08

## 2019-01-01 RX ADMIN — SODIUM CHLORIDE 1000 ML: 9 INJECTION, SOLUTION INTRAVENOUS at 17:56

## 2019-01-01 RX ADMIN — SODIUM CHLORIDE 500 ML: 9 INJECTION, SOLUTION INTRAVENOUS at 00:04

## 2019-01-01 RX ADMIN — LIDOCAINE HYDROCHLORIDE 5 ML: 10 INJECTION, SOLUTION INFILTRATION; PERINEURAL at 11:30

## 2019-01-01 RX ADMIN — LIDOCAINE HYDROCHLORIDE 7 ML: 10 INJECTION, SOLUTION EPIDURAL; INFILTRATION; INTRACAUDAL; PERINEURAL at 09:42

## 2019-01-01 RX ADMIN — FOLIC ACID 1 MG: 1 TABLET ORAL at 10:08

## 2019-01-01 RX ADMIN — LACTULOSE 20 G: 10 SOLUTION ORAL at 14:21

## 2019-01-01 RX ADMIN — RIFAXIMIN 550 MG: 550 TABLET ORAL at 21:36

## 2019-01-01 RX ADMIN — ALBUMIN HUMAN 37.5 G: 0.25 SOLUTION INTRAVENOUS at 12:36

## 2019-01-01 RX ADMIN — PANTOPRAZOLE SODIUM 40 MG: 40 INJECTION, POWDER, LYOPHILIZED, FOR SOLUTION INTRAVENOUS at 09:07

## 2019-01-01 RX ADMIN — MIDODRINE HYDROCHLORIDE 5 MG: 2.5 TABLET ORAL at 09:20

## 2019-01-01 RX ADMIN — RIFAXIMIN 550 MG: 550 TABLET ORAL at 22:23

## 2019-01-01 RX ADMIN — LACTULOSE 20 G: 10 SOLUTION ORAL at 09:21

## 2019-01-01 RX ADMIN — LACTULOSE 20 G: 10 SOLUTION ORAL at 20:23

## 2019-01-01 RX ADMIN — FOLIC ACID 1 MG: 1 TABLET ORAL at 08:10

## 2019-01-01 RX ADMIN — LACTULOSE 20 G: 10 SOLUTION ORAL at 15:18

## 2019-01-01 RX ADMIN — LACTULOSE 20 G: 10 SOLUTION ORAL at 21:36

## 2019-01-01 RX ADMIN — Medication 1 MG: at 01:17

## 2019-01-01 RX ADMIN — THIAMINE HCL TAB 100 MG 100 MG: 100 TAB at 09:07

## 2019-01-01 RX ADMIN — LACTULOSE 20 G: 10 SOLUTION ORAL at 08:07

## 2019-01-01 RX ADMIN — MIDODRINE HYDROCHLORIDE 5 MG: 2.5 TABLET ORAL at 13:12

## 2019-01-01 RX ADMIN — MIDODRINE HYDROCHLORIDE 5 MG: 2.5 TABLET ORAL at 17:09

## 2019-01-01 RX ADMIN — FOLIC ACID 1 MG: 1 TABLET ORAL at 09:21

## 2019-01-01 RX ADMIN — LIDOCAINE HYDROCHLORIDE 6 ML: 10 INJECTION, SOLUTION INFILTRATION; PERINEURAL at 11:54

## 2019-01-01 RX ADMIN — RIFAXIMIN 550 MG: 550 TABLET ORAL at 10:16

## 2019-01-01 RX ADMIN — RIFAXIMIN 550 MG: 550 TABLET ORAL at 08:10

## 2019-01-01 RX ADMIN — LIDOCAINE HYDROCHLORIDE 10 ML: 10 INJECTION, SOLUTION INFILTRATION; PERINEURAL at 12:08

## 2019-01-01 RX ADMIN — LACTULOSE 20 G: 10 SOLUTION ORAL at 22:05

## 2019-01-01 RX ADMIN — ALBUMIN HUMAN 50 G: 0.25 SOLUTION INTRAVENOUS at 12:31

## 2019-01-01 RX ADMIN — THIAMINE HCL TAB 100 MG 100 MG: 100 TAB at 08:04

## 2019-01-01 RX ADMIN — LACTULOSE 20 G: 10 SOLUTION ORAL at 14:02

## 2019-01-01 RX ADMIN — PANTOPRAZOLE SODIUM 40 MG: 40 INJECTION, POWDER, LYOPHILIZED, FOR SOLUTION INTRAVENOUS at 08:06

## 2019-01-01 RX ADMIN — LIDOCAINE HYDROCHLORIDE 6 ML: 10 INJECTION, SOLUTION EPIDURAL; INFILTRATION; INTRACAUDAL; PERINEURAL at 12:11

## 2019-01-01 RX ADMIN — MIDODRINE HYDROCHLORIDE 5 MG: 2.5 TABLET ORAL at 12:42

## 2019-01-01 RX ADMIN — RIFAXIMIN 550 MG: 550 TABLET ORAL at 21:29

## 2019-01-01 RX ADMIN — LACTULOSE 20 G: 10 SOLUTION ORAL at 09:00

## 2019-01-01 RX ADMIN — POTASSIUM CHLORIDE 20 MEQ: 1500 TABLET, EXTENDED RELEASE ORAL at 08:17

## 2019-01-01 RX ADMIN — RIFAXIMIN 550 MG: 550 TABLET ORAL at 09:20

## 2019-01-01 RX ADMIN — LACTULOSE 10 G: 10 SOLUTION ORAL at 08:00

## 2019-01-01 RX ADMIN — NICOTINE 1 PATCH: 21 PATCH TRANSDERMAL at 08:06

## 2019-01-01 RX ADMIN — FUROSEMIDE 20 MG: 20 TABLET ORAL at 15:29

## 2019-01-01 RX ADMIN — THIAMINE HCL TAB 100 MG 100 MG: 100 TAB at 08:10

## 2019-01-01 RX ADMIN — CEFTRIAXONE SODIUM 2 G: 2 INJECTION, POWDER, FOR SOLUTION INTRAMUSCULAR; INTRAVENOUS at 15:44

## 2019-01-01 RX ADMIN — RIFAXIMIN 550 MG: 550 TABLET ORAL at 20:23

## 2019-01-01 RX ADMIN — CEFTRIAXONE SODIUM 2 G: 2 INJECTION, POWDER, FOR SOLUTION INTRAMUSCULAR; INTRAVENOUS at 15:34

## 2019-01-01 RX ADMIN — PANTOPRAZOLE SODIUM 40 MG: 40 INJECTION, POWDER, LYOPHILIZED, FOR SOLUTION INTRAVENOUS at 17:15

## 2019-01-01 RX ADMIN — NICOTINE 1 PATCH: 21 PATCH TRANSDERMAL at 08:10

## 2019-01-01 RX ADMIN — RIFAXIMIN 550 MG: 550 TABLET ORAL at 10:09

## 2019-01-01 RX ADMIN — SODIUM CHLORIDE 1000 ML: 9 INJECTION, SOLUTION INTRAVENOUS at 11:32

## 2019-01-01 RX ADMIN — LIDOCAINE HYDROCHLORIDE 5 ML: 10 INJECTION, SOLUTION EPIDURAL; INFILTRATION; INTRACAUDAL; PERINEURAL at 11:59

## 2019-01-01 RX ADMIN — LACTULOSE 20 G: 10 SOLUTION ORAL at 09:07

## 2019-01-01 RX ADMIN — MIDODRINE HYDROCHLORIDE 5 MG: 2.5 TABLET ORAL at 12:45

## 2019-01-01 RX ADMIN — ONDANSETRON 4 MG: 2 INJECTION INTRAMUSCULAR; INTRAVENOUS at 22:52

## 2019-01-01 RX ADMIN — SODIUM CHLORIDE, POTASSIUM CHLORIDE, SODIUM LACTATE AND CALCIUM CHLORIDE 1000 ML: 600; 310; 30; 20 INJECTION, SOLUTION INTRAVENOUS at 13:08

## 2019-01-01 RX ADMIN — SODIUM CHLORIDE, POTASSIUM CHLORIDE, SODIUM LACTATE AND CALCIUM CHLORIDE 500 ML: 600; 310; 30; 20 INJECTION, SOLUTION INTRAVENOUS at 15:32

## 2019-01-01 RX ADMIN — FOLIC ACID 1 MG: 1 TABLET ORAL at 17:13

## 2019-01-01 RX ADMIN — CEFTRIAXONE SODIUM 2 G: 2 INJECTION, POWDER, FOR SOLUTION INTRAMUSCULAR; INTRAVENOUS at 16:24

## 2019-01-01 RX ADMIN — SODIUM CHLORIDE 1000 ML: 9 INJECTION, SOLUTION INTRAVENOUS at 23:51

## 2019-01-01 RX ADMIN — ALBUMIN HUMAN 25 G: 0.25 SOLUTION INTRAVENOUS at 23:14

## 2019-01-01 RX ADMIN — CEFTRIAXONE SODIUM 2 G: 2 INJECTION, POWDER, FOR SOLUTION INTRAMUSCULAR; INTRAVENOUS at 16:30

## 2019-01-01 RX ADMIN — MIDODRINE HYDROCHLORIDE 5 MG: 2.5 TABLET ORAL at 17:38

## 2019-01-01 RX ADMIN — NICOTINE 1 PATCH: 21 PATCH TRANSDERMAL at 09:08

## 2019-01-01 RX ADMIN — RIFAXIMIN 550 MG: 550 TABLET ORAL at 21:24

## 2019-01-01 RX ADMIN — POTASSIUM CHLORIDE 40 MEQ: 1500 TABLET, EXTENDED RELEASE ORAL at 08:05

## 2019-01-01 RX ADMIN — SODIUM CHLORIDE 1000 ML: 9 INJECTION, SOLUTION INTRAVENOUS at 04:02

## 2019-01-01 RX ADMIN — FUROSEMIDE 20 MG: 20 TABLET ORAL at 15:44

## 2019-01-01 RX ADMIN — WATER 10 ML: 1 INJECTION INTRAMUSCULAR; INTRAVENOUS; SUBCUTANEOUS at 08:06

## 2019-01-01 RX ADMIN — NICOTINE 1 PATCH: 21 PATCH TRANSDERMAL at 09:21

## 2019-01-01 RX ADMIN — RIFAXIMIN 550 MG: 550 TABLET ORAL at 09:00

## 2019-01-01 RX ADMIN — CEFTRIAXONE SODIUM 2 G: 2 INJECTION, POWDER, FOR SOLUTION INTRAMUSCULAR; INTRAVENOUS at 15:29

## 2019-01-01 RX ADMIN — MIDODRINE HYDROCHLORIDE 5 MG: 2.5 TABLET ORAL at 17:21

## 2019-01-01 RX ADMIN — LACTULOSE 20 G: 10 SOLUTION ORAL at 22:24

## 2019-01-01 RX ADMIN — OMEPRAZOLE 20 MG: 20 CAPSULE, DELAYED RELEASE ORAL at 06:46

## 2019-01-01 RX ADMIN — SODIUM CHLORIDE 1000 ML: 9 INJECTION, SOLUTION INTRAVENOUS at 03:18

## 2019-01-01 RX ADMIN — ALBUMIN HUMAN 25 G: 0.25 SOLUTION INTRAVENOUS at 13:02

## 2019-01-01 RX ADMIN — THIAMINE HCL TAB 100 MG 100 MG: 100 TAB at 10:09

## 2019-01-01 RX ADMIN — PANTOPRAZOLE SODIUM 40 MG: 40 INJECTION, POWDER, LYOPHILIZED, FOR SOLUTION INTRAVENOUS at 10:09

## 2019-01-01 RX ADMIN — LACTULOSE 20 G: 10 SOLUTION ORAL at 20:25

## 2019-01-01 RX ADMIN — FUROSEMIDE 20 MG: 20 TABLET ORAL at 08:04

## 2019-01-01 RX ADMIN — POTASSIUM CHLORIDE 40 MEQ: 1500 TABLET, EXTENDED RELEASE ORAL at 05:53

## 2019-01-01 RX ADMIN — RIFAXIMIN 550 MG: 550 TABLET ORAL at 10:07

## 2019-01-01 RX ADMIN — LIDOCAINE HYDROCHLORIDE 8 ML: 10 INJECTION, SOLUTION EPIDURAL; INFILTRATION; INTRACAUDAL; PERINEURAL at 11:33

## 2019-01-01 RX ADMIN — SODIUM CHLORIDE 1000 ML: 9 INJECTION, SOLUTION INTRAVENOUS at 14:42

## 2019-01-01 RX ADMIN — MIDODRINE HYDROCHLORIDE 5 MG: 2.5 TABLET ORAL at 08:10

## 2019-01-01 RX ADMIN — Medication 1 MG: at 00:05

## 2019-01-01 RX ADMIN — POTASSIUM CHLORIDE 40 MEQ: 1500 TABLET, EXTENDED RELEASE ORAL at 06:40

## 2019-01-01 RX ADMIN — ALBUMIN HUMAN 25 G: 0.25 SOLUTION INTRAVENOUS at 12:06

## 2019-01-01 RX ADMIN — FUROSEMIDE 20 MG: 20 TABLET ORAL at 18:01

## 2019-01-01 RX ADMIN — SODIUM CHLORIDE 1000 ML: 9 INJECTION, SOLUTION INTRAVENOUS at 17:32

## 2019-01-01 RX ADMIN — LACTULOSE 20 G: 10 SOLUTION ORAL at 14:41

## 2019-01-01 RX ADMIN — LIDOCAINE HYDROCHLORIDE 5 ML: 10 INJECTION, SOLUTION INFILTRATION; PERINEURAL at 11:38

## 2019-01-01 RX ADMIN — LIDOCAINE HYDROCHLORIDE 8 ML: 10 INJECTION, SOLUTION EPIDURAL; INFILTRATION; INTRACAUDAL; PERINEURAL at 12:45

## 2019-01-01 RX ADMIN — FUROSEMIDE 20 MG: 20 TABLET ORAL at 09:07

## 2019-01-01 RX ADMIN — MIDODRINE HYDROCHLORIDE 5 MG: 2.5 TABLET ORAL at 12:54

## 2019-01-01 RX ADMIN — FOLIC ACID 1 MG: 1 TABLET ORAL at 09:00

## 2019-01-01 RX ADMIN — LACTULOSE 20 G: 10 SOLUTION ORAL at 14:10

## 2019-01-01 RX ADMIN — LIDOCAINE HYDROCHLORIDE 7 ML: 10 INJECTION, SOLUTION INFILTRATION; PERINEURAL at 12:38

## 2019-01-01 RX ADMIN — SODIUM CHLORIDE 500 ML: 9 INJECTION, SOLUTION INTRAVENOUS at 22:50

## 2019-01-01 RX ADMIN — LIDOCAINE HYDROCHLORIDE 5 ML: 10 INJECTION, SOLUTION EPIDURAL; INFILTRATION; INTRACAUDAL; PERINEURAL at 10:22

## 2019-01-01 RX ADMIN — RIFAXIMIN 550 MG: 550 TABLET ORAL at 09:07

## 2019-01-01 RX ADMIN — SODIUM CHLORIDE 1000 ML: 9 INJECTION, SOLUTION INTRAVENOUS at 06:26

## 2019-01-01 RX ADMIN — MIDODRINE HYDROCHLORIDE 5 MG: 2.5 TABLET ORAL at 11:45

## 2019-01-01 RX ADMIN — NICOTINE 1 PATCH: 21 PATCH TRANSDERMAL at 17:10

## 2019-01-01 RX ADMIN — LACTULOSE 20 G: 10 SOLUTION ORAL at 08:09

## 2019-01-01 RX ADMIN — POTASSIUM CHLORIDE 20 MEQ: 1500 TABLET, EXTENDED RELEASE ORAL at 10:07

## 2019-01-01 RX ADMIN — LIDOCAINE HYDROCHLORIDE 7 ML: 10 INJECTION, SOLUTION EPIDURAL; INFILTRATION; INTRACAUDAL; PERINEURAL at 11:18

## 2019-01-01 RX ADMIN — FUROSEMIDE 20 MG: 20 TABLET ORAL at 09:21

## 2019-01-01 RX ADMIN — SODIUM CHLORIDE, POTASSIUM CHLORIDE, SODIUM LACTATE AND CALCIUM CHLORIDE 500 ML: 600; 310; 30; 20 INJECTION, SOLUTION INTRAVENOUS at 23:13

## 2019-01-01 RX ADMIN — LACTULOSE 20 G: 10 SOLUTION ORAL at 16:11

## 2019-01-01 RX ADMIN — ALBUMIN HUMAN 50 G: 0.25 SOLUTION INTRAVENOUS at 13:09

## 2019-01-01 RX ADMIN — ALBUMIN HUMAN 37.5 G: 0.25 SOLUTION INTRAVENOUS at 12:21

## 2019-01-01 RX ADMIN — THIAMINE HCL TAB 100 MG 100 MG: 100 TAB at 09:00

## 2019-01-01 SDOH — HEALTH STABILITY: PHYSICAL HEALTH: ON AVERAGE, HOW MANY MINUTES DO YOU ENGAGE IN EXERCISE AT THIS LEVEL?: 0 MIN

## 2019-01-01 SDOH — ECONOMIC STABILITY: TRANSPORTATION INSECURITY
IN THE PAST 12 MONTHS, HAS THE LACK OF TRANSPORTATION KEPT YOU FROM MEDICAL APPOINTMENTS OR FROM GETTING MEDICATIONS?: NO

## 2019-01-01 SDOH — HEALTH STABILITY: PHYSICAL HEALTH: ON AVERAGE, HOW MANY DAYS PER WEEK DO YOU ENGAGE IN MODERATE TO STRENUOUS EXERCISE (LIKE A BRISK WALK)?: 0 DAYS

## 2019-01-01 SDOH — ECONOMIC STABILITY: FOOD INSECURITY: WITHIN THE PAST 12 MONTHS, THE FOOD YOU BOUGHT JUST DIDN'T LAST AND YOU DIDN'T HAVE MONEY TO GET MORE.: NEVER TRUE

## 2019-01-01 SDOH — ECONOMIC STABILITY: INCOME INSECURITY: HOW HARD IS IT FOR YOU TO PAY FOR THE VERY BASICS LIKE FOOD, HOUSING, MEDICAL CARE, AND HEATING?: NOT VERY HARD

## 2019-01-01 SDOH — HEALTH STABILITY: MENTAL HEALTH
STRESS IS WHEN SOMEONE FEELS TENSE, NERVOUS, ANXIOUS, OR CAN'T SLEEP AT NIGHT BECAUSE THEIR MIND IS TROUBLED. HOW STRESSED ARE YOU?: RATHER MUCH

## 2019-01-01 SDOH — ECONOMIC STABILITY: FOOD INSECURITY: WITHIN THE PAST 12 MONTHS, YOU WORRIED THAT YOUR FOOD WOULD RUN OUT BEFORE YOU GOT MONEY TO BUY MORE.: NEVER TRUE

## 2019-01-01 SDOH — ECONOMIC STABILITY: TRANSPORTATION INSECURITY
IN THE PAST 12 MONTHS, HAS LACK OF TRANSPORTATION KEPT YOU FROM MEETINGS, WORK, OR FROM GETTING THINGS NEEDED FOR DAILY LIVING?: NO

## 2019-01-01 ASSESSMENT — ENCOUNTER SYMPTOMS
ABDOMINAL PAIN: 1
COUGH: 1
ARTHRALGIAS: 0
SORE THROAT: 0
DIZZINESS: 0
HEMATURIA: 0
HEADACHES: 0
MYALGIAS: 0
FEVER: 0
HEARTBURN: 0
BREAST MASS: 0
WEAKNESS: 0
NERVOUS/ANXIOUS: 1
DIARRHEA: 1
SHORTNESS OF BREATH: 0
JOINT SWELLING: 0
NAUSEA: 0
EYE PAIN: 0
PARESTHESIAS: 0
DYSURIA: 0
CONSTIPATION: 1
PALPITATIONS: 0
FREQUENCY: 0
HEMATOCHEZIA: 0
CHILLS: 0

## 2019-01-01 ASSESSMENT — PAIN SCALES - GENERAL
PAINLEVEL: NO PAIN (0)
PAINLEVEL: SEVERE PAIN (6)
PAINLEVEL: NO PAIN (0)
PAINLEVEL: MILD PAIN (3)

## 2019-01-01 ASSESSMENT — ACTIVITIES OF DAILY LIVING (ADL)
ADLS_ACUITY_SCORE: 17
NUMBER_OF_TIMES_PATIENT_HAS_FALLEN_WITHIN_LAST_SIX_MONTHS: 2
ADLS_ACUITY_SCORE: 14
ADLS_ACUITY_SCORE: 14
DEPENDENT_IADLS:: TRANSPORTATION;INDEPENDENT
ADLS_ACUITY_SCORE: 17
DRESS: 0-->INDEPENDENT
ADLS_ACUITY_SCORE: 14
ADLS_ACUITY_SCORE: 12
ADLS_ACUITY_SCORE: 14
WHICH_OF_THE_ABOVE_FUNCTIONAL_RISKS_HAD_A_RECENT_ONSET_OR_CHANGE?: FALL HISTORY;COGNITION
ADLS_ACUITY_SCORE: 16
ADLS_ACUITY_SCORE: 14
ADLS_ACUITY_SCORE: 14
ADLS_ACUITY_SCORE: 15
ADLS_ACUITY_SCORE: 16
ADLS_ACUITY_SCORE: 17
ADLS_ACUITY_SCORE: 12
ADLS_ACUITY_SCORE: 17
ADLS_ACUITY_SCORE: 15
ADLS_ACUITY_SCORE: 14
RETIRED_COMMUNICATION: 0-->UNDERSTANDS/COMMUNICATES WITHOUT DIFFICULTY
ADLS_ACUITY_SCORE: 15
ADLS_ACUITY_SCORE: 12
ADLS_ACUITY_SCORE: 17
ADLS_ACUITY_SCORE: 12
SWALLOWING: 0-->SWALLOWS FOODS/LIQUIDS WITHOUT DIFFICULTY
ADLS_ACUITY_SCORE: 15
RETIRED_EATING: 0-->INDEPENDENT
TRANSFERRING: 0-->INDEPENDENT
ADLS_ACUITY_SCORE: 15
ADLS_ACUITY_SCORE: 13
ADLS_ACUITY_SCORE: 14
AMBULATION: 0-->INDEPENDENT
TOILETING: 0-->INDEPENDENT
ADLS_ACUITY_SCORE: 13
ADLS_ACUITY_SCORE: 16
ADLS_ACUITY_SCORE: 16
COGNITION: 2 - DIFFICULTY WITH ORGANIZING THOUGHTS
ADLS_ACUITY_SCORE: 15
ADLS_ACUITY_SCORE: 15
ADLS_ACUITY_SCORE: 16
ADLS_ACUITY_SCORE: 17
BATHING: 0-->INDEPENDENT
FALL_HISTORY_WITHIN_LAST_SIX_MONTHS: YES
ADLS_ACUITY_SCORE: 16
ADLS_ACUITY_SCORE: 15
ADLS_ACUITY_SCORE: 12
ADLS_ACUITY_SCORE: 14
ADLS_ACUITY_SCORE: 13
ADLS_ACUITY_SCORE: 17
ADLS_ACUITY_SCORE: 13

## 2019-01-01 ASSESSMENT — MIFFLIN-ST. JEOR
SCORE: 1104.38
SCORE: 1110.28
SCORE: 1130.71
SCORE: 1071.38
SCORE: 1140.67
SCORE: 1098.94
SCORE: 1106.38
SCORE: 1128.88
SCORE: 1094.41
SCORE: 1099.85

## 2019-01-01 ASSESSMENT — PATIENT HEALTH QUESTIONNAIRE - PHQ9
SUM OF ALL RESPONSES TO PHQ QUESTIONS 1-9: 10
SUM OF ALL RESPONSES TO PHQ QUESTIONS 1-9: 10
10. IF YOU CHECKED OFF ANY PROBLEMS, HOW DIFFICULT HAVE THESE PROBLEMS MADE IT FOR YOU TO DO YOUR WORK, TAKE CARE OF THINGS AT HOME, OR GET ALONG WITH OTHER PEOPLE: SOMEWHAT DIFFICULT
SUM OF ALL RESPONSES TO PHQ QUESTIONS 1-9: 10

## 2019-01-03 ENCOUNTER — ONCOLOGY VISIT (OUTPATIENT)
Dept: ONCOLOGY | Facility: CLINIC | Age: 54
End: 2019-01-03
Payer: COMMERCIAL

## 2019-01-03 VITALS
TEMPERATURE: 98 F | OXYGEN SATURATION: 94 % | RESPIRATION RATE: 18 BRPM | DIASTOLIC BLOOD PRESSURE: 64 MMHG | HEIGHT: 61 IN | BODY MASS INDEX: 25.15 KG/M2 | SYSTOLIC BLOOD PRESSURE: 126 MMHG | HEART RATE: 92 BPM | WEIGHT: 133.2 LBS

## 2019-01-03 DIAGNOSIS — D50.8 OTHER IRON DEFICIENCY ANEMIA: ICD-10-CM

## 2019-01-03 DIAGNOSIS — D69.6 THROMBOCYTOPENIA (H): Primary | ICD-10-CM

## 2019-01-03 LAB
ERYTHROCYTE [DISTWIDTH] IN BLOOD BY AUTOMATED COUNT: 16 % (ref 10–15)
FERRITIN SERPL-MCNC: 287 NG/ML (ref 8–252)
HCT VFR BLD AUTO: 35.4 % (ref 35–47)
HGB BLD-MCNC: 11.4 G/DL (ref 11.7–15.7)
IRON SATN MFR SERPL: 68 % (ref 15–46)
IRON SERPL-MCNC: 123 UG/DL (ref 35–180)
MCH RBC QN AUTO: 36.5 PG (ref 26.5–33)
MCHC RBC AUTO-ENTMCNC: 32.2 G/DL (ref 31.5–36.5)
MCV RBC AUTO: 114 FL (ref 78–100)
PLATELET # BLD AUTO: 57 10E9/L (ref 150–450)
RBC # BLD AUTO: 3.12 10E12/L (ref 3.8–5.2)
TIBC SERPL-MCNC: 180 UG/DL (ref 240–430)
WBC # BLD AUTO: 4 10E9/L (ref 4–11)

## 2019-01-03 PROCEDURE — 82728 ASSAY OF FERRITIN: CPT | Performed by: INTERNAL MEDICINE

## 2019-01-03 PROCEDURE — 83550 IRON BINDING TEST: CPT | Performed by: INTERNAL MEDICINE

## 2019-01-03 PROCEDURE — 36415 COLL VENOUS BLD VENIPUNCTURE: CPT | Performed by: INTERNAL MEDICINE

## 2019-01-03 PROCEDURE — 85027 COMPLETE CBC AUTOMATED: CPT | Performed by: INTERNAL MEDICINE

## 2019-01-03 PROCEDURE — 83540 ASSAY OF IRON: CPT | Performed by: INTERNAL MEDICINE

## 2019-01-03 PROCEDURE — 99213 OFFICE O/P EST LOW 20 MIN: CPT | Performed by: INTERNAL MEDICINE

## 2019-01-03 ASSESSMENT — MIFFLIN-ST. JEOR: SCORE: 1151.57

## 2019-01-03 ASSESSMENT — PAIN SCALES - GENERAL: PAINLEVEL: NO PAIN (0)

## 2019-01-03 NOTE — PROGRESS NOTES
FOLLOW-UP VISIT NOTE    PATIENT NAME: Monalisa Olguin MRN # [id number]  DATE OF VISIT: Evens 3, 2019 YOB: 1965    REFERRING PROVIDER: No referring provider defined for this encounter.    Reason for follow up  - Pancytopenia secondary to underlying cirrhosis /Hypersplensim/Alcolohol abuse  - Iron def anemia from chronic GI blood loss      HISTORY:    60-year-old female with past medical history significant for alcohol abuse, chronic cirrhosis with splenomegaly, anxiety/depression, CKD, hypertension who has had thrombocytopenia since 2015 and anemia since 2017. She has been following with gastroenterology for underlying alcoholic cirrhosis as well as some. Also has undergone workup for GI blood loss with EGD and colonoscopy in December 2017 which didn't show any obvious bleeding site. Since then patient has been admitted to the hospital in March with alcohol intoxication and was noted to be severely anemic with hemoglobin in 6's. She was transfused 2 units of packed red blood cells. Stool guaic was positive for blood. Patient was discharged home after hemoglobin stabilized. Iron studies performed indicated iron deficiency with ferritin at 11 and saturation 7%.    - 05/03/18  seen in hematology clinic. She was noted to be severely anemic with hemoglobin at 6.3. Orders were placed for 2 units of blood transfusion and IV iron infusion.    07/17/2018 - IV iron repeated      SUBJECTIVE     She is here for 3 month  follow-up. States that she has cut down alcohol intake is also still feedings per day. Has noticed abdominal distention last few weeks Her last paracentesis was in October 2018. Complains of intermittent nosebleeds. Denies fevers/chills,, pain, dyspnea, dizziness, blood in the stools or other complaints. Patient has GI appointment last month but missed it and has not rescheduled it yet.      PAST MEDICAL HISTORY     Past Medical History:   Diagnosis Date     Alcohol abuse 5/2/2013     Alcohol  dependence 5/25/2013     Alcohol withdrawal 5/2/2013     Anxiety 10/10/2011     CKD (chronic kidney disease) stage 3, GFR 30-59 ml/min (H) 2006    last GFR was 42     CKD (chronic kidney disease) stage 3, GFR 30-59 ml/min (H) 2/22/2011     Depressive disorder      Esophageal reflux 10/7/2002     Hypertension goal BP (blood pressure) < 130/80 7/12/2011     Moderate Depression [296.32] 12/22/2009    stable on wellbutrin     Other internal derangement of knee(717.89)     ACL Internal derangement, knee/ original injury in 5th grade, torn cartilage     Pap smear 2011    no abnormals, due for paps q 2-3 yrs     Unspecified essential hypertension          CURRENT OUTPATIENT MEDICATIONS     Current Outpatient Medications   Medication Sig Dispense Refill     acetaminophen (TYLENOL) 325 MG tablet Take 2 tablets (650 mg) by mouth every 4 hours as needed for mild pain 100 tablet      alendronate (FOSAMAX) 35 MG tablet Take 1 tablet (35 mg) by mouth with 8oz water every Monday (once every 7 days) 30 minutes before breakfast and remain upright during this time. 12 tablet 3     ARIPiprazole (ABILIFY) 5 MG tablet Take 5 mg by mouth daily       busPIRone (BUSPAR) 15 MG tablet Take 1 tablet (15 mg) by mouth 2 times daily 180 tablet 3     ferrous sulfate (IRON) 325 (65 Fe) MG tablet Take 1 tablet (325 mg) by mouth 2 times daily 60 tablet 2     FLUoxetine (PROZAC) 20 MG capsule Take 3 capsules (60 mg) by mouth daily 180 capsule 3     gabapentin (NEURONTIN) 100 MG capsule Take 300 mg by mouth At Bedtime       lactulose (CHRONULAC) 10 GM/15ML solution Take 15 mLs (10 g) by mouth 3 times daily as needed for constipation Start with 15 ml daily, titrate as needed for 2-3 BM's daily. 500 mL 3     omeprazole (PRILOSEC) 20 MG CR capsule TAKE ONE CAPSULE BY MOUTH EVERY DAY 90 capsule 3     phosphorus tablet 250 mg (PHOSPHA 250 NEUTRAL) 250 MG per tablet TAKE TWO TABLETS BY MOUTH TWICE A  tablet 3     propranolol (INDERAL) 10 MG tablet  "Take 1 tablet (10 mg) by mouth 2 times daily 180 tablet 3     rifaximin (XIFAXAN) 550 MG TABS tablet Take 1 tablet (550 mg) by mouth 2 times daily 60 tablet 11     thiamine (VITAMIN B-1) 100 MG tablet Take 1 tablet (100 mg) by mouth daily 90 tablet 3     TL RICARDO RX 2.2-25-1 MG TABS TAKE ONE TABLET BY MOUTH EVERY DAY 90 tablet 3     traZODone (DESYREL) 50 MG tablet Take 2 tablets (100 mg) by mouth nightly as needed for sleep 60 tablet 5        ALLERGIES     Allergies   Allergen Reactions     Albuterol      Tongue \"hardened and painful\"        REVIEW OF SYSTEMS   As above in the HPI, o/w complete 12-point ROS was negative.     PHYSICAL EXAM   B/P: 104/62, T: 96.6, P: 94, R: 16  SpO2 Readings from Last 4 Encounters:   08/17/18 94%   08/16/18 96%   08/15/18 96%   08/14/18 95%     Wt Readings from Last 3 Encounters:   01/03/19 60.4 kg (133 lb 3.2 oz)   10/03/18 57.5 kg (126 lb 11.2 oz)   08/16/18 66 kg (145 lb 8 oz)     GEN: NAD  EYES:Scleral icterus  Mouth/ENT: Oropharynx is clear.  ABDOMEN: distended, non tender  EXT: no edema  NEURO: alert  SKIN: no rash     LABORATORY AND IMAGING STUDIES     Component      Latest Ref Rng & Units 1/3/2019   WBC      4.0 - 11.0 10e9/L 4.0   RBC Count      3.8 - 5.2 10e12/L 3.12 (L)   Hemoglobin      11.7 - 15.7 g/dL 11.4 (L)   Hematocrit      35.0 - 47.0 % 35.4   MCV      78 - 100 fl 114 (H)   MCH      26.5 - 33.0 pg 36.5 (H)   MCHC      31.5 - 36.5 g/dL 32.2   RDW      10.0 - 15.0 % 16.0 (H)   Platelet Count      150 - 450 10e9/L 57 (L)   Iron      35 - 180 ug/dL 123   Iron Binding Cap      240 - 430 ug/dL 180 (L)   Iron Saturation Index      15 - 46 % 68 (H)   Ferritin      8 - 252 ng/mL 287 (H)      ASSESSMENT AND PLAN     52-year-old female with    1.  Pancytopenia   2.  Iron def anemia from chronic GI blood loss  3.  Macrocytosis  4.  Decompensated ESLD secondary to alcohol abuse with ascites     1. Secondary to underlying alcoholic cirrhosis /Hypersplensim/Alcolohol " abuse  Recommended alcohol abstinence.     2.  Iron deficiency likely from chronic GI blood loss but no obvious source on the EGD and  Colonoscopy  Last IV iron 07/2018  Hb improved at 11.4 today  Iron levels normal    3. Secondary to alcohol abuse and underlying liver disease    4. Continue follow up with GI/PCP    Return to clinic in 6 months with labs.     Chart documentation with Dragon Voice recognition Software. Although reviewed after completion, some words and grammatical errors may remain.  Usman Lee MD  Attending Physician   Hematology/Medical Oncology

## 2019-01-03 NOTE — NURSING NOTE
DISCHARGE PLAN:  Next appointments: See patient instruction section  Departure Mode: Ambulatory  Accompanied by: self  4 minutes for nursing discharge (face to face time)     Monalisa Olguin is here today for 3 month follow up hematology.  Writing nurse seen patient after Medical Oncology appointment to address questions/concerns/coordinate care. Patient ambulated by nurse to  to schedule follow up and/or lab appointments. Follow up in 6 months with labs prior. See patient instructions and Oncologist's Progress note for further details. Questions and concerns addressed to patient's satisfaction. Patient verbalized and demonstrated understanding of plan.  Contact information provided and patient is encouraged to call with any that arise in the interim of care.    Kusum DIALLO [facility]    Oncology/Hematology at [facility]  [phone]  1/3/2019, 3:30 PM

## 2019-01-03 NOTE — Clinical Note
1/3/2019         RE: Monalisa Olguin  [address] [facility] [address]        Dear Colleague,    Thank you for referring your patient, Monalisa Olguin, to the [facility]. Please see a copy of my visit note below.      FOLLOW-UP VISIT NOTE    PATIENT NAME: Monalisa Olguin MRN # [id number]  DATE OF VISIT: Evens 3, 2019 YOB: 1965    REFERRING PROVIDER: No referring provider defined for this encounter.    Reason for follow up  - Pancytopenia secondary to underlying cirrhosis /Hypersplensim/Alcolohol abuse  - Iron def anemia from chronic GI blood loss      HISTORY:    60-year-old female with past medical history significant for alcohol abuse, chronic cirrhosis with splenomegaly, anxiety/depression, CKD, hypertension who has had thrombocytopenia since 2015 and anemia since 2017. She has been following with gastroenterology for underlying alcoholic cirrhosis as well as some. Also has undergone workup for GI blood loss with EGD and colonoscopy in December 2017 which didn't show any obvious bleeding site. Since then patient has been admitted to the hospital in March with alcohol intoxication and was noted to be severely anemic with hemoglobin in 6's. She was transfused 2 units of packed red blood cells. Stool guaic was positive for blood. Patient was discharged home after hemoglobin stabilized. Iron studies performed indicated iron deficiency with ferritin at 11 and saturation 7%.    - 05/03/18  seen in hematology clinic. She was noted to be severely anemic with hemoglobin at 6.3. Orders were placed for 2 units of blood transfusion and IV iron infusion.    07/17/2018 - IV iron repeated      SUBJECTIVE     She is here for 3 month  follow-up. States that she has cut down alcohol intake is also still feedings per day. Has noticed abdominal distention last few weeks Her last paracentesis was in October 2018. Complains of intermittent nosebleeds. Denies fevers/chills,, pain,  dyspnea, dizziness, blood in the stools or other complaints. Patient has GI appointment last month but missed it and has not rescheduled it yet.      PAST MEDICAL HISTORY     Past Medical History:   Diagnosis Date     Alcohol abuse 5/2/2013     Alcohol dependence 5/25/2013     Alcohol withdrawal 5/2/2013     Anxiety 10/10/2011     CKD (chronic kidney disease) stage 3, GFR 30-59 ml/min (H) 2006    last GFR was 42     CKD (chronic kidney disease) stage 3, GFR 30-59 ml/min (H) 2/22/2011     Depressive disorder      Esophageal reflux 10/7/2002     Hypertension goal BP (blood pressure) < 130/80 7/12/2011     Moderate Depression [296.32] 12/22/2009    stable on wellbutrin     Other internal derangement of knee(717.89)     ACL Internal derangement, knee/ original injury in 5th grade, torn cartilage     Pap smear 2011    no abnormals, due for paps q 2-3 yrs     Unspecified essential hypertension          CURRENT OUTPATIENT MEDICATIONS     Current Outpatient Medications   Medication Sig Dispense Refill     acetaminophen (TYLENOL) 325 MG tablet Take 2 tablets (650 mg) by mouth every 4 hours as needed for mild pain 100 tablet      alendronate (FOSAMAX) 35 MG tablet Take 1 tablet (35 mg) by mouth with 8oz water every Monday (once every 7 days) 30 minutes before breakfast and remain upright during this time. 12 tablet 3     ARIPiprazole (ABILIFY) 5 MG tablet Take 5 mg by mouth daily       busPIRone (BUSPAR) 15 MG tablet Take 1 tablet (15 mg) by mouth 2 times daily 180 tablet 3     ferrous sulfate (IRON) 325 (65 Fe) MG tablet Take 1 tablet (325 mg) by mouth 2 times daily 60 tablet 2     FLUoxetine (PROZAC) 20 MG capsule Take 3 capsules (60 mg) by mouth daily 180 capsule 3     gabapentin (NEURONTIN) 100 MG capsule Take 300 mg by mouth At Bedtime       lactulose (CHRONULAC) 10 GM/15ML solution Take 15 mLs (10 g) by mouth 3 times daily as needed for constipation Start with 15 ml daily, titrate as needed for 2-3 BM's daily. 500 mL 3  "    omeprazole (PRILOSEC) 20 MG CR capsule TAKE ONE CAPSULE BY MOUTH EVERY DAY 90 capsule 3     phosphorus tablet 250 mg (PHOSPHA 250 NEUTRAL) 250 MG per tablet TAKE TWO TABLETS BY MOUTH TWICE A  tablet 3     propranolol (INDERAL) 10 MG tablet Take 1 tablet (10 mg) by mouth 2 times daily 180 tablet 3     rifaximin (XIFAXAN) 550 MG TABS tablet Take 1 tablet (550 mg) by mouth 2 times daily 60 tablet 11     thiamine (VITAMIN B-1) 100 MG tablet Take 1 tablet (100 mg) by mouth daily 90 tablet 3     TL RICARDO RX 2.2-25-1 MG TABS TAKE ONE TABLET BY MOUTH EVERY DAY 90 tablet 3     traZODone (DESYREL) 50 MG tablet Take 2 tablets (100 mg) by mouth nightly as needed for sleep 60 tablet 5        ALLERGIES     Allergies   Allergen Reactions     Albuterol      Tongue \"hardened and painful\"        REVIEW OF SYSTEMS   As above in the HPI, o/w complete 12-point ROS was negative.     PHYSICAL EXAM   B/P: 104/62, T: 96.6, P: 94, R: 16  SpO2 Readings from Last 4 Encounters:   08/17/18 94%   08/16/18 96%   08/15/18 96%   08/14/18 95%     Wt Readings from Last 3 Encounters:   01/03/19 60.4 kg (133 lb 3.2 oz)   10/03/18 57.5 kg (126 lb 11.2 oz)   08/16/18 66 kg (145 lb 8 oz)     GEN: NAD  EYES:Scleral icterus  Mouth/ENT: Oropharynx is clear.  ABDOMEN: distended, non tender  EXT: no edema  NEURO: alert  SKIN: no rash     LABORATORY AND IMAGING STUDIES     Component      Latest Ref Rng & Units 1/3/2019   WBC      4.0 - 11.0 10e9/L 4.0   RBC Count      3.8 - 5.2 10e12/L 3.12 (L)   Hemoglobin      11.7 - 15.7 g/dL 11.4 (L)   Hematocrit      35.0 - 47.0 % 35.4   MCV      78 - 100 fl 114 (H)   MCH      26.5 - 33.0 pg 36.5 (H)   MCHC      31.5 - 36.5 g/dL 32.2   RDW      10.0 - 15.0 % 16.0 (H)   Platelet Count      150 - 450 10e9/L 57 (L)   Iron      35 - 180 ug/dL 123   Iron Binding Cap      240 - 430 ug/dL 180 (L)   Iron Saturation Index      15 - 46 % 68 (H)   Ferritin      8 - 252 ng/mL 287 (H)      ASSESSMENT AND PLAN     52-year-old " female with    1.  Pancytopenia   2.  Iron def anemia from chronic GI blood loss  3.  Macrocytosis  4.  Decompensated ESLD secondary to alcohol abuse with ascites     1. Secondary to underlying alcoholic cirrhosis /Hypersplensim/Alcolohol abuse  Recommended alcohol abstinence.     2.  Iron deficiency likely from chronic GI blood loss but no obvious source on the EGD and  Colonoscopy  Last IV iron 07/2018  Hb improved at 11.4 today  Iron levels normal    3. Secondary to alcohol abuse and underlying liver disease    4. Continue follow up with GI/PCP    Return to clinic in 6 months with labs.     Chart documentation with Dragon Voice recognition Software. Although reviewed after completion, some words and grammatical errors may remain.  Usman Lee MD  Attending Physician   Hematology/Medical Oncology    Again, thank you for allowing me to participate in the care of your patient.        Sincerely,        Usman Lee MD

## 2019-01-03 NOTE — PATIENT INSTRUCTIONS
Please follow up with Dr. Lee in 6 months with lab work couple days prior.      Lab Date/Time:    Follow Up Date/Time:     If you have any questions or concerns please feel free to call.    If you need to reschedule please call:  Clinic or Lab Appointment - [phone]  Infusion - [phone]  Imaging - [phone]    Kusum DIALLO CMA  [facility]  Oncology/Hematology at [facility]  [phone]

## 2019-01-03 NOTE — NURSING NOTE
"Oncology Rooming Note    January 3, 2019 3:11 PM   Monalisa Olguin is a 53 year old female who presents for:    Chief Complaint   Patient presents with     Hematology     3 month follow up Iron def anemia from chronic GI blood loss & Thrombocytopenia     Results     lab work completed on 1/2/19     Initial Vitals: /64 (BP Location: Right arm, Patient Position: Chair, Cuff Size: Adult Regular)   Pulse 92   Temp 98  F (36.7  C) (Temporal)   Resp 18   Ht 1.549 m (5' 1\")   Wt 60.4 kg (133 lb 3.2 oz)   LMP 05/16/2012   SpO2 94%   BMI 25.17 kg/m   Estimated body mass index is 25.17 kg/m  as calculated from the following:    Height as of this encounter: 1.549 m (5' 1\").    Weight as of this encounter: 60.4 kg (133 lb 3.2 oz). Body surface area is 1.61 meters squared.  No Pain (0) Comment: Data Unavailable   Patient's last menstrual period was 05/16/2012.  Allergies reviewed: Yes  Medications reviewed: Yes    Medications: Medication refills not needed today.  Pharmacy name entered into [facility]:    [facility] - [place], [place] - [address]  [facility] [place], [place] [facility] [address] [facility]  [facility] [place], [place] - [address]  [facility]      6 minutes for nursing intake (face to face time)     Kusum DIALLO CMA              "

## 2019-02-05 ENCOUNTER — TELEPHONE (OUTPATIENT)
Dept: FAMILY MEDICINE | Facility: CLINIC | Age: 54
End: 2019-02-05

## 2019-02-05 ASSESSMENT — PATIENT HEALTH QUESTIONNAIRE - PHQ9: SUM OF ALL RESPONSES TO PHQ QUESTIONS 1-9: 7

## 2019-02-05 NOTE — TELEPHONE ENCOUNTER
Pt completed PHQ-9.    PHQ-9 SCORE 2/5/2019   PHQ-9 Total Score -   PHQ-9 Total Score MyChart -   PHQ-9 Total Score 7     Lili Weldon CMA ([facility])

## 2019-02-05 NOTE — TELEPHONE ENCOUNTER
I have attempted to call the pt to update a PHQ-9. I left message for pt to call back. I will call back another time. Lili Weldon CMA ([facility])

## 2019-02-05 NOTE — TELEPHONE ENCOUNTER
Pt completed PHQ-9.    PHQ-9 SCORE 2/5/2019   PHQ-9 Total Score -   PHQ-9 Total Score MyChart -   PHQ-9 Total Score 7     Lili Weldon CMA ([facility])

## 2019-03-19 ENCOUNTER — TELEPHONE (OUTPATIENT)
Dept: GASTROENTEROLOGY | Facility: CLINIC | Age: 54
End: 2019-03-19

## 2019-03-19 DIAGNOSIS — K70.30 ALCOHOLIC CIRRHOSIS OF LIVER WITHOUT ASCITES (H): Primary | ICD-10-CM

## 2019-03-19 DIAGNOSIS — K70.30 ALCOHOLIC CIRRHOSIS OF LIVER WITHOUT ASCITES (H): ICD-10-CM

## 2019-03-19 LAB
AFP SERPL-MCNC: 5.4 UG/L (ref 0–8)
ALBUMIN SERPL-MCNC: 3.3 G/DL (ref 3.4–5)
ALP SERPL-CCNC: 179 U/L (ref 40–150)
ALT SERPL W P-5'-P-CCNC: 11 U/L (ref 0–50)
ANION GAP SERPL CALCULATED.3IONS-SCNC: 9 MMOL/L (ref 3–14)
AST SERPL W P-5'-P-CCNC: 36 U/L (ref 0–45)
BILIRUB DIRECT SERPL-MCNC: 2.1 MG/DL (ref 0–0.2)
BILIRUB SERPL-MCNC: 3.1 MG/DL (ref 0.2–1.3)
BUN SERPL-MCNC: 7 MG/DL (ref 7–30)
CALCIUM SERPL-MCNC: 8.2 MG/DL (ref 8.5–10.1)
CHLORIDE SERPL-SCNC: 110 MMOL/L (ref 94–109)
CO2 SERPL-SCNC: 25 MMOL/L (ref 20–32)
CREAT SERPL-MCNC: 0.83 MG/DL (ref 0.52–1.04)
ERYTHROCYTE [DISTWIDTH] IN BLOOD BY AUTOMATED COUNT: 14.5 % (ref 10–15)
GFR SERPL CREATININE-BSD FRML MDRD: 81 ML/MIN/{1.73_M2}
GLUCOSE SERPL-MCNC: 92 MG/DL (ref 70–99)
HCT VFR BLD AUTO: 33.2 % (ref 35–47)
HGB BLD-MCNC: 10.9 G/DL (ref 11.7–15.7)
INR PPP: 1.14 (ref 0.86–1.14)
MCH RBC QN AUTO: 39.4 PG (ref 26.5–33)
MCHC RBC AUTO-ENTMCNC: 32.8 G/DL (ref 31.5–36.5)
MCV RBC AUTO: 120 FL (ref 78–100)
PLATELET # BLD AUTO: 51 10E9/L (ref 150–450)
POTASSIUM SERPL-SCNC: 3.7 MMOL/L (ref 3.4–5.3)
PROT SERPL-MCNC: 6.5 G/DL (ref 6.8–8.8)
RBC # BLD AUTO: 2.77 10E12/L (ref 3.8–5.2)
SODIUM SERPL-SCNC: 144 MMOL/L (ref 133–144)
WBC # BLD AUTO: 4.7 10E9/L (ref 4–11)

## 2019-03-19 PROCEDURE — 80048 BASIC METABOLIC PNL TOTAL CA: CPT | Performed by: INTERNAL MEDICINE

## 2019-03-19 PROCEDURE — 85027 COMPLETE CBC AUTOMATED: CPT | Performed by: INTERNAL MEDICINE

## 2019-03-19 PROCEDURE — 85610 PROTHROMBIN TIME: CPT | Performed by: INTERNAL MEDICINE

## 2019-03-19 PROCEDURE — 82105 ALPHA-FETOPROTEIN SERUM: CPT | Performed by: INTERNAL MEDICINE

## 2019-03-19 PROCEDURE — 80076 HEPATIC FUNCTION PANEL: CPT | Performed by: INTERNAL MEDICINE

## 2019-03-19 PROCEDURE — 36415 COLL VENOUS BLD VENIPUNCTURE: CPT | Performed by: INTERNAL MEDICINE

## 2019-03-19 NOTE — TELEPHONE ENCOUNTER
Orders entered. LVM updating patient.  Patient is also due for an ultrasound, that order is also in number given to patient to schedule.    Mai Yañez LPN  Hepatology Clinic      ----------   Health Call Center    Phone Message    May a detailed message be left on voicemail: yes    Reason for Call: Other: The pt has made an appt for 3.22.19. She would like to get her labs done 3.21.19 at [facility]. Please add the orders so that she can do that. Thanks.     Action Taken: Message routed to:  [facility] ([facility]

## 2019-03-20 ENCOUNTER — OFFICE VISIT (OUTPATIENT)
Dept: GASTROENTEROLOGY | Facility: CLINIC | Age: 54
End: 2019-03-20
Attending: INTERNAL MEDICINE
Payer: COMMERCIAL

## 2019-03-20 VITALS
BODY MASS INDEX: 25.45 KG/M2 | DIASTOLIC BLOOD PRESSURE: 70 MMHG | HEART RATE: 91 BPM | HEIGHT: 61 IN | OXYGEN SATURATION: 95 % | SYSTOLIC BLOOD PRESSURE: 111 MMHG | WEIGHT: 134.8 LBS

## 2019-03-20 DIAGNOSIS — K70.30 ALCOHOLIC CIRRHOSIS OF LIVER WITHOUT ASCITES (H): Primary | ICD-10-CM

## 2019-03-20 DIAGNOSIS — K74.60 CIRRHOSIS OF LIVER WITHOUT ASCITES, UNSPECIFIED HEPATIC CIRRHOSIS TYPE (H): ICD-10-CM

## 2019-03-20 PROCEDURE — G0463 HOSPITAL OUTPT CLINIC VISIT: HCPCS | Mod: ZF

## 2019-03-20 ASSESSMENT — PAIN SCALES - GENERAL: PAINLEVEL: NO PAIN (0)

## 2019-03-20 ASSESSMENT — MIFFLIN-ST. JEOR: SCORE: 1158.83

## 2019-03-20 NOTE — LETTER
3/20/2019       RE: Monalisa Olguin  [address]  [facility] [address]     Dear Colleague,    Thank you for referring your patient, Monalisa Olguin, to the [facility] HEPATOLOGY at [facility]. Please see a copy of my visit note below.    [facility] Hepatology Note    Encounter Date: Mar 20, 2019    CC:  Chief Complaint   Patient presents with     RECHECK     Alcoholic hepatitis     History of Present Illness:  Ms. Monalisa Olguin is a 53 year old female who presents as a follow-up for alcoholic cirrhosis of liver without ascites. At today's visit the patient states that she continues to drink 4 day's a week. She mostly drinks beer and has about 6 a day. Patient was sober for 11 months and then relapsed and started drinking in April. She has not been able to sleep well. She denies itching or skin rash or fatigue. The patient has occasional abdominal pain. She denies any increased abdominal girth or lower extremity edema.  She denies any fevers or chills, cough or shortness of breath. She denies any nausea or vomiting, but does experience diarrhea or constipation.  Her appetite has been poor, and has been losing weight. Her energy level has been very poor. Patient mentions that she sometimes wakes up in the morning and has to vomit, and she also has swallowing problems.     Past Medical History:   Patient Active Problem List   Diagnosis     Kidney donor     Esophageal reflux     Moderate Depression [296.32]     HYPERLIPIDEMIA LDL GOAL <100     Hypertension goal BP (blood pressure) < 130/80     Anxiety     Alcoholism in recovery (H)     Alcoholic hepatitis with ascites     Chronic blood loss anemia     Thrombocytopenia (H)     Tobacco abuse     Portal hypertension (H)     Pulmonary nodules     Hyperthyroidism     Acute alcoholic intoxication in alcoholism (H)     Heme positive stool     Alcoholic cirrhosis of liver without ascites (H) - per  Hepatology consult 2017     Splenomegaly     Epistaxis     Other iron deficiency anemia     Past Medical History:   Diagnosis Date     Alcohol abuse 2013     Alcohol dependence 2013     Alcohol withdrawal 2013     Anxiety 10/10/2011     CKD (chronic kidney disease) stage 3, GFR 30-59 ml/min (H)     last GFR was 42     CKD (chronic kidney disease) stage 3, GFR 30-59 ml/min (H) 2011     Depressive disorder      Esophageal reflux 10/7/2002     Hypertension goal BP (blood pressure) < 130/80 2011     Moderate Depression [296.32] 2009    stable on wellbutrin     Other internal derangement of knee(717.89)     ACL Internal derangement, knee/ original injury in 5th grade, torn cartilage     Pap smear     no abnormals, due for paps q 2-3 yrs     Unspecified essential hypertension      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     COLONOSCOPY N/A 2017    Procedure: COLONOSCOPY;  Colonoscopy;  Surgeon: Sai Johnston MD;  Location:  GI     ESOPHAGOSCOPY, GASTROSCOPY, DUODENOSCOPY (EGD), COMBINED N/A 2017    Procedure: COMBINED ESOPHAGOSCOPY, GASTROSCOPY, DUODENOSCOPY (EGD), BIOPSY SINGLE OR MULTIPLE;  ESOPHAGOSCOPY, GASTROSCOPY, DUODENOSCOPY (EGD) with biopsies;  Surgeon: Sai Johnston MD;  Location:  GI     HC KNEE SCOPE, DIAGNOSTIC  01    Arthroscopy, Lt Knee     LAPAROSCOPIC CHOLECYSTECTOMY N/A 2017    Procedure: LAPAROSCOPIC CHOLECYSTECTOMY;  laparoscopic cholecystectomy;  Surgeon: Romeo Pastor MD;  Location: [facility] OR     OPEN REDUCTION INTERNAL FIXATION WRIST Right 2017    Procedure: OPEN REDUCTION INTERNAL FIXATION WRIST;  OPEN REDUCTION INTERNAL FIXATION RIGHT WRIST;  Surgeon: Edgardo Almendarez MD;  Location:  OR     Medications:  Current Outpatient Medications   Medication Sig Dispense Refill      "acetaminophen (TYLENOL) 325 MG tablet Take 2 tablets (650 mg) by mouth every 4 hours as needed for mild pain 100 tablet      alendronate (FOSAMAX) 35 MG tablet Take 1 tablet (35 mg) by mouth with 8oz water every Monday (once every 7 days) 30 minutes before breakfast and remain upright during this time. 12 tablet 3     ARIPiprazole (ABILIFY) 5 MG tablet Take 5 mg by mouth daily       busPIRone (BUSPAR) 15 MG tablet Take 1 tablet (15 mg) by mouth 2 times daily 180 tablet 3     ferrous sulfate (IRON) 325 (65 Fe) MG tablet Take 1 tablet (325 mg) by mouth 2 times daily 60 tablet 2     FLUoxetine (PROZAC) 20 MG capsule Take 3 capsules (60 mg) by mouth daily 180 capsule 3     gabapentin (NEURONTIN) 100 MG capsule Take 300 mg by mouth At Bedtime       lactulose (CHRONULAC) 10 GM/15ML solution Take 15 mLs (10 g) by mouth 3 times daily as needed for constipation Start with 15 ml daily, titrate as needed for 2-3 BM's daily. 500 mL 3     omeprazole (PRILOSEC) 20 MG CR capsule TAKE ONE CAPSULE BY MOUTH EVERY DAY 90 capsule 3     phosphorus tablet 250 mg (PHOSPHA 250 NEUTRAL) 250 MG per tablet TAKE TWO TABLETS BY MOUTH TWICE A  tablet 3     propranolol (INDERAL) 10 MG tablet Take 1 tablet (10 mg) by mouth 2 times daily 180 tablet 3     rifaximin (XIFAXAN) 550 MG TABS tablet Take 1 tablet (550 mg) by mouth 2 times daily 60 tablet 11     thiamine (VITAMIN B-1) 100 MG tablet Take 1 tablet (100 mg) by mouth daily 90 tablet 3     TL RICARDO RX 2.2-25-1 MG TABS TAKE ONE TABLET BY MOUTH EVERY DAY 90 tablet 3     traZODone (DESYREL) 50 MG tablet Take 2 tablets (100 mg) by mouth nightly as needed for sleep 60 tablet 5       Allergies   Allergen Reactions     Albuterol      Tongue \"hardened and painful\"       /70   Pulse 91   Ht 1.549 m (5' 1\")   Wt 61.1 kg (134 lb 12.8 oz)   LMP 05/16/2012   SpO2 95%   BMI 25.47 kg/m       Physical Exam:    In general, she appears quite healthy.  HEENT exam shows no scleral icterus or " temporal muscle wasting.  Her chest is clear.  Her abdominal exam shows no increase in girth.  No masses or tenderness to palpation are present.  Her liver is 10 cm in span without left lobe enlargement.  No spleen tip is palpable, and extremity exam shows no edema.  Skin exam shows no stigmata of chronic liver disease.  Neurologic exam shows no asterixis.     Recent Results (from the past 168 hour(s))   AFP tumor marker    Collection Time: 03/19/19 12:36 PM   Result Value Ref Range    Alpha Fetoprotein 5.4 0 - 8 ug/L   INR    Collection Time: 03/19/19 12:36 PM   Result Value Ref Range    INR 1.14 0.86 - 1.14   Hepatic panel    Collection Time: 03/19/19 12:36 PM   Result Value Ref Range    Bilirubin Direct 2.1 (H) 0.0 - 0.2 mg/dL    Bilirubin Total 3.1 (H) 0.2 - 1.3 mg/dL    Albumin 3.3 (L) 3.4 - 5.0 g/dL    Protein Total 6.5 (L) 6.8 - 8.8 g/dL    Alkaline Phosphatase 179 (H) 40 - 150 U/L    ALT 11 0 - 50 U/L    AST 36 0 - 45 U/L   Basic metabolic panel    Collection Time: 03/19/19 12:36 PM   Result Value Ref Range    Sodium 144 133 - 144 mmol/L    Potassium 3.7 3.4 - 5.3 mmol/L    Chloride 110 (H) 94 - 109 mmol/L    Carbon Dioxide 25 20 - 32 mmol/L    Anion Gap 9 3 - 14 mmol/L    Glucose 92 70 - 99 mg/dL    Urea Nitrogen 7 7 - 30 mg/dL    Creatinine 0.83 0.52 - 1.04 mg/dL    GFR Estimate 81 >60 mL/min/[1.73_m2]    GFR Estimate If Black >90 >60 mL/min/[1.73_m2]    Calcium 8.2 (L) 8.5 - 10.1 mg/dL   CBC with platelets    Collection Time: 03/19/19 12:36 PM   Result Value Ref Range    WBC 4.7 4.0 - 11.0 10e9/L    RBC Count 2.77 (L) 3.8 - 5.2 10e12/L    Hemoglobin 10.9 (L) 11.7 - 15.7 g/dL    Hematocrit 33.2 (L) 35.0 - 47.0 %     (H) 78 - 100 fl    MCH 39.4 (H) 26.5 - 33.0 pg    MCHC 32.8 31.5 - 36.5 g/dL    RDW 14.5 10.0 - 15.0 %    Platelet Count 51 (L) 150 - 450 10e9/L      Impression/Plan:  1. Alcoholic Cirrhosis of liver without ascites.     Liver test look good, but patient still drinking     Will do an  endoscopy for variceal screening and to work up dysphasia    2. Alcohol abuse     Patient continues not being sober, drinks 6 beers a day 4 times a week.     Has strongly encouraged to stop     3. Health Care Maintenance     Up to date on Vaccine     Up to date on cancer screening     No further intervention required. Patient to report changes.     Follow-up in 6 months.     Staff Involved:  Staff/Scribe    Scribe Disclosure:   Noel LEAL, am serving as a scribe to document services personally performed by Dr. Edgardo Kovacs, based on data collection and the provider's statements to me.        Edgardo Kovacs MD      Professor of Medicine  [employer]      Executive Medical Director, Solid Organ Transplant Program  [facility]

## 2019-03-20 NOTE — PROGRESS NOTES
[facility] Hepatology Note    Encounter Date: Mar 20, 2019    CC:  Chief Complaint   Patient presents with     RECHECK     Alcoholic hepatitis     History of Present Illness:  Ms. Monalisa Olguin is a 53 year old female who presents as a follow-up for alcoholic cirrhosis of liver without ascites. At today's visit the patient states that she continues to drink 4 day's a week. She mostly drinks beer and has about 6 a day. Patient was sober for 11 months and then relapsed and started drinking in April. She has not been able to sleep well. She denies itching or skin rash or fatigue. The patient has occasional abdominal pain. She denies any increased abdominal girth or lower extremity edema.  She denies any fevers or chills, cough or shortness of breath. She denies any nausea or vomiting, but does experience diarrhea or constipation.  Her appetite has been poor, and has been losing weight. Her energy level has been very poor. Patient mentions that she sometimes wakes up in the morning and has to vomit, and she also has swallowing problems.     Past Medical History:   Patient Active Problem List   Diagnosis     Kidney donor     Esophageal reflux     Moderate Depression [296.32]     HYPERLIPIDEMIA LDL GOAL <100     Hypertension goal BP (blood pressure) < 130/80     Anxiety     Alcoholism in recovery (H)     Alcoholic hepatitis with ascites     Chronic blood loss anemia     Thrombocytopenia (H)     Tobacco abuse     Portal hypertension (H)     Pulmonary nodules     Hyperthyroidism     Acute alcoholic intoxication in alcoholism (H)     Heme positive stool     Alcoholic cirrhosis of liver without ascites (H) - per Hepatology consult Nov 2017     Splenomegaly     Epistaxis     Other iron deficiency anemia     Past Medical History:   Diagnosis Date     Alcohol abuse 5/2/2013     Alcohol dependence 5/25/2013     Alcohol withdrawal 5/2/2013     Anxiety 10/10/2011     CKD (chronic kidney disease) stage 3, GFR  30-59 ml/min (H)     last GFR was 42     CKD (chronic kidney disease) stage 3, GFR 30-59 ml/min (H) 2011     Depressive disorder      Esophageal reflux 10/7/2002     Hypertension goal BP (blood pressure) < 130/80 2011     Moderate Depression [296.32] 2009    stable on wellbutrin     Other internal derangement of knee(717.89)     ACL Internal derangement, knee/ original injury in 5th grade, torn cartilage     Pap smear     no abnormals, due for paps q 2-3 yrs     Unspecified essential hypertension      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     COLONOSCOPY N/A 2017    Procedure: COLONOSCOPY;  Colonoscopy;  Surgeon: Sai Johnston MD;  Location:  GI     ESOPHAGOSCOPY, GASTROSCOPY, DUODENOSCOPY (EGD), COMBINED N/A 2017    Procedure: COMBINED ESOPHAGOSCOPY, GASTROSCOPY, DUODENOSCOPY (EGD), BIOPSY SINGLE OR MULTIPLE;  ESOPHAGOSCOPY, GASTROSCOPY, DUODENOSCOPY (EGD) with biopsies;  Surgeon: Sai Johnston MD;  Location: [facility]     HC KNEE SCOPE, DIAGNOSTIC  01    Arthroscopy, Lt Knee     LAPAROSCOPIC CHOLECYSTECTOMY N/A 2017    Procedure: LAPAROSCOPIC CHOLECYSTECTOMY;  laparoscopic cholecystectomy;  Surgeon: Romeo Pastor MD;  Location: [facility] OR     OPEN REDUCTION INTERNAL FIXATION WRIST Right 2017    Procedure: OPEN REDUCTION INTERNAL FIXATION WRIST;  OPEN REDUCTION INTERNAL FIXATION RIGHT WRIST;  Surgeon: Edgardo Almendarez MD;  Location:  OR     Medications:  Current Outpatient Medications   Medication Sig Dispense Refill     acetaminophen (TYLENOL) 325 MG tablet Take 2 tablets (650 mg) by mouth every 4 hours as needed for mild pain 100 tablet      alendronate (FOSAMAX) 35 MG tablet Take 1 tablet (35 mg) by mouth with 8oz water every Monday (once every 7 days) 30 minutes before breakfast and remain upright during this  "time. 12 tablet 3     ARIPiprazole (ABILIFY) 5 MG tablet Take 5 mg by mouth daily       busPIRone (BUSPAR) 15 MG tablet Take 1 tablet (15 mg) by mouth 2 times daily 180 tablet 3     ferrous sulfate (IRON) 325 (65 Fe) MG tablet Take 1 tablet (325 mg) by mouth 2 times daily 60 tablet 2     FLUoxetine (PROZAC) 20 MG capsule Take 3 capsules (60 mg) by mouth daily 180 capsule 3     gabapentin (NEURONTIN) 100 MG capsule Take 300 mg by mouth At Bedtime       lactulose (CHRONULAC) 10 GM/15ML solution Take 15 mLs (10 g) by mouth 3 times daily as needed for constipation Start with 15 ml daily, titrate as needed for 2-3 BM's daily. 500 mL 3     omeprazole (PRILOSEC) 20 MG CR capsule TAKE ONE CAPSULE BY MOUTH EVERY DAY 90 capsule 3     phosphorus tablet 250 mg (PHOSPHA 250 NEUTRAL) 250 MG per tablet TAKE TWO TABLETS BY MOUTH TWICE A  tablet 3     propranolol (INDERAL) 10 MG tablet Take 1 tablet (10 mg) by mouth 2 times daily 180 tablet 3     rifaximin (XIFAXAN) 550 MG TABS tablet Take 1 tablet (550 mg) by mouth 2 times daily 60 tablet 11     thiamine (VITAMIN B-1) 100 MG tablet Take 1 tablet (100 mg) by mouth daily 90 tablet 3     TL RICARDO RX 2.2-25-1 MG TABS TAKE ONE TABLET BY MOUTH EVERY DAY 90 tablet 3     traZODone (DESYREL) 50 MG tablet Take 2 tablets (100 mg) by mouth nightly as needed for sleep 60 tablet 5       Allergies   Allergen Reactions     Albuterol      Tongue \"hardened and painful\"       /70   Pulse 91   Ht 1.549 m (5' 1\")   Wt 61.1 kg (134 lb 12.8 oz)   LMP 05/16/2012   SpO2 95%   BMI 25.47 kg/m      Physical Exam:    In general, she appears quite healthy.  HEENT exam shows no scleral icterus or temporal muscle wasting.  Her chest is clear.  Her abdominal exam shows no increase in girth.  No masses or tenderness to palpation are present.  Her liver is 10 cm in span without left lobe enlargement.  No spleen tip is palpable, and extremity exam shows no edema.  Skin exam shows no stigmata of " chronic liver disease.  Neurologic exam shows no asterixis.     Recent Results (from the past 168 hour(s))   AFP tumor marker    Collection Time: 03/19/19 12:36 PM   Result Value Ref Range    Alpha Fetoprotein 5.4 0 - 8 ug/L   INR    Collection Time: 03/19/19 12:36 PM   Result Value Ref Range    INR 1.14 0.86 - 1.14   Hepatic panel    Collection Time: 03/19/19 12:36 PM   Result Value Ref Range    Bilirubin Direct 2.1 (H) 0.0 - 0.2 mg/dL    Bilirubin Total 3.1 (H) 0.2 - 1.3 mg/dL    Albumin 3.3 (L) 3.4 - 5.0 g/dL    Protein Total 6.5 (L) 6.8 - 8.8 g/dL    Alkaline Phosphatase 179 (H) 40 - 150 U/L    ALT 11 0 - 50 U/L    AST 36 0 - 45 U/L   Basic metabolic panel    Collection Time: 03/19/19 12:36 PM   Result Value Ref Range    Sodium 144 133 - 144 mmol/L    Potassium 3.7 3.4 - 5.3 mmol/L    Chloride 110 (H) 94 - 109 mmol/L    Carbon Dioxide 25 20 - 32 mmol/L    Anion Gap 9 3 - 14 mmol/L    Glucose 92 70 - 99 mg/dL    Urea Nitrogen 7 7 - 30 mg/dL    Creatinine 0.83 0.52 - 1.04 mg/dL    GFR Estimate 81 >60 mL/min/[1.73_m2]    GFR Estimate If Black >90 >60 mL/min/[1.73_m2]    Calcium 8.2 (L) 8.5 - 10.1 mg/dL   CBC with platelets    Collection Time: 03/19/19 12:36 PM   Result Value Ref Range    WBC 4.7 4.0 - 11.0 10e9/L    RBC Count 2.77 (L) 3.8 - 5.2 10e12/L    Hemoglobin 10.9 (L) 11.7 - 15.7 g/dL    Hematocrit 33.2 (L) 35.0 - 47.0 %     (H) 78 - 100 fl    MCH 39.4 (H) 26.5 - 33.0 pg    MCHC 32.8 31.5 - 36.5 g/dL    RDW 14.5 10.0 - 15.0 %    Platelet Count 51 (L) 150 - 450 10e9/L      Impression/Plan:  1. Alcoholic Cirrhosis of liver without ascites.     Liver test look good, but patient still drinking     Will do an endoscopy for variceal screening and to work up dysphasia    2. Alcohol abuse     Patient continues not being sober, drinks 6 beers a day 4 times a week.     Has strongly encouraged to stop     3. Health Care Maintenance     Up to date on Vaccine     Up to date on cancer screening     No further  intervention required. Patient to report changes.     Follow-up in 6 months.     Staff Involved:  Staff/Scribe    Scribe Disclosure:   I, Noel Sarkar, am serving as a scribe to document services personally performed by Dr. Edgardo Kovacs, based on data collection and the provider's statements to me.        Edgardo Kovacs MD      Professor of Medicine  [employer]      Executive Medical Director, Solid Organ Transplant Program  [facility]

## 2019-03-20 NOTE — NURSING NOTE
"Chief Complaint   Patient presents with     RECHECK     Alcoholic hepatitis     /70   Pulse 91   Ht 1.549 m (5' 1\")   Wt 61.1 kg (134 lb 12.8 oz)   LMP 05/16/2012   SpO2 95%   BMI 25.47 kg/m    Pamela Vickers CMA    "

## 2019-03-20 NOTE — LETTER
3/20/2019      RE: Monalisa Olguin  [address]  [facility] [address]       [facility] Hepatology Note    Encounter Date: Mar 20, 2019    CC:  Chief Complaint   Patient presents with     RECHECK     Alcoholic hepatitis     History of Present Illness:  Ms. Monalisa Olguin is a 53 year old female who presents as a follow-up for alcoholic cirrhosis of liver without ascites. At today's visit the patient states that she continues to drink 4 day's a week. She mostly drinks beer and has about 6 a day. Patient was sober for 11 months and then relapsed and started drinking in April. She has not been able to sleep well. She denies itching or skin rash or fatigue. The patient has occasional abdominal pain. She denies any increased abdominal girth or lower extremity edema.  She denies any fevers or chills, cough or shortness of breath. She denies any nausea or vomiting, but does experience diarrhea or constipation.  Her appetite has been poor, and has been losing weight. Her energy level has been very poor. Patient mentions that she sometimes wakes up in the morning and has to vomit, and she also has swallowing problems.     Past Medical History:   Patient Active Problem List   Diagnosis     Kidney donor     Esophageal reflux     Moderate Depression [296.32]     HYPERLIPIDEMIA LDL GOAL <100     Hypertension goal BP (blood pressure) < 130/80     Anxiety     Alcoholism in recovery (H)     Alcoholic hepatitis with ascites     Chronic blood loss anemia     Thrombocytopenia (H)     Tobacco abuse     Portal hypertension (H)     Pulmonary nodules     Hyperthyroidism     Acute alcoholic intoxication in alcoholism (H)     Heme positive stool     Alcoholic cirrhosis of liver without ascites (H) - per Hepatology consult Nov 2017     Splenomegaly     Epistaxis     Other iron deficiency anemia     Past Medical History:   Diagnosis Date     Alcohol abuse 5/2/2013     Alcohol dependence 5/25/2013     Alcohol  withdrawal 2013     Anxiety 10/10/2011     CKD (chronic kidney disease) stage 3, GFR 30-59 ml/min (H)     last GFR was 42     CKD (chronic kidney disease) stage 3, GFR 30-59 ml/min (H) 2011     Depressive disorder      Esophageal reflux 10/7/2002     Hypertension goal BP (blood pressure) < 130/80 2011     Moderate Depression [296.32] 2009    stable on wellbutrin     Other internal derangement of knee(717.89)     ACL Internal derangement, knee/ original injury in 5th grade, torn cartilage     Pap smear     no abnormals, due for paps q 2-3 yrs     Unspecified essential hypertension      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     COLONOSCOPY N/A 2017    Procedure: COLONOSCOPY;  Colonoscopy;  Surgeon: Sai Johnston MD;  Location:  GI     ESOPHAGOSCOPY, GASTROSCOPY, DUODENOSCOPY (EGD), COMBINED N/A 2017    Procedure: COMBINED ESOPHAGOSCOPY, GASTROSCOPY, DUODENOSCOPY (EGD), BIOPSY SINGLE OR MULTIPLE;  ESOPHAGOSCOPY, GASTROSCOPY, DUODENOSCOPY (EGD) with biopsies;  Surgeon: Sai Johnston MD;  Location: [facility]     HC KNEE SCOPE, DIAGNOSTIC  01    Arthroscopy, Lt Knee     LAPAROSCOPIC CHOLECYSTECTOMY N/A 2017    Procedure: LAPAROSCOPIC CHOLECYSTECTOMY;  laparoscopic cholecystectomy;  Surgeon: Romeo Pastor MD;  Location: [facility] OR     OPEN REDUCTION INTERNAL FIXATION WRIST Right 2017    Procedure: OPEN REDUCTION INTERNAL FIXATION WRIST;  OPEN REDUCTION INTERNAL FIXATION RIGHT WRIST;  Surgeon: Edgardo Almendarez MD;  Location:  OR     Medications:  Current Outpatient Medications   Medication Sig Dispense Refill     acetaminophen (TYLENOL) 325 MG tablet Take 2 tablets (650 mg) by mouth every 4 hours as needed for mild pain 100 tablet      alendronate (FOSAMAX) 35 MG tablet Take 1 tablet (35 mg) by mouth with 8oz water every  "Monday (once every 7 days) 30 minutes before breakfast and remain upright during this time. 12 tablet 3     ARIPiprazole (ABILIFY) 5 MG tablet Take 5 mg by mouth daily       busPIRone (BUSPAR) 15 MG tablet Take 1 tablet (15 mg) by mouth 2 times daily 180 tablet 3     ferrous sulfate (IRON) 325 (65 Fe) MG tablet Take 1 tablet (325 mg) by mouth 2 times daily 60 tablet 2     FLUoxetine (PROZAC) 20 MG capsule Take 3 capsules (60 mg) by mouth daily 180 capsule 3     gabapentin (NEURONTIN) 100 MG capsule Take 300 mg by mouth At Bedtime       lactulose (CHRONULAC) 10 GM/15ML solution Take 15 mLs (10 g) by mouth 3 times daily as needed for constipation Start with 15 ml daily, titrate as needed for 2-3 BM's daily. 500 mL 3     omeprazole (PRILOSEC) 20 MG CR capsule TAKE ONE CAPSULE BY MOUTH EVERY DAY 90 capsule 3     phosphorus tablet 250 mg (PHOSPHA 250 NEUTRAL) 250 MG per tablet TAKE TWO TABLETS BY MOUTH TWICE A  tablet 3     propranolol (INDERAL) 10 MG tablet Take 1 tablet (10 mg) by mouth 2 times daily 180 tablet 3     rifaximin (XIFAXAN) 550 MG TABS tablet Take 1 tablet (550 mg) by mouth 2 times daily 60 tablet 11     thiamine (VITAMIN B-1) 100 MG tablet Take 1 tablet (100 mg) by mouth daily 90 tablet 3     TL RICARDO RX 2.2-25-1 MG TABS TAKE ONE TABLET BY MOUTH EVERY DAY 90 tablet 3     traZODone (DESYREL) 50 MG tablet Take 2 tablets (100 mg) by mouth nightly as needed for sleep 60 tablet 5       Allergies   Allergen Reactions     Albuterol      Tongue \"hardened and painful\"       /70   Pulse 91   Ht 1.549 m (5' 1\")   Wt 61.1 kg (134 lb 12.8 oz)   LMP 05/16/2012   SpO2 95%   BMI 25.47 kg/m       Physical Exam:    In general, she appears quite healthy.  HEENT exam shows no scleral icterus or temporal muscle wasting.  Her chest is clear.  Her abdominal exam shows no increase in girth.  No masses or tenderness to palpation are present.  Her liver is 10 cm in span without left lobe enlargement.  No spleen " tip is palpable, and extremity exam shows no edema.  Skin exam shows no stigmata of chronic liver disease.  Neurologic exam shows no asterixis.     Recent Results (from the past 168 hour(s))   AFP tumor marker    Collection Time: 03/19/19 12:36 PM   Result Value Ref Range    Alpha Fetoprotein 5.4 0 - 8 ug/L   INR    Collection Time: 03/19/19 12:36 PM   Result Value Ref Range    INR 1.14 0.86 - 1.14   Hepatic panel    Collection Time: 03/19/19 12:36 PM   Result Value Ref Range    Bilirubin Direct 2.1 (H) 0.0 - 0.2 mg/dL    Bilirubin Total 3.1 (H) 0.2 - 1.3 mg/dL    Albumin 3.3 (L) 3.4 - 5.0 g/dL    Protein Total 6.5 (L) 6.8 - 8.8 g/dL    Alkaline Phosphatase 179 (H) 40 - 150 U/L    ALT 11 0 - 50 U/L    AST 36 0 - 45 U/L   Basic metabolic panel    Collection Time: 03/19/19 12:36 PM   Result Value Ref Range    Sodium 144 133 - 144 mmol/L    Potassium 3.7 3.4 - 5.3 mmol/L    Chloride 110 (H) 94 - 109 mmol/L    Carbon Dioxide 25 20 - 32 mmol/L    Anion Gap 9 3 - 14 mmol/L    Glucose 92 70 - 99 mg/dL    Urea Nitrogen 7 7 - 30 mg/dL    Creatinine 0.83 0.52 - 1.04 mg/dL    GFR Estimate 81 >60 mL/min/[1.73_m2]    GFR Estimate If Black >90 >60 mL/min/[1.73_m2]    Calcium 8.2 (L) 8.5 - 10.1 mg/dL   CBC with platelets    Collection Time: 03/19/19 12:36 PM   Result Value Ref Range    WBC 4.7 4.0 - 11.0 10e9/L    RBC Count 2.77 (L) 3.8 - 5.2 10e12/L    Hemoglobin 10.9 (L) 11.7 - 15.7 g/dL    Hematocrit 33.2 (L) 35.0 - 47.0 %     (H) 78 - 100 fl    MCH 39.4 (H) 26.5 - 33.0 pg    MCHC 32.8 31.5 - 36.5 g/dL    RDW 14.5 10.0 - 15.0 %    Platelet Count 51 (L) 150 - 450 10e9/L      Impression/Plan:  1. Alcoholic Cirrhosis of liver without ascites.     Liver test look good, but patient still drinking     Will do an endoscopy for variceal screening and to work up dysphasia    2. Alcohol abuse     Patient continues not being sober, drinks 6 beers a day 4 times a week.     Has strongly encouraged to stop     3. Health Care  Maintenance     Up to date on Vaccine     Up to date on cancer screening     No further intervention required. Patient to report changes.     Follow-up in 6 months.     Staff Involved:  Staff/Scribe    Scribe Disclosure:   I, Noel Sarkar, am serving as a scribe to document services personally performed by Dr. Edgardo Kovacs, based on data collection and the provider's statements to me.        Edgardo Kovacs MD      Professor of Medicine  [employer]      Executive Medical Director, Solid Organ Transplant Program  [facility]        Edgardo Kovacs MD

## 2019-03-21 ENCOUNTER — TELEPHONE (OUTPATIENT)
Dept: GASTROENTEROLOGY | Facility: CLINIC | Age: 54
End: 2019-03-21

## 2019-03-22 ENCOUNTER — TELEPHONE (OUTPATIENT)
Dept: GASTROENTEROLOGY | Facility: CLINIC | Age: 54
End: 2019-03-22

## 2019-04-01 ENCOUNTER — HOSPITAL ENCOUNTER (OUTPATIENT)
Dept: ULTRASOUND IMAGING | Facility: CLINIC | Age: 54
Discharge: HOME | End: 2019-04-01
Attending: INTERNAL MEDICINE | Admitting: INTERNAL MEDICINE
Payer: COMMERCIAL

## 2019-04-01 DIAGNOSIS — K70.30 ALCOHOLIC CIRRHOSIS OF LIVER WITHOUT ASCITES (H): ICD-10-CM

## 2019-04-01 PROCEDURE — 76705 ECHO EXAM OF ABDOMEN: CPT

## 2019-04-01 NOTE — TELEPHONE ENCOUNTER
Patient called to schedule a EGD. Scheduled patient on 4/29/19 @ 1330 arrival @1230 with Felisha. Patient will need prep-instructions. Thanks

## 2019-04-29 ENCOUNTER — HOSPITAL ENCOUNTER (OUTPATIENT)
Facility: CLINIC | Age: 54
Discharge: HOME | End: 2019-04-29
Attending: SURGERY | Admitting: SURGERY
Payer: COMMERCIAL

## 2019-04-29 VITALS
SYSTOLIC BLOOD PRESSURE: 119 MMHG | RESPIRATION RATE: 14 BRPM | DIASTOLIC BLOOD PRESSURE: 90 MMHG | OXYGEN SATURATION: 97 % | HEART RATE: 79 BPM

## 2019-04-29 LAB — UPPER GI ENDOSCOPY: NORMAL

## 2019-04-29 PROCEDURE — 88305 TISSUE EXAM BY PATHOLOGIST: CPT | Performed by: SURGERY

## 2019-04-29 PROCEDURE — 43239 EGD BIOPSY SINGLE/MULTIPLE: CPT | Performed by: SURGERY

## 2019-04-29 PROCEDURE — 25000125 ZZHC RX 250: Performed by: SURGERY

## 2019-04-29 PROCEDURE — G0500 MOD SEDAT ENDO SERVICE >5YRS: HCPCS | Performed by: SURGERY

## 2019-04-29 PROCEDURE — 25000128 H RX IP 250 OP 636: Performed by: SURGERY

## 2019-04-29 RX ORDER — ONDANSETRON 2 MG/ML
4 INJECTION INTRAMUSCULAR; INTRAVENOUS
Status: DISCONTINUED | OUTPATIENT
Start: 2019-04-29 | End: 2019-04-29 | Stop reason: HOSPADM

## 2019-04-29 RX ORDER — NALOXONE HYDROCHLORIDE 0.4 MG/ML
.1-.4 INJECTION, SOLUTION INTRAMUSCULAR; INTRAVENOUS; SUBCUTANEOUS
Status: DISCONTINUED | OUTPATIENT
Start: 2019-04-29 | End: 2019-04-29 | Stop reason: HOSPADM

## 2019-04-29 RX ORDER — LIDOCAINE 40 MG/G
CREAM TOPICAL
Status: DISCONTINUED | OUTPATIENT
Start: 2019-04-29 | End: 2019-04-29 | Stop reason: HOSPADM

## 2019-04-29 RX ORDER — FLUMAZENIL 0.1 MG/ML
0.2 INJECTION, SOLUTION INTRAVENOUS
Status: DISCONTINUED | OUTPATIENT
Start: 2019-04-29 | End: 2019-04-29 | Stop reason: HOSPADM

## 2019-04-29 RX ORDER — FENTANYL CITRATE 50 UG/ML
INJECTION, SOLUTION INTRAMUSCULAR; INTRAVENOUS PRN
Status: DISCONTINUED | OUTPATIENT
Start: 2019-04-29 | End: 2019-04-29 | Stop reason: HOSPADM

## 2019-04-29 RX ORDER — ONDANSETRON 4 MG/1
4 TABLET, ORALLY DISINTEGRATING ORAL EVERY 6 HOURS PRN
Status: DISCONTINUED | OUTPATIENT
Start: 2019-04-29 | End: 2019-04-29 | Stop reason: HOSPADM

## 2019-04-29 RX ORDER — ONDANSETRON 2 MG/ML
4 INJECTION INTRAMUSCULAR; INTRAVENOUS EVERY 6 HOURS PRN
Status: DISCONTINUED | OUTPATIENT
Start: 2019-04-29 | End: 2019-04-29 | Stop reason: HOSPADM

## 2019-04-29 RX ADMIN — LIDOCAINE HYDROCHLORIDE 0.1 ML: 10 INJECTION, SOLUTION EPIDURAL; INFILTRATION; INTRACAUDAL; PERINEURAL at 13:24

## 2019-04-29 NOTE — DISCHARGE INSTRUCTIONS
[facility]    Home Care Following Endoscopy          Activity:    You have just undergone an endoscopic procedure usually performed with conscious sedation.  Do not work or operate machinery (including a car) for at least 12 hours.      I encourage you to walk and attempt to pass this air as soon as possible.    Diet:    Return to the diet you were on before your procedure but eat lightly for the first 12-24 hours.    Drink plenty of water.    Resume any regular medications unless otherwise advised by your physician.  Please begin any new medication prescribed as a result of your procedure as directed by your physician.     If you had any biopsy or polyp removed please refrain from aspirin or aspirin products for 2 days.  If on Coumadin please restart as instructed by your physician.   Pain:    You may take Tylenol as needed for pain.  Expected Recovery:    You can expect some mild abdominal fullness and/or discomfort due to the air used to inflate your intestinal tract. It is also normal to have a mild sore throat after upper endoscopy.    Call Your Physician if You Have:    After Upper Endoscopy:  o Shoulder, back or chest pain.  o Difficulty breathing or swallowing.  o Vomiting blood.    Any questions or concerns about your recovery, please call [phone] or after hours [phone] Nurse Advice Line.    Follow-up Care:    You should receive a call or letter with your results within 1 week. Please call if you have not received a notification of your results.  If asked to return to clinic please make an appointment 1 week after your procedure.  Call [phone].

## 2019-04-29 NOTE — LETTER
[facility]           [address] [place] [address]  Monalisa Olguin  [address] [place] [address]  May 1, 2019    Dear Monalisa,   This letter is to inform you of the results of your pathology report on your upper endoscopy (EGD).   If you do have further questions please don t hesitate to call my assistant at [phone].  We do not have someone answering the phone all the time at my assistants number so if leave a message it may take a day or so to get back to you.  So if more urgent then call the below number.    To make an appointment call [phone]:  Your pathology report was:  Stomach, antrum, biopsy:   - Gastric body-type mucosa with no diagnostic alterations.   - No Helicobacter pylori identified.   Normal, please follow up by having a repeat upper endoscopy (EGD), if your symptoms worsen.        If you have questions, please contact your primary care doctor.    Sincerely,   Edgardo Scott D.O.

## 2019-05-01 LAB — COPATH REPORT: NORMAL

## 2019-06-27 ENCOUNTER — ONCOLOGY VISIT (OUTPATIENT)
Dept: ONCOLOGY | Facility: CLINIC | Age: 54
End: 2019-06-27
Payer: COMMERCIAL

## 2019-06-27 VITALS
WEIGHT: 125.4 LBS | DIASTOLIC BLOOD PRESSURE: 60 MMHG | BODY MASS INDEX: 23.68 KG/M2 | HEIGHT: 61 IN | HEART RATE: 88 BPM | RESPIRATION RATE: 18 BRPM | OXYGEN SATURATION: 99 % | TEMPERATURE: 97.5 F | SYSTOLIC BLOOD PRESSURE: 112 MMHG

## 2019-06-27 DIAGNOSIS — D69.6 THROMBOCYTOPENIA (H): ICD-10-CM

## 2019-06-27 DIAGNOSIS — D50.8 OTHER IRON DEFICIENCY ANEMIA: Primary | ICD-10-CM

## 2019-06-27 LAB
BASOPHILS # BLD AUTO: 0.1 10E9/L (ref 0–0.2)
BASOPHILS NFR BLD AUTO: 1.3 %
DIFFERENTIAL METHOD BLD: ABNORMAL
EOSINOPHIL NFR BLD AUTO: 0.2 %
ERYTHROCYTE [DISTWIDTH] IN BLOOD BY AUTOMATED COUNT: 15.2 % (ref 10–15)
FERRITIN SERPL-MCNC: 575 NG/ML (ref 8–252)
HCT VFR BLD AUTO: 37.2 % (ref 35–47)
HGB BLD-MCNC: 12.4 G/DL (ref 11.7–15.7)
IMM GRANULOCYTES # BLD: 0 10E9/L (ref 0–0.4)
IMM GRANULOCYTES NFR BLD: 0.6 %
IRON SATN MFR SERPL: 98 % (ref 15–46)
IRON SERPL-MCNC: 213 UG/DL (ref 35–180)
LYMPHOCYTES # BLD AUTO: 1.6 10E9/L (ref 0.8–5.3)
LYMPHOCYTES NFR BLD AUTO: 34.5 %
MCH RBC QN AUTO: 38.4 PG (ref 26.5–33)
MCHC RBC AUTO-ENTMCNC: 33.3 G/DL (ref 31.5–36.5)
MCV RBC AUTO: 115 FL (ref 78–100)
MONOCYTES # BLD AUTO: 0.4 10E9/L (ref 0–1.3)
MONOCYTES NFR BLD AUTO: 8.5 %
NEUTROPHILS # BLD AUTO: 2.6 10E9/L (ref 1.6–8.3)
NEUTROPHILS NFR BLD AUTO: 54.9 %
NRBC # BLD AUTO: 0 10*3/UL
NRBC BLD AUTO-RTO: 0 /100
PLATELET # BLD AUTO: 49 10E9/L (ref 150–450)
RBC # BLD AUTO: 3.23 10E12/L (ref 3.8–5.2)
TIBC SERPL-MCNC: 218 UG/DL (ref 240–430)
WBC # BLD AUTO: 4.7 10E9/L (ref 4–11)

## 2019-06-27 PROCEDURE — 83550 IRON BINDING TEST: CPT | Performed by: INTERNAL MEDICINE

## 2019-06-27 PROCEDURE — 99213 OFFICE O/P EST LOW 20 MIN: CPT | Performed by: INTERNAL MEDICINE

## 2019-06-27 PROCEDURE — 82728 ASSAY OF FERRITIN: CPT | Performed by: INTERNAL MEDICINE

## 2019-06-27 PROCEDURE — 36415 COLL VENOUS BLD VENIPUNCTURE: CPT | Performed by: INTERNAL MEDICINE

## 2019-06-27 PROCEDURE — 85025 COMPLETE CBC W/AUTO DIFF WBC: CPT | Performed by: INTERNAL MEDICINE

## 2019-06-27 PROCEDURE — 83540 ASSAY OF IRON: CPT | Performed by: INTERNAL MEDICINE

## 2019-06-27 ASSESSMENT — MIFFLIN-ST. JEOR: SCORE: 1116.19

## 2019-06-27 NOTE — NURSING NOTE
"Oncology Rooming Note    June 27, 2019 4:33 PM   Monalisa Olguin is a 53 year old female who presents for:    Chief Complaint   Patient presents with     Hematology     6 month follow up- Iron def anemia from chronic GI blood loss & Thrombocytopenia     Results     Initial Vitals: /60   Pulse 88   Temp 97.5  F (36.4  C) (Temporal)   Resp 18   Ht 1.549 m (5' 1\")   Wt 56.9 kg (125 lb 6.4 oz)   LMP 05/16/2012   SpO2 99%   BMI 23.69 kg/m   Estimated body mass index is 23.69 kg/m  as calculated from the following:    Height as of this encounter: 1.549 m (5' 1\").    Weight as of this encounter: 56.9 kg (125 lb 6.4 oz). Body surface area is 1.56 meters squared.  Data Unavailable Comment: Data Unavailable   Patient's last menstrual period was 05/16/2012.  Allergies reviewed: Yes  Medications reviewed: Yes    Medications: Medication refills not needed today.  Pharmacy name entered into [facility]:    [facility] - [place], [place] - [address]  [facility] [place], [place] - [address] [facility]  [facility] [place], [place] - [address]    Clinical concerns: None.      5 minutes for nursing intake (face to face time)     Kusum DIALLO CMA              "

## 2019-06-27 NOTE — PROGRESS NOTES
FOLLOW-UP VISIT NOTE    PATIENT NAME: Monalisa Olguin MRN # [id number]  DATE OF VISIT: Jun 27, 2019 YOB: 1965    REFERRING PROVIDER: No referring provider defined for this encounter.    Reason for follow up  - Pancytopenia secondary to underlying cirrhosis /Hypersplensim/Alcolohol abuse  - Iron def anemia from chronic GI blood loss      HISTORY:    53-year-old female with past medical history significant for alcohol abuse, chronic cirrhosis with splenomegaly, anxiety/depression, CKD, hypertension who has had thrombocytopenia since 2015 and anemia since 2017. She has been following with gastroenterology for underlying alcoholic cirrhosis as well as some. Also has undergone workup for GI blood loss with EGD and colonoscopy in December 2017 which didn't show any obvious bleeding site. Since then patient has been admitted to the hospital in March with alcohol intoxication and was noted to be severely anemic with hemoglobin in 6's. She was transfused 2 units of packed red blood cells. Stool guaic was positive for blood. Patient was discharged home after hemoglobin stabilized. Iron studies performed indicated iron deficiency with ferritin at 11 and saturation 7%.    - 05/03/18  seen in hematology clinic. She was noted to be severely anemic with hemoglobin at 6.3. Orders were placed for 2 units of blood transfusion and IV iron infusion.    07/17/2018 - IV iron repeated      SUBJECTIVE     She is here for 6 month  follow-up.   Continues to drink alcohol daily  Denies fevers/chills,, pain, dyspnea, dizziness, blood in the stools or other complaints.       PAST MEDICAL HISTORY     Past Medical History:   Diagnosis Date     Alcohol abuse 5/2/2013     Alcohol dependence 5/25/2013     Alcohol withdrawal 5/2/2013     Anxiety 10/10/2011     CKD (chronic kidney disease) stage 3, GFR 30-59 ml/min (H) 2006    last GFR was 42     CKD (chronic kidney disease) stage 3, GFR 30-59 ml/min (H) 2/22/2011     Depressive  disorder      Esophageal reflux 10/7/2002     Hypertension goal BP (blood pressure) < 130/80 7/12/2011     Moderate Depression [296.32] 12/22/2009    stable on wellbutrin     Other internal derangement of knee(717.89)     ACL Internal derangement, knee/ original injury in 5th grade, torn cartilage     Pap smear 2011    no abnormals, due for paps q 2-3 yrs     Unspecified essential hypertension          CURRENT OUTPATIENT MEDICATIONS     Current Outpatient Medications   Medication Sig Dispense Refill     acetaminophen (TYLENOL) 325 MG tablet Take 2 tablets (650 mg) by mouth every 4 hours as needed for mild pain 100 tablet      alendronate (FOSAMAX) 35 MG tablet Take 1 tablet (35 mg) by mouth with 8oz water every Monday (once every 7 days) 30 minutes before breakfast and remain upright during this time. 12 tablet 3     ARIPiprazole (ABILIFY) 5 MG tablet Take 5 mg by mouth daily       busPIRone (BUSPAR) 15 MG tablet Take 1 tablet (15 mg) by mouth 2 times daily 180 tablet 3     ferrous sulfate (FEROSUL) 325 (65 Fe) MG tablet Take 1 tablet (325 mg) by mouth 2 times daily 60 tablet 0     FLUoxetine (PROZAC) 20 MG capsule Take 3 capsules (60 mg) by mouth daily 180 capsule 3     gabapentin (NEURONTIN) 100 MG capsule Take 300 mg by mouth At Bedtime       lactulose (CHRONULAC) 10 GM/15ML solution Take 15 mLs (10 g) by mouth 3 times daily as needed for constipation Start with 15 ml daily, titrate as needed for 2-3 BM's daily. 500 mL 3     omeprazole (PRILOSEC) 20 MG CR capsule TAKE ONE CAPSULE BY MOUTH EVERY DAY 90 capsule 3     phosphorus tablet 250 mg (PHOSPHA 250 NEUTRAL) 250 MG per tablet TAKE TWO TABLETS BY MOUTH TWICE A  tablet 3     propranolol (INDERAL) 10 MG tablet Take 1 tablet (10 mg) by mouth 2 times daily 180 tablet 3     rifaximin (XIFAXAN) 550 MG TABS tablet Take 1 tablet (550 mg) by mouth 2 times daily 60 tablet 11     thiamine (VITAMIN B-1) 100 MG tablet Take 1 tablet (100 mg) by mouth daily 90 tablet 3  "    TL RICARDO RX 2.2-25-1 MG TABS TAKE ONE TABLET BY MOUTH EVERY DAY 90 tablet 3     traZODone (DESYREL) 50 MG tablet Take 2 tablets (100 mg) by mouth nightly as needed for sleep 60 tablet 5        ALLERGIES     Allergies   Allergen Reactions     Albuterol      Tongue \"hardened and painful\"        REVIEW OF SYSTEMS   As above in the HPI, o/w complete 12-point ROS was negative.     PHYSICAL EXAM   B/P: 104/62, T: 96.6, P: 94, R: 16  SpO2 Readings from Last 4 Encounters:   08/17/18 94%   08/16/18 96%   08/15/18 96%   08/14/18 95%     Wt Readings from Last 3 Encounters:   03/20/19 61.1 kg (134 lb 12.8 oz)   01/03/19 60.4 kg (133 lb 3.2 oz)   10/03/18 57.5 kg (126 lb 11.2 oz)     GEN: NAD  EYES:Scleral icterus  Mouth/ENT: Oropharynx is clear.  ABDOMEN: distended, non tender  EXT: no edema  NEURO: alert  SKIN: no rash     LABORATORY AND IMAGING STUDIES     Lab Results   Component Value Date    WBC 4.7 06/27/2019     Lab Results   Component Value Date    RBC 3.23 06/27/2019     Lab Results   Component Value Date    HGB 12.4 06/27/2019     Lab Results   Component Value Date    HCT 37.2 06/27/2019     No components found for: MCT  Lab Results   Component Value Date     06/27/2019     Lab Results   Component Value Date    MCH 38.4 06/27/2019     Lab Results   Component Value Date    MCHC 33.3 06/27/2019     Lab Results   Component Value Date    RDW 15.2 06/27/2019     Lab Results   Component Value Date    PLT 49 06/27/2019        ASSESSMENT AND PLAN     52-year-old female with    1.  Pancytopenia   2.  Iron def anemia from chronic GI blood loss  3.  Macrocytosis  4.  Decompensated ESLD secondary to alcohol abuse with ascites     1. Secondary to underlying alcoholic cirrhosis /Hypersplensim/Alcolohol abuse  Recommended alcohol abstinence.     2.  Iron deficiency likely from chronic GI blood loss but no obvious source on the EGD and  Colonoscopy  Last IV iron 07/2018  Hb improved at 12.4 today  Iron levels normal    3. " Secondary to alcohol abuse and underlying liver disease    4. Continue follow up with GI/PCP    Return to clinic in 6 months with labs.     Chart documentation with Dragon Voice recognition Software. Although reviewed after completion, some words and grammatical errors may remain.  Usman Lee MD  Attending Physician   Hematology/Medical Oncology

## 2019-06-27 NOTE — LETTER
6/27/2019         RE: Monalisa Olguin  [address] [facility] [address]        Dear Colleague,    Thank you for referring your patient, Monalisa Olguin, to the [facility]. Please see a copy of my visit note below.      FOLLOW-UP VISIT NOTE    PATIENT NAME: Monalisa Olguin MRN # [id number]  DATE OF VISIT: Jun 27, 2019 YOB: 1965    REFERRING PROVIDER: No referring provider defined for this encounter.    Reason for follow up  - Pancytopenia secondary to underlying cirrhosis /Hypersplensim/Alcolohol abuse  - Iron def anemia from chronic GI blood loss      HISTORY:    53-year-old female with past medical history significant for alcohol abuse, chronic cirrhosis with splenomegaly, anxiety/depression, CKD, hypertension who has had thrombocytopenia since 2015 and anemia since 2017. She has been following with gastroenterology for underlying alcoholic cirrhosis as well as some. Also has undergone workup for GI blood loss with EGD and colonoscopy in December 2017 which didn't show any obvious bleeding site. Since then patient has been admitted to the hospital in March with alcohol intoxication and was noted to be severely anemic with hemoglobin in 6's. She was transfused 2 units of packed red blood cells. Stool guaic was positive for blood. Patient was discharged home after hemoglobin stabilized. Iron studies performed indicated iron deficiency with ferritin at 11 and saturation 7%.    - 05/03/18  seen in hematology clinic. She was noted to be severely anemic with hemoglobin at 6.3. Orders were placed for 2 units of blood transfusion and IV iron infusion.    07/17/2018 - IV iron repeated      SUBJECTIVE     She is here for 6 month  follow-up.   Continues to drink alcohol daily  Denies fevers/chills,, pain, dyspnea, dizziness, blood in the stools or other complaints.       PAST MEDICAL HISTORY     Past Medical History:   Diagnosis Date     Alcohol abuse 5/2/2013     Alcohol  dependence 5/25/2013     Alcohol withdrawal 5/2/2013     Anxiety 10/10/2011     CKD (chronic kidney disease) stage 3, GFR 30-59 ml/min (H) 2006    last GFR was 42     CKD (chronic kidney disease) stage 3, GFR 30-59 ml/min (H) 2/22/2011     Depressive disorder      Esophageal reflux 10/7/2002     Hypertension goal BP (blood pressure) < 130/80 7/12/2011     Moderate Depression [296.32] 12/22/2009    stable on wellbutrin     Other internal derangement of knee(717.89)     ACL Internal derangement, knee/ original injury in 5th grade, torn cartilage     Pap smear 2011    no abnormals, due for paps q 2-3 yrs     Unspecified essential hypertension          CURRENT OUTPATIENT MEDICATIONS     Current Outpatient Medications   Medication Sig Dispense Refill     acetaminophen (TYLENOL) 325 MG tablet Take 2 tablets (650 mg) by mouth every 4 hours as needed for mild pain 100 tablet      alendronate (FOSAMAX) 35 MG tablet Take 1 tablet (35 mg) by mouth with 8oz water every Monday (once every 7 days) 30 minutes before breakfast and remain upright during this time. 12 tablet 3     ARIPiprazole (ABILIFY) 5 MG tablet Take 5 mg by mouth daily       busPIRone (BUSPAR) 15 MG tablet Take 1 tablet (15 mg) by mouth 2 times daily 180 tablet 3     ferrous sulfate (FEROSUL) 325 (65 Fe) MG tablet Take 1 tablet (325 mg) by mouth 2 times daily 60 tablet 0     FLUoxetine (PROZAC) 20 MG capsule Take 3 capsules (60 mg) by mouth daily 180 capsule 3     gabapentin (NEURONTIN) 100 MG capsule Take 300 mg by mouth At Bedtime       lactulose (CHRONULAC) 10 GM/15ML solution Take 15 mLs (10 g) by mouth 3 times daily as needed for constipation Start with 15 ml daily, titrate as needed for 2-3 BM's daily. 500 mL 3     omeprazole (PRILOSEC) 20 MG CR capsule TAKE ONE CAPSULE BY MOUTH EVERY DAY 90 capsule 3     phosphorus tablet 250 mg (PHOSPHA 250 NEUTRAL) 250 MG per tablet TAKE TWO TABLETS BY MOUTH TWICE A  tablet 3     propranolol (INDERAL) 10 MG tablet  "Take 1 tablet (10 mg) by mouth 2 times daily 180 tablet 3     rifaximin (XIFAXAN) 550 MG TABS tablet Take 1 tablet (550 mg) by mouth 2 times daily 60 tablet 11     thiamine (VITAMIN B-1) 100 MG tablet Take 1 tablet (100 mg) by mouth daily 90 tablet 3     TL RICARDO RX 2.2-25-1 MG TABS TAKE ONE TABLET BY MOUTH EVERY DAY 90 tablet 3     traZODone (DESYREL) 50 MG tablet Take 2 tablets (100 mg) by mouth nightly as needed for sleep 60 tablet 5        ALLERGIES     Allergies   Allergen Reactions     Albuterol      Tongue \"hardened and painful\"        REVIEW OF SYSTEMS   As above in the HPI, o/w complete 12-point ROS was negative.     PHYSICAL EXAM   B/P: 104/62, T: 96.6, P: 94, R: 16  SpO2 Readings from Last 4 Encounters:   08/17/18 94%   08/16/18 96%   08/15/18 96%   08/14/18 95%     Wt Readings from Last 3 Encounters:   03/20/19 61.1 kg (134 lb 12.8 oz)   01/03/19 60.4 kg (133 lb 3.2 oz)   10/03/18 57.5 kg (126 lb 11.2 oz)     GEN: NAD  EYES:Scleral icterus  Mouth/ENT: Oropharynx is clear.  ABDOMEN: distended, non tender  EXT: no edema  NEURO: alert  SKIN: no rash     LABORATORY AND IMAGING STUDIES     Lab Results   Component Value Date    WBC 4.7 06/27/2019     Lab Results   Component Value Date    RBC 3.23 06/27/2019     Lab Results   Component Value Date    HGB 12.4 06/27/2019     Lab Results   Component Value Date    HCT 37.2 06/27/2019     No components found for: MCT  Lab Results   Component Value Date     06/27/2019     Lab Results   Component Value Date    MCH 38.4 06/27/2019     Lab Results   Component Value Date    MCHC 33.3 06/27/2019     Lab Results   Component Value Date    RDW 15.2 06/27/2019     Lab Results   Component Value Date    PLT 49 06/27/2019        ASSESSMENT AND PLAN     52-year-old female with    1.  Pancytopenia   2.  Iron def anemia from chronic GI blood loss  3.  Macrocytosis  4.  Decompensated ESLD secondary to alcohol abuse with ascites     1. Secondary to underlying alcoholic " cirrhosis /Hypersplensim/Alcolohol abuse  Recommended alcohol abstinence.     2.  Iron deficiency likely from chronic GI blood loss but no obvious source on the EGD and  Colonoscopy  Last IV iron 07/2018  Hb improved at 12.4 today  Iron levels normal    3. Secondary to alcohol abuse and underlying liver disease    4. Continue follow up with GI/PCP    Return to clinic in 6 months with labs.     Chart documentation with Dragon Voice recognition Software. Although reviewed after completion, some words and grammatical errors may remain.  Usman Lee MD  Attending Physician   Hematology/Medical Oncology    Again, thank you for allowing me to participate in the care of your patient.        Sincerely,        Usman Lee MD

## 2019-06-29 NOTE — TELEPHONE ENCOUNTER
Called patient for scheduled medication therapy management (MTM) visit to review medications.  Called first at 10:30 AM. No answer. Left voicemail stating I would attempt call back in 5-10 minutes.  Called second at 10:37 AM. No answer. Left second voicemail with MTM Scheduling Line for her to reschedule. Pt does appear to be undergoing infusion treatment today and potentially was travelling for this appointment.    Francis Doan, LatanyaD, [facility]  Medication Therapy Management Pharmacist  Pager: [phone]       H&P dictated. Job# [id number].

## 2019-07-01 DIAGNOSIS — G47.09 OTHER INSOMNIA: ICD-10-CM

## 2019-07-01 RX ORDER — TRAZODONE HYDROCHLORIDE 50 MG/1
100 TABLET, FILM COATED ORAL
Qty: 60 TABLET | Refills: 1 | Status: SHIPPED | OUTPATIENT
Start: 2019-07-01 | End: 2019-01-01

## 2019-07-01 NOTE — TELEPHONE ENCOUNTER
"Trazodone  Last Written Prescription Date:  06/05/2018  Last Fill Quantity: 60,  # refills: 5   Last office visit: 7/31/2018 with prescribing provider:  Juli   Future Office Visit:   Next 5 appointments (look out 90 days)    Sep 17, 2019  9:50 AM CDT  PHYSICAL with Efren Bustos MD  [facility] [address] [place] [id number] [phone]      Prescription approved per [facility] Refill Protocol.    Requested Prescriptions   Pending Prescriptions Disp Refills     traZODone (DESYREL) 50 MG tablet 60 tablet 5     Sig: Take 2 tablets (100 mg) by mouth nightly as needed for sleep       Serotonin Modulators Passed - 7/1/2019 11:01 AM        Passed - Recent (12 mo) or future (30 days) visit within the authorizing provider's specialty     Patient had office visit in the last 12 months or has a visit in the next 30 days with authorizing provider or within the authorizing provider's specialty.  See \"Patient Info\" tab in inbasket, or \"Choose Columns\" in Meds & Orders section of the refill encounter.          Passed - Medication is active on med list        Passed - Patient is age 18 or older        Passed - No active pregnancy on record        Passed - No positive pregnancy test in past 12 months      Kenyetta Sinclair RN   "

## 2019-07-01 NOTE — RESULT ENCOUNTER NOTE
Patient already instructed to follow up in 6 months.  VM left for patient to return call to let her know that labs are stable and to continue with follow up as planned.    Lester Do RN, BSN, OCN  7/1/2019, 2:03 PM

## 2019-07-10 ENCOUNTER — NURSE TRIAGE (OUTPATIENT)
Dept: NURSING | Facility: CLINIC | Age: 54
End: 2019-07-10

## 2019-07-11 ENCOUNTER — OFFICE VISIT (OUTPATIENT)
Dept: FAMILY MEDICINE | Facility: CLINIC | Age: 54
End: 2019-07-11
Payer: COMMERCIAL

## 2019-07-11 VITALS
TEMPERATURE: 99 F | DIASTOLIC BLOOD PRESSURE: 60 MMHG | WEIGHT: 122 LBS | HEART RATE: 100 BPM | SYSTOLIC BLOOD PRESSURE: 114 MMHG | BODY MASS INDEX: 23.05 KG/M2 | RESPIRATION RATE: 24 BRPM | OXYGEN SATURATION: 99 %

## 2019-07-11 DIAGNOSIS — R11.10 VOMITING AND DIARRHEA: ICD-10-CM

## 2019-07-11 DIAGNOSIS — D61.818 OTHER PANCYTOPENIA (H): ICD-10-CM

## 2019-07-11 DIAGNOSIS — R19.7 VOMITING AND DIARRHEA: ICD-10-CM

## 2019-07-11 DIAGNOSIS — K76.82 HEPATIC ENCEPHALOPATHY (H): ICD-10-CM

## 2019-07-11 DIAGNOSIS — K52.9 GASTROENTERITIS: Primary | ICD-10-CM

## 2019-07-11 DIAGNOSIS — F33.1 MAJOR DEPRESSIVE DISORDER, RECURRENT EPISODE, MODERATE (H): ICD-10-CM

## 2019-07-11 DIAGNOSIS — F10.20 ALCOHOL DEPENDENCE, CONTINUOUS DRINKING BEHAVIOR (H): ICD-10-CM

## 2019-07-11 LAB
ALBUMIN SERPL-MCNC: 3.4 G/DL (ref 3.4–5)
ALP SERPL-CCNC: 262 U/L (ref 40–150)
ALT SERPL W P-5'-P-CCNC: 106 U/L (ref 0–50)
AMMONIA PLAS-SCNC: 23 UMOL/L (ref 10–50)
ANION GAP SERPL CALCULATED.3IONS-SCNC: 9 MMOL/L (ref 3–14)
AST SERPL W P-5'-P-CCNC: 226 U/L (ref 0–45)
BASOPHILS # BLD AUTO: 0 10E9/L (ref 0–0.2)
BASOPHILS NFR BLD AUTO: 1 %
BILIRUB SERPL-MCNC: 11.9 MG/DL (ref 0.2–1.3)
BUN SERPL-MCNC: 6 MG/DL (ref 7–30)
CALCIUM SERPL-MCNC: 8.4 MG/DL (ref 8.5–10.1)
CHLORIDE SERPL-SCNC: 99 MMOL/L (ref 94–109)
CO2 SERPL-SCNC: 28 MMOL/L (ref 20–32)
CREAT SERPL-MCNC: 0.74 MG/DL (ref 0.52–1.04)
DIFFERENTIAL METHOD BLD: ABNORMAL
ERYTHROCYTE [DISTWIDTH] IN BLOOD BY AUTOMATED COUNT: 15.9 % (ref 10–15)
GFR SERPL CREATININE-BSD FRML MDRD: >90 ML/MIN/{1.73_M2}
GLUCOSE SERPL-MCNC: 103 MG/DL (ref 70–99)
HCT VFR BLD AUTO: 32.6 % (ref 35–47)
HGB BLD-MCNC: 10.8 G/DL (ref 11.7–15.7)
LYMPHOCYTES # BLD AUTO: 0.8 10E9/L (ref 0.8–5.3)
LYMPHOCYTES NFR BLD AUTO: 20 %
MCH RBC QN AUTO: 38.8 PG (ref 26.5–33)
MCHC RBC AUTO-ENTMCNC: 33.1 G/DL (ref 31.5–36.5)
MCV RBC AUTO: 117 FL (ref 78–100)
MONOCYTES # BLD AUTO: 0.5 10E9/L (ref 0–1.3)
MONOCYTES NFR BLD AUTO: 12 %
NEUTROPHILS # BLD AUTO: 2.7 10E9/L (ref 1.6–8.3)
NEUTROPHILS NFR BLD AUTO: 67 %
PLATELET # BLD AUTO: 30 10E9/L (ref 150–450)
POTASSIUM SERPL-SCNC: 2.7 MMOL/L (ref 3.4–5.3)
PROT SERPL-MCNC: 6.1 G/DL (ref 6.8–8.8)
RBC # BLD AUTO: 2.78 10E12/L (ref 3.8–5.2)
SODIUM SERPL-SCNC: 136 MMOL/L (ref 133–144)
WBC # BLD AUTO: 4 10E9/L (ref 4–11)

## 2019-07-11 PROCEDURE — 99214 OFFICE O/P EST MOD 30 MIN: CPT | Performed by: FAMILY MEDICINE

## 2019-07-11 PROCEDURE — 82140 ASSAY OF AMMONIA: CPT | Performed by: FAMILY MEDICINE

## 2019-07-11 PROCEDURE — 85025 COMPLETE CBC W/AUTO DIFF WBC: CPT | Performed by: FAMILY MEDICINE

## 2019-07-11 PROCEDURE — 80053 COMPREHEN METABOLIC PANEL: CPT | Performed by: FAMILY MEDICINE

## 2019-07-11 PROCEDURE — 36415 COLL VENOUS BLD VENIPUNCTURE: CPT | Performed by: FAMILY MEDICINE

## 2019-07-11 RX ORDER — ONDANSETRON 4 MG/1
4 TABLET, FILM COATED ORAL EVERY 6 HOURS PRN
Qty: 30 TABLET | Refills: 1 | Status: ON HOLD | OUTPATIENT
Start: 2019-07-11 | End: 2019-01-01

## 2019-07-11 ASSESSMENT — PAIN SCALES - GENERAL: PAINLEVEL: SEVERE PAIN (7)

## 2019-07-11 NOTE — TELEPHONE ENCOUNTER
Vomiting and diarrhea for the last week.  Has kept some fluids down but feeling weak with  Constant abdominal cramping.  Will go to ER.    Jeannette Borjas RN  [facility]      Reason for Disposition    [1] Constant abdominal pain AND [2] present > 2 hours    Additional Information    Negative: Shock suspected (e.g., cold/pale/clammy skin, too weak to stand, low BP, rapid pulse)    Negative: Difficult to awaken or acting confused (e.g., disoriented, slurred speech)    Negative: Sounds like a life-threatening emergency to the triager    Negative: [1] SEVERE abdominal pain (e.g., excruciating) AND [2] present > 1 hour    Negative: [1] SEVERE abdominal pain AND [2] age > 60    Negative: [1] Blood in the stool AND [2] moderate or large amount of blood    Negative: Black or tarry bowel movements  (Exception: chronic-unchanged  black-grey bowel movements AND is taking iron pills or Pepto-bismol)    Negative: [1] Drinking very little AND [2] dehydration suspected (e.g., no urine > 12 hours, very dry mouth, very lightheaded)    Negative: Patient sounds very sick or weak to the triager    Negative: [1] SEVERE diarrhea (e.g., 7 or more times / day more than normal) AND [2]  age > 60 years    Protocols used: DIARRHEA-A-

## 2019-07-11 NOTE — PROGRESS NOTES
Subjective     Monalisa Olguin is a 53 year old female who presents to clinic today for the following health issues:    HPI   Back Pain       Duration: 2 days         Specific cause: laying down    Description:   Location of pain: low back right  Character of pain: dull ache  Pain radiation:radiates into the right buttocks and radiates into the left buttocks  New numbness or weakness in legs, not attributed to pain:  YES- not sure     Intensity: Currently 7/10, At its worst 8/10    History:   Pain interferes with job: Not applicable  History of back problems: no prior back problems  Any previous MRI or X-rays: None  Sees a specialist for back pain:  Kidney specialist   Therapies tried without relief: none    Alleviating factors:   Improved by: none      Precipitating factors:  Worsened by: Sitting and Lying Flat          Accompanying Signs & Symptoms:  Risk of Fracture:  None  Risk of Cauda Equina:  None  Risk of Infection:  None  Risk of Cancer:  None  Risk of Ankylosing Spondylitis:  Onset at age <35, male, AND morning back stiffness. no                Diarrhea  Onset: week     Description:   Consistency of stool: watery, runny and yellow  Blood in stool: no   Number of loose stools in past 24 hours: 3    Progression of Symptoms:  same    Accompanying Signs & Symptoms:  Fever: no   Nausea or vomiting; YES  Abdominal pain: YES, bloating   Episodes of constipation: no   Weight loss: YES    History:   Ill contacts: no   Recent use of antibiotics: no    Recent travels[place]- [place] the [place]          Recent medication-new or changes(Rx or OTC): no     Precipitating factors:   Possible food poisoning.      Alleviating factors:   Sleep     Therapies Tried and outcome:  PeptoBismol; Outcome: helped a little. Having lower back pain, weakness, bloating, cramping, vomiting and diarrhea.       Monalisa Olguin is a 53 year old female who presents with 1 week history of watery frequent stools.  She and  her  spent the weekend of 4 July in [facility].  She thinks maybe they had eaten undercooked hamburger or possibly some chicken the night before.  She back began with frequent nausea and vomiting and progressed to watery loose yellow stool.  She denies any gross blood in her stool.  She has been unable to keep down any nutrition, has been able to keep down small sips of water.  Unfortunately she has been unable to keep down any of her medications.  She has a history of end-stage liver disease with due to alcoholic cirrhosis history of pancytopenia due to same.  She recently underwent an upper endoscopy because of anemia.  Biopsies of the upper endoscopy just showed chronic inflammation did not show any helical back to pylori.  She denies fever associated with her GI symptoms, she denies lightheadedness or syncope.  He still is urinating but at a lower rate.  She has tried Pepto-Bismol which is helped slightly.    Her  has had no GI symptoms associated with similar foods they are unaware of any other guests over the weekend who have had similar symptoms.    Results for orders placed or performed in visit on 06/27/19   Iron and iron binding capacity   Result Value Ref Range    Iron 213 (H) 35 - 180 ug/dL    Iron Binding Cap 218 (L) 240 - 430 ug/dL    Iron Saturation Index 98 (H) 15 - 46 %   Ferritin   Result Value Ref Range    Ferritin 575 (H) 8 - 252 ng/mL   CBC with platelets differential   Result Value Ref Range    WBC 4.7 4.0 - 11.0 10e9/L    RBC Count 3.23 (L) 3.8 - 5.2 10e12/L    Hemoglobin 12.4 11.7 - 15.7 g/dL    Hematocrit 37.2 35.0 - 47.0 %     (H) 78 - 100 fl    MCH 38.4 (H) 26.5 - 33.0 pg    MCHC 33.3 31.5 - 36.5 g/dL    RDW 15.2 (H) 10.0 - 15.0 %    Platelet Count 49 (LL) 150 - 450 10e9/L    Diff Method Automated Method     % Neutrophils 54.9 %    % Lymphocytes 34.5 %    % Monocytes 8.5 %    % Eosinophils 0.2 %    % Basophils 1.3 %    % Immature Granulocytes 0.6 %     Nucleated RBCs 0 0 /100    Absolute Neutrophil 2.6 1.6 - 8.3 10e9/L    Absolute Lymphocytes 1.6 0.8 - 5.3 10e9/L    Absolute Monocytes 0.4 0.0 - 1.3 10e9/L    Absolute Basophils 0.1 0.0 - 0.2 10e9/L    Abs Immature Granulocytes 0.0 0 - 0.4 10e9/L    Absolute Nucleated RBC 0.0          Patient Active Problem List   Diagnosis     Kidney donor     Esophageal reflux     Moderate Depression [296.32]     HYPERLIPIDEMIA LDL GOAL <100     Hypertension goal BP (blood pressure) < 130/80     Anxiety     Alcoholism in recovery (H)     Alcoholic hepatitis with ascites     Chronic blood loss anemia     Thrombocytopenia (H)     Tobacco abuse     Portal hypertension (H)     Pulmonary nodules     Hyperthyroidism     Acute alcoholic intoxication in alcoholism (H)     Heme positive stool     Alcoholic cirrhosis of liver without ascites (H) - per Hepatology consult 2017     Splenomegaly     Epistaxis     Other iron deficiency anemia     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     COLONOSCOPY N/A 2017    Procedure: COLONOSCOPY;  Colonoscopy;  Surgeon: Sai Johnston MD;  Location:  GI     ESOPHAGOSCOPY, GASTROSCOPY, DUODENOSCOPY (EGD), COMBINED N/A 2017    Procedure: COMBINED ESOPHAGOSCOPY, GASTROSCOPY, DUODENOSCOPY (EGD), BIOPSY SINGLE OR MULTIPLE;  ESOPHAGOSCOPY, GASTROSCOPY, DUODENOSCOPY (EGD) with biopsies;  Surgeon: Sai Johnston MD;  Location:  GI     ESOPHAGOSCOPY, GASTROSCOPY, DUODENOSCOPY (EGD), COMBINED N/A 2019    Procedure: ESOPHAGOGASTRODUODENOSCOPY, WITH BIOPSY;  Surgeon: Edgardo Scott DO;  Location: [facility]     HC KNEE SCOPE, DIAGNOSTIC  01    Arthroscopy, Lt Knee     LAPAROSCOPIC CHOLECYSTECTOMY N/A 2017    Procedure: LAPAROSCOPIC CHOLECYSTECTOMY;  laparoscopic cholecystectomy;  Surgeon: Romeo Pastor MD;  Location: [facility]      OPEN REDUCTION INTERNAL FIXATION WRIST Right 11/29/2017    Procedure: OPEN REDUCTION INTERNAL FIXATION WRIST;  OPEN REDUCTION INTERNAL FIXATION RIGHT WRIST;  Surgeon: Edgardo Almendarez MD;  Location:  OR       Social History     Tobacco Use     Smoking status: Current Every Day Smoker     Packs/day: 0.50     Years: 10.00     Pack years: 5.00     Smokeless tobacco: Never Used     Tobacco comment: pt quit 1992 after smoking for 8 yrs, started again in 2004   Substance Use Topics     Alcohol use: Yes     Alcohol/week: 0.0 oz     Comment: 2 beers per day     Family History   Problem Relation Age of Onset     Hypertension Mother      Heart Disease Paternal Grandmother      Heart Disease Paternal Grandfather      Cerebrovascular Disease Maternal Grandmother      Cerebrovascular Disease Maternal Grandfather      Alcohol/Drug Maternal Grandfather      Substance Abuse Maternal Grandfather      Heart Disease Father         Silent MI stents x 2     Diabetes Sister 17        older sister also had kidney and pancreas transplant     Heart Disease Sister         MI secondary to diabetes     Genitourinary Problems Sister 43        kidney transplant from renal failure     Diabetes Sister 9        youngest, juvenile type I (onset age 9 with no complications)     Cancer Paternal Aunt         ?kind         Current Outpatient Medications   Medication Sig Dispense Refill     acetaminophen (TYLENOL) 325 MG tablet Take 2 tablets (650 mg) by mouth every 4 hours as needed for mild pain 100 tablet      omeprazole (PRILOSEC) 20 MG CR capsule TAKE ONE CAPSULE BY MOUTH EVERY DAY 90 capsule 3     alendronate (FOSAMAX) 35 MG tablet Take 1 tablet (35 mg) by mouth with 8oz water every Monday (once every 7 days) 30 minutes before breakfast and remain upright during this time. (Patient not taking: Reported on 7/11/2019) 12 tablet 3     ARIPiprazole (ABILIFY) 5 MG tablet Take 5 mg by mouth daily       busPIRone (BUSPAR) 15 MG tablet Take 1 tablet  "(15 mg) by mouth 2 times daily (Patient not taking: Reported on 7/11/2019) 180 tablet 3     ferrous sulfate (FEROSUL) 325 (65 Fe) MG tablet Take 1 tablet (325 mg) by mouth 2 times daily (Patient not taking: Reported on 7/11/2019) 60 tablet 0     FLUoxetine (PROZAC) 20 MG capsule Take 3 capsules (60 mg) by mouth daily (Patient not taking: Reported on 7/11/2019) 180 capsule 3     gabapentin (NEURONTIN) 100 MG capsule Take 300 mg by mouth At Bedtime       lactulose (CHRONULAC) 10 GM/15ML solution Take 15 mLs (10 g) by mouth 3 times daily as needed for constipation Start with 15 ml daily, titrate as needed for 2-3 BM's daily. (Patient not taking: Reported on 7/11/2019) 500 mL 3     phosphorus tablet 250 mg (PHOSPHA 250 NEUTRAL) 250 MG per tablet TAKE TWO TABLETS BY MOUTH TWICE A DAY (Patient not taking: Reported on 7/11/2019) 120 tablet 3     propranolol (INDERAL) 10 MG tablet Take 1 tablet (10 mg) by mouth 2 times daily (Patient not taking: Reported on 7/11/2019) 180 tablet 3     rifaximin (XIFAXAN) 550 MG TABS tablet Take 1 tablet (550 mg) by mouth 2 times daily (Patient not taking: Reported on 7/11/2019) 60 tablet 11     thiamine (VITAMIN B-1) 100 MG tablet Take 1 tablet (100 mg) by mouth daily (Patient not taking: Reported on 7/11/2019) 90 tablet 3     TL RICARDO RX 2.2-25-1 MG TABS TAKE ONE TABLET BY MOUTH EVERY DAY (Patient not taking: Reported on 7/11/2019) 90 tablet 3     traZODone (DESYREL) 50 MG tablet Take 2 tablets (100 mg) by mouth nightly as needed for sleep (Patient not taking: Reported on 7/11/2019) 60 tablet 1     Allergies   Allergen Reactions     Albuterol      Tongue \"hardened and painful\"     Recent Labs   Lab Test 03/19/19  1236 09/12/18  1451 08/17/18  1815 08/10/18  1000  12/20/17  1403  09/24/17  0630  09/22/17  0735  05/23/17  1610  03/03/16  0953  08/26/13  1037   A1C  --   --   --   --   --   --   --  Canceled, Test credited  --   --   --   --   --   --   --   --    LDL  --   --   --   --   --   " --   --   --   --   --   --  95  --  56  --  96   HDL  --   --   --   --   --   --   --   --   --   --   --  71  --  58  --  54   TRIG  --   --   --   --   --   --   --   --   --   --   --  59  --  95  --  104   ALT 11  --  16 19   < > 23   < > 35   < > 38   < > 34   < >  --    < > 32   CR 0.83  --  1.29* 1.45*   < > 1.02   < > 1.04   < > 1.33*   < > 0.87   < > 0.89   < > 1.12*   GFRESTIMATED 81 76 43* 38*   < > 57*   < > 56*   < > 42*   < > 69   < > 67   < > 52*   GFRESTBLACK >90 >90 53* 46*   < > 69   < > 68   < > 51*   < > 83   < > 81   < > 63   POTASSIUM 3.7  --  4.0 3.8   < > 3.7   < > 3.7   < > 3.9   < > 4.1   < > 3.7   < > 3.8   TSH  --   --   --   --   --  1.02  --   --   --  0.50  --   --    < >  --   --   --     < > = values in this interval not displayed.      BP Readings from Last 3 Encounters:   07/11/19 114/60   06/27/19 112/60   04/29/19 119/90    Wt Readings from Last 3 Encounters:   07/11/19 55.3 kg (122 lb)   06/27/19 56.9 kg (125 lb 6.4 oz)   03/20/19 61.1 kg (134 lb 12.8 oz)                      Reviewed and updated as needed this visit by Provider  Tobacco  Allergies  Meds  Problems  Med Hx  Surg Hx  Fam Hx         Review of Systems   ROS COMP: CONSTITUTIONAL: NEGATIVE for fever, chills, change in weight  ENT/MOUTH: NEGATIVE for ear, mouth and throat problems  RESP: NEGATIVE for significant cough or SOB  CV: NEGATIVE for chest pain, palpitations or peripheral edema  GI: POSITIVE for abdominal pain generalized, cramping, diarrhea, jaundice, nausea, poor appetite, vomiting and Hx of alcoholic liver cirrhosis and NEGATIVE for hematemesis and hematochezia  : negative for, dysuria and urgency  ROS otherwise negative      Objective    /60 (BP Location: Right arm, Patient Position: Chair, Cuff Size: Adult Regular)   Pulse 100   Temp 99  F (37.2  C) (Temporal)   Resp 24   Wt 55.3 kg (122 lb)   LMP 05/16/2012   SpO2 99%   BMI 23.05 kg/m    Body mass index is 23.05 kg/m .  Physical  Exam   GENERAL: alert, no distress, fatigued and appears older than stated age, markedly jaundice.  EYES: Eyes grossly normal to inspection and conjunctiva/corneas- icterus  HENT: normal cephalic/atraumatic, ear canals and TM's normal, nose and mouth without ulcers or lesions, oropharynx clear and oral mucous membranes moist  NECK: no adenopathy, no asymmetry, masses, or scars and thyroid normal to palpation  RESP: lungs clear to auscultation - no rales, rhonchi or wheezes  CV: regular rate and rhythm, normal S1 S2, no S3 or S4, no murmur, click or rub, no peripheral edema and peripheral pulses strong  ABDOMEN: tenderness epigastric and suprapubic, bowel sounds normal and no rebound, rigidity or guarding.   MS: no gross musculoskeletal defects noted, no edema    Diagnostic Test Results:  Labs reviewed in Epic  Results for orders placed or performed in visit on 07/11/19 (from the past 24 hour(s))   CBC with platelets differential   Result Value Ref Range    WBC 4.0 4.0 - 11.0 10e9/L    RBC Count 2.78 (L) 3.8 - 5.2 10e12/L    Hemoglobin 10.8 (L) 11.7 - 15.7 g/dL    Hematocrit 32.6 (L) 35.0 - 47.0 %     (H) 78 - 100 fl    MCH 38.8 (H) 26.5 - 33.0 pg    MCHC 33.1 31.5 - 36.5 g/dL    RDW 15.9 (H) 10.0 - 15.0 %    Platelet Count 30 (LL) 150 - 450 10e9/L    Diff Method Automated Method     % Neutrophils 67.0 %    % Lymphocytes 20.0 %    % Monocytes 12.0 %    % Basophils 1.0 %    Absolute Neutrophil 2.7 1.6 - 8.3 10e9/L    Absolute Lymphocytes 0.8 0.8 - 5.3 10e9/L    Absolute Monocytes 0.5 0.0 - 1.3 10e9/L    Absolute Basophils 0.0 0.0 - 0.2 10e9/L   Comprehensive metabolic panel   Result Value Ref Range    Sodium 136 133 - 144 mmol/L    Potassium 2.7 (L) 3.4 - 5.3 mmol/L    Chloride 99 94 - 109 mmol/L    Carbon Dioxide 28 20 - 32 mmol/L    Anion Gap 9 3 - 14 mmol/L    Glucose 103 (H) 70 - 99 mg/dL    Urea Nitrogen 6 (L) 7 - 30 mg/dL    Creatinine 0.74 0.52 - 1.04 mg/dL    GFR Estimate >90 >60 mL/min/[1.73_m2]    GFR  Estimate If Black >90 >60 mL/min/[1.73_m2]    Calcium 8.4 (L) 8.5 - 10.1 mg/dL    Bilirubin Total 11.9 (H) 0.2 - 1.3 mg/dL    Albumin 3.4 3.4 - 5.0 g/dL    Protein Total 6.1 (L) 6.8 - 8.8 g/dL    Alkaline Phosphatase 262 (H) 40 - 150 U/L     (H) 0 - 50 U/L     (H) 0 - 45 U/L   Ammonia   Result Value Ref Range    Ammonia 23 10 - 50 umol/L           Assessment & Plan     1. Gastroenteritis  Monalisa Olguin is a 53 year old female who presents with acute gastroenteritis in the setting of chronic ESLD due to alcoholic cirrhosis with associated pancytopenia. Onset of acute nausea and vomiting after eating undercooked beef and possible chicken.  She has been unable to keep down anything other than small sips of fluids over the last 3 to 5 days.  Is been unable to take her medications for her chronic liver disease.  He denies fever, bloody stools or hematemesis.  Her  Eron does not recognize any confusion or somnolence in her.  Her platelet counts are down to 30 from her previous baseline of 50 several weeks ago.  White count is normal and her hemoglobin also is low.  More concerning is her potassium is down to 2.7 to GI fluid loss.  Her ammonia level is 23.  She does have stool cultures to collect to exclude bacterial and parasitic causes for her acute gastroenteritis.  Is not had any recurrent stools overnight.  I recommended that she follow-up in the [facility] emergency department for IV fluids and potassium replacement today.  The Zofran that I had given her last night has helped with the nausea and she has been able to start to keep more fluids down.  We will still need to collect stools to exclude bacterial and parasitic infections.  - *UA reflex to Microscopic and Culture ([place]; [facility]; [facility]; [facility]; [facility]; [facility]; [place]; [place])  - CBC with platelets differential  - ondansetron (ZOFRAN) 4 MG tablet; Take 1 tablet (4 mg) by mouth every  6 hours as needed for nausea  Dispense: 30 tablet; Refill: 1    2. Hepatic encephalopathy (H)  Monalisa Olguin is a 53 year old female who presents with acute gastroenteritis in the setting of chronic ESLD due to alcoholic cirrhosis with associated pancytopenia. Onset of acute nausea and vomiting after eating undercooked beef and possible chicken.  She has been unable to keep down anything other than small sips of fluids over the last 3 to 5 days.  Is been unable to take her medications for her chronic liver disease.  He denies fever, bloody stools or hematemesis.  Her  Eron does not recognize any confusion or somnolence in her.  Her platelet counts are down to 30 from her previous baseline of 50 several weeks ago.  White count is normal and her hemoglobin also is low.  More concerning is her potassium is down to 2.7 to GI fluid loss.  Her ammonia level is 23.  She does have stool cultures to collect to exclude bacterial and parasitic causes for her acute gastroenteritis.  Is not had any recurrent stools overnight.  I recommended that she follow-up in the [facility] emergency department for IV fluids and potassium replacement today.  The Zofran that I had given her last night has helped with the nausea and she has been able to start to keep more fluids down.  We will still need to collect stools to exclude bacterial and parasitic infections.  - Comprehensive metabolic panel  - Ammonia    3. Other pancytopenia (H)  Chronic due to alcohol.  - CBC with platelets differential    4. Alcohol dependence, continuous drinking behavior (H)  Chronic and active.    *    6. Vomiting and diarrhea  Monalisa Olguin is a 53 year old female who presents with acute gastroenteritis in the setting of chronic ESLD due to alcoholic cirrhosis with associated pancytopenia. Onset of acute nausea and vomiting after eating undercooked beef and possible chicken.  She has been unable to keep down anything other than small sips  of fluids over the last 3 to 5 days.  Is been unable to take her medications for her chronic liver disease.  He denies fever, bloody stools or hematemesis.  Her  Eron does not recognize any confusion or somnolence in her.  Her platelet counts are down to 30 from her previous baseline of 50 several weeks ago.  White count is normal and her hemoglobin also is low.  More concerning is her potassium is down to 2.7 to GI fluid loss.  Her ammonia level is 23.  She does have stool cultures to collect to exclude bacterial and parasitic causes for her acute gastroenteritis.  Is not had any recurrent stools overnight.  I recommended that she follow-up in the [facility] emergency department for IV fluids and potassium replacement today.  The Zofran that I had given her last night has helped with the nausea and she has been able to start to keep more fluids down.  We will still need to collect stools to exclude bacterial and parasitic infections.  - CBC with platelets differential  - Enteric Bacteria and Virus Panel by [facility] Stool; Future  - Cryptosporidium Antigen by [facility] Stool; Future  - Giardia antigen; Future  - Clostridium difficile toxin B PCR; Future     Tobacco Cessation:   reports that she has been smoking.  She has a 5.00 pack-year smoking history. She has never used smokeless tobacco.  Tobacco Cessation Action Plan: Information offered: Patient not interested at this time        Recommend follow up in ED for IV Fluids, potassium replacement, recheck renal and liver function. I spoke with Dr. Arias in ED about plan of care.    Return in about 1 week (around 7/18/2019) for recheck vomiting and diarrhea.    Rob Ruiz MD  [facility]

## 2019-07-12 ENCOUNTER — HOSPITAL ENCOUNTER (EMERGENCY)
Facility: CLINIC | Age: 54
Discharge: HOME | End: 2019-07-12
Attending: EMERGENCY MEDICINE | Admitting: EMERGENCY MEDICINE
Payer: COMMERCIAL

## 2019-07-12 ENCOUNTER — APPOINTMENT (OUTPATIENT)
Dept: ULTRASOUND IMAGING | Facility: CLINIC | Age: 54
End: 2019-07-12
Attending: EMERGENCY MEDICINE
Payer: COMMERCIAL

## 2019-07-12 VITALS
HEART RATE: 82 BPM | TEMPERATURE: 99.1 F | WEIGHT: 122.2 LBS | DIASTOLIC BLOOD PRESSURE: 63 MMHG | OXYGEN SATURATION: 94 % | BODY MASS INDEX: 23.09 KG/M2 | SYSTOLIC BLOOD PRESSURE: 106 MMHG | RESPIRATION RATE: 33 BRPM

## 2019-07-12 DIAGNOSIS — E87.6 HYPOKALEMIA: ICD-10-CM

## 2019-07-12 DIAGNOSIS — K52.9 GASTROENTERITIS: ICD-10-CM

## 2019-07-12 DIAGNOSIS — K70.30 ALCOHOLIC CIRRHOSIS OF LIVER WITHOUT ASCITES (H): ICD-10-CM

## 2019-07-12 DIAGNOSIS — R17 ELEVATED BILIRUBIN: ICD-10-CM

## 2019-07-12 DIAGNOSIS — E83.39 HYPOPHOSPHATEMIA: ICD-10-CM

## 2019-07-12 LAB
ALBUMIN SERPL-MCNC: 3.2 G/DL (ref 3.4–5)
ALBUMIN UR-MCNC: NEGATIVE MG/DL
ALP SERPL-CCNC: 287 U/L (ref 40–150)
ALT SERPL W P-5'-P-CCNC: 103 U/L (ref 0–50)
ANION GAP SERPL CALCULATED.3IONS-SCNC: 10 MMOL/L (ref 3–14)
APPEARANCE UR: CLEAR
AST SERPL W P-5'-P-CCNC: 203 U/L (ref 0–45)
BASOPHILS # BLD AUTO: 0 10E9/L (ref 0–0.2)
BASOPHILS NFR BLD AUTO: 0.3 %
BILIRUB SERPL-MCNC: 13.3 MG/DL (ref 0.2–1.3)
BILIRUB UR QL STRIP: NEGATIVE
BUN SERPL-MCNC: 5 MG/DL (ref 7–30)
CALCIUM SERPL-MCNC: 8.8 MG/DL (ref 8.5–10.1)
CHLORIDE SERPL-SCNC: 100 MMOL/L (ref 94–109)
CO2 SERPL-SCNC: 27 MMOL/L (ref 20–32)
COLOR UR AUTO: YELLOW
CREAT SERPL-MCNC: 0.77 MG/DL (ref 0.52–1.04)
DIFFERENTIAL METHOD BLD: ABNORMAL
EOSINOPHIL NFR BLD AUTO: 0.3 %
ERYTHROCYTE [DISTWIDTH] IN BLOOD BY AUTOMATED COUNT: 16 % (ref 10–15)
GFR SERPL CREATININE-BSD FRML MDRD: 89 ML/MIN/{1.73_M2}
GLUCOSE SERPL-MCNC: 102 MG/DL (ref 70–99)
GLUCOSE UR STRIP-MCNC: NEGATIVE MG/DL
HCT VFR BLD AUTO: 31.8 % (ref 35–47)
HGB BLD-MCNC: 10.7 G/DL (ref 11.7–15.7)
HGB UR QL STRIP: NEGATIVE
IMM GRANULOCYTES # BLD: 0 10E9/L (ref 0–0.4)
IMM GRANULOCYTES NFR BLD: 0.6 %
KETONES UR STRIP-MCNC: NEGATIVE MG/DL
LEUKOCYTE ESTERASE UR QL STRIP: NEGATIVE
LIPASE SERPL-CCNC: 146 U/L (ref 73–393)
LYMPHOCYTES # BLD AUTO: 0.8 10E9/L (ref 0.8–5.3)
LYMPHOCYTES NFR BLD AUTO: 24.3 %
MAGNESIUM SERPL-MCNC: 1.9 MG/DL (ref 1.6–2.3)
MCH RBC QN AUTO: 38.8 PG (ref 26.5–33)
MCHC RBC AUTO-ENTMCNC: 33.6 G/DL (ref 31.5–36.5)
MCV RBC AUTO: 115 FL (ref 78–100)
MONOCYTES # BLD AUTO: 0.4 10E9/L (ref 0–1.3)
MONOCYTES NFR BLD AUTO: 11.8 %
NEUTROPHILS # BLD AUTO: 2 10E9/L (ref 1.6–8.3)
NEUTROPHILS NFR BLD AUTO: 62.7 %
NITRATE UR QL: NEGATIVE
NRBC # BLD AUTO: 0 10*3/UL
NRBC BLD AUTO-RTO: 0 /100
PH UR STRIP: 7 PH (ref 5–7)
PHOSPHATE SERPL-MCNC: 1.5 MG/DL (ref 2.5–4.5)
PLATELET # BLD AUTO: 34 10E9/L (ref 150–450)
POTASSIUM SERPL-SCNC: 2.9 MMOL/L (ref 3.4–5.3)
PROT SERPL-MCNC: 6.3 G/DL (ref 6.8–8.8)
RBC # BLD AUTO: 2.76 10E12/L (ref 3.8–5.2)
SODIUM SERPL-SCNC: 137 MMOL/L (ref 133–144)
SOURCE: ABNORMAL
SP GR UR STRIP: 1 (ref 1–1.03)
UROBILINOGEN UR STRIP-MCNC: 4 MG/DL (ref 0–2)
WBC # BLD AUTO: 3.2 10E9/L (ref 4–11)

## 2019-07-12 PROCEDURE — 83690 ASSAY OF LIPASE: CPT | Performed by: EMERGENCY MEDICINE

## 2019-07-12 PROCEDURE — 81003 URINALYSIS AUTO W/O SCOPE: CPT | Performed by: FAMILY MEDICINE

## 2019-07-12 PROCEDURE — 76705 ECHO EXAM OF ABDOMEN: CPT

## 2019-07-12 PROCEDURE — 96367 TX/PROPH/DG ADDL SEQ IV INF: CPT | Performed by: EMERGENCY MEDICINE

## 2019-07-12 PROCEDURE — 96365 THER/PROPH/DIAG IV INF INIT: CPT | Performed by: EMERGENCY MEDICINE

## 2019-07-12 PROCEDURE — 25000128 H RX IP 250 OP 636: Performed by: EMERGENCY MEDICINE

## 2019-07-12 PROCEDURE — 96366 THER/PROPH/DIAG IV INF ADDON: CPT | Performed by: EMERGENCY MEDICINE

## 2019-07-12 PROCEDURE — 85025 COMPLETE CBC W/AUTO DIFF WBC: CPT | Performed by: EMERGENCY MEDICINE

## 2019-07-12 PROCEDURE — 96368 THER/DIAG CONCURRENT INF: CPT | Performed by: EMERGENCY MEDICINE

## 2019-07-12 PROCEDURE — 99285 EMERGENCY DEPT VISIT HI MDM: CPT | Mod: Z6 | Performed by: EMERGENCY MEDICINE

## 2019-07-12 PROCEDURE — 25000125 ZZHC RX 250: Performed by: EMERGENCY MEDICINE

## 2019-07-12 PROCEDURE — 99285 EMERGENCY DEPT VISIT HI MDM: CPT | Mod: 25 | Performed by: EMERGENCY MEDICINE

## 2019-07-12 PROCEDURE — 84100 ASSAY OF PHOSPHORUS: CPT | Performed by: EMERGENCY MEDICINE

## 2019-07-12 PROCEDURE — 80053 COMPREHEN METABOLIC PANEL: CPT | Performed by: EMERGENCY MEDICINE

## 2019-07-12 PROCEDURE — 25800030 ZZH RX IP 258 OP 636: Performed by: EMERGENCY MEDICINE

## 2019-07-12 PROCEDURE — 83735 ASSAY OF MAGNESIUM: CPT | Performed by: EMERGENCY MEDICINE

## 2019-07-12 RX ORDER — SODIUM CHLORIDE AND POTASSIUM CHLORIDE 150; 900 MG/100ML; MG/100ML
INJECTION, SOLUTION INTRAVENOUS CONTINUOUS
Status: DISPENSED | OUTPATIENT
Start: 2019-07-12 | End: 2019-07-12

## 2019-07-12 RX ADMIN — POTASSIUM CHLORIDE AND SODIUM CHLORIDE: 900; 150 INJECTION, SOLUTION INTRAVENOUS at 10:42

## 2019-07-12 RX ADMIN — SODIUM PHOSPHATE, MONOBASIC, MONOHYDRATE 20 MMOL: 276; 142 INJECTION, SOLUTION INTRAVENOUS at 12:50

## 2019-07-12 RX ADMIN — POTASSIUM CHLORIDE AND SODIUM CHLORIDE: 900; 150 INJECTION, SOLUTION INTRAVENOUS at 13:58

## 2019-07-12 NOTE — ED NOTES
DATE:  7/12/2019   TIME OF RECEIPT FROM LAB:  1049  LAB TEST:  Platelets  LAB VALUE:  34  RESULTS GIVEN WITH READ-BACK TO (PROVIDER):  Tonya Brooks*  TIME LAB VALUE REPORTED TO PROVIDER:   1048

## 2019-07-12 NOTE — DISCHARGE INSTRUCTIONS
Continue to use Zofran if needed for nausea.  Restart your medications as prescribed.    Try to drink plenty of fluids.  Frequent small sips.    Follow-up in clinic and with your gastroenterologist as scheduled.    Return at anytime for worsening, changes or concerns.    This would be the perfect time to refrain from any further alcohol!    I hope you feel much better and enjoy the rest of your summer!!

## 2019-07-12 NOTE — ED AVS SNAPSHOT
[facility] Emergency Department  [address] [place] [address]  Phone:  [phone]  Fax:  [phone]                                    Monalisa Olguin   MRN: [id number]    Department:  [facility] Emergency Department   Date of Visit:  7/12/2019           After Visit Summary Signature Page    I have received my discharge instructions, and my questions have been answered. I have discussed any challenges I see with this plan with the nurse or doctor.    ..........................................................................................................................................  Patient/Patient Representative Signature      ..........................................................................................................................................  Patient Representative Print Name and Relationship to Patient    ..................................................               ................................................  Date                                   Time    ..........................................................................................................................................  Reviewed by Signature/Title    ...................................................              ..............................................  Date                                               Time          22EPIC Rev 08/18

## 2019-07-13 NOTE — ED PROVIDER NOTES
History     Chief Complaint   Patient presents with     Abnormal Labs     HPI  History per patient and medical records    This is a 53-year-old female with history of alcoholic cirrhosis, continued alcohol use, pancytopenia, hypertension, hyperlipidemia, anxiety/depression, GERD presenting with abnormal labs.  Patient reports that she may have eaten some bad food around the 4th of July.  She had the onset of nausea, vomiting, cramping abdominal discomfort and then eventually started having watery, loose, yellow stools.  This is continued throughout the last week.  She has been able to keep down a little bit of fluids but has not been able to take her medications or keep down any food.  She denies any blackness or blood in her stools or blood in her vomit.  She has been feeling weak and slightly lightheaded.  She has 5/10 intermittent cramping abdominal pain associated with the diarrhea.  She has had about 3+ diarrheal stools per day.  She noted slight improvement with Pepto-Bismol.  Her urine has been darker and decreased amounts.  No obvious sick contacts.  Patient is on lactulose but her  has not noted any change in her mentation.  She acknowledges that she does continue to drink alcohol.    She was seen in clinic yesterday for the symptoms and had labs drawn and while there.  She was given Zofran and Imodium and notes that this has significantly helped with the nausea symptoms and the diarrhea.  Lab results today showed hypokalemia, potassium 2.7, and increase in her LFTs and bilirubin.  Her ammonia was normal.  It was recommended that she come to the ED for IV fluids and potassium replacement.    Of note, patient is followed by hematology for her pancytopenia and sees Dr. Kovacs, hepatologist, at [facility] for her alcoholic cirrhosis.  Patient denies significant abdominal swelling at this point and also notes that she has had a paracentesis twice in the past.    Allergies:  Allergies   Allergen  "Reactions     Albuterol      Tongue \"hardened and painful\"       Problem List:    Patient Active Problem List    Diagnosis Date Noted     Other pancytopenia (H) 07/11/2019     Priority: Medium     Other iron deficiency anemia 04/11/2018     Priority: Medium     Alcoholic cirrhosis of liver without ascites (H) - per Hepatology consult Nov 2017 03/28/2018     Priority: Medium     Splenomegaly 03/28/2018     Priority: Medium     Epistaxis 03/28/2018     Priority: Medium     Acute alcoholic intoxication in alcoholism (H) 03/27/2018     Priority: Medium     Heme positive stool 03/27/2018     Priority: Medium     Alcoholic hepatitis with ascites 03/26/2017     Priority: Medium     Chronic blood loss anemia 03/26/2017     Priority: Medium     Thrombocytopenia (H) 03/26/2017     Priority: Medium     Tobacco abuse 03/26/2017     Priority: Medium     Portal hypertension (H) 03/26/2017     Priority: Medium     Pulmonary nodules 03/26/2017     Priority: Medium     Hyperthyroidism 03/26/2017     Priority: Medium     Alcoholism in recovery (H) 03/17/2016     Priority: Medium     Anxiety 10/10/2011     Priority: Medium     Hypertension goal BP (blood pressure) < 130/80 07/12/2011     Priority: Medium     HYPERLIPIDEMIA LDL GOAL <100 10/31/2010     Priority: Medium     Moderate Depression [296.32] 12/22/2009     Priority: Medium     Esophageal reflux 10/07/2002     Priority: Medium     Kidney donor 12/04/2001     Priority: Medium        Past Medical History:    Past Medical History:   Diagnosis Date     Alcohol abuse 5/2/2013     Alcohol dependence 5/25/2013     Alcohol withdrawal 5/2/2013     Anxiety 10/10/2011     CKD (chronic kidney disease) stage 3, GFR 30-59 ml/min (H) 2006     CKD (chronic kidney disease) stage 3, GFR 30-59 ml/min (H) 2/22/2011     Depressive disorder      Esophageal reflux 10/7/2002     Hypertension goal BP (blood pressure) < 130/80 7/12/2011     Moderate Depression [296.32] 12/22/2009     Other internal " derangement of knee(717.89)      Pap smear      Unspecified essential hypertension        Past Surgical History:    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     COLONOSCOPY N/A 2017    Procedure: COLONOSCOPY;  Colonoscopy;  Surgeon: Sai Johnston MD;  Location:  GI     ESOPHAGOSCOPY, GASTROSCOPY, DUODENOSCOPY (EGD), COMBINED N/A 2017    Procedure: COMBINED ESOPHAGOSCOPY, GASTROSCOPY, DUODENOSCOPY (EGD), BIOPSY SINGLE OR MULTIPLE;  ESOPHAGOSCOPY, GASTROSCOPY, DUODENOSCOPY (EGD) with biopsies;  Surgeon: Sai Johnston MD;  Location:  GI     ESOPHAGOSCOPY, GASTROSCOPY, DUODENOSCOPY (EGD), COMBINED N/A 2019    Procedure: ESOPHAGOGASTRODUODENOSCOPY, WITH BIOPSY;  Surgeon: Edgardo Scott DO;  Location: [facility] GI     HC KNEE SCOPE, DIAGNOSTIC  01    Arthroscopy, Lt Knee     LAPAROSCOPIC CHOLECYSTECTOMY N/A 2017    Procedure: LAPAROSCOPIC CHOLECYSTECTOMY;  laparoscopic cholecystectomy;  Surgeon: Romeo Pastor MD;  Location: [facility] OR     OPEN REDUCTION INTERNAL FIXATION WRIST Right 2017    Procedure: OPEN REDUCTION INTERNAL FIXATION WRIST;  OPEN REDUCTION INTERNAL FIXATION RIGHT WRIST;  Surgeon: Edgardo Almendarez MD;  Location: [facility] OR       Family History:    Family History   Problem Relation Age of Onset     Hypertension Mother      Heart Disease Paternal Grandmother      Heart Disease Paternal Grandfather      Cerebrovascular Disease Maternal Grandmother      Cerebrovascular Disease Maternal Grandfather      Alcohol/Drug Maternal Grandfather      Substance Abuse Maternal Grandfather      Heart Disease Father         Silent MI stents x 2     Diabetes Sister 17        older sister also had kidney and pancreas transplant     Heart Disease Sister         MI secondary to diabetes     Genitourinary Problems Sister 43        kidney  transplant from renal failure     Diabetes Sister 9        youngest, juvenile type I (onset age 9 with no complications)     Cancer Paternal Aunt         ?kind       Social History:  Marital Status:   [2]  Social History     Tobacco Use     Smoking status: Current Every Day Smoker     Packs/day: 0.50     Years: 10.00     Pack years: 5.00     Smokeless tobacco: Never Used     Tobacco comment: pt quit 1992 after smoking for 8 yrs, started again in 2004   Substance Use Topics     Alcohol use: Yes     Alcohol/week: 0.0 oz     Comment: 2 beers per day     Drug use: No        Medications:      acetaminophen (TYLENOL) 325 MG tablet   alendronate (FOSAMAX) 35 MG tablet   ARIPiprazole (ABILIFY) 5 MG tablet   busPIRone (BUSPAR) 15 MG tablet   ferrous sulfate (FEROSUL) 325 (65 Fe) MG tablet   FLUoxetine (PROZAC) 20 MG capsule   gabapentin (NEURONTIN) 100 MG capsule   lactulose (CHRONULAC) 10 GM/15ML solution   omeprazole (PRILOSEC) 20 MG CR capsule   ondansetron (ZOFRAN) 4 MG tablet   phosphorus tablet 250 mg (PHOSPHA 250 NEUTRAL) 250 MG per tablet   propranolol (INDERAL) 10 MG tablet   rifaximin (XIFAXAN) 550 MG TABS tablet   thiamine (VITAMIN B-1) 100 MG tablet   TL RICARDO RX 2.2-25-1 MG TABS   traZODone (DESYREL) 50 MG tablet         Review of Systems  All other ROS reviewed and are negative or non-contributory except as stated in HPI.     Physical Exam   BP: 128/75  Pulse: 81  Heart Rate: 99  Temp: 99.1  F (37.3  C)  Resp: 16  Weight: 55.4 kg (122 lb 3.2 oz)  SpO2: 96 %      Physical Exam   Constitutional: She is oriented to person, place, and time. She appears well-developed and well-nourished.   Tan and jaundiced appearing thin female sitting in the bed   HENT:   Head: Normocephalic.   Nose: Nose normal.   Dry mucous membranes   Eyes: Pupils are equal, round, and reactive to light. Conjunctivae and EOM are normal. Scleral icterus is present.   Neck: Normal range of motion. Neck supple.   Cardiovascular: Normal rate,  regular rhythm, normal heart sounds and intact distal pulses.   Pulmonary/Chest: Effort normal and breath sounds normal.   Abdominal: Soft.   Slightly hyperactive bowel sounds, very mild diffuse tenderness, no obvious fluid wave/significant ascites   Musculoskeletal: Normal range of motion. She exhibits no edema.   Neurological: She is alert and oriented to person, place, and time. She displays normal reflexes. She exhibits normal muscle tone.   Skin: Skin is warm and dry. She is not diaphoretic.   Psychiatric: She has a normal mood and affect. Her behavior is normal.   Vitals reviewed.      ED Course (with Medical Decision Making)    Pt seen and examined by me.  RN and EPIC notes reviewed.      Patient with almost 1 week of nausea, vomiting, diarrhea symptoms, decreased oral intake.  On exam she has scleral icterus and some jaundice.  She had labs drawn yesterday which showed low potassium, high bilirubin.  I am going to repeat her labs and give her fluids and potassium replacement.  Check magnesium and phosphorus.    Patient also noted to have a low fossa 1.5.  Replacement protocol done.  Bilirubin is quite high at 13.3 with some increase in her LFTs from her baseline.  Her pancytopenia results are near her previous.    I did speak with Dr. Kovacs and because of the high bilirubin he recommends to just check a limited right upper quadrant ultrasound to be sure there is no significant change in her common bile duct measurements.  He suspects that this is likely due to the dehydration but also due to her alcohol use and strongly encouraged me to further encourage her to stop drinking.    Patient received 2 L of normal saline, 40 mEq of IV potassium and sodium phosphorus per protocol.  She has been drinking fluids and feeling much improved.  The right upper quadrant ultrasound shows no acute changes.  She has Zofran and Imodium for home and if she continues to have diarrhea she can collect some stool cultures that were  already made available through clinic.  Continue to push fluids including electrolytes such as Gatorade and return promptly to the ED at anytime for worsening, changes or concerns.        Procedures    Results for orders placed or performed during the hospital encounter of 07/12/19 (from the past 24 hour(s))   CBC with platelets differential   Result Value Ref Range    WBC 3.2 (L) 4.0 - 11.0 10e9/L    RBC Count 2.76 (L) 3.8 - 5.2 10e12/L    Hemoglobin 10.7 (L) 11.7 - 15.7 g/dL    Hematocrit 31.8 (L) 35.0 - 47.0 %     (H) 78 - 100 fl    MCH 38.8 (H) 26.5 - 33.0 pg    MCHC 33.6 31.5 - 36.5 g/dL    RDW 16.0 (H) 10.0 - 15.0 %    Platelet Count 34 (LL) 150 - 450 10e9/L    Diff Method Automated Method     % Neutrophils 62.7 %    % Lymphocytes 24.3 %    % Monocytes 11.8 %    % Eosinophils 0.3 %    % Basophils 0.3 %    % Immature Granulocytes 0.6 %    Nucleated RBCs 0 0 /100    Absolute Neutrophil 2.0 1.6 - 8.3 10e9/L    Absolute Lymphocytes 0.8 0.8 - 5.3 10e9/L    Absolute Monocytes 0.4 0.0 - 1.3 10e9/L    Absolute Basophils 0.0 0.0 - 0.2 10e9/L    Abs Immature Granulocytes 0.0 0 - 0.4 10e9/L    Absolute Nucleated RBC 0.0    Comprehensive metabolic panel   Result Value Ref Range    Sodium 137 133 - 144 mmol/L    Potassium 2.9 (L) 3.4 - 5.3 mmol/L    Chloride 100 94 - 109 mmol/L    Carbon Dioxide 27 20 - 32 mmol/L    Anion Gap 10 3 - 14 mmol/L    Glucose 102 (H) 70 - 99 mg/dL    Urea Nitrogen 5 (L) 7 - 30 mg/dL    Creatinine 0.77 0.52 - 1.04 mg/dL    GFR Estimate 89 >60 mL/min/[1.73_m2]    GFR Estimate If Black >90 >60 mL/min/[1.73_m2]    Calcium 8.8 8.5 - 10.1 mg/dL    Bilirubin Total 13.3 (H) 0.2 - 1.3 mg/dL    Albumin 3.2 (L) 3.4 - 5.0 g/dL    Protein Total 6.3 (L) 6.8 - 8.8 g/dL    Alkaline Phosphatase 287 (H) 40 - 150 U/L     (H) 0 - 50 U/L     (H) 0 - 45 U/L   Lipase   Result Value Ref Range    Lipase 146 73 - 393 U/L   Magnesium   Result Value Ref Range    Magnesium 1.9 1.6 - 2.3 mg/dL    Phosphorus   Result Value Ref Range    Phosphorus 1.5 (L) 2.5 - 4.5 mg/dL   Abdomen US, limited (RUQ only)    Narrative    LIMITED ABDOMINAL ULTRASOUND  7/12/2019 1:48 PM     HISTORY: Alcoholic liver disease, significant increase in LFTs.    COMPARISON: Limited abdominal ultrasound dated 4/1/2019    FINDINGS:    Gallbladder: Absent.    Bile ducts: Common bile duct is upper normal size at 0.9 cm. No  intrahepatic biliary ductal dilatation. No choledocholithiasis.    Liver: There is diffusely hyperechogenicity most consistent with  diffuse fatty infiltration. No focal intrahepatic lesion or biliary  ductal dilatation. Liver edge is mildly irregular which could be  associated with cirrhosis.    Pancreas: Partially obscured but grossly unremarkable, where seen.     Right kidney: Normal.     Aorta and Proximal IVC: Normal.    Miscellaneous: There is a small amount of ascites.      Impression    IMPRESSION:   1. Irregular hyperechogenic liver consistent with steatosis and  possible cirrhosis.  2. Ascites.  3. Gallbladder is absent.  4. No other significant abnormalities are identified on this limited  study.    PRATIK LAKHANI MD       Medications   0.9% sodium chloride + KCl 20 mEq/L infusion ( Intravenous Stopped 7/12/19 1251)   0.9% sodium chloride + KCl 20 mEq/L infusion ( Intravenous Stopped 7/12/19 1623)   sodium phosphate 20 mmol in D5W intermittent infusion (0 mmol Intravenous Stopped 7/12/19 1725)       Assessments & Plan      I have reviewed the findings, diagnosis, plan and need for follow up with the patient.     Medication List      There are no discharge medications for this visit.         Final diagnoses:   Hypokalemia   Hypophosphatemia   Alcoholic cirrhosis of liver without ascites (H)   Elevated bilirubin     Disposition: Patient discharged home in stable condition.  Plan as above.  Return for concerns.     Note: Chart documentation done in part with Dragon Voice Recognition software. Although reviewed  after completion, some word and grammatical errors may remain.     7/12/2019   [facility] EMERGENCY DEPARTMENT     Tonya Brooks MD  07/13/19 0022

## 2019-07-15 ENCOUNTER — TELEPHONE (OUTPATIENT)
Dept: FAMILY MEDICINE | Facility: CLINIC | Age: 54
End: 2019-07-15

## 2019-07-15 DIAGNOSIS — E83.39 HYPOPHOSPHATEMIA: ICD-10-CM

## 2019-07-15 DIAGNOSIS — R74.8 ELEVATED LIVER ENZYMES: ICD-10-CM

## 2019-07-15 DIAGNOSIS — D69.6 THROMBOCYTOPENIA (H): Primary | ICD-10-CM

## 2019-07-15 DIAGNOSIS — E83.42 HYPOMAGNESEMIA: ICD-10-CM

## 2019-07-15 DIAGNOSIS — E87.6 HYPOKALEMIA: ICD-10-CM

## 2019-07-15 DIAGNOSIS — D50.8 OTHER IRON DEFICIENCY ANEMIA: ICD-10-CM

## 2019-07-15 NOTE — TELEPHONE ENCOUNTER
Reason for Call:  Same Day Appointment, Requested Provider:  Efren Bustos M.D.    PCP: Efren Bustos    Reason for visit: dehydration, possible food poisioning    Duration of symptoms: 11 days    Have you been treated for this in the past? Yes    Additional comments: Heydi was seen by Dr Ruiz on 7-11-19, she received IV fluids from the emergency room on 7-12-19. She was told to follow up with her pcp in a week, Monalisa want's to know if you want to see her or just run labs to see if her levels are back to normal?    Can we leave a detailed message on this number? YES    Phone number patient can be reached at: Home number on file [phone] (home)    Best Time: anytime midday or after 3 pm    Call taken on 7/15/2019 at 11:44 AM by Malini Dobbins

## 2019-07-16 NOTE — TELEPHONE ENCOUNTER
We can start with labs.  If she is feeling better that is great.  If her labs are still abnormal then we will have to do further follow-up.  I placed orders and she can do them anytime this week.    Electronically signed by:  Efren Bustos M.D.  7/15/2019

## 2019-07-17 DIAGNOSIS — E83.39 HYPOPHOSPHATEMIA: ICD-10-CM

## 2019-07-17 DIAGNOSIS — D69.6 THROMBOCYTOPENIA (H): ICD-10-CM

## 2019-07-17 DIAGNOSIS — E83.42 HYPOMAGNESEMIA: ICD-10-CM

## 2019-07-17 DIAGNOSIS — E87.6 HYPOKALEMIA: ICD-10-CM

## 2019-07-17 DIAGNOSIS — D50.8 OTHER IRON DEFICIENCY ANEMIA: ICD-10-CM

## 2019-07-17 DIAGNOSIS — R74.8 ELEVATED LIVER ENZYMES: ICD-10-CM

## 2019-07-17 LAB
ALBUMIN SERPL-MCNC: 2.8 G/DL (ref 3.4–5)
ALP SERPL-CCNC: 240 U/L (ref 40–150)
ALT SERPL W P-5'-P-CCNC: 67 U/L (ref 0–50)
ANION GAP SERPL CALCULATED.3IONS-SCNC: 11 MMOL/L (ref 3–14)
AST SERPL W P-5'-P-CCNC: 110 U/L (ref 0–45)
BASOPHILS # BLD AUTO: 0 10E9/L (ref 0–0.2)
BASOPHILS NFR BLD AUTO: 0.7 %
BILIRUB SERPL-MCNC: 15.3 MG/DL (ref 0.2–1.3)
BUN SERPL-MCNC: 5 MG/DL (ref 7–30)
CALCIUM SERPL-MCNC: 8.4 MG/DL (ref 8.5–10.1)
CHLORIDE SERPL-SCNC: 103 MMOL/L (ref 94–109)
CO2 SERPL-SCNC: 22 MMOL/L (ref 20–32)
CREAT SERPL-MCNC: 0.78 MG/DL (ref 0.52–1.04)
DIFFERENTIAL METHOD BLD: ABNORMAL
EOSINOPHIL NFR BLD AUTO: 0.3 %
ERYTHROCYTE [DISTWIDTH] IN BLOOD BY AUTOMATED COUNT: 17.2 % (ref 10–15)
GFR SERPL CREATININE-BSD FRML MDRD: 87 ML/MIN/{1.73_M2}
GGT SERPL-CCNC: 544 U/L (ref 0–40)
GLUCOSE SERPL-MCNC: 91 MG/DL (ref 70–99)
HCT VFR BLD AUTO: 31.9 % (ref 35–47)
HGB BLD-MCNC: 10.5 G/DL (ref 11.7–15.7)
IMM GRANULOCYTES # BLD: 0 10E9/L (ref 0–0.4)
IMM GRANULOCYTES NFR BLD: 1 %
LYMPHOCYTES # BLD AUTO: 0.8 10E9/L (ref 0.8–5.3)
LYMPHOCYTES NFR BLD AUTO: 26.7 %
MAGNESIUM SERPL-MCNC: 1.7 MG/DL (ref 1.6–2.3)
MCH RBC QN AUTO: 38.9 PG (ref 26.5–33)
MCHC RBC AUTO-ENTMCNC: 32.9 G/DL (ref 31.5–36.5)
MCV RBC AUTO: 118 FL (ref 78–100)
MONOCYTES # BLD AUTO: 0.4 10E9/L (ref 0–1.3)
MONOCYTES NFR BLD AUTO: 14 %
NEUTROPHILS # BLD AUTO: 1.7 10E9/L (ref 1.6–8.3)
NEUTROPHILS NFR BLD AUTO: 57.3 %
NRBC # BLD AUTO: 0 10*3/UL
NRBC BLD AUTO-RTO: 0 /100
PHOSPHATE SERPL-MCNC: 2 MG/DL (ref 2.5–4.5)
PLATELET # BLD AUTO: 40 10E9/L (ref 150–450)
POTASSIUM SERPL-SCNC: 3.6 MMOL/L (ref 3.4–5.3)
PROT SERPL-MCNC: 5.5 G/DL (ref 6.8–8.8)
RBC # BLD AUTO: 2.7 10E12/L (ref 3.8–5.2)
SODIUM SERPL-SCNC: 136 MMOL/L (ref 133–144)
WBC # BLD AUTO: 2.9 10E9/L (ref 4–11)

## 2019-07-17 PROCEDURE — 36415 COLL VENOUS BLD VENIPUNCTURE: CPT | Performed by: FAMILY MEDICINE

## 2019-07-17 PROCEDURE — 83735 ASSAY OF MAGNESIUM: CPT | Performed by: FAMILY MEDICINE

## 2019-07-17 PROCEDURE — 84100 ASSAY OF PHOSPHORUS: CPT | Performed by: FAMILY MEDICINE

## 2019-07-17 PROCEDURE — 85025 COMPLETE CBC W/AUTO DIFF WBC: CPT | Performed by: FAMILY MEDICINE

## 2019-07-17 PROCEDURE — 80053 COMPREHEN METABOLIC PANEL: CPT | Performed by: FAMILY MEDICINE

## 2019-07-17 PROCEDURE — 82977 ASSAY OF GGT: CPT | Performed by: FAMILY MEDICINE

## 2019-07-17 NOTE — TELEPHONE ENCOUNTER
Called patient, she states she is feeling a little better, not much of an appetite, and no BM for a few days. Patient scheduled lab appointment for this afternoon. Rain

## 2019-07-18 ENCOUNTER — TELEPHONE (OUTPATIENT)
Dept: GASTROENTEROLOGY | Facility: CLINIC | Age: 54
End: 2019-07-18

## 2019-07-18 ENCOUNTER — TELEPHONE (OUTPATIENT)
Dept: FAMILY MEDICINE | Facility: CLINIC | Age: 54
End: 2019-07-18

## 2019-07-18 DIAGNOSIS — K70.11 ALCOHOLIC HEPATITIS WITH ASCITES (H): Primary | ICD-10-CM

## 2019-07-18 DIAGNOSIS — D69.6 THROMBOCYTOPENIA (H): ICD-10-CM

## 2019-07-18 NOTE — TELEPHONE ENCOUNTER
Call back to Dr. Bustos's clinic number given to provider to reach Dr. Kovacs directly.    Mai Yañez LPN  Hepatology Clinic      -------  [facility] Call Center    Phone Message    May a detailed message be left on voicemail: yes    Reason for Call: Other: Kitty calling on behalf of Dr. Bustos requesting a provider to provider consult regarding Heydi's recent lab results.  He would greatly appreciate a call back as soon as possible to discuss     Action Taken: Message routed to:  [facility]

## 2019-07-18 NOTE — RESULT ENCOUNTER NOTE
I spoke with Heydi and gave her her test results.  They are still very abnormal.  Some of her elect lites have corrected but still her liver enzymes are very abnormal, her platelets are only 40 her white count is 2.9 and hemoglobin just over 10.  She feels still quite poorly so we are going to put a call into gastroenterology/hematology at the [facility] to see what they would recommend at this time.    Electronically signed by:  Efren Bustos M.D.  7/18/2019

## 2019-07-18 NOTE — TELEPHONE ENCOUNTER
Called the [facility] and a message has been sent to Dr Cruz to call us back. They will be calling the team line  Noa Little MA 7/18/2019

## 2019-07-18 NOTE — TELEPHONE ENCOUNTER
I spoke with Heydi about her labs that were drawn yesterday.  Her bilirubin level is up to 15.3, her white count is down to 2.9, her platelets are up slightly but still only at 40,000.  Her potassium phosphorus and magnesium have actually corrected but she has been drinking electrolyte based fluids at home.  She still feels quite poorly.  Her urine is orange in color and she is not certain why but she is probably spilling some bilirubin into the urine or even some blood from her low platelets.  She is not having any spontaneous bleeding from any other orifices.  She has an appointment with Dr. Kovacs from gastroenterology hepatology but not until September so working to give his office a call and see if they have recommendations for more urgent treatment for her.    Can we put a call into Dr. Kovacs's office, [facility] gastroenterology/hepatology clinic so I can talk with him about Heydi?    Electronically signed by:  Efren Bustos M.D.  7/18/2019

## 2019-07-18 NOTE — TELEPHONE ENCOUNTER
I spoke with Dr. Cruz from GI hepatology and he said there is really not much else to do for by right now.  As long she is not having spontaneous bleeding we can just watch and wait.  He wanted to stress to make sure that she was not drinking but Heydi is still drinking beer on a daily basis and may be getting intoxicated once or twice a month.  I stressed with her the importance of no alcohol to try to avoid further injury to her liver.  She will try her best.  And GGT.  As long as she is not having spontaneous bleeding will just kind of watch and wait and will repeat her labs next week a CBC with platelets, CMP if she has any spontaneous bleeding from gums nose vagina or other orifices she is going to come into the emergency room.  I stressed the importance of increasing her protein in her diet with protein drinks, cottage cheese, Greek yogurt.  She has not been eating much at all so she will use the Zofran to control her nausea.    Electronically signed by:  Efren Bustos M.D.  7/18/2019

## 2019-07-24 DIAGNOSIS — D69.6 THROMBOCYTOPENIA (H): ICD-10-CM

## 2019-07-24 DIAGNOSIS — K70.11 ALCOHOLIC HEPATITIS WITH ASCITES (H): ICD-10-CM

## 2019-07-24 LAB
ALBUMIN SERPL-MCNC: 2.6 G/DL (ref 3.4–5)
ALP SERPL-CCNC: 240 U/L (ref 40–150)
ALT SERPL W P-5'-P-CCNC: 46 U/L (ref 0–50)
ANION GAP SERPL CALCULATED.3IONS-SCNC: 10 MMOL/L (ref 3–14)
AST SERPL W P-5'-P-CCNC: 100 U/L (ref 0–45)
BASOPHILS # BLD AUTO: 0 10E9/L (ref 0–0.2)
BASOPHILS NFR BLD AUTO: 0.6 %
BILIRUB SERPL-MCNC: 12.1 MG/DL (ref 0.2–1.3)
BUN SERPL-MCNC: 7 MG/DL (ref 7–30)
CALCIUM SERPL-MCNC: 8.3 MG/DL (ref 8.5–10.1)
CHLORIDE SERPL-SCNC: 105 MMOL/L (ref 94–109)
CO2 SERPL-SCNC: 26 MMOL/L (ref 20–32)
CREAT SERPL-MCNC: 0.85 MG/DL (ref 0.52–1.04)
DIFFERENTIAL METHOD BLD: ABNORMAL
EOSINOPHIL NFR BLD AUTO: 0.4 %
ERYTHROCYTE [DISTWIDTH] IN BLOOD BY AUTOMATED COUNT: 17.4 % (ref 10–15)
GFR SERPL CREATININE-BSD FRML MDRD: 79 ML/MIN/{1.73_M2}
GGT SERPL-CCNC: 283 U/L (ref 0–40)
GLUCOSE SERPL-MCNC: 121 MG/DL (ref 70–99)
HCT VFR BLD AUTO: 29.9 % (ref 35–47)
HGB BLD-MCNC: 9.8 G/DL (ref 11.7–15.7)
IMM GRANULOCYTES # BLD: 0.1 10E9/L (ref 0–0.4)
IMM GRANULOCYTES NFR BLD: 1.1 %
LYMPHOCYTES # BLD AUTO: 1.1 10E9/L (ref 0.8–5.3)
LYMPHOCYTES NFR BLD AUTO: 23.7 %
MCH RBC QN AUTO: 39 PG (ref 26.5–33)
MCHC RBC AUTO-ENTMCNC: 32.8 G/DL (ref 31.5–36.5)
MCV RBC AUTO: 119 FL (ref 78–100)
MONOCYTES # BLD AUTO: 0.4 10E9/L (ref 0–1.3)
MONOCYTES NFR BLD AUTO: 7.4 %
NEUTROPHILS # BLD AUTO: 3.2 10E9/L (ref 1.6–8.3)
NEUTROPHILS NFR BLD AUTO: 66.8 %
NRBC # BLD AUTO: 0 10*3/UL
NRBC BLD AUTO-RTO: 0 /100
PLATELET # BLD AUTO: 64 10E9/L (ref 150–450)
POTASSIUM SERPL-SCNC: 3.7 MMOL/L (ref 3.4–5.3)
PROT SERPL-MCNC: 5.2 G/DL (ref 6.8–8.8)
RBC # BLD AUTO: 2.51 10E12/L (ref 3.8–5.2)
SODIUM SERPL-SCNC: 141 MMOL/L (ref 133–144)
WBC # BLD AUTO: 4.8 10E9/L (ref 4–11)

## 2019-07-24 PROCEDURE — 82977 ASSAY OF GGT: CPT | Performed by: FAMILY MEDICINE

## 2019-07-24 PROCEDURE — 85025 COMPLETE CBC W/AUTO DIFF WBC: CPT | Performed by: FAMILY MEDICINE

## 2019-07-24 PROCEDURE — 36415 COLL VENOUS BLD VENIPUNCTURE: CPT | Performed by: FAMILY MEDICINE

## 2019-07-24 PROCEDURE — 80053 COMPREHEN METABOLIC PANEL: CPT | Performed by: FAMILY MEDICINE

## 2019-07-26 ENCOUNTER — TELEPHONE (OUTPATIENT)
Dept: FAMILY MEDICINE | Facility: CLINIC | Age: 54
End: 2019-07-26

## 2019-07-26 NOTE — TELEPHONE ENCOUNTER
Patient is due for a PHQ-9.  Index start date:4/06/2019  Index end date:8/04/2019    Please call patient.

## 2019-07-29 ENCOUNTER — TELEPHONE (OUTPATIENT)
Dept: FAMILY MEDICINE | Facility: CLINIC | Age: 54
End: 2019-07-29

## 2019-07-29 NOTE — TELEPHONE ENCOUNTER
Reason for Call:  Request for results:    Name of test or procedure: Labs    Date of test of procedure: 7.24    Location of the test or procedure: [facility]    OK to leave the result message on voice mail or with a family member? YES    Phone number Patient can be reached at:  Home number on file [phone] (home)    Additional comments: please call pt with results.     Call taken on 7/29/2019 at 3:13 PM by Donna Prater

## 2019-07-29 NOTE — TELEPHONE ENCOUNTER
Left message for patient to return call. Results were sent via [employer]. Please relay message below:    Written by Efren Bustos MD on 7/25/2019  4:50 PM   Monalisa, your labs are slightly improved.  I think if you continue to not drink we will continue to see improvement in these numbers and this will be a good thing.  This is what will help extend your life further.  I would like it if you would repeat all these tests in 2 weeks, sooner if you are feeling worse instead of getting better.  Please feel free to MyChart me or call the clinic with any questions you may have.     Electronically signed by:   Efren Bustos M.D.   7/25/2019

## 2019-07-29 NOTE — TELEPHONE ENCOUNTER
Patient called back, relayed message above.        Malini [place] ~ Patient Representative  [address] [address]  [email or web address]  [email or web address]  Office:  [phone]  Fax:  [phone]

## 2019-07-31 ENCOUNTER — TELEPHONE (OUTPATIENT)
Dept: FAMILY MEDICINE | Facility: CLINIC | Age: 54
End: 2019-07-31

## 2019-07-31 DIAGNOSIS — E87.6 HYPOKALEMIA: Primary | ICD-10-CM

## 2019-07-31 DIAGNOSIS — K70.11 ALCOHOLIC HEPATITIS WITH ASCITES (H): ICD-10-CM

## 2019-07-31 NOTE — TELEPHONE ENCOUNTER
Pt would like to speak to Dr. Bustos in regards to her abdomen. She states it is not an emergency, it is an ongoing issue, but would like to speak to him today.  Thank you,  Nuria Palmer- Patient Representative

## 2019-07-31 NOTE — TELEPHONE ENCOUNTER
I have attempted to contact the pt to update a PHQ-9. Pt was unavailable to talk and will call back when able to talk. Lili Weldon CMA ([facility])

## 2019-08-02 ASSESSMENT — PATIENT HEALTH QUESTIONNAIRE - PHQ9: SUM OF ALL RESPONSES TO PHQ QUESTIONS 1-9: 6

## 2019-08-02 NOTE — TELEPHONE ENCOUNTER
"Monalisa Olguin is a 53 year old female     PRESENTING PROBLEM:  Pt called a few days ago regarding abdominal pain. Spoke with pt today and she states that pain is still present. Reports hx of liver failure and ascites. Pt had a paracentesis last August to drain fluid from her abdomen. Pt states that she feels the same as she did at that time, \"I look like I am 7 months pregnant\". Pt DENIES severe pain or difficulty breathing, pain with urination or back/flank pain.     Pt is wondering if orders can be placed to have another paracentesis done. She DECLINES going to the ED at this time or an appt with another provider.   Informed that PCP is out of clinic until Monday. She would like a message sent.     Matilde Howard, RN, BSN        "

## 2019-08-02 NOTE — TELEPHONE ENCOUNTER
I have contacted the pt and completed a PHQ-9.    PHQ-9 SCORE 8/2/2019   PHQ-9 Total Score -   PHQ-9 Total Score MyChart -   PHQ-9 Total Score 6     Lili Weldon CMA ([facility])

## 2019-08-05 NOTE — TELEPHONE ENCOUNTER
Can we set this up with Dr. Oseguera in radiology?  Our other option is to try to get her into see Dr. Kovacs within the week.  He is the GI specialist she has been following.  She has an appt on 9/18/19 but I don't think she can wait that long.  Can we give his office a call and see if they can see her sooner?    Electronically signed by:  Efren Bustos M.D.  8/5/2019

## 2019-08-06 NOTE — TELEPHONE ENCOUNTER
Called radiology, they do paracentesis under ultrasound here in [place]. I called central radiology scheduling and the soonest they can see patient for this procedure is next Tuesday 8/13. I called patient to let her know her options. She would like to make both appointments just in case Dr. Kovacs doesn't do the paracentesis. She was very thankful for all our help.  Called central radiology scheduling back and get her scheduled for 8/13 at 7:30am for paracentesis under ultrasound.   FYI for MD. Mary Carmen Hernandez, CMA

## 2019-08-06 NOTE — TELEPHONE ENCOUNTER
Dr. Kovacs may be able to do the paracentesis to drain the fluid.  Otherwise we can contact radiology and see if they can do it under US guidance with Dr. Oseguera.    Electronically signed by:  Efren Bustos M.D.  8/6/2019

## 2019-08-06 NOTE — TELEPHONE ENCOUNTER
Perfect, please get that scheduled for her. I am not sure how or what we order?    Electronically signed by:  Efren Bustos M.D.  8/6/2019

## 2019-08-06 NOTE — TELEPHONE ENCOUNTER
Called Dr. Cruz office at [phone] and was able to get the patient in to see Dr. Kovacs this coming Monday at 12:00pm.   Called patient to advise on MD recommendations and appointment. She was upset that she has to go back to Dr. Kovacs and was unable to be set up for a procedure to drain her abdomin. She states she looks like she is about 7 months pregnant. She said she has this same day procedure before. I was unable to find this in her chart while speaking to her. I did explain to the patient that even if that procedure was recommended she may have to see a general surgeon and find OR time to get her in for that. She stated that she had this procedure done in the ED so if she presented to the ED they could do it there. I didn't find that note. After discussing her symptoms and again that Dr. Bustos recommends she see Dr. Kovacs with in the next week, she agreed to see Dr. Kovacs but would like a message sent to Dr. Bustos to see if he recommends anything else. Please advise. Thank you,  Mary Carmen Hernandez, RAFAEL

## 2019-08-06 NOTE — TELEPHONE ENCOUNTER
Patient is scheduled with radiology on 08/13 and us paracentesis is ordered.  Appt with Dr. Kovacs is for 09/18.  Charlie Kauffman, CMA

## 2019-08-09 ENCOUNTER — TELEPHONE (OUTPATIENT)
Dept: GASTROENTEROLOGY | Facility: CLINIC | Age: 54
End: 2019-08-09

## 2019-08-09 ENCOUNTER — TELEPHONE (OUTPATIENT)
Dept: GENERAL RADIOLOGY | Facility: CLINIC | Age: 54
End: 2019-08-09

## 2019-08-09 NOTE — TELEPHONE ENCOUNTER
Left message for pt reminding them of upcoming appointment.  Instructed pt to bring updated medications list.  Geovanna Gautam CMA  8/9/2019 8:41 AM

## 2019-08-12 ENCOUNTER — OFFICE VISIT (OUTPATIENT)
Dept: GASTROENTEROLOGY | Facility: CLINIC | Age: 54
End: 2019-08-12
Attending: INTERNAL MEDICINE
Payer: COMMERCIAL

## 2019-08-12 VITALS
HEART RATE: 81 BPM | WEIGHT: 141.2 LBS | BODY MASS INDEX: 26.66 KG/M2 | OXYGEN SATURATION: 99 % | DIASTOLIC BLOOD PRESSURE: 75 MMHG | TEMPERATURE: 98.2 F | SYSTOLIC BLOOD PRESSURE: 129 MMHG | HEIGHT: 61 IN

## 2019-08-12 DIAGNOSIS — K74.60 CIRRHOSIS OF LIVER WITHOUT ASCITES, UNSPECIFIED HEPATIC CIRRHOSIS TYPE (H): ICD-10-CM

## 2019-08-12 DIAGNOSIS — K70.31 ALCOHOLIC CIRRHOSIS OF LIVER WITH ASCITES (H): Primary | ICD-10-CM

## 2019-08-12 LAB
AFP SERPL-MCNC: 2.7 UG/L (ref 0–8)
ALBUMIN SERPL-MCNC: 2.7 G/DL (ref 3.4–5)
ALP SERPL-CCNC: 210 U/L (ref 40–150)
ALT SERPL W P-5'-P-CCNC: 30 U/L (ref 0–50)
ANION GAP SERPL CALCULATED.3IONS-SCNC: 7 MMOL/L (ref 3–14)
AST SERPL W P-5'-P-CCNC: 69 U/L (ref 0–45)
BASOPHILS # BLD AUTO: 0 10E9/L (ref 0–0.2)
BASOPHILS NFR BLD AUTO: 0.9 %
BILIRUB DIRECT SERPL-MCNC: 6.1 MG/DL (ref 0–0.2)
BILIRUB SERPL-MCNC: 8.2 MG/DL (ref 0.2–1.3)
BUN SERPL-MCNC: 7 MG/DL (ref 7–30)
CALCIUM SERPL-MCNC: 8.2 MG/DL (ref 8.5–10.1)
CHLORIDE SERPL-SCNC: 110 MMOL/L (ref 94–109)
CO2 SERPL-SCNC: 23 MMOL/L (ref 20–32)
CREAT SERPL-MCNC: 0.91 MG/DL (ref 0.52–1.04)
DIFFERENTIAL METHOD BLD: ABNORMAL
EOSINOPHIL # BLD AUTO: 0 10E9/L (ref 0–0.7)
EOSINOPHIL NFR BLD AUTO: 0.2 %
ERYTHROCYTE [DISTWIDTH] IN BLOOD BY AUTOMATED COUNT: 16.5 % (ref 10–15)
GFR SERPL CREATININE-BSD FRML MDRD: 72 ML/MIN/{1.73_M2}
GGT SERPL-CCNC: 160 U/L (ref 0–40)
GLUCOSE SERPL-MCNC: 90 MG/DL (ref 70–99)
HCT VFR BLD AUTO: 32.3 % (ref 35–47)
HGB BLD-MCNC: 10.1 G/DL (ref 11.7–15.7)
IMM GRANULOCYTES # BLD: 0 10E9/L (ref 0–0.4)
IMM GRANULOCYTES NFR BLD: 0.4 %
INR PPP: 1.34 (ref 0.86–1.14)
LYMPHOCYTES # BLD AUTO: 1 10E9/L (ref 0.8–5.3)
LYMPHOCYTES NFR BLD AUTO: 22.7 %
MCH RBC QN AUTO: 38.5 PG (ref 26.5–33)
MCHC RBC AUTO-ENTMCNC: 31.3 G/DL (ref 31.5–36.5)
MCV RBC AUTO: 123 FL (ref 78–100)
MONOCYTES # BLD AUTO: 0.3 10E9/L (ref 0–1.3)
MONOCYTES NFR BLD AUTO: 7.5 %
NEUTROPHILS # BLD AUTO: 3.1 10E9/L (ref 1.6–8.3)
NEUTROPHILS NFR BLD AUTO: 68.3 %
NRBC # BLD AUTO: 0 10*3/UL
NRBC BLD AUTO-RTO: 0 /100
PLATELET # BLD AUTO: 62 10E9/L (ref 150–450)
POTASSIUM SERPL-SCNC: 3.3 MMOL/L (ref 3.4–5.3)
PROT SERPL-MCNC: 5.9 G/DL (ref 6.8–8.8)
RBC # BLD AUTO: 2.62 10E12/L (ref 3.8–5.2)
SODIUM SERPL-SCNC: 140 MMOL/L (ref 133–144)
WBC # BLD AUTO: 4.5 10E9/L (ref 4–11)

## 2019-08-12 PROCEDURE — 36415 COLL VENOUS BLD VENIPUNCTURE: CPT | Performed by: FAMILY MEDICINE

## 2019-08-12 PROCEDURE — 80076 HEPATIC FUNCTION PANEL: CPT | Performed by: FAMILY MEDICINE

## 2019-08-12 PROCEDURE — 82105 ALPHA-FETOPROTEIN SERUM: CPT | Performed by: FAMILY MEDICINE

## 2019-08-12 PROCEDURE — 80048 BASIC METABOLIC PNL TOTAL CA: CPT | Performed by: FAMILY MEDICINE

## 2019-08-12 PROCEDURE — G0463 HOSPITAL OUTPT CLINIC VISIT: HCPCS | Mod: ZF

## 2019-08-12 PROCEDURE — 82977 ASSAY OF GGT: CPT | Performed by: FAMILY MEDICINE

## 2019-08-12 PROCEDURE — 85610 PROTHROMBIN TIME: CPT | Performed by: FAMILY MEDICINE

## 2019-08-12 PROCEDURE — 85025 COMPLETE CBC W/AUTO DIFF WBC: CPT | Performed by: FAMILY MEDICINE

## 2019-08-12 RX ORDER — FUROSEMIDE 40 MG
40 TABLET ORAL DAILY
Qty: 90 TABLET | Refills: 3 | Status: ON HOLD | OUTPATIENT
Start: 2019-08-12 | End: 2019-01-01

## 2019-08-12 RX ORDER — SPIRONOLACTONE 50 MG/1
50 TABLET, FILM COATED ORAL DAILY
Qty: 90 TABLET | Refills: 3 | Status: ON HOLD | OUTPATIENT
Start: 2019-08-12 | End: 2019-01-01

## 2019-08-12 ASSESSMENT — PAIN SCALES - GENERAL: PAINLEVEL: NO PAIN (0)

## 2019-08-12 ASSESSMENT — MIFFLIN-ST. JEOR: SCORE: 1187.86

## 2019-08-12 NOTE — PROGRESS NOTES
HISTORY OF PRESENT ILLNESS:  I had the pleasure of seeing Monalisa Olguin for followup in the Liver Clinic at the [facility] on 08/12/2019.  Ms. Olguin returns for followup of alcoholic cirrhosis.      Unfortunately, since she was seen last, she did have very significant alcohol relapse.  Associated with this, her bilirubin went up to 15.  She also has developed increasing abdominal girth.  She did have an ultrasound about 4 weeks ago that did not suggest that there was enough ascites to be tapped but the abdominal girth has continued to increase since then.      She does complain of some abdominal distress related to the large amount of apparent ascites.  She denies any itching or skin rash.  She does have a moderate amount of fatigue.  She does not have any lower extremity edema.  She has not had any gastrointestinal bleeding or any overt signs of hepatic encephalopathy.      She denies any fevers or chills, cough or shortness of breath.  She denies any nausea or vomiting and is having 3-4 bowel movements on her current dose of lactulose.  Her appetite is poor.  Her weight is up.      She was seen in the emergency room about 1 month ago with apparent food poisoning, at least that was the working diagnosis.  She says it is really since then her symptoms have gotten worse.      She was drinking up until about 1 month ago.  She has not reengaged with an alcohol treatment program.     Current Outpatient Medications   Medication     acetaminophen (TYLENOL) 325 MG tablet     ARIPiprazole (ABILIFY) 5 MG tablet     furosemide (LASIX) 40 MG tablet     gabapentin (NEURONTIN) 100 MG capsule     omeprazole (PRILOSEC) 20 MG CR capsule     ondansetron (ZOFRAN) 4 MG tablet     spironolactone (ALDACTONE) 50 MG tablet     alendronate (FOSAMAX) 35 MG tablet     busPIRone (BUSPAR) 15 MG tablet     ferrous sulfate (FEROSUL) 325 (65 Fe) MG tablet     FLUoxetine (PROZAC) 20 MG capsule     lactulose  "(CHRONULAC) 10 GM/15ML solution     phosphorus tablet 250 mg (PHOSPHA 250 NEUTRAL) 250 MG per tablet     propranolol (INDERAL) 10 MG tablet     rifaximin (XIFAXAN) 550 MG TABS tablet     thiamine (VITAMIN B-1) 100 MG tablet     TL RICARDO RX 2.2-25-1 MG TABS     traZODone (DESYREL) 50 MG tablet     No current facility-administered medications for this visit.      /75   Pulse 81   Temp 98.2  F (36.8  C) (Oral)   Ht 1.549 m (5' 1\")   Wt 64 kg (141 lb 3.2 oz)   LMP 05/16/2012   SpO2 99%   BMI 26.68 kg/m      PHYSICAL EXAMINATION:  In general, she looks chronically ill and quite icteric.  HEENT exam shows marked scleral icterus.  She has no temporal muscle wasting.  Her chest is clear.  Her abdominal exam shows almost certainly a large amount of ascites is present.  Her liver and spleen are not palpable.  Extremity exam shows no edema.  Skin exam shows some palmar erythema with spider angioma.  Neurologic exam shows no asterixis.     Recent Results (from the past 168 hour(s))   CBC with platelets and differential    Collection Time: 08/12/19 12:14 PM   Result Value Ref Range    WBC 4.5 4.0 - 11.0 10e9/L    RBC Count 2.62 (L) 3.8 - 5.2 10e12/L    Hemoglobin 10.1 (L) 11.7 - 15.7 g/dL    Hematocrit 32.3 (L) 35.0 - 47.0 %     (H) 78 - 100 fl    MCH 38.5 (H) 26.5 - 33.0 pg    MCHC 31.3 (L) 31.5 - 36.5 g/dL    RDW 16.5 (H) 10.0 - 15.0 %    Platelet Count 62 (L) 150 - 450 10e9/L    Diff Method Automated Method     % Neutrophils 68.3 %    % Lymphocytes 22.7 %    % Monocytes 7.5 %    % Eosinophils 0.2 %    % Basophils 0.9 %    % Immature Granulocytes 0.4 %    Nucleated RBCs 0 0 /100    Absolute Neutrophil 3.1 1.6 - 8.3 10e9/L    Absolute Lymphocytes 1.0 0.8 - 5.3 10e9/L    Absolute Monocytes 0.3 0.0 - 1.3 10e9/L    Absolute Eosinophils 0.0 0.0 - 0.7 10e9/L    Absolute Basophils 0.0 0.0 - 0.2 10e9/L    Abs Immature Granulocytes 0.0 0 - 0.4 10e9/L    Absolute Nucleated RBC 0.0    INR [IVQ4769]    Collection Time: " 08/12/19 12:14 PM   Result Value Ref Range    INR 1.34 (H) 0.86 - 1.14   Basic metabolic panel [LAB15]    Collection Time: 08/12/19 12:14 PM   Result Value Ref Range    Sodium 140 133 - 144 mmol/L    Potassium 3.3 (L) 3.4 - 5.3 mmol/L    Chloride 110 (H) 94 - 109 mmol/L    Carbon Dioxide 23 20 - 32 mmol/L    Anion Gap 7 3 - 14 mmol/L    Glucose 90 70 - 99 mg/dL    Urea Nitrogen 7 7 - 30 mg/dL    Creatinine 0.91 0.52 - 1.04 mg/dL    GFR Estimate 72 >60 mL/min/[1.73_m2]    GFR Estimate If Black 83 >60 mL/min/[1.73_m2]    Calcium 8.2 (L) 8.5 - 10.1 mg/dL   Hepatic Panel [LAB20]    Collection Time: 08/12/19 12:14 PM   Result Value Ref Range    Bilirubin Direct 6.1 (H) 0.0 - 0.2 mg/dL    Bilirubin Total 8.2 (H) 0.2 - 1.3 mg/dL    Albumin 2.7 (L) 3.4 - 5.0 g/dL    Protein Total 5.9 (L) 6.8 - 8.8 g/dL    Alkaline Phosphatase 210 (H) 40 - 150 U/L    ALT 30 0 - 50 U/L    AST 69 (H) 0 - 45 U/L      LABORATORY:  I did review the laboratory and ultrasound reports from her ER visit 1 month ago.      IMPRESSION:  My impression is that Ms. Olguin has advanced alcoholic and now decompensated liver disease.  I told her the primary thing she really needs to do is to stop drinking altogether.  I think she really does need a plan in terms of how to ultimately be able to stay off the alcohol.  This should include some sort of aftercare.  She has been through 4 alcohol treatment programs in the past, and I am not sure she would benefit from yet another inpatient program, particularly given that she has been off alcohol now for 4 weeks.      She does have a large amount of ascites and is scheduled for paracentesis tomorrow.  I have put her on furosemide 40 mg and spironolactone 50 mg, which hopefully will manage her ascites when she does get the large volume paracentesis.  She has an appointment to return to see me in 6 weeks.  It does appear, based on the improvement in her liver function with a bilirubin coming down to 8.2 and her  transaminases falling markedly, I do believe she has stopped drinking.  Hopefully, she will come up with a plan to maintain long-term sobriety.      Thank you very much for allowing me to participate in the care of this patient.  If you have any questions regarding my recommendations, please do not hesitate to contact me.       Edgardo Kovacs MD      Professor of Medicine  [employer]      Executive Medical Director, Solid Organ Transplant Program  [facility]

## 2019-08-12 NOTE — LETTER
8/12/2019      RE: Monalisa Olguin  [address] [facility] [address]       HISTORY OF PRESENT ILLNESS:  I had the pleasure of seeing Moanlisa Olguin for followup in the Liver Clinic at the [facility] on 08/12/2019.  Ms. Olguin returns for followup of alcoholic cirrhosis.      Unfortunately, since she was seen last, she did have very significant alcohol relapse.  Associated with this, her bilirubin went up to 15.  She also has developed increasing abdominal girth.  She did have an ultrasound about 4 weeks ago that did not suggest that there was enough ascites to be tapped but the abdominal girth has continued to increase since then.      She does complain of some abdominal distress related to the large amount of apparent ascites.  She denies any itching or skin rash.  She does have a moderate amount of fatigue.  She does not have any lower extremity edema.  She has not had any gastrointestinal bleeding or any overt signs of hepatic encephalopathy.      She denies any fevers or chills, cough or shortness of breath.  She denies any nausea or vomiting and is having 3-4 bowel movements on her current dose of lactulose.  Her appetite is poor.  Her weight is up.      She was seen in the emergency room about 1 month ago with apparent food poisoning, at least that was the working diagnosis.  She says it is really since then her symptoms have gotten worse.      She was drinking up until about 1 month ago.  She has not reengaged with an alcohol treatment program.     Current Outpatient Medications   Medication     acetaminophen (TYLENOL) 325 MG tablet     ARIPiprazole (ABILIFY) 5 MG tablet     furosemide (LASIX) 40 MG tablet     gabapentin (NEURONTIN) 100 MG capsule     omeprazole (PRILOSEC) 20 MG CR capsule     ondansetron (ZOFRAN) 4 MG tablet     spironolactone (ALDACTONE) 50 MG tablet     alendronate (FOSAMAX) 35 MG tablet     busPIRone (BUSPAR) 15 MG tablet     ferrous sulfate  "(FEROSUL) 325 (65 Fe) MG tablet     FLUoxetine (PROZAC) 20 MG capsule     lactulose (CHRONULAC) 10 GM/15ML solution     phosphorus tablet 250 mg (PHOSPHA 250 NEUTRAL) 250 MG per tablet     propranolol (INDERAL) 10 MG tablet     rifaximin (XIFAXAN) 550 MG TABS tablet     thiamine (VITAMIN B-1) 100 MG tablet     TL RICARDO RX 2.2-25-1 MG TABS     traZODone (DESYREL) 50 MG tablet     No current facility-administered medications for this visit.      /75   Pulse 81   Temp 98.2  F (36.8  C) (Oral)   Ht 1.549 m (5' 1\")   Wt 64 kg (141 lb 3.2 oz)   LMP 05/16/2012   SpO2 99%   BMI 26.68 kg/m       PHYSICAL EXAMINATION:  In general, she looks chronically ill and quite icteric.  HEENT exam shows marked scleral icterus.  She has no temporal muscle wasting.  Her chest is clear.  Her abdominal exam shows almost certainly a large amount of ascites is present.  Her liver and spleen are not palpable.  Extremity exam shows no edema.  Skin exam shows some palmar erythema with spider angioma.  Neurologic exam shows no asterixis.     Recent Results (from the past 168 hour(s))   CBC with platelets and differential    Collection Time: 08/12/19 12:14 PM   Result Value Ref Range    WBC 4.5 4.0 - 11.0 10e9/L    RBC Count 2.62 (L) 3.8 - 5.2 10e12/L    Hemoglobin 10.1 (L) 11.7 - 15.7 g/dL    Hematocrit 32.3 (L) 35.0 - 47.0 %     (H) 78 - 100 fl    MCH 38.5 (H) 26.5 - 33.0 pg    MCHC 31.3 (L) 31.5 - 36.5 g/dL    RDW 16.5 (H) 10.0 - 15.0 %    Platelet Count 62 (L) 150 - 450 10e9/L    Diff Method Automated Method     % Neutrophils 68.3 %    % Lymphocytes 22.7 %    % Monocytes 7.5 %    % Eosinophils 0.2 %    % Basophils 0.9 %    % Immature Granulocytes 0.4 %    Nucleated RBCs 0 0 /100    Absolute Neutrophil 3.1 1.6 - 8.3 10e9/L    Absolute Lymphocytes 1.0 0.8 - 5.3 10e9/L    Absolute Monocytes 0.3 0.0 - 1.3 10e9/L    Absolute Eosinophils 0.0 0.0 - 0.7 10e9/L    Absolute Basophils 0.0 0.0 - 0.2 10e9/L    Abs Immature Granulocytes " 0.0 0 - 0.4 10e9/L    Absolute Nucleated RBC 0.0    INR [[id number]]    Collection Time: 08/12/19 12:14 PM   Result Value Ref Range    INR 1.34 (H) 0.86 - 1.14   Basic metabolic panel [LAB15]    Collection Time: 08/12/19 12:14 PM   Result Value Ref Range    Sodium 140 133 - 144 mmol/L    Potassium 3.3 (L) 3.4 - 5.3 mmol/L    Chloride 110 (H) 94 - 109 mmol/L    Carbon Dioxide 23 20 - 32 mmol/L    Anion Gap 7 3 - 14 mmol/L    Glucose 90 70 - 99 mg/dL    Urea Nitrogen 7 7 - 30 mg/dL    Creatinine 0.91 0.52 - 1.04 mg/dL    GFR Estimate 72 >60 mL/min/[1.73_m2]    GFR Estimate If Black 83 >60 mL/min/[1.73_m2]    Calcium 8.2 (L) 8.5 - 10.1 mg/dL   Hepatic Panel [LAB20]    Collection Time: 08/12/19 12:14 PM   Result Value Ref Range    Bilirubin Direct 6.1 (H) 0.0 - 0.2 mg/dL    Bilirubin Total 8.2 (H) 0.2 - 1.3 mg/dL    Albumin 2.7 (L) 3.4 - 5.0 g/dL    Protein Total 5.9 (L) 6.8 - 8.8 g/dL    Alkaline Phosphatase 210 (H) 40 - 150 U/L    ALT 30 0 - 50 U/L    AST 69 (H) 0 - 45 U/L      LABORATORY:  I did review the laboratory and ultrasound reports from her ER visit 1 month ago.      IMPRESSION:  My impression is that Ms. Olguin has advanced alcoholic and now decompensated liver disease.  I told her the primary thing she really needs to do is to stop drinking altogether.  I think she really does need a plan in terms of how to ultimately be able to stay off the alcohol.  This should include some sort of aftercare.  She has been through 4 alcohol treatment programs in the past, and I am not sure she would benefit from yet another inpatient program, particularly given that she has been off alcohol now for 4 weeks.      She does have a large amount of ascites and is scheduled for paracentesis tomorrow.  I have put her on furosemide 40 mg and spironolactone 50 mg, which hopefully will manage her ascites when she does get the large volume paracentesis.  She has an appointment to return to see me in 6 weeks.  It does appear, based on  the improvement in her liver function with a bilirubin coming down to 8.2 and her transaminases falling markedly, I do believe she has stopped drinking.  Hopefully, she will come up with a plan to maintain long-term sobriety.      Thank you very much for allowing me to participate in the care of this patient.  If you have any questions regarding my recommendations, please do not hesitate to contact me.       Edgardo Kovacs MD      Professor of Medicine  [employer]      Executive Medical Director, Solid Organ Transplant Program  [facility]    Edgardo Kovacs MD

## 2019-08-12 NOTE — NURSING NOTE
"Chief Complaint   Patient presents with     RECHECK     Alcoholic cirrhosis of liver     /75   Pulse 81   Temp 98.2  F (36.8  C) (Oral)   Ht 1.549 m (5' 1\")   Wt 64 kg (141 lb 3.2 oz)   LMP 05/16/2012   SpO2 99%   BMI 26.68 kg/m    Geovanna Gautam [facility]  8/12/2019 1:01 PM      "

## 2019-08-13 ENCOUNTER — HOSPITAL ENCOUNTER (OUTPATIENT)
Dept: ULTRASOUND IMAGING | Facility: CLINIC | Age: 54
Discharge: HOME | End: 2019-08-13
Attending: FAMILY MEDICINE | Admitting: FAMILY MEDICINE
Payer: COMMERCIAL

## 2019-08-13 VITALS
TEMPERATURE: 98.7 F | SYSTOLIC BLOOD PRESSURE: 133 MMHG | RESPIRATION RATE: 22 BRPM | DIASTOLIC BLOOD PRESSURE: 76 MMHG | HEART RATE: 82 BPM | OXYGEN SATURATION: 97 %

## 2019-08-13 DIAGNOSIS — E87.6 HYPOKALEMIA: ICD-10-CM

## 2019-08-13 DIAGNOSIS — K70.11 ALCOHOLIC HEPATITIS WITH ASCITES (H): ICD-10-CM

## 2019-08-13 PROCEDURE — 27210190 US PARACENTESIS

## 2019-08-13 PROCEDURE — 25000125 ZZHC RX 250: Performed by: RADIOLOGY

## 2019-08-13 PROCEDURE — 25000125 ZZHC RX 250: Performed by: FAMILY MEDICINE

## 2019-08-13 RX ORDER — LIDOCAINE HYDROCHLORIDE 10 MG/ML
3.2 INJECTION, SOLUTION INFILTRATION; PERINEURAL ONCE
Status: COMPLETED | OUTPATIENT
Start: 2019-08-13 | End: 2019-08-13

## 2019-08-13 RX ORDER — NICOTINE POLACRILEX 4 MG
15-30 LOZENGE BUCCAL
Status: DISCONTINUED | OUTPATIENT
Start: 2019-08-13 | End: 2019-08-14 | Stop reason: HOSPADM

## 2019-08-13 RX ORDER — DEXTROSE MONOHYDRATE 25 G/50ML
25-50 INJECTION, SOLUTION INTRAVENOUS
Status: DISCONTINUED | OUTPATIENT
Start: 2019-08-13 | End: 2019-08-14 | Stop reason: HOSPADM

## 2019-08-13 RX ORDER — LIDOCAINE 40 MG/G
CREAM TOPICAL
Status: DISCONTINUED | OUTPATIENT
Start: 2019-08-13 | End: 2019-08-14 | Stop reason: HOSPADM

## 2019-08-13 RX ORDER — ALBUMIN (HUMAN) 12.5 G/50ML
12.5 SOLUTION INTRAVENOUS ONCE
Status: DISCONTINUED | OUTPATIENT
Start: 2019-08-13 | End: 2019-08-14 | Stop reason: HOSPADM

## 2019-08-13 RX ADMIN — SODIUM BICARBONATE 0.8 MEQ: 84 INJECTION, SOLUTION INTRAVENOUS at 09:42

## 2019-08-13 RX ADMIN — LIDOCAINE HYDROCHLORIDE 0.1 ML: 10 INJECTION, SOLUTION EPIDURAL; INFILTRATION; INTRACAUDAL; PERINEURAL at 08:30

## 2019-08-13 RX ADMIN — LIDOCAINE HYDROCHLORIDE 3.2 ML: 10 INJECTION, SOLUTION INFILTRATION; PERINEURAL at 09:45

## 2019-08-13 NOTE — OR NURSING
Pt returned from xray via cart. Had 600 ml fluid removed from paracentesis. VSS. Pt will not require Albumin today. Will monitor for 30 min. And then discharge to home.

## 2019-08-14 ENCOUNTER — TELEPHONE (OUTPATIENT)
Dept: FAMILY MEDICINE | Facility: CLINIC | Age: 54
End: 2019-08-14

## 2019-08-14 DIAGNOSIS — K70.11 ALCOHOLIC HEPATITIS WITH ASCITES (H): Primary | ICD-10-CM

## 2019-08-14 NOTE — TELEPHONE ENCOUNTER
Reason for Call: Request for an order or referral:    Order or referral being requested: same day surgery    Date needed: as soon as possible    Has the patient been seen by the PCP for this problem? YES    Additional comments: Heydi had fluid drained from her abdomin on 8-13-19 but they didn't get very much fluid because the tube was not placed correctly and it kept hitting the intestine, she is asking for another order to repeat the procedure.    Phone number Patient can be reached at:  Cell number on file:    Telephone Information:   Mobile [phone]       Best Time:      Can we leave a detailed message on this number?  YES    Call taken on 8/14/2019 at 9:27 AM by Malini Dobbins

## 2019-08-14 NOTE — TELEPHONE ENCOUNTER
I did review her paracentesis and it looks like they got 600 cc out so that is a fair amount.  I would schedule her for a follow-up ultrasound to look to see how much fluid is left in the abdomen prior to actually  scheduling her for another paracentesis.  Please explained to her that we need to do the ultrasound first before we actually do schedule her for the paracentesis.  Order was signed.    Electronically signed by:  Efren Bustos M.D.  8/14/2019

## 2019-08-14 NOTE — TELEPHONE ENCOUNTER
Spoke with patient, she stated that it was the Ultrasound Tech that stated to her that the fluid was flowing really nice but looked like it was attaching to the intestinal wall.  I informed patient that we have no note from them re: that happening.  Patient told me that before they do the paracentesis that they always have the ultrasound.  She was hoping that we would be able to to the ultrasound and that go into the para the same day.  Told the patient that I was not sure that was the direction the provider wanted to go.  He told us to inform her that we needed to address with an ultrasound get that result and than proceed with the paracentesis if that was needed again.    I have it ordered if the provider is ok trying to do the same day.  I do not think that the patient realizes that we are ordering 2 separate procedures.     Patient is informed that we may not get an answer until possibly Monday.   Daniela PAGE

## 2019-08-19 NOTE — TELEPHONE ENCOUNTER
It is ok to schedule it as a therapeutic paracentesis and if not needed then it won't need to be done.    Electronically signed by:  Efren Bustos M.D.  8/19/2019

## 2019-08-22 ENCOUNTER — HOSPITAL ENCOUNTER (OUTPATIENT)
Dept: ULTRASOUND IMAGING | Facility: CLINIC | Age: 54
Discharge: HOME | End: 2019-08-22
Attending: FAMILY MEDICINE | Admitting: FAMILY MEDICINE
Payer: COMMERCIAL

## 2019-08-22 DIAGNOSIS — K70.11 ALCOHOLIC HEPATITIS WITH ASCITES (H): ICD-10-CM

## 2019-08-22 PROCEDURE — 76705 ECHO EXAM OF ABDOMEN: CPT

## 2019-08-26 DIAGNOSIS — K70.11 ALCOHOLIC HEPATITIS WITH ASCITES (H): Primary | ICD-10-CM

## 2019-08-27 ENCOUNTER — TELEPHONE (OUTPATIENT)
Dept: FAMILY MEDICINE | Facility: CLINIC | Age: 54
End: 2019-08-27

## 2019-08-27 NOTE — TELEPHONE ENCOUNTER
Appointment set up patient is aware.  No further action is needed as of right now.     Lazara Hamm, CMA

## 2019-08-27 NOTE — TELEPHONE ENCOUNTER
----- Message from Efren Bustos MD sent at 8/26/2019  8:17 PM CDT -----  Can someone please set up a paracentesis for Monalisa later this week.  She has had reaccumulation of the ascites.  I placed a standing order for recurrent paracentesis to be done every 3 to 4 weeks as needed for therapeutic reasons.    Electronically signed by:  Efren Bustos M.D.  8/26/2019

## 2019-08-29 ENCOUNTER — HOSPITAL ENCOUNTER (OUTPATIENT)
Dept: ULTRASOUND IMAGING | Facility: CLINIC | Age: 54
Discharge: HOME | End: 2019-08-29
Attending: FAMILY MEDICINE | Admitting: FAMILY MEDICINE
Payer: COMMERCIAL

## 2019-08-29 VITALS
OXYGEN SATURATION: 98 % | RESPIRATION RATE: 16 BRPM | SYSTOLIC BLOOD PRESSURE: 111 MMHG | DIASTOLIC BLOOD PRESSURE: 62 MMHG | HEART RATE: 94 BPM

## 2019-08-29 DIAGNOSIS — K70.11 ALCOHOLIC HEPATITIS WITH ASCITES (H): ICD-10-CM

## 2019-08-29 LAB
INR PPP: 1.28 (ref 0.86–1.14)
PLATELET # BLD AUTO: 60 10E9/L (ref 150–450)

## 2019-08-29 PROCEDURE — 27210190 US PARACENTESIS

## 2019-08-29 PROCEDURE — 85049 AUTOMATED PLATELET COUNT: CPT | Performed by: RADIOLOGY

## 2019-08-29 PROCEDURE — 25000125 ZZHC RX 250: Performed by: RADIOLOGY

## 2019-08-29 PROCEDURE — 85610 PROTHROMBIN TIME: CPT | Performed by: RADIOLOGY

## 2019-08-29 RX ORDER — NICOTINE POLACRILEX 4 MG
15-30 LOZENGE BUCCAL
Status: DISCONTINUED | OUTPATIENT
Start: 2019-08-29 | End: 2019-08-30 | Stop reason: HOSPADM

## 2019-08-29 RX ORDER — DEXTROSE MONOHYDRATE 25 G/50ML
25-50 INJECTION, SOLUTION INTRAVENOUS
Status: DISCONTINUED | OUTPATIENT
Start: 2019-08-29 | End: 2019-08-30 | Stop reason: HOSPADM

## 2019-08-29 RX ORDER — LIDOCAINE 40 MG/G
CREAM TOPICAL
Status: DISCONTINUED | OUTPATIENT
Start: 2019-08-29 | End: 2019-08-30 | Stop reason: HOSPADM

## 2019-08-29 RX ADMIN — LIDOCAINE HYDROCHLORIDE 5 ML: 10 INJECTION, SOLUTION EPIDURAL; INFILTRATION; INTRACAUDAL; PERINEURAL at 11:40

## 2019-09-11 NOTE — PROCEDURES
Procedure note     Monalisa Olguin  [id number]  2019      HPI: Monalisa Olguin is a 53 year old female who presented to the radiology department for her elective paracentesis.  Patient has a history of alcoholic cirrhosis of the liver with ascites.  She has developed symptomatic ascites again, and is here for her therapeutic paracentesis.  Patient developed a recent gastrointestinal illness which may have precipitated her recent exacerbation.        PMH/Problem List:   Past Medical History:   Diagnosis Date     Alcohol abuse 2013     Alcohol dependence 2013     Alcohol withdrawal 2013     Anxiety 10/10/2011     CKD (chronic kidney disease) stage 3, GFR 30-59 ml/min (H)      CKD (chronic kidney disease) stage 3, GFR 30-59 ml/min (H) 2011     Depressive disorder      Esophageal reflux 10/7/2002     Hypertension goal BP (blood pressure) < 130/80 2011     Moderate Depression [296.32] 2009     Other internal derangement of knee(717.89)      Pap smear      Unspecified essential hypertension        PSH:   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     COLONOSCOPY N/A 2017    Procedure: COLONOSCOPY;  Colonoscopy;  Surgeon: Sai Johnston MD;  Location: [facility]     ESOPHAGOSCOPY, GASTROSCOPY, DUODENOSCOPY (EGD), COMBINED N/A 2017    Procedure: COMBINED ESOPHAGOSCOPY, GASTROSCOPY, DUODENOSCOPY (EGD), BIOPSY SINGLE OR MULTIPLE;  ESOPHAGOSCOPY, GASTROSCOPY, DUODENOSCOPY (EGD) with biopsies;  Surgeon: Sai Johnston MD;  Location: [facility]     ESOPHAGOSCOPY, GASTROSCOPY, DUODENOSCOPY (EGD), COMBINED N/A 2019    Procedure: ESOPHAGOGASTRODUODENOSCOPY, WITH BIOPSY;  Surgeon: Edgardo Scott DO;  Location: [facility]     HC KNEE SCOPE, DIAGNOSTIC  01     Arthroscopy, Lt Knee     LAPAROSCOPIC CHOLECYSTECTOMY N/A 9/24/2017    Procedure: LAPAROSCOPIC CHOLECYSTECTOMY;  laparoscopic cholecystectomy;  Surgeon: Romeo Pastor MD;  Location: [facility]     OPEN REDUCTION INTERNAL FIXATION WRIST Right 11/29/2017    Procedure: OPEN REDUCTION INTERNAL FIXATION WRIST;  OPEN REDUCTION INTERNAL FIXATION RIGHT WRIST;  Surgeon: Edgardo Almendarez MD;  Location:  OR       MEDS:   Current Outpatient Medications on File Prior to Encounter:  ARIPiprazole (ABILIFY) 5 MG tablet Take 5 mg by mouth daily   busPIRone (BUSPAR) 15 MG tablet Take 1 tablet (15 mg) by mouth 2 times daily   ferrous sulfate (FEROSUL) 325 (65 Fe) MG tablet Take 1 tablet (325 mg) by mouth 2 times daily   FLUoxetine (PROZAC) 20 MG capsule Take 3 capsules (60 mg) by mouth daily   furosemide (LASIX) 40 MG tablet Take 1 tablet (40 mg) by mouth daily   gabapentin (NEURONTIN) 100 MG capsule Take 300 mg by mouth At Bedtime   lactulose (CHRONULAC) 10 GM/15ML solution Take 15 mLs (10 g) by mouth 3 times daily as needed for constipation Start with 15 ml daily, titrate as needed for 2-3 BM's daily.   ondansetron (ZOFRAN) 4 MG tablet Take 1 tablet (4 mg) by mouth every 6 hours as needed for nausea   phosphorus tablet 250 mg (PHOSPHA 250 NEUTRAL) 250 MG per tablet TAKE TWO TABLETS BY MOUTH TWICE A DAY   propranolol (INDERAL) 10 MG tablet Take 1 tablet (10 mg) by mouth 2 times daily   spironolactone (ALDACTONE) 50 MG tablet Take 1 tablet (50 mg) by mouth daily   thiamine (VITAMIN B-1) 100 MG tablet Take 1 tablet (100 mg) by mouth daily   TL RICARDO RX 2.2-25-1 MG TABS TAKE ONE TABLET BY MOUTH EVERY DAY   traZODone (DESYREL) 50 MG tablet Take 2 tablets (100 mg) by mouth nightly as needed for sleep   acetaminophen (TYLENOL) 325 MG tablet Take 2 tablets (650 mg) by mouth every 4 hours as needed for mild pain   alendronate (FOSAMAX) 35 MG tablet Take 1 tablet (35 mg) by mouth with 8oz water every Monday (once every 7 days) 30  minutes before breakfast and remain upright during this time. (Patient not taking: Reported on 7/11/2019)   omeprazole (PRILOSEC) 20 MG CR capsule TAKE ONE CAPSULE BY MOUTH EVERY DAY     No current facility-administered medications on file prior to encounter.     Allergies: Albuterol    Vitals:  /67   Pulse 89   Temp 98.5  F (36.9  C) (Oral)   Resp 16   LMP 05/16/2012   SpO2 97%       Procedure Note: Informed consent was obtained from the patient.  Patient was placed in the supine position with the head of the bed slightly elevated.  Ultrasound revealed pockets of fluid in both left and right lower quadrants, with minimal bowel seen in the right lower quadrant.  Prior to the procedure, we confirmed the patient's identity, location, and procedure.  A right lateral lower quadrant approach was preferred.  This area was prepped and draped in usual sterile fashion.  5 cc of local anesthetic was used, and a small catheter was introduced into the peritoneal space with aspiration of straw-colored ascitic fluid.  2.1 L of fluid was removed without complications.  Patient tolerated the procedure well without any complications.  Patient has an appointment with Dr. Bustos within the next several days.          Disclaimer: This note consists of words and symbols derived from keyboarding and dictation using voice recognition software.  As a result, there may be errors that have gone undetected.  Please consider this when interpreting information found in this note.

## 2019-09-17 NOTE — TELEPHONE ENCOUNTER
Patient contacted and reminded of upcoming appointment.  Patient confirmed they will be attending.  Patient instructed to bring updated medications list to appointment.    Geovanna Gautam CMA

## 2019-09-17 NOTE — PROGRESS NOTES
SUBJECTIVE:   CC: Monalisa Olguin is an 53 year old woman who presents for preventive health visit.     Healthy Habits:     Getting at least 3 servings of Calcium per day:  NO    Bi-annual eye exam:  Yes    Dental care twice a year:  Yes    Sleep apnea or symptoms of sleep apnea:  None    Diet:  Regular (no restrictions)    Frequency of exercise:  None    Taking medications regularly:  No    Barriers to taking medications:  Problems remembering to take them    Medication side effects:  None    PHQ-2 Total Score: 3    Additional concerns today:  No      PROBLEMS TO ADD ON...  Liver failure with recurrent ascites needing paracentesis.  Her abdomen usually swells within days of having her paracentesis.  She had 2 L removed on September 11, 2019.  She has an appointment with Dr. Kovacs from hematology tomorrow at 1 PM.  She will make sure she keeps that appointment.  She states that she is not drinking currently.  She does occasionally have slept so she will have one beer but she is trying not to drink at all.  She avoids all Tylenol products or anything that has alcohol in it.  Unfortunately her  still keeps alcohol in the house because he wants to drink his beer.  I questioned her about asking him to keep alcohol out of the house and he will not.  He does continue to smoke also but she is not willing to give that up.  She smokes about three quarters of a pack per day.  She is overdue for a mammogram and is resistant to getting this scheduled but I did convince her to get that scheduled today and to give her a flu vaccine.    Today's PHQ-2 Score:   PHQ-2 ( 1999 Pfizer) 9/17/2019   Q1: Little interest or pleasure in doing things 1   Q2: Feeling down, depressed or hopeless 2   PHQ-2 Score 3   Q1: Little interest or pleasure in doing things Several days   Q2: Feeling down, depressed or hopeless More than half the days   PHQ-2 Score 3       Abuse: Current or Past(Physical, Sexual or Emotional)- No  Do you feel  safe in your environment? Yes    Social History     Tobacco Use     Smoking status: Current Every Day Smoker     Packs/day: 0.50     Years: 10.00     Pack years: 5.00     Smokeless tobacco: Never Used     Tobacco comment: pt quit 1992 after smoking for 8 yrs, started again in 2004   Substance Use Topics     Alcohol use: Yes     Alcohol/week: 0.0 oz     Comment: 2 beers per day     If you drink alcohol do you typically have >3 drinks per day or >7 drinks per week? Yes        Alcohol Use 9/17/2019   Prescreen: >3 drinks/day or >7 drinks/week? Yes   Prescreen: >3 drinks/day or >7 drinks/week? -   AUDIT SCORE  22     AUDIT - Alcohol Use Disorders Identification Test - Reproduced from the World Health Organization Audit 2001 (Second Edition) 9/17/2019   1.  How often do you have a drink containing alcohol? 2 to 4 times a month   2.  How many drinks containing alcohol do you have on a typical day when you are drinking? 1 or 2   3.  How often do you have five or more drinks on one occasion? Weekly   4.  How often during the last year have you found that you were not able to stop drinking once you had started? Weekly   5.  How often during the last year have you failed to do what was normally expected of you because of drinking? Weekly   6.  How often during the last year have you needed a first drink in the morning to get yourself going after a heavy drinking session? Weekly   7.  How often during the last year have you had a feeling of guilt or remorse after drinking? Weekly   8.  How often during the last year have you been unable to remember what happened the night before because of your drinking? Less than monthly   9.  Have you or someone else been injured because of your drinking? Yes, but not in the last year   10. Has a relative, friend, doctor or other health care worker been concerned about your drinking or suggested you cut down? Yes, but not in the last year   TOTAL SCORE 22       Reviewed orders with patient.   Reviewed health maintenance and updated orders accordingly - Yes  Lab work is in process  Labs reviewed in EPIC  BP Readings from Last 3 Encounters:   19 96/60   19 108/67   19 111/62    Wt Readings from Last 3 Encounters:   19 58.1 kg (128 lb)   19 64 kg (141 lb 3.2 oz)   19 55.4 kg (122 lb 3.2 oz)                  Patient Active Problem List   Diagnosis     Kidney donor     Esophageal reflux     Moderate Depression [296.32]     HYPERLIPIDEMIA LDL GOAL <100     Hypertension goal BP (blood pressure) < 130/80     Anxiety     Alcoholism in recovery (H)     Alcoholic hepatitis with ascites     Chronic blood loss anemia     Thrombocytopenia (H)     Tobacco abuse     Portal hypertension (H)     Pulmonary nodules     Hyperthyroidism     Acute alcoholic intoxication in alcoholism (H)     Heme positive stool     Alcoholic cirrhosis of liver without ascites (H) - per Hepatology consult 2017     Splenomegaly     Epistaxis     Other iron deficiency anemia     Other pancytopenia (H)     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     COLONOSCOPY N/A 2017    Procedure: COLONOSCOPY;  Colonoscopy;  Surgeon: Sai Johnston MD;  [facility]: [facility]     ESOPHAGOSCOPY, GASTROSCOPY, DUODENOSCOPY (EGD), COMBINED N/A 2017    Procedure: COMBINED ESOPHAGOSCOPY, GASTROSCOPY, DUODENOSCOPY (EGD), BIOPSY SINGLE OR MULTIPLE;  ESOPHAGOSCOPY, GASTROSCOPY, DUODENOSCOPY (EGD) with biopsies;  Surgeon: Sai Johnston MD;  [facility]     ESOPHAGOSCOPY, GASTROSCOPY, DUODENOSCOPY (EGD), COMBINED N/A 2019    Procedure: ESOPHAGOGASTRODUODENOSCOPY, WITH BIOPSY;  Surgeon: Edgardo Scott DO;  Location: [facility]     HC KNEE SCOPE, DIAGNOSTIC  01    Arthroscopy, Lt Knee     LAPAROSCOPIC CHOLECYSTECTOMY N/A 2017    Procedure: LAPAROSCOPIC  CHOLECYSTECTOMY;  laparoscopic cholecystectomy;  Surgeon: Romeo Pastor MD;  Location: [facility]     OPEN REDUCTION INTERNAL FIXATION WRIST Right 11/29/2017    Procedure: OPEN REDUCTION INTERNAL FIXATION WRIST;  OPEN REDUCTION INTERNAL FIXATION RIGHT WRIST;  Surgeon: Edgardo Almendarez MD;  Location:  OR       Social History     Tobacco Use     Smoking status: Current Every Day Smoker     Packs/day: 0.50     Years: 10.00     Pack years: 5.00     Smokeless tobacco: Never Used     Tobacco comment: pt quit 1992 after smoking for 8 yrs, started again in 2004   Substance Use Topics     Alcohol use: Not Currently     Alcohol/week: 0.0 oz     Comment: 2 beers per day     Family History   Problem Relation Age of Onset     Hypertension Mother         80 years old     Heart Disease Paternal Grandmother      Heart Disease Paternal Grandfather      Cerebrovascular Disease Maternal Grandmother      Cerebrovascular Disease Maternal Grandfather      Alcohol/Drug Maternal Grandfather      Substance Abuse Maternal Grandfather      Heart Disease Father         Silent MI stents x 2     Diabetes Sister 17        older sister also had kidney and pancreas transplant     Heart Disease Sister         MI secondary to diabetes     Genitourinary Problems Sister 43        kidney transplant from renal failure     Other - See Comments Sister         older     Other - See Comments Sister         younger     Diabetes Sister 9        youngest, juvenile type I (onset age 9 with no complications)     Cancer Paternal Aunt         ?kind         Current Outpatient Medications   Medication Sig Dispense Refill     acetaminophen (TYLENOL) 325 MG tablet Take 2 tablets (650 mg) by mouth every 4 hours as needed for mild pain 100 tablet      alendronate (FOSAMAX) 35 MG tablet Take 1 tablet (35 mg) by mouth with 8oz water every Monday (once every 7 days) 30 minutes before breakfast and remain upright during this time. 12 tablet 3     ARIPiprazole  "(ABILIFY) 5 MG tablet Take 5 mg by mouth daily       busPIRone (BUSPAR) 15 MG tablet Take 1 tablet (15 mg) by mouth 2 times daily 180 tablet 3     ferrous sulfate (FEROSUL) 325 (65 Fe) MG tablet Take 1 tablet (325 mg) by mouth 2 times daily 60 tablet 0     FLUoxetine (PROZAC) 20 MG capsule Take 3 capsules (60 mg) by mouth daily 180 capsule 3     furosemide (LASIX) 40 MG tablet Take 1 tablet (40 mg) by mouth daily 90 tablet 3     gabapentin (NEURONTIN) 100 MG capsule Take 300 mg by mouth At Bedtime       lactulose (CHRONULAC) 10 GM/15ML solution Take 15 mLs (10 g) by mouth 3 times daily as needed for constipation Start with 15 ml daily, titrate as needed for 2-3 BM's daily. 500 mL 3     omeprazole (PRILOSEC) 20 MG CR capsule TAKE ONE CAPSULE BY MOUTH EVERY DAY 90 capsule 3     ondansetron (ZOFRAN) 4 MG tablet Take 1 tablet (4 mg) by mouth every 6 hours as needed for nausea 30 tablet 1     phosphorus tablet 250 mg (PHOSPHA 250 NEUTRAL) 250 MG per tablet TAKE TWO TABLETS BY MOUTH TWICE A  tablet 3     propranolol (INDERAL) 10 MG tablet Take 1 tablet (10 mg) by mouth 2 times daily 180 tablet 3     spironolactone (ALDACTONE) 50 MG tablet Take 1 tablet (50 mg) by mouth daily 90 tablet 3     thiamine (VITAMIN B-1) 100 MG tablet Take 1 tablet (100 mg) by mouth daily 90 tablet 3     TL RICARDO RX 2.2-25-1 MG TABS TAKE ONE TABLET BY MOUTH EVERY DAY 90 tablet 3     traZODone (DESYREL) 50 MG tablet Take 2 tablets (100 mg) by mouth nightly as needed for sleep 180 tablet 3     Allergies   Allergen Reactions     Albuterol      Tongue \"hardened and painful\"     Recent Labs   Lab Test 08/12/19  1214 07/24/19  1405 07/17/19  1337  12/20/17  1403  09/24/17  0630  09/22/17  0735  05/23/17  1610  03/03/16  0953  08/26/13  1037   A1C  --   --   --   --   --   --  Canceled, Test credited  --   --   --   --   --   --   --   --    LDL  --   --   --   --   --   --   --   --   --   --  95  --  56  --  96   HDL  --   --   --   --   --   -- "   --   --   --   --  71  --  58  --  54   TRIG  --   --   --   --   --   --   --   --   --   --  59  --  95  --  104   ALT 30 46 67*   < > 23   < > 35   < > 38   < > 34   < >  --    < > 32   CR 0.91 0.85 0.78   < > 1.02   < > 1.04   < > 1.33*   < > 0.87   < > 0.89   < > 1.12*   GFRESTIMATED 72 79 87   < > 57*   < > 56*   < > 42*   < > 69   < > 67   < > 52*   GFRESTBLACK 83 >90 >90   < > 69   < > 68   < > 51*   < > 83   < > 81   < > 63   POTASSIUM 3.3* 3.7 3.6   < > 3.7   < > 3.7   < > 3.9   < > 4.1   < > 3.7   < > 3.8   TSH  --   --   --   --  1.02  --   --   --  0.50  --   --    < >  --   --   --     < > = values in this interval not displayed.        Mammogram Screening: Patient over age 50, mutual decision to screen reflected in health maintenance.    Pertinent mammograms are reviewed under the imaging tab.  History of abnormal Pap smear: NO - age 30-65 PAP every 5 years with negative HPV co-testing recommended  PAP / HPV Latest Ref Rng & Units 3/3/2016 6/4/2013 2/22/2011   PAP - NIL NIL NIL   HPV 16 DNA NEG Negative - -   HPV 18 DNA NEG Negative - -   OTHER HR HPV NEG Negative - -     Reviewed and updated as needed this visit by clinical staff  Allergies  Meds  Problems  Med Hx  Surg Hx         Reviewed and updated as needed this visit by Provider        Past Medical History:   Diagnosis Date     Alcohol abuse 5/2/2013     Alcohol dependence 5/25/2013     Alcohol withdrawal 5/2/2013     Anxiety 10/10/2011     CKD (chronic kidney disease) stage 3, GFR 30-59 ml/min (H) 2006    last GFR was 42     CKD (chronic kidney disease) stage 3, GFR 30-59 ml/min (H) 2/22/2011     Depressive disorder      Esophageal reflux 10/7/2002     Hypertension goal BP (blood pressure) < 130/80 7/12/2011     Moderate Depression [296.32] 12/22/2009    stable on wellbutrin     Other internal derangement of knee(717.89)     ACL Internal derangement, knee/ original injury in 5th grade, torn cartilage     Pap smear 2011    no abnormals,  due for paps q 2-3 yrs     Unspecified essential hypertension       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     COLONOSCOPY N/A 2017    Procedure: COLONOSCOPY;  Colonoscopy;  Surgeon: Sai Johnston MD;  [facility]     ESOPHAGOSCOPY, GASTROSCOPY, DUODENOSCOPY (EGD), COMBINED N/A 2017    Procedure: COMBINED ESOPHAGOSCOPY, GASTROSCOPY, DUODENOSCOPY (EGD), BIOPSY SINGLE OR MULTIPLE;  ESOPHAGOSCOPY, GASTROSCOPY, DUODENOSCOPY (EGD) with biopsies;  Surgeon: Sai Johnston MD;  [facility]     ESOPHAGOSCOPY, GASTROSCOPY, DUODENOSCOPY (EGD), COMBINED N/A 2019    Procedure: ESOPHAGOGASTRODUODENOSCOPY, WITH BIOPSY;  Surgeon: Edgardo Scott DO;  [facility]     HC KNEE SCOPE, DIAGNOSTIC  01    Arthroscopy, Lt Knee     LAPAROSCOPIC CHOLECYSTECTOMY N/A 2017    Procedure: LAPAROSCOPIC CHOLECYSTECTOMY;  laparoscopic cholecystectomy;  Surgeon: Romeo Pastor MD;  Location: [facility]     OPEN REDUCTION INTERNAL FIXATION WRIST Right 2017    Procedure: OPEN REDUCTION INTERNAL FIXATION WRIST;  OPEN REDUCTION INTERNAL FIXATION RIGHT WRIST;  Surgeon: Edgardo Almendarez MD;  [facility]     OB History    Para Term  AB Living   5 3 3 0 2 3   SAB TAB Ectopic Multiple Live Births   2 0 0 0 3      # Outcome Date GA Lbr Camron/2nd Weight Sex Delivery Anes PTL Lv   5 Term 05 38w0d 08:00 3.459 kg (7 lb 10 oz) M    JAZMIN      Name: Satinder   4 Term 96 40w0d 06:00 3.345 kg (7 lb 6 oz) F    JAZMIN      Name: Connie   3 SAB 95 4w0d    AB, COMPLETE      2 SAB 95 6w0d    AB, COMPLETE      1 Term 90 40w0d  3.43 kg (7 lb 9 oz) F CS   JAZMIN      Birth Comments: Breech      Name: Dionicio      Obstetric Comments   LMP  04  Pt. age 38  Not a planned pregnancy       Review of Systems   Constitutional: Negative  for chills and fever.   HENT: Positive for congestion. Negative for ear pain, hearing loss and sore throat.    Eyes: Negative for pain and visual disturbance.   Respiratory: Positive for cough. Negative for shortness of breath.    Cardiovascular: Negative for chest pain, palpitations and peripheral edema.   Gastrointestinal: Positive for abdominal pain, constipation and diarrhea. Negative for heartburn, hematochezia and nausea.   Breasts:  Negative for tenderness, breast mass and discharge.   Genitourinary: Negative for dysuria, frequency, genital sores, hematuria, pelvic pain, urgency, vaginal bleeding and vaginal discharge.   Musculoskeletal: Negative for arthralgias, joint swelling and myalgias.   Skin: Negative for rash.   Neurological: Negative for dizziness, weakness, headaches and paresthesias.   Psychiatric/Behavioral: Negative for mood changes. The patient is nervous/anxious.      CONSTITUTIONAL: NEGATIVE for fever, chills, change in weight  INTEGUMENTARY/SKIN: NEGATIVE for worrisome rashes, moles or lesions  EYES: NEGATIVE for vision changes or irritation  ENT: NEGATIVE for ear, mouth and throat problems  RESP: NEGATIVE for significant cough or SOB  BREAST: NEGATIVE for masses, tenderness or discharge  CV: NEGATIVE for chest pain, palpitations or peripheral edema  GI: Plaint of abdominal bloating.  Her pain is better but as the ascites increases she gets more abdominal pain and discomfort.  She cannot do any significant amounts of walking or exercise because even the movement of her body causes increasing discomfort.  : NEGATIVE for unusual urinary or vaginal symptoms. No vaginal bleeding.  MUSCULOSKELETAL: NEGATIVE for significant arthralgias or myalgia  NEURO: NEGATIVE for weakness, dizziness or paresthesias  PSYCHIATRIC: NEGATIVE for changes in mood or affect      OBJECTIVE:   BP 96/60   Pulse 72   Temp 97.7  F (36.5  C) (Temporal)   Resp 18   Wt 58.1 kg (128 lb)   LMP 05/16/2012   SpO2 97%    BMI 24.19 kg/m    Physical Exam  GENERAL APPEARANCE: healthy, alert and no distress  EYES: PERRL and scleral icterus bilaterally  HENT: ear canals and TM's normal, nose and mouth without ulcers or lesions, oropharynx clear and oral mucous membranes moist  NECK: no adenopathy, no asymmetry, masses, or scars and thyroid normal to palpation  RESP: lungs clear to auscultation - no rales, rhonchi or wheezes  BREAST: No breast exam done, we discussed self breast awareness and what to be concerned about, ie; retraction of a nipple, dimpling of the skin or any nipple discharge or aerolar rash that does not clear.   CV: regular rate and rhythm, normal S1 S2, no S3 or S4, no murmur, click or rub, no peripheral edema and peripheral pulses strong  ABDOMEN: distended with an obvious fluid wave.  I do not feel any hepatosplenomegaly.  She is tender in the right upper quadrant to palpation.   (female): Exam not indicated.  She is not due for a Pap smear and she has no complaints.  MS: no musculoskeletal defects are noted and gait is age appropriate without ataxia  SKIN: Her skin is bronzed most likely from her liver disease.  She does not have obvious jaundice today.  NEURO: Normal strength and tone, sensory exam grossly normal, mentation intact and speech normal  PSYCH: mentation appears normal and affect normal/bright    Diagnostic Test Results:  Labs reviewed in Epic  Labs are being drawn today.    ASSESSMENT/PLAN:   (Z00.00) Routine general medical examination at a health care facility  (primary encounter diagnosis)  Comment: Overall Heydi says that she is doing okay but still has lots of questions about whether her liver will recover from the damage she has done to it with alcohol.  I stressed with her that if she continues to drink even occasionally that there is really little chance that her liver will recover.  It is hard to tell how damaged the liver is now but we will not know unless she gives up alcohol  completely.  Plan: She still states that it is hard for her to stop completely and will have one beer occasionally but for now she has quit but she cannot promise that she will not drink again.    (K70.31) Alcoholic cirrhosis of liver with ascites (H)  Comment: Her cirrhosis is her main problem and she continues to have recurrent ascites.  Plan: Her last paracentesis was on 9/11/2019 and she is now reaccumulated fluid within the abdomen.  She has a follow-up with Dr. Kovacs from hepatology tomorrow at 1 PM and she does promise to keep that appointment.  He had ordered blood to be drawn for that appointment so were doing it today so he can have it available for the appointment tomorrow.  Again I reinforced the fact that if she continues to drink there is really no hope for her recovery and that the best thing she can do for herself is to stay away from anything that contains alcohol including any cough or over-the-counter products and particularly alcohol itself but also including Tylenol products.  She says she is trying her best.  We do have a standing order for ultrasound with paracentesis to be done as needed for therapeutic reasons.  I look forward to seeing what Dr. Cruz input is on her current status.    (G47.09) Other insomnia  Comment: She does not sleep very well at night so she does want a refill her trazodone.  Plan: traZODone (DESYREL) 50 MG tablet        We will go ahead and send that refill to the pharmacy for her.  I did place a referral to [facility] for her so that we can try to get her medications straightened out.  She is not certain what she is really taking at home at all.  She can go days without taking her medications and then she will remember to take them for a few days.  She promises to try to be better.  She does have been set up in pill containers that she  refills every Sunday.    (Z12.31) Encounter for screening mammogram for breast cancer  Comment: She is overdue for mammogram so we did get  "this scheduled for her today.  Plan: *MA Screening Digital Bilateral        Order placed.      COUNSELING:  Reviewed preventive health counseling, as reflected in patient instructions       Healthy diet/nutrition       Immunizations    Vaccinated for: Influenza             Alcohol Use    Estimated body mass index is 24.19 kg/m  as calculated from the following:    Height as of 8/12/19: 1.549 m (5' 1\").    Weight as of this encounter: 58.1 kg (128 lb).         reports that she has been smoking.  She has a 5.00 pack-year smoking history. She has never used smokeless tobacco.  Tobacco Cessation Action Plan: Information offered: Patient not interested at this time    Counseling Resources:  ATP IV Guidelines  Pooled Cohorts Equation Calculator  Breast Cancer Risk Calculator  FRAX Risk Assessment  ICSI Preventive Guidelines  Dietary Guidelines for Americans, 2010  USDA's MyPlate  ASA Prophylaxis  Lung CA Screening    Electronically signed by:  Efren Bustos M.D.  9/17/2019    Answers for HPI/ROS submitted by the patient on 9/17/2019   Annual Exam:  If you checked off any problems, how difficult have these problems made it for you to do your work, take care of things at home, or get along with other people?: Somewhat difficult  PHQ9 TOTAL SCORE: 10    "

## 2019-09-18 NOTE — NURSING NOTE
"Chief Complaint   Patient presents with     RECHECK     alcohol induced cirrhosis       Vital signs:  Temp: 98.3  F (36.8  C) Temp src: Oral BP: 108/67 Pulse: 78   Resp: 16 SpO2: 98 %     Height: 154.9 cm (5' 1\") Weight: 58.3 kg (128 lb 9.6 oz)  Estimated body mass index is 24.3 kg/m  as calculated from the following:    Height as of this encounter: 1.549 m (5' 1\").    Weight as of this encounter: 58.3 kg (128 lb 9.6 oz).          Teri Marti, [facility]  9/18/2019 1:08 PM      "

## 2019-09-18 NOTE — LETTER
9/18/2019      RE: Monalisa Olguin  [address] [facility] [address]       SUBJECTIVE:  I had the pleasure of seeing Monalisa Olguin for followup in the Liver Clinic at the [facility] on 09/18/2019.  The patient returns for followup of alcoholic cirrhosis.  When I last saw her she had unfortunately started drinking heavily, had decompensation of her liver disease.  She has now cut back on her alcohol intake and her liver tests have improved.  She has continued to have ascites that has required a couple of large volume paracentesis since she was last seen.  She is complaining of increased abdominal girth today.  She does have discomfort related to that.  She is also complaining of some itching.  The itching is worse at night.  It is not associated with any rash.  She does complain of a mild amount of fatigue.        She has not had any gastrointestinal bleeding or any overt signs of hepatic encephalopathy.  She denies cough or SOB.  Her appetite is poor and her weight has flucuated.     Current Outpatient Medications   Medication     acamprosate (CAMPRAL) 333 MG EC tablet     acetaminophen (TYLENOL) 325 MG tablet     ARIPiprazole (ABILIFY) 5 MG tablet     busPIRone (BUSPAR) 15 MG tablet     ferrous sulfate (FEROSUL) 325 (65 Fe) MG tablet     FLUoxetine (PROZAC) 20 MG capsule     furosemide (LASIX) 40 MG tablet     gabapentin (NEURONTIN) 100 MG capsule     lactulose (CHRONULAC) 10 GM/15ML solution     omeprazole (PRILOSEC) 20 MG CR capsule     ondansetron (ZOFRAN) 4 MG tablet     phosphorus tablet 250 mg (PHOSPHA 250 NEUTRAL) 250 MG per tablet     propranolol (INDERAL) 10 MG tablet     spironolactone (ALDACTONE) 50 MG tablet     thiamine (VITAMIN B-1) 100 MG tablet     TL RICARDO RX 2.2-25-1 MG TABS     traZODone (DESYREL) 50 MG tablet     ursodiol (ACTIGALL) 500 MG tablet     alendronate (FOSAMAX) 35 MG tablet     No current facility-administered medications for this visit.   "    /67 (BP Location: Right arm, Patient Position: Sitting, Cuff Size: Adult Regular)   Pulse 78   Temp 98.3  F (36.8  C) (Oral)   Resp 16   Ht 1.549 m (5' 1\")   Wt 58.3 kg (128 lb 9.6 oz)   LMP 05/16/2012   SpO2 98%   BMI 24.30 kg/m       PHYSICAL EXAMINATION:  In general, she looks chronically ill with significant muscle wasting.  HEENT exam shows mild scleral icterus and moderate temporal muscle wasting.  Her chest is clear.  Her abdominal exam shows ascites.  No masses or tenderness to palpation.  Liver spleen not palpable.  Extremity exam shows no edema.  Neurologic exam shows no asterixis.     Recent Results (from the past 168 hour(s))   INR    Collection Time: 09/17/19 10:55 AM   Result Value Ref Range    INR 1.24 (H) 0.86 - 1.14   Hepatic panel    Collection Time: 09/17/19 10:55 AM   Result Value Ref Range    Bilirubin Direct 3.0 (H) 0.0 - 0.2 mg/dL    Bilirubin Total 4.5 (H) 0.2 - 1.3 mg/dL    Albumin 2.4 (L) 3.4 - 5.0 g/dL    Protein Total 5.3 (L) 6.8 - 8.8 g/dL    Alkaline Phosphatase 204 (H) 40 - 150 U/L    ALT 16 0 - 50 U/L    AST 39 0 - 45 U/L   Basic metabolic panel    Collection Time: 09/17/19 10:55 AM   Result Value Ref Range    Sodium 145 (H) 133 - 144 mmol/L    Potassium 3.4 3.4 - 5.3 mmol/L    Chloride 110 (H) 94 - 109 mmol/L    Carbon Dioxide 26 20 - 32 mmol/L    Anion Gap 9 3 - 14 mmol/L    Glucose 79 70 - 99 mg/dL    Urea Nitrogen 13 7 - 30 mg/dL    Creatinine 1.08 (H) 0.52 - 1.04 mg/dL    GFR Estimate 59 (L) >60 mL/min/[1.73_m2]    GFR Estimate If Black 68 >60 mL/min/[1.73_m2]    Calcium 8.2 (L) 8.5 - 10.1 mg/dL   CBC with platelets    Collection Time: 09/17/19 10:55 AM   Result Value Ref Range    WBC 4.0 4.0 - 11.0 10e9/L    RBC Count 2.60 (L) 3.8 - 5.2 10e12/L    Hemoglobin 9.8 (L) 11.7 - 15.7 g/dL    Hematocrit 30.8 (L) 35.0 - 47.0 %     (H) 78 - 100 fl    MCH 37.7 (H) 26.5 - 33.0 pg    MCHC 31.8 31.5 - 36.5 g/dL    RDW 15.4 (H) 10.0 - 15.0 %    Platelet Count 59 (L) " 150 - 450 10e9/L      IMPRESSION:  My impression is that Ms. Olguin has alcoholic cirrhosis with ascites and peripheral edema.  I will make a number of changes to try to better manage her liver disease.  First, I will discontinue propranolol as endoscopy showed numerous esophageal varices.  This could be contributing to some of her fluid retension .  She has not been taking her diuretics on a daily basis and I have recommended she do so as well as restrict her salt intake.  I put her on acamprosate 660 mg 3 times a day to try and decrease her alcohol cravings.  Finally, I have given her ursodiol 500 mg to take twice a day.  I strongly encouraged her to stop all alcohol.        Thank you very much for allowing me to participate in the care of this patient.  If you have any questions regarding my recommendations, please do not hesitate to contact me.       Edgardo Kovacs MD      Professor of Medicine  [employer]      Executive Medical Director, Solid Organ Transplant Program  [facility]    Edgardo Kovacs MD

## 2019-09-18 NOTE — LETTER
9/18/2019       RE: Monalisa Olguin  [address]  [facility] [address]     Dear Colleague,    Thank you for referring your patient, Monalisa Olguin, to the [facility] HEPATOLOGY at [facility]. Please see a copy of my visit note below.    SUBJECTIVE:  I had the pleasure of seeing Monalisa Olguin for followup in the Liver Clinic at the [facility] on 09/18/2019.  The patient returns for followup of alcoholic cirrhosis.  When I last saw her she had unfortunately started drinking heavily, had decompensation of her liver disease.  She has now cut back on her alcohol intake and her liver tests have improved.  She has continued to have ascites that has required a couple of large volume paracentesis since she was last seen.  She is complaining of increased abdominal girth today.  She does have discomfort related to that.  She is also complaining of some itching.  The itching is worse at night.  It is not associated with any rash.  She does complain of a mild amount of fatigue.        She has not had any gastrointestinal bleeding or any overt signs of hepatic encephalopathy.  She denies cough or SOB.  Her appetite is poor and her weight has flucuated.     Current Outpatient Medications   Medication     acamprosate (CAMPRAL) 333 MG EC tablet     acetaminophen (TYLENOL) 325 MG tablet     ARIPiprazole (ABILIFY) 5 MG tablet     busPIRone (BUSPAR) 15 MG tablet     ferrous sulfate (FEROSUL) 325 (65 Fe) MG tablet     FLUoxetine (PROZAC) 20 MG capsule     furosemide (LASIX) 40 MG tablet     gabapentin (NEURONTIN) 100 MG capsule     lactulose (CHRONULAC) 10 GM/15ML solution     omeprazole (PRILOSEC) 20 MG CR capsule     ondansetron (ZOFRAN) 4 MG tablet     phosphorus tablet 250 mg (PHOSPHA 250 NEUTRAL) 250 MG per tablet     propranolol (INDERAL) 10 MG tablet     spironolactone (ALDACTONE) 50 MG tablet     thiamine (VITAMIN B-1) 100 MG tablet     TL RICARDO RX  "2.2-25-1 MG TABS     traZODone (DESYREL) 50 MG tablet     ursodiol (ACTIGALL) 500 MG tablet     alendronate (FOSAMAX) 35 MG tablet     No current facility-administered medications for this visit.      /67 (BP Location: Right arm, Patient Position: Sitting, Cuff Size: Adult Regular)   Pulse 78   Temp 98.3  F (36.8  C) (Oral)   Resp 16   Ht 1.549 m (5' 1\")   Wt 58.3 kg (128 lb 9.6 oz)   LMP 05/16/2012   SpO2 98%   BMI 24.30 kg/m       PHYSICAL EXAMINATION:  In general, she looks chronically ill with significant muscle wasting.  HEENT exam shows mild scleral icterus and moderate temporal muscle wasting.  Her chest is clear.  Her abdominal exam shows ascites.  No masses or tenderness to palpation.  Liver spleen not palpable.  Extremity exam shows no edema.  Neurologic exam shows no asterixis.     Recent Results (from the past 168 hour(s))   INR    Collection Time: 09/17/19 10:55 AM   Result Value Ref Range    INR 1.24 (H) 0.86 - 1.14   Hepatic panel    Collection Time: 09/17/19 10:55 AM   Result Value Ref Range    Bilirubin Direct 3.0 (H) 0.0 - 0.2 mg/dL    Bilirubin Total 4.5 (H) 0.2 - 1.3 mg/dL    Albumin 2.4 (L) 3.4 - 5.0 g/dL    Protein Total 5.3 (L) 6.8 - 8.8 g/dL    Alkaline Phosphatase 204 (H) 40 - 150 U/L    ALT 16 0 - 50 U/L    AST 39 0 - 45 U/L   Basic metabolic panel    Collection Time: 09/17/19 10:55 AM   Result Value Ref Range    Sodium 145 (H) 133 - 144 mmol/L    Potassium 3.4 3.4 - 5.3 mmol/L    Chloride 110 (H) 94 - 109 mmol/L    Carbon Dioxide 26 20 - 32 mmol/L    Anion Gap 9 3 - 14 mmol/L    Glucose 79 70 - 99 mg/dL    Urea Nitrogen 13 7 - 30 mg/dL    Creatinine 1.08 (H) 0.52 - 1.04 mg/dL    GFR Estimate 59 (L) >60 mL/min/[1.73_m2]    GFR Estimate If Black 68 >60 mL/min/[1.73_m2]    Calcium 8.2 (L) 8.5 - 10.1 mg/dL   CBC with platelets    Collection Time: 09/17/19 10:55 AM   Result Value Ref Range    WBC 4.0 4.0 - 11.0 10e9/L    RBC Count 2.60 (L) 3.8 - 5.2 10e12/L    Hemoglobin 9.8 (L) " 11.7 - 15.7 g/dL    Hematocrit 30.8 (L) 35.0 - 47.0 %     (H) 78 - 100 fl    MCH 37.7 (H) 26.5 - 33.0 pg    MCHC 31.8 31.5 - 36.5 g/dL    RDW 15.4 (H) 10.0 - 15.0 %    Platelet Count 59 (L) 150 - 450 10e9/L      IMPRESSION:  My impression is that Ms. Olguin has alcoholic cirrhosis with ascites and peripheral edema.  I will make a number of changes to try to better manage her liver disease.  First, I will discontinue propranolol as endoscopy showed numerous esophageal varices.  This could be contributing to some of her fluid retension .  She has not been taking her diuretics on a daily basis and I have recommended she do so as well as restrict her salt intake.  I put her on acamprosate 660 mg 3 times a day to try and decrease her alcohol cravings.  Finally, I have given her ursodiol 500 mg to take twice a day.  I strongly encouraged her to stop all alcohol.        Thank you very much for allowing me to participate in the care of this patient.  If you have any questions regarding my recommendations, please do not hesitate to contact me.       Edgardo Kovacs MD      Professor of Medicine  [employer]      Executive Medical Director, Solid Organ Transplant Program  [facility]    Again, thank you for allowing me to participate in the care of your patient.      Sincerely,    Edgardo Kovacs MD

## 2019-09-18 NOTE — PROGRESS NOTES
SUBJECTIVE:  I had the pleasure of seeing Monalisa Olguin for followup in the Liver Clinic at the [facility] on 09/18/2019.  The patient returns for followup of alcoholic cirrhosis.  When I last saw her she had unfortunately started drinking heavily, had decompensation of her liver disease.  She has now cut back on her alcohol intake and her liver tests have improved.  She has continued to have ascites that has required a couple of large volume paracentesis since she was last seen.  She is complaining of increased abdominal girth today.  She does have discomfort related to that.  She is also complaining of some itching.  The itching is worse at night.  It is not associated with any rash.  She does complain of a mild amount of fatigue.        She has not had any gastrointestinal bleeding or any overt signs of hepatic encephalopathy.  She denies cough or SOB.  Her appetite is poor and her weight has flucuated.     Current Outpatient Medications   Medication     acamprosate (CAMPRAL) 333 MG EC tablet     acetaminophen (TYLENOL) 325 MG tablet     ARIPiprazole (ABILIFY) 5 MG tablet     busPIRone (BUSPAR) 15 MG tablet     ferrous sulfate (FEROSUL) 325 (65 Fe) MG tablet     FLUoxetine (PROZAC) 20 MG capsule     furosemide (LASIX) 40 MG tablet     gabapentin (NEURONTIN) 100 MG capsule     lactulose (CHRONULAC) 10 GM/15ML solution     omeprazole (PRILOSEC) 20 MG CR capsule     ondansetron (ZOFRAN) 4 MG tablet     phosphorus tablet 250 mg (PHOSPHA 250 NEUTRAL) 250 MG per tablet     propranolol (INDERAL) 10 MG tablet     spironolactone (ALDACTONE) 50 MG tablet     thiamine (VITAMIN B-1) 100 MG tablet     TL RICARDO RX 2.2-25-1 MG TABS     traZODone (DESYREL) 50 MG tablet     ursodiol (ACTIGALL) 500 MG tablet     alendronate (FOSAMAX) 35 MG tablet     No current facility-administered medications for this visit.      /67 (BP Location: Right arm, Patient Position: Sitting, Cuff Size: Adult Regular)   " Pulse 78   Temp 98.3  F (36.8  C) (Oral)   Resp 16   Ht 1.549 m (5' 1\")   Wt 58.3 kg (128 lb 9.6 oz)   LMP 05/16/2012   SpO2 98%   BMI 24.30 kg/m      PHYSICAL EXAMINATION:  In general, she looks chronically ill with significant muscle wasting.  HEENT exam shows mild scleral icterus and moderate temporal muscle wasting.  Her chest is clear.  Her abdominal exam shows ascites.  No masses or tenderness to palpation.  Liver spleen not palpable.  Extremity exam shows no edema.  Neurologic exam shows no asterixis.     Recent Results (from the past 168 hour(s))   INR    Collection Time: 09/17/19 10:55 AM   Result Value Ref Range    INR 1.24 (H) 0.86 - 1.14   Hepatic panel    Collection Time: 09/17/19 10:55 AM   Result Value Ref Range    Bilirubin Direct 3.0 (H) 0.0 - 0.2 mg/dL    Bilirubin Total 4.5 (H) 0.2 - 1.3 mg/dL    Albumin 2.4 (L) 3.4 - 5.0 g/dL    Protein Total 5.3 (L) 6.8 - 8.8 g/dL    Alkaline Phosphatase 204 (H) 40 - 150 U/L    ALT 16 0 - 50 U/L    AST 39 0 - 45 U/L   Basic metabolic panel    Collection Time: 09/17/19 10:55 AM   Result Value Ref Range    Sodium 145 (H) 133 - 144 mmol/L    Potassium 3.4 3.4 - 5.3 mmol/L    Chloride 110 (H) 94 - 109 mmol/L    Carbon Dioxide 26 20 - 32 mmol/L    Anion Gap 9 3 - 14 mmol/L    Glucose 79 70 - 99 mg/dL    Urea Nitrogen 13 7 - 30 mg/dL    Creatinine 1.08 (H) 0.52 - 1.04 mg/dL    GFR Estimate 59 (L) >60 mL/min/[1.73_m2]    GFR Estimate If Black 68 >60 mL/min/[1.73_m2]    Calcium 8.2 (L) 8.5 - 10.1 mg/dL   CBC with platelets    Collection Time: 09/17/19 10:55 AM   Result Value Ref Range    WBC 4.0 4.0 - 11.0 10e9/L    RBC Count 2.60 (L) 3.8 - 5.2 10e12/L    Hemoglobin 9.8 (L) 11.7 - 15.7 g/dL    Hematocrit 30.8 (L) 35.0 - 47.0 %     (H) 78 - 100 fl    MCH 37.7 (H) 26.5 - 33.0 pg    MCHC 31.8 31.5 - 36.5 g/dL    RDW 15.4 (H) 10.0 - 15.0 %    Platelet Count 59 (L) 150 - 450 10e9/L      IMPRESSION:  My impression is that Ms. Olguin has alcoholic cirrhosis " with ascites and peripheral edema.  I will make a number of changes to try to better manage her liver disease.  First, I will discontinue propranolol as endoscopy showed numerous esophageal varices.  This could be contributing to some of her fluid retension .  She has not been taking her diuretics on a daily basis and I have recommended she do so as well as restrict her salt intake.  I put her on acamprosate 660 mg 3 times a day to try and decrease her alcohol cravings.  Finally, I have given her ursodiol 500 mg to take twice a day.  I strongly encouraged her to stop all alcohol.        Thank you very much for allowing me to participate in the care of this patient.  If you have any questions regarding my recommendations, please do not hesitate to contact me.       Edgardo Kovacs MD      Professor of Medicine  [employer]      Executive Medical Director, Solid Organ Transplant Program  [facility]

## 2019-09-19 NOTE — TELEPHONE ENCOUNTER
MTM referral from: [facility] visit (referral by provider)    MTM referral outreach attempt #2 on September 19, 2019 at 1:06 PM      Outcome: Patient not reachable after several attempts, will route to MTM Pharmacist/Provider as an FYI. Thank you for the referral.    Minda Bah MTM Coordinator

## 2019-09-23 NOTE — PROGRESS NOTES
Spoke with Dr. Renee (radiologist).  Discussed vitals and patient update.  Dr. Renee ok with patient discharge to home.

## 2019-10-12 NOTE — TELEPHONE ENCOUNTER
"Patient calling requesting to know her medication list as she will be flying and would like a list of current medications and doses.  Reviewed per Epic current medication list.    Caller verbalized understanding. Denies further questions.    Camryn Pelayo RN  [facility]      Reason for Disposition    Caller has medication question only, adult not sick, and triager answers question    Additional Information    Negative: Drug overdose and nurse unable to answer question    Negative: Caller requesting information not related to medicine    Negative: Caller requesting a prescription for Strep throat and has a positive culture result    Negative: Rash while taking a medication or within 3 days of stopping it    Negative: Immunization reaction suspected    Negative: [1] Asthma and [2] having symptoms of asthma (cough, wheezing, etc)    Negative: MORE THAN A DOUBLE DOSE of a prescription or over-the-counter (OTC) drug    Negative: [1] DOUBLE DOSE (an extra dose or lesser amount) of over-the-counter (OTC) drug AND [2] any symptoms (e.g., dizziness, nausea, pain, sleepiness)    Negative: [1] DOUBLE DOSE (an extra dose or lesser amount) of prescription drug AND [2] any symptoms (e.g., dizziness, nausea, pain, sleepiness)    Negative: Took another person's prescription drug    Negative: [1] DOUBLE DOSE (an extra dose or lesser amount) of prescription drug AND [2] NO symptoms (Exception: a double dose of antibiotics)    Negative: Diabetes drug error or overdose (e.g., insulin or extra dose)    Negative: [1] Request for URGENT new prescription or refill of \"essential\" medication (i.e., likelihood of harm to patient if not taken) AND [2] triager unable to fill per unit policy    Negative: [1] Prescription not at pharmacy AND [2] was prescribed today by PCP    Negative: Pharmacy calling with prescription questions and triager unable to answer question    Negative: Caller has URGENT medication question about med that " PCP prescribed and triager unable to answer question    Negative: Caller has NON-URGENT medication question about med that PCP prescribed and triager unable to answer question    Negative: Caller requesting a NON-URGENT new prescription or refill and triager unable to refill per unit policy    Negative: Caller has medication question about med not prescribed by PCP and triager unable to answer question (e.g., compatibility with other med, storage)    Negative: [1] DOUBLE DOSE (an extra dose or lesser amount) of over-the-counter (OTC) drug AND [2] NO symptoms    Negative: [1] DOUBLE DOSE (an extra dose or lesser amount) of antibiotic drug AND [2] NO symptoms    Protocols used: MEDICATION QUESTION CALL-A-AH

## 2019-10-23 NOTE — PROGRESS NOTES
Pt had low blood pressures following procedure, Dr. Oseguera notified and ordered 25G of albumin and a hemoglobin/hematocrit.  Pt's blood pressure did get better and hemoglobin/hematocrit stable compared to previous lab values.  Dr. Oseguera notified at 1400 of pt's progress and said pt was okay to leave.   Pt discharged at 1415.

## 2019-10-24 NOTE — TELEPHONE ENCOUNTER
Abilify  Last office visit: 9/17/2019 with prescribing provider:  Juli   Future Office Visit:  None  Routing refill request to provider for review/approval because:  Drug not active on patient's medication list    Requested Prescriptions   Pending Prescriptions Disp Refills     ARIPiprazole (ABILIFY) 5 MG tablet       Sig: Take 1 tablet (5 mg) by mouth daily       Antipsychotic Medications Failed - 10/24/2019  3:32 PM        Failed - Lipid panel on file within the past 12 months     Recent Labs   Lab Test 05/23/17  1610  08/26/13  1037   CHOL 178   < > 170   TRIG 59   < > 104   HDL 71   < > 54   LDL 95   < > 96   NHDL 107   < >  --    VLDL  --   --  21   CHOLHDLRATIO  --   --  3.0    < > = values in this interval not displayed.           Passed - Blood pressure under 140/90 in past 12 months     BP Readings from Last 3 Encounters:   10/23/19 (!) 88/37   10/10/19 93/50   10/03/19 92/57           Passed - Patient is 12 years of age or older        Passed - CBC on file in past 12 months     Recent Labs   Lab Test 10/23/19  1258 10/23/19  0859  09/17/19  1055   WBC  --   --   --  4.0   RBC  --   --   --  2.60*   HGB 9.8*  --   --  9.8*   HCT 28.9*  --   --  30.8*   PLT  --  57*   < > 59*    < > = values in this interval not displayed.           Passed - Heart Rate on file within past 12 months     Pulse Readings from Last 3 Encounters:   10/23/19 94   10/10/19 96   10/03/19 91           Passed - A1c or Glucose on file in past 12 months     Recent Labs   Lab Test 09/17/19  1055  09/24/17  0630   GLC 79   < > 201*   A1C  --   --  Canceled, Test credited    < > = values in this interval not displayed.     Please review patients last 3 weights. If a weight gain of >10 lbs exists, you may refill the prescription once after instructing the patient to schedule an appointment within the next 30 days.  Wt Readings from Last 3 Encounters:   09/18/19 58.3 kg (128 lb 9.6 oz)   09/17/19 58.1 kg (128 lb)   08/12/19 64 kg (141 lb  "3.2 oz)           Passed - Medication is active on med list        Passed - Patient is not pregnant        Passed - No positve pregnancy test on file in past 12 months        Passed - Recent (6 mo) or future (30 days) visit within the authorizing provider's specialty     Patient had office visit in the last 6 months or has a visit in the next 30 days with authorizing provider or within the authorizing provider's specialty.  See \"Patient Info\" tab in inbasket, or \"Choose Columns\" in Meds & Orders section of the refill encounter.        Kenyetta Sinclair RN   "

## 2019-11-01 NOTE — TELEPHONE ENCOUNTER
Central Prior Authorization Team   Phone: [phone]    Prior Authorization Not Needed per Insurance - verified w/ pharmacy they were able to process rx prior to me calling (see screenshot)        Medication: Xifaxan 550 mg tab  Insurance Company: [employer] - Phone [phone] Fax [phone]  Expected CoPay:  zero   Pharmacy Filling the Rx: [facility] [id number]

## 2019-11-01 NOTE — TELEPHONE ENCOUNTER
Prior Authorization Retail Medication Request    Medication/Dose: Xifaxan 550 mg tab  ICD code (if different than what is on RX):  K72.9 Hepatic encephalopathy    Previously Tried and Failed:  Lactulose alone    Rationale:  Xifaxan helps to lower the toxin build up in the blood while lactulose works by cleansing the toxin build up in the liver. Adjunctive therapy.      Insurance Name:    Insurance ID:        Pharmacy Information (if different than what is on RX)  Name:    Phone:

## 2019-11-04 NOTE — TELEPHONE ENCOUNTER
"Ferrous Sulfate  Last Written Prescription Date:  03/22/2019  Last Fill Quantity: 60,  # refills: 0   Last office visit: 9/17/2019 with prescribing provider:  Juli   Future Office Visit:  None  Prescription approved per [facility] Refill Protocol.    Requested Prescriptions   Pending Prescriptions Disp Refills     Ferrous Sulfate 324 (65 Fe) MG TBEC [Pharmacy Med Name: FERROUS SULFATE 324 (65 FE)MG TBEC] 60 tablet 0     Sig: TAKE ONE TABLET BY MOUTH TWICE A DAY       Iron Supplements Passed - 11/3/2019 10:09 AM        Passed - Patient is 12 years of age or older        Passed - Recent (12 mo) or future (30 days) visit within the authorizing provider's specialty     Patient has had an office visit with the authorizing provider or a provider within the authorizing providers department within the previous 12 mos or has a future within next 30 days. See \"Patient Info\" tab in inbasket, or \"Choose Columns\" in Meds & Orders section of the refill encounter.            Passed - Hgb OR Hct on record within the past 12 mos.     Patient need only have had a HGB or HCT on file in the past 12 mos. That result does not need to be normal.  Recent Labs   Lab Test 10/23/19  1258 09/17/19  1055 08/12/19  1214   HGB 9.8* 9.8* 10.1*     Recent Labs   Lab Test 10/23/19  1258 09/17/19  1055 08/12/19  1214   HCT 28.9* 30.8* 32.3*     Please verify a HGB or HCT has been checked SINCE THE LAST DOSE CHANGE.        Passed - Medication is active on med list      Kenyetta Sinclair RN   "

## 2019-11-06 NOTE — TELEPHONE ENCOUNTER
Prescription was sent 11/4/19 for #60 with 5 refills.  Pharmacy notified via E-Prescribe refusal.     Olivia Clark RN on 11/6/2019 at 3:37 PM

## 2019-11-06 NOTE — TELEPHONE ENCOUNTER
"Requested Prescriptions   Pending Prescriptions Disp Refills     Ferrous Sulfate 324 (65 Fe) MG TBEC 60 tablet 5     Sig: Take 1 tablet (324 mg) by mouth 2 times daily   Last Written Prescription Date:  11/4/19  Last Fill Quantity: 60,  # refills: 5   Last office visit: 9/17/2019 with prescribing provider:  Juli   Future Office Visit:        Iron Supplements Passed - 11/6/2019  3:29 PM        Passed - Patient is 12 years of age or older        Passed - Recent (12 mo) or future (30 days) visit within the authorizing provider's specialty     Patient has had an office visit with the authorizing provider or a provider within the authorizing providers department within the previous 12 mos or has a future within next 30 days. See \"Patient Info\" tab in inbasket, or \"Choose Columns\" in Meds & Orders section of the refill encounter.              Passed - Hgb OR Hct on record within the past 12 mos.     Patient need only have had a HGB or HCT on file in the past 12 mos. That result does not need to be normal.    Recent Labs   Lab Test 10/23/19  1258 09/17/19  1055 08/12/19  1214   HGB 9.8* 9.8* 10.1*     Recent Labs   Lab Test 10/23/19  1258 09/17/19  1055 08/12/19  1214   HCT 28.9* 30.8* 32.3*       Please verify a HGB or HCT has been checked SINCE THE LAST DOSE CHANGE.            Passed - Medication is active on med list          "

## 2019-11-09 NOTE — TELEPHONE ENCOUNTER
"  Reason for Disposition    Patient sounds very sick or weak to the triager    Additional Information    Negative: [1] SEVERE pain (e.g., excruciating) AND [2] present > 1 hour    Negative: [1] SEVERE pain AND [2] age > 60    Negative: [1] Vomiting AND [2] contains red blood or black (\"coffee ground\") material  (Exception: few red streaks in vomit that only happened once)    Negative: Blood in bowel movements   (Exception: blood on surface of BM with constipation)    Negative: Black or tarry bowel movements  (Exception: chronic-unchanged  black-grey bowel movements AND is taking iron pills or Pepto-bismol)    Protocols used: ABDOMINAL PAIN - FEMALE-A-AH    "

## 2019-11-09 NOTE — TELEPHONE ENCOUNTER
"\"I am sick, weak, tired, dizzy. I have abdominal pain. I have no appetite, I haven't eaten for two days, I slept.\" Patient was slurring words during triage. I asked if she had been drinking she said \"no\". During triage almost every question asked she replied yes. I advised ER. Patient refuses and says \"make me better.\" I offered ER or appt with PCP. She prefers appt. Transferred to scheduling for appt. Call back if needed.  Myriam Worrell RN [facility]      "

## 2019-11-10 NOTE — LETTER
Transition Communication Hand-off for Care Transitions to Next Level of Care Provider    Name: Monalisa Olguin  : 1965  MRN #: [id number]  Primary Care Provider: Efren Bustos MD     Primary Clinic: [address] [place] [address] [address] [place] [address]     Reason for Hospitalization:  Hepatic encephalopathy (H) [K72.90]  Acute kidney injury (H) [N17.9]  Alcoholic cirrhosis of liver with ascites (H) [K70.31]  Admit Date/Time: 11/10/2019 10:19 PM  Discharge Date: 19  Payor Source: Payor: [employer] / Plan: [employer] OPEN ACCESS / Product Type: HMO /     Readmission Assessment Measure (MERCEDEZ) Risk Score/category: Average          Reason for Communication Hand-off Referral: Fragility  Difficulty understanding plan of care    Discharge Plan:  Discharge Plan:      Most Recent Value   Disposition Comments  Going to in [facility] in [place]            Concern for non-adherence with plan of care:   Y/N Yes- patient has cognitive deficits   Discharge Needs Assessment:  Needs      Most Recent Value   Equipment Currently Used at Home  grab bar, tub/shower   # of Referrals Placed by [facility]  Internal Clinic Care Coordination   Home Care  Other (Comment) [Rosibel Stallworth [facility] in [place] ]          Already enrolled in Tele-monitoring program and name of program:  no   Follow-up specialty is recommended: Yes    Follow-up plan:    Future Appointments   Date Time Provider Department Center   2019  4:00 PM PHXR2 PHXRAY [facility]   2019  1:00 PM  LAB [facility]   2019  2:00 PM Edgardo Kovacs MD [facility]       Any outstanding tests or procedures:        Radiology & Cardiology Orders     Future Labs/Procedures Complete By Expires    US Paracentesis  2019 (Approximate) 2019        Referrals     Future Labs/Procedures    Occupational Therapy Adult Consult     Comments:    Evaluate and treat as clinically indicated.    Reason:  Hepatic encephalopathy    Physical Therapy Adult Consult      Comments:    Evaluate and treat as clinically indicated.    Reason: deconditioning            Key Recommendations:  Attend any follow up appointments.     CINDY CARRASQUILLO    AVS/Discharge Summary is the source of truth; this is a helpful guide for improved communication of patient story

## 2019-11-11 PROBLEM — I95.9 HYPOTENSION, UNSPECIFIED HYPOTENSION TYPE: Status: ACTIVE | Noted: 2019-01-01

## 2019-11-11 PROBLEM — D53.9 MACROCYTIC ANEMIA: Status: ACTIVE | Noted: 2019-01-01

## 2019-11-11 PROBLEM — K92.2 GI BLEED: Status: ACTIVE | Noted: 2019-01-01

## 2019-11-11 PROBLEM — K76.82 HEPATIC ENCEPHALOPATHY (H): Status: ACTIVE | Noted: 2019-01-01

## 2019-11-11 PROBLEM — K92.1 HEMATOCHEZIA: Status: ACTIVE | Noted: 2019-01-01

## 2019-11-11 PROBLEM — E87.1 HYPONATREMIA: Status: ACTIVE | Noted: 2019-01-01

## 2019-11-11 PROBLEM — N17.9 AKI (ACUTE KIDNEY INJURY) (H): Status: ACTIVE | Noted: 2019-01-01

## 2019-11-11 PROBLEM — Z90.5 SOLITARY KIDNEY, ACQUIRED: Status: ACTIVE | Noted: 2019-01-01

## 2019-11-11 NOTE — ASSESSMENT & PLAN NOTE
Currently sober, being followed by hepatology at the [facility]  M ELD score currently 18  Receiving paracentesis every 2 to 3 weeks, last one was November 1 having removed 5200 cc  Hold diuretics for now, continue rifaximin and reinitiate lactulose

## 2019-11-11 NOTE — ED NOTES
ED Nursing criteria listed below was addressed during verbal handoff:     Abnormal vitals: Yes  Abnormal results: Yes  Med Reconciliation completed: No  Meds given in ED: Yes  Any Overdue Meds: No  Core Measures: N/A  Isolation: N/A  Special needs: No  Skin assessment: Yes    Observation Patient  Education provided: Yes

## 2019-11-11 NOTE — ED TRIAGE NOTES
Patient with generalized weakness, slept most of the day. Does report sudden bursts of energy as well though. Patient jaundice. Denies any alcohol use for the past 6 months. Has been vomiting today.

## 2019-11-11 NOTE — PROGRESS NOTES
Pts.daughter Connie called to get an update on pt.Heydi was asked if we could give her daughter information on her and she okayed it.

## 2019-11-11 NOTE — ED PROVIDER NOTES
"  History     Chief Complaint   Patient presents with     Generalized Weakness     HPI  Monalisa Olguin is a 54 year old female who presents with concerns of not eating or drinking and some episodes of confusion.  Patient has known end-stage liver disease and is being followed by GI at the [facility].  Patient is currently getting paracentesis is done every 2 to 3 weeks.  Patient states over the last 2 days she has been unable to eat or drink much because she gets very nauseous and then she throws up.  Patient denies any fevers or chills.  Denies any significant abdominal pain.  Patient denies any dysuria or hematuria.  Bowel movements have been normal.  Patient denies any blood in her stools or vomit.  Patient states that she does take lactulose but only takes it intermittently, the last time she took some was about a week ago.    Allergies:  Allergies   Allergen Reactions     Albuterol      Tongue \"hardened and painful\"       Problem List:    Patient Active Problem List    Diagnosis Date Noted     Other pancytopenia (H) 07/11/2019     Priority: Medium     Other iron deficiency anemia 04/11/2018     Priority: Medium     Alcoholic cirrhosis of liver without ascites (H) - per Hepatology consult Nov 2017 03/28/2018     Priority: Medium     Splenomegaly 03/28/2018     Priority: Medium     Epistaxis 03/28/2018     Priority: Medium     Acute alcoholic intoxication in alcoholism (H) 03/27/2018     Priority: Medium     Heme positive stool 03/27/2018     Priority: Medium     Alcoholic hepatitis with ascites 03/26/2017     Priority: Medium     Chronic blood loss anemia 03/26/2017     Priority: Medium     Thrombocytopenia (H) 03/26/2017     Priority: Medium     Tobacco abuse 03/26/2017     Priority: Medium     Portal hypertension (H) 03/26/2017     Priority: Medium     Pulmonary nodules 03/26/2017     Priority: Medium     Hyperthyroidism 03/26/2017     Priority: Medium     Alcoholism in recovery (H) 03/17/2016     Priority: " Medium     Anxiety 10/10/2011     Priority: Medium     Hypertension goal BP (blood pressure) < 130/80 2011     Priority: Medium     HYPERLIPIDEMIA LDL GOAL <100 10/31/2010     Priority: Medium     Moderate Depression [296.32] 2009     Priority: Medium     Esophageal reflux 10/07/2002     Priority: Medium     Kidney donor 2001     Priority: Medium        Past Medical History:    Past Medical History:   Diagnosis Date     Alcohol abuse 2013     Alcohol dependence 2013     Alcohol withdrawal 2013     Anxiety 10/10/2011     CKD (chronic kidney disease) stage 3, GFR 30-59 ml/min (H)      CKD (chronic kidney disease) stage 3, GFR 30-59 ml/min (H) 2011     Depressive disorder      Esophageal reflux 10/7/2002     Hypertension goal BP (blood pressure) < 130/80 2011     Moderate Depression [296.32] 2009     Other internal derangement of knee(717.89)      Pap smear      Unspecified essential hypertension        Past Surgical History:    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     COLONOSCOPY N/A 2017    Procedure: COLONOSCOPY;  Colonoscopy;  Surgeon: Sai Johnston MD;  Location: [facility]     ESOPHAGOSCOPY, GASTROSCOPY, DUODENOSCOPY (EGD), COMBINED N/A 2017    Procedure: COMBINED ESOPHAGOSCOPY, GASTROSCOPY, DUODENOSCOPY (EGD), BIOPSY SINGLE OR MULTIPLE;  ESOPHAGOSCOPY, GASTROSCOPY, DUODENOSCOPY (EGD) with biopsies;  Surgeon: Sai Johnston MD;  Location: [facility]     ESOPHAGOSCOPY, GASTROSCOPY, DUODENOSCOPY (EGD), COMBINED N/A 2019    Procedure: ESOPHAGOGASTRODUODENOSCOPY, WITH BIOPSY;  Surgeon: Edgardo Scott DO;  Location: [facility]     HC KNEE SCOPE, DIAGNOSTIC  01    Arthroscopy, Lt Knee     LAPAROSCOPIC CHOLECYSTECTOMY N/A 2017    Procedure: LAPAROSCOPIC CHOLECYSTECTOMY;  laparoscopic  cholecystectomy;  Surgeon: Romeo Pastor MD;  Location: [facility]     OPEN REDUCTION INTERNAL FIXATION WRIST Right 11/29/2017    Procedure: OPEN REDUCTION INTERNAL FIXATION WRIST;  OPEN REDUCTION INTERNAL FIXATION RIGHT WRIST;  Surgeon: Edgardo Almendarez MD;  Location: [facility]       Family History:    Family History   Problem Relation Age of Onset     Hypertension Mother         80 years old     Heart Disease Paternal Grandmother      Heart Disease Paternal Grandfather      Cerebrovascular Disease Maternal Grandmother      Cerebrovascular Disease Maternal Grandfather      Alcohol/Drug Maternal Grandfather      Substance Abuse Maternal Grandfather      Heart Disease Father         Silent MI stents x 2     Diabetes Sister 17        older sister also had kidney and pancreas transplant     Heart Disease Sister         MI secondary to diabetes     Genitourinary Problems Sister 43        kidney transplant from renal failure     Other - See Comments Sister         older     Other - See Comments Sister         younger     Diabetes Sister 9        youngest, juvenile type I (onset age 9 with no complications)     Cancer Paternal Aunt         ?kind       Social History:  Marital Status:   [2]  Social History     Tobacco Use     Smoking status: Current Every Day Smoker     Packs/day: 0.50     Years: 10.00     Pack years: 5.00     Smokeless tobacco: Never Used     Tobacco comment: pt quit 1992 after smoking for 8 yrs, started again in 2004   Substance Use Topics     Alcohol use: Not Currently     Alcohol/week: 0.0 standard drinks     Comment: 2 beers per day     Drug use: No        Medications:    acamprosate (CAMPRAL) 333 MG EC tablet  acetaminophen (TYLENOL) 325 MG tablet  alendronate (FOSAMAX) 35 MG tablet  ARIPiprazole (ABILIFY) 5 MG tablet  busPIRone (BUSPAR) 15 MG tablet  Ferrous Sulfate 324 (65 Fe) MG TBEC  FLUoxetine (PROZAC) 20 MG capsule  furosemide (LASIX) 40 MG tablet  gabapentin (NEURONTIN) 100 MG  capsule  lactulose (CHRONULAC) 10 GM/15ML solution  omeprazole (PRILOSEC) 20 MG CR capsule  ondansetron (ZOFRAN) 4 MG tablet  phosphorus tablet 250 mg (PHOSPHA 250 NEUTRAL) 250 MG per tablet  propranolol (INDERAL) 10 MG tablet  rifaximin (XIFAXAN) 550 MG TABS tablet  spironolactone (ALDACTONE) 50 MG tablet  thiamine (VITAMIN B-1) 100 MG tablet  traZODone (DESYREL) 50 MG tablet  ursodiol (ACTIGALL) 500 MG tablet          Review of Systems   All other systems reviewed and are negative.      Physical Exam   BP: 92/48  Pulse: 79  Heart Rate: 79  Temp: 98.9  F (37.2  C)  Resp: 18  Weight: 56.7 kg (125 lb)  SpO2: 98 %      Physical Exam  Vitals signs and nursing note reviewed.   Constitutional:       General: She is not in acute distress.     Appearance: She is well-developed. She is not diaphoretic.      Comments: Patient is very jaundiced appearing    HENT:      Head: Normocephalic and atraumatic.      Nose: Nose normal.      Mouth/Throat:      Mouth: Mucous membranes are dry.      Pharynx: No oropharyngeal exudate.   Eyes:      General: Scleral icterus present.      Conjunctiva/sclera: Conjunctivae normal.   Neck:      Musculoskeletal: Normal range of motion and neck supple.   Cardiovascular:      Rate and Rhythm: Normal rate and regular rhythm.      Heart sounds: Normal heart sounds. No murmur. No friction rub.   Pulmonary:      Effort: Pulmonary effort is normal. No respiratory distress.      Breath sounds: Normal breath sounds. No stridor. No wheezing or rales.   Abdominal:      General: Bowel sounds are normal. There is distension.      Palpations: Abdomen is soft. There is fluid wave and hepatomegaly. There is no mass.      Tenderness: There is no tenderness. There is no guarding.   Musculoskeletal: Normal range of motion.         General: No tenderness.   Skin:     General: Skin is warm and dry.      Capillary Refill: Capillary refill takes less than 2 seconds.      Findings: No erythema.      Comments: Mendez  skin   Neurological:      Mental Status: She is alert and oriented to person, place, and time.   Psychiatric:         Judgement: Judgment normal.         ED Course        Procedures               Results for orders placed or performed during the hospital encounter of 11/10/19 (from the past 24 hour(s))   CBC with platelets differential   Result Value Ref Range    WBC 7.0 4.0 - 11.0 10e9/L    RBC Count 2.70 (L) 3.8 - 5.2 10e12/L    Hemoglobin 9.7 (L) 11.7 - 15.7 g/dL    Hematocrit 29.2 (L) 35.0 - 47.0 %     (H) 78 - 100 fl    MCH 35.9 (H) 26.5 - 33.0 pg    MCHC 33.2 31.5 - 36.5 g/dL    RDW 17.0 (H) 10.0 - 15.0 %    Platelet Count 73 (L) 150 - 450 10e9/L    Diff Method Automated Method     % Neutrophils 70.1 %    % Lymphocytes 18.8 %    % Monocytes 9.3 %    % Eosinophils 0.4 %    % Basophils 0.7 %    % Immature Granulocytes 0.7 %    Nucleated RBCs 0 0 /100    Absolute Neutrophil 4.9 1.6 - 8.3 10e9/L    Absolute Lymphocytes 1.3 0.8 - 5.3 10e9/L    Absolute Monocytes 0.7 0.0 - 1.3 10e9/L    Absolute Basophils 0.1 0.0 - 0.2 10e9/L    Abs Immature Granulocytes 0.1 0 - 0.4 10e9/L    Absolute Nucleated RBC 0.0    Basic metabolic panel   Result Value Ref Range    Sodium 131 (L) 133 - 144 mmol/L    Potassium 4.0 3.4 - 5.3 mmol/L    Chloride 96 94 - 109 mmol/L    Carbon Dioxide 24 20 - 32 mmol/L    Anion Gap 11 3 - 14 mmol/L    Glucose 100 (H) 70 - 99 mg/dL    Urea Nitrogen 44 (H) 7 - 30 mg/dL    Creatinine 2.63 (H) 0.52 - 1.04 mg/dL    GFR Estimate 20 (L) >60 mL/min/[1.73_m2]    GFR Estimate If Black 23 (L) >60 mL/min/[1.73_m2]    Calcium 8.4 (L) 8.5 - 10.1 mg/dL   Hepatic panel   Result Value Ref Range    Bilirubin Direct 7.4 (H) 0.0 - 0.2 mg/dL    Bilirubin Total 9.2 (H) 0.2 - 1.3 mg/dL    Albumin 2.1 (L) 3.4 - 5.0 g/dL    Protein Total 5.1 (L) 6.8 - 8.8 g/dL    Alkaline Phosphatase 202 (H) 40 - 150 U/L    ALT 23 0 - 50 U/L    AST 54 (H) 0 - 45 U/L   Lipase   Result Value Ref Range    Lipase 57 (L) 73 - 393 U/L    Ammonia   Result Value Ref Range    Ammonia 56 (H) 10 - 50 umol/L   Blood gas venous   Result Value Ref Range    Ph Venous 7.42 7.32 - 7.43 pH    PCO2 Venous 37 (L) 40 - 50 mm Hg    PO2 Venous 44 25 - 47 mm Hg    Bicarbonate Venous 24 21 - 28 mmol/L    Base Deficit Venous 0.3 mmol/L    FIO2 21    INR   Result Value Ref Range    INR 1.18 (H) 0.86 - 1.14   Partial thromboplastin time   Result Value Ref Range    PTT 27 22 - 37 sec   Alcohol ethyl   Result Value Ref Range    Ethanol g/dL <0.01 <0.01 g/dL       Medications   0.9% sodium chloride BOLUS (has no administration in time range)     Followed by   sodium chloride 0.9% infusion (has no administration in time range)   ondansetron (ZOFRAN) injection 4 mg (has no administration in time range)     Labs are reviewed and patient's BUN and creatinine are significantly up consistent with an acute kidney injury most likely from hypovolemia.  This fits with her history of not eating or drinking much over the last 2 days.  Patient's ammonia level was also elevated which I think would explain the patient's intermittent confusion.  Patient is not on lactulose continuously so I think getting her back on her lactulose should help clear this up.  Would recommend an observation stay here in the hospital for continued fluid hydration, started on the lactulose and see if patient's confusion continues to clear.  We will discussed the case with the hospitalist and plan for an observation admission.    Assessments & Plan (with Medical Decision Making)  Hepatic encephalopathy, acute kidney injury, alcoholic cirrhosis of the liver     I have reviewed the nursing notes.    I have reviewed the findings, diagnosis, plan and need for follow up with the patient.              11/10/2019   [facility] EMERGENCY DEPARTMENT     Joel Gary MD  11/10/19 [id number]

## 2019-11-11 NOTE — PROGRESS NOTES
Pts.blood pressure has been running low.Heart rate is stable.Pt.is asymptomatic.Pt.states she runs this low. notified of systolic less than 90 as ordered.Plan to give some fluids per I.V

## 2019-11-11 NOTE — PROGRESS NOTES
S-(situation): Patient changed to inpatient status    B-(background): Patient status change due to observation goals not being met.     A-(assessment): VSS. Low Bps with systolic in the 80s-90s. Jaundice with yellow sclera. Continues to be confused. Requesting diet to be advanced.     R-(recommendations): Will monitor patient per MD orders.       Inpatient nursing criteria listed below were met:    Adult Profile completedYes  Health care directives status obtained and documented: Yes  VTE prophylaxis orders: SCD  (FYI: Asprin is not an approved anticoagulation for DVT prophylaxis)  SCD's Documented: Yes  Vaccine assessment done and vaccine ordered if needed. Yes  MRSA swab completed for patient 55 years and older (exclude WENDI and TKA): NA  My Chart patient sign up addressed and documented: No  Care Plan initiated: Yes  Discharge planning review completed (admission navigator) Yes

## 2019-11-11 NOTE — H&P
[facility]    History and Physical - Hospitalist Service       Date of Admission:  11/10/2019    Assessment & Plan   Monalisa Olguin is a 54 year old female admitted on 11/10/2019. She presents from home feeling weak sleeping more over the past week or so.  Has been having nausea with vomiting over the past few days, not keeping much down.  Past history significant for alcohol liver disease, followed by hepatology at the [facility].  States she has not been taking her meds including her spironolactone, Lasix and has not taken her lactulose for several weeks.  In the emergency department her vital signs were stable, she was afebrile.  She appears jaundiced with abdomen somewhat distended but nontender.  He is alert and oriented, although appears fatigued.  Lab work notable for normal white blood cell count with stable hemoglobin and low platelets at 73,000.  Bili is elevated 7.4 with low albumin and otherwise stable liver function test.  Ammonia level mildly elevated at 56.  Creatinine elevated 2.63 with sodium somewhat low at 131.  At this point patient appears acutely dehydrated likely due to poor oral intake.  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  At this point she will be registered observation with IV fluids.  We will hold her diuretics.  We will reinitiate her lactulose with a goal to have 2-3 loose stools daily.  Will reevaluate tomorrow with the hope that she can get home in the next 1 to 2 days.    Non-intractable vomiting with nausea  Presenting from home with 1 to 2-week history of increased nausea with vomiting  Having poor oral intake  History of alcoholic disease with a ascites and hepatic encephalopathy  At this time showing signs of acute kidney injury with elevated creatinine, also with elevated ammonia  Treat with IV fluids, initiate lactulose  Allowed to eat as able, antiemetics  ordered  Follow labs    ALFREDO (acute kidney injury) (H)  Elevated creatinine of 2.63, elevated over baseline, likely due to poor oral intake and ongoing diuretic use  History of kidney donation in 2001  Hold diuretics for now, IV fluids  Recheck numbers in a.m.      Hepatic encephalopathy (H)  Increased fatigue over the past week or so with elevated ammonia level at 56  Has not been taking her lactulose over the past several weeks  Rehydrate gently, initiate lactulose, recheck numbers in morning    Alcoholic hepatitis with ascites  Currently sober, being followed by hepatology at the [facility]  M ELD score currently 18  Receiving paracentesis every 2 to 3 weeks, last one was November 1 having removed 5200 cc  Hold diuretics for now, continue rifaximin and reinitiate lactulose    Solitary kidney, acquired  Donated kidney to sister in 2001  Obviously contributing to her chronic kidney disease  Monitor as above    Esophageal reflux  At home taking Prilosec  Restart when able    Thrombocytopenia (H)  Related to alcohol liver disease  Follow    Tobacco abuse  Ongoing, smoking 1/2 pack cigarettes daily  Declines nicotine replacement at this time         Diet: Advance diet as able  DVT Prophylaxis: Low Risk/Ambulatory with no VTE prophylaxis indicated  Gonzalez Catheter: not present  Code Status: Full code    Disposition Plan   Expected discharge: 1 to 2 days, recommended to prior living arrangement once renal function improved and Taking oral meds, able to eat adequate intake.  Entered: Brandon Cummings MD 11/11/2019, 1:06 AM     The patient's care was discussed with the Patient.    Brandon Cummings MD  [facility]    ______________________________________________________________________    Chief Complaint   54-year-old female feeling weak and not eating or drinking well    History is obtained from the patient, electronic health record and emergency department  physician    History of Present Illness   Monalisa Olguin is a 54 year old female who presents from home feeling weak and tired.  States she is sleeping a lot over the past week with poor oral intake associate with nausea and vomiting.  Past history significant for alcohol liver disease, not currently drinking.  Seen by hepatology at [facility].  She has history of a ascites having had her last paracentesis on November 1, removed 5200 cc.  She states her belly is soft, nontender and does not feel very distended.  She denies any difficulty breathing, shortness of breath, fever, chills.  There is been no blood in her emesis.  She normally takes Lasix and spironolactone, although not taking this recently.  She also has not been taking her Chronulac, lactulose, over the past several weeks.  Past history significant for donating her kidney to her sister in 2001.  She denies any urinary symptoms other than she is not urinating very much.  Denies feeling dizzy, has not had any falls or syncope.    Review of Systems    The 10 point Review of Systems is negative other than noted in the HPI or here.     Past Medical History    I have reviewed this patient's medical history and updated it with pertinent information if needed.   Past Medical History:   Diagnosis Date     Alcohol abuse 5/2/2013     Alcohol dependence 5/25/2013     Alcohol withdrawal 5/2/2013     Anxiety 10/10/2011     CKD (chronic kidney disease) stage 3, GFR 30-59 ml/min (H) 2006    last GFR was 42     CKD (chronic kidney disease) stage 3, GFR 30-59 ml/min (H) 2/22/2011     Depressive disorder      Esophageal reflux 10/7/2002     Hypertension goal BP (blood pressure) < 130/80 7/12/2011     Moderate Depression [296.32] 12/22/2009    stable on wellbutrin     Other internal derangement of knee(717.89)     ACL Internal derangement, knee/ original injury in 5th grade, torn cartilage     Pap smear 2011    no abnormals, due for paps q 2-3 yrs      Unspecified essential hypertension        Past Surgical History   I have reviewed this patient's surgical history and updated it with pertinent information if needed.  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     COLONOSCOPY N/A 2017    Procedure: COLONOSCOPY;  Colonoscopy;  Surgeon: Sai Johnston MD;  Location:  GI     ESOPHAGOSCOPY, GASTROSCOPY, DUODENOSCOPY (EGD), COMBINED N/A 2017    Procedure: COMBINED ESOPHAGOSCOPY, GASTROSCOPY, DUODENOSCOPY (EGD), BIOPSY SINGLE OR MULTIPLE;  ESOPHAGOSCOPY, GASTROSCOPY, DUODENOSCOPY (EGD) with biopsies;  Surgeon: Sai Johnston MD;  Location:  GI     ESOPHAGOSCOPY, GASTROSCOPY, DUODENOSCOPY (EGD), COMBINED N/A 2019    Procedure: ESOPHAGOGASTRODUODENOSCOPY, WITH BIOPSY;  Surgeon: Edgardo Scott DO;  Location:  GI     HC KNEE SCOPE, DIAGNOSTIC  01    Arthroscopy, Lt Knee     LAPAROSCOPIC CHOLECYSTECTOMY N/A 2017    Procedure: LAPAROSCOPIC CHOLECYSTECTOMY;  laparoscopic cholecystectomy;  Surgeon: Romeo Pastor MD;  Location: [facility] OR     OPEN REDUCTION INTERNAL FIXATION WRIST Right 2017    Procedure: OPEN REDUCTION INTERNAL FIXATION WRIST;  OPEN REDUCTION INTERNAL FIXATION RIGHT WRIST;  Surgeon: Edgardo Almendarez MD;  Location:  OR       Social History   I have reviewed this patient's social history and updated it with pertinent information if needed.  Social History     Tobacco Use     Smoking status: Current Every Day Smoker     Packs/day: 0.50     Years: 10.00     Pack years: 5.00     Smokeless tobacco: Never Used     Tobacco comment: pt quit  after smoking for 8 yrs, started again in    Substance Use Topics     Alcohol use: Not Currently     Alcohol/week: 0.0 standard drinks     Comment: 2 beers per day     Drug use: No       Family History   I have reviewed this  patient's family history and updated it with pertinent information if needed.   Family History   Problem Relation Age of Onset     Hypertension Mother         80 years old     Heart Disease Paternal Grandmother      Heart Disease Paternal Grandfather      Cerebrovascular Disease Maternal Grandmother      Cerebrovascular Disease Maternal Grandfather      Alcohol/Drug Maternal Grandfather      Substance Abuse Maternal Grandfather      Heart Disease Father         Silent MI stents x 2     Diabetes Sister 17        older sister also had kidney and pancreas transplant     Heart Disease Sister         MI secondary to diabetes     Genitourinary Problems Sister 43        kidney transplant from renal failure     Other - See Comments Sister         older     Other - See Comments Sister         younger     Diabetes Sister 9        youngest, juvenile type I (onset age 9 with no complications)     Cancer Paternal Aunt         ?kind       Prior to Admission Medications   Prior to Admission Medications   Prescriptions Last Dose Informant Patient Reported? Taking?   ARIPiprazole (ABILIFY) 5 MG tablet   No No   Sig: Take 1 tablet (5 mg) by mouth At Bedtime   FLUoxetine (PROZAC) 20 MG capsule   No No   Sig: Take 3 capsules (60 mg) by mouth daily   Ferrous Sulfate 324 (65 Fe) MG TBEC   No No   Sig: TAKE ONE TABLET BY MOUTH TWICE A DAY   acamprosate (CAMPRAL) 333 MG EC tablet   No No   Sig: Take 2 tablets (666 mg) by mouth 3 times daily   acetaminophen (TYLENOL) 325 MG tablet   No No   Sig: Take 2 tablets (650 mg) by mouth every 4 hours as needed for mild pain   alendronate (FOSAMAX) 35 MG tablet   No No   Sig: Take 1 tablet (35 mg) by mouth with 8oz water every Monday (once every 7 days) 30 minutes before breakfast and remain upright during this time.   busPIRone (BUSPAR) 15 MG tablet   No No   Sig: Take 1 tablet (15 mg) by mouth 2 times daily   furosemide (LASIX) 40 MG tablet   No No   Sig: Take 1 tablet (40 mg) by mouth daily  "  gabapentin (NEURONTIN) 100 MG capsule   Yes No   Sig: Take 300 mg by mouth At Bedtime   lactulose (CHRONULAC) 10 GM/15ML solution   No No   Sig: Take 15 mLs (10 g) by mouth 3 times daily as needed for constipation Start with 15 ml daily, titrate as needed for 2-3 BM's daily.   omeprazole (PRILOSEC) 20 MG CR capsule   No No   Sig: TAKE ONE CAPSULE BY MOUTH EVERY DAY   ondansetron (ZOFRAN) 4 MG tablet   No No   Sig: Take 1 tablet (4 mg) by mouth every 6 hours as needed for nausea   phosphorus tablet 250 mg (PHOSPHA 250 NEUTRAL) 250 MG per tablet   No No   Sig: TAKE TWO TABLETS BY MOUTH TWICE A DAY   propranolol (INDERAL) 10 MG tablet   No No   Sig: Take 1 tablet (10 mg) by mouth 2 times daily   rifaximin (XIFAXAN) 550 MG TABS tablet   No No   Sig: Take 1 tablet (550 mg) by mouth 2 times daily   spironolactone (ALDACTONE) 50 MG tablet   No No   Sig: Take 1 tablet (50 mg) by mouth daily   thiamine (VITAMIN B-1) 100 MG tablet   No No   Sig: Take 1 tablet (100 mg) by mouth daily   traZODone (DESYREL) 50 MG tablet   No No   Sig: Take 2 tablets (100 mg) by mouth nightly as needed for sleep   ursodiol (ACTIGALL) 500 MG tablet   No No   Sig: Take 1 tablet (500 mg) by mouth 2 times daily      Facility-Administered Medications: None     Allergies   Allergies   Allergen Reactions     Albuterol      Tongue \"hardened and painful\"       Physical Exam   Vital Signs: Temp: 98.9  F (37.2  C) Temp src: Oral BP: (!) 83/49 Pulse: 77 Heart Rate: 79 Resp: 18 SpO2: 91 % O2 Device: None (Room air)    Weight: 125 lbs 0 oz    Constitutional: Alert, oriented.  Appears jaundiced  Eyes: extra-ocular muscles intact, icteric bilaterally and conjunctiva normal  ENT: Normocephalic, without obvious abnormality, atraumatic, sinuses nontender on palpation, external ears without lesions, oral pharynx with moist mucous membranes, tonsils without erythema or exudates, gums normal and good dentition.  Hematologic / Lymphatic: no cervical lymphadenopathy " and no supraclavicular lymphadenopathy  Respiratory: No increased work of breathing, good air exchange, clear to auscultation bilaterally, no crackles or wheezing  Cardiovascular: regular rate and rhythm  GI: normal bowel sounds, soft, mildly distended and non-tender  Skin: no bruising or bleeding, normal skin color, texture, turgor and jaundice noted  Musculoskeletal: There is no redness, warmth, or swelling of the joints.  Full range of motion noted.  Motor strength is 5 out of 5 all extremities bilaterally.  Tone is normal.  Neurologic: Awake, alert, oriented to name, place and time.  Cranial nerves II-XII are grossly intact.  Motor is 5 out of 5 bilaterally.  Negative asterixis  Data   Data reviewed today: I reviewed all medications, new labs and imaging results over the last 24 hours. I personally reviewed no images or EKG's today.      Most Recent 3 CBC's:  Recent Labs   Lab Test 11/10/19  2248 11/01/19  1119 10/23/19  1258 10/23/19  0859  09/17/19  1055  08/12/19  1214   WBC 7.0  --   --   --   --  4.0  --  4.5   HGB 9.7*  --  9.8*  --   --  9.8*  --  10.1*   *  --   --   --   --  119*  --  123*   PLT 73* 40*  --  57*   < > 59*   < > 62*    < > = values in this interval not displayed.     Most Recent 3 BMP's:  Recent Labs   Lab Test 11/10/19  2248 09/17/19  1055 08/12/19  1214   * 145* 140   POTASSIUM 4.0 3.4 3.3*   CHLORIDE 96 110* 110*   CO2 24 26 23   BUN 44* 13 7   CR 2.63* 1.08* 0.91   ANIONGAP 11 9 7   ELSIE 8.4* 8.2* 8.2*   * 79 90     Most Recent 2 LFT's:  Recent Labs   Lab Test 11/10/19  2248 09/17/19  1055   AST 54* 39   ALT 23 16   ALKPHOS 202* 204*   BILITOTAL 9.2* 4.5*       Ammonia 56

## 2019-11-11 NOTE — H&P
[facility]    History and Physical - Hospitalist Service       Date of Admission:  11/10/2019    Assessment & Plan   Monalisa Olguin is a 54 year old female who presented to the ER on 11/10/2019 with intractable vomiting and confusion.  Due to concern for worsening renal function and hepatic encephalopathy, hospitalization for observation was advised.  She has failed to improve over a period of observation.  There is now increasing concern for persistent acute renal failure, hepatic encephalopathy, and lower GI bleeding with hypotension and possible acute blood loss anemia superimposed upon chronic alcoholic cirrhosis with ascites and single kidney status.  She as at high risk for an adverse outcome including worsening GI bleeding, worsening hypotension and shock, worsening acute renal failure, worsening encephalopathy, and death, so ongoing hospitalization is considered medically necessary.  Based on the presently available information, hospitalization for 3 to 5 days is anticipated.    Principal Problem:    Acute renal failure (H)  Assessment: Creatinine increased more than double her previous baseline and has been oliguric although not hyperkalemic or acidotic so far, fractional excretion of sodium consistent with prerenal azotemia possibly due to combination of poor oral intake and chronic use of diuretics but increased concern now for recent GI bleeding and hypotension  Plan: Admit to inpatient, monitor urine output closely, follow renal function, adjust medication doses accordingly for renal failure and presently withholding many of her chronic medications because of acute renal failure    Active Problems:    Hypotension, unspecified hypotension type  Assessment: Persistent hypotension today but does appear to be responding to IV fluid therapy, suspect intravascular hypovolemia probably multifactorial including recent blood losses from GI bleeding, poor oral intake, and  chronic diuretics, cannot completely exclude infection  Plan: Continue IV fluids, holding chronic diuretics, treat worsening anemia as indicated, check procalcitonin and blood cultures and reconsider antibiotics if there is increasing concern for infection, consider vasopressors if she fails to respond adequately to fluid resuscitation and/or transfusion      Alcoholic hepatitis with ascites  Assessment: Worsening hepatic encephalopathy and ascites and higher meld score of 26 compared with previous baseline of 15 in September is concerning for decompensated cirrhosis, possibly triggered by recent GI bleeding, cannot exclude infection including SBP, hepatorenal syndrome is possible  Plan: Admit to inpatient, continue IV fluids and treat hypotension, treat GI bleeding, anticipate diagnostic paracentesis once blood pressure stabilizes and would reevaluate whether it is safe for her to have therapeutic paracentesis depending upon her hemodynamic status, follow liver function closely with low threshold to discuss her case with her primary hepatologist Dr. Kovacs at the [facility] if she fails to improve or worsens      Thrombocytopenia (H)  Assessment: Moderately severe though appears relatively stable compared with her baseline, may contribute to bleeding risk, not overtly bleeding at this time  Plan: Follow platelet count, consider platelet transfusion if platelet count is less than 50,000 and she develops active bleeding      Non-intractable vomiting with nausea  Assessment: Cause unclear but likely contributes to hypovolemia  Plan: Clear liquids as tolerated, antiemetics as needed, continue IV fluids      Solitary kidney, acquired  Assessment: Previously donated a kidney and therefore has only a single kidney now with acute renal failure  Plan: Treat renal failure as outlined above      Hepatic encephalopathy (H)  Assessment: Obvious confusion with disturbed sleep-wake cycle and involuntary movements  consistent with at least moderate hepatic encephalopathy, unclear whether she has been taking lactulose regularly recently, does not appear to have intact medical decision-making capacity at this time because of her acute confusion  Plan: Resume regular use of lactulose and continue rifaximin, may need to consider NG tube placement for lactulose administration versus lactulose enema administration though favor NG tube rather than enema because of concern about recent lower GI bleeding, would obtain consent from her spouse for any procedural interventions because of the patient's confusion at least until her confusion improves      Macrocytic anemia  Assessment: Worsening anemia with 1.5 g drop in hemoglobin today with IV fluid therapy, concern for an acute blood loss anemia superimposed upon chronic anemia based on patient report of recent hematochezia  Plan: Serial hemoglobin, transfuse PRBCs for hemoglobin less than 7 or worsening hemodynamic instability or active bleeding and briefly discussed possible need for blood transfusion with the patient today      Hematochezia-patient reported  Assessment: 2-week history of recurring hematochezia by patient report which could be the trigger for this episode of decompensated cirrhosis and acute renal failure, known to have internal hemorrhoids which could be source for hematochezia and bleeding could be exacerbated by her chronic thrombocytopenia and coagulopathy  Plan: Serial hemoglobin and transfuse as indicated, consider surgery consult and/or other diagnostic evaluation for lower GI bleeding if bleeding worsens      Hyponatremia  Assessment: Mildly hyponatremic probably due to inadequate oral intake recently  Plan: Holding diuretics, using isotonic IV fluids with normal saline for now, follow sodium      GI bleed  Assessment: Suspected lower GI bleeding based on history provided by the patient although her reliability as an historian is questionable at this  time  Plan: Serial hemoglobin, IV PPI for now      Esophageal reflux  Assessment: Clinically reporting some dysphagia  Plan: Clear liquids, IV PPI, consider swallow evaluation if necessary      Tobacco abuse  Assessment: Chronic  Plan: Nicotine replacement if she wishes       Diet: Clear liquid  DVT Prophylaxis: Pneumatic Compression Devices  Gonzalez Catheter: not present  Code Status: Full Code      Disposition Plan   Expected discharge: 3 to 5 days, recommended to Be determined once Diagnostic evaluation has been completed and she is medically stable.  Entered: Mayur Moncada MD 11/11/2019, 4:26 PM     The patient's care was discussed with the Patient.    Mayur Moncada MD  [facility]    ______________________________________________________________________    Chief Complaint   Nausea with vomiting    History is obtained from the patient and electronic health record.  Reliability of the patient herself is questionable because of her abnormal mentation.    History of Present Illness   Monalisa Olguin is a 54 year old female who reports that for about 2 weeks she has noted bleeding from her bottom that seems to be from the anorectal area although the patient is a difficult historian.  She denies heavy bleeding, but it has been recurrent.  She has been feeling sick to her stomach and in the last few days has developed repetitive vomiting.  She has not seen any blood in the emesis.  She denies any worsening abdominal pain although she does feel distended in her abdomen.  There was nothing in particular she did to try to stop the bleeding and she did not notice any triggers except bowel movements.  She has been increasingly dizzy and confused.  For example, she found herself in her closet at home and thought she was in the bathroom.  For these reasons, she presented to the emergency room last night.  After initial evaluation in the ER, there were concerns for hypovolemia and impaired  renal function compared with her baseline, so hospitalization for observation was recommended.  She had been receiving IV fluids during the day today.  She continues to feel lightheaded like she is going to faint.  She has not had a bowel movement here in the hospital so far and did receive lactulose today.  She remains nauseated although has not vomited here in the hospital.  She was hypotensive this morning, but blood pressure has trended upward after additional boluses of IV fluids today.  She has been sleeping most of the day.  She is noticing intermittent jerking movements of her limbs.  She is now seen in her hospital room for evaluation.    Review of Systems    CONSTITUTIONAL:  positive for  fatigue and malaise  negative for  fevers, chills and sweats  EYES:  negative for  visual disturbance and irritation  HEENT:  negative for  earaches, nasal congestion, epistaxis and sore throat  RESPIRATORY:  negative for  dry cough, cough with sputum and dyspnea  CARDIOVASCULAR: Positive for near syncope or syncope recently and in fact she asks whether she just passed out during the course of the interview which was not witnessed by this writer, negative for chest pain, palpitations  GASTROINTESTINAL:  positive for nausea, vomiting, change in bowel habits with decreased stool output lately, dysphagia in her mid neck and hematochezia  GENITOURINARY:  negative for dysuria  INTEGUMENT/BREAST:  negative for rash  HEMATOLOGIC/LYMPHATIC:  negative for other bleeding  ALLERGIC/IMMUNOLOGIC:  positive for drug reactions  MUSCULOSKELETAL:  negative for  myalgias and arthralgias  NEUROLOGICAL:  negative for headaches    Past Medical History    I have reviewed this patient's medical history and updated it with pertinent information if needed.   Past Medical History:   Diagnosis Date     Alcohol abuse 5/2/2013     Alcohol dependence 5/25/2013     Alcohol withdrawal 5/2/2013     Anxiety 10/10/2011     CKD (chronic kidney disease)  stage 3, GFR 30-59 ml/min (H)     last GFR was 42     CKD (chronic kidney disease) stage 3, GFR 30-59 ml/min (H) 2011     Depressive disorder      Esophageal reflux 10/7/2002     Hypertension goal BP (blood pressure) < 130/80 2011     Moderate Depression [296.32] 2009    stable on wellbutrin     Other internal derangement of knee(717.89)     ACL Internal derangement, knee/ original injury in 5th grade, torn cartilage     Pap smear     no abnormals, due for paps q 2-3 yrs     Unspecified essential hypertension      Most recent EGD performed 2019 demonstrated erythematous gastric mucosa but was otherwise normal and pathology results were normal  Most recent colonoscopy in 2017 was an incomplete study due to looping of the large bowel and patient discomfort during the test and was notable only for internal hemorrhoids in the visualized portion of the colon    Past Surgical History   I have reviewed this patient's surgical history and updated it with pertinent information if needed.  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     COLONOSCOPY N/A 2017    Procedure: COLONOSCOPY;  Colonoscopy;  Surgeon: Sia Johnston MD;  Location: [facility]     ESOPHAGOSCOPY, GASTROSCOPY, DUODENOSCOPY (EGD), COMBINED N/A 2017    Procedure: COMBINED ESOPHAGOSCOPY, GASTROSCOPY, DUODENOSCOPY (EGD), BIOPSY SINGLE OR MULTIPLE;  ESOPHAGOSCOPY, GASTROSCOPY, DUODENOSCOPY (EGD) with biopsies;  Surgeon: Sai Johnston MD;  Location: [facility]     ESOPHAGOSCOPY, GASTROSCOPY, DUODENOSCOPY (EGD), COMBINED N/A 2019    Procedure: ESOPHAGOGASTRODUODENOSCOPY, WITH BIOPSY;  Surgeon: Edgardo Scott DO;  Location: [facility]     HC KNEE SCOPE, DIAGNOSTIC  01    Arthroscopy, Lt Knee     LAPAROSCOPIC CHOLECYSTECTOMY N/A 2017    Procedure: LAPAROSCOPIC  CHOLECYSTECTOMY;  laparoscopic cholecystectomy;  Surgeon: Romeo Pastor MD;  Location: [facility]     OPEN REDUCTION INTERNAL FIXATION WRIST Right 11/29/2017    Procedure: OPEN REDUCTION INTERNAL FIXATION WRIST;  OPEN REDUCTION INTERNAL FIXATION RIGHT WRIST;  Surgeon: Edgardo Almendarez MD;  Location:  OR       Social History   I have reviewed this patient's social history and updated it with pertinent information if needed.  Social History     Tobacco Use     Smoking status: Current Every Day Smoker     Packs/day: 0.50     Years: 10.00     Pack years: 5.00     Smokeless tobacco: Never Used     Tobacco comment: pt quit 1992 after smoking for 8 yrs, started again in 2004   Substance Use Topics     Alcohol use: Not Currently     Alcohol/week: 0.0 standard drinks     Comment: 2 beers per day     Drug use: No       Family History   I have reviewed this patient's family history and updated it with pertinent information if needed.   Family History   Problem Relation Age of Onset     Hypertension Mother         80 years old     Heart Disease Paternal Grandmother      Heart Disease Paternal Grandfather      Cerebrovascular Disease Maternal Grandmother      Cerebrovascular Disease Maternal Grandfather      Alcohol/Drug Maternal Grandfather      Substance Abuse Maternal Grandfather      Heart Disease Father         Silent MI stents x 2     Diabetes Sister 17        older sister also had kidney and pancreas transplant     Heart Disease Sister         MI secondary to diabetes     Genitourinary Problems Sister 43        kidney transplant from renal failure     Other - See Comments Sister         older     Other - See Comments Sister         younger     Diabetes Sister 9        youngest, juvenile type I (onset age 9 with no complications)     Cancer Paternal Aunt         ?kind       Prior to Admission Medications   Prior to Admission Medications   Prescriptions Last Dose Informant Patient Reported? Taking?   ARIPiprazole  (ABILIFY) 5 MG tablet Unknown at Unknown time  No No   Sig: Take 1 tablet (5 mg) by mouth At Bedtime   FLUoxetine (PROZAC) 20 MG capsule Unknown at Unknown time  No No   Sig: Take 3 capsules (60 mg) by mouth daily   Ferrous Sulfate 324 (65 Fe) MG TBEC Unknown at Unknown time  No No   Sig: TAKE ONE TABLET BY MOUTH TWICE A DAY   acamprosate (CAMPRAL) 333 MG EC tablet Unknown at Unknown time  No No   Sig: Take 2 tablets (666 mg) by mouth 3 times daily   acetaminophen (TYLENOL) 325 MG tablet Unknown at Unknown time  No No   Sig: Take 2 tablets (650 mg) by mouth every 4 hours as needed for mild pain   alendronate (FOSAMAX) 35 MG tablet Unknown at Unknown time  No No   Sig: Take 1 tablet (35 mg) by mouth with 8oz water every Monday (once every 7 days) 30 minutes before breakfast and remain upright during this time.   busPIRone (BUSPAR) 15 MG tablet Unknown at Unknown time  No No   Sig: Take 1 tablet (15 mg) by mouth 2 times daily   furosemide (LASIX) 40 MG tablet Unknown at Unknown time  No No   Sig: Take 1 tablet (40 mg) by mouth daily   gabapentin (NEURONTIN) 100 MG capsule Unknown at Unknown time  Yes No   Sig: Take 300 mg by mouth At Bedtime   lactulose (CHRONULAC) 10 GM/15ML solution Unknown at Unknown time  No No   Sig: Take 15 mLs (10 g) by mouth 3 times daily as needed for constipation Start with 15 ml daily, titrate as needed for 2-3 BM's daily.   omeprazole (PRILOSEC) 20 MG CR capsule Unknown at Unknown time  No No   Sig: TAKE ONE CAPSULE BY MOUTH EVERY DAY   ondansetron (ZOFRAN) 4 MG tablet Unknown at Unknown time  No No   Sig: Take 1 tablet (4 mg) by mouth every 6 hours as needed for nausea   phosphorus tablet 250 mg (PHOSPHA 250 NEUTRAL) 250 MG per tablet Unknown at Unknown time  No No   Sig: TAKE TWO TABLETS BY MOUTH TWICE A DAY   propranolol (INDERAL) 10 MG tablet Unknown at Unknown time  No No   Sig: Take 1 tablet (10 mg) by mouth 2 times daily   rifaximin (XIFAXAN) 550 MG TABS tablet Unknown at Unknown  "time  No No   Sig: Take 1 tablet (550 mg) by mouth 2 times daily   spironolactone (ALDACTONE) 50 MG tablet Unknown at Unknown time  No No   Sig: Take 1 tablet (50 mg) by mouth daily   thiamine (VITAMIN B-1) 100 MG tablet Unknown at Unknown time  No No   Sig: Take 1 tablet (100 mg) by mouth daily   traZODone (DESYREL) 50 MG tablet Unknown at Unknown time  No No   Sig: Take 2 tablets (100 mg) by mouth nightly as needed for sleep   ursodiol (ACTIGALL) 500 MG tablet Unknown at Unknown time  No No   Sig: Take 1 tablet (500 mg) by mouth 2 times daily      Facility-Administered Medications: None     Allergies   Allergies   Allergen Reactions     Albuterol      Tongue \"hardened and painful\"       Physical Exam   Vital Signs: Temp: 96.6  F (35.9  C) Temp src: Oral BP: 95/57 Pulse: 77 Heart Rate: 76 Resp: 16 SpO2: 94 % O2 Device: None (Room air)    Weight: 124 lbs 5.43 oz    Patient Vitals for the past 24 hrs:   BP Temp Temp src Pulse Heart Rate Resp SpO2 Height Weight   11/11/19 1520 95/57 96.6  F (35.9  C) Oral 77 -- 16 94 % -- --   11/11/19 1347 -- -- -- -- 76 -- -- -- --   11/11/19 1058 (!) 89/50 96  F (35.6  C) Oral 77 77 18 97 % -- --   11/11/19 0737 (!) 82/38 97.2  F (36.2  C) Oral 75 -- 14 92 % -- --   11/11/19 0139 98/49 95.8  F (35.4  C) Oral -- 76 16 93 % 1.549 m (5' 1\") 56.4 kg (124 lb 5.4 oz)   11/11/19 0124 -- 98.8  F (37.1  C) Oral -- -- -- -- -- --   11/11/19 0100 (!) 88/48 -- -- 76 -- -- 93 % -- --   11/11/19 0045 (!) 83/49 -- -- -- -- -- 91 % -- --   11/10/19 2354 90/48 -- -- 77 -- -- 98 % -- --   11/10/19 2217 92/48 98.9  F (37.2  C) Oral 79 79 18 98 % -- 56.7 kg (125 lb)     General Appearance: Ill-appearing, thin, jaundiced with scleral icterus present, abnormal mentation, and frequent involuntary limb movements while resting in bed  Eyes: Icteric sclerae, clear conjunctivae, small symmetric pupils  HEENT: No signs of recent head injury, clear ear canals, no nasal drainage or bleeding, normal oropharynx " without lesions or bleeding  Neck: Trachea midline, symmetric, nontender, no stridor  Chest: No gross anomalies, symmetric excursion  Respiratory: Normal respiratory effort, mildly diminished breath sounds at the bases, few inspiratory crackles that improve with deep breaths  Cardiovascular: Regular rate and rhythm, no gallop or rub, soft grade 1/6 systolic murmur, good radial pulse, normal capillary refill, no jugular vein distention  GI: Normal bowel sounds, distended abdomen, soft, no abdominal tenderness  Lymph/Hematologic: No cervical or supraclavicular lymphadenopathy  Skin: Jaundice present, no rash  Musculoskeletal: No acutely inflamed joints, no cords or tenderness in the lower extremities, no pitting edema  Neurologic: Fluctuating level of consciousness even over the course of the interview and at times appears briefly unresponsive, obviously forgetful and cannot maintain her train of thought consistently although sometimes does so briefly, speech is dysarthric and sometimes difficult to understand but other times is understandable, intermittent myoclonic jerking of the limbs, asterixis present in the upper extremities, no sustained clonus at the ankles, normal reflexes at the knees  Psychiatric: No overt hallucinations identified    Data   Data reviewed today: I reviewed all medications, new labs and imaging results over the last 24 hours. I personally reviewed no images or EKG's today.    Recent Labs   Lab 11/11/19  1643 11/11/19  1401 11/11/19  0558 11/10/19  2248   WBC  --   --  5.2 7.0   HGB 8.8* 8.1* 8.2* 9.7*   MCV  --   --  109* 108*   PLT  --   --  58* 73*   INR 1.26*  --   --  1.18*   NA  --  134 131* 131*   POTASSIUM  --   --  3.7 4.0   CHLORIDE  --   --  99 96   CO2  --   --  23 24   BUN  --   --  45* 44*   CR  --  2.57* 2.56* 2.63*   ANIONGAP  --   --  9 11   ELSIE  --   --  7.7* 8.4*   GLC  --   --  83 100*   ALBUMIN  --   --  1.8* 2.1*   PROTTOTAL  --   --  4.2* 5.1*   BILITOTAL  --   --  8.2*  9.2*   ALKPHOS  --   --  169* 202*   ALT  --   --  20 23   AST  --   --  34 54*   LIPASE  --   --   --  57*     Serum ammonia level 56    Fractional excretion of sodium 0.1%, urine sodium 11

## 2019-11-11 NOTE — ASSESSMENT & PLAN NOTE
Donated kidney to sister in 2001  Obviously contributing to her chronic kidney disease  Monitor as above

## 2019-11-11 NOTE — PROGRESS NOTES
SPIRITUAL HEALTH SERVICES  SPIRITUAL ASSESSMENT Progress Note  [facility]      During Rounding,  introduced himself to Heydi Olguin and informed her of his availability.    Mehran Espino M.Div., [facility]  Staff   Office tel: [phone]

## 2019-11-11 NOTE — ASSESSMENT & PLAN NOTE
Presenting from home with 1 to 2-week history of increased nausea with vomiting  Having poor oral intake  History of alcoholic disease with a ascites and hepatic encephalopathy  At this time showing signs of acute kidney injury with elevated creatinine, also with elevated ammonia  Treat with IV fluids, initiate lactulose  Allowed to eat as able, antiemetics ordered  Follow labs

## 2019-11-11 NOTE — ASSESSMENT & PLAN NOTE
Increased fatigue over the past week or so with elevated ammonia level at 56  Has not been taking her lactulose over the past several weeks  Rehydrate gently, initiate lactulose, recheck numbers in morning

## 2019-11-11 NOTE — ASSESSMENT & PLAN NOTE
Elevated creatinine of 2.63, elevated over baseline, likely due to poor oral intake and ongoing diuretic use  History of kidney donation in 2001  Hold diuretics for now, IV fluids  Recheck numbers in a.m.

## 2019-11-11 NOTE — PROGRESS NOTES
"S-(situation): Patient registered to Observation. Patient arrived to room 252 via cart from ED    B-(background): pt to ED with increased confusion    A-(assessment): VSS. Afebrile. Denies any pain/discomfort. Pt has disorganized thinking. When asking pt why she is in the hospital pt answered \"shortness of pain\", \"short brain\". Pt then forgot what writer had asked. Pt pleasant and cooperative. No complaints.     R-(recommendations): Orders and observation goals reviewed with patient    Nursing Observation criteria listed below was met:    Skin issues/needs documented:Yes  Isolation needs addressed, if appropriate: NA  Fall Prevention: Education given and documented: Yes  Education Assessment documented:Yes  Education Documented (Pre-existing chronic infection such as, MRSA/VRE need education on admission): Yes  OBS video/handout Reviewed & DocumentedYes  Medication Reconciliation Complete: No. Pt unable to complete. Pt and  unsure of what pt taking or when meds were taken.  New medication patient education completed and documented (Possible Side Effects of Common Medications handout): Yes  Home medications if not able to send immediately home with family stored here: NA  Reminder note placed in discharge instructions: NA  Patient has discharge needs (If yes, please explain): No            "

## 2019-11-11 NOTE — PROGRESS NOTES
Pts.blood pressure noted to be 83/56 pulse 77.Charge nurse notified of findings and message left for provider.We are to notify provider of systolic less than 90.She has had some clear liquids with her meals.Her hgb is low and she will have recheck at 1400.Pt.is confused and it is hard to carry on conversation at times with her.Pt.voids margie to tea colored urine.She had a large incontinent stool earlier this morning.

## 2019-11-12 PROBLEM — E87.6 HYPOPOTASSEMIA: Status: ACTIVE | Noted: 2019-01-01

## 2019-11-12 PROBLEM — K65.2 SBP (SPONTANEOUS BACTERIAL PERITONITIS) (H): Status: ACTIVE | Noted: 2019-01-01

## 2019-11-12 NOTE — PLAN OF CARE
S-(situation): Physical therapy order    B-(background): Patient is a 54 year old female admitted from the ED with hepatic encephalopathy, acute kidney injury, alcoholic cirrhosis of liver and hypotension. patient has a previous medical history of end stage liver disease, reflux, splenomegaly, anxiety, depression, HTN and portal hypertension.     A-(assessment): Patient not seen in the AM per rounding hand off, encouraged to hold until after thoracentesis. Attempted evaluation at 1315 however patient on bedrest.     R-(recommendations): Assess 11/13/19 to determine appropriateness for  PT evaluation.    Thank you for your referral.    Tiffanie Moeller, PT, DPT, ATC    [facility]    O: [phone]  E: [email or web address]

## 2019-11-12 NOTE — PLAN OF CARE
VSS this AM. Insignificant changes to orthostatic BPs. After paracentesis frequent vital checks being done. Blood pressures 80s over 40s. Pt. Asymptomatic (Charge RN Notified). Albumin infusing with IV fluids. Pt. Denies pain. Pt. Confused to situation and place. Lungs sounds clear. Abdomen full and distended. Firm to touch. Bowel sounds active. Loose incontinent stools. Pt. Reports abdominal fullness. Plan for paracentesis today. Voiding adequate. Skin intact. Patient jaundice throughout body and eyes. SBA with activity.

## 2019-11-12 NOTE — PLAN OF CARE
VSS. Systolic BP in 90s-80s as her normal. Orthos WNL. A & O x1. Disoriented to time, place and situation. Denies any N/V. Clear liquids. LS clear/diminished. IV patent. Jaundice with yellow sclera.

## 2019-11-12 NOTE — PLAN OF CARE
VSS. Afebrile. Denies any pain/discomfort. LS clear throughout. BS active in all quadrants. LS diminished throughout. Denies any nausea/vomiting. Pt up to BR with stand by assist. 2 sm loose bm. Occult obtained which was negative. No complaints at this time.

## 2019-11-12 NOTE — PROGRESS NOTES
[facility]    Medicine Progress Note - Hospitalist Service       Date of Admission:  11/10/2019  Assessment & Plan    54-year-old woman with alcoholic cirrhosis admitted yesterday due to concerns for acute renal failure and decompensated cirrhosis with hepatic encephalopathy, hypotension, and ascites.  Hypotension has resolved with IV fluid resuscitation, renal function and urine output are stable to improving, and encephalopathy is stable to improving.  Trigger for this acute decompensation of chronic alcoholic hepatic cirrhosis remains unclear, but presentation is concerning for hepatorenal syndrome.  Recent use of alcohol has been denied.  Poor oral intake recently is suspected.  there has been concern for recent GI bleeding with patient report of hematochezia but she has not had gross blood in her stool during this hospital stay, stool Hemoccult testing is negative, and her hemoglobin has been stable over the last 24 hours.  Infection is a possibility although has not been identified with certainty.  Procalcitonin was moderately elevated but is trending toward improvement without antibiotics.      Principal Problem:    Acute renal failure (H)  Assessment: Creatinine improved to 2.32 today from 2.63 at admission, oliguria resolved, has not had hyperkalemia or acidosis so far and aside from ascites has not had volume overload, fractional excretion of sodium was low at 0.1% with urine sodium low at 11 consistent with prerenal azotemia from intravascular hypovolemia which may have been multifactorial including reported recent bleeding, decreased oral intake, and vomiting and worsening ascites may have also contributed, hepatorenal syndrome is possible  Plan: Continue IV fluids but decrease rate from 125 mL/h to 75 mL/h today, continue to follow renal function and urine output closely    Active Problems:    Hepatic encephalopathy (H)  Assessment: Clinically improved and having stools  now after restarting lactulose yesterday  Plan: Continue lactulose and titrate to have adequate stool output, monitor clinically for resolving encephalopathy, continue chronic rifaximin      Hypotension, unspecified hypotension type  Assessment: Blood pressure has normalized at rest with more aggressive IV fluid therapy although she did have about a 10 point orthostatic drop in systolic blood pressure overnight, cause for hypotension remains indeterminate but hypovolemia probably contributed, will be at risk for worsening hypotension today after therapeutic paracentesis  Plan: Continue IV fluids but will decrease rate as long as blood pressure is stable, administer 25 g IV albumin today after paracentesis and could consider additional IV albumin administration if she continues to be hypotensive later in the day or overnight      Alcoholic cirrhosis of liver with ascites (H)  Assessment: Decompensated with increased ascites, hepatic encephalopathy, and possible hepatorenal syndrome for reasons that are unclear, meld score improved minimally at 25 today from 26 yesterday but remains significantly worsened as compared with 15 in September  Plan: Diagnostic as well as therapeutic paracentesis today, anticipate close outpatient follow-up with her primary hepatic team at the [facility] if she improves but otherwise would consider hospital transfer for inpatient hepatology evaluation if not improving or worsening      Thrombocytopenia (H)  Assessment: Relatively stable as compared with recently although worsened compared with previously, no overt active bleeding so far  Plan: Follow platelet, consider platelet transfusion if she develops active bleeding      Macrocytic anemia  Assessment: Approximately 1 g drop in hemoglobin compared with admission but hemoglobin has stabilized over the last 24 hours and active bleeding is not clinically evident  Plan: Continue to follow hemoglobin, continue  folic acid  supplementation      Hematochezia-patient reported  Assessment: Reportedly recently by patient but not clinically evident during this hospital stay, known to have internal hemorrhoids noted at colonoscopy in 2017  Plan: Follow clinically, consider diagnostic colonoscopy as an outpatient after discharge once she recovers from this acute illness      Hyponatremia  Assessment: Mild at admission with sodium 131, now resolved after isotonic IV fluid administration and after withholding chronic diuretics  Plan: Continue isotonic IV fluids but decrease rate, continue to withhold diuretics for now      Hypopotassemia  Assessment: Worsened today coinciding with increased stool output but also holding previous chronic Spironolactone  Plan: Replace potassium according to protocol      Esophageal reflux  Assessment: Chronic and stable  Plan: Continue antacid      Alcoholism in recovery (H)  Assessment: Chronic and reportedly stable  Plan: No acute intervention      Tobacco abuse  Assessment: Chronic  Plan: Nicotine replacement      Non-intractable vomiting with nausea  Assessment: Resolving  Plan: Continue clear liquids and may start to advance diet as tolerated today after her procedure      Solitary kidney, acquired  Assessment: Chronic after previously donating a kidney  Plan: Treat acute renal failure as summarized above      Diet: Clear Liquid Diet    DVT Prophylaxis: Pneumatic Compression Devices  Gonzalez Catheter: not present  Code Status: Full Code      Disposition Plan   Expected discharge: 2 to 4 days, recommended to Be determined once hemoglobin stable, renal function improved, safe disposition plan/ TCU bed available and Diagnostic evaluation has been completed and liver function and hepatic encephalopathy are improved.  Entered: Mayur Moncada MD 11/12/2019, 12:38 PM       The patient's care was discussed with the Patient and Patient's Family.    Mayur Moncada MD  Hospitalist Service  [facility]  [facility]    ______________________________________________________________________    Interval History   Patient just completed her paracentesis procedure at the bedside today after removal of over 6 L of ascites fluid according to the radiology technician.  History from the patient is currently limited by confusion.  She does acknowledge some abdominal discomfort.  She has not been complaining of nausea today and has tolerated clear liquids today without vomiting.  Nursing reports that she has been having loose stools.  Family reports that the patient does seem better today from a mental status standpoint although is not at her normal baseline.  She has been afebrile but has had temperature as low as 35.3.  She has no longer been hypotensive at rest overnight.  Other vital signs have been stable.  Orthostatic blood pressure did drop from 90 to 81 changing from lying to standing position last night.  Urine output has improved.    Data reviewed today: I reviewed all medications, new labs and imaging results over the last 24 hours. I personally reviewed no images or EKG's today.    Physical Exam   Vital Signs: Temp: 96.1  F (35.6  C) Temp src: Oral BP: 95/59 Pulse: 77 Heart Rate: 76 Resp: 16 SpO2: 93 % O2 Device: None (Room air)    Weight: 131 lbs 14.4 oz  General Appearance: Ill-appearing without signs of acute distress  Respiratory: Normal respiratory effort, clear lungs  Cardiovascular: Regular rate and rhythm, palpable radial pulse, capillary refill 2 seconds  GI: Nondistended abdomen, normal bowel sounds, soft, diffuse tenderness without rebound tenderness or involuntary guarding  Skin: Jaundice present  Other: Fluctuating level of consciousness alternating between trying to answer questions and falling asleep in the middle of the conversation, mildly dysarthric, appears disoriented to time and speaking of topics that do not make sense in today's context, no involuntary movements at rest but unable to  follow instructions at this time to test for asterixis    Data   Recent Labs   Lab 11/12/19  0548 11/11/19  1643 11/11/19  1401 11/11/19  0558 11/10/19  2248   WBC  --   --   --  5.2 7.0   HGB 8.7* 8.8* 8.1* 8.2* 9.7*   MCV  --   --   --  109* 108*   PLT 59*  --   --  58* 73*   INR 1.30* 1.26*  --   --  1.18*     --  134 131* 131*   POTASSIUM 3.3*  --   --  3.7 4.0   CHLORIDE 106  --   --  99 96   CO2 22  --   --  23 24   BUN 39*  --   --  45* 44*   CR 2.32*  --  2.57* 2.56* 2.63*   ANIONGAP 7  --   --  9 11   ELSIE 7.4*  --   --  7.7* 8.4*   *  --   --  83 100*   ALBUMIN 2.0*  --   --  1.8* 2.1*   PROTTOTAL 4.5*  --   --  4.2* 5.1*   BILITOTAL 7.9*  --   --  8.2* 9.2*   ALKPHOS 181*  --   --  169* 202*   ALT 20  --   --  20 23   AST 34  --   --  34 54*   LIPASE  --   --   --   --  57*     Stool testing for occult blood was negative  Procalcitonin yesterday was 0.80, moderate risk for systemic infection and procalcitonin today is 0.56, also moderate risk for infection  Blood cultures are negative so far, urine culture is negative so far    Peritoneal fluid test results are pending after today's diagnostic and therapeutic paracentesis    Medications     - MEDICATION INSTRUCTIONS -       sodium chloride 1,000 mL (11/11/19 9223)       folic acid  1 mg Oral Daily    Or     folic acid intermittent infusion (for doses >5 mg)  1 mg Intravenous Daily     lactulose  20 g Oral TID     pantoprazole (PROTONIX) IV  40 mg Intravenous Daily     rifaximin  550 mg Oral BID     sodium chloride (PF)  3 mL Intracatheter Q8H     vitamin B1  100 mg Oral Daily    Or     thiamine  100 mg Intravenous Daily

## 2019-11-12 NOTE — SIGNIFICANT EVENT
Significant Event Note    Time of event: 3:24 PM November 12, 2019    Description of event:  After paracentesis today, patient has been hypotensive.  Systolic blood pressures are in the 80s and mean arterial pressure is about 60.  She is asymptomatic from hypotension currently.  She is received a dose of 25 g IV albumin.  Initial test results of ascites fluid are notable for low ascites albumin and associated elevated serum to ascites albumin gradient of 1.6 consistent with portal hypertension.  WBC of ascites fluid is 612 with 58% neutrophils, so ascites neutrophil count is 355.  Gram stain and culture of ascites fluid is pending.  She was tender on abdominal examination today.  This increases concern for spontaneous bacterial peritonitis complicating chronic alcoholic cirrhosis with ascites.    Plan:    Administer LR bolus 500 mL now for treatment of hypotension, could consider additional dose of IV albumin 25 g after large volume paracentesis today if she continues to be hypotensive despite IV fluid bolus  Start IV ceftriaxone empirically for treatment of suspected spontaneous bacterial peritonitis while awaiting results of Gram stain and culture    Discussed with: Bedside nurse and charge nurse    Mayur Moncada MD
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
## 2019-11-13 NOTE — PROGRESS NOTES
11/13/19 1400   Quick Adds   Type of Visit Initial PT Evaluation       Present no   Living Environment   Lives With spouse;child(lit), adult  (13 year old son)   Living Arrangements house   Home Accessibility stairs within home;stairs to enter home   Number of Stairs, Within Home, Primary 4  (12 to basement)   Transportation Anticipated family or friend will provide   Living Environment Comment step over tub shower   Self-Care   Usual Activity Tolerance fair   Current Activity Tolerance poor   Regular Exercise No   Equipment Currently Used at Home grab bar, tub/shower   Functional Level Prior   Ambulation 0-->independent   Transferring 0-->independent   Toileting 0-->independent   Bathing 0-->independent   Communication 0-->understands/communicates without difficulty   Swallowing 0-->swallows foods/liquids without difficulty   Cognition 2 - difficulty with organizing thoughts   Fall history within last six months yes   Number of times patient has fallen within last six months 3   Which of the above functional risks had a recent onset or change? ambulation;fall history   Prior Functional Level Comment Patient's  works from 7a to 4 p. She reports cleaning while he is away otherwise not tolerating much. She is unable to grocery shop due to fatigue and she does not have her license.    General Information   Onset of Illness/Injury or Date of Surgery - Date 11/10/19   Referring Physician Dr. Moncada   Patient/Family Goals Statement Home   Pertinent History of Current Problem (include personal factors and/or comorbidities that impact the POC) Patient is a 54 year old female admitted from the ED with hepatic encephalopathy, acute kidney injury, alcoholic cirrhosis of liver and hypotension. patient has a previous medical history of end stage liver disease, reflux, splenomegaly, anxiety, depression, HTN and portal hypertension.    Precautions/Limitations fall precautions   Weight-Bearing Status  - LUE full weight-bearing   Weight-Bearing Status - RUE full weight-bearing   Weight-Bearing Status - LLE full weight-bearing   Weight-Bearing Status - RLE full weight-bearing   General Info Comments PT: evaluate and treat deconditioning. Activity orders: up with assistance.   Cognitive Status Examination   Orientation person   Level of Consciousness lethargic/somnolent;confused   Follows Commands and Answers Questions 75% of the time;able to follow single-step instructions   Personal Safety and Judgment impaired;impulsive   Memory impaired   Pain Assessment   Patient Currently in Pain Yes, see Vital Sign flowsheet  (intermittent abdominal pain)   Integumentary/Edema   Integumentary/Edema Comments distention of abdomen   Range of Motion (ROM)   ROM Comment bilat UE and LE full AROM   Strength   Strength Comments Bilat UE and LE 4/5 with impaired muscular endurance   Bed Mobility   Bed Mobility Bed mobility skill: Rolling/Turning;Bed mobility skill: Scooting/Bridging;Bed mobility skill: Sit to supine;Bed mobility skill: Supine to sit   Bed Mobility Skill: Rolling/Turning   Level of [place]: Rolling/Turning independent   Bed Mobility Skill: Scooting/Bridging   Level of [place]: Scooting/Bridging independent   Bed Mobility Skill: Sit to Supine   Level of [place]: Sit/Supine independent   Bed Mobility Skill: Supine to Sit   Level of [place]: Supine/Sit independent   Transfer Skills   Transfer Transfer Skill:  Stand to Sit;Transfer Skill: Sit to Stand   Transfer Skill:  Sit to Stand   Level of [place]: Sit/Stand independent   Transfer Skill: Stand to Sit   Level of [place]: Stand/Sit independent   Gait   Gait Gait Skill;Gait Analysis   Gait Skills   Level of [place]: Gait stand-by assist   Physical Assist/Nonphysical Assist: Gait supervision;verbal cues   Weight-Bearing Restrictions: Gait full weight-bearing   Gait Distance 200 feet   Gait Analysis   Gait Pattern Used swing-through gait    Gait Deviations Noted decreased toe-to-floor clearance;decreased stride length;decreased step length;decreased juana  (narrow base of support)   Impairments Contributing to Gait Deviations impaired balance;impaired coordination;pain;decreased strength   Balance   Balance Comments  loss of balance at 15 seconds with narrow base of support, eyes open. Eyes closed loss of balance within 5 seconds. Unable to achieve single limb stance on left and able to maintain on right for 3 seconds.    Coordination   Coordination Comments impaired UE management with IV line and blankets, poor targeting of object   General Therapy Interventions   Planned Therapy Interventions balance training;gait training;ROM;strengthening;home program guidelines;neuromuscular re-education   Clinical Impression   Criteria for Skilled Therapeutic Intervention yes, treatment indicated   PT Diagnosis impaired balance, deconditioned   Influenced by the following impairments chronic and acute medical status   Functional limitations due to impairments decreased safety with transitional movements, increased risk of falling, decreased activity tolerance, impaired safety awareness   Clinical Presentation Evolving/Changing   Clinical Presentation Rationale patient hypotensive with stable orthostatics, acutely ill and decreased activity tolerance, anticipate progression towards stability with medical management   Clinical Decision Making (Complexity) Moderate complexity   Therapy Frequency Daily   Predicted Duration of Therapy Intervention (days/wks) 3-4 days   Anticipated Equipment Needs at Discharge front wheeled walker   Anticipated Discharge Disposition Home with Assist;Home with Home Therapy   Risk & Benefits of therapy have been explained Yes   Patient, Family & other staff in agreement with plan of care Yes   Clinical Impression Comments Patient demonstrates significant balance impairments in addition to cognitive deficits which require 24/7  "assistance for safe discharge home. Patient able to mobilize fair given her medical status. She would benefit from HHPT to address balance, safety and home set for improved quality of life and activity tolerance.    [facility] TM \"6 Clicks\"   2016, Trustees of [facility], under license to [employer].  All rights reserved.   6 Clicks Short Forms Basic Mobility Inpatient Short Form   [facility]  \"6 Clicks\" V.2 Basic Mobility Inpatient Short Form   1. Turning from your back to your side while in a flat bed without using bedrails? 4 - None   2. Moving from lying on your back to sitting on the side of a flat bed without using bedrails? 4 - None   3. Moving to and from a bed to a chair (including a wheelchair)? 4 - None   4. Standing up from a chair using your arms (e.g., wheelchair, or bedside chair)? 4 - None   5. To walk in hospital room? 3 - A Little   6. Climbing 3-5 steps with a railing? 3 - A Little   Basic Mobility Raw Score (Score out of 24.Lower scores equate to lower levels of function) 22   Total Evaluation Time   Total Evaluation Time (Minutes) 15     Thank you for your referral.    Tiffanie Moeller, PT, DPT, ATC    [facility]    O: [phone]  E: [email or web address]      "

## 2019-11-13 NOTE — PLAN OF CARE
VSS this shift other than blood pressures being lower. Provider updated on low afternoon check BP and Albumin and LR bolus given X1. Every 30 minute blood pressures for the length of the bolus per MD. Lung sounds with crackles in the bases. Pt has no complaints of pain this shift. Diet advanced to full diet, tolerating well. Abdomen rounded and distended. IV fluids were running at 75 mL/hr before bolus, temporarily stopped while bolus is infusing. X2 loose BM's this shift. Pt up to bathroom with SBA. Was able to shower today. Will continue to monitor.

## 2019-11-13 NOTE — PROVIDER NOTIFICATION
Critical value platelet count of 41, Hgb 7.7, WBC 3.4, BP 87/47 and flagged for sepsis. MD notified.

## 2019-11-13 NOTE — PLAN OF CARE
Discharge Planner PT   Patient plan for discharge: Home  Current status: Patient is a 54 year old female admitted from the ED with hepatic encephalopathy, acute kidney injury, alcoholic cirrhosis of liver and hypotension. patient has a previous medical history of end stage liver disease, reflux, splenomegaly, anxiety, depression, HTN and portal hypertension. Prior to admission patient living in a single family home with 4 stairs to enter and 12 to her basement level where she sleeps at times and likes to spend her day. She denies use of an assistive device for mobility, has a flat bed, step over tub and standard toilet. She reports three falls in the past 6 months, two while traveling recently and one upon return home. Her daughter was present during assessment and is not aware of travel by her mother. Patient lives with her  who is out of the house from 7am to 4pm. Her daughter is in school in [place] and is unable to assist. Currently, patient demonstrated IND bed mobility, good strength throughout bilat UE and LE. She demonstrates impaired coordination and per daughter is very confused. Patient is conversational and agreeable. IND sit to/from stand. Balance: loss of balance at 15 seconds with narrow base of support, eyes open. Eyes closed loss of balance within 5 seconds. Unable to achieve single limb stance on left and able to maintain on right for 3 seconds. Ambulated 200 ft with SBA, surface seeking behaviors, narrow stance width, fair environment navigation, forward center of gravity over her base of support with quick gait speed in order to maintain balance.    Barriers to return to prior living situation: Medical status, impaired cognition, lack of support, impaired balance  Recommendations for discharge: Home with 24/7 assistance, HHPT, HHOT  Rationale for recommendations: Patient demonstrates significant balance impairments in addition to cognitive deficits which require 24/7 assistance for safe  discharge home. Patient able to mobilize fair given her medical status. She would benefit from HHPT to address balance, safety and home set for improved quality of life and activity tolerance.        Entered by: Tiffanie Moeller 11/13/2019 2:59 PM     Thank you for your referral.    Tiffanie Moeller, PT, DPT, ATC    [facility]    O: [phone]  E: [email or web address]

## 2019-11-13 NOTE — PLAN OF CARE
"S-(situation): End of shift note    B-(background): bacterial peritonitis    A-(assessment): Hypotensive through night.  MD notified, bolus and albumin ordered and given. BP (!) 86/47   Pulse 80   Temp 97  F (36.1  C) (Oral)   Resp 18   Ht 1.549 m (5' 1\")   Wt 59.8 kg (131 lb 14.4 oz)   LMP 05/16/2012   SpO2 94%   BMI 24.92 kg/m  .  Afebrile. Lung sounds clear diminished. Disoriented to time, place and situation. Bowel sounds normoactive. Abdomin distended and taught. Loose watery stool x 3 with lactulose. Pt flagged for sepsis and had a critical value of 41 for platelet.      R-(recommendations): Continue to monitor per care plan.    "

## 2019-11-13 NOTE — TELEPHONE ENCOUNTER
Writing Nurse called patient to see where she wished to be seen. Patient informed writer that she wants to go to [place]. Writer gave information and phone number for her to call and make an appointment in [place]. Patient verbalized understanding of information and stated she will call to schedule.   Radha Li RN on 11/13/2019 at 12:49 PM

## 2019-11-13 NOTE — PROGRESS NOTES
"Initial IP occupational therapy evaluation     11/13/19 2750   Quick Adds   Type of Visit Initial Occupational Therapy Evaluation   Living Environment   Lives With spouse;child(lit), adult  (son 13 years old)   Living Arrangements house   Home Accessibility stairs within home   Number of Stairs, Within Home, Primary 4   Transportation Anticipated family or friend will provide   Living Environment Comment \"I have alot of my stuff in the basement with a full flight of stairs\"   Self-Care   Usual Activity Tolerance fair   Current Activity Tolerance poor   Regular Exercise No   Activity/Exercise/Self-Care Comment tub/shower combo with grab bar   Functional Level   Ambulation 0-->independent   Transferring 0-->independent   Toileting 0-->independent   Bathing 0-->independent   Dressing 0-->independent   Eating 0-->independent   Communication 0-->understands/communicates without difficulty   Swallowing 0-->swallows foods/liquids without difficulty   Cognition 2 - difficulty with organizing thoughts   Fall history within last six months yes   Number of times patient has fallen within last six months 3  (2x fell and hit head; possible concussion)   Which of the above functional risks had a recent onset or change? fall history;cognition   Prior Functional Level Comment Patient aware of cognitive deficits prior to admission resulting in pt feeling frustrated.   General Information   Onset of Illness/Injury or Date of Surgery - Date 11/11/19   Referring Physician Mayur Moncada MD   Patient/Family Goals Statement go home   Precautions/Limitations fall precautions   Cognitive Status Examination   Orientation orientation to person, place and time   Level of Consciousness lethargic/somnolent;confused   Follows Commands (Cognition) 50-74% accuracy   Memory impaired   Attention Reports problems attending;Divided attention impaired, difficulty with simultaneous tasks;Quiet environment required   Organization/Problem Solving " "Reports problems with problem solving during evaluation   Executive Function Working memory impaired, decreased storage of information for performing tasks;Self awareness/monitoring impaired   Cognitive Comment SLUMs completed with score of 7/30. Significant cognitive impairment; category Dementia scale   Visual Perception   Visual Perception Wears glasses   Range of Motion (ROM)   ROM Comment UE WNL   Strength   Strength Comments grasp 4+/5   Mobility   Bed Mobility Bed mobility skill: Supine to sit;Bed mobility skill: Sit to supine   Bed Mobility Comments IND; IND with blanket management.    Instrumental Activities of Daily Living (IADL)   Previous Responsibilities meal prep;housekeeping;laundry;shopping;yardwork;medication management;finances;   Activities of Daily Living Analysis   ADL Comments enjoys reading.  Trying to save money.   General Therapy Interventions   Planned Therapy Interventions ADL retraining;cognition   Clinical Impression   Criteria for Skilled Therapeutic Interventions Met yes, treatment indicated   OT Diagnosis decreased ease with BADL/IADLs   Influenced by the following impairments fall precautions, cognitive status   Assessment of Occupational Performance 5 or more Performance Deficits   Identified Performance Deficits prior medical history, safety, bathing, social engagement, community mobility, functional mobility within the home, cooking/meal prep, medication management, financial management.    Clinical Decision Making (Complexity) High complexity   Therapy Frequency Daily   Predicted Duration of Therapy Intervention (days/wks) 1-2 days   Anticipated Discharge Disposition Home with Assist;Home with Outpatient Therapy   Risks and Benefits of Treatment have been explained. Yes   Patient, Family & other staff in agreement with plan of care Yes   [facility] AM-PAC TM \"6 Clicks\"   2016, Trustees of [facility], under license to [employer].  All rights reserved.   6 " "Clicks Short Forms Daily Activity Inpatient Short Form   [facility] AM-PAC  \"6 Clicks\" Daily Activity Inpatient Short Form   1. Putting on and taking off regular lower body clothing? 3 - A Little   2. Bathing (including washing, rinsing, drying)? 3 - A Little   3. Toileting, which includes using toilet, bedpan or urinal? 3 - A Little   4. Putting on and taking off regular upper body clothing? 3 - A Little   5. Taking care of personal grooming such as brushing teeth? 3 - A Little   6. Eating meals? 4 - None   Daily Activity Raw Score (Score out of 24.Lower scores equate to lower levels of function) 19   Total Evaluation Time   Total Evaluation Time (Minutes) 30     Thank you for your referral.     ZAHIRA Hanks [facility]  Occupational Therapy     Phone: [phone]  Email: [email or web address]    "

## 2019-11-13 NOTE — PLAN OF CARE
"Discharge Planner OT   Patient plan for discharge: Home with family assist  Current status: Patient is a 54 year old female admitted from the ED with hepatic encephalopathy, acute kidney injury, alcoholic cirrhosis of liver and hypotension. patient has a previous medical history of end stage liver disease, reflux, splenomegaly, anxiety, depression, HTN and portal hypertension.  Patient lives in a house with her  and 13 year old son; no stairs to enter the house; a full flight of stairs to enter the basement; unsure of flight going upstairs due to difficulty articulating.  Patient has tub shower combo with 1 grab bar.  No regular exercises and fair to poor activity tolerance.  IND with dressing, bathing, meal prep, household chores, medication management, and shopping.  Patient does not drive due to legal reasons.  She also receives help for meal prep/cooking and finances from her .  Currently, patient is alert and oriented x3 with some difficulties sustaining arousal level; stated feeling \"tired\".  Cognitive screen completed on this date: SLUMS score 7/30 on the dementia scale rating level significant impairment with memory recall, naming, and problem solving. Patient is aware of possible cognitive deficits prior to admission stating she was \"frustrated\" by the deficits.  Patient was assessed to be a poor historian which may have been impacted by her medial status or fatigue.  Patient IND with bed mobility from supine with HOB raised to EOB; manages covers IND.  Discussed possible cognitive deficits and its impacting on BADLs/IADLs. Patient stated her  works until about 4 pm and son goes to school until 4 pm so she will be home alone during that time.       Barriers to return to prior living situation: fall risk, cognition  Recommendations for discharge: home with 24/7 family assist; HHOT  Rationale for recommendations: Patient would benefit from HH OT to increase patient's independence and " safety with BADL/IADL tasks within her home environment, and to ensure patient's safety and ability to be alone within her current living environment.  Patient should be provided assistance with higher level cognitive tasks such as medication management, financial management, cooking, and navigating novel environments.          Entered by: Dana Monterroso 11/13/2019 10:02 AM

## 2019-11-13 NOTE — PLAN OF CARE
Blood pressure stable, 90's/50's after 500ml LR bolus and Albumin. Patient continues to be asymptomatic with lower BP. Alert, confused to situation and intermittent place. Patient had 5 loose watery black/brown stool this afternoon after taking lactulose. Tolerating clear liquids. Denies nausea. Paracentesis site is CDI. Abdomen is less distended and softer to touch, patient also reports better abdominal relief. Up with stand-by assist to bedside commode. Continue to monitor patient and plan of care.

## 2019-11-13 NOTE — PROVIDER NOTIFICATION
"NSA alerted staff that patient's blood pressure was low.  BP (!) 86/47   Pulse 80   Temp 97  F (36.1  C) (Oral)   Resp 18   Ht 1.549 m (5' 1\")   Wt 59.8 kg (131 lb 14.4 oz)   LMP 05/16/2012   SpO2 94%   BMI 24.92 kg/m      Patient asymptomatic.  Lying in bed.  MD notified and orders received for 25g of albumin and 500mL bolus of LR over 2 hrs.  Will continue to closely monitor.  "

## 2019-11-13 NOTE — PROGRESS NOTES
S-(situation): Confusion, Cardiovascular    B-(background): Admitted on 11/10 with acute kidney injury    A-(assessment): Patient is A&O to person, place, and time, but conversations are often illogical with slow, mumbled responses. She scored a 7/30 on [facility] assessment see OT note for more specifics. Ammonia levels have decreased from 56 to 22 with lactulose given 3x per day. Vitals: Temp: 96.9, Pulse: 80, RR: 16, BP: 79/42, O2 Sat: 98%. Albumin and a bolus of LR given at 1300 to increase blood pressure.     R-(recommendations): Continue to monitor blood pressure periodically and reorient patient as needed. SN Adelso

## 2019-11-13 NOTE — PROGRESS NOTES
[facility]    Medicine Progress Note - Hospitalist Service       Date of Admission:  11/10/2019  Assessment & Plan    Patient is a 54-year-old female with alcoholic cirrhosis who was admitted on 11/10/2019 secondary to acute renal failure, decompensated cirrhosis with acute encephalopathy, hypotension, and worsening ascites.  Her initial hypotension resolved with fluid resuscitation and renal function and urinary output improved.  Home lactulose and home cirrhotic medications were restarted at time of hospitalization as patient apparently had discontinued them prior to admission and she began having improvement of her encephalopathy as well.  Patient had reported hematochezia prior to hospitalization but none has been visualized on this hospitalization and Hemoccult stool testing was negative with her hemoglobin stable.  Procalcitonin was elevated at 0.80 on initial evaluation but no obvious source of infection was present.  On hospital day #2 patient developed increasing abdominal pain and a paracentesis was performed with increased concern for spontaneous bacterial peritonitis with white blood cell count over 500  although no organisms noted on Gram stain.  Patient was started on Rocephin.  Her procalcitonin has been trending downward appropriately and patient's abdominal pain has started to improve.  She has been struggling with persistent hypotension following paracentesis, during which 7 L were removed.  Patient has received albumin IV as well as fluid bolusing x2 secondary to this persistent hypotension since yesterday afternoon.  Blood pressures continue to be low with most recent check in the 79-82 systolic range.    Principal Problem:    SBP (spontaneous bacterial peritonitis) (H)    Assessment: Suspected based on increased confusion, increased abdominal pain, hypotension and paracentesis showing over 500 white blood cells.  Culture is currently pending.  Patient is on  Rocephin with improvement of her mentation and abdominal pain    Plan: Continue with Rocephin and monitor for culture results.  If patient does need recurrent paracentesis secondary to worsening ascites, would reevaluate white blood cell count at that time to ensure ongoing improvement.  Continue with procalcitonin monitoring.    Active Problems:    Hypotension, unspecified hypotension type    Assessment: Present initially and had improved with fluid resuscitation, however following paracentesis performed on 11/12/2019 patient has had persistent hypotension requiring multiple doses of IV  albumin as well as fluid resuscitation.  patient's most recent blood pressure checks were 79 and 82 systolic    Plan: We will proceed with another 25 g of albumin IV as well as a 1 L lactated Ringer bolus given over the next 4 hours.  Nursing staff will inform if the patient's systolic becomes above 100 and consideration of fluid bolus discontinuation would occur to try and minimize recurrent ascites development.  We will continue to monitor closely and repeat further albumin and fluids as needed to maintain maps above 65.      Acute renal failure (H)    Assessment: Present at time of admission with creatinine peak of 2.63 and oliguria noted however patient has improved greatly in the past 24 hours with creatinine now 1.62  with urine output now normalizing.    Plan: We will continue with management of patient's persistent hypotension and in efforts to maintain adequate renal perfusion.  Encourage increase oral intake as tolerated and will continue with maintenance fluids at 75 cc an hour following most recent bolus as above.      Acute encephalopathy - hepatic vs sepsis    Assessment: Patient initially was significantly confused and there was concern for hepatic encephalopathy as patient had not recently been taking her lactulose however there is also been concern for acute encephalopathy secondary to infection due to SBP as  above.  The confusion has started to improve following reinitiation of lactulose as well as initiation of Rocephin however patient continues to appear unable to appropriately track conversation Occupational Therapy did perform a cognitive evaluation today and patient scored a 7 out of 30 on her slums score.      Plan: We will continue to monitor during this hospitalization and perhaps consider repeat cognitive testing closer to discharge to see if patient has made any significant improvement and to assist in disposition planning going forward.      Alcoholic cirrhosis of liver with ascites (H)    Assessment: Decompensated with patient having ongoing increased ascites, hepatic encephalopathy, and concern for possible hepatorenal syndrome.  Initial MELD score was 26 at admission, trending down but still worse than the 15 it was in September of this year.      Plan: We will continue with resumption of home regimen and monitor closely for improvement.  May need to consider consultation with the [facility]'s hepatology team if patient does not continue to have improvement.  If she is able to remain at this facility until discharge, patient will need close outpatient follow-up with the hepatology team to assist in management of her cirrhosis going forward.      Non-intractable vomiting with nausea    Assessment: Present initially, limiting patient's oral intake prior to admission.  Is resolving and patient is tolerating clear liquids without difficulty    Plan: We will advance to full liquids and continue to advance diet as tolerated.  Continue with PRN antiemetics as needed.      Macrocytic anemia    Assessment: Present at baseline.  Patient did have a 1 g drop of her hemoglobin following initial hemodilution.  Hemoglobin has continued to trend downward with IV fluid resuscitation that is been necessary in the last 24 hours and is currently 7.7 today.    Plan: We will continue to monitor  closely and consider blood transfusion if hemoglobin gets under 7.0 during his hospitalization.      Hematochezia-patient reported    Assessment:  Reported prior to admission per the patient however none has been identified during this hospitalization and Hemoccult stool was negative.     Plan: Continue to monitor.  Patient may need an outpatient diagnostic colonoscopy to further evaluate going forward.      Hyponatremia    Assessment: Mildly low at 131 at time of admission, now resolved following IV fluid resuscitation    Plan: Continue to monitor      Hypopotassemia    Assessment: Present initially, has resolved with supplementation and is currently 3.6    Plan: We will continue to monitor and supplement as needed.      Thrombocytopenia (H)    Assessment: Present on admission and thought secondary to patient's underlying cirrhosis.  Platelets continue to worsen today and are down to 41, no active bleeding has been identified    Plan: Continue to monitor daily, would consider platelet transfusion if there is increased concern for active bleeding.      Alcoholism in recovery (H)    Assessment:  Chronic and patient denies recent alcohol use    Plan: No acute intervention is needed      Esophageal reflux    Assessment: Chronic and stable    Plan: Continue antacid      Tobacco abuse    Assessment: Chronic and stable    Plan:  Continue nicotine replacement during this hospital stay      Solitary kidney, acquired    Assessment: Chronic after previously donating a kidney    Plan: Continue treatment of acute renal failure as above      Diet: Advance Diet as Tolerated: Full Liquid Diet; Full Liquid Diet    DVT Prophylaxis: Pneumatic Compression Devices  Gonzalez Catheter: not present  Code Status: Full Code      Disposition Plan   Expected discharge: 2-4 days, recommended to TCU vs home with home care depending on patient's clinical course once Blood pressure and hemoglobin stabilized, patient acute kidney injury has  resolved's, cirrhosis is better managed and appropriate discharge plan is been identified.  Entered: Mary Wood MD 11/13/2019, 12:47 PM       The patient's care was discussed with the Bedside Nurse, Care Coordinator/, Patient and Patient's Family.    Mary Wood MD  Hospitalist Service  [facility]    ______________________________________________________________________    Interval History   Patient continued to have intermittently persistently low blood pressures following paracentesis yesterday.  She is received IV albumin as well as fluid bolusing twice since yesterday afternoon which provides transient improvement of her blood pressure but then hypotension recur several hours after administration.  Patient does continue to cognitively clear.  She is tolerating clears without difficulty and is wanting to try something more for her diet.  Patient has no new concerns.  She reports that she feels much better than she did yesterday and her abdominal pain is improving greatly.    Data reviewed today: I reviewed all medications, new labs and imaging results over the last 24 hours.    Physical Exam   Vital Signs: Temp: 96.9  F (36.1  C) Temp src: Oral BP: 91/51 Pulse: 76   Resp: 16 SpO2: 97 % O2 Device: None (Room air)    Weight: 116 lbs 9.97 oz  Constitutional: awake, alert, cooperative, no apparent distress, and appears stated age  Respiratory: No increased work of breathing, good air exchange, clear to auscultation bilaterally, no crackles or wheezing  Cardiovascular: Normal apical impulse, regular rate and rhythm  GI: Bowel sounds are present, abdomen is distended but soft and without tenderness on palpation  Skin: Ongoing jaundice is noted, no rashes or lesions identified  Neurologic: Patient is awake and alert, she continues to have inappropriate conversation at times and does not appear to understand the significance of much that has been  told to her but is able to maintain alertness today.    Data   Recent Labs   Lab 11/13/19  0601 11/12/19  1330 11/12/19  0548 11/11/19  1643 11/11/19  1401 11/11/19  0558 11/10/19  2248   WBC 3.4*  --   --   --   --  5.2 7.0   HGB 7.7*  --  8.7* 8.8* 8.1* 8.2* 9.7*   MCV  --   --   --   --   --  109* 108*   PLT 41*  --  59*  --   --  58* 73*   INR 1.39*  --  1.30* 1.26*  --   --  1.18*     --  135  --  134 131* 131*   POTASSIUM 3.6 3.6 3.3*  --   --  3.7 4.0   CHLORIDE 110*  --  106  --   --  99 96   CO2 20  --  22  --   --  23 24   BUN 27  --  39*  --   --  45* 44*   CR 1.62*  --  2.32*  --  2.57* 2.56* 2.63*   ANIONGAP 8  --  7  --   --  9 11   ELSIE 7.6*  --  7.4*  --   --  7.7* 8.4*   GLC 92  --  108*  --   --  83 100*   ALBUMIN 2.3*  --  2.0*  --   --  1.8* 2.1*   PROTTOTAL 4.4*  --  4.5*  --   --  4.2* 5.1*   BILITOTAL 7.0*  --  7.9*  --   --  8.2* 9.2*   ALKPHOS 138  --  181*  --   --  169* 202*   ALT 14  --  20  --   --  20 23   AST 22  --  34  --   --  34 54*   LIPASE  --   --   --   --   --   --  57*

## 2019-11-14 NOTE — PLAN OF CARE
Discharge Planner OT   Patient plan for discharge: return home if able  Current status: Pt agreed to OT session.  She was sleeping when entering room , easily aroused.  Sat upright in the bed, HOB elevated for support and comfort.  SisterLuciana present in the room and education.  OT reviewed results of the SLUMS with pt and family.  Discussed concerns for safety as with recall for medication, problem solving and cooking.  Sister stated they have an auto medicine box with alarm which she and family could use; as not to miss a dosage.  Discussion of safe discharge plan and the recommendation for 24/7 supervision.  Family looking into options for meeting this recommendation.  OT did discuss TCU, if other arrangements could not be completed. Reviewed memory strategies and handout.  Educated and provided handout on falls prevention to implement within the home as needed. OT educated to assist on improved alert state and awareness; opening shades, sit upright and participating in things she enjoys ie; table top art or coloring.  Barriers to return to prior living situation: cognitive impairment, level of safety and assist needed, fall risk  Recommendations for discharge: home with 24/7 family assist; HHOT, HHPT, HH nursing and HHaide  Rationale for recommendations: Patient would benefit from HH OT to increase patient's independence and safety with BADL/IADL tasks within her home environment, and to ensure patient's safety and ability to be alone within her current living environment.  Patient should be provided assistance with higher level cognitive tasks such as medication management, financial management, cooking, and navigating novel environments.          Entered by: Lay Bowles 11/14/2019 12:43 PM

## 2019-11-14 NOTE — PLAN OF CARE
S-(situation): Physical therapy treatment per plan of care    B-(background): Patient is a 54 year old female admitted from the ED with hepatic encephalopathy, acute kidney injury, alcoholic cirrhosis of liver and hypotension. patient has a previous medical history of end stage liver disease, reflux, splenomegaly, anxiety, depression, HTN and portal hypertension.    A-(assessment): Patient status post paracentesis, per protocol on bedrest for four hours. Patient remains hypostatic at this time.     R-(recommendations): Hold treatment 11/14/19 and reassess 11/15/19 for resumption of PT services.    Thank you for your referral.    Tiffanie Moeller, PT, DPT, ATC    [facility]    O: [phone]  E: [email or web address]

## 2019-11-14 NOTE — PLAN OF CARE
Vss. Pt has 13 pound increase from yesterday's weight. Abdominal ascites appears to be worsening, abdomen is round, edema noted when auscultating bowel sounds with stethoscope. Pt this morning has pain to bilateral upper quadrants, repositioning has helped and warm pack applied. Bowel sounds are active. Has had three loose stools overnight. See I and O. Skin is bronze/jaundiced, sclera jaundiced. Pt is alert though slight lethargic at times and is oriented but at times is nonspecific about current situation. Platelets 40 this morning, charge nurse notified Dr. Cummings, no orders at this time. Will continue to monitor per plan of care, monitor labs, abdominal ascites, vs, I and O, weight.

## 2019-11-14 NOTE — PROGRESS NOTES
[facility]    Medicine Progress Note - Hospitalist Service       Date of Admission:  11/10/2019  Assessment & Plan     Patient is a 54-year-old female with alcoholic cirrhosis who was admitted on 11/10/2019 secondary to acute renal failure, decompensated cirrhosis with acute encephalopathy, hypotension, and worsening ascites.  Her initial hypotension resolved with fluid resuscitation and renal function and urinary output improved.  Home lactulose and home cirrhotic medications except her previous diuretic medications were restarted at time of hospitalization as patient apparently had discontinued them prior to admission. Patient had reported hematochezia prior to hospitalization but none has been visualized on this hospitalization and Hemoccult stool testing was negative with her hemoglobin stable.  Procalcitonin was elevated at 0.80 on initial evaluation but no obvious source of infection was present.  On hospital day #2 patient developed increasing abdominal pain and a paracentesis was performed with increased concern for spontaneous bacterial peritonitis with white blood cell count over 500  although no organisms noted on Gram stain.  Patient was started on Rocephin.  Her procalcitonin has been trending downward appropriately and patient's abdominal pain has started to improve, encephalopathy has slowly been improving.  She struggled greatly with persistent hypotension following paracentesis, during which 7 L were removed and required three doses of IV albumin IV as well as fluid bolusing to improve her blood pressures.  Blood pressure have not been stable in the  range systolic overnight, with ongoing improvement of her renal function, however patient once more has growing ascites in her abdomen with increasing abdominal pain noted.  Discussed this case with Dr. Savannah Suero, hepatologist from the [facility], who is recommending recurrent  paracentesis be performed today however no more than 4 L of fluid being removed with each paracentesis with ongoing IV albumin following.  She also recommended initiation of Midodrin 5 mg 3 times daily to help improve blood pressures overall, with possible consideration of increasing to 10 mg 3 times daily if persistent hypotension continues to develop going forward.  She is also recommending repeat CBC with differential as well as Gram stain and culture to further evaluate SBP progression following antibiotic initiation.   Patient will need outpatient hepatology follow-up after discharge to reevaluate patient overall situation going forward.  Patient and her family were informed of these recommendations and is wanting to proceed.     Principal Problem:    SBP (spontaneous bacterial peritonitis) (H)    Assessment: Suspected based on increased confusion, increased abdominal pain, hypotension and paracentesis showing over 500 white blood cells.  Culture is currently pending.  Patient is on Rocephin with improvement of her mentation and abdominal pain and procalcitonin trending downward appropriately     Plan: Continue with Rocephin and monitor for culture results.  Patient does require repeat paracentesis as above, will perform CBC with differential as well as Gram stain and culture for reevaluation of SBP progression as above    Active Problems:    Hypotension, unspecified hypotension type    Assessment: Present initially and had improved with fluid resuscitation, however following paracentesis performed on 11/12/2019 patient has had persistent hypotension into the 70-low 80s systolic with MAPs less than 60 and she requiring multiple doses of IV albumin as well as fluid resuscitation until resolution finally occurred.  Blood pressures have been stable since last evening but patient is now needing another paracentesis as above.     Plan: Will initiate Midrin 5 mg 3 times daily as recommended by the hematologist.  We  will proceed with paracentesis as above and monitor blood pressure closely for tolerance during the procedure as well as afterwards.  We will also proceed with recurrent IV albumin 25 g following paracentesis in an effort to decrease likelihood of hypotension going forward.  Goal at this time is to keep MAP above 60      Acute renal failure (H)    Assessment: Present at time of admission with creatinine peak of 2.63 and oliguria noted however patient has improved greatly with creatinine now 1.29 with urine output now normalizing.  Previous baseline 0.7-0.9    Plan: Will proceed with paracentesis, saline lock IV fluids if blood pressures will allow but monitor renal function closely for tolerance.  Will not initiate patient's previous home diuretic regimen at this time, but may be able to consider if renal function returns to previous baseline and patient's blood pressures would tolerate - will need to monitor renal function closely during this hospitalization and following to ensure renal tolerance of any diuretic started.        Acute encephalopathy - hepatic vs sepsis    Assessment: Patient initially was significantly confused and there was concern for hepatic encephalopathy as patient had not recently been taking her lactulose however there is also been concern for acute encephalopathy secondary to infection due to SBP as above.  The confusion has started to improve following reinitiation of lactulose as well as initiation of Rocephin however patient continues to appear unable to appropriately track conversation Occupational Therapy did perform a cognitive evaluation on 11/13/19and patient scored a 7 out of 30 on her slums score.      Plan: We will continue to monitor during this hospitalization and perhaps consider repeat cognitive testing closer to discharge to see if patient has made any significant improvement and to assist in disposition planning going forward.        Alcoholic cirrhosis of liver with  ascites (H)    Assessment: Decompensated with patient having ongoing increased ascites, hepatic encephalopathy, and concern for possible hepatorenal syndrome.  Initial MELD score was 26 at admission, trending down and is 20 today but still worse than the 15 it was in September of this year.      Plan: We will continue with resumption of home regimen apart from previous diuretics and monitor closely for improvement.  Will consider cautious reinitiation of diuretics if able during this hospitalization.  Patient will need close outpatient follow-up with the hepatology team to assist in management of her cirrhosis going forward.      Non-intractable vomiting with nausea    Assessment: Present initially, limiting patient's oral intake prior to admission.  Is resolving and patient is tolerating dietary advancement without difficulty    Plan: Continue with PRN antiemetics as needed.      Macrocytic anemia    Assessment: Present at baseline.  Patient did have a 1 g drop of her hemoglobin following initial hemodilution.  Hemoglobin has stabilized in the upper 7 range.      Plan: We will continue to monitor closely and consider blood transfusion if hemoglobin gets under 7.0 during his hospitalization.      Hematochezia-patient reported    Assessment:  Reported prior to admission per the patient however none has been identified during this hospitalization and Hemoccult stool was negative.     Plan: Continue to monitor.  Patient may need an outpatient diagnostic colonoscopy to further evaluate going forward.      Hyponatremia    Assessment: Mildly low at 131 at time of admission, now resolved following IV fluid resuscitation    Plan: Continue to monitor      Hypopotassemia    Assessment: Present initially and needed ongoing intermittent supplementation    Plan: We will continue to monitor and supplement as needed.      Thrombocytopenia (H)    Assessment: Present on admission and thought secondary to patient's underlying  cirrhosis.  Platelets have been in the low 40s, no active bleeding has been identified    Plan: Continue to monitor daily, would consider platelet transfusion if there is increased concern for active bleeding.      Alcoholism in recovery (H)    Assessment:  Chronic and patient denies recent alcohol use    Plan: No acute intervention is needed      Esophageal reflux    Assessment: Chronic and stable    Plan: Continue antacid      Tobacco abuse    Assessment: Chronic and stable    Plan:  Continue nicotine replacement during this hospital stay      Solitary kidney, acquired    Assessment: Chronic after previously donating a kidney    Plan: Continue treatment of acute renal failure as above      Diet: Regular Diet Adult  Room Service    DVT Prophylaxis: Pneumatic Compression Devices  Gonzalez Catheter: not present  Code Status: Full Code      Disposition Plan   Expected discharge: 2-4 days, recommended to home with 24/7 support and home care vs TCU depending on family's ability to provide care and patient's progression during this stay once adequate pain management/ tolerating PO medications, antibiotic plan established, mental status at baseline, renal function improved and blood pressures remain stable with safe disposition plan in place..  Entered: Mary Wood MD 11/14/2019, 11:27 AM       The patient's care was discussed with the Bedside Nurse, Care Coordinator/, Patient, Patient's Family and telephone consultation with Dr. Savannah Suero, hepatologist from the [facility].    Mary Wood MD  Hospitalist Service  [facility]    ______________________________________________________________________    Interval History   Patient's blood pressures have now stabilized overnight.  Patient continues to be intermittently confused and at times her conversation is very bizarre and not appropriate.  She is complaining of increasing  abdominal pressure and discomfort today and her appetite is slightly lessened.  Nursing staff is noting some crackles in her bilateral bases.  Vital signs have been stable.  No additional concerns    Data reviewed today: I reviewed all medications, new labs and imaging results over the last 24 hours.    Physical Exam   Vital Signs: Temp: 97.3  F (36.3  C) Temp src: Oral BP: 105/60 Pulse: 81 Heart Rate: 84 Resp: 18 SpO2: 95 % O2 Device: None (Room air)    Weight: 129 lbs 4.8 oz  Constitutional: Sleepy but does awaken to voice and is oriented to person and place but continues to make bizarre comments and answers are not always appropriate for questions asked.  No acute distress, appears ongoing chronically ill, older than stated age and is obviously jaundiced  Respiratory: No increased work of breathing, decreased air exchange,  fine crackles heard at bilateral lower bases  Cardiovascular: Normal apical impulse, regular rate and rhythm  GI: Bowel sounds present, abdomen is significantly distended and more firm to palpation than yesterday with mild diffuse tenderness on palpation noted  Skin: Ongoing obvious jaundice unchanged from yesterday  Musculoskeletal: no lower extremity pitting edema present  Neurologic: Sleepy but does awaken to voice, oriented to person and place only, appears to have limited insight into her current medical status but is able to express a desire to have a paracentesis to help her abdominal symptoms    Data   Recent Labs   Lab  11/14/19  0531 11/13/19  0601  11/12/19  0548  11/11/19  0558 11/10/19  2248   WBC  4.0 3.4*  --   --   --  5.2 7.0   HGB  7.8* 7.7*  --  8.7*   < > 8.2* 9.7*   MCV  115*  --   --   --   --  109* 108*   PLT  40* 41*  --  59*  --  58* 73*   INR  1.42* 1.39*  --  1.30*   < >  --  1.18*   NA  140 138  --  135   < > 131* 131*   POTASSIUM  3.2* 3.6   < > 3.3*  --  3.7 4.0   CHLORIDE   110*  --  106  --  99 96   CO2   20  --  22  --  23 24   BUN   27  --  39*  --  45* 44*    CR   1.62*  --  2.32*   < > 2.56* 2.63*   ANIONGAP   8  --  7  --  9 11   ELSIE   7.6*  --  7.4*  --  7.7* 8.4*   GLC   92  --  108*  --  83 100*   ALBUMIN   2.3*  --  2.0*  --  1.8* 2.1*   PROTTOTAL   4.4*  --  4.5*  --  4.2* 5.1*   BILITOTAL   7.0*  --  7.9*  --  8.2* 9.2*   ALKPHOS   138  --  181*  --  169* 202*   ALT   14  --  20  --  20 23   AST   22  --  34  --  34 54*   LIPASE    --   --   --   --   --  57*    < > = values in this interval not displayed.

## 2019-11-14 NOTE — PLAN OF CARE
Pt had 4 Liters out from paracentesis done at 1200. Vitals stable after paracentesis. SBP 90's-100's. Midodrine started as scheduled. Pt having loose/liquid stools and voiding adequately. Potassium recheck was 3.8 after replacement. Pt has minimal appetite. Ongoing explanation provided to pt. Pt's daughter called and update given.

## 2019-11-14 NOTE — PROGRESS NOTES
S:  End of shift note    B: Bacterial peritonitis/ hypotensive     A. BP during her 1000 ml bolus over 4 hours consistently 96/50's .  IVF @ 75 hr after bolus, with scheduled Rocephin.  Urine output 400  Oral intake 290, BM X 2.  Stools watery. Alert and orientated, but at times appears confused. Sisters here this evening, ambulating in the halls. Bed alarm zone 1.      R:  Continue to monitor BP, I and O.

## 2019-11-14 NOTE — PROGRESS NOTES
DATE:  11/14/2019   TIME OF RECEIPT FROM LAB:  0550  LAB TEST:  Platelets   LAB VALUE:  40  RESULTS GIVEN WITH READ-BACK TO (PROVIDER):  Janine peña  TIME LAB VALUE REPORTED TO PROVIDER:   0591

## 2019-11-14 NOTE — CONSULTS
CARE TRANSITION SOCIAL WORK INITIAL ASSESSMENT:      Met with: Patient.    DATA  Principal Problem:    SBP (spontaneous bacterial peritonitis) (H)  Active Problems:    Esophageal reflux    Alcoholism in recovery (H)    Alcoholic cirrhosis of liver with ascites (H)    Thrombocytopenia (H)    Tobacco abuse    Non-intractable vomiting with nausea    Acute renal failure (H)    Solitary kidney, acquired    Hepatic encephalopathy (H)    Hypotension, unspecified hypotension type    Macrocytic anemia    Hematochezia-patient reported    Hyponatremia    Hypopotassemia             Contact information and PCP information verified: Yes      ASSESSMENT  Cognitive Status: awake, alert and oriented.                      Description of Support System: Supportive, Involved   Who is your support system?: , Children       Insurance Concerns: No Insurance issues identified  Living Arrangements: house        This writer met with patient and introduced self and role. Discussed discharge planning and medicare guidelines in regards to home care and SNF benefits.     Discussed current recommendation for TCU placement, as patient does not have someone that can be with her 24/7 at home.      Patient was provided with Medicare certified nursing home list. Pts choices are as follows: [facility] (Main Phone: [phone] Admissions Phone: [phone] Fax: [phone])[facility] (Phone: [phone] Fax: [phone]), [facility]  (Phone: [phone] Fax: [phone]). Referrals being sent.      Anticipate that patient may be ready for discharge on Sunday, 11/17 or Monday, 11/18.        PLAN    TCU referrals are pending. Care Transitions will continue to follow.         TATY Atkinson

## 2019-11-14 NOTE — PROGRESS NOTES
Patient has undergone the paracentesis with 4 L removed as recommended by hematology.  Blood pressures have remained stable during that paracentesis with systolic blood pressure consistently above 90.  We will continue to increase vital monitoring frequency to ensure ongoing stabilization.  IV albumin is running now.    Patient's WBC in the paracentesis fluid is decreasing appropriately - down to 191 with only 19% neutrophils compared to the 633 with 58% neutrophils present two days prior.  Repeat gram stain and fluid culture are pending.      Electronically Signed:  Mary Wood MD

## 2019-11-14 NOTE — PROGRESS NOTES
1140 paracentesis procedure started, vital signs being monitored at least every 10 minutes, draining well  1200 tap stopped with a total of 4 liters returned then stopped. Blood pressure stable throughout.   1205 post procedure Albumin x1 order started   1215 visiting with her sister, sitting up eating noon meal.Valeria SAENZ

## 2019-11-15 NOTE — PROGRESS NOTES
S:  End of shift note    B:  Cirrhosis, hypotensive, ascities    A:  Paracentesis today with 4 L fluid removed, pt states she is breathing better, tap sites covered with band aid / gauze. Up with SBA in room.  Continuing to have loose watery stools with pt receiving lactulose.  LS coarse in bases with RUL exp wheezes.  VSS, BP 99/45  and 105/59 HR 75. Afebrile.  Appetite poor.  K+ replaced today recheck 3.8.  Started on Midodrin today to help with BP's.  Nicotine patch placed today.    R:  Continue with POC, monitor BP's

## 2019-11-15 NOTE — PROGRESS NOTES
"Writer visited with patient and her sister, Edwin, regarding discharge planning.  Patient in agreement with Edwin being involved in the visit.      Discussed current recommendation for TCU placement.  [facility] has sent assessment information to patient's [employer] insurance for their review.  [facility] requires prior authorization for TCU placement.  Admissions at [facility] is skeptical, and stated that [facility] may say that patient is \"doing too well\" with her functional abilities to qualify for TCU.  They also stated that [facility] can take a week for prior authorization to go through.  [id number]    [facility] has called and stated that they cannot accept, as they feel patient does not meet the criteria for their TCU, as she is functionally doing too well.     [facility] has assessed and called back stating that they \"do not have an appropriate bed for this patient\".     Discussed together other possible discharge options.  Patient stated that she could potentially go stay with her in [facility] in [place], as they are home all the time. Discussed home health care option of RN, OT, PT, aide, but patient would need supplemental home care to cover all the hours of 7am to 4pm when her  is at work. Discussed private pay home care.  Patient feels it would be a hardship to pay for in home care or to pay for a nursing home.      Discussed [facility] and sister, Edwin, brought patient an automatic medication dispenser that talks.  Patient plans to explore her options for 24/7 care further.  She would like to go to the TCU at [facility] if her insurance will cover it.  Care Transitions to continue to follow for discharge planning.    "

## 2019-11-15 NOTE — PROGRESS NOTES
[facility]    Medicine Progress Note - Hospitalist Service       Date of Admission:  11/10/2019  Assessment & Plan   Patient is a 54-year-old female with alcoholic cirrhosis who was admitted on 11/10/2019 secondary to acute renal failure, decompensated cirrhosis with acute encephalopathy, hypotension, and worsening ascites.  Her initial hypotension resolved with fluid resuscitation and renal function and urinary output improved.  Home medications except her previous diuretic medications were restarted at time of hospitalization as patient apparently had discontinued them prior to admission. Patient had reported hematochezia prior to hospitalization but none has been visualized on this hospitalization and Hemoccult stool testing was negative with her hemoglobin stable.  Procalcitonin was elevated at 0.80 on initial evaluation but no obvious source of infection was present.  On hospital day #2 patient developed increasing abdominal pain and a paracentesis was performed with increased concern for spontaneous bacterial peritonitis with white blood cell count over 500  although no organisms noted on Gram stain.  Patient was started on Rocephin.  Her procalcitonin has been trending downward appropriately and patient's abdominal pain has started to improve, encephalopathy has slowly been improving.  She struggled greatly with persistent hypotension following paracentesis, during which 7 L were removed and required three doses of IV albumin IV as well as fluid bolusing to improve her blood pressures.  Blood pressure have not been stable in the  range systolic overnight, with ongoing improvement of her renal function, however patient once more has growing ascites in her abdomen with increasing abdominal pain noted.  Discussed this case with Dr. Savannah Suero, hepatologist from the [facility], who is recommending recurrent paracentesis be repeated on 11/14/19  however no more than 4 L of fluid being removed with each paracentesis with ongoing IV albumin following.  She also recommended initiation of Midodrin 5 mg 3 times daily to help improve blood pressures overall, with possible consideration of increasing to 10 mg 3 times daily if hypotension persists.  She is also recommending repeat CBC with differential as well as Gram stain and culture to further evaluate SBP progression following antibiotic initiation.   Patient will need outpatient hepatology follow-up after discharge to reevaluate patient overall situation going forward.  Patient and her family were informed of these recommendations and is wanting to proceed.      Principal Problem:  SBP (spontaneous bacterial peritonitis) (H), present on admission  Septic shock due to above, resolved  Possible Staphylococcus lugdunensis colonization  Alcoholic cirrhosis of liver with ascites (H), Initial MELD score was 26 at admission,it was in 15 September of this year.   Continue Rocephin  Ascitic fluid and blood cultures negative so far  Will discuss with ID Monday regarding antibiotic to discharge her on to TCU  Avoid large volume paracentesis above 4 liters each time  Albumin following each paracentesis  Continue midodrine  Hold home propranolol  Restart lasix at lower dose tomorrow  close outpatient follow-up with the hepatology team to assist in management of her cirrhosis going forward.     Acute renal failure (H), resolving  Solitary kidney, acquired  Previous baseline 0.7-0.9   monitor closely     Acute encephalopathy, multiple etiologies: hepatic and sepsis, improving  patient continues to appear unable to appropriately track conversation Occupational Therapy did perform a cognitive evaluation on 11/13/19and patient scored a 7 out of 30 on her slums score, consider repeat cognitive testing after discharge with PCP  Continue lactulose and rifaximin    Nicotine dependence  -continue nicoderm patch      Non-intractable  vomiting with nausea    Assessment: Present initially, limiting patient's oral intake prior to admission.  Is resolving and patient is tolerating dietary advancement without difficulty    Plan: Continue with PRN antiemetics as needed.      Macrocytic anemia   continue to monitor closely   Repeat B12 pending       Hematochezia-patient reported    Assessment:  Reported prior to admission per the patient however none has been identified during this hospitalization and Hemoccult stool was negative.     Plan: Continue to monitor.  Patient may need an outpatient diagnostic colonoscopy to further evaluate going forward.       Hyponatremia, resolved       Hypopotassemia, resolved       Thrombocytopenia (H), stable    Plan: Continue to monitor daily, would consider platelet transfusion if there is increased concern for active bleeding.       Esophageal reflux    Assessment: Chronic and stable    Plan: change from iv protonix to home po prilosec         Diet: Regular Diet Adult  Room Service    DVT Prophylaxis: Pneumatic Compression Devices  Gonzalez Catheter: not present  Code Status: Full Code      Disposition Plan   Expected discharge: Monday, recommended to transitional care unit once safe disposition plan/ TCU bed available.  Entered: Shanta Pineda MD 11/15/2019, 5:31 PM       The patient's care was discussed with the Patient.    Shanta Pineda MD  Hospitalist Service  [facility]    ______________________________________________________________________    Interval History   Can't keep her train of thought during prolonged converstaion    Data reviewed today: I reviewed all medications, new labs and imaging results over the last 24 hours. I personally reviewed no images or EKG's today.    Physical Exam   Vital Signs: Temp: (P) 98  F (36.7  C) Temp src: (P) Oral BP: 121/60 Pulse: (P) 80 Heart Rate: 70 Resp: (P) 18 SpO2: (P) 100 % O2 Device: None (Room air)    Weight: 121 lbs 11.2 oz  Constitutional:  Awake, alertx3, cooperative, no apparent distress.  Eyes: Conjunctiva and pupils examined and normal.  HEENT: Moist mucous membranes, normal dentition.  Respiratory: Clear to auscultation bilaterally, no crackles or wheezing.  Cardiovascular: Regular rate and rhythm, normal S1 and S2, and no murmur noted.  GI: Soft, non-distended, non-tender, normal bowel sounds. Small ascites  Lymph/Hematologic: No anterior cervical or supraclavicular adenopathy.  Skin: No rashes, no cyanosis, no edema.  Musculoskeletal: No joint swelling, erythema or tenderness.  Neurologic: Cranial nerves 2-12 intact, normal strength and sensation.  Psychiatric: Alert, oriented to person, place and time, no obvious anxiety or depression, poor historian    Data   Recent Labs   Lab 11/15/19  0536 11/14/19  1223 11/14/19  0531 11/13/19  0601  11/11/19  0558 11/10/19  2248   WBC 4.2  --  4.0 3.4*  --  5.2 7.0   HGB 8.1*  --  7.8* 7.7*   < > 8.2* 9.7*   *  --  115*  --   --  109* 108*   PLT 40*  --  40* 41*   < > 58* 73*   INR 1.42*  --  1.42* 1.39*   < >  --  1.18*     --  140 138   < > 131* 131*   POTASSIUM 3.8 3.8 3.2* 3.6   < > 3.7 4.0   CHLORIDE 116*  --  113* 110*   < > 99 96   CO2 18*  --  18* 20   < > 23 24   BUN 14  --  20 27   < > 45* 44*   CR 1.05*  --  1.29* 1.62*   < > 2.56* 2.63*   ANIONGAP 7  --  9 8   < > 9 11   ELSIE 7.7*  --  7.3* 7.6*   < > 7.7* 8.4*   GLC 86  --  96 92   < > 83 100*   ALBUMIN 2.6*  --  2.7* 2.3*   < > 1.8* 2.1*   PROTTOTAL 4.6*  --  4.6* 4.4*   < > 4.2* 5.1*   BILITOTAL 5.3*  --  6.1* 7.0*   < > 8.2* 9.2*   ALKPHOS 149  --  150 138   < > 169* 202*   ALT 15  --  13 14   < > 20 23   AST 23  --  22 22   < > 34 54*   LIPASE  --   --   --   --   --   --  57*    < > = values in this interval not displayed.     No results found for this or any previous visit (from the past 24 hour(s)).

## 2019-11-15 NOTE — PLAN OF CARE
Discharge Planner OT   Patient plan for discharge: TCU  Current status: Pt up in chair upon arrival.  Pt stated planning to discharge to TCU due to cognition and needing assistance.  Pt appears alert.  She does state she has water mixed with soda in her water pitcher.  Transfers chair to stand, stand to bed, and bed mobility IND; SBA for safety.  Pt denied needing to void and washing her face due to waiting for her  to bring her face wash.  She stated she already brushed her teeth.  Pt supine in bed with HOB raised with bed alarm set upon exit.   Barriers to return to prior living situation: cognitive impairment, level of safety and assist needed, fall risk  Recommendations for discharge: TCU or if family can provide 24/7 assistance.  HHOT  Rationale for recommendations: Patient would benefit from HH OT to increase patient's independence and safety with BADL/IADL tasks within her home environment, and to ensure patient's safety and ability to be alone within her current living environment.  Patient should be provided assistance with higher level cognitive tasks such as medication management, financial management, cooking, and navigating novel environments.       Entered by: Dana Monterroso 11/15/2019 11:47 AM

## 2019-11-15 NOTE — PLAN OF CARE
"Pt A&Ox4, denies pain/SOB.  Abdomen rounded, x 2 incisional sites C/D/I, \"little\" nausea reported this morning, zofran administered with stated relief. I/O monitored, BP reported 84/41, re-checked 97/55, 116/67, continue to monitor. Pt awake most of night, slept intermittently.  Education provided on safety and use of call light with OOB assistance.  Daily weight obtained, observed decrease in weight (see chart).    Shanel Fernandez RN on 11/15/2019 at 7:36 AM    "

## 2019-11-15 NOTE — PLAN OF CARE
"Discharge Planner PT   Patient plan for discharge: I will pay my sister in law who is a PT to stay with me  Current status: IND supine to sitting EOB through hip flexion. IND sit to/from stand x 5 without hand support. Good static standing balance. Blood pressure in short sitting with MAP 78. Ambulated 300 ft with SBA, good activity tolerance, no LOB, and fair path finding. Upon arriving at the door to complete the stairs patient cued \"open the door\", patient proceeded to reach for the fire alarm pull and required verbal and physical guidance to avoid this. Cued to \"walk up the stairs\" and patient proceeded to walk down the stairs. Cued to use on railing and patient used both. Patient MOD IND ascend/descend 12 stairs with bilateral hand rails and step over pattern, no LOB. Patient completed standing balance and standing exercises at window sill with CGA, LOB with tandem stance bilaterally within 5 seconds.  Patient with bowel sounds from standing distance.    Barriers to return to prior living situation: impaired balance, lack of 24/7 assistance, impaired cognition, decreased safety insight  Recommendations for discharge: TCU  Rationale for recommendations: Patient demonstrates significant safety concerns regarding her capacity to care for her self IND during the day while her  is away. She demonstrates an increased risk of falling and would benefit from continued skilled PT intervention to address balance and strength.        Entered by: Tiffanie Moeller 11/15/2019 9:54 AM     Thank you for your referral.    Tiffanie Moeller, PT, DPT, ATC    [facility]    O: [phone]  E: [email or web address]      "

## 2019-11-16 NOTE — PROGRESS NOTES
[facility]    Medicine Progress Note - Hospitalist Service       Date of Admission:  11/10/2019  Assessment & Plan   Patient is a 54-year-old female with alcoholic cirrhosis who was admitted on 11/10/2019 secondary to acute renal failure, decompensated cirrhosis with acute encephalopathy, hypotension, and worsening ascites.  Her initial hypotension resolved with fluid resuscitation and renal function and urinary output improved.  Home medications except her previous diuretic medications were restarted at time of hospitalization as patient apparently had discontinued them prior to admission. Patient had reported hematochezia prior to hospitalization but none has been visualized on this hospitalization and Hemoccult stool testing was negative with her hemoglobin stable.  Procalcitonin was elevated at 0.80 on initial evaluation but no obvious source of infection was present.  On hospital day #2 patient developed increasing abdominal pain and a paracentesis was performed with increased concern for spontaneous bacterial peritonitis with white blood cell count over 500  although no organisms noted on Gram stain.  Patient was started on Rocephin.  Her procalcitonin has been trending downward appropriately and patient's abdominal pain has started to improve, encephalopathy has slowly been improving.  She struggled greatly with persistent hypotension following paracentesis, during which 7 L were removed and required three doses of IV albumin IV as well as fluid bolusing to improve her blood pressures.  Blood pressure have not been stable in the  range systolic overnight, with ongoing improvement of her renal function, however patient once more has growing ascites in her abdomen with increasing abdominal pain noted.  Discussed this case with Dr. Savannah Suero, hepatologist from the [facility], who is recommending recurrent paracentesis be repeated on 11/14/19  however no more than 4 L of fluid being removed with each paracentesis with ongoing IV albumin following.  She also recommended initiation of Midodrin 5 mg 3 times daily to help improve blood pressures overall, with possible consideration of increasing to 10 mg 3 times daily if hypotension persists.  She is also recommending repeat CBC with differential as well as Gram stain and culture to further evaluate SBP progression following antibiotic initiation.   Patient will need outpatient hepatology follow-up after discharge to reevaluate patient overall situation going forward.  Patient and her family were informed of these recommendations and is wanting to proceed.      Principal Problem:  Presumed SBP (spontaneous bacterial peritonitis) (H), present on admission  Septic shock due to above, resolved  Possible Staphylococcus lugdunensis colonization  Alcoholic cirrhosis of liver with ascites (H)  Ascitic fluid on 11/12/19 has more than 200 neutrophils, making SBP likely, however, ascitic fluid cultures x2 have remained negative, ID recommended total of 7 days of iv rocephin to cover both SBP and staph UTI  Ascitic fluid and blood cultures negative so far  Avoid large volume paracentesis more than 4 liters each time, always give albumin afterwards to reduce risk of recurrent hypotension.  Continue midodrine, check orthostatic, if positive, increase the dose  Continue to hold home propranolol  Restart lasix at lower dose today, may restart spirolactone later if BP tolerates, repeat paracentesis Monday morning before transfer to TCU  close outpatient follow-up with the hepatology team to assist in management of her cirrhosis going forward.     Acute renal failure (H), resolving  Solitary kidney, acquired  Previous baseline 0.7-0.9   monitor closely    Hypokalemia  Replace per protocol     Acute encephalopathy, multiple etiologies: hepatic and sepsis, improving  consider repeat cognitive testing after discharge with  PCP  Continue lactulose and rifaximin    Nicotine dependence  continue nicoderm patch      Macrocytic anemia   continue to monitor closely   Repeat B12 pending     Hematochezia-patient reported  Thrombocytopenia, slightly worse today   transfuse platelet if rectal bleeding is worse,  Patient may need an outpatient diagnostic colonoscopy to further evaluate going forward.       Esophageal reflux    Assessment: Chronic and stable    Plan: continue po prilosec         Diet: Regular Diet Adult  Room Service    DVT Prophylaxis: Pneumatic Compression Devices  Gonzalez Catheter: not present  Code Status: Full Code      Disposition Plan   Expected discharge: Monday, recommended to transitional care unit once safe disposition plan/ TCU bed available.  Entered: Shanta Pineda MD 11/16/2019, 10:06 AM       The patient's care was discussed with the Patient.    Shanta Pineda MD  Hospitalist Service  [facility]    ______________________________________________________________________    Interval History   Denies epistaxis or gum bleeding, blood on tissue when she wipes after BM    Data reviewed today: I reviewed all medications, new labs and imaging results over the last 24 hours. I personally reviewed no images or EKG's today.    Physical Exam   Vital Signs: Temp: 97.9  F (36.6  C) Temp src: Oral BP: 95/43 Pulse: 74 Heart Rate: 84 Resp: 20 SpO2: 97 % O2 Device: None (Room air)    Weight: 122 lbs 11.2 oz  Constitutional: Awake, alertx3, cooperative, no apparent distress.  Eyes: Conjunctiva and pupils examined and normal.  HEENT: Moist mucous membranes, normal dentition.  Respiratory: Clear to auscultation bilaterally, no crackles or wheezing.  Cardiovascular: Regular rate and rhythm, normal S1 and S2, and no murmur noted.  GI: more distended, non-tender, normal bowel sounds, increased ascites  Lymph/Hematologic: No anterior cervical or supraclavicular adenopathy.  Skin: No rashes, no cyanosis, no  edema.  Musculoskeletal: No joint swelling, erythema or tenderness.  Neurologic: Cranial nerves 2-12 intact, normal strength and sensation.  Psychiatric: Alert, oriented to person, place and time, no obvious anxiety or depression, poor historian    Data   Recent Labs   Lab 11/16/19  0536 11/15/19  0536 11/14/19  1223 11/14/19  0531 11/13/19  0601  11/10/19  2248   WBC 4.1 4.2  --  4.0 3.4*   < > 7.0   HGB 8.3* 8.1*  --  7.8* 7.7*   < > 9.7*   * 113*  --  115*  --    < > 108*   PLT 37* 40*  --  40* 41*   < > 73*   INR  --  1.42*  --  1.42* 1.39*   < > 1.18*    141  --  140 138   < > 131*   POTASSIUM 3.3* 3.8 3.8 3.2* 3.6   < > 4.0   CHLORIDE 115* 116*  --  113* 110*   < > 96   CO2 18* 18*  --  18* 20   < > 24   BUN 12 14  --  20 27   < > 44*   CR 1.02 1.05*  --  1.29* 1.62*   < > 2.63*   ANIONGAP 7 7  --  9 8   < > 11   ELSIE 7.9* 7.7*  --  7.3* 7.6*   < > 8.4*   * 86  --  96 92   < > 100*   ALBUMIN 2.7* 2.6*  --  2.7* 2.3*   < > 2.1*   PROTTOTAL 4.7* 4.6*  --  4.6* 4.4*   < > 5.1*   BILITOTAL 5.7* 5.3*  --  6.1* 7.0*   < > 9.2*   ALKPHOS 152* 149  --  150 138   < > 202*   ALT 16 15  --  13 14   < > 23   AST 31 23  --  22 22   < > 54*   LIPASE  --   --   --   --   --   --  57*    < > = values in this interval not displayed.     No results found for this or any previous visit (from the past 24 hour(s)).

## 2019-11-16 NOTE — PLAN OF CARE
"Fair appetite. Abdomen rounded, incision sites x2 dry, intact. Up with min SBA, steady on feet, impulsive at times, reminders for her to slow down. /60   Pulse 80   Temp 98  F (36.7  C) (Oral)   Resp 18   Ht 1.549 m (5' 1\")   Wt 55.2 kg (121 lb 11.2 oz)   LMP 05/16/2012   SpO2 100%   BMI 23.00 kg/m      "

## 2019-11-16 NOTE — PLAN OF CARE
"Discharge Planner PT   Patient plan for discharge: Currently TCU  Current status: Supine <> sit indep, sit<> stand with SBA, Amb with SBA with 4 small LOBS which pt was richard to correct,  Tandem stand x 30\" with 1 LOB each side.  Sit<> stand x 10 for strengthening tolerated well.    Barriers to return to prior living situation: Cognition is improved today Per OT report and pt following directions better today.  That said,  still requires 24 hour assistance.   Recommendations for discharge: TCU vs home with 24 hour assist.  Pt notes that her family all work and 24 hour assist would be a hardship.   Rationale for recommendations: Cognition appears to be clearing, see OT report for full details. Patient demonstrates significant safety concerns regarding her capacity to care for her self IND during the day while her  is away. She demonstrates an increased risk of falling and would benefit from continued skilled PT intervention to address balance and strength.        Entered by: Mary Barbosa 11/16/2019 10:10 AM       "

## 2019-11-16 NOTE — PROGRESS NOTES
S-(situation): platelet result low    B-(background): trending down over several days    A-(assessment): result 37, 11/5= 40, 11/14=40, 11/13 41, 11/12=59, 11/11=58, 11/10=73 to see the downward trend.  Discussed with Dr. Almanza, no new orders or changes at this time.    R-(recommendations): no new orders

## 2019-11-16 NOTE — PLAN OF CARE
Vital signs stable.  Afebrile. Lung sounds CTA B/L.  A&O x4.  Pt denies pain. Bowel sounds normoactive. Ambulating independently with SBA.  Voiding adequately.  Refused SCDs at beginning of shift, but did put them on this AM at around 0430.  Abdomen rounded, incision sites clean, dry and intact. IV site asymptomatic.  Able to make needs known and uses call light appropriately.  Continue to monitor per care plan.

## 2019-11-16 NOTE — PROGRESS NOTES
DATE:  11/16/2019   TIME OF RECEIPT FROM LAB:  0110  LAB TEST:  PCR testing   LAB VALUE:  PCR not detected, negative Listeria    RESULTS GIVEN WITH READ-BACK TO (PROVIDER):  Dr. Almanza  TIME LAB VALUE REPORTED TO PROVIDER:  Note left for MD, no changes  Rah Driscoll, RN

## 2019-11-16 NOTE — PROGRESS NOTES
Care Transitions visited with patient and her , Eron, today regarding discharge planning.      Discussed that [facility] has submitted information to patient's insurance and waiting for prior authorization.  [place] indicated this could take a week.  Discussed that insurance may not give prior authorization, so need a Plan B.  Patient and  are not interested in paying for TCU privately.      Patient and  indicated that patient would go stay with her in-laws in [place], as they are retired.  Patient's daughter who is going to school up there also stays with them.  Patient stated she would be in agreement with home care OT/PT to work with her in [place].  [facility] is researching home care agencies in that area.      Discussed that recommendation is 24/7 supervision and that home OT services does a home assessment of patient's safety in the home and whether she is safe to be home alone.  Patient stated understanding of this.      Patient reported that her  may be able to work from home for a few days, but then would have to return to work.  Again talked about a [employer] alert system and using the automated pill dispenser when patient does return home.      Patient is aware that it is anticipated that she will be ready for discharge on Monday, 11/18.

## 2019-11-16 NOTE — PLAN OF CARE
VSS. Orthostatic BP's completed, (see flowsheet). Pt. Denies pain. Steady on feet up independently. Lungs CTA. Fair to good appetite. Voiding adequately. Loose stools x2. Platelets 37 with No bleeding noted. Abdominal distention and rounded. Will continue with POC.

## 2019-11-16 NOTE — PROVIDER NOTIFICATION
DATE:  11/15/2019   TIME OF RECEIPT FROM LAB:  2207  LAB TEST:  Left Hand Blood Culture Drawn 11/11/2019 1648  LAB VALUE:  Gram Positive Bacilli Resembling Diphtheroids   RESULTS GIVEN WITH READ-BACK TO (PROVIDER):  Dr. Almanza  TIME LAB VALUE REPORTED TO PROVIDER:   7269

## 2019-11-16 NOTE — PLAN OF CARE
Discharge Planner OT   Patient plan for discharge: TCU  Current status: Repeat SLUMS completed to re-assess pt's current cognitive level. Pt scored a 20/30 on the SLUMS cognitive screen, which indicates moderate cognitive impairment. Pt demonstrates deficits in higher level processing, planning, and problem solving. This impacts her safety with ADL/IADL, especially higher level tasks such as medication mgmt, meal prep, and safety during home mgmt. Pt transferred supine <-> EOB independently. Pt ambulated to bathroom and completed all aspects of toileting with SBA. Pt stood at sink for 6 minutes to complete grooming tasks with SBA. Minor balance deficits noted, however pt self-corrects and did not require CGA or assist for safety during functional mobility.   Barriers to return to prior living situation: Lack of 24/7 assist; cognition  Recommendations for discharge: Home with 24/7 assist, HHOT, HH nursing, and HH aide  Rationale for recommendations: Pt demonstrates improved cognition since admission, however continues to demonstrate deficits in processing, planning, and problem solving, which impacts her safety during ADL/IADL within her home environment. Pt would benefit from 24/7 assist at home until assessment can be completed by HH OT on cognition with performance of ADL/IADL within pt's home environment. If 24/7 assist is not available, TCU is recommended to provide a safe discharge disposition.       Entered by: Matilde Cooney 11/16/2019 10:42 AM

## 2019-11-17 NOTE — PROGRESS NOTES
Care [facility]itions visited with patient today regarding discharge planning.  She is doing much better physically since admission, so does not meet TCU criteria, but PT and OT services still recommends 24/7 supervision for safety.      Patient stated that she and her  decided that patient will discharge to her in laws home in [place].  Plan is that patient will be ready for discharge tomorrow between 3 and 4pm.  Patient plans to call her  to have him come transport her tomorrow around that time.  They will then drive up to [place].    Discussed recommendation for home health PT and OT services.  Writer has provided patient with a list of Medicare certified home care agencies that service the [facility].  Patient is reviewing it and plans to talk with her  and sister about her options.  Patient also plans to get her in laws address for the home care agency.  Care Transitions will make the referral once patient has chosen an agency.

## 2019-11-17 NOTE — PROGRESS NOTES
[facility]    Medicine Progress Note - Hospitalist Service       Date of Admission:  11/10/2019  Assessment & Plan   Patient is a 54-year-old female with alcoholic cirrhosis who was admitted on 11/10/2019 secondary to acute renal failure, decompensated cirrhosis with acute encephalopathy, hypotension, and worsening ascites.  Her initial hypotension resolved with fluid resuscitation and renal function and urinary output improved.  Home medications except her previous diuretic medications were restarted at time of hospitalization as patient apparently had discontinued them prior to admission. Patient had reported hematochezia prior to hospitalization but none has been visualized on this hospitalization and Hemoccult stool testing was negative with her hemoglobin stable.  Procalcitonin was elevated at 0.80 on initial evaluation but no obvious source of infection was present.  On hospital day #2 patient developed increasing abdominal pain and a paracentesis was performed with increased concern for spontaneous bacterial peritonitis with white blood cell count over 500  although no organisms noted on Gram stain.  Patient was started on Rocephin.  Her procalcitonin has been trending downward appropriately and patient's abdominal pain has started to improve, encephalopathy has slowly been improving.  She struggled greatly with persistent hypotension following paracentesis, during which 7 L were removed and required three doses of IV albumin IV as well as fluid bolusing to improve her blood pressures.  Blood pressure have not been stable in the  range systolic overnight, with ongoing improvement of her renal function, however patient once more has growing ascites in her abdomen with increasing abdominal pain noted.  Discussed this case with Dr. Savannah Suero, hepatologist from the [facility], who is recommending recurrent paracentesis be repeated on 11/14/19  however no more than 4 L of fluid being removed with each paracentesis with ongoing IV albumin following.  She also recommended initiation of Midodrin 5 mg 3 times daily to help improve blood pressures overall, with possible consideration of increasing to 10 mg 3 times daily if hypotension persists.  She is also recommending repeat CBC with differential as well as Gram stain and culture to further evaluate SBP progression following antibiotic initiation.   Patient will need outpatient hepatology follow-up after discharge to reevaluate patient overall situation going forward.  Patient and her family were informed of these recommendations and is wanting to proceed.      Principal Problem:  Presumed SBP (spontaneous bacterial peritonitis) (H), present on admission  Septic shock due to above, resolved  Possible Staphylococcus lugdunensis colonization  Alcoholic cirrhosis of liver with ascites (H)  Ascitic fluid on 11/12/19 has more than 200 neutrophils, making SBP likely, however, ascitic fluid cultures x2 have remained negative, ID recommended total of 7 days of iv rocephin to cover both SBP and staph UTI  Ascitic fluid and blood cultures negative so far  Avoid large volume paracentesis more than 4 liters each time, always give albumin afterwards to reduce risk of recurrent hypotension.  Continue midodrine, monitor orthostatic, if positive, increase the dose  Continue to hold home propranolol, while increasing lasix, may restart spirolactone later if BP tolerates, repeat paracentesis Monday morning before transfer to TCU  close outpatient follow-up with the hepatology team to assist in management of her cirrhosis going forward.     Acute renal failure (H), resolving  Solitary kidney, acquired  Previous baseline 0.7-0.9   monitor closely    Hypokalemia  Replace per protocol     Acute encephalopathy, multiple etiologies: hepatic and sepsis, improving  consider repeat cognitive testing after discharge with PCP  Continue  lactulose and rifaximin    Nicotine dependence  continue nicoderm patch      Macrocytic anemia,   Repeat B12 normal  Transfuse if H drops below 7     Hematochezia-patient reported  Thrombocytopenia, slightly worse today   transfuse platelet if rectal bleeding is worse,  Patient may need an outpatient diagnostic colonoscopy to further evaluate going forward.       Esophageal reflux    Plan: continue po prilosec         Diet: Regular Diet Adult  Room Service    DVT Prophylaxis: Pneumatic Compression Devices  Gonzalez Catheter: not present  Code Status: Full Code      Disposition Plan   Expected discharge: Monday, recommended to transitional care unit once safe disposition plan/ TCU bed available.  Entered: Shanta Pineda MD 11/17/2019, 1:06 PM       The patient's care was discussed with the Patient.    Shanta Pineda MD  Hospitalist Service  [facility]    ______________________________________________________________________    Interval History   No new complaint, walking around with PT, aware that her TCU request may be denied by her insurance, we won't know for sure until Monday    Data reviewed today: I reviewed all medications, new labs and imaging results over the last 24 hours. I personally reviewed no images or EKG's today.    Physical Exam   Vital Signs: Temp: 97.7  F (36.5  C) Temp src: Oral BP: 130/58 Pulse: 79 Heart Rate: 79 Resp: 16 SpO2: 100 % O2 Device: None (Room air)    Weight: 122 lbs 14.4 oz  Constitutional: Awake, alertx3, cooperative, no apparent distress.  Eyes: Conjunctiva and pupils examined and normal.  HEENT: Moist mucous membranes, normal dentition.  Respiratory: Clear to auscultation bilaterally, no crackles or wheezing.  Cardiovascular: Regular rate and rhythm, normal S1 and S2, and no murmur noted.  GI: more distended, non-tender, normal bowel sounds, increased ascites  Lymph/Hematologic: No anterior cervical or supraclavicular adenopathy.  Skin: No rashes, no  cyanosis, no edema.  Musculoskeletal: No joint swelling, erythema or tenderness.  Neurologic: Cranial nerves 2-12 intact, normal strength and sensation.  Psychiatric: Alert, oriented to person, place and time, no obvious anxiety or depression, poor historian    Data   Recent Labs   Lab 11/16/19  1400 11/16/19  0536 11/15/19  0536  11/14/19  0531 11/13/19  0601  11/10/19  2248   WBC  --  4.1 4.2  --  4.0 3.4*   < > 7.0   HGB  --  8.3* 8.1*  --  7.8* 7.7*   < > 9.7*   MCV  --  112* 113*  --  115*  --    < > 108*   PLT  --  37* 40*  --  40* 41*   < > 73*   INR  --   --  1.42*  --  1.42* 1.39*   < > 1.18*   NA  --  140 141  --  140 138   < > 131*   POTASSIUM 4.0 3.3* 3.8   < > 3.2* 3.6   < > 4.0   CHLORIDE  --  115* 116*  --  113* 110*   < > 96   CO2  --  18* 18*  --  18* 20   < > 24   BUN  --  12 14  --  20 27   < > 44*   CR  --  1.02 1.05*  --  1.29* 1.62*   < > 2.63*   ANIONGAP  --  7 7  --  9 8   < > 11   ELSIE  --  7.9* 7.7*  --  7.3* 7.6*   < > 8.4*   GLC  --  113* 86  --  96 92   < > 100*   ALBUMIN  --  2.7* 2.6*  --  2.7* 2.3*   < > 2.1*   PROTTOTAL  --  4.7* 4.6*  --  4.6* 4.4*   < > 5.1*   BILITOTAL  --  5.7* 5.3*  --  6.1* 7.0*   < > 9.2*   ALKPHOS  --  152* 149  --  150 138   < > 202*   ALT  --  16 15  --  13 14   < > 23   AST  --  31 23  --  22 22   < > 54*   LIPASE  --   --   --   --   --   --   --  57*    < > = values in this interval not displayed.     No results found for this or any previous visit (from the past 24 hour(s)).

## 2019-11-17 NOTE — DISCHARGE SUMMARY
[facility]  Hospitalist Discharge Summary       Date of Admission:  11/10/2019  Date of Discharge:  11/18/2019  Discharging Provider: Shanta Pineda MD      Discharge Diagnoses   Patient is a 54-year-old female with alcoholic cirrhosis who was admitted on 11/10/2019 secondary to acute renal failure, decompensated cirrhosis with acute encephalopathy, hypotension, and worsening ascites.  Her initial hypotension resolved with fluid resuscitation and renal function and urinary output improved.  Home medications except her previous diuretic medications were restarted at time of hospitalization as patient apparently had discontinued them prior to admission. Patient had reported hematochezia prior to hospitalization but none has been visualized on this hospitalization and Hemoccult stool testing was negative with her hemoglobin stable.  Procalcitonin was elevated at 0.80 on initial evaluation but no obvious source of infection was present.  On hospital day #2 patient developed increasing abdominal pain and a paracentesis was performed with increased concern for spontaneous bacterial peritonitis with white blood cell count over 500  although no organisms noted on Gram stain.  Patient was started on Rocephin.  Her procalcitonin has been trending downward appropriately and patient's abdominal pain has started to improve, encephalopathy has slowly been improving.  She struggled greatly with persistent hypotension following paracentesis, during which 7 L were removed and required three doses of IV albumin IV as well as fluid bolusing to improve her blood pressures.  Blood pressure have not been stable in the  range systolic overnight, with ongoing improvement of her renal function, however patient once more has growing ascites in her abdomen with increasing abdominal pain noted.  Discussed this case with Dr. Savannah Suero, hepatologist from the [facility], who is  recommending recurrent paracentesis be repeated on 11/14/19 however no more than 4 L of fluid being removed with each paracentesis with ongoing IV albumin following.  She also recommended initiation of Midodrin 5 mg 3 times daily to help improve blood pressures overall, with possible consideration of increasing to 10 mg 3 times daily if hypotension persists.  She is also recommending repeat CBC with differential as well as Gram stain and culture to further evaluate SBP progression following antibiotic initiation.   Patient will need outpatient hepatology follow-up after discharge to reevaluate patient overall situation going forward.  Patient and her family were informed of these recommendations and is wanting to proceed.      Principal Problem:  Presumed SBP (spontaneous bacterial peritonitis) (H), present on admission  Septic shock due to above, resolved  Possible Staphylococcus lugdunensis colonization  Alcoholic cirrhosis of liver with ascites (H)  Ascitic fluid on 11/12/19 has more than 200 neutrophils, making SBP likely, however, ascitic fluid cultures x2 have remained negative, ID recommended total of 7 days of iv rocephin to cover both SBP and staph UTI  Ascitic fluid and blood cultures negative so far  Avoid large volume paracentesis more than 4 liters each time, always give albumin 50 g iv x1 afterwards to reduce risk of recurrent hypotension.  Continue midodrine  Continue to hold home propranolol, while increasing lasix, may restart spirolactone later if BP tolerates, repeat paracentesis Monday with repeat Cell differential to see if neutrophil count drops below 200 in response to iv rocephin.  close outpatient follow-up with the hepatology team Dr. Edgardo Kovacs 11/29/19 to assist in management of her cirrhosis.     Acute renal failure (H), resolved  Solitary kidney, acquired  Previous baseline 0.7-0.9   monitor closely     Hypokalemia  Monitor with PCP  Start KCL 10meq po daily in addition to replacement  procotol     Acute encephalopathy, multiple etiologies: hepatic and sepsis, improving  consider repeat cognitive testing after discharge with PCP  Continue lactulose and rifaximin, patient's sister bought her a medication box to remind her of dosing due.     Nicotine dependence  continue nicoderm patch      Macrocytic anemia,   Repeat B12 normal  Transfuse if H drops below 7     Hematochezia-patient reported  Thrombocytopenia, slightly worse today  Last colonoscopy done 1 year ago       Esophageal reflux    Plan: continue po prilosec     Follow-ups Needed After Discharge   Need paracentesis weekly on average    Unresulted Labs Ordered in the Past 30 Days of this Admission     Date and Time Order Name Status Description    11/14/2019 1103 Fluid Culture Aerobic Bacterial Preliminary     11/11/2019 1623 Blood culture Preliminary       These results will be followed up by PCP    Discharge Disposition   Discharged to nursing home  Condition at discharge: Stable    Hospital Course   See discharge diagnosis and H/P    Consultations This Hospital Stay   PHYSICAL THERAPY ADULT IP CONSULT  OCCUPATIONAL THERAPY ADULT IP CONSULT  CARE TRANSITION RN/SW IP CONSULT    Code Status   Full Code    Time Spent on this Encounter   I, Shanta Pineda MD, personally saw the patient today and spent greater than 30 minutes discharging this patient.       Shanta Pineda MD  [facility]  ______________________________________________________________________    Physical Exam   Vital Signs: Temp: 97.7  F (36.5  C) Temp src: Oral BP: 130/58 Pulse: 79 Heart Rate: 79 Resp: 16 SpO2: 100 % O2 Device: None (Room air)    Weight: 122 lbs 14.4 oz  Constitutional: Awake, alertx3, cooperative, no apparent distress.  Eyes: Conjunctiva and pupils examined and normal.  HEENT: Moist mucous membranes, normal dentition.  Respiratory: Clear to auscultation bilaterally, no crackles or wheezing.  Cardiovascular: Regular rate and rhythm,  normal S1 and S2, and no murmur noted.  GI: less distended, non-tender, normal bowel sounds.  Lymph/Hematologic: No anterior cervical or supraclavicular adenopathy.  Skin: jaundice.  Musculoskeletal: No joint swelling, erythema or tenderness.  Neurologic: Cranial nerves 2-12 intact, normal strength and sensation. Ataxic and poor recent memory recall.  Psychiatric: Alert, oriented to person, place and time, no obvious anxiety or depression. Easily loses track of long conversation       Primary Care Physician   Efren Bustos MD    Discharge Orders   No discharge procedures on file.    Significant Results and Procedures   Results for orders placed or performed during the hospital encounter of 11/10/19   US Paracentesis    Narrative    ULTRASOUND PARACENTESIS  11/12/2019 1:51 PM     HISTORY: HIGH VOLUME paracentesis with or without diagnostic fluid  analysis with labs to be drawn if ordered.    COMPARISON: Paracentesis dated 11/1/2019    FINDINGS: There is a large amount of ascites.    PROCEDURE: Written informed consent was obtained from the patient's   per telephone conversation with appropriate procedure, prior  to the paracentesis. Patient has been confused for the last several  days. The risks and benefits including bleeding, infection and  abdominal organ injury were discussed and the patient's  wished  to continue. Initial ultrasound images demonstrate a large ascitic  fluid collection. The skin overlying this collection was marked,  prepped, draped and anesthetized right lateral abdomen in usual  sterile fashion utilizing 7 mL of 1% lidocaine. A 5 Polish tracx  paracentesis catheter was then placed into the peritoneal fluid  collection, over the included needle, with return of 6600 mL of clear  yellowish fluid. Patient tolerated the procedure well. Patient  received intravenous albumin post procedure.    Procedure was done in the patient's hospital room and the patient  recovered in the  hospital room post procedure. Patient is an  inpatient.    PHYSICIAN: Dr. Pratik Lakhani.  ASSISTANTS: Ultrasound technologists.   PROCEDURE: Ultrasound-guided paracentesis.  FINDINGS: There is a large amount of ascites on the initial imaging.  Postparacentesis imaging demonstrates near resolution of the ascitic  fluid collection.  ESTIMATED BLOOD LOSS: Less than 2 ml.  SPECIMENS REMOVED: 6600 mL of clear yellowish fluid was aspirated.  POST OP DIAGNOSIS: Successful large volume paracentesis without  initial complication.      Impression    IMPRESSION: Technically successful large volume ultrasound-guided  paracentesis without immediate complication. Care of the patient and  postprocedural orders are by Dr. Moncada. I discussed the procedure  with Dr. Moncada at the time of the procedure.    PRATIK LAKHANI MD   US Paracentesis    Narrative    ULTRASOUND PARACENTESIS   11/14/2019 12:31 PM     HISTORY: Max volume taken off is 4L. Refractory ascites. Question  spontaneous bacterial peritonitis.  Patient is an inpatient at the  hospital.    COMPARISON: None.    FINDINGS:  There is a large amount of ascites.    PROCEDURE:  Written informed consent was obtained from the patient  prior to the procedure. The risks and benefits including bleeding  (increased risk of bleeding due to elevated INR and decreased platelet  level), infection and abdominal organ injury were discussed and the  patient wished to continue. Initial ultrasound images demonstrate a  large ascitic fluid collection. The skin overlying this collection was  marked, prepped, draped and anesthetized in the left lateral abdomen  in usual sterile fashion utilizing 4 mL of 1% lidocaine. A 5 Irish  DGTSeh paracentesis catheter was then placed into the peritoneal fluid  collection, over the included needle, with return of 4000 mL of clear  yellowish fluid. Patient tolerated the procedure well. Postprocedural  imaging demonstrates a persistent moderate amount of ascites.  Patient  received intravenous albumin during and post exam, per her clinician's  orders. Postprocedural orders were performed by the clinician and care  of the patient was returned to her clinician post procedure. Procedure  was performed in her room on the medical surgical floor. Fluid was  sent to the laboratory for appropriate evaluation as ordered by the  clinician.    Patient was observed in her room on the medical surgical floor post  procedure.     PHYSICIAN:  Dr. Pratik Lakhani.  ASSISTANTS: Ultrasound technologists.   PROCEDURE: Ultrasound-guided paracentesis.  FINDINGS: There is a large amount of ascites on the initial imaging.  Postparacentesis imaging demonstrates near resolution of the ascitic  fluid collection.  ESTIMATED BLOOD LOSS:  Less than 2 ml.  SPECIMENS REMOVED: 4000 mL of clear yellowish fluid was aspirated.  POST OP DIAGNOSIS : Successful large volume paracentesis without  initial complication.    Patient's vital signs were monitored during the procedure and  postprocedure by the nursing service. Her vital signs remained stable  throughout the procedure.      Impression    IMPRESSION:  Technically successful large volume ultrasound-guided  paracentesis without immediate complication. Final diagnosis is  awaiting laboratory evaluation of the fluid.    PRATIK LAKHANI MD       Discharge Medications   Current Discharge Medication List      CONTINUE these medications which have NOT CHANGED    Details   acamprosate (CAMPRAL) 333 MG EC tablet Take 2 tablets (666 mg) by mouth 3 times daily  Qty: 180 tablet, Refills: 3    Associated Diagnoses: Alcoholic cirrhosis of liver with ascites (H)      acetaminophen (TYLENOL) 325 MG tablet Take 2 tablets (650 mg) by mouth every 4 hours as needed for mild pain  Qty: 100 tablet    Associated Diagnoses: Alcoholic cirrhosis of liver without ascites (H)      alendronate (FOSAMAX) 35 MG tablet Take 1 tablet (35 mg) by mouth with 8oz water every Monday (once every 7  days) 30 minutes before breakfast and remain upright during this time.  Qty: 12 tablet, Refills: 3    Associated Diagnoses: Osteopenia, unspecified location      ARIPiprazole (ABILIFY) 5 MG tablet Take 1 tablet (5 mg) by mouth At Bedtime  Qty: 30 tablet, Refills: 3    Associated Diagnoses: Major depressive disorder, recurrent episode, moderate (H)      busPIRone (BUSPAR) 15 MG tablet Take 1 tablet (15 mg) by mouth 2 times daily  Qty: 180 tablet, Refills: 3    Associated Diagnoses: MONA (generalized anxiety disorder)      Ferrous Sulfate 324 (65 Fe) MG TBEC TAKE ONE TABLET BY MOUTH TWICE A DAY  Qty: 60 tablet, Refills: 5    Associated Diagnoses: Other iron deficiency anemia      FLUoxetine (PROZAC) 20 MG capsule Take 3 capsules (60 mg) by mouth daily  Qty: 180 capsule, Refills: 3    Associated Diagnoses: Anxiety; Major depressive disorder, recurrent episode, moderate (H)      furosemide (LASIX) 40 MG tablet Take 1 tablet (40 mg) by mouth daily  Qty: 90 tablet, Refills: 3    Associated Diagnoses: Alcoholic cirrhosis of liver with ascites (H)      gabapentin (NEURONTIN) 100 MG capsule Take 300 mg by mouth At Bedtime      lactulose (CHRONULAC) 10 GM/15ML solution Take 15 mLs (10 g) by mouth 3 times daily as needed for constipation Start with 15 ml daily, titrate as needed for 2-3 BM's daily.  Qty: 500 mL, Refills: 3    Associated Diagnoses: Hepatic encephalopathy (H)      omeprazole (PRILOSEC) 20 MG CR capsule TAKE ONE CAPSULE BY MOUTH EVERY DAY  Qty: 90 capsule, Refills: 3    Associated Diagnoses: Gastroesophageal reflux disease with esophagitis      ondansetron (ZOFRAN) 4 MG tablet Take 1 tablet (4 mg) by mouth every 6 hours as needed for nausea  Qty: 30 tablet, Refills: 1    Associated Diagnoses: Gastroenteritis      phosphorus tablet 250 mg (PHOSPHA 250 NEUTRAL) 250 MG per tablet TAKE TWO TABLETS BY MOUTH TWICE A DAY  Qty: 120 tablet, Refills: 3    Associated Diagnoses: Hypophosphatemia      propranolol (INDERAL) 10  "MG tablet Take 1 tablet (10 mg) by mouth 2 times daily  Qty: 180 tablet, Refills: 3    Associated Diagnoses: Hypertension goal BP (blood pressure) < 130/80      rifaximin (XIFAXAN) 550 MG TABS tablet Take 1 tablet (550 mg) by mouth 2 times daily  Qty: 180 tablet, Refills: 3    Associated Diagnoses: Alcoholic cirrhosis of liver without ascites (H); Hepatic encephalopathy (H)      spironolactone (ALDACTONE) 50 MG tablet Take 1 tablet (50 mg) by mouth daily  Qty: 90 tablet, Refills: 3    Associated Diagnoses: Alcoholic cirrhosis of liver with ascites (H)      thiamine (VITAMIN B-1) 100 MG tablet Take 1 tablet (100 mg) by mouth daily  Qty: 90 tablet, Refills: 3    Associated Diagnoses: Alcohol dependence, continuous drinking behavior (H)      traZODone (DESYREL) 50 MG tablet Take 2 tablets (100 mg) by mouth nightly as needed for sleep  Qty: 180 tablet, Refills: 3    Associated Diagnoses: Other insomnia      ursodiol (ACTIGALL) 500 MG tablet Take 1 tablet (500 mg) by mouth 2 times daily  Qty: 60 tablet, Refills: 5    Associated Diagnoses: Alcoholic cirrhosis of liver with ascites (H)           Allergies   Allergies   Allergen Reactions     Albuterol      Tongue \"hardened and painful\"     "

## 2019-11-17 NOTE — PLAN OF CARE
Pt a/o, up most of the night awake, denies pain, daily wt down a few oz, VSS, afebrile, 3 moderate loose/watery stools this shift, Rocephin q 24 hrs for peritonitis.

## 2019-11-17 NOTE — PLAN OF CARE
Discharge Planner PT   Patient plan for discharge: Home with 24 hour assist, home PT and OT.  Current status: Amb x 400' indep. Steadier today no observed LOB's during gait. Up down stairs with out using rail reciprocally. Able to stand for 30 seconds  EO and EC on firm and compliant surfaces with NBOS.  Tandem walk x 10 paces forward and 6 paces backwards before LOB which she was able to step and correct.    Barriers to return to prior living situation: Cognition. OT not present today. Improved cognition yesterday per OT report and pt appears clear and understanding.  Still recommend 24 hour supervision due to cognintion.  Recommendations for discharge: Home with 24 hour supervision home PT and OT and possibly RN too to manage meds? 24 hour supervision until released by home OT or PT recommendation.   Rationale for recommendations: Pt is medically brittle and cognition declines quickly when medical status declines.  Pt will benefit from Home PT and OT and RN assessment to ensure good home safety and systems in place.         Entered by: Mary Barbosa 11/17/2019 11:39 AM       Physical Therapy Discharge Summary    Reason for therapy discharge:    Hospital based PT goals met. Pt to continue walking halls with staff to maintain strength until discontinue.     Progress towards therapy goal(s). See goals on Care Plan in [facility] electronic health record for goal details.  Goals met    Therapy recommendation(s):    Continued therapy is recommended.  Rationale/Recommendations:  as above.

## 2019-11-18 NOTE — PROGRESS NOTES
Name: Monalisa Olguin    MRN#: [id number]    Reason for Hospitalization: Hepatic encephalopathy (H) [K72.90]  Acute kidney injury (H) [N17.9]  Alcoholic cirrhosis of liver with ascites (H) [K70.31]    Discharge Date: 11/18/2019    Patient / Family response to discharge plan: Discharging to in laws home for 24/7 supervision.  transporting. [facility] #[phone], Fax- [phone], to follow for RN, PT, and OT services.      Other Providers (Care Coordinator, County Services, PCA services etc): No    CTS Hand Off Completed: Yes    MERCEDEZ Score: Average     Future Appointments:   Future Appointments   Date Time Provider Department Center   11/25/2019  1:00 PM [facility]   11/25/2019  2:00 PM Edgardo Kovacs MD [facility]       Discharge Disposition: In laws home in [place].     Discharge Planner   Discharge Plans in progress: Completed. discharge to in laws home in [place].  transport. [facility] to follow.   Barriers to discharge plan: None   Follow up plan: Per PCP        Entered by: CINDY CARRASQUILLO 11/18/2019 12:48 PM           TATY Atkinson

## 2019-11-18 NOTE — TELEPHONE ENCOUNTER
Patient called to schedule an appointment for a hospital follow-up or appeared on a report showing that they were recently discharged from the hospital.    Patient was admitted to [facility]  Discharged date: 11/19  Reason for hospital admission:    Does patient have future appointment scheduled with provider? Yes   Date of future appointment:  11/21      This information will be used to help the care team plan for the patients upcoming visit.  The triage RN may determine that a follow up call is necessary and reach out to the patient via the phone number listed in the chart.     Please route this message on routine priority to the Triage RN pool.

## 2019-11-18 NOTE — PLAN OF CARE
Vital signs stable.  Afebrile. Lung sounds Clear.  A&O x4.  Pt denies pain. Abdomen is distended and rounded. Ambulating independently.  Voiding adequately. Skin is intact. IV site asymptomatic. Able to make needs known and uses call light appropriately. Continue to monitor per care plan.

## 2019-11-18 NOTE — PLAN OF CARE
"Having liquid/loose stools, tolerating Lactulose. Up independently in halls. Abdomen distended, rounded. /47   Pulse 70   Temp 97.8  F (36.6  C) (Oral)   Resp 18   Ht 1.549 m (5' 1\")   Wt 55.7 kg (122 lb 14.4 oz)   LMP 05/16/2012   SpO2 99%   BMI 23.22 kg/m   will continue with POC.  "

## 2019-11-18 NOTE — PLAN OF CARE
Discharge Planner OT   Patient plan for discharge: home  Current status: The patient has improved alertness and cognitive processing skills; last SLUMS 20/30.  Continues to limit out of bed activity.  OT provided the pt with bilateral UE AROM/strengthening home programming, to initiate improving endurance for daily tasks/activities.  Pt reported will be returning home with  and child assist and supervision as able, however per  [facility] note and  confirming discharge plan; discharge to in [facility] placement will occur this afternoon.  Barriers to return to prior living situation: decreased endurance for activities, level of assist, fall risk, decreased cognitive processing with insight and judgement  Recommendations for discharge: Home with 24/7 assist, HHOT, HH nursing, and HH aide  Rationale for recommendations: Pt demonstrates improved cognition since admission, however continues to demonstrate deficits in processing, planning, and problem solving, which impacts her safety during ADL/IADL within her home environment. Pt would benefit from 24/7 assist at home until assessment can be completed by HH OT on cognition with performance of ADL/IADL within pt's home environment.   No further inpatient OT services needed at this time.       Entered by: Lay Bowles 11/18/2019 12:11 PM    Occupational Therapy Discharge Summary    Reason for therapy discharge:    Discharged to home with home therapy.    Progress towards therapy goal(s). See goals on Care Plan in [facility] electronic health record for goal details.  Goals met    Therapy recommendation(s):    Continued therapy is recommended.  Rationale/Recommendations:  HHOT to increase her endurance, safety and functional independence with BADL/IADLs.  Cognitive function performance and assessment as deemed necessary.      FRANK Claire/L  [facility]  [phone]

## 2019-11-18 NOTE — PROGRESS NOTES
Writer has visited patient in her hospital room and has spoken with , Eron, over the phone.      Patient thought she might discharge to home today, but  Eron verifies that he will be driving patient to his parents in [place] today and home care will need to see her in [place].      Referral has been made to [facility] #[phone] in [place].  Referral information faxed to #[phone].  Sarah in intake at [facility] stated that they would be able to accept this patient for RN, PT, and OT services.  They will call , Eron, to set up the schedule and get the address of patient's in [facility] in [place].

## 2019-11-18 NOTE — PLAN OF CARE
VSS this shift. Pt has no complaints of pain this shift. Potassium replaced this shift, recheck ordered. Abdomen rounded and distended, paracentesis done this shift per MD. Procedure vitals protocol in process. Pt BP tolerated procedure well. Pt currently receiving Albumin bolus. Plan is for discharge this evening after receiving IV antibiotic dose. Pt on bedrest with bathroom privileges until 1730. Will continue to monitor.

## 2019-11-19 NOTE — PROGRESS NOTES
"Monalisa Olguin  Gender: female  : 1965  [address]  [facility] [address] [phone] (home)     Medical Record: [id number]  Pharmacy:    [facility] - [place], [place] - [address]  [facility] [place] - [place], [place] - [address]   [facility]  [facility] [place] - [place], [place] - [address]  [facility] - [place], [place] - [address]  Primary Care Provider: Efren Bustos    Parent's names are: OSWALDO BAXTER (mother) and Data Unavailable (father).      [facility]  2019     Discharge Phone Call:  Key Words/Key Times      Introduction - AIDET (Acknowledge, Introduce, Duration, Explanation)      Empathy-   We are calling to see how you are since your recent stay in the hospital?     Call back COMMENTS: Pt.states she is doing good since discharge.      Clinical Questions -  (f/u appts, medication side effects/purpose, ability to care for self at home) \"For your safety, it is important to us that you understand the purpose and side effects of your medications, can you tell me what your new medications are?\"     Call back COMMENTS: Pt.has a Med box in place now so she will take her meds as prescribed.She is waiting for [facility] to call her to schedule O.T and P.T.No questions regarding meds or follow up.      Staff Recognition -  We like to recognize staff and physicians who have done an excellent job.  Do you remember any people from your care team that you would like recognize?     Call back COMMENTS: She recognized Daniela as being caring and talking and listening to her.      Very Good Care -  We want to provide very good care to all patients.  How was your care?     Call back COMMENTS: Pt.was happy with her care.      Opportunities for Improvement -  Our goal is to be the best.  Do you have any suggestions for things that we could improve upon?     Call back COMMENTS: Pt.did not " offer any suggestions got good care.      Thank You   [address] Teran R.N.

## 2019-11-19 NOTE — TELEPHONE ENCOUNTER
"Hospital/TCU/ED for chronic condition Discharge Protocol    \"Hi, my name is Nena Jeffries RN, a registered nurse, and I am calling from [facility].  I am calling to follow up and see how things are going for you after your recent emergency visit/hospital/TCU stay.\"    Tell me how you are doing now that you are home?\" Much better      Discharge Instructions    \"Let's review your discharge instructions.  What is/are the follow-up recommendations?  Pt. Response: I have office visit 11/21/2019 with Dr. Garcia    \"Has an appointment with your primary care provider been scheduled?\"   Yes. (confirm)    \"When you see the provider, I would recommend that you bring your medications with you.\"    Medications    \"Tell me what changed about your medicines when you discharged?\"    Changes to chronic meds?    0-1    \"What questions do you have about your medications?\"    Please call patient - Questions cannot be easily answered - Epic MTM referral needed     New diagnoses of heart failure, COPD, diabetes, or MI?    No              Post Discharge Medication Reconciliation Status: unable to reconcile discharge medications due to Too many changes in medications, asking for MTM for confirmation on many changes.    Was MTM referral placed (*Make sure to put transitions as reason for referral)?   Yes - \"The Medication Therapy  will call you within the next few days to schedule an appointment with an MTM pharmacist to discuss your medicines and make sure they are working as well as they possibly can for you. This visit can be done in a [facility] or on the phone and is at no charge to you.\"    Call Summary    \"What questions or concerns do you have about your recent visit and your follow-up care?\"     none - will speak with MTM and Dr. Bustos at office visit    \"If you have questions or things don't continue to improve, we encourage you contact us through the main clinic number (give number).  Even if " "the clinic is not open, triage nurses are available 24/7 to help you.     We would like you to know that our clinic has extended hours (provide information).  We also have urgent care (provide details on closest location and hours/contact info)\"      \"Thank you for your time and take care!\"    Nena Jeffries RN             "

## 2019-11-19 NOTE — PROGRESS NOTES
S-(situation): Patient discharged to home with spouse @ 8940    B-(background):hepatic encephalopathy    A-(assessment): VSS, paracentesis site band aid CDI.  Afebrile, denies pain    R-(recommendations): Discharge instructions reviewed with pt. Listed belongings gathered and returned to patient. yes        Discharge Nursing Criteria:     Care Plan and Patient education resolved:yes    New Medications- pt has been educated about purpose and side effects: yes    Vaccines  Influenza status verified at discharge:  yes      MISC  Prescriptions if needed, hard copies sent with patient  n/a  Home and hospital aquired medications returned to patient: yes  Medication Bin checked and emptied on discharge yes  Patient reports post-discharge pain management plan is effective: yes

## 2019-11-19 NOTE — PROGRESS NOTES
Clinic Care Coordination Contact    Situation: Patient chart reviewed by care coordinator.    Background: Reason for Hospitalization:  Hepatic encephalopathy (H) [K72.90]  Acute kidney injury (H) [N17.9]  Alcoholic cirrhosis of liver with ascites (H) [K70.31]  Admit Date/Time: 11/10/2019 10:19 PM  Discharge Date: 11/18/19  Discharge Plan:      Discharge Plan:      Most Recent Value   Disposition Comments  Going to in [facility] home in [place]        Assessment: CAROLINE BOUDREAUX received CTS letter. Pt was looking at going to TCU however mobility improved. Pt spouse having her go to [facility] to stay with in [facility] for a while. Home care was initiated through [facility]. Clinic RN placed f/u call today. Pt has f/u appt with PCP on 11/21/19.     Plan/Recommendations: CAROLINE BOUDREAUX will outreach to pt tomorrow since already received one call today from clinic.       TATY Mejia   Primary Care Clinic- Social Work Care Coordinator  [facility]  11/19/2019 11:40 AM  [phone]

## 2019-11-20 NOTE — TELEPHONE ENCOUNTER
Reason for Call:  Other Home Care/MTM    Detailed comments: Esme, care coordinator from [facility], called stating pt was set up with home care for medication management after being discharged from the hospital. Upon follow up with [facility], Esme was informed that pt is not in their service area. Pt has many medications and is very overwhelmed and confused with her medication regimine.  Eron has set up an automatic pill dispenser but Esme wanted to communicate that pt will not be receiving the home care services initially set up and that it may need to be revisited to find a different home care provider. Esme will continue to follow [facility] as [facility], please reach out to her if there are questions or communications. [phone]. Pt is scheduled for hospital follow up with Dr. Bustos on 11/21    Phone Number Patient can be reached at: Other phone number:  NA    Best Time: 8-5 M-F    Can we leave a detailed message on this number? YES    Call taken on 11/20/2019 at 3:02 PM by Merced Granados

## 2019-11-20 NOTE — PROGRESS NOTES
"Clinic Care Coordination Contact  Care Team Conversations    CAROLINE BOUDREAUX spoke briefly with pt. She states that she ended up going home instead of her in-laws in [place]. Pt had been referred to [facility] however needs to have local home care. Of course [facility] is not going to service [facility].     CAROLINE BOUDREAUX notes pt was referred to [facility] it looks like yesterday. Pt thought they don't \"service her area\" however pt only lives 7 miles outside of [place]. Let pt know CAROLINE BOUDREAUX will f/u with home care and let her know.     CAROLINE BOUDREAUX spoke with intake at  Home care. She sees the order in there but it was documented pt out of service area. This was due to seeing CAROLINE BOUDREAUX note from yesterday. Explained to intake that was CAROLINE BOUDREAUX understanding that pt was in [place] however she is not. The referral to home care was placed after that note at 2:24pm. Pt is in service area for  home care out of [place]. Intake will follow up with [place] team to determine if can see pt tomorrow and will call CAROLINE BOUDREAUX back.        TATY Mejia   Primary Care Clinic- Social Work Care Coordinator  [facility]  11/20/2019 3:43 PM  [phone]      "

## 2019-11-20 NOTE — PROGRESS NOTES
Clinic Care Coordination Contact  Care Team Conversations    CAROLINE CC received call back from Cole in intake at  Home care. She was not able to get a hold of anyone in [place] office. She is going to enter the referral for the pt and it will go to them and hopefully they can see pt tomorrow if not they will call to get a new order for home care from the provider.    CAROLINE BOUDREAUX called [facility] Care coordinator back and left detailed message on her confidential voicemail letting her know of the mix up with the home care orders and that it is being taken care of.       TATY Mejia   Primary Care Clinic- Social Work Care Coordinator  [facility]  11/20/2019 3:51 PM  [phone]      ADDENDUM: CAROLINE BOUDREAUX spoke with Esme and updated her. She will continue to follow up with pt as well said. Asked CAROLINE BOUDREAUX to let her know if pt needs anything.     CAROLINE BOUDREAUX spoke with pt and let her know  Home care will be contacting her. CAROLINE BOUDREAUX will follow up with pt in clinic tomorrow, she is needing a POA form. CAROLINE BOUDREAUX will get that to her and attempt to do more assessment to see if any further CAROLINE BOUDREAUX needs.     Updated PCP as welll.     TATY Mejia   Primary Care Clinic- Social Work Care Coordinator  [facility]  11/20/2019 4:36 PM  [phone]

## 2019-11-20 NOTE — TELEPHONE ENCOUNTER
There was a mix up with home care orders. Pt did NOT go to [place] as was communicated in CTS so [facility] could not see pt as it was outside of their service area.     A referral had been placed then for [facility] care at some point as it must have been discovered pt was at home in [place], but  home care thought pt was in [place] and outside of their service area.     CAROLINE BOUDREAUX clarified with  Home care that pt is at home in [place] and needs home care. Cole in intake is placing the referral again and hopefully they will be able to see pt tomorrow, if not they will call the Care team for another home care order to be placed.       TATY Mejia   Primary Care Clinic- Social Work Care Coordinator  [facility]  11/20/2019 4:01 PM  [phone]

## 2019-11-21 NOTE — PROGRESS NOTES
Subjective     Monalisa Olguin is a 54 year old female who presents to clinic today for the following health issues:    [facility]       Hospital Follow-up Visit:    Hospital/Nursing Home/IP Rehab Facility: [facility]  Date of Admission: 11/10/2019  Date of Discharge: 11/18/2019  Reason(s) for Admission:             Problems taking medications regularly:  None       Medication changes since discharge: None       Problems adhering to non-medication therapy:  None    Summary of hospitalization:  [facility] discharge summary reviewed  Diagnostic Tests/Treatments reviewed.  Follow up needed: none  Other Healthcare Providers Involved in Patient s Care:         None  Update since discharge: stable.     Post Discharge Medication Reconciliation: discharge medications reconciled and changed, per note/orders (see AVS).  Plan of care communicated with patient     Coding guidelines for this visit:  Type of Medical   Decision Making Face-to-Face Visit       within 7 Days of discharge Face-to-Face Visit        within 14 days of discharge   Moderate Complexity [id number]   High Complexity 67486 [id number]            PROBLEMS TO ADD ON...  -------------------------------------  Monalisa is in for follow-up after discharge from the hospital for 8 days for chronic liver disease end-stage liver disease.  She had received 2 paracentesis while in the hospital each time removing close to 3 L of fluid.  They start her on some lactulose to help with ammonia levels and her cognition which is really help.  Also is getting her bowels moving on a more regular basis.  She feels significantly better than she did when she was hospitalized.  She does not remember a lot from the hospitalization because she was so out of it.  I did review her discharge medication list compared to the medication she was on in our medication list.  She has been taking all the medication that they discharged her at home home on her discharge med rec  sheet.    She feels that she is doing much better since she was admitted.  She knows that she is in end-stage liver disease and has been sober per her accounts for the past 6 months.  She sees Dr. Kovacs her hepatologist on Monday and will talk to him about whether she can get on the transplant list.  I think she agrees that this is her only chance of having long-term survival and she is certain that she will never drink again.    Patient Active Problem List   Diagnosis     Kidney donor     Esophageal reflux     Moderate Depression [296.32]     HYPERLIPIDEMIA LDL GOAL <100     Hypertension goal BP (blood pressure) < 130/80     Anxiety     Alcoholism in recovery (H)     Alcoholic cirrhosis of liver with ascites (H)     Chronic blood loss anemia     Thrombocytopenia (H)     Tobacco abuse     Non-intractable vomiting with nausea     Portal hypertension (H)     Pulmonary nodules     Hyperthyroidism     Acute alcoholic intoxication in alcoholism (H)     Heme positive stool     Alcoholic cirrhosis of liver without ascites (H) - per Hepatology consult 2017     Splenomegaly     Epistaxis     Other iron deficiency anemia     Other pancytopenia (H)     Acute renal failure (H)     Solitary kidney, acquired     Hepatic encephalopathy (H)     Hypotension, unspecified hypotension type     Macrocytic anemia     Hematochezia-patient reported     Hyponatremia     Hypopotassemia     SBP (spontaneous bacterial peritonitis) (H)     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     COLONOSCOPY N/A 2017    Procedure: COLONOSCOPY;  Colonoscopy;  Surgeon: Sai Johnston MD;  Location:  GI     ESOPHAGOSCOPY, GASTROSCOPY, DUODENOSCOPY (EGD), COMBINED N/A 2017    Procedure: COMBINED ESOPHAGOSCOPY, GASTROSCOPY, DUODENOSCOPY (EGD), BIOPSY SINGLE OR MULTIPLE;  ESOPHAGOSCOPY, GASTROSCOPY, DUODENOSCOPY  (EGD) with biopsies;  Surgeon: Sai Johnston MD;  Location:  GI     ESOPHAGOSCOPY, GASTROSCOPY, DUODENOSCOPY (EGD), COMBINED N/A 4/29/2019    Procedure: ESOPHAGOGASTRODUODENOSCOPY, WITH BIOPSY;  Surgeon: Edgardo Scott DO;  Location:  GI     HC KNEE SCOPE, DIAGNOSTIC  11/14/01    Arthroscopy, Lt Knee     LAPAROSCOPIC CHOLECYSTECTOMY N/A 9/24/2017    Procedure: LAPAROSCOPIC CHOLECYSTECTOMY;  laparoscopic cholecystectomy;  Surgeon: Romeo Pastor MD;  Location: [facility] OR     OPEN REDUCTION INTERNAL FIXATION WRIST Right 11/29/2017    Procedure: OPEN REDUCTION INTERNAL FIXATION WRIST;  OPEN REDUCTION INTERNAL FIXATION RIGHT WRIST;  Surgeon: Edgardo Almendarez MD;  Location: [facility] OR       Social History     Tobacco Use     Smoking status: Current Every Day Smoker     Packs/day: 0.50     Years: 10.00     Pack years: 5.00     Smokeless tobacco: Never Used     Tobacco comment: pt quit 1992 after smoking for 8 yrs, started again in 2004   Substance Use Topics     Alcohol use: Not Currently     Alcohol/week: 0.0 standard drinks     Comment: 2 beers per day     Family History   Problem Relation Age of Onset     Hypertension Mother         80 years old     Heart Disease Paternal Grandmother      Heart Disease Paternal Grandfather      Cerebrovascular Disease Maternal Grandmother      Cerebrovascular Disease Maternal Grandfather      Alcohol/Drug Maternal Grandfather      Substance Abuse Maternal Grandfather      Heart Disease Father         Silent MI stents x 2     Diabetes Sister 17        older sister also had kidney and pancreas transplant     Heart Disease Sister         MI secondary to diabetes     Genitourinary Problems Sister 43        kidney transplant from renal failure     Other - See Comments Sister         older     Other - See Comments Sister         younger     Diabetes Sister 9        youngest, juvenile type I (onset age 9 with no complications)     Cancer Paternal Aunt         ?kind          Current Outpatient Medications   Medication Sig Dispense Refill     acamprosate (CAMPRAL) 333 MG EC tablet Take 2 tablets (666 mg) by mouth 3 times daily 540 tablet 0     alendronate (FOSAMAX) 35 MG tablet Take 1 tablet (35 mg) by mouth with 8oz water every Monday (once every 7 days) 30 minutes before breakfast and remain upright during this time. 12 tablet 0     ARIPiprazole (ABILIFY) 5 MG tablet Take 1 tablet (5 mg) by mouth At Bedtime 90 tablet 3     busPIRone (BUSPAR) 15 MG tablet Take 1 tablet (15 mg) by mouth 2 times daily 180 tablet 3     FLUoxetine (PROZAC) 20 MG capsule Take 3 capsules (60 mg) by mouth daily 90 capsule 0     folic acid (FOLVITE) 1 MG tablet Take 1 tablet (1 mg) by mouth daily 90 tablet 3     furosemide (LASIX) 40 MG tablet Take 1 tablet (40 mg) by mouth daily 90 tablet 3     gabapentin (NEURONTIN) 100 MG capsule Take 3 capsules (300 mg) by mouth At Bedtime 270 capsule 3     lactulose (CHRONULAC) 10 GM/15ML solution Take 15 mLs (10 g) by mouth 3 times daily as needed for constipation Start with 15 ml daily, titrate as needed for 2-3 BM's daily. 500 mL 3     MEDICATION INSTRUCTION  1 each 0     midodrine (PROAMATINE) 5 MG tablet Take 1 tablet (5 mg) by mouth 3 times daily (with meals) 270 tablet 3     omeprazole (PRILOSEC) 20 MG DR capsule Take 1 capsule (20 mg) by mouth daily 90 capsule 3     ondansetron (ZOFRAN) 4 MG tablet Take 1 tablet (4 mg) by mouth every 6 hours as needed for nausea 30 tablet 0     potassium chloride ER (K-DUR/KLOR-CON M) 10 MEQ CR tablet Take 1 tablet (10 mEq) by mouth daily 90 tablet 3     rifaximin (XIFAXAN) 550 MG TABS tablet Take 1 tablet (550 mg) by mouth 2 times daily 60 tablet 0     spironolactone (ALDACTONE) 50 MG tablet Take 1 tablet (50 mg) by mouth daily       traZODone (DESYREL) 50 MG tablet Take 2 tablets (100 mg) by mouth nightly as needed for sleep 30 tablet 0     ursodiol (ACTIGALL) 500 MG tablet Take 1 tablet (500 mg) by mouth 2 times daily 180  "tablet 3     vitamin B1 (VITAMIN B-1) 100 MG tablet Take 1 tablet (100 mg) by mouth daily 90 tablet 3     acetaminophen (TYLENOL) 325 MG tablet Take 1 tablet (325 mg) by mouth every 8 hours as needed for mild pain or fever 30 tablet 0     Allergies   Allergen Reactions     Albuterol      Tongue \"hardened and painful\"     Recent Labs   Lab Test 11/18/19  1501 11/18/19  0621  11/16/19  0536 11/15/19  0536  12/20/17  1403  09/24/17  0630  09/22/17  0735  05/23/17  1610  03/03/16  0953  08/26/13  1037   A1C  --   --   --   --   --   --   --   --  Canceled, Test credited  --   --   --   --   --   --   --   --    LDL  --   --   --   --   --   --   --   --   --   --   --   --  95  --  56  --  96   HDL  --   --   --   --   --   --   --   --   --   --   --   --  71  --  58  --  54   TRIG  --   --   --   --   --   --   --   --   --   --   --   --  59  --  95  --  104   ALT  --  20  --  16 15   < > 23   < > 35   < > 38   < > 34   < >  --    < > 32   CR  --  0.94  --  1.02 1.05*   < > 1.02   < > 1.04   < > 1.33*   < > 0.87   < > 0.89   < > 1.12*   GFRESTIMATED  --  69  --  63 61   < > 57*   < > 56*   < > 42*   < > 69   < > 67   < > 52*   GFRESTBLACK  --  80  --  73 70   < > 69   < > 68   < > 51*   < > 83   < > 81   < > 63   POTASSIUM 3.8 3.3*   < > 3.3* 3.8   < > 3.7   < > 3.7   < > 3.9   < > 4.1   < > 3.7   < > 3.8   TSH  --   --   --   --   --   --  1.02  --   --   --  0.50  --   --    < >  --   --   --     < > = values in this interval not displayed.      BP Readings from Last 3 Encounters:   11/21/19 102/56   11/18/19 121/61   11/01/19 90/59    Wt Readings from Last 3 Encounters:   11/21/19 55.8 kg (123 lb)   11/18/19 56.2 kg (123 lb 14.4 oz)   09/18/19 58.3 kg (128 lb 9.6 oz)         Reviewed and updated as needed this visit by Provider         Review of Systems   ROS COMP: Constitutional, HEENT, cardiovascular, pulmonary, gi and gu systems are negative, except as otherwise noted.      Objective    /56   Pulse 86   " Temp 98.1  F (36.7  C) (Temporal)   Resp 18   Wt 55.8 kg (123 lb)   LMP 05/16/2012   SpO2 99%   BMI 23.24 kg/m    Body mass index is 23.24 kg/m .  Physical Exam   GENERAL: Heydi is alert but looks ill.  She has significant scleral icterus and her skin is bronzed so dark that she looks like she has been in a tanning rosen for quite some time.  Her skin is so dark but it almost has a purplish appearance to it.  EYES: Her sclera are deep yellow  RESP: lungs clear to auscultation - no rales, rhonchi or wheezes  CV: Is a regular rate and rhythm but has a 2/6 to 3/6 systolic murmur.  I do not hear any S3s or S4s.  ABDOMEN: Her abdomen is quite distended she has an obvious fluid wave noted on exam.  She has a lot of increased vascularity and venous markings of the upper abdomen going up into the chest region most likely from portal hypertension and end-stage liver disease.  I think she has a significant amount of fluid in the abdomen and is seeing Dr. Kovacs in 4 days so will probably need an paracentesis done at that time.  She typically has one done here on a weekly or biweekly basis.  MS: I do not note any extremity edema on her today.    Diagnostic Test Results:  Labs reviewed in Epic  none         Assessment & Plan     (Z09) Hospital discharge follow-up  (primary encounter diagnosis)  (K72.90) Hepatic encephalopathy (H)  Comment: Recent hospitalization for 8 days for hepatic encephalopathy due to end-stage liver disease from chronic alcohol use.  Plan: lactulose (CHRONULAC) 10 GM/15ML solution        Lactulose has helped getting her bowels moving and to help clear her mentally she will continue this.    (F10.20) Alcohol dependence, continuous drinking behavior (H)  Comment: She has been alcohol free for 6 months according to her.  Plan: vitamin B1 (VITAMIN B-1) 100 MG tablet        She will continue on her multivitamins including her folic acid and her thiamine.  She sees her hematologist this coming Monday and  really needs to talk to him about whether or not she would qualify for the transplant list now that she is been sober for 6 months.    (K70.31) Alcoholic cirrhosis of liver with ascites (H)  Comment: Her liver disease has caused significant issues with massive ascites.  Plan: ursodiol (ACTIGALL) 500 MG tablet, folic acid         (FOLVITE) 1 MG tablet, acamprosate (CAMPRAL)         333 MG EC tablet, furosemide (LASIX) 40 MG         Tablet;   she is going to continue on her on her Actigall, Campral, Lasix, lactulose, and other medications to help maintain her electrolytes.  She is needed to be started on Midrin for her orthostatic hypotension so her Inderal has been stopped.    (E87.6) Hypokalemia  Comment: Her hypokalemia is stable now on spironolactone.  She is on Lasix now so we will keep her on the 10 mEq of potassium chloride and monitor her potassium level.  Plan: potassium chloride ER (K-DUR/KLOR-CON M) 10 MEQ        CR tablet        Potassium has been stable we will recheck it in 2 weeks.    (K21.0) Gastroesophageal reflux disease with esophagitis  Comment: Her reflux has been stable on her omeprazole.    Plan: omeprazole (PRILOSEC) 20 MG DR capsule        We will continue this.    (I95.1) Orthostatic hypotension  Comment: Her blood pressure is stable today but still relatively low.  Plan: midodrine (PROAMATINE) 5 MG tablet        She will need to continue on her Midodrine to help maintain her blood pressures.    (G62.9) Neuropathy  Comment: She does have some neuropathy most likely from vitamin deficiencies from her chronic alcohol use.  Plan: gabapentin (NEURONTIN) 100 MG capsule        She will continue with her Neurontin at bedtime.  This also helps give her better rest and can help with her anxiety.    (F33.1) Moderate Depression [296.32]  Comment: She was started on Abilify for her depression  Plan: ARIPiprazole (ABILIFY) 5 MG tablet        We will continue with this medication.    (F41.1) MONA  (generalized anxiety disorder)  Comment: For her generalized anxiety they started buspirone so we will continue this also.  Plan: busPIRone (BUSPAR) 15 MG tablet        Refills of all her medications were sent to the pharmacy with 3-month supply and 3 refills.  The pharmacy will look at these to see which need to be filled now and which can wait.       Tobacco Cessation:   reports that she has been smoking. She has a 5.00 pack-year smoking history. She has never used smokeless tobacco.  Tobacco Cessation Action Plan: Information offered: Patient not interested at this time    She has a follow-up with her hematologist on Monday and will have lab and BP follow-up in 2 weeks.    Return in about 2 weeks (around 12/5/2019) for Lab Work, BP Recheck.    40 minutes were spent with this patient during this consultation with over 50% spent in counseling regarding her end stage liver disease and medication reconciliation.    Electronically signed by:  Efren Bustos M.D.  11/21/2019

## 2019-11-21 NOTE — PROGRESS NOTES
Clinic Care Coordination Contact    Clinic Care Coordination Contact  OUTREACH    Referral Information: Hospital Discharge  Referral Source: IP Handoff    Primary Diagnosis: CD    Chief Complaint   Patient presents with     Clinic Care Coordination - Face To Face     SW        [place] Utilization:   Clinic Utilization  Difficulty keeping appointments:: No  Compliance Concerns: No  No-Show Concerns: No  No PCP office visit in Past Year: No  Utilization    Last refreshed: 11/21/2019  3:20 PM:  Hospital Admissions 1           Last refreshed: 11/21/2019  3:20 PM:  ED Visits 1           Last refreshed: 11/21/2019  3:20 PM:  No Show Count (past year) 5              Current as of: 11/21/2019  3:20 PM            Clinical Concerns:  Patient Active Problem List   Diagnosis     Kidney donor     Esophageal reflux     Moderate Depression [296.32]     HYPERLIPIDEMIA LDL GOAL <100     Hypertension goal BP (blood pressure) < 130/80     Anxiety     Alcoholism in recovery (H)     Alcoholic cirrhosis of liver with ascites (H)     Chronic blood loss anemia     Thrombocytopenia (H)     Tobacco abuse     Non-intractable vomiting with nausea     Portal hypertension (H)     Pulmonary nodules     Hyperthyroidism     Acute alcoholic intoxication in alcoholism (H)     Heme positive stool     Alcoholic cirrhosis of liver without ascites (H) - per Hepatology consult Nov 2017     Splenomegaly     Epistaxis     Other iron deficiency anemia     Other pancytopenia (H)     Acute renal failure (H)     Solitary kidney, acquired     Hepatic encephalopathy (H)     Hypotension, unspecified hypotension type     Macrocytic anemia     Hematochezia-patient reported     Hyponatremia     Hypopotassemia     SBP (spontaneous bacterial peritonitis) (H)       Current Medical Concerns:  Pt recently hospitalized due to alcholic cirrhosis, acute renal failure, worsening ascites.  Referred to [facility]. Pt states she spoke with them today and they are going  to see pt tomorrow. Orders for RN, SAMMIE, and PT, OT. Pt has Heptologist and appointment next week and will be discussing whether or not can get on a transplant list.    Current Behavioral Concerns: Hx of alcoholism. Pt reports been sober for 6 months. Had paracentesis in hospital. Declines need for any CD resources.  Pt not interested in quitting smoking. Resources offered.   Education Provided to patient: Pt wanting a Power of  form. Printed one for pt. Asked pt if has a health care directive as CAROLINE BOUDREAUX does not see one on file. Pt did want a Health Care Directive to complete, one was printed for pt. Explained to her to return to clinic and can get to [facility] department to review and scan into pt medical record.   Pain  Pain (GOAL):: No  Health Maintenance Reviewed: Due/Overdue   Health Maintenance Due   Topic Date Due     ZOSTER IMMUNIZATION (1 of 2) 10/08/2016       Clinical Pathway: None    Medication Management:  See medication list. Pt has med dispenser. Home care coming out tomorrow to open to services and will assist with medications as well.      Functional Status:  Dependent ADLs:: Independent  Dependent IADLs:: Transportation, Independent(not driving currently)  Bed or wheelchair confined:: No  Mobility Status: Independent  Fallen 2 or more times in the past year?: No  Any fall with injury in the past year?: No    Living Situation:  Current living arrangement:: I live in a private home with family  Type of residence:: (unknown)    Diet/Exercise/Sleep:  Inadequate nutrition (GOAL):: No  Food Insecurity: No  Tube Feeding: No  Exercise:: Currently not exercising  Inadequate activity/exercise (GOAL):: No  Significant changes in sleep pattern (GOAL): No    Transportation:  Transportation concerns (GOAL):: No  Transportation means:: Regular car, Family     Psychosocial:  Protestant or spiritual beliefs that impact treatment:: No  Mental health DX:: Yes  Mental health DX how managed::  Medication  Mental health management concern (GOAL):: No  Informal Support system:: Spouse, Family     Financial/Insurance:   Financial/Insurance concerns (GOAL):: No  [facility]     Resources and Interventions:  Current Resources:   List of home care services:: Skilled Nursing, Home Health Aid, Physicial Therapy, Occupational Therapy;   Community Resources: Home Care     Equipment Currently Used at Home: grab bar, tub/shower, other (see comments)(medication dispenser)    Advance Care Plan/Directive  Advanced Care Plans/Directives on file:: No  Advanced Care Plan/Directive Status: Considering Options(gave her a health care directive form)    Referrals Placed: Home Care     Goals: n/a    Patient/Caregiver understanding: yes       Future Appointments              Today Efren Bustos MD [facility], [facility]    In 4 days  LAB [facility] Lab, [facility]    In 4 days Edgardo Kovacs MD [facility] Hepatology, [facility]    In 5 days PH RAD; PHUS1 [facility] Ultrasound, [place]          Plan: Pt will complete Health Care Directive and return to clinic- should be sent to [facility] department for review when completed and they will scan into pt chart.   Pt declines any other CC needs. SW CC will do no further outreaches.       TATY Mejia   Primary Care Clinic- Social Work Care Coordinator  [facility]  11/21/2019 4:38 PM  [phone]

## 2019-11-21 NOTE — NURSING NOTE
Chief Complaint   Patient presents with     Hospital F/U     11/10/2019-11/8/2019 Presumed SBP (spontaneous bacterial peritonitis)      MP/MA

## 2019-11-22 NOTE — TELEPHONE ENCOUNTER
I left a message asking patient to return our call.  Please inform patient of the message below.  Charlie Kauffman, CMA

## 2019-11-22 NOTE — TELEPHONE ENCOUNTER
----- Message from Efren Bustos MD sent at 11/21/2019  6:07 PM CST -----  Monalisa needs a follow-up blood pressure and labs in 2 weeks.  Blood pressure can be with the float nurse.  I will place orders for her labs to be drawn.    Electronically signed by:  Efren Bustos M.D.  11/21/2019

## 2019-11-25 NOTE — NURSING NOTE
Chief Complaint   Patient presents with     RECHECK     Alcoholic cirrhosis of liver     /74   Pulse 112   Temp 97.9  F (36.6  C) (Oral)   Ht 1.524 m (5')   Wt 58.4 kg (128 lb 11.2 oz)   LMP 05/16/2012   SpO2 95%   BMI 25.13 kg/m    Geovanna Gautam, CMA

## 2019-11-25 NOTE — LETTER
11/25/2019      RE: Monalisa Olguin  [address] [facility] [address]       I had the pleasure of seeing Monalisa Olguin for followup in the Liver Clinic at the [facility] on 11/25/2019.  Ms. Olguin returns for followup of alcoholic cirrhosis.      Since she saw me last, she was hospitalized with what appeared to be episode of SBP.  She has also had episodes of hepatic encephalopathy as well.  She has been sober since September.      She is complaining of some abdominal discomfort related to increased abdominal girth.  She denies any itching or skin rash.  She has a moderate amount of fatigue.  She does have some lower extremity edema in addition to the ascites.  She has not had any gastrointestinal bleeding.  As I mentioned, she has had some hepatic encephalopathy.  Her appetite has been good, but she has experienced a fair amount of muscle wasting.      Current Outpatient Medications   Medication     acamprosate (CAMPRAL) 333 MG EC tablet     acetaminophen (TYLENOL) 325 MG tablet     alendronate (FOSAMAX) 35 MG tablet     ARIPiprazole (ABILIFY) 5 MG tablet     busPIRone (BUSPAR) 15 MG tablet     FLUoxetine (PROZAC) 20 MG capsule     folic acid (FOLVITE) 1 MG tablet     furosemide (LASIX) 40 MG tablet     gabapentin (NEURONTIN) 100 MG capsule     lactulose (CHRONULAC) 10 GM/15ML solution     MEDICATION INSTRUCTION     midodrine (PROAMATINE) 5 MG tablet     omeprazole (PRILOSEC) 20 MG DR capsule     ondansetron (ZOFRAN) 4 MG tablet     potassium chloride ER (K-DUR/KLOR-CON M) 10 MEQ CR tablet     rifaximin (XIFAXAN) 550 MG TABS tablet     spironolactone (ALDACTONE) 50 MG tablet     traZODone (DESYREL) 50 MG tablet     ursodiol (ACTIGALL) 500 MG tablet     vitamin B1 (VITAMIN B-1) 100 MG tablet     No current facility-administered medications for this visit.      /74   Pulse 112   Temp 97.9  F (36.6  C) (Oral)   Ht 1.524 m (5')   Wt 58.4 kg (128 lb 11.2 oz)   LMP  05/16/2012   SpO2 95%   BMI 25.13 kg/m       EXAMINATION:  In general, she looks chronically ill with a moderate amount of muscle wasting.  HEENT exam shows moderate scleral icterus.  She has moderate temporal muscle wasting.  Chest is clear.  Abdominal exam shows a large amount of ascites.  No masses or tenderness to palpation are present.  Her liver and spleen are not palpable, and extremity shows 1+ edema.  Skin exam shows some palmar erythema with a few spider angioma.  Neurologic exam shows no asterixis.     Recent Results (from the past 168 hour(s))   CBC with platelets    Collection Time: 11/25/19 12:56 PM   Result Value Ref Range    WBC 6.3 4.0 - 11.0 10e9/L    RBC Count 2.23 (L) 3.8 - 5.2 10e12/L    Hemoglobin 8.0 (L) 11.7 - 15.7 g/dL    Hematocrit 25.8 (L) 35.0 - 47.0 %     (H) 78 - 100 fl    MCH 35.9 (H) 26.5 - 33.0 pg    MCHC 31.0 (L) 31.5 - 36.5 g/dL    RDW 19.7 (H) 10.0 - 15.0 %    Platelet Count 61 (L) 150 - 450 10e9/L   Basic metabolic panel    Collection Time: 11/25/19 12:56 PM   Result Value Ref Range    Sodium 137 133 - 144 mmol/L    Potassium 4.1 3.4 - 5.3 mmol/L    Chloride 109 94 - 109 mmol/L    Carbon Dioxide 22 20 - 32 mmol/L    Anion Gap 6 3 - 14 mmol/L    Glucose 129 (H) 70 - 99 mg/dL    Urea Nitrogen 27 7 - 30 mg/dL    Creatinine 1.38 (H) 0.52 - 1.04 mg/dL    GFR Estimate 44 (L) >60 mL/min/[1.73_m2]    GFR Estimate If Black 50 (L) >60 mL/min/[1.73_m2]    Calcium 8.2 (L) 8.5 - 10.1 mg/dL   Hepatic panel    Collection Time: 11/25/19 12:56 PM   Result Value Ref Range    Bilirubin Direct 3.2 (H) 0.0 - 0.2 mg/dL    Bilirubin Total 5.6 (H) 0.2 - 1.3 mg/dL    Albumin 3.2 (L) 3.4 - 5.0 g/dL    Protein Total 5.6 (L) 6.8 - 8.8 g/dL    Alkaline Phosphatase 238 (H) 40 - 150 U/L    ALT 27 0 - 50 U/L    AST 53 (H) 0 - 45 U/L   INR    Collection Time: 11/25/19 12:56 PM   Result Value Ref Range    INR 1.30 (H) 0.86 - 1.14      IMPRESSION:  My impression is that Ms. Olguin has advanced and  decompensated alcoholic cirrhosis.  We will get her set up to have large volume paracentesis twice a week.  This is because of her renal insufficiency and I am concerned that if we take large amounts of volume, it will adversely affect her kidneys.  She has a MELD score too high to consider a TIPS procedure at this point in time, and really, transplant is going to be the only way to go.  She has only been sober for about 3-1/2 months, however, so we will wait until she is at least 5 months sober before initiating evaluation for liver transplantation.  In the meantime, she is up-to-date with regard to vaccines.  She is up-to-date with regard to HCC screening.  I will see her back in the clinic again in 2 months.      Thank you very much for allowing me to participate in the care of this patient.  If you have any questions regarding my recommendations, please do not hesitate to contact me.       Edgardo Kovacs MD      Professor of Medicine  [employer] [employer]      Executive Medical Director, Solid Organ Transplant Program  [facility]

## 2019-11-25 NOTE — PROGRESS NOTES
Per Dr. Kovacs change Pt's us paracentesis order to twice weekly, maximum liters to remove 4.  Albumin replacement per protocol. New order entered. Pt aware seen by Dr. Kovacs in clinic today.    Mai Yañez LPN  Hepatology Clinic

## 2019-11-25 NOTE — PROGRESS NOTES
I had the pleasure of seeing Monalisa Olguin for followup in the Liver Clinic at the [facility] on 11/25/2019.  Ms. Olguin returns for followup of alcoholic cirrhosis.      Since she saw me last, she was hospitalized with what appeared to be episode of SBP.  She has also had episodes of hepatic encephalopathy as well.  She has been sober since September.      She is complaining of some abdominal discomfort related to increased abdominal girth.  She denies any itching or skin rash.  She has a moderate amount of fatigue.  She does have some lower extremity edema in addition to the ascites.  She has not had any gastrointestinal bleeding.  As I mentioned, she has had some hepatic encephalopathy.  Her appetite has been good, but she has experienced a fair amount of muscle wasting.      Current Outpatient Medications   Medication     acamprosate (CAMPRAL) 333 MG EC tablet     acetaminophen (TYLENOL) 325 MG tablet     alendronate (FOSAMAX) 35 MG tablet     ARIPiprazole (ABILIFY) 5 MG tablet     busPIRone (BUSPAR) 15 MG tablet     FLUoxetine (PROZAC) 20 MG capsule     folic acid (FOLVITE) 1 MG tablet     furosemide (LASIX) 40 MG tablet     gabapentin (NEURONTIN) 100 MG capsule     lactulose (CHRONULAC) 10 GM/15ML solution     MEDICATION INSTRUCTION     midodrine (PROAMATINE) 5 MG tablet     omeprazole (PRILOSEC) 20 MG DR capsule     ondansetron (ZOFRAN) 4 MG tablet     potassium chloride ER (K-DUR/KLOR-CON M) 10 MEQ CR tablet     rifaximin (XIFAXAN) 550 MG TABS tablet     spironolactone (ALDACTONE) 50 MG tablet     traZODone (DESYREL) 50 MG tablet     ursodiol (ACTIGALL) 500 MG tablet     vitamin B1 (VITAMIN B-1) 100 MG tablet     No current facility-administered medications for this visit.      /74   Pulse 112   Temp 97.9  F (36.6  C) (Oral)   Ht 1.524 m (5')   Wt 58.4 kg (128 lb 11.2 oz)   LMP 05/16/2012   SpO2 95%   BMI 25.13 kg/m      EXAMINATION:  In general, she looks  chronically ill with a moderate amount of muscle wasting.  HEENT exam shows moderate scleral icterus.  She has moderate temporal muscle wasting.  Chest is clear.  Abdominal exam shows a large amount of ascites.  No masses or tenderness to palpation are present.  Her liver and spleen are not palpable, and extremity shows 1+ edema.  Skin exam shows some palmar erythema with a few spider angioma.  Neurologic exam shows no asterixis.     Recent Results (from the past 168 hour(s))   CBC with platelets    Collection Time: 11/25/19 12:56 PM   Result Value Ref Range    WBC 6.3 4.0 - 11.0 10e9/L    RBC Count 2.23 (L) 3.8 - 5.2 10e12/L    Hemoglobin 8.0 (L) 11.7 - 15.7 g/dL    Hematocrit 25.8 (L) 35.0 - 47.0 %     (H) 78 - 100 fl    MCH 35.9 (H) 26.5 - 33.0 pg    MCHC 31.0 (L) 31.5 - 36.5 g/dL    RDW 19.7 (H) 10.0 - 15.0 %    Platelet Count 61 (L) 150 - 450 10e9/L   Basic metabolic panel    Collection Time: 11/25/19 12:56 PM   Result Value Ref Range    Sodium 137 133 - 144 mmol/L    Potassium 4.1 3.4 - 5.3 mmol/L    Chloride 109 94 - 109 mmol/L    Carbon Dioxide 22 20 - 32 mmol/L    Anion Gap 6 3 - 14 mmol/L    Glucose 129 (H) 70 - 99 mg/dL    Urea Nitrogen 27 7 - 30 mg/dL    Creatinine 1.38 (H) 0.52 - 1.04 mg/dL    GFR Estimate 44 (L) >60 mL/min/[1.73_m2]    GFR Estimate If Black 50 (L) >60 mL/min/[1.73_m2]    Calcium 8.2 (L) 8.5 - 10.1 mg/dL   Hepatic panel    Collection Time: 11/25/19 12:56 PM   Result Value Ref Range    Bilirubin Direct 3.2 (H) 0.0 - 0.2 mg/dL    Bilirubin Total 5.6 (H) 0.2 - 1.3 mg/dL    Albumin 3.2 (L) 3.4 - 5.0 g/dL    Protein Total 5.6 (L) 6.8 - 8.8 g/dL    Alkaline Phosphatase 238 (H) 40 - 150 U/L    ALT 27 0 - 50 U/L    AST 53 (H) 0 - 45 U/L   INR    Collection Time: 11/25/19 12:56 PM   Result Value Ref Range    INR 1.30 (H) 0.86 - 1.14      IMPRESSION:  My impression is that Ms. Olguin has advanced and decompensated alcoholic cirrhosis.  We will get her set up to have large volume  paracentesis twice a week.  This is because of her renal insufficiency and I am concerned that if we take large amounts of volume, it will adversely affect her kidneys.  She has a MELD score too high to consider a TIPS procedure at this point in time, and really, transplant is going to be the only way to go.  She has only been sober for about 3-1/2 months, however, so we will wait until she is at least 5 months sober before initiating evaluation for liver transplantation.  In the meantime, she is up-to-date with regard to vaccines.  She is up-to-date with regard to HCC screening.  I will see her back in the clinic again in 2 months.      Thank you very much for allowing me to participate in the care of this patient.  If you have any questions regarding my recommendations, please do not hesitate to contact me.       Edgardo Kovacs MD      Professor of Medicine  [employer] [facility]      Executive Medical Director, Solid Organ Transplant Program  [facility]

## 2019-12-03 NOTE — TELEPHONE ENCOUNTER
Patient is requesting stimulant to have more regular bowel movments script to be sent to [facility] and requesting to have labs done when she is in on Friday.    Thank you,  Teresa PLAZA

## 2019-12-06 NOTE — OR NURSING
Pt meets criteria for discharge to home 2 hours post paracentesis. SBP stable ; HR 70's. Denies c/o discomfort/pain.  notified and updated on pt condition. Received ok for pt to be discharged. Pt reports understanding for post care.

## 2019-12-06 NOTE — OR NURSING
Pt arrived post paracentesis from Xray via cart. VSS. Denies c/o discomfort. Requesting menu to order lunch.

## 2019-12-18 NOTE — TELEPHONE ENCOUNTER
"prozac  Last Written Prescription Date:  11/17/2019  Last Fill Quantity: 90,  # refills: 0   Last office visit: 11/21/2019 with prescribing provider:     Future Office Visit:    PHQ-9 score:    PHQ-9 SCORE 9/17/2019   PHQ-9 Total Score -   PHQ-9 Total Score MyChart 10 (Moderate depression)   PHQ-9 Total Score 10     Requested Prescriptions   Pending Prescriptions Disp Refills     FLUoxetine (PROZAC) 20 MG capsule 90 capsule 0     Sig: Take 3 capsules (60 mg) by mouth daily       SSRIs Protocol Failed - 12/18/2019  4:05 PM        Failed - PHQ-9 score less than 5 in past 6 months     Please review last PHQ-9 score.           Passed - Medication is active on med list        Passed - Patient is age 18 or older        Passed - No active pregnancy on record        Passed - No positive pregnancy test in last 12 months        Passed - Recent (6 mo) or future (30 days) visit within the authorizing provider's specialty     Patient had office visit in the last 6 months or has a visit in the next 30 days with authorizing provider or within the authorizing provider's specialty.  See \"Patient Info\" tab in inbasket, or \"Choose Columns\" in Meds & Orders section of the refill encounter.              "

## 2020-01-01 ENCOUNTER — TELEPHONE (OUTPATIENT)
Dept: TRANSPLANT | Facility: CLINIC | Age: 55
End: 2020-01-01

## 2020-01-01 ENCOUNTER — APPOINTMENT (OUTPATIENT)
Dept: PHYSICAL THERAPY | Facility: CLINIC | Age: 55
DRG: 432 | End: 2020-01-01
Payer: COMMERCIAL

## 2020-01-01 ENCOUNTER — MEDICAL CORRESPONDENCE (OUTPATIENT)
Dept: MULTI SPECIALTY CLINIC | Facility: CLINIC | Age: 55
End: 2020-01-01

## 2020-01-01 ENCOUNTER — VIRTUAL VISIT (OUTPATIENT)
Dept: GASTROENTEROLOGY | Facility: CLINIC | Age: 55
End: 2020-01-01
Attending: PHYSICIAN ASSISTANT
Payer: COMMERCIAL

## 2020-01-01 ENCOUNTER — HOSPITAL ENCOUNTER (INPATIENT)
Facility: CLINIC | Age: 55
LOS: 8 days | Discharge: NURSING, REHAB OR LONG-TERM CARE | DRG: 441 | End: 2020-08-03
Attending: EMERGENCY MEDICINE | Admitting: INTERNAL MEDICINE
Payer: COMMERCIAL

## 2020-01-01 ENCOUNTER — APPOINTMENT (OUTPATIENT)
Dept: GENERAL RADIOLOGY | Facility: CLINIC | Age: 55
DRG: 432 | End: 2020-01-01
Attending: EMERGENCY MEDICINE
Payer: COMMERCIAL

## 2020-01-01 ENCOUNTER — APPOINTMENT (OUTPATIENT)
Dept: OCCUPATIONAL THERAPY | Facility: CLINIC | Age: 55
DRG: 441 | End: 2020-01-01
Payer: COMMERCIAL

## 2020-01-01 ENCOUNTER — APPOINTMENT (OUTPATIENT)
Dept: PHYSICAL THERAPY | Facility: CLINIC | Age: 55
DRG: 441 | End: 2020-01-01
Payer: COMMERCIAL

## 2020-01-01 ENCOUNTER — NURSE TRIAGE (OUTPATIENT)
Dept: NURSING | Facility: CLINIC | Age: 55
End: 2020-01-01

## 2020-01-01 ENCOUNTER — TELEPHONE (OUTPATIENT)
Dept: FAMILY MEDICINE | Facility: CLINIC | Age: 55
End: 2020-01-01

## 2020-01-01 ENCOUNTER — HOSPITAL ENCOUNTER (OUTPATIENT)
Dept: CARDIOLOGY | Facility: CLINIC | Age: 55
Discharge: HOME | End: 2020-05-13
Attending: INTERNAL MEDICINE | Admitting: INTERNAL MEDICINE
Payer: COMMERCIAL

## 2020-01-01 ENCOUNTER — VIRTUAL VISIT (OUTPATIENT)
Dept: FAMILY MEDICINE | Facility: CLINIC | Age: 55
End: 2020-01-01
Payer: COMMERCIAL

## 2020-01-01 ENCOUNTER — APPOINTMENT (OUTPATIENT)
Dept: NEUROLOGY | Facility: CLINIC | Age: 55
DRG: 441 | End: 2020-01-01
Attending: PSYCHIATRY & NEUROLOGY
Payer: COMMERCIAL

## 2020-01-01 ENCOUNTER — TELEPHONE (OUTPATIENT)
Dept: FAMILY MEDICINE | Facility: OTHER | Age: 55
End: 2020-01-01

## 2020-01-01 ENCOUNTER — OFFICE VISIT (OUTPATIENT)
Dept: GASTROENTEROLOGY | Facility: CLINIC | Age: 55
End: 2020-01-01
Attending: INTERNAL MEDICINE
Payer: COMMERCIAL

## 2020-01-01 ENCOUNTER — ANCILLARY PROCEDURE (OUTPATIENT)
Dept: GENERAL RADIOLOGY | Facility: CLINIC | Age: 55
End: 2020-01-01
Attending: FAMILY MEDICINE
Payer: COMMERCIAL

## 2020-01-01 ENCOUNTER — PATIENT OUTREACH (OUTPATIENT)
Dept: CARE COORDINATION | Facility: CLINIC | Age: 55
End: 2020-01-01

## 2020-01-01 ENCOUNTER — HOSPITAL ENCOUNTER (OUTPATIENT)
Facility: CLINIC | Age: 55
End: 2020-01-01
Attending: INTERNAL MEDICINE | Admitting: INTERNAL MEDICINE
Payer: COMMERCIAL

## 2020-01-01 ENCOUNTER — REFERRAL (OUTPATIENT)
Dept: TRANSPLANT | Facility: CLINIC | Age: 55
End: 2020-01-01

## 2020-01-01 ENCOUNTER — HOSPITAL LABORATORY (OUTPATIENT)
Dept: NURSING HOME | Facility: OTHER | Age: 55
End: 2020-01-01

## 2020-01-01 ENCOUNTER — HOSPITAL ENCOUNTER (INPATIENT)
Facility: CLINIC | Age: 55
LOS: 10 days | Discharge: NURSING, REHAB OR LONG-TERM CARE | DRG: 441 | End: 2020-06-12
Attending: EMERGENCY MEDICINE | Admitting: INTERNAL MEDICINE
Payer: COMMERCIAL

## 2020-01-01 ENCOUNTER — TELEPHONE (OUTPATIENT)
Dept: GASTROENTEROLOGY | Facility: CLINIC | Age: 55
End: 2020-01-01

## 2020-01-01 ENCOUNTER — HOSPITAL ENCOUNTER (OUTPATIENT)
Dept: ULTRASOUND IMAGING | Facility: CLINIC | Age: 55
Discharge: HOME | End: 2020-03-20
Attending: FAMILY MEDICINE | Admitting: FAMILY MEDICINE
Payer: COMMERCIAL

## 2020-01-01 ENCOUNTER — VIRTUAL VISIT (OUTPATIENT)
Dept: NEUROLOGY | Facility: CLINIC | Age: 55
End: 2020-01-01
Payer: COMMERCIAL

## 2020-01-01 ENCOUNTER — HOSPITAL ENCOUNTER (OUTPATIENT)
Facility: CLINIC | Age: 55
Setting detail: OBSERVATION
Discharge: HOME | End: 2020-06-16
Attending: FAMILY MEDICINE | Admitting: FAMILY MEDICINE
Payer: COMMERCIAL

## 2020-01-01 ENCOUNTER — APPOINTMENT (OUTPATIENT)
Dept: GENERAL RADIOLOGY | Facility: CLINIC | Age: 55
DRG: 441 | End: 2020-01-01
Attending: EMERGENCY MEDICINE
Payer: COMMERCIAL

## 2020-01-01 ENCOUNTER — TELEPHONE (OUTPATIENT)
Dept: GENERAL RADIOLOGY | Facility: CLINIC | Age: 55
End: 2020-01-01

## 2020-01-01 ENCOUNTER — APPOINTMENT (OUTPATIENT)
Dept: CT IMAGING | Facility: CLINIC | Age: 55
DRG: 432 | End: 2020-01-01
Attending: EMERGENCY MEDICINE
Payer: COMMERCIAL

## 2020-01-01 ENCOUNTER — APPOINTMENT (OUTPATIENT)
Dept: CT IMAGING | Facility: CLINIC | Age: 55
DRG: 441 | End: 2020-01-01
Attending: PHYSICIAN ASSISTANT
Payer: COMMERCIAL

## 2020-01-01 ENCOUNTER — COMMITTEE REVIEW (OUTPATIENT)
Dept: TRANSPLANT | Facility: CLINIC | Age: 55
End: 2020-01-01

## 2020-01-01 ENCOUNTER — PATIENT OUTREACH (OUTPATIENT)
Dept: GASTROENTEROLOGY | Facility: CLINIC | Age: 55
End: 2020-01-01

## 2020-01-01 ENCOUNTER — APPOINTMENT (OUTPATIENT)
Dept: ULTRASOUND IMAGING | Facility: CLINIC | Age: 55
DRG: 441 | End: 2020-01-01
Attending: DERMATOLOGY
Payer: COMMERCIAL

## 2020-01-01 ENCOUNTER — APPOINTMENT (OUTPATIENT)
Dept: ULTRASOUND IMAGING | Facility: CLINIC | Age: 55
DRG: 441 | End: 2020-01-01
Attending: PHYSICIAN ASSISTANT
Payer: COMMERCIAL

## 2020-01-01 ENCOUNTER — APPOINTMENT (OUTPATIENT)
Dept: OCCUPATIONAL THERAPY | Facility: CLINIC | Age: 55
DRG: 432 | End: 2020-01-01
Attending: INTERNAL MEDICINE
Payer: COMMERCIAL

## 2020-01-01 ENCOUNTER — HOSPITAL ENCOUNTER (OUTPATIENT)
Dept: ULTRASOUND IMAGING | Facility: CLINIC | Age: 55
Discharge: HOME | End: 2020-06-29
Attending: FAMILY MEDICINE | Admitting: FAMILY MEDICINE
Payer: COMMERCIAL

## 2020-01-01 ENCOUNTER — ALLIED HEALTH/NURSE VISIT (OUTPATIENT)
Dept: PHARMACY | Facility: CLINIC | Age: 55
End: 2020-01-01
Payer: COMMERCIAL

## 2020-01-01 ENCOUNTER — HOSPITAL ENCOUNTER (OUTPATIENT)
Facility: CLINIC | Age: 55
End: 2020-01-01
Attending: SURGERY | Admitting: SURGERY
Payer: COMMERCIAL

## 2020-01-01 ENCOUNTER — APPOINTMENT (OUTPATIENT)
Dept: OCCUPATIONAL THERAPY | Facility: CLINIC | Age: 55
DRG: 433 | End: 2020-01-01
Attending: STUDENT IN AN ORGANIZED HEALTH CARE EDUCATION/TRAINING PROGRAM
Payer: COMMERCIAL

## 2020-01-01 ENCOUNTER — APPOINTMENT (OUTPATIENT)
Dept: BEHAVIORAL HEALTH | Facility: CLINIC | Age: 55
End: 2020-01-01
Attending: PSYCHIATRY & NEUROLOGY
Payer: COMMERCIAL

## 2020-01-01 ENCOUNTER — APPOINTMENT (OUTPATIENT)
Dept: PHYSICAL THERAPY | Facility: CLINIC | Age: 55
DRG: 441 | End: 2020-01-01
Attending: PHYSICIAN ASSISTANT
Payer: COMMERCIAL

## 2020-01-01 ENCOUNTER — VIRTUAL VISIT (OUTPATIENT)
Dept: TRANSPLANT | Facility: CLINIC | Age: 55
End: 2020-01-01
Attending: INTERNAL MEDICINE
Payer: COMMERCIAL

## 2020-01-01 ENCOUNTER — TRANSFERRED RECORDS (OUTPATIENT)
Dept: HEALTH INFORMATION MANAGEMENT | Facility: CLINIC | Age: 55
End: 2020-01-01

## 2020-01-01 ENCOUNTER — MEDICAL CORRESPONDENCE (OUTPATIENT)
Dept: HEALTH INFORMATION MANAGEMENT | Facility: CLINIC | Age: 55
End: 2020-01-01

## 2020-01-01 ENCOUNTER — VIRTUAL VISIT (OUTPATIENT)
Dept: NEPHROLOGY | Facility: CLINIC | Age: 55
End: 2020-01-01
Attending: INTERNAL MEDICINE
Payer: COMMERCIAL

## 2020-01-01 ENCOUNTER — HOSPITAL ENCOUNTER (INPATIENT)
Facility: CLINIC | Age: 55
LOS: 4 days | Discharge: HOME HEALTH | DRG: 432 | End: 2020-05-08
Attending: EMERGENCY MEDICINE | Admitting: INTERNAL MEDICINE
Payer: COMMERCIAL

## 2020-01-01 ENCOUNTER — TELEPHONE (OUTPATIENT)
Dept: PHARMACY | Facility: CLINIC | Age: 55
End: 2020-01-01

## 2020-01-01 ENCOUNTER — OFFICE VISIT (OUTPATIENT)
Dept: ORTHOPEDICS | Facility: CLINIC | Age: 55
End: 2020-01-01
Payer: COMMERCIAL

## 2020-01-01 ENCOUNTER — APPOINTMENT (OUTPATIENT)
Dept: INTERVENTIONAL RADIOLOGY/VASCULAR | Facility: CLINIC | Age: 55
DRG: 441 | End: 2020-01-01
Attending: PHYSICIAN ASSISTANT
Payer: COMMERCIAL

## 2020-01-01 ENCOUNTER — HOSPITAL ENCOUNTER (OUTPATIENT)
Dept: ULTRASOUND IMAGING | Facility: CLINIC | Age: 55
Discharge: HOME | End: 2020-07-13
Attending: FAMILY MEDICINE | Admitting: FAMILY MEDICINE
Payer: COMMERCIAL

## 2020-01-01 ENCOUNTER — APPOINTMENT (OUTPATIENT)
Dept: OCCUPATIONAL THERAPY | Facility: CLINIC | Age: 55
DRG: 441 | End: 2020-01-01
Attending: PHYSICIAN ASSISTANT
Payer: COMMERCIAL

## 2020-01-01 ENCOUNTER — PRE VISIT (OUTPATIENT)
Dept: GASTROENTEROLOGY | Facility: CLINIC | Age: 55
End: 2020-01-01

## 2020-01-01 ENCOUNTER — HOSPITAL ENCOUNTER (OUTPATIENT)
Dept: ULTRASOUND IMAGING | Facility: CLINIC | Age: 55
Discharge: HOME | End: 2020-08-24
Attending: FAMILY MEDICINE | Admitting: FAMILY MEDICINE
Payer: COMMERCIAL

## 2020-01-01 ENCOUNTER — DOCUMENTATION ONLY (OUTPATIENT)
Dept: TRANSPLANT | Facility: CLINIC | Age: 55
End: 2020-01-01

## 2020-01-01 ENCOUNTER — HOSPITAL ENCOUNTER (OUTPATIENT)
Dept: ULTRASOUND IMAGING | Facility: CLINIC | Age: 55
Discharge: HOME | End: 2020-01-03
Attending: INTERNAL MEDICINE | Admitting: INTERNAL MEDICINE
Payer: COMMERCIAL

## 2020-01-01 ENCOUNTER — HOSPITAL ENCOUNTER (INPATIENT)
Facility: CLINIC | Age: 55
LOS: 3 days | Discharge: HOME | DRG: 433 | End: 2020-04-24
Attending: STUDENT IN AN ORGANIZED HEALTH CARE EDUCATION/TRAINING PROGRAM | Admitting: INTERNAL MEDICINE
Payer: COMMERCIAL

## 2020-01-01 ENCOUNTER — CARE COORDINATION (OUTPATIENT)
Dept: MULTI SPECIALTY CLINIC | Facility: CLINIC | Age: 55
End: 2020-01-01

## 2020-01-01 ENCOUNTER — APPOINTMENT (OUTPATIENT)
Dept: GENERAL RADIOLOGY | Facility: CLINIC | Age: 55
DRG: 432 | End: 2020-01-01
Attending: INTERNAL MEDICINE
Payer: COMMERCIAL

## 2020-01-01 ENCOUNTER — APPOINTMENT (OUTPATIENT)
Dept: OCCUPATIONAL THERAPY | Facility: CLINIC | Age: 55
DRG: 432 | End: 2020-01-01
Payer: COMMERCIAL

## 2020-01-01 ENCOUNTER — HOSPITAL ENCOUNTER (OUTPATIENT)
Dept: GENERAL RADIOLOGY | Facility: CLINIC | Age: 55
End: 2020-04-28
Attending: FAMILY MEDICINE
Payer: COMMERCIAL

## 2020-01-01 ENCOUNTER — APPOINTMENT (OUTPATIENT)
Dept: CARDIOLOGY | Facility: CLINIC | Age: 55
DRG: 432 | End: 2020-01-01
Attending: INTERNAL MEDICINE
Payer: COMMERCIAL

## 2020-01-01 ENCOUNTER — APPOINTMENT (OUTPATIENT)
Dept: CT IMAGING | Facility: CLINIC | Age: 55
DRG: 441 | End: 2020-01-01
Payer: COMMERCIAL

## 2020-01-01 ENCOUNTER — APPOINTMENT (OUTPATIENT)
Dept: PHYSICAL THERAPY | Facility: CLINIC | Age: 55
End: 2020-01-01
Attending: HOSPITALIST
Payer: COMMERCIAL

## 2020-01-01 ENCOUNTER — VIRTUAL VISIT (OUTPATIENT)
Dept: GERIATRICS | Facility: CLINIC | Age: 55
End: 2020-01-01
Payer: COMMERCIAL

## 2020-01-01 ENCOUNTER — APPOINTMENT (OUTPATIENT)
Dept: PHYSICAL THERAPY | Facility: CLINIC | Age: 55
DRG: 432 | End: 2020-01-01
Attending: INTERNAL MEDICINE
Payer: COMMERCIAL

## 2020-01-01 ENCOUNTER — HOSPITAL ENCOUNTER (EMERGENCY)
Facility: CLINIC | Age: 55
Discharge: HOME | End: 2020-08-19
Attending: EMERGENCY MEDICINE | Admitting: EMERGENCY MEDICINE
Payer: COMMERCIAL

## 2020-01-01 ENCOUNTER — HOSPITAL ENCOUNTER (EMERGENCY)
Facility: CLINIC | Age: 55
Discharge: ANOTHER HOSPITAL | End: 2020-04-21
Attending: NURSE PRACTITIONER | Admitting: NURSE PRACTITIONER
Payer: COMMERCIAL

## 2020-01-01 ENCOUNTER — APPOINTMENT (OUTPATIENT)
Dept: ULTRASOUND IMAGING | Facility: CLINIC | Age: 55
DRG: 433 | End: 2020-01-01
Attending: STUDENT IN AN ORGANIZED HEALTH CARE EDUCATION/TRAINING PROGRAM
Payer: COMMERCIAL

## 2020-01-01 ENCOUNTER — HOSPITAL ENCOUNTER (OUTPATIENT)
Dept: ULTRASOUND IMAGING | Facility: CLINIC | Age: 55
Discharge: HOME | End: 2020-01-31
Attending: FAMILY MEDICINE | Admitting: FAMILY MEDICINE
Payer: COMMERCIAL

## 2020-01-01 VITALS
OXYGEN SATURATION: 98 % | TEMPERATURE: 98.5 F | HEART RATE: 85 BPM | RESPIRATION RATE: 16 BRPM | SYSTOLIC BLOOD PRESSURE: 115 MMHG | DIASTOLIC BLOOD PRESSURE: 62 MMHG

## 2020-01-01 VITALS
DIASTOLIC BLOOD PRESSURE: 48 MMHG | HEART RATE: 63 BPM | RESPIRATION RATE: 16 BRPM | OXYGEN SATURATION: 99 % | HEIGHT: 61 IN | TEMPERATURE: 97.3 F | WEIGHT: 125.9 LBS | SYSTOLIC BLOOD PRESSURE: 97 MMHG | BODY MASS INDEX: 23.77 KG/M2

## 2020-01-01 VITALS
RESPIRATION RATE: 18 BRPM | BODY MASS INDEX: 25.09 KG/M2 | DIASTOLIC BLOOD PRESSURE: 59 MMHG | TEMPERATURE: 96.3 F | HEART RATE: 93 BPM | SYSTOLIC BLOOD PRESSURE: 104 MMHG | OXYGEN SATURATION: 96 % | WEIGHT: 132.8 LBS

## 2020-01-01 VITALS
SYSTOLIC BLOOD PRESSURE: 113 MMHG | RESPIRATION RATE: 18 BRPM | OXYGEN SATURATION: 96 % | WEIGHT: 135.3 LBS | TEMPERATURE: 98.5 F | DIASTOLIC BLOOD PRESSURE: 47 MMHG | BODY MASS INDEX: 26.56 KG/M2 | HEART RATE: 83 BPM | HEIGHT: 60 IN

## 2020-01-01 VITALS
WEIGHT: 129 LBS | BODY MASS INDEX: 25.32 KG/M2 | HEIGHT: 60 IN | HEART RATE: 90 BPM | RESPIRATION RATE: 16 BRPM | DIASTOLIC BLOOD PRESSURE: 49 MMHG | OXYGEN SATURATION: 98 % | SYSTOLIC BLOOD PRESSURE: 96 MMHG | TEMPERATURE: 97 F

## 2020-01-01 VITALS
TEMPERATURE: 98.1 F | SYSTOLIC BLOOD PRESSURE: 103 MMHG | HEIGHT: 61 IN | OXYGEN SATURATION: 92 % | HEART RATE: 88 BPM | RESPIRATION RATE: 16 BRPM | DIASTOLIC BLOOD PRESSURE: 52 MMHG | WEIGHT: 125 LBS | BODY MASS INDEX: 23.6 KG/M2

## 2020-01-01 VITALS
DIASTOLIC BLOOD PRESSURE: 64 MMHG | SYSTOLIC BLOOD PRESSURE: 109 MMHG | TEMPERATURE: 97.8 F | OXYGEN SATURATION: 99 % | WEIGHT: 123.5 LBS | HEIGHT: 61 IN | HEART RATE: 71 BPM | BODY MASS INDEX: 23.32 KG/M2

## 2020-01-01 VITALS
DIASTOLIC BLOOD PRESSURE: 52 MMHG | BODY MASS INDEX: 24.16 KG/M2 | OXYGEN SATURATION: 95 % | RESPIRATION RATE: 14 BRPM | SYSTOLIC BLOOD PRESSURE: 94 MMHG | WEIGHT: 127.87 LBS | TEMPERATURE: 98.6 F

## 2020-01-01 VITALS
HEART RATE: 92 BPM | SYSTOLIC BLOOD PRESSURE: 99 MMHG | DIASTOLIC BLOOD PRESSURE: 57 MMHG | RESPIRATION RATE: 18 BRPM | OXYGEN SATURATION: 93 %

## 2020-01-01 VITALS — HEART RATE: 75 BPM | BODY MASS INDEX: 25.89 KG/M2 | WEIGHT: 137 LBS | TEMPERATURE: 97.2 F | OXYGEN SATURATION: 98 %

## 2020-01-01 VITALS
WEIGHT: 135.2 LBS | HEART RATE: 75 BPM | DIASTOLIC BLOOD PRESSURE: 40 MMHG | TEMPERATURE: 97.6 F | HEIGHT: 60 IN | SYSTOLIC BLOOD PRESSURE: 90 MMHG | BODY MASS INDEX: 26.55 KG/M2 | OXYGEN SATURATION: 96 % | RESPIRATION RATE: 16 BRPM

## 2020-01-01 VITALS
DIASTOLIC BLOOD PRESSURE: 58 MMHG | OXYGEN SATURATION: 91 % | RESPIRATION RATE: 20 BRPM | SYSTOLIC BLOOD PRESSURE: 108 MMHG | HEART RATE: 92 BPM

## 2020-01-01 VITALS — WEIGHT: 125 LBS | BODY MASS INDEX: 24.41 KG/M2

## 2020-01-01 VITALS
HEART RATE: 83 BPM | SYSTOLIC BLOOD PRESSURE: 124 MMHG | DIASTOLIC BLOOD PRESSURE: 65 MMHG | OXYGEN SATURATION: 99 % | RESPIRATION RATE: 18 BRPM | TEMPERATURE: 97.9 F

## 2020-01-01 VITALS — WEIGHT: 135 LBS | HEIGHT: 60 IN | BODY MASS INDEX: 26.5 KG/M2

## 2020-01-01 DIAGNOSIS — R25.1 SHAKING: ICD-10-CM

## 2020-01-01 DIAGNOSIS — Z11.59 ENCOUNTER FOR SCREENING FOR OTHER VIRAL DISEASES: ICD-10-CM

## 2020-01-01 DIAGNOSIS — F32.A DEPRESSION: ICD-10-CM

## 2020-01-01 DIAGNOSIS — K70.31 ALCOHOLIC CIRRHOSIS OF LIVER WITH ASCITES (H): ICD-10-CM

## 2020-01-01 DIAGNOSIS — F09 MENTAL DISORDER DUE TO GENERAL MEDICAL CONDITION: ICD-10-CM

## 2020-01-01 DIAGNOSIS — I95.9 HYPOTENSION, UNSPECIFIED HYPOTENSION TYPE: ICD-10-CM

## 2020-01-01 DIAGNOSIS — D63.8 ANEMIA IN OTHER CHRONIC DISEASES CLASSIFIED ELSEWHERE: ICD-10-CM

## 2020-01-01 DIAGNOSIS — K76.82 HEPATIC ENCEPHALOPATHY (H): Primary | ICD-10-CM

## 2020-01-01 DIAGNOSIS — E87.1 HYPONATREMIA: ICD-10-CM

## 2020-01-01 DIAGNOSIS — F10.988 ALCOHOL-INDUCED COGNITIVE DYSFUNCTION (H): Primary | ICD-10-CM

## 2020-01-01 DIAGNOSIS — I95.89 OTHER SPECIFIED HYPOTENSION: ICD-10-CM

## 2020-01-01 DIAGNOSIS — K70.11 ALCOHOLIC HEPATITIS WITH ASCITES (H): ICD-10-CM

## 2020-01-01 DIAGNOSIS — K70.30 ALCOHOLIC CIRRHOSIS OF LIVER WITHOUT ASCITES (H): ICD-10-CM

## 2020-01-01 DIAGNOSIS — N17.9 ACUTE RENAL FAILURE, UNSPECIFIED ACUTE RENAL FAILURE TYPE (H): ICD-10-CM

## 2020-01-01 DIAGNOSIS — G47.00 INSOMNIA, UNSPECIFIED TYPE: ICD-10-CM

## 2020-01-01 DIAGNOSIS — Z20.822 COVID-19 RULED OUT: ICD-10-CM

## 2020-01-01 DIAGNOSIS — F33.1 MAJOR DEPRESSIVE DISORDER, RECURRENT EPISODE, MODERATE (H): ICD-10-CM

## 2020-01-01 DIAGNOSIS — K70.31 ALCOHOLIC CIRRHOSIS OF LIVER WITH ASCITES (H): Primary | ICD-10-CM

## 2020-01-01 DIAGNOSIS — D64.9 ANEMIA, UNSPECIFIED TYPE: ICD-10-CM

## 2020-01-01 DIAGNOSIS — S92.315A CLOSED NONDISPLACED FRACTURE OF FIRST METATARSAL BONE OF LEFT FOOT, INITIAL ENCOUNTER: ICD-10-CM

## 2020-01-01 DIAGNOSIS — D50.8 OTHER IRON DEFICIENCY ANEMIA: ICD-10-CM

## 2020-01-01 DIAGNOSIS — F32.A DEPRESSION, UNSPECIFIED DEPRESSION TYPE: ICD-10-CM

## 2020-01-01 DIAGNOSIS — R41.0 CONFUSION: ICD-10-CM

## 2020-01-01 DIAGNOSIS — F41.9 ANXIETY: ICD-10-CM

## 2020-01-01 DIAGNOSIS — K76.6 PORTAL HYPERTENSION (H): ICD-10-CM

## 2020-01-01 DIAGNOSIS — R27.8 ASTERIXIS: ICD-10-CM

## 2020-01-01 DIAGNOSIS — K76.82 HEPATIC ENCEPHALOPATHY (H): ICD-10-CM

## 2020-01-01 DIAGNOSIS — Z71.89 COUNSELING AND COORDINATION OF CARE: Primary | ICD-10-CM

## 2020-01-01 DIAGNOSIS — Z72.0 TOBACCO ABUSE: ICD-10-CM

## 2020-01-01 DIAGNOSIS — Z20.822 2019-NCOV NOT DETECTED: ICD-10-CM

## 2020-01-01 DIAGNOSIS — G62.9 NEUROPATHY: ICD-10-CM

## 2020-01-01 DIAGNOSIS — D50.0 CHRONIC BLOOD LOSS ANEMIA: ICD-10-CM

## 2020-01-01 DIAGNOSIS — R79.89 INCREASED AMMONIA LEVEL: ICD-10-CM

## 2020-01-01 DIAGNOSIS — M85.80 OSTEOPENIA, UNSPECIFIED LOCATION: ICD-10-CM

## 2020-01-01 DIAGNOSIS — N18.3 ACUTE RENAL FAILURE SUPERIMPOSED ON STAGE 3 CHRONIC KIDNEY DISEASE, UNSPECIFIED ACUTE RENAL FAILURE TYPE: ICD-10-CM

## 2020-01-01 DIAGNOSIS — R11.2 NON-INTRACTABLE VOMITING WITH NAUSEA: ICD-10-CM

## 2020-01-01 DIAGNOSIS — N18.30 CKD (CHRONIC KIDNEY DISEASE) STAGE 3, GFR 30-59 ML/MIN (H): Primary | ICD-10-CM

## 2020-01-01 DIAGNOSIS — G31.2 ALCOHOLIC ENCEPHALOPATHY (H): ICD-10-CM

## 2020-01-01 DIAGNOSIS — I95.1 ORTHOSTATIC HYPOTENSION: ICD-10-CM

## 2020-01-01 DIAGNOSIS — N18.30 CKD (CHRONIC KIDNEY DISEASE) STAGE 3, GFR 30-59 ML/MIN (H): ICD-10-CM

## 2020-01-01 DIAGNOSIS — E05.90 HYPERTHYROIDISM: ICD-10-CM

## 2020-01-01 DIAGNOSIS — N18.9 ACUTE KIDNEY INJURY SUPERIMPOSED ON CKD (H): Primary | ICD-10-CM

## 2020-01-01 DIAGNOSIS — K70.30 CIRRHOSIS, ALCOHOLIC (H): Primary | ICD-10-CM

## 2020-01-01 DIAGNOSIS — S99.922A FOOT INJURY, LEFT, INITIAL ENCOUNTER: ICD-10-CM

## 2020-01-01 DIAGNOSIS — S92.302A CLOSED NONDISPLACED FRACTURE OF METATARSAL BONE OF LEFT FOOT, UNSPECIFIED METATARSAL, INITIAL ENCOUNTER: Primary | ICD-10-CM

## 2020-01-01 DIAGNOSIS — R79.89 ABNORMAL LFTS (LIVER FUNCTION TESTS): ICD-10-CM

## 2020-01-01 DIAGNOSIS — Z20.822 SUSPECTED COVID-19 VIRUS INFECTION: Primary | ICD-10-CM

## 2020-01-01 DIAGNOSIS — Z12.11 SPECIAL SCREENING FOR MALIGNANT NEOPLASMS, COLON: Primary | ICD-10-CM

## 2020-01-01 DIAGNOSIS — D69.6 THROMBOCYTOPENIA (H): ICD-10-CM

## 2020-01-01 DIAGNOSIS — E87.6 HYPOKALEMIA: ICD-10-CM

## 2020-01-01 DIAGNOSIS — R11.0 NAUSEA: ICD-10-CM

## 2020-01-01 DIAGNOSIS — Z11.59 ENCOUNTER FOR SCREENING FOR OTHER VIRAL DISEASES: Primary | ICD-10-CM

## 2020-01-01 DIAGNOSIS — E83.52 HYPERCALCEMIA: ICD-10-CM

## 2020-01-01 DIAGNOSIS — K65.2 SBP (SPONTANEOUS BACTERIAL PERITONITIS) (H): Primary | ICD-10-CM

## 2020-01-01 DIAGNOSIS — F41.1 GAD (GENERALIZED ANXIETY DISORDER): ICD-10-CM

## 2020-01-01 DIAGNOSIS — S99.922A FOOT INJURY, LEFT, INITIAL ENCOUNTER: Primary | ICD-10-CM

## 2020-01-01 DIAGNOSIS — Z90.5 SOLITARY KIDNEY, ACQUIRED: ICD-10-CM

## 2020-01-01 DIAGNOSIS — S92.325A CLOSED NONDISPLACED FRACTURE OF SECOND METATARSAL BONE OF LEFT FOOT, INITIAL ENCOUNTER: ICD-10-CM

## 2020-01-01 DIAGNOSIS — G93.40 ENCEPHALOPATHY: ICD-10-CM

## 2020-01-01 DIAGNOSIS — G31.2 ALCOHOLIC ENCEPHALOPATHY (H): Primary | ICD-10-CM

## 2020-01-01 DIAGNOSIS — Z78.9 TAKES DIETARY SUPPLEMENTS: ICD-10-CM

## 2020-01-01 DIAGNOSIS — K70.30 ALCOHOLIC CIRRHOSIS (H): Primary | ICD-10-CM

## 2020-01-01 DIAGNOSIS — M79.672 LEFT FOOT PAIN: Primary | ICD-10-CM

## 2020-01-01 DIAGNOSIS — S92.355A CLOSED NONDISPLACED FRACTURE OF FIFTH METATARSAL BONE OF LEFT FOOT, INITIAL ENCOUNTER: ICD-10-CM

## 2020-01-01 DIAGNOSIS — F17.200 SMOKER: ICD-10-CM

## 2020-01-01 DIAGNOSIS — Z72.0 TOBACCO ABUSE: Primary | ICD-10-CM

## 2020-01-01 DIAGNOSIS — D64.9 ACUTE ON CHRONIC ANEMIA: ICD-10-CM

## 2020-01-01 DIAGNOSIS — N17.9 ACUTE RENAL FAILURE SUPERIMPOSED ON STAGE 3 CHRONIC KIDNEY DISEASE, UNSPECIFIED ACUTE RENAL FAILURE TYPE: ICD-10-CM

## 2020-01-01 DIAGNOSIS — K21.00 GASTROESOPHAGEAL REFLUX DISEASE WITH ESOPHAGITIS: ICD-10-CM

## 2020-01-01 DIAGNOSIS — R10.13 ABDOMINAL PAIN, EPIGASTRIC: Primary | ICD-10-CM

## 2020-01-01 DIAGNOSIS — E46 MALNUTRITION, UNSPECIFIED TYPE (H): ICD-10-CM

## 2020-01-01 DIAGNOSIS — D61.818 OTHER PANCYTOPENIA (H): ICD-10-CM

## 2020-01-01 DIAGNOSIS — E87.1 HYPONATREMIA: Primary | ICD-10-CM

## 2020-01-01 DIAGNOSIS — Z72.0 SMOKING TRYING TO QUIT: ICD-10-CM

## 2020-01-01 DIAGNOSIS — Z23 NEED FOR SHINGLES VACCINE: ICD-10-CM

## 2020-01-01 DIAGNOSIS — G47.09 OTHER INSOMNIA: ICD-10-CM

## 2020-01-01 DIAGNOSIS — F33.0 MAJOR DEPRESSIVE DISORDER, RECURRENT EPISODE, MILD (H): ICD-10-CM

## 2020-01-01 DIAGNOSIS — M79.672 LEFT FOOT PAIN: ICD-10-CM

## 2020-01-01 DIAGNOSIS — R52 PAIN: ICD-10-CM

## 2020-01-01 DIAGNOSIS — K52.9 GASTROENTERITIS: ICD-10-CM

## 2020-01-01 DIAGNOSIS — S82.832A OTHER CLOSED FRACTURE OF DISTAL END OF LEFT FIBULA, INITIAL ENCOUNTER: ICD-10-CM

## 2020-01-01 DIAGNOSIS — N17.9 ACUTE KIDNEY INJURY SUPERIMPOSED ON CKD (H): Primary | ICD-10-CM

## 2020-01-01 DIAGNOSIS — K21.9 GASTROESOPHAGEAL REFLUX DISEASE, ESOPHAGITIS PRESENCE NOT SPECIFIED: ICD-10-CM

## 2020-01-01 DIAGNOSIS — Z86.69 HISTORY OF ENCEPHALOPATHY: ICD-10-CM

## 2020-01-01 DIAGNOSIS — R41.844 FRONTAL LOBE AND EXECUTIVE FUNCTION DEFICIT: ICD-10-CM

## 2020-01-01 DIAGNOSIS — R41.82 ALTERED MENTAL STATUS, UNSPECIFIED ALTERED MENTAL STATUS TYPE: ICD-10-CM

## 2020-01-01 DIAGNOSIS — F10.21 ALCOHOL DEPENDENCE IN REMISSION (H): ICD-10-CM

## 2020-01-01 DIAGNOSIS — K70.30 CIRRHOSIS, ALCOHOLIC (H): ICD-10-CM

## 2020-01-01 DIAGNOSIS — I95.9 HYPOTENSION, UNSPECIFIED HYPOTENSION TYPE: Primary | ICD-10-CM

## 2020-01-01 DIAGNOSIS — Z52.4 KIDNEY DONOR: ICD-10-CM

## 2020-01-01 LAB
1,25(OH)2D SERPL-MCNC: 6.5 PG/ML (ref 19.9–79.3)
ABO + RH BLD: NORMAL
ALBUMIN FLD-MCNC: 1.2 G/DL
ALBUMIN SERPL ELPH-MCNC: 3 G/DL (ref 3.7–5.1)
ALBUMIN SERPL-MCNC: 2 G/DL (ref 3.4–5)
ALBUMIN SERPL-MCNC: 2 G/DL (ref 3.4–5)
ALBUMIN SERPL-MCNC: 2.3 G/DL (ref 3.4–5)
ALBUMIN SERPL-MCNC: 2.5 G/DL (ref 3.4–5)
ALBUMIN SERPL-MCNC: 2.6 G/DL (ref 3.4–5)
ALBUMIN SERPL-MCNC: 2.7 G/DL (ref 3.4–5)
ALBUMIN SERPL-MCNC: 2.8 G/DL (ref 3.4–5)
ALBUMIN SERPL-MCNC: 2.9 G/DL (ref 3.4–5)
ALBUMIN SERPL-MCNC: 3 G/DL (ref 3.4–5)
ALBUMIN SERPL-MCNC: 3.1 G/DL (ref 3.4–5)
ALBUMIN SERPL-MCNC: 3.2 G/DL (ref 3.4–5)
ALBUMIN SERPL-MCNC: 3.3 G/DL (ref 3.4–5)
ALBUMIN SERPL-MCNC: 3.3 G/DL (ref 3.4–5)
ALBUMIN SERPL-MCNC: 3.4 G/DL (ref 3.4–5)
ALBUMIN SERPL-MCNC: 3.5 G/DL (ref 3.4–5)
ALBUMIN SERPL-MCNC: 3.6 G/DL (ref 3.4–5)
ALBUMIN SERPL-MCNC: 3.6 G/DL (ref 3.4–5)
ALBUMIN SERPL-MCNC: 3.8 G/DL (ref 3.4–5)
ALBUMIN SERPL-MCNC: 4 G/DL (ref 3.4–5)
ALBUMIN UR-MCNC: 10 MG/DL
ALBUMIN UR-MCNC: 10 MG/DL
ALBUMIN UR-MCNC: NEGATIVE MG/DL
ALCOHOL BREATH TEST: 0 (ref 0–0.01)
ALP SERPL-CCNC: 120 U/L (ref 40–150)
ALP SERPL-CCNC: 136 U/L (ref 40–150)
ALP SERPL-CCNC: 141 U/L (ref 40–150)
ALP SERPL-CCNC: 146 U/L (ref 40–150)
ALP SERPL-CCNC: 146 U/L (ref 40–150)
ALP SERPL-CCNC: 150 U/L (ref 40–150)
ALP SERPL-CCNC: 154 U/L (ref 40–150)
ALP SERPL-CCNC: 157 U/L (ref 40–150)
ALP SERPL-CCNC: 157 U/L (ref 40–150)
ALP SERPL-CCNC: 158 U/L (ref 40–150)
ALP SERPL-CCNC: 159 U/L (ref 40–150)
ALP SERPL-CCNC: 161 U/L (ref 40–150)
ALP SERPL-CCNC: 169 U/L (ref 40–150)
ALP SERPL-CCNC: 172 U/L (ref 40–150)
ALP SERPL-CCNC: 173 U/L (ref 40–150)
ALP SERPL-CCNC: 186 U/L (ref 40–150)
ALP SERPL-CCNC: 192 U/L (ref 40–150)
ALP SERPL-CCNC: 193 U/L (ref 40–150)
ALP SERPL-CCNC: 193 U/L (ref 40–150)
ALP SERPL-CCNC: 197 U/L (ref 40–150)
ALP SERPL-CCNC: 200 U/L (ref 40–150)
ALP SERPL-CCNC: 202 U/L (ref 40–150)
ALP SERPL-CCNC: 208 U/L (ref 40–150)
ALP SERPL-CCNC: 208 U/L (ref 40–150)
ALP SERPL-CCNC: 211 U/L (ref 40–150)
ALP SERPL-CCNC: 212 U/L (ref 40–150)
ALP SERPL-CCNC: 217 U/L (ref 40–150)
ALP SERPL-CCNC: 244 U/L (ref 40–150)
ALP SERPL-CCNC: 248 U/L (ref 40–150)
ALP SERPL-CCNC: 266 U/L (ref 40–150)
ALP SERPL-CCNC: 267 U/L (ref 40–150)
ALP SERPL-CCNC: 285 U/L (ref 40–150)
ALP SERPL-CCNC: 297 U/L (ref 40–150)
ALP SERPL-CCNC: 340 U/L (ref 40–150)
ALPHA1 GLOB SERPL ELPH-MCNC: 0.3 G/DL (ref 0.2–0.4)
ALPHA2 GLOB SERPL ELPH-MCNC: 0.4 G/DL (ref 0.5–0.9)
ALT SERPL W P-5'-P-CCNC: 13 U/L (ref 0–50)
ALT SERPL W P-5'-P-CCNC: 13 U/L (ref 0–50)
ALT SERPL W P-5'-P-CCNC: 14 U/L (ref 0–50)
ALT SERPL W P-5'-P-CCNC: 15 U/L (ref 0–50)
ALT SERPL W P-5'-P-CCNC: 15 U/L (ref 0–50)
ALT SERPL W P-5'-P-CCNC: 16 U/L (ref 0–50)
ALT SERPL W P-5'-P-CCNC: 17 U/L (ref 0–50)
ALT SERPL W P-5'-P-CCNC: 18 U/L (ref 0–50)
ALT SERPL W P-5'-P-CCNC: 20 U/L (ref 0–50)
ALT SERPL W P-5'-P-CCNC: 21 U/L (ref 0–50)
ALT SERPL W P-5'-P-CCNC: 22 U/L (ref 0–50)
ALT SERPL W P-5'-P-CCNC: 24 U/L (ref 0–50)
ALT SERPL W P-5'-P-CCNC: 24 U/L (ref 0–50)
ALT SERPL W P-5'-P-CCNC: 25 U/L (ref 0–50)
ALT SERPL W P-5'-P-CCNC: 26 U/L (ref 0–50)
ALT SERPL W P-5'-P-CCNC: 30 U/L (ref 0–50)
ALT SERPL W P-5'-P-CCNC: 30 U/L (ref 0–50)
ALT SERPL W P-5'-P-CCNC: 36 U/L (ref 0–50)
ALT SERPL-CCNC: 10 U/L (ref 8–45)
AMMONIA PLAS-SCNC: 123 UMOL/L (ref 10–50)
AMMONIA PLAS-SCNC: 130 UMOL/L (ref 10–50)
AMMONIA PLAS-SCNC: 25 UMOL/L (ref 10–50)
AMMONIA PLAS-SCNC: 51 UMOL/L (ref 10–50)
AMMONIA PLAS-SCNC: 54 UMOL/L (ref 10–50)
AMMONIA PLAS-SCNC: 59 UMOL/L (ref 10–50)
AMMONIA PLAS-SCNC: 83 UMOL/L (ref 10–50)
AMMONIA PLAS-SCNC: 83 UMOL/L (ref 10–50)
AMPHETAMINES UR QL: NOT DETECTED NG/ML
ANION GAP SERPL CALCULATED.3IONS-SCNC: 10 MMOL/L (ref 3–14)
ANION GAP SERPL CALCULATED.3IONS-SCNC: 11 MMOL/L (ref 3–14)
ANION GAP SERPL CALCULATED.3IONS-SCNC: 12 MMOL/L (ref 3–14)
ANION GAP SERPL CALCULATED.3IONS-SCNC: 2 MMOL/L (ref 3–14)
ANION GAP SERPL CALCULATED.3IONS-SCNC: 4 MMOL/L (ref 3–14)
ANION GAP SERPL CALCULATED.3IONS-SCNC: 5 MMOL/L (ref 3–14)
ANION GAP SERPL CALCULATED.3IONS-SCNC: 6 MMOL/L (ref 3–14)
ANION GAP SERPL CALCULATED.3IONS-SCNC: 7 MMOL/L (ref 3–14)
ANION GAP SERPL CALCULATED.3IONS-SCNC: 8 MMOL/L (ref 3–14)
ANION GAP SERPL CALCULATED.3IONS-SCNC: 9 MMOL/L (ref 3–14)
APPEARANCE FLD: CLEAR
APPEARANCE FLD: NORMAL
APPEARANCE UR: ABNORMAL
APPEARANCE UR: ABNORMAL
APPEARANCE UR: CLEAR
APTT PPP: 29 SEC (ref 22–37)
APTT PPP: 37 SEC (ref 22–37)
APTT PPP: 37 SEC (ref 22–37)
APTT PPP: 44 SEC (ref 22–37)
APTT PPP: 47 SEC (ref 22–37)
AST SERPL W P-5'-P-CCNC: 20 U/L (ref 0–45)
AST SERPL W P-5'-P-CCNC: 22 U/L (ref 0–45)
AST SERPL W P-5'-P-CCNC: 22 U/L (ref 0–45)
AST SERPL W P-5'-P-CCNC: 23 U/L (ref 0–45)
AST SERPL W P-5'-P-CCNC: 24 U/L (ref 0–45)
AST SERPL W P-5'-P-CCNC: 26 U/L (ref 0–45)
AST SERPL W P-5'-P-CCNC: 27 U/L (ref 0–45)
AST SERPL W P-5'-P-CCNC: 29 U/L (ref 0–45)
AST SERPL W P-5'-P-CCNC: 30 U/L (ref 0–45)
AST SERPL W P-5'-P-CCNC: 31 U/L (ref 0–45)
AST SERPL W P-5'-P-CCNC: 31 U/L (ref 0–45)
AST SERPL W P-5'-P-CCNC: 32 U/L (ref 0–45)
AST SERPL W P-5'-P-CCNC: 34 U/L (ref 0–45)
AST SERPL W P-5'-P-CCNC: 34 U/L (ref 0–45)
AST SERPL W P-5'-P-CCNC: 35 U/L (ref 0–45)
AST SERPL W P-5'-P-CCNC: 36 U/L (ref 0–45)
AST SERPL W P-5'-P-CCNC: 37 U/L (ref 0–45)
AST SERPL W P-5'-P-CCNC: 37 U/L (ref 0–45)
AST SERPL W P-5'-P-CCNC: 38 U/L (ref 0–45)
AST SERPL W P-5'-P-CCNC: 38 U/L (ref 0–45)
AST SERPL W P-5'-P-CCNC: 42 U/L (ref 0–45)
AST SERPL W P-5'-P-CCNC: 42 U/L (ref 0–45)
AST SERPL W P-5'-P-CCNC: 43 U/L (ref 0–45)
AST SERPL W P-5'-P-CCNC: 47 U/L (ref 0–45)
AST SERPL W P-5'-P-CCNC: 48 U/L (ref 0–45)
AST SERPL W P-5'-P-CCNC: 51 U/L (ref 0–45)
AST SERPL W P-5'-P-CCNC: 52 U/L (ref 0–45)
AST SERPL W P-5'-P-CCNC: 68 U/L (ref 0–45)
AST SERPL-CCNC: 23 U/L (ref 5–41)
B-GLOBULIN SERPL ELPH-MCNC: 0.4 G/DL (ref 0.6–1)
BACTERIA #/AREA URNS HPF: ABNORMAL /HPF
BACTERIA SPEC CULT: NO GROWTH
BACTERIA SPEC CULT: NORMAL
BARBITURATES UR QL SCN: NOT DETECTED NG/ML
BASE DEFICIT BLDV-SCNC: 10.1 MMOL/L
BASOPHILS # BLD AUTO: 0 10E9/L (ref 0–0.2)
BASOPHILS # BLD AUTO: 0.1 10E9/L (ref 0–0.2)
BASOPHILS # BLD AUTO: 0.1 10E9/L (ref 0–0.2)
BASOPHILS NFR BLD AUTO: 0.1 %
BASOPHILS NFR BLD AUTO: 0.2 %
BASOPHILS NFR BLD AUTO: 0.2 %
BASOPHILS NFR BLD AUTO: 0.3 %
BASOPHILS NFR BLD AUTO: 0.4 %
BASOPHILS NFR BLD AUTO: 0.5 %
BASOPHILS NFR BLD AUTO: 0.5 %
BASOPHILS NFR BLD AUTO: 0.6 %
BASOPHILS NFR BLD AUTO: 0.8 %
BENZODIAZ UR QL SCN: ABNORMAL NG/ML
BILIRUB DIRECT SERPL-MCNC: 10.1 MG/DL (ref 0–0.2)
BILIRUB DIRECT SERPL-MCNC: 10.2 MG/DL (ref 0–0.2)
BILIRUB DIRECT SERPL-MCNC: 11.5 MG/DL (ref 0–0.2)
BILIRUB DIRECT SERPL-MCNC: 12.2 MG/DL (ref 0–0.2)
BILIRUB DIRECT SERPL-MCNC: 12.3 MG/DL (ref 0–0.2)
BILIRUB DIRECT SERPL-MCNC: 4.5 MG/DL (ref 0–0.2)
BILIRUB DIRECT SERPL-MCNC: 7 MG/DL (ref 0–0.2)
BILIRUB DIRECT SERPL-MCNC: 7.4 MG/DL (ref 0–0.2)
BILIRUB DIRECT SERPL-MCNC: 7.6 MG/DL (ref 0–0.2)
BILIRUB SERPL-MCNC: 10 MG/DL (ref 0.2–1.3)
BILIRUB SERPL-MCNC: 10.4 MG/DL (ref 0.2–1.3)
BILIRUB SERPL-MCNC: 10.5 MG/DL (ref 0.2–1.3)
BILIRUB SERPL-MCNC: 10.7 MG/DL (ref 0.2–1.3)
BILIRUB SERPL-MCNC: 10.8 MG/DL (ref 0.2–1.3)
BILIRUB SERPL-MCNC: 11.3 MG/DL (ref 0.2–1.3)
BILIRUB SERPL-MCNC: 12 MG/DL (ref 0.2–1.3)
BILIRUB SERPL-MCNC: 12 MG/DL (ref 0.2–1.3)
BILIRUB SERPL-MCNC: 12.3 MG/DL (ref 0.2–1.3)
BILIRUB SERPL-MCNC: 12.4 MG/DL (ref 0.2–1.3)
BILIRUB SERPL-MCNC: 12.6 MG/DL (ref 0.2–1.3)
BILIRUB SERPL-MCNC: 12.6 MG/DL (ref 0.2–1.3)
BILIRUB SERPL-MCNC: 12.7 MG/DL (ref 0.2–1.3)
BILIRUB SERPL-MCNC: 12.8 MG/DL (ref 0.2–1.3)
BILIRUB SERPL-MCNC: 12.8 MG/DL (ref 0.2–1.3)
BILIRUB SERPL-MCNC: 13 MG/DL (ref 0.2–1.3)
BILIRUB SERPL-MCNC: 13.2 MG/DL (ref 0.2–1.3)
BILIRUB SERPL-MCNC: 13.2 MG/DL (ref 0.2–1.3)
BILIRUB SERPL-MCNC: 13.3 MG/DL (ref 0.2–1.3)
BILIRUB SERPL-MCNC: 13.6 MG/DL (ref 0.2–1.3)
BILIRUB SERPL-MCNC: 13.9 MG/DL (ref 0.2–1.3)
BILIRUB SERPL-MCNC: 13.9 MG/DL (ref 0.2–1.3)
BILIRUB SERPL-MCNC: 14 MG/DL (ref 0.2–1.3)
BILIRUB SERPL-MCNC: 14.5 MG/DL (ref 0.2–1.3)
BILIRUB SERPL-MCNC: 14.8 MG/DL (ref 0.2–1.3)
BILIRUB SERPL-MCNC: 15.6 MG/DL (ref 0.2–1.3)
BILIRUB SERPL-MCNC: 15.7 MG/DL (ref 0.2–1.3)
BILIRUB SERPL-MCNC: 6.1 MG/DL (ref 0.2–1.3)
BILIRUB SERPL-MCNC: 8.9 MG/DL (ref 0.2–1.3)
BILIRUB SERPL-MCNC: 9.2 MG/DL (ref 0.2–1.3)
BILIRUB SERPL-MCNC: 9.4 MG/DL (ref 0.2–1.3)
BILIRUB SERPL-MCNC: 9.4 MG/DL (ref 0.2–1.3)
BILIRUB SERPL-MCNC: 9.5 MG/DL (ref 0.2–1.3)
BILIRUB SERPL-MCNC: 9.6 MG/DL (ref 0.2–1.3)
BILIRUB UR QL STRIP: ABNORMAL
BLD GP AB SCN SERPL QL: NORMAL
BLD PROD TYP BPU: NORMAL
BLD UNIT ID BPU: 0
BLOOD BANK CMNT PATIENT-IMP: NORMAL
BLOOD PRODUCT CODE: NORMAL
BPU ID: NORMAL
BUN SERPL-MCNC: 25 MG/DL (ref 7–30)
BUN SERPL-MCNC: 26 MG/DL (ref 7–30)
BUN SERPL-MCNC: 26 MG/DL (ref 7–30)
BUN SERPL-MCNC: 31 MG/DL (ref 7–30)
BUN SERPL-MCNC: 34 MG/DL (ref 7–30)
BUN SERPL-MCNC: 36 MG/DL (ref 7–30)
BUN SERPL-MCNC: 36 MG/DL (ref 7–30)
BUN SERPL-MCNC: 37 MG/DL (ref 7–30)
BUN SERPL-MCNC: 38 MG/DL (ref 7–30)
BUN SERPL-MCNC: 40 MG/DL (ref 7–30)
BUN SERPL-MCNC: 40 MG/DL (ref 7–30)
BUN SERPL-MCNC: 45 MG/DL (ref 7–30)
BUN SERPL-MCNC: 45 MG/DL (ref 7–30)
BUN SERPL-MCNC: 46 MG/DL (ref 7–30)
BUN SERPL-MCNC: 52 MG/DL (ref 7–30)
BUN SERPL-MCNC: 53 MG/DL (ref 7–30)
BUN SERPL-MCNC: 54 MG/DL (ref 7–30)
BUN SERPL-MCNC: 54 MG/DL (ref 7–30)
BUN SERPL-MCNC: 55 MG/DL (ref 7–30)
BUN SERPL-MCNC: 57 MG/DL (ref 7–30)
BUN SERPL-MCNC: 59 MG/DL (ref 7–30)
BUN SERPL-MCNC: 60 MG/DL (ref 7–30)
BUN SERPL-MCNC: 62 MG/DL (ref 7–30)
BUN SERPL-MCNC: 68 MG/DL (ref 7–30)
BUN SERPL-MCNC: 68 MG/DL (ref 7–30)
BUN SERPL-MCNC: 70 MG/DL (ref 7–30)
BUN SERPL-MCNC: 71 MG/DL (ref 7–30)
BUN SERPL-MCNC: 72 MG/DL (ref 7–30)
BUN SERPL-MCNC: 73 MG/DL (ref 7–30)
BUN SERPL-MCNC: 77 MG/DL (ref 7–30)
BUN SERPL-MCNC: 81 MG/DL (ref 7–30)
BUN SERPL-MCNC: 86 MG/DL (ref 7–30)
BUPRENORPHINE UR QL: NOT DETECTED NG/ML
CA-I BLD-MCNC: 5.3 MG/DL (ref 4.4–5.2)
CA-I SERPL ISE-MCNC: 5.3 MG/DL (ref 4.4–5.2)
CALCIUM SERPL-MCNC: 10.2 MG/DL (ref 8.5–10.1)
CALCIUM SERPL-MCNC: 10.5 MG/DL (ref 8.5–10.1)
CALCIUM SERPL-MCNC: 10.6 MG/DL (ref 8.5–10.1)
CALCIUM SERPL-MCNC: 10.8 MG/DL (ref 8.5–10.1)
CALCIUM SERPL-MCNC: 10.9 MG/DL (ref 8.5–10.1)
CALCIUM SERPL-MCNC: 11.5 MG/DL (ref 8.5–10.1)
CALCIUM SERPL-MCNC: 11.8 MG/DL (ref 8.5–10.1)
CALCIUM SERPL-MCNC: 12.9 MG/DL (ref 8.5–10.1)
CALCIUM SERPL-MCNC: 13.3 MG/DL (ref 8.5–10.1)
CALCIUM SERPL-MCNC: 8.1 MG/DL (ref 8.5–10.1)
CALCIUM SERPL-MCNC: 8.3 MG/DL (ref 8.5–10.1)
CALCIUM SERPL-MCNC: 8.4 MG/DL (ref 8.5–10.1)
CALCIUM SERPL-MCNC: 8.7 MG/DL (ref 8.5–10.1)
CALCIUM SERPL-MCNC: 8.8 MG/DL (ref 8.5–10.1)
CALCIUM SERPL-MCNC: 8.9 MG/DL (ref 8.5–10.1)
CALCIUM SERPL-MCNC: 9 MG/DL (ref 8.5–10.1)
CALCIUM SERPL-MCNC: 9.1 MG/DL (ref 8.5–10.1)
CALCIUM SERPL-MCNC: 9.2 MG/DL (ref 8.5–10.1)
CALCIUM SERPL-MCNC: 9.3 MG/DL (ref 8.5–10.1)
CALCIUM SERPL-MCNC: 9.3 MG/DL (ref 8.5–10.1)
CALCIUM SERPL-MCNC: 9.4 MG/DL (ref 8.5–10.1)
CALCIUM SERPL-MCNC: 9.5 MG/DL (ref 8.5–10.1)
CALCIUM SERPL-MCNC: 9.5 MG/DL (ref 8.5–10.1)
CALCIUM SERPL-MCNC: 9.6 MG/DL (ref 8.5–10.1)
CALCIUM SERPL-MCNC: 9.8 MG/DL (ref 8.5–10.1)
CALCIUM SERPL-MCNC: 9.8 MG/DL (ref 8.5–10.1)
CALCIUM SERPL-MCNC: 9.9 MG/DL (ref 8.5–10.1)
CANNABINOIDS UR QL: ABNORMAL NG/ML
CHLORIDE SERPL-SCNC: 100 MMOL/L (ref 94–109)
CHLORIDE SERPL-SCNC: 100 MMOL/L (ref 94–109)
CHLORIDE SERPL-SCNC: 103 MMOL/L (ref 94–109)
CHLORIDE SERPL-SCNC: 104 MMOL/L (ref 94–109)
CHLORIDE SERPL-SCNC: 105 MMOL/L (ref 94–109)
CHLORIDE SERPL-SCNC: 105 MMOL/L (ref 94–109)
CHLORIDE SERPL-SCNC: 107 MMOL/L (ref 94–109)
CHLORIDE SERPL-SCNC: 108 MMOL/L (ref 94–109)
CHLORIDE SERPL-SCNC: 109 MMOL/L (ref 94–109)
CHLORIDE SERPL-SCNC: 111 MMOL/L (ref 94–109)
CHLORIDE SERPL-SCNC: 111 MMOL/L (ref 94–109)
CHLORIDE SERPL-SCNC: 112 MMOL/L (ref 94–109)
CHLORIDE SERPL-SCNC: 113 MMOL/L (ref 94–109)
CHLORIDE SERPL-SCNC: 114 MMOL/L (ref 94–109)
CHLORIDE SERPL-SCNC: 115 MMOL/L (ref 94–109)
CHLORIDE SERPL-SCNC: 115 MMOL/L (ref 94–109)
CHLORIDE SERPL-SCNC: 116 MMOL/L (ref 94–109)
CHLORIDE SERPL-SCNC: 116 MMOL/L (ref 94–109)
CHLORIDE SERPL-SCNC: 117 MMOL/L (ref 94–109)
CHLORIDE SERPL-SCNC: 118 MMOL/L (ref 94–109)
CMV IGG SERPL QL IA: >8 AI (ref 0–0.8)
CO2 SERPL-SCNC: 13 MMOL/L (ref 20–32)
CO2 SERPL-SCNC: 14 MMOL/L (ref 20–32)
CO2 SERPL-SCNC: 15 MMOL/L (ref 20–32)
CO2 SERPL-SCNC: 16 MMOL/L (ref 20–32)
CO2 SERPL-SCNC: 17 MMOL/L (ref 20–32)
CO2 SERPL-SCNC: 18 MMOL/L (ref 20–32)
CO2 SERPL-SCNC: 19 MMOL/L (ref 20–32)
CO2 SERPL-SCNC: 20 MMOL/L (ref 20–32)
CO2 SERPL-SCNC: 20 MMOL/L (ref 20–32)
CO2 SERPL-SCNC: 21 MMOL/L (ref 20–32)
CO2 SERPL-SCNC: 22 MMOL/L (ref 20–32)
CO2 SERPL-SCNC: 23 MMOL/L (ref 20–32)
CO2 SERPL-SCNC: 26 MMOL/L (ref 20–32)
CO2 SERPL-SCNC: 30 MMOL/L (ref 20–32)
COCAINE UR QL SCN: NOT DETECTED NG/ML
COLOR FLD: YELLOW
COLOR FLD: YELLOW
COLOR UR AUTO: ABNORMAL
COLOR UR AUTO: YELLOW
COLOR UR AUTO: YELLOW
COPATH REPORT: NORMAL
CORTIS SERPL-MCNC: 9.7 UG/DL (ref 4–22)
CREAT SERPL-MCNC: 1.33 MG/DL (ref 0.52–1.04)
CREAT SERPL-MCNC: 1.4 MG/DL (ref 0.52–1.04)
CREAT SERPL-MCNC: 1.49 MG/DL (ref 0.52–1.04)
CREAT SERPL-MCNC: 1.52 MG/DL (ref 0.52–1.04)
CREAT SERPL-MCNC: 1.58 MG/DL (ref 0.57–1.11)
CREAT SERPL-MCNC: 1.59 MG/DL (ref 0.52–1.04)
CREAT SERPL-MCNC: 1.61 MG/DL (ref 0.52–1.04)
CREAT SERPL-MCNC: 1.62 MG/DL (ref 0.52–1.04)
CREAT SERPL-MCNC: 1.62 MG/DL (ref 0.52–1.04)
CREAT SERPL-MCNC: 1.65 MG/DL (ref 0.52–1.04)
CREAT SERPL-MCNC: 1.66 MG/DL (ref 0.52–1.04)
CREAT SERPL-MCNC: 1.76 MG/DL (ref 0.52–1.04)
CREAT SERPL-MCNC: 1.76 MG/DL (ref 0.52–1.04)
CREAT SERPL-MCNC: 1.78 MG/DL (ref 0.52–1.04)
CREAT SERPL-MCNC: 1.84 MG/DL (ref 0.52–1.04)
CREAT SERPL-MCNC: 1.91 MG/DL (ref 0.52–1.04)
CREAT SERPL-MCNC: 1.92 MG/DL (ref 0.52–1.04)
CREAT SERPL-MCNC: 1.96 MG/DL (ref 0.52–1.04)
CREAT SERPL-MCNC: 2.06 MG/DL (ref 0.52–1.04)
CREAT SERPL-MCNC: 2.08 MG/DL (ref 0.52–1.04)
CREAT SERPL-MCNC: 2.13 MG/DL (ref 0.52–1.04)
CREAT SERPL-MCNC: 2.16 MG/DL (ref 0.52–1.04)
CREAT SERPL-MCNC: 2.28 MG/DL (ref 0.52–1.04)
CREAT SERPL-MCNC: 2.32 MG/DL (ref 0.52–1.04)
CREAT SERPL-MCNC: 2.44 MG/DL (ref 0.52–1.04)
CREAT SERPL-MCNC: 2.46 MG/DL (ref 0.52–1.04)
CREAT SERPL-MCNC: 2.47 MG/DL (ref 0.52–1.04)
CREAT SERPL-MCNC: 2.59 MG/DL (ref 0.52–1.04)
CREAT SERPL-MCNC: 2.7 MG/DL (ref 0.52–1.04)
CREAT SERPL-MCNC: 2.8 MG/DL (ref 0.52–1.04)
CREAT SERPL-MCNC: 2.83 MG/DL (ref 0.52–1.04)
CREAT SERPL-MCNC: 2.85 MG/DL (ref 0.52–1.04)
CREAT SERPL-MCNC: 2.89 MG/DL (ref 0.52–1.04)
CREAT SERPL-MCNC: 2.91 MG/DL (ref 0.52–1.04)
CREAT SERPL-MCNC: 2.92 MG/DL (ref 0.52–1.04)
CREAT SERPL-MCNC: 3.18 MG/DL (ref 0.52–1.04)
CREAT SERPL-MCNC: 3.64 MG/DL (ref 0.52–1.04)
CREAT SERPL-MCNC: 3.66 MG/DL (ref 0.52–1.04)
CREAT SERPL-MCNC: 3.7 MG/DL (ref 0.52–1.04)
CREAT SERPL-MCNC: 3.77 MG/DL (ref 0.52–1.04)
CREAT SERPL-MCNC: 3.88 MG/DL (ref 0.52–1.04)
CREAT SERPL-MCNC: 3.92 MG/DL (ref 0.52–1.04)
CREAT SERPL-MCNC: 4.19 MG/DL (ref 0.52–1.04)
CREAT UR-MCNC: 73 MG/DL
CREAT UR-MCNC: 99 MG/DL
CRP SERPL-MCNC: 55.8 MG/L (ref 0–8)
D DIMER PPP FEU-MCNC: 10.3 UG/ML FEU (ref 0–0.5)
D DIMER PPP FEU-MCNC: 8.6 UG/ML FEU (ref 0–0.5)
D-METHAMPHET UR QL: NOT DETECTED NG/ML
DEPRECATED CALCIDIOL+CALCIFEROL SERPL-MC: <15 UG/L (ref 20–75)
DIFFERENTIAL METHOD BLD: ABNORMAL
DIFFERENTIAL METHOD BLD: NORMAL
EBV VCA IGG SER QL IA: 1 AI (ref 0–0.8)
EOSINOPHIL # BLD AUTO: 0.1 10E9/L (ref 0–0.7)
EOSINOPHIL # BLD AUTO: 0.2 10E9/L (ref 0–0.7)
EOSINOPHIL # BLD AUTO: 0.3 10E9/L (ref 0–0.7)
EOSINOPHIL # BLD AUTO: 0.5 10E9/L (ref 0–0.7)
EOSINOPHIL NFR BLD AUTO: 0.7 %
EOSINOPHIL NFR BLD AUTO: 1.8 %
EOSINOPHIL NFR BLD AUTO: 1.9 %
EOSINOPHIL NFR BLD AUTO: 2.3 %
EOSINOPHIL NFR BLD AUTO: 2.5 %
EOSINOPHIL NFR BLD AUTO: 2.8 %
EOSINOPHIL NFR BLD AUTO: 3 %
EOSINOPHIL NFR BLD AUTO: 4.4 %
EOSINOPHIL NFR BLD AUTO: 4.4 %
ERYTHROCYTE [DISTWIDTH] IN BLOOD BY AUTOMATED COUNT: 16.1 % (ref 10–15)
ERYTHROCYTE [DISTWIDTH] IN BLOOD BY AUTOMATED COUNT: 16.2 % (ref 10–15)
ERYTHROCYTE [DISTWIDTH] IN BLOOD BY AUTOMATED COUNT: 16.3 % (ref 10–15)
ERYTHROCYTE [DISTWIDTH] IN BLOOD BY AUTOMATED COUNT: 16.9 % (ref 10–15)
ERYTHROCYTE [DISTWIDTH] IN BLOOD BY AUTOMATED COUNT: 17.8 % (ref 10–15)
ERYTHROCYTE [DISTWIDTH] IN BLOOD BY AUTOMATED COUNT: 19.5 % (ref 10–15)
ERYTHROCYTE [DISTWIDTH] IN BLOOD BY AUTOMATED COUNT: 20.1 % (ref 10–15)
ERYTHROCYTE [DISTWIDTH] IN BLOOD BY AUTOMATED COUNT: 20.3 % (ref 10–15)
ERYTHROCYTE [DISTWIDTH] IN BLOOD BY AUTOMATED COUNT: 20.5 % (ref 10–15)
ERYTHROCYTE [DISTWIDTH] IN BLOOD BY AUTOMATED COUNT: 20.7 % (ref 10–15)
ERYTHROCYTE [DISTWIDTH] IN BLOOD BY AUTOMATED COUNT: 20.8 % (ref 10–15)
ERYTHROCYTE [DISTWIDTH] IN BLOOD BY AUTOMATED COUNT: 21 % (ref 10–15)
ERYTHROCYTE [DISTWIDTH] IN BLOOD BY AUTOMATED COUNT: 21.2 % (ref 10–15)
ERYTHROCYTE [DISTWIDTH] IN BLOOD BY AUTOMATED COUNT: 21.2 % (ref 10–15)
ERYTHROCYTE [DISTWIDTH] IN BLOOD BY AUTOMATED COUNT: 21.3 % (ref 10–15)
ERYTHROCYTE [DISTWIDTH] IN BLOOD BY AUTOMATED COUNT: 21.4 % (ref 10–15)
ERYTHROCYTE [DISTWIDTH] IN BLOOD BY AUTOMATED COUNT: 21.5 % (ref 10–15)
ERYTHROCYTE [DISTWIDTH] IN BLOOD BY AUTOMATED COUNT: 21.7 % (ref 10–15)
ERYTHROCYTE [DISTWIDTH] IN BLOOD BY AUTOMATED COUNT: 22 % (ref 10–15)
ERYTHROCYTE [DISTWIDTH] IN BLOOD BY AUTOMATED COUNT: 22.2 % (ref 10–15)
ERYTHROCYTE [DISTWIDTH] IN BLOOD BY AUTOMATED COUNT: 22.5 % (ref 10–15)
ERYTHROCYTE [DISTWIDTH] IN BLOOD BY AUTOMATED COUNT: 22.9 % (ref 10–15)
ERYTHROCYTE [DISTWIDTH] IN BLOOD BY AUTOMATED COUNT: 23.6 % (ref 10–15)
ERYTHROCYTE [DISTWIDTH] IN BLOOD BY AUTOMATED COUNT: 23.7 % (ref 10–15)
ERYTHROCYTE [DISTWIDTH] IN BLOOD BY AUTOMATED COUNT: 23.7 % (ref 10–15)
ERYTHROCYTE [DISTWIDTH] IN BLOOD BY AUTOMATED COUNT: 23.8 % (ref 10–15)
ERYTHROCYTE [DISTWIDTH] IN BLOOD BY AUTOMATED COUNT: 24 % (ref 10–15)
ERYTHROCYTE [DISTWIDTH] IN BLOOD BY AUTOMATED COUNT: 24.1 % (ref 10–15)
ERYTHROCYTE [DISTWIDTH] IN BLOOD BY AUTOMATED COUNT: 24.2 % (ref 10–15)
ERYTHROCYTE [DISTWIDTH] IN BLOOD BY AUTOMATED COUNT: 24.3 % (ref 10–15)
ERYTHROCYTE [DISTWIDTH] IN BLOOD BY AUTOMATED COUNT: 24.4 % (ref 10–15)
ERYTHROCYTE [DISTWIDTH] IN BLOOD BY AUTOMATED COUNT: 24.4 % (ref 10–15)
ERYTHROCYTE [DISTWIDTH] IN BLOOD BY AUTOMATED COUNT: 24.5 % (ref 10–15)
ERYTHROCYTE [DISTWIDTH] IN BLOOD BY AUTOMATED COUNT: 24.5 % (ref 10–15)
ERYTHROCYTE [DISTWIDTH] IN BLOOD BY AUTOMATED COUNT: 24.6 % (ref 10–15)
ERYTHROCYTE [DISTWIDTH] IN BLOOD BY AUTOMATED COUNT: 24.6 % (ref 10–15)
ERYTHROCYTE [DISTWIDTH] IN BLOOD BY AUTOMATED COUNT: 24.7 % (ref 10–15)
ERYTHROCYTE [DISTWIDTH] IN BLOOD BY AUTOMATED COUNT: 25 % (ref 10–15)
ETHANOL SERPL-MCNC: <0.01 G/DL
FERRITIN SERPL-MCNC: 853 NG/ML (ref 8–252)
FERRITIN SERPL-MCNC: 896 NG/ML (ref 8–252)
FIBRINOGEN PPP-MCNC: 173 MG/DL (ref 200–420)
FIBRINOGEN PPP-MCNC: 183 MG/DL (ref 200–420)
FIBRINOGEN PPP-MCNC: 249 MG/DL (ref 200–420)
FIBRINOGEN PPP-MCNC: 285 MG/DL (ref 200–420)
FOLATE SERPL-MCNC: 64.2 NG/ML
FRACT EXCRET NA UR+SERPL-RTO: 0.4 %
GAMMA GLOB SERPL ELPH-MCNC: 0.4 G/DL (ref 0.7–1.6)
GAMMA INTERFERON BACKGROUND BLD IA-ACNC: 0.04 IU/ML
GFR SERPL CREATININE-BSD FRML MDRD: 11 ML/MIN/{1.73_M2}
GFR SERPL CREATININE-BSD FRML MDRD: 12 ML/MIN/{1.73_M2}
GFR SERPL CREATININE-BSD FRML MDRD: 12 ML/MIN/{1.73_M2}
GFR SERPL CREATININE-BSD FRML MDRD: 13 ML/MIN/{1.73_M2}
GFR SERPL CREATININE-BSD FRML MDRD: 16 ML/MIN/{1.73_M2}
GFR SERPL CREATININE-BSD FRML MDRD: 18 ML/MIN/{1.73_M2}
GFR SERPL CREATININE-BSD FRML MDRD: 19 ML/MIN/{1.73_M2}
GFR SERPL CREATININE-BSD FRML MDRD: 20 ML/MIN/{1.73_M2}
GFR SERPL CREATININE-BSD FRML MDRD: 21 ML/MIN/{1.73_M2}
GFR SERPL CREATININE-BSD FRML MDRD: 22 ML/MIN/{1.73_M2}
GFR SERPL CREATININE-BSD FRML MDRD: 22 ML/MIN/{1.73_M2}
GFR SERPL CREATININE-BSD FRML MDRD: 23 ML/MIN/{1.73_M2}
GFR SERPL CREATININE-BSD FRML MDRD: 24 ML/MIN/{1.73_M2}
GFR SERPL CREATININE-BSD FRML MDRD: 25 ML/MIN/{1.73_M2}
GFR SERPL CREATININE-BSD FRML MDRD: 26 ML/MIN/{1.73_M2}
GFR SERPL CREATININE-BSD FRML MDRD: 26 ML/MIN/{1.73_M2}
GFR SERPL CREATININE-BSD FRML MDRD: 27 ML/MIN/{1.73_M2}
GFR SERPL CREATININE-BSD FRML MDRD: 28 ML/MIN/{1.73_M2}
GFR SERPL CREATININE-BSD FRML MDRD: 29 ML/MIN/{1.73_M2}
GFR SERPL CREATININE-BSD FRML MDRD: 29 ML/MIN/{1.73_M2}
GFR SERPL CREATININE-BSD FRML MDRD: 31 ML/MIN/{1.73_M2}
GFR SERPL CREATININE-BSD FRML MDRD: 32 ML/MIN/{1.73_M2}
GFR SERPL CREATININE-BSD FRML MDRD: 35 ML/MIN/{1.73_M2}
GFR SERPL CREATININE-BSD FRML MDRD: 35 ML/MIN/{1.73_M2}
GFR SERPL CREATININE-BSD FRML MDRD: 36 ML/MIN/{1.73_M2}
GFR SERPL CREATININE-BSD FRML MDRD: 37 ML/MIN/1.73M2
GFR SERPL CREATININE-BSD FRML MDRD: 37 ML/MIN/{1.73_M2}
GFR SERPL CREATININE-BSD FRML MDRD: 39 ML/MIN/{1.73_M2}
GFR SERPL CREATININE-BSD FRML MDRD: 39 ML/MIN/{1.73_M2}
GFR SERPL CREATININE-BSD FRML MDRD: 43 ML/MIN/{1.73_M2}
GFR SERPL CREATININE-BSD FRML MDRD: 45 ML/MIN/{1.73_M2}
GLUCOSE BLDC GLUCOMTR-MCNC: 98 MG/DL (ref 70–99)
GLUCOSE SERPL-MCNC: 100 MG/DL (ref 70–99)
GLUCOSE SERPL-MCNC: 101 MG/DL (ref 70–99)
GLUCOSE SERPL-MCNC: 101 MG/DL (ref 70–99)
GLUCOSE SERPL-MCNC: 103 MG/DL (ref 70–99)
GLUCOSE SERPL-MCNC: 104 MG/DL (ref 70–99)
GLUCOSE SERPL-MCNC: 106 MG/DL (ref 70–99)
GLUCOSE SERPL-MCNC: 107 MG/DL (ref 70–99)
GLUCOSE SERPL-MCNC: 108 MG/DL (ref 70–99)
GLUCOSE SERPL-MCNC: 109 MG/DL (ref 70–99)
GLUCOSE SERPL-MCNC: 110 MG/DL (ref 70–99)
GLUCOSE SERPL-MCNC: 111 MG/DL (ref 70–99)
GLUCOSE SERPL-MCNC: 112 MG/DL (ref 70–99)
GLUCOSE SERPL-MCNC: 112 MG/DL (ref 70–99)
GLUCOSE SERPL-MCNC: 115 MG/DL (ref 70–99)
GLUCOSE SERPL-MCNC: 117 MG/DL (ref 70–99)
GLUCOSE SERPL-MCNC: 119 MG/DL (ref 70–99)
GLUCOSE SERPL-MCNC: 120 MG/DL (ref 70–99)
GLUCOSE SERPL-MCNC: 125 MG/DL (ref 70–99)
GLUCOSE SERPL-MCNC: 125 MG/DL (ref 70–99)
GLUCOSE SERPL-MCNC: 127 MG/DL (ref 70–99)
GLUCOSE SERPL-MCNC: 79 MG/DL (ref 70–99)
GLUCOSE SERPL-MCNC: 83 MG/DL (ref 70–99)
GLUCOSE SERPL-MCNC: 84 MG/DL (ref 70–99)
GLUCOSE SERPL-MCNC: 89 MG/DL (ref 70–99)
GLUCOSE SERPL-MCNC: 90 MG/DL (ref 70–99)
GLUCOSE SERPL-MCNC: 91 MG/DL (ref 70–99)
GLUCOSE SERPL-MCNC: 94 MG/DL (ref 70–99)
GLUCOSE SERPL-MCNC: 95 MG/DL (ref 70–100)
GLUCOSE SERPL-MCNC: 95 MG/DL (ref 70–99)
GLUCOSE SERPL-MCNC: 97 MG/DL (ref 70–99)
GLUCOSE SERPL-MCNC: 98 MG/DL (ref 70–99)
GLUCOSE UR STRIP-MCNC: NEGATIVE MG/DL
GRAM STN SPEC: NORMAL
HAPTOGLOB SERPL-MCNC: 41 MG/DL (ref 32–197)
HAPTOGLOB SERPL-MCNC: 55 MG/DL (ref 32–197)
HAPTOGLOB SERPL-MCNC: 64 MG/DL (ref 32–197)
HAV IGG SER QL IA: REACTIVE
HCG SERPL QL: NEGATIVE
HCO3 BLDV-SCNC: 16 MMOL/L (ref 21–28)
HCT VFR BLD AUTO: 19.4 % (ref 35–47)
HCT VFR BLD AUTO: 20.2 % (ref 35–47)
HCT VFR BLD AUTO: 20.9 % (ref 35–47)
HCT VFR BLD AUTO: 21.4 % (ref 35–47)
HCT VFR BLD AUTO: 21.9 % (ref 35–47)
HCT VFR BLD AUTO: 22 % (ref 35–47)
HCT VFR BLD AUTO: 22.1 % (ref 35–47)
HCT VFR BLD AUTO: 22.2 % (ref 35–47)
HCT VFR BLD AUTO: 22.3 % (ref 35–47)
HCT VFR BLD AUTO: 22.5 % (ref 35–47)
HCT VFR BLD AUTO: 22.7 % (ref 35–47)
HCT VFR BLD AUTO: 22.9 % (ref 35–47)
HCT VFR BLD AUTO: 23.1 % (ref 35–47)
HCT VFR BLD AUTO: 23.2 % (ref 35–47)
HCT VFR BLD AUTO: 23.6 % (ref 35–47)
HCT VFR BLD AUTO: 23.6 % (ref 35–47)
HCT VFR BLD AUTO: 23.8 % (ref 35–47)
HCT VFR BLD AUTO: 24.4 % (ref 35–47)
HCT VFR BLD AUTO: 24.5 % (ref 35–47)
HCT VFR BLD AUTO: 24.5 % (ref 35–47)
HCT VFR BLD AUTO: 24.6 % (ref 35–47)
HCT VFR BLD AUTO: 24.7 % (ref 35–47)
HCT VFR BLD AUTO: 24.8 % (ref 35–47)
HCT VFR BLD AUTO: 24.8 % (ref 35–47)
HCT VFR BLD AUTO: 25 % (ref 35–47)
HCT VFR BLD AUTO: 25.4 % (ref 35–47)
HCT VFR BLD AUTO: 25.5 % (ref 35–47)
HCT VFR BLD AUTO: 25.6 % (ref 35–47)
HCT VFR BLD AUTO: 25.8 % (ref 35–47)
HCT VFR BLD AUTO: 26 % (ref 35–47)
HCT VFR BLD AUTO: 26.5 % (ref 35–47)
HCT VFR BLD AUTO: 26.6 % (ref 35–47)
HCT VFR BLD AUTO: 26.6 % (ref 35–47)
HCT VFR BLD AUTO: 26.9 % (ref 35–47)
HCT VFR BLD AUTO: 26.9 % (ref 35–47)
HCT VFR BLD AUTO: 27.3 % (ref 35–47)
HCT VFR BLD AUTO: 28.6 % (ref 35–47)
HCT VFR BLD AUTO: 29.1 % (ref 35–47)
HCT VFR BLD AUTO: 29.4 % (ref 35–47)
HCT VFR BLD AUTO: 31 % (ref 35–47)
HCT VFR BLD AUTO: 33 % (ref 35–47)
HEMOCCULT STL QL: NEGATIVE
HEMOCCULT STL QL: POSITIVE
HGB BLD-MCNC: 10.2 G/DL (ref 11.7–15.7)
HGB BLD-MCNC: 6.2 G/DL (ref 11.7–15.7)
HGB BLD-MCNC: 6.5 G/DL (ref 11.7–15.7)
HGB BLD-MCNC: 6.7 G/DL (ref 11.7–15.7)
HGB BLD-MCNC: 6.9 G/DL (ref 11.7–15.7)
HGB BLD-MCNC: 7.2 G/DL (ref 11.7–15.7)
HGB BLD-MCNC: 7.3 G/DL (ref 11.7–15.7)
HGB BLD-MCNC: 7.3 G/DL (ref 11.7–15.7)
HGB BLD-MCNC: 7.4 G/DL (ref 11.7–15.7)
HGB BLD-MCNC: 7.6 G/DL (ref 11.7–15.7)
HGB BLD-MCNC: 7.6 G/DL (ref 11.7–15.7)
HGB BLD-MCNC: 7.7 G/DL (ref 11.7–15.7)
HGB BLD-MCNC: 7.9 G/DL (ref 11.7–15.7)
HGB BLD-MCNC: 7.9 G/DL (ref 11.7–15.7)
HGB BLD-MCNC: 8 G/DL (ref 11.7–15.7)
HGB BLD-MCNC: 8 G/DL (ref 11.7–15.7)
HGB BLD-MCNC: 8.1 G/DL (ref 11.7–15.7)
HGB BLD-MCNC: 8.2 G/DL (ref 11.7–15.7)
HGB BLD-MCNC: 8.3 G/DL (ref 11.7–15.7)
HGB BLD-MCNC: 8.4 G/DL (ref 11.7–15.7)
HGB BLD-MCNC: 8.6 G/DL (ref 11.7–15.7)
HGB BLD-MCNC: 8.6 G/DL (ref 11.7–15.7)
HGB BLD-MCNC: 8.8 G/DL (ref 11.7–15.7)
HGB BLD-MCNC: 8.9 G/DL (ref 11.7–15.7)
HGB BLD-MCNC: 9.3 G/DL (ref 11.7–15.7)
HGB BLD-MCNC: 9.9 G/DL (ref 11.7–15.7)
HGB UR QL STRIP: ABNORMAL
HGB UR QL STRIP: ABNORMAL
HGB UR QL STRIP: NEGATIVE
HIV 1+2 AB+HIV1 P24 AG SERPL QL IA: NONREACTIVE
HYALINE CASTS #/AREA URNS LPF: 17 /LPF (ref 0–2)
HYALINE CASTS #/AREA URNS LPF: 3 /LPF (ref 0–2)
HYALINE CASTS #/AREA URNS LPF: 3 /LPF (ref 0–2)
HYALINE CASTS #/AREA URNS LPF: 5 /LPF (ref 0–2)
IGA SERPL-MCNC: 47 MG/DL (ref 84–499)
IGG SERPL-MCNC: 583 MG/DL (ref 610–1616)
IGM SERPL-MCNC: 177 MG/DL (ref 35–242)
IMM GRANULOCYTES # BLD: 0.1 10E9/L (ref 0–0.4)
IMM GRANULOCYTES # BLD: 0.2 10E9/L (ref 0–0.4)
IMM GRANULOCYTES # BLD: 0.3 10E9/L (ref 0–0.4)
IMM GRANULOCYTES NFR BLD: 0.8 %
IMM GRANULOCYTES NFR BLD: 1 %
IMM GRANULOCYTES NFR BLD: 1.1 %
IMM GRANULOCYTES NFR BLD: 1.2 %
IMM GRANULOCYTES NFR BLD: 1.3 %
IMM GRANULOCYTES NFR BLD: 1.7 %
IMM GRANULOCYTES NFR BLD: 2 %
IMM GRANULOCYTES NFR BLD: 3.1 %
IMM GRANULOCYTES NFR BLD: 3.8 %
INR PPP: 1.12 (ref 0.86–1.14)
INR PPP: 1.26 (ref 0.86–1.14)
INR PPP: 1.29 (ref 0.86–1.14)
INR PPP: 1.29 (ref 0.86–1.14)
INR PPP: 1.34 (ref 0.86–1.14)
INR PPP: 1.37 (ref 0.86–1.14)
INR PPP: 1.38 (ref 0.86–1.14)
INR PPP: 1.6 (ref 0.86–1.14)
INR PPP: 1.64 (ref 0.86–1.14)
INR PPP: 1.64 (ref 0.86–1.14)
INR PPP: 1.65 (ref 0.86–1.14)
INR PPP: 1.68 (ref 0.86–1.14)
INR PPP: 1.69 (ref 0.86–1.14)
INR PPP: 1.7 (ref 0.9–1.1)
INR PPP: 1.82 (ref 0.86–1.14)
INR PPP: 1.82 (ref 0.86–1.14)
INR PPP: 1.84 (ref 0.86–1.14)
INR PPP: 1.87 (ref 0.86–1.14)
INR PPP: 1.88 (ref 0.86–1.14)
INR PPP: 1.91 (ref 0.86–1.14)
INR PPP: 1.91 (ref 0.86–1.14)
INR PPP: 1.93 (ref 0.86–1.14)
INR PPP: 1.96 (ref 0.86–1.14)
INR PPP: 1.99 (ref 0.86–1.14)
INR PPP: 2.09 (ref 0.86–1.14)
INR PPP: 2.17 (ref 0.86–1.14)
INR PPP: 2.17 (ref 0.86–1.14)
INR PPP: 2.24 (ref 0.86–1.14)
INR PPP: 2.32 (ref 0.86–1.14)
INR PPP: 2.41 (ref 0.86–1.14)
INR PPP: 2.41 (ref 0.86–1.14)
INR PPP: 2.5 (ref 0.86–1.14)
INTERPRETATION ECG - MUSE: NORMAL
INTERPRETATION ECG - MUSE: NORMAL
IRON SATN MFR SERPL: 102 % (ref 15–46)
IRON SATN MFR SERPL: 94 % (ref 15–46)
IRON SERPL-MCNC: 136 UG/DL (ref 35–180)
IRON SERPL-MCNC: 143 UG/DL (ref 35–180)
KAPPA LC UR-MCNC: 3.08 MG/DL (ref 0.33–1.94)
KAPPA LC/LAMBDA SER: 0.97 {RATIO} (ref 0.26–1.65)
KETONES UR STRIP-MCNC: NEGATIVE MG/DL
L PNEUMO1 AG UR QL IA: NORMAL
LABORATORY COMMENT REPORT: NORMAL
LABORATORY COMMENT REPORT: NORMAL
LACTATE BLD-SCNC: 1.3 MMOL/L (ref 0.7–2)
LACTATE BLD-SCNC: 2 MMOL/L (ref 0.7–2)
LAMBDA LC SERPL-MCNC: 3.19 MG/DL (ref 0.57–2.63)
LDH SERPL L TO P-CCNC: 118 U/L (ref 81–234)
LDH SERPL L TO P-CCNC: 146 U/L (ref 81–234)
LDH SERPL L TO P-CCNC: 161 U/L (ref 81–234)
LEUKOCYTE ESTERASE UR QL STRIP: ABNORMAL
LEUKOCYTE ESTERASE UR QL STRIP: NEGATIVE
LEUKOCYTE ESTERASE UR QL STRIP: NEGATIVE
LIPASE SERPL-CCNC: 132 U/L (ref 73–393)
LIPASE SERPL-CCNC: 66 U/L (ref 73–393)
LYMPHOCYTES # BLD AUTO: 0.7 10E9/L (ref 0.8–5.3)
LYMPHOCYTES # BLD AUTO: 0.7 10E9/L (ref 0.8–5.3)
LYMPHOCYTES # BLD AUTO: 0.8 10E9/L (ref 0.8–5.3)
LYMPHOCYTES # BLD AUTO: 0.8 10E9/L (ref 0.8–5.3)
LYMPHOCYTES # BLD AUTO: 0.9 10E9/L (ref 0.8–5.3)
LYMPHOCYTES # BLD AUTO: 1.1 10E9/L (ref 0.8–5.3)
LYMPHOCYTES # BLD AUTO: 1.2 10E9/L (ref 0.8–5.3)
LYMPHOCYTES NFR BLD AUTO: 10.9 %
LYMPHOCYTES NFR BLD AUTO: 11.4 %
LYMPHOCYTES NFR BLD AUTO: 12.4 %
LYMPHOCYTES NFR BLD AUTO: 12.6 %
LYMPHOCYTES NFR BLD AUTO: 14.6 %
LYMPHOCYTES NFR BLD AUTO: 14.9 %
LYMPHOCYTES NFR BLD AUTO: 17.9 %
LYMPHOCYTES NFR BLD AUTO: 8.4 %
LYMPHOCYTES NFR BLD AUTO: 8.7 %
LYMPHOCYTES NFR FLD MANUAL: 11 %
LYMPHOCYTES NFR FLD MANUAL: 13 %
Lab: NORMAL
M PROTEIN SERPL ELPH-MCNC: 0 G/DL
M TB IFN-G BLD-IMP: NEGATIVE
M TB IFN-G CD4+ BCKGRND COR BLD-ACNC: 8.52 IU/ML
MAGNESIUM SERPL-MCNC: 2 MG/DL (ref 1.6–2.3)
MAGNESIUM SERPL-MCNC: 2.1 MG/DL (ref 1.6–2.3)
MAGNESIUM SERPL-MCNC: 2.3 MG/DL (ref 1.6–2.3)
MAGNESIUM SERPL-MCNC: 2.4 MG/DL (ref 1.6–2.3)
MCH RBC QN AUTO: 31.8 PG (ref 26.5–33)
MCH RBC QN AUTO: 31.8 PG (ref 26.5–33)
MCH RBC QN AUTO: 31.9 PG (ref 26.5–33)
MCH RBC QN AUTO: 32 PG (ref 26.5–33)
MCH RBC QN AUTO: 32 PG (ref 26.5–33)
MCH RBC QN AUTO: 32.1 PG (ref 26.5–33)
MCH RBC QN AUTO: 32.2 PG (ref 26.5–33)
MCH RBC QN AUTO: 32.2 PG (ref 26.5–33)
MCH RBC QN AUTO: 32.3 PG (ref 26.5–33)
MCH RBC QN AUTO: 32.4 PG (ref 26.5–33)
MCH RBC QN AUTO: 32.5 PG (ref 26.5–33)
MCH RBC QN AUTO: 32.6 PG (ref 26.5–33)
MCH RBC QN AUTO: 32.7 PG (ref 26.5–33)
MCH RBC QN AUTO: 32.7 PG (ref 26.5–33)
MCH RBC QN AUTO: 32.9 PG (ref 26.5–33)
MCH RBC QN AUTO: 33 PG (ref 26.5–33)
MCH RBC QN AUTO: 33.1 PG (ref 26.5–33)
MCH RBC QN AUTO: 33.2 PG (ref 26.5–33)
MCH RBC QN AUTO: 33.3 PG (ref 26.5–33)
MCH RBC QN AUTO: 33.5 PG (ref 26.5–33)
MCH RBC QN AUTO: 33.7 PG (ref 26.5–33)
MCH RBC QN AUTO: 33.8 PG (ref 26.5–33)
MCH RBC QN AUTO: 34 PG (ref 26.5–33)
MCH RBC QN AUTO: 34 PG (ref 26.5–33)
MCH RBC QN AUTO: 34.3 PG (ref 26.5–33)
MCH RBC QN AUTO: 34.6 PG (ref 26.5–33)
MCHC RBC AUTO-ENTMCNC: 30.1 G/DL (ref 31.5–36.5)
MCHC RBC AUTO-ENTMCNC: 30.1 G/DL (ref 31.5–36.5)
MCHC RBC AUTO-ENTMCNC: 30.3 G/DL (ref 31.5–36.5)
MCHC RBC AUTO-ENTMCNC: 30.5 G/DL (ref 31.5–36.5)
MCHC RBC AUTO-ENTMCNC: 30.6 G/DL (ref 31.5–36.5)
MCHC RBC AUTO-ENTMCNC: 30.6 G/DL (ref 31.5–36.5)
MCHC RBC AUTO-ENTMCNC: 30.8 G/DL (ref 31.5–36.5)
MCHC RBC AUTO-ENTMCNC: 30.9 G/DL (ref 31.5–36.5)
MCHC RBC AUTO-ENTMCNC: 31.2 G/DL (ref 31.5–36.5)
MCHC RBC AUTO-ENTMCNC: 31.2 G/DL (ref 31.5–36.5)
MCHC RBC AUTO-ENTMCNC: 31.4 G/DL (ref 31.5–36.5)
MCHC RBC AUTO-ENTMCNC: 31.5 G/DL (ref 31.5–36.5)
MCHC RBC AUTO-ENTMCNC: 31.6 G/DL (ref 31.5–36.5)
MCHC RBC AUTO-ENTMCNC: 31.7 G/DL (ref 31.5–36.5)
MCHC RBC AUTO-ENTMCNC: 31.7 G/DL (ref 31.5–36.5)
MCHC RBC AUTO-ENTMCNC: 31.8 G/DL (ref 31.5–36.5)
MCHC RBC AUTO-ENTMCNC: 31.8 G/DL (ref 31.5–36.5)
MCHC RBC AUTO-ENTMCNC: 31.9 G/DL (ref 31.5–36.5)
MCHC RBC AUTO-ENTMCNC: 32 G/DL (ref 31.5–36.5)
MCHC RBC AUTO-ENTMCNC: 32.1 G/DL (ref 31.5–36.5)
MCHC RBC AUTO-ENTMCNC: 32.2 G/DL (ref 31.5–36.5)
MCHC RBC AUTO-ENTMCNC: 32.3 G/DL (ref 31.5–36.5)
MCHC RBC AUTO-ENTMCNC: 32.4 G/DL (ref 31.5–36.5)
MCHC RBC AUTO-ENTMCNC: 32.6 G/DL (ref 31.5–36.5)
MCHC RBC AUTO-ENTMCNC: 32.9 G/DL (ref 31.5–36.5)
MCHC RBC AUTO-ENTMCNC: 33.3 G/DL (ref 31.5–36.5)
MCV RBC AUTO: 100 FL (ref 78–100)
MCV RBC AUTO: 100 FL (ref 78–100)
MCV RBC AUTO: 101 FL (ref 78–100)
MCV RBC AUTO: 102 FL (ref 78–100)
MCV RBC AUTO: 103 FL (ref 78–100)
MCV RBC AUTO: 104 FL (ref 78–100)
MCV RBC AUTO: 105 FL (ref 78–100)
MCV RBC AUTO: 106 FL (ref 78–100)
MCV RBC AUTO: 107 FL (ref 78–100)
MCV RBC AUTO: 108 FL (ref 78–100)
MCV RBC AUTO: 108 FL (ref 78–100)
MCV RBC AUTO: 110 FL (ref 78–100)
MCV RBC AUTO: 112 FL (ref 78–100)
MCV RBC AUTO: 99 FL (ref 78–100)
METHADONE UR QL SCN: NOT DETECTED NG/ML
MITOGEN IGNF BCKGRD COR BLD-ACNC: 0 IU/ML
MITOGEN IGNF BCKGRD COR BLD-ACNC: 0 IU/ML
MONOCYTES # BLD AUTO: 0.5 10E9/L (ref 0–1.3)
MONOCYTES # BLD AUTO: 0.5 10E9/L (ref 0–1.3)
MONOCYTES # BLD AUTO: 0.6 10E9/L (ref 0–1.3)
MONOCYTES # BLD AUTO: 0.7 10E9/L (ref 0–1.3)
MONOCYTES # BLD AUTO: 0.8 10E9/L (ref 0–1.3)
MONOCYTES # BLD AUTO: 0.8 10E9/L (ref 0–1.3)
MONOCYTES # BLD AUTO: 1 10E9/L (ref 0–1.3)
MONOCYTES NFR BLD AUTO: 10.5 %
MONOCYTES NFR BLD AUTO: 11.1 %
MONOCYTES NFR BLD AUTO: 6.1 %
MONOCYTES NFR BLD AUTO: 8.1 %
MONOCYTES NFR BLD AUTO: 8.2 %
MONOCYTES NFR BLD AUTO: 9 %
MONOCYTES NFR BLD AUTO: 9.4 %
MONOCYTES NFR BLD AUTO: 9.6 %
MONOCYTES NFR BLD AUTO: 9.7 %
MUCOUS THREADS #/AREA URNS LPF: PRESENT /LPF
MUCOUS THREADS #/AREA URNS LPF: PRESENT /LPF
NEUTROPHILS # BLD AUTO: 4.2 10E9/L (ref 1.6–8.3)
NEUTROPHILS # BLD AUTO: 4.3 10E9/L (ref 1.6–8.3)
NEUTROPHILS # BLD AUTO: 4.8 10E9/L (ref 1.6–8.3)
NEUTROPHILS # BLD AUTO: 4.9 10E9/L (ref 1.6–8.3)
NEUTROPHILS # BLD AUTO: 5.3 10E9/L (ref 1.6–8.3)
NEUTROPHILS # BLD AUTO: 5.4 10E9/L (ref 1.6–8.3)
NEUTROPHILS # BLD AUTO: 7 10E9/L (ref 1.6–8.3)
NEUTROPHILS # BLD AUTO: 7.7 10E9/L (ref 1.6–8.3)
NEUTROPHILS # BLD AUTO: 7.9 10E9/L (ref 1.6–8.3)
NEUTROPHILS NFR BLD AUTO: 65.9 %
NEUTROPHILS NFR BLD AUTO: 69.8 %
NEUTROPHILS NFR BLD AUTO: 70.7 %
NEUTROPHILS NFR BLD AUTO: 71 %
NEUTROPHILS NFR BLD AUTO: 74.1 %
NEUTROPHILS NFR BLD AUTO: 76.1 %
NEUTROPHILS NFR BLD AUTO: 76.9 %
NEUTROPHILS NFR BLD AUTO: 78.9 %
NEUTROPHILS NFR BLD AUTO: 79.7 %
NEUTS BAND NFR FLD MANUAL: 26 %
NEUTS BAND NFR FLD MANUAL: 41 %
NITRATE UR QL: NEGATIVE
NRBC # BLD AUTO: 0 10*3/UL
NRBC BLD AUTO-RTO: 0 /100
NT-PROBNP SERPL-MCNC: 2471 PG/ML (ref 0–900)
NUM BPU REQUESTED: 1
NUM BPU REQUESTED: 1
NUM BPU REQUESTED: 2
NUM BPU REQUESTED: 2
O2/TOTAL GAS SETTING VFR VENT: 95 %
OPIATES UR QL SCN: NOT DETECTED NG/ML
OSMOLALITY SERPL: 297 MMOL/KG (ref 275–295)
OSMOLALITY SERPL: 299 MMOL/KG (ref 275–295)
OSMOLALITY UR: 292 MMOL/KG (ref 100–1200)
OSMOLALITY UR: 341 MMOL/KG (ref 100–1200)
OTHER CELLS FLD MANUAL: 48 %
OTHER CELLS FLD MANUAL: 61 %
OXYCODONE UR QL SCN: NOT DETECTED NG/ML
PCO2 BLDV: 33 MM HG (ref 40–50)
PCP UR QL SCN: NOT DETECTED NG/ML
PETH BLD-MCNC: NEGATIVE NG/ML
PH BLDV: 7.29 PH (ref 7.32–7.43)
PH UR STRIP: 5 PH (ref 5–7)
PH UR STRIP: 5.5 PH (ref 5–7)
PH UR STRIP: 6 PH (ref 5–7)
PHOSPHATE SERPL-MCNC: 2.2 MG/DL (ref 2.5–4.5)
PHOSPHATE SERPL-MCNC: 2.2 MG/DL (ref 2.5–4.5)
PHOSPHATE SERPL-MCNC: 3.1 MG/DL (ref 2.5–4.5)
PLATELET # BLD AUTO: 25 10E9/L (ref 150–450)
PLATELET # BLD AUTO: 26 10E9/L (ref 150–450)
PLATELET # BLD AUTO: 29 10E9/L (ref 150–450)
PLATELET # BLD AUTO: 29 10E9/L (ref 150–450)
PLATELET # BLD AUTO: 30 10E9/L (ref 150–450)
PLATELET # BLD AUTO: 30 10E9/L (ref 150–450)
PLATELET # BLD AUTO: 31 10E9/L (ref 150–450)
PLATELET # BLD AUTO: 33 10E9/L (ref 150–450)
PLATELET # BLD AUTO: 34 10E9/L (ref 150–450)
PLATELET # BLD AUTO: 36 10E9/L (ref 150–450)
PLATELET # BLD AUTO: 36 10E9/L (ref 150–450)
PLATELET # BLD AUTO: 37 10E9/L (ref 150–450)
PLATELET # BLD AUTO: 38 10E9/L (ref 150–450)
PLATELET # BLD AUTO: 41 10E9/L (ref 150–450)
PLATELET # BLD AUTO: 42 10E9/L (ref 150–450)
PLATELET # BLD AUTO: 43 10E9/L (ref 150–450)
PLATELET # BLD AUTO: 44 10E9/L (ref 150–450)
PLATELET # BLD AUTO: 44 10E9/L (ref 150–450)
PLATELET # BLD AUTO: 45 10E9/L (ref 150–450)
PLATELET # BLD AUTO: 45 10E9/L (ref 150–450)
PLATELET # BLD AUTO: 46 10E9/L (ref 150–450)
PLATELET # BLD AUTO: 48 10E9/L (ref 150–450)
PLATELET # BLD AUTO: 50 10E9/L (ref 150–450)
PLATELET # BLD AUTO: 51 10E9/L (ref 150–450)
PLATELET # BLD AUTO: 56 10E9/L (ref 150–450)
PLATELET # BLD AUTO: 57 10E9/L (ref 150–450)
PLATELET # BLD AUTO: 58 10E9/L (ref 150–450)
PLATELET # BLD AUTO: 59 10E9/L (ref 150–450)
PLATELET # BLD AUTO: 60 10E9/L (ref 150–450)
PLATELET # BLD AUTO: 61 10E9/L (ref 150–450)
PLATELET # BLD AUTO: 62 10E9/L (ref 150–450)
PLATELET # BLD AUTO: 63 10E9/L (ref 150–450)
PLATELET # BLD AUTO: 65 10E9/L (ref 150–450)
PLATELET # BLD AUTO: 69 10E9/L (ref 150–450)
PLATELET # BLD AUTO: 74 10E9/L (ref 150–450)
PLATELET # BLD AUTO: 74 10E9/L (ref 150–450)
PLATELET # BLD AUTO: 75 10E9/L (ref 150–450)
PLATELET # BLD AUTO: 76 10E9/L (ref 150–450)
PLATELET # BLD AUTO: 83 10E9/L (ref 150–450)
PLATELET # BLD AUTO: 98 10E9/L (ref 150–450)
PO2 BLDV: 55 MM HG (ref 25–47)
POTASSIUM SERPL-SCNC: 3.4 MMOL/L (ref 3.4–5.3)
POTASSIUM SERPL-SCNC: 3.5 MMOL/L (ref 3.4–5.3)
POTASSIUM SERPL-SCNC: 3.6 MMOL/L (ref 3.4–5.3)
POTASSIUM SERPL-SCNC: 3.7 MMOL/L (ref 3.4–5.3)
POTASSIUM SERPL-SCNC: 3.7 MMOL/L (ref 3.5–5.1)
POTASSIUM SERPL-SCNC: 3.8 MMOL/L (ref 3.4–5.3)
POTASSIUM SERPL-SCNC: 3.9 MMOL/L (ref 3.4–5.3)
POTASSIUM SERPL-SCNC: 4 MMOL/L (ref 3.4–5.3)
POTASSIUM SERPL-SCNC: 4.1 MMOL/L (ref 3.4–5.3)
POTASSIUM SERPL-SCNC: 4.2 MMOL/L (ref 3.4–5.3)
POTASSIUM SERPL-SCNC: 4.2 MMOL/L (ref 3.4–5.3)
POTASSIUM SERPL-SCNC: 4.3 MMOL/L (ref 3.4–5.3)
POTASSIUM SERPL-SCNC: 4.5 MMOL/L (ref 3.4–5.3)
POTASSIUM SERPL-SCNC: 4.6 MMOL/L (ref 3.4–5.3)
POTASSIUM SERPL-SCNC: 4.6 MMOL/L (ref 3.4–5.3)
POTASSIUM SERPL-SCNC: 4.8 MMOL/L (ref 3.4–5.3)
POTASSIUM SERPL-SCNC: 4.9 MMOL/L (ref 3.4–5.3)
POTASSIUM SERPL-SCNC: 5.1 MMOL/L (ref 3.4–5.3)
POTASSIUM SERPL-SCNC: 5.3 MMOL/L (ref 3.4–5.3)
POTASSIUM SERPL-SCNC: 5.3 MMOL/L (ref 3.4–5.3)
POTASSIUM SERPL-SCNC: 5.4 MMOL/L (ref 3.4–5.3)
POTASSIUM SERPL-SCNC: 5.5 MMOL/L (ref 3.4–5.3)
PROCALCITONIN SERPL-MCNC: 0.35 NG/ML
PROCALCITONIN SERPL-MCNC: 0.65 NG/ML
PROPOXYPH UR QL: NOT DETECTED NG/ML
PROT FLD-MCNC: 1.7 G/DL
PROT PATTERN SERPL ELPH-IMP: ABNORMAL
PROT PATTERN SERPL IFE-IMP: ABNORMAL
PROT SERPL-MCNC: 4.1 G/DL (ref 6.8–8.8)
PROT SERPL-MCNC: 4.2 G/DL (ref 6.8–8.8)
PROT SERPL-MCNC: 4.6 G/DL (ref 6.8–8.8)
PROT SERPL-MCNC: 4.7 G/DL (ref 6.8–8.8)
PROT SERPL-MCNC: 4.8 G/DL (ref 6.8–8.8)
PROT SERPL-MCNC: 4.9 G/DL (ref 6.8–8.8)
PROT SERPL-MCNC: 4.9 G/DL (ref 6.8–8.8)
PROT SERPL-MCNC: 5 G/DL (ref 6.8–8.8)
PROT SERPL-MCNC: 5.1 G/DL (ref 6.8–8.8)
PROT SERPL-MCNC: 5.2 G/DL (ref 6.8–8.8)
PROT SERPL-MCNC: 5.2 G/DL (ref 6.8–8.8)
PROT SERPL-MCNC: 5.3 G/DL (ref 6.8–8.8)
PROT SERPL-MCNC: 5.4 G/DL (ref 6.8–8.8)
PROT SERPL-MCNC: 5.4 G/DL (ref 6.8–8.8)
PROT SERPL-MCNC: 5.5 G/DL (ref 6.8–8.8)
PROT SERPL-MCNC: 5.6 G/DL (ref 6.8–8.8)
PROT SERPL-MCNC: 5.7 G/DL (ref 6.8–8.8)
PROT SERPL-MCNC: 5.8 G/DL (ref 6.8–8.8)
PROT SERPL-MCNC: 5.8 G/DL (ref 6.8–8.8)
PROT SERPL-MCNC: 5.9 G/DL (ref 6.8–8.8)
PTH RELATED PROT SERPL-SCNC: <2 PMOL/L (ref 0–3.4)
PTH-INTACT SERPL-MCNC: 8 PG/ML (ref 18–80)
RBC # BLD AUTO: 1.9 10E12/L (ref 3.8–5.2)
RBC # BLD AUTO: 1.96 10E12/L (ref 3.8–5.2)
RBC # BLD AUTO: 2 10E12/L (ref 3.8–5.2)
RBC # BLD AUTO: 2.05 10E12/L (ref 3.8–5.2)
RBC # BLD AUTO: 2.06 10E12/L (ref 3.8–5.2)
RBC # BLD AUTO: 2.1 10E12/L (ref 3.8–5.2)
RBC # BLD AUTO: 2.1 10E12/L (ref 3.8–5.2)
RBC # BLD AUTO: 2.12 10E12/L (ref 3.8–5.2)
RBC # BLD AUTO: 2.13 10E12/L (ref 3.8–5.2)
RBC # BLD AUTO: 2.15 10E12/L (ref 3.8–5.2)
RBC # BLD AUTO: 2.17 10E12/L (ref 3.8–5.2)
RBC # BLD AUTO: 2.18 10E12/L (ref 3.8–5.2)
RBC # BLD AUTO: 2.22 10E12/L (ref 3.8–5.2)
RBC # BLD AUTO: 2.22 10E12/L (ref 3.8–5.2)
RBC # BLD AUTO: 2.24 10E12/L (ref 3.8–5.2)
RBC # BLD AUTO: 2.27 10E12/L (ref 3.8–5.2)
RBC # BLD AUTO: 2.29 10E12/L (ref 3.8–5.2)
RBC # BLD AUTO: 2.31 10E12/L (ref 3.8–5.2)
RBC # BLD AUTO: 2.33 10E12/L (ref 3.8–5.2)
RBC # BLD AUTO: 2.36 10E12/L (ref 3.8–5.2)
RBC # BLD AUTO: 2.38 10E12/L (ref 3.8–5.2)
RBC # BLD AUTO: 2.38 10E12/L (ref 3.8–5.2)
RBC # BLD AUTO: 2.39 10E12/L (ref 3.8–5.2)
RBC # BLD AUTO: 2.42 10E12/L (ref 3.8–5.2)
RBC # BLD AUTO: 2.44 10E12/L (ref 3.8–5.2)
RBC # BLD AUTO: 2.45 10E12/L (ref 3.8–5.2)
RBC # BLD AUTO: 2.48 10E12/L (ref 3.8–5.2)
RBC # BLD AUTO: 2.54 10E12/L (ref 3.8–5.2)
RBC # BLD AUTO: 2.55 10E12/L (ref 3.8–5.2)
RBC # BLD AUTO: 2.55 10E12/L (ref 3.8–5.2)
RBC # BLD AUTO: 2.56 10E12/L (ref 3.8–5.2)
RBC # BLD AUTO: 2.57 10E12/L (ref 3.8–5.2)
RBC # BLD AUTO: 2.57 10E12/L (ref 3.8–5.2)
RBC # BLD AUTO: 2.61 10E12/L (ref 3.8–5.2)
RBC # BLD AUTO: 2.64 10E12/L (ref 3.8–5.2)
RBC # BLD AUTO: 2.65 10E12/L (ref 3.8–5.2)
RBC # BLD AUTO: 2.7 10E12/L (ref 3.8–5.2)
RBC # BLD AUTO: 2.77 10E12/L (ref 3.8–5.2)
RBC # BLD AUTO: 2.79 10E12/L (ref 3.8–5.2)
RBC # BLD AUTO: 2.86 10E12/L (ref 3.8–5.2)
RBC # BLD AUTO: 3 10E12/L (ref 3.8–5.2)
RBC #/AREA URNS AUTO: 2 /HPF (ref 0–2)
RBC #/AREA URNS AUTO: 21 /HPF (ref 0–2)
RBC #/AREA URNS AUTO: 4 /HPF (ref 0–2)
RBC #/AREA URNS AUTO: <1 /HPF (ref 0–2)
RETICS # AUTO: 100.9 10E9/L (ref 25–95)
RETICS # AUTO: 86.8 10E9/L (ref 25–95)
RETICS # AUTO: 90.7 10E9/L (ref 25–95)
RETICS/RBC NFR AUTO: 3.9 % (ref 0.5–2)
RETICS/RBC NFR AUTO: 4.3 % (ref 0.5–2)
RETICS/RBC NFR AUTO: 5.1 % (ref 0.5–2)
S PNEUM AG SPEC QL: NORMAL
SARS-COV-2 PCR COMMENT: NORMAL
SARS-COV-2 RNA SPEC QL NAA+PROBE: NEGATIVE
SARS-COV-2 RNA SPEC QL NAA+PROBE: NORMAL
SARS-COV-2 RNA SPEC QL NAA+PROBE: NOT DETECTED
SODIUM SERPL-SCNC: 127 MMOL/L (ref 133–144)
SODIUM SERPL-SCNC: 129 MMOL/L (ref 133–144)
SODIUM SERPL-SCNC: 130 MMOL/L (ref 133–144)
SODIUM SERPL-SCNC: 131 MMOL/L (ref 133–144)
SODIUM SERPL-SCNC: 132 MMOL/L (ref 133–144)
SODIUM SERPL-SCNC: 133 MMOL/L (ref 133–144)
SODIUM SERPL-SCNC: 133 MMOL/L (ref 133–144)
SODIUM SERPL-SCNC: 134 MMOL/L (ref 133–144)
SODIUM SERPL-SCNC: 135 MMOL/L (ref 133–144)
SODIUM SERPL-SCNC: 136 MMOL/L (ref 133–144)
SODIUM SERPL-SCNC: 137 MMOL/L (ref 133–144)
SODIUM SERPL-SCNC: 138 MMOL/L (ref 133–144)
SODIUM SERPL-SCNC: 139 MMOL/L (ref 133–144)
SODIUM SERPL-SCNC: 140 MMOL/L (ref 133–144)
SODIUM SERPL-SCNC: 141 MMOL/L (ref 133–144)
SODIUM SERPL-SCNC: 142 MMOL/L (ref 133–144)
SODIUM SERPL-SCNC: 143 MMOL/L (ref 133–144)
SODIUM UR-SCNC: 15 MMOL/L
SODIUM UR-SCNC: 25 MMOL/L
SODIUM UR-SCNC: 28 MMOL/L
SODIUM UR-SCNC: 35 MMOL/L
SOURCE: ABNORMAL
SP GR UR STRIP: 1.01 (ref 1–1.03)
SPECIMEN EXP DATE BLD: NORMAL
SPECIMEN SOURCE FLD: NORMAL
SPECIMEN SOURCE: NORMAL
SQUAMOUS #/AREA URNS AUTO: 10 /HPF (ref 0–1)
SQUAMOUS #/AREA URNS AUTO: 2 /HPF (ref 0–1)
SQUAMOUS #/AREA URNS AUTO: 2 /HPF (ref 0–1)
T PALLIDUM AB SER QL: NONREACTIVE
THROMBIN TIME: 15.3 SEC (ref 13–19)
TIBC SERPL-MCNC: 134 UG/DL (ref 240–430)
TIBC SERPL-MCNC: 153 UG/DL (ref 240–430)
TRANS CELLS #/AREA URNS HPF: 7 /HPF (ref 0–1)
TRANSFUSION STATUS PATIENT QL: NORMAL
TRICYCLICS UR QL SCN: NOT DETECTED NG/ML
TROPONIN I SERPL-MCNC: 0.04 UG/L (ref 0–0.04)
TSH SERPL DL<=0.005 MIU/L-ACNC: 0.52 MU/L (ref 0.4–4)
TSH SERPL DL<=0.005 MIU/L-ACNC: 0.68 MU/L (ref 0.4–4)
UROBILINOGEN UR STRIP-MCNC: NORMAL MG/DL (ref 0–2)
UUN UR-MCNC: 398 MG/DL
UUN/CREAT 24H UR: 5 G/G CR
VIT B12 SERPL-MCNC: 2921 PG/ML (ref 193–986)
VIT B12 SERPL-MCNC: 2988 PG/ML (ref 193–986)
VITAMIN D2 SERPL-MCNC: <5 UG/L
VITAMIN D3 SERPL-MCNC: 10 UG/L
WBC # BLD AUTO: 10 10E9/L (ref 4–11)
WBC # BLD AUTO: 10.2 10E9/L (ref 4–11)
WBC # BLD AUTO: 3.3 10E9/L (ref 4–11)
WBC # BLD AUTO: 4.5 10E9/L (ref 4–11)
WBC # BLD AUTO: 4.5 10E9/L (ref 4–11)
WBC # BLD AUTO: 4.6 10E9/L (ref 4–11)
WBC # BLD AUTO: 4.7 10E9/L (ref 4–11)
WBC # BLD AUTO: 4.7 10E9/L (ref 4–11)
WBC # BLD AUTO: 5 10E9/L (ref 4–11)
WBC # BLD AUTO: 5.1 10E9/L (ref 4–11)
WBC # BLD AUTO: 5.2 10E9/L (ref 4–11)
WBC # BLD AUTO: 5.4 10E9/L (ref 4–11)
WBC # BLD AUTO: 5.5 10E9/L (ref 4–11)
WBC # BLD AUTO: 5.5 10E9/L (ref 4–11)
WBC # BLD AUTO: 5.7 10E9/L (ref 4–11)
WBC # BLD AUTO: 5.7 10E9/L (ref 4–11)
WBC # BLD AUTO: 5.9 10E9/L (ref 4–11)
WBC # BLD AUTO: 6 10E9/L (ref 4–11)
WBC # BLD AUTO: 6.1 10E9/L (ref 4–11)
WBC # BLD AUTO: 6.1 10E9/L (ref 4–11)
WBC # BLD AUTO: 6.2 10E9/L (ref 4–11)
WBC # BLD AUTO: 6.3 10E9/L (ref 4–11)
WBC # BLD AUTO: 6.3 10E9/L (ref 4–11)
WBC # BLD AUTO: 6.4 10E9/L (ref 4–11)
WBC # BLD AUTO: 6.4 10E9/L (ref 4–11)
WBC # BLD AUTO: 6.5 10E9/L (ref 4–11)
WBC # BLD AUTO: 6.6 10E9/L (ref 4–11)
WBC # BLD AUTO: 6.7 10E9/L (ref 4–11)
WBC # BLD AUTO: 6.7 10E9/L (ref 4–11)
WBC # BLD AUTO: 7.5 10E9/L (ref 4–11)
WBC # BLD AUTO: 7.8 10E9/L (ref 4–11)
WBC # BLD AUTO: 7.9 10E9/L (ref 4–11)
WBC # BLD AUTO: 8.3 10E9/L (ref 4–11)
WBC # BLD AUTO: 8.8 10E9/L (ref 4–11)
WBC # FLD AUTO: 116 /UL
WBC # FLD AUTO: 328 /UL
WBC #/AREA URNS AUTO: 150 /HPF (ref 0–5)
WBC #/AREA URNS AUTO: 3 /HPF (ref 0–5)
WBC #/AREA URNS AUTO: 30 /HPF (ref 0–5)
WBC #/AREA URNS AUTO: 4 /HPF (ref 0–5)

## 2020-01-01 PROCEDURE — 99214 OFFICE O/P EST MOD 30 MIN: CPT | Mod: TEL | Performed by: FAMILY MEDICINE

## 2020-01-01 PROCEDURE — 84443 ASSAY THYROID STIM HORMONE: CPT | Performed by: STUDENT IN AN ORGANIZED HEALTH CARE EDUCATION/TRAINING PROGRAM

## 2020-01-01 PROCEDURE — 27210732 ZZH ACCESSORY CR1

## 2020-01-01 PROCEDURE — 99606 MTMS BY PHARM EST 15 MIN: CPT | Performed by: PHARMACIST

## 2020-01-01 PROCEDURE — U0003 INFECTIOUS AGENT DETECTION BY NUCLEIC ACID (DNA OR RNA); SEVERE ACUTE RESPIRATORY SYNDROME CORONAVIRUS 2 (SARS-COV-2) (CORONAVIRUS DISEASE [COVID-19]), AMPLIFIED PROBE TECHNIQUE, MAKING USE OF HIGH THROUGHPUT TECHNOLOGIES AS DESCRIBED BY CMS-2020-01-R: HCPCS | Performed by: FAMILY MEDICINE

## 2020-01-01 PROCEDURE — P9047 ALBUMIN (HUMAN), 25%, 50ML: HCPCS | Performed by: STUDENT IN AN ORGANIZED HEALTH CARE EDUCATION/TRAINING PROGRAM

## 2020-01-01 PROCEDURE — 99233 SBSQ HOSP IP/OBS HIGH 50: CPT | Mod: GC | Performed by: INTERNAL MEDICINE

## 2020-01-01 PROCEDURE — 25000132 ZZH RX MED GY IP 250 OP 250 PS 637: Performed by: STUDENT IN AN ORGANIZED HEALTH CARE EDUCATION/TRAINING PROGRAM

## 2020-01-01 PROCEDURE — 70450 CT HEAD/BRAIN W/O DYE: CPT

## 2020-01-01 PROCEDURE — 0W9G3ZZ DRAINAGE OF PERITONEAL CAVITY, PERCUTANEOUS APPROACH: ICD-10-PCS | Performed by: PHYSICIAN ASSISTANT

## 2020-01-01 PROCEDURE — 25000125 ZZHC RX 250: Performed by: RADIOLOGY

## 2020-01-01 PROCEDURE — 84100 ASSAY OF PHOSPHORUS: CPT | Performed by: DERMATOLOGY

## 2020-01-01 PROCEDURE — P9047 ALBUMIN (HUMAN), 25%, 50ML: HCPCS | Performed by: DERMATOLOGY

## 2020-01-01 PROCEDURE — H0001 ALCOHOL AND/OR DRUG ASSESS: HCPCS | Mod: HF | Performed by: COUNSELOR

## 2020-01-01 PROCEDURE — 82784 ASSAY IGA/IGD/IGG/IGM EACH: CPT | Performed by: INTERNAL MEDICINE

## 2020-01-01 PROCEDURE — 85027 COMPLETE CBC AUTOMATED: CPT | Performed by: PHYSICIAN ASSISTANT

## 2020-01-01 PROCEDURE — 25800030 ZZH RX IP 258 OP 636: Performed by: PHYSICIAN ASSISTANT

## 2020-01-01 PROCEDURE — 86140 C-REACTIVE PROTEIN: CPT | Performed by: NURSE PRACTITIONER

## 2020-01-01 PROCEDURE — 83935 ASSAY OF URINE OSMOLALITY: CPT | Performed by: PHYSICIAN ASSISTANT

## 2020-01-01 PROCEDURE — 12000001 ZZH R&B MED SURG/OB UMMC

## 2020-01-01 PROCEDURE — 12000004 ZZH R&B IMCU UMMC

## 2020-01-01 PROCEDURE — 25800030 ZZH RX IP 258 OP 636: Performed by: STUDENT IN AN ORGANIZED HEALTH CARE EDUCATION/TRAINING PROGRAM

## 2020-01-01 PROCEDURE — 73630 X-RAY EXAM OF FOOT: CPT | Mod: LT

## 2020-01-01 PROCEDURE — 99285 EMERGENCY DEPT VISIT HI MDM: CPT | Mod: 25

## 2020-01-01 PROCEDURE — G0463 HOSPITAL OUTPT CLINIC VISIT: HCPCS | Mod: ZF

## 2020-01-01 PROCEDURE — 36415 COLL VENOUS BLD VENIPUNCTURE: CPT | Performed by: RADIOLOGY

## 2020-01-01 PROCEDURE — 87899 AGENT NOS ASSAY W/OPTIC: CPT | Performed by: PHYSICIAN ASSISTANT

## 2020-01-01 PROCEDURE — 97110 THERAPEUTIC EXERCISES: CPT | Mod: GP

## 2020-01-01 PROCEDURE — 97530 THERAPEUTIC ACTIVITIES: CPT | Mod: GO

## 2020-01-01 PROCEDURE — 82728 ASSAY OF FERRITIN: CPT | Performed by: PHYSICIAN ASSISTANT

## 2020-01-01 PROCEDURE — 25800030 ZZH RX IP 258 OP 636: Performed by: EMERGENCY MEDICINE

## 2020-01-01 PROCEDURE — 25000132 ZZH RX MED GY IP 250 OP 250 PS 637: Performed by: PHYSICIAN ASSISTANT

## 2020-01-01 PROCEDURE — 84100 ASSAY OF PHOSPHORUS: CPT | Performed by: STUDENT IN AN ORGANIZED HEALTH CARE EDUCATION/TRAINING PROGRAM

## 2020-01-01 PROCEDURE — 87070 CULTURE OTHR SPECIMN AEROBIC: CPT | Performed by: DERMATOLOGY

## 2020-01-01 PROCEDURE — 25000132 ZZH RX MED GY IP 250 OP 250 PS 637: Performed by: EMERGENCY MEDICINE

## 2020-01-01 PROCEDURE — 25000128 H RX IP 250 OP 636: Performed by: STUDENT IN AN ORGANIZED HEALTH CARE EDUCATION/TRAINING PROGRAM

## 2020-01-01 PROCEDURE — 85384 FIBRINOGEN ACTIVITY: CPT | Performed by: DERMATOLOGY

## 2020-01-01 PROCEDURE — 97535 SELF CARE MNGMENT TRAINING: CPT | Mod: GO

## 2020-01-01 PROCEDURE — 25000131 ZZH RX MED GY IP 250 OP 636 PS 637: Performed by: INTERNAL MEDICINE

## 2020-01-01 PROCEDURE — 99207 ZZC CDG-MDM COMPONENT: MEETS LOW - DOWN CODED: CPT | Performed by: INTERNAL MEDICINE

## 2020-01-01 PROCEDURE — 96374 THER/PROPH/DIAG INJ IV PUSH: CPT | Performed by: FAMILY MEDICINE

## 2020-01-01 PROCEDURE — 97116 GAIT TRAINING THERAPY: CPT | Mod: GP

## 2020-01-01 PROCEDURE — 80053 COMPREHEN METABOLIC PANEL: CPT | Performed by: INTERNAL MEDICINE

## 2020-01-01 PROCEDURE — 97112 NEUROMUSCULAR REEDUCATION: CPT | Mod: GP

## 2020-01-01 PROCEDURE — 85027 COMPLETE CBC AUTOMATED: CPT | Performed by: DERMATOLOGY

## 2020-01-01 PROCEDURE — 36415 COLL VENOUS BLD VENIPUNCTURE: CPT | Performed by: PHYSICIAN ASSISTANT

## 2020-01-01 PROCEDURE — P9047 ALBUMIN (HUMAN), 25%, 50ML: HCPCS | Performed by: PHYSICIAN ASSISTANT

## 2020-01-01 PROCEDURE — 80053 COMPREHEN METABOLIC PANEL: CPT | Performed by: DERMATOLOGY

## 2020-01-01 PROCEDURE — 86900 BLOOD TYPING SEROLOGIC ABO: CPT | Performed by: STUDENT IN AN ORGANIZED HEALTH CARE EDUCATION/TRAINING PROGRAM

## 2020-01-01 PROCEDURE — 99207 ZZC APP CREDIT; MD BILLING SHARED VISIT: CPT | Performed by: PHYSICIAN ASSISTANT

## 2020-01-01 PROCEDURE — 36415 COLL VENOUS BLD VENIPUNCTURE: CPT | Performed by: INTERNAL MEDICINE

## 2020-01-01 PROCEDURE — 97530 THERAPEUTIC ACTIVITIES: CPT | Mod: GO | Performed by: OCCUPATIONAL THERAPIST

## 2020-01-01 PROCEDURE — 80048 BASIC METABOLIC PNL TOTAL CA: CPT | Performed by: INTERNAL MEDICINE

## 2020-01-01 PROCEDURE — 86901 BLOOD TYPING SEROLOGIC RH(D): CPT | Performed by: STUDENT IN AN ORGANIZED HEALTH CARE EDUCATION/TRAINING PROGRAM

## 2020-01-01 PROCEDURE — 25000131 ZZH RX MED GY IP 250 OP 636 PS 637: Performed by: STUDENT IN AN ORGANIZED HEALTH CARE EDUCATION/TRAINING PROGRAM

## 2020-01-01 PROCEDURE — 93016 CV STRESS TEST SUPVJ ONLY: CPT | Performed by: INTERNAL MEDICINE

## 2020-01-01 PROCEDURE — 80053 COMPREHEN METABOLIC PANEL: CPT | Performed by: EMERGENCY MEDICINE

## 2020-01-01 PROCEDURE — 83010 ASSAY OF HAPTOGLOBIN QUANT: CPT | Performed by: STUDENT IN AN ORGANIZED HEALTH CARE EDUCATION/TRAINING PROGRAM

## 2020-01-01 PROCEDURE — 82248 BILIRUBIN DIRECT: CPT | Performed by: FAMILY MEDICINE

## 2020-01-01 PROCEDURE — 85027 COMPLETE CBC AUTOMATED: CPT | Performed by: STUDENT IN AN ORGANIZED HEALTH CARE EDUCATION/TRAINING PROGRAM

## 2020-01-01 PROCEDURE — 85049 AUTOMATED PLATELET COUNT: CPT | Performed by: RADIOLOGY

## 2020-01-01 PROCEDURE — 25000128 H RX IP 250 OP 636: Performed by: PHYSICIAN ASSISTANT

## 2020-01-01 PROCEDURE — 99238 HOSP IP/OBS DSCHRG MGMT 30/<: CPT | Performed by: INTERNAL MEDICINE

## 2020-01-01 PROCEDURE — 93005 ELECTROCARDIOGRAM TRACING: CPT | Performed by: EMERGENCY MEDICINE

## 2020-01-01 PROCEDURE — 80076 HEPATIC FUNCTION PANEL: CPT | Performed by: PHYSICIAN ASSISTANT

## 2020-01-01 PROCEDURE — 99213 OFFICE O/P EST LOW 20 MIN: CPT | Mod: TEL | Performed by: NURSE PRACTITIONER

## 2020-01-01 PROCEDURE — 84484 ASSAY OF TROPONIN QUANT: CPT | Performed by: EMERGENCY MEDICINE

## 2020-01-01 PROCEDURE — 85610 PROTHROMBIN TIME: CPT | Performed by: INTERNAL MEDICINE

## 2020-01-01 PROCEDURE — 25000125 ZZHC RX 250: Performed by: FAMILY MEDICINE

## 2020-01-01 PROCEDURE — 82570 ASSAY OF URINE CREATININE: CPT | Performed by: INTERNAL MEDICINE

## 2020-01-01 PROCEDURE — 97530 THERAPEUTIC ACTIVITIES: CPT | Mod: GP

## 2020-01-01 PROCEDURE — 85027 COMPLETE CBC AUTOMATED: CPT | Performed by: INTERNAL MEDICINE

## 2020-01-01 PROCEDURE — 97161 PT EVAL LOW COMPLEX 20 MIN: CPT | Mod: GP

## 2020-01-01 PROCEDURE — G0378 HOSPITAL OBSERVATION PER HR: HCPCS

## 2020-01-01 PROCEDURE — 86923 COMPATIBILITY TEST ELECTRIC: CPT | Performed by: PHYSICIAN ASSISTANT

## 2020-01-01 PROCEDURE — 85045 AUTOMATED RETICULOCYTE COUNT: CPT | Performed by: STUDENT IN AN ORGANIZED HEALTH CARE EDUCATION/TRAINING PROGRAM

## 2020-01-01 PROCEDURE — 25800030 ZZH RX IP 258 OP 636: Performed by: DERMATOLOGY

## 2020-01-01 PROCEDURE — 96376 TX/PRO/DX INJ SAME DRUG ADON: CPT | Performed by: FAMILY MEDICINE

## 2020-01-01 PROCEDURE — 85610 PROTHROMBIN TIME: CPT | Performed by: DERMATOLOGY

## 2020-01-01 PROCEDURE — 27210903 ZZH KIT CR5

## 2020-01-01 PROCEDURE — 80053 COMPREHEN METABOLIC PANEL: CPT | Performed by: STUDENT IN AN ORGANIZED HEALTH CARE EDUCATION/TRAINING PROGRAM

## 2020-01-01 PROCEDURE — 87040 BLOOD CULTURE FOR BACTERIA: CPT | Performed by: STUDENT IN AN ORGANIZED HEALTH CARE EDUCATION/TRAINING PROGRAM

## 2020-01-01 PROCEDURE — 82140 ASSAY OF AMMONIA: CPT | Performed by: NURSE PRACTITIONER

## 2020-01-01 PROCEDURE — 25500064 ZZH RX 255 OP 636: Performed by: INTERNAL MEDICINE

## 2020-01-01 PROCEDURE — 86708 HEPATITIS A ANTIBODY: CPT | Performed by: INTERNAL MEDICINE

## 2020-01-01 PROCEDURE — 85045 AUTOMATED RETICULOCYTE COUNT: CPT | Performed by: DERMATOLOGY

## 2020-01-01 PROCEDURE — 85018 HEMOGLOBIN: CPT | Performed by: PHYSICIAN ASSISTANT

## 2020-01-01 PROCEDURE — 83735 ASSAY OF MAGNESIUM: CPT | Performed by: PHYSICIAN ASSISTANT

## 2020-01-01 PROCEDURE — 25000132 ZZH RX MED GY IP 250 OP 250 PS 637: Performed by: DERMATOLOGY

## 2020-01-01 PROCEDURE — 80053 COMPREHEN METABOLIC PANEL: CPT | Performed by: PHYSICIAN ASSISTANT

## 2020-01-01 PROCEDURE — 99233 SBSQ HOSP IP/OBS HIGH 50: CPT | Performed by: ORTHOPAEDIC SURGERY

## 2020-01-01 PROCEDURE — 99285 EMERGENCY DEPT VISIT HI MDM: CPT | Mod: Z6 | Performed by: EMERGENCY MEDICINE

## 2020-01-01 PROCEDURE — 99207 ZZC CDG-CODE INCORRECT PER BILLING BASED ON TIME: CPT | Performed by: INTERNAL MEDICINE

## 2020-01-01 PROCEDURE — 86923 COMPATIBILITY TEST ELECTRIC: CPT | Performed by: STUDENT IN AN ORGANIZED HEALTH CARE EDUCATION/TRAINING PROGRAM

## 2020-01-01 PROCEDURE — 96361 HYDRATE IV INFUSION ADD-ON: CPT | Performed by: EMERGENCY MEDICINE

## 2020-01-01 PROCEDURE — 85610 PROTHROMBIN TIME: CPT | Performed by: EMERGENCY MEDICINE

## 2020-01-01 PROCEDURE — 82272 OCCULT BLD FECES 1-3 TESTS: CPT | Performed by: NURSE PRACTITIONER

## 2020-01-01 PROCEDURE — 84145 PROCALCITONIN (PCT): CPT | Performed by: EMERGENCY MEDICINE

## 2020-01-01 PROCEDURE — 40000611 ZZHCL STATISTIC MORPHOLOGY W/INTERP HEMEPATH TC 85060

## 2020-01-01 PROCEDURE — 82140 ASSAY OF AMMONIA: CPT | Performed by: EMERGENCY MEDICINE

## 2020-01-01 PROCEDURE — 80321 ALCOHOLS BIOMARKERS 1OR 2: CPT | Performed by: PHYSICIAN ASSISTANT

## 2020-01-01 PROCEDURE — 87070 CULTURE OTHR SPECIMN AEROBIC: CPT | Performed by: PHYSICIAN ASSISTANT

## 2020-01-01 PROCEDURE — 99605 MTMS BY PHARM NP 15 MIN: CPT | Performed by: PHARMACIST

## 2020-01-01 PROCEDURE — 85025 COMPLETE CBC W/AUTO DIFF WBC: CPT | Performed by: FAMILY MEDICINE

## 2020-01-01 PROCEDURE — 49083 ABD PARACENTESIS W/IMAGING: CPT

## 2020-01-01 PROCEDURE — 96365 THER/PROPH/DIAG IV INF INIT: CPT

## 2020-01-01 PROCEDURE — 85025 COMPLETE CBC W/AUTO DIFF WBC: CPT | Performed by: DERMATOLOGY

## 2020-01-01 PROCEDURE — 87205 SMEAR GRAM STAIN: CPT | Performed by: DERMATOLOGY

## 2020-01-01 PROCEDURE — 93350 STRESS TTE ONLY: CPT | Mod: 26 | Performed by: INTERNAL MEDICINE

## 2020-01-01 PROCEDURE — 99207 ZZC CDG-CUT & PASTE-POTENTIAL IMPACT ON LEVEL: CPT | Performed by: INTERNAL MEDICINE

## 2020-01-01 PROCEDURE — P9016 RBC LEUKOCYTES REDUCED: HCPCS | Performed by: PHYSICIAN ASSISTANT

## 2020-01-01 PROCEDURE — 96375 TX/PRO/DX INJ NEW DRUG ADDON: CPT

## 2020-01-01 PROCEDURE — 40000141 ZZH STATISTIC PERIPHERAL IV START W/O US GUIDANCE

## 2020-01-01 PROCEDURE — 87015 SPECIMEN INFECT AGNT CONCNTJ: CPT | Performed by: DERMATOLOGY

## 2020-01-01 PROCEDURE — 25800030 ZZH RX IP 258 OP 636: Performed by: FAMILY MEDICINE

## 2020-01-01 PROCEDURE — 25000125 ZZHC RX 250: Performed by: STUDENT IN AN ORGANIZED HEALTH CARE EDUCATION/TRAINING PROGRAM

## 2020-01-01 PROCEDURE — 99285 EMERGENCY DEPT VISIT HI MDM: CPT | Mod: 25 | Performed by: EMERGENCY MEDICINE

## 2020-01-01 PROCEDURE — 96361 HYDRATE IV INFUSION ADD-ON: CPT

## 2020-01-01 PROCEDURE — 25000128 H RX IP 250 OP 636: Performed by: DERMATOLOGY

## 2020-01-01 PROCEDURE — 99217 ZZC OBSERVATION CARE DISCHARGE: CPT | Performed by: HOSPITALIST

## 2020-01-01 PROCEDURE — 82248 BILIRUBIN DIRECT: CPT | Performed by: STUDENT IN AN ORGANIZED HEALTH CARE EDUCATION/TRAINING PROGRAM

## 2020-01-01 PROCEDURE — 27211039 ZZH NEEDLE CR2

## 2020-01-01 PROCEDURE — 36415 COLL VENOUS BLD VENIPUNCTURE: CPT | Performed by: NURSE PRACTITIONER

## 2020-01-01 PROCEDURE — 99285 EMERGENCY DEPT VISIT HI MDM: CPT | Mod: 25 | Performed by: FAMILY MEDICINE

## 2020-01-01 PROCEDURE — 36415 COLL VENOUS BLD VENIPUNCTURE: CPT | Performed by: STUDENT IN AN ORGANIZED HEALTH CARE EDUCATION/TRAINING PROGRAM

## 2020-01-01 PROCEDURE — 36415 COLL VENOUS BLD VENIPUNCTURE: CPT | Performed by: DERMATOLOGY

## 2020-01-01 PROCEDURE — 25000128 H RX IP 250 OP 636: Performed by: EMERGENCY MEDICINE

## 2020-01-01 PROCEDURE — 80048 BASIC METABOLIC PNL TOTAL CA: CPT | Performed by: DERMATOLOGY

## 2020-01-01 PROCEDURE — 99238 HOSP IP/OBS DSCHRG MGMT 30/<: CPT | Mod: GC | Performed by: INTERNAL MEDICINE

## 2020-01-01 PROCEDURE — 93975 VASCULAR STUDY: CPT | Mod: TC

## 2020-01-01 PROCEDURE — C9113 INJ PANTOPRAZOLE SODIUM, VIA: HCPCS | Performed by: STUDENT IN AN ORGANIZED HEALTH CARE EDUCATION/TRAINING PROGRAM

## 2020-01-01 PROCEDURE — 85730 THROMBOPLASTIN TIME PARTIAL: CPT | Performed by: DERMATOLOGY

## 2020-01-01 PROCEDURE — 81001 URINALYSIS AUTO W/SCOPE: CPT | Performed by: DERMATOLOGY

## 2020-01-01 PROCEDURE — 85379 FIBRIN DEGRADATION QUANT: CPT | Performed by: DERMATOLOGY

## 2020-01-01 PROCEDURE — 85610 PROTHROMBIN TIME: CPT | Performed by: RADIOLOGY

## 2020-01-01 PROCEDURE — 84100 ASSAY OF PHOSPHORUS: CPT | Performed by: PHYSICIAN ASSISTANT

## 2020-01-01 PROCEDURE — 85018 HEMOGLOBIN: CPT | Performed by: DERMATOLOGY

## 2020-01-01 PROCEDURE — 99310 SBSQ NF CARE HIGH MDM 45: CPT | Mod: GT | Performed by: NURSE PRACTITIONER

## 2020-01-01 PROCEDURE — 82330 ASSAY OF CALCIUM: CPT | Performed by: PHYSICIAN ASSISTANT

## 2020-01-01 PROCEDURE — 80048 BASIC METABOLIC PNL TOTAL CA: CPT | Performed by: FAMILY MEDICINE

## 2020-01-01 PROCEDURE — 84300 ASSAY OF URINE SODIUM: CPT | Performed by: PHYSICIAN ASSISTANT

## 2020-01-01 PROCEDURE — 85610 PROTHROMBIN TIME: CPT | Performed by: PHYSICIAN ASSISTANT

## 2020-01-01 PROCEDURE — 87040 BLOOD CULTURE FOR BACTERIA: CPT | Performed by: INTERNAL MEDICINE

## 2020-01-01 PROCEDURE — 97165 OT EVAL LOW COMPLEX 30 MIN: CPT | Mod: GO | Performed by: OCCUPATIONAL THERAPIST

## 2020-01-01 PROCEDURE — 86900 BLOOD TYPING SEROLOGIC ABO: CPT | Performed by: INTERNAL MEDICINE

## 2020-01-01 PROCEDURE — 82607 VITAMIN B-12: CPT | Performed by: DERMATOLOGY

## 2020-01-01 PROCEDURE — 82746 ASSAY OF FOLIC ACID SERUM: CPT | Performed by: PHYSICIAN ASSISTANT

## 2020-01-01 PROCEDURE — 84295 ASSAY OF SERUM SODIUM: CPT | Performed by: INTERNAL MEDICINE

## 2020-01-01 PROCEDURE — 82652 VIT D 1 25-DIHYDROXY: CPT | Performed by: STUDENT IN AN ORGANIZED HEALTH CARE EDUCATION/TRAINING PROGRAM

## 2020-01-01 PROCEDURE — 83690 ASSAY OF LIPASE: CPT | Performed by: EMERGENCY MEDICINE

## 2020-01-01 PROCEDURE — 97530 THERAPEUTIC ACTIVITIES: CPT | Mod: GP | Performed by: REHABILITATION PRACTITIONER

## 2020-01-01 PROCEDURE — 83605 ASSAY OF LACTIC ACID: CPT | Performed by: DERMATOLOGY

## 2020-01-01 PROCEDURE — 89051 BODY FLUID CELL COUNT: CPT | Performed by: PHYSICIAN ASSISTANT

## 2020-01-01 PROCEDURE — 86665 EPSTEIN-BARR CAPSID VCA: CPT | Performed by: INTERNAL MEDICINE

## 2020-01-01 PROCEDURE — 83010 ASSAY OF HAPTOGLOBIN QUANT: CPT | Performed by: DERMATOLOGY

## 2020-01-01 PROCEDURE — 85610 PROTHROMBIN TIME: CPT | Performed by: STUDENT IN AN ORGANIZED HEALTH CARE EDUCATION/TRAINING PROGRAM

## 2020-01-01 PROCEDURE — 82248 BILIRUBIN DIRECT: CPT | Performed by: EMERGENCY MEDICINE

## 2020-01-01 PROCEDURE — 86901 BLOOD TYPING SEROLOGIC RH(D): CPT | Performed by: PHYSICIAN ASSISTANT

## 2020-01-01 PROCEDURE — 76705 ECHO EXAM OF ABDOMEN: CPT

## 2020-01-01 PROCEDURE — 85025 COMPLETE CBC W/AUTO DIFF WBC: CPT | Performed by: STUDENT IN AN ORGANIZED HEALTH CARE EDUCATION/TRAINING PROGRAM

## 2020-01-01 PROCEDURE — 87635 SARS-COV-2 COVID-19 AMP PRB: CPT | Performed by: EMERGENCY MEDICINE

## 2020-01-01 PROCEDURE — 81001 URINALYSIS AUTO W/SCOPE: CPT | Performed by: PHYSICIAN ASSISTANT

## 2020-01-01 PROCEDURE — 83970 ASSAY OF PARATHORMONE: CPT | Performed by: PHYSICIAN ASSISTANT

## 2020-01-01 PROCEDURE — 83935 ASSAY OF URINE OSMOLALITY: CPT | Performed by: INTERNAL MEDICINE

## 2020-01-01 PROCEDURE — 97165 OT EVAL LOW COMPLEX 30 MIN: CPT | Mod: GO

## 2020-01-01 PROCEDURE — 83930 ASSAY OF BLOOD OSMOLALITY: CPT | Performed by: EMERGENCY MEDICINE

## 2020-01-01 PROCEDURE — 85025 COMPLETE CBC W/AUTO DIFF WBC: CPT | Performed by: NURSE PRACTITIONER

## 2020-01-01 PROCEDURE — 99282 EMERGENCY DEPT VISIT SF MDM: CPT | Performed by: EMERGENCY MEDICINE

## 2020-01-01 PROCEDURE — 85018 HEMOGLOBIN: CPT | Performed by: STUDENT IN AN ORGANIZED HEALTH CARE EDUCATION/TRAINING PROGRAM

## 2020-01-01 PROCEDURE — 85379 FIBRIN DEGRADATION QUANT: CPT | Performed by: STUDENT IN AN ORGANIZED HEALTH CARE EDUCATION/TRAINING PROGRAM

## 2020-01-01 PROCEDURE — U0003 INFECTIOUS AGENT DETECTION BY NUCLEIC ACID (DNA OR RNA); SEVERE ACUTE RESPIRATORY SYNDROME CORONAVIRUS 2 (SARS-COV-2) (CORONAVIRUS DISEASE [COVID-19]), AMPLIFIED PROBE TECHNIQUE, MAKING USE OF HIGH THROUGHPUT TECHNOLOGIES AS DESCRIBED BY CMS-2020-01-R: HCPCS | Mod: 90 | Performed by: FAMILY MEDICINE

## 2020-01-01 PROCEDURE — 84295 ASSAY OF SERUM SODIUM: CPT | Performed by: STUDENT IN AN ORGANIZED HEALTH CARE EDUCATION/TRAINING PROGRAM

## 2020-01-01 PROCEDURE — 85027 COMPLETE CBC AUTOMATED: CPT | Performed by: NURSE PRACTITIONER

## 2020-01-01 PROCEDURE — 71045 X-RAY EXAM CHEST 1 VIEW: CPT

## 2020-01-01 PROCEDURE — 99607 MTMS BY PHARM ADDL 15 MIN: CPT | Performed by: PHARMACIST

## 2020-01-01 PROCEDURE — 73610 X-RAY EXAM OF ANKLE: CPT | Mod: LT

## 2020-01-01 PROCEDURE — 85384 FIBRINOGEN ACTIVITY: CPT | Performed by: STUDENT IN AN ORGANIZED HEALTH CARE EDUCATION/TRAINING PROGRAM

## 2020-01-01 PROCEDURE — 82306 VITAMIN D 25 HYDROXY: CPT | Performed by: STUDENT IN AN ORGANIZED HEALTH CARE EDUCATION/TRAINING PROGRAM

## 2020-01-01 PROCEDURE — 99233 SBSQ HOSP IP/OBS HIGH 50: CPT | Performed by: INTERNAL MEDICINE

## 2020-01-01 PROCEDURE — 82803 BLOOD GASES ANY COMBINATION: CPT | Performed by: STUDENT IN AN ORGANIZED HEALTH CARE EDUCATION/TRAINING PROGRAM

## 2020-01-01 PROCEDURE — 83010 ASSAY OF HAPTOGLOBIN QUANT: CPT

## 2020-01-01 PROCEDURE — 80048 BASIC METABOLIC PNL TOTAL CA: CPT | Performed by: PHYSICIAN ASSISTANT

## 2020-01-01 PROCEDURE — 86481 TB AG RESPONSE T-CELL SUSP: CPT | Performed by: INTERNAL MEDICINE

## 2020-01-01 PROCEDURE — 99283 EMERGENCY DEPT VISIT LOW MDM: CPT | Mod: Z6 | Performed by: EMERGENCY MEDICINE

## 2020-01-01 PROCEDURE — 85730 THROMBOPLASTIN TIME PARTIAL: CPT | Performed by: STUDENT IN AN ORGANIZED HEALTH CARE EDUCATION/TRAINING PROGRAM

## 2020-01-01 PROCEDURE — 93010 ELECTROCARDIOGRAM REPORT: CPT | Mod: Z6 | Performed by: EMERGENCY MEDICINE

## 2020-01-01 PROCEDURE — 84703 CHORIONIC GONADOTROPIN ASSAY: CPT | Performed by: INTERNAL MEDICINE

## 2020-01-01 PROCEDURE — 36415 COLL VENOUS BLD VENIPUNCTURE: CPT | Performed by: FAMILY MEDICINE

## 2020-01-01 PROCEDURE — 84300 ASSAY OF URINE SODIUM: CPT | Performed by: INTERNAL MEDICINE

## 2020-01-01 PROCEDURE — 99223 1ST HOSP IP/OBS HIGH 75: CPT | Mod: AI | Performed by: INTERNAL MEDICINE

## 2020-01-01 PROCEDURE — 86334 IMMUNOFIX E-PHORESIS SERUM: CPT | Performed by: INTERNAL MEDICINE

## 2020-01-01 PROCEDURE — 93321 DOPPLER ECHO F-UP/LMTD STD: CPT | Mod: 26 | Performed by: INTERNAL MEDICINE

## 2020-01-01 PROCEDURE — 97535 SELF CARE MNGMENT TRAINING: CPT | Mod: GO | Performed by: OCCUPATIONAL THERAPIST

## 2020-01-01 PROCEDURE — 82040 ASSAY OF SERUM ALBUMIN: CPT | Performed by: STUDENT IN AN ORGANIZED HEALTH CARE EDUCATION/TRAINING PROGRAM

## 2020-01-01 PROCEDURE — 80321 ALCOHOLS BIOMARKERS 1OR 2: CPT | Performed by: INTERNAL MEDICINE

## 2020-01-01 PROCEDURE — 86923 COMPATIBILITY TEST ELECTRIC: CPT | Performed by: FAMILY MEDICINE

## 2020-01-01 PROCEDURE — 80048 BASIC METABOLIC PNL TOTAL CA: CPT | Performed by: STUDENT IN AN ORGANIZED HEALTH CARE EDUCATION/TRAINING PROGRAM

## 2020-01-01 PROCEDURE — 80076 HEPATIC FUNCTION PANEL: CPT | Performed by: INTERNAL MEDICINE

## 2020-01-01 PROCEDURE — 90632 HEPA VACCINE ADULT IM: CPT | Performed by: STUDENT IN AN ORGANIZED HEALTH CARE EDUCATION/TRAINING PROGRAM

## 2020-01-01 PROCEDURE — 80321 ALCOHOLS BIOMARKERS 1OR 2: CPT | Performed by: STUDENT IN AN ORGANIZED HEALTH CARE EDUCATION/TRAINING PROGRAM

## 2020-01-01 PROCEDURE — 82565 ASSAY OF CREATININE: CPT | Performed by: INTERNAL MEDICINE

## 2020-01-01 PROCEDURE — 84443 ASSAY THYROID STIM HORMONE: CPT | Performed by: INTERNAL MEDICINE

## 2020-01-01 PROCEDURE — 40000611 ZZHCL STATISTIC MORPHOLOGY W/INTERP HEMEPATH TC 85060: Performed by: STUDENT IN AN ORGANIZED HEALTH CARE EDUCATION/TRAINING PROGRAM

## 2020-01-01 PROCEDURE — 82248 BILIRUBIN DIRECT: CPT | Performed by: INTERNAL MEDICINE

## 2020-01-01 PROCEDURE — 96375 TX/PRO/DX INJ NEW DRUG ADDON: CPT | Performed by: EMERGENCY MEDICINE

## 2020-01-01 PROCEDURE — 84300 ASSAY OF URINE SODIUM: CPT | Performed by: DERMATOLOGY

## 2020-01-01 PROCEDURE — 99232 SBSQ HOSP IP/OBS MODERATE 35: CPT | Mod: GC | Performed by: INTERNAL MEDICINE

## 2020-01-01 PROCEDURE — 99223 1ST HOSP IP/OBS HIGH 75: CPT | Performed by: PHYSICIAN ASSISTANT

## 2020-01-01 PROCEDURE — 86850 RBC ANTIBODY SCREEN: CPT | Performed by: PHYSICIAN ASSISTANT

## 2020-01-01 PROCEDURE — 86850 RBC ANTIBODY SCREEN: CPT | Performed by: FAMILY MEDICINE

## 2020-01-01 PROCEDURE — 25000132 ZZH RX MED GY IP 250 OP 250 PS 637: Performed by: INTERNAL MEDICINE

## 2020-01-01 PROCEDURE — 36415 COLL VENOUS BLD VENIPUNCTURE: CPT | Performed by: OBSTETRICS & GYNECOLOGY

## 2020-01-01 PROCEDURE — 85025 COMPLETE CBC W/AUTO DIFF WBC: CPT | Performed by: EMERGENCY MEDICINE

## 2020-01-01 PROCEDURE — 83550 IRON BINDING TEST: CPT | Performed by: PHYSICIAN ASSISTANT

## 2020-01-01 PROCEDURE — 85027 COMPLETE CBC AUTOMATED: CPT | Performed by: OBSTETRICS & GYNECOLOGY

## 2020-01-01 PROCEDURE — 86901 BLOOD TYPING SEROLOGIC RH(D): CPT | Performed by: INTERNAL MEDICINE

## 2020-01-01 PROCEDURE — 83930 ASSAY OF BLOOD OSMOLALITY: CPT | Performed by: PHYSICIAN ASSISTANT

## 2020-01-01 PROCEDURE — 84540 ASSAY OF URINE/UREA-N: CPT | Performed by: PHYSICIAN ASSISTANT

## 2020-01-01 PROCEDURE — 85027 COMPLETE CBC AUTOMATED: CPT | Performed by: FAMILY MEDICINE

## 2020-01-01 PROCEDURE — 85049 AUTOMATED PLATELET COUNT: CPT | Performed by: INTERNAL MEDICINE

## 2020-01-01 PROCEDURE — 73630 X-RAY EXAM OF FOOT: CPT | Mod: TC

## 2020-01-01 PROCEDURE — 89051 BODY FLUID CELL COUNT: CPT | Performed by: DERMATOLOGY

## 2020-01-01 PROCEDURE — 25000128 H RX IP 250 OP 636: Performed by: NURSE PRACTITIONER

## 2020-01-01 PROCEDURE — 84165 PROTEIN E-PHORESIS SERUM: CPT | Performed by: INTERNAL MEDICINE

## 2020-01-01 PROCEDURE — 85610 PROTHROMBIN TIME: CPT | Performed by: FAMILY MEDICINE

## 2020-01-01 PROCEDURE — 40000264 ECHO STRESS ECHOCARDIOGRAM

## 2020-01-01 PROCEDURE — 99239 HOSP IP/OBS DSCHRG MGMT >30: CPT | Mod: GC | Performed by: INTERNAL MEDICINE

## 2020-01-01 PROCEDURE — 82310 ASSAY OF CALCIUM: CPT | Performed by: STUDENT IN AN ORGANIZED HEALTH CARE EDUCATION/TRAINING PROGRAM

## 2020-01-01 PROCEDURE — 83605 ASSAY OF LACTIC ACID: CPT | Performed by: NURSE PRACTITIONER

## 2020-01-01 PROCEDURE — 27210190 US PARACENTESIS

## 2020-01-01 PROCEDURE — 87389 HIV-1 AG W/HIV-1&-2 AB AG IA: CPT | Performed by: INTERNAL MEDICINE

## 2020-01-01 PROCEDURE — 80076 HEPATIC FUNCTION PANEL: CPT | Performed by: STUDENT IN AN ORGANIZED HEALTH CARE EDUCATION/TRAINING PROGRAM

## 2020-01-01 PROCEDURE — 82607 VITAMIN B-12: CPT | Performed by: PHYSICIAN ASSISTANT

## 2020-01-01 PROCEDURE — 83540 ASSAY OF IRON: CPT | Performed by: PHYSICIAN ASSISTANT

## 2020-01-01 PROCEDURE — 95711 VEEG 2-12 HR UNMONITORED: CPT

## 2020-01-01 PROCEDURE — 80053 COMPREHEN METABOLIC PANEL: CPT | Performed by: NURSE PRACTITIONER

## 2020-01-01 PROCEDURE — 00000146 ZZHCL STATISTIC GLUCOSE BY METER IP

## 2020-01-01 PROCEDURE — 82330 ASSAY OF CALCIUM: CPT | Performed by: INTERNAL MEDICINE

## 2020-01-01 PROCEDURE — 84157 ASSAY OF PROTEIN OTHER: CPT | Performed by: DERMATOLOGY

## 2020-01-01 PROCEDURE — 93325 DOPPLER ECHO COLOR FLOW MAPG: CPT | Mod: 26 | Performed by: INTERNAL MEDICINE

## 2020-01-01 PROCEDURE — 25000132 ZZH RX MED GY IP 250 OP 250 PS 637: Performed by: FAMILY MEDICINE

## 2020-01-01 PROCEDURE — 99239 HOSP IP/OBS DSCHRG MGMT >30: CPT | Performed by: ORTHOPAEDIC SURGERY

## 2020-01-01 PROCEDURE — 86850 RBC ANTIBODY SCREEN: CPT | Performed by: INTERNAL MEDICINE

## 2020-01-01 PROCEDURE — 36430 TRANSFUSION BLD/BLD COMPNT: CPT

## 2020-01-01 PROCEDURE — 83883 ASSAY NEPHELOMETRY NOT SPEC: CPT | Performed by: INTERNAL MEDICINE

## 2020-01-01 PROCEDURE — 99213 OFFICE O/P EST LOW 20 MIN: CPT | Mod: TEL | Performed by: OBSTETRICS & GYNECOLOGY

## 2020-01-01 PROCEDURE — 83880 ASSAY OF NATRIURETIC PEPTIDE: CPT | Performed by: EMERGENCY MEDICINE

## 2020-01-01 PROCEDURE — 83615 LACTATE (LD) (LDH) ENZYME: CPT | Performed by: STUDENT IN AN ORGANIZED HEALTH CARE EDUCATION/TRAINING PROGRAM

## 2020-01-01 PROCEDURE — 93306 TTE W/DOPPLER COMPLETE: CPT

## 2020-01-01 PROCEDURE — 82140 ASSAY OF AMMONIA: CPT | Performed by: FAMILY MEDICINE

## 2020-01-01 PROCEDURE — 00000402 ZZHCL STATISTIC TOTAL PROTEIN: Performed by: INTERNAL MEDICINE

## 2020-01-01 PROCEDURE — 25000125 ZZHC RX 250: Performed by: INTERNAL MEDICINE

## 2020-01-01 PROCEDURE — 40000559 ZZH STATISTIC FAILED PERIPHERAL IV START

## 2020-01-01 PROCEDURE — 25000132 ZZH RX MED GY IP 250 OP 250 PS 637: Performed by: HOSPITALIST

## 2020-01-01 PROCEDURE — 80050 GENERAL HEALTH PANEL: CPT | Performed by: FAMILY MEDICINE

## 2020-01-01 PROCEDURE — 25000128 H RX IP 250 OP 636: Performed by: INTERNAL MEDICINE

## 2020-01-01 PROCEDURE — 83615 LACTATE (LD) (LDH) ENZYME: CPT | Performed by: DERMATOLOGY

## 2020-01-01 PROCEDURE — 40000611 ZZHCL STATISTIC MORPHOLOGY W/INTERP HEMEPATH TC 85060: Performed by: DERMATOLOGY

## 2020-01-01 PROCEDURE — U0003 INFECTIOUS AGENT DETECTION BY NUCLEIC ACID (DNA OR RNA); SEVERE ACUTE RESPIRATORY SYNDROME CORONAVIRUS 2 (SARS-COV-2) (CORONAVIRUS DISEASE [COVID-19]), AMPLIFIED PROBE TECHNIQUE, MAKING USE OF HIGH THROUGHPUT TECHNOLOGIES AS DESCRIBED BY CMS-2020-01-R: HCPCS | Performed by: PHYSICIAN ASSISTANT

## 2020-01-01 PROCEDURE — 86780 TREPONEMA PALLIDUM: CPT | Performed by: INTERNAL MEDICINE

## 2020-01-01 PROCEDURE — 82533 TOTAL CORTISOL: CPT | Performed by: STUDENT IN AN ORGANIZED HEALTH CARE EDUCATION/TRAINING PROGRAM

## 2020-01-01 PROCEDURE — 99214 OFFICE O/P EST MOD 30 MIN: CPT | Performed by: FAMILY MEDICINE

## 2020-01-01 PROCEDURE — 74018 RADEX ABDOMEN 1 VIEW: CPT

## 2020-01-01 PROCEDURE — P9016 RBC LEUKOCYTES REDUCED: HCPCS | Performed by: STUDENT IN AN ORGANIZED HEALTH CARE EDUCATION/TRAINING PROGRAM

## 2020-01-01 PROCEDURE — 97750 PHYSICAL PERFORMANCE TEST: CPT | Mod: GP

## 2020-01-01 PROCEDURE — 83970 ASSAY OF PARATHORMONE: CPT | Performed by: STUDENT IN AN ORGANIZED HEALTH CARE EDUCATION/TRAINING PROGRAM

## 2020-01-01 PROCEDURE — 99232 SBSQ HOSP IP/OBS MODERATE 35: CPT | Performed by: INTERNAL MEDICINE

## 2020-01-01 PROCEDURE — 25800030 ZZH RX IP 258 OP 636: Performed by: NURSE PRACTITIONER

## 2020-01-01 PROCEDURE — 86644 CMV ANTIBODY: CPT | Performed by: INTERNAL MEDICINE

## 2020-01-01 PROCEDURE — 96366 THER/PROPH/DIAG IV INF ADDON: CPT

## 2020-01-01 PROCEDURE — 36415 COLL VENOUS BLD VENIPUNCTURE: CPT | Performed by: HOSPITALIST

## 2020-01-01 PROCEDURE — 93306 TTE W/DOPPLER COMPLETE: CPT | Mod: 26 | Performed by: INTERNAL MEDICINE

## 2020-01-01 PROCEDURE — 76700 US EXAM ABDOM COMPLETE: CPT

## 2020-01-01 PROCEDURE — 81003 URINALYSIS AUTO W/O SCOPE: CPT | Performed by: EMERGENCY MEDICINE

## 2020-01-01 PROCEDURE — 82042 OTHER SOURCE ALBUMIN QUAN EA: CPT | Performed by: DERMATOLOGY

## 2020-01-01 PROCEDURE — 86900 BLOOD TYPING SEROLOGIC ABO: CPT | Performed by: PHYSICIAN ASSISTANT

## 2020-01-01 PROCEDURE — 36415 COLL VENOUS BLD VENIPUNCTURE: CPT

## 2020-01-01 PROCEDURE — 93018 CV STRESS TEST I&R ONLY: CPT | Performed by: INTERNAL MEDICINE

## 2020-01-01 PROCEDURE — 99000 SPECIMEN HANDLING OFFICE-LAB: CPT | Performed by: FAMILY MEDICINE

## 2020-01-01 PROCEDURE — 27210282 US PARACENTESIS

## 2020-01-01 PROCEDURE — 86923 COMPATIBILITY TEST ELECTRIC: CPT | Performed by: INTERNAL MEDICINE

## 2020-01-01 PROCEDURE — 81001 URINALYSIS AUTO W/SCOPE: CPT | Performed by: EMERGENCY MEDICINE

## 2020-01-01 PROCEDURE — 81001 URINALYSIS AUTO W/SCOPE: CPT | Performed by: STUDENT IN AN ORGANIZED HEALTH CARE EDUCATION/TRAINING PROGRAM

## 2020-01-01 PROCEDURE — 86901 BLOOD TYPING SEROLOGIC RH(D): CPT | Performed by: FAMILY MEDICINE

## 2020-01-01 PROCEDURE — P9016 RBC LEUKOCYTES REDUCED: HCPCS | Performed by: FAMILY MEDICINE

## 2020-01-01 PROCEDURE — 99207 ZZC OFFICE-HOSPITAL ADMIT: CPT | Mod: TEL | Performed by: FAMILY MEDICINE

## 2020-01-01 PROCEDURE — 99285 EMERGENCY DEPT VISIT HI MDM: CPT | Mod: Z6 | Performed by: FAMILY MEDICINE

## 2020-01-01 PROCEDURE — 85670 THROMBIN TIME PLASMA: CPT | Performed by: STUDENT IN AN ORGANIZED HEALTH CARE EDUCATION/TRAINING PROGRAM

## 2020-01-01 PROCEDURE — 99220 ZZC INITIAL OBSERVATION CARE,LEVL III: CPT | Performed by: FAMILY MEDICINE

## 2020-01-01 PROCEDURE — 0W9G3ZZ DRAINAGE OF PERITONEAL CAVITY, PERCUTANEOUS APPROACH: ICD-10-PCS | Performed by: RADIOLOGY

## 2020-01-01 PROCEDURE — 82140 ASSAY OF AMMONIA: CPT | Performed by: OBSTETRICS & GYNECOLOGY

## 2020-01-01 PROCEDURE — 86900 BLOOD TYPING SEROLOGIC ABO: CPT | Performed by: FAMILY MEDICINE

## 2020-01-01 PROCEDURE — 76770 US EXAM ABDO BACK WALL COMP: CPT

## 2020-01-01 PROCEDURE — 76705 ECHO EXAM OF ABDOMEN: CPT | Performed by: EMERGENCY MEDICINE

## 2020-01-01 PROCEDURE — 87040 BLOOD CULTURE FOR BACTERIA: CPT | Performed by: PHYSICIAN ASSISTANT

## 2020-01-01 PROCEDURE — 84295 ASSAY OF SERUM SODIUM: CPT | Performed by: PHYSICIAN ASSISTANT

## 2020-01-01 PROCEDURE — 76705 ECHO EXAM OF ABDOMEN: CPT | Mod: 26 | Performed by: EMERGENCY MEDICINE

## 2020-01-01 PROCEDURE — 90746 HEPB VACCINE 3 DOSE ADULT IM: CPT | Performed by: STUDENT IN AN ORGANIZED HEALTH CARE EDUCATION/TRAINING PROGRAM

## 2020-01-01 PROCEDURE — 83735 ASSAY OF MAGNESIUM: CPT | Performed by: STUDENT IN AN ORGANIZED HEALTH CARE EDUCATION/TRAINING PROGRAM

## 2020-01-01 PROCEDURE — C9113 INJ PANTOPRAZOLE SODIUM, VIA: HCPCS | Performed by: NURSE PRACTITIONER

## 2020-01-01 PROCEDURE — 85610 PROTHROMBIN TIME: CPT | Performed by: NURSE PRACTITIONER

## 2020-01-01 PROCEDURE — 87040 BLOOD CULTURE FOR BACTERIA: CPT | Performed by: EMERGENCY MEDICINE

## 2020-01-01 PROCEDURE — 40000556 ZZH STATISTIC PERIPHERAL IV START W US GUIDANCE

## 2020-01-01 PROCEDURE — 85730 THROMBOPLASTIN TIME PARTIAL: CPT | Performed by: EMERGENCY MEDICINE

## 2020-01-01 PROCEDURE — 87205 SMEAR GRAM STAIN: CPT | Performed by: PHYSICIAN ASSISTANT

## 2020-01-01 PROCEDURE — C9803 HOPD COVID-19 SPEC COLLECT: HCPCS

## 2020-01-01 PROCEDURE — 85004 AUTOMATED DIFF WBC COUNT: CPT | Performed by: STUDENT IN AN ORGANIZED HEALTH CARE EDUCATION/TRAINING PROGRAM

## 2020-01-01 PROCEDURE — 96365 THER/PROPH/DIAG IV INF INIT: CPT | Performed by: EMERGENCY MEDICINE

## 2020-01-01 PROCEDURE — U0003 INFECTIOUS AGENT DETECTION BY NUCLEIC ACID (DNA OR RNA); SEVERE ACUTE RESPIRATORY SYNDROME CORONAVIRUS 2 (SARS-COV-2) (CORONAVIRUS DISEASE [COVID-19]), AMPLIFIED PROBE TECHNIQUE, MAKING USE OF HIGH THROUGHPUT TECHNOLOGIES AS DESCRIBED BY CMS-2020-01-R: HCPCS | Performed by: EMERGENCY MEDICINE

## 2020-01-01 PROCEDURE — 80048 BASIC METABOLIC PNL TOTAL CA: CPT | Performed by: NURSE PRACTITIONER

## 2020-01-01 PROCEDURE — 86850 RBC ANTIBODY SCREEN: CPT | Performed by: STUDENT IN AN ORGANIZED HEALTH CARE EDUCATION/TRAINING PROGRAM

## 2020-01-01 PROCEDURE — 80053 COMPREHEN METABOLIC PANEL: CPT | Performed by: FAMILY MEDICINE

## 2020-01-01 PROCEDURE — 96375 TX/PRO/DX INJ NEW DRUG ADDON: CPT | Performed by: FAMILY MEDICINE

## 2020-01-01 PROCEDURE — 99207 ZZC MOONLIGHTING INDICATOR: CPT | Performed by: INTERNAL MEDICINE

## 2020-01-01 PROCEDURE — 96374 THER/PROPH/DIAG INJ IV PUSH: CPT | Performed by: EMERGENCY MEDICINE

## 2020-01-01 PROCEDURE — 82542 COL CHROMOTOGRAPHY QUAL/QUAN: CPT | Performed by: STUDENT IN AN ORGANIZED HEALTH CARE EDUCATION/TRAINING PROGRAM

## 2020-01-01 PROCEDURE — P9016 RBC LEUKOCYTES REDUCED: HCPCS | Performed by: INTERNAL MEDICINE

## 2020-01-01 PROCEDURE — 82272 OCCULT BLD FECES 1-3 TESTS: CPT | Performed by: PHYSICIAN ASSISTANT

## 2020-01-01 PROCEDURE — 87086 URINE CULTURE/COLONY COUNT: CPT | Performed by: INTERNAL MEDICINE

## 2020-01-01 PROCEDURE — 96361 HYDRATE IV INFUSION ADD-ON: CPT | Performed by: FAMILY MEDICINE

## 2020-01-01 PROCEDURE — 82140 ASSAY OF AMMONIA: CPT | Performed by: HOSPITALIST

## 2020-01-01 RX ORDER — LACTULOSE 10 G/15ML
20 SOLUTION ORAL 3 TIMES DAILY
Qty: 1892 ML | Refills: 4 | Status: ON HOLD
Start: 2020-01-01 | End: 2020-01-01

## 2020-01-01 RX ORDER — CEFTRIAXONE 1 G/1
1 INJECTION, POWDER, FOR SOLUTION INTRAMUSCULAR; INTRAVENOUS ONCE
Status: COMPLETED | OUTPATIENT
Start: 2020-01-01 | End: 2020-01-01

## 2020-01-01 RX ORDER — ACETAMINOPHEN 325 MG/1
650 TABLET ORAL ONCE
Status: COMPLETED | OUTPATIENT
Start: 2020-01-01 | End: 2020-01-01

## 2020-01-01 RX ORDER — CIPROFLOXACIN 500 MG/1
500 TABLET, FILM COATED ORAL
Status: DISCONTINUED | OUTPATIENT
Start: 2020-01-01 | End: 2020-01-01

## 2020-01-01 RX ORDER — LACTULOSE 10 G/15ML
20 SOLUTION ORAL 3 TIMES DAILY
Status: DISCONTINUED | OUTPATIENT
Start: 2020-01-01 | End: 2020-01-01 | Stop reason: HOSPADM

## 2020-01-01 RX ORDER — SPIRONOLACTONE 25 MG/1
25 TABLET ORAL DAILY
Status: DISCONTINUED | OUTPATIENT
Start: 2020-01-01 | End: 2020-01-01 | Stop reason: HOSPADM

## 2020-01-01 RX ORDER — NICOTINE POLACRILEX 4 MG
15-30 LOZENGE BUCCAL
Status: CANCELLED | OUTPATIENT
Start: 2020-01-01

## 2020-01-01 RX ORDER — SODIUM BICARBONATE 650 MG/1
650 TABLET ORAL 2 TIMES DAILY
Qty: 60 TABLET | Refills: 0 | Status: ON HOLD | DISCHARGE
Start: 2020-01-01 | End: 2020-01-01

## 2020-01-01 RX ORDER — NALOXONE HYDROCHLORIDE 0.4 MG/ML
.1-.4 INJECTION, SOLUTION INTRAMUSCULAR; INTRAVENOUS; SUBCUTANEOUS
Status: DISCONTINUED | OUTPATIENT
Start: 2020-01-01 | End: 2020-01-01 | Stop reason: HOSPADM

## 2020-01-01 RX ORDER — CIPROFLOXACIN 250 MG/1
250 TABLET, FILM COATED ORAL EVERY 24 HOURS
Qty: 30 TABLET | Refills: 0 | Status: SHIPPED | OUTPATIENT
Start: 2020-01-01 | End: 2020-01-01

## 2020-01-01 RX ORDER — ALBUMIN (HUMAN) 12.5 G/50ML
25 SOLUTION INTRAVENOUS ONCE
Status: COMPLETED | OUTPATIENT
Start: 2020-01-01 | End: 2020-01-01

## 2020-01-01 RX ORDER — ARIPIPRAZOLE 5 MG/1
5 TABLET ORAL AT BEDTIME
Status: DISCONTINUED | OUTPATIENT
Start: 2020-01-01 | End: 2020-01-01 | Stop reason: HOSPADM

## 2020-01-01 RX ORDER — ALBUMIN (HUMAN) 12.5 G/50ML
12.5 SOLUTION INTRAVENOUS ONCE
Status: COMPLETED | OUTPATIENT
Start: 2020-01-01 | End: 2020-01-01

## 2020-01-01 RX ORDER — GABAPENTIN 100 MG/1
300 CAPSULE ORAL AT BEDTIME
Qty: 90 CAPSULE | Refills: 0 | Status: SHIPPED | OUTPATIENT
Start: 2020-01-01 | End: 2020-01-01

## 2020-01-01 RX ORDER — LACTULOSE 10 G/15ML
30 SOLUTION ORAL 3 TIMES DAILY
Status: DISCONTINUED | OUTPATIENT
Start: 2020-01-01 | End: 2020-01-01

## 2020-01-01 RX ORDER — TRAZODONE HYDROCHLORIDE 50 MG/1
100 TABLET, FILM COATED ORAL
Qty: 30 TABLET | Refills: 0 | Status: SHIPPED | OUTPATIENT
Start: 2020-01-01 | End: 2020-01-01

## 2020-01-01 RX ORDER — ONDANSETRON 2 MG/ML
4 INJECTION INTRAMUSCULAR; INTRAVENOUS EVERY 30 MIN PRN
Status: DISCONTINUED | OUTPATIENT
Start: 2020-01-01 | End: 2020-01-01 | Stop reason: HOSPADM

## 2020-01-01 RX ORDER — LIDOCAINE 40 MG/G
CREAM TOPICAL
Status: DISCONTINUED | OUTPATIENT
Start: 2020-01-01 | End: 2020-01-01 | Stop reason: HOSPADM

## 2020-01-01 RX ORDER — LACTULOSE 10 G/15ML
20 SOLUTION ORAL 3 TIMES DAILY
Qty: 2700 ML | Refills: 0 | Status: SHIPPED | OUTPATIENT
Start: 2020-01-01 | End: 2020-01-01

## 2020-01-01 RX ORDER — LIDOCAINE HYDROCHLORIDE 10 MG/ML
1-30 INJECTION, SOLUTION EPIDURAL; INFILTRATION; INTRACAUDAL; PERINEURAL
Status: COMPLETED | OUTPATIENT
Start: 2020-01-01 | End: 2020-01-01

## 2020-01-01 RX ORDER — MIDODRINE HYDROCHLORIDE 10 MG/1
10 TABLET ORAL
Qty: 90 TABLET | Refills: 0 | DISCHARGE
Start: 2020-01-01

## 2020-01-01 RX ORDER — FUROSEMIDE 20 MG
20 TABLET ORAL DAILY
Status: DISCONTINUED | OUTPATIENT
Start: 2020-01-01 | End: 2020-01-01 | Stop reason: HOSPADM

## 2020-01-01 RX ORDER — URSODIOL 500 MG/1
500 TABLET, FILM COATED ORAL 2 TIMES DAILY
Status: DISCONTINUED | OUTPATIENT
Start: 2020-01-01 | End: 2020-01-01 | Stop reason: HOSPADM

## 2020-01-01 RX ORDER — ONDANSETRON 2 MG/ML
4 INJECTION INTRAMUSCULAR; INTRAVENOUS EVERY 6 HOURS PRN
Status: DISCONTINUED | OUTPATIENT
Start: 2020-01-01 | End: 2020-01-01 | Stop reason: HOSPADM

## 2020-01-01 RX ORDER — LIDOCAINE HYDROCHLORIDE 10 MG/ML
5 INJECTION, SOLUTION EPIDURAL; INFILTRATION; INTRACAUDAL; PERINEURAL ONCE
Status: COMPLETED | OUTPATIENT
Start: 2020-01-01 | End: 2020-01-01

## 2020-01-01 RX ORDER — SPIRONOLACTONE 25 MG/1
25 TABLET ORAL DAILY
Qty: 30 TABLET | Refills: 0 | Status: SHIPPED | OUTPATIENT
Start: 2020-01-01 | End: 2020-01-01

## 2020-01-01 RX ORDER — ACETAMINOPHEN 325 MG/1
650 TABLET ORAL EVERY 6 HOURS PRN
Status: DISCONTINUED | OUTPATIENT
Start: 2020-01-01 | End: 2020-01-01 | Stop reason: HOSPADM

## 2020-01-01 RX ORDER — ACETAMINOPHEN 500 MG
500 TABLET ORAL EVERY 6 HOURS PRN
Qty: 30 TABLET | Refills: 0 | Status: SHIPPED | OUTPATIENT
Start: 2020-01-01 | End: 2020-01-01

## 2020-01-01 RX ORDER — CALCIUM CARBONATE 500 MG/1
500 TABLET, CHEWABLE ORAL DAILY PRN
Status: DISCONTINUED | OUTPATIENT
Start: 2020-01-01 | End: 2020-01-01 | Stop reason: HOSPADM

## 2020-01-01 RX ORDER — URSODIOL 250 MG/1
500 TABLET, FILM COATED ORAL 2 TIMES DAILY
Status: DISCONTINUED | OUTPATIENT
Start: 2020-01-01 | End: 2020-01-01 | Stop reason: HOSPADM

## 2020-01-01 RX ORDER — URSODIOL 500 MG/1
500 TABLET, FILM COATED ORAL 2 TIMES DAILY
Qty: 60 TABLET | Refills: 0 | Status: SHIPPED | OUTPATIENT
Start: 2020-01-01 | End: 2020-01-01

## 2020-01-01 RX ORDER — SPIRONOLACTONE 25 MG/1
25 TABLET ORAL DAILY
Qty: 90 TABLET | Refills: 3 | Status: ON HOLD | OUTPATIENT
Start: 2020-01-01 | End: 2020-01-01

## 2020-01-01 RX ORDER — METOPROLOL TARTRATE 1 MG/ML
1-20 INJECTION, SOLUTION INTRAVENOUS
Status: ACTIVE | OUTPATIENT
Start: 2020-01-01 | End: 2020-01-01

## 2020-01-01 RX ORDER — LANOLIN ALCOHOL/MO/W.PET/CERES
100 CREAM (GRAM) TOPICAL DAILY
Qty: 30 TABLET | Refills: 0 | Status: ON HOLD | DISCHARGE
Start: 2020-01-01 | End: 2020-01-01

## 2020-01-01 RX ORDER — ARIPIPRAZOLE 5 MG/1
5 TABLET ORAL AT BEDTIME
Status: DISCONTINUED | OUTPATIENT
Start: 2020-01-01 | End: 2020-01-01

## 2020-01-01 RX ORDER — LANOLIN ALCOHOL/MO/W.PET/CERES
3 CREAM (GRAM) TOPICAL
Status: DISCONTINUED | OUTPATIENT
Start: 2020-01-01 | End: 2020-01-01 | Stop reason: HOSPADM

## 2020-01-01 RX ORDER — ONDANSETRON 2 MG/ML
4 INJECTION INTRAMUSCULAR; INTRAVENOUS EVERY 30 MIN PRN
Status: DISCONTINUED | OUTPATIENT
Start: 2020-01-01 | End: 2020-01-01

## 2020-01-01 RX ORDER — LACTULOSE 10 G/15ML
20 SOLUTION ORAL 2 TIMES DAILY PRN
Status: DISCONTINUED | OUTPATIENT
Start: 2020-01-01 | End: 2020-01-01 | Stop reason: HOSPADM

## 2020-01-01 RX ORDER — MIDODRINE HYDROCHLORIDE 10 MG/1
10 TABLET ORAL
Qty: 90 TABLET | Refills: 0 | Status: SHIPPED | OUTPATIENT
Start: 2020-01-01 | End: 2020-01-01

## 2020-01-01 RX ORDER — CIPROFLOXACIN 250 MG/1
250 TABLET, FILM COATED ORAL
Status: DISCONTINUED | OUTPATIENT
Start: 2020-01-01 | End: 2020-01-01 | Stop reason: HOSPADM

## 2020-01-01 RX ORDER — MIDODRINE HYDROCHLORIDE 10 MG/1
10 TABLET ORAL
Qty: 90 TABLET | Refills: 0 | Status: ON HOLD | DISCHARGE
Start: 2020-01-01 | End: 2020-01-01

## 2020-01-01 RX ORDER — BISACODYL 5 MG
15 TABLET, DELAYED RELEASE (ENTERIC COATED) ORAL ONCE
Qty: 4 TABLET | Refills: 0 | Status: ON HOLD | OUTPATIENT
Start: 2020-01-01 | End: 2020-01-01

## 2020-01-01 RX ORDER — ALBUMIN (HUMAN) 12.5 G/50ML
12.5 SOLUTION INTRAVENOUS ONCE
Status: DISCONTINUED | OUTPATIENT
Start: 2020-01-01 | End: 2020-01-01 | Stop reason: CLARIF

## 2020-01-01 RX ORDER — LANOLIN ALCOHOL/MO/W.PET/CERES
100 CREAM (GRAM) TOPICAL DAILY
Qty: 30 TABLET | Refills: 0 | Status: SHIPPED | OUTPATIENT
Start: 2020-01-01 | End: 2020-01-01

## 2020-01-01 RX ORDER — POTASSIUM CHLORIDE 750 MG/1
10 TABLET, EXTENDED RELEASE ORAL DAILY
Status: DISCONTINUED | OUTPATIENT
Start: 2020-01-01 | End: 2020-01-01

## 2020-01-01 RX ORDER — AMOXICILLIN 250 MG
1 CAPSULE ORAL 2 TIMES DAILY PRN
Status: DISCONTINUED | OUTPATIENT
Start: 2020-01-01 | End: 2020-01-01 | Stop reason: HOSPADM

## 2020-01-01 RX ORDER — LIDOCAINE 40 MG/G
CREAM TOPICAL
Status: CANCELLED | OUTPATIENT
Start: 2020-01-01

## 2020-01-01 RX ORDER — SPIRONOLACTONE 25 MG/1
25 TABLET ORAL DAILY
Qty: 30 TABLET | Refills: 0 | Status: ON HOLD | DISCHARGE
Start: 2020-01-01 | End: 2020-01-01

## 2020-01-01 RX ORDER — ALBUMIN (HUMAN) 12.5 G/50ML
50 SOLUTION INTRAVENOUS ONCE
Status: COMPLETED | OUTPATIENT
Start: 2020-01-01 | End: 2020-01-01

## 2020-01-01 RX ORDER — BUSPIRONE HYDROCHLORIDE 5 MG/1
15 TABLET ORAL 2 TIMES DAILY
Status: DISCONTINUED | OUTPATIENT
Start: 2020-01-01 | End: 2020-01-01 | Stop reason: HOSPADM

## 2020-01-01 RX ORDER — LACTULOSE 10 G/15ML
20 SOLUTION ORAL
Status: DISCONTINUED | OUTPATIENT
Start: 2020-01-01 | End: 2020-01-01 | Stop reason: HOSPADM

## 2020-01-01 RX ORDER — DEXTROSE MONOHYDRATE 25 G/50ML
25-50 INJECTION, SOLUTION INTRAVENOUS
Status: DISCONTINUED | OUTPATIENT
Start: 2020-01-01 | End: 2020-01-01 | Stop reason: HOSPADM

## 2020-01-01 RX ORDER — GABAPENTIN 300 MG/1
300 CAPSULE ORAL AT BEDTIME
Status: DISCONTINUED | OUTPATIENT
Start: 2020-01-01 | End: 2020-01-01 | Stop reason: HOSPADM

## 2020-01-01 RX ORDER — CIPROFLOXACIN 250 MG/1
250 TABLET, FILM COATED ORAL EVERY 24 HOURS
Qty: 30 TABLET | Refills: 0 | Status: ON HOLD | OUTPATIENT
Start: 2020-01-01 | End: 2020-01-01

## 2020-01-01 RX ORDER — LANOLIN ALCOHOL/MO/W.PET/CERES
100 CREAM (GRAM) TOPICAL DAILY
Qty: 30 TABLET | Refills: 0 | Status: SHIPPED | OUTPATIENT
Start: 2020-01-01

## 2020-01-01 RX ORDER — ALENDRONATE SODIUM 35 MG/1
35 TABLET ORAL
COMMUNITY
Start: 2020-01-01 | End: 2020-01-01

## 2020-01-01 RX ORDER — LIDOCAINE HYDROCHLORIDE 10 MG/ML
10 INJECTION, SOLUTION EPIDURAL; INFILTRATION; INTRACAUDAL; PERINEURAL ONCE
Status: COMPLETED | OUTPATIENT
Start: 2020-01-01 | End: 2020-01-01

## 2020-01-01 RX ORDER — NICOTINE 21 MG/24HR
1 PATCH, TRANSDERMAL 24 HOURS TRANSDERMAL EVERY 24 HOURS
Qty: 14 PATCH | Refills: 0 | Status: ON HOLD | OUTPATIENT
Start: 2020-01-01 | End: 2020-01-01

## 2020-01-01 RX ORDER — FUROSEMIDE 40 MG
40 TABLET ORAL DAILY
Qty: 30 TABLET | Refills: 0 | Status: ON HOLD | DISCHARGE
Start: 2020-01-01 | End: 2020-01-01

## 2020-01-01 RX ORDER — CEFTRIAXONE 1 G/1
1 INJECTION, POWDER, FOR SOLUTION INTRAMUSCULAR; INTRAVENOUS EVERY 24 HOURS
Status: DISCONTINUED | OUTPATIENT
Start: 2020-01-01 | End: 2020-01-01

## 2020-01-01 RX ORDER — NICOTINE 21 MG/24HR
1 PATCH, TRANSDERMAL 24 HOURS TRANSDERMAL DAILY
Status: DISCONTINUED | OUTPATIENT
Start: 2020-01-01 | End: 2020-01-01 | Stop reason: HOSPADM

## 2020-01-01 RX ORDER — FUROSEMIDE 20 MG
20 TABLET ORAL DAILY
Status: ON HOLD | COMMUNITY
Start: 2020-01-01 | End: 2020-01-01

## 2020-01-01 RX ORDER — ACAMPROSATE CALCIUM 333 MG/1
TABLET, DELAYED RELEASE ORAL
Qty: 540 TABLET | Refills: 3 | Status: ON HOLD | OUTPATIENT
Start: 2020-01-01 | End: 2020-01-01

## 2020-01-01 RX ORDER — BUSPIRONE HYDROCHLORIDE 15 MG/1
15 TABLET ORAL 2 TIMES DAILY
Qty: 60 TABLET | Refills: 0 | Status: SHIPPED | OUTPATIENT
Start: 2020-01-01 | End: 2020-01-01

## 2020-01-01 RX ORDER — LACTULOSE 10 G/15ML
SOLUTION ORAL
Qty: 1892 ML | Refills: 4 | Status: SHIPPED | OUTPATIENT
Start: 2020-01-01 | End: 2020-01-01

## 2020-01-01 RX ORDER — ONDANSETRON 4 MG/1
4 TABLET, FILM COATED ORAL EVERY 6 HOURS PRN
Status: DISCONTINUED | OUTPATIENT
Start: 2020-01-01 | End: 2020-01-01 | Stop reason: HOSPADM

## 2020-01-01 RX ORDER — LACTULOSE 10 G/15ML
100 SOLUTION ORAL
Status: DISCONTINUED | OUTPATIENT
Start: 2020-01-01 | End: 2020-01-01

## 2020-01-01 RX ORDER — NICOTINE 21 MG/24HR
1 PATCH, TRANSDERMAL 24 HOURS TRANSDERMAL EVERY 24 HOURS
Qty: 14 PATCH | Refills: 0 | Status: ON HOLD | DISCHARGE
Start: 2020-01-01 | End: 2020-01-01

## 2020-01-01 RX ORDER — SODIUM BICARBONATE 650 MG/1
650 TABLET ORAL 2 TIMES DAILY
Status: DISCONTINUED | OUTPATIENT
Start: 2020-01-01 | End: 2020-01-01 | Stop reason: HOSPADM

## 2020-01-01 RX ORDER — CEFOXITIN 2 G/1
2 INJECTION, POWDER, FOR SOLUTION INTRAVENOUS ONCE
Status: COMPLETED | OUTPATIENT
Start: 2020-01-01 | End: 2020-01-01

## 2020-01-01 RX ORDER — PROCHLORPERAZINE MALEATE 5 MG
10 TABLET ORAL EVERY 6 HOURS PRN
Status: DISCONTINUED | OUTPATIENT
Start: 2020-01-01 | End: 2020-01-01 | Stop reason: HOSPADM

## 2020-01-01 RX ORDER — ARIPIPRAZOLE 5 MG/1
5 TABLET ORAL AT BEDTIME
Qty: 30 TABLET | Refills: 0 | Status: ON HOLD | OUTPATIENT
Start: 2020-01-01 | End: 2020-01-01

## 2020-01-01 RX ORDER — CALCITONIN SALMON 200 [USP'U]/ML
200 INJECTION, SOLUTION INTRAMUSCULAR; SUBCUTANEOUS ONCE
Status: COMPLETED | OUTPATIENT
Start: 2020-01-01 | End: 2020-01-01

## 2020-01-01 RX ORDER — ONDANSETRON 4 MG/1
4 TABLET, ORALLY DISINTEGRATING ORAL EVERY 6 HOURS PRN
Status: DISCONTINUED | OUTPATIENT
Start: 2020-01-01 | End: 2020-01-01 | Stop reason: HOSPADM

## 2020-01-01 RX ORDER — CIPROFLOXACIN 250 MG/1
250 TABLET, FILM COATED ORAL DAILY
Qty: 30 TABLET | Refills: 11 | Status: SHIPPED | OUTPATIENT
Start: 2020-01-01

## 2020-01-01 RX ORDER — NICOTINE POLACRILEX 4 MG
15-30 LOZENGE BUCCAL
Status: DISCONTINUED | OUTPATIENT
Start: 2020-01-01 | End: 2020-01-01 | Stop reason: HOSPADM

## 2020-01-01 RX ORDER — FUROSEMIDE 40 MG
40 TABLET ORAL DAILY
Qty: 30 TABLET | Refills: 0 | Status: SHIPPED | OUTPATIENT
Start: 2020-01-01 | End: 2020-01-01

## 2020-01-01 RX ORDER — CIPROFLOXACIN 250 MG/1
250 TABLET, FILM COATED ORAL EVERY 24 HOURS
Qty: 30 TABLET | Refills: 0 | Status: ON HOLD | DISCHARGE
Start: 2020-01-01 | End: 2020-01-01

## 2020-01-01 RX ORDER — LACTULOSE 10 G/15ML
20 SOLUTION ORAL
Status: DISCONTINUED | OUTPATIENT
Start: 2020-01-01 | End: 2020-01-01

## 2020-01-01 RX ORDER — GABAPENTIN 100 MG/1
100 CAPSULE ORAL ONCE
Status: COMPLETED | OUTPATIENT
Start: 2020-01-01 | End: 2020-01-01

## 2020-01-01 RX ORDER — FUROSEMIDE 40 MG
40 TABLET ORAL DAILY
Status: DISCONTINUED | OUTPATIENT
Start: 2020-01-01 | End: 2020-01-01 | Stop reason: HOSPADM

## 2020-01-01 RX ORDER — LIDOCAINE HYDROCHLORIDE AND EPINEPHRINE 10; 10 MG/ML; UG/ML
1 INJECTION, SOLUTION INFILTRATION; PERINEURAL ONCE
Status: DISCONTINUED | OUTPATIENT
Start: 2020-01-01 | End: 2020-01-01 | Stop reason: HOSPADM

## 2020-01-01 RX ORDER — CALCIUM CARBONATE 500 MG/1
500 TABLET, CHEWABLE ORAL EVERY 4 HOURS PRN
Status: DISCONTINUED | OUTPATIENT
Start: 2020-01-01 | End: 2020-01-01 | Stop reason: HOSPADM

## 2020-01-01 RX ORDER — LACTULOSE 10 G/15ML
20 SOLUTION ORAL 3 TIMES DAILY
Qty: 2700 ML | Refills: 0 | Status: SHIPPED | OUTPATIENT
Start: 2020-01-01

## 2020-01-01 RX ORDER — LACTULOSE 20 G/30ML
20 SOLUTION ORAL DAILY PRN
Qty: 900 ML | Refills: 11
Start: 2020-01-01

## 2020-01-01 RX ORDER — ACAMPROSATE CALCIUM 333 MG/1
666 TABLET, DELAYED RELEASE ORAL 3 TIMES DAILY
Status: DISCONTINUED | OUTPATIENT
Start: 2020-01-01 | End: 2020-01-01

## 2020-01-01 RX ORDER — ACETAMINOPHEN 500 MG
500 TABLET ORAL EVERY 6 HOURS PRN
Qty: 30 TABLET | Refills: 0 | Status: ON HOLD | DISCHARGE
Start: 2020-01-01 | End: 2020-01-01

## 2020-01-01 RX ORDER — BUSPIRONE HYDROCHLORIDE 15 MG/1
15 TABLET ORAL 2 TIMES DAILY
Status: DISCONTINUED | OUTPATIENT
Start: 2020-01-01 | End: 2020-01-01 | Stop reason: HOSPADM

## 2020-01-01 RX ORDER — LANOLIN ALCOHOL/MO/W.PET/CERES
100 CREAM (GRAM) TOPICAL DAILY
Status: DISCONTINUED | OUTPATIENT
Start: 2020-01-01 | End: 2020-01-01 | Stop reason: HOSPADM

## 2020-01-01 RX ORDER — SODIUM CHLORIDE 9 MG/ML
INJECTION, SOLUTION INTRAVENOUS CONTINUOUS
Status: DISCONTINUED | OUTPATIENT
Start: 2020-01-01 | End: 2020-01-01

## 2020-01-01 RX ORDER — DEXTROSE MONOHYDRATE 25 G/50ML
25-50 INJECTION, SOLUTION INTRAVENOUS
Status: CANCELLED | OUTPATIENT
Start: 2020-01-01

## 2020-01-01 RX ORDER — URSODIOL 500 MG/1
500 TABLET, FILM COATED ORAL 2 TIMES DAILY
Qty: 60 TABLET | Refills: 0 | Status: SHIPPED | OUTPATIENT
Start: 2020-01-01

## 2020-01-01 RX ORDER — ALBUMIN (HUMAN) 12.5 G/50ML
100 SOLUTION INTRAVENOUS ONCE
Status: COMPLETED | OUTPATIENT
Start: 2020-01-01 | End: 2020-01-01

## 2020-01-01 RX ORDER — ONDANSETRON 4 MG/1
TABLET, FILM COATED ORAL
Qty: 30 TABLET | Refills: 1 | Status: SHIPPED | OUTPATIENT
Start: 2020-01-01 | End: 2020-01-01

## 2020-01-01 RX ORDER — LACTULOSE 10 G/15ML
100 SOLUTION ORAL
Status: DISCONTINUED | OUTPATIENT
Start: 2020-01-01 | End: 2020-01-01 | Stop reason: HOSPADM

## 2020-01-01 RX ORDER — BUSPIRONE HYDROCHLORIDE 15 MG/1
15 TABLET ORAL 2 TIMES DAILY
Status: DISCONTINUED | OUTPATIENT
Start: 2020-01-01 | End: 2020-01-01

## 2020-01-01 RX ORDER — SPIRONOLACTONE 50 MG/1
50 TABLET, FILM COATED ORAL DAILY
Qty: 90 TABLET | Refills: 3 | Status: ON HOLD | OUTPATIENT
Start: 2020-01-01 | End: 2020-01-01

## 2020-01-01 RX ORDER — LACTULOSE 10 G/15ML
20 SOLUTION ORAL ONCE
Status: COMPLETED | OUTPATIENT
Start: 2020-01-01 | End: 2020-01-01

## 2020-01-01 RX ORDER — HEPARIN SODIUM,PORCINE 10 UNIT/ML
2-5 VIAL (ML) INTRAVENOUS
Status: DISCONTINUED | OUTPATIENT
Start: 2020-01-01 | End: 2020-01-01 | Stop reason: HOSPADM

## 2020-01-01 RX ORDER — OXYCODONE HYDROCHLORIDE 5 MG/1
5 TABLET ORAL 2 TIMES DAILY PRN
Qty: 12 TABLET | Refills: 0 | Status: SHIPPED | OUTPATIENT
Start: 2020-01-01 | End: 2020-01-01

## 2020-01-01 RX ORDER — OCTREOTIDE ACETATE 100 UG/ML
100 INJECTION, SOLUTION INTRAVENOUS; SUBCUTANEOUS 3 TIMES DAILY
Status: DISCONTINUED | OUTPATIENT
Start: 2020-01-01 | End: 2020-01-01 | Stop reason: HOSPADM

## 2020-01-01 RX ORDER — MIDODRINE HYDROCHLORIDE 5 MG/1
5 TABLET ORAL
Status: DISCONTINUED | OUTPATIENT
Start: 2020-01-01 | End: 2020-01-01 | Stop reason: HOSPADM

## 2020-01-01 RX ORDER — TRAZODONE HYDROCHLORIDE 50 MG/1
100 TABLET, FILM COATED ORAL
Qty: 30 TABLET | Refills: 0 | Status: ON HOLD | OUTPATIENT
Start: 2020-01-01 | End: 2020-01-01

## 2020-01-01 RX ORDER — SODIUM BICARBONATE 650 MG/1
1300 TABLET ORAL 2 TIMES DAILY
Status: DISCONTINUED | OUTPATIENT
Start: 2020-01-01 | End: 2020-01-01

## 2020-01-01 RX ORDER — FOLIC ACID 1 MG/1
1 TABLET ORAL DAILY
Status: DISCONTINUED | OUTPATIENT
Start: 2020-01-01 | End: 2020-01-01 | Stop reason: HOSPADM

## 2020-01-01 RX ORDER — ALBUMIN (HUMAN) 12.5 G/50ML
50 SOLUTION INTRAVENOUS DAILY
Status: COMPLETED | OUTPATIENT
Start: 2020-01-01 | End: 2020-01-01

## 2020-01-01 RX ORDER — LIDOCAINE HYDROCHLORIDE 10 MG/ML
5 INJECTION, SOLUTION EPIDURAL; INFILTRATION; INTRACAUDAL; PERINEURAL ONCE
Status: DISCONTINUED | OUTPATIENT
Start: 2020-01-01 | End: 2020-01-01 | Stop reason: HOSPADM

## 2020-01-01 RX ORDER — MIDODRINE HYDROCHLORIDE 5 MG/1
10 TABLET ORAL
Status: DISCONTINUED | OUTPATIENT
Start: 2020-01-01 | End: 2020-01-01 | Stop reason: HOSPADM

## 2020-01-01 RX ORDER — ARIPIPRAZOLE 5 MG/1
5 TABLET ORAL AT BEDTIME
Qty: 30 TABLET | Refills: 0 | Status: SHIPPED | OUTPATIENT
Start: 2020-01-01 | End: 2020-01-01

## 2020-01-01 RX ORDER — LIDOCAINE 4 G/G
1 PATCH TOPICAL
Status: DISCONTINUED | OUTPATIENT
Start: 2020-01-01 | End: 2020-01-01 | Stop reason: HOSPADM

## 2020-01-01 RX ORDER — LANOLIN ALCOHOL/MO/W.PET/CERES
100 CREAM (GRAM) TOPICAL DAILY
Status: ON HOLD | COMMUNITY
Start: 2020-01-01 | End: 2020-01-01

## 2020-01-01 RX ORDER — MIDODRINE HYDROCHLORIDE 10 MG/1
10 TABLET ORAL
Qty: 90 TABLET | Refills: 0 | Status: ON HOLD | OUTPATIENT
Start: 2020-01-01 | End: 2020-01-01

## 2020-01-01 RX ORDER — SODIUM BICARBONATE 650 MG/1
650 TABLET ORAL 2 TIMES DAILY
Qty: 60 TABLET | Refills: 0 | Status: SHIPPED | OUTPATIENT
Start: 2020-01-01

## 2020-01-01 RX ORDER — POLYETHYLENE GLYCOL 3350 17 G/17G
17 POWDER, FOR SOLUTION ORAL DAILY PRN
Status: DISCONTINUED | OUTPATIENT
Start: 2020-01-01 | End: 2020-01-01 | Stop reason: HOSPADM

## 2020-01-01 RX ORDER — CIPROFLOXACIN 250 MG/1
250 TABLET, FILM COATED ORAL EVERY 24 HOURS
Status: DISCONTINUED | OUTPATIENT
Start: 2020-01-01 | End: 2020-01-01 | Stop reason: HOSPADM

## 2020-01-01 RX ORDER — ONDANSETRON 4 MG/1
4 TABLET, FILM COATED ORAL EVERY 6 HOURS PRN
Status: ON HOLD | COMMUNITY
Start: 2020-01-01 | End: 2020-01-01

## 2020-01-01 RX ORDER — ALENDRONATE SODIUM 35 MG/1
TABLET ORAL
Qty: 12 TABLET | Refills: 3 | Status: ON HOLD | OUTPATIENT
Start: 2020-01-01 | End: 2020-01-01

## 2020-01-01 RX ORDER — FERROUS SULFATE 324(65)MG
TABLET, DELAYED RELEASE (ENTERIC COATED) ORAL
Qty: 180 TABLET | Refills: 3 | Status: SHIPPED | OUTPATIENT
Start: 2020-01-01

## 2020-01-01 RX ORDER — SODIUM CHLORIDE 9 MG/ML
INJECTION, SOLUTION INTRAVENOUS CONTINUOUS
Status: ACTIVE | OUTPATIENT
Start: 2020-01-01 | End: 2020-01-01

## 2020-01-01 RX ORDER — SPIRONOLACTONE 25 MG/1
50 TABLET ORAL DAILY
Status: DISCONTINUED | OUTPATIENT
Start: 2020-01-01 | End: 2020-01-01 | Stop reason: HOSPADM

## 2020-01-01 RX ORDER — ONDANSETRON 4 MG/1
4 TABLET, FILM COATED ORAL EVERY 6 HOURS PRN
Qty: 20 TABLET | Refills: 0 | Status: SHIPPED | OUTPATIENT
Start: 2020-01-01

## 2020-01-01 RX ORDER — MIDODRINE HYDROCHLORIDE 5 MG/1
5 TABLET ORAL
Status: DISCONTINUED | OUTPATIENT
Start: 2020-01-01 | End: 2020-01-01

## 2020-01-01 RX ORDER — ATROPINE SULFATE 0.4 MG/ML
.2-2 AMPUL (ML) INJECTION
Status: COMPLETED | OUTPATIENT
Start: 2020-01-01 | End: 2020-01-01

## 2020-01-01 RX ORDER — ARIPIPRAZOLE 2 MG/1
2 TABLET ORAL AT BEDTIME
Status: DISCONTINUED | OUTPATIENT
Start: 2020-01-01 | End: 2020-01-01

## 2020-01-01 RX ORDER — CALCITONIN SALMON 200 [USP'U]/ML
200 INJECTION, SOLUTION INTRAMUSCULAR; SUBCUTANEOUS ONCE
Status: DISPENSED | OUTPATIENT
Start: 2020-01-01 | End: 2020-01-01

## 2020-01-01 RX ORDER — ONDANSETRON 2 MG/ML
4 INJECTION INTRAMUSCULAR; INTRAVENOUS ONCE
Status: COMPLETED | OUTPATIENT
Start: 2020-01-01 | End: 2020-01-01

## 2020-01-01 RX ORDER — SODIUM BICARBONATE 650 MG/1
650 TABLET ORAL 2 TIMES DAILY
Status: DISCONTINUED | OUTPATIENT
Start: 2020-01-01 | End: 2020-01-01

## 2020-01-01 RX ORDER — FUROSEMIDE 40 MG
40 TABLET ORAL DAILY
Qty: 90 TABLET | Refills: 3 | Status: ON HOLD | OUTPATIENT
Start: 2020-01-01 | End: 2020-01-01

## 2020-01-01 RX ORDER — ACETAMINOPHEN 500 MG
500 TABLET ORAL EVERY 6 HOURS PRN
Status: DISCONTINUED | OUTPATIENT
Start: 2020-01-01 | End: 2020-01-01 | Stop reason: HOSPADM

## 2020-01-01 RX ORDER — ONDANSETRON 4 MG/1
4 TABLET, FILM COATED ORAL EVERY 6 HOURS PRN
Status: DISCONTINUED | OUTPATIENT
Start: 2020-01-01 | End: 2020-01-01

## 2020-01-01 RX ORDER — FUROSEMIDE 40 MG
20 TABLET ORAL DAILY
Qty: 30 TABLET | Refills: 0 | Status: SHIPPED | OUTPATIENT
Start: 2020-01-01 | End: 2020-01-01

## 2020-01-01 RX ORDER — PROCHLORPERAZINE 25 MG
25 SUPPOSITORY, RECTAL RECTAL EVERY 12 HOURS PRN
Status: DISCONTINUED | OUTPATIENT
Start: 2020-01-01 | End: 2020-01-01 | Stop reason: HOSPADM

## 2020-01-01 RX ORDER — FUROSEMIDE 10 MG/ML
40 INJECTION INTRAMUSCULAR; INTRAVENOUS ONCE
Status: COMPLETED | OUTPATIENT
Start: 2020-01-01 | End: 2020-01-01

## 2020-01-01 RX ORDER — NICOTINE 21 MG/24HR
1 PATCH, TRANSDERMAL 24 HOURS TRANSDERMAL EVERY 24 HOURS
Qty: 30 PATCH | Refills: 0 | Status: SHIPPED | OUTPATIENT
Start: 2020-01-01

## 2020-01-01 RX ORDER — FOLIC ACID 1 MG/1
1 TABLET ORAL DAILY
Qty: 30 TABLET | Refills: 0 | Status: SHIPPED | OUTPATIENT
Start: 2020-01-01 | End: 2020-01-01

## 2020-01-01 RX ORDER — GABAPENTIN 100 MG/1
300 CAPSULE ORAL AT BEDTIME
Qty: 90 CAPSULE | Refills: 0 | Status: SHIPPED | OUTPATIENT
Start: 2020-01-01

## 2020-01-01 RX ORDER — URSODIOL 500 MG/1
500 TABLET, FILM COATED ORAL 2 TIMES DAILY
Status: DISCONTINUED | OUTPATIENT
Start: 2020-01-01 | End: 2020-01-01

## 2020-01-01 RX ORDER — MIDODRINE HYDROCHLORIDE 2.5 MG/1
10 TABLET ORAL
Status: DISCONTINUED | OUTPATIENT
Start: 2020-01-01 | End: 2020-01-01 | Stop reason: HOSPADM

## 2020-01-01 RX ORDER — LACTULOSE 10 G/15ML
15 SOLUTION ORAL 3 TIMES DAILY PRN
Status: DISCONTINUED | OUTPATIENT
Start: 2020-01-01 | End: 2020-01-01

## 2020-01-01 RX ORDER — HEPARIN SODIUM 5000 [USP'U]/.5ML
5000 INJECTION, SOLUTION INTRAVENOUS; SUBCUTANEOUS EVERY 12 HOURS
Status: DISCONTINUED | OUTPATIENT
Start: 2020-01-01 | End: 2020-01-01 | Stop reason: HOSPADM

## 2020-01-01 RX ORDER — ACETAMINOPHEN 500 MG
500 TABLET ORAL EVERY 6 HOURS PRN
Status: DISCONTINUED | OUTPATIENT
Start: 2020-01-01 | End: 2020-01-01

## 2020-01-01 RX ORDER — ONDANSETRON 4 MG/1
4 TABLET, FILM COATED ORAL EVERY 6 HOURS PRN
Qty: 20 TABLET | Refills: 0 | Status: SHIPPED | OUTPATIENT
Start: 2020-01-01 | End: 2020-01-01

## 2020-01-01 RX ORDER — ACETAMINOPHEN 325 MG/1
650 TABLET ORAL EVERY 4 HOURS PRN
Status: DISCONTINUED | OUTPATIENT
Start: 2020-01-01 | End: 2020-01-01 | Stop reason: HOSPADM

## 2020-01-01 RX ORDER — SODIUM CHLORIDE 9 MG/ML
INJECTION, SOLUTION INTRAVENOUS CONTINUOUS
Status: DISCONTINUED | OUTPATIENT
Start: 2020-01-01 | End: 2020-01-01 | Stop reason: HOSPADM

## 2020-01-01 RX ORDER — CEFTRIAXONE 2 G/1
2 INJECTION, POWDER, FOR SOLUTION INTRAMUSCULAR; INTRAVENOUS EVERY 24 HOURS
Status: DISCONTINUED | OUTPATIENT
Start: 2020-01-01 | End: 2020-01-01

## 2020-01-01 RX ORDER — SODIUM BICARBONATE 650 MG/1
650 TABLET ORAL 2 TIMES DAILY
Qty: 60 TABLET | Refills: 0 | Status: SHIPPED | OUTPATIENT
Start: 2020-01-01 | End: 2020-01-01

## 2020-01-01 RX ORDER — BISACODYL 5 MG
15 TABLET, DELAYED RELEASE (ENTERIC COATED) ORAL ONCE
Qty: 4 TABLET | Refills: 0 | Status: SHIPPED | OUTPATIENT
Start: 2020-01-01 | End: 2020-01-01

## 2020-01-01 RX ORDER — LACTULOSE 10 G/15ML
15 SOLUTION ORAL 3 TIMES DAILY PRN
Qty: 1000 ML | Refills: 5 | Status: SHIPPED | OUTPATIENT
Start: 2020-01-01 | End: 2020-01-01

## 2020-01-01 RX ORDER — TRAZODONE HYDROCHLORIDE 50 MG/1
100 TABLET, FILM COATED ORAL
Status: DISCONTINUED | OUTPATIENT
Start: 2020-01-01 | End: 2020-01-01 | Stop reason: HOSPADM

## 2020-01-01 RX ORDER — AMOXICILLIN 250 MG
2 CAPSULE ORAL 2 TIMES DAILY PRN
Status: DISCONTINUED | OUTPATIENT
Start: 2020-01-01 | End: 2020-01-01 | Stop reason: HOSPADM

## 2020-01-01 RX ORDER — FOLIC ACID 1 MG/1
1 TABLET ORAL DAILY
Qty: 30 TABLET | Refills: 0 | Status: SHIPPED | OUTPATIENT
Start: 2020-01-01

## 2020-01-01 RX ORDER — NICOTINE 21 MG/24HR
1 PATCH, TRANSDERMAL 24 HOURS TRANSDERMAL EVERY 24 HOURS
Qty: 30 PATCH | Refills: 0 | Status: SHIPPED | OUTPATIENT
Start: 2020-01-01 | End: 2020-01-01

## 2020-01-01 RX ORDER — LACTULOSE 10 G/15ML
15 SOLUTION ORAL 3 TIMES DAILY
Status: DISCONTINUED | OUTPATIENT
Start: 2020-01-01 | End: 2020-01-01 | Stop reason: HOSPADM

## 2020-01-01 RX ORDER — ACETAMINOPHEN 500 MG
500 TABLET ORAL EVERY 6 HOURS PRN
Qty: 30 TABLET | Refills: 0 | Status: SHIPPED | OUTPATIENT
Start: 2020-01-01

## 2020-01-01 RX ORDER — LANOLIN ALCOHOL/MO/W.PET/CERES
100 CREAM (GRAM) TOPICAL DAILY
Status: DISCONTINUED | OUTPATIENT
Start: 2020-01-01 | End: 2020-01-01

## 2020-01-01 RX ORDER — BUSPIRONE HYDROCHLORIDE 15 MG/1
15 TABLET ORAL 2 TIMES DAILY
Qty: 60 TABLET | Refills: 0 | Status: ON HOLD | OUTPATIENT
Start: 2020-01-01 | End: 2020-01-01

## 2020-01-01 RX ORDER — AMOXICILLIN 250 MG
1 CAPSULE ORAL 2 TIMES DAILY
Status: DISCONTINUED | OUTPATIENT
Start: 2020-01-01 | End: 2020-01-01 | Stop reason: HOSPADM

## 2020-01-01 RX ORDER — NICOTINE 21 MG/24HR
1 PATCH, TRANSDERMAL 24 HOURS TRANSDERMAL EVERY 24 HOURS
DISCHARGE
Start: 2020-01-01

## 2020-01-01 RX ORDER — DOBUTAMINE HYDROCHLORIDE 200 MG/100ML
10-50 INJECTION INTRAVENOUS CONTINUOUS
Status: ACTIVE | OUTPATIENT
Start: 2020-01-01 | End: 2020-01-01

## 2020-01-01 RX ORDER — DEXTROSE MONOHYDRATE 50 MG/ML
INJECTION, SOLUTION INTRAVENOUS CONTINUOUS
Status: DISCONTINUED | OUTPATIENT
Start: 2020-01-01 | End: 2020-01-01

## 2020-01-01 RX ORDER — FERROUS SULFATE 325(65) MG
324 TABLET ORAL 2 TIMES DAILY
Status: DISCONTINUED | OUTPATIENT
Start: 2020-01-01 | End: 2020-01-01 | Stop reason: CLARIF

## 2020-01-01 RX ORDER — POTASSIUM CHLORIDE 750 MG/1
10 TABLET, EXTENDED RELEASE ORAL DAILY
Status: DISCONTINUED | OUTPATIENT
Start: 2020-01-01 | End: 2020-01-01 | Stop reason: HOSPADM

## 2020-01-01 RX ORDER — FERROUS SULFATE 325(65) MG
325 TABLET ORAL 2 TIMES DAILY
Status: DISCONTINUED | OUTPATIENT
Start: 2020-01-01 | End: 2020-01-01 | Stop reason: HOSPADM

## 2020-01-01 RX ORDER — AZITHROMYCIN 250 MG/1
250 TABLET, FILM COATED ORAL DAILY
Status: DISCONTINUED | OUTPATIENT
Start: 2020-01-01 | End: 2020-01-01

## 2020-01-01 RX ORDER — PHYTONADIONE 5 MG/1
5 TABLET ORAL DAILY
Qty: 2 TABLET | Refills: 0 | Status: ON HOLD | DISCHARGE
Start: 2020-01-01 | End: 2020-01-01

## 2020-01-01 RX ADMIN — OCTREOTIDE ACETATE 100 MCG: 100 INJECTION, SOLUTION INTRAVENOUS; SUBCUTANEOUS at 07:53

## 2020-01-01 RX ADMIN — FOLIC ACID 1 MG: 1 TABLET ORAL at 09:15

## 2020-01-01 RX ADMIN — RIFAXIMIN 550 MG: 550 TABLET ORAL at 09:03

## 2020-01-01 RX ADMIN — SODIUM BICARBONATE 650 MG TABLET 1300 MG: at 09:47

## 2020-01-01 RX ADMIN — MIDODRINE HYDROCHLORIDE 10 MG: 5 TABLET ORAL at 20:47

## 2020-01-01 RX ADMIN — CIPROFLOXACIN HYDROCHLORIDE 250 MG: 250 TABLET, FILM COATED ORAL at 11:49

## 2020-01-01 RX ADMIN — ONDANSETRON 4 MG: 4 TABLET, ORALLY DISINTEGRATING ORAL at 05:57

## 2020-01-01 RX ADMIN — POTASSIUM CHLORIDE 10 MEQ: 750 TABLET, EXTENDED RELEASE ORAL at 10:03

## 2020-01-01 RX ADMIN — RIFAXIMIN 550 MG: 550 TABLET ORAL at 07:56

## 2020-01-01 RX ADMIN — SENNOSIDES AND DOCUSATE SODIUM 1 TABLET: 8.6; 5 TABLET ORAL at 19:25

## 2020-01-01 RX ADMIN — CIPROFLOXACIN HYDROCHLORIDE 250 MG: 250 TABLET, FILM COATED ORAL at 08:23

## 2020-01-01 RX ADMIN — MIDODRINE HYDROCHLORIDE 10 MG: 5 TABLET ORAL at 09:02

## 2020-01-01 RX ADMIN — MIDODRINE HYDROCHLORIDE 10 MG: 5 TABLET ORAL at 08:47

## 2020-01-01 RX ADMIN — ARIPIPRAZOLE 5 MG: 5 TABLET ORAL at 00:11

## 2020-01-01 RX ADMIN — FLUOXETINE HYDROCHLORIDE 60 MG: 40 CAPSULE ORAL at 07:53

## 2020-01-01 RX ADMIN — LACTULOSE 20 G: 10 SOLUTION ORAL at 13:43

## 2020-01-01 RX ADMIN — FOLIC ACID 1 MG: 1 TABLET ORAL at 08:26

## 2020-01-01 RX ADMIN — SODIUM BICARBONATE 650 MG TABLET 650 MG: at 21:10

## 2020-01-01 RX ADMIN — ALBUMIN HUMAN 50 G: 0.25 SOLUTION INTRAVENOUS at 12:24

## 2020-01-01 RX ADMIN — LACTULOSE 20 G: 20 SOLUTION ORAL at 09:02

## 2020-01-01 RX ADMIN — ALBUMIN HUMAN 50 G: 0.25 SOLUTION INTRAVENOUS at 09:17

## 2020-01-01 RX ADMIN — MIDODRINE HYDROCHLORIDE 10 MG: 5 TABLET ORAL at 08:43

## 2020-01-01 RX ADMIN — POTASSIUM CHLORIDE 10 MEQ: 750 TABLET, EXTENDED RELEASE ORAL at 17:59

## 2020-01-01 RX ADMIN — RIFAXIMIN 550 MG: 550 TABLET ORAL at 20:55

## 2020-01-01 RX ADMIN — SODIUM BICARBONATE 650 MG TABLET 1300 MG: at 19:41

## 2020-01-01 RX ADMIN — RIFAXIMIN 550 MG: 550 TABLET ORAL at 21:10

## 2020-01-01 RX ADMIN — ARIPIPRAZOLE 5 MG: 5 TABLET ORAL at 20:15

## 2020-01-01 RX ADMIN — RIFAXIMIN 550 MG: 550 TABLET ORAL at 19:52

## 2020-01-01 RX ADMIN — LACTULOSE 20 G: 20 SOLUTION ORAL at 14:08

## 2020-01-01 RX ADMIN — SODIUM BICARBONATE 650 MG TABLET 650 MG: at 08:13

## 2020-01-01 RX ADMIN — MIDODRINE HYDROCHLORIDE 10 MG: 5 TABLET ORAL at 07:58

## 2020-01-01 RX ADMIN — LACTULOSE 20 G: 10 SOLUTION ORAL at 09:02

## 2020-01-01 RX ADMIN — LIDOCAINE HYDROCHLORIDE 0.1 ML: 10 INJECTION, SOLUTION EPIDURAL; INFILTRATION; INTRACAUDAL; PERINEURAL at 10:22

## 2020-01-01 RX ADMIN — MIDODRINE HYDROCHLORIDE 10 MG: 5 TABLET ORAL at 08:29

## 2020-01-01 RX ADMIN — CIPROFLOXACIN HYDROCHLORIDE 250 MG: 250 TABLET, FILM COATED ORAL at 08:26

## 2020-01-01 RX ADMIN — MIDODRINE HYDROCHLORIDE 10 MG: 5 TABLET ORAL at 10:27

## 2020-01-01 RX ADMIN — OCTREOTIDE ACETATE 100 MCG: 100 INJECTION, SOLUTION INTRAVENOUS; SUBCUTANEOUS at 20:06

## 2020-01-01 RX ADMIN — LACTULOSE 20 G: 20 SOLUTION ORAL at 03:49

## 2020-01-01 RX ADMIN — RIFAXIMIN 550 MG: 550 TABLET ORAL at 19:25

## 2020-01-01 RX ADMIN — RIFAXIMIN 550 MG: 550 TABLET ORAL at 08:03

## 2020-01-01 RX ADMIN — SODIUM BICARBONATE 650 MG TABLET 1300 MG: at 08:43

## 2020-01-01 RX ADMIN — LACTULOSE 20 G: 20 SOLUTION ORAL at 21:32

## 2020-01-01 RX ADMIN — RIFAXIMIN 550 MG: 550 TABLET ORAL at 10:24

## 2020-01-01 RX ADMIN — RIFAXIMIN 550 MG: 550 TABLET ORAL at 19:38

## 2020-01-01 RX ADMIN — SODIUM BICARBONATE 650 MG TABLET 650 MG: at 19:36

## 2020-01-01 RX ADMIN — OMEPRAZOLE 20 MG: 20 CAPSULE, DELAYED RELEASE ORAL at 10:01

## 2020-01-01 RX ADMIN — SODIUM BICARBONATE 650 MG TABLET 1300 MG: at 19:40

## 2020-01-01 RX ADMIN — PHYTONADIONE 10 MG: 10 INJECTION, EMULSION INTRAMUSCULAR; INTRAVENOUS; SUBCUTANEOUS at 08:41

## 2020-01-01 RX ADMIN — ONDANSETRON 4 MG: 2 INJECTION INTRAMUSCULAR; INTRAVENOUS at 11:42

## 2020-01-01 RX ADMIN — LACTULOSE 20 G: 20 SOLUTION ORAL at 19:27

## 2020-01-01 RX ADMIN — OMEPRAZOLE 20 MG: 20 CAPSULE, DELAYED RELEASE ORAL at 07:53

## 2020-01-01 RX ADMIN — FUROSEMIDE 20 MG: 20 TABLET ORAL at 10:02

## 2020-01-01 RX ADMIN — OCTREOTIDE ACETATE 100 MCG: 100 INJECTION, SOLUTION INTRAVENOUS; SUBCUTANEOUS at 09:03

## 2020-01-01 RX ADMIN — SODIUM CHLORIDE 500 ML: 9 INJECTION, SOLUTION INTRAVENOUS at 08:14

## 2020-01-01 RX ADMIN — SODIUM BICARBONATE 650 MG TABLET 650 MG: at 21:18

## 2020-01-01 RX ADMIN — LACTULOSE 20 G: 20 SOLUTION ORAL at 13:18

## 2020-01-01 RX ADMIN — OMEPRAZOLE 20 MG: 20 CAPSULE, DELAYED RELEASE ORAL at 17:59

## 2020-01-01 RX ADMIN — LACTULOSE 20 G: 10 SOLUTION ORAL at 13:14

## 2020-01-01 RX ADMIN — URSODIOL 500 MG: 250 TABLET ORAL at 19:38

## 2020-01-01 RX ADMIN — SODIUM CHLORIDE: 9 INJECTION, SOLUTION INTRAVENOUS at 08:00

## 2020-01-01 RX ADMIN — LACTULOSE 20 G: 20 SOLUTION ORAL at 09:27

## 2020-01-01 RX ADMIN — MIDODRINE HYDROCHLORIDE 10 MG: 5 TABLET ORAL at 09:48

## 2020-01-01 RX ADMIN — LACTULOSE 20 G: 20 SOLUTION ORAL at 08:02

## 2020-01-01 RX ADMIN — LACTULOSE 20 G: 20 SOLUTION ORAL at 02:30

## 2020-01-01 RX ADMIN — OMEPRAZOLE 20 MG: 20 CAPSULE, DELAYED RELEASE ORAL at 08:16

## 2020-01-01 RX ADMIN — RIFAXIMIN 550 MG: 550 TABLET ORAL at 20:25

## 2020-01-01 RX ADMIN — POTASSIUM CHLORIDE 10 MEQ: 750 TABLET, EXTENDED RELEASE ORAL at 08:16

## 2020-01-01 RX ADMIN — CIPROFLOXACIN HYDROCHLORIDE 250 MG: 250 TABLET, FILM COATED ORAL at 12:58

## 2020-01-01 RX ADMIN — URSODIOL 500 MG: 250 TABLET ORAL at 19:20

## 2020-01-01 RX ADMIN — Medication 5000 UNITS: at 20:55

## 2020-01-01 RX ADMIN — URSODIOL 500 MG: 250 TABLET ORAL at 21:23

## 2020-01-01 RX ADMIN — THIAMINE HYDROCHLORIDE 250 MG: 100 INJECTION, SOLUTION INTRAMUSCULAR; INTRAVENOUS at 08:30

## 2020-01-01 RX ADMIN — URSODIOL 500 MG: 250 TABLET ORAL at 09:14

## 2020-01-01 RX ADMIN — URSODIOL 500 MG: 250 TABLET ORAL at 08:13

## 2020-01-01 RX ADMIN — SODIUM CHLORIDE 1000 ML: 9 INJECTION, SOLUTION INTRAVENOUS at 11:32

## 2020-01-01 RX ADMIN — SODIUM BICARBONATE 650 MG TABLET 650 MG: at 10:36

## 2020-01-01 RX ADMIN — SPIRONOLACTONE 50 MG: 25 TABLET ORAL at 08:22

## 2020-01-01 RX ADMIN — LACTULOSE 20 G: 20 SOLUTION ORAL at 19:35

## 2020-01-01 RX ADMIN — SODIUM BICARBONATE 650 MG TABLET 1300 MG: at 20:05

## 2020-01-01 RX ADMIN — OCTREOTIDE ACETATE 100 MCG: 100 INJECTION, SOLUTION INTRAVENOUS; SUBCUTANEOUS at 14:03

## 2020-01-01 RX ADMIN — MIDODRINE HYDROCHLORIDE 5 MG: 5 TABLET ORAL at 12:19

## 2020-01-01 RX ADMIN — FUROSEMIDE 20 MG: 20 TABLET ORAL at 11:23

## 2020-01-01 RX ADMIN — MIDODRINE HYDROCHLORIDE 5 MG: 5 TABLET ORAL at 17:37

## 2020-01-01 RX ADMIN — LACTULOSE 20 G: 20 SOLUTION ORAL at 08:30

## 2020-01-01 RX ADMIN — BUSPIRONE HYDROCHLORIDE 15 MG: 15 TABLET ORAL at 09:02

## 2020-01-01 RX ADMIN — LACTULOSE 20 G: 20 SOLUTION ORAL at 13:30

## 2020-01-01 RX ADMIN — THIAMINE HYDROCHLORIDE 250 MG: 100 INJECTION, SOLUTION INTRAMUSCULAR; INTRAVENOUS at 08:53

## 2020-01-01 RX ADMIN — BUSPIRONE HYDROCHLORIDE 15 MG: 15 TABLET ORAL at 08:22

## 2020-01-01 RX ADMIN — MIDODRINE HYDROCHLORIDE 10 MG: 5 TABLET ORAL at 11:49

## 2020-01-01 RX ADMIN — OMEPRAZOLE 20 MG: 20 CAPSULE, DELAYED RELEASE ORAL at 08:29

## 2020-01-01 RX ADMIN — LACTULOSE 20 G: 20 SOLUTION ORAL at 13:59

## 2020-01-01 RX ADMIN — MIDODRINE HYDROCHLORIDE 10 MG: 5 TABLET ORAL at 08:13

## 2020-01-01 RX ADMIN — FUROSEMIDE 40 MG: 40 TABLET ORAL at 09:02

## 2020-01-01 RX ADMIN — RIFAXIMIN 550 MG: 550 TABLET ORAL at 08:13

## 2020-01-01 RX ADMIN — MIDODRINE HYDROCHLORIDE 5 MG: 5 TABLET ORAL at 09:15

## 2020-01-01 RX ADMIN — OCTREOTIDE ACETATE 100 MCG: 100 INJECTION, SOLUTION INTRAVENOUS; SUBCUTANEOUS at 19:36

## 2020-01-01 RX ADMIN — LACTULOSE 20 G: 20 SOLUTION ORAL at 20:04

## 2020-01-01 RX ADMIN — OCTREOTIDE ACETATE 100 MCG: 100 INJECTION, SOLUTION INTRAVENOUS; SUBCUTANEOUS at 10:03

## 2020-01-01 RX ADMIN — THIAMINE HYDROCHLORIDE 250 MG: 100 INJECTION, SOLUTION INTRAMUSCULAR; INTRAVENOUS at 22:08

## 2020-01-01 RX ADMIN — MIDODRINE HYDROCHLORIDE 5 MG: 5 TABLET ORAL at 12:31

## 2020-01-01 RX ADMIN — OCTREOTIDE ACETATE 100 MCG: 100 INJECTION, SOLUTION INTRAVENOUS; SUBCUTANEOUS at 14:28

## 2020-01-01 RX ADMIN — ONDANSETRON 4 MG: 2 INJECTION INTRAMUSCULAR; INTRAVENOUS at 12:56

## 2020-01-01 RX ADMIN — URSODIOL 500 MG: 500 TABLET, FILM COATED ORAL at 09:02

## 2020-01-01 RX ADMIN — GABAPENTIN 300 MG: 300 CAPSULE ORAL at 21:10

## 2020-01-01 RX ADMIN — Medication 0.4 MG: at 11:56

## 2020-01-01 RX ADMIN — RIFAXIMIN 550 MG: 550 TABLET ORAL at 21:23

## 2020-01-01 RX ADMIN — LACTULOSE 20 G: 20 SOLUTION ORAL at 20:14

## 2020-01-01 RX ADMIN — LACTULOSE 20 G: 10 SOLUTION ORAL at 01:32

## 2020-01-01 RX ADMIN — LACTULOSE 20 G: 20 SOLUTION ORAL at 21:03

## 2020-01-01 RX ADMIN — URSODIOL 500 MG: 250 TABLET, FILM COATED ORAL at 19:21

## 2020-01-01 RX ADMIN — LACTULOSE 20 G: 20 SOLUTION ORAL at 14:07

## 2020-01-01 RX ADMIN — RIFAXIMIN 550 MG: 550 TABLET ORAL at 08:21

## 2020-01-01 RX ADMIN — FLUOXETINE 60 MG: 20 CAPSULE ORAL at 08:16

## 2020-01-01 RX ADMIN — ALBUMIN HUMAN 12.5 G: 0.25 SOLUTION INTRAVENOUS at 13:19

## 2020-01-01 RX ADMIN — LACTULOSE 20 G: 20 SOLUTION ORAL at 14:28

## 2020-01-01 RX ADMIN — LACTULOSE 20 G: 20 SOLUTION ORAL at 14:13

## 2020-01-01 RX ADMIN — Medication 5000 UNITS: at 09:05

## 2020-01-01 RX ADMIN — THIAMINE HYDROCHLORIDE 500 MG: 100 INJECTION, SOLUTION INTRAMUSCULAR; INTRAVENOUS at 08:30

## 2020-01-01 RX ADMIN — ARIPIPRAZOLE 5 MG: 5 TABLET ORAL at 21:38

## 2020-01-01 RX ADMIN — FERROUS SULFATE TAB 325 MG (65 MG ELEMENTAL FE) 325 MG: 325 (65 FE) TAB at 09:02

## 2020-01-01 RX ADMIN — URSODIOL 500 MG: 250 TABLET ORAL at 20:05

## 2020-01-01 RX ADMIN — MIDODRINE HYDROCHLORIDE 10 MG: 5 TABLET ORAL at 12:56

## 2020-01-01 RX ADMIN — OMEPRAZOLE 20 MG: 20 CAPSULE, DELAYED RELEASE ORAL at 08:54

## 2020-01-01 RX ADMIN — URSODIOL 500 MG: 250 TABLET ORAL at 09:20

## 2020-01-01 RX ADMIN — RIFAXIMIN 550 MG: 550 TABLET ORAL at 20:21

## 2020-01-01 RX ADMIN — LACTULOSE 20 G: 20 SOLUTION ORAL at 08:43

## 2020-01-01 RX ADMIN — THIAMINE HCL TAB 100 MG 100 MG: 100 TAB at 09:48

## 2020-01-01 RX ADMIN — FLUOXETINE 60 MG: 20 CAPSULE ORAL at 09:20

## 2020-01-01 RX ADMIN — THIAMINE HCL TAB 100 MG 100 MG: 100 TAB at 09:20

## 2020-01-01 RX ADMIN — URSODIOL 500 MG: 250 TABLET ORAL at 21:10

## 2020-01-01 RX ADMIN — SENNOSIDES AND DOCUSATE SODIUM 1 TABLET: 8.6; 5 TABLET ORAL at 20:14

## 2020-01-01 RX ADMIN — FLUOXETINE 60 MG: 20 CAPSULE ORAL at 09:42

## 2020-01-01 RX ADMIN — OMEPRAZOLE 20 MG: 20 CAPSULE, DELAYED RELEASE ORAL at 09:02

## 2020-01-01 RX ADMIN — URSODIOL 500 MG: 250 TABLET ORAL at 20:58

## 2020-01-01 RX ADMIN — BUSPIRONE HYDROCHLORIDE 15 MG: 15 TABLET ORAL at 08:17

## 2020-01-01 RX ADMIN — LACTULOSE 20 G: 20 SOLUTION ORAL at 19:55

## 2020-01-01 RX ADMIN — LACTULOSE 20 G: 20 SOLUTION ORAL at 07:58

## 2020-01-01 RX ADMIN — LACTULOSE 15 G: 20 SOLUTION ORAL at 08:20

## 2020-01-01 RX ADMIN — BUSPIRONE HYDROCHLORIDE 15 MG: 5 TABLET ORAL at 08:21

## 2020-01-01 RX ADMIN — THIAMINE HCL TAB 100 MG 100 MG: 100 TAB at 08:21

## 2020-01-01 RX ADMIN — LACTULOSE 20 G: 20 SOLUTION ORAL at 19:19

## 2020-01-01 RX ADMIN — OCTREOTIDE ACETATE 100 MCG: 100 INJECTION, SOLUTION INTRAVENOUS; SUBCUTANEOUS at 21:03

## 2020-01-01 RX ADMIN — LACTULOSE 20 G: 20 SOLUTION ORAL at 00:42

## 2020-01-01 RX ADMIN — FOLIC ACID 1 MG: 1 TABLET ORAL at 08:54

## 2020-01-01 RX ADMIN — MIDODRINE HYDROCHLORIDE 10 MG: 5 TABLET ORAL at 17:34

## 2020-01-01 RX ADMIN — Medication 5000 UNITS: at 21:18

## 2020-01-01 RX ADMIN — LACTULOSE 20 G: 20 SOLUTION ORAL at 13:33

## 2020-01-01 RX ADMIN — RIFAXIMIN 550 MG: 550 TABLET ORAL at 10:02

## 2020-01-01 RX ADMIN — MIDODRINE HYDROCHLORIDE 10 MG: 5 TABLET ORAL at 08:16

## 2020-01-01 RX ADMIN — LACTULOSE 20 G: 20 SOLUTION ORAL at 01:51

## 2020-01-01 RX ADMIN — MELATONIN TAB 3 MG 3 MG: 3 TAB at 01:58

## 2020-01-01 RX ADMIN — MIDODRINE HYDROCHLORIDE 5 MG: 5 TABLET ORAL at 17:39

## 2020-01-01 RX ADMIN — NICOTINE 1 PATCH: 14 PATCH, EXTENDED RELEASE TRANSDERMAL at 07:56

## 2020-01-01 RX ADMIN — THIAMINE HYDROCHLORIDE 500 MG: 100 INJECTION, SOLUTION INTRAMUSCULAR; INTRAVENOUS at 21:23

## 2020-01-01 RX ADMIN — CIPROFLOXACIN HYDROCHLORIDE 250 MG: 250 TABLET, FILM COATED ORAL at 08:13

## 2020-01-01 RX ADMIN — SODIUM CHLORIDE 1000 ML: 9 INJECTION, SOLUTION INTRAVENOUS at 13:34

## 2020-01-01 RX ADMIN — THIAMINE HYDROCHLORIDE 250 MG: 100 INJECTION, SOLUTION INTRAMUSCULAR; INTRAVENOUS at 08:01

## 2020-01-01 RX ADMIN — ARIPIPRAZOLE 5 MG: 5 TABLET ORAL at 22:00

## 2020-01-01 RX ADMIN — OCTREOTIDE ACETATE 100 MCG: 100 INJECTION, SOLUTION INTRAVENOUS; SUBCUTANEOUS at 14:40

## 2020-01-01 RX ADMIN — FLUOXETINE 60 MG: 20 CAPSULE ORAL at 09:05

## 2020-01-01 RX ADMIN — RIFAXIMIN 550 MG: 550 TABLET ORAL at 09:05

## 2020-01-01 RX ADMIN — Medication 1 MG: at 00:13

## 2020-01-01 RX ADMIN — SPIRONOLACTONE 50 MG: 25 TABLET ORAL at 10:30

## 2020-01-01 RX ADMIN — ARIPIPRAZOLE 5 MG: 5 TABLET ORAL at 21:03

## 2020-01-01 RX ADMIN — MIDODRINE HYDROCHLORIDE 10 MG: 5 TABLET ORAL at 12:15

## 2020-01-01 RX ADMIN — SENNOSIDES AND DOCUSATE SODIUM 1 TABLET: 8.6; 5 TABLET ORAL at 09:05

## 2020-01-01 RX ADMIN — URSODIOL 500 MG: 250 TABLET, FILM COATED ORAL at 20:55

## 2020-01-01 RX ADMIN — SPIRONOLACTONE 25 MG: 25 TABLET ORAL at 11:23

## 2020-01-01 RX ADMIN — LACTULOSE 20 G: 20 SOLUTION ORAL at 19:23

## 2020-01-01 RX ADMIN — NICOTINE 1 PATCH: 14 PATCH, EXTENDED RELEASE TRANSDERMAL at 20:39

## 2020-01-01 RX ADMIN — LACTULOSE 20 G: 10 SOLUTION ORAL at 08:22

## 2020-01-01 RX ADMIN — MIDODRINE HYDROCHLORIDE 10 MG: 5 TABLET ORAL at 09:43

## 2020-01-01 RX ADMIN — FOLIC ACID 1 MG: 1 TABLET ORAL at 08:16

## 2020-01-01 RX ADMIN — BUSPIRONE HYDROCHLORIDE 15 MG: 5 TABLET ORAL at 09:15

## 2020-01-01 RX ADMIN — LACTULOSE 20 G: 10 SOLUTION ORAL at 03:44

## 2020-01-01 RX ADMIN — LACTULOSE 20 G: 10 SOLUTION ORAL at 08:17

## 2020-01-01 RX ADMIN — CEFTRIAXONE 1 G: 1 INJECTION, POWDER, FOR SOLUTION INTRAMUSCULAR; INTRAVENOUS at 16:03

## 2020-01-01 RX ADMIN — OMEPRAZOLE 20 MG: 20 CAPSULE, DELAYED RELEASE ORAL at 08:47

## 2020-01-01 RX ADMIN — ONDANSETRON HYDROCHLORIDE 4 MG: 4 TABLET, FILM COATED ORAL at 09:32

## 2020-01-01 RX ADMIN — FOLIC ACID 1 MG: 1 TABLET ORAL at 08:29

## 2020-01-01 RX ADMIN — LACTULOSE 20 G: 20 SOLUTION ORAL at 09:46

## 2020-01-01 RX ADMIN — FLUOXETINE HYDROCHLORIDE 60 MG: 40 CAPSULE ORAL at 09:47

## 2020-01-01 RX ADMIN — MIDODRINE HYDROCHLORIDE 5 MG: 5 TABLET ORAL at 12:09

## 2020-01-01 RX ADMIN — Medication 5000 UNITS: at 11:25

## 2020-01-01 RX ADMIN — FOLIC ACID 1 MG: 1 TABLET ORAL at 10:30

## 2020-01-01 RX ADMIN — MIDODRINE HYDROCHLORIDE 10 MG: 5 TABLET ORAL at 18:02

## 2020-01-01 RX ADMIN — LACTULOSE 20 G: 20 SOLUTION ORAL at 21:07

## 2020-01-01 RX ADMIN — ALBUMIN HUMAN 100 G: 0.25 SOLUTION INTRAVENOUS at 09:38

## 2020-01-01 RX ADMIN — LACTULOSE 20 G: 20 SOLUTION ORAL at 14:20

## 2020-01-01 RX ADMIN — ALBUMIN HUMAN 50 G: 0.25 SOLUTION INTRAVENOUS at 08:26

## 2020-01-01 RX ADMIN — MIDODRINE HYDROCHLORIDE 10 MG: 2.5 TABLET ORAL at 18:12

## 2020-01-01 RX ADMIN — THIAMINE HYDROCHLORIDE 250 MG: 100 INJECTION, SOLUTION INTRAMUSCULAR; INTRAVENOUS at 09:43

## 2020-01-01 RX ADMIN — FLUOXETINE 60 MG: 20 CAPSULE ORAL at 08:29

## 2020-01-01 RX ADMIN — OCTREOTIDE ACETATE 100 MCG: 100 INJECTION, SOLUTION INTRAVENOUS; SUBCUTANEOUS at 20:05

## 2020-01-01 RX ADMIN — RIFAXIMIN 550 MG: 550 TABLET ORAL at 21:59

## 2020-01-01 RX ADMIN — MIDODRINE HYDROCHLORIDE 5 MG: 5 TABLET ORAL at 11:06

## 2020-01-01 RX ADMIN — URSODIOL 500 MG: 250 TABLET ORAL at 08:54

## 2020-01-01 RX ADMIN — URSODIOL 500 MG: 250 TABLET ORAL at 09:47

## 2020-01-01 RX ADMIN — FOLIC ACID 1 MG: 1 TABLET ORAL at 10:02

## 2020-01-01 RX ADMIN — NICOTINE 1 PATCH: 14 PATCH, EXTENDED RELEASE TRANSDERMAL at 08:20

## 2020-01-01 RX ADMIN — GABAPENTIN 100 MG: 100 CAPSULE ORAL at 22:33

## 2020-01-01 RX ADMIN — Medication 5000 UNITS: at 20:16

## 2020-01-01 RX ADMIN — OCTREOTIDE ACETATE 100 MCG: 100 INJECTION, SOLUTION INTRAVENOUS; SUBCUTANEOUS at 13:53

## 2020-01-01 RX ADMIN — FLUOXETINE 60 MG: 20 CAPSULE ORAL at 07:59

## 2020-01-01 RX ADMIN — FERROUS SULFATE TAB 325 MG (65 MG ELEMENTAL FE) 325 MG: 325 (65 FE) TAB at 09:47

## 2020-01-01 RX ADMIN — BUSPIRONE HYDROCHLORIDE 15 MG: 15 TABLET ORAL at 20:55

## 2020-01-01 RX ADMIN — URSODIOL 500 MG: 250 TABLET ORAL at 21:59

## 2020-01-01 RX ADMIN — LACTULOSE 20 G: 20 SOLUTION ORAL at 12:31

## 2020-01-01 RX ADMIN — PANTOPRAZOLE SODIUM 40 MG: 40 INJECTION, POWDER, FOR SOLUTION INTRAVENOUS at 23:03

## 2020-01-01 RX ADMIN — FERROUS SULFATE TAB 325 MG (65 MG ELEMENTAL FE) 325 MG: 325 (65 FE) TAB at 19:38

## 2020-01-01 RX ADMIN — LACTULOSE 20 G: 20 SOLUTION ORAL at 10:32

## 2020-01-01 RX ADMIN — ARIPIPRAZOLE 5 MG: 5 TABLET ORAL at 21:15

## 2020-01-01 RX ADMIN — Medication 5000 UNITS: at 10:01

## 2020-01-01 RX ADMIN — URSODIOL 500 MG: 250 TABLET ORAL at 19:52

## 2020-01-01 RX ADMIN — OMEPRAZOLE 20 MG: 20 CAPSULE, DELAYED RELEASE ORAL at 07:58

## 2020-01-01 RX ADMIN — SODIUM BICARBONATE: 84 INJECTION, SOLUTION INTRAVENOUS at 14:10

## 2020-01-01 RX ADMIN — BUSPIRONE HYDROCHLORIDE 15 MG: 5 TABLET ORAL at 21:59

## 2020-01-01 RX ADMIN — FERROUS SULFATE TAB 325 MG (65 MG ELEMENTAL FE) 325 MG: 325 (65 FE) TAB at 21:03

## 2020-01-01 RX ADMIN — MIDODRINE HYDROCHLORIDE 10 MG: 5 TABLET ORAL at 07:53

## 2020-01-01 RX ADMIN — ALBUMIN HUMAN 50 G: 0.25 SOLUTION INTRAVENOUS at 08:25

## 2020-01-01 RX ADMIN — RIFAXIMIN 550 MG: 550 TABLET ORAL at 21:07

## 2020-01-01 RX ADMIN — URSODIOL 500 MG: 250 TABLET ORAL at 07:59

## 2020-01-01 RX ADMIN — OCTREOTIDE ACETATE 100 MCG: 100 INJECTION, SOLUTION INTRAVENOUS; SUBCUTANEOUS at 08:43

## 2020-01-01 RX ADMIN — THIAMINE HYDROCHLORIDE 500 MG: 100 INJECTION, SOLUTION INTRAMUSCULAR; INTRAVENOUS at 14:08

## 2020-01-01 RX ADMIN — PANTOPRAZOLE SODIUM 40 MG: 40 INJECTION, POWDER, FOR SOLUTION INTRAVENOUS at 00:12

## 2020-01-01 RX ADMIN — SPIRONOLACTONE 25 MG: 25 TABLET ORAL at 13:11

## 2020-01-01 RX ADMIN — SODIUM CHLORIDE 1000 ML: 9 INJECTION, SOLUTION INTRAVENOUS at 11:37

## 2020-01-01 RX ADMIN — SODIUM CHLORIDE: 9 INJECTION, SOLUTION INTRAVENOUS at 14:41

## 2020-01-01 RX ADMIN — MIDODRINE HYDROCHLORIDE 10 MG: 5 TABLET ORAL at 17:33

## 2020-01-01 RX ADMIN — RIFAXIMIN 550 MG: 550 TABLET ORAL at 19:46

## 2020-01-01 RX ADMIN — HUMAN ALBUMIN MICROSPHERES AND PERFLUTREN 7 ML: 10; .22 INJECTION, SOLUTION INTRAVENOUS at 12:02

## 2020-01-01 RX ADMIN — ONDANSETRON 4 MG: 2 INJECTION INTRAMUSCULAR; INTRAVENOUS at 12:23

## 2020-01-01 RX ADMIN — GABAPENTIN 300 MG: 300 CAPSULE ORAL at 21:18

## 2020-01-01 RX ADMIN — LIDOCAINE 1 PATCH: 560 PATCH PERCUTANEOUS; TOPICAL; TRANSDERMAL at 03:42

## 2020-01-01 RX ADMIN — FLUOXETINE 60 MG: 20 CAPSULE ORAL at 08:13

## 2020-01-01 RX ADMIN — URSODIOL 500 MG: 250 TABLET ORAL at 09:15

## 2020-01-01 RX ADMIN — LACTULOSE 15 G: 20 SOLUTION ORAL at 14:09

## 2020-01-01 RX ADMIN — LACTULOSE 20 G: 20 SOLUTION ORAL at 03:47

## 2020-01-01 RX ADMIN — CIPROFLOXACIN HYDROCHLORIDE 250 MG: 250 TABLET, FILM COATED ORAL at 08:54

## 2020-01-01 RX ADMIN — FLUOXETINE 60 MG: 20 CAPSULE ORAL at 07:56

## 2020-01-01 RX ADMIN — FOLIC ACID 1 MG: 1 TABLET ORAL at 09:42

## 2020-01-01 RX ADMIN — SENNOSIDES AND DOCUSATE SODIUM 1 TABLET: 8.6; 5 TABLET ORAL at 08:26

## 2020-01-01 RX ADMIN — MIDODRINE HYDROCHLORIDE 5 MG: 5 TABLET ORAL at 07:59

## 2020-01-01 RX ADMIN — SODIUM BICARBONATE: 84 INJECTION, SOLUTION INTRAVENOUS at 21:53

## 2020-01-01 RX ADMIN — THIAMINE HCL TAB 100 MG 100 MG: 100 TAB at 08:02

## 2020-01-01 RX ADMIN — LACTULOSE 20 G: 20 SOLUTION ORAL at 23:56

## 2020-01-01 RX ADMIN — FLUOXETINE HYDROCHLORIDE 60 MG: 40 CAPSULE ORAL at 10:26

## 2020-01-01 RX ADMIN — URSODIOL 500 MG: 250 TABLET ORAL at 12:14

## 2020-01-01 RX ADMIN — LIDOCAINE HYDROCHLORIDE 5 ML: 10 INJECTION, SOLUTION EPIDURAL; INFILTRATION; INTRACAUDAL; PERINEURAL at 11:12

## 2020-01-01 RX ADMIN — ALBUMIN HUMAN 12.5 G: 0.25 SOLUTION INTRAVENOUS at 14:09

## 2020-01-01 RX ADMIN — LACTULOSE 20 G: 20 SOLUTION ORAL at 07:53

## 2020-01-01 RX ADMIN — LACTULOSE 20 G: 20 SOLUTION ORAL at 19:36

## 2020-01-01 RX ADMIN — CIPROFLOXACIN HYDROCHLORIDE 250 MG: 250 TABLET, FILM COATED ORAL at 08:29

## 2020-01-01 RX ADMIN — CEFTRIAXONE 1 G: 1 INJECTION, POWDER, FOR SOLUTION INTRAMUSCULAR; INTRAVENOUS at 14:13

## 2020-01-01 RX ADMIN — CIPROFLOXACIN HYDROCHLORIDE 250 MG: 250 TABLET, FILM COATED ORAL at 08:47

## 2020-01-01 RX ADMIN — NICOTINE 1 PATCH: 21 PATCH TRANSDERMAL at 09:06

## 2020-01-01 RX ADMIN — ARIPIPRAZOLE 5 MG: 5 TABLET ORAL at 22:14

## 2020-01-01 RX ADMIN — ALBUMIN HUMAN 50 G: 0.25 SOLUTION INTRAVENOUS at 00:28

## 2020-01-01 RX ADMIN — URSODIOL 500 MG: 250 TABLET ORAL at 20:54

## 2020-01-01 RX ADMIN — LIDOCAINE HYDROCHLORIDE 5 ML: 10 INJECTION, SOLUTION EPIDURAL; INFILTRATION; INTRACAUDAL; PERINEURAL at 15:03

## 2020-01-01 RX ADMIN — FOLIC ACID 1 MG: 1 TABLET ORAL at 08:47

## 2020-01-01 RX ADMIN — LACTULOSE 20 G: 20 SOLUTION ORAL at 21:10

## 2020-01-01 RX ADMIN — MIDODRINE HYDROCHLORIDE 5 MG: 5 TABLET ORAL at 17:10

## 2020-01-01 RX ADMIN — MIDODRINE HYDROCHLORIDE 10 MG: 5 TABLET ORAL at 18:40

## 2020-01-01 RX ADMIN — ALBUMIN HUMAN 50 G: 0.25 SOLUTION INTRAVENOUS at 09:44

## 2020-01-01 RX ADMIN — DEXTROSE MONOHYDRATE 500 ML: 50 INJECTION, SOLUTION INTRAVENOUS at 17:19

## 2020-01-01 RX ADMIN — BUSPIRONE HYDROCHLORIDE 15 MG: 5 TABLET ORAL at 20:21

## 2020-01-01 RX ADMIN — MIDODRINE HYDROCHLORIDE 10 MG: 2.5 TABLET ORAL at 09:03

## 2020-01-01 RX ADMIN — SODIUM BICARBONATE 650 MG TABLET 650 MG: at 19:35

## 2020-01-01 RX ADMIN — URSODIOL 500 MG: 250 TABLET ORAL at 21:07

## 2020-01-01 RX ADMIN — URSODIOL 500 MG: 250 TABLET ORAL at 09:43

## 2020-01-01 RX ADMIN — METOPROLOL TARTRATE 2.5 MG: 5 INJECTION INTRAVENOUS at 12:01

## 2020-01-01 RX ADMIN — LACTULOSE 20 G: 20 SOLUTION ORAL at 21:17

## 2020-01-01 RX ADMIN — SODIUM BICARBONATE: 84 INJECTION, SOLUTION INTRAVENOUS at 06:08

## 2020-01-01 RX ADMIN — RIFAXIMIN 550 MG: 550 TABLET ORAL at 19:35

## 2020-01-01 RX ADMIN — AZITHROMYCIN 250 MG: 250 TABLET, FILM COATED ORAL at 09:47

## 2020-01-01 RX ADMIN — FOLIC ACID 1 MG: 1 TABLET ORAL at 08:22

## 2020-01-01 RX ADMIN — MIDODRINE HYDROCHLORIDE 10 MG: 5 TABLET ORAL at 18:23

## 2020-01-01 RX ADMIN — OCTREOTIDE ACETATE 100 MCG: 100 INJECTION, SOLUTION INTRAVENOUS; SUBCUTANEOUS at 12:14

## 2020-01-01 RX ADMIN — LACTULOSE 20 G: 20 SOLUTION ORAL at 19:48

## 2020-01-01 RX ADMIN — ARIPIPRAZOLE 5 MG: 5 TABLET ORAL at 21:18

## 2020-01-01 RX ADMIN — CEFTRIAXONE 1 G: 1 INJECTION, POWDER, FOR SOLUTION INTRAMUSCULAR; INTRAVENOUS at 16:20

## 2020-01-01 RX ADMIN — URSODIOL 500 MG: 250 TABLET ORAL at 21:17

## 2020-01-01 RX ADMIN — THIAMINE HCL TAB 100 MG 100 MG: 100 TAB at 08:43

## 2020-01-01 RX ADMIN — URSODIOL 500 MG: 250 TABLET, FILM COATED ORAL at 20:14

## 2020-01-01 RX ADMIN — CALCIUM CARBONATE (ANTACID) CHEW TAB 500 MG 500 MG: 500 CHEW TAB at 10:52

## 2020-01-01 RX ADMIN — OMEPRAZOLE 20 MG: 20 CAPSULE, DELAYED RELEASE ORAL at 08:43

## 2020-01-01 RX ADMIN — MIDODRINE HYDROCHLORIDE 5 MG: 5 TABLET ORAL at 12:03

## 2020-01-01 RX ADMIN — LACTULOSE 20 G: 20 SOLUTION ORAL at 13:06

## 2020-01-01 RX ADMIN — BUSPIRONE HYDROCHLORIDE 15 MG: 5 TABLET ORAL at 09:03

## 2020-01-01 RX ADMIN — URSODIOL 500 MG: 250 TABLET ORAL at 08:29

## 2020-01-01 RX ADMIN — BUSPIRONE HYDROCHLORIDE 15 MG: 15 TABLET ORAL at 19:25

## 2020-01-01 RX ADMIN — URSODIOL 500 MG: 250 TABLET, FILM COATED ORAL at 19:33

## 2020-01-01 RX ADMIN — MIDODRINE HYDROCHLORIDE 5 MG: 5 TABLET ORAL at 21:59

## 2020-01-01 RX ADMIN — LACTULOSE 20 G: 20 SOLUTION ORAL at 21:25

## 2020-01-01 RX ADMIN — MIDODRINE HYDROCHLORIDE 5 MG: 5 TABLET ORAL at 11:50

## 2020-01-01 RX ADMIN — RIFAXIMIN 550 MG: 550 TABLET ORAL at 09:15

## 2020-01-01 RX ADMIN — SPIRONOLACTONE 25 MG: 25 TABLET ORAL at 08:19

## 2020-01-01 RX ADMIN — URSODIOL 500 MG: 250 TABLET ORAL at 07:53

## 2020-01-01 RX ADMIN — RIFAXIMIN 550 MG: 550 TABLET ORAL at 19:21

## 2020-01-01 RX ADMIN — FUROSEMIDE 20 MG: 20 TABLET ORAL at 08:21

## 2020-01-01 RX ADMIN — AZITHROMYCIN 250 MG: 250 TABLET, FILM COATED ORAL at 09:46

## 2020-01-01 RX ADMIN — OCTREOTIDE ACETATE 100 MCG: 100 INJECTION, SOLUTION INTRAVENOUS; SUBCUTANEOUS at 08:00

## 2020-01-01 RX ADMIN — CIPROFLOXACIN HYDROCHLORIDE 250 MG: 250 TABLET, FILM COATED ORAL at 10:03

## 2020-01-01 RX ADMIN — LACTULOSE 20 G: 20 SOLUTION ORAL at 08:54

## 2020-01-01 RX ADMIN — CEFTRIAXONE SODIUM 1 G: 1 INJECTION, POWDER, FOR SOLUTION INTRAMUSCULAR; INTRAVENOUS at 14:20

## 2020-01-01 RX ADMIN — GABAPENTIN 100 MG: 100 CAPSULE ORAL at 17:37

## 2020-01-01 RX ADMIN — ONDANSETRON HYDROCHLORIDE 4 MG: 4 TABLET, FILM COATED ORAL at 20:32

## 2020-01-01 RX ADMIN — SODIUM BICARBONATE 650 MG TABLET 1300 MG: at 21:03

## 2020-01-01 RX ADMIN — SODIUM BICARBONATE 650 MG TABLET 650 MG: at 20:48

## 2020-01-01 RX ADMIN — SODIUM BICARBONATE 650 MG TABLET 1300 MG: at 19:48

## 2020-01-01 RX ADMIN — ONDANSETRON 4 MG: 2 INJECTION INTRAMUSCULAR; INTRAVENOUS at 09:57

## 2020-01-01 RX ADMIN — THIAMINE HYDROCHLORIDE 500 MG: 100 INJECTION, SOLUTION INTRAMUSCULAR; INTRAVENOUS at 19:52

## 2020-01-01 RX ADMIN — LACTULOSE 20 G: 20 SOLUTION ORAL at 04:23

## 2020-01-01 RX ADMIN — OMEPRAZOLE 20 MG: 20 CAPSULE, DELAYED RELEASE ORAL at 08:22

## 2020-01-01 RX ADMIN — FOLIC ACID 1 MG: 1 TABLET ORAL at 09:46

## 2020-01-01 RX ADMIN — MIDODRINE HYDROCHLORIDE 5 MG: 5 TABLET ORAL at 08:22

## 2020-01-01 RX ADMIN — LACTULOSE 20 G: 10 SOLUTION ORAL at 09:05

## 2020-01-01 RX ADMIN — MIDODRINE HYDROCHLORIDE 10 MG: 5 TABLET ORAL at 13:29

## 2020-01-01 RX ADMIN — MIDODRINE HYDROCHLORIDE 5 MG: 5 TABLET ORAL at 17:59

## 2020-01-01 RX ADMIN — LACTULOSE 20 G: 10 SOLUTION ORAL at 13:01

## 2020-01-01 RX ADMIN — RIFAXIMIN 550 MG: 550 TABLET ORAL at 19:23

## 2020-01-01 RX ADMIN — MIDODRINE HYDROCHLORIDE 10 MG: 5 TABLET ORAL at 11:26

## 2020-01-01 RX ADMIN — FLUOXETINE 60 MG: 20 CAPSULE ORAL at 08:26

## 2020-01-01 RX ADMIN — FLUOXETINE HYDROCHLORIDE 60 MG: 40 CAPSULE ORAL at 07:58

## 2020-01-01 RX ADMIN — RIFAXIMIN 550 MG: 550 TABLET ORAL at 19:48

## 2020-01-01 RX ADMIN — OMEPRAZOLE 20 MG: 20 CAPSULE, DELAYED RELEASE ORAL at 07:59

## 2020-01-01 RX ADMIN — FERROUS SULFATE TAB 325 MG (65 MG ELEMENTAL FE) 325 MG: 325 (65 FE) TAB at 19:41

## 2020-01-01 RX ADMIN — BUSPIRONE HYDROCHLORIDE 15 MG: 15 TABLET ORAL at 09:05

## 2020-01-01 RX ADMIN — ONDANSETRON 4 MG: 2 INJECTION INTRAMUSCULAR; INTRAVENOUS at 14:48

## 2020-01-01 RX ADMIN — FOLIC ACID 1 MG: 1 TABLET ORAL at 09:03

## 2020-01-01 RX ADMIN — URSODIOL 500 MG: 250 TABLET ORAL at 07:56

## 2020-01-01 RX ADMIN — BUSPIRONE HYDROCHLORIDE 15 MG: 5 TABLET ORAL at 07:56

## 2020-01-01 RX ADMIN — RIFAXIMIN 550 MG: 550 TABLET ORAL at 21:03

## 2020-01-01 RX ADMIN — FLUOXETINE HYDROCHLORIDE 60 MG: 40 CAPSULE ORAL at 09:02

## 2020-01-01 RX ADMIN — ONDANSETRON HYDROCHLORIDE 4 MG: 4 TABLET, FILM COATED ORAL at 21:12

## 2020-01-01 RX ADMIN — SODIUM BICARBONATE 650 MG TABLET 650 MG: at 07:59

## 2020-01-01 RX ADMIN — LACTULOSE 20 G: 10 SOLUTION ORAL at 20:55

## 2020-01-01 RX ADMIN — MIDODRINE HYDROCHLORIDE 5 MG: 5 TABLET ORAL at 11:23

## 2020-01-01 RX ADMIN — MELATONIN TAB 3 MG 3 MG: 3 TAB at 01:34

## 2020-01-01 RX ADMIN — MIDODRINE HYDROCHLORIDE 10 MG: 5 TABLET ORAL at 18:15

## 2020-01-01 RX ADMIN — SODIUM BICARBONATE 650 MG TABLET 1300 MG: at 07:52

## 2020-01-01 RX ADMIN — URSODIOL 500 MG: 250 TABLET, FILM COATED ORAL at 08:16

## 2020-01-01 RX ADMIN — FERROUS SULFATE TAB 325 MG (65 MG ELEMENTAL FE) 325 MG: 325 (65 FE) TAB at 07:59

## 2020-01-01 RX ADMIN — URSODIOL 500 MG: 250 TABLET ORAL at 20:21

## 2020-01-01 RX ADMIN — LACTULOSE 15 G: 20 SOLUTION ORAL at 20:25

## 2020-01-01 RX ADMIN — LACTULOSE 20 G: 20 SOLUTION ORAL at 00:47

## 2020-01-01 RX ADMIN — FLUOXETINE 60 MG: 20 CAPSULE ORAL at 08:22

## 2020-01-01 RX ADMIN — FUROSEMIDE 20 MG: 20 TABLET ORAL at 10:30

## 2020-01-01 RX ADMIN — MIDODRINE HYDROCHLORIDE 5 MG: 5 TABLET ORAL at 08:16

## 2020-01-01 RX ADMIN — RIFAXIMIN 550 MG: 550 TABLET ORAL at 07:59

## 2020-01-01 RX ADMIN — MIDODRINE HYDROCHLORIDE 5 MG: 5 TABLET ORAL at 18:54

## 2020-01-01 RX ADMIN — LACTULOSE 20 G: 20 SOLUTION ORAL at 16:20

## 2020-01-01 RX ADMIN — BUSPIRONE HYDROCHLORIDE 15 MG: 15 TABLET ORAL at 19:21

## 2020-01-01 RX ADMIN — MIDODRINE HYDROCHLORIDE 10 MG: 5 TABLET ORAL at 09:47

## 2020-01-01 RX ADMIN — RIFAXIMIN 550 MG: 550 TABLET ORAL at 08:22

## 2020-01-01 RX ADMIN — ACETAMINOPHEN 650 MG: 325 TABLET, FILM COATED ORAL at 02:42

## 2020-01-01 RX ADMIN — FOLIC ACID 1 MG: 1 TABLET ORAL at 09:20

## 2020-01-01 RX ADMIN — RIFAXIMIN 550 MG: 550 TABLET ORAL at 20:14

## 2020-01-01 RX ADMIN — LACTULOSE 20 G: 20 SOLUTION ORAL at 19:40

## 2020-01-01 RX ADMIN — RIFAXIMIN 550 MG: 550 TABLET ORAL at 09:04

## 2020-01-01 RX ADMIN — MIDODRINE HYDROCHLORIDE 10 MG: 5 TABLET ORAL at 12:14

## 2020-01-01 RX ADMIN — MIDODRINE HYDROCHLORIDE 10 MG: 5 TABLET ORAL at 14:07

## 2020-01-01 RX ADMIN — LACTULOSE 20 G: 20 SOLUTION ORAL at 12:09

## 2020-01-01 RX ADMIN — CIPROFLOXACIN HYDROCHLORIDE 500 MG: 500 TABLET, FILM COATED ORAL at 09:05

## 2020-01-01 RX ADMIN — CIPROFLOXACIN HYDROCHLORIDE 250 MG: 250 TABLET, FILM COATED ORAL at 11:58

## 2020-01-01 RX ADMIN — ONDANSETRON 4 MG: 2 INJECTION INTRAMUSCULAR; INTRAVENOUS at 08:28

## 2020-01-01 RX ADMIN — OMEPRAZOLE 20 MG: 20 CAPSULE, DELAYED RELEASE ORAL at 17:26

## 2020-01-01 RX ADMIN — LACTULOSE 20 G: 10 SOLUTION ORAL at 20:13

## 2020-01-01 RX ADMIN — LACTULOSE 20 G: 20 SOLUTION ORAL at 22:34

## 2020-01-01 RX ADMIN — MIDODRINE HYDROCHLORIDE 5 MG: 5 TABLET ORAL at 13:01

## 2020-01-01 RX ADMIN — CEFTRIAXONE 1 G: 1 INJECTION, POWDER, FOR SOLUTION INTRAMUSCULAR; INTRAVENOUS at 19:24

## 2020-01-01 RX ADMIN — LACTULOSE 20 G: 20 SOLUTION ORAL at 08:23

## 2020-01-01 RX ADMIN — RIFAXIMIN 550 MG: 550 TABLET ORAL at 19:30

## 2020-01-01 RX ADMIN — BUSPIRONE HYDROCHLORIDE 15 MG: 15 TABLET ORAL at 10:28

## 2020-01-01 RX ADMIN — CEFTRIAXONE 1 G: 1 INJECTION, POWDER, FOR SOLUTION INTRAMUSCULAR; INTRAVENOUS at 13:33

## 2020-01-01 RX ADMIN — NICOTINE 1 PATCH: 21 PATCH TRANSDERMAL at 01:32

## 2020-01-01 RX ADMIN — ACETAMINOPHEN 650 MG: 325 TABLET, FILM COATED ORAL at 22:41

## 2020-01-01 RX ADMIN — BUSPIRONE HYDROCHLORIDE 15 MG: 5 TABLET ORAL at 01:32

## 2020-01-01 RX ADMIN — FUROSEMIDE 20 MG: 20 TABLET ORAL at 08:22

## 2020-01-01 RX ADMIN — FLUOXETINE 60 MG: 20 CAPSULE ORAL at 17:47

## 2020-01-01 RX ADMIN — LACTULOSE 20 G: 20 SOLUTION ORAL at 20:21

## 2020-01-01 RX ADMIN — SODIUM BICARBONATE 650 MG TABLET 650 MG: at 09:02

## 2020-01-01 RX ADMIN — SODIUM BICARBONATE 650 MG TABLET 1300 MG: at 09:02

## 2020-01-01 RX ADMIN — FERROUS SULFATE TAB 325 MG (65 MG ELEMENTAL FE) 325 MG: 325 (65 FE) TAB at 08:43

## 2020-01-01 RX ADMIN — FLUOXETINE 60 MG: 20 CAPSULE ORAL at 09:14

## 2020-01-01 RX ADMIN — CIPROFLOXACIN HYDROCHLORIDE 250 MG: 250 TABLET, FILM COATED ORAL at 09:02

## 2020-01-01 RX ADMIN — URSODIOL 500 MG: 250 TABLET ORAL at 10:30

## 2020-01-01 RX ADMIN — FUROSEMIDE 40 MG: 40 TABLET ORAL at 14:20

## 2020-01-01 RX ADMIN — RIFAXIMIN 550 MG: 550 TABLET ORAL at 20:44

## 2020-01-01 RX ADMIN — ARIPIPRAZOLE 5 MG: 5 TABLET ORAL at 21:10

## 2020-01-01 RX ADMIN — LACTULOSE 20 G: 20 SOLUTION ORAL at 05:59

## 2020-01-01 RX ADMIN — LACTULOSE 20 G: 10 SOLUTION ORAL at 13:23

## 2020-01-01 RX ADMIN — CIPROFLOXACIN HYDROCHLORIDE 250 MG: 250 TABLET, FILM COATED ORAL at 09:20

## 2020-01-01 RX ADMIN — PHYTONADIONE 10 MG: 10 INJECTION, EMULSION INTRAMUSCULAR; INTRAVENOUS; SUBCUTANEOUS at 08:01

## 2020-01-01 RX ADMIN — CALCITONIN SALMON 200 UNITS: 200 INJECTION, SOLUTION INTRAMUSCULAR; SUBCUTANEOUS at 14:25

## 2020-01-01 RX ADMIN — FLUOXETINE 60 MG: 20 CAPSULE ORAL at 17:58

## 2020-01-01 RX ADMIN — SPIRONOLACTONE 50 MG: 25 TABLET ORAL at 09:04

## 2020-01-01 RX ADMIN — LACTULOSE 20 G: 10 SOLUTION ORAL at 10:01

## 2020-01-01 RX ADMIN — POTASSIUM CHLORIDE 10 MEQ: 750 TABLET, EXTENDED RELEASE ORAL at 09:05

## 2020-01-01 RX ADMIN — LACTULOSE 20 G: 20 SOLUTION ORAL at 13:11

## 2020-01-01 RX ADMIN — THIAMINE HYDROCHLORIDE 250 MG: 100 INJECTION, SOLUTION INTRAMUSCULAR; INTRAVENOUS at 08:55

## 2020-01-01 RX ADMIN — FOLIC ACID 1 MG: 1 TABLET ORAL at 17:59

## 2020-01-01 RX ADMIN — URSODIOL 500 MG: 250 TABLET ORAL at 20:25

## 2020-01-01 RX ADMIN — LACTULOSE 100 G: 10 SOLUTION ORAL; RECTAL at 23:56

## 2020-01-01 RX ADMIN — URSODIOL 500 MG: 250 TABLET ORAL at 09:46

## 2020-01-01 RX ADMIN — BUSPIRONE HYDROCHLORIDE 15 MG: 15 TABLET ORAL at 21:03

## 2020-01-01 RX ADMIN — LACTULOSE 20 G: 20 SOLUTION ORAL at 04:10

## 2020-01-01 RX ADMIN — Medication 100 MG: at 09:03

## 2020-01-01 RX ADMIN — PANTOPRAZOLE SODIUM 40 MG: 40 INJECTION, POWDER, FOR SOLUTION INTRAVENOUS at 23:48

## 2020-01-01 RX ADMIN — MIDODRINE HYDROCHLORIDE 10 MG: 2.5 TABLET ORAL at 13:14

## 2020-01-01 RX ADMIN — RIFAXIMIN 550 MG: 550 TABLET ORAL at 09:02

## 2020-01-01 RX ADMIN — NICOTINE 1 PATCH: 14 PATCH, EXTENDED RELEASE TRANSDERMAL at 09:15

## 2020-01-01 RX ADMIN — RIFAXIMIN 550 MG: 550 TABLET ORAL at 19:55

## 2020-01-01 RX ADMIN — AZITHROMYCIN MONOHYDRATE 500 MG: 500 INJECTION, POWDER, LYOPHILIZED, FOR SOLUTION INTRAVENOUS at 20:44

## 2020-01-01 RX ADMIN — FERROUS SULFATE TAB 325 MG (65 MG ELEMENTAL FE) 325 MG: 325 (65 FE) TAB at 19:48

## 2020-01-01 RX ADMIN — ARIPIPRAZOLE 5 MG: 5 TABLET ORAL at 22:12

## 2020-01-01 RX ADMIN — OMEPRAZOLE 20 MG: 20 CAPSULE, DELAYED RELEASE ORAL at 09:14

## 2020-01-01 RX ADMIN — Medication 1 MG: at 22:57

## 2020-01-01 RX ADMIN — FERROUS SULFATE TAB 325 MG (65 MG ELEMENTAL FE) 325 MG: 325 (65 FE) TAB at 10:31

## 2020-01-01 RX ADMIN — MIDODRINE HYDROCHLORIDE 10 MG: 5 TABLET ORAL at 18:07

## 2020-01-01 RX ADMIN — MIDODRINE HYDROCHLORIDE 10 MG: 5 TABLET ORAL at 12:26

## 2020-01-01 RX ADMIN — THIAMINE HCL TAB 100 MG 100 MG: 100 TAB at 08:41

## 2020-01-01 RX ADMIN — OMEPRAZOLE 20 MG: 20 CAPSULE, DELAYED RELEASE ORAL at 08:03

## 2020-01-01 RX ADMIN — LACTULOSE 20 G: 20 SOLUTION ORAL at 11:50

## 2020-01-01 RX ADMIN — URSODIOL 500 MG: 250 TABLET ORAL at 21:03

## 2020-01-01 RX ADMIN — Medication 5000 UNITS: at 21:38

## 2020-01-01 RX ADMIN — FERROUS SULFATE TAB 325 MG (65 MG ELEMENTAL FE) 325 MG: 325 (65 FE) TAB at 19:35

## 2020-01-01 RX ADMIN — LACTULOSE 20 G: 20 SOLUTION ORAL at 04:50

## 2020-01-01 RX ADMIN — MELATONIN TAB 3 MG 5 MG: 3 TAB at 21:39

## 2020-01-01 RX ADMIN — ACETAMINOPHEN 650 MG: 325 TABLET, FILM COATED ORAL at 05:56

## 2020-01-01 RX ADMIN — FOLIC ACID 1 MG: 1 TABLET ORAL at 17:26

## 2020-01-01 RX ADMIN — FUROSEMIDE 20 MG: 20 TABLET ORAL at 09:05

## 2020-01-01 RX ADMIN — OMEPRAZOLE 20 MG: 20 CAPSULE, DELAYED RELEASE ORAL at 09:47

## 2020-01-01 RX ADMIN — RIFAXIMIN 550 MG: 550 TABLET ORAL at 19:20

## 2020-01-01 RX ADMIN — FLUOXETINE 60 MG: 20 CAPSULE ORAL at 10:01

## 2020-01-01 RX ADMIN — RIFAXIMIN 550 MG: 550 TABLET ORAL at 08:16

## 2020-01-01 RX ADMIN — FOLIC ACID 1 MG: 1 TABLET ORAL at 07:59

## 2020-01-01 RX ADMIN — URSODIOL 500 MG: 250 TABLET ORAL at 08:16

## 2020-01-01 RX ADMIN — THIAMINE HCL TAB 100 MG 100 MG: 100 TAB at 09:15

## 2020-01-01 RX ADMIN — CEFTRIAXONE SODIUM 2 G: 2 INJECTION, POWDER, FOR SOLUTION INTRAMUSCULAR; INTRAVENOUS at 15:35

## 2020-01-01 RX ADMIN — MIDODRINE HYDROCHLORIDE 10 MG: 5 TABLET ORAL at 18:03

## 2020-01-01 RX ADMIN — URSODIOL 500 MG: 250 TABLET, FILM COATED ORAL at 10:01

## 2020-01-01 RX ADMIN — OMEPRAZOLE 20 MG: 20 CAPSULE, DELAYED RELEASE ORAL at 09:42

## 2020-01-01 RX ADMIN — AZITHROMYCIN 250 MG: 250 TABLET, FILM COATED ORAL at 09:02

## 2020-01-01 RX ADMIN — AZITHROMYCIN 250 MG: 250 TABLET, FILM COATED ORAL at 07:59

## 2020-01-01 RX ADMIN — LACTULOSE 20 G: 20 SOLUTION ORAL at 00:49

## 2020-01-01 RX ADMIN — CEFTRIAXONE 1 G: 1 INJECTION, POWDER, FOR SOLUTION INTRAMUSCULAR; INTRAVENOUS at 16:01

## 2020-01-01 RX ADMIN — BUSPIRONE HYDROCHLORIDE 15 MG: 15 TABLET ORAL at 10:02

## 2020-01-01 RX ADMIN — RIFAXIMIN 550 MG: 550 TABLET ORAL at 09:20

## 2020-01-01 RX ADMIN — FOLIC ACID 1 MG: 1 TABLET ORAL at 07:53

## 2020-01-01 RX ADMIN — THIAMINE HCL TAB 100 MG 100 MG: 100 TAB at 07:53

## 2020-01-01 RX ADMIN — LACTULOSE 20 G: 20 SOLUTION ORAL at 09:19

## 2020-01-01 RX ADMIN — RIFAXIMIN 550 MG: 550 TABLET ORAL at 09:47

## 2020-01-01 RX ADMIN — SENNOSIDES AND DOCUSATE SODIUM 1 TABLET: 8.6; 5 TABLET ORAL at 20:55

## 2020-01-01 RX ADMIN — LACTULOSE 20 G: 20 SOLUTION ORAL at 09:42

## 2020-01-01 RX ADMIN — RIFAXIMIN 550 MG: 550 TABLET ORAL at 07:53

## 2020-01-01 RX ADMIN — MIDODRINE HYDROCHLORIDE 10 MG: 5 TABLET ORAL at 18:29

## 2020-01-01 RX ADMIN — CIPROFLOXACIN HYDROCHLORIDE 250 MG: 250 TABLET, FILM COATED ORAL at 08:01

## 2020-01-01 RX ADMIN — MIDODRINE HYDROCHLORIDE 10 MG: 5 TABLET ORAL at 08:02

## 2020-01-01 RX ADMIN — RIFAXIMIN 550 MG: 550 TABLET ORAL at 08:43

## 2020-01-01 RX ADMIN — LACTULOSE 20 G: 10 SOLUTION ORAL at 19:21

## 2020-01-01 RX ADMIN — LACTULOSE 20 G: 20 SOLUTION ORAL at 01:38

## 2020-01-01 RX ADMIN — MELATONIN TAB 3 MG 5 MG: 3 TAB at 20:15

## 2020-01-01 RX ADMIN — URSODIOL 500 MG: 250 TABLET ORAL at 19:46

## 2020-01-01 RX ADMIN — FOLIC ACID 1 MG: 1 TABLET ORAL at 08:02

## 2020-01-01 RX ADMIN — THIAMINE HYDROCHLORIDE 250 MG: 100 INJECTION, SOLUTION INTRAMUSCULAR; INTRAVENOUS at 08:13

## 2020-01-01 RX ADMIN — SODIUM BICARBONATE 650 MG TABLET 650 MG: at 08:29

## 2020-01-01 RX ADMIN — THIAMINE HCL TAB 100 MG 100 MG: 100 TAB at 22:07

## 2020-01-01 RX ADMIN — URSODIOL 500 MG: 250 TABLET, FILM COATED ORAL at 09:05

## 2020-01-01 RX ADMIN — MIDODRINE HYDROCHLORIDE 5 MG: 5 TABLET ORAL at 09:05

## 2020-01-01 RX ADMIN — URSODIOL 500 MG: 250 TABLET ORAL at 09:02

## 2020-01-01 RX ADMIN — URSODIOL 500 MG: 250 TABLET, FILM COATED ORAL at 08:23

## 2020-01-01 RX ADMIN — FLUOXETINE 60 MG: 20 CAPSULE ORAL at 08:54

## 2020-01-01 RX ADMIN — LACTULOSE 20 G: 20 SOLUTION ORAL at 19:41

## 2020-01-01 RX ADMIN — PANTOPRAZOLE SODIUM 40 MG: 40 INJECTION, POWDER, FOR SOLUTION INTRAVENOUS at 14:18

## 2020-01-01 RX ADMIN — FLUOXETINE 60 MG: 20 CAPSULE ORAL at 09:15

## 2020-01-01 RX ADMIN — SENNOSIDES AND DOCUSATE SODIUM 1 TABLET: 8.6; 5 TABLET ORAL at 19:21

## 2020-01-01 RX ADMIN — LACTULOSE 20 G: 20 SOLUTION ORAL at 03:40

## 2020-01-01 RX ADMIN — CIPROFLOXACIN HYDROCHLORIDE 250 MG: 250 TABLET, FILM COATED ORAL at 09:43

## 2020-01-01 RX ADMIN — OCTREOTIDE ACETATE 100 MCG: 100 INJECTION, SOLUTION INTRAVENOUS; SUBCUTANEOUS at 16:02

## 2020-01-01 RX ADMIN — MIDODRINE HYDROCHLORIDE 10 MG: 5 TABLET ORAL at 11:54

## 2020-01-01 RX ADMIN — ALBUMIN HUMAN 25 G: 0.25 SOLUTION INTRAVENOUS at 19:19

## 2020-01-01 RX ADMIN — LACTULOSE 20 G: 20 SOLUTION ORAL at 14:40

## 2020-01-01 RX ADMIN — RIFAXIMIN 550 MG: 550 TABLET ORAL at 08:26

## 2020-01-01 RX ADMIN — RIFAXIMIN 550 MG: 550 TABLET ORAL at 19:41

## 2020-01-01 RX ADMIN — MIDODRINE HYDROCHLORIDE 5 MG: 5 TABLET ORAL at 10:03

## 2020-01-01 RX ADMIN — URSODIOL 500 MG: 250 TABLET ORAL at 19:29

## 2020-01-01 RX ADMIN — LACTULOSE 20 G: 20 SOLUTION ORAL at 15:27

## 2020-01-01 RX ADMIN — OCTREOTIDE ACETATE 100 MCG: 100 INJECTION, SOLUTION INTRAVENOUS; SUBCUTANEOUS at 19:41

## 2020-01-01 RX ADMIN — Medication 1 MG: at 00:09

## 2020-01-01 RX ADMIN — RIFAXIMIN 550 MG: 550 TABLET ORAL at 20:05

## 2020-01-01 RX ADMIN — MIDODRINE HYDROCHLORIDE 5 MG: 5 TABLET ORAL at 17:23

## 2020-01-01 RX ADMIN — RIFAXIMIN 550 MG: 550 TABLET ORAL at 19:36

## 2020-01-01 RX ADMIN — SODIUM CHLORIDE, POTASSIUM CHLORIDE, SODIUM LACTATE AND CALCIUM CHLORIDE 1000 ML: 600; 310; 30; 20 INJECTION, SOLUTION INTRAVENOUS at 13:04

## 2020-01-01 RX ADMIN — AZITHROMYCIN 250 MG: 250 TABLET, FILM COATED ORAL at 10:24

## 2020-01-01 RX ADMIN — FERROUS SULFATE TAB 325 MG (65 MG ELEMENTAL FE) 325 MG: 325 (65 FE) TAB at 19:36

## 2020-01-01 RX ADMIN — FLUOXETINE 60 MG: 20 CAPSULE ORAL at 08:47

## 2020-01-01 RX ADMIN — URSODIOL 500 MG: 250 TABLET ORAL at 08:20

## 2020-01-01 RX ADMIN — LACTULOSE 20 G: 20 SOLUTION ORAL at 17:37

## 2020-01-01 RX ADMIN — OMEPRAZOLE 20 MG: 20 CAPSULE, DELAYED RELEASE ORAL at 08:13

## 2020-01-01 RX ADMIN — MIDODRINE HYDROCHLORIDE 5 MG: 5 TABLET ORAL at 17:47

## 2020-01-01 RX ADMIN — MIDODRINE HYDROCHLORIDE 5 MG: 5 TABLET ORAL at 18:19

## 2020-01-01 RX ADMIN — MIDODRINE HYDROCHLORIDE 10 MG: 5 TABLET ORAL at 13:18

## 2020-01-01 RX ADMIN — CIPROFLOXACIN HYDROCHLORIDE 250 MG: 250 TABLET, FILM COATED ORAL at 09:14

## 2020-01-01 RX ADMIN — MIDODRINE HYDROCHLORIDE 5 MG: 5 TABLET ORAL at 17:33

## 2020-01-01 RX ADMIN — SPIRONOLACTONE 50 MG: 25 TABLET ORAL at 10:01

## 2020-01-01 RX ADMIN — FOLIC ACID 1 MG: 1 TABLET ORAL at 20:44

## 2020-01-01 RX ADMIN — SODIUM CHLORIDE 1000 ML: 9 INJECTION, SOLUTION INTRAVENOUS at 08:26

## 2020-01-01 RX ADMIN — Medication 1 MG: at 02:38

## 2020-01-01 RX ADMIN — MIDODRINE HYDROCHLORIDE 10 MG: 5 TABLET ORAL at 12:48

## 2020-01-01 RX ADMIN — MIDODRINE HYDROCHLORIDE 10 MG: 5 TABLET ORAL at 19:01

## 2020-01-01 RX ADMIN — DOBUTAMINE HYDROCHLORIDE 10 MCG/KG/MIN: 200 INJECTION INTRAVENOUS at 11:48

## 2020-01-01 RX ADMIN — THIAMINE HCL TAB 100 MG 100 MG: 100 TAB at 17:26

## 2020-01-01 RX ADMIN — LACTULOSE 20 G: 20 SOLUTION ORAL at 13:58

## 2020-01-01 RX ADMIN — FERROUS SULFATE TAB 325 MG (65 MG ELEMENTAL FE) 325 MG: 325 (65 FE) TAB at 08:03

## 2020-01-01 RX ADMIN — URSODIOL 500 MG: 250 TABLET ORAL at 08:26

## 2020-01-01 RX ADMIN — SPIRONOLACTONE 25 MG: 25 TABLET ORAL at 09:03

## 2020-01-01 RX ADMIN — CIPROFLOXACIN HYDROCHLORIDE 250 MG: 250 TABLET, FILM COATED ORAL at 12:15

## 2020-01-01 RX ADMIN — PHYTONADIONE 10 MG: 10 INJECTION, EMULSION INTRAMUSCULAR; INTRAVENOUS; SUBCUTANEOUS at 09:48

## 2020-01-01 RX ADMIN — RIFAXIMIN 550 MG: 550 TABLET ORAL at 09:42

## 2020-01-01 RX ADMIN — ALBUMIN HUMAN 25 G: 0.25 SOLUTION INTRAVENOUS at 12:13

## 2020-01-01 RX ADMIN — LACTULOSE 20 G: 20 SOLUTION ORAL at 17:30

## 2020-01-01 RX ADMIN — OMEPRAZOLE 20 MG: 20 CAPSULE, DELAYED RELEASE ORAL at 08:26

## 2020-01-01 RX ADMIN — MIDODRINE HYDROCHLORIDE 10 MG: 5 TABLET ORAL at 08:54

## 2020-01-01 RX ADMIN — LACTULOSE 20 G: 10 SOLUTION ORAL at 11:32

## 2020-01-01 RX ADMIN — FLUOXETINE HYDROCHLORIDE 60 MG: 40 CAPSULE ORAL at 08:43

## 2020-01-01 RX ADMIN — SODIUM CHLORIDE: 9 INJECTION, SOLUTION INTRAVENOUS at 11:04

## 2020-01-01 RX ADMIN — HEPATITIS B VACCINE (RECOMBINANT) 20 MCG: 20 INJECTION, SUSPENSION INTRAMUSCULAR at 12:26

## 2020-01-01 RX ADMIN — URSODIOL 500 MG: 250 TABLET ORAL at 08:43

## 2020-01-01 RX ADMIN — MIDODRINE HYDROCHLORIDE 5 MG: 5 TABLET ORAL at 07:56

## 2020-01-01 RX ADMIN — FLUOXETINE 60 MG: 20 CAPSULE ORAL at 08:21

## 2020-01-01 RX ADMIN — POTASSIUM CHLORIDE 10 MEQ: 750 TABLET, EXTENDED RELEASE ORAL at 08:22

## 2020-01-01 RX ADMIN — FOLIC ACID 1 MG: 1 TABLET ORAL at 21:59

## 2020-01-01 RX ADMIN — URSODIOL 500 MG: 250 TABLET ORAL at 19:23

## 2020-01-01 RX ADMIN — FOLIC ACID 1 MG: 1 TABLET ORAL at 09:47

## 2020-01-01 RX ADMIN — LIDOCAINE HYDROCHLORIDE 6 ML: 10 INJECTION, SOLUTION EPIDURAL; INFILTRATION; INTRACAUDAL; PERINEURAL at 10:44

## 2020-01-01 RX ADMIN — MIDODRINE HYDROCHLORIDE 10 MG: 5 TABLET ORAL at 18:26

## 2020-01-01 RX ADMIN — MIDODRINE HYDROCHLORIDE 10 MG: 5 TABLET ORAL at 13:11

## 2020-01-01 RX ADMIN — OCTREOTIDE ACETATE 100 MCG: 100 INJECTION, SOLUTION INTRAVENOUS; SUBCUTANEOUS at 16:20

## 2020-01-01 RX ADMIN — THIAMINE HCL TAB 100 MG 100 MG: 100 TAB at 09:02

## 2020-01-01 RX ADMIN — FERROUS SULFATE TAB 325 MG (65 MG ELEMENTAL FE) 325 MG: 325 (65 FE) TAB at 07:53

## 2020-01-01 RX ADMIN — ARIPIPRAZOLE 2 MG: 2 TABLET ORAL at 22:10

## 2020-01-01 RX ADMIN — PANTOPRAZOLE SODIUM 40 MG: 40 INJECTION, POWDER, FOR SOLUTION INTRAVENOUS at 11:06

## 2020-01-01 RX ADMIN — PHYTONADIONE 10 MG: 10 INJECTION, EMULSION INTRAMUSCULAR; INTRAVENOUS; SUBCUTANEOUS at 18:04

## 2020-01-01 RX ADMIN — LACTULOSE 20 G: 20 SOLUTION ORAL at 08:47

## 2020-01-01 RX ADMIN — RIFAXIMIN 550 MG: 550 TABLET ORAL at 08:29

## 2020-01-01 RX ADMIN — BUSPIRONE HYDROCHLORIDE 15 MG: 15 TABLET ORAL at 20:14

## 2020-01-01 RX ADMIN — FOLIC ACID 1 MG: 1 TABLET ORAL at 09:05

## 2020-01-01 RX ADMIN — MIDODRINE HYDROCHLORIDE 5 MG: 5 TABLET ORAL at 09:20

## 2020-01-01 RX ADMIN — THIAMINE HYDROCHLORIDE 250 MG: 100 INJECTION, SOLUTION INTRAMUSCULAR; INTRAVENOUS at 08:51

## 2020-01-01 RX ADMIN — URSODIOL 500 MG: 250 TABLET ORAL at 19:41

## 2020-01-01 RX ADMIN — URSODIOL 500 MG: 250 TABLET ORAL at 19:48

## 2020-01-01 RX ADMIN — THIAMINE HCL TAB 100 MG 100 MG: 100 TAB at 07:56

## 2020-01-01 RX ADMIN — URSODIOL 500 MG: 250 TABLET ORAL at 19:36

## 2020-01-01 RX ADMIN — THIAMINE HYDROCHLORIDE 250 MG: 100 INJECTION, SOLUTION INTRAMUSCULAR; INTRAVENOUS at 08:49

## 2020-01-01 RX ADMIN — MIDODRINE HYDROCHLORIDE 10 MG: 5 TABLET ORAL at 18:14

## 2020-01-01 RX ADMIN — HEPATITIS A VACCINE 1440 UNITS: 1440 INJECTION, SUSPENSION INTRAMUSCULAR at 12:24

## 2020-01-01 RX ADMIN — LACTULOSE 20 G: 20 SOLUTION ORAL at 23:29

## 2020-01-01 RX ADMIN — URSODIOL 500 MG: 250 TABLET ORAL at 19:55

## 2020-01-01 RX ADMIN — OMEPRAZOLE 20 MG: 20 CAPSULE, DELAYED RELEASE ORAL at 10:28

## 2020-01-01 RX ADMIN — OCTREOTIDE ACETATE 100 MCG: 100 INJECTION, SOLUTION INTRAVENOUS; SUBCUTANEOUS at 11:25

## 2020-01-01 RX ADMIN — URSODIOL 500 MG: 250 TABLET ORAL at 08:47

## 2020-01-01 RX ADMIN — MIDODRINE HYDROCHLORIDE 10 MG: 5 TABLET ORAL at 12:58

## 2020-01-01 RX ADMIN — CEFTRIAXONE 1 G: 1 INJECTION, POWDER, FOR SOLUTION INTRAMUSCULAR; INTRAVENOUS at 15:56

## 2020-01-01 RX ADMIN — FLUOXETINE HYDROCHLORIDE 60 MG: 40 CAPSULE ORAL at 08:02

## 2020-01-01 RX ADMIN — MIDODRINE HYDROCHLORIDE 10 MG: 5 TABLET ORAL at 11:58

## 2020-01-01 RX ADMIN — ALBUMIN HUMAN 50 G: 0.25 SOLUTION INTRAVENOUS at 12:14

## 2020-01-01 RX ADMIN — FOLIC ACID 1 MG: 1 TABLET ORAL at 08:43

## 2020-01-01 RX ADMIN — FOLIC ACID 1 MG: 1 TABLET ORAL at 07:57

## 2020-01-01 RX ADMIN — CIPROFLOXACIN HYDROCHLORIDE 250 MG: 250 TABLET, FILM COATED ORAL at 08:16

## 2020-01-01 RX ADMIN — FERROUS SULFATE TAB 325 MG (65 MG ELEMENTAL FE) 325 MG: 325 (65 FE) TAB at 20:04

## 2020-01-01 RX ADMIN — PANTOPRAZOLE SODIUM 40 MG: 40 INJECTION, POWDER, FOR SOLUTION INTRAVENOUS at 11:24

## 2020-01-01 RX ADMIN — FOLIC ACID 1 MG: 1 TABLET ORAL at 08:13

## 2020-01-01 RX ADMIN — CEFOXITIN 2 G: 2 INJECTION, POWDER, FOR SOLUTION INTRAVENOUS at 16:01

## 2020-01-01 RX ADMIN — SODIUM BICARBONATE 650 MG TABLET 650 MG: at 08:02

## 2020-01-01 RX ADMIN — ONDANSETRON 4 MG: 2 INJECTION INTRAMUSCULAR; INTRAVENOUS at 19:56

## 2020-01-01 RX ADMIN — LACTULOSE 20 G: 20 SOLUTION ORAL at 13:29

## 2020-01-01 RX ADMIN — SODIUM CHLORIDE: 9 INJECTION, SOLUTION INTRAVENOUS at 01:33

## 2020-01-01 RX ADMIN — OCTREOTIDE ACETATE 100 MCG: 100 INJECTION, SOLUTION INTRAVENOUS; SUBCUTANEOUS at 19:48

## 2020-01-01 RX ADMIN — LACTULOSE 20 G: 20 SOLUTION ORAL at 13:53

## 2020-01-01 RX ADMIN — LACTULOSE 20 G: 20 SOLUTION ORAL at 08:13

## 2020-01-01 RX ADMIN — OMEPRAZOLE 20 MG: 20 CAPSULE, DELAYED RELEASE ORAL at 09:20

## 2020-01-01 RX ADMIN — LACTULOSE 20 G: 20 SOLUTION ORAL at 09:14

## 2020-01-01 RX ADMIN — LIDOCAINE HYDROCHLORIDE 6 ML: 10 INJECTION, SOLUTION EPIDURAL; INFILTRATION; INTRACAUDAL; PERINEURAL at 12:50

## 2020-01-01 RX ADMIN — PHYTONADIONE 10 MG: 10 INJECTION, EMULSION INTRAMUSCULAR; INTRAVENOUS; SUBCUTANEOUS at 12:18

## 2020-01-01 RX ADMIN — RIFAXIMIN 550 MG: 550 TABLET ORAL at 21:18

## 2020-01-01 RX ADMIN — MIDODRINE HYDROCHLORIDE 10 MG: 5 TABLET ORAL at 08:26

## 2020-01-01 RX ADMIN — RIFAXIMIN 550 MG: 550 TABLET ORAL at 09:46

## 2020-01-01 RX ADMIN — SODIUM CHLORIDE 1000 ML: 9 INJECTION, SOLUTION INTRAVENOUS at 20:10

## 2020-01-01 RX ADMIN — LACTULOSE 20 G: 20 SOLUTION ORAL at 21:46

## 2020-01-01 RX ADMIN — FLUOXETINE 60 MG: 20 CAPSULE ORAL at 09:03

## 2020-01-01 RX ADMIN — OMEPRAZOLE 20 MG: 20 CAPSULE, DELAYED RELEASE ORAL at 09:05

## 2020-01-01 RX ADMIN — THIAMINE HYDROCHLORIDE 500 MG: 100 INJECTION, SOLUTION INTRAMUSCULAR; INTRAVENOUS at 11:59

## 2020-01-01 RX ADMIN — MIDODRINE HYDROCHLORIDE 10 MG: 5 TABLET ORAL at 12:18

## 2020-01-01 RX ADMIN — SODIUM CHLORIDE, POTASSIUM CHLORIDE, SODIUM LACTATE AND CALCIUM CHLORIDE 1000 ML: 600; 310; 30; 20 INJECTION, SOLUTION INTRAVENOUS at 14:20

## 2020-01-01 RX ADMIN — LACTULOSE 20 G: 20 SOLUTION ORAL at 14:03

## 2020-01-01 RX ADMIN — MELATONIN TAB 3 MG 5 MG: 3 TAB at 21:18

## 2020-01-01 RX ADMIN — RIFAXIMIN 550 MG: 550 TABLET ORAL at 08:47

## 2020-01-01 RX ADMIN — BUSPIRONE HYDROCHLORIDE 15 MG: 5 TABLET ORAL at 20:26

## 2020-01-01 RX ADMIN — CALCITONIN SALMON 200 UNITS: 200 INJECTION, SOLUTION INTRAMUSCULAR; SUBCUTANEOUS at 19:46

## 2020-01-01 RX ADMIN — LACTULOSE 20 G: 20 SOLUTION ORAL at 22:00

## 2020-01-01 RX ADMIN — FUROSEMIDE 40 MG: 10 INJECTION, SOLUTION INTRAMUSCULAR; INTRAVENOUS at 14:05

## 2020-01-01 RX ADMIN — LACTULOSE 20 G: 20 SOLUTION ORAL at 13:03

## 2020-01-01 RX ADMIN — RIFAXIMIN 550 MG: 550 TABLET ORAL at 09:14

## 2020-01-01 RX ADMIN — URSODIOL 500 MG: 250 TABLET ORAL at 08:02

## 2020-01-01 ASSESSMENT — ACTIVITIES OF DAILY LIVING (ADL)
ADLS_ACUITY_SCORE: 14
ADLS_ACUITY_SCORE: 13
ADLS_ACUITY_SCORE: 13
ADLS_ACUITY_SCORE: 12
ADLS_ACUITY_SCORE: 13
ADLS_ACUITY_SCORE: 13
ADLS_ACUITY_SCORE: 14
AMBULATION: 3-->ASSISTIVE EQUIPMENT AND PERSON
ADLS_ACUITY_SCORE: 16
ADLS_ACUITY_SCORE: 14
ADLS_ACUITY_SCORE: 13
SWALLOWING: 0-->SWALLOWS FOODS/LIQUIDS WITHOUT DIFFICULTY
ADLS_ACUITY_SCORE: 13
ADLS_ACUITY_SCORE: 13
ADLS_ACUITY_SCORE: 14
ADLS_ACUITY_SCORE: 13
ADLS_ACUITY_SCORE: 14
WHICH_OF_THE_ABOVE_FUNCTIONAL_RISKS_HAD_A_RECENT_ONSET_OR_CHANGE?: AMBULATION;TRANSFERRING;FALL HISTORY
ADLS_ACUITY_SCORE: 13
ADLS_ACUITY_SCORE: 14
ADLS_ACUITY_SCORE: 13
ADLS_ACUITY_SCORE: 14
ADLS_ACUITY_SCORE: 13
ADLS_ACUITY_SCORE: 15
ADLS_ACUITY_SCORE: 13
ADLS_ACUITY_SCORE: 14
ADLS_ACUITY_SCORE: 23
ADLS_ACUITY_SCORE: 14
ADLS_ACUITY_SCORE: 13
ADLS_ACUITY_SCORE: 23
ADLS_ACUITY_SCORE: 13
ADLS_ACUITY_SCORE: 27
ADLS_ACUITY_SCORE: 15
ADLS_ACUITY_SCORE: 13
TRANSFERRING: 3-->ASSISTIVE EQUIPMENT AND PERSON
ADLS_ACUITY_SCORE: 14
ADLS_ACUITY_SCORE: 13
ADLS_ACUITY_SCORE: 15
ADLS_ACUITY_SCORE: 15
COGNITION: 1 - ATTENTION OR MEMORY DEFICITS
ADLS_ACUITY_SCORE: 25
ADLS_ACUITY_SCORE: 13
TRANSFERRING: 1-->ASSISTIVE EQUIPMENT
ADLS_ACUITY_SCORE: 14
ADLS_ACUITY_SCORE: 14
ADLS_ACUITY_SCORE: 15
ADLS_ACUITY_SCORE: 25
ADLS_ACUITY_SCORE: 14
ADLS_ACUITY_SCORE: 13
ADLS_ACUITY_SCORE: 13
RETIRED_EATING: 0-->INDEPENDENT
ADLS_ACUITY_SCORE: 25
ADLS_ACUITY_SCORE: 14
ADLS_ACUITY_SCORE: 14
FALL_HISTORY_WITHIN_LAST_SIX_MONTHS: NO
ADLS_ACUITY_SCORE: 13
COGNITION: 0 - NO COGNITION ISSUES REPORTED
ADLS_ACUITY_SCORE: 14
ADLS_ACUITY_SCORE: 15
ADLS_ACUITY_SCORE: 14
ADLS_ACUITY_SCORE: 13
ADLS_ACUITY_SCORE: 14
ADLS_ACUITY_SCORE: 14
ADLS_ACUITY_SCORE: 15
ADLS_ACUITY_SCORE: 13
ADLS_ACUITY_SCORE: 14
ADLS_ACUITY_SCORE: 14
DRESS: 3-->ASSISTIVE EQUIPMENT AND PERSON
ADLS_ACUITY_SCORE: 14
ADLS_ACUITY_SCORE: 13
RETIRED_COMMUNICATION: 0-->UNDERSTANDS/COMMUNICATES WITHOUT DIFFICULTY
ADLS_ACUITY_SCORE: 15
ADLS_ACUITY_SCORE: 12
FALL_HISTORY_WITHIN_LAST_SIX_MONTHS: YES
ADLS_ACUITY_SCORE: 13
ADLS_ACUITY_SCORE: 14
ADLS_ACUITY_SCORE: 13
ADLS_ACUITY_SCORE: 16
ADLS_ACUITY_SCORE: 12
ADLS_ACUITY_SCORE: 15
ADLS_ACUITY_SCORE: 14
ADLS_ACUITY_SCORE: 13
ADLS_ACUITY_SCORE: 13
RETIRED_COMMUNICATION: 0-->UNDERSTANDS/COMMUNICATES WITHOUT DIFFICULTY
ADLS_ACUITY_SCORE: 13
ADLS_ACUITY_SCORE: 22
ADLS_ACUITY_SCORE: 12
ADLS_ACUITY_SCORE: 14
ADLS_ACUITY_SCORE: 15
ADLS_ACUITY_SCORE: 14
ADLS_ACUITY_SCORE: 14
ADLS_ACUITY_SCORE: 13
SWALLOWING: 0-->SWALLOWS FOODS/LIQUIDS WITHOUT DIFFICULTY
ADLS_ACUITY_SCORE: 14
ADLS_ACUITY_SCORE: 13
DRESS: 0-->INDEPENDENT
ADLS_ACUITY_SCORE: 13
ADLS_ACUITY_SCORE: 13
ADLS_ACUITY_SCORE: 25
ADLS_ACUITY_SCORE: 13
ADLS_ACUITY_SCORE: 13
ADLS_ACUITY_SCORE: 25
ADLS_ACUITY_SCORE: 12
DEPENDENT_IADLS:: INDEPENDENT
ADLS_ACUITY_SCORE: 14
ADLS_ACUITY_SCORE: 12
ADLS_ACUITY_SCORE: 13
ADLS_ACUITY_SCORE: 16
AMBULATION: 1-->ASSISTIVE EQUIPMENT
RETIRED_EATING: 2-->ASSISTIVE PERSON
ADLS_ACUITY_SCORE: 12
ADLS_ACUITY_SCORE: 16
TOILETING: 0-->INDEPENDENT
DEPENDENT_IADLS:: TRANSPORTATION;INDEPENDENT
ADLS_ACUITY_SCORE: 15
ADLS_ACUITY_SCORE: 13
ADLS_ACUITY_SCORE: 14
ADLS_ACUITY_SCORE: 12
ADLS_ACUITY_SCORE: 13
ADLS_ACUITY_SCORE: 13
ADLS_ACUITY_SCORE: 15
ADLS_ACUITY_SCORE: 12
ADLS_ACUITY_SCORE: 14
ADLS_ACUITY_SCORE: 14
TOILETING: 3-->ASSISTIVE EQUIPMENT AND PERSON
ADLS_ACUITY_SCORE: 14
IADL_COMMENTS: UNKNOWN PRIOR RESPONSIBILITIES
BATHING: 0-->INDEPENDENT
BATHING: 3-->ASSISTIVE EQUIPMENT AND PERSON
ADLS_ACUITY_SCORE: 14
ADLS_ACUITY_SCORE: 15
ADLS_ACUITY_SCORE: 14
ADLS_ACUITY_SCORE: 14
ADLS_ACUITY_SCORE: 13
ADLS_ACUITY_SCORE: 22
ADLS_ACUITY_SCORE: 13
ADLS_ACUITY_SCORE: 16
ADLS_ACUITY_SCORE: 15
ADLS_ACUITY_SCORE: 13
ADLS_ACUITY_SCORE: 14
NUMBER_OF_TIMES_PATIENT_HAS_FALLEN_WITHIN_LAST_SIX_MONTHS: 3
ADLS_ACUITY_SCORE: 14
ADLS_ACUITY_SCORE: 15
ADLS_ACUITY_SCORE: 13
ADLS_ACUITY_SCORE: 13
ADLS_ACUITY_SCORE: 14
ADLS_ACUITY_SCORE: 15
ADLS_ACUITY_SCORE: 13
ADLS_ACUITY_SCORE: 15
ADLS_ACUITY_SCORE: 15
IADL_COMMENTS: OT: PT'S SPOUSE CAN A PRN
ADLS_ACUITY_SCORE: 13
ADLS_ACUITY_SCORE: 15
ADLS_ACUITY_SCORE: 13

## 2020-01-01 ASSESSMENT — MIFFLIN-ST. JEOR
SCORE: 1057.66
SCORE: 1133.38
SCORE: 1137.5
SCORE: 1140.5
SCORE: 1144.3
SCORE: 1132.38
SCORE: 1137.04
SCORE: 1134.32
SCORE: 1113.46
SCORE: 1099.39
SCORE: 1058.57
SCORE: 1139.76
SCORE: 1111.64
SCORE: 1102.57
SCORE: 1221.86
SCORE: 1086.24
SCORE: 1140.22
SCORE: 1108.01
SCORE: 1121.17
SCORE: 1093.5
SCORE: 1138.86
SCORE: 1059.02
SCORE: 1088.96
SCORE: 1110.74
SCORE: 1128.38
SCORE: 1109.38
SCORE: 1138.86

## 2020-01-01 ASSESSMENT — ANXIETY QUESTIONNAIRES
GAD7 TOTAL SCORE: 6
4. TROUBLE RELAXING: SEVERAL DAYS
7. FEELING AFRAID AS IF SOMETHING AWFUL MIGHT HAPPEN: NOT AT ALL
5. BEING SO RESTLESS THAT IT IS HARD TO SIT STILL: SEVERAL DAYS
3. WORRYING TOO MUCH ABOUT DIFFERENT THINGS: SEVERAL DAYS
GAD7 TOTAL SCORE: 6
6. BECOMING EASILY ANNOYED OR IRRITABLE: SEVERAL DAYS
1. FEELING NERVOUS, ANXIOUS, OR ON EDGE: SEVERAL DAYS
2. NOT BEING ABLE TO STOP OR CONTROL WORRYING: SEVERAL DAYS

## 2020-01-01 ASSESSMENT — ENCOUNTER SYMPTOMS
ABDOMINAL PAIN: 0
VOMITING: 1
HEADACHES: 0
CONFUSION: 1
ABDOMINAL PAIN: 0
VOMITING: 1
NAUSEA: 1
COUGH: 0
FEVER: 0
FEVER: 0
SHORTNESS OF BREATH: 1

## 2020-01-01 ASSESSMENT — PAIN SCALES - GENERAL
PAINLEVEL: NO PAIN (0)
PAINLEVEL: SEVERE PAIN (7)
PAINLEVEL: NO PAIN (0)
PAINLEVEL: NO PAIN (0)

## 2020-01-01 ASSESSMENT — PATIENT HEALTH QUESTIONNAIRE - PHQ9
SUM OF ALL RESPONSES TO PHQ QUESTIONS 1-9: 16
SUM OF ALL RESPONSES TO PHQ QUESTIONS 1-9: 6

## 2020-01-27 NOTE — PROGRESS NOTES
HISTORY OF PRESENT ILLNESS:  I had the pleasure of seeing Heydi Olguin for followup in the Liver Clinic at the [facility] on 01/27/2020.  Ms. Olguin returns for followup of alcoholic cirrhosis with ascites.  She reports that she has not had any alcohol since September.  Her last large volume paracentesis was in early December.  She did come to get rechecked again 3 weeks ago, and they actually did not do a paracentesis because they did not think there was enough.  She still does complain of some increased abdominal girth with occasional abdominal discomfort.  She does complain of itching, largely related to dry skin.  She does have a moderate amount of fatigue.  She has no lower extremity edema.  She has not had any gastrointestinal bleeding or any overt signs of hepatic encephalopathy.      She denies any fevers or chills, cough or shortness of breath.  She denies any nausea, vomiting, diarrhea or constipation.  She is having at least 2 bowel movements per day.  Her appetite has been good.  Her weight has largely remained the same.     Current Outpatient Medications   Medication     acamprosate (CAMPRAL) 333 MG EC tablet     acetaminophen (TYLENOL) 325 MG tablet     alendronate (FOSAMAX) 35 MG tablet     ARIPiprazole (ABILIFY) 5 MG tablet     busPIRone (BUSPAR) 15 MG tablet     FLUoxetine (PROZAC) 20 MG capsule     folic acid (FOLVITE) 1 MG tablet     furosemide (LASIX) 40 MG tablet     gabapentin (NEURONTIN) 100 MG capsule     lactulose (CHRONULAC) 10 GM/15ML solution     MEDICATION INSTRUCTION     midodrine (PROAMATINE) 5 MG tablet     omeprazole (PRILOSEC) 20 MG DR capsule     ondansetron (ZOFRAN) 4 MG tablet     potassium chloride ER (K-DUR/KLOR-CON M) 10 MEQ CR tablet     rifaximin (XIFAXAN) 550 MG TABS tablet     SENNA-docusate sodium (SENNA S) 8.6-50 MG tablet     spironolactone (ALDACTONE) 50 MG tablet     traZODone (DESYREL) 50 MG tablet     ursodiol (ACTIGALL) 500 MG tablet      "vitamin B1 (VITAMIN B-1) 100 MG tablet     No current facility-administered medications for this visit.      /64   Pulse 71   Temp 97.8  F (36.6  C) (Oral)   Ht 1.549 m (5' 1\")   Wt 56 kg (123 lb 8 oz)   LMP 05/16/2012   SpO2 99%   BMI 23.34 kg/m      PHYSICAL EXAMINATION:  In general, she looks chronically ill.  HEENT exam shows she has some moderate scleral icterus and no temporal muscle wasting.  Chest is clear.  Her abdominal exam shows no increase in girth.  No masses or tenderness to palpation are present.  Her liver is 10 cm in span without left lobe enlargement.  No spleen tip is palpable, and extremity exam shows no edema.  Skin exam shows no stigmata of chronic liver disease.  Neurologic exam shows no asterixis.     Recent Results (from the past 168 hour(s))   INR    Collection Time: 01/27/20  2:43 PM   Result Value Ref Range    INR 1.12 0.86 - 1.14   Hepatic panel    Collection Time: 01/27/20  2:43 PM   Result Value Ref Range    Bilirubin Direct 4.5 (H) 0.0 - 0.2 mg/dL    Bilirubin Total 6.1 (H) 0.2 - 1.3 mg/dL    Albumin 2.8 (L) 3.4 - 5.0 g/dL    Protein Total 5.9 (L) 6.8 - 8.8 g/dL    Alkaline Phosphatase 266 (H) 40 - 150 U/L    ALT 22 0 - 50 U/L    AST 42 0 - 45 U/L   Basic metabolic panel    Collection Time: 01/27/20  2:43 PM   Result Value Ref Range    Sodium 139 133 - 144 mmol/L    Potassium 3.6 3.4 - 5.3 mmol/L    Chloride 104 94 - 109 mmol/L    Carbon Dioxide 30 20 - 32 mmol/L    Anion Gap 5 3 - 14 mmol/L    Glucose 112 (H) 70 - 99 mg/dL    Urea Nitrogen 25 7 - 30 mg/dL    Creatinine 1.33 (H) 0.52 - 1.04 mg/dL    GFR Estimate 45 (L) >60 mL/min/[1.73_m2]    GFR Estimate If Black 53 (L) >60 mL/min/[1.73_m2]    Calcium 9.2 8.5 - 10.1 mg/dL   CBC with platelets    Collection Time: 01/27/20  2:43 PM   Result Value Ref Range    WBC 5.9 4.0 - 11.0 10e9/L    RBC Count 3.00 (L) 3.8 - 5.2 10e12/L    Hemoglobin 10.2 (L) 11.7 - 15.7 g/dL    Hematocrit 33.0 (L) 35.0 - 47.0 %     (H) 78 - " 100 fl    MCH 34.0 (H) 26.5 - 33.0 pg    MCHC 30.9 (L) 31.5 - 36.5 g/dL    RDW 16.3 (H) 10.0 - 15.0 %    Platelet Count 75 (L) 150 - 450 10e9/L      IMPRESSION:  My impression is that Ms. Olguin is now 5 months sober by her report for alcoholic cirrhosis.  Her liver tests are really not much better at this point in time, and I am concerned that her MCV is still 110.  Her hemoglobin has improved and perhaps this does reflect some degree of an increased reticulocyte response.  However, it does not appear to be hemolysis related and that most of her bilirubin is direct.  I will add a PEth test to her labs today.      She has at most a modest amount of ascites at this point in time, and I certainly do not think that she needs a large volume paracentesis at this point in time.  I did say this is up to her and she can always go and get checked to see if there is enough ascites to justify the procedure.  I did congratulate her on her sobriety and encouraged her to continue with this sobriety.  She is on acamprosate which may be having an effect as she reports decreased cravings for alcohol.  She is up to date with regard to vaccines and other cancer screening.      Thank you very much for allowing me to participate in the care of this patient.  If you have any questions regarding my recommendations, please do not hesitate to contact me.  My plan will be to see the patient back in clinic in 6 months for repeat ultrasound and blood work.       Edgardo Kovacs MD      Professor of Medicine  [employer]      Executive Medical Director, Solid Organ Transplant Program  [facility]

## 2020-01-27 NOTE — LETTER
1/27/2020      RE: Monalisa Olguin  [address] [facility] [address]       HISTORY OF PRESENT ILLNESS:  I had the pleasure of seeing Heydi Olguin for followup in the Liver Clinic at the [facility] on 01/27/2020.  Ms. Olguin returns for followup of alcoholic cirrhosis with ascites.  She reports that she has not had any alcohol since September.  Her last large volume paracentesis was in early December.  She did come to get rechecked again 3 weeks ago, and they actually did not do a paracentesis because they did not think there was enough.  She still does complain of some increased abdominal girth with occasional abdominal discomfort.  She does complain of itching, largely related to dry skin.  She does have a moderate amount of fatigue.  She has no lower extremity edema.  She has not had any gastrointestinal bleeding or any overt signs of hepatic encephalopathy.      She denies any fevers or chills, cough or shortness of breath.  She denies any nausea, vomiting, diarrhea or constipation.  She is having at least 2 bowel movements per day.  Her appetite has been good.  Her weight has largely remained the same.     Current Outpatient Medications   Medication     acamprosate (CAMPRAL) 333 MG EC tablet     acetaminophen (TYLENOL) 325 MG tablet     alendronate (FOSAMAX) 35 MG tablet     ARIPiprazole (ABILIFY) 5 MG tablet     busPIRone (BUSPAR) 15 MG tablet     FLUoxetine (PROZAC) 20 MG capsule     folic acid (FOLVITE) 1 MG tablet     furosemide (LASIX) 40 MG tablet     gabapentin (NEURONTIN) 100 MG capsule     lactulose (CHRONULAC) 10 GM/15ML solution     MEDICATION INSTRUCTION     midodrine (PROAMATINE) 5 MG tablet     omeprazole (PRILOSEC) 20 MG DR capsule     ondansetron (ZOFRAN) 4 MG tablet     potassium chloride ER (K-DUR/KLOR-CON M) 10 MEQ CR tablet     rifaximin (XIFAXAN) 550 MG TABS tablet     SENNA-docusate sodium (SENNA S) 8.6-50 MG tablet     spironolactone (ALDACTONE) 50  "MG tablet     traZODone (DESYREL) 50 MG tablet     ursodiol (ACTIGALL) 500 MG tablet     vitamin B1 (VITAMIN B-1) 100 MG tablet     No current facility-administered medications for this visit.      /64   Pulse 71   Temp 97.8  F (36.6  C) (Oral)   Ht 1.549 m (5' 1\")   Wt 56 kg (123 lb 8 oz)   LMP 05/16/2012   SpO2 99%   BMI 23.34 kg/m       PHYSICAL EXAMINATION:  In general, she looks chronically ill.  HEENT exam shows she has some moderate scleral icterus and no temporal muscle wasting.  Chest is clear.  Her abdominal exam shows no increase in girth.  No masses or tenderness to palpation are present.  Her liver is 10 cm in span without left lobe enlargement.  No spleen tip is palpable, and extremity exam shows no edema.  Skin exam shows no stigmata of chronic liver disease.  Neurologic exam shows no asterixis.     Recent Results (from the past 168 hour(s))   INR    Collection Time: 01/27/20  2:43 PM   Result Value Ref Range    INR 1.12 0.86 - 1.14   Hepatic panel    Collection Time: 01/27/20  2:43 PM   Result Value Ref Range    Bilirubin Direct 4.5 (H) 0.0 - 0.2 mg/dL    Bilirubin Total 6.1 (H) 0.2 - 1.3 mg/dL    Albumin 2.8 (L) 3.4 - 5.0 g/dL    Protein Total 5.9 (L) 6.8 - 8.8 g/dL    Alkaline Phosphatase 266 (H) 40 - 150 U/L    ALT 22 0 - 50 U/L    AST 42 0 - 45 U/L   Basic metabolic panel    Collection Time: 01/27/20  2:43 PM   Result Value Ref Range    Sodium 139 133 - 144 mmol/L    Potassium 3.6 3.4 - 5.3 mmol/L    Chloride 104 94 - 109 mmol/L    Carbon Dioxide 30 20 - 32 mmol/L    Anion Gap 5 3 - 14 mmol/L    Glucose 112 (H) 70 - 99 mg/dL    Urea Nitrogen 25 7 - 30 mg/dL    Creatinine 1.33 (H) 0.52 - 1.04 mg/dL    GFR Estimate 45 (L) >60 mL/min/[1.73_m2]    GFR Estimate If Black 53 (L) >60 mL/min/[1.73_m2]    Calcium 9.2 8.5 - 10.1 mg/dL   CBC with platelets    Collection Time: 01/27/20  2:43 PM   Result Value Ref Range    WBC 5.9 4.0 - 11.0 10e9/L    RBC Count 3.00 (L) 3.8 - 5.2 10e12/L    " Hemoglobin 10.2 (L) 11.7 - 15.7 g/dL    Hematocrit 33.0 (L) 35.0 - 47.0 %     (H) 78 - 100 fl    MCH 34.0 (H) 26.5 - 33.0 pg    MCHC 30.9 (L) 31.5 - 36.5 g/dL    RDW 16.3 (H) 10.0 - 15.0 %    Platelet Count 75 (L) 150 - 450 10e9/L      IMPRESSION:  My impression is that Ms. Olguin is now 5 months sober by her report for alcoholic cirrhosis.  Her liver tests are really not much better at this point in time, and I am concerned that her MCV is still 110.  Her hemoglobin has improved and perhaps this does reflect some degree of an increased reticulocyte response.  However, it does not appear to be hemolysis related and that most of her bilirubin is direct.  I will add a PEth test to her labs today.      She has at most a modest amount of ascites at this point in time, and I certainly do not think that she needs a large volume paracentesis at this point in time.  I did say this is up to her and she can always go and get checked to see if there is enough ascites to justify the procedure.  I did congratulate her on her sobriety and encouraged her to continue with this sobriety.  She is on acamprosate which may be having an effect as she reports decreased cravings for alcohol.  She is up to date with regard to vaccines and other cancer screening.      Thank you very much for allowing me to participate in the care of this patient.  If you have any questions regarding my recommendations, please do not hesitate to contact me.  My plan will be to see the patient back in clinic in 6 months for repeat ultrasound and blood work.       Edgardo Kovacs MD      Professor of Medicine  [employer]      Executive Medical Director, Solid Organ Transplant Program  [facility]     Edgardo Kovacs MD

## 2020-01-27 NOTE — LETTER
1/27/2020       RE: Monalisa Olguin  [address] [facility] [address]     Dear Colleague,    Thank you for referring your patient, Monalisa Olguin, to the [facility] HEPATOLOGY at [facility]. Please see a copy of my visit note below.    HISTORY OF PRESENT ILLNESS:  I had the pleasure of seeing Heydi Olguin for followup in the Liver Clinic at the [facility] on 01/27/2020.  Ms. Olguin returns for followup of alcoholic cirrhosis with ascites.  She reports that she has not had any alcohol since September.  Her last large volume paracentesis was in early December.  She did come to get rechecked again 3 weeks ago, and they actually did not do a paracentesis because they did not think there was enough.  She still does complain of some increased abdominal girth with occasional abdominal discomfort.  She does complain of itching, largely related to dry skin.  She does have a moderate amount of fatigue.  She has no lower extremity edema.  She has not had any gastrointestinal bleeding or any overt signs of hepatic encephalopathy.      She denies any fevers or chills, cough or shortness of breath.  She denies any nausea, vomiting, diarrhea or constipation.  She is having at least 2 bowel movements per day.  Her appetite has been good.  Her weight has largely remained the same.     Current Outpatient Medications   Medication     acamprosate (CAMPRAL) 333 MG EC tablet     acetaminophen (TYLENOL) 325 MG tablet     alendronate (FOSAMAX) 35 MG tablet     ARIPiprazole (ABILIFY) 5 MG tablet     busPIRone (BUSPAR) 15 MG tablet     FLUoxetine (PROZAC) 20 MG capsule     folic acid (FOLVITE) 1 MG tablet     furosemide (LASIX) 40 MG tablet     gabapentin (NEURONTIN) 100 MG capsule     lactulose (CHRONULAC) 10 GM/15ML solution     MEDICATION INSTRUCTION     midodrine (PROAMATINE) 5 MG tablet     omeprazole (PRILOSEC) 20 MG DR capsule     ondansetron (ZOFRAN) 4 MG  "tablet     potassium chloride ER (K-DUR/KLOR-CON M) 10 MEQ CR tablet     rifaximin (XIFAXAN) 550 MG TABS tablet     SENNA-docusate sodium (SENNA S) 8.6-50 MG tablet     spironolactone (ALDACTONE) 50 MG tablet     traZODone (DESYREL) 50 MG tablet     ursodiol (ACTIGALL) 500 MG tablet     vitamin B1 (VITAMIN B-1) 100 MG tablet     No current facility-administered medications for this visit.      /64   Pulse 71   Temp 97.8  F (36.6  C) (Oral)   Ht 1.549 m (5' 1\")   Wt 56 kg (123 lb 8 oz)   LMP 05/16/2012   SpO2 99%   BMI 23.34 kg/m       PHYSICAL EXAMINATION:  In general, she looks chronically ill.  HEENT exam shows she has some moderate scleral icterus and no temporal muscle wasting.  Chest is clear.  Her abdominal exam shows no increase in girth.  No masses or tenderness to palpation are present.  Her liver is 10 cm in span without left lobe enlargement.  No spleen tip is palpable, and extremity exam shows no edema.  Skin exam shows no stigmata of chronic liver disease.  Neurologic exam shows no asterixis.     Recent Results (from the past 168 hour(s))   INR    Collection Time: 01/27/20  2:43 PM   Result Value Ref Range    INR 1.12 0.86 - 1.14   Hepatic panel    Collection Time: 01/27/20  2:43 PM   Result Value Ref Range    Bilirubin Direct 4.5 (H) 0.0 - 0.2 mg/dL    Bilirubin Total 6.1 (H) 0.2 - 1.3 mg/dL    Albumin 2.8 (L) 3.4 - 5.0 g/dL    Protein Total 5.9 (L) 6.8 - 8.8 g/dL    Alkaline Phosphatase 266 (H) 40 - 150 U/L    ALT 22 0 - 50 U/L    AST 42 0 - 45 U/L   Basic metabolic panel    Collection Time: 01/27/20  2:43 PM   Result Value Ref Range    Sodium 139 133 - 144 mmol/L    Potassium 3.6 3.4 - 5.3 mmol/L    Chloride 104 94 - 109 mmol/L    Carbon Dioxide 30 20 - 32 mmol/L    Anion Gap 5 3 - 14 mmol/L    Glucose 112 (H) 70 - 99 mg/dL    Urea Nitrogen 25 7 - 30 mg/dL    Creatinine 1.33 (H) 0.52 - 1.04 mg/dL    GFR Estimate 45 (L) >60 mL/min/[1.73_m2]    GFR Estimate If Black 53 (L) >60 " mL/min/[1.73_m2]    Calcium 9.2 8.5 - 10.1 mg/dL   CBC with platelets    Collection Time: 01/27/20  2:43 PM   Result Value Ref Range    WBC 5.9 4.0 - 11.0 10e9/L    RBC Count 3.00 (L) 3.8 - 5.2 10e12/L    Hemoglobin 10.2 (L) 11.7 - 15.7 g/dL    Hematocrit 33.0 (L) 35.0 - 47.0 %     (H) 78 - 100 fl    MCH 34.0 (H) 26.5 - 33.0 pg    MCHC 30.9 (L) 31.5 - 36.5 g/dL    RDW 16.3 (H) 10.0 - 15.0 %    Platelet Count 75 (L) 150 - 450 10e9/L      IMPRESSION:  My impression is that Ms. Olguin is now 5 months sober by her report for alcoholic cirrhosis.  Her liver tests are really not much better at this point in time, and I am concerned that her MCV is still 110.  Her hemoglobin has improved and perhaps this does reflect some degree of an increased reticulocyte response.  However, it does not appear to be hemolysis related and that most of her bilirubin is direct.  I will add a PEth test to her labs today.      She has at most a modest amount of ascites at this point in time, and I certainly do not think that she needs a large volume paracentesis at this point in time.  I did say this is up to her and she can always go and get checked to see if there is enough ascites to justify the procedure.  I did congratulate her on her sobriety and encouraged her to continue with this sobriety.  She is on acamprosate which may be having an effect as she reports decreased cravings for alcohol.  She is up to date with regard to vaccines and other cancer screening.      Thank you very much for allowing me to participate in the care of this patient.  If you have any questions regarding my recommendations, please do not hesitate to contact me.  My plan will be to see the patient back in clinic in 6 months for repeat ultrasound and blood work.       Edgardo Kovacs MD      Professor of Medicine  [employer]      Executive Medical Director, Solid Organ Transplant Program  [facility]

## 2020-01-27 NOTE — NURSING NOTE
"Chief Complaint   Patient presents with     RECHECK     Alcoholic cirrhosis of liver with ascites     /64   Pulse 71   Temp 97.8  F (36.6  C) (Oral)   Ht 1.549 m (5' 1\")   Wt 56 kg (123 lb 8 oz)   LMP 05/16/2012   SpO2 99%   BMI 23.34 kg/m    Geovanna Gautam, [facility]  1/27/2020 3:09 PM    "

## 2020-02-04 NOTE — TELEPHONE ENCOUNTER
"Fosamax  Last Written Prescription Date:  11/17/2019  Last Fill Quantity: 12,  # refills: 0   Last office visit: 11/21/2019 with prescribing provider:  Juli   Future Office Visit:  None  Routing refill request to provider for review/approval because:  Labs out of range:  Creatinine  No current DEXA scan on file.     Requested Prescriptions   Pending Prescriptions Disp Refills     alendronate (FOSAMAX) 35 MG tablet [Pharmacy Med Name: ALENDRONATE SODIUM 35MG TABS] 12 tablet 3     Sig: TAKE ONE TABLET BY MOUTH WITH 8OZ WATER EVERY MONDAY (ONCE EVERY 7 DAYS) 30 MINUTES BEFORE BREAKFAST AND REMAIN UPRIGHT DURING THIS TIME       Bisphosphonates Failed - 2/3/2020  2:40 PM        Failed - Dexa on file within past 2 years     Please review last Dexa result.         Failed - Normal serum creatinine on file within past 12 months     Recent Labs   Lab Test 01/27/20  1443  09/12/18  1451   CR 1.33*   < >  --    CREAT  --   --  0.8    < > = values in this interval not displayed.           Passed - Recent (12 mo) or future (30 days) visit within the authorizing provider's specialty     Patient has had an office visit with the authorizing provider or a provider within the authorizing providers department within the previous 12 mos or has a future within next 30 days. See \"Patient Info\" tab in inbasket, or \"Choose Columns\" in Meds & Orders section of the refill encounter.          Passed - Medication is active on med list        Passed - Patient is age 18 or older      Kenyetta Sinclair RN   "

## 2020-02-21 NOTE — TELEPHONE ENCOUNTER
"Fluoxetine  Last Written Prescription Date:  12/18/2019  Last Fill Quantity: 90,  # refills: 1   Last office visit: 11/21/2019 with prescribing provider:  Juli   Future Office Visit:  None  Routing refill request to provider for review/approval because:  Elevated PHQ 9 score.     Requested Prescriptions   Pending Prescriptions Disp Refills     FLUOXETINE 20 MG PO capsule [Pharmacy Med Name: FLUOXETINE HCL 20MG CAPS] 90 capsule 1     Sig: TAKE THREE CAPSULES BY MOUTH ONCE DAILY       SSRIs Protocol Failed - 2/21/2020  8:51 AM        Failed - PHQ-9 score less than 5 in past 6 months     Please review last PHQ-9 score.         Passed - Medication is active on med list        Passed - Patient is age 18 or older        Passed - No active pregnancy on record        Passed - No positive pregnancy test in last 12 months        Passed - Recent (6 mo) or future (30 days) visit within the authorizing provider's specialty     Patient had office visit in the last 6 months or has a visit in the next 30 days with authorizing provider or within the authorizing provider's specialty.  See \"Patient Info\" tab in inbasket, or \"Choose Columns\" in Meds & Orders section of the refill encounter.        PHQ-9 score:    PHQ 9/17/2019   PHQ-9 Total Score 10   Q9: Thoughts of better off dead/self-harm past 2 weeks Not at all     Kenyetta Sinclair RN   "

## 2020-02-28 NOTE — TELEPHONE ENCOUNTER
"Requested Prescriptions   Pending Prescriptions Disp Refills     ondansetron (ZOFRAN) 4 MG tablet [Pharmacy Med Name: ONDANSETRON HCL 4MG TABS] 30 tablet 1     Sig: TAKE ONE TABLET BY MOUTH EVERY 6 HOURS AS NEEDED FOR NAUSEA   Last Written Prescription Date:  11/17/19  Last Fill Quantity: 30,  # refills: 0   Last office visit: 11/21/2019 with prescribing provider:  11/21/19   Future Office Visit:         Antivertigo/Antiemetic Agents Passed - 2/27/2020  5:03 PM        Passed - Recent (12 mo) or future (30 days) visit within the authorizing provider's specialty     Patient has had an office visit with the authorizing provider or a provider within the authorizing providers department within the previous 12 mos or has a future within next 30 days. See \"Patient Info\" tab in inbasket, or \"Choose Columns\" in Meds & Orders section of the refill encounter.              Passed - Medication is active on med list        Passed - Patient is 18 years of age or older        "

## 2020-02-28 NOTE — TELEPHONE ENCOUNTER
Prescription approved per [facility] Refill Protocol.    TRISTON BurtonN, RN  [facility]

## 2020-03-13 NOTE — TELEPHONE ENCOUNTER
Requested Prescriptions   Pending Prescriptions Disp Refills     acamprosate (CAMPRAL) 333 MG EC tablet [Pharmacy Med Name: ACAMPROSATE CALCIUM 333MG TBEC] 540 tablet 0     Sig: TAKE TWO TABLETS BY MOUTH THREE TIMES A DAY     Last Written Prescription Date:  11/25/2019  Last Fill Quantity: 540,   # refills: 3  Last Office Visit: 11/21/2019  Future Office visit:       Routing refill request to provider for review/approval because:  Drug not on the [facility], [facility] or [facility] refill protocol or controlled substance    Aminata Sánchez MA

## 2020-04-14 PROBLEM — E87.6 HYPOPOTASSEMIA: Status: RESOLVED | Noted: 2019-01-01 | Resolved: 2020-01-01

## 2020-04-14 PROBLEM — I95.9 HYPOTENSION, UNSPECIFIED HYPOTENSION TYPE: Status: RESOLVED | Noted: 2019-01-01 | Resolved: 2020-01-01

## 2020-04-14 PROBLEM — N17.9 ACUTE RENAL FAILURE (H): Status: RESOLVED | Noted: 2019-01-01 | Resolved: 2020-01-01

## 2020-04-14 PROBLEM — E87.1 HYPONATREMIA: Status: RESOLVED | Noted: 2019-01-01 | Resolved: 2020-01-01

## 2020-04-14 PROBLEM — R19.5 HEME POSITIVE STOOL: Status: RESOLVED | Noted: 2018-03-27 | Resolved: 2020-01-01

## 2020-04-14 PROBLEM — K76.82 HEPATIC ENCEPHALOPATHY (H): Status: RESOLVED | Noted: 2019-01-01 | Resolved: 2020-01-01

## 2020-04-14 PROBLEM — N18.30 CKD (CHRONIC KIDNEY DISEASE) STAGE 3, GFR 30-59 ML/MIN (H): Status: ACTIVE | Noted: 2020-01-01

## 2020-04-14 PROBLEM — K65.2 SBP (SPONTANEOUS BACTERIAL PERITONITIS) (H): Status: RESOLVED | Noted: 2019-01-01 | Resolved: 2020-01-01

## 2020-04-14 PROBLEM — F10.229 ACUTE ALCOHOLIC INTOXICATION IN ALCOHOLISM (H): Status: RESOLVED | Noted: 2018-03-27 | Resolved: 2020-01-01

## 2020-04-14 PROBLEM — N18.5 CKD (CHRONIC KIDNEY DISEASE) STAGE 5, GFR LESS THAN 15 ML/MIN (H): Status: ACTIVE | Noted: 2020-01-01

## 2020-04-14 PROBLEM — K92.1 HEMATOCHEZIA: Status: RESOLVED | Noted: 2019-01-01 | Resolved: 2020-01-01

## 2020-04-14 PROBLEM — N18.5 CKD (CHRONIC KIDNEY DISEASE) STAGE 5, GFR LESS THAN 15 ML/MIN (H): Status: RESOLVED | Noted: 2020-01-01 | Resolved: 2020-01-01

## 2020-04-14 NOTE — PROGRESS NOTES
"Monalisa Olguin is a 54 year old female who is being evaluated via a billable telephone visit.      The patient has been notified of following:     \"This telephone visit will be conducted via a call between you and your physician/provider. We have found that certain health care needs can be provided without the need for a physical exam.  This service lets us provide the care you need with a short phone conversation.  If a prescription is necessary we can send it directly to your pharmacy.  If lab work is needed we can place an order for that and you can then stop by our lab to have the test done at a later time.    Telephone visits are billed at different rates depending on your insurance coverage. During this emergency period, for some insurers they may be billed the same as an in-person visit.  Please reach out to your insurance provider with any questions.    If during the course of the call the physician/provider feels a telephone visit is not appropriate, you will not be charged for this service.\"    Patient has given verbal consent for Telephone visit?  Yes    How would you like to obtain your AVS? Mail    Subjective     Monalisa Olguin is a 54 year old female who presents to clinic today for the following health issues:    Chief Complaint   Patient presents with     Shaking     getting worse and worse. They shake so bad her hands are hurting now.        PROBLEMS TO ADD ON...  Patient is having more shaking to the point where she is dropping things and breaking them.  She has broken 2 plates in a glass because of the increased shaking.  When she sitting resting the shaking is not bad but when she tries to do things or use those muscles of the arms she gets more shaking.  She is feels like she is weaker also.  Her last ultrasound on 3/20/2020 did not show a significant amount of ascites so they did not do a paracentesis.  This is the second time now that they canceled the paracentesis due to the fact that " she was not as distended and full of fluid as usual.  I told her that this is actually a good thing that things are stabilizing and she is not requiring more of that ascitic fluid which would indicate to me that her liver is somewhat recovering particularly osmotic pressure in the blood or the protein levels in the bladder better so she is not losing so much fluid into the tissues or into the abdominal cavity.  She still is not eating as well as she would like, she just has no appetite to eat.  She does confirm the fact that she still is not drinking and has not had anything to drink since 2019.  I congratulated her on that.  Her follow-up with Dr. Kovacs is not until July.    We discussed some etiologies of shaking, I want to make sure that her electrolytes are all within a normal range along with her thyroid level.  I do want to check her blood count and her liver profile to make sure that there is no significant changes with those either.    Patient Active Problem List   Diagnosis     Kidney donor     Esophageal reflux     Moderate Depression [296.32]     HYPERLIPIDEMIA LDL GOAL <100     Hypertension goal BP (blood pressure) < 130/80     Anxiety     Alcoholism in recovery (H)     Alcoholic cirrhosis of liver with ascites (H)     Chronic blood loss anemia     Thrombocytopenia (H)     Tobacco abuse     Non-intractable vomiting with nausea     Portal hypertension (H)     Pulmonary nodules     Hyperthyroidism     Alcoholic cirrhosis of liver without ascites (H) - per Hepatology consult 2017     Splenomegaly     Epistaxis     Other iron deficiency anemia     Other pancytopenia (H)     Solitary kidney, acquired     Macrocytic anemia     CKD (chronic kidney disease) stage 3, GFR 30-59 ml/min (H)     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     COLONOSCOPY N/A 2017     Procedure: COLONOSCOPY;  Colonoscopy;  Surgeon: Sai Johnston MD;  Location: [facility]     ESOPHAGOSCOPY, GASTROSCOPY, DUODENOSCOPY (EGD), COMBINED N/A 12/27/2017    Procedure: COMBINED ESOPHAGOSCOPY, GASTROSCOPY, DUODENOSCOPY (EGD), BIOPSY SINGLE OR MULTIPLE;  ESOPHAGOSCOPY, GASTROSCOPY, DUODENOSCOPY (EGD) with biopsies;  Surgeon: Sai Johnston MD;  Location: [facility]     ESOPHAGOSCOPY, GASTROSCOPY, DUODENOSCOPY (EGD), COMBINED N/A 4/29/2019    Procedure: ESOPHAGOGASTRODUODENOSCOPY, WITH BIOPSY;  Surgeon: Edgardo Scott DO;  Location: [facility]     HC KNEE SCOPE, DIAGNOSTIC  11/14/01    Arthroscopy, Lt Knee     LAPAROSCOPIC CHOLECYSTECTOMY N/A 9/24/2017    Procedure: LAPAROSCOPIC CHOLECYSTECTOMY;  laparoscopic cholecystectomy;  Surgeon: Romeo Pastor MD;  Location: [facility]     OPEN REDUCTION INTERNAL FIXATION WRIST Right 11/29/2017    Procedure: OPEN REDUCTION INTERNAL FIXATION WRIST;  OPEN REDUCTION INTERNAL FIXATION RIGHT WRIST;  Surgeon: Edgardo Almendarez MD;  Location: [facility]       Social History     Tobacco Use     Smoking status: Current Every Day Smoker     Packs/day: 0.50     Years: 10.00     Pack years: 5.00     Smokeless tobacco: Never Used     Tobacco comment: pt quit 1992 after smoking for 8 yrs, started again in 2004   Substance Use Topics     Alcohol use: Not Currently     Alcohol/week: 0.0 standard drinks     Comment: 2 beers per day     Family History   Problem Relation Age of Onset     Hypertension Mother         80 years old     Heart Disease Paternal Grandmother      Heart Disease Paternal Grandfather      Cerebrovascular Disease Maternal Grandmother      Cerebrovascular Disease Maternal Grandfather      Alcohol/Drug Maternal Grandfather      Substance Abuse Maternal Grandfather      Heart Disease Father         Silent MI stents x 2     Diabetes Sister 17        older sister also had kidney and pancreas transplant     Heart Disease Sister         MI secondary to diabetes      Genitourinary Problems Sister 43        kidney transplant from renal failure     Other - See Comments Sister         older     Other - See Comments Sister         younger     Diabetes Sister 9        youngest, juvenile type I (onset age 9 with no complications)     Cancer Paternal Aunt         ?kind         Current Outpatient Medications   Medication Sig Dispense Refill     acamprosate (CAMPRAL) 333 MG EC tablet TAKE TWO TABLETS BY MOUTH THREE TIMES A  tablet 3     acetaminophen (TYLENOL) 325 MG tablet Take 1 tablet (325 mg) by mouth every 8 hours as needed for mild pain or fever 30 tablet 0     alendronate (FOSAMAX) 35 MG tablet TAKE ONE TABLET BY MOUTH WITH 8OZ WATER EVERY MONDAY (ONCE EVERY 7 DAYS) 30 MINUTES BEFORE BREAKFAST AND REMAIN UPRIGHT DURING THIS TIME 12 tablet 3     ARIPiprazole (ABILIFY) 5 MG tablet Take 1 tablet (5 mg) by mouth At Bedtime 90 tablet 3     busPIRone (BUSPAR) 15 MG tablet Take 1 tablet (15 mg) by mouth 2 times daily 180 tablet 3     FLUoxetine (PROZAC) 20 MG capsule TAKE THREE CAPSULES BY MOUTH ONCE DAILY 90 capsule 3     folic acid (FOLVITE) 1 MG tablet Take 1 tablet (1 mg) by mouth daily 90 tablet 3     furosemide (LASIX) 40 MG tablet Take 1 tablet (40 mg) by mouth daily 90 tablet 3     gabapentin (NEURONTIN) 100 MG capsule Take 3 capsules (300 mg) by mouth At Bedtime 270 capsule 3     lactulose (CHRONULAC) 10 GM/15ML solution Take 22.5 mLs (15 g) by mouth 3 times daily as needed for constipation Start with 15 ml daily, titrate as needed for 2-3 BM's daily. 1000 mL 5     MEDICATION INSTRUCTION  1 each 0     midodrine (PROAMATINE) 5 MG tablet Take 1 tablet (5 mg) by mouth 3 times daily (with meals) 270 tablet 3     omeprazole (PRILOSEC) 20 MG DR capsule Take 1 capsule (20 mg) by mouth daily 90 capsule 3     ondansetron (ZOFRAN) 4 MG tablet TAKE ONE TABLET BY MOUTH EVERY 6 HOURS AS NEEDED FOR NAUSEA 30 tablet 1     potassium chloride ER (K-DUR/KLOR-CON M) 10 MEQ CR tablet Take 1  "tablet (10 mEq) by mouth daily 90 tablet 3     rifaximin (XIFAXAN) 550 MG TABS tablet Take 1 tablet (550 mg) by mouth 2 times daily 60 tablet 0     SENNA-docusate sodium (SENNA S) 8.6-50 MG tablet Take 1 tablet by mouth 2 times daily 60 tablet 11     spironolactone (ALDACTONE) 50 MG tablet Take 1 tablet (50 mg) by mouth daily 90 tablet 3     traZODone (DESYREL) 50 MG tablet Take 2 tablets (100 mg) by mouth nightly as needed for sleep 30 tablet 0     ursodiol (ACTIGALL) 500 MG tablet Take 1 tablet (500 mg) by mouth 2 times daily 180 tablet 3     vitamin B1 (VITAMIN B-1) 100 MG tablet Take 1 tablet (100 mg) by mouth daily 90 tablet 3     Allergies   Allergen Reactions     Albuterol      Tongue \"hardened and painful\"     Recent Labs   Lab Test 01/27/20  1443 11/25/19  1256  11/18/19  0621  12/20/17  1403  09/24/17  0630  09/22/17  0735  05/23/17  1610  03/03/16  0953  08/26/13  1037   A1C  --   --   --   --   --   --   --  Canceled, Test credited  --   --   --   --   --   --   --   --    LDL  --   --   --   --   --   --   --   --   --   --   --  95  --  56  --  96   HDL  --   --   --   --   --   --   --   --   --   --   --  71  --  58  --  54   TRIG  --   --   --   --   --   --   --   --   --   --   --  59  --  95  --  104   ALT 22 27  --  20   < > 23   < > 35   < > 38   < > 34   < >  --    < > 32   CR 1.33* 1.38*  --  0.94   < > 1.02   < > 1.04   < > 1.33*   < > 0.87   < > 0.89   < > 1.12*   GFRESTIMATED 45* 44*  --  69   < > 57*   < > 56*   < > 42*   < > 69   < > 67   < > 52*   GFRESTBLACK 53* 50*  --  80   < > 69   < > 68   < > 51*   < > 83   < > 81   < > 63   POTASSIUM 3.6 4.1   < > 3.3*   < > 3.7   < > 3.7   < > 3.9   < > 4.1   < > 3.7   < > 3.8   TSH  --   --   --   --   --  1.02  --   --   --  0.50  --   --    < >  --   --   --     < > = values in this interval not displayed.      BP Readings from Last 3 Encounters:   01/27/20 109/64   01/03/20 115/62   12/06/19 98/58    Wt Readings from Last 3 Encounters: "   01/27/20 56 kg (123 lb 8 oz)   11/25/19 58.4 kg (128 lb 11.2 oz)   11/21/19 55.8 kg (123 lb)                    Reviewed and updated as needed this visit by Provider         Review of Systems   ROS COMP: Constitutional, HEENT, cardiovascular, pulmonary, gi and gu systems are negative, except as otherwise noted.       Objective   Reported vitals:  LMP 05/16/2012        She sounds quite well on the phone.  She does not sound overly tired or down and depressed.  She is answering all questions fully and logically.  She does not sound confused at all.  She denies any shortness of breath or symptoms of COVID-19.  Remainder of exam unable to be completed due to telephone visits    Diagnostic Test Results:  Labs reviewed in Epic  Patient has been to come in this afternoon now to have some labs drawn so we can make sure that everything is stable.        Assessment/Plan:  (K70.31) Alcoholic cirrhosis of liver with ascites (H)  (primary encounter diagnosis)  Comment: Patient with cirrhosis of the liver from chronic alcohol use.  Her ascites is stabilized and she has not needed a paracentesis now for couple months.  Plan: Hepatic panel (Albumin, ALT, AST, Bili, Alk         Phos, TP), Comprehensive metabolic panel (BMP +        Alb, Alk Phos, ALT, AST, Total. Bili, TP)        Closure her abdomen still bloated but it is not gotten any larger since she was seen on March 20 by the radiologist.  I am going to order some labs on her to see if things remain stable or if there is anything unusual.    (D50.0) Chronic blood loss anemia  Comment: She has had some chronic anemia secondary to chronic gastric irritation from her chronic drinking but this is been stable and she has not been drinking since September.  She does have thrombocytopenia and anemia although that has been improving.  Plan: INR, CBC with platelets and differential        We will get an INR and a CBC with platelets to make sure that things are continuing to  improve.    (E05.90) Hyperthyroidism  Comment: She has had some abnormal thyroid labs in the past so were going on to a follow-up on that  Plan: TSH with free T4 reflex        We will get a TSH with a free T4 reflex to make sure that she is not hyperthyroid which could be contributing to her shaking.    (R25.1) Shaking  Comment: Shaking is gotten worse and this could be secondary to her liver failure, hypothyroidism, or some neurologic injury.  Plan: Hepatic panel (Albumin, ALT, AST, Bili, Alk         Phos, TP), Comprehensive metabolic panel (BMP +        Alb, Alk Phos, ALT, AST, Total. Bili, TP), CBC         with platelets and differential, TSH with free         T4 reflex        She has not been falling but has been dropping things so I do want to get these labs checked to make sure that things are stable and not offkilter too much.    (N18.3) CKD (chronic kidney disease) stage 3, GFR 30-59 ml/min (H)  Comment: Her last GFR was 45 so she got stage III kidney disease.  Plan: We will obtain her labs today to make sure that that is remaining stable or improving.      Return Today coming in for lab, for Lab Work.      Phone call duration:  16 minutes    Electronically signed by:  Efren Bustos M.D.  4/14/2020

## 2020-04-14 NOTE — NURSING NOTE
Chief Complaint   Patient presents with     Shaking     getting worse and worse. They shake so bad her hands are hurting now.     MP/MA

## 2020-04-15 NOTE — TELEPHONE ENCOUNTER
Reason for Call:  Request for results:    Name of test or procedure: lab tests    Date of test of procedure: 4/14/2020    Location of the test or procedure: [facility]    OK to leave the result message on voice mail or with a family member? YES    Phone number Patient can be reached at:  Cell number on file:    Telephone Information:   Mobile [phone]       Additional comments: none    Call taken on 4/15/2020 at 1:43 PM by Aspen Vee

## 2020-04-16 NOTE — TELEPHONE ENCOUNTER
Heydi's labs have changed minimally, it looks like she may be getting a little dehydrated because her creatinine and BUN are a little bit worse.  Her electrolytes are all normal as is your thyroid labs but her hemoglobin did drop a little bit from 10.2 down to 9.9 which is not a significant drop.  I am waiting to hear from Dr. Kovacs to see if he wants us to do anything else at this time other than hold the course.    Electronically signed by:  Efren Bustos M.D.  4/16/2020

## 2020-04-16 NOTE — TELEPHONE ENCOUNTER
Patient was informed that labs have changed minimally, it looks like she may be getting a little dehydrated because her creatinine and BUN are a little bit worse.  Her electrolytes are all normal as is your thyroid labs but her hemoglobin did drop a little bit from 10.2 down to 9.9 which is not a significant drop.  Dr. Bustos is waiting to hear from Dr. Kovacs to see if he wants us to do anything else at this time other than hold the course.    Brian Boyce, CMA

## 2020-04-19 NOTE — TELEPHONE ENCOUNTER
Her daughter will bring her to the ER. I advised 911 if she can't get her to the car. Heydi is in liver failure and taking lactulose to push out toxins in the stool. She has been vomiting up everything. She is confused and not making sense in what she is doing. She is shaky and can' t hold onto a cup without dropping it.  Eli Covarrubias RN  [facility]      Reason for Disposition    Headache or vomiting    Additional Information    Negative: [1] Difficult to awaken or acting confused (e.g., disoriented, slurred speech) AND [2] present now AND [3] has diabetes (diabetes mellitus)    Negative: [1] Difficult to awaken or acting confused (e.g., disoriented, slurred speech) AND [2] present now AND [3] new onset    Negative: [1] Weakness of the face, arm, or leg on one side of the body AND [2] new onset    Negative: [1] Numbness of the face, arm, or leg on one side of the body AND [2] new onset    Negative: [1] Loss of speech or garbled speech AND [2] new onset    Negative: Difficulty breathing or bluish lips    Negative: Shock suspected (e.g., cold/pale/clammy skin, too weak to stand, low BP, rapid pulse)    Negative: Seeing, hearing, or feeling things that are not there (i.e., visual, auditory, or tactile hallucinations)    Negative: Followed a head injury    Negative: Drug overdose suspected    Negative: Sounds like a life-threatening emergency to the triager    Negative: Alcohol use, abuse or dependence: question or problem related to    Negative: Drug abuse or dependence: question or problem related to    Protocols used: CONFUSION - DELIRIUM-A-AH

## 2020-04-20 NOTE — TELEPHONE ENCOUNTER
Tried to reach patient, left message for patient to call the clinic back. If patient calls back, please give Dr. Bustos and Dr. Kovacs's recommendations below.    Brian Boyce CMA

## 2020-04-20 NOTE — TELEPHONE ENCOUNTER
We need to contact Heydi and order the lab that dr. Kovacs recommended and then inquire about her lactulose use and how often she is having a BM.  He wants her to shoot for 3-5 per day.  Also have her cut her diaretic dose in half.    Electronically signed by:  Efren Bustos M.D.  4/20/2020

## 2020-04-20 NOTE — TELEPHONE ENCOUNTER
----- Message from Edgardo Kovacs MD sent at 4/18/2020  4:44 PM CDT -----  A few thoughts.    1.  Is she drinking again.  I am concerned that her liver function is a bit worse.  May want to check an ethyl glucuronide or PEth test to see.    2.  Is she taking her lactulose and how many BMs/day is she having?  Shoot for  2-5/day.  The tremor could be asterixis.    3.  I would consider cutting the diuretic doses in 1/2.    Those are my thoughts.    Maikel Kovacs  ----- Message -----  From: Efren Bustos MD  Sent: 4/16/2020   4:31 PM CDT  To: Edgardo Kovacs MD    Good afternoon Dr. Kovacs, I did a phone encounter with Heydi to discuss some of the symptoms she is having right now.  She really is not been having any significant abdominal enlargement and the last time she had her ultrasound done for paracentesis they did not see enough fluid to indicate doing a paracentesis so canceled it.  This was on 3/20/2020.  She does complain now of increasing shaking and tremor.  She said it is worse when she is doing things and has dropped 2 plates in a glass because of it.  When she is resting in quiet she does not get much tremor but it worsens with any activity.  She feels that she is weaker also.  I repeated some labs on her to make sure that nothing was to off but really there is no significant change in her lab values.  It looks like she might be a little dry because her BUN is elevated and her GFR has decreased.  I am wondering if you have any suggestions for me?  She is a very complicated patient and I am really not certain what to do with her.    I appreciate your suggestions, Eliceo    Electronically signed by:  Efren Bustos M.D.  4/16/2020

## 2020-04-21 PROBLEM — R41.0 CONFUSION: Status: ACTIVE | Noted: 2020-01-01

## 2020-04-21 NOTE — PROGRESS NOTES
[facility]  Transfer Triage Note    Date of call: 04/21/20  Time of call: 1:49 PM    Is pandemic COVID-19 a concern? NO    Reason for transfer: Patient has established care here  Further diagnostic work up, management, and consultation for specialized care   Diagnosis: GIB in setting of etoh cirrhosis    Outside Records: Available  Additional records requested to be faxed to [phone].    Stability of Patient: Patient is vitally stable, with no critical labs, and will likely remain stable throughout the transfer process  ICU: No    Expected Time of Arrival for Transfer: 0-8 hours    Arrival Location:  [address] [address] Phone: [phone]    Recommendations for Management and Stabilization: Given    Additional Comments 53 yo with etoh cirrhosis presents to OSH ED with hematmesis and malaise.  No fevers or chills.  Reports one episode of hematemesis prior to arrival.  On presentation, vitals near baseline.  Hgb ~2g less than previous baseline with mild ALFREDO in setting of CKD.  No obvious infectious source.  Most recent EGD from ~1 year ago without varices.  Recommended second IV placement and IV PPI. Given lack of ability to get ascitic fluid sample prior to transfer, recommended dose of cephalosporin.  Will transfer to  initially given presentation and risk for decompensation.      Eh Che MD

## 2020-04-21 NOTE — ED TRIAGE NOTES
Here with nausea, vomiting and diarrhea that has been on going for about a week. Currently in liver failure and on lactulose,

## 2020-04-21 NOTE — PROGRESS NOTES
"Monalisa Olguin is a 54 year old female who is being evaluated via a billable telephone visit.      The patient has been notified of following:     \"This telephone visit will be conducted via a call between you and your physician/provider. We have found that certain health care needs can be provided without the need for a physical exam.  This service lets us provide the care you need with a short phone conversation.  If a prescription is necessary we can send it directly to your pharmacy.  If lab work is needed we can place an order for that and you can then stop by our lab to have the test done at a later time.    Telephone visits are billed at different rates depending on your insurance coverage. During this emergency period, for some insurers they may be billed the same as an in-person visit.  Please reach out to your insurance provider with any questions.    If during the course of the call the physician/provider feels a telephone visit is not appropriate, you will not be charged for this service.\"    Patient has given verbal consent for Telephone visit?  Yes    How would you like to obtain your AVS? MyChart    Subjective     Monalisa Olguni is a 54 year old female who presents to clinic today for the following health issues:    PHQ9 not completed patient sounded a little confused on the phone and was not answering all the questions    HPI  Gastrointestinal symptoms      Duration: Wednesday     Description:           ABDOMINAL PAIN - epigastric area  .   Pain is described as aching and throbbing and radiates to center.    Intensity:  severe    Accompanying signs and symptoms:  nausea, vomitting, fevers, chills, diarrhea, loose stools, painful bowel movements and bloating    History  Previous {similar problem: YES  Previous evaluation:  Unsure     Aggravating factors: meals and bowel movements    Alleviating factors: If she doesn't eat     Other Therapies tried: None    Had a phone visit with patient for " abdominal pain with N/V.  She was not recommended to come in the office today due to COVID 19 pandemic restriction.  She was offered to come in today if she feels the need to be seen, she wanted to try the phone visit first.      Monalisa has a concern about worsening of the abdominal pain in the last week and it is not getting better.  She has a complicated medical history which include alcoholic cirrhosis of the liver with ascites, portal hypertension, GERD, and history of encephalopathy.  Stated that she has chronic N/V but it has gotten significantly worse over the week as well.  Sharp pain that comes and go, mainly in the epigastric area with is worse with meals.  Usually happen right after eating, regardless of the type of food.  Been taking the Omeprazole, no change in diet.  No fever or chill.   Stated that she has not been able to keep much food down and has been trying to drink more water.  Stated that she feels dry and dehydrated.  Urine looks dark.  Stated that she feel tire and foggy.  Her last bowel movement was 6 days ago and it was watery stool. No bloody or black stool.  No hematemesis or coffee-ground emesis.  Generally feeling weak, tired and her plan feels foggy.  No fever or chill.  She is the last time she drank alcohol was about 5 months ago.  No with no change in her diet.  She thinks that she has been losing some weight because of not eating.      Current Outpatient Medications   Medication Sig Dispense Refill     acetaminophen (TYLENOL) 325 MG tablet Take 1 tablet (325 mg) by mouth every 8 hours as needed for mild pain or fever 30 tablet 0     ARIPiprazole (ABILIFY) 5 MG tablet Take 1 tablet (5 mg) by mouth At Bedtime 90 tablet 3     busPIRone (BUSPAR) 15 MG tablet Take 1 tablet (15 mg) by mouth 2 times daily 180 tablet 3     FLUoxetine (PROZAC) 20 MG capsule TAKE THREE CAPSULES BY MOUTH ONCE DAILY 90 capsule 3     folic acid (FOLVITE) 1 MG tablet Take 1 tablet (1 mg) by mouth daily 90  "tablet 3     furosemide (LASIX) 40 MG tablet Take 1 tablet (40 mg) by mouth daily 90 tablet 3     gabapentin (NEURONTIN) 100 MG capsule Take 3 capsules (300 mg) by mouth At Bedtime 270 capsule 3     lactulose (CHRONULAC) 10 GM/15ML solution Take 22.5 mLs (15 g) by mouth 3 times daily as needed for constipation Start with 15 ml daily, titrate as needed for 2-3 BM's daily. 1000 mL 5     MEDICATION INSTRUCTION  1 each 0     midodrine (PROAMATINE) 5 MG tablet Take 1 tablet (5 mg) by mouth 3 times daily (with meals) 270 tablet 3     omeprazole (PRILOSEC) 20 MG DR capsule Take 1 capsule (20 mg) by mouth daily 90 capsule 3     ondansetron (ZOFRAN) 4 MG tablet TAKE ONE TABLET BY MOUTH EVERY 6 HOURS AS NEEDED FOR NAUSEA 30 tablet 1     potassium chloride ER (K-DUR/KLOR-CON M) 10 MEQ CR tablet Take 1 tablet (10 mEq) by mouth daily 90 tablet 3     rifaximin (XIFAXAN) 550 MG TABS tablet Take 1 tablet (550 mg) by mouth 2 times daily 60 tablet 0     spironolactone (ALDACTONE) 50 MG tablet Take 1 tablet (50 mg) by mouth daily 90 tablet 3     traZODone (DESYREL) 50 MG tablet Take 2 tablets (100 mg) by mouth nightly as needed for sleep 30 tablet 0     ursodiol (ACTIGALL) 500 MG tablet Take 1 tablet (500 mg) by mouth 2 times daily 180 tablet 3     vitamin B1 (VITAMIN B-1) 100 MG tablet Take 1 tablet (100 mg) by mouth daily 90 tablet 3     acamprosate (CAMPRAL) 333 MG EC tablet TAKE TWO TABLETS BY MOUTH THREE TIMES A DAY (Patient not taking: Reported on 4/21/2020) 540 tablet 3     alendronate (FOSAMAX) 35 MG tablet TAKE ONE TABLET BY MOUTH WITH 8OZ WATER EVERY MONDAY (ONCE EVERY 7 DAYS) 30 MINUTES BEFORE BREAKFAST AND REMAIN UPRIGHT DURING THIS TIME (Patient not taking: Reported on 4/21/2020) 12 tablet 3     SENNA-docusate sodium (SENNA S) 8.6-50 MG tablet Take 1 tablet by mouth 2 times daily 60 tablet 11     Allergies   Allergen Reactions     Albuterol      Tongue \"hardened and painful\"       Reviewed and updated as needed this visit " by Provider         Review of Systems   ROS COMP: Constitutional, HEENT, cardiovascular, pulmonary, gi and gu systems are negative, except as otherwise noted.       Objective   Reported vitals:  LMP 05/16/2012    Speech is slightly slurry, answered the questions appropriately  PSYCH: Alert and oriented times 3; coherent speech, normal   rate and volume, able to articulate logical thoughts, able   to abstract reason, no tangential thoughts, no hallucinations   or delusions  Her affect is normal  RESP: No cough, no audible wheezing, able to talk in full sentences  Remainder of exam unable to be completed due to telephone visits    Diagnostic Test Results:  Labs reviewed in Epic  Reviewed labs on - normal TSH but CBC showed macrocytic anemia with a hemoglobin of 9.9, elevated INR of 1.29, elevated creatinine with a GFR of 23    Assessment/Plan:    ICD-10-CM    1. Abdominal pain, epigastric  R10.13    2. Alcoholic cirrhosis of liver with ascites (H)  K70.31    3. Gastroesophageal reflux disease with esophagitis  K21.0    4. Portal hypertension (H)  K76.6    5. History of encephalopathy  Z86.69       Monalisa has a very complicated high risk medical conditions who has been having abdominal pain for a week.  She also had not had a bowel movement for a week.  She has alcoholic hepatitis with ascites and history of encephalopathy and therefore the goal for her to have 4-5 loose stools aday.  She also has been feeling more foggy and tired.  Clinical presentation suggests he may be dehydrated as well.  She was instructed to go to ER for further evaluation and management.  She agreed and her sister will take her to the ER.     Return if symptoms worsen or fail to improve.      Phone call duration:  13 minutes    Alexis Mae Mai, MD

## 2020-04-21 NOTE — ED PROVIDER NOTES
"  History     Chief Complaint   Patient presents with     Nausea, Vomiting, & Diarrhea     HPI  Monalisa Olguin is a 54 year old female who presents with weakness, nausea and vomiting.  Patient has a history of alcoholic cirrhosis with ascites and liver failure.  Patient reports that she has not had any alcohol since September of last year.  Patient has not required a paracentesis for the last couple of months.  Patient denies any significant abdominal pain, she did have some last night but this is resolved on its own.  Patient has taken Zofran ODT without relief in her vomiting last episode was on the way here, patient does report a small amount of hematemesis but thinks she had a nosebleed at the time as well.  Patient denies any hematochezia, she has chronic diarrhea from her lactulose.  Patient denies any significant increase in overall swelling.  Patient does feel slightly \"off\", she does have a history of hepatic encephalopathy but is not confused on exam.  Patient denies any cough, fever, chills or any exposure to anyone with known COVID 19.    Allergies:  Allergies   Allergen Reactions     Albuterol      Tongue \"hardened and painful\"       Problem List:    Patient Active Problem List    Diagnosis Date Noted     CKD (chronic kidney disease) stage 3, GFR 30-59 ml/min (H) 04/14/2020     Priority: Medium     Solitary kidney, acquired 11/11/2019     Priority: Medium     Macrocytic anemia 11/11/2019     Priority: Medium     Other pancytopenia (H) 07/11/2019     Priority: Medium     Other iron deficiency anemia 04/11/2018     Priority: Medium     Alcoholic cirrhosis of liver without ascites (H) - per Hepatology consult Nov 20172017 03/28/2018     Priority: Medium     Splenomegaly 03/28/2018     Priority: Medium     Epistaxis 03/28/2018     Priority: Medium     Alcoholic cirrhosis of liver with ascites (H) 03/26/2017     Priority: Medium     Chronic blood loss anemia 03/26/2017     Priority: Medium     " Thrombocytopenia (H) 2017     Priority: Medium     Tobacco abuse 2017     Priority: Medium     Non-intractable vomiting with nausea 2017     Priority: Medium     Portal hypertension (H) 2017     Priority: Medium     Pulmonary nodules 2017     Priority: Medium     Hyperthyroidism 2017     Priority: Medium     Alcoholism in recovery (H) 2016     Priority: Medium     Anxiety 10/10/2011     Priority: Medium     Hypertension goal BP (blood pressure) < 130/80 2011     Priority: Medium     HYPERLIPIDEMIA LDL GOAL <100 10/31/2010     Priority: Medium     Moderate Depression [296.32] 2009     Priority: Medium     Esophageal reflux 10/07/2002     Priority: Medium     Kidney donor 2001     Priority: Medium        Past Medical History:    Past Medical History:   Diagnosis Date     Acute alcoholic intoxication in alcoholism (H) 3/27/2018     Acute renal failure (H) 2019     Alcohol abuse 2013     Alcohol dependence 2013     Alcohol withdrawal 2013     Anxiety 10/10/2011     CKD (chronic kidney disease) stage 3, GFR 30-59 ml/min (H)      CKD (chronic kidney disease) stage 3, GFR 30-59 ml/min (H) 2011     CKD (chronic kidney disease) stage 5, GFR less than 15 ml/min (H) 2020     Depressive disorder      Esophageal reflux 10/7/2002     Hematochezia-patient reported 2019     Heme positive stool 3/27/2018     Hepatic encephalopathy (H) 2019     Hypertension goal BP (blood pressure) < 130/80 2011     Hyponatremia 2019     Moderate Depression [296.32] 2009     Other internal derangement of knee(717.89)      Pap smear      SBP (spontaneous bacterial peritonitis) (H) 2019     Unspecified essential hypertension        Past Surgical History:    Past Surgical History:   Procedure Laterality Date     C  DELIVERY ONLY      , Low Cervical     C RMV,KIDNEY,DONOR,LIVING      donated kidney  to sister     COLONOSCOPY N/A 9/8/2017    Procedure: COLONOSCOPY;  Colonoscopy;  Surgeon: Sai Johnston MD;  Location: [facility]     ESOPHAGOSCOPY, GASTROSCOPY, DUODENOSCOPY (EGD), COMBINED N/A 12/27/2017    Procedure: COMBINED ESOPHAGOSCOPY, GASTROSCOPY, DUODENOSCOPY (EGD), BIOPSY SINGLE OR MULTIPLE;  ESOPHAGOSCOPY, GASTROSCOPY, DUODENOSCOPY (EGD) with biopsies;  Surgeon: Sai Johnston MD;  Location: [facility]     ESOPHAGOSCOPY, GASTROSCOPY, DUODENOSCOPY (EGD), COMBINED N/A 4/29/2019    Procedure: ESOPHAGOGASTRODUODENOSCOPY, WITH BIOPSY;  Surgeon: Edgardo Scott DO;  Location: [facility]     HC KNEE SCOPE, DIAGNOSTIC  11/14/01    Arthroscopy, Lt Knee     LAPAROSCOPIC CHOLECYSTECTOMY N/A 9/24/2017    Procedure: LAPAROSCOPIC CHOLECYSTECTOMY;  laparoscopic cholecystectomy;  Surgeon: Romeo Pastor MD;  Location: [facility]     OPEN REDUCTION INTERNAL FIXATION WRIST Right 11/29/2017    Procedure: OPEN REDUCTION INTERNAL FIXATION WRIST;  OPEN REDUCTION INTERNAL FIXATION RIGHT WRIST;  Surgeon: Edgardo Almendarez MD;  Location: [facility]       Family History:    Family History   Problem Relation Age of Onset     Hypertension Mother         80 years old     Heart Disease Paternal Grandmother      Heart Disease Paternal Grandfather      Cerebrovascular Disease Maternal Grandmother      Cerebrovascular Disease Maternal Grandfather      Alcohol/Drug Maternal Grandfather      Substance Abuse Maternal Grandfather      Heart Disease Father         Silent MI stents x 2     Diabetes Sister 17        older sister also had kidney and pancreas transplant     Heart Disease Sister         MI secondary to diabetes     Genitourinary Problems Sister 43        kidney transplant from renal failure     Other - See Comments Sister         older     Other - See Comments Sister         younger     Diabetes Sister 9        youngest, juvenile type I (onset age 9 with no complications)     Cancer Paternal Aunt         ?kind       Social  History:  Marital Status:   [2]  Social History     Tobacco Use     Smoking status: Current Every Day Smoker     Packs/day: 0.50     Years: 10.00     Pack years: 5.00     Smokeless tobacco: Never Used     Tobacco comment: pt quit 1992 after smoking for 8 yrs, started again in 2004   Substance Use Topics     Alcohol use: Not Currently     Alcohol/week: 0.0 standard drinks     Comment: 2 beers per day     Drug use: No        Medications:    acamprosate (CAMPRAL) 333 MG EC tablet  ARIPiprazole (ABILIFY) 5 MG tablet  busPIRone (BUSPAR) 15 MG tablet  FLUoxetine (PROZAC) 20 MG capsule  folic acid (FOLVITE) 1 MG tablet  furosemide (LASIX) 40 MG tablet  gabapentin (NEURONTIN) 100 MG capsule  lactulose (CHRONULAC) 10 GM/15ML solution  midodrine (PROAMATINE) 5 MG tablet  omeprazole (PRILOSEC) 20 MG DR capsule  ondansetron (ZOFRAN) 4 MG tablet  potassium chloride ER (K-DUR/KLOR-CON M) 10 MEQ CR tablet  rifaximin (XIFAXAN) 550 MG TABS tablet  spironolactone (ALDACTONE) 50 MG tablet  traZODone (DESYREL) 50 MG tablet  ursodiol (ACTIGALL) 500 MG tablet  vitamin B1 (VITAMIN B-1) 100 MG tablet  acetaminophen (TYLENOL) 325 MG tablet  alendronate (FOSAMAX) 35 MG tablet  MEDICATION INSTRUCTION  SENNA-docusate sodium (SENNA S) 8.6-50 MG tablet          Review of Systems   All other systems reviewed and are negative.      Physical Exam   BP: 99/40  Heart Rate: 92  Temp: 98.6  F (37  C)  Resp: 14  Weight: 58 kg (127 lb 13.9 oz)  SpO2: 97 %      Physical Exam  Constitutional:       Appearance: She is ill-appearing.      Comments: Chronically ill, thin, jaundiced appearing female sitting in the bed in no acute distress   HENT:      Head: Normocephalic.      Mouth/Throat:      Mouth: Mucous membranes are dry.   Eyes:      General: Scleral icterus present.      Pupils: Pupils are equal, round, and reactive to light.   Neck:      Musculoskeletal: Normal range of motion.   Cardiovascular:      Rate and Rhythm: Normal rate.      Heart  sounds: Murmur (3/6 systolic) present.   Pulmonary:      Effort: Pulmonary effort is normal.   Abdominal:      General: Bowel sounds are normal. There is distension (Mild).      Palpations: Abdomen is soft.      Tenderness: There is no abdominal tenderness.      Comments: Abdomen is soft, no concern for ascites that would require a tap today.   Musculoskeletal: Normal range of motion.   Skin:     General: Skin is warm.      Capillary Refill: Capillary refill takes less than 2 seconds.   Neurological:      General: No focal deficit present.      Mental Status: She is alert.   Psychiatric:         Mood and Affect: Mood normal.         ED Course        Procedures    Results for orders placed or performed during the hospital encounter of 04/21/20 (from the past 24 hour(s))   CBC with platelets differential   Result Value Ref Range    WBC 10.0 4.0 - 11.0 10e9/L    RBC Count 2.38 (L) 3.8 - 5.2 10e12/L    Hemoglobin 8.1 (L) 11.7 - 15.7 g/dL    Hematocrit 25.5 (L) 35.0 - 47.0 %     (H) 78 - 100 fl    MCH 34.0 (H) 26.5 - 33.0 pg    MCHC 31.8 31.5 - 36.5 g/dL    RDW 16.1 (H) 10.0 - 15.0 %    Platelet Count 57 (L) 150 - 450 10e9/L    Diff Method Automated Method     % Neutrophils 78.9 %    % Lymphocytes 11.4 %    % Monocytes 8.1 %    % Eosinophils 0.7 %    % Basophils 0.1 %    % Immature Granulocytes 0.8 %    Nucleated RBCs 0 0 /100    Absolute Neutrophil 7.9 1.6 - 8.3 10e9/L    Absolute Lymphocytes 1.1 0.8 - 5.3 10e9/L    Absolute Monocytes 0.8 0.0 - 1.3 10e9/L    Absolute Basophils 0.0 0.0 - 0.2 10e9/L    Abs Immature Granulocytes 0.1 0 - 0.4 10e9/L    Absolute Nucleated RBC 0.0    INR   Result Value Ref Range    INR 1.37 (H) 0.86 - 1.14   Comprehensive metabolic panel   Result Value Ref Range    Sodium 130 (L) 133 - 144 mmol/L    Potassium 4.1 3.4 - 5.3 mmol/L    Chloride 100 94 - 109 mmol/L    Carbon Dioxide 20 20 - 32 mmol/L    Anion Gap 10 3 - 14 mmol/L    Glucose 111 (H) 70 - 99 mg/dL    Urea Nitrogen 53 (H) 7 -  30 mg/dL    Creatinine 2.85 (H) 0.52 - 1.04 mg/dL    GFR Estimate 18 (L) >60 mL/min/[1.73_m2]    GFR Estimate If Black 21 (L) >60 mL/min/[1.73_m2]    Calcium 9.3 8.5 - 10.1 mg/dL    Bilirubin Total 12.3 (H) 0.2 - 1.3 mg/dL    Albumin 2.5 (L) 3.4 - 5.0 g/dL    Protein Total 5.1 (L) 6.8 - 8.8 g/dL    Alkaline Phosphatase 186 (H) 40 - 150 U/L    ALT 30 0 - 50 U/L    AST 51 (H) 0 - 45 U/L   Lactic acid whole blood   Result Value Ref Range    Lactic Acid 2.0 0.7 - 2.0 mmol/L   Ammonia   Result Value Ref Range    Ammonia 123 (HH) 10 - 50 umol/L   CRP inflammation   Result Value Ref Range    CRP Inflammation 55.8 (H) 0.0 - 8.0 mg/L   Occult blood stool   Result Value Ref Range    Occult Blood Negative NEG^Negative       Medications   ondansetron (ZOFRAN) injection 4 mg (4 mg Intravenous Given 4/21/20 1142)   cefTRIAXone (ROCEPHIN) 1 g vial to attach to  mL bag for ADULTS or NS 50 mL bag for PEDS (1 g Intravenous New Bag 4/21/20 1420)   0.9% sodium chloride BOLUS (0 mLs Intravenous Stopped 4/21/20 1240)   pantoprazole (PROTONIX) 40 mg IV push injection (40 mg Intravenous Given 4/21/20 1418)       Assessments & Plan (with Medical Decision Making)  Heydi is a 54-year-old female with known alcoholic cirrhosis, being followed by hepatology at [facility], presents today with nausea vomiting and possible hematemesis.  Please refer to HPI and focused exam.  Concerns for worsening hepatic failure versus GI bleed versus metabolic abnormality from her lactulose induced chronic diarrhea.  Patient is not acutely encephalopathic.  She does have mild confusion at times.  Patient has a soft blood pressure but is otherwise hemodynamically stable and afebrile.  Patient denies any upper respiratory symptoms or dysuria.  She denies any fever.  I think infection at this point is unlikely.  Patient CBC returns with a hemoglobin of 8.1 which is down 3 points from baseline, platelets are down to 57,000.  White count is normal at  10, lactic acid is normal at 2.  INR is elevated for patient at 1.37 with an ammonia level of 123 today.  Occult stool is negative.  CMP returns with a sodium of 130, BUN of 53, creatinine of 2.85 which is elevated from a week ago, GFR of 18, and an elevated total bilirubin of 12.3.  Total protein is 5.1 with an alkaline Bly of 186 and an AST of 51, ALT is normal at 30.  Patient's meld score is 29 today, I am concerned about worsening liver failure and discussed her case with Dr. Thomas, hepatologist from the [facility] who agrees with transferring patient down there, I discussed her case with Dr. Che, hospitalist who has graciously accepted patient for transfer.  A second line was established and patient was given IV Protonix as well as a dose of Rocephin per Dr. Che's request.  Patient's last ultrasound did not show significant esophageal varices so we will hold off on administering octreotide at this time.  I updated patient on plan of care who is agreeable, patient will be transferred in stable condition.  Patient was staffed in the emergency room with Dr. Gary.     I have reviewed the nursing notes.    I have reviewed the findings, diagnosis, plan and need for follow up with the patient.    New Prescriptions    No medications on file       Final diagnoses:   Acute renal failure, unspecified acute renal failure type (H)   Anemia, unspecified type   Non-intractable vomiting with nausea   Increased ammonia level   Alcoholic cirrhosis of liver without ascites (H)       4/21/2020   [facility] EMERGENCY DEPARTMENT     Ivory Goetz, IVETTE CNP  04/21/20 7500

## 2020-04-22 NOTE — PLAN OF CARE
"Neuro: A&Ox4. Patient has been restless overnight and unable to sleep.  She received 4 mg of melatonin and lavender was placed in the room to encourage sleep.  Patient states she was been unable to sleep well for over a year.  Patient states \"It's boring here.\"  Cardiac: SR. VSS. BP 90/52 (BP Location: Right arm)   Temp 98.2  F (36.8  C) (Oral)   Resp 16   Wt 58 kg (127 lb 13.9 oz)   LMP 05/16/2012   SpO2 96%   BMI 24.16 kg/m    Respiratory: Sating 96% on RA. Patient c/o SOB once overnight. SpO2 was 100% on room air.  2 L of O2 was given for comfort.  Pt said she was restless instead of SOB.  Ambulated times one through the 6th floor with no SOB.  GI/: Adequate urine output.  Loose BM X1  Diet/appetite: Tolerating NPO since midnight.  Activity:  Assist of stand by up to chair and in halls.  Pain: At acceptable level on current regimen.   Skin: No new deficits noted. Jaundice skin and sclera; bruising thoughout; scab on right thigh.  LDA's: 2 PIV saline locked    Critical labs: Hgb 6.9 and platelet 48.  [place] cross cover notified.  Transfusion ordered.    Plan: Continue with POC. Notify primary team with changes.   "

## 2020-04-22 NOTE — PROVIDER NOTIFICATION
[id number]--[id number]Critical lab results: Hgb: 6.9 and Platelets 48.     0511--New orders received

## 2020-04-22 NOTE — H&P
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     States that she has chronic vomiting but that recently has had more nausea and vomiting, which is precipitated by eating. Has not been eating or drinking for past few days. She has one episode yesterday during which she had a few drops of blood in her vomit. She states that this has happened in the past (but not for a while) but is not sure how frequently this has been occurring or when the last occurrence was prior to yesterday. States to this writer that her last stool was last night and that she has chronic diarrhea on lactulose but feels that she has been having more watery diarrhea (unable to quantify) with no blood or mucous. Per notes, she stated to another provider that she has not had a stool for the past 6 days. She has also had worsening RLQ abdominal pain for the past week, which ultimately prompted her presentation. Pain is currently 5/10 but was \"much worse\" last night. States her abdomen is enlarging and harder. She use to have frequent paracenthesis, but recentl (jan and march 2020) has no had enough ascites to tap. Prior to transfer to [facility], her sister took her to [facility].     ROS: Denies fever, chills, chest pain  Endorses: feeling cold, dizziness, dehydration, mouth dryness    ED course:  VS: AF, BP 99/40 increased to 104/60 s/p " IV fluids, HR 79, O2 97%  S/p 1L IVF  S/p 1g ceftriaxone  S/p IV 40 pantoprazole  Labs notable for Cr 2.85, Hb 8, plt 57, INR 1.37, t bili 12.3, alkphos 186, ammonia 123, CRp 55.8, lactate 2, AST 51, ALT 30, alb 2.5, prot 5.         Past Medical History:     Past Medical History:   Diagnosis Date     Acute alcoholic intoxication in alcoholism (H) 3/27/2018     Acute renal failure (H) 2019     Alcohol abuse 2013     Alcohol dependence 2013     Alcohol withdrawal 2013     Anxiety 10/10/2011     CKD (chronic kidney disease) stage 3, GFR 30-59 ml/min (H)     last GFR was 42     CKD (chronic kidney disease) stage 3, GFR 30-59 ml/min (H) 2011     CKD (chronic kidney disease) stage 5, GFR less than 15 ml/min (H) 2020     Depressive disorder      Esophageal reflux 10/7/2002     Hematochezia-patient reported 2019     Heme positive stool 3/27/2018     Hepatic encephalopathy (H) 2019     Hypertension goal BP (blood pressure) < 130/80 2011     Hyponatremia 2019     Moderate Depression [296.32] 2009    stable on wellbutrin     Other internal derangement of knee(717.89)     ACL Internal derangement, knee/ original injury in 5th grade, torn cartilage     Pap smear     no abnormals, due for paps q 2-3 yrs     SBP (spontaneous bacterial peritonitis) (H) 2019     Unspecified essential hypertension               Past Surgical History:     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     COLONOSCOPY N/A 2017    Procedure: COLONOSCOPY;  Colonoscopy;  Surgeon: Sai Johnston MD;  Location: [facility]     ESOPHAGOSCOPY, GASTROSCOPY, DUODENOSCOPY (EGD), COMBINED N/A 2017    Procedure: COMBINED ESOPHAGOSCOPY, GASTROSCOPY, DUODENOSCOPY (EGD), BIOPSY SINGLE OR MULTIPLE;  ESOPHAGOSCOPY, GASTROSCOPY, DUODENOSCOPY (EGD) with biopsies;   Surgeon: Sai Johnston MD;  Location: [facility]     ESOPHAGOSCOPY, GASTROSCOPY, DUODENOSCOPY (EGD), COMBINED N/A 4/29/2019    Procedure: ESOPHAGOGASTRODUODENOSCOPY, WITH BIOPSY;  Surgeon: Edgardo Scott DO;  Location: [facility]     HC KNEE SCOPE, DIAGNOSTIC  11/14/01    Arthroscopy, Lt Knee     LAPAROSCOPIC CHOLECYSTECTOMY N/A 9/24/2017    Procedure: LAPAROSCOPIC CHOLECYSTECTOMY;  laparoscopic cholecystectomy;  Surgeon: Romeo Pastor MD;  Location: [facility]     OPEN REDUCTION INTERNAL FIXATION WRIST Right 11/29/2017    Procedure: OPEN REDUCTION INTERNAL FIXATION WRIST;  OPEN REDUCTION INTERNAL FIXATION RIGHT WRIST;  Surgeon: Edgardo Almendarez MD;  Location: [facility]              Social History:     Social History     Socioeconomic History     Marital status:      Spouse name: Eron     Number of children: 3   Occupational History     Occupation: Homemaker   Tobacco Use     Smoking status: Current Every Day Smoker     Packs/day: 0.50-1     Years: 10.00     Pack years: 5.00     Smokeless tobacco: Never Used     Tobacco comment: pt quit 1992 after smoking for 8 yrs, started again in 2004        Social History Narrative     and lives at home with her  and her son, her two daughters are out of the house            Family History:     Family History   Problem Relation Age of Onset     Hypertension Mother         80 years old     Heart Disease Paternal Grandmother      Heart Disease Paternal Grandfather      Cerebrovascular Disease Maternal Grandmother      Cerebrovascular Disease Maternal Grandfather      Alcohol/Drug Maternal Grandfather      Substance Abuse Maternal Grandfather      Heart Disease Father         Silent MI stents x 2     Diabetes Sister 17        older sister also had kidney and pancreas transplant     Heart Disease Sister         MI secondary to diabetes     Genitourinary Problems Sister 43        kidney transplant from renal failure     Other - See Comments Sister       "   older     Other - See Comments Sister         younger     Diabetes Sister 9        youngest, juvenile type I (onset age 9 with no complications)     Cancer Paternal Aunt         ?kind            Immunizations:     Immunization History   Administered Date(s) Administered     HepA-Adult 04/11/2018     HepB-Adult 04/11/2018     Influenza (IIV3) PF 10/26/2005, 11/06/2006     Influenza Quad, Recombinant, p-free (RIV4) 09/17/2019     Influenza Vaccine IM > 6 months Valent IIV4 03/30/2017, 09/25/2017     Mantoux Tuberculin Skin Test 08/26/2013     Pneumo Conj 13-V (2010&after) 11/28/2017     Pneumococcal 23 valent 05/26/2013     TDAP Vaccine (Adacel) 08/30/2007, 06/14/2016              Allergies:      Allergies   Allergen Reactions     Albuterol      Tongue \"hardened and painful\"            Medications:     Prior to Admission Medications   Prescriptions Last Dose Informant Patient Reported? Taking?   ARIPiprazole (ABILIFY) 5 MG tablet   No No   Sig: Take 1 tablet (5 mg) by mouth At Bedtime   FLUoxetine (PROZAC) 20 MG capsule   No No   Sig: TAKE THREE CAPSULES BY MOUTH ONCE DAILY   MEDICATION INSTRUCTION   Yes No   SENNA-docusate sodium (SENNA S) 8.6-50 MG tablet   No No   Sig: Take 1 tablet by mouth 2 times daily   acamprosate (CAMPRAL) 333 MG EC tablet   No No   Sig: TAKE TWO TABLETS BY MOUTH THREE TIMES A DAY   acetaminophen (TYLENOL) 325 MG tablet   No No   Sig: Take 1 tablet (325 mg) by mouth every 8 hours as needed for mild pain or fever   alendronate (FOSAMAX) 35 MG tablet   No No   Sig: TAKE ONE TABLET BY MOUTH WITH 8OZ WATER EVERY MONDAY (ONCE EVERY 7 DAYS) 30 MINUTES BEFORE BREAKFAST AND REMAIN UPRIGHT DURING THIS TIME   Patient not taking: Reported on 4/21/2020   busPIRone (BUSPAR) 15 MG tablet   No No   Sig: Take 1 tablet (15 mg) by mouth 2 times daily   folic acid (FOLVITE) 1 MG tablet   No No   Sig: Take 1 tablet (1 mg) by mouth daily   furosemide (LASIX) 40 MG tablet   No No   Sig: Take 1 tablet (40 mg) by " mouth daily   gabapentin (NEURONTIN) 100 MG capsule   No No   Sig: Take 3 capsules (300 mg) by mouth At Bedtime   lactulose (CHRONULAC) 10 GM/15ML solution   No No   Sig: Take 22.5 mLs (15 g) by mouth 3 times daily as needed for constipation Start with 15 ml daily, titrate as needed for 2-3 BM's daily.   midodrine (PROAMATINE) 5 MG tablet   No No   Sig: Take 1 tablet (5 mg) by mouth 3 times daily (with meals)   omeprazole (PRILOSEC) 20 MG DR capsule   No No   Sig: Take 1 capsule (20 mg) by mouth daily   ondansetron (ZOFRAN) 4 MG tablet   No No   Sig: TAKE ONE TABLET BY MOUTH EVERY 6 HOURS AS NEEDED FOR NAUSEA   potassium chloride ER (K-DUR/KLOR-CON M) 10 MEQ CR tablet   No No   Sig: Take 1 tablet (10 mEq) by mouth daily   rifaximin (XIFAXAN) 550 MG TABS tablet   No No   Sig: Take 1 tablet (550 mg) by mouth 2 times daily   spironolactone (ALDACTONE) 50 MG tablet   No No   Sig: Take 1 tablet (50 mg) by mouth daily   traZODone (DESYREL) 50 MG tablet   No No   Sig: Take 2 tablets (100 mg) by mouth nightly as needed for sleep   ursodiol (ACTIGALL) 500 MG tablet   No No   Sig: Take 1 tablet (500 mg) by mouth 2 times daily   vitamin B1 (VITAMIN B-1) 100 MG tablet   No No   Sig: Take 1 tablet (100 mg) by mouth daily      Facility-Administered Medications: None             Review of Systems:   A complete ROS was performed and is negative other than what is stated in the HPI.          Physical Exam:   Blood pressure 99/47, temperature 98.2  F (36.8  C), temperature source Oral, resp. rate 16, last menstrual period 05/16/2012, SpO2 95 %, not currently breastfeeding.  General: NAD, A&Ox2-3 (initially got date wrong then corrected.) Answering questions appropriately but has trouble remembering questions posed sometimes. Asking appropriate questions.   HEENT: scleral icterus, buccal jaundice, tan brown skin, oropharynx is clear  Neck: supple, no LAD  Resp: CTAB  Heart/CV: RRR, nl s1 s2, has systolic murmur 4/6  Abdomen/GI: soft,  distended, tender over R mid to lower quadrant, distant and few bowel sounds.   Extremities: wwp, trace edema in ankles  Neuro: no asterixis, no focal deficits.   Psych: appropriate, some reduced concentration, but answers appropriately, sometimes need to ask question several times or in shorter sentences.         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     # Hx EtOH cirrhosis with ascites and elevated AST  # AMS c/f hepatic encephalopathy  # Abdominal pain c/f increased ascites, thrombosis, SBP  # Elevated ammonia  # Hyperbilirubinemia, increased alk phos  #Thrombocytopenia, elevated INR  Patient presented with confusion and increasing abdominal pain, nausea, and vomiting for the past week. She is afebrile and vitally stable other than soft BP which initially improved with fluids. Labs notable for worsening hyperbili, increase alk phos, decreased Hb (8 from 9.9), decreased plt (57 from 98) counts from earlier this month. AST (51) /ALT (30) overall stable. Has elevated ammonia to 123, CRP, and INR to 1.37. Lactate wnl. Of note, per chart, patient is post cholecystectomy. Suspect lab abnormalities are 2/2 liver disease. AMS could be attributed to hepatic encephalopathy (has elevated ammonia, but no asterixis), metabolic abnormality, infection (less likely given no fever or white  count). Abdominal pain could be 2/2 hepatic or portal thrombosis, distension 2/2 ascites, or SBP. Will likely need a diagnostic and therapeutic tap. Will cover with ceftriaxone for now,  start lactulose protocol and discuss w/ GI in AM.  - GI consult placed  - f/u RUQ US   - consider paracenthesis (diagnostic and therapeutic)  - cont ceftriaxone 1g q12  - f/u blood cx  - every day MELD labs  - [facility] protocol  - cont PTA rifaxamin  - cont PTA zofran  - cont thiamine, folic acid  - pain: 2g limit acetaminophen  - hold PTA gabapentin  - hold PTA trazodone  - hold PTA acamprosate (2/2 ALFREDO)  - hold PTA lactulose    # Small volume hematemesis x1  Had one episode on day prior to admission w/ a few drops of blood in vomit. Has had this in the past, but not for some time. Last EGD in 2019 did not have evidence of varices. Low suspicion of a new bleeding varice given how little bleeding she had. GI is consulted for the above problem, and would appreciate GI to weigh in on if need to check for varices at this time.   - GI consult as above  - cont pantoprazole 40mg IV q12hr  - cont ceftriaxone   - type and screen ordered  - obtain signed consent form for blood products (patient verbally consented to blood transfusion 4/21 by this writer)  - VS q4hr  - NPO at midnight    #ALFREDO  #Dehydration  Patient has had increased nausea and vomiting. Endorses feeling dehydrated. Baseline Cr ~1.33-2.3.  - AM BMP  - s/p 1L IV fluids  - hold PTA furosemide   - hold K tab  - hold spironolactone    #Soft Bp and hx orthostatic hypotension  Soft admission BP which improved s/p fluids. Holding lasix. Would avoid giving more fluids unless hemodynamically unstable and would extravasate fluid 2/2 low albumin. Could consider giving albumin if needed more volume.   - CTM  - cont PTA midodrine    #Thrombocytopenia  Likely related to liver disease. No overt bleeding other than small vol hemoptysis. Cannot r/o occult bleed. VSS.  - CTM  - f/u AM labs  -  consider transfusing if active bleeding     #Macrocytic anemia  Unclear etiology. GI provider checked PeTH in jan and it was negative. B12 last year was negative. No overt bleeding other than small vol hemoptysis. Cannot r/o occult bleed. VSS.  - f/u AM CBC  - consider checking B12  - consider smear    ----- Prior Medical Problems----  # Anxiety and depression: cont PTA fluoxetine, aripiprazole, buspirone  # Hypercholesterolemia: cont ursadiol  #Alcohol dependence (sober since 9/2019): hold acamprosate in setting of ALFREDO  #Current smoker: nicotine patch  #GERD: hold PTA omeprazole    CODE: FULL  Diet/IVF: NPO at midnight s/p 1L IVF  DVT ppx: ambulate w/ supervision. Fall precautions.  Disposition/Admission Status: admit to IM floor    I discussed the patient with Dr Hamilton who agreed with assessment and plan.    Tyrone Gallagher MD PGY-1  Internal Medicine - Dermatology  Pager: *[phone]      Internal Medicine Staff Addendum  Date of Service: 4/21/2020    I have seen and examined this patient, reviewed the data and discussed the plan of care. I agree with the above documentation including plan and ddx unless otherwise stated:     #    Arden Hamilton MD  Internal Medicine Hospitalist  [facility]  Attending pager: [phone]

## 2020-04-22 NOTE — CONSULTS
Hepatology Consultation    Monalisa Olguin   MRN# [id number]     Age: 54 year old YOB: 1965       Referring provider: Eh Che  Attending Hepatologist: Dr. Thomas  Consult requested for: acute on chronic liver disease       Assessment and Recommendation:   Assessment:   Ms. Olguin is a 54-year-old with a history of alcohol cirrhosis, abstinent since 9/2019, complicated by ascites, hepatic encephalopathy, hx SINAI, hx solitary kidney following living donor tx (2000) who was admitted with 1 week of weakness, nausea, vomiting and noted to have ALFREDO with creatinine 2.8, elevated bili 12.3 and altered mental status.       Recommendations:   1. Acute on chronic liver disease  2. Alcohol cirrhosis  3. Hepatic encephalopathy  4. Ascites  - elevated bilirubin above baseline. MELD 31. Has not been evaluated for liver transplant in the past due to prior improvement with abstinence and stability.   - please send PETH test. Prior peth in 1/2020 negative.   - US with moderate ascites without evidence of HCC  - infectious work up with para, blood cultures, UA. Has received ceftriaxone for suspected infection.   - continue lactulose with goal of 3-5 BM's per day. Continue rifaximin.     5. Anemia  - EGD 4/2019 without EV. Drop in hemoglobin with post hemoconcentration. Agree with hemolysis/anemia work up (retic/hapto/LDH/smear/iron panel) given macrocytosis. Prior iron levels have been elevated in setting of alcohol use. Bilirubin has been mostly direct.   - will hold on  plans for EGD at this time- low threshold for performing EGD.   - receiving PRBC to keep hemoglobin >7.0    6. ALFREDO  7. Solitary kidney (donor left kidney)  - UA/UC for infection. Has received prbc and will get albumin challenge today.   - slight improvement since admission. Hold diuretics.     8. Immunizations  - Pneumovax at time of discharge.   - received x1 dose of HAV/HBV in 2018. Please restart series of HAV and HBV prior to  discharge.     Plan of care discussed with Dr. Thomas    Thank you for the opportunity to be involved in Monalisa Olguin care. Please call with any questions or concerns.     IVETTE Suarez, CNP  Inpatient Hepatology [facility]  [phone]  Text link               History of Present Illness:   Monalisa Olguin is a 54 year old female with a history of alcohol cirrhosis, reported abstinence since 9/2019. Her liver disease has been complicated by ascites (no para needed since 1/2020), hepatic encephalopathy, osteopenia and known solitary kidney (living donor 2000) who was admitted with complaints of weakness, nausea and vomiting for approximately 1 week. On one episode of vomiting, she noted having a few bright red drops of blood in the vomit. She reports that her daughter had cold type symptoms with cough but otherwise has not been around anyone who has been ill. She denies overt fevers but reports chills/tremors especially yesterday. She continues to take her lactulose three times a day having 3-4 BM's daily and denies melena or hematochezia. She reports improvement in her mentation since admission. She continued to take her diuretics at home and did have a decrease in oral intake over the past week.     Since admission, she has had a drop in her hemoglobin, initially 8.1 dropped to 6.9 this morning. Prior to this past week, she had been active with walking and has noticed some mild weight loss. She did have one episode of significant abdominal distention last week which she stated mildly improved. She denies any falls or increased bruising recently.                Past Medical History:     Past Medical History:   Diagnosis Date     Acute alcoholic intoxication in alcoholism (H) 3/27/2018     Acute renal failure (H) 11/11/2019     Alcohol abuse 5/2/2013     Alcohol dependence 5/25/2013     Alcohol withdrawal 5/2/2013     Anxiety 10/10/2011     CKD (chronic kidney disease) stage 3, GFR 30-59 ml/min (H) 2006     last GFR was 42     CKD (chronic kidney disease) stage 3, GFR 30-59 ml/min (H) 2011     CKD (chronic kidney disease) stage 5, GFR less than 15 ml/min (H) 2020     Depressive disorder      Esophageal reflux 10/7/2002     Hematochezia-patient reported 2019     Heme positive stool 3/27/2018     Hepatic encephalopathy (H) 2019     Hypertension goal BP (blood pressure) < 130/80 2011     Hyponatremia 2019     Moderate Depression [296.32] 2009    stable on wellbutrin     Other internal derangement of knee(717.89)     ACL Internal derangement, knee/ original injury in 5th grade, torn cartilage     Pap smear     no abnormals, due for paps q 2-3 yrs     SBP (spontaneous bacterial peritonitis) (H) 2019     Unspecified essential hypertension               Past Surgical History:     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     COLONOSCOPY N/A 2017    Procedure: COLONOSCOPY;  Colonoscopy;  Surgeon: Sai Johnston MD;  Location: [facility]     ESOPHAGOSCOPY, GASTROSCOPY, DUODENOSCOPY (EGD), COMBINED N/A 2017    Procedure: COMBINED ESOPHAGOSCOPY, GASTROSCOPY, DUODENOSCOPY (EGD), BIOPSY SINGLE OR MULTIPLE;  ESOPHAGOSCOPY, GASTROSCOPY, DUODENOSCOPY (EGD) with biopsies;  Surgeon: Sai Johnston MD;  Location: [facility]     ESOPHAGOSCOPY, GASTROSCOPY, DUODENOSCOPY (EGD), COMBINED N/A 2019    Procedure: ESOPHAGOGASTRODUODENOSCOPY, WITH BIOPSY;  Surgeon: Edgardo Scott DO;  Location: [facility]     HC KNEE SCOPE, DIAGNOSTIC  01    Arthroscopy, Lt Knee     LAPAROSCOPIC CHOLECYSTECTOMY N/A 2017    Procedure: LAPAROSCOPIC CHOLECYSTECTOMY;  laparoscopic cholecystectomy;  Surgeon: Romeo Pastor MD;  Location: [facility]     OPEN REDUCTION INTERNAL FIXATION WRIST Right 2017    Procedure: OPEN REDUCTION INTERNAL FIXATION  "WRIST;  OPEN REDUCTION INTERNAL FIXATION RIGHT WRIST;  Surgeon: Edgardo Almendarez MD;  Location: [facility]              Social History:     Social History     Tobacco Use     Smoking status: Current Every Day Smoker     Packs/day: 0.50     Years: 10.00     Pack years: 5.00     Smokeless tobacco: Never Used     Tobacco comment: pt quit 1992 after smoking for 8 yrs, started again in 2004   Substance Use Topics     Alcohol use: Not Currently     Alcohol/week: 0.0 standard drinks     Comment: 2 beers per day             Family History:   The family history includes Alcohol/Drug in her maternal grandfather; Cancer in her paternal aunt; Cerebrovascular Disease in her maternal grandfather and maternal grandmother; Diabetes (age of onset: 17) in her sister; Diabetes (age of onset: 9) in her sister; Genitourinary Problems (age of onset: 43) in her sister; Heart Disease in her father, paternal grandfather, paternal grandmother, and sister; Hypertension in her mother; Other - See Comments in her sister and sister; Substance Abuse in her maternal grandfather.             Immunizations:     Immunization History   Administered Date(s) Administered     HepA-Adult 04/11/2018     HepB-Adult 04/11/2018     Influenza (IIV3) PF 10/26/2005, 11/06/2006     Influenza Quad, Recombinant, p-free (RIV4) 09/17/2019     Influenza Vaccine IM > 6 months Valent IIV4 03/30/2017, 09/25/2017     Mantoux Tuberculin Skin Test 08/26/2013     Pneumo Conj 13-V (2010&after) 11/28/2017     Pneumococcal 23 valent 05/26/2013     TDAP Vaccine (Adacel) 08/30/2007, 06/14/2016            Allergies:     Allergies   Allergen Reactions     Albuterol      Tongue \"hardened and painful\"             Medications:   @  Medications Prior to Admission   Medication Sig Dispense Refill Last Dose     acamprosate (CAMPRAL) 333 MG EC tablet TAKE TWO TABLETS BY MOUTH THREE TIMES A  tablet 3      acetaminophen (TYLENOL) 325 MG tablet Take 1 tablet (325 mg) by mouth every 8 " hours as needed for mild pain or fever 30 tablet 0      alendronate (FOSAMAX) 35 MG tablet TAKE ONE TABLET BY MOUTH WITH 8OZ WATER EVERY MONDAY (ONCE EVERY 7 DAYS) 30 MINUTES BEFORE BREAKFAST AND REMAIN UPRIGHT DURING THIS TIME (Patient not taking: Reported on 4/21/2020) 12 tablet 3      ARIPiprazole (ABILIFY) 5 MG tablet Take 1 tablet (5 mg) by mouth At Bedtime 90 tablet 3      busPIRone (BUSPAR) 15 MG tablet Take 1 tablet (15 mg) by mouth 2 times daily 180 tablet 3      FLUoxetine (PROZAC) 20 MG capsule TAKE THREE CAPSULES BY MOUTH ONCE DAILY 90 capsule 3      folic acid (FOLVITE) 1 MG tablet Take 1 tablet (1 mg) by mouth daily 90 tablet 3      furosemide (LASIX) 40 MG tablet Take 1 tablet (40 mg) by mouth daily 90 tablet 3      gabapentin (NEURONTIN) 100 MG capsule Take 3 capsules (300 mg) by mouth At Bedtime 270 capsule 3      lactulose (CHRONULAC) 10 GM/15ML solution Take 22.5 mLs (15 g) by mouth 3 times daily as needed for constipation Start with 15 ml daily, titrate as needed for 2-3 BM's daily. 1000 mL 5      MEDICATION INSTRUCTION  1 each 0      midodrine (PROAMATINE) 5 MG tablet Take 1 tablet (5 mg) by mouth 3 times daily (with meals) 270 tablet 3      omeprazole (PRILOSEC) 20 MG DR capsule Take 1 capsule (20 mg) by mouth daily 90 capsule 3      ondansetron (ZOFRAN) 4 MG tablet TAKE ONE TABLET BY MOUTH EVERY 6 HOURS AS NEEDED FOR NAUSEA 30 tablet 1      potassium chloride ER (K-DUR/KLOR-CON M) 10 MEQ CR tablet Take 1 tablet (10 mEq) by mouth daily 90 tablet 3      rifaximin (XIFAXAN) 550 MG TABS tablet Take 1 tablet (550 mg) by mouth 2 times daily 60 tablet 0      SENNA-docusate sodium (SENNA S) 8.6-50 MG tablet Take 1 tablet by mouth 2 times daily 60 tablet 11      spironolactone (ALDACTONE) 50 MG tablet Take 1 tablet (50 mg) by mouth daily 90 tablet 3      traZODone (DESYREL) 50 MG tablet Take 2 tablets (100 mg) by mouth nightly as needed for sleep 30 tablet 0      ursodiol (ACTIGALL) 500 MG tablet Take  1 tablet (500 mg) by mouth 2 times daily 180 tablet 3      vitamin B1 (VITAMIN B-1) 100 MG tablet Take 1 tablet (100 mg) by mouth daily 90 tablet 3    @          Review of Systems:    ROS: 10 point ROS neg other than the symptoms noted above in the HPI.          Physical Exam:   Blood pressure 96/58, pulse 82, temperature 97.7  F (36.5  C), temperature source Oral, resp. rate 18, weight 58 kg (127 lb 13.9 oz), last menstrual period 05/16/2012, SpO2 96 %, not currently breastfeeding. Body mass index is 24.16 kg/m .  Date 04/22/20 0700 - 04/23/20 0659   Shift 5405-3113 0644-4695 4789-6626 24 Hour Total   INTAKE   Shift Total(mL/kg)       OUTPUT   Other 300   300   Shift Total(mL/kg) 300(5.17)   300(5.17)   Weight (kg) 58 58 58 58     General: In no acute distress, moderate facial muscle wasting  Neuro: AOx3, slight slowing in verbal response. trace asterixis  HEENT: PERRL moderate scleral icterus, Nooral lesions  Lymph:  Nocervical lymphadenoapthy  CV: S1/S2 with gr 3/6 murmurs, Skin warm and dry  Lungs: clear to auscultation, diminished bases Respirations even and nonlabored on room air  Abd: Mildly distended, nontender. +BS  Extrem: trace-+1 lower peripehral edema  Skin: moderate jaundice  Psych: pleasant         Data:     Lab Results   Component Value Date    WBC 6.3 04/22/2020     Lab Results   Component Value Date    RBC 2.05 04/22/2020     Lab Results   Component Value Date    HGB 6.9 04/22/2020     Lab Results   Component Value Date    HCT 22.9 04/22/2020     No components found for: MCT  Lab Results   Component Value Date     04/22/2020     Lab Results   Component Value Date    MCH 33.7 04/22/2020     Lab Results   Component Value Date    MCHC 30.1 04/22/2020     Lab Results   Component Value Date    RDW 16.2 04/22/2020     Lab Results   Component Value Date    PLT 48 04/22/2020       Last Basic Metabolic Panel:  Lab Results   Component Value Date     04/22/2020      Lab Results   Component Value  Date    POTASSIUM 3.6 04/22/2020     Lab Results   Component Value Date    CHLORIDE 108 04/22/2020     Lab Results   Component Value Date    ELSIE 8.7 04/22/2020     Lab Results   Component Value Date    CO2 18 04/22/2020     Lab Results   Component Value Date    BUN 53 04/22/2020     Lab Results   Component Value Date    CR 2.70 04/22/2020     Lab Results   Component Value Date    GLC 95 04/22/2020       Liver Function Studies -   Recent Labs   Lab Test 04/22/20  0436   PROTTOTAL 4.2*   ALBUMIN 2.0*   BILITOTAL 10.7*   ALKPHOS 146   AST 37   ALT 26       Lab Results   Component Value Date    INR 1.64 04/22/2020       MELD-Na score: 31 at 4/22/2020  4:36 AM  MELD score: 30 at 4/22/2020  4:36 AM  Calculated from:  Serum Creatinine: 2.70 mg/dL at 4/22/2020  4:36 AM  Serum Sodium: 134 mmol/L at 4/22/2020  4:36 AM  Total Bilirubin: 10.7 mg/dL at 4/22/2020  4:36 AM  INR(ratio): 1.64 at 4/22/2020  4:36 AM  Age: 54 years 6 months           Previous Endoscopy:     Results for orders placed or performed during the hospital encounter of 09/08/17   COLONOSCOPY   Result Value Ref Range    COLONOSCOPY       [address] [address] [phone]     Endoscopy Department  _______________________________________________________________________________  Patient Name: Monalisa Olguin          Procedure Date: 9/8/2017 2:39 PM  MRN: [id number]                       Account Number: [id number]  YOB: 1965              Admit Type: Outpatient  Age: 51                               Room: Room 1  Gender: Female                        Note Status: Finalized  Attending MD: Sai Johnston MD      Total Sedation Time:   _______________________________________________________________________________     Procedure:           Colonoscopy  Indications:         Screening for colorectal malignant neoplasm, This is the                        patient's first colonoscopy  Providers:            Sai Johnston MD  Referring MD:        Efren Bustos MD  Medicines:           Midazolam 5 mg IV, Fentanyl 50 micrograms IV  Complications:       No immedia te complications.  _______________________________________________________________________________  Procedure:           Pre-Anesthesia Assessment:                       - ASA Grade Assessment: I - A normal, healthy patient.                       After obtaining informed consent, the colonoscope was                        passed under direct vision. Throughout the procedure,                        the patient's blood pressure, pulse, and oxygen                        saturations were monitored continuously. The Colonoscope                        was introduced through the anus with the intention of                        advancing to the cecum. The scope was advanced to the                        transverse colon before the procedure was aborted.                        Medications were given. The patient tolerated the                        procedure well. The quality of the bowel preparation was                        excellent.                                                                                    Findings:       Internal hemorrhoids were found during retroflexion. The hemorrhoids        were Grade I (internal hemorrhoids that do not prolapse).       The proximal transverse colon and the hepatic flexure were significantly        angulated and produced significant looping of the colonoscope despite        different maneuvers, positions, etc. The procedure was aborted due to        patient discomfort (additional medications were given to the point of        vital sgn tolerence and despite this patient discomfort continued).       No recurrent neoplastic or malignant processes were noted during this        study to the extent it was visualized.                                                                                   Moderate  Sedation:       Moderate (conscious) sedation was administered by the endoscopy nurse        and supervised by the endoscopist. The following parameters were        monitored: oxygen saturation, heart rate, blood pressure, res piratory        rate, EKG, adequacy of pulmonary ventilation, and response to care.        Total physician intraservice time was 16 minutes.  Impression:          - Internal hemorrhoids.                       - There was significant looping of the colon.                       - No specimens collected.                       - The exam was suboptimal due to patient discomfort.  Recommendation:      - Discharge patient to home.                       - Reschedule colonoscopy with MAC                       - This patient may now resume his/her routine                        activities, diet, and medications.                       - Patient has a contact number available for                        emergencies. The signs and symptoms of potential delayed                        complications were discussed with the patient. Return to                        normal activities tomorrow. Written discharge                        instructions were provided to the patient.                                                                                      _________________  Sai Johnston MD  9/8/2017 3:15:28 PM  I was physically present for the entire viewing portion of the exam.Sai Johnston MD  Number of Addenda: 0    Note Initiated On: 9/8/2017 2:39 PM         EGD 4/29/19  Impression:          - Normal oropharynx.                        - Z-line regular, 35 cm from the incisors.                        - Erythematous mucosa in the stomach. Biopsied.                        - Small hiatal hernia.                        - Normal examined duodenum.   Stomach, antrum, biopsy:   - Gastric body-type mucosa with no diagnostic alterations.   - No Helicobacter pylori identified.     IMAGING:  US abd w doppler  4/21/20  IMPRESSION:   1. Hepatic cirrhosis and moderate ascites.  2. Patent and antegrade Doppler flow of the hepatic vasculature.

## 2020-04-22 NOTE — PLAN OF CARE
Neuro: A&Ox4. Answering questions appropriately, but remained lethargic throughout the day, gave 1 dose PRN lactulose, patient cooperative with this.   Cardiac: SR. VSS.   Respiratory: Sating 100 on RA.  GI/: Adequate urine output. BM X1 Still need UA  Diet/appetite: OK for clears, NPO midnight tonight for EGD in the morning.   Activity:  Assist of 1, up to chair and in halls.  Pain: At acceptable level on current regimen.   Skin: No new deficits noted.  LDA's: R and L PIV    Plan: 2 units RBC given today for hgb of 6.9x2, will need to recheck CBC  Continue with POC. Notify primary team with changes.

## 2020-04-22 NOTE — PROVIDER NOTIFICATION
0117--6B.29-2.[place])Pt is having increased anxiety and cannot sleep. Can she have something for anxiety or more melatonin?     0130--See MAR; New order received

## 2020-04-22 NOTE — PLAN OF CARE
Neuro: A&Ox4. Drowsy at times, forgetful.  Cardiac: SR 80s. Bps soft, scheduled midodrine.   Respiratory: Sating 95% on RA. No cough.  GI/: Adequate urine output. BM X1, loose. Continent x2. Mild intermittent nausea, no emesis.  Diet/appetite: Tolerating renal diet. NPO at midnight. Eating well.  Activity:  Assist of 1, up to bathroom.  Pain: Denies pain.  Skin: No new deficits noted.  LDA's: PIV x2.    Plan: Continue with POC. Notify primary team with changes.

## 2020-04-22 NOTE — PROGRESS NOTES
[facility], [place]    Progress Note - [place] 2 Service        Date of Admission:  4/21/2020    Assessment & Plan   Monalisa Olguin is a 54 year old female admitted on 4/21/2020. She presents with hepatic encephalopathy.    # Hx EtOH cirrhosis with ascites and elevated AST  # Hepatic encephalopathy, resolved  # Hyperbilirubinemia, increased alk phos  #Thrombocytopenia, elevated INR  Patient presented with confusion and increasing abdominal pain, nausea, and vomiting for the past week. Most likely due to hepatic encephalopathy given recovery with lactulose and rifaxamin.    Original consideration was that AMS could represent infection however no source found. Did receive ceftriaxone however had small amount of ascites and SBP is considered unlikely.  - Hepatology consulted, appreciate recs  - U/S shows unchanged liver anatomy, patent PV  - Insufficient fluid for paracentesis  - BCx NGTD  - [facility] protocol  - cont PTA rifaxamin  - cont PTA zofran  - cont thiamine, folic acid  - hold PTA gabapentin  - hold PTA trazodone  - hold PTA acamprosate (2/2 ALFREDO)     # Small volume hematemesis x1  Had one episode on day prior to admission w/ a few drops of blood in vomit. Has had this in the past, but not for some time. Last EGD in 2019 did not have evidence of varices. Low suspicion of a new bleeding varice given how little bleeding she had. GI does not feel that she needs EGD at this time.  - GI consult as above  - cont pantoprazole 40mg IV q12hr  - Transfused 2 units pRBC 4/22  - 100 g albumin 4/22     #ALFREDO, pre-renal  Patient has had increased nausea and vomiting. Endorses feeling dehydrated. Baseline Cr ~1.33-2.3.  - Fluids as above  - hold PTA furosemide   - hold K tab  - hold spironolactone     #Soft Bp and hx orthostatic hypotension  Soft admission BP which improved s/p fluids. Holding lasix. Would avoid giving more fluids unless hemodynamically unstable and would extravasate fluid 2/2  low albumin. Could consider giving albumin if needed more volume.   - cont PTA midodrine     #Thrombocytopenia  Likely related to liver disease. No overt bleeding other than small vol hemoptysis. Cannot r/o occult bleed. VSS.  - f/u AM labs  - consider transfusing if active bleeding      #Macrocytic anemia  Unclear etiology. GI provider checked PeTH in jan and it was negative. B12 last year was negative. No overt bleeding other than small vol hemoptysis. Cannot r/o occult bleed. VSS.  - f/u AM CBC  - consider checking B12  - consider smear     ----- Prior Medical Problems----  # Anxiety and depression: cont PTA fluoxetine, aripiprazole, buspirone  # Hypercholesterolemia: cont ursadiol  #Alcohol dependence (sober since 9/2019): hold acamprosate in setting of ALFREDO  #Current smoker: nicotine patch  #GERD: hold PTA omeprazole     Diet: Clear Liquid Diet    Fluids: As above  Lines: PIV  DVT Prophylaxis: VTE Prophylaxis contraindicated due to possible bleed  Gonzalez Catheter: not present  Code Status: Full Code      Disposition Plan   Expected discharge: 2 - 3 days, recommended to prior living arrangement once hemoglobin stable and mental status at baseline.  Entered: Daniel Omalley MD 04/22/2020, 8:14 AM       The patient's care was discussed with the Attending Physician, Dr. Claudio.    Daniel Omalley MD  [address], [place]  Pager: [phone]  Please see sticky note for cross cover information  ______________________________________________________________________    Interval History   Notes reviewed, no acute events overnight.    Feels that she has recovered appropriate from her prior confusion. No ongoing concerns, although wonders when she can eat.    4 pt ROS performed and negative other than as above.    Data reviewed today: I reviewed all medications, new labs and imaging results over the last 24 hours.    Physical Exam   Vital Signs: Temp: 97.7  F (36.5  C)  Temp src: Oral BP: 96/58 Pulse: 82 Heart Rate: 72 Resp: 18 SpO2: 96 % O2 Device: None (Room air) Oxygen Delivery: 2 LPM  Weight: 127 lbs 13.87 oz    General: NAD, A&O3  HEENT: scleral icterus, buccal jaundice, tan brown skin, oropharynx is clear  Neck: supple, no LAD  Resp: CTAB  Heart/CV: RRR, nl s1 s2, has systolic murmur 4/6  Abdomen/GI: soft, distended, non-tender. No notable ascites.   Extremities: wwp, trace edema in ankles  Neuro: no asterixis, no focal deficits.  Psych: appropriate, some reduced concentration       Data   Recent Labs   Lab 04/22/20  1231 04/22/20  0436 04/21/20  1136   WBC 5.1 6.3 10.0   HGB 6.9* 6.9* 8.1*   * 112* 107*   PLT 43* 48* 57*   INR  --  1.64* 1.37*   NA  --  134 130*   POTASSIUM  --  3.6 4.1   CHLORIDE  --  108 100   CO2  --  18* 20   BUN  --  53* 53*   CR  --  2.70* 2.85*   ANIONGAP  --  8 10   ELSIE  --  8.7 9.3   GLC  --  95 111*   ALBUMIN  --  2.0* 2.5*   PROTTOTAL  --  4.2* 5.1*   BILITOTAL  --  10.7* 12.3*   ALKPHOS  --  146 186*   ALT  --  26 30   AST  --  37 51*

## 2020-04-23 NOTE — PLAN OF CARE
Discharge Planner OT   Patient plan for discharge: home  Current status: Facilitated cognitive/memory tasks to increase ind in ADLS. Pt ambulated 300ft w/ 1 standing rest breaks and SBA-CGA throughout. Pt w/ 2 sways while ambulating CGA throughout, holding onto IV pole. Pt's VSS throughout RA  Barriers to return to prior living situation: medical status  Recommendations for discharge: Home w/ A and OP OT/PT (pending pt's progress w/ balance and PT recommendations)  Rationale for recommendations: to increase ind in ADLS/IADLS       Entered by: Lyssa Neal 04/23/2020 11:00 AM

## 2020-04-23 NOTE — PLAN OF CARE
Neuro: A&Ox4. Answering questions appropriately, but forgetful/foggy intermittently, gave 1 PRN lactulose, patient cooperative with this.   Cardiac: SR. VSS.       Respiratory: Sating >92% on RA.  GI/: Adequate UOP, UA sent. BM X1.  Diet/appetite: OK for clears, NPO midnight tonight for EGD in the morning.   Activity: SBA/Independent, up to chair and in halls.  Pain: At acceptable level on current regimen.   Skin/LDA's: Jaundice. R&L PIV: SL'ed     Plan: PRBC x2, 2nd unit done @1730, recheck Hgb 8.2. EGD 4/23, NPO @midnight. Continue with POC. Notify primary team with changes

## 2020-04-23 NOTE — PROGRESS NOTES
"[facility]  HEPATOLOGY PROGRESS NOTE  Monalisa Olguin [id number]       CC: hepatic encephalopathy  SUBJECTIVE:  Mildly upset at being transferred from [facility] to the [facility] yesterday.  Reports understanding of need for elevated level of care due to acute kidney injury.  Does not feel at her baseline for mentation, however, this has improved and she is currently doing word find puzzles.  Denies melena, hematochezia, hematemesis, fever, abdominal distention increasing.  Her  does drink alcohol at home and she states \"he drinks beer that I do not like\".  She has not had treatment following her abstinence time but has gone to occasional AA meetings.    ROS:  A 10-point review of systems was negative.    OBJECTIVE:  VS: /63 (BP Location: Right arm)   Pulse 74   Temp 96.7  F (35.9  C) (Oral)   Resp 16   Wt 60.7 kg (133 lb 14.4 oz)   LMP 05/16/2012   SpO2 98%   BMI 25.30 kg/m     Date 04/23/20 0700 - 04/24/20 0659   Shift 5525-7913 0230-8029 4155-9010 24 Hour Total   INTAKE   Shift Total(mL/kg)       OUTPUT   Urine 150   150   Shift Total(mL/kg) 150(2.47)   150(2.47)   Weight (kg) 60.74 60.74 60.74 60.74     General: In no acute distress, mild facial muscle wasting  Neuro: AOx3, No asterixis  Lymph: No cervical lymphadenopathy  HEENT:  Moderate scleral icterus, Nooral lesions  CV: S1/V2jujmlmy murmurs. Skin warm and dry  Lungs: clear to auscultation Respirations even and nonlabored on room air  Abd: Mildly distended, nontender, +BS  Extrem: Noperipheral edema  Skin: moderate jaundice  Psych: pleasant    MEDICATIONS:  Current Facility-Administered Medications   Medication     ARIPiprazole (ABILIFY) tablet 5 mg     busPIRone (BUSPAR) tablet 15 mg     cefTRIAXone (ROCEPHIN) 1 g vial to attach to  mL bag for ADULTS or NS 50 mL bag for PEDS     Daily 2 GRAM acetaminophen limit, unless fulminent liver failure or transaminases greater than or equal to 300 - 400, then none     " FLUoxetine (PROzac) capsule 60 mg     folic acid (FOLVITE) tablet 1 mg     furosemide (LASIX) tablet 20 mg     lactulose (CHRONULAC) solution 15 g     lidocaine (LMX4) cream     lidocaine 1 % 0.1-1 mL     melatonin tablet 3 mg     midodrine (PROAMATINE) tablet 5 mg     naloxone (NARCAN) injection 0.1-0.4 mg     nicotine (NICODERM CQ) 14 MG/24HR 24 hr patch 1 patch     nicotine Patch in Place     ondansetron (ZOFRAN) tablet 4 mg     pantoprazole (PROTONIX) 40 mg IV push injection     rifaximin (XIFAXAN) tablet 550 mg     sodium chloride (PF) 0.9% PF flush 3 mL     sodium chloride (PF) 0.9% PF flush 3 mL     spironolactone (ALDACTONE) tablet 25 mg     ursodiol (ACTIGALL) tablet 500 mg     vitamin B1 (THIAMINE) tablet 100 mg       REVIEW OF LABORATORY, PATHOLOGY AND IMAGING RESULTS:  BMP  Recent Labs   Lab 04/23/20  0450 04/22/20  0436 04/21/20  1136    134 130*   POTASSIUM 3.4 3.6 4.1   CHLORIDE 108 108 100   ELSIE 8.8 8.7 9.3   CO2 18* 18* 20   BUN 45* 53* 53*   CR 2.16* 2.70* 2.85*   GLC 79 95 111*     CBC  Recent Labs   Lab 04/23/20  0450 04/22/20  1817 04/22/20  1231 04/22/20  0436   WBC 4.7 5.5 5.1 6.3   RBC 2.56* 2.55* 2.06* 2.05*   HGB 8.2* 8.2* 6.9* 6.9*   HCT 26.6* 26.9* 22.3* 22.9*   * 106* 108* 112*   MCH 32.0 32.2 33.5* 33.7*   MCHC 30.8* 30.5* 30.9* 30.1*   RDW 20.3* 19.5* 17.8* 16.2*   PLT 44* 45* 43* 48*     INR  Recent Labs   Lab 04/22/20  0436 04/21/20  1136   INR 1.64* 1.37*     LFTs  Recent Labs   Lab 04/23/20  0450 04/22/20  0436 04/21/20  1136   ALKPHOS 120 146 186*   AST 30 37 51*   ALT 21 26 30   BILITOTAL 10.8* 10.7* 12.3*   PROTTOTAL 5.0* 4.2* 5.1*   ALBUMIN 3.2* 2.0* 2.5*      PANCNo lab results found in last 7 days.   MELD-Na score: 29 at 4/23/2020  4:50 AM  MELD score: 28 at 4/23/2020  4:50 AM  Calculated from:  Serum Creatinine: 2.16 mg/dL at 4/23/2020  4:50 AM  Serum Sodium: 135 mmol/L at 4/23/2020  4:50 AM  Total Bilirubin: 10.8 mg/dL at 4/23/2020  4:50 AM  INR(ratio): 1.64 at  4/22/2020  4:36 AM  Age: 54 years 6 months      Imaging Results:  US abd w doppler 4/21/20  IMPRESSION:   1. Hepatic cirrhosis and moderate ascites.  2. Patent and antegrade Doppler flow of the hepatic vasculature.    IMPRESSION:  Monalisa Olguin is a 54 year old femalewith a history of alcohol cirrhosis, abstinent since 9/2019, complicated by ascites, hepatic encephalopathy, hx SINAI, hx solitary kidney following living donor tx (2000) who was admitted with 1 week of weakness, nausea, vomiting and noted to have ALFREDO with creatinine 2.8, elevated bili 12.3 and altered mental status.  This improved with hydration and lactulose.     RECOMMENDATIONS:  1. Alcohol cirrhosis  2. Hepatic encephalopathy  3. Ascites  - elevated bilirubin above baseline. MELD 29. Has not been evaluated for liver transplant in the past due to prior improvement with abstinence and stability. Will have transplant social work seen patient for opinion on transplant.   -PETH pending 4/22  - US with moderate ascites without evidence of HCC  - infectious work up so far negative.  Consider repeat paracentesis and send cell count with albumin and differential  - continue lactulose with goal of 3-5 BM's per day. Continue rifaximin.      4. Anemia  - EGD 4/2019 without EV. Drop in hemoglobin likely due to hemoconcentration.  Hemolysis work-up negative   - Had originally planned on performing EGD today due to continued drop in hemoglobin to 6.9.  Follow-up hemoglobin tests 8.2 following a 1 unit of blood.     5. ALFREDO  6. Solitary kidney (donor left kidney)  -Continues to receive albumin challenge.  UA negative  - slight improvement since admission. Hold diuretics.  Has had minimal lower extremity edema.     7. Immunizations  - Pneumovax at time of discharge.   - received x1 dose of HAV/HBV in 2018. Please restart series of HAV and HBV prior to discharge.     Patient was discussed with attending physician, Dr. Kovacs    Next follow up appointment: TBD    Thank  you for the opportunity to be involved with  Monalisa koch. Please call with any questions or concerns.    IVETTE Suarez, CNP  Inpatient Hepatology [facility]  [phone]  Text Link

## 2020-04-23 NOTE — PROGRESS NOTES
Transfer  Transferred to: 7D  Via:bed  Reason for transfer:Pt no longer appropriate for 6B- improved patient condition  Family: Aware of transfer  Belongings: Packed and sent with pt  Chart: Delivered with pt to next unit  Medications: Meds sent to new unit with pt  Report given to: Accepting 7D nurse  Pt status: Pt. Vitally stable no major concerns hgb stabilized in the 8s. No signs of bleeding

## 2020-04-23 NOTE — PROGRESS NOTES
04/23/20 1000   Quick Adds   Type of Visit Initial Occupational Therapy Evaluation   Living Environment   Lives With spouse;child(lit), adult  (daughter)   Living Arrangements house   Home Accessibility stairs within home;stairs to enter home   Number of Stairs, Main Entrance 2   Number of Stairs, Within Home, Primary   (one flight in home, pt can stay on main level)   Transportation Anticipated family or friend will provide   Self-Care   Usual Activity Tolerance moderate   Current Activity Tolerance fair   Equipment Currently Used at Home grab bar, tub/shower;other (see comments)   Activity/Exercise/Self-Care Comment OT: Pt had been seeing OP OT for cognition, pt is a GAUTHIER   Functional Level   Ambulation 0-->independent   Transferring 0-->independent   Toileting 0-->independent   Bathing 0-->independent   Dressing 0-->independent   Eating 0-->independent   Communication 0-->understands/communicates without difficulty   Swallowing 0-->swallows foods/liquids without difficulty   Cognition 1 - attention or memory deficits   Fall history within last six months yes   Number of times patient has fallen within last six months 1   General Information   Onset of Illness/Injury or Date of Surgery - Date 04/21/20   Referring Physician  Daniel Omalley MD     Additional Occupational Profile Info/Pertinent History of Current Problem Ms. Olguin is a 54-year-old with a history of alcohol cirrhosis, abstinent since 9/2019, complicated by ascites, hepatic encephalopathy, hx SINAI, hx solitary kidney following living donor tx (2000) who was admitted with 1 week of weakness, nausea, vomiting and noted to have ALFREDO with creatinine 2.8, elevated bili 12.3 and altered mental status.    Precautions/Limitations fall precautions   Cognitive Status Examination   Orientation orientation to person, place and time   Cognitive Comment OT: Pt reporting trouble w/ memory, demonstrating some word finding deficits    Visual Perception  "  Visual Perception Wears glasses   Range of Motion (ROM)   ROM Comment OT: BUE WFL   Strength   Strength Comments OT: BUE overall deconditioned   Muscle Tone Assessment   Muscle Tone Comments OT: BUE overall deconditioned   Mobility   Bed Mobility Comments OT: SBA   Transfer Skills   Transfer Comments OT: SBA   Balance   Balance Comments OT: SBA-CGA   Instrumental Activities of Daily Living (IADL)   IADL Comments OT: Pt's spouse can A prn   Activities of Daily Living Analysis   Impairments Contributing to Impaired Activities of Daily Living balance impaired;cognition impaired;strength decreased   General Therapy Interventions   Planned Therapy Interventions ADL retraining;IADL retraining;cognition;strengthening;transfer training;home program guidelines;progressive activity/exercise   Clinical Impression   Criteria for Skilled Therapeutic Interventions Met yes, treatment indicated   OT Diagnosis decreased ind in ADLS/IADLS   Influenced by the following impairments generalized weakness and cognition   Assessment of Occupational Performance 1-3 Performance Deficits   Identified Performance Deficits decreased ind in ADLS/IADLS   Clinical Decision Making (Complexity) Low complexity   Therapy Frequency Daily   Predicted Duration of Therapy Intervention (days/wks) 5/2/20   Anticipated Discharge Disposition Home with Outpatient Therapy;Home with Assist   Risks and Benefits of Treatment have been explained. Yes   Patient, Family & other staff in agreement with plan of care Yes   [facility] ArmorText-PAC TM \"6 Clicks\"   2016, Trustees of [facility], under license to [employer].  All rights reserved.   6 Clicks Short Forms Daily Activity Inpatient Short Form   [facility] AM-PAC  \"6 Clicks\" Daily Activity Inpatient Short Form   1. Putting on and taking off regular lower body clothing? 3 - A Little   2. Bathing (including washing, rinsing, drying)? 3 - A Little   3. Toileting, which includes using toilet, bedpan " or urinal? 3 - A Little   4. Putting on and taking off regular upper body clothing? 4 - None   5. Taking care of personal grooming such as brushing teeth? 4 - None   6. Eating meals? 4 - None   Daily Activity Raw Score (Score out of 24.Lower scores equate to lower levels of function) 21   Total Evaluation Time   Total Evaluation Time (Minutes) 4

## 2020-04-23 NOTE — PLAN OF CARE
BP 90/52 (BP Location: Right arm)   Pulse 71   Temp 98.3  F (36.8  C) (Oral)   Resp 16   Wt 60 kg (132 lb 4.4 oz)   LMP 05/16/2012   SpO2 93%   BMI 24.99 kg/m      Neuro: A&Ox4. Obeys commands. Forgetful at times. Scoring 0 on [place]. Pt seem anxious and restless, constantly asking to go for walks overnight.  Cardiac: SR 70-80. SBP . Afebrile.   Respiratory: Sating >95% on RA.  GI/: Adequate urine output. No BMs  Diet/appetite: NPO  Activity:  SBA  Pain: At acceptable level on current regimen.   Skin: No new deficits noted.  LDA's: Left PIV    Plan: Possible EGD today. Continue with POC. Notify primary team with changes.

## 2020-04-23 NOTE — PROGRESS NOTES
[facility], [place]    Progress Note - Ter[facility] 2 Service        Date of Admission:  4/21/2020    Assessment & Plan   Monalisa Olguin is a 54 year old female admitted on 4/21/2020. She presents with hepatic encephalopathy.    # Hx EtOH cirrhosis with ascites and elevated AST  # Hepatic encephalopathy, resolved  # Hyperbilirubinemia, increased alk phos  #Thrombocytopenia, elevated INR  Patient presented with confusion and increasing abdominal pain, nausea, and vomiting for the past week. Most likely due to hepatic encephalopathy given recovery with lactulose and rifaxamin.    Original consideration was that AMS could represent infection however no source found. Did receive ceftriaxone however had small amount of ascites and SBP is considered unlikely.  - Hepatology consulted, appreciate recs  - U/S shows unchanged liver anatomy, patent PV  - Insufficient fluid for paracentesis  - BCx NGTD  - [facility] protocol, transitioned to PTA lactulose plan 4/23  - cont PTA rifaxamin  - cont PTA zofran  - cont thiamine, folic acid  - hold PTA gabapentin  - hold PTA trazodone  - hold PTA acamprosate (2/2 ALFREDO)     # Small volume hematemesis x1  Had one episode on day prior to admission w/ a few drops of blood in vomit. Has had this in the past, but not for some time. Last EGD in 2019 did not have evidence of varices. Low suspicion of a new bleeding varice given how little bleeding she had. GI does not feel that she needs EGD at this time.  - GI consult as above  - cont pantoprazole 40mg IV q12hr  - Transfused 2 units pRBC 4/22     #ALFREDO, pre-renal  Patient has had increased nausea and vomiting. Endorses feeling dehydrated. Baseline Cr ~1.33-2.3.  - Fluids as above  - 100 g albumin 4/22, 50 g 4/23  - Restart furosemide, spironolactone at 50% PTA dose on 4/23     #Soft Bp and hx orthostatic hypotension  Soft admission BP which improved s/p fluids.   - cont PTA midodrine     #Thrombocytopenia  Likely  related to liver disease. VSS.  - f/u AM labs     #Macrocytic anemia  Unclear etiology. GI provider checked PeTH in jan and it was negative. Stable after transfusion.  - f/u AM CBC     ----- Prior Medical Problems----  # Anxiety and depression: cont PTA fluoxetine, aripiprazole, buspirone  # Hypercholesterolemia: cont ursadiol  #Alcohol dependence (sober since 9/2019): hold acamprosate in setting of ALFREDO, restart at d/c  #Current smoker: nicotine patch  #GERD: hold PTA omeprazole, on IV pantoprazole     Diet: Regular Diet Adult    Fluids: As above  Lines: PIV  DVT Prophylaxis: VTE Prophylaxis contraindicated due to possible bleed  Gonzalez Catheter: not present  Code Status: Full Code      Disposition Plan   Expected discharge: 2 - 3 days, recommended to prior living arrangement once hemoglobin stable and mental status at baseline.  Entered: Daniel Omalley MD 04/23/2020, 10:26 AM       The patient's care was discussed with the Attending Physician, Dr. Claudio.    Daniel Omalley MD  [address], [place]  Pager: [phone]  Please see sticky note for cross cover information  ______________________________________________________________________    Interval History   Notes reviewed, no acute events overnight.    Feels that she has recovered appropriate from her prior confusion. No ongoing concerns, although wonders when she can eat; started on a diet this AM.    4 pt ROS performed and negative other than as above.    Data reviewed today: I reviewed all medications, new labs and imaging results over the last 24 hours.    Physical Exam   Vital Signs: Temp: 97.6  F (36.4  C) Temp src: Oral BP: 97/62 Pulse: 74 Heart Rate: 73 Resp: 16 SpO2: 97 % O2 Device: None (Room air) Oxygen Delivery: (P) 2 LPM  Weight: 132 lbs 4.42 oz    General: NAD, A&O3  HEENT: scleral icterus, buccal jaundice, tan brown skin, oropharynx is clear  Neck: supple, no LAD  Resp: CTAB  Heart/CV: RRR,  nl s1 s2, has systolic murmur 4/6  Abdomen/GI: soft, distended, non-tender. No notable ascites.   Extremities: wwp, trace edema in ankles  Neuro: no asterixis, no focal deficits.  Psych: appropriate, some reduced concentration       Data   Recent Labs   Lab 04/23/20  0450 04/22/20  1817 04/22/20  1231 04/22/20  0436 04/21/20  1136   WBC 4.7 5.5 5.1 6.3 10.0   HGB 8.2* 8.2* 6.9* 6.9* 8.1*   * 106* 108* 112* 107*   PLT 44* 45* 43* 48* 57*   INR  --   --   --  1.64* 1.37*     --   --  134 130*   POTASSIUM 3.4  --   --  3.6 4.1   CHLORIDE 108  --   --  108 100   CO2 18*  --   --  18* 20   BUN 45*  --   --  53* 53*   CR 2.16*  --   --  2.70* 2.85*   ANIONGAP 9  --   --  8 10   ELSIE 8.8  --   --  8.7 9.3   GLC 79  --   --  95 111*   ALBUMIN 3.2*  --   --  2.0* 2.5*   PROTTOTAL 5.0*  --   --  4.2* 5.1*   BILITOTAL 10.8*  --   --  10.7* 12.3*   ALKPHOS 120  --   --  146 186*   ALT 21  --   --  26 30   AST 30  --   --  37 51*

## 2020-04-23 NOTE — PROGRESS NOTES
Psychosocial Assessment for Liver Transplant Evaluation  Due to COVID I called Heydi to complete an initial evaluation.  Information for this evaluation was also gathered from the medical record.  Living Situation: Heydi lives with her  Eron and two of their children, Connie (age 24) and Satinder (age 15) in a house in [place], [place].  Heydi denies any concerns with her living situation.  She repots having adequate supervision and assistance.  Heydi does not drive due to past DUIs.  Her family can assist with transportation to medical appointments.  Education/Employment:  Heydi graduated high school.  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  She reports not being able to work due to her alcohol use.  Financial /Income: Heydi receives SSDI benefits.  Her  Eron has income from his employment at [employer], where he works as a .  Health Insurance:  Heydi has a [employer] policy, through her spouse's employment.  This writer talked with Heydi about the financial risks of transplant, particularly about the high cost of transplant related medications and the importance of maintaining adequate health insurance coverage.  Family/Social Support: eHydi and her  Eron have been  for thirty-three years.  She reports Eron is supportive and involved.  They have three children together.  Dionicio lives in [place], [place] and the other two live with patient as previously mentioned.      Heydi has three sisters who she reports are involved and would be available to assist if called upon.  Edwin is a nurse by [employer] and she lives in [place], [place].  Ritika and Myriam live in the [place], [place] area.  This writer stressed the importance of having a stable and involved support network before and after transplant.  Provided Heydi with education about the  "relationship between a stable support system and better surgical and post-transplant outcomes compared to patients with a limited support system.    Functional Status: Occupational therapy anticipates patient will be able to return home with outpatient therapy or home with assistance.  Chemical Dependency:  Heydi currently smokes a half pack to one pack of cigarettes daily.  She also smokes marijuana twice daily to help her sleep.  I have advised Heydi to discontinue both.  I also encouraged Heydi to talk with her doctors about options to help with quitting smoking and sleep.  Heydi reports her last consumption of alcohol was early September of 2019.  She was consuming a half liter to one liter of C[employer] Beam daily.  Heydi reports a history of six treatments for alcohol and two DUIs (2-3 years ago and another 4 years ago).  Heydi reports her last treatment was in [place] \"about a year ago.\"  She drank throughout treatment.  Mental Health: Heydi has been diagnosed with depression and anxiety, and is prescribed abilify and buspar by her primary care provider.  She denies any history of suicidal ideation, or hospitalization for mental health treatment.  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  She has known of her liver disease for approximately five years and she has struggled to maintain sobriety.  Heydi wants to be considered for liver transplantation.     This writer provided Heydi with supportive counseling throughout this interview.  This writer also encouraged Heydi to attend the liver transplant support group for additional support and encouragement.   Impression/Recommendations:   Heydi verbalizes understanding the psychosocial risks of transplant and teaching provided during this evaluation.  It will be important to engage her support system in future appointments for assessment and education.  Heydi reports sobriety since September of 2019.  A PEth test is pending.  Heydi has a history of six " treatments for alcohol, two DUIs, and ongoing alcohol consumption with known liver disease.  Heydi remains at high risk for relapsing to alcohol, and would be high risk for relapsing to alcohol post transplantation.  Heydi also continues to smoke a half pack to a pack of cigarettes daily and she smokes marijuana twice daily.  Heydi will need a substance use assessment as part of her liver transplant evaluation.  I will request this to be scheduled at [facility].      Medical records from [facility] at [facility] also document concern for Wernicke's encephalopathy.  Neuropsychological testing is recommended as part of Heydi's liver transplant evaluation.  I will also request this be scheduled as part of her liver transplant evaluation.  Heydi has adequate finances and health insurance for transplant.  She reports a stable living situation and adequate support, though this should be verified.  This writer is covering for CHRISTINE Galeana.  Jaimee will resume following this patient for the remainder of her evaluation.   Teaching completed during assessment:  1.     Housing and relocation needs post transplant.  2.     Caregiver needs post transplant.  3.     Financial issues related to transplant.  4.     Risks of alcohol use post transplant.  5.     Common psychosocial stressors pre/post transplant.          6.     Liver Transplant support group availability.          7.     [facility] [employer] Directive-document will be mailed to patient's home             Psychosocial Risks of Transplant Reviewed:  1.     Increased stress related to your emotional, family, social, employment, or   financial situation.  2.     Affect on work and/or disability benefits.  3.     Affect on future health and life insurance.  4.     Transplant outcome expectations may not be met.  5.     Mental Health risks: anxiety, depression, PTSD, guilt, grief and chronic fatigue.     CHRISTINE Lopez

## 2020-04-23 NOTE — PLAN OF CARE
/63 (BP Location: Right arm)   Pulse 74   Temp 96.7  F (35.9  C) (Oral)   Resp 16   Wt 60.7 kg (133 lb 14.4 oz)   LMP 05/16/2012   SpO2 98%   BMI 25.30 kg/m     Pt was transferred to  from  around 1230p  Neuro: A/Ox3  VS: VSS on RA  Pain: Denies  [place]: 0  GI: on reg diet. Denies nausea. No BM this shift, on scheduled lactulose  : no void yet  PIV: infusing IV antibiotic now  Skin: Generalized jaundice, skin intact  Activity - Assist of 1  Plan of Care - Continue with POC.

## 2020-04-24 NOTE — PLAN OF CARE
OT/7D:  Discharge Planner OT   Patient plan for discharge: Home with assist from family. Open to OP OT to progress cognition. Would like to work with sister-in-law (who is a PT) on exercises/independent walking program vs formal OP PT at this time.   Current status: Patient IND with toileting, LB dressing during session. Memory deficits persist with some word finding/substitution deficits noted, though patient reports working on cognitive exercises. SBA to ambulate ~300 feet in hallways. Does demonstrate pathway deviations and higher level balance instability with head turns, though anticipate functional mobility appropriate for discharge home. SBA to ascend/descend 4 stairs x2. Complains of heartburn and nausea at end of session. BP slightly soft but stable.   Barriers to return to prior living situation: Medical Status, Cognition, Higher Level Balance, Fatigue  Recommendations for discharge: Home with assist from family for higher level IADL (med management, managing schedule, driving) and OP OT/PT to address cognition/higher level balance.   Rationale for recommendations: Patient completing basic ADLs/mobility IND-SBA. Would benefit from OP therapies to address cognition/higher level balance as indicated above. Assist for higher level cognitive tasks to ensure safety (family already assisting with meds, etc.)       Entered by: Monique Still 04/24/2020 9:48 AM

## 2020-04-24 NOTE — PLAN OF CARE
Afebrile, VSS. Soft BP tonight but within parameters. A&O x4 but forgetful. Denies pain/nausea. Good appetite. Adequate UOP, BM x1 this evening per pt report. Ambulating in halls independently this evening. No acute concerns. Plan for possible discharge tomorrow.

## 2020-04-24 NOTE — DISCHARGE SUMMARY
[facility], [place]  Discharge Summary - Medicine & Pediatrics       Date of Admission:  4/21/2020  Date of Discharge:  4/24/2020 11:57 AM  Discharging Provider: Dexter Claudio MD  Discharge Service: Terrence 2    Discharge Diagnoses   Hepatic encephalopathy  Hematemesis, nonvariceal  ALFREDO, pre-renal    Follow-ups Needed After Discharge   Follow-up Appointments     Adult [facility] Follow-up and recommended labs and tests      Follow up with primary care provider, Efren Bustos MD, within 7   days for hospital follow- up. We recommend evaluating a CMP.    Appointments on [place] and/or [facility] (with [facility] or [facility]   provider or service). Call [phone] if you haven't heard regarding   these appointments within 7 days of discharge.           Unresulted Labs Ordered in the Past 30 Days of this Admission     Date and Time Order Name Status Description    4/22/2020 0436 Phosphatidylethanol (PEth) In process     4/21/2020 1919 Blood culture Preliminary       These results will be followed up by PCP and GI clinic    Discharge Disposition   Discharged to home  Condition at discharge: Stable    Hospital Course   Monalisa Olguin was admitted on 4/21/2020 for hepatic encephalopathy.  The following problems were addressed during her hospitalization:    # Hepatic encephalopathy, resolved  Patient presented with confusion and increasing abdominal pain, nausea, and vomiting for the past week. Most likely due to hepatic encephalopathy given recovery with lactulose and rifaxamin.     Original consideration was that AMS could represent infection however no source found. Did receive ceftriaxone however had small amount of ascites and SBP is considered unlikely.  - [place] protocol, transitioned to PTA lactulose plan 4/23  - Continue home rifaxamin    # Small volume hematemesis x1  Had one episode on day prior to admission w/ a few drops of blood in vomit. Has had this in the past, but not  for some time. Last EGD in 2019 did not have evidence of varices. Low suspicion of a new bleeding varice given how little bleeding she had. GI does not feel that she needs EGD at this time.  - Transfused 2 units pRBC 4/22    #ALFREDO, pre-renal  Patient has had increased nausea and vomiting. Endorsed feeling dehydrated. Cr recovered with IVF.  - 100 g albumin 4/22, 50 g 4/23  - Discharged on 20 mg PO lasix, 50 mg PO spironolactone, PTA K supplement   - Recommend assessment with PCP to eval electrolytes    Consultations This Hospital Stay   GI HEPATOLOGY ADULT IP CONSULT  PHYSICAL THERAPY ADULT IP CONSULT  OCCUPATIONAL THERAPY ADULT IP CONSULT  GI HEPATOLOGY ADULT IP CONSULT    Code Status   Full Code     The patient was discussed with MD Jessica Espinoza[address], [place]  Pager: [phone]  ______________________________________________________________________    Physical Exam   Vital Signs: Temp: 96.3  F (35.7  C) Temp src: Oral BP: 104/59 Pulse: 93 Heart Rate: 77 Resp: 18 SpO2: 96 % O2 Device: None (Room air)    Weight: 132 lbs 12.8 oz    General: NAD, A&O3  HEENT: scleral icterus, buccal jaundice, tan brown skin, oropharynx is clear  Neck: supple, no LAD  Resp: CTAB  Heart/CV: RRR, nl s1 s2, has systolic murmur 4/6  Abdomen/GI: soft, distended, non-tender. No notable ascites.   Extremities: wwp, trace edema in ankles  Neuro: no asterixis, no focal deficits.  Psych: appropriate, moderate insight      Primary Care Physician   Efren Bustos MD    Discharge Orders      Reason for your hospital stay    You were admitted due to confusion. We think that this was caused by a condition called hepatic encephalopathy, which is when toxins build up in the bloodstream and affect your brain. These toxins are usually removed from the body by the liver, but since your liver is not functioning at full capacity you are vulnerable to this condition. It is very  important that you take your rifaximin and your lactulose on a regular basis to keep this under control.    We recommend that you follow up with your primary care provider within 7 days of discharge. Your liver doctors will be contacting you regarding follow up in their clinic.     Adult [facility] Follow-up and recommended labs and tests    Follow up with primary care provider, Efren Bustos MD, within 7 days for hospital follow- up. We recommend evaluating a CMP.    Appointments on [place] and/or [facility] (with [facility] or [facility] provider or service). Call [phone] if you haven't heard regarding these appointments within 7 days of discharge.     Activity    Your activity upon discharge: activity as tolerated     Full Code     Diet    Follow this diet upon discharge: Orders Placed This Encounter      Regular Diet Adult       Significant Results and Procedures   Most Recent 3 CBC's:  Recent Labs   Lab Test 04/24/20  0545 04/23/20  0450 04/22/20  1817   WBC 4.6 4.7 5.5   HGB 8.4* 8.2* 8.2*   * 104* 106*   PLT 41* 44* 45*     Most Recent 3 BMP's:  Recent Labs   Lab Test 04/24/20  0545 04/23/20  0450 04/22/20  0436    135 134   POTASSIUM 3.7 3.4 3.6   CHLORIDE 112* 108 108   CO2 18* 18* 18*   BUN 37* 45* 53*   CR 1.91* 2.16* 2.70*   ANIONGAP 8 9 8   ELSIE 9.4 8.8 8.7   * 79 95     Most Recent 2 LFT's:  Recent Labs   Lab Test 04/24/20  0545 04/23/20  0450   AST 32 30   ALT 20 21   ALKPHOS 136 120   BILITOTAL 9.5* 10.8*       Discharge Medications   Discharge Medication List as of 4/24/2020 10:37 AM      CONTINUE these medications which have CHANGED    Details   furosemide (LASIX) 40 MG tablet Take 0.5 tablets (20 mg) by mouth daily, Disp-30 tablet,R-0, E-Prescribe         CONTINUE these medications which have NOT CHANGED    Details   acamprosate (CAMPRAL) 333 MG EC tablet TAKE TWO TABLETS BY MOUTH THREE TIMES A DAY, Disp-540 tablet,R-3, E-Prescribe      acetaminophen (TYLENOL) 325 MG tablet  Take 1 tablet (325 mg) by mouth every 8 hours as needed for mild pain or fever, Disp-30 tablet, R-0, E-Prescribe      ARIPiprazole (ABILIFY) 5 MG tablet Take 1 tablet (5 mg) by mouth At Bedtime, Disp-90 tablet, R-3, E-Prescribe      busPIRone (BUSPAR) 15 MG tablet Take 1 tablet (15 mg) by mouth 2 times daily, Disp-180 tablet, R-3, E-Prescribe      FLUoxetine (PROZAC) 20 MG capsule TAKE THREE CAPSULES BY MOUTH ONCE DAILY, Disp-90 capsule, R-3, E-Prescribe      folic acid (FOLVITE) 1 MG tablet Take 1 tablet (1 mg) by mouth daily, Disp-90 tablet, R-3, E-Prescribe      gabapentin (NEURONTIN) 100 MG capsule Take 3 capsules (300 mg) by mouth At Bedtime, Disp-270 capsule, R-3, E-Prescribe      lactulose (CHRONULAC) 10 GM/15ML solution Take 22.5 mLs (15 g) by mouth 3 times daily as needed for constipation Start with 15 ml daily, titrate as needed for 2-3 BM's daily., Disp-1000 mL, R-5, E-Prescribe      MEDICATION INSTRUCTION Disp-1 each, R-0, Historical      midodrine (PROAMATINE) 5 MG tablet Take 1 tablet (5 mg) by mouth 3 times daily (with meals), Disp-270 tablet, R-3, E-Prescribe      omeprazole (PRILOSEC) 20 MG DR capsule Take 1 capsule (20 mg) by mouth daily, Disp-90 capsule, R-3, E-Prescribe      ondansetron (ZOFRAN) 4 MG tablet TAKE ONE TABLET BY MOUTH EVERY 6 HOURS AS NEEDED FOR NAUSEA, Disp-30 tablet,R-1, E-Prescribe      potassium chloride ER (K-DUR/KLOR-CON M) 10 MEQ CR tablet Take 1 tablet (10 mEq) by mouth daily, Disp-90 tablet, R-3, E-Prescribe      rifaximin (XIFAXAN) 550 MG TABS tablet Take 1 tablet (550 mg) by mouth 2 times daily, Disp-60 tablet, R-0, E-Prescribe      SENNA-docusate sodium (SENNA S) 8.6-50 MG tablet Take 1 tablet by mouth 2 times daily, Disp-60 tablet, R-11, E-Prescribe      spironolactone (ALDACTONE) 50 MG tablet Take 1 tablet (50 mg) by mouth daily, Disp-90 tablet, R-3, E-Prescribe      traZODone (DESYREL) 50 MG tablet Take 2 tablets (100 mg) by mouth nightly as needed for sleep, Disp-30  "tablet, R-0, E-Prescribe      ursodiol (ACTIGALL) 500 MG tablet Take 1 tablet (500 mg) by mouth 2 times daily, Disp-180 tablet, R-3, E-Prescribe      vitamin B1 (VITAMIN B-1) 100 MG tablet Take 1 tablet (100 mg) by mouth daily, Disp-90 tablet, R-3, E-Prescribe         STOP taking these medications       alendronate (FOSAMAX) 35 MG tablet Comments:   Reason for Stopping:             Allergies   Allergies   Allergen Reactions     Albuterol      Tongue \"hardened and painful\"     "

## 2020-04-24 NOTE — PROGRESS NOTES
Care Coordinator - Discharge Planning    Admission Date/Time:  4/21/2020  Attending MD:  Eh Che,*     Data  Date of initial CC assessment:  4/24/2020  Patient was admitted for:   1. Alcoholic cirrhosis of liver with ascites (H)         Assessment   Concerns with insurance coverage for discharge needs: None.  Current Living Situation: Patient lives with family.  Support System: Supportive and Involved  Services Involved: none  Transportation at Discharge: sister  Transportation to Medical Appointments:    - Name of caregiver: family  Barriers to Discharge: none      Coordination of Care  D: Plan of care discussed with Medical Team at Interdisciplinary Rounds, plan for patient to discharge today.     I/A: Chart reviewed; spoke wwith patient by phone to confirm home support, living arrangements and transportation. Therapies recommending continuation of outpatient therapies, offered this to patient, she is not interested in doing that at this time. Patient reports the one of the therapist gave her home exercise plans. Patient denies all further needs.     P: No RNCC discharge needs identified. Care Coordinator will remain available for discharge needs that may arise.      Referrals: Provided patient with options for Outpatient Rehab, refused      Plan  Anticipated Discharge Date:  4/24/2020  Anticipated Discharge Plan:  Home    CTS Handoff completed:  NO      Sarah Boss RN, BSN, YOLANDAN  [place] RN Care Coordinator  [facility]  Phone: [phone]  Pager: [phone]    To contact Weekend RNCC, dial [phone] and enter job code [phone] at prompt.   This pager can not be contacted by text page or outside line.

## 2020-04-24 NOTE — PROGRESS NOTES
[facility]  HEPATOLOGY PROGRESS NOTE  Monalisa Olguin [id number]       CC: hepatic encephalopathy  SUBJECTIVE:  Has been feeling well and denies any change in mentation. She does feel she has some abdominal distention. No lower extremity edema, fevers, nausea or vomiting.      ROS:  A 10-point review of systems was negative.    OBJECTIVE:  VS: BP 94/52   Temp 98.6  F (37  C) (Oral)   Resp 14   Wt 58 kg (127 lb 13.9 oz)   LMP 05/16/2012   SpO2 95%   BMI 24.16 kg/m     No intake or output data in the 24 hours ending 04/24/20 1507  General: In no acute distress, mild facial muscle wasting  Neuro: AOx3, No asterixis  Lymph: No cervical lymphadenopathy  HEENT:  Moderate scleral icterus, Nooral lesions  CV: S1/[id number] murmurs. Skin warm and dry  Lungs: clear to auscultation Respirations even and nonlabored on room air  Abd: Mildly distended, nontender, +BS  Extrem: Noperipheral edema  Skin: moderate jaundice  Psych: pleasant    MEDICATIONS:  No current facility-administered medications for this encounter.      Current Outpatient Medications   Medication     acamprosate (CAMPRAL) 333 MG EC tablet     ARIPiprazole (ABILIFY) 5 MG tablet     busPIRone (BUSPAR) 15 MG tablet     FLUoxetine (PROZAC) 20 MG capsule     folic acid (FOLVITE) 1 MG tablet     gabapentin (NEURONTIN) 100 MG capsule     lactulose (CHRONULAC) 10 GM/15ML solution     midodrine (PROAMATINE) 5 MG tablet     omeprazole (PRILOSEC) 20 MG DR capsule     ondansetron (ZOFRAN) 4 MG tablet     potassium chloride ER (K-DUR/KLOR-CON M) 10 MEQ CR tablet     rifaximin (XIFAXAN) 550 MG TABS tablet     spironolactone (ALDACTONE) 50 MG tablet     traZODone (DESYREL) 50 MG tablet     ursodiol (ACTIGALL) 500 MG tablet     vitamin B1 (VITAMIN B-1) 100 MG tablet     acetaminophen (TYLENOL) 325 MG tablet     furosemide (LASIX) 40 MG tablet     MEDICATION INSTRUCTION     SENNA-docusate sodium (SENNA S) 8.6-50 MG tablet       REVIEW OF LABORATORY, PATHOLOGY AND  IMAGING RESULTS:  BMP  Recent Labs   Lab 04/24/20  0545 04/23/20  0450 04/22/20  0436 04/21/20  1136    135 134 130*   POTASSIUM 3.7 3.4 3.6 4.1   CHLORIDE 112* 108 108 100   ELSIE 9.4 8.8 8.7 9.3   CO2 18* 18* 18* 20   BUN 37* 45* 53* 53*   CR 1.91* 2.16* 2.70* 2.85*   * 79 95 111*     CBC  Recent Labs   Lab 04/24/20  0545 04/23/20  0450 04/22/20  1817 04/22/20  1231   WBC 4.6 4.7 5.5 5.1   RBC 2.61* 2.56* 2.55* 2.06*   HGB 8.4* 8.2* 8.2* 6.9*   HCT 27.3* 26.6* 26.9* 22.3*   * 104* 106* 108*   MCH 32.2 32.0 32.2 33.5*   MCHC 30.8* 30.8* 30.5* 30.9*   RDW 20.5* 20.3* 19.5* 17.8*   PLT 41* 44* 45* 43*     INR  Recent Labs   Lab 04/22/20  0436 04/21/20  1136   INR 1.64* 1.37*     LFTs  Recent Labs   Lab 04/24/20  0545 04/23/20  0450 04/22/20  0436 04/21/20  1136   ALKPHOS 136 120 146 186*   AST 32 30 37 51*   ALT 20 21 26 30   BILITOTAL 9.5* 10.8* 10.7* 12.3*   PROTTOTAL 5.2* 5.0* 4.2* 5.1*   ALBUMIN 3.4 3.2* 2.0* 2.5*      PANCNo lab results found in last 7 days.   MELD-Na score: 26 at 4/24/2020  5:45 AM  MELD score: 26 at 4/24/2020  5:45 AM  Calculated from:  Serum Creatinine: 1.91 mg/dL at 4/24/2020  5:45 AM  Serum Sodium: 138 mmol/L (Rounded to 137 mmol/L) at 4/24/2020  5:45 AM  Total Bilirubin: 9.5 mg/dL at 4/24/2020  5:45 AM  INR(ratio): 1.64 at 4/22/2020  4:36 AM  Age: 54 years 6 months      Imaging Results:  US abd w doppler 4/21/20  IMPRESSION:   1. Hepatic cirrhosis and moderate ascites.  2. Patent and antegrade Doppler flow of the hepatic vasculature.    IMPRESSION:  Monalisa Olguin is a 54 year old femalewith a history of alcohol cirrhosis, abstinent since 9/2019, complicated by ascites, hepatic encephalopathy, hx SINAI, hx solitary kidney following living donor tx (2000) who was admitted with 1 week of weakness, nausea, vomiting and noted to have ALFREDO with creatinine 2.8, elevated bili 12.3 and altered mental status.  This improved with hydration and lactulose.     RECOMMENDATIONS:  1.  Alcohol cirrhosis  2. Hepatic encephalopathy  3. Ascites  - elevated bilirubin above baseline. MELD 29. Has not been evaluated for liver transplant in the past due to prior improvement with abstinence and stability.   - Liver transplant  recommending neuropsych testing and CD evaluation.   -PETH pending 4/22  - US with moderate ascites without evidence of HCC  - infectious work up so far negative. Repeat view of abdomen with limited amount for para.  - continue lactulose with goal of 3-5 BM's per day. Continue rifaximin.   - will plan for outpatient transplant evaluation.      4. Anemia  - EGD 4/2019 without EV. Drop in hemoglobin likely due to hemoconcentration.  Hemolysis work-up negative   - Hemoglobin stable.      5. ALFREDO  6. Solitary kidney (donor left kidney)  - Improving kidney function.   - Received albumin challenge. Receiving low dose diuretics with lasix 20 and spironolactone 50 daily     7. Immunizations  - Pneumovax at time of discharge.   - received x1 dose of HAV/HBV in 2018. Please restart series of HAV and HBV prior to discharge.     Patient was discussed with attending physician, Dr. Kovacs    Next follow up appointment: TBD    Thank you for the opportunity to be involved with  [facility]. Please call with any questions or concerns.    IVETTE Suarez, CNP  Inpatient Hepatology [facility]  [phone]  Text Link

## 2020-04-24 NOTE — PLAN OF CARE
Occupational Therapy Discharge Summary    Reason for therapy discharge:    Discharged to home.    Progress towards therapy goal(s). See goals on Care Plan in [facility] electronic health record for goal details.  Goals partially met.  Barriers to achieving goals:   discharge from facility.    Therapy recommendation(s):    Continued therapy is recommended.  Rationale/Recommendations:  Patient would benefit from OP OT to address higher level cognitive skills and likely OP PT to promote higher level balance.

## 2020-04-27 NOTE — LETTER
LIVER TRANSPLANT  EVALUATION SCHEDULE    Patient:   Monalisa Olguin  MR#:    [id number]  Coordinator:  Gabbie      [phone]  :     [place]                 [phone]  [facility]  Date(s):    May 12, 2020          This is your evaluation schedule, please follow dates and times.  You will   receive reminder phone calls for other tests, but please follow this schedule  only!  If you have any questions about dates and times, please call us on  number listed above.  Thank you, Transplant Services         Day/Date:    Tuesday, May 12, 2020  Time Location Activity   10:00 AM Telephone Visit: You will receive a phone call to: [phone] Appointment with Dr. Hong,  Transplant Surgeon   11:30 AM Telephone Visit: You will receive a phone call to: [phone] Appointment with Diane Kelly,  Registered Dietitian

## 2020-04-27 NOTE — LETTER
4/29/2020    Monalisa Olguin  [address]  [place] [address]      Dear Monalisa,    Thank you for your interest in the Transplant Center at [facility]. We look forward to being a part of your care team and assisting you through the transplant process.    As we discussed, your transplant coordinator is Gabbie Portillo (Liver).  You may call your coordinator at any time with questions or concerns call [phone].    Please complete the following.    1. Fill out and return the enclosed forms    Authorization for Electronic Communication    Authorization to Discuss Protected Health Information    Authorization for Release of Protected Health Information    Authorization for Care Everywhere Release of Information    2. Sign up for:    [employer], access to your electronic medical record (see enclosed pamphlet)    [email or web address], a transplant education website    You can use these tools to learn more about your transplant, communicate with your care team, and track your medical details      Sincerely,      Solid Organ Transplant  [facility]    cc: PCP

## 2020-04-27 NOTE — PROGRESS NOTES
Date: 4/27/2020 Status: [facility]   Time: 11:00 AM Length: 40   Visit Type: TELEPHONE VISIT NEW [[id number]] MARY:     Provider: Hi Humphrey NP Department:  FAMILY PRACTICE     Patient has clinic visit within 24-48 hours of Discharge so no post DC follow up call is needed

## 2020-04-27 NOTE — PATIENT INSTRUCTIONS
Dr. Cruz today with labs.    -Plan for labs in Dr. Bustos  with a telephone visit in 2 weeks, labs the day before.    -Call clinic with new or worsening symptoms or present to ER with any severe pain, blood in urine or stool or emesis, or worsening confusion.    Hi Humphrey CNP

## 2020-04-27 NOTE — PROGRESS NOTES
"Monalisa Olguin is a 54 year old female who is being evaluated via a billable telephone visit.      The patient has been notified of following:     \"This telephone visit will be conducted via a call between you and your physician/provider. We have found that certain health care needs can be provided without the need for a physical exam.  This service lets us provide the care you need with a short phone conversation.  If a prescription is necessary we can send it directly to your pharmacy.  If lab work is needed we can place an order for that and you can then stop by our lab to have the test done at a later time.    Telephone visits are billed at different rates depending on your insurance coverage. During this emergency period, for some insurers they may be billed the same as an in-person visit.  Please reach out to your insurance provider with any questions.    If during the course of the call the physician/provider feels a telephone visit is not appropriate, you will not be charged for this service.\"    Patient has given verbal consent for Telephone visit?  Yes    How would you like to obtain your AVS? MyChart    Subjective     Monalisa Olguin is a 54 year old female who presents to clinic today for the following health issues:    [facility]    Hospital Follow-up Visit:    Hospital/Nursing Home/IP Rehab Facility: [facility]  Date of Admission: 4/21/2020  Date of Discharge: 4/24/2020  Reason(s) for Admission: Renal failure      Was your hospitalization related to COVID-19? No   Problems taking medications regularly:  None  Medication changes since discharge: None  Problems adhering to non-medication therapy:  None    Summary of hospitalization:  [facility] discharge summary reviewed  Diagnostic Tests/Treatments reviewed.  Follow up needed: Follow up Dr. Nancy SANCHEZ today.   Other Healthcare Providers Involved in Patient s Care:         Specialist appointment - GI- Dr. Cruz today.   Update " since discharge: improved. Post Discharge Medication Reconciliation: discharge medications reconciled, continue medications without change.  Plan of care communicated with patient                   Patient Active Problem List   Diagnosis     Kidney donor     Esophageal reflux     Moderate Depression [296.32]     HYPERLIPIDEMIA LDL GOAL <100     Hypertension goal BP (blood pressure) < 130/80     Anxiety     Alcoholism in recovery (H)     Alcoholic cirrhosis of liver with ascites (H)     Chronic blood loss anemia     Thrombocytopenia (H)     Tobacco abuse     Non-intractable vomiting with nausea     Portal hypertension (H)     Pulmonary nodules     Hyperthyroidism     Alcoholic cirrhosis of liver without ascites (H) - per Hepatology consult 2017     Splenomegaly     Epistaxis     Other iron deficiency anemia     Other pancytopenia (H)     Solitary kidney, acquired     Macrocytic anemia     CKD (chronic kidney disease) stage 3, GFR 30-59 ml/min (H)     Confusion     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     COLONOSCOPY N/A 2017    Procedure: COLONOSCOPY;  Colonoscopy;  Surgeon: Sai Johnston MD;  [facility]: [facility]     ESOPHAGOSCOPY, GASTROSCOPY, DUODENOSCOPY (EGD), COMBINED N/A 2017    Procedure: COMBINED ESOPHAGOSCOPY, GASTROSCOPY, DUODENOSCOPY (EGD), BIOPSY SINGLE OR MULTIPLE;  ESOPHAGOSCOPY, GASTROSCOPY, DUODENOSCOPY (EGD) with biopsies;  Surgeon: Sai Johnston MD;  [facility]     ESOPHAGOSCOPY, GASTROSCOPY, DUODENOSCOPY (EGD), COMBINED N/A 2019    Procedure: ESOPHAGOGASTRODUODENOSCOPY, WITH BIOPSY;  Surgeon: Edgardo Scott DO;  Location: [facility]     HC KNEE SCOPE, DIAGNOSTIC  01    Arthroscopy, Lt Knee     LAPAROSCOPIC CHOLECYSTECTOMY N/A 2017    Procedure: LAPAROSCOPIC CHOLECYSTECTOMY;  laparoscopic cholecystectomy;  Surgeon:  Romeo Pastor MD;  Location: [facility]     OPEN REDUCTION INTERNAL FIXATION WRIST Right 11/29/2017    Procedure: OPEN REDUCTION INTERNAL FIXATION WRIST;  OPEN REDUCTION INTERNAL FIXATION RIGHT WRIST;  Surgeon: Edgardo Almendarez MD;  Location:  OR       Social History     Tobacco Use     Smoking status: Current Every Day Smoker     Packs/day: 0.50     Years: 10.00     Pack years: 5.00     Smokeless tobacco: Never Used     Tobacco comment: pt quit 1992 after smoking for 8 yrs, started again in 2004   Substance Use Topics     Alcohol use: Not Currently     Alcohol/week: 0.0 standard drinks     Family History   Problem Relation Age of Onset     Hypertension Mother         80 years old     Heart Disease Paternal Grandmother      Heart Disease Paternal Grandfather      Cerebrovascular Disease Maternal Grandmother      Cerebrovascular Disease Maternal Grandfather      Alcohol/Drug Maternal Grandfather      Substance Abuse Maternal Grandfather      Heart Disease Father         Silent MI stents x 2     Diabetes Sister 17        older sister also had kidney and pancreas transplant     Heart Disease Sister         MI secondary to diabetes     Genitourinary Problems Sister 43        kidney transplant from renal failure     Other - See Comments Sister         older     Other - See Comments Sister         younger     Diabetes Sister 9        youngest, juvenile type I (onset age 9 with no complications)     Cancer Paternal Aunt         ?kind         Current Outpatient Medications   Medication Sig Dispense Refill     acamprosate (CAMPRAL) 333 MG EC tablet TAKE TWO TABLETS BY MOUTH THREE TIMES A  tablet 3     acetaminophen (TYLENOL) 325 MG tablet Take 1 tablet (325 mg) by mouth every 8 hours as needed for mild pain or fever 30 tablet 0     ARIPiprazole (ABILIFY) 5 MG tablet Take 1 tablet (5 mg) by mouth At Bedtime 90 tablet 3     busPIRone (BUSPAR) 15 MG tablet Take 1 tablet (15 mg) by mouth 2 times daily 180 tablet  "3     FLUoxetine (PROZAC) 20 MG capsule TAKE THREE CAPSULES BY MOUTH ONCE DAILY 90 capsule 3     folic acid (FOLVITE) 1 MG tablet Take 1 tablet (1 mg) by mouth daily 90 tablet 3     furosemide (LASIX) 40 MG tablet Take 0.5 tablets (20 mg) by mouth daily 30 tablet 0     gabapentin (NEURONTIN) 100 MG capsule Take 3 capsules (300 mg) by mouth At Bedtime 270 capsule 3     lactulose (CHRONULAC) 10 GM/15ML solution Take 22.5 mLs (15 g) by mouth 3 times daily as needed for constipation Start with 15 ml daily, titrate as needed for 2-3 BM's daily. 1000 mL 5     midodrine (PROAMATINE) 5 MG tablet Take 1 tablet (5 mg) by mouth 3 times daily (with meals) 270 tablet 3     omeprazole (PRILOSEC) 20 MG DR capsule Take 1 capsule (20 mg) by mouth daily 90 capsule 3     ondansetron (ZOFRAN) 4 MG tablet TAKE ONE TABLET BY MOUTH EVERY 6 HOURS AS NEEDED FOR NAUSEA 30 tablet 1     potassium chloride ER (K-DUR/KLOR-CON M) 10 MEQ CR tablet Take 1 tablet (10 mEq) by mouth daily 90 tablet 3     rifaximin (XIFAXAN) 550 MG TABS tablet Take 1 tablet (550 mg) by mouth 2 times daily 60 tablet 0     SENNA-docusate sodium (SENNA S) 8.6-50 MG tablet Take 1 tablet by mouth 2 times daily 60 tablet 11     spironolactone (ALDACTONE) 50 MG tablet Take 1 tablet (50 mg) by mouth daily 90 tablet 3     traZODone (DESYREL) 50 MG tablet Take 2 tablets (100 mg) by mouth nightly as needed for sleep 30 tablet 0     ursodiol (ACTIGALL) 500 MG tablet Take 1 tablet (500 mg) by mouth 2 times daily 180 tablet 3     vitamin B1 (VITAMIN B-1) 100 MG tablet Take 1 tablet (100 mg) by mouth daily 90 tablet 3     MEDICATION INSTRUCTION  1 each 0     Allergies   Allergen Reactions     Albuterol      Tongue \"hardened and painful\"       Reviewed and updated as needed this visit by Provider    Patient calls in regards today for follow-up of a hospital stay at the [facility] from April 21 through April 24.    Excerpt from hospital stay below:    Date of Admission:  " 4/21/2020  Date of Discharge:  4/24/2020 11:57 AM  Discharging Provider: Dexter Claudio MD  Discharge Service: [facility] 2    Hepatic encephalopathy  Hematemesis, nonvariceal  ALFREDO, pre-renal    Monalisa Olguin was admitted on 4/21/2020 for hepatic encephalopathy.  The following problems were addressed during her hospitalization:     # Hepatic encephalopathy, resolved  Patient presented with confusion and increasing abdominal pain, nausea, and vomiting for the past week. Most likely due to hepatic encephalopathy given recovery with lactulose and rifaxamin.     Original consideration was that AMS could represent infection however no source found. Did receive ceftriaxone however had small amount of ascites and SBP is considered unlikely.  - [place] protocol, transitioned to PTA lactulose plan 4/23  - Continue home rifaxamin     # Small volume hematemesis x1  Had one episode on day prior to admission w/ a few drops of blood in vomit. Has had this in the past, but not for some time. Last EGD in 2019 did not have evidence of varices. Low suspicion of a new bleeding varice given how little bleeding she had. GI does not feel that she needs EGD at this time.  - Transfused 2 units pRBC 4/22     #ALFREDO, pre-renal  Patient has had increased nausea and vomiting. Endorsed feeling dehydrated. Cr recovered with IVF.  - 100 g albumin 4/22, 50 g 4/23  - Discharged on 20 mg PO lasix, 50 mg PO spironolactone, PTA K supplement              - Recommend assessment with PCP to eval electrolytes     Today: 4/27/20    Patient reports that she is slowly improving.  She feels still feels shaky and weak at times but again is able to do most daily cares.  She is now taking her medication consistently and drinking lots of water to include some electrolyte replacement drinks, and flavored water.  She has not had any hemoptysis but some ongoing low-grade nausea for which the Zofran does help.  Stools remain loose but she is on the lactulose no blood  in the stools that she can tell.  She states the confusion is greatly improved and she feels clearer than she has since admission.    Patient is unaware that she had follow-up labs and with Dr. Cruz her GI doc at the [facility] today.  I was 2 per recommendations of discharge get a BMP on her but since she is going to get labs at the [facility] will hold on the labs today.  I gave the patient the number for Dr. Cruz so she can get instructions on how to parking access his office today.    She is negative for any new acute symptoms of concern.  No fevers no chills.  No new abdominal pain of any kind.  No new cough or shortness of breath.  No new chest pain of any kind.  Does not feel the abdomen is distended to the degree that she would need any kind of paracentesis as she has in the past.  Again she will be seen by Dr. Cruz today who can evaluate her through exam.  Plan will have her be to follow-up with her primary care provider in 2 weeks and I will get labs scheduled for the day before these will include a hemoglobin and a BMP.  Patient states she is in no acute distress of any kind and feels overall stable.         Review of Systems   ROS COMP: Constitutional, HEENT, cardiovascular, pulmonary, GI, , musculoskeletal, neuro, skin, endocrine and psych systems are negative, except as otherwise noted.       Objective   Reported vitals:  [facility] 05/16/2012    GENERAL: Telephone visit: Secondary to COVID restrictions: patient per phone appeared alert and no distress  PSYCH: Alert and oriented times 3; coherent speech, normal   rate and volume, able to articulate logical thoughts, able   to abstract reason, no tangential thoughts, no hallucinations   or delusions  Her affect is normal  RESP: No cough, no audible wheezing, able to talk in full sentences  Remainder of exam unable to be completed due to telephone visits    Diagnostic Test Results:  Labs reviewed in Epic        Assessment/Plan:  1. Alcoholic cirrhosis  of liver with ascites (H)  -Plan for labs today later at [facility] with Dr. Cruz.  -We will then have her follow-up with her primary provider in 2 weeks with a phone visit and labs the day prior.  -Patient is aware to call clinic with new or worsening symptoms prior to her follow-up appointment; or present to the ER with severe symptoms of any kind.  - **CBC with platelets FUTURE anytime; Future  - **Basic metabolic panel FUTURE anytime; Future    -Patient verbalized understanding of plan of care and is agreement with current plan of care.      Phone call duration:  12 minutes    Hi Humphrey NP

## 2020-04-28 NOTE — TELEPHONE ENCOUNTER
----- Message from Hi Humphrey NP sent at 4/28/2020  7:04 AM CDT -----  Please call with results. Please ensure that labs have improved. Kidney function is better and her blood counts are slowly trending up which is a good thing. Please order a CBC and CMP for 5/11 the day before her phone visit with Dr. Bustos. Dx: Alcoholic Cirrhosis. Thank you.      Hi Humphrey NP on 4/28/2020 at 7:00 AM

## 2020-04-28 NOTE — PROGRESS NOTES
Monalisa Olguin is a 54 year old female who is being evaluated via a billable telephone visit.        Subjective     Monalisa Olguin is a 54 year old female who presents to clinic today for the following health issues:  Chief Complaint   Patient presents with     Musculoskeletal Problem     left foot pain twisted and fell from 2 days ago       HPI      Current Outpatient Medications   Medication Sig Dispense Refill     acamprosate (CAMPRAL) 333 MG EC tablet TAKE TWO TABLETS BY MOUTH THREE TIMES A  tablet 3     acetaminophen (TYLENOL) 325 MG tablet Take 1 tablet (325 mg) by mouth every 8 hours as needed for mild pain or fever 30 tablet 0     ARIPiprazole (ABILIFY) 5 MG tablet Take 1 tablet (5 mg) by mouth At Bedtime 90 tablet 3     busPIRone (BUSPAR) 15 MG tablet Take 1 tablet (15 mg) by mouth 2 times daily 180 tablet 3     FLUoxetine (PROZAC) 20 MG capsule TAKE THREE CAPSULES BY MOUTH ONCE DAILY 90 capsule 3     folic acid (FOLVITE) 1 MG tablet Take 1 tablet (1 mg) by mouth daily 90 tablet 3     furosemide (LASIX) 40 MG tablet Take 0.5 tablets (20 mg) by mouth daily 30 tablet 0     gabapentin (NEURONTIN) 100 MG capsule Take 3 capsules (300 mg) by mouth At Bedtime 270 capsule 3     lactulose (CHRONULAC) 10 GM/15ML solution Take 22.5 mLs (15 g) by mouth 3 times daily as needed for constipation Start with 15 ml daily, titrate as needed for 2-3 BM's daily. 1000 mL 5     MEDICATION INSTRUCTION  1 each 0     midodrine (PROAMATINE) 5 MG tablet Take 1 tablet (5 mg) by mouth 3 times daily (with meals) 270 tablet 3     omeprazole (PRILOSEC) 20 MG DR capsule Take 1 capsule (20 mg) by mouth daily 90 capsule 3     ondansetron (ZOFRAN) 4 MG tablet TAKE ONE TABLET BY MOUTH EVERY 6 HOURS AS NEEDED FOR NAUSEA 30 tablet 1     oxyCODONE (ROXICODONE) 5 MG tablet Take 1 tablet (5 mg) by mouth 2 times daily as needed for pain 12 tablet 0     potassium chloride ER (K-DUR/KLOR-CON M) 10 MEQ CR tablet Take 1 tablet (10 mEq) by  mouth daily 90 tablet 3     rifaximin (XIFAXAN) 550 MG TABS tablet Take 1 tablet (550 mg) by mouth 2 times daily 60 tablet 0     SENNA-docusate sodium (SENNA S) 8.6-50 MG tablet Take 1 tablet by mouth 2 times daily 60 tablet 11     spironolactone (ALDACTONE) 50 MG tablet Take 1 tablet (50 mg) by mouth daily 90 tablet 3     traZODone (DESYREL) 50 MG tablet Take 2 tablets (100 mg) by mouth nightly as needed for sleep 30 tablet 0     ursodiol (ACTIGALL) 500 MG tablet Take 1 tablet (500 mg) by mouth 2 times daily 180 tablet 3     vitamin B1 (VITAMIN B-1) 100 MG tablet Take 1 tablet (100 mg) by mouth daily 90 tablet 3       SUBJECTIVE:  Monalisa  is a 54 year old female who presents for: Injury to her left foot.  Last night she was wearing some heels foot rolled over.  Tender over the fifth metatarsal.  Difficult to bear weight.  Recently released from the hospital with alcoholic encephalopathy and is a history of alcohol cirrhosis and kidney disease as well.  Is being followed up with her PCP here for that.    Past Medical History:   Diagnosis Date     Acute alcoholic intoxication in alcoholism (H) 3/27/2018     Acute renal failure (H) 11/11/2019     Alcohol abuse 5/2/2013     Alcohol dependence 5/25/2013     Alcohol withdrawal 5/2/2013     Anxiety 10/10/2011     CKD (chronic kidney disease) stage 3, GFR 30-59 ml/min (H) 2006    last GFR was 42     CKD (chronic kidney disease) stage 3, GFR 30-59 ml/min (H) 2/22/2011     CKD (chronic kidney disease) stage 5, GFR less than 15 ml/min (H) 4/14/2020     Depressive disorder      Esophageal reflux 10/7/2002     Hematochezia-patient reported 11/11/2019     Heme positive stool 3/27/2018     Hepatic encephalopathy (H) 11/11/2019     Hypertension goal BP (blood pressure) < 130/80 7/12/2011     Hyponatremia 11/11/2019     Moderate Depression [296.32] 12/22/2009    stable on wellbutrin     Other internal derangement of knee(717.89)     ACL Internal derangement, knee/ original injury  in 5th grade, torn cartilage     Pap smear     no abnormals, due for paps q 2-3 yrs     SBP (spontaneous bacterial peritonitis) (H) 2019     Unspecified essential hypertension      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     COLONOSCOPY N/A 2017    Procedure: COLONOSCOPY;  Colonoscopy;  Surgeon: Sai Johnston MD;  [facility]     ESOPHAGOSCOPY, GASTROSCOPY, DUODENOSCOPY (EGD), COMBINED N/A 2017    Procedure: COMBINED ESOPHAGOSCOPY, GASTROSCOPY, DUODENOSCOPY (EGD), BIOPSY SINGLE OR MULTIPLE;  ESOPHAGOSCOPY, GASTROSCOPY, DUODENOSCOPY (EGD) with biopsies;  Surgeon: Sai Johnston MD;  Location:  GI     ESOPHAGOSCOPY, GASTROSCOPY, DUODENOSCOPY (EGD), COMBINED N/A 2019    Procedure: ESOPHAGOGASTRODUODENOSCOPY, WITH BIOPSY;  Surgeon: Edgardo Scott DO;  Location: [facility]     HC KNEE SCOPE, DIAGNOSTIC  01    Arthroscopy, Lt Knee     LAPAROSCOPIC CHOLECYSTECTOMY N/A 2017    Procedure: LAPAROSCOPIC CHOLECYSTECTOMY;  laparoscopic cholecystectomy;  Surgeon: Romeo Pastor MD;  Location[facility]     OPEN REDUCTION INTERNAL FIXATION WRIST Right 2017    Procedure: OPEN REDUCTION INTERNAL FIXATION WRIST;  OPEN REDUCTION INTERNAL FIXATION RIGHT WRIST;  Surgeon: Edgardo Almendarez MD;  Location:  OR     Social History     Tobacco Use     Smoking status: Current Every Day Smoker     Packs/day: 0.50     Years: 10.00     Pack years: 5.00     Smokeless tobacco: Never Used     Tobacco comment: pt quit  after smoking for 8 yrs, started again in    Substance Use Topics     Alcohol use: Not Currently     Alcohol/week: 0.0 standard drinks     Current Outpatient Medications   Medication Sig Dispense Refill     acamprosate (CAMPRAL) 333 MG EC tablet TAKE TWO TABLETS BY MOUTH THREE TIMES A  tablet 3     acetaminophen  (TYLENOL) 325 MG tablet Take 1 tablet (325 mg) by mouth every 8 hours as needed for mild pain or fever 30 tablet 0     ARIPiprazole (ABILIFY) 5 MG tablet Take 1 tablet (5 mg) by mouth At Bedtime 90 tablet 3     busPIRone (BUSPAR) 15 MG tablet Take 1 tablet (15 mg) by mouth 2 times daily 180 tablet 3     FLUoxetine (PROZAC) 20 MG capsule TAKE THREE CAPSULES BY MOUTH ONCE DAILY 90 capsule 3     folic acid (FOLVITE) 1 MG tablet Take 1 tablet (1 mg) by mouth daily 90 tablet 3     furosemide (LASIX) 40 MG tablet Take 0.5 tablets (20 mg) by mouth daily 30 tablet 0     gabapentin (NEURONTIN) 100 MG capsule Take 3 capsules (300 mg) by mouth At Bedtime 270 capsule 3     lactulose (CHRONULAC) 10 GM/15ML solution Take 22.5 mLs (15 g) by mouth 3 times daily as needed for constipation Start with 15 ml daily, titrate as needed for 2-3 BM's daily. 1000 mL 5     MEDICATION INSTRUCTION  1 each 0     midodrine (PROAMATINE) 5 MG tablet Take 1 tablet (5 mg) by mouth 3 times daily (with meals) 270 tablet 3     omeprazole (PRILOSEC) 20 MG DR capsule Take 1 capsule (20 mg) by mouth daily 90 capsule 3     ondansetron (ZOFRAN) 4 MG tablet TAKE ONE TABLET BY MOUTH EVERY 6 HOURS AS NEEDED FOR NAUSEA 30 tablet 1     oxyCODONE (ROXICODONE) 5 MG tablet Take 1 tablet (5 mg) by mouth 2 times daily as needed for pain 12 tablet 0     potassium chloride ER (K-DUR/KLOR-CON M) 10 MEQ CR tablet Take 1 tablet (10 mEq) by mouth daily 90 tablet 3     rifaximin (XIFAXAN) 550 MG TABS tablet Take 1 tablet (550 mg) by mouth 2 times daily 60 tablet 0     SENNA-docusate sodium (SENNA S) 8.6-50 MG tablet Take 1 tablet by mouth 2 times daily 60 tablet 11     spironolactone (ALDACTONE) 50 MG tablet Take 1 tablet (50 mg) by mouth daily 90 tablet 3     traZODone (DESYREL) 50 MG tablet Take 2 tablets (100 mg) by mouth nightly as needed for sleep 30 tablet 0     ursodiol (ACTIGALL) 500 MG tablet Take 1 tablet (500 mg) by mouth 2 times daily 180 tablet 3     vitamin B1  (VITAMIN B-1) 100 MG tablet Take 1 tablet (100 mg) by mouth daily 90 tablet 3       REVIEW OF SYSTEMS:   5 point ROS negative except as noted above in HPI, including Gen., Resp, CV, GI &  system review.     OBJECTIVE:  Vitals: Pulse 75   Temp 97.2  F (36.2  C) (Temporal)   Wt 62.1 kg (137 lb)   LMP 05/16/2012   SpO2 98%   BMI 25.89 kg/m    BMI= Body mass index is 25.89 kg/m .  She is alert talkative oriented.  Appears in no distress.  Jaundiced.  Her left foot is not particularly swollen.  No ecchymosis.  Tender over the fifth metatarsal more towards the distal and then the base but kind of all along it.  Painful with compression.  Ankle with good range of motion no tenderness.  X-ray of the foot looks to be normal.    ASSESSMENT:  #1 sprain foot #2 alcoholic cirrhosis #3 kidney failure    PLAN:  She requests something for pain.  Tylenol would not be appropriate nor would nonsteroidals.  Knowing her history of alcohol abuse I reluctantly did offer her some oxycodone just a few of them to take maybe 2 a day.  To ice and elevate and off as much as possible.  If she cannot tolerate the discomfort would have to get her into a walking cast boot.  Follow-up for repeat x-ray if not improving in about 10 days.  Is following up with her PCP it sounds like in a week for some more blood work.        Bryant Colorado MD  [facility]

## 2020-04-28 NOTE — TELEPHONE ENCOUNTER
Called and left a VM.     Orders for future labs have been placed Per Hi Humphrey CNP.     Mona Cameron on 4/28/2020 at 8:04 AM

## 2020-04-28 NOTE — TELEPHONE ENCOUNTER
"Patient was asked the following questions during liver intake call.     Referring Provider:   Referring Diagnosis: Alcoholic Cirrhosis of the Liver with Ascites  PCP: Dr. PETER PATTERSON,   1)Do you know why you have liver disease: Yes             If Alcoholic Cirrhosis is present when was your last drink: 9/2019             Have you ever been through treatment for alcohol: Yes  2) Presence of Ascites: Yes Paracentesis: No  3) Presence of Hepatic Encephalopathy: No Medications: Yes  4) History of GI Bleeding: No  5) Oxygen Use: None  6) EGD: Yes, patient states \"many years ago.\"  7) Colonoscopy: Yes Where: [facility] When:2007  8) MELD Score: 25   9) Insurance information: [employer]      Policy andino: Spouse      Subscriber/policy/ID number: [id number]      Group Number: [id number]  Referral intake process completed.  Patient is aware that after financial approval is received, medical records will be requested.   Patient confirmed for a callback from transplant coordinator on 5/4/2020.  Tentative evaluation date TBD.    Confirmed coordinator will discuss evaluation process in more detail at the time of their call.   Patient is aware of the need to arrange age appropriate cancer screening, vaccinations, and dental care.  Reminded patient to complete questionnaire, complete medical records release, and review packet prior to evaluation visit .  Assessed patient for special needs (ie--wheelchair, assistance, guardian, and ):  Wheelchair   Patient instructed to call [phone] with questions.     TRISTON Reyes, LPN   Solid Organ Transplant          "

## 2020-04-29 NOTE — TELEPHONE ENCOUNTER
Mailed a result letter, and requested patient make a lab visit on 5/11/20.    Lazara Bravo CMA ([facility]) 4/29/2020

## 2020-05-04 PROBLEM — K76.82 HEPATIC ENCEPHALOPATHY (H): Status: ACTIVE | Noted: 2020-01-01

## 2020-05-04 NOTE — ED PROVIDER NOTES
[facility] EMERGENCY DEPARTMENT ([facility])  May 4, 2020    ED Provider Note  [employer]      History     Chief Complaint   Patient presents with     Ankle Pain     Nausea & Vomiting     Altered Mental Status     The history is provided by the patient, the spouse and medical records ( gave some history over the phone per patient's request).     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 Patient's  reports patient has been increasingly confused over the last couple of days. He states she is taking all of her medications, including lactulose, which she last took yesterday evening. Patient reports she is feeling confused and thinks her ammonia might be high. She states she is nauseous and vomiting, so she has not been eating much. She reports she has been feeling short of breath. She denies fever, cough, or abdominal pain. Of note, patient also complains of left ankle pain. Her  states she fell in a parking lot last week and had x-rays taken after the fall. Patient denies hitting her head during that fall.    Per chart review, patient was hospitalized at [facility] from 4/21/20-4/24/20 for confusion, increased abdominal pain, nausea, and vomiting for 1 week. Patient's symptoms resolved after being treated with lactulose and rifaxamin. Patient did have a few drops of blood in her vomit. There was low suspicion of a new bleeding varice. GI did not feel she needed an EGD at that time. Patient received 2 units pRBC on 4/22/20.     Past Medical History  Past Medical History:   Diagnosis Date     Acute alcoholic intoxication in alcoholism (H) 3/27/2018     Acute renal failure  (H) 2019     Alcohol abuse 2013     Alcohol dependence 2013     Alcohol withdrawal 2013     Alcoholic cirrhosis (H)      Anxiety 10/10/2011     CKD (chronic kidney disease) stage 3, GFR 30-59 ml/min (H)     last GFR was 42     CKD (chronic kidney disease) stage 3, GFR 30-59 ml/min (H) 2011     CKD (chronic kidney disease) stage 5, GFR less than 15 ml/min (H) 2020     Depressive disorder      Esophageal reflux 10/7/2002     Hematochezia-patient reported 2019     Heme positive stool 3/27/2018     Hepatic encephalopathy (H) 2019     History of blood transfusion      [facility]     Hypertension goal BP (blood pressure) < 130/80 2011     Hyponatremia 2019     Moderate Depression [296.32] 2009    stable on wellbutrin     Other internal derangement of knee(717.89)     ACL Internal derangement, knee/ original injury in 5th grade, torn cartilage     Pap smear     no abnormals, due for paps q 2-3 yrs     SBP (spontaneous bacterial peritonitis) (H) 2019     Thyroid disease      Unspecified essential hypertension      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     COLONOSCOPY N/A 2017    Procedure: COLONOSCOPY;  Colonoscopy;  Surgeon: Sai Johnston MD;  Location: [facility]     ESOPHAGOSCOPY, GASTROSCOPY, DUODENOSCOPY (EGD), COMBINED N/A 2017    Procedure: COMBINED ESOPHAGOSCOPY, GASTROSCOPY, DUODENOSCOPY (EGD), BIOPSY SINGLE OR MULTIPLE;  ESOPHAGOSCOPY, GASTROSCOPY, DUODENOSCOPY (EGD) with biopsies;  Surgeon: Sai Johnston MD;  Location: [facility]     ESOPHAGOSCOPY, GASTROSCOPY, DUODENOSCOPY (EGD), COMBINED N/A 2019    Procedure: ESOPHAGOGASTRODUODENOSCOPY, WITH BIOPSY;  Surgeon: Edgardo Scott DO;  Location: [facility]     HC KNEE SCOPE, DIAGNOSTIC  01    Arthroscopy, Lt Knee     LAPAROSCOPIC  "CHOLECYSTECTOMY N/A 9/24/2017    Procedure: LAPAROSCOPIC CHOLECYSTECTOMY;  laparoscopic cholecystectomy;  Surgeon: Romeo Pastor MD;  Location: [facility]     OPEN REDUCTION INTERNAL FIXATION WRIST Right 11/29/2017    Procedure: OPEN REDUCTION INTERNAL FIXATION WRIST;  OPEN REDUCTION INTERNAL FIXATION RIGHT WRIST;  Surgeon: Edgardo Almendarez MD;  Location: [facility]     TRANSPLANT      Donated Left Kidney in 2000     acamprosate (CAMPRAL) 333 MG EC tablet  ARIPiprazole (ABILIFY) 5 MG tablet  busPIRone (BUSPAR) 15 MG tablet  FLUoxetine (PROZAC) 20 MG capsule  folic acid (FOLVITE) 1 MG tablet  furosemide (LASIX) 20 MG tablet  gabapentin (NEURONTIN) 100 MG capsule  lactulose (CHRONULAC) 10 GM/15ML solution  midodrine (PROAMATINE) 5 MG tablet  omeprazole (PRILOSEC) 20 MG DR capsule  oxyCODONE (ROXICODONE) 5 MG tablet  potassium chloride ER (K-DUR/KLOR-CON M) 10 MEQ CR tablet  rifaximin (XIFAXAN) 550 MG TABS tablet  SENNA-docusate sodium (SENNA S) 8.6-50 MG tablet  spironolactone (ALDACTONE) 50 MG tablet  traZODone (DESYREL) 50 MG tablet  ursodiol (ACTIGALL) 500 MG tablet      Allergies   Allergen Reactions     Albuterol      Tongue \"hardened and painful\"     Past medical history, past surgical history, medications, and allergies were reviewed with the patient. Additional pertinent items: None    Family History  Family History   Problem Relation Age of Onset     Hypertension Mother         80 years old     Heart Disease Paternal Grandmother      Heart Disease Paternal Grandfather      Cerebrovascular Disease Maternal Grandmother      Cerebrovascular Disease Maternal Grandfather      Alcohol/Drug Maternal Grandfather      Substance Abuse Maternal Grandfather      Heart Disease Father         Silent MI stents x 2     Diabetes Sister 17        older sister also had kidney and pancreas transplant     Heart Disease Sister         MI secondary to diabetes     Genitourinary Problems Sister 43        kidney transplant from " renal failure     Other - See Comments Sister         older     Other - See Comments Sister         younger     Diabetes Sister 9        youngest, juvenile type I (onset age 9 with no complications)     Cancer Paternal Aunt         ?kind     Family history was reviewed with the patient. Additional pertinent items: None    Social History  Social History     Tobacco Use     Smoking status: Current Every Day Smoker     Packs/day: 0.50     Years: 10.00     Pack years: 5.00     Types: Cigarettes     Smokeless tobacco: Never Used     Tobacco comment: pt quit 1992 after smoking for 8 yrs, started again in 2004   Substance Use Topics     Alcohol use: Not Currently     Alcohol/week: 0.0 standard drinks     Comment: Quit drinking 9/2019     Drug use: Yes     Types: Marijuana      Social history was reviewed with the patient. Additional pertinent items: None      Review of Systems   Constitutional: Negative for fever.   Respiratory: Positive for shortness of breath. Negative for cough.    Gastrointestinal: Positive for nausea and vomiting. Negative for abdominal pain.   Musculoskeletal:        Positive for left ankle pain.   Psychiatric/Behavioral: Positive for confusion.   All other systems reviewed and are negative.      Physical Exam   BP: 111/65  Pulse: 91  Heart Rate: 96  Temp: 98.1  F (36.7  C)  Resp: 16  Height: 152.4 cm (5')  Weight: 69.5 kg (153 lb 4.8 oz)  SpO2: 97 %  Physical Exam  Constitutional:       General: She is not in acute distress.     Appearance: She is not diaphoretic.   HENT:      Head: Normocephalic.      Mouth/Throat:      Pharynx: No oropharyngeal exudate.   Eyes:      Extraocular Movements: Extraocular movements intact.   Neck:      Musculoskeletal: Neck supple.   Cardiovascular:      Rate and Rhythm: Normal rate and regular rhythm.      Heart sounds: Normal heart sounds.   Pulmonary:      Effort: No respiratory distress.      Breath sounds: Normal breath sounds.   Abdominal:      General: There is  no distension.      Palpations: Abdomen is soft.      Tenderness: There is no abdominal tenderness.   Musculoskeletal:         General: Swelling and tenderness present. No deformity.      Comments: Mild diffuse swelling and tenderness of left ankle, left lateral midfoot tenderness   Skin:     General: Skin is warm and dry.      Coloration: Skin is jaundiced.   Neurological:      Mental Status: She is alert.      Comments: Alert, oriented.  Patient remembers tripping last week and denies other current complaints besides ankle pain.  Patient does appear mildly confused.  She is slow to answer and appears drowsy although her answers are appropriate   Psychiatric:         Behavior: Behavior normal.         ED Course      Procedures             EKG Interpretation:      Interpreted by Je Lozano DO  Time reviewed: 1400  Symptoms at time of EKG: Confusion, dyspnea  Rhythm: normal sinus   Rate: normal  Axis: normal  Ectopy: none  Conduction: normal  ST Segments/ T Waves: No ST-T wave changes  Q Waves: none  Comparison to prior: Unchanged    Clinical Impression: normal EKG    Results for orders placed or performed during the hospital encounter of 05/04/20   XR Chest Port 1 View     Status: None    Narrative    EXAM: XR CHEST PORT 1 VW  5/4/2020 11:40 AM      HISTORY: Confusion, liver failure, shortness of breath.    COMPARISON: Chest x-ray from 9/24/2017 and 9/22/2017    FINDINGS: Upright portable radiograph of the chest. Surgical clips in  the upper abdomen. There are basilar predominant increased  interstitial opacities. Small left pleural effusion. The trachea is  midline. No pneumothorax. No acute osseous or abdominal abnormality.      Impression    IMPRESSION: Increased basilar predominant interstitial opacities,  likely representing pulmonary edema or atypical infection.    Findings discussed with senior resident, Dr. Neto Alvarez.    I have personally reviewed the examination and initial  interpretation  and I agree with the findings.    TYRONE TILLEY MD   CT Head w/o Contrast     Status: None    Narrative    CT HEAD W/O CONTRAST 5/4/2020 11:45 AM    Provided History: Confusion, fall 1 week ago uncertain head trauma    Comparison: MRI brain 11/6/2017.    Technique: Using multidetector thin collimation helical acquisition  technique, axial, coronal and sagittal CT images from the skull base  to the vertex were obtained without intravenous contrast.     Findings:    No intracranial hemorrhage, mass effect, or midline shift. The  ventricles are proportionate to the cerebral sulci. The gray to white  matter differentiation of the cerebral hemispheres is preserved. The  basal cisterns are patent. Mild generalized cerebral and cerebellar  parenchymal volume loss.    The visualized paranasal sinuses are clear. The mastoid air cells are  clear.       Impression    Impression: No acute intracranial pathology. Mild diffuse cerebral  atrophy, not disproportionate to patient's age.    I have personally reviewed the examination and initial interpretation  and I agree with the findings.    ALOK SILVERIO MD   XR Ankle Port Left G/E 3 Views     Status: None    Narrative    Exam: 3 views of the left ankle dated 5/4/2020.    COMPARISON: None.    CLINICAL HISTORY: Ankle pain after fall one week ago.    FINDINGS: AP, oblique, and lateral views of the left ankle were  obtained. The bones are well aligned. The joint spaces are well  maintained. Marked soft tissue swelling over the lateral malleolus. No  displaced fractures.      Impression    IMPRESSION: Soft tissue swelling overlying the left ankle lateral  malleolus number otherwise no displaced fractures noted. Findings  presumed to represent ligamentous injury/an ankle sprain.    JUANJOSE VARMA MD   XR Foot Port Left 3 Views     Status: None    Narrative    Exam: 3 view of the left foot dated 5/4/2020.    COMPARISON: 4/28/2020.    CLINICAL HISTORY: Lateral  foot pain after prior fall.    FINDINGS: AP, oblique, and lateral views of the left foot were  obtained. The bones are well aligned. The joint spaces are  well-maintained. The Lisfranc joint appears congruent. Fracture  involving the base of the left fifth metatarsal, best seen on the  lateral view. There is also irregularity with possible fracture  involving the proximal aspect of the left second metatarsal, best seen  on the oblique view, located approximately 8-12 mm from the base.      Impression    IMPRESSION:  1. Fracture involving the base of the left fifth metatarsal.  2. Probable fracture involving the proximal aspect of the left second  metatarsal, located 8-12 mm distal from the base.  3. The Lisfranc joint is congruent.  4. Subtle lucency in the distal fibula, in retrospect also subtle on  the radiographs of the ankle from the same day, cannot exclude a  nondisplaced fracture.    JUANJOSE VARMA MD   CBC with platelets differential     Status: Abnormal   Result Value Ref Range    WBC 6.4 4.0 - 11.0 10e9/L    RBC Count 2.57 (L) 3.8 - 5.2 10e12/L    Hemoglobin 8.4 (L) 11.7 - 15.7 g/dL    Hematocrit 26.5 (L) 35.0 - 47.0 %     (H) 78 - 100 fl    MCH 32.7 26.5 - 33.0 pg    MCHC 31.7 31.5 - 36.5 g/dL    RDW 22.0 (H) 10.0 - 15.0 %    Platelet Count 58 (L) 150 - 450 10e9/L    Diff Method Automated Method     % Neutrophils 76.9 %    % Lymphocytes 10.9 %    % Monocytes 8.2 %    % Eosinophils 1.9 %    % Basophils 0.8 %    % Immature Granulocytes 1.3 %    Nucleated RBCs 0 0 /100    Absolute Neutrophil 4.9 1.6 - 8.3 10e9/L    Absolute Lymphocytes 0.7 (L) 0.8 - 5.3 10e9/L    Absolute Monocytes 0.5 0.0 - 1.3 10e9/L    Absolute Eosinophils 0.1 0.0 - 0.7 10e9/L    Absolute Basophils 0.1 0.0 - 0.2 10e9/L    Abs Immature Granulocytes 0.1 0 - 0.4 10e9/L    Absolute Nucleated RBC 0.0    Comprehensive metabolic panel     Status: Abnormal   Result Value Ref Range    Sodium 137 133 - 144 mmol/L    Potassium 3.7 3.4 - 5.3  mmol/L    Chloride 107 94 - 109 mmol/L    Carbon Dioxide 23 20 - 32 mmol/L    Anion Gap 8 3 - 14 mmol/L    Glucose 110 (H) 70 - 99 mg/dL    Urea Nitrogen 38 (H) 7 - 30 mg/dL    Creatinine 2.06 (H) 0.52 - 1.04 mg/dL    GFR Estimate 27 (L) >60 mL/min/[1.73_m2]    GFR Estimate If Black 31 (L) >60 mL/min/[1.73_m2]    Calcium 9.8 8.5 - 10.1 mg/dL    Bilirubin Total 13.9 (H) 0.2 - 1.3 mg/dL    Albumin 3.2 (L) 3.4 - 5.0 g/dL    Protein Total 5.3 (L) 6.8 - 8.8 g/dL    Alkaline Phosphatase 157 (H) 40 - 150 U/L    ALT 16 0 - 50 U/L    AST 36 0 - 45 U/L   Lipase     Status: Abnormal   Result Value Ref Range    Lipase 66 (L) 73 - 393 U/L   Ammonia     Status: Abnormal   Result Value Ref Range    Ammonia 59 (H) 10 - 50 umol/L   INR     Status: Abnormal   Result Value Ref Range    INR 1.68 (H) 0.86 - 1.14   Partial thromboplastin time     Status: None   Result Value Ref Range    PTT 37 22 - 37 sec   UA reflex to Microscopic and Culture     Status: Abnormal    Specimen: Urine clean catch   Result Value Ref Range    Color Urine Yellow     Appearance Urine Clear     Glucose Urine Negative NEG^Negative mg/dL    Bilirubin Urine Small (A) NEG^Negative    Ketones Urine Negative NEG^Negative mg/dL    Specific Gravity Urine 1.008 1.003 - 1.035    Blood Urine Negative NEG^Negative    pH Urine 6.0 5.0 - 7.0 pH    Protein Albumin Urine Negative NEG^Negative mg/dL    Urobilinogen mg/dL Normal 0.0 - 2.0 mg/dL    Nitrite Urine Negative NEG^Negative    Leukocyte Esterase Urine Negative NEG^Negative    Source Urine    COVID-19 Virus (Coronavirus) by PCR Nasopharyngeal     Status: None    Specimen: Nasopharyngeal   Result Value Ref Range    COVID-19 Virus PCR to [facility] - Source Nasopharyngeal     COVID-19 Virus PCR to [facility] - Result       Test received-See reflex to IDDL test SARS CoV2 (COVID-19) Virus RT-PCR   Nt probnp inpatient     Status: Abnormal   Result Value Ref Range    N-Terminal Pro BNP Inpatient 2,471 (H) 0 - 900 pg/mL   Troponin I      Status: None   Result Value Ref Range    Troponin I ES 0.043 0.000 - 0.045 ug/L   EKG 12-lead, tracing only     Status: None (Preliminary result)   Result Value Ref Range    Interpretation ECG Click View Image link to view waveform and result           Results for orders placed or performed during the hospital encounter of 05/04/20   XR Chest Port 1 View     Status: None    Narrative    EXAM: XR CHEST PORT 1 VW  5/4/2020 11:40 AM      HISTORY: Confusion, liver failure, shortness of breath.    COMPARISON: Chest x-ray from 9/24/2017 and 9/22/2017    FINDINGS: Upright portable radiograph of the chest. Surgical clips in  the upper abdomen. There are basilar predominant increased  interstitial opacities. Small left pleural effusion. The trachea is  midline. No pneumothorax. No acute osseous or abdominal abnormality.      Impression    IMPRESSION: Increased basilar predominant interstitial opacities,  likely representing pulmonary edema or atypical infection.    Findings discussed with senior resident, Dr. Neto Alvarez.    I have personally reviewed the examination and initial interpretation  and I agree with the findings.    TYRONE TILLEY MD   CT Head w/o Contrast     Status: None    Narrative    CT HEAD W/O CONTRAST 5/4/2020 11:45 AM    Provided History: Confusion, fall 1 week ago uncertain head trauma    Comparison: MRI brain 11/6/2017.    Technique: Using multidetector thin collimation helical acquisition  technique, axial, coronal and sagittal CT images from the skull base  to the vertex were obtained without intravenous contrast.     Findings:    No intracranial hemorrhage, mass effect, or midline shift. The  ventricles are proportionate to the cerebral sulci. The gray to white  matter differentiation of the cerebral hemispheres is preserved. The  basal cisterns are patent. Mild generalized cerebral and cerebellar  parenchymal volume loss.    The visualized paranasal sinuses are clear. The mastoid air  cells are  clear.       Impression    Impression: No acute intracranial pathology. Mild diffuse cerebral  atrophy, not disproportionate to patient's age.    I have personally reviewed the examination and initial interpretation  and I agree with the findings.    ALOK SILVERIO MD   XR Ankle Port Left G/E 3 Views     Status: None    Narrative    Exam: 3 views of the left ankle dated 5/4/2020.    COMPARISON: None.    CLINICAL HISTORY: Ankle pain after fall one week ago.    FINDINGS: AP, oblique, and lateral views of the left ankle were  obtained. The bones are well aligned. The joint spaces are well  maintained. Marked soft tissue swelling over the lateral malleolus. No  displaced fractures.      Impression    IMPRESSION: Soft tissue swelling overlying the left ankle lateral  malleolus number otherwise no displaced fractures noted. Findings  presumed to represent ligamentous injury/an ankle sprain.    JUANJOSE VARMA MD   XR Foot Port Left 3 Views     Status: None    Narrative    Exam: 3 view of the left foot dated 5/4/2020.    COMPARISON: 4/28/2020.    CLINICAL HISTORY: Lateral foot pain after prior fall.    FINDINGS: AP, oblique, and lateral views of the left foot were  obtained. The bones are well aligned. The joint spaces are  well-maintained. The Lisfranc joint appears congruent. Fracture  involving the base of the left fifth metatarsal, best seen on the  lateral view. There is also irregularity with possible fracture  involving the proximal aspect of the left second metatarsal, best seen  on the oblique view, located approximately 8-12 mm from the base.      Impression    IMPRESSION:  1. Fracture involving the base of the left fifth metatarsal.  2. Probable fracture involving the proximal aspect of the left second  metatarsal, located 8-12 mm distal from the base.  3. The Lisfranc joint is congruent.  4. Subtle lucency in the distal fibula, in retrospect also subtle on  the radiographs of the ankle from the  same day, cannot exclude a  nondisplaced fracture.    JUANJOSE VARMA MD   CBC with platelets differential     Status: Abnormal   Result Value Ref Range    WBC 6.4 4.0 - 11.0 10e9/L    RBC Count 2.57 (L) 3.8 - 5.2 10e12/L    Hemoglobin 8.4 (L) 11.7 - 15.7 g/dL    Hematocrit 26.5 (L) 35.0 - 47.0 %     (H) 78 - 100 fl    MCH 32.7 26.5 - 33.0 pg    MCHC 31.7 31.5 - 36.5 g/dL    RDW 22.0 (H) 10.0 - 15.0 %    Platelet Count 58 (L) 150 - 450 10e9/L    Diff Method Automated Method     % Neutrophils 76.9 %    % Lymphocytes 10.9 %    % Monocytes 8.2 %    % Eosinophils 1.9 %    % Basophils 0.8 %    % Immature Granulocytes 1.3 %    Nucleated RBCs 0 0 /100    Absolute Neutrophil 4.9 1.6 - 8.3 10e9/L    Absolute Lymphocytes 0.7 (L) 0.8 - 5.3 10e9/L    Absolute Monocytes 0.5 0.0 - 1.3 10e9/L    Absolute Eosinophils 0.1 0.0 - 0.7 10e9/L    Absolute Basophils 0.1 0.0 - 0.2 10e9/L    Abs Immature Granulocytes 0.1 0 - 0.4 10e9/L    Absolute Nucleated RBC 0.0    Comprehensive metabolic panel     Status: Abnormal   Result Value Ref Range    Sodium 137 133 - 144 mmol/L    Potassium 3.7 3.4 - 5.3 mmol/L    Chloride 107 94 - 109 mmol/L    Carbon Dioxide 23 20 - 32 mmol/L    Anion Gap 8 3 - 14 mmol/L    Glucose 110 (H) 70 - 99 mg/dL    Urea Nitrogen 38 (H) 7 - 30 mg/dL    Creatinine 2.06 (H) 0.52 - 1.04 mg/dL    GFR Estimate 27 (L) >60 mL/min/[1.73_m2]    GFR Estimate If Black 31 (L) >60 mL/min/[1.73_m2]    Calcium 9.8 8.5 - 10.1 mg/dL    Bilirubin Total 13.9 (H) 0.2 - 1.3 mg/dL    Albumin 3.2 (L) 3.4 - 5.0 g/dL    Protein Total 5.3 (L) 6.8 - 8.8 g/dL    Alkaline Phosphatase 157 (H) 40 - 150 U/L    ALT 16 0 - 50 U/L    AST 36 0 - 45 U/L   Lipase     Status: Abnormal   Result Value Ref Range    Lipase 66 (L) 73 - 393 U/L   Ammonia     Status: Abnormal   Result Value Ref Range    Ammonia 59 (H) 10 - 50 umol/L   INR     Status: Abnormal   Result Value Ref Range    INR 1.68 (H) 0.86 - 1.14   Partial thromboplastin time     Status: None    Result Value Ref Range    PTT 37 22 - 37 sec   UA reflex to Microscopic and Culture     Status: Abnormal    Specimen: Urine clean catch   Result Value Ref Range    Color Urine Yellow     Appearance Urine Clear     Glucose Urine Negative NEG^Negative mg/dL    Bilirubin Urine Small (A) NEG^Negative    Ketones Urine Negative NEG^Negative mg/dL    Specific Gravity Urine 1.008 1.003 - 1.035    Blood Urine Negative NEG^Negative    pH Urine 6.0 5.0 - 7.0 pH    Protein Albumin Urine Negative NEG^Negative mg/dL    Urobilinogen mg/dL Normal 0.0 - 2.0 mg/dL    Nitrite Urine Negative NEG^Negative    Leukocyte Esterase Urine Negative NEG^Negative    Source Urine    COVID-19 Virus (Coronavirus) by PCR Nasopharyngeal     Status: None    Specimen: Nasopharyngeal   Result Value Ref Range    COVID-19 Virus PCR to [facility] - Source Nasopharyngeal     COVID-19 Virus PCR to [facility] - Result       Test received-See reflex to IDDL test SARS CoV2 (COVID-19) Virus RT-PCR   Nt probnp inpatient     Status: Abnormal   Result Value Ref Range    N-Terminal Pro BNP Inpatient 2,471 (H) 0 - 900 pg/mL   Troponin I     Status: None   Result Value Ref Range    Troponin I ES 0.043 0.000 - 0.045 ug/L   EKG 12-lead, tracing only     Status: None (Preliminary result)   Result Value Ref Range    Interpretation ECG Click View Image link to view waveform and result      Medications   ondansetron (ZOFRAN) injection 4 mg (has no administration in time range)   furosemide (LASIX) injection 40 mg (40 mg Intravenous Given 5/4/20 1405)        Assessments & Plan (with Medical Decision Making)   54-year-old female presents to us with a chief complaint of confusion ankle pain.  Differential includes but not limited to ankle sprain, ankle fracture, foot fracture, hepatic encephalopathy, UTI, pneumonia, anemia, nausea and vomiting.  Patient's ankle shows a possible distal fibular fracture but is not definitive.  She does have 2 metatarsal fractures.  Discussed with  orthopedics who will see her on the floor and recommended a cam boot.  In terms of patient's confusion she is answering correctly but is quite drowsy and slow to respond.  Hemoglobin is 8.4 which is in her baseline range.  Creatinine is elevated 2.06 but also in her baseline range.  T bili is elevated at 13.9 which may be slightly above her baseline.  Other LFTs are otherwise normal.  Ammonia slightly elevated at 59.  Patient's significant other spoke on the phone did seem to think the patient had her lactulose last night.  CT scan of her head showed no evidence of bleeding or other abnormality.  UA was clear.  Chest x-ray showed possibly fluid overload.  Patient is afebrile not coughing, appears in no respiratory distress.  I have low suspicion for pneumonia.  COVID testing was also ordered given her baseline conditions and poor state of health.  This was negative.  Discussed with internal medicine who accepted admission.    I have reviewed the nursing notes. I have reviewed the findings, diagnosis, plan and need for follow up with the patient.    New Prescriptions    No medications on file       Final diagnoses:   Confusion   Closed nondisplaced fracture of metatarsal bone of left foot, unspecified metatarsal, initial encounter     I, Meena Garza, am serving as a trained medical scribe to document services personally performed by  Je Lozano DO, based on the provider's statements to me.      I, Je Lozano DO, was physically present and have reviewed and verified the accuracy of this note documented by Meena Garza.     --  Je Lozano DO  Emergency Medicine   [facility], [place], EMERGENCY DEPARTMENT  5/4/2020     Je Lozano DO  05/05/20 2490     no

## 2020-05-04 NOTE — ED NOTES
"[facility]   ED Nurse to Floor Handoff     Monalisa Olguin is a 54 year old female who speaks English and lives with a spouse,  in a home  They arrived in the ED by car from home    ED Chief Complaint: Ankle Pain; Nausea & Vomiting; and Altered Mental Status    ED Dx;   Final diagnoses:   Confusion   Closed nondisplaced fracture of metatarsal bone of left foot, unspecified metatarsal, initial encounter         Needed?: No    Allergies:   Allergies   Allergen Reactions     Albuterol      Tongue \"hardened and painful\"   .  Past Medical Hx:   Past Medical History:   Diagnosis Date     Acute alcoholic intoxication in alcoholism (H) 3/27/2018     Acute renal failure (H) 11/11/2019     Alcohol abuse 5/2/2013     Alcohol dependence 5/25/2013     Alcohol withdrawal 5/2/2013     Alcoholic cirrhosis (H)      Anxiety 10/10/2011     CKD (chronic kidney disease) stage 3, GFR 30-59 ml/min (H) 2006    last GFR was 42     CKD (chronic kidney disease) stage 3, GFR 30-59 ml/min (H) 2/22/2011     CKD (chronic kidney disease) stage 5, GFR less than 15 ml/min (H) 4/14/2020     Depressive disorder      Esophageal reflux 10/7/2002     Hematochezia-patient reported 11/11/2019     Heme positive stool 3/27/2018     Hepatic encephalopathy (H) 11/11/2019     History of blood transfusion     2020 [facility]     Hypertension goal BP (blood pressure) < 130/80 7/12/2011     Hyponatremia 11/11/2019     Moderate Depression [296.32] 12/22/2009    stable on wellbutrin     Other internal derangement of knee(717.89)     ACL Internal derangement, knee/ original injury in 5th grade, torn cartilage     Pap smear 2011    no abnormals, due for paps q 2-3 yrs     SBP (spontaneous bacterial peritonitis) (H) 11/12/2019     Thyroid disease      Unspecified essential hypertension       Baseline Mental status: WDL  Current Mental Status changes: other lethargic, confused    Infection present or suspected this encounter: " no  Sepsis suspected: No  Isolation type: Special Precautions-COVID-19     Activity level - Baseline/Home:  Independent and Walker  Activity Level - Current:   Stand with Assist, Walker and CAM boot    Bariatric equipment needed?: No    In the ED these meds were given:   Medications   ondansetron (ZOFRAN) injection 4 mg (has no administration in time range)   furosemide (LASIX) injection 40 mg (has no administration in time range)       Drips running?  No    Home pump  No    Current LDAs  Peripheral IV 04/21/20 Left Lower forearm (Active)   Number of days: 13       Peripheral IV 05/04/20 Right Lower forearm (Active)   Site Assessment WDL 05/04/20 1110   Line Status Saline locked 05/04/20 1110   Phlebitis Scale 0-->no symptoms 05/04/20 1110   Infiltration Scale 0 05/04/20 1110   Number of days: 0       Labs results:   Labs Ordered and Resulted from Time of ED Arrival Up to the Time of Departure from the ED   CBC WITH PLATELETS DIFFERENTIAL - Abnormal; Notable for the following components:       Result Value    RBC Count 2.57 (*)     Hemoglobin 8.4 (*)     Hematocrit 26.5 (*)      (*)     RDW 22.0 (*)     Platelet Count 58 (*)     Absolute Lymphocytes 0.7 (*)     All other components within normal limits   COMPREHENSIVE METABOLIC PANEL - Abnormal; Notable for the following components:    Glucose 110 (*)     Urea Nitrogen 38 (*)     Creatinine 2.06 (*)     GFR Estimate 27 (*)     GFR Estimate If Black 31 (*)     Bilirubin Total 13.9 (*)     Albumin 3.2 (*)     Protein Total 5.3 (*)     Alkaline Phosphatase 157 (*)     All other components within normal limits   LIPASE - Abnormal; Notable for the following components:    Lipase 66 (*)     All other components within normal limits   AMMONIA - Abnormal; Notable for the following components:    Ammonia 59 (*)     All other components within normal limits   INR - Abnormal; Notable for the following components:    INR 1.68 (*)     All other components within normal  limits   UA MACROSCOPIC WITH REFLEX TO MICRO AND CULTURE - Abnormal; Notable for the following components:    Bilirubin Urine Small (*)     All other components within normal limits   PARTIAL THROMBOPLASTIN TIME   COVID-19 VIRUS (CORONAVIRUS) BY PCR   SARS-COV-2 (COVID-19) VIRUS RT-PCR   TROPONIN I   NT PROBNP INPATIENT   PERIPHERAL IV CATHETER       Imaging Studies:   Recent Results (from the past 24 hour(s))   XR Ankle Port Left G/E 3 Views    Narrative    Exam: 3 views of the left ankle dated 5/4/2020.    COMPARISON: None.    CLINICAL HISTORY: Ankle pain after fall one week ago.    FINDINGS: AP, oblique, and lateral views of the left ankle were  obtained. The bones are well aligned. The joint spaces are well  maintained. Marked soft tissue swelling over the lateral malleolus. No  displaced fractures.      Impression    IMPRESSION: Soft tissue swelling overlying the left ankle lateral  malleolus number otherwise no displaced fractures noted. Findings  presumed to represent ligamentous injury/an ankle sprain.    JUANJOSE VARMA MD   XR Foot Port Left 3 Views    Narrative    Exam: 3 view of the left foot dated 5/4/2020.    COMPARISON: 4/28/2020.    CLINICAL HISTORY: Lateral foot pain after prior fall.    FINDINGS: AP, oblique, and lateral views of the left foot were  obtained. The bones are well aligned. The joint spaces are  well-maintained. The Lisfranc joint appears congruent. Fracture  involving the base of the left fifth metatarsal, best seen on the  lateral view. There is also irregularity with possible fracture  involving the proximal aspect of the left second metatarsal, best seen  on the oblique view, located approximately 8-12 mm from the base.      Impression    IMPRESSION:  1. Fracture involving the base of the left fifth metatarsal.  2. Probable fracture involving the proximal aspect of the left second  metatarsal, located 8-12 mm distal from the base.  3. The Lisfranc joint is congruent.  4. Subtle  lucency in the distal fibula, in retrospect also subtle on  the radiographs of the ankle from the same day, cannot exclude a  nondisplaced fracture.    JUANJOSE VARMA MD   XR Chest Port 1 View    Narrative    EXAM: XR CHEST PORT 1 VW  5/4/2020 11:40 AM      HISTORY: Confusion, liver failure, shortness of breath.    COMPARISON: Chest x-ray from 9/24/2017 and 9/22/2017    FINDINGS: Upright portable radiograph of the chest. Surgical clips in  the upper abdomen. There are basilar predominant increased  interstitial opacities. Small left pleural effusion. The trachea is  midline. No pneumothorax. No acute osseous or abdominal abnormality.      Impression    IMPRESSION: Increased basilar predominant interstitial opacities,  likely representing pulmonary edema or atypical infection.    Findings discussed with senior resident, Dr. Neto Alvarez.    I have personally reviewed the examination and initial interpretation  and I agree with the findings.    TYRONE TILLEY MD   CT Head w/o Contrast    Narrative    CT HEAD W/O CONTRAST 5/4/2020 11:45 AM    Provided History: Confusion, fall 1 week ago uncertain head trauma    Comparison: MRI brain 11/6/2017.    Technique: Using multidetector thin collimation helical acquisition  technique, axial, coronal and sagittal CT images from the skull base  to the vertex were obtained without intravenous contrast.     Findings:    No intracranial hemorrhage, mass effect, or midline shift. The  ventricles are proportionate to the cerebral sulci. The gray to white  matter differentiation of the cerebral hemispheres is preserved. The  basal cisterns are patent. Mild generalized cerebral and cerebellar  parenchymal volume loss.    The visualized paranasal sinuses are clear. The mastoid air cells are  clear.       Impression    Impression: No acute intracranial pathology. Mild diffuse cerebral  atrophy, not disproportionate to patient's age.    I have personally reviewed the examination and  initial interpretation  and I agree with the findings.    ALOK SILVERIO MD       Recent vital signs:   /61   Pulse 90   Temp 98.1  F (36.7  C) (Oral)   Resp 16   Ht 1.524 m (5')   Wt 69.5 kg (153 lb 4.8 oz)   LMP 05/16/2012   SpO2 96%   BMI 29.94 kg/m      Anay Coma Scale Score: 14 (05/04/20 1045)       Cardiac Rhythm: Other  Pt needs tele? No  Skin/wound Issues: None    Code Status: Full Code    Pain control: fair    Nausea control: pt had none    Abnormal labs/tests/findings requiring intervention: xray showed left 5th metatarsal fracture, probable 2nd metatarsal fracture and ankle sprain--CAM boot applied. CXR showed pulmonary edema--IV Lasix given    Family present during ED course? No   Family Comments/Social Situation comments:     Tasks needing completion: None    Joseline Blanco, RN  [phone] [facility] ED

## 2020-05-04 NOTE — H&P
[facility], [place]    [facility] and [facility]        Date of Admission:  5/4/2020    Assessment & Plan   Monalisa Olguin is a 54 year old female admitted on 5/4/2020. 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    Acute metabolic encephalopathy 2/2 Hepatic encephalopathy  Hyperbilirubinemia  Decompensated EtOH Cirrhosis c/b ascites, recurrent HE, SBP  Presenting with N/V and increased confusion over past several days. Recent admission from 4/21-24 with similar presentation, thought to be secondary to medication non-adherence. Infectious w/u unrevealing at that time, no ascites to tap for diagnostic para. On this presentation she does not have fevers or chills. No leukocytosis. Bili up from discharge, other LFTs around baseline. Ammonia increased at 57, but lower than on previous admission. No abdominal pain. UA bland. Hgb stable from discharge. No other metabolic derangements. Did have witness mechanical fall last week, head CT to r/u SDH was negative for intracranial pathology. Pt states she has only been taking one dose of lactulose a day and has not had a BM in 3 days (unable to corroborate with ). Most likely etiology of HE is lactulose deficiency.  - Will assess for possible diagnostic para with ultrasound  - Lactulose scheduled TID and PRN, [place] Protocol  - Rifaximin BID  - After diagnostic para will start   - Blood culture x1  - No UCx as UA is bland    MELD-Na score: 29 at 5/4/2020 10:38 AM  MELD score: 29 at 5/4/2020 10:38 AM  Calculated from:  Serum Creatinine: 2.06 mg/dL at 5/4/2020 10:38 AM  Serum Sodium: 137 mmol/L at 5/4/2020 10:38 AM  Total Bilirubin: 13.9 mg/dL at 5/4/2020  10:38 AM  INR(ratio): 1.68 at 5/4/2020 10:38 AM  Age: 54 years 6 months    Pulmonary infiltrate, likely pulmonary edema vs infection  Denies cough, SOB. BNP checked and elevated at 2471. Given lasix 40mg IV in ED. No known history of CHF, no prior TTE in chart. COVID PCR checked (low pretest probability), and currently pending.  - Add on procal, will treat for CAP if high  - COVID pending (low pre-test probability)  - Redose lasix prn    Mechanical Fall  Fracture of L 5th metatarsal  Probable fracture of proximal L 2nd metatarsal  Had witnessed mechanical fall last week. No head trauma per pt. Head CT on admission without acute intracranial findings. Foot XR with fracture of 5th metatarsal and probable fracture of 2nd metatarsal. ED discussed with ortho who recommended Cam boot and will see on the floor.  - Cam boot  - Ortho consult    ALFREDO on CKD  Cr 2.06, up from 1.62 on 4/27. Likely prerenal in the setting of poor PO intake and N/V over past several days, however does have increased pulmonary edema vs infection on CXR. S/p 40mg IV lasix in ED.   - Monitor fluid status  - If not improving on repeat BMP, will give albumin challenge    Thrombocytopenia  Secondary to underlying liver disease, stable/improved from discharge    Chronic macrocytic anemia  Stable from discharge. No evidence of bleeding.      ----- Prior Medical Problems----  # Anxiety and depression: cont PTA fluoxetine, aripiprazole, buspirone  # Hypercholesterolemia: cont ursadiol  #Alcohol dependence (sober since 9/2019): hold acamprosate in setting of ALFREDO  #Current smoker: nicotine patch  #GERD: hold PTA omeprazole       Diet: Regular  Fluids: PO. Will bolus albumin PRN  DVT Prophylaxis: Pneumatic Compression Devices  Gonzalez Catheter: not present  Code Status: FULL    Disposition Plan   Expected discharge: 2 - 3 days, recommended to prior living arrangement once mental status at baseline.  Entered: Mary Worthington MD 05/04/2020, 2:03 PM       The  patient's care was discussed with the Attending Physician, Dr. Munoz, Patient and Patient's Family.    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    Pt recently admitted from 4/21-24 with hepatic encephalopathy. Had infectious w/u and ultrasound to assess for PVT, which were negative. No ascites to tap. Had vomiting with streaks of blood. GI consulted and did not think pt needed EGD at that time, did receive 2U PRBC. Concern that pt not taking adequate lactulose to prevent hepatic encephalopathy. Per report from , pt initially doing well after discharge, but with increasing confusion over past several days. Last dose of lactulose 5/3 PM (per ). Per pt, has only been taking lactulose once daily and has not had BM for 3 days. Nausea and vomiting over past several days with minimal PO intake. Denies abdominal pain, but feels abd is more distended. Denies fever or chills. Initially endorsed cough, but then later denied. No sore throat. Says her daughter has been sick recently with cough. Had a mechanical fall last week, witness by . No head trauma.    In ED - pt hemodynamically stable, afebrile. Labs significant for hyperbilirubinemia up from discharge on 4/24. Other labs stable from discharge. CXR with ?increased  perihilar infiltrates - given 40mg IV lasix.     Review of Systems    The 10 point Review of Systems is negative other than noted in the HPI or here.    Past Medical History    I have reviewed this patient's medical history and updated it with pertinent information if needed.   Past Medical History:   Diagnosis Date     Acute alcoholic intoxication in alcoholism (H) 3/27/2018     Acute renal failure (H) 2019     Alcohol abuse 2013     Alcohol dependence 2013     Alcohol withdrawal 2013     Alcoholic cirrhosis (H)      Anxiety 10/10/2011     CKD (chronic kidney disease) stage 3, GFR 30-59 ml/min (H)     last GFR was 42     CKD (chronic kidney disease) stage 3, GFR 30-59 ml/min (H) 2011     CKD (chronic kidney disease) stage 5, GFR less than 15 ml/min (H) 2020     Depressive disorder      Esophageal reflux 10/7/2002     Hematochezia-patient reported 2019     Heme positive stool 3/27/2018     Hepatic encephalopathy (H) 2019     History of blood transfusion      [facility]     Hypertension goal BP (blood pressure) < 130/80 2011     Hyponatremia 2019     Moderate Depression [296.32] 2009    stable on wellbutrin     Other internal derangement of knee(717.89)     ACL Internal derangement, knee/ original injury in 5th grade, torn cartilage     Pap smear     no abnormals, due for paps q 2-3 yrs     SBP (spontaneous bacterial peritonitis) (H) 2019     Thyroid disease      Unspecified essential hypertension         Past Surgical History   I have reviewed this patient's surgical history and updated it with pertinent information if needed.  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     COLONOSCOPY N/A 2017    Procedure: COLONOSCOPY;  Colonoscopy;  Surgeon: Sai Johnston MD;  Location: [facility]     ESOPHAGOSCOPY, GASTROSCOPY,  DUODENOSCOPY (EGD), COMBINED N/A 12/27/2017    Procedure: COMBINED ESOPHAGOSCOPY, GASTROSCOPY, DUODENOSCOPY (EGD), BIOPSY SINGLE OR MULTIPLE;  ESOPHAGOSCOPY, GASTROSCOPY, DUODENOSCOPY (EGD) with biopsies;  Surgeon: Sai Johnston MD;  Location: [facility]     ESOPHAGOSCOPY, GASTROSCOPY, DUODENOSCOPY (EGD), COMBINED N/A 4/29/2019    Procedure: ESOPHAGOGASTRODUODENOSCOPY, WITH BIOPSY;  Surgeon: Edgardo Scott DO;  Location: [facility]     HC KNEE SCOPE, DIAGNOSTIC  11/14/01    Arthroscopy, Lt Knee     LAPAROSCOPIC CHOLECYSTECTOMY N/A 9/24/2017    Procedure: LAPAROSCOPIC CHOLECYSTECTOMY;  laparoscopic cholecystectomy;  Surgeon: Romeo Pastor MD;  Location: [facility]     OPEN REDUCTION INTERNAL FIXATION WRIST Right 11/29/2017    Procedure: OPEN REDUCTION INTERNAL FIXATION WRIST;  OPEN REDUCTION INTERNAL FIXATION RIGHT WRIST;  Surgeon: Edgardo Almendarez MD;  Location: [facility]     TRANSPLANT      Donated Left Kidney in 2000        Social History   I have reviewed this patient's social history and updated it with pertinent information if needed. Monalisa Olguin  reports that she has been smoking cigarettes. She has a 5.00 pack-year smoking history. She has never used smokeless tobacco. She reports previous alcohol use. She reports current drug use. Drug: Marijuana.    Family History   I have reviewed this patient's family history and updated it with pertinent information if needed.   Family History   Problem Relation Age of Onset     Hypertension Mother         80 years old     Heart Disease Paternal Grandmother      Heart Disease Paternal Grandfather      Cerebrovascular Disease Maternal Grandmother      Cerebrovascular Disease Maternal Grandfather      Alcohol/Drug Maternal Grandfather      Substance Abuse Maternal Grandfather      Heart Disease Father         Silent MI stents x 2     Diabetes Sister 17        older sister also had kidney and pancreas transplant     Heart Disease Sister         MI  secondary to diabetes     Genitourinary Problems Sister 43        kidney transplant from renal failure     Other - See Comments Sister         older     Other - See Comments Sister         younger     Diabetes Sister 9        youngest, juvenile type I (onset age 9 with no complications)     Cancer Paternal Aunt         ?kind       Prior to Admission Medications   Prior to Admission Medications   Prescriptions Last Dose Informant Patient Reported? Taking?   ARIPiprazole (ABILIFY) 5 MG tablet   No No   Sig: Take 1 tablet (5 mg) by mouth At Bedtime   FLUoxetine (PROZAC) 20 MG capsule   No No   Sig: TAKE THREE CAPSULES BY MOUTH ONCE DAILY   MEDICATION INSTRUCTION   Yes No   SENNA-docusate sodium (SENNA S) 8.6-50 MG tablet   No No   Sig: Take 1 tablet by mouth 2 times daily   acamprosate (CAMPRAL) 333 MG EC tablet   No No   Sig: TAKE TWO TABLETS BY MOUTH THREE TIMES A DAY   alendronate (FOSAMAX) 35 MG tablet   Yes No   Sig: Take 35 mg by mouth   busPIRone (BUSPAR) 15 MG tablet   No No   Sig: Take 1 tablet (15 mg) by mouth 2 times daily   folic acid (FOLVITE) 1 MG tablet   No No   Sig: Take 1 tablet (1 mg) by mouth daily   furosemide (LASIX) 40 MG tablet   No No   Sig: Take 0.5 tablets (20 mg) by mouth daily   gabapentin (NEURONTIN) 100 MG capsule   No No   Sig: Take 3 capsules (300 mg) by mouth At Bedtime   lactulose (CHRONULAC) 10 GM/15ML solution 5/3/2020 at Unknown time  No Yes   Sig: Take 22.5 mLs (15 g) by mouth 3 times daily as needed for constipation Start with 15 ml daily, titrate as needed for 2-3 BM's daily.   midodrine (PROAMATINE) 5 MG tablet   No No   Sig: Take 1 tablet (5 mg) by mouth 3 times daily (with meals)   omeprazole (PRILOSEC) 20 MG DR capsule   No No   Sig: Take 1 capsule (20 mg) by mouth daily   ondansetron (ZOFRAN) 4 MG tablet   No No   Sig: TAKE ONE TABLET BY MOUTH EVERY 6 HOURS AS NEEDED FOR NAUSEA   oxyCODONE (ROXICODONE) 5 MG tablet   No No   Sig: Take 1 tablet (5 mg) by mouth 2 times daily  "as needed for pain   potassium chloride ER (K-DUR/KLOR-CON M) 10 MEQ CR tablet   No No   Sig: Take 1 tablet (10 mEq) by mouth daily   rifaximin (XIFAXAN) 550 MG TABS tablet   No No   Sig: Take 1 tablet (550 mg) by mouth 2 times daily   spironolactone (ALDACTONE) 50 MG tablet   No No   Sig: Take 1 tablet (50 mg) by mouth daily   traZODone (DESYREL) 50 MG tablet   No No   Sig: Take 2 tablets (100 mg) by mouth nightly as needed for sleep   ursodiol (ACTIGALL) 500 MG tablet   No No   Sig: Take 1 tablet (500 mg) by mouth 2 times daily   vitamin B1 (VITAMIN B-1) 100 MG tablet   No No   Sig: Take 1 tablet (100 mg) by mouth daily      Facility-Administered Medications: None     Allergies   Allergies   Allergen Reactions     Albuterol      Tongue \"hardened and painful\"       Physical Exam   Vital Signs: Temp: 98.1  F (36.7  C) Temp src: Oral BP: 116/61 Pulse: 90 Heart Rate: 96 Resp: 16 SpO2: 95 % O2 Device: None (Room air)    Weight: 153 lbs 4.8 oz    General Appearance: No acute distress. Awake and alert.  Eyes: Sclera icteric. PERRLA  HEENT: AT/NC. Oropharynx clear  Respiratory: CTAB. Breathing comfortably on RA  Cardiovascular: RRR. No m/r/g  GI: BS+. Abd distended. Nontender to palpation.  Skin: Jaundiced. No rash. Scattered small ecchymoses.  Musculoskeletal: Normal bulk and tone. Trace LE edema. L foot in CAM boot.  Neurologic: AOx2.5 (self, place, May 2021). Neuro nonfocal. Mild asterixis.   Psychiatric: Slightly confused. Normal affect. Pleasant.    Data   Data reviewed today: I reviewed all medications, new labs and imaging results over the last 24 hours. I personally reviewed no images or EKG's today.    Recent Labs   Lab 05/04/20  1038 04/27/20  1633   WBC 6.4 6.2   HGB 8.4* 8.9*   * 105*   PLT 58* 44*   INR 1.68*  --     141   POTASSIUM 3.7 3.4   CHLORIDE 107 113*   CO2 23 21   BUN 38* 26   CR 2.06* 1.62*   ANIONGAP 8 7   ELSIE 9.8 8.1*   * 119*   ALBUMIN 3.2*  --    PROTTOTAL 5.3*  --  "   BILITOTAL 13.9*  --    ALKPHOS 157*  --    ALT 16  --    AST 36  --    LIPASE 66*  --      Recent Results (from the past 24 hour(s))   XR Ankle Port Left G/E 3 Views    Narrative    Exam: 3 views of the left ankle dated 5/4/2020.    COMPARISON: None.    CLINICAL HISTORY: Ankle pain after fall one week ago.    FINDINGS: AP, oblique, and lateral views of the left ankle were  obtained. The bones are well aligned. The joint spaces are well  maintained. Marked soft tissue swelling over the lateral malleolus. No  displaced fractures.      Impression    IMPRESSION: Soft tissue swelling overlying the left ankle lateral  malleolus number otherwise no displaced fractures noted. Findings  presumed to represent ligamentous injury/an ankle sprain.    JUANJOSE VARMA MD   XR Foot Port Left 3 Views    Narrative    Exam: 3 view of the left foot dated 5/4/2020.    COMPARISON: 4/28/2020.    CLINICAL HISTORY: Lateral foot pain after prior fall.    FINDINGS: AP, oblique, and lateral views of the left foot were  obtained. The bones are well aligned. The joint spaces are  well-maintained. The Lisfranc joint appears congruent. Fracture  involving the base of the left fifth metatarsal, best seen on the  lateral view. There is also irregularity with possible fracture  involving the proximal aspect of the left second metatarsal, best seen  on the oblique view, located approximately 8-12 mm from the base.      Impression    IMPRESSION:  1. Fracture involving the base of the left fifth metatarsal.  2. Probable fracture involving the proximal aspect of the left second  metatarsal, located 8-12 mm distal from the base.  3. The Lisfranc joint is congruent.  4. Subtle lucency in the distal fibula, in retrospect also subtle on  the radiographs of the ankle from the same day, cannot exclude a  nondisplaced fracture.    JUANJOSE VARMA MD   XR Chest Port 1 View    Narrative    EXAM: XR CHEST PORT 1 VW  5/4/2020 11:40 AM      HISTORY: Confusion,  liver failure, shortness of breath.    COMPARISON: Chest x-ray from 9/24/2017 and 9/22/2017    FINDINGS: Upright portable radiograph of the chest. Surgical clips in  the upper abdomen. There are basilar predominant increased  interstitial opacities. Small left pleural effusion. The trachea is  midline. No pneumothorax. No acute osseous or abdominal abnormality.      Impression    IMPRESSION: Increased basilar predominant interstitial opacities,  likely representing pulmonary edema or atypical infection.    Findings discussed with senior resident, Dr. Neto Alvarez.    I have personally reviewed the examination and initial interpretation  and I agree with the findings.    TYRONE TILLEY MD   CT Head w/o Contrast    Narrative    CT HEAD W/O CONTRAST 5/4/2020 11:45 AM    Provided History: Confusion, fall 1 week ago uncertain head trauma    Comparison: MRI brain 11/6/2017.    Technique: Using multidetector thin collimation helical acquisition  technique, axial, coronal and sagittal CT images from the skull base  to the vertex were obtained without intravenous contrast.     Findings:    No intracranial hemorrhage, mass effect, or midline shift. The  ventricles are proportionate to the cerebral sulci. The gray to white  matter differentiation of the cerebral hemispheres is preserved. The  basal cisterns are patent. Mild generalized cerebral and cerebellar  parenchymal volume loss.    The visualized paranasal sinuses are clear. The mastoid air cells are  clear.       Impression    Impression: No acute intracranial pathology. Mild diffuse cerebral  atrophy, not disproportionate to patient's age.    I have personally reviewed the examination and initial interpretation  and I agree with the findings.    ALOK SILVERIO MD     Internal Medicine Staff Attestation  Date of Service:5/4/2020  I have seen and examined Monalisa Olguin, reviewed the data and discussed the plan of care with the care team on rounds.  I agree  with the above documentation with the additions/changes to the ROS, HPI, Exam or data (including my edits in italics):    I discussed pt's care with bedside RN, case management/social work today.  I personally reviewed, labs, medications and past 24 hr notes.    Assessment/Plan/Diagnoses: plan/dx as below, which contains my edits and reflects our joint medical decision-making.     Adán Munoz MD PhD  Internal Medicine Hospitalist & Staff Physician   of Internal Medicine   [facility]  Pager: [phone]

## 2020-05-04 NOTE — PHARMACY-ADMISSION MEDICATION HISTORY
Admission medication history interview status for the 5/4/2020 admission is complete. See Epic admission navigator for allergy information, pharmacy, prior to admission medications and immunization status.     Medication history interview sources:  patient, [place], chart review    Outpatient pharmacy: [facility] in [place] ([phone])    Changes made to PTA medication list (reason)  Added: N/A  Deleted:   - Acetaminophen 325 mg PO every 8 hours PRN mild pain (old prescription, recent provider note from 4/28 states acetaminophen is not appropriate for patient)  - Vitamin B1 100 mg PO daily (old prescription, no recent fills)  - Ondansetron 4 mg every 6 hours PRN nausea (old prescription, patient states she's not taking)  Changed:   - Furosemide 40 mg 0.5 tablet daily --> 20 mg 1 tablet daily (recent fill for 20 mg tablets)    Additional medication history information (including reliability of information, actions taken by pharmacist):  - Attempted to interview patient for medication history though due to patient's confused state she was unable to provide much information. She stated she took all her medications yesterday, but when asked further about each medication, she had difficulty confirming whether or not she takes them.  - Acamprosate: Patient stated she is no longer taking this medication.  Recent fill 3/12/20 for 90 day supply and was continued at recent discharge 4/24/20. Left on list since patient likely unreliable given current state.  - Rifaximin: Patient states she is still taking, though last fill was in November, 2019 for 30 day supply.  - Please confirm medication list with patient once she is cognitively stable again.    [place] :  04/28/2020 Oxycodone Hcl 5 Mg Tablet #12 6 day supply  03/31/2020 Gabapentin 100 Mg Capsule #270 90 day supply    Prior to Admission medications    Medication Sig Last Dose Taking? Auth Provider   acamprosate (CAMPRAL) 333 MG EC tablet TAKE TWO TABLETS BY  MOUTH THREE TIMES A DAY   Efren Bustos MD   ARIPiprazole (ABILIFY) 5 MG tablet Take 1 tablet (5 mg) by mouth At Bedtime   Efren Bustos MD   busPIRone (BUSPAR) 15 MG tablet Take 1 tablet (15 mg) by mouth 2 times daily   Efren Bustos MD   FLUoxetine (PROZAC) 20 MG capsule TAKE THREE CAPSULES BY MOUTH ONCE DAILY   Efren Bustos MD   folic acid (FOLVITE) 1 MG tablet Take 1 tablet (1 mg) by mouth daily   Efren Bustos MD   furosemide (LASIX) 20 MG tablet Take 20 mg by mouth daily   Unknown, Entered By History   gabapentin (NEURONTIN) 100 MG capsule Take 3 capsules (300 mg) by mouth At Bedtime   Efren Bustos MD   lactulose (CHRONULAC) 10 GM/15ML solution Take 22.5 mLs (15 g) by mouth 3 times daily as needed for constipation Start with 15 ml daily, titrate as needed for 2-3 BM's daily.   Edgardo Kovacs MD   midodrine (PROAMATINE) 5 MG tablet Take 1 tablet (5 mg) by mouth 3 times daily (with meals)   Efren Bustos MD   omeprazole (PRILOSEC) 20 MG DR capsule Take 1 capsule (20 mg) by mouth daily   Efren Bustos MD   oxyCODONE (ROXICODONE) 5 MG tablet Take 1 tablet (5 mg) by mouth 2 times daily as needed for pain   Bryant Colorado MD   potassium chloride ER (K-DUR/KLOR-CON M) 10 MEQ CR tablet Take 1 tablet (10 mEq) by mouth daily   Efren Bustos MD   rifaximin (XIFAXAN) 550 MG TABS tablet Take 1 tablet (550 mg) by mouth 2 times daily   Shanta Pineda MD   SENNA-docusate sodium (SENNA S) 8.6-50 MG tablet Take 1 tablet by mouth 2 times daily  Patient taking differently: Take 1 tablet by mouth daily    Efren Bustos MD   spironolactone (ALDACTONE) 50 MG tablet Take 1 tablet (50 mg) by mouth daily   Edgardo Kovacs MD   traZODone (DESYREL) 50 MG tablet Take 2 tablets (100 mg) by mouth nightly as needed for sleep   Shanta Pineda MD   ursodiol (ACTIGALL) 500 MG tablet Take 1 tablet (500 mg) by mouth 2 times daily   Efren Bustos MD         Medication history completed  by:   Julieta Trinh, PharmD  PGY1 Pharmacy Practice Resident in Behavioral Health

## 2020-05-04 NOTE — ED TRIAGE NOTES
Pt brought in by car for confusion that started Friday and got worse over the weekend. Pt unsteady on feet per  and tripped in parking lot/sprained left ankle last week. Pt says she has been throwing up and not keeping much down over the past two days.

## 2020-05-04 NOTE — TELEPHONE ENCOUNTER
She has liver failure, she's confused, loss of balance, amonia level is high. Went to [facility] and they couldn't care for her so they took her by ambulance to the [facility].  wants to drive her down to the [facility] ER rather than doing the same thing with an ambulance. I did explain that they won't let him in with her because of the Covid19 but he can bring her to the ER today, it would be appropriate.  Eli Covarrubias RN  [facility]       Reason for Disposition    Difficult to awaken or acting confused (disoriented, slurred speech) and new onset    Additional Information    Negative: Difficult to awaken or acting confused (disoriented, slurred speech) and has diabetes    Protocols used: CONFUSION - DELIRIUM-A-OH

## 2020-05-04 NOTE — TELEPHONE ENCOUNTER
Heydi admitted on RN intake call day. Inpatient team notified.    PCP:   Dr. Bustos, seen 4/24/20     Recent hospitalization:4/21/20 HE,ALFREDO, Hematemesis-nonvariceal. ED today, 5/4/20    54 year old with a history of alcoholic cirrhosis followed by Dr. Kovacs since 11/28/17.    MELD 29 from 5/4/20    Creat 2.06, Na 137, T Bili 13.9, INR 1.68    Ascites - yes  Taps - no  HE - yes  EGD - 4/2019, no varices  Colonoscopy - 2017 re schedule with [facility] [place] [place]     Social History  Lives with   Working:disability  ADL's:independent     PMH/Surgical History: nephrectomy-donor   See updated history    Nicotine:  Current everyday smoker         Alcohol/non prescribed drugs: 9/2019    Plan: Per  assessment will get substance use assessment and neuropsych testing.      Contacted patient and introduced myself as their Transplant Coordinator, also introduced the role of the Transplant Coordinator in the transplant process.  Explained the purpose of this call including reviewing next steps and answering questions.  Confirmed Referring Provider, Dialysis Center, and Primary Care Physician. Notified patient of the importance of continued communication with referring providers and primary care physicians.  Reviewed components of transplant evaluation process including necessary appointments, tests, and procedures.    Answered questions for patient regarding evaluation, provided my name and contact information and requested they call with any additional questions.  Notified patient that the schedule will be mailed.

## 2020-05-05 NOTE — CONSULTS
Hepatology Consultation  Monalisa Olguin [id number] 54 year old 1965  5/4/2020        Date of Admission: 5/4/2020  Reason for consult: decompensated EtOH cirrhosis   Requesting physician: Dr. Munoz, Adán Almonte,   Attending hepatologist:  Dr. Suero       Reason for Consultation:   h/o decompensated EtOH cirrhosis (ascites, HE, SBP), MELDs ~30. Admitted for recurrent HE. Question regarding transplant candidacy.          Assessment and Plan:   Monalisa Olguin is a 54 year old female with decompensated cirrhosis 2/2 alcohol (abstinent since 9/2019) complicated by ascites, anemia, CKD s/p nephrectomy (donor 2000), orthostatic hypotension on midodrine, tobacco use, depression and anxiety who presents with increased nausea, vomiting, and confusion.     1. Alcohol cirrhosis  - MELD 30, ABO O  - has not been evaluated for liver transplant in the past. She was advised to undergo CD evaluation and neuropsych testing during last hospitalization. Will discuss at liver transplant conference 5/5/20. Will order additional labs for transplant in AM.   - US without evidence of HCC  - recent PETH 4/22 negative.   - recent BNP elevated at 2471. She does not appear significantly fluid overloaded. With potential transplant evaluation and +murmur, would consider echocardiogram.    2. Hepatic encephalopathy  - continues with asterixis. She has had BM since admission. Per reports, she has not been taking lactulose at home and decreased BM's.   - Infectious work up so far negative. Recommend diagnostic para if pocket of ascites seen.   - continue with lactulose to achieve 3-5 BM's per day. Continue rifaximin 550 mg BID    3. Ascites  4. Hx of SBP  - as above, if pocket of ascites seen, please perform diagnostic.   - Has not been on SBP ppx  - with mild acute on chronic kidney function, low dose lasix and spironolactone    4. ALFREDO/CKD  - solitary kidney. UA negative for protein.   - continue with hydration. May benefit from 50  "gms Albumin 25%    5. EGD  - last EGD 4/2019 without EV    6. Debility  - currently on midodrine for hypotension. May consider increasing this should she have persistent hypotension.   - HE could contribute to lowered mobility. PT/OT following.     7. Immunizations  - Pneumovax at time of discharge.   - received x1 dose of HAV/HBV in 2018. Please restart series of HAV and HBV prior to discharge.     Plan of care discussed with attending Hepatologist,  Dr. Suero.     Please do not hesitate to contact the Hepatology service with any questions or concerns.     Bridget Peterson, APRN [facility]  Inpatient Hepatology  [phone]         HPI:   Monalisa Olguin is a 54 year old female with decompensated cirrhosis 2/2 alcohol, abstinent since 9/2019  complicated by ascites, anemia, CKD s/p nephrectomy (kidney donor 2000), orthostatic hypotension on midodrine, depression and anxiety who presents with increased nausea, vomiting, and confusion. She was recently admitted from 4/21-4/24 for similar symptoms that improved with lactulose.  She reports that following her discharge, she stopped taking her lactulose since she thought this was not needed. She has only had x1 BM since discharge. She did have a fall last week when she became \"dizzy\" and had pain in her left foot. X-rays of the foot and ankle notes fractures of metatarsals and a boot was placed.     Ms. Olguin denies any recent fevers, abdominal pain, abdominal distention, dyspnea, or coughs. She could not recall tasks that Makayla Yeager, transplant , had given her to do after discharge (CD counselor and neuropsych eval) in preparation for liver transplant eval. She denies any hematemesis. She has joi able to take in liquids per her report.            Past Medical History:     Past Medical History:   Diagnosis Date     Acute alcoholic intoxication in alcoholism (H) 3/27/2018     Acute renal failure (H) 11/11/2019     Alcohol abuse 5/2/2013     Alcohol " dependence 2013     Alcohol withdrawal 2013     Alcoholic cirrhosis (H)      Anxiety 10/10/2011     CKD (chronic kidney disease) stage 3, GFR 30-59 ml/min (H)     last GFR was 42     CKD (chronic kidney disease) stage 3, GFR 30-59 ml/min (H) 2011     CKD (chronic kidney disease) stage 5, GFR less than 15 ml/min (H) 2020     Depressive disorder      Esophageal reflux 10/7/2002     Hematochezia-patient reported 2019     Heme positive stool 3/27/2018     Hepatic encephalopathy (H) 2019     History of blood transfusion      [facility]     Hypertension goal BP (blood pressure) < 130/80 2011     Hyponatremia 2019     Moderate Depression [296.32] 2009    stable on wellbutrin     Other internal derangement of knee(717.89)     ACL Internal derangement, knee/ original injury in 5th grade, torn cartilage     Pap smear     no abnormals, due for paps q 2-3 yrs     SBP (spontaneous bacterial peritonitis) (H) 2019     Thyroid disease      Unspecified essential hypertension           Past Surgical History:     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     COLONOSCOPY N/A 2017    Procedure: COLONOSCOPY;  Colonoscopy;  Surgeon: Sai Johnston MD;  Location: [facility]     ESOPHAGOSCOPY, GASTROSCOPY, DUODENOSCOPY (EGD), COMBINED N/A 2017    Procedure: COMBINED ESOPHAGOSCOPY, GASTROSCOPY, DUODENOSCOPY (EGD), BIOPSY SINGLE OR MULTIPLE;  ESOPHAGOSCOPY, GASTROSCOPY, DUODENOSCOPY (EGD) with biopsies;  Surgeon: Sai Johnston MD;  Location: [facility]     ESOPHAGOSCOPY, GASTROSCOPY, DUODENOSCOPY (EGD), COMBINED N/A 2019    Procedure: ESOPHAGOGASTRODUODENOSCOPY, WITH BIOPSY;  Surgeon: Edgardo Scott DO;  Location: [facility]     HC KNEE SCOPE, DIAGNOSTIC  01    Arthroscopy, Lt Knee     LAPAROSCOPIC CHOLECYSTECTOMY N/A  "9/24/2017    Procedure: LAPAROSCOPIC CHOLECYSTECTOMY;  laparoscopic cholecystectomy;  Surgeon: Romeo Pastor MD;  Location: [facility]     OPEN REDUCTION INTERNAL FIXATION WRIST Right 11/29/2017    Procedure: OPEN REDUCTION INTERNAL FIXATION WRIST;  OPEN REDUCTION INTERNAL FIXATION RIGHT WRIST;  Surgeon: Edgardo Almendarez MD;  Location: [facility]     TRANSPLANT      Donated Left Kidney in 2000          Medications:     Current Facility-Administered Medications   Medication     acetaminophen (TYLENOL) tablet 650 mg     ARIPiprazole (ABILIFY) tablet 5 mg     busPIRone (BUSPAR) tablet 15 mg     Daily 2 GRAM acetaminophen limit, unless fulminent liver failure or transaminases greater than or equal to 300 - 400, then none     FLUoxetine (PROzac) capsule 60 mg     folic acid (FOLVITE) tablet 1 mg     lactulose (CHRONULAC) solution 20 g     lactulose (CHRONULAC) solution 20 g    Or     lactulose (CHRONULAC) solution for enema prep 100 g     lidocaine (LMX4) cream     lidocaine 1 % 0.1-1 mL     lidocaine 1% with EPINEPHrine 1:100,000 injection 1 mL     melatonin tablet 1 mg     midodrine (PROAMATINE) tablet 5 mg     naloxone (NARCAN) injection 0.1-0.4 mg     omeprazole (priLOSEC) CR capsule 20 mg     ondansetron (ZOFRAN-ODT) ODT tab 4 mg    Or     ondansetron (ZOFRAN) injection 4 mg     potassium chloride ER (KLOR-CON M) CR tablet 10 mEq     rifaximin (XIFAXAN) tablet 550 mg     senna-docusate (SENOKOT-S/PERICOLACE) 8.6-50 MG per tablet 1 tablet     sodium chloride (PF) 0.9% PF flush 3 mL     sodium chloride (PF) 0.9% PF flush 3 mL     ursodiol (ACTIGALL) tablet 500 mg            Allergies     Allergies   Allergen Reactions     Albuterol      Tongue \"hardened and painful\"            Social History:     Social History     Socioeconomic History     Marital status:      Spouse name: Eron     Number of children: 3     Years of education: 14     Highest education level: Not on file   Occupational History     Occupation: " Homemaker   Social Needs     Financial resource strain: Not very hard     Food insecurity     Worry: Never true     Inability: Never true     Transportation needs     Medical: No     Non-medical: No   Tobacco Use     Smoking status: Current Every Day Smoker     Packs/day: 0.50     Years: 10.00     Pack years: 5.00     Types: Cigarettes     Smokeless tobacco: Never Used     Tobacco comment: pt quit 1992 after smoking for 8 yrs, started again in 2004   Substance and Sexual Activity     Alcohol use: Not Currently     Alcohol/week: 0.0 standard drinks     Comment: Quit drinking 9/2019     Drug use: Yes     Types: Marijuana     Sexual activity: Yes     Partners: Male     Birth control/protection: Surgical     Comment:  had vasectomy   Lifestyle     Physical activity     Days per week: 0 days     Minutes per session: 0 min     Stress: Rather much   Relationships     Social connections     Talks on phone: Not on file     Gets together: Not on file     Attends Restoration service: Not on file     Active member of club or organization: Not on file     Attends meetings of clubs or organizations: Not on file     Relationship status: Not on file     Intimate partner violence     Fear of current or ex partner: Not on file     Emotionally abused: Not on file     Physically abused: Not on file     Forced sexual activity: Not on file   Other Topics Concern      Service No     Blood Transfusions No     Caffeine Concern No     Occupational Exposure No     Hobby Hazards No     Sleep Concern No     Stress Concern No     Weight Concern No     Special Diet No     Back Care No     Exercise No     Bike Helmet No     Seat Belt Yes     Self-Exams No     Parent/sibling w/ CABG, MI or angioplasty before 65F 55M? Yes   Social History Narrative     and lives at home with her  and her son, her two daughters are out of the house             Family History:   Reviewed and edited as appropriate  Family History   Problem  Relation Age of Onset     Hypertension Mother         80 years old     Heart Disease Paternal Grandmother      Heart Disease Paternal Grandfather      Cerebrovascular Disease Maternal Grandmother      Cerebrovascular Disease Maternal Grandfather      Alcohol/Drug Maternal Grandfather      Substance Abuse Maternal Grandfather      Heart Disease Father         Silent MI stents x 2     Diabetes Sister 17        older sister also had kidney and pancreas transplant     Heart Disease Sister         MI secondary to diabetes     Genitourinary Problems Sister 43        kidney transplant from renal failure     Other - See Comments Sister         older     Other - See Comments Sister         younger     Diabetes Sister 9        youngest, juvenile type I (onset age 9 with no complications)     Cancer Paternal Aunt         ?kind            Review of Systems:   A complete 10 point review of systems was obtained.  Please see the HPI for pertinent positives and negatives.  All other systems were reviewed and were found to be negative.          Physical Exam:   VS:  /55   Pulse 90   Temp 95.8  F (35.4  C) (Axillary)   Resp 16   Ht 1.524 m (5')   Wt 69.5 kg (153 lb 4.8 oz)   LMP 05/16/2012   SpO2 94%   BMI 29.94 kg/m      Wt:   Wt Readings from Last 2 Encounters:   05/04/20 69.5 kg (153 lb 4.8 oz)   04/28/20 62.1 kg (137 lb)      Constitutional: cooperative, pleasant, in no acute distress  Eyes: Sclera icteris  HEENT: Normal oropharynx without ulcers or exudate, mucus membranes moist  CV: S1/S2 with gr 2/6 murmur. Skin warm and dry.   Respiratory: CTAB  Abd: Mildly distended, nontender +bs   Skin: warm, perfused, no jaundice  Neuro: AAO x 3, Mild asterixis  Extrem:  No peripheral edema         Laboratory:   BMP  Recent Labs   Lab 05/04/20  1038      POTASSIUM 3.7   CHLORIDE 107   ELSIE 9.8   CO2 23   BUN 38*   CR 2.06*   *     CBC  Recent Labs   Lab 05/04/20  1038   WBC 6.4   RBC 2.57*   HGB 8.4*   HCT 26.5*    *   MCH 32.7   MCHC 31.7   RDW 22.0*   PLT 58*     INR  Recent Labs   Lab 05/04/20  1038   INR 1.68*     LFTs  Recent Labs   Lab 05/04/20  1038   ALKPHOS 157*   AST 36   ALT 16   BILITOTAL 13.9*   PROTTOTAL 5.3*   ALBUMIN 3.2*      PANC  Recent Labs   Lab 05/04/20  1038   LIPASE 66*     MELD-Na score: 30 at 5/5/2020  5:48 AM  MELD score: 30 at 5/5/2020  5:48 AM  Calculated from:  Serum Creatinine: 1.96 mg/dL at 5/5/2020  5:48 AM  Serum Sodium: 138 mmol/L (Rounded to 137 mmol/L) at 5/5/2020  5:48 AM  Total Bilirubin: 12.6 mg/dL at 5/5/2020  5:48 AM  INR(ratio): 1.93 at 5/5/2020  5:48 AM  Age: 54 years 6 months           Imaging/Procedures/Studies:   CT Head w/o Contrast 5/4/2020:  Impression: No acute intracranial pathology. Mild diffuse cerebral  atrophy, not disproportionate to patient's age.    CXR 5/4/2020:  IMPRESSION: Increased basilar predominant interstitial opacities,  likely representing pulmonary edema or atypical infection.    XR Foot L 5/4/2020:  1. Fracture involving the base of the left fifth metatarsal.  2. Probable fracture involving the proximal aspect of the left second  metatarsal, located 8-12 mm distal from the base.  3. The Lisfranc joint is congruent.  4. Subtle lucency in the distal fibula, in retrospect also subtle on  the radiographs of the ankle from the same day, cannot exclude a  nondisplaced fracture.    XR Ankle L 5/4/2020:  IMPRESSION: Soft tissue swelling overlying the left ankle lateral  malleolus number otherwise no displaced fractures noted. Findings  presumed to represent ligamentous injury/an ankle sprain.    EGD 4/2019:  Impression:          - Normal oropharynx.                        - Z-line regular, 35 cm from the incisors.                        - Erythematous mucosa in the stomach. Biopsied.                        - Small hiatal hernia.                        - Normal examined duodenum.   Recommendation:      - Patient has a contact number available for                         emergencies. The signs and symptoms of potential delayed                        complications were discussed with the patient. Return to                        normal activities tomorrow. Written discharge                        instructions were provided to the patient.                        - Continue present medications.                        - Await pathology results.     EGD 4/29/19  Impression:          - Normal oropharynx.                        - Z-line regular, 35 cm from the incisors.                        - Erythematous mucosa in the stomach. Biopsied.                        - Small hiatal hernia.                        - Normal examined duodenum.     US abd w dopp 4/21/20  IMPRESSION:   1. Hepatic cirrhosis and moderate ascites.  2. Patent and antegrade Doppler flow of the hepatic vasculature.

## 2020-05-05 NOTE — PLAN OF CARE
PT - PT Eval completed and treatment initiated. WBAT on L LE with CAM boot    Discharge Planner PT   Patient plan for discharge: home  Current status: Pt is mod I for bed mobility. Pt is SBA for transfers with FWW. Pt ambulates short distances with FWW and SBA. Trialed ~5 steps without AD with hand hold assist, pt not safe for ambulation with AD at this time d/t pain.  Barriers to return to prior living situation: medical status, WBAT L LE, pain, impaired functional mobility  Recommendations for discharge: Anticipate home with assist, HH PT d/t limited OP resources at this time and use of FWW (which pt already owns)  Rationale for recommendations: Pt limited primarily by pain. Pt will benefit from continued therapy while inpatient to progress gait, stairs and strengthening. Recommending HH PT to assess pt safety in home and promote independence with functional mobility, ADLs/IADLs.  Entered by: Lay Shipman 05/05/2020 11:18 AM

## 2020-05-05 NOTE — PLAN OF CARE
Orthopedics was contacted by the ED provider to provider recommendations regarding the patient's left foot. I attempted to see the patient on the floor at 1700, however, the patient's COVID-19 test was not resulted. Given the non-urgent nature of this patient's injury, formal evaluation was deferred.       The history was obtained by chart review and discussion with ED provider.    On 4/26, the patient was wearing high heeled shoe and rolled over the outside of her foot, sustaining a twisting injury. She was able to ambulate on the right lower extremity with some discomfort. She presented to her PCP on 4/28. The provider obtained XR imaging of the left foot. No osseous abnormalities were noted. Patient was diagnosed with left ankle sprain, discharged with pain meds.     Since the time of the initial injury on 4/26, she denies any additional trauma. Continues to express discomfort with weight bearing of the left lower extremity.     AP/lateral/oblique of left foot and ankle obtained today were reviewed, and compared to those obtained on 4/28. Imaging demonstrates non-displaced fifth metatarsal base fracture, zone 1 (also seen on lateral of 4/28 imaging). Probable fracture of the left second metatarsal, just distal to the metatarsal base; questionable fracture of the distal fibula, however, ankle mortise is maintained, no medial joint space widening.        Given that this patient's injury occurred on 4/26 and she has been weight bearing since the injury, no concern for unstable bimalleolar fracture. If any concern or ttp over the medial malleolus, may consider doing gravity stress view to confirm.     Zone 1 injuries of the fifth metatarsal can be successfully treated with immobilization and protected weight bearing. Similarly, non-displaced metatarsal base fractures can also be successfully treated in this manner. There is no indication for surgical repair. However, recommend obtaining weight bearing left foot  imaging.      Activity: up ad sundar with assistive devices  Weight bearing: WBAT (PWB at 50% body weight, progressing to full WB as tolerated by pain) to left LE with CAM boot on at all times when up  Immobilization: CAM boot  Imaging: WB imaging of left foot (order placed)      Please contact orthopedics with questions or further recommendations. Formal evaluation pending additional concerns by primary team.       Henry Vizcaino PA-C 5/4/2020 9:24 PM  Orthopaedic Surgery    Please page me at [phone] with any questions/concerns. If there is no response, if it is a weekend, or if it is during evening hours, please page the orthopaedic surgery resident on call.

## 2020-05-05 NOTE — PLAN OF CARE
BP 92/49 (BP Location: Right arm)   Pulse 85   Temp 98.2  F (36.8  C) (Oral)   Resp 18   Ht 1.524 m (5')   Wt 69.5 kg (153 lb 4.8 oz)   LMP 05/16/2012   SpO2 92%   BMI 29.94 kg/m     Time: 3824-5808     Reason for admission: HE  Vitals: BP soft on RA  Activity: A1 with NWB on LE. Cam on when ambulating. Currently off while in bed.   Pain: Only complaints of LE pain when ambulating but manageable with ice packs.   Neuro: A&Ox4. Slightly forgetful. [place]: 0. Scheduled Lactulose with no PRN given.   Cardiac: BP soft   Respiratory: Lung sounds clear.   GI/: Voiding and BM x2. Strict I&O. Stool loose and mixed with urine. Episode of emesis x1. Gave Zofran IV with improvement. Suggest giving Zofran prior to morning meds.   Diet: Regular diet, fair appetite.   Lines: PIV SL.  Wounds: Jaundiced. Bruising. Blanchable redness on buttocks.   Labs/imaging: Creat @ 1.96. INR: 1.93. Platelets low @ 61 but improved from prior. Abdominal xray taken.      New changes this shift: Started on heparin injections. Abdominal xray taken. Hepatology consult. Worked with PT. Took cam boot off while in bed for comfort.      Plan:. Continue to monitor mentation. Rehab. Hepatology following.     Continue to monitor and follow POC

## 2020-05-05 NOTE — PLAN OF CARE
Discharge Planner OT   Patient plan for discharge: Pt would like to return home.   Current status: Evaluation completed and treatment initiated. Therapist educated pt on OT role and discussed POC/Goals for therapy. Therapist educated pt on WBAT precautions with CAM boot donned. Pt required Mod A to don CAM boot. Pt required CGA and vc's for transfer supine<->seated EOB. Pt required Min A and vc's for transfer sit<->standing pivot transfer to bedside chair. Pt with slight LOB in standing. Pt tolerated therapy session well. VSS.   Barriers to return to prior living situation: Decreased independence with functional transfers and ADLs. Decreased strength and endurance, Impaired cognition, CAM boot precautions.   Recommendations for discharge: Anticipate discharge Home with 24hr A and HH OT/PT  Rationale for recommendations: Pt will benefit from continued therapy while IP to maximize functional independence and safety for return home.        Entered by: Ld Mendoza 05/05/2020 5:15 PM

## 2020-05-05 NOTE — COMMITTEE REVIEW
Abdominal Patient Discussion Note Transplant Coordinator: Gabbie Portillo  Transplant Surgeon:       Referring Physician: Sai Johnston    Committee Review Members:  Nutrition Diane Kelly, RD   Pharmacy Heath Cruz, [facility]   Transplant Edgardo Kovacs MD, Jr Geovanny Rangel, DANY, Savannah Suero MD, Bridget Peterson, APRN CNP, Gabbie Portillo RN, Marc Thomas MD, Toy Mcfarland MD, Blanca Montelongo RN, Dolly Otto MD, Vincent Hong MD, Thomas M. Leventhal, MD, Cyn Casey MD, MD       Additional Discussion Notes and Findings:   Proceed with evaluation, inpt team to follow  CD assessment if able while inpatient- concern for recidivism  Will need neuro psych testing- PMH of possible [place]

## 2020-05-05 NOTE — PROGRESS NOTES
05/05/20 1600   Quick Adds   Type of Visit Initial Occupational Therapy Evaluation   Living Environment   Lives With child(lit), adult;child(lit), dependent;spouse   Living Arrangements house   Home Accessibility stairs to enter home   Number of Stairs, Main Entrance 3   Stair Railings, Main Entrance railing on left side (ascending)   Number of Stairs, Within Home, Primary other (see comments)   Transportation Anticipated family or friend will provide   Living Environment Comment Pt has a tub/shower.    Self-Care   Usual Activity Tolerance moderate   Current Activity Tolerance fair   Regular Exercise No   Equipment Currently Used at Home none   Functional Level   Ambulation 0-->independent   Transferring 0-->independent   Toileting 0-->independent   Bathing 0-->independent   Dressing 0-->independent   Eating 0-->independent   Communication 0-->understands/communicates without difficulty   Swallowing 0-->swallows foods/liquids without difficulty   Cognition 1 - attention or memory deficits   Fall history within last six months yes   Number of times patient has fallen within last six months 3   Which of the above functional risks had a recent onset or change? ambulation;transferring;toileting;bathing;dressing;cognition;fall history   General Information   Onset of Illness/Injury or Date of Surgery - Date 05/04/20   Referring Physician Mary Worthington MD    Patient/Family Goals Statement Pt would like to increase strength and to return home independently.    Additional Occupational Profile Info/Pertinent History of Current Problem Monalisa Olguin is a 54 year old female admitted on 5/4/2020. 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    Precautions/Limitations fall precautions   Weight-Bearing Status - LUE full weight-bearing   Weight-Bearing Status - RUE full weight-bearing   Weight-Bearing Status - LLE weight-bearing as tolerated   Weight-Bearing Status - RLE full weight-bearing   Cognitive Status Examination   Orientation orientation to person, place and time   Level of Consciousness alert;confused   Follows Commands (Cognition) WFL   Visual Perception   Visual Perception No deficits were identified;Wears glasses   Sensory Examination   Sensory Quick Adds No deficits were identified   Range of Motion (ROM)   ROM Comment  BUE AROM WFL.    Strength   Strength Comments BUE strength grossly 4-/5 MMT. Moderate deficit in  strength.    Mobility   Bed Mobility Comments CGA and vc's    Transfer Skills   Transfer Comments Min A and vc's.    Activities of Daily Living Analysis   Impairments Contributing to Impaired Activities of Daily Living balance impaired;cognition impaired;flexibility decreased;pain;strength decreased   General Therapy Interventions   Planned Therapy Interventions ADL retraining;IADL retraining;bed mobility training;cognition;ROM;strengthening;stretching;transfer training;home program guidelines;progressive activity/exercise   Clinical Impression   Criteria for Skilled Therapeutic Interventions Met yes, treatment indicated   OT Diagnosis Decreased independence with functional transfers and ADLS/IADLs.    Influenced by the following impairments Decreased stregnth and endurance, Decreased mobility.    Assessment of Occupational Performance 3-5 Performance Deficits   Identified Performance Deficits Decreased independence with functional transfers and ADLs/IADLS.    Clinical Decision Making (Complexity) Low complexity   Therapy Frequency 5x/week   Predicted Duration of Therapy Intervention (days/wks) 5/12/2020   Anticipated Equipment Needs at Discharge tub bench   Anticipated Discharge Disposition Home with Home Therapy;Other (see  "comments)  (24/7 A)   Risks and Benefits of Treatment have been explained. Yes   Patient, Family & other staff in agreement with plan of care Yes   [facility] [place] AM-PAC  \"6 Clicks\" Daily Activity Inpatient Short Form   1. Putting on and taking off regular lower body clothing? 2 - A Lot   2. Bathing (including washing, rinsing, drying)? 3 - A Little   3. Toileting, which includes using toilet, bedpan or urinal? 3 - A Little   4. Putting on and taking off regular upper body clothing? 4 - None   5. Taking care of personal grooming such as brushing teeth? 3 - A Little   6. Eating meals? 4 - None   Daily Activity Raw Score (Score out of 24.Lower scores equate to lower levels of function) 19   Total Evaluation Time   Total Evaluation Time (Minutes) 5     "

## 2020-05-05 NOTE — PLAN OF CARE
Admitted this 54 yr old female from ED at 1650 via litter and escorted by transport personnel. Came in with CC of N/V, SOB and concerning for Hepatic Encephalopathy. Patient on R/O Covid 19 and placed on Special isolation. Made comfortable in bed. Orientation to room, call light and staff done, Md notified of this admission. Patient appeared drowsy but verbally responsive, calm, cooperative with cares. Patient able to ambulate short distances with assist of 1, cam boot on L foot in place. Appeared jaundiced and abdomen distended. Covid 19  test negative per lab personnel report. Was seen and rounded by MD and Planned to perform paracentesis however, not enough abdominal fluid was noted so paracentesis was deferred. Placed on NPO, able to take medications with ease. No c/o pain or discomfort and patient not on respiratory distress. Resting in bed as of this time. Ceftriaxone for Abx.

## 2020-05-05 NOTE — PROGRESS NOTES
05/05/20 1100   Quick Adds   Type of Visit Initial PT Evaluation   Living Environment   Lives With child(lit), adult;spouse   Living Arrangements house   Home Accessibility stairs to enter home   Number of Stairs, Main Entrance 3   Stair Railings, Main Entrance railings safe and in good condition   Number of Stairs, Within Home, Primary other (see comments)  (flight to basement, pt does not have to use)   Transportation Anticipated family or friend will provide   Living Environment Comment Pt lives in single story home.  works night shifts, daughter works days   Self-Care   Usual Activity Tolerance moderate   Current Activity Tolerance fair   Regular Exercise No   Equipment Currently Used at Home walker, standard;crutches   Activity/Exercise/Self-Care Comment Pt IND at baseline. Owns FWW and crutches   Functional Level Prior   Ambulation 0-->independent   Transferring 0-->independent   Toileting 0-->independent   Bathing 0-->independent   Communication 0-->understands/communicates without difficulty   Swallowing 0-->swallows foods/liquids without difficulty   Cognition 0 - no cognition issues reported   Fall history within last six months yes   Number of times patient has fallen within last six months 3   Which of the above functional risks had a recent onset or change? ambulation;transferring;toileting;bathing;dressing;fall history   Prior Functional Level Comment Pt is IND at baseline. Falls occuring d/t LOB   General Information   Onset of Illness/Injury or Date of Surgery - Date 05/04/20   Referring Physician Adán Munoz MD    Patient/Family Goals Statement return home   Pertinent History of Current Problem (include personal factors and/or comorbidities that impact the POC) Per chart, Monalisa Olguin is a 54 year old female admitted on 5/4/2020. 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   Precautions/Limitations fall precautions   Weight-Bearing Status - LLE weight-bearing as tolerated   General Observations CAM Boot   General Info Comments Activity: up ad sundar   Cognitive Status Examination   Orientation orientation to person, place and time   Level of Consciousness alert   Follows Commands and Answers Questions 100% of the time   Personal Safety and Judgment intact   Memory intact   Pain Assessment   Patient Currently in Pain Yes, see Vital Sign flowsheet  (8/10 L foot)   Integumentary/Edema   Integumentary/Edema no deficits were identifed   Posture    Posture Forward head position;Protracted shoulders;Kyphosis   Range of Motion (ROM)   ROM Comment CAM boot L LE, otherwise WFL   Strength   Strength Comments Per observation, merritt LEs >3/5   Bed Mobility   Bed Mobility Comments mod I   Transfer Skills   Transfer Comments SBA    Gait   Gait Comments SBA with FWW   Balance   Balance Comments unsteady without AD, history of falls   General Therapy Interventions   Planned Therapy Interventions balance training;gait training;neuromuscular re-education;strengthening;home program guidelines   Clinical Impression   Criteria for Skilled Therapeutic Intervention yes, treatment indicated   PT Diagnosis Impaired functional mobility   Influenced by the following impairments WBAT L LE, pain, decreased activity tolerance, weakness   Functional limitations due to impairments balance, gait, stairs, ADLs/IADLs   Clinical Presentation Stable/Uncomplicated   Clinical Presentation Rationale chart review and clinical judgement   Clinical Decision Making (Complexity) Low complexity   Therapy Frequency 6x/week   Predicted Duration of Therapy Intervention (days/wks) 5 days   Anticipated Discharge Disposition Home with Assist;Home with Home Therapy   Risk &  "Benefits of therapy have been explained Yes   Patient, Family & other staff in agreement with plan of care Yes   [facility] AM-PAC  \"6 Clicks\" V.2 Basic Mobility Inpatient Short Form   1. Turning from your back to your side while in a flat bed without using bedrails? 4 - None   2. Moving from lying on your back to sitting on the side of a flat bed without using bedrails? 4 - None   3. Moving to and from a bed to a chair (including a wheelchair)? 4 - None   4. Standing up from a chair using your arms (e.g., wheelchair, or bedside chair)? 4 - None   5. To walk in hospital room? 3 - A Little   6. Climbing 3-5 steps with a railing? 2 - A Lot   Basic Mobility Raw Score (Score out of 24.Lower scores equate to lower levels of function) 21   Total Evaluation Time   Total Evaluation Time (Minutes) 8     "

## 2020-05-05 NOTE — PROGRESS NOTES
[facility], [place]    Progress Note - [facility] 4 Service        Date of Admission:  5/4/2020    Assessment & Plan   Monalisa Olguin is a 54 year old female admitted on 5/4/2020. 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    Today's Changes:  - Hepatology consult  - Continue lactulose scheduled/prn and rifaximin BID  - [place] protocol  - Continue CAM boot until ortho f/u in 2-4 weeks for repeat imaging  - Weight bearing as tolerated     Acute metabolic encephalopathy 2/2 Hepatic encephalopathy  Hyperbilirubinemia  Decompensated EtOH Cirrhosis c/b ascites, recurrent HE, SBP  Presenting with N/V and increased confusion over past several days. Recent admission from 4/21-24 with similar presentation, thought to be secondary to medication non-adherence. Infectious w/u unrevealing at that time, no ascites to tap for diagnostic para. On this presentation she does not have fevers or chills. No leukocytosis. Bili up from discharge, other LFTs around baseline. Ammonia increased at 57, but lower than on previous admission. No abdominal pain. UA bland. Hgb stable from discharge. No other metabolic derangements. Did have witness mechanical fall last week, head CT to r/u SDH was negative for intracranial pathology. Pt states she has only been taking one dose of lactulose a day and has not had a BM in 3 days (unable to corroborate with ). Most likely etiology of HE is lactulose deficiency.  - Pocket too small for bedside diagnostic paracentesis, low suspicion for SBP  - Lactulose scheduled TID and PRN, [facility] Protocol  - Rifaximin BID  - Blood culture x1  - No UCx as UA is bland  - Hepatology consult  - s/p 1 dose CTX 1g,  will discontinue due to low suspicion for SBP  - Restart PTA furosemide and spironolactone     MELD-Na score: 30 at 5/5/2020  5:48 AM  MELD score: 30 at 5/5/2020  5:48 AM  Calculated from:  Serum Creatinine: 1.96 mg/dL at 5/5/2020  5:48 AM  Serum Sodium: 138 mmol/L (Rounded to 137 mmol/L) at 5/5/2020  5:48 AM  Total Bilirubin: 12.6 mg/dL at 5/5/2020  5:48 AM  INR(ratio): 1.93 at 5/5/2020  5:48 AM  Age: 54 years 6 months     Pulmonary infiltrate, likely pulmonary edema vs infection  Denies cough, SOB. BNP checked and elevated at 2471. Given lasix 40mg IV in ED. No known history of CHF, no prior TTE in chart. COVID PCR and negative (low pretest probability). Procal returned at 0.3, low overall suspicion for infection.  - COVID negative  - Restart PTA furosemide and spironolactone     Mechanical Fall  Fracture of L 5th metatarsal  Probable fracture of proximal L 2nd metatarsal  Had witnessed mechanical fall last week. No head trauma per pt. Head CT on admission without acute intracranial findings. Foot XR with fracture of 5th metatarsal and probable fracture of 2nd metatarsal. ED discussed with ortho who recommended Cam boot.  - Cam boot  - Discussed with ortho - follow up in 2-4 weeks in clinic with repeat imaging (they will contact clinic to schedule appointment).  - Partial weight bearing as tolerated     ALFREDO on CKD - downtrending  Cr 2.06, up from 1.62 on 4/27. Likely prerenal in the setting of poor PO intake and N/V over past several days, however does have increased pulmonary edema vs infection on CXR. S/p 40mg IV lasix in ED.   - Monitor fluid status  - Restart PTA lasix 20mg and spironolactone 50mg     Thrombocytopenia  Secondary to underlying liver disease, stable/improved from discharge     Chronic macrocytic anemia  Stable from discharge. No evidence of bleeding.     Diet: Regular Diet Adult    Fluids: PO  Lines: PIV  DVT Prophylaxis: Heparin SQ  Gonzalez Catheter: not present  Code Status: Full Code       Disposition Plan   Expected discharge: 2 - 3 days, recommended to prior living arrangement once mental status at baseline.  Entered: Mary Worthington MD 05/05/2020, 7:10 AM       The patient's care was discussed with the Attending Physician, Dr. Kwong, Bedside Nurse, Care Coordinator/, Patient and Patient's Family.    Mary Worthington MD  [address], [place]  Pager: [phone]  Please see sticky note for cross cover information  ______________________________________________________________________    Interval History   Nursing notes reviewed. No acute events overnight. Pt seen and examined. Answering questions appropriately, but does intermittently fall asleep during interview. Denies pain. No nausea, abdominal pain, vomiting. No headache. No pain in L foot.    Data reviewed today: I reviewed all medications, new labs and imaging results over the last 24 hours. I personally reviewed no images or EKG's today.    Physical Exam   Vital Signs: Temp: 98.4  F (36.9  C) Temp src: Oral BP: 97/56 Pulse: 88 Heart Rate: 96 Resp: 18 SpO2: 96 % O2 Device: None (Room air)    Weight: 153 lbs 4.8 oz     General Appearance: No acute distress. Sclera icteric. Answers questions appropriately.  Respiratory: CTAB. No wheezes or crackles.  Cardiovascular: RRR, no m/r/g  GI: BS+. Distended. Nontender.  Skin: Jaundiced. No rash.  Other: AOx2-3, moving all extremities. Mild asterixis. Speech fluent.    Data   Recent Labs   Lab 05/05/20  0548 05/04/20  1038   WBC 5.4 6.4   HGB 7.6* 8.4*   * 103*   PLT 61* 58*   INR 1.93* 1.68*    137   POTASSIUM 3.5 3.7   CHLORIDE 109 107   CO2 22 23   BUN 40* 38*   CR 1.96* 2.06*   ANIONGAP 7 8   ELSIE 9.3 9.8   * 110*   ALBUMIN 2.8* 3.2*   PROTTOTAL 4.8* 5.3*   BILITOTAL 12.6* 13.9*   ALKPHOS 141 157*   ALT 14 16   AST 30 36   LIPASE  --  66*   TROPI  --  0.043

## 2020-05-05 NOTE — PLAN OF CARE
"ASSUMED CARES: [id number].  STATUS: Pt admitted 5/4 for AMS- Mechanical fall last week, resulting in fracture of 5th Metatarsal and possible fracture of 2nd metatarsal as well- CAM boot in place.   NEURO: Pt A/o x 2-3. Confused overnight and slow to respond at times. - Aasking for ID and where to \"Pay Bill\". Asking writer to \"Cut off jeans\". PRN Lactulose given x 1. [place] 0 & 1.   VS: VSS on RA.   ACTIVITY: Up with A1- CAM boot to be worn at all times when pt up.   PAIN: Ajay pain.   CARDIAC: HR WDL.   RESP: No C/o SOB. No Cough noted. LS Clear Upper lobes .  GI/: Voiding spontaneously on commode. BM x 1 overnight. Distended Abd. Strict I & O.   DIET: Regular.   SKIN: Jaundice. +Brusies.   LDA'S: R PIV SL.   LABS: Ammonia 59. Bili 12.6(13.9). Creat 1.96. COVID Negative 5/4. Hgb 7.6.   CHANGES THIS SHIFT: No changes this shift. PRN Lactulose given x 1.   POC: Cont with POC. Plan for discharge to prior once Mental status back to baseline. Call light within reach. Bed alarm on for pt safety. Will continue to monitor.      "

## 2020-05-06 NOTE — CONSULTS
Pt participated in Rule 25 Assessment with PEARL Styles (Liz) on this date. Please allow up to 72 hours for assessment to be completed. See Nallely's progress notes for full assessment and recommendations.    PEARL Garcia  [email or web address]  [phone]

## 2020-05-06 NOTE — PROGRESS NOTES
[facility]  [address] [place] [id number]                ADULT CD ASSESSMENT ADDENDUM      Patient Name: Monalisa Olguin  Cell Phone:[phone]  Email:  [email or web address]  Emergency Contact: Eron Olguin ()  Tel: [phone]    The patient reported being:      With which race do you identify? White    Initial Screening Questions     1. Are you currently having severe withdrawal symptoms that are putting yourself or others in danger?  No    2. Are you currently having severe medical problems that require immediate attention?  No    3. Are you currently having severe emotional or behavioral problems that are putting yourself or others at risk of harm?  No    4. Do you have sufficient reading skills that will enable you to understand written materials, including the program rules and client rights materials?  Yes     Family History and other additional information     Who raised you? (parents, grandparents, adoptive parents, step-parents, etc.)    Both Parents    Please tell me what it was like growing up in your family. (please include any history of substance abuse, mental health issues, emotional/physical/sexual abuse, forms of discipline, and support)     Siblings: 2 living sisters- Ritika and Myriam; 1  sister  Raised by: both of her parents.  Supported: yes  Abuse: none  MI: none  CD: none    Do you have any children or Stepchildren? Yes, explain: 3 children    Are you being investigated by Child Protection Services? No    Do you have a child protection worker, probation office or ?  No    How would you describe your current finances?  Doing okay    If you are having problems, (unpaid bills, bankruptcy, IRS problems) please explain:  No    If working or a student are you able to function appropriately in that setting? No, explain: due to her health     Describe your preferred learning style:  by hands-on practice    What are your some of your  "personal strengths?  \"I am not a liar. I am sober.\"     Do you currently participate in community oswald activities, such as attending [employer], temple, Yarsani or Mandaen services?  No    How does your spirituality impact your recovery?  \"it does a lot. And I do pray and things like that. I don't go to [employer]. I watch it on tv sometimes.\"    Do you currently self-administer your medications?  Yes    Have you ever had to lie to people important to you about how much you serna?   No   Have you ever felt the need to bet more and more money?   No   Have you ever attempted treatment for a gambling problem?   No   Have you ever touched or fondled someone else inappropriately or forced them to have sex with you against their will?   No   Are you or have you ever been a registered sex offender?   No   Is there any history of sexual abuse in your family? No   Have you ever felt obsessed by your sexual behavior, such as having sex with many partners, masturbating often, using pornography often?   No     Have you ever received therapy or stayed in the hospital for mental health problems?   Yes, explain: hx of therapy     Have you ever hurt yourself, such as cutting, burning or hitting yourself?   No     Have you ever purged, binged or restricted yourself as a way to control your weight?   No     Are you on a special diet?   No     Do you have any concerns regarding your nutritional status?   No     Have you had any appetite changes in the last 3 months?   No   Have you had weight loss or weight gain of more than 10 lbs in the last 3 months?   If patient gained or lost more than 10 lbs, then refer to program RN / attending Physician for assessment.   Yes, explain: gained weight   Was the patient informed of BMI?    Above,     No   Have you engaged in any risk-taking behavior that would put you at risk for exposure to blood-borne or sexually transmitted diseases?   No   Do you have any dental problems?   No   Have you ever lived " "through any trauma or stressful life events?   Yes, explain: \"my dad .\"   In the past month, have you had any of the following symptoms related to the trauma listed above? (dreams, intense memories, flashbacks, physical reactions, etc.)   No   Have you ever believed people were spying on you, or that someone was plotting against you or trying to hurt you?   No   Have you ever believed someone was reading your mind or could hear your thoughts or that you could actually read someone's mind or hear what another person was thinking?   No   Have you ever believed that someone of some force outside of yourself was putting thoughts into your mind or made you act in a way that was not your usual self?  Have you ever though you were possessed?   No   Have you ever believed you were being sent special messages through the TV, radio or newspaper?   No   Have you ever heard things other people couldn't hear, such as voices or other noises?   No   Have you ever had visions when you were awake?  Or have you ever seen things other people couldn't see?   No   Do you have a valid 's license?    No, explain: DWI     PHQ-9, MONA-7 and Suicide Risk Assessment   PHQ-9 on 2020 MONA-7 on 2020   The patient's PHQ-9 score was 16 out of 27, indicating moderately severe depression.   The patient's MONA-7 score was 6 out of 21, indicating mild anxiety.       Edgar-Suicide Severity Rating Scale   Suicide Ideation   1.) Have you ever wished you were dead or that you could go to sleep and not wake up?     Lifetime:  No   Past Month:  No     2.) Have you actually had any thoughts of killing yourself?   Lifetime:  No   Past Month:  No     3.) Have you been thinking about how you might do this?     Lifetime:  No   Past Month:  No     4.) Have you had these thoughts and had some intention of acting on them?     Lifetime:  No   Past Month:  No     5.) Have you started to work out the details of how to kill yourself?   Lifetime:  No   " Past Month:  No     6.) Do you intend to carry out this plan?      Lifetime:  No   Past Month:  No     Intensity of Ideation   Intensity of ideation (1 being least severe, 5 being most severe):     Lifetime:  The patient denied ever having any suicidal thoughts in life.   Past Month:  The patient denied ever having any suicidal thoughts in life.     How often do you have these thoughts?  The patient denied ever having any suicidal thoughts in life.     When you have the thoughts how long do they last?  The patient denied ever having any suicidal thoughts in life.     Can you stop thinking about killing yourself or wanting to die if you want to?  The patient denied ever having any suicidal thoughts in life.     Are there things - anyone or anything (i.e. family, Adventism, pain of death) that stopped you from wanting to die or acting on thoughts of suicide?  Does not apply     What sort of reasons did you have for thinking about wanting to die or killing yourself (ie end pain, stop how you were feeling, get attention or reaction, revenge)?  Does not apply     Suicidal Behavior   (Suicide Attempt) - Have you made a suicide attempt?     Lifetime:  The patient had never made a suicide attempt.   Past Month:  The patient had never made a suicide attempt.     Have you engaged in self-harm (non-suicidal self-injury)?  The patient denied having any history of engaging in self-harm (non-suicidal self-injury).     (Interrupted Attempt) - Has there been a time when you started to do something to end your life but someone or something stopped you before you actually did anything?  No     (Aborted or Self-Interrupted Attempt) - Has there been a time when you started to do something to try to end your life but you stopped yourself before you actually did anything?  No     (Preparatory Acts of Behavior) - Have you taken any steps towards making suicide attempt or preparing to kill yourself (such as collecting pills, getting a gun,  "giving valuables away or writing a suicide note)?  No     Actual Lethality/Medical Damage:  The patient denied ever making a suicidal attempt.       2008  [employer]  Used with permission by Tracy Boo, PhD.       Guide to C-SSRS Risk Ratings   NO IDEATION:  with no active thoughts IDEATION: with a wish to die. IDEATION: with active thoughts. Risk Ratings   If Yes No No 0 - Very Low Risk   If NA Yes No 1 - Low Risk   If NA Yes Yes 2 - Low/moderate risk   IDEATION: associated thoughts of methods without intent or plan INTENT: Intent to follow through on suicide PLAN: Plan to follow through on suicide Risk Ratings cont...   If Yes No No 3 - Moderate Risk   If Yes Yes No 4 - High Risk   If Yes Yes Yes 5 - High Risk   The patient's ADDITIONAL RISK FACTORS and lack of PROTECTIVE FACTORS may increase their overall suicide risk ratings.     Additional Risk Factors:    Significant history of having untreated or poorly treated mental health symptoms     Significant history of physical illness or chronic medical problems     Significant history of untreated or poorly treated chronic pain issues   Protective Factors:    Having people in his/her life that would prevent the patient from considering a suicide attempt (i.e. young children, spouse, parents, etc.)     A positive relationship with his/her clinical medical and/or mental health providers     Having easy access to supportive family members     Having a good community support network     Having cultural, Evangelical or spiritual beliefs that discourage suicide     Risk Status   Past month: 0. - Very Low Risk:  Evaluation Counselors:  Document in Epic / SBAR to counselor \"Very Low Risk\".      Treatment Counselors:  Reassess upon admission as applicable, assess weekly in progress notes under Dimension 3 and summarize in Discharge / Treatment summary under Dimension 3.    Past 24 hours: 0. - Very Low Risk:  Evaluation Counselors:  " "Document in Epic / SBAR to counselor \"Very Low Risk\".      Treatment Counselors:  Reassess upon admission as applicable, assess weekly in progress notes under Dimension 3 and summarize in Discharge / Treatment summary under Dimension 3.   Additional information to support suicide risk rating: There was no additional information to provide at this time.     Mental Health Status   Physical Appearance/Attire: Unable to assess   Hygiene: Unable to assess   Eye Contact: Unable to assess   Speech Rate:  regular   Speech Volume: regular   Speech Quality: fluid   Cognitive/Perceptual:  reality based   Cognition: memory intact    Judgment: intact   Insight: intact   Orientation:  time, place, person and situation   Thought: logical    Hallucinations:  none   General Behavioral Tone: cooperative   Psychomotor Activity: Unable to assess   Gait:  Unable to assess   Mood: appropriate   Affect: flat/none   Counselor Notes: NA     Criteria for Diagnosis: DSM-5 Criteria for Substance Use Disorders      Alcohol Use Disorder Severe - 303.90 (F10.20)  Cannabis Use Disorder Mild - 305.20 (F12.10)  Tobacco Use Disorder Moderate - 305.10 (F17.200)  Depression NOS, per patient self-report  Anxiety disorder NOS, per patient self-report    Level of Care   I.) Intoxication and Withdrawal: 0   II.) Biomedical:  2   III.) Emotional and Behavioral:  2   IV.) Readiness to Change:  3   V.) Relapse Potential: 4   VI.) Recovery Environmental: 3     Initial Problem List     The patient is currently living in an unhealthy and/or using environment  The patient lacks relapse prevention skills  The patient has poor coping skills  The patient has poor refusal skills   The patient lacks a sober peer support network  The patient has a tendency to isolate  The patient has dual issues of MI and CD  The patient lacks the ability to effectively manage his/her mental health issues    Patient/Client is willing to follow treatment recommendations.  " Yes    Counselor: [facility] [place]

## 2020-05-06 NOTE — PLAN OF CARE
Shift:   VS: Temp: 97.4  F (36.3  C) Temp src: Oral BP: 94/48 Pulse: 85   Resp: 16 SpO2: 93 % O2 Device: None (Room air)    Pain: Denies  Neuro: A&Ox4, slow to respond. Calls appropriately  Cardiac:   WNL  Respiratory: RA  GI/Diet/Appetite: Fair oral intake, no nausea reported  :  Adequate UO, loose Bm x2  LDA's: PIV SL  Skin: Jaundiced  Activity: Up with SBA, boot for left foot when OOB  Tests/Procedures:   Pertinent Labs/Lab Collection:      Plan: Continue with cares and update MD with any changes.

## 2020-05-06 NOTE — PROGRESS NOTES
[facility]  HEPATOLOGY PROGRESS NOTE  Monalisa Olguin [id number]       CC: altered mental status  SUBJECTIVE:  Continues to feel foggy in her thinking. Denies fevers, abdominal pain or distention. Has been up walking with a walker to bathroom. States she did not sleep well and is more fatigued today.     ROS:  A 10-point review of systems was negative.    OBJECTIVE:  VS: BP 94/48 (BP Location: Right arm)   Pulse 85   Temp 97.4  F (36.3  C) (Oral)   Resp 16   Ht 1.524 m (5')   Wt 61.8 kg (136 lb 3.2 oz)   LMP 05/16/2012   SpO2 93%   BMI 26.60 kg/m     Date 05/06/20 0700 - 05/07/20 0659   Shift 6979-6543 5019-0404 3458-6547 24 Hour Total   INTAKE   Shift Total(mL/kg)       OUTPUT   Urine 100   100   Other 300   300   Shift Total(mL/kg) 400(6.47)   400(6.47)   Weight (kg) 61.78 61.78 61.78 61.78     General: In no acute distress, mild facial muscle wasting  Neuro: AOx3, drowsy, No asterixis, slight tremors  Lymph: No cervical lymphadenopathy  HEENT:  Moderate scleral icterus, Nooral lesions  CV: S1/S2 with gr 2/6 murmurs. Skin warm and dry  Lungs: clear to auscultation, slight diminished bases Respirations even and nonlabored on room air  Abd: Mildly distended, nontender. +BS  Extrem: Noperipheral edema  Skin: mild jaundice  Psych: pleasant    MEDICATIONS:  Current Facility-Administered Medications   Medication     acetaminophen (TYLENOL) tablet 650 mg     ARIPiprazole (ABILIFY) tablet 5 mg     busPIRone (BUSPAR) tablet 15 mg     ciprofloxacin (CIPRO) tablet 500 mg     Daily 2 GRAM acetaminophen limit, unless fulminent liver failure or transaminases greater than or equal to 300 - 400, then none     FLUoxetine (PROzac) capsule 60 mg     folic acid (FOLVITE) tablet 1 mg     furosemide (LASIX) tablet 20 mg     heparin ANTICOAGULANT injection 5,000 Units     hepatitis A virus (PF) vaccine (HAVRIX (PF)) injection 1,440 Units     hepatitis B vaccine (ENGERIX-B) injection 20 mcg     lactulose (CHRONULAC)  solution 20 g     lactulose (CHRONULAC) solution 20 g    Or     lactulose (CHRONULAC) solution for enema prep 100 g     lidocaine (LMX4) cream     lidocaine 1 % 0.1-1 mL     lidocaine 1% with EPINEPHrine 1:100,000 injection 1 mL     melatonin tablet 5 mg     midodrine (PROAMATINE) tablet 5 mg     naloxone (NARCAN) injection 0.1-0.4 mg     omeprazole (priLOSEC) CR capsule 20 mg     ondansetron (ZOFRAN-ODT) ODT tab 4 mg    Or     ondansetron (ZOFRAN) injection 4 mg     potassium chloride ER (KLOR-CON M) CR tablet 10 mEq     rifaximin (XIFAXAN) tablet 550 mg     senna-docusate (SENOKOT-S/PERICOLACE) 8.6-50 MG per tablet 1 tablet     sodium chloride (PF) 0.9% PF flush 3 mL     sodium chloride (PF) 0.9% PF flush 3 mL     spironolactone (ALDACTONE) tablet 50 mg     ursodiol (ACTIGALL) tablet 500 mg       REVIEW OF LABORATORY, PATHOLOGY AND IMAGING RESULTS:  BMP  Recent Labs   Lab 05/06/20 0536 05/05/20 0548 05/04/20  1038    138 137   POTASSIUM 3.5 3.5 3.7   CHLORIDE 109 109 107   ELSIE 9.0 9.3 9.8   CO2 23 22 23   BUN 37* 40* 38*   CR 1.92* 1.96* 2.06*   GLC 91 117* 110*     CBC  Recent Labs   Lab 05/06/20 0536 05/05/20 0548 05/04/20  1038   WBC 4.7 5.4 6.4   RBC 2.27* 2.38* 2.57*   HGB 7.4* 7.6* 8.4*   HCT 23.6* 24.8* 26.5*   * 104* 103*   MCH 32.6 31.9 32.7   MCHC 31.4* 30.6* 31.7   RDW 22.5* 22.2* 22.0*   PLT 56* 61* 58*     INR  Recent Labs   Lab 05/05/20 0548 05/04/20  1038   INR 1.93* 1.68*     LFTs  Recent Labs   Lab 05/06/20 0536 05/05/20 0548 05/04/20  1038   ALKPHOS 146 141 157*   AST 31 30 36   ALT 15 14 16   BILITOTAL 12.7* 12.6* 13.9*   PROTTOTAL 4.7* 4.8* 5.3*   ALBUMIN 2.8* 2.8* 3.2*      PANC  Recent Labs   Lab 05/04/20  1038   LIPASE 66*      MELD-Na score: 29 at 5/6/2020  5:36 AM  MELD score: 29 at 5/6/2020  5:36 AM  Calculated from:  Serum Creatinine: 1.92 mg/dL at 5/6/2020  5:36 AM  Serum Sodium: 140 mmol/L (Rounded to 137 mmol/L) at 5/6/2020  5:36 AM  Total Bilirubin: 12.7 mg/dL at  5/6/2020  5:36 AM  INR(ratio): 1.93 at 5/5/2020  5:48 AM  Age: 54 years 6 months      Imaging Results:  AXR 5/5/20  Impression:   Nonobstructive bowel gas pattern with mild gaseous distention of the  Colon.    Echo 5/6/20  Interpretation Summary  Global and regional left ventricular function is normal with an EF of 60-65%.  Mild right ventricular dilation is present.  Global right ventricular function is normal.  Pulmonary artery systolic pressure cannot be assessed.  The inferior vena cava was normal in size with preserved respiratory  variability.  No pericardial effusion is present.  Ascites is noted.    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     RECOMMENDATIONS:  1. Alcohol cirrhosis  - MELD 29, ABO O  - has not been evaluated for liver transplant in the past. She was advised to undergo CD evaluation and neuropsych testing during last hospitalization. She does have social concerns for liver transplant. CD evaluation while inpatient.   - US without evidence of HCC  - recent PETH 4/22 negative.   - recent BNP elevated at 2471. She does not appear significantly fluid overloaded. Echocardiogram with  normal EF and mild RV dilation. No TR jet to see RVSP.  She does have a history of tobacco and alcohol use so will need DSE if outpatient transplant eval performed.     2. Hepatic encephalopathy  - improved mentation, unclear if at baseline. Per reports, she has not been taking lactulose at home and decreased BM's.   - Infectious work up so far negative.   - continue with lactulose to achieve 3-5 BM's per day. Continue rifaximin 550 mg BID     3. Ascites  4. Hx of SBP  - as above, if pocket of ascites seen, please perform diagnostic.   -  Agree with ciprofloxacin for SBP ppx. Please renally adjust.   - with mild acute on chronic kidney function, low dose lasix and spironolactone     5. ALFREDO/CKD  - solitary kidney. UA negative for protein.   - continue with hydration.      6. Esophageal Varices  - last EGD 4/2019 without EV     7. Debility  - currently on midodrine for hypotension. May consider increasing this should she have persistent hypotension.   - HE could contribute to lowered mobility. PT/OT following.      8. Immunizations  - Pneumovax at time of discharge.   - received x1 dose of HAV/HBV in 2018. Please restart series of HAV and HBV prior to discharge    Patient was discussed with attending physician, Dr. Suero     Next follow up appointment: Fermín 7/27.     Thank you for the opportunity to be involved with  [facility]. Please call with any questions or concerns.    IVETTE Suarez, CNP  Inpatient Hepatology [facility]  [phone]  Text Link

## 2020-05-06 NOTE — PLAN OF CARE
Discharge Planner PT   Patient plan for discharge: Home with assist  Current status: Donns CAM boot and shoe. Completes supine<>sit transfer independently. Sits EOB with good tolerance. Completes sit<>stand transfer with supervision using walker. Patient fairly steady with use of walker. CAM boot donned and shoe on R (slipper). Ambulates 2 x 60' with supervision using walker. Takes seated rest break in between. Ended in bed with needs in reach  Barriers to return to prior living situation: No PT barriers  Recommendations for discharge: Home with assist and home PT  Rationale for recommendations: Mobilizing appropriately. Home PT for safety evaluation and progression       Entered by: Estela Koehler 05/06/2020 1:03 PM

## 2020-05-06 NOTE — PROGRESS NOTES
[facility], [place]    Progress Note - [place] [facility] Service        Date of Admission:  5/4/2020    Assessment & Plan   Monalisa Olguin is a 54 year old female admitted on 5/4/2020. 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    Today's Changes:  - Hepatology consult - transplant work up:   - TTE   - Chem dep consult   - Hep A and Hep B vaccines - first dose today, will need rest of series as outpatient  - Continue lactulose scheduled/prn and rifaximin BID  - Schedule melatonin PM  - [facility] protocol  - Continue CAM boot while not in bed (ok to wear in bed too if more comfortable)     Acute metabolic encephalopathy 2/2 Hepatic encephalopathy  Hyperbilirubinemia  Decompensated EtOH Cirrhosis c/b ascites, recurrent HE, SBP  Presenting with N/V and increased confusion over past several days. Recent admission from 4/21-24 with similar presentation, thought to be secondary to medication non-adherence. Infectious w/u unrevealing at that time, no ascites to tap for diagnostic para. On this presentation she does not have fevers or chills. No leukocytosis. Bili up from discharge, other LFTs around baseline. Ammonia increased at 57, but lower than on previous admission. No abdominal pain. UA bland. Hgb stable from discharge. No other metabolic derangements. Did have witness mechanical fall last week, head CT to r/u SDH was negative for intracranial pathology. Pt states she has only been taking one dose of lactulose a day and has not had a BM in 3 days (unable to corroborate with ). Most likely etiology of HE is lactulose deficiency.  - Pocket too small for bedside diagnostic paracentesis, low suspicion for SBP  - Lactulose  scheduled TID and PRN, [facility] Protocol  - Rifaximin BID  - PTA furosemide and spironolactone  - Start cipro 500mg daily for SBP prophylaxis  - Hepatology consult for transplant work up/management   - TTE   - Chem dep consult  - Hep A and Hep B first vaccine today - will need to complete series as outpatient   - Hep A (VAQTA) - 2nd dose in 6-18 months   - Hep B (ENERGIX-B) - 2nd dose in 1 month, 3rd in 6 months     MELD-Na score: 29 at 5/6/2020  5:36 AM  MELD score: 29 at 5/6/2020  5:36 AM  Calculated from:  Serum Creatinine: 1.92 mg/dL at 5/6/2020  5:36 AM  Serum Sodium: 140 mmol/L (Rounded to 137 mmol/L) at 5/6/2020  5:36 AM  Total Bilirubin: 12.7 mg/dL at 5/6/2020  5:36 AM  INR(ratio): 1.93 at 5/5/2020  5:48 AM  Age: 54 years 6 months     Pulmonary infiltrate, likely pulmonary edema vs infection  Denies cough, SOB. BNP checked and elevated at 2471. Given lasix 40mg IV in ED. No known history of CHF, no prior TTE in chart. COVID PCR and negative (low pretest probability). Procal returned at 0.3, low overall suspicion for infection.  - COVID negative  - PTA furosemide and spironolactone     Mechanical Fall  Fracture of L 5th metatarsal  Probable fracture of proximal L 2nd metatarsal  Had witnessed mechanical fall last week. No head trauma per pt. Head CT on admission without acute intracranial findings. Foot XR with fracture of 5th metatarsal and probable fracture of 2nd metatarsal. ED discussed with ortho who recommended Cam boot.  - Cam boot  - Discussed with ortho - follow up in 2-4 weeks in clinic with repeat imaging (they will contact clinic to schedule appointment).  - Partial weight bearing as tolerated     ALFREDO on CKD - downtrending  Cr 2.06, up from 1.62 on 4/27. Likely prerenal in the setting of poor PO intake and N/V over past several days, however does have increased pulmonary edema vs infection on CXR. S/p 40mg IV lasix in ED.   - Monitor fluid status  - PTA lasix 20mg and spironolactone  50mg     Thrombocytopenia  Secondary to underlying liver disease, stable/improved from discharge     Chronic macrocytic anemia  Stable from discharge. No evidence of bleeding.     Diet: Regular Diet Adult    Fluids: PO  Lines: PIV  DVT Prophylaxis: Heparin SQ  Gonzalez Catheter: not present  Code Status: Full Code      Disposition Plan   Expected discharge: 2 - 3 days, recommended to prior living arrangement once mental status at baseline.  Entered: Mary Worthington MD 05/06/2020, 6:49 AM       The patient's care was discussed with the Attending Physician, Dr. Kwong, Bedside Nurse, Care Coordinator/ and Patient.    Mary Worthington MD  [address], [place]  Pager: [phone]  Please see sticky note for cross cover information  ______________________________________________________________________    Interval History   Nursing notes reviewed. No acute events overnight. Pt seen and examined. Still somnolent this morning, but answers questions appropriately when woken up and then nods off. Intermittently confused. Denies abdominal pain, nausea, vomiting. Didn't sleep well last night. States she is having BMs, per nursing notes had 7 between PM shift and overnight.    4 point ROS negative except for above.    Data reviewed today: I reviewed all medications, new labs and imaging results over the last 24 hours. I personally reviewed no images or EKG's today.    Physical Exam   Vital Signs: Temp: 97.4  F (36.3  C) Temp src: Oral BP: 94/48 Pulse: 85   Resp: 16 SpO2: 93 % O2 Device: None (Room air)    Weight: 136 lbs 3.2 oz     General Appearance: No acute distress. Sclera icteric. Answers questions appropriately.  Respiratory: CTAB. No wheezes or crackles.  Cardiovascular: RRR, no m/r/g  GI: BS+. Distended. Nontender.  Skin: Jaundiced. No rash.  Other: AOx2-3, moving all extremities. Mild asterixis. Speech fluent.    Data   Recent Labs   Lab 05/06/20  0587  05/05/20  0548 05/04/20  1038   WBC 4.7 5.4 6.4   HGB 7.4* 7.6* 8.4*   * 104* 103*   PLT 56* 61* 58*   INR  --  1.93* 1.68*    138 137   POTASSIUM 3.5 3.5 3.7   CHLORIDE 109 109 107   CO2 23 22 23   BUN 37* 40* 38*   CR 1.92* 1.96* 2.06*   ANIONGAP 8 7 8   ELSIE 9.0 9.3 9.8   GLC 91 117* 110*   ALBUMIN 2.8* 2.8* 3.2*   PROTTOTAL 4.7* 4.8* 5.3*   BILITOTAL 12.7* 12.6* 13.9*   ALKPHOS 146 141 157*   ALT 15 14 16   AST 31 30 36   LIPASE  --   --  66*   TROPI  --   --  0.043

## 2020-05-06 NOTE — PROGRESS NOTES
"The patient has been notified of the following:     \"We have found that certain health care needs can be provided without the need for a face to face visit.  This service lets us provide the care you need with a phone conversation.      I will have full access to your [facility] medical record during this entire phone call.   I will be taking notes for your medical record.     Since this is like an office visit, we will bill your insurance company for this service.  If your insurance denies the charge we will appeal and/or write off the cost of the service.  The Governor's executive order may result in expanded health insurance coverage for this service, which would be paid by your insurance.       There are potential benefits and risks of telephone visits (e.g. limits to patient confidentiality) that differ from in-person visits.?  Confidentiality still applies for telephone services, and nobody will record the visit.  It is important to be in a quiet, private space that is free of distractions (including cell phone or other devices) during the visit.??     If during the course of the call I believe a telephone visit is not appropriate, you will not be charged for this service\"    Consent has been obtained for this service by care team member: Yes    Phone visit start time:  2:03pm  Phone visit end time:  2:59pm      Rule 25 Assessment  Background Information   1. Date of Assessment Request  2. Date of Assessment  5/6/2020 3. Date Service Authorized     4.   CHASE Jarrell     5.  Phone Number   [phone] 6. Referent  [facility] Pre-Liver  Transplant Team 7. Assessment Site  UNIT [address] [place] [address]     8. Client Name   Monalisa Olguin 9. Date of Birth  1965 Age  54 year old 10. Gender  female  11. PMI/ Insurance No.  [id number]   12. Client's Primary Language:  English 13. Do you require special accommodations, such as an  or assistance with written material? No "   14. Current Address: [address] [place] [id number]   15. Client Phone Numbers: [phone]     16. Tell me what has happened to bring you here today.    Ms. Monalisa Olguin completed a telephone CD evaluation while she was hospitalized at [facility] for increased nausea, vomiting, concerning for hepatic encephalopathy.  The [facility] Pre-Liver Transplant team requested pt complete a CD evaluation while she was hospitalized.     17. Have you had other rule 25 assessments?     Yes. When, Where, and What circumstances: a couple of years ago    DIMENSION I - Acute Intoxication /Withdrawal Potential   1. Chemical use most recent 12 months outside a facility and other significant use history (client self-report)              X = Primary Drug Used   Age of First Use Most Recent Pattern of Use and Duration   Need enough information to show pattern (both frequency and amounts) and to show tolerance for each chemical that has a diagnosis   Date of last use and time, if needed   Withdrawal Potential? Requiring special care Method of use  (oral, smoked, snort, IV, etc)   x   Alcohol     [id number]: every day, she would drink all day. She'd drink 1 liter of Proximus or Cj Beam and that would last almost a day.    She denies any beer or wine.   First week of 9/2019 no oral   x   Marijuana/  Hashish   17 Past 30 days: she would use at night time to help her sleep and she'd smoke 1-2 onies. When she stopped drinking she started using marijuana.    HU: [age]: when she reported a pattern of smoking a few bowls of THC 3-4 times per week   1 week ago no smoke      Cocaine/Crack     20s 20s: used it once 20s no snort      Meth/  Amphetamines   No use          Heroin     No use          Other Opiates/  Synthetics   No use          Inhalants     No use          Benzodiazepines     No use          Hallucinogens     No use          Barbiturates/  Sedatives/  Hypnotics No use           Over-the-Counter Drugs   No use          Other     No use          Nicotine     HS Quit for many years and then started again.  Currently:1/2-1 PPD 20 no smoke     2. Do you use greater amounts of alcohol/other drugs to feel intoxicated or achieve the desired effect?  Yes.  Or use the same amount and get less of an effect?  Yes.  Example: The patient reported having increased use and tolerance issues with alcohol and marijuana.    3A. Have you ever been to detox?     Yes    3B. When was the first time?     2013    3C. How many times since then?     7    3D. Date of most recent detox:     [facility], date unknown    4.  Withdrawal symptoms: Have you had any of the following withdrawal symptoms?  Past 12 months Recent (past 30 days)   Sweating (Rapid Pulse)  Shaky / Jittery / Tremors  Unable to Sleep  Agitation  Irritability None     's Visual Observations and Symptoms: No visible withdrawal symptoms at this time    Based on the above information, is withdrawal likely to require attention as part of treatment participation?  No    Dimension I Ratings   Acute intoxication/Withdrawal potential - The placing authority must use the criteria in Dimension I to determine a client s acute intoxication and withdrawal potential.    RISK DESCRIPTIONS - Severity ratin Client displays full functioning with good ability to tolerate and cope with withdrawal discomfort. No signs or symptoms of intoxication or withdrawal or resolving signs or symptoms.    REASONS SEVERITY WAS ASSIGNED (What about the amount of the person s use and date of most recent use and history of withdrawal problems suggests the potential of withdrawal symptoms requiring professional assistance? )     Patient reports that her last use of alcohol was the first week of 2019. She reports her last use of marijuana was a week ago. Pt is not currently experiencing any withdrawal.          DIMENSION II - Biomedical Complications and  Conditions   1a. Do you have any current health/medical conditions?(Include any infectious diseases, allergies, or chronic or acute pain, history of chronic conditions)       Patient Active Problem List   Diagnosis     Kidney donor     Esophageal reflux     Moderate Depression [296.32]     HYPERLIPIDEMIA LDL GOAL <100     Hypertension goal BP (blood pressure) < 130/80     Anxiety     Alcoholism in recovery (H)     Alcoholic cirrhosis of liver with ascites (H)     Chronic blood loss anemia     Thrombocytopenia (H)     Tobacco abuse     Non-intractable vomiting with nausea     Portal hypertension (H)     Pulmonary nodules     Hyperthyroidism     Alcoholic cirrhosis of liver without ascites (H) - per Hepatology consult Nov 2017     Splenomegaly     Epistaxis     Other iron deficiency anemia     Other pancytopenia (H)     Solitary kidney, acquired     Macrocytic anemia     CKD (chronic kidney disease) stage 3, GFR 30-59 ml/min (H)     Confusion     Hepatic encephalopathy (H)       1b. On a scale of mild, moderate to severe please specify the severity of the patient's diabetes and/or neuropathy.    The patient denied having a history of being diagnosed with diabetes or neuropathy.    2. Do you have a health care provider? When was your most recent appointment? What concerns were identified?     The patient's PCP is Dr. Efren Bustos.  The patient's Primary Medical Clinic is [facility].  The patient's last medical appointment was unknown.  Pt is currently on an inpatient medical unit due to her physical health.    3. If indicated by answers to items 1 or 2: How do you deal with these concerns? Is that working for you? If you are not receiving care for this problem, why not?      The patient reported taking prescription medications as prescribed for the above medical issues.    4A. List current medication(s) including over-the-counter or herbal supplements--including pain management:     Current Facility-Administered  Medications   Medication     acetaminophen (TYLENOL) tablet 650 mg     ARIPiprazole (ABILIFY) tablet 5 mg     busPIRone (BUSPAR) tablet 15 mg     ciprofloxacin (CIPRO) tablet 500 mg     Daily 2 GRAM acetaminophen limit, unless fulminent liver failure or transaminases greater than or equal to 300 - 400, then none     FLUoxetine (PROzac) capsule 60 mg     folic acid (FOLVITE) tablet 1 mg     furosemide (LASIX) tablet 20 mg     heparin ANTICOAGULANT injection 5,000 Units     lactulose (CHRONULAC) solution 20 g     lactulose (CHRONULAC) solution 20 g    Or     lactulose (CHRONULAC) solution for enema prep 100 g     lidocaine (LMX4) cream     lidocaine 1 % 0.1-1 mL     lidocaine 1% with EPINEPHrine 1:100,000 injection 1 mL     melatonin tablet 5 mg     midodrine (PROAMATINE) tablet 5 mg     naloxone (NARCAN) injection 0.1-0.4 mg     omeprazole (priLOSEC) CR capsule 20 mg     ondansetron (ZOFRAN-ODT) ODT tab 4 mg    Or     ondansetron (ZOFRAN) injection 4 mg     potassium chloride ER (KLOR-CON M) CR tablet 10 mEq     rifaximin (XIFAXAN) tablet 550 mg     senna-docusate (SENOKOT-S/PERICOLACE) 8.6-50 MG per tablet 1 tablet     sodium chloride (PF) 0.9% PF flush 3 mL     sodium chloride (PF) 0.9% PF flush 3 mL     spironolactone (ALDACTONE) tablet 50 mg     ursodiol (ACTIGALL) tablet 500 mg     4B. Do you follow current medical recommendations/take medications as prescribed?     Yes    4C. When did you last take your medication?     5/6/2020    4D. Do you need a referral to have a follow up with a primary care physician?    No.    5. Has a health care provider/healer ever recommended that you reduce or quit alcohol/drug use?     Yes    6. Are you pregnant?     No    7. Have you had any injuries, assaults/violence towards you, accidents, health related issues, overdose(s) or hospitalizations related to your use of alcohol or other drugs:     Yes, explain: She has been hospitalized due to her drinking.    8. Do you have any  specific physical needs/accommodations? Yes, she has a walker    Dimension II Ratings   Biomedical Conditions and Complications - The placing authority must use the criteria in Dimension II to determine a client s biomedical conditions and complications.   RISK DESCRIPTIONS - Severity ratin Client has difficulty tolerating and coping with physical problems or has other biomedical problems that interfere with recovery and treatment. Client neglects or does not seek care for serious biomedical problems.    REASONS SEVERITY WAS ASSIGNED (What physical/medical problems does this person have that would inhibit his or her ability to participate in treatment? What issues does he or she have that require assistance to address?)    Patient reports having the above medical concerns. Pt reports taking the above medications. Pt reports that she is a daily cigarette smoker.  According to pt's H&P completed on 2020:   Monalisa Olguin is a 54 year old female admitted on 2020. 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         DIMENSION III - Emotional, Behavioral, Cognitive Conditions and Complications   1. (Optional) Tell me what it was like growing up in your family. (substance use, mental health, discipline, abuse, support)     Siblings: 2 living sisters- Ritika and Myriam; 1  sister  Raised by: both of her parents.  Supported: yes  Abuse: none  MI: none  CD: none    2. When was the last time that you had significant problems...  A. with feeling very trapped, lonely, sad, blue, depressed or hopeless  about the future? Past Month    B. with sleep trouble, such as bad dreams, sleeping restlessly, or falling  asleep during the day? Past  Month    C. with feeling very anxious, nervous, tense, scared, panicked, or like  something bad was going to happen? Past Month    D. with becoming very distressed and upset when something reminded  you of the past? Past Month- when her daughter told her she couldn't see her grandchildren if she was drinking.    E. with thinking about ending your life or committing suicide? Never    3. When was the last time that you did the following things two or more times?  A. Lied or conned to get things you wanted or to avoid having to do  something? 2 - 12 months ago    B. Had a hard time paying attention at school, work, or home? Past Month    C. Had a hard time listening to instructions at school, work, or home? 1+ years ago    D. Were a bully or threatened other people? Never    E. Started physical fights with other people? Never    Note: These questions are from the Global Appraisal of Individual Needs--Short Screener. Any item marked  past month  or  2 to 12 months ago  will be scored with a severity rating of at least 2.     For each item that has occurred in the past month or past year ask follow up questions to determine how often the person has felt this way or has the behavior occurred? How recently? How has it affected their daily living? And, whether they were using or in withdrawal at the time?    See above    4A. If the person has answered item 2E with  in the past year  or  the past month , ask about frequency and history of suicide in the family or someone close and whether they were under the influence.     The patient denied any family member or someone close to the patient had ever completed suicide.    Any history of suicide in your family? Or someone close to you?     The patient denied any family member or someone close to the patient had ever completed suicide.    4B. If the person answered item 2E  in the past month  ask about  intent, plan, means and access and any other follow-up information  to  determine imminent risk. Document any actions taken to intervene  on any identified imminent risk.      The patient denied having any suicide ideation within the past month.    5A. Have you ever been diagnosed with a mental health problem?     Yes, explain: depression and anxiety.      5B. Are you receiving care for any mental health issues? If yes, what is the focus of that care or treatment?  Are you satisfied with the service? Most recent appointment?  How has it been helpful?     The patient reported having prior treatment for mental health issues, but denied receiving any current treatment for mental health issues.    6. Have you been prescribed medications for emotional/psychological problems?     Yes, Prozac, Buspar, and Abilify    7. Does your MH provider know about your use?     The patient does not currently have any mental health providers.    8A. Have you ever been verbally, emotionally, physically or sexually abused?      No     Follow up questions to learn current risk, continuing emotional impact.      The patient denied having any history of being verbally, emotionally, physically or sexually abused.    8B. Have you received counseling for abuse?      The patient denied having any history of being verbally, emotionally, physically or sexually abused.    9. Have you ever experienced or been part of a group that experienced community violence, historical trauma, rape or assault?     No    10A. Bishop:    No    11. Do you have problems with any of the following things in your daily life?    Dizziness, Problem Solving, Concentration, Remembering and Reading, writing, calculating      Note: If the person has any of the above problems, follow up with items 12, 13, and 14. If none of the issues in item 11 are a problem for the person, skip to item 15.    Due to her liver disease.    12. Have you been diagnosed with traumatic brain injury or Alzheimer s?  No    13. If the answer to #12 is no, ask the  following questions:    Have you ever hit your head or been hit on the head? No    Were you ever seen in the Emergency Room, hospital or by a doctor because of an injury to your head? No    Have you had any significant illness that affected your brain (brain tumor, meningitis, West Nile Virus, stroke or seizure, heart attack, near drowning or near suffocation)? No    14. If the answer to #12 is yes, ask if any of the problems identified in #11 occurred since the head injury or loss of oxygen. The patient had never had a head injury or a loss of oxygen.    15A. Highest grade of school completed:     Some college, but no degree    15B. Do you have a learning disability? No    15C. Did you ever have tutoring in Math or English? No    15D. Have you ever been diagnosed with Fetal Alcohol Effects or Fetal Alcohol Syndrome? No    16. If yes to item 15 B, C, or D: How has this affected your use or been affected by your use?     The patient denied having any history of a learning disability, tutoring in math or English or being diagnosed with Fetal Alcohol Effects or Fetal Alcohol Syndrome.    Dimension III Ratings   Emotional/Behavioral/Cognitive - The placing authority must use the criteria in Dimension III to determine a client s emotional, behavioral, and cognitive conditions and complications.   RISK DESCRIPTIONS - Severity ratin Client has difficulty with impulse control and lacks coping skills. Client has thoughts of suicide or harm to others without means; however, the thoughts may interfere with participation in some treatment activities. Client has difficulty functioning in significant life areas. Client has moderate symptoms of emotional, behavioral, or cognitive problems. Client is able to participate in most treatment activities.    REASONS SEVERITY WAS ASSIGNED - What current issues might with thinking, feelings or behavior pose barriers to participation in a treatment program? What coping skills or other  "assets does the person have to offset those issues? Are these problems that can be initially accommodated by a treatment provider? If not, what specialized skills or attributes must a provider have?    The patient reports having mental health diagnosis of depression and anxiety. Pt is taking Prozac, Buspar, and Abilify for her mental health at this time. Pt denies a history of abuse. At the time of the assessment, pt's PHQ-9 score was 16 (moderatly severe depression) and her MONA-7 score was 6 (mild anxiety).  Pt lacks emotional and stress management skills. Pt denies SIB, SA, suicidal thoughts at this time. During pt's assessment, her [place]-Suicide Severity Rating Scale score was 0 - Very Low Risk.         DIMENSION IV - Readiness for Change   1. You ve told me what brought you here today. (first section) What do you think the problem really is?     Pt was asked to complete a CD evaluation by the [facility] Pre-Liver Transplant team.    2. Tell me how things are going. Ask enough questions to determine whether the person has use related problems or assets that can be built upon in the following areas: Family/friends/relationships; Legal; Financial; Emotional; Educational; Recreational/ leisure; Vocational/employment; Living arrangements (DSM)      The patient currently lives with her  and her two children. The patient reports her  drinks 12 beers a night. The patient reported having a relationship conflict with \"everybody\" due to her alcohol abuse issues. The patient reported having a history of legal issues, including 2 DWIs. She is not on probation at this time. The patient is unemployed and she last worked 5 years ago. The patient lacks a current sober peer support network.    3. What activities have you engaged in when using alcohol/other drugs that could be hazardous to you or others (i.e. driving a car/motorcycle/boat, operating machinery, unsafe sex, sharing needles for drugs or " "tattoos, etc     The patient denied engaging in any of the above dangerous activities when using alcohol and/or drugs.    4. How much time do you spend getting, using or getting over using alcohol or drugs? (DSM)     She reports she would drink all day.    5. Reasons for drinking/drug use (Use the space below to record answers. It may not be necessary to ask each item.)  Like the feeling Yes   Trying to forget problems No   To cope with stress No   To relieve physical pain Yes   To cope with anxiety Yes   To cope with depression Yes   To relax or unwind Yes   Makes it easier to talk with people Yes   Partner encourages use No   Most friends drink or use Yes   To cope with family problems No   Afraid of withdrawal symptoms/to feel better Yes   Other (specify)  No     A. What concerns other people about your alcohol or drug use/Has anyone told you that you use too much? What did they say? (DSM)      about alcohol: \"he doesn't pay attention. He was telling me to stop and slow down. He tells me he knows.\"   about THC: \"he doesn't use it. He would like... but he doesn't want me to start drinking. He drinks 12 beers a day.\"    Her daughter told her she doesn't get to see her two grandchildren (ages 2 & 4) if she keeps drinking and that is motivation for her.    B. When did you first think you had a problem with drugs or alcohol?    Alcohol: \"when I first went into detox.\" 2013    6. What changes are you willing to make? What substance are you willing to stop using? How are you going to do that? Have you tried that before? What interfered with your success with that goal?      What specific changes are you willing to make? \"whatever. I will quit too.\"  Why do you want to make this change? \"because I would like to have a liver. It would make me feel better.\"  What are you willing to commit to in order to make this change? \"[employer].\" She has never been before.  What is getting in your way to make " "this change? \"I don't have a 's license right now.\"  What is the cost if you don't make this change? Death and not seeing her grandkids    7. What would be helpful to you in making this change?     nothing    Dimension IV Ratings   Readiness for Change - The placing authority must use the criteria in Dimension IV to determine a client s readiness for change.   RISK DESCRIPTIONS - Severity rating: 3 Client displays inconsistent compliance, minimal awareness of either the client's addiction or mental disorder, and is minimally cooperative.    REASONS SEVERITY WAS ASSIGNED - (What information did the person provide that supports your assessment of his or her readiness to change? How aware is the person of problems caused by continued use? How willing is she or he to make changes? What does the person feel would be helpful? What has the person been able to do without help?)      Pt's primary reason for completing this CD evaluation was at the request of [facility] Pre-Liver Transplant team. It appears her motivation to stay sober is external; to get a new liver to feel better and to see her grandchildren. Pt struggles with cross addiction and is only sober from alcohol because she is now smoking marijuana.           DIMENSION V - Relapse, Continued Use, and Continued Problem Potential   1A. In what ways have you tried to control, cut-down or quit your use? If you have had periods of sobriety, how did you accomplish that? What was helpful? What happened to prevent you from continuing your sobriety? (DSM)     Control: \"I had to just stop.\" She did not try to limit her drinking.  Longest sober time: right now is her longest period of sobriety.  How did you do it: \"I don't go to bars, and I don't go to parties. I don't have bottles at home. I laugh at people\" who are drinking because they look stupid.    1B. What were the circumstances of your most recent relapse with mood altering chemicals?    She is still " "sober from alcohol.    2. Have you experienced cravings? If yes, ask follow up questions to determine if the person recognizes triggers and if the person has had any success in dealing with them.     In the past 30 days, on a scale of 0-10 (0 least severe to 10 being most severe, how would you rate your cravings?  Alcohol:  1  THC: 0    3. Have you been treated for alcohol/other drug abuse/dependence? Yes.    3B. Number of times(lifetime) (over what period) 7.    3C. Number of times completed treatment (lifetime) 7.    3D. During the past three years have you participated in outpatient and/or residential?  Yes.    3E. When and where?      2013: [facility]    4. Support group participation: Have you/do you attend support group meetings to reduce/stop your alcohol/drug use? How recently? What was your experience? Are you willing to restart? If the person has not participated, is he or she willing?     \"I know where there are some.\" The last meeting she attended was probably 4 months ago. She is interested in [facility] meetings.    5. What would assist you in staying sober/straight?     Nothing.    Dimension V Ratings   Relapse/Continued Use/Continued problem potential - The placing authority must use the criteria in Dimension V to determine a client s relapse, continued use, and continued problem potential.   RISK DESCRIPTIONS - Severity ratin No awareness of the negative impact of mental health problems or substance abuse. No coping skills to arrest mental health or addiction illnesses, or prevent relapse.    REASONS SEVERITY WAS ASSIGNED - (What information did the person provide that indicates his or her understanding of relapse issues? What about the person s experience indicates how prone he or she is to relapse? What coping skills does the person have that decrease relapse potential?)      Patient has attended and completed 7 CD treatments in her life time.  Pt has " "minimally  attended 12 step groups and the last meeting she attended was 4 months ago. From September 2019 through now is pt's longest period of sobriety. She has never had long term sobriety before this.  The only reason she is sober from alcohol is due to not feeling well and using marijuana instead. Pt has minimal insight into her relapse triggers and warning signs as well as cross addiction. In the past 30 days, pt rates her cravings for alcohol as a 1 on the 0-10 Craving Scale. Pt has minimal impulse control along with sober coping skills. Pt lacks insight into the effects her use has had on her physical and mental health. Pt is at a high risk for relapse on alcohol once she feels better physically.       DIMENSION VI - Recovery Environment   1. Are you employed/attending school? Tell me about that.     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  She reports not being able to work due to her alcohol use.  Heydi receives SSDI benefits.    2A. Describe a typical day; evening for you. Work, school, social, leisure, volunteer, spiritual practices. Include time spent obtaining, using, recovering from drugs or alcohol. (DSM)     \"I do a lot of the cleaning and house work. Do working outside. Keeping busy. I like to do arts and crafts.\"    Please describe what leisure activities have been associated with your substance abuse:     Bars and parties.    2B. How often do you spend more time than you planned using or use more than you planned? (DSM)     nothing    3. How important is using to your social connections? Do many of your family or friends use?      currently drinks.  Friends: \"they used to be friends.\"    4A. Are you currently in a significant relationship?     Yes.  4B. How long?  33 years            Please describe your significant other's use of mood altering chemicals?  drinks 12 beers a day.    4C. Sexual Orientation: " "    Heterosexual    5A. Who do you live with?      Her  and 2 children    5B. Tell me about their alcohol/drug use and mental health issues.     Her  drinks 12 beers a day.    5C. Are you concerned for your safety there? No    5D. Are you concerned about the safety of anyone else who lives with you? No    6A. Do you have children who live with you?     Yes, two children, Connie (age 24) and Satinder (age 15)    6B. Do you have children who do not live with you?     Yes, one adult daughter    7A. Who supports you in making changes in your alcohol or drug use? What are they willing to do to support you? Who is upset or angry about you making changes in your alcohol or drug use? How big a problem is this for you?      \"my daughter and her children.\"    7B. This table is provided to record information about the person s relationships and available support It is not necessary to ask each item; only to get a comprehensive picture of their support system.  How often can you count on the following people when you need someone?   Partner / Spouse Usually supportive   Parent(s)/Aunt(s)/Uncle(s)/Grandparents Always supportive   Sibling(s)/Cousin(s) Usually supportive   Child(lit) Usually supportive   Other relative(s) Usually supportive   Friend(s)/neighbor(s) Always supportive   Child(lit) s father(s)/mother(s) Usually supportive   Support group member(s) The patient denied having any current involvement with 12-step or other support group meetings.   Community of oswald members The patient denied having any current involvement with community oswald members.   /counselor/therapist/healer The patient denied having any current involvement with a , counselor, therapist or healer.   Other (specify) No     8A. What is your current living situation?     She lives in a house.    8B. What is your long term plan for where you will be living?     No changes    8C. Tell me about your living " "environment/neighborhood? Ask enough follow up questions to determine safety, criminal activity, availability of alcohol and drugs, supportive or antagonistic to the person making changes.      No concerns    9. Criminal justice history: Gather current/recent history and any significant history related to substance use--Arrests? Convictions? Circumstances? Alcohol or drug involvement? Sentences? Still on probation or parole? Expectations of the court? Current court order? Any sex offenses - lifetime? What level? (DSM)    2 DWIs, she is unsure when they are from.    10. What obstacles exist to participating in treatment? (Time off work, childcare, funding, transportation, pending snf time, living situation)     Her physical health.    Dimension VI Ratings   Recovery environment - The placing authority must use the criteria in Dimension VI to determine a client s recovery environment.   RISK DESCRIPTIONS - Severity rating: 3 Client is not engaged in structured, meaningful activity and the client's peers, family, significant other, and living environment are unsupportive, or there is significant criminal justice system involvement.    REASONS SEVERITY WAS ASSIGNED - (What support does the person have for making changes? What structure/stability does the person have in his or her daily life that will increase the likelihood that changes can be sustained? What problems exist in the person s environment that will jeopardize getting/staying clean and sober?)     The patient currently lives with her  and her two children. The patient reports her  drinks 12 beers a night. The patient reported having a relationship conflict with \"everybody\" due to her alcohol abuse issues. The patient reported having a history of legal issues, including 2 DWIs. She is not on probation at this time. The patient is unemployed and she last worked 5 years ago. The patient lacks a current sober peer support network.         Client " Choice/Exceptions   Would you like services specific to language, age, gender, culture, Uatsdin preference, race, ethnicity, sexual orientation or disability?  No    What particular treatment choices and options would you like to have? None    Do you have a preference for a particular treatment program? None    Criteria for Diagnosis     Criteria for Diagnosis  DSM-5 Criteria for Substance Use Disorder  Instructions: Determine whether the client currently meets the criteria for Substance Use Disorder using the diagnostic criteria in the DSM-V pp.481-587. Current means during the most recent 12 months outside a facility that controls access to substances    Category of Substance Severity (ICD-10 Code / DSM 5 Code)     Alcohol Use Disorder Severe  (10.20) (303.90)   Cannabis Use Disorder Mild  (F12.10) (305.20)   Hallucinogen Use Disorder The patient does not meet the criteria for a Hallucinogen use disorder.   Inhalant Use Disorder The patient does not meet the criteria for an Inhalant use disorder.   Opioid Use Disorder The patient does not meet the criteria for an Opioid use disorder.   Sedative, Hypnotic, or Anxiolytic Use Disorder The patient does not meet the criteria for a Sedative/Hypnotic use disorder.   Stimulant Related Disorder The patient does not meet the criteria for a Stimulant use disorder.   Tobacco Use Disorder Moderate   (F17.200) (305.1)   Other (or unknown) Substance Use Disorder The patient does not meet the criteria for a Other (or unknown) Substance use disorder.       Collateral Contact Summary   Number of contacts made: 3    Contact with referring person:  No    If court related records were reviewed, summarize here: No court records had been reviewed at the time of this documentation.    Information from collateral contacts supported/largely agreed with information from the client and associated risk ratings.      Rule 25 Assessment Summary and Plan   's Recommendation    1)   "Complete an Intensive Outpatient MICD Treatment Program.  2)  Abstain from all mood-altering chemicals unless prescribed by a licensed provider.   3)  Attend, at minimum, 2 weekly support group meetings, such as 12 step based (AA/NA), SMART Recovery, Health Realizations, and/or Refuge Recovery meetings.     4)  Actively work with a female mentor/sponsor on a weekly basis.   5)  Follow all the recommendations of your treatment/medical providers.  6)  Begin working with an individual psychotherapist.      Rationale for Recommendations:  It appears her motivation to stay sober is external; to get a new liver to feel better and to see her grandchildren. Pt struggles with cross addiction and is only sober from alcohol because she is now smoking marijuana. Pt has very little continuous sobriety prior to her current sobriety. She lacks long term sober maintenance skills. Pt has minimal insight into her relapse triggers and warning signs as well as cross addiction. The last AA meeting she attended was 4 months ago. In the past 30 days, pt identifies have minimal cravings. She does not appear to have any sober supports. Her  of 33 years drinks a 12 pack of beer a day.  It appears she is still spending time with friends who actively drink. Pt is at a high risk for relapse on alcohol once she feels better physically.      Collateral Contacts     Name:    Eron Olguin   Relationship:       Phone Number:    [phone] Releases:    Yes- verbal auth to speak with Eron due to Covid-19     5/7/20: Eron stated pt hasn't drank in \"better part of a year.\"  Prior to her quitting, she would quit drinking off and on due to her health. She was a daily drinker until she got sick, then stop drinking for a short time and then begin drinking again. He stated she liked whiskey and strong beer. He has been concerned about her drinking over the years and they have tried numerous treatments. The last treatment was a few years ago. He " "stated there is still alcohol in the house because he drinks beer, but she doesn't use it. When it comes to pt's marijuana use, he would \"prefer she didn't do it\" and that she is using it as an alternative to alcohol. He hadn't noticed any outward signs that she is struggling with depression or anxiety.    Collateral Contacts     Name:    Cresencio Yeager   Relationship:    [facility]   Phone Number:    [phone]   Releases:    PHILLIP Dupont completed the Psychocial Assessment for Liver Transplant on 4/24/2020. She documented the following about pt's chemical use and mental health.    Chemical Dependency:  Heydi currently smokes a half pack to one pack of cigarettes daily.  She also smokes marijuana twice daily to help her sleep.  I have advised Heydi to discontinue both.  I also encouraged Heydi to talk with her doctors about options to help with quitting smoking and sleep.  Heydi reports her last consumption of alcohol was early September of 2019.  She was consuming a half liter to one liter of Cj Beam daily.  Heydi reports a history of six treatments for alcohol and two DUIs (2-3 years ago and another 4 years ago).  Heydi reports her last treatment was in [place] \"about a year ago.\"  She drank throughout treatment.  Mental Health: Heydi has been diagnosed with depression and anxiety, and is prescribed abilify and buspar by her primary care provider.  She denies any history of suicidal ideation, or hospitalization for mental health treatment.  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  She has known of her liver disease for approximately five years and she has struggled to maintain sobriety.  Heydi wants to be considered for liver transplantation.    Heydi reports sobriety since September of 2019.  A PEth test is pending.  Heydi has a history of six treatments for alcohol, two DUIs, and ongoing alcohol consumption with known liver disease.  Heydi remains at high risk for relapsing " to alcohol, and would be high risk for relapsing to alcohol post transplantation.  Heydi also continues to smoke a half pack to a pack of cigarettes daily and she smokes marijuana twice daily.  Heydi will need a substance use assessment as part of her liver transplant evaluation.  I will request this to be scheduled at [facility].       Collateral Contacts     Name:    [facility]   Relationship:    [facility]   Phone Number:    NA   Releases:    NA     The patient's medical record at [facility] was reviewed and the information contained in the medical record supported the patient's account of her chemical use history and chemical use consequences.  ollateral Contacts      A problematic pattern of alcohol/drug use leading to clinically significant impairment or distress, as manifested by at least two of the following, occurring within a 12-month period:    1.) Alcohol/drug is often taken in larger amounts or over a longer period than was intended.  2.) There is a persistent desire or unsuccessful efforts to cut down or control alcohol/drug use  3.) A great deal of time is spent in activities necessary to obtain alcohol, use alcohol, or recover from its effects.  4.) Craving, or a strong desire or urge to use alcohol/drug  5.) Recurrent alcohol/drug use resulting in a failure to fulfill major role obligations at work, school or home.  6.) Continued alcohol use despite having persistent or recurrent social or interpersonal problems caused or exacerbated by the effects of alcohol/drug.  7.) Important social, occupational, or recreational activities are given up or reduced because of alcohol/drug use.  8.) Recurrent alcohol/drug use in situations in which it is physically hazardous.  9.) Alcohol/drug use is continued despite knowledge of having a persistent or recurrent physical or psychological problem that is likely to have been caused or exacerbated by alcohol.  10.) Tolerance, as defined by  either of the following: A need for markedly increased amounts of alcohol/drug to achieve intoxication or desired effect.  11.) Withdrawal, as manifested by either of the following: The characteristic withdrawal syndrome for alcohol/drug (refer to Criteria A and B of the criteria set for alcohol/drug withdrawal).      Specify if: In early remission:  After full criteria for alcohol/drug use disorder were previously met, none of the criteria for alcohol/drug use disorder have been met for at least 3 months but for less than 12 months (with the exception that Criterion A4,  Craving or a strong desire or urge to use alcohol/drug  may be met).     In sustained remission:   After full criteria for alcohol use disorder were previously met, non of the criteria for alcohol/drug use disorder have been met at any time during a period of 12 months or longer (with the exception that Criterion A4,  Craving or strong desire or urge to use alcohol/drug  may be met).   Specify if:   This additional specifier is used if the individual is in an environment where access to alcohol is restricted.    Mild: Presence of 2-3 symptoms  Moderate: Presence of 4-5 symptoms  Severe: Presence of 6 or more symptoms

## 2020-05-06 NOTE — PLAN OF CARE
[facility] CARES: [id number].  STATUS: Pt admitted 5/4 for AMS- Mechanical fall last week, resulting in fracture of 5th Metatarsal and possible fracture of 2nd metatarsal as well- CAM boot in place.   NEURO: Pt A/o x 3. Slow to respond at times. Wilmer 0.    VS: VSS on RA.   ACTIVITY: Up with A1- CAM boot to be worn at all times when pt up. Off while pt in bed currently.   PAIN: Ajay pain while in bed- Some pain noted in LLE with use.   CARDIAC: HR WDL. Soft BP's.   RESP: No C/o SOB. No Cough noted. LS Clear Upper lobes .  GI/: Voiding spontaneously on commode. BM x 2 overnight. Distended Abd. Strict I & O- Urine and stool mixed a lot of the time so Strict I & O's remain difficult. No C/o nausea overnight. Per AM nurse note, recommends Zofran prior to AM meds?   DIET: Regular.   SKIN: Jaundice. +Brusies.   LDA'S: R PIV SL.   LABS: Ammonia 59. Bili 12.7 (12.6). Creat 1.92 (1.96). COVID Negative 5/4. Hgb 7.4 (7.6). Meld 30 5/5.   CHANGES THIS SHIFT: No changes this shift. Melatonin given at HS per pt request for inability to sleep. Started Heparin Injections 5/5.   POC: Cont with POC. Plan for discharge to prior once Mental status back to baseline- Per ortho- Plan for F/u in 2-4 weeks.  Several lab draws order for this AM. Call light within reach. Bed alarm on for pt safety. Will continue to monitor.

## 2020-05-07 NOTE — PROGRESS NOTES
[facility] [place]    Progress Note - [place] 4 Service        Date of Admission:  5/4/2020    Assessment & Plan   Monalisa Olguin is a 54 year old female admitted on 5/4/2020. 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    Today's Changes:  - Continue lactulose scheduled/prn and rifaximin BID  - Schedule melatonin PM  - [facility] protocol  - Continue CAM boot while not in bed (ok to wear in bed too if more comfortable)  - Likely discharge tmrw     Acute metabolic encephalopathy 2/2 Hepatic encephalopathy  Hyperbilirubinemia  Decompensated EtOH Cirrhosis c/b ascites, recurrent HE, SBP  Presenting with N/V and increased confusion over past several days. Recent admission from 4/21-24 with similar presentation, thought to be secondary to medication non-adherence. Infectious w/u unrevealing at that time, no ascites to tap for diagnostic para. On this presentation she does not have fevers or chills. No leukocytosis. Bili up from discharge, other LFTs around baseline. Ammonia increased at 57, but lower than on previous admission. No abdominal pain. UA bland. Hgb stable from discharge. No other metabolic derangements. Did have witness mechanical fall last week, head CT to r/u SDH was negative for intracranial pathology. Pt states she has only been taking one dose of lactulose a day and has not had a BM in 3 days (unable to corroborate with ). Most likely etiology of HE is lactulose deficiency.  - Pocket too small for bedside diagnostic paracentesis, low suspicion for SBP  - Lactulose scheduled TID and PRN, [facility] Protocol  - Rifaximin BID  - PTA furosemide and spironolactone  - Cipro 250mg daily for SBP prophylaxis  (renal dosing)  - Hepatology consult - transplant work up:   - TTE - Mild RV dilation, normal EF   - Chem dep consult - Rule 25 process started 5/6  - Hep A and Hep B first vaccine 5/6 - will need to complete series as outpatient   - Hep A (HAVRIX) - 2nd dose in 6-18 months   - Hep B (ENERGIX-B) - 2nd dose in 1 month, 3rd in 6 months     MELD-Na score: 30 at 5/7/2020  5:26 AM  MELD score: 30 at 5/7/2020  5:26 AM  Calculated from:  Serum Creatinine: 1.78 mg/dL at 5/7/2020  5:26 AM  Serum Sodium: 140 mmol/L (Rounded to 137 mmol/L) at 5/7/2020  5:26 AM  Total Bilirubin: 13.2 mg/dL at 5/7/2020  5:26 AM  INR(ratio): 1.99 at 5/7/2020  5:26 AM  Age: 54 years 6 months     Pulmonary edema  Denies cough, SOB. BNP checked and elevated at 2471. Given lasix 40mg IV in ED. No known history of CHF, no prior TTE in chart. COVID PCR and negative (low pretest probability). Procal returned at 0.3, low overall suspicion for infection.  - COVID negative  - PTA furosemide and spironolactone     Mechanical Fall  Fracture of L 5th metatarsal  Probable fracture of proximal L 2nd metatarsal  Had witnessed mechanical fall last week. No head trauma per pt. Head CT on admission without acute intracranial findings. Foot XR with fracture of 5th metatarsal and probable fracture of 2nd metatarsal. ED discussed with ortho who recommended Cam boot.  - Cam boot  - Discussed with ortho - follow up in 2-4 weeks in clinic with repeat imaging (they will contact clinic to schedule appointment).  - Partial weight bearing as tolerated     ALFREDO on CKD - downtrending  Cr 2.06, up from 1.62 on 4/27. Likely prerenal in the setting of poor PO intake and N/V over past several days, however does have increased pulmonary edema vs infection on CXR. S/p 40mg IV lasix in ED.   - Monitor fluid status  - PTA lasix 20mg and spironolactone 50mg     Thrombocytopenia  Secondary to underlying liver disease, stable/improved from discharge     Chronic macrocytic anemia  Stable  from discharge. No evidence of bleeding.     Diet: Regular Diet Adult    Fluids: PO  Lines: PIV  DVT Prophylaxis: Heparin SQ  Gonzalez Catheter: not present  Code Status: Full Code      Disposition Plan   Expected discharge: Tomorrow, recommended to prior living arrangement once mental status at baseline.  Entered: Mary Worthington MD 05/07/2020, 6:43 AM       The patient's care was discussed with the Attending Physician, Dr. Kwong, Bedside Nurse, Care Coordinator/ and Patient.    Mary Worthington MD  [address], [place]  Pager: [phone]  Please see sticky note for cross cover information  ______________________________________________________________________    Interval History   Nursing notes reviewed. No acute events overnight. Pt seen and examined. Awake and alert. Answering questions appropriately. Still having trouble sleeping. Denies abdominal pain, nausea. No headache. No fevers or chills.    4 point ROS negative except for above.    Data reviewed today: I reviewed all medications, new labs and imaging results over the last 24 hours. I personally reviewed no images or EKG's today.    Physical Exam   Vital Signs: Temp: 98  F (36.7  C) Temp src: Oral BP: 92/44 Pulse: 78 Heart Rate: 75 Resp: 18 SpO2: 93 % O2 Device: None (Room air)    Weight: 134 lbs 11.22 oz     General Appearance: No acute distress. Sclera icteric. Answers questions appropriately.  Respiratory: CTAB. No wheezes or crackles.  Cardiovascular: RRR, no m/r/g  GI: BS+. Distended. Nontender.  Skin: Jaundiced. No rash.  Other: AOx2-3, moving all extremities. Mild asterixis. Speech fluent.    Data   Recent Labs   Lab 05/07/20  0526 05/06/20  0536 05/05/20  0548 05/04/20  1038   WBC 4.5 4.7 5.4 6.4   HGB 7.7* 7.4* 7.6* 8.4*   * 104* 104* 103*   PLT 56* 56* 61* 58*   INR 1.99*  --  1.93* 1.68*    140 138 137   POTASSIUM 3.5 3.5 3.5 3.7   CHLORIDE 109 109 109 107   CO2 23 23 22 23    BUN 31* 37* 40* 38*   CR 1.78* 1.92* 1.96* 2.06*   ANIONGAP 7 8 7 8   ELSIE 9.0 9.0 9.3 9.8   * 91 117* 110*   ALBUMIN 2.8* 2.8* 2.8* 3.2*   PROTTOTAL 4.7* 4.7* 4.8* 5.3*   BILITOTAL 13.2* 12.7* 12.6* 13.9*   ALKPHOS 159* 146 141 157*   ALT 14 15 14 16   AST 34 31 30 36   LIPASE  --   --   --  66*   TROPI  --   --   --  0.043

## 2020-05-07 NOTE — PLAN OF CARE
BP 92/44 (BP Location: Right arm)   Pulse 78   Temp 98  F (36.7  C) (Oral)   Resp 18   Ht 1.524 m (5')   Wt 61.1 kg (134 lb 11.2 oz)   LMP 05/16/2012   SpO2 93%   BMI 26.31 kg/m      Reason for Admission: Hepatic Encephalopathy d/t increased nausea, vomiting.   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 [place] score 0. Lactulose protocol- No prn Lactulose given this shift.   Activity: WBAT with Cam Boot L foot with walker and GB in room and commode. Cam Boot Left foot off while in bed. PT/OT  Vitals: VSS on RA. Soft BP but within parameters.   Lines: L PIV SL   Cardiac: WNL. Denies chest pain.   Respiratory: Denies SOB or cough.   GI/: + BS, passing flatus. LBM 5/7/20 but none this shift. Voiding spontaneously.   Skin: Jaundiced, intact. Bruising noted bilateral feet and arms. Blanchable redness on buttocks.   Pain: Denies pain at rest. Pain in L foot while up ambulating or transferring. Denied pain meds this shift.   Diet/Nausea:  Regular Diet, Denies nausea  Safety: Bed alarm on.    Plan today:  Continue to monitor mentation and Rehab

## 2020-05-07 NOTE — PROVIDER NOTIFICATION
Provider notification  Provider paged that pt is unable to sleep, pt requested bedtime medication early so she could sleep but it did not help  Provider called back: make pt aware that melatonin takes a while to kick in, and encourage her to turn of lights, phone, and tv

## 2020-05-07 NOTE — PLAN OF CARE
PT -   Discharge Planner PT   Patient plan for discharge: home  Current status: Pt is SBA for bed mobility and sit<>stand transfers with FWW. Pt needing min A for donning CAM boot. Pt ambulates in hallway up to 120ft with FWW with SBA. Pt ascends/descends 3 stairs with 1-2 UE support and contact guard assist using gait belt  Barriers to return to prior living situation: medical status, below baseline, cognition, impaired functional mobility  Recommendations for discharge: Home with assist, HH PT/OT, use of FWW (which pt owns)  Rationale for recommendations: Pt not needing much assist for mobility, safe to discharge home with assistance and use of FWW. Pt will need HH PT to progress strength/ROM, balance, gait and independence with all mobility and ADLs/IADLs.  Entered by: Lay Shipman 05/07/2020 4:17 PM

## 2020-05-07 NOTE — PROGRESS NOTES
Care Coordinator Progress Note    Admission Date/Time:  5/4/2020  Attending MD:  Joey Kwong MD    Data  Chart reviewed, discussed with interdisciplinary team.   Patient was admitted for:    Confusion  Closed nondisplaced fracture of metatarsal bone of left foot, unspecified metatarsal, initial encounter  2019-Ncov not Detected.    Concerns with insurance coverage for discharge needs: None.  Current Living Situation: Patient lives with spouse.  Support System: Supportive and Involved  Services Involved: None prior to admission.  Transportation at Discharge: Family or friend will provide  Transportation to Medical Appointments:   - Name of caregiver: Spouse, Eron  Barriers to Discharge: None noted.    Coordination of Care and Referrals: Provided patient/family with options for Home Care.        Assessment  Patient is a 54yr old female with a prior medical history of alcohol cirrhosis w/ascites, portal hypertension, thrombocytopenia, elevated MCV, anemia, CKD, s/p nephrectomy and cholecystectomy, orthostatic hypotension, depression and anxiety who was admitted w/increased nausea, vomiting, and concern for hepatic encephalopathy. Per chart review, PT/OT are recommending patient discharge to home when medically stable w/home care services. Writer contacted patient via hospital phone to discuss discharge planning. Patient confirms that she lives locally with her spouse and two children. Home care options discussed, patient notes that she has used [facility] in the past and would like a referral sent to them at this time for RN/PT/OT, referral sent, orders placed. Patient voiced no further discharge needs. Patient states that she has all in necessary in home equipment at this time. Family will transport at discharge. RNCC will continue to follow for discharge planning.     [facility]  Phone  [phone]  Fax  [phone]    Plan  Anticipated Discharge Date:  5/8?  Anticipated Discharge Plan:  Home  w/home care services    Lay Worthington, RNCC, BSN    [facility]  [address] [place] [address]    [email or web address]  [email or web address]    Office: [phone] Pager: [phone]  To contact the weekend RNCC, page [phone].

## 2020-05-07 NOTE — PLAN OF CARE
Assumed care 1500 to 2330. Vital signs stable on room air. BP soft. Pt is alert and oriented can be slightly forgetful. Bed alarm on. [place]: 0. Scheduled Lactulose with no PRN given. Pt denies Pain. Pt denies nausea and SOB. Lung sounds clear. Cardiac WDL. Bowel Sounds present pt had 5 bowel movements this shift.. Pt voiding adequately, clear yellow urine. Pt Assist of 1 with transfers, Gait belt and walker Cam boot on when ambulating. Currently off while in bed. Skin clean, dry, and intact  Jaundiced. Bruising. Blanchable redness on buttocks. LPIV saline locked. Plan: Continue to monitor mentation and Rehab

## 2020-05-07 NOTE — H&P
I did not see the patient but agree with below notes  No billing please    Liver Transplant Surgical Evaluation    Assessment and Plan:  1. liver transplant evaluation - patient is a good candidate overall. Benefits and surgical risks of a liver transplantation were discussed.  2. End stage liver disease due to cirrohsis of the liver due to Laennec's cirrhosis.    Surgical evaluation:  1. Portal Vein: patent  2. Hepatic Artery: patent  3. TIPS: none  4. Previous Abdominal Surgery: past lap hand assist left kidney donor, past lap cholecystectomy,   5. Hepatocellular Carcinoma: none  6. Ascites: moderate. Last paracentesis . Costal Angle: wide  8. Portopulmonary Hypertension: unable to assess RVSP on last echo  9. Hepatopulmonary Syndrome: no  10. Cardiac Evaluation: needs DSE  11. Nutritional Status: fair. Some hypoalbuminemia to 2.8. Denies weakness/weight loss  12. Hypertension: no  13. Diabetes: no  14. Smokin pack year tobacco history. Ongoing marijuana  15. Frailty: able to mobilize with walker but does appear somewhat weak    Recommendations:   Monalisa Olguin is a fair candidate for transplant overall. Active issues:    -needs to complete CD treatments and neuropsychiatric evaluation    -needs dobutamine stress echo as well as right heart assessment (most recent echo without ability to assess RVSP    -will discuss in meeting but may need nephrology evaluation. Best creatinine in past two years 0.8, but most recent baseline closer to 1.3 with acute elevation in to the 2-3 range frequently. She has less reserve given single kidney status and may need kidney transplant evaluation preoperatively should she go into more severe HRS or if stress of liver transplant leads to significant kidney injury. She should receive high status at minimum as a prior live kidney donor.      Patients overall evaluation will be discussed at the Liver Transplant selection committee meeting with a final  recommendation on the patients suitability for transplant to be made at that time.    Consult Full Details:  Monalisa Olguin was seen in consultation at the request of Dr. Kovacs for evaluation as a potential liver transplant recipient.    Reason for Visit:  Monalisa Olguin is a 54 year old female with cirrohsis of the liver due to Laennec's, who presents for liver transplant evaluation.    HPI:  Presenting complaint: dizziness/fogginess, broken foot  Monalisa Olguin is a 55 yo F with a history of Laennec's cirrhosis. She was recently admitted after a fall with a broken foot. She reports feeling 'foggy' before her fall, but not dizzy or light-headed. This fogginess is similar to her feelings of encephalopathy and is generally controlled with lactulose and bowel function. She reports being on top of her medications, but thinks she might have gotten behind unawares. While admitted she was noted to have an elevated MELD score, thus the decision to pursue transplant workup was made.    She has liver failure from a long-standing alcohol use history. She has been sober since September 2019. Her decompensation has taken the form of encephalopathy (generally controlled with lactulose) and ascites (previously requiring frequent taps, now controlled with diuretics. No tap since January 2020).  She denies infections in her ascitic fluid and denies history of GI bleeds or variceal bleeds. She is otherwise in her usual state of health without complaints.     Previous Transplant Hx: prior living kidney donor (sister- lap hand assist LEFT kidney donor)    Cardiovascular Hx:  h/o Cardiac Issues: none  Exercise Tolerance: able to walk but limited currently by foot pain. No chest pain/shortness of breath    Potential Donor(s): doesn't know    ROS:   REVIEW OF SYSTEMS (check box if normal)  [X]   GENERAL [X]   PULMONARY [X]   GENITOURINARY  [X]    CNS [X]   CARDIAC [X]    ENDOCRINE  [X]   EARS,NOSE,THROAT [X]    GASTROINTESTINAL [X]    NEUROLOGIC   [X]   MUSCLOSKELTAL [X]    HEMATOLOGY    Examination:     Vitals:  There were no vitals taken for this visit.    GENERAL APPEARANCE: alert and no distress  EYES: PERRL  HENT: mouth without ulcers or lesions  NECK: supple, no adenopathy  RESP: lungs clear to auscultation - no rales, rhonchi or wheezes  CV: regular rhythm, normal rate, no rub   ABDOMEN: soft, nontender. Moderate ascitic fluid wave. Wide costal angle. Well healed midline hand port incision, no hernia. Well-healed lower abdominal incision for , no hernia  MS: extremities normal- no gross deformities noted, no evidence of inflammation in joints, no muscle tenderness  SKIN: no rash  NEURO: Normal strength and tone, sensory exam grossly normal, mentation intact and speech normal  PSYCH: mentation appears normal. and affect normal/bright      Results:             I had a long discussion with the patient regarding liver transplantation which included but was not limited to  the following points:  1. Liver transplant selection committee process.  2. The federal rules for cadaveric waiting list, the size and blood type matching of the organ. The availability of living-related donor transplantation.  3. The types of donors: brain death donors, non-heart beating donors, partial liver grafts: splits and living donor grafts  4. Extended criteria Donors (older age, steasosis) and the increased risk of primary non-function using the extended criteria donors  5. The CDC high risk donors, Risk of donor transmitted infections and donor transmitted malignancy  6. The liver transplant operation and the associated risks and technical complications which can include intraoperative death, post operative death, Primary non-function, bleeding requiring re-operations, arterial and biliary complications, bowel perforations, and intra abdominal abscess. Some of these complicaitons may require a second operation.  7. The postoperative course, the ICU stay  and risk of postoperative complications which can include sepsis, MI, stroke, brain injury, pneumonia, pleural effusions, and renal dysfunction.  8. The current 1 year and 5 year graft and patient survivals.  9. The need for life long immunosuppressive therapy and the side effects of these medications, including the possibility of toxicity, opportunistic infections, risk of cancer including lymphoma, and the possibility of rejection even if the patient is taking the medication exactly as prescribed.  10. The need for compliance with medications and follow-up visits in the clinic and thereafter.  11. The patient and family understand these risks and wish to proceed to transplantation    I spent 60 minutes with the patient and more than 50% of the time was spend in direct face to face counseling.

## 2020-05-07 NOTE — PLAN OF CARE
Patient A & O X 4, forgetful, no respiratory distress noted. Received scheduled medications as ordered. Patient appetite fair . Up to BSC with assist and NWB to left foot. Up in chair for 2hr and tolerated okay. Void x 2 and Bowel movement x 2 . Plan possible discharge to home tomorrow . Call light in reach , bed alarm on for safety . Continue monitor and update MD with concerns.

## 2020-05-07 NOTE — PLAN OF CARE
Discharge Planner OT   Patient plan for discharge: home  Current status: Pt CGA with FWW for functional transfers and mobility in room. Pt min A for donning/doffing CAM boot. Pt completing ADLs standing at sink with SBA and set up. Pt completing MOCA 8.1 scoring 10/30 indicating significant cognitive deficits. Pt aware of cognitive decline, however does not demonstrate insight into impact of cognitive status on function. All VSS on RA.   Barriers to return to prior living situation: deconditioning, cognitive status  Recommendations for discharge: home with assist for higher level IADLs + HH OT and PT  Rationale for recommendations: Pt to require supervision for higher level IADLs including med management, cooking, finance management, driving due to cognitive deficits. Pt to benefit from HH OT and PT to progress activity tolerance and independence with ADL/IADLs.       Entered by: Tabitha Hussein 05/07/2020 9:11 AM

## 2020-05-07 NOTE — PROGRESS NOTES
"[facility]  HEPATOLOGY PROGRESS NOTE  Monalisa Olguin [id number]       CC: altered mental status  SUBJECTIVE:  Continues to complain of feeling \"foggy\". Notes continues to have poor sleep but feels mentation improved. . Having x4 BM's that are soft and denies melena or hematochezia. She does pass flatus and feels mildly distended. Denies fevers, abdominal pain. She has been using boot and walker to get up to the bathroom. When asked about CD evaluation, she comments \"no pot\" as what the outcome was.     ROS:  A 10-point review of systems was negative.    OBJECTIVE:  VS: BP 92/44 (BP Location: Right arm)   Pulse 78   Temp 98  F (36.7  C) (Oral)   Resp 18   Ht 1.524 m (5')   Wt 61.1 kg (134 lb 11.2 oz)   LMP 05/16/2012   SpO2 93%   BMI 26.31 kg/m       Intake/Output Summary (Last 24 hours) at 5/7/2020 1004  Last data filed at 5/7/2020 0728  Gross per 24 hour   Intake 360 ml   Output 2500 ml   Net -2140 ml     General: In no acute distress, mild facial muscle wasting  Neuro: AOx3, drowsy, No asterixis, slight tremors  Lymph: No cervical lymphadenopathy  HEENT:  Moderate scleral icterus, Nooral lesions  CV: . Skin warm and dry  Lungs:  Respirations even and nonlabored on room air  Abd: Mildly distended, nontender. +BS  Extrem: Noperipheral edema  Skin: mild jaundice  Psych: pleasant    MEDICATIONS:  Current Facility-Administered Medications   Medication     acetaminophen (TYLENOL) tablet 650 mg     ARIPiprazole (ABILIFY) tablet 5 mg     busPIRone (BUSPAR) tablet 15 mg     ciprofloxacin (CIPRO) tablet 250 mg     Daily 2 GRAM acetaminophen limit, unless fulminent liver failure or transaminases greater than or equal to 300 - 400, then none     FLUoxetine (PROzac) capsule 60 mg     folic acid (FOLVITE) tablet 1 mg     furosemide (LASIX) tablet 20 mg     heparin ANTICOAGULANT injection 5,000 Units     lactulose (CHRONULAC) solution 20 g     lactulose (CHRONULAC) solution 20 g    Or     lactulose (CHRONULAC) " solution for enema prep 100 g     lidocaine (LMX4) cream     lidocaine 1 % 0.1-1 mL     lidocaine 1% with EPINEPHrine 1:100,000 injection 1 mL     melatonin tablet 5 mg     midodrine (PROAMATINE) tablet 5 mg     naloxone (NARCAN) injection 0.1-0.4 mg     omeprazole (priLOSEC) CR capsule 20 mg     ondansetron (ZOFRAN-ODT) ODT tab 4 mg    Or     ondansetron (ZOFRAN) injection 4 mg     potassium chloride ER (KLOR-CON M) CR tablet 10 mEq     rifaximin (XIFAXAN) tablet 550 mg     senna-docusate (SENOKOT-S/PERICOLACE) 8.6-50 MG per tablet 1 tablet     sodium chloride (PF) 0.9% PF flush 3 mL     sodium chloride (PF) 0.9% PF flush 3 mL     spironolactone (ALDACTONE) tablet 50 mg     ursodiol (ACTIGALL) tablet 500 mg       REVIEW OF LABORATORY, PATHOLOGY AND IMAGING RESULTS:  BMP  Recent Labs   Lab 05/07/20 0526 05/06/20 0536 05/05/20  0548 05/04/20  1038    140 138 137   POTASSIUM 3.5 3.5 3.5 3.7   CHLORIDE 109 109 109 107   ELSIE 9.0 9.0 9.3 9.8   CO2 23 23 22 23   BUN 31* 37* 40* 38*   CR 1.78* 1.92* 1.96* 2.06*   * 91 117* 110*     CBC  Recent Labs   Lab 05/07/20 0526 05/06/20 0536 05/05/20 0548 05/04/20  1038   WBC 4.5 4.7 5.4 6.4   RBC 2.42* 2.27* 2.38* 2.57*   HGB 7.7* 7.4* 7.6* 8.4*   HCT 25.4* 23.6* 24.8* 26.5*   * 104* 104* 103*   MCH 31.8 32.6 31.9 32.7   MCHC 30.3* 31.4* 30.6* 31.7   RDW 22.9* 22.5* 22.2* 22.0*   PLT 56* 56* 61* 58*     INR  Recent Labs   Lab 05/07/20 0526 05/05/20  0548 05/04/20  1038   INR 1.99* 1.93* 1.68*     LFTs  Recent Labs   Lab 05/07/20  0526 05/06/20  0536 05/05/20  0548 05/04/20  1038   ALKPHOS 159* 146 141 157*   AST 34 31 30 36   ALT 14 15 14 16   BILITOTAL 13.2* 12.7* 12.6* 13.9*   PROTTOTAL 4.7* 4.7* 4.8* 5.3*   ALBUMIN 2.8* 2.8* 2.8* 3.2*      PANC  Recent Labs   Lab 05/04/20  1038   LIPASE 66*      MELD-Na score: 30 at 5/7/2020  5:26 AM  MELD score: 30 at 5/7/2020  5:26 AM  Calculated from:  Serum Creatinine: 1.78 mg/dL at 5/7/2020  5:26 AM  Serum Sodium:  "140 mmol/L (Rounded to 137 mmol/L) at 5/7/2020  5:26 AM  Total Bilirubin: 13.2 mg/dL at 5/7/2020  5:26 AM  INR(ratio): 1.99 at 5/7/2020  5:26 AM  Age: 54 years 6 months      Imaging Results:  Echo 5/6/20  Interpretation Summary  Global and regional left ventricular function is normal with an EF of 60-65%.  Mild right ventricular dilation is present.  Global right ventricular function is normal.  Pulmonary artery systolic pressure cannot be assessed.  The inferior vena cava was normal in size with preserved respiratory  variability.  No pericardial effusion is present.  Ascites is noted.    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     RECOMMENDATIONS:  1. Alcohol cirrhosis  - MELD 30, ABO O  - has not been evaluated for liver transplant in the past due to alcohol relapses. CD evaluation completed 5/6 and will be receiving further resources per email. Discussed that she still requires neuropsych testing and she does not feel \"clear\" enough to do that today. Neuropsych testing has been able to be completed while inpatient in some circumstances. If plans to discharge, this may also be completed as outpatient. Per OT, scored 10/30 on MOCA 5/7  - US without evidence of HCC  - recent PETH 4/22 negative.   - Echocardiogram with  normal EF and mild RV dilation. No TR jet to see RVSP.  She does have a history of tobacco and alcohol use so will need DSE if outpatient transplant eval performed.     2. Hepatic encephalopathy  - improved mentation, unclear if at baseline. She continues to feel not entirely clear  - Infectious work up so far negative.   - continue with lactulose to achieve 3-5 BM's per day. Continue rifaximin 550 mg BID. Stressed " importance of compliance with taking lactulose and not stopping. Did not discuss titration of lactulose due to mentation      3. Ascites  4. Hx of SBP  - Ciprofloxacin 250 mg daily   - no significant ascites to perform diagnostic para. Continues on low dose lasix and spironolactone due to kidney function.      5. ALFREDO/CKD  - solitary kidney. UA negative for protein.       6. Esophageal Varices  - last EGD 4/2019 without EV     7. Debility  - currently on midodrine for hypotension.   - . PT/OT following. With low MOCA, may need additional resources for managing medications     8. Immunizations  - Pneumovax at time of discharge.     Patient was discussed with attending physician, Dr. Mcfarland    Next follow up appointment: Fermín 7/27. Will set up follow up apt with PA in upcoming week on 5/15 at 1115    Thank you for the opportunity to be involved with  Monalisa Olguin [facility]. Please call with any questions or concerns.    IVETTE Suarez, CNP  Inpatient Hepatology [facility]  [phone]  Text Link

## 2020-05-08 NOTE — TELEPHONE ENCOUNTER
Spoke to Heydi and will call  for surgeon and dietitian next Tuesday. Reviewed th components of the rest of her evaluation which are still needed. She does not recall when her last pap/pelvic was. Communicated to her PCP that should be up to date.    This RN asked that she and her  review the education material which will arrive in her e-mail. Will follow up next week for review.

## 2020-05-08 NOTE — PLAN OF CARE
BP 90/40 (BP Location: Right arm)   Pulse 75   Temp 97.6  F (36.4  C) (Oral)   Resp 16   Ht 1.524 m (5')   Wt 61.3 kg (135 lb 3.2 oz)   LMP 05/16/2012   SpO2 96%   BMI 26.40 kg/m      Care from: 6044-1993    VSS ex hypertensive, denied pain, on room air. Chase score: 0. Good appetite and oral intake. Sclera yellow and jaundice noted. 1+ L foot edema noted. Distended abdomen. Adequate urine output, no BM this shift. Pt is adequate for discharge. Removed PIV. Reviewed poc and signed discharge paperwork with pt. Pt left the unit at 1125 with her CAM boot on L leg, via car by her daughter with all of her belongings.

## 2020-05-08 NOTE — PLAN OF CARE
Alert & oriented x4. [place] score for HE is 0. Complained of abdominal discomfort and queasiness this morning, APAP and zofran given and effective per patient report. Walked to bathroom with standby assist with CAM boot on and walker. Ate chicken noodle soup with crackers. Continue with current plan of nursing care, and update MD with concerns as needed.

## 2020-05-08 NOTE — PLAN OF CARE
While reviewing labs I noted that the patient tested positive (reactive) for hepatitis A. Patient was placed on enteric isolation and Charge Nurse Angelique was updated.    MD called this morning. The patient has received the vaccine and that is why the test was positive. The patient does not have hepatitis A.

## 2020-05-08 NOTE — PROGRESS NOTES
Care Coordinator - Discharge Planning    Admission Date/Time:  5/4/2020  Attending MD:  Joey Kwong MD     Data  Date of initial CC assessment:  5/8/2020  Chart reviewed, discussed with interdisciplinary team.   Patient was admitted for:   1. Closed nondisplaced fracture of metatarsal bone of left foot, unspecified metatarsal, initial encounter    2. Confusion    3. 2019-Ncov not Detected    4. Hepatic encephalopathy (H)    5. Alcoholic cirrhosis of liver without ascites (H) - per Hepatology consult Nov 2017    6. Alcoholic cirrhosis of liver with ascites (H)         Assessment   Full assessment completed in previous note    Coordination of Care and Referrals:  [facility] updated on discharge plan. No additional needs noted. Family will transport.       Plan  Anticipated Discharge Date:  5/8/2020  Anticipated Discharge Plan:  Home w/home care services.     Lay Worthington, RNCC, BSN    [facility]  [address] [place] [id number]    [email or web address]    Office: [phone] Pager: [phone]  To contact the weekend RNCC, page [phone].

## 2020-05-08 NOTE — PLAN OF CARE
Assumed care 1500 to 2330. Vital signs stable on room air. Pt is alert and oriented can be slightly forgetful. Bed alarm on. [place]: 0. Scheduled Lactulose with no PRN given. Pt give PRN Tylenol for pain.  Pt denies nausea and SOB. Lung sounds clear. Cardiac WDL. Bowel Sounds present pt had 3 bowel movements this shift.. Pt voiding adequately, clear yellow urine. Pt Assist of 1 with transfers, Gait belt and walker Cam boot on when ambulating. Currently off while in bed. Skin clean, dry, and intact  Jaundiced. Bruising. Blanchable redness on buttocks. LPIV saline locked. Plan: Continue to monitor mentation and Rehab

## 2020-05-08 NOTE — PLAN OF CARE
Occupational Therapy Discharge Summary    Reason for therapy discharge:    Discharged to home with home therapy.    Progress towards therapy goal(s). See goals on Care Plan in [facility] electronic health record for goal details.  Goals partially met.  Barriers to achieving goals:   discharge from facility.    Therapy recommendation(s):    Continued therapy is recommended.  Rationale/Recommendations:  to progress independence and safety with functional mobility and ADL/IADLs.

## 2020-05-08 NOTE — TELEPHONE ENCOUNTER
Patient Call: Yo  Date/Time: 5/8/2020-2:44pm  Reason for call: Please connect with pt returning phone call

## 2020-05-08 NOTE — TELEPHONE ENCOUNTER
Attempt to reach and request a call back in order to discuss completion of evaluation, receipt of information and follow up teaching.

## 2020-05-08 NOTE — PROGRESS NOTES
[facility]  [address] [place] [id number]                5/8/2020      Monalisa Olguin  [id number] [facility] [address]      Dear Ms. Olguin,    It was a pleasure meeting with you on 5/6/2020 for your Chemical Dependency Evaluation. Based on your evaluation, the recommendation is:  1)  Complete an Intensive Outpatient MICD Treatment Program.  2)  Abstain from all mood-altering chemicals unless prescribed by a licensed provider.   3)  Attend, at minimum, 2 weekly support group meetings, such as 12 step based (AA/NA), SMART Recovery, Health Realizations, and/or Refuge Recovery meetings.     4)  Actively work with a female mentor/sponsor on a weekly basis.   5)  Follow all the recommendations of your treatment/medical providers.  6)  Begin working with an individual psychotherapist.      Currently all IOP programming is done via telehealth from your own home. Please let me know which program you would like to attend.    [facility]  [email or web address]    [facility]  [email or web address]    [facility] meetings in [place]  [email or web address]    If I can be of further service, please don't hesitate to call.    Sincerely,        Mary Purdy MA [facility]  CD Evaluation Counselor  [phone]      (this was securely emailed to pt on 5/8/2020)

## 2020-05-08 NOTE — PROGRESS NOTES
[facility]   Spoke with patient to discuss plans for home care. Patient to be discharged home 5/8/20 and has agreed to have [facility] follow with services of RN, PT and OT. Patient care support center processing referral.  Patient verbalized understanding that initial visit is scheduled for 5/9 or 5/10/20.  Provided 24 hour phone number for [facility] for any questions or concerns.    Destiny Huff RN  [facility] Care Liaison  [phone]

## 2020-05-08 NOTE — PROVIDER NOTIFICATION
M4. 5B. 5224. B.S. Re: reactive for hepatitis A. Resulted 5/6/20 @0536. Actions: patient has been placed on enteric precautions. Updating MD with this information. Althea SAENZ. [id number]. [phone].    Text message sent to Terrence Shaw MD via Smart Web.    -------------------------------------------------  GI Addendum:  Patient is Hepatitis A Antibody IgG positive - this indicates a past infection and is not indicative of a current active infection. At this time, no concern for active hepatitis A infection. Please remove enteric precautions.    Mary Wright

## 2020-05-08 NOTE — PLAN OF CARE
Physical Therapy Discharge Summary    Reason for therapy discharge:    Discharged to home.    Progress towards therapy goal(s). See goals on Care Plan in [facility] electronic health record for goal details.  Goals partially met.  Barriers to achieving goals:   discharge from facility.    Therapy recommendation(s):    Continued therapy is recommended.  Rationale/Recommendations:  HHPT recommended to progress strength/ROM, balance, gait and independence with all mobility and ADLs/IADLs..

## 2020-05-08 NOTE — PLAN OF CARE
Discharge Planner OT   Patient plan for discharge: home with A and HH OT/PT  Current status: Pt CGA with FWW for functional transfers and mobility. Pt with one LOB episode, requiring min A to prevent fall. Pt completing full body dressing with min A for sequencing for donning CAM boot. Pt SBA with set up completing standing g/h at sink. All VSS on RA.   Barriers to return to prior living situation: deconditioning, cognitive deficits  Recommendations for discharge: home with assist for higher level IADLs and HH OT/PT  Rationale for recommendations: Pt to require assist for higher level IADLs due to cognitive deficits, and to benefit from HH OT/PT to progress independence and safety with functional mobility, ADL/IADLs.        Entered by: Tabitha Hussein 05/08/2020 8:20 AM

## 2020-05-08 NOTE — DISCHARGE SUMMARY
[facility], [place]  Discharge Summary - Medicine & Pediatrics       Date of Admission:  5/4/2020  Date of Discharge:  5/8/2020  Discharging Provider: Joey Kwong  Discharge Service: Terrence Rolle    Discharge Diagnoses     Acute metabolic encephalopathy 2/2 Hepatic encephalopathy  Decompensated EtOH cirrhosis c/b ascites, SBP, recurrent HE  Hyperbilirubinemia  Pulmonary edema  Mechanical fall with fracture of L 5th and 2nd metatarsals  ALFREDO on CKD  Thrombocytopenia secondary to liver disease  Chronic macrocytic anemia secondary to alcohol use    Follow-ups Needed After Discharge   Follow-up Appointments     Adult [facility] Follow-up and recommended labs and tests      Follow up with Dr. Castillo of GI, at [facility] on 5/15 for hospital follow- up.   No follow up labs or test are needed.    Follow up with Dr. Maravilla of Orthopedics and sports medicine, at [facility] on   5/26 to re-evaluate the broken bones in your L foot. The following   labs/tests are recommended: repeat X-rays (to be ordered by clinic).    Appointments on [place] and/or [facility] (with [facility] or [facility]   provider or service). Call [phone] if you haven't heard regarding   these appointments within 7 days of discharge.           Unresulted Labs Ordered in the Past 30 Days of this Admission     Date and Time Order Name Status Description    5/6/2020 0000 Quantiferon TB Gold Plus In process     5/4/2020 1643 Blood culture Preliminary       These results will be followed up by PCP and GI team    Discharge Disposition   Discharged to home  Condition at discharge: Good    Hospital Course   Monalisa Olguin was admitted on 5/4/2020 for recurrent hepatic encephalopathy. She is a 54 year old female admitted on 5/4/2020. 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 The following problems were addressed during her hospitalization:    Acute metabolic encephalopathy 2/2 Hepatic encephalopathy  Hyperbilirubinemia  Decompensated EtOH Cirrhosis c/b ascites, recurrent HE, SBP  Presenting with N/V and increased confusion over past several days. Recent admission from 4/21-24 with similar presentation, thought to be secondary to medication non-adherence. Infectious w/u unrevealing at that time, no ascites to tap for diagnostic para. On this presentation she does not have fevers or chills. No leukocytosis. Bili up from discharge, other LFTs around baseline. Ammonia increased at 57, but lower than on previous admission. No abdominal pain. UA bland. Hgb stable from discharge. No other metabolic derangements. Did have witness mechanical fall last week, head CT to r/u SDH was negative for intracranial pathology. Pt endorsed decreased BM for several days prior to presentation. No adequate pocket to tap for diagnostic paracentesis. Mental status improved significantly with increased lactulose and adequate BM. Given MELDS ~30, discussed starting transplant work up with hepatology. Had initial Chem Dep intake and cardiac work up while inpatient, will need additional testing as outpatient.  - GI follow up appointment on 5/15   - Additional transplant work up recommended:    - Neuropsych testing due to concern for cognitive impairment (referral placed)    - Ischemia evaluation and possible R heart cath    - Possible nephrology referral due to single kidney status (prior donor)  - Continue lactulose 20g TID with titration to achieve 3-5 BM per day  - Continue rifaximin BID  - Start ciprofloxacin 250mg daily for SBP prophylaxis (renal dosing)  - PTA furosemide and spironolactone  - Hep A and Hep B first vaccine 5/6 - will need to complete series as outpatient              - Hep A (HAVRIX) - 2nd dose in 6-18  months              - Hep B (ENERGIX-B) - 2nd dose in 1 month, 3rd in 6 months  - Pneumovax prior to discharge    Pulmonary edema - improved  Denies cough, SOB. BNP checked and elevated at 2471. Given lasix 40mg IV in ED. No known history of CHF, no prior TTE in chart. COVID PCR and negative (low pretest probability). Procal returned at 0.3, low overall suspicion for infection.  - COVID negative  - PTA furosemide and spironolactone     Mechanical Fall  Fracture of L 5th metatarsal  Probable fracture of proximal L 2nd metatarsal  Had witnessed mechanical fall last week. No head trauma per pt. Head CT on admission without acute intracranial findings. Foot XR with fracture of 5th metatarsal and probable fracture of 2nd metatarsal. ED discussed with ortho who recommended Cam boot.  - Continue CAM boot until ortho follow up  - Appointment on 5/26 with Sports Medicine     ALFREDO on CKD - downtrending  Solitary kidney - prior living donor  Cr 2.06, up from 1.62 on 4/27. Likely prerenal in the setting of poor PO intake and N/V over past several days, however does have increased pulmonary edema vs infection on CXR. S/p 40mg IV lasix in ED. Improved throughout hospitalization.  - PTA lasix 20mg and spironolactone 50mg     Thrombocytopenia  Secondary to underlying liver disease, stable/improved from discharge     Chronic macrocytic anemia  Stable from discharge. No evidence of bleeding.       Consultations This Hospital Stay   MEDICATION HISTORY IP PHARMACY CONSULT  PHYSICAL THERAPY ADULT IP CONSULT  OCCUPATIONAL THERAPY ADULT IP CONSULT  GI HEPATOLOGY ADULT IP CONSULT  PHYSICAL THERAPY ADULT IP CONSULT  OCCUPATIONAL THERAPY ADULT IP CONSULT  CHEMICAL DEPENDENCY IP CONSULT  VASCULAR ACCESS CARE ADULT IP CONSULT    Code Status   Full Code       The patient was discussed with MD Terrence Win 4 Service  [facility] [place]  Pager:  [phone]  ______________________________________________________________________    Physical Exam   Vital Signs: Temp: 97.6  F (36.4  C) Temp src: Oral BP: 90/40 Pulse: 75 Heart Rate: 77 Resp: 16 SpO2: 96 % O2 Device: None (Room air)    Weight: 135 lbs 3.2 oz     General Appearance: No acute distress. Awake and alert. Sclera icteric.  Respiratory: CTAB. No wheezes or crackles.  Cardiovascular: RRR. No m/r/g  GI: BS+. Distended. Nontender.  Skin: Jaundice. No rash.  Other: AOx3. No asterixis. Neuro nonfocal.      Primary Care Physician   Efren Bustos MD    Discharge Orders      Equalizer Walker     Home care nursing referral      Home Care PT Referral for Hospital Discharge      Home Care OT Referral for Hospital Discharge      Medication Therapy Management Referral      MD face to face encounter    Documentation of Face to Face and Certification for Home Health Services    I certify that patient: Monalisa Olguin is under my care and that I, or a nurse practitioner or physician's assistant working with me, had a face-to-face encounter that meets the physician face-to-face encounter requirements with this patient on: 5/7/2020.    This encounter with the patient was in whole, or in part, for the following medical condition, which is the primary reason for home health care: hepatic encephalopathy.    I certify that, based on my findings, the following services are medically necessary home health services: Nursing, Occupational Therapy and Physical Therapy.    My clinical findings support the need for the above services because: Nurse is needed: To assess vital signs and weight, respiratory and cardiac status, patients ability to take and record daily blood pressure, temp and weight, pain level and activity tolerance, incision for signs/symptoms of infection, hydration, nutrition and bowel status and home safety. after changes in medications or other medical regimen, Occupational Therapy Services are needed to assess  and treat cognitive ability and address ADL safety due to impairment in balance, independence and Physical Therapy Services are needed to assess and treat the following functional impairments: activity tolerance.    Further, I certify that my clinical findings support that this patient is homebound (i.e. absences from home require considerable and taxing effort and are for medical reasons or [employer] services or infrequently or of short duration when for other reasons) because: Requires assistance of another person or specialized equipment to access medical services because patient: Requires supervision of another for safe ambulation.    Based on the above findings. I certify that this patient is confined to the home and needs intermittent skilled nursing care, physical therapy and/or speech therapy.  The patient is under my care, and I have initiated the establishment of the plan of care.  This patient will be followed by a physician who will periodically review the plan of care.  Physician/Provider to provide follow up care: Efren Bustos    Attending hospital physician (the Medicare certified [place] provider): Dr. Kwong  Physician Signature: See electronic signature associated with these discharge orders.  Date: 5/7/2020     Reason for your hospital stay    You were hospitalized with confusion due to hepatic encephalopathy which improved with increased lactulose. You also were found to have two broken bones in your L foot from your fall last week and will need to follow up with orthopedics as an outpatient.     Adult [facility] Follow-up and recommended labs and tests    Follow up with Dr. Castillo of GI, at [facility] on 5/15 for hospital follow- up. No follow up labs or test are needed.    Follow up with Dr. Maravilla of Orthopedics and sports medicine, at [facility] on 5/26 to re-evaluate the broken bones in your L foot. The following labs/tests are recommended: repeat X-rays (to be ordered by clinic).    Appointments on  [place] and/or [facility] (with [facility] or [facility] provider or service). Call [phone] if you haven't heard regarding these appointments within 7 days of discharge.     Activity    Your activity upon discharge: activity as tolerated     Discharge Instructions    1. It is very important that you make sure you are having 3-5 bowel movements a day. You may need to take lactulose more than three times a day to meet this goal. We would encourage you and your family to come up with a system to monitor this and adjust your dose as needed. This will help keep you out of the hospital with recurrent confusion.    2. Orthopedics made an appointment for you to see them on 5/26 to reassess your foot and determine if you still need to wear the boot. Until that appointment you should wear the CAM boot with any weight bearing.     3. The liver team started additional work up to determine if you would be a transplant candidate. There are more tests that will need to be done as an outpatient. You should discuss this at your upcoming appointment on 5/15.     Full Code     Diet    Follow this diet upon discharge: Orders Placed This Encounter      Regular Diet Adult       Significant Results and Procedures   Most Recent 3 CBC's:  Recent Labs   Lab Test 05/08/20  0554 05/07/20  0526 05/06/20  0536 05/05/20  0548   WBC  --  4.5 4.7 5.4   HGB  --  7.7* 7.4* 7.6*   MCV  --  105* 104* 104*   PLT 59* 56* 56* 61*     Most Recent 3 BMP's:  Recent Labs   Lab Test 05/07/20  0526 05/06/20  0536 05/05/20  0548    140 138   POTASSIUM 3.5 3.5 3.5   CHLORIDE 109 109 109   CO2 23 23 22   BUN 31* 37* 40*   CR 1.78* 1.92* 1.96*   ANIONGAP 7 8 7   ELSIE 9.0 9.0 9.3   * 91 117*     Most Recent 2 LFT's:  Recent Labs   Lab Test 05/07/20  0526 05/06/20  0536   AST 34 31   ALT 14 15   ALKPHOS 159* 146   BILITOTAL 13.2* 12.7*       Discharge Medications   Current Discharge Medication List      START taking these medications    Details    ciprofloxacin (CIPRO) 250 MG tablet Take 1 tablet (250 mg) by mouth every 24 hours  Qty: 30 tablet, Refills: 0    Associated Diagnoses: Alcoholic cirrhosis of liver with ascites (H)         CONTINUE these medications which have NOT CHANGED    Details   ARIPiprazole (ABILIFY) 5 MG tablet Take 1 tablet (5 mg) by mouth At Bedtime  Qty: 90 tablet, Refills: 3    Associated Diagnoses: Major depressive disorder, recurrent episode, moderate (H)      acamprosate (CAMPRAL) 333 MG EC tablet TAKE TWO TABLETS BY MOUTH THREE TIMES A DAY  Qty: 540 tablet, Refills: 3    Associated Diagnoses: Alcoholic cirrhosis of liver with ascites (H)      busPIRone (BUSPAR) 15 MG tablet Take 1 tablet (15 mg) by mouth 2 times daily  Qty: 180 tablet, Refills: 3    Associated Diagnoses: MONA (generalized anxiety disorder)      FLUoxetine (PROZAC) 20 MG capsule TAKE THREE CAPSULES BY MOUTH ONCE DAILY  Qty: 90 capsule, Refills: 3    Associated Diagnoses: Anxiety; Major depressive disorder, recurrent episode, moderate (H)      folic acid (FOLVITE) 1 MG tablet Take 1 tablet (1 mg) by mouth daily  Qty: 90 tablet, Refills: 3    Associated Diagnoses: Alcoholic cirrhosis of liver with ascites (H)      furosemide (LASIX) 20 MG tablet Take 20 mg by mouth daily      gabapentin (NEURONTIN) 100 MG capsule Take 3 capsules (300 mg) by mouth At Bedtime  Qty: 270 capsule, Refills: 3    Associated Diagnoses: Neuropathy      lactulose (CHRONULAC) 10 GM/15ML solution Take 22.5 mLs (15 g) by mouth 3 times daily as needed for constipation Start with 15 ml daily, titrate as needed for 2-3 BM's daily.  Qty: 1000 mL, Refills: 5    Associated Diagnoses: Hepatic encephalopathy (H)      midodrine (PROAMATINE) 5 MG tablet Take 1 tablet (5 mg) by mouth 3 times daily (with meals)  Qty: 270 tablet, Refills: 3    Associated Diagnoses: Orthostatic hypotension      omeprazole (PRILOSEC) 20 MG DR capsule Take 1 capsule (20 mg) by mouth daily  Qty: 90 capsule, Refills: 3     "Associated Diagnoses: Gastroesophageal reflux disease with esophagitis      oxyCODONE (ROXICODONE) 5 MG tablet Take 1 tablet (5 mg) by mouth 2 times daily as needed for pain  Qty: 12 tablet, Refills: 0    Associated Diagnoses: Foot injury, left, initial encounter      potassium chloride ER (K-DUR/KLOR-CON M) 10 MEQ CR tablet Take 1 tablet (10 mEq) by mouth daily  Qty: 90 tablet, Refills: 3    Associated Diagnoses: Hypokalemia      rifaximin (XIFAXAN) 550 MG TABS tablet Take 1 tablet (550 mg) by mouth 2 times daily  Qty: 60 tablet, Refills: 0    Associated Diagnoses: Alcoholic cirrhosis of liver without ascites (H); Hepatic encephalopathy (H)      SENNA-docusate sodium (SENNA S) 8.6-50 MG tablet Take 1 tablet by mouth 2 times daily  Qty: 60 tablet, Refills: 11    Associated Diagnoses: Slow transit constipation      spironolactone (ALDACTONE) 50 MG tablet Take 1 tablet (50 mg) by mouth daily  Qty: 90 tablet, Refills: 3    Associated Diagnoses: Alcoholic cirrhosis of liver with ascites (H)      traZODone (DESYREL) 50 MG tablet Take 2 tablets (100 mg) by mouth nightly as needed for sleep  Qty: 30 tablet, Refills: 0    Associated Diagnoses: Other insomnia      ursodiol (ACTIGALL) 500 MG tablet Take 1 tablet (500 mg) by mouth 2 times daily  Qty: 180 tablet, Refills: 3    Associated Diagnoses: Alcoholic cirrhosis of liver with ascites (H)           Allergies   Allergies   Allergen Reactions     Albuterol      Tongue \"hardened and painful\"     " no

## 2020-05-11 NOTE — PROGRESS NOTES
Clinic Care Coordination Contact    Clinic Care Coordination Contact  OUTREACH    Referral Information:  Referral Source: IP Report    Primary Diagnosis: GI Disorders    Chief Complaint   Patient presents with     Clinic Care Coordination - Post Hospital     RN assessment     Universal Utilization:   Clinic Utilization  Difficulty keeping appointments:: No  Compliance Concerns: No  No-Show Concerns: No  No PCP office visit in Past Year: No  Utilization    Last refreshed: 5/11/2020  1:33 PM:  Hospital Admissions 3           Last refreshed: 5/11/2020  1:33 PM:  ED Visits 4           Last refreshed: 5/11/2020  1:33 PM:  No Show Count (past year) 5              Current as of: 5/11/2020  1:33 PM            Clinical Concerns:  Current Medical Concerns:  Called and spoke with pt, introduced self and role.  Pt was recently hospitalized at [facility] for confusion, nausea and vomiting.  Pt has known Cirrhosis and is starting the process for transplant. Pt states she is doing ok, home care has been out to see her already.  Pt does have all her follow up appt scheduled. Pt denies any additions support or resources at this time.  Pt was testes to COVID-19 and was negative will send get well loop.    Current Behavioral Concerns: no current concerns    Education Provided to patient: NA   Pain  Pain (GOAL):: No  Health Maintenance Reviewed: Not assessed  Clinical Pathway: None    Medication Management:  Medications have been reviewed with home care nurse.      Functional Status:  Dependent IADLs:: Transportation, Independent  Bed or wheelchair confined:: No  Mobility Status: Independent  Fallen 2 or more times in the past year?: Yes  Any fall with injury in the past year?: Yes    Living Situation:  Current living arrangement:: I live in a private home with family    Lifestyle & Psychosocial Needs:  Lifestyle     Physical activity     Days per week: 0 days     Minutes per session: 0 min     Stress: Rather  Telephone Encounter by Mariel Colbert DO at 07/24/17 09:30 AM     Author:  Mariel Colbert DO Service:  (none) Author Type:  Physician     Filed:  07/24/17 09:30 AM Encounter Date:  7/24/2017 Status:  Signed     :  Mariel Colbert DO (Physician)            Please notify patient that stress test did not show any ischemia.  Follow-up with ordering physician as planned.  Electronically Signed by:    Mariel Colbert DO , 7/24/2017[FH1.1T]          Revision History        User Key Date/Time User Provider Type Action    > FH1.1 07/24/17 09:30 AM Mariel Colbert DO Physician Sign    T - Template             much     Social Needs     Financial resource strain: Not very hard     Food insecurity     Worry: Never true     Inability: Never true     Transportation needs     Medical: No     Non-medical: No     Inadequate nutrition (GOAL):: No  Tube Feeding: No  Inadequate activity/exercise (GOAL):: No  Significant changes in sleep pattern (GOAL): No  Transportation means:: Regular car     Mental health DX:: Yes  Mental health DX how managed:: Medication  Mental health management concern (GOAL):: No  Informal Support system:: Family   Socioeconomic History     Marital status:      Spouse name: Eron     Number of children: 3     Years of education: 14     Highest education level: Not on file   Occupational History     Occupation: Homemaker     Tobacco Use     Smoking status: Current Every Day Smoker     Packs/day: 0.50     Years: 10.00     Pack years: 5.00     Types: Cigarettes     Smokeless tobacco: Never Used     Tobacco comment: pt quit 1992 after smoking for 8 yrs, started again in 2004   Substance and Sexual Activity     Alcohol use: Not Currently     Alcohol/week: 0.0 standard drinks     Comment: Quit drinking 9/2019     Drug use: Yes     Types: Marijuana     Sexual activity: Yes     Partners: Male     Birth control/protection: Surgical     Comment:  had vasectomy      Resources and Interventions:  Current Resources:   List of home care services:: Skilled Nursing, Physicial Therapy, Occupational Therapy  Community Resources: Home Care     Equipment Currently Used at Home: none    Advance Care Plan/Directive  Advanced Care Plans/Directives on file:: No  Advanced Care Plan/Directive Status: Not Applicable    Referrals Placed: None     Goals: NA    Patient/Caregiver understanding: Pt verbalizes understanding of follow up instructions and when scheduled appt date and times.          Future Appointments              Tomorrow Vincent Hong MD [facility] Solid Organ Transplant, [facility]    Tomorrow Robin  AXEL Walker [facility] Solid Organ Transplant, [facility]    Tomorrow Efren Bustos MD [facility], [facility]    In 2 days [id number] [place]    In 4 days Chau Castillo PA-C [facility] Hepatology, [facility]    In 2 weeks Pasha Maravilla DO [facility] Orthopaedic Walk In [facility], [facility]    In 2 months  LAB [facility] Lab, [facility]    In 2 months Edgardo Kovacs MD [facility] Hepatology, [facility]          Plan:   Pt will continue to be followed by home care  Pt will follow up at scheduled appts  No further outreach by RN CC as pt declined and additional needs or resources.       Inés GOLDSTEINN, RN, PHN, CCM  Primary Clinic Care Coordination    [facility]  [email or web address]   Office: [phone]  Employed by [employer]

## 2020-05-11 NOTE — TELEPHONE ENCOUNTER
Spoke to Heydi and her , Eron, will be home at 1630 for education. My transplant place was down, but will try to login latter today.  Explained they will get material sent to them and to review.

## 2020-05-11 NOTE — PROGRESS NOTES
Date: 5/12/2020 Status: Amy   Time: 2:00 PM Length: 20   Visit Type: TELEPHONE VISIT NEW [0920] MARY:     Provider: Efren Bustos MD Department:  FAMILY PRACTICE     Patient was contacted by an RN for post DC follow up so no duplicate post DC follow up call will be made

## 2020-05-11 NOTE — TELEPHONE ENCOUNTER
Liver Transplant Evaluation/Teaching- On phone  TEACHING TOPICS: Evaluation Process, Evaluation Items, Diagnostic Studies, Consultation, Chemical Dependency Policy, CD Eval, Donor Source, Liver Allocation, MELD System, UNOS, Waiting List, Follow up while on transplant list, Follow up after transplantation, Infection and Rejection, Immunosuppression , Medication Teaching, Lab Recording after transplant, Laboratory Frequency after transplant , Consent for evaluation and One year survival rates  INSTRUCTIONAL MATERIAL USED/GIVEN: Received by mail Liver Transplant Handbook, MELD Booklet, Donor Booklet, Web Sites Options, Verbal instructions, Multiple Listing Brochure , Consent for evaluation signed, One year survival rates and SRTR (Scientific Registry) Data  Person(s) involved in teaching: patient and  Eron  Asks Questions: YES  Eager to Learn: YES  Cooperative: YES  Receptive (willing/able to accept information): YES  Reason for the appointment, diagnosis and treatment plan:YES  Knowledge of proper use of medications and conditions for which they are ordered (with special attention to potential side effects or drug interactions): YES  Which situations necessitate calling provider and whom to contact:YES  Other: [facility] e-mail is not working, request docu sign sent to husbands. Discussed 1) alcohol policy, 2)reciept of information (liver booklet, 1 yr survival and multiple listing) and  donor types.  Explained to sign and send back. They agree to try to view my transplant place.   Patient is aware of and agrees to required commitment to post-op care and long term follow-up: YES  Patient has name and phone number of transplant coordinator.   Time Spent face-to-face teachin minutes.

## 2020-05-12 PROBLEM — R79.89 ABNORMAL LFTS (LIVER FUNCTION TESTS): Status: ACTIVE | Noted: 2020-01-01

## 2020-05-12 PROBLEM — F10.21 ALCOHOL DEPENDENCE IN REMISSION (H): Status: ACTIVE | Noted: 2020-01-01

## 2020-05-12 PROBLEM — D69.6 THROMBOCYTOPENIA (H): Status: RESOLVED | Noted: 2017-03-26 | Resolved: 2020-01-01

## 2020-05-12 NOTE — TELEPHONE ENCOUNTER
Antonio Hong, I was wondering if you saw this patient's elevated bilirubin level from yesterday?  I know she is undergoing a work up for liver transplantation.  Thanks,     Electronically signed by:  Efren Bustos M.D.  5/12/2020

## 2020-05-12 NOTE — PROGRESS NOTES
"Monalisa Olguin is a 54 year old female who is being evaluated via a billable telephone visit.      The patient has been notified of following:     \"This telephone visit will be conducted via a call between you and your physician/provider. We have found that certain health care needs can be provided without the need for a physical exam.  This service lets us provide the care you need with a short phone conversation.  If a prescription is necessary we can send it directly to your pharmacy.  If lab work is needed we can place an order for that and you can then stop by our lab to have the test done at a later time.    Telephone visits are billed at different rates depending on your insurance coverage. During this emergency period, for some insurers they may be billed the same as an in-person visit.  Please reach out to your insurance provider with any questions.    If during the course of the call the physician/provider feels a telephone visit is not appropriate, you will not be charged for this service.\"    Patient has given verbal consent for Telephone visit?  Yes    What phone number would you like to be contacted at? [phone]    How would you like to obtain your AVS? Michaelhart    Subjective     Monalisa Olguin is a 54 year old female who presents to clinic today for the following health issues:    HPI  Chief Complaint   Patient presents with     Results     Lab results        PROBLEMS TO ADD ON...  Undergoing a work up for a liver transplant for her end stage liver disease. Needs her Shingles vaccine done.      Patient Active Problem List   Diagnosis     Kidney donor     Esophageal reflux     Moderate Depression [296.32]     HYPERLIPIDEMIA LDL GOAL <100     Hypertension goal BP (blood pressure) < 130/80     Anxiety     Alcoholic cirrhosis of liver with ascites (H)     Chronic blood loss anemia     Tobacco abuse     Non-intractable vomiting with nausea     Portal hypertension (H)     Pulmonary nodules     " Hyperthyroidism     Alcoholic cirrhosis of liver without ascites (H) - per Hepatology consult 2017     Splenomegaly     Epistaxis     Other iron deficiency anemia     Other pancytopenia (H)     Solitary kidney, acquired     Macrocytic anemia     CKD (chronic kidney disease) stage 3, GFR 30-59 ml/min (H)     Confusion     Hepatic encephalopathy (H)     Alcohol dependence in remission (H)     Abnormal LFTs (liver function tests)     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     COLONOSCOPY N/A 2017    Procedure: COLONOSCOPY;  Colonoscopy;  Surgeon: Sai Johnston MD;  Location:  GI     ESOPHAGOSCOPY, GASTROSCOPY, DUODENOSCOPY (EGD), COMBINED N/A 2017    Procedure: COMBINED ESOPHAGOSCOPY, GASTROSCOPY, DUODENOSCOPY (EGD), BIOPSY SINGLE OR MULTIPLE;  ESOPHAGOSCOPY, GASTROSCOPY, DUODENOSCOPY (EGD) with biopsies;  Surgeon: Sai Johnston MD;  Location:  GI     ESOPHAGOSCOPY, GASTROSCOPY, DUODENOSCOPY (EGD), COMBINED N/A 2019    Procedure: ESOPHAGOGASTRODUODENOSCOPY, WITH BIOPSY;  Surgeon: Edgardo Scott DO;  Location: [facility] GI     HC KNEE SCOPE, DIAGNOSTIC  01    Arthroscopy, Lt Knee     LAPAROSCOPIC CHOLECYSTECTOMY N/A 2017    Procedure: LAPAROSCOPIC CHOLECYSTECTOMY;  laparoscopic cholecystectomy;  Surgeon: Romeo Pastor MD;  Location: [facility] OR     OPEN REDUCTION INTERNAL FIXATION WRIST Right 2017    Procedure: OPEN REDUCTION INTERNAL FIXATION WRIST;  OPEN REDUCTION INTERNAL FIXATION RIGHT WRIST;  Surgeon: Edgardo Almendarez MD;  Location: [facility] OR     TRANSPLANT      Donated Left Kidney in        Social History     Tobacco Use     Smoking status: Current Every Day Smoker     Packs/day: 0.50     Years: 10.00     Pack years: 5.00     Types: Cigarettes     Smokeless tobacco: Never Used     Tobacco comment: pt quit  after  smoking for 8 yrs, started again in 2004   Substance Use Topics     Alcohol use: Not Currently     Alcohol/week: 0.0 standard drinks     Comment: Quit drinking 9/2019     Family History   Problem Relation Age of Onset     Hypertension Mother         80 years old     Heart Disease Paternal Grandmother      Heart Disease Paternal Grandfather      Cerebrovascular Disease Maternal Grandmother      Cerebrovascular Disease Maternal Grandfather      Alcohol/Drug Maternal Grandfather      Substance Abuse Maternal Grandfather      Heart Disease Father         Silent MI stents x 2     Diabetes Sister 17        older sister also had kidney and pancreas transplant     Heart Disease Sister         MI secondary to diabetes     Genitourinary Problems Sister 43        kidney transplant from renal failure     Other - See Comments Sister         older     Other - See Comments Sister         younger     Diabetes Sister 9        youngest, juvenile type I (onset age 9 with no complications)     Cancer Paternal Aunt         ?kind         Current Outpatient Medications   Medication Sig Dispense Refill     acamprosate (CAMPRAL) 333 MG EC tablet TAKE TWO TABLETS BY MOUTH THREE TIMES A  tablet 3     ARIPiprazole (ABILIFY) 5 MG tablet Take 1 tablet (5 mg) by mouth At Bedtime 90 tablet 3     busPIRone (BUSPAR) 15 MG tablet Take 1 tablet (15 mg) by mouth 2 times daily 180 tablet 3     ciprofloxacin (CIPRO) 250 MG tablet Take 1 tablet (250 mg) by mouth every 24 hours 30 tablet 0     FLUoxetine (PROZAC) 20 MG capsule TAKE THREE CAPSULES BY MOUTH ONCE DAILY 90 capsule 3     folic acid (FOLVITE) 1 MG tablet Take 1 tablet (1 mg) by mouth daily 90 tablet 3     furosemide (LASIX) 20 MG tablet Take 20 mg by mouth daily       gabapentin (NEURONTIN) 100 MG capsule Take 3 capsules (300 mg) by mouth At Bedtime 270 capsule 3     lactulose (CHRONULAC) 10 GM/15ML solution Take 22.5 mLs (15 g) by mouth 3 times daily as needed for constipation Start with  "15 ml daily, titrate as needed for 2-3 BM's daily. 1000 mL 5     midodrine (PROAMATINE) 5 MG tablet Take 1 tablet (5 mg) by mouth 3 times daily (with meals) 270 tablet 3     omeprazole (PRILOSEC) 20 MG DR capsule Take 1 capsule (20 mg) by mouth daily 90 capsule 3     potassium chloride ER (K-DUR/KLOR-CON M) 10 MEQ CR tablet Take 1 tablet (10 mEq) by mouth daily 90 tablet 3     rifaximin (XIFAXAN) 550 MG TABS tablet Take 1 tablet (550 mg) by mouth 2 times daily 60 tablet 0     SENNA-docusate sodium (SENNA S) 8.6-50 MG tablet Take 1 tablet by mouth 2 times daily (Patient taking differently: Take 1 tablet by mouth daily ) 60 tablet 11     spironolactone (ALDACTONE) 50 MG tablet Take 1 tablet (50 mg) by mouth daily 90 tablet 3     traZODone (DESYREL) 50 MG tablet Take 2 tablets (100 mg) by mouth nightly as needed for sleep 30 tablet 0     ursodiol (ACTIGALL) 500 MG tablet Take 1 tablet (500 mg) by mouth 2 times daily 180 tablet 3     Allergies   Allergen Reactions     Albuterol      Tongue \"hardened and painful\"     Recent Labs   Lab Test 05/11/20  1728 05/07/20  0526 05/06/20  0536  04/14/20  1653  12/20/17  1403  09/24/17  0630  05/23/17  1610  03/03/16  0953  08/26/13  1037   A1C  --   --   --   --   --   --   --   --  Canceled, Test credited  --   --   --   --   --   --    LDL  --   --   --   --   --   --   --   --   --   --  95  --  56  --  96   HDL  --   --   --   --   --   --   --   --   --   --  71  --  58  --  54   TRIG  --   --   --   --   --   --   --   --   --   --  59  --  95  --  104   ALT 13 14 15   < > 30   < > 23   < > 35   < > 34   < >  --    < > 32   CR 1.65* 1.78* 1.92*   < > 2.32*   < > 1.02   < > 1.04   < > 0.87   < > 0.89   < > 1.12*   GFRESTIMATED 35* 32* 29*   < > 23*   < > 57*   < > 56*   < > 69   < > 67   < > 52*   GFRESTBLACK 40* 37* 34*   < > 27*   < > 69   < > 68   < > 83   < > 81   < > 63   POTASSIUM 3.9 3.5 3.5   < > 3.9   < > 3.7   < > 3.7   < > 4.1   < > 3.7   < > 3.8   TSH  --   --   " --   --  0.52  --  1.02  --   --    < >  --    < >  --   --   --     < > = values in this interval not displayed.      BP Readings from Last 3 Encounters:   05/08/20 90/40   04/24/20 104/59   04/21/20 94/52    Wt Readings from Last 3 Encounters:   05/07/20 61.3 kg (135 lb 3.2 oz)   04/28/20 62.1 kg (137 lb)   04/28/20 61.2 kg (135 lb)                    Reviewed and updated as needed this visit by Provider  Tobacco  Allergies  Meds  Problems  Med Hx  Surg Hx  Fam Hx         Review of Systems   Constitutional, HEENT, cardiovascular, pulmonary, gi and gu systems are negative, except as otherwise noted.       Objective   Reported vitals:  LMP 05/16/2012    Sounds very good.    PSYCH: she sounds good, less confused.      Diagnostic Test Results:  Labs reviewed in Epic        Assessment/Plan:  (K70.31) Alcoholic cirrhosis of liver with ascites (H)  Comment: Her ascites is actually slowed down significantly by her cirrhosis is continuing to worsen.  Plan: She recently had a hospitalization done at the [facility] for elevated ammonia levels and increased confusion.  She feels much better now and is much more lucid than she was.  She is now undergoing an evaluation for liver transplant.  She has been sober since September 2019 and I did congratulate her for that.  I am so proud that she is been able to stay sober.  Unfortunately her  continues to drink and smoke heavily.  He keeps his alcohol out in the garage so it is not actually in the house and he spends a lot of his time after work and on weekends in the garage away from her so that he can continue to drink.    (D61.818) Other pancytopenia (H)  Comment: She continues to have some pancytopenia.  Her platelets are at 62,000 her hemoglobin is 8.8, her red blood count is 2.7.  Plan: Her labs are being monitored closely by her hepatologist and now the transplant team.    (F10.21) Alcohol dependence in remission (H)  Comment: Her alcoholism is in remission like us  that she has been sober since September 2019 which is excellent.  Plan: I encouraged her sobriety and we talked about that as a day at a time process but she continues to work at a daily.    (F33.1) Major depressive disorder, recurrent episode, moderate (H)  Comment: Her depression is stable on her current meds.  Plan: No change in medications.    (Z23) Need for shingles vaccine  Comment: The transplant team wanted me to update her health maintenance issues.  Nothing that she is really due for is the shingles vaccine.  Plan: ZOSTER VACCINE RECOMBINANT ADJUVANTED IM NJX        I did order this and Heydi will come in later this week to have the first dose administered.  The second dose will be due in 6 months.     (R94.5) Abnormal LFTs (liver function tests)  Comment: Her bilirubin level is up to 15.6 with a direct of 12.3.  Her alkaline phosphatase is 165 but her ALT and AST are normal.  Plan: followed by her hepatologist and now the transplant team.      Return if symptoms worsen or fail to improve.      Phone call duration:  24 minutes    Electronically signed by:  Efren Bustos M.D.  5/12/2020

## 2020-05-12 NOTE — TELEPHONE ENCOUNTER
Received a critical lab result of bilirubin of 15.6 . On review patient has a history of hyperbilirubinemia secondary to alcoholic liver cirrhosis . Will route to PCP to review in am.

## 2020-05-12 NOTE — TELEPHONE ENCOUNTER
MTM referral from: Transitions of Care (recent hospital discharge or ED visit)    MTM referral outreach attempt #2 on May 12, 2020 at 11:59 AM      Outcome: Patient not reachable after several attempts, will route to MTM Pharmacist/Provider as an FYI. Thank you for the referral.    See Jignesh [facility] Pharmacy Coordinator

## 2020-05-12 NOTE — PROGRESS NOTES
"Monalisa Olguin is a 54 year old female who is being evaluated via a billable telephone visit.      The patient has been notified of following:     \"This telephone visit will be conducted via a call between you and your physician/provider. We have found that certain health care needs can be provided without the need for a physical exam.  This service lets us provide the care you need with a short phone conversation.  If a prescription is necessary we can send it directly to your pharmacy.  If lab work is needed we can place an order for that and you can then stop by our lab to have the test done at a later time.    Telephone visits are billed at different rates depending on your insurance coverage. During this emergency period, for some insurers they may be billed the same as an in-person visit.  Please reach out to your insurance provider with any questions.    If during the course of the call the physician/provider feels a telephone visit is not appropriate, you will not be charged for this service.\"    Patient has given verbal consent for Telephone visit?  Yes    What phone number would you like to be contacted at? [phone]    How would you like to obtain your AVS? E-Mail (inform patient AVS not encrypted)    Video visit via [employer]; total call duration 20 min and 20 min documentation    SC    Assessment and Plan:  1. liver transplant evaluation - patient is a good candidate overall. Benefits and surgical risks of a liver transplantation were discussed.  2.  End stage liver disease due to Laennec's    Surgical evaluation:  1. Portal Vein:Patent  2. Hepatic Artery: Open  3. TIPS: absent  4. Previous Abdominal Surgery: Yes; C section, Kidney donor 25 years ago at the [facility]  5. Hepatocellular Carcinoma: None  6. Ascites: Present - large amount  7. Costal Angle: narrow  8. Portopulmonary Hypertension: unknown  9. Hepatopulmonary Syndrome: absent  10. Cardiac Evaluation: needs stress " echocardiogram  11. Nutritional Status: Poor  12. Diabetes:no  13.Hypertension no  14. Smoker:yes needs to stop  14: Fraility index:yes  15. Meets guidelines to receive Living Donor  Yes -  needs to be confirmed on visit in person  16. Potential Living donors Yes -  she will try to looks for some    Recommendations: needs to complete work up and the leg needs to heal.      Patients overall evaluation will be discussed at the Liver Transplant selection committee meeting with a final recommendation on the patients suitability for transplant to be made at that time.    Consult Full  Details:  Monalisa Olguin was seen in consultation at the request of Dr. Kovacs  for evaluation as a potential liver transplant recipient.    Reason for Visit:  Monalisa Olguin is a 54 year old year old female with Laennec's, who presents for liver transplant evaluation.    HPI:  Presenting complaint: Abdominal distention    Past Medical History:   Diagnosis Date     Acute alcoholic intoxication in alcoholism (H) 3/27/2018     Acute renal failure (H) 11/11/2019     Alcohol abuse 5/2/2013     Alcohol dependence 5/25/2013     Alcohol withdrawal 5/2/2013     Alcoholic cirrhosis (H)      Anxiety 10/10/2011     CKD (chronic kidney disease) stage 3, GFR 30-59 ml/min (H) 2006    last GFR was 42     CKD (chronic kidney disease) stage 3, GFR 30-59 ml/min (H) 2/22/2011     CKD (chronic kidney disease) stage 5, GFR less than 15 ml/min (H) 4/14/2020     Depressive disorder      Esophageal reflux 10/7/2002     Hematochezia-patient reported 11/11/2019     Heme positive stool 3/27/2018     Hepatic encephalopathy (H) 11/11/2019     History of blood transfusion     2020 [facility]     Hypertension goal BP (blood pressure) < 130/80 7/12/2011     Hyponatremia 11/11/2019     Moderate Depression [296.32] 12/22/2009    stable on wellbutrin     Other internal derangement of knee(717.89)     ACL Internal derangement, knee/ original injury in 5th grade, torn cartilage      Pap smear     no abnormals, due for paps q 2-3 yrs     SBP (spontaneous bacterial peritonitis) (H) 2019     Thyroid disease      Unspecified essential hypertension      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     COLONOSCOPY N/A 2017    Procedure: COLONOSCOPY;  Colonoscopy;  Surgeon: Sai Johnston MD;  [facility]: [facility]     ESOPHAGOSCOPY, GASTROSCOPY, DUODENOSCOPY (EGD), COMBINED N/A 2017    Procedure: COMBINED ESOPHAGOSCOPY, GASTROSCOPY, DUODENOSCOPY (EGD), BIOPSY SINGLE OR MULTIPLE;  ESOPHAGOSCOPY, GASTROSCOPY, DUODENOSCOPY (EGD) with biopsies;  Surgeon: Sai Johnston MD;  Location: [facility]     ESOPHAGOSCOPY, GASTROSCOPY, DUODENOSCOPY (EGD), COMBINED N/A 2019    Procedure: ESOPHAGOGASTRODUODENOSCOPY, WITH BIOPSY;  Surgeon: Edgardo Scott DO;  Location: [facility]     HC KNEE SCOPE, DIAGNOSTIC  01    Arthroscopy, Lt Knee     LAPAROSCOPIC CHOLECYSTECTOMY N/A 2017    Procedure: LAPAROSCOPIC CHOLECYSTECTOMY;  laparoscopic cholecystectomy;  Surgeon: Romeo Pastor MD;  Location: [facility]     OPEN REDUCTION INTERNAL FIXATION WRIST Right 2017    Procedure: OPEN REDUCTION INTERNAL FIXATION WRIST;  OPEN REDUCTION INTERNAL FIXATION RIGHT WRIST;  Surgeon: Edgardo Almendarez MD;  [facility]     TRANSPLANT      Donated Left Kidney in      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     COLONOSCOPY N/A 2017    Procedure: COLONOSCOPY;  Colonoscopy;  Surgeon: Sai Johnston MD;  [facility]     ESOPHAGOSCOPY, GASTROSCOPY, DUODENOSCOPY (EGD), COMBINED N/A 2017    Procedure: COMBINED ESOPHAGOSCOPY, GASTROSCOPY, DUODENOSCOPY (EGD), BIOPSY SINGLE OR MULTIPLE;  ESOPHAGOSCOPY,  "GASTROSCOPY, DUODENOSCOPY (EGD) with biopsies;  Surgeon: Sai Johnston MD;  [facility]     ESOPHAGOSCOPY, GASTROSCOPY, DUODENOSCOPY (EGD), COMBINED N/A 4/29/2019    Procedure: ESOPHAGOGASTRODUODENOSCOPY, WITH BIOPSY;  Surgeon: Edgardo Scott DO;  [facility]     HC KNEE SCOPE, DIAGNOSTIC  11/14/01    Arthroscopy, Lt Knee     LAPAROSCOPIC CHOLECYSTECTOMY N/A 9/24/2017    Procedure: LAPAROSCOPIC CHOLECYSTECTOMY;  laparoscopic cholecystectomy;  Surgeon: Romeo Pastor MD;  [facility]     OPEN REDUCTION INTERNAL FIXATION WRIST Right 11/29/2017    Procedure: OPEN REDUCTION INTERNAL FIXATION WRIST;  OPEN REDUCTION INTERNAL FIXATION RIGHT WRIST;  Surgeon: Edgardo Almendarez MD;  [facility]     TRANSPLANT      Donated Left Kidney in 2000     Family History   Problem Relation Age of Onset     Hypertension Mother         80 years old     Heart Disease Paternal Grandmother      Heart Disease Paternal Grandfather      Cerebrovascular Disease Maternal Grandmother      Cerebrovascular Disease Maternal Grandfather      Alcohol/Drug Maternal Grandfather      Substance Abuse Maternal Grandfather      Heart Disease Father         Silent MI stents x 2     Diabetes Sister 17        older sister also had kidney and pancreas transplant     Heart Disease Sister         MI secondary to diabetes     Genitourinary Problems Sister 43        kidney transplant from renal failure     Other - See Comments Sister         older     Other - See Comments Sister         younger     Diabetes Sister 9        youngest, juvenile type I (onset age 9 with no complications)     Cancer Paternal Aunt         ?kind     Allergies   Allergen Reactions     Albuterol      Tongue \"hardened and painful\"     Prior to Admission medications    Medication Sig Start Date End Date Taking? Authorizing Provider   acamprosate (CAMPRAL) 333 MG EC tablet TAKE TWO TABLETS BY MOUTH THREE TIMES A DAY 3/12/20  Yes Efren Bustos, " MD   ARIPiprazole (ABILIFY) 5 MG tablet Take 1 tablet (5 mg) by mouth At Bedtime 11/21/19  Yes Efren Bustos MD   busPIRone (BUSPAR) 15 MG tablet Take 1 tablet (15 mg) by mouth 2 times daily 11/21/19  Yes Efren Bustos MD   ciprofloxacin (CIPRO) 250 MG tablet Take 1 tablet (250 mg) by mouth every 24 hours 5/8/20 6/7/20 Yes Joey Kwong MD   FLUoxetine (PROZAC) 20 MG capsule TAKE THREE CAPSULES BY MOUTH ONCE DAILY 2/25/20  Yes Efren Bustos MD   folic acid (FOLVITE) 1 MG tablet Take 1 tablet (1 mg) by mouth daily 11/21/19  Yes Efren Bustos MD   furosemide (LASIX) 20 MG tablet Take 20 mg by mouth daily 4/24/20  Yes Unknown, Entered By History   gabapentin (NEURONTIN) 100 MG capsule Take 3 capsules (300 mg) by mouth At Bedtime 11/21/19  Yes Efren Bustos MD   lactulose (CHRONULAC) 10 GM/15ML solution Take 22.5 mLs (15 g) by mouth 3 times daily as needed for constipation Start with 15 ml daily, titrate as needed for 2-3 BM's daily. 1/27/20  Yes Edgardo Kovacs MD   midodrine (PROAMATINE) 5 MG tablet Take 1 tablet (5 mg) by mouth 3 times daily (with meals) 11/21/19  Yes Efren Bustos MD   omeprazole (PRILOSEC) 20 MG DR capsule Take 1 capsule (20 mg) by mouth daily 11/21/19  Yes Efren Bustos MD   oxyCODONE (ROXICODONE) 5 MG tablet Take 1 tablet (5 mg) by mouth 2 times daily as needed for pain 4/28/20  Yes Bryant Colorado MD   potassium chloride ER (K-DUR/KLOR-CON M) 10 MEQ CR tablet Take 1 tablet (10 mEq) by mouth daily 11/21/19  Yes Efren Bustos MD   rifaximin (XIFAXAN) 550 MG TABS tablet Take 1 tablet (550 mg) by mouth 2 times daily 11/17/19  Yes Shanta Pineda MD   SENNA-docusate sodium (SENNA S) 8.6-50 MG tablet Take 1 tablet by mouth 2 times daily  Patient taking differently: Take 1 tablet by mouth daily  12/3/19  Yes Efren Bustos MD   spironolactone (ALDACTONE) 50 MG tablet Take 1 tablet (50 mg) by mouth daily 1/27/20  Yes Edgardo Kovacs MD   traZOFerchoe  (DESYREL) 50 MG tablet Take 2 tablets (100 mg) by mouth nightly as needed for sleep 11/17/19  Yes Shanta Pineda MD   ursodiol (ACTIGALL) 500 MG tablet Take 1 tablet (500 mg) by mouth 2 times daily 11/21/19  Yes Efren Bustos MD       Previous Transplant Hx: No    Cardiovascular Hx:       h/o Cardiac Issues: No       Exercise Tolerance: shortness of breath with exertion.    Potential Donor(s): No    ROS:    REVIEW OF SYSTEMS (check box if normal)  [x]                GENERAL  [x]                  PULMONARY [x]                 GENITOURINARY  [x]                 CNS                 [x]                  CARDIAC  [x]                  ENDOCRINE  [x]                 EARS,NOSE,THROAT [x]                  GASTROINTESTINAL [x]                  NEUROLOGIC    [x]                 MUSCLOSKELTAL  [x]                   HEMATOLOGY    Examination:     Vitals: icteric, foot in a boot.      Results:   Recent Results (from the past 168 hour(s))   Comprehensive metabolic panel    Collection Time: 05/06/20  5:36 AM   Result Value Ref Range    Sodium 140 133 - 144 mmol/L    Potassium 3.5 3.4 - 5.3 mmol/L    Chloride 109 94 - 109 mmol/L    Carbon Dioxide 23 20 - 32 mmol/L    Anion Gap 8 3 - 14 mmol/L    Glucose 91 70 - 99 mg/dL    Urea Nitrogen 37 (H) 7 - 30 mg/dL    Creatinine 1.92 (H) 0.52 - 1.04 mg/dL    GFR Estimate 29 (L) >60 mL/min/[1.73_m2]    GFR Estimate If Black 34 (L) >60 mL/min/[1.73_m2]    Calcium 9.0 8.5 - 10.1 mg/dL    Bilirubin Total 12.7 (H) 0.2 - 1.3 mg/dL    Albumin 2.8 (L) 3.4 - 5.0 g/dL    Protein Total 4.7 (L) 6.8 - 8.8 g/dL    Alkaline Phosphatase 146 40 - 150 U/L    ALT 15 0 - 50 U/L    AST 31 0 - 45 U/L   CBC with platelets    Collection Time: 05/06/20  5:36 AM   Result Value Ref Range    WBC 4.7 4.0 - 11.0 10e9/L    RBC Count 2.27 (L) 3.8 - 5.2 10e12/L    Hemoglobin 7.4 (L) 11.7 - 15.7 g/dL    Hematocrit 23.6 (L) 35.0 - 47.0 %     (H) 78 - 100 fl    MCH 32.6 26.5 - 33.0 pg    MCHC 31.4 (L) 31.5 - 36.5  g/dL    RDW 22.5 (H) 10.0 - 15.0 %    Platelet Count 56 (L) 150 - 450 10e9/L   HIV Antigen Antibody Combo    Collection Time: 05/06/20  5:36 AM   Result Value Ref Range    HIV Antigen Antibody Combo Nonreactive NR^Nonreactive       Hepatitis A Antibody IgG    Collection Time: 05/06/20  5:36 AM   Result Value Ref Range    Hepatitis A Antibody IgG Reactive (AA) NR^Nonreactive   Treponema Abs w Reflex to RPR and Titer    Collection Time: 05/06/20  5:36 AM   Result Value Ref Range    Treponema Antibodies Nonreactive NR^Nonreactive   EBV Capsid Antibody IgG    Collection Time: 05/06/20  5:36 AM   Result Value Ref Range    EBV Capsid Antibody IgG 1.0 (H) 0.0 - 0.8 AI   CMV Antibody IgG    Collection Time: 05/06/20  5:36 AM   Result Value Ref Range    CMV Antibody IgG >8.0 (H) 0.0 - 0.8 AI   Quantiferon TB Gold Plus    Collection Time: 05/06/20  5:36 AM    Specimen: Plasma   Result Value Ref Range    Quantiferon-TB Gold Plus Result Negative NEG^Negative    TB1 Ag minus Nil Value 0.00 IU/mL    TB2 Ag minus Nil Value 0.00 IU/mL    Mitogen minus Nil Result 8.52 IU/mL    Nil Result 0.04 IU/mL   HCG qualitative Blood    Collection Time: 05/06/20  5:36 AM   Result Value Ref Range    HCG Qualitative Serum Negative NEG^Negative   CBC with platelets    Collection Time: 05/07/20  5:26 AM   Result Value Ref Range    WBC 4.5 4.0 - 11.0 10e9/L    RBC Count 2.42 (L) 3.8 - 5.2 10e12/L    Hemoglobin 7.7 (L) 11.7 - 15.7 g/dL    Hematocrit 25.4 (L) 35.0 - 47.0 %     (H) 78 - 100 fl    MCH 31.8 26.5 - 33.0 pg    MCHC 30.3 (L) 31.5 - 36.5 g/dL    RDW 22.9 (H) 10.0 - 15.0 %    Platelet Count 56 (L) 150 - 450 10e9/L   Comprehensive metabolic panel    Collection Time: 05/07/20  5:26 AM   Result Value Ref Range    Sodium 140 133 - 144 mmol/L    Potassium 3.5 3.4 - 5.3 mmol/L    Chloride 109 94 - 109 mmol/L    Carbon Dioxide 23 20 - 32 mmol/L    Anion Gap 7 3 - 14 mmol/L    Glucose 100 (H) 70 - 99 mg/dL    Urea Nitrogen 31 (H) 7 - 30 mg/dL     Creatinine 1.78 (H) 0.52 - 1.04 mg/dL    GFR Estimate 32 (L) >60 mL/min/[1.73_m2]    GFR Estimate If Black 37 (L) >60 mL/min/[1.73_m2]    Calcium 9.0 8.5 - 10.1 mg/dL    Bilirubin Total 13.2 (H) 0.2 - 1.3 mg/dL    Albumin 2.8 (L) 3.4 - 5.0 g/dL    Protein Total 4.7 (L) 6.8 - 8.8 g/dL    Alkaline Phosphatase 159 (H) 40 - 150 U/L    ALT 14 0 - 50 U/L    AST 34 0 - 45 U/L   INR    Collection Time: 05/07/20  5:26 AM   Result Value Ref Range    INR 1.99 (H) 0.86 - 1.14   Bilirubin direct    Collection Time: 05/07/20  5:26 AM   Result Value Ref Range    Bilirubin Direct 10.2 (H) 0.0 - 0.2 mg/dL   Platelet count    Collection Time: 05/08/20  5:54 AM   Result Value Ref Range    Platelet Count 59 (L) 150 - 450 10e9/L   INR    Collection Time: 05/11/20  5:28 PM   Result Value Ref Range    INR 1.64 (H) 0.86 - 1.14   Hepatic panel    Collection Time: 05/11/20  5:28 PM   Result Value Ref Range    Bilirubin Direct 12.3 (H) 0.0 - 0.2 mg/dL    Bilirubin Total 15.6 (HH) 0.2 - 1.3 mg/dL    Albumin 3.0 (L) 3.4 - 5.0 g/dL    Protein Total 5.1 (L) 6.8 - 8.8 g/dL    Alkaline Phosphatase 169 (H) 40 - 150 U/L    ALT 13 0 - 50 U/L    AST 35 0 - 45 U/L   Basic metabolic panel    Collection Time: 05/11/20  5:28 PM   Result Value Ref Range    Sodium 141 133 - 144 mmol/L    Potassium 3.9 3.4 - 5.3 mmol/L    Chloride 111 (H) 94 - 109 mmol/L    Carbon Dioxide 22 20 - 32 mmol/L    Anion Gap 8 3 - 14 mmol/L    Glucose 127 (H) 70 - 99 mg/dL    Urea Nitrogen 26 7 - 30 mg/dL    Creatinine 1.65 (H) 0.52 - 1.04 mg/dL    GFR Estimate 35 (L) >60 mL/min/[1.73_m2]    GFR Estimate If Black 40 (L) >60 mL/min/[1.73_m2]    Calcium 9.4 8.5 - 10.1 mg/dL   CBC with platelets    Collection Time: 05/11/20  5:28 PM   Result Value Ref Range    WBC 5.7 4.0 - 11.0 10e9/L    RBC Count 2.70 (L) 3.8 - 5.2 10e12/L    Hemoglobin 8.8 (L) 11.7 - 15.7 g/dL    Hematocrit 28.6 (L) 35.0 - 47.0 %     (H) 78 - 100 fl    MCH 32.6 26.5 - 33.0 pg    MCHC 30.8 (L) 31.5 - 36.5  g/dL    RDW 23.7 (H) 10.0 - 15.0 %    Platelet Count 62 (L) 150 - 450 10e9/L     I had a long discussion with the patient regarding liver transplantation which included but was not limited to  the following points:    1. Liver transplant selection committee process.  2. The federal rules for cadaveric waiting list, the size and blood type matching of the organ. The availability of living-related donor transplantation.  3. The types of donors: brain death donors, non-heart beating donors, partial liver grafts: splits and living donor grafts  4. Extended criteria  Donors (older age, steasosis) and the increased  risk of primary non-function using the extended criteria donors  5. The [facility] high risk donors,  Risk of donor transmitted infections and donor transmitted malignancy  6. The liver transplant operation and the associated risks and technical complications which can include intraoperative death, post operative death,  Primary non-function, bleeding requiring re-operations, arterial and biliary complications, bowel perforations, and intra abdominal abscess. Some of these complicaitons may require a second operation.  7. The postoperative course, the ICU stay and risk of postoperative complications which can include sepsis, MI, stroke, brain injury, pneumonia, pleural effusions, and renal dysfunction.  8. The current 1 year and 5 year graft and patient survivals.  9. The need for life long immunosuppressive therapy and the side effects of these medications, including the possibility of toxicity, opportunistic infections, risk of cancer including lymphoma, and the possibility of rejection even if the patient is taking the medication exactly as prescribed.  10. The need for compliance with medications and follow-up visits in the clinic and thereafter.  11. The patient and family understand these risks and wish to proceed to transplantation       I spent ..30.....minutes with the patient and more than 50% of the time  was spend in direct face to face counseling.  CC  TYE OWENS    Copy to patient     [address] [facility] [id number]      HPI      ROS      Physical Exam

## 2020-05-12 NOTE — PROGRESS NOTES
"Monalisa Olguin is a 54 year old female who is being evaluated via a billable telephone visit.      The patient has been notified of following:     \"This telephone visit will be conducted via a call between you and your physician/provider. We have found that certain health care needs can be provided without the need for a physical exam.  This service lets us provide the care you need with a short phone conversation.  If a prescription is necessary we can send it directly to your pharmacy.  If lab work is needed we can place an order for that and you can then stop by our lab to have the test done at a later time.    Telephone visits are billed at different rates depending on your insurance coverage. During this emergency period, for some insurers they may be billed the same as an in-person visit.  Please reach out to your insurance provider with any questions.    If during the course of the call the physician/provider feels a telephone visit is not appropriate, you will not be charged for this service.\"    Patient has given verbal consent for Telephone visit? yes    Phone call duration: 15 minutes    Outpatient MNT: Liver Transplant Evaluation    Current BMI: 26.4 (HT 60 in,  lbs/61 kg)- data from 5/7/20   BMI is within recommendation of <40 for liver transplant     Time Spent: 15 minutes  Visit Type: Initial  Referring Physician: Gely   Pt accompanied by: self    Medical dx associated with RD referral  - Etoh cirrhosis    History of previous txp: none, but did donate a kidney in 2000    Nutrition Assessment  Pt reports following a regular diet w/o any sodium restrictions. She does report recent vomiting lately after eating some foods.     Appetite: poor for a while now, but pt reports she has never been a big eater    Vitamins, Supplements, Pertinent Meds: folic acid   Herbal Medicines/Supplements: none     Diet Recall  Breakfast Toast or cereal    Lunch popsicles or TV dinner    Dinner 1/3 maria de jesus  "   Snacks Cookies, pretzels, cheese sticks    Beverages Ice, water/flavored water, some juice    Dining out Unknown      Physical Activity  Does OT and PT activities 2x/day      Anthropometrics  Height:   60 in   BMI:    26.4    Weight Status:Overweight BMI 25-29.9   Weight:  135 lbs (5/7)            IBW (lb): 100  % IBW: 135    Wt Hx: Pt reports edema is mild and has overall improved. She reports stable weight in the range of 120-135 lbs with some muscle loss in her upper body (unknown degree).     Adj/dosing BW: 109 lbs/49 kg       Malnutrition  % Intake: Decreased intake does not meet criteria for malnutrition   % Weight Loss: None noted  Subcutaneous Fat Loss: None  Muscle Loss: unknown degree, perhaps mild   Fluid Accumulation/Edema: Mild   Malnutrition Diagnosis: Non-Severe malnutrition in the context of chronic illness, yet unable to determine malnutrition dx fully d/t inability to visualize pt     Estimated Nutrition Needs  Energy  4905-9876     (25-30 kcal/kg for maintenance)     Protein  49-59    (1-1.2 g/kg for increased needs)           Fluid  1 ml/kcal or per MD        Micronutrient   Na+: <2000 mg/day            Nutrition Diagnosis  Food and nutrition related knowledge deficit r/t pre liver transplant eval AEB pt verbalized not hearing pre/post transplant diet guidelines.    Nutrition Intervention  Nutrition education provided:  Discussed sodium intake (low sodium foods and drinks, seasoning food without salt and tips for low sodium diet). Pt reports she is constantly thirsty. Discussed that this could be exacerbated from excessive dietary sodium intake. Diet recall does not reveal excessive intake, yet suggested she track intake for a few days, with daily recommendation to remain <2000 mg.     Reviewed adequate protein intake. Encouraged receiving protein from both animal and plant based sources.     Reviewed post txp diet guidelines in brief (will review in further detail post txp):  (1) Review of  proper food safety measures d/t immunosuppressant therapy post-op and increased risk for food-borne illness    (2) Avoid the following post txp d/t risk for rejection, unknown effects on the organs, and/or potential interactions with immunosuppressants:  - Herbal, Chinese, holistic, chiropractic, natural, alternative medicines and supplements  - Detoxes and cleanses  - Weight loss pills  - Protein powders or other products with extracts or herbs (ie green tea extract)    (3) Med regimen and possible side effects    Patient Understanding: Pt verbalized understanding of education provided.  Expected Compliance: Good  Follow-Up Plans: PRN     Nutrition Goals  1. Limit Na+ <2000mg/day  2. Pt to verbalize understanding of 3 aspects of post txp education provided    Provided pt with contact info.   Diane Kelly RD, LD  [facility] [phone]

## 2020-05-13 NOTE — PROGRESS NOTES
Pt here for dobutamine stress test. Test, meds and side effects reviewed with patient. Achieved target HR at 40 mcg/kg/min Dobutamine and a total of 0.4 mg IV atropine. Gave a total of 2.5mg IV Metoprolol to bring HR back to baseline.  Post monitoring complete and VSS. Pt escorted out to front lobby via wheelchair.

## 2020-05-14 NOTE — TELEPHONE ENCOUNTER
Reason for Call:  Other call back    Detailed comments: Patient calling about a heel cuff,sys you were going to let her try this. She was looking for information please advise     Phone Number Patient can be reached at: Cell number on file:    Telephone Information:   Mobile [phone]       Best Time: Anytime     Can we leave a detailed message on this number? YES    Call taken on 5/14/2020 at 12:36 PM by Miranda Seipiel Vaccari

## 2020-05-15 NOTE — PROGRESS NOTES
"Monalisa Olguin is a 54 year old female who is being evaluated via a billable telephone visit.      The patient has been notified of following:     \"This telephone visit will be conducted via a call between you and your physician/provider. We have found that certain health care needs can be provided without the need for a physical exam.  This service lets us provide the care you need with a short phone conversation.  If a prescription is necessary we can send it directly to your pharmacy.  If lab work is needed we can place an order for that and you can then stop by our lab to have the test done at a later time.    Telephone visits are billed at different rates depending on your insurance coverage. During this emergency period, for some insurers they may be billed the same as an in-person visit.  Please reach out to your insurance provider with any questions.    If during the course of the call the physician/provider feels a telephone visit is not appropriate, you will not be charged for this service.\"    Patient has given verbal consent for Telephone visit?  Yes    What phone number would you like to be contacted at? [phone]   How would you like to obtain your AVS? Mail     Phone call duration: 19 minutes    Chau Castillo PA-C    Hepatology Follow-up Clinic note  Monalisa Olguin   Date of Birth 1965  Date of Service 5/15/2020    Reason for follow-up: ETOH cirrhosis          Assessment/plan:   Monalisa Olguin is a 54 year old female with history of ETOH cirrhosis complicated by history of hepatic encephalopathy and ascites. Patient is currently undergoing evaluation for liver transplantation. She has been sober since 9/2019, Her hepatic encephalopathy appears fairly well controlled at this time.  MELD-Na 27 .  We discussed importance of good nutrition to keep up her strength. She does have some ascites on recent imaging, but states she does any abdominal discomfort and does not feel like she needs " a paracentesis. Her Tbili has trended up over the past week without clear etiology.     # Total bilirubin 15.6, trending up   - Repeat hepatic panel, CBC, BMP, INR in one week     # History of ascites,   - Continue 20 mg lasix. Diuretics limited by kidney function and solitary kidney   - Paracentesis PRN     # Transplant Evaluation work-up:   - Colonoscopy within 1-2 weeks   - Neuropsychology testing     # History of hepatic encephalopathy   - Continue lactulose (dose to 3-5 bowel movements)  - Continue Rifaximin    # Follow-up in clinic with Dr. Kovacs as previously scheduled in July 2020    Chau Castillo PA-C   [facility] Hepatology clinic    -----------------------------------------------------       HPI:   Monalisa Olguin is a 54 year old female presenting for follow-up.     ETOH Cirrhosis  Complicated by:  - Ascites  - Hepatic encephalopathy   - No history of GI bleed  EGD: 4/29/2019, normal   HCC: 4/21/2020     Patient was last seen by Dr. Kovacs on 1/27/2020. She was recent in early May due to acute metabolic encephalopathy/HE thought due to medication non-adhererence. She had a mechanical fall prior to admission and suffered metatarsal fracture.     Since discharge, her appetite is fair. She eats one to two meals a day with some snacks. Per recall, she eats a fair amount protein sizes.    She states she doesn't really have any swelling in her legs. She is taking 20 mg Lasix. She denies taking Spironolactone. Her weight is stable.   She does think she has any significant fluid in her abdomen. Her last paracentesis was January 2020. She has had a few ultrasound since that time that did not show enough fluid to drain.     She denies any overt confusion, but states she is always struggling with memory, both short and long term. She is taking lactulose and having 2-4 bowel movements a day. She is also taking Rifaximin.     Patient also denies melena, hematochezia or hematemesis. Patient denies weight  loss, fevers, sweats or chills.    Medical hx Surgical hx   Past Medical History:   Diagnosis Date     Acute alcoholic intoxication in alcoholism (H) 3/27/2018     Acute renal failure (H) 2019     Alcohol abuse 2013     Alcohol dependence 2013     Alcohol withdrawal 2013     Alcoholic cirrhosis (H)      Anxiety 10/10/2011     CKD (chronic kidney disease) stage 3, GFR 30-59 ml/min (H)      CKD (chronic kidney disease) stage 3, GFR 30-59 ml/min (H) 2011     CKD (chronic kidney disease) stage 5, GFR less than 15 ml/min (H) 2020     Depressive disorder      Esophageal reflux 10/7/2002     Hematochezia-patient reported 2019     Heme positive stool 3/27/2018     Hepatic encephalopathy (H) 2019     History of blood transfusion      Hypertension goal BP (blood pressure) < 130/80 2011     Hyponatremia 2019     Moderate Depression [296.32] 2009     Other internal derangement of knee(717.89)      Pap smear      SBP (spontaneous bacterial peritonitis) (H) 2019     Thyroid disease      Unspecified essential hypertension     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     COLONOSCOPY N/A 2017    Procedure: COLONOSCOPY;  Colonoscopy;  Surgeon: Sai Johnston MD;  Location:  GI     ESOPHAGOSCOPY, GASTROSCOPY, DUODENOSCOPY (EGD), COMBINED N/A 2017    Procedure: COMBINED ESOPHAGOSCOPY, GASTROSCOPY, DUODENOSCOPY (EGD), BIOPSY SINGLE OR MULTIPLE;  ESOPHAGOSCOPY, GASTROSCOPY, DUODENOSCOPY (EGD) with biopsies;  Surgeon: Sai Johnston MD;  Location:  GI     ESOPHAGOSCOPY, GASTROSCOPY, DUODENOSCOPY (EGD), COMBINED N/A 2019    Procedure: ESOPHAGOGASTRODUODENOSCOPY, WITH BIOPSY;  Surgeon: Edgardo Scott DO;  Location: [facility]     HC KNEE SCOPE, DIAGNOSTIC  01    Arthroscopy, Lt Knee     LAPAROSCOPIC  "CHOLECYSTECTOMY N/A 9/24/2017    Procedure: LAPAROSCOPIC CHOLECYSTECTOMY;  laparoscopic cholecystectomy;  Surgeon: Romeo Pastor MD;  Location: [facility] OR     OPEN REDUCTION INTERNAL FIXATION WRIST Right 11/29/2017    Procedure: OPEN REDUCTION INTERNAL FIXATION WRIST;  OPEN REDUCTION INTERNAL FIXATION RIGHT WRIST;  Surgeon: Edgardo Almendarez MD;  Location: [facility] OR     TRANSPLANT      Donated Left Kidney in 2000                 Medications:     Current Outpatient Medications   Medication     acamprosate (CAMPRAL) 333 MG EC tablet     ARIPiprazole (ABILIFY) 5 MG tablet     busPIRone (BUSPAR) 15 MG tablet     ciprofloxacin (CIPRO) 250 MG tablet     FLUoxetine (PROZAC) 20 MG capsule     folic acid (FOLVITE) 1 MG tablet     furosemide (LASIX) 20 MG tablet     gabapentin (NEURONTIN) 100 MG capsule     lactulose (CHRONULAC) 10 GM/15ML solution     midodrine (PROAMATINE) 5 MG tablet     omeprazole (PRILOSEC) 20 MG DR capsule     potassium chloride ER (K-DUR/KLOR-CON M) 10 MEQ CR tablet     rifaximin (XIFAXAN) 550 MG TABS tablet     SENNA-docusate sodium (SENNA S) 8.6-50 MG tablet     spironolactone (ALDACTONE) 50 MG tablet     traZODone (DESYREL) 50 MG tablet     ursodiol (ACTIGALL) 500 MG tablet     No current facility-administered medications for this visit.             Allergies:     Allergies   Allergen Reactions     Albuterol      Tongue \"hardened and painful\"            Review of Systems:   10 points ROS was obtained and highlighted in the HPI, otherwise negative.          Physical Exam:     PSYCH: Alert and oriented times 3; coherent speech, normal   rate and volume, no hallucinations or delusions  His affect is normal    RESP: No cough, no audible wheezing, able to talk in full sentences  Remainder of exam unable to be completed due to telephone visits             Data:   Reviewed in person and significant for:    Lab Results   Component Value Date     05/11/2020      Lab Results   Component Value Date    " POTASSIUM 3.9 05/11/2020     Lab Results   Component Value Date    CHLORIDE 111 05/11/2020     Lab Results   Component Value Date    CO2 22 05/11/2020     Lab Results   Component Value Date    BUN 26 05/11/2020     Lab Results   Component Value Date    CR 1.65 05/11/2020       Lab Results   Component Value Date    WBC 5.7 05/11/2020     Lab Results   Component Value Date    HGB 8.8 05/11/2020     Lab Results   Component Value Date    HCT 28.6 05/11/2020     Lab Results   Component Value Date     05/11/2020     Lab Results   Component Value Date    PLT 62 05/11/2020       Lab Results   Component Value Date    AST 35 05/11/2020     Lab Results   Component Value Date    ALT 13 05/11/2020     Lab Results   Component Value Date    BILICONJ 0.0 06/04/2013      Lab Results   Component Value Date    BILITOTAL 15.6 05/11/2020       Lab Results   Component Value Date    ALBUMIN 3.0 05/11/2020     Lab Results   Component Value Date    PROTTOTAL 5.1 05/11/2020      Lab Results   Component Value Date    ALKPHOS 169 05/11/2020       Lab Results   Component Value Date    INR 1.64 05/11/2020     US ABDOMEN COMPLETE:   4/21/2020:   IMPRESSION:   1. Hepatic cirrhosis and moderate ascites.  2. Patent and antegrade Doppler flow of the hepatic vasculature.    STRESS ECHO:   5/13/2020:   Interpretation Summary  Normal, low-risk dobutamine echocardiogram without evidence of ischemia.  The target heart rate was achieved.  Normal biventricular size, thickness, and global systolic function at  baseline, LVEF=60-65%.  With low-dose dobutamine, LVEF augmented and LV cavity size decreased  appropriately.  With peak dobutamine, LVEF increased further to >70% and LV cavity size  decreased appropriately.  No regional wall motion abnormalities at rest or with dobutamine.  No angina was elicited.  No ECG evidence of ischemia. Normal heart rate and blood pressure response to  dobutamine.  No significant valvular abnormalities are noted on  screening Doppler exam.  The aortic root and visualized ascending aorta are normal.  PA systolic pressure is normal. Estimated PA systolic pressure is 21 mmHg (18  mmHg+RA pressure.)

## 2020-05-15 NOTE — PROGRESS NOTES
"Monalisa Olguin is a 54 year old female who is being evaluated via a billable telephone visit.      The patient has been notified of following:     \"This telephone visit will be conducted via a call between you and your physician/provider. We have found that certain health care needs can be provided without the need for a physical exam.  This service lets us provide the care you need with a short phone conversation.  If a prescription is necessary we can send it directly to your pharmacy.  If lab work is needed we can place an order for that and you can then stop by our lab to have the test done at a later time.    Telephone visits are billed at different rates depending on your insurance coverage. During this emergency period, for some insurers they may be billed the same as an in-person visit.  Please reach out to your insurance provider with any questions.    If during the course of the call the physician/provider feels a telephone visit is not appropriate, you will not be charged for this service.\"    Patient has given verbal consent for Telephone visit?  Yes    What phone number would you like to be contacted at? [phone]    How would you like to obtain your AVS? Mail a copy    Phone call duration: *** minutes    {signature options:[id number]}    pt does not have technology for video visit switched to telephone visit. -KM  "

## 2020-05-18 NOTE — TELEPHONE ENCOUNTER
Reason for Call:  Medication or medication refill:    Do you use a [facility]?  Name of the pharmacy and phone number for the current request:  [facility] - [phone]    Name of the medication requested: nicotine patches    Other request: [facility] is requesting a prescription for nicotine patches    Can we leave a detailed message on this number? YES    Phone number patient can be reached at: Cell number on file:    Telephone Information:   Mobile [phone]       Best Time:     Call taken on 5/18/2020 at 10:34 AM by Malini Dobbins

## 2020-05-18 NOTE — TELEPHONE ENCOUNTER
Have not yet reviewed or checked if education material in husbands e-mail. Ask that she does that. She reports her neuropsych testing is for 5/25 and has not yet heard regarding colonoscopy.

## 2020-05-18 NOTE — TELEPHONE ENCOUNTER
Patient Call: Voicemail  Date/Time: 0951 5/15/2020  Reason for call: Pt left a  message to call her back

## 2020-05-19 NOTE — TELEPHONE ENCOUNTER
Request sent to admin pool. First request for material to be sent to patients e-mail, second request to her  and now she is requesting education material be sent by mail.

## 2020-05-19 NOTE — TELEPHONE ENCOUNTER
Patient Call: Voicemail  Date/Time: 5/19/2020-10:44am   Reason for call: PT ran out of ink please mail educational paperwork

## 2020-05-19 NOTE — TELEPHONE ENCOUNTER
Tapered dosing Rxs were sent to the pharmacy for her.     Electronically signed by:  Efren Bustos M.D.  5/19/2020

## 2020-05-19 NOTE — PROGRESS NOTES
Patient was recently admitted with a metatarsal fracture for which ortho was consulted and recommended a Cam boot. She was discharged with oxycodone BID (12 tablets) on 5/11/2020 to start that evening.     She paged to request more oxycodone. She has ongoing pain in her foot, no worsening pain. She has not tried tylenol for pain.    - Recommended tylenol < 2g per day for pain  - If she has ongoing pain in her foot, recommended she reach out to her PCP

## 2020-05-20 NOTE — PROGRESS NOTES
MTM ENCOUNTER  SUBJECTIVE/OBJECTIVE:                           Monalisa Olguin is a 54 year old female called for a transitions of care visit. She was discharged from [facility] on 05/08/20 for Acute metabolic encephalopathy 2/2 Hepatic encephalopathy  Decompensated EtOH cirrhosis c/b ascites, SBP, recurrent HE  Hyperbilirubinemia  Pulmonary edema  Mechanical fall with fracture of L 5th and 2nd metatarsals  ALFREDO on CKD  Thrombocytopenia secondary to liver disease  Chronic macrocytic anemia secondary to alcohol use      Patient consented to a telehealth visit: yes  Telemedicine Visit Details  Type of service:  Telephone visit  Start Time: 12:36 pm  End Time: 1:37 pm  Originating Location (pt. Location): Home  Distant Location (provider location):  [facility] [place]  Mode of Communication:  Telephone    Chief Complaint: Reports she would like to review dosing instructions of Lactulose and understand why she is taking each medication.  Would like to review instructions on how to use nicotine patches.  Personal Healthcare Goals: Stay healthy and out of the hospital.     Allergies/ADRs: Reviewed in Epic  Tobacco:  reports that she has been smoking cigarettes. She has a 5.00 pack-year smoking history. She has never used smokeless tobacco.Tobacco Cessation Action Plan: will start patches.  Alcohol: none since sept 2019.  Caffeine: once in a while energy drinks/day.  Activity: none because of broken foot.  PMH: Reviewed in Epic    Medication Adherence/Access: Patient uses pill box(es).  Reports her daughter fills her med box for her.  Reports her daughter is going to nursing school.  Patient takes medications 3 time(s) per day.   Per patient, misses medication 0 times per week.   Medication barriers: none.   The patient fills medications at [facility]: YES. And [facility]     Hepatic encephalopathy/alcoholic cirrhosis of the liver with ascites: Reports takes, ciprofloxacin 250 mg, once  daily.  Reports is taking acamprosate 333mg, and is taking 2 tablets, three times daily. Reports no alcohol since Sept 2019.  Reports is taking lactulose 10 g / 15 mL solution, and is taking 15 mL's daily, and will titrate as needed to get to 2-3 bowel movements per day.  Reports 1 bowel movement so far today.  Reports takes rifaximin 550 mg, twice daily.  Reports taking furosemide 20 mg daily, and spironolactone 50 mg in the afternoon.  Reports no edema/ascites symptoms.  Reports her foot is swollen, however believes this is because it is broken.  Reports is taking ursodiol 500 mg, twice daily, and will call the pharmacy for refill as she is running out of this.  Reports her memory/confusion is improving.  Reports she feels she is tolerating medication well except for feeling sleepy.    Hypokalemia: Reports she takes potassium chloride 10 M EQ's once daily, without difficulty.  On 5/15/2020 potassium level was 3.9.    Orthostatic hypotension: Reports is taking midodrine 5 mg by mouth 3 times daily.  Reports she does not have the means to monitor her blood pressure at home.  Reports no orthostatic hypotension symptoms, but does feel sleepy.    Depression/anxiety/insomnia: Reports she takes Abilify 5 mg at bedtime, buspirone 15 mg, twice daily, fluoxetine 60 mg once per day, and trazodone 100 mg at bedtime for sleep.  Reports depression, anxiety, and insomnia symptoms are stable.  PHQ-9 SCORE 9/17/2019 4/27/2020 5/6/2020   PHQ-9 Total Score - - -   PHQ-9 Total Score MyChart 10 (Moderate depression) - -   PHQ-9 Total Score 10 6 16       Smoking cessation: Reports smoking about 6 cigarettes/day.  Reports she has not picked up nicotine patches as of yet, but will.  Reports she would like to review instructions on how to use nicotine patches.    GERD: Reports takes omeprazole 20 mg by mouth daily in the morning.  Reports no GERD symptoms.    Nausea: Reports takes ondansetron 4 mg, as needed.  Reports does not take often.   Reports nausea is stable.    Supplements: Reports takes folic acid 1 mg once a day, vitamin B1 100 mg once daily.    Neuropathy/numbness: Reports takes gabapentin 300 mg by mouth at bedtime.  Feels this helps.    Today's Vitals: LMP 05/16/2012       ASSESSMENT:                            Medication Adherence: good, no issues identified.  Patient was able to look at each of her prescription bottles, read the names of the medication, dose, and dosing instructions.  We reviewed why she is taking each medication, and how they work. Her daughter will call if she has any questions since she is filling med box. OK to discuss per [facility].     Reviewed dosing instructions of lactulose, and reviewed why she is taking each medication, and their use.    Hepatic encephalopathy/alcoholic cirrhosis of the liver with ascites: Appears stable.  Patient will titrate lactulose dose as per needed to get to 2-3 bowel movements per day.  We reviewed this in detail.    Hypokalemia: Stable.    Orthostatic hypotension: Stable.  Discussed with patient if she has the means to acquire a blood pressure monitor this may be beneficial for her to monitor.  We reviewed signs and symptoms of orthostatic hypotension.  She is asked to report these to her doctor.    Depression/anxiety/insomnia: Stable.    Smoking cessation: Assess at follow-up.  Patient will be picking up nicotine patches.  We reviewed in detail dosing, application, and duration.    GERD: Stable.    Nausea: Stable.    Supplements: Stable.    Neuropathy/numbness: Stable.    PLAN:                          Post Discharge Medication Reconciliation Status: discharge medications reconciled, continue medications without change.    1.  Continue medications as directed.    I spent 60 minutes with this patient today. I offer these suggestions with the understanding that I don't fully understand Monalisa's past medical history and the complexity of her health conditions. Monalisa should make no  changes without the approval of her physician. A copy of the visit note was provided to the patient's gastro provider.    Will follow up in two weeks with a phone call.    The patient declined a summary of these recommendations.     Nadia Day, [facility] Pharmacist.   [phone]

## 2020-05-20 NOTE — TELEPHONE ENCOUNTER
Called and left a VM. Please inform patient that her prescription  Has been sent to the pharmacy as requested. Close encounter if no further questions.  Mona Cameron on 5/20/2020 at 7:52 AM

## 2020-05-20 NOTE — TELEPHONE ENCOUNTER
Patient Call: Voicemail  Date/Time: 5/20/20 @ 2:21 PM  Reason for call: Patient left  with her name and phone number but no details

## 2020-05-21 NOTE — TELEPHONE ENCOUNTER
Reason for Call: Request for an order or referral:    Order or referral being requested: Colonoscopy     Date needed: as soon as possible    Has the patient been seen by the PCP for this problem? YES    Additional comments: NA     Phone number Patient can be reached at:  Cell number on file:    Telephone Information:   Mobile [phone]       Best Time:  Any     Can we leave a detailed message on this number?  YES    Call taken on 5/21/2020 at 4:44 PM by Denise Cavazos

## 2020-05-22 NOTE — TELEPHONE ENCOUNTER
[facility] ran out of lactulose, due to insurance issues but has it for today. Request sent to Michell Romero to aid in scheduling colonoscopy and to Minda Soriano for neuropsych testing.

## 2020-05-22 NOTE — TELEPHONE ENCOUNTER
Patient was called and notified. The referral is T'd up for your signature.  Mona Cameron on 5/22/2020 at 3:27 PM

## 2020-05-22 NOTE — TELEPHONE ENCOUNTER
I have been communicating with the [facility], they were going to try to schedule her for colonoscopy down there due to her significant medical problems.  She needs to undergo MAC anesthesia and they are better prepared to deal with that particularly with her liver and kidney failure.  We may just need to place the referral for this.    Electronically signed by:  Efren Bustos M.D.  5/22/2020

## 2020-05-22 NOTE — TELEPHONE ENCOUNTER
Attempted to reach patient, mailbox is full and I cannot leave a message. If call is returned, please find out why she is wanting a colonoscopy- is she is having any symptoms or issues?

## 2020-05-26 NOTE — PROGRESS NOTES
SPORTS & ORTHOPEDIC WALK-IN VISIT 5/26/2020    Primary Care Physician: Dr. Bustos    5/4/20 - walking out of the flower store, and describes a twisting SHELLY which revealed a fracture under XR.     Most of the pain is located in the midfoot of her L foot.    Reason for visit:     What part of your body is injured / painful?  left foot    What caused the injury /pain? Fall    How long ago did your injury occur or pain begin? several weeks ago    What are your most bothersome symptoms? Pain    How would you characterize your symptom?  aching and dull    What makes your symptoms better? Rest and Wrap or brace    What makes your symptoms worse? Walking    Have you been previously seen for this problem? Yes, ED    Medical History:    Any recent changes to your medical history? No    Any new medication prescribed since last visit? No    Have you had surgery on this body part before? No      Review of Systems:    Do you have fever, chills, weight loss? No    Do you have any vision problems? No    Do you have any chest pain or edema? No    Do you have any shortness of breath or wheezing?  Yes, slightly    Do you have stomach problems? No    Do you have any numbness or focal weakness? Yes, some in hands    Do you have diabetes? No    Do you have problems with bleeding or clotting? Yes, thin blood due to aspirin    Do you have an rashes or other skin lesions? No       CHIEF COMPLAINT:  Pain of the Left Foot       HISTORY OF PRESENT ILLNESS  Ms. Olguin is a pleasant 54 year old female who presents to clinic today with a foot fracture.  Heydi originally injured her foot 3 weeks ago.  She was walking out of a flower shop when she twisted her foot, this was an inversion mechanism injury.  She felt immediate pain throughout her midfoot and lateral foot.  She was seen in the emergency department where she was diagnosed with multiple fractures of her foot.  She was given a Cam walker.  She is feeling about 50% better overall,  she still does have pain at times, mostly with ambulation although she is almost pain-free in the boot.  Most of the pain is in her midfoot region.    Of note, Heydi does have a history of alcoholic liver cirrhosis.  She is being worked up for a possible liver transplant currently.      Additional history: as documented    MEDICAL HISTORY  Patient Active Problem List   Diagnosis     Kidney donor     Esophageal reflux     Moderate Depression [296.32]     HYPERLIPIDEMIA LDL GOAL <100     Hypertension goal BP (blood pressure) < 130/80     Anxiety     Alcoholic cirrhosis of liver with ascites (H)     Chronic blood loss anemia     Tobacco abuse     Non-intractable vomiting with nausea     Portal hypertension (H)     Pulmonary nodules     Hyperthyroidism     Alcoholic cirrhosis of liver without ascites (H) - per Hepatology consult Nov 2017     Splenomegaly     Epistaxis     Other iron deficiency anemia     Other pancytopenia (H)     Solitary kidney, acquired     Macrocytic anemia     CKD (chronic kidney disease) stage 3, GFR 30-59 ml/min (H)     Confusion     Hepatic encephalopathy (H)     Alcohol dependence in remission (H)     Abnormal LFTs (liver function tests)       Current Outpatient Medications   Medication Sig Dispense Refill     acamprosate (CAMPRAL) 333 MG EC tablet TAKE TWO TABLETS BY MOUTH THREE TIMES A  tablet 3     ARIPiprazole (ABILIFY) 5 MG tablet Take 1 tablet (5 mg) by mouth At Bedtime 90 tablet 3     busPIRone (BUSPAR) 15 MG tablet Take 1 tablet (15 mg) by mouth 2 times daily 180 tablet 3     ciprofloxacin (CIPRO) 250 MG tablet Take 1 tablet (250 mg) by mouth every 24 hours 30 tablet 0     FLUoxetine (PROZAC) 20 MG capsule TAKE THREE CAPSULES BY MOUTH ONCE DAILY 90 capsule 3     folic acid (FOLVITE) 1 MG tablet Take 1 tablet (1 mg) by mouth daily 90 tablet 3     furosemide (LASIX) 20 MG tablet Take 20 mg by mouth daily       gabapentin (NEURONTIN) 100 MG capsule Take 3 capsules (300 mg) by mouth  "At Bedtime 270 capsule 3     lactulose (CHRONULAC) 10 GM/15ML solution Take 22.5 mLs (15 g) by mouth 3 times daily as needed for constipation Start with 15 ml daily, titrate as needed for 2-3 BM's daily. 1000 mL 5     midodrine (PROAMATINE) 5 MG tablet Take 1 tablet (5 mg) by mouth 3 times daily (with meals) 270 tablet 3     [START ON 6/2/2020] nicotine (NICODERM CQ) 14 MG/24HR 24 hr patch Place 1 patch onto the skin every 24 hours for 14 days 14 patch 0     nicotine (NICODERM CQ) 21 MG/24HR 24 hr patch Place 1 patch onto the skin every 24 hours for 14 days 14 patch 0     [START ON 6/16/2020] nicotine (NICODERM CQ) 7 MG/24HR 24 hr patch Place 1 patch onto the skin every 24 hours 30 patch 1     omeprazole (PRILOSEC) 20 MG DR capsule Take 1 capsule (20 mg) by mouth daily 90 capsule 3     ondansetron (ZOFRAN) 4 MG tablet Take 1 tablet (4 mg) by mouth every 6 hours as needed for nausea       potassium chloride ER (K-DUR/KLOR-CON M) 10 MEQ CR tablet Take 1 tablet (10 mEq) by mouth daily 90 tablet 3     rifaximin (XIFAXAN) 550 MG TABS tablet Take 1 tablet (550 mg) by mouth 2 times daily 60 tablet 0     spironolactone (ALDACTONE) 50 MG tablet Take 1 tablet (50 mg) by mouth daily 90 tablet 3     traZODone (DESYREL) 50 MG tablet Take 2 tablets (100 mg) by mouth nightly as needed for sleep 30 tablet 0     ursodiol (ACTIGALL) 500 MG tablet Take 1 tablet (500 mg) by mouth 2 times daily 180 tablet 3     vitamin B1 (VITAMIN B-1) 100 MG tablet Take 1 tablet (100 mg) by mouth daily         Allergies   Allergen Reactions     Albuterol      Tongue \"hardened and painful\"       Family History   Problem Relation Age of Onset     Hypertension Mother         80 years old     Heart Disease Paternal Grandmother      Heart Disease Paternal Grandfather      Cerebrovascular Disease Maternal Grandmother      Cerebrovascular Disease Maternal Grandfather      Alcohol/Drug Maternal Grandfather      Substance Abuse Maternal Grandfather      Heart " Disease Father         Silent MI stents x 2     Diabetes Sister 17        older sister also had kidney and pancreas transplant     Heart Disease Sister         MI secondary to diabetes     Genitourinary Problems Sister 43        kidney transplant from renal failure     Other - See Comments Sister         older     Other - See Comments Sister         younger     Diabetes Sister 9        youngest, juvenile type I (onset age 9 with no complications)     Cancer Paternal Aunt         ?kind       Additional medical/Social/Surgical histories reviewed in [facility] and updated as appropriate.        PHYSICAL EXAM    Vitals:    05/26/20 1459   Weight: (P) 61.2 kg (135 lb)   Height: (P) 1.524 m (5')     General  - jaundiced  HEENT  - scleral icterus  CV  - normal pulses at posterior tib and dorsalis pedis  Pulm  - normal respiratory pattern, non-labored  Musculoskeletal - left foot  - stance: gait favors left side  - inspection: no swelling or effusion, normal bone and joint alignment, no obvious deformity  - palpation: tender base of MT 1-2, MT5, distal fibula   - ROM: normal active and passive ROM of great and lesser toes, no pain with MT translation  - strength: 5/5 in all planes  Neuro  - no sensory or motor deficit, grossly normal coordination, normal muscle tone  Skin  - jaundiced, as above   Psych  - interactive, appropriate, normal mood and affect           ASSESSMENT & PLAN  Ms. Olguin is a 54 year old female who presents to clinic today with multiple fractures of her left foot.    I ordered and reviewed AP, lateral, and oblique xrays of her foot, these reveal fractures of her proximal first MT, proximal 2nd MT, proximal 5th MT, and distal fibula.  These are non-displaced with exception of the fibular fracture, which is minimally displaced.    Heydi is doing surprisingly well given her multiple fractures.  I did explain to her that it may take another few weeks for her fractures to heal, this is complicated given her  history of cirrhosis and multiple other medical problems that may contribute to delayed healing.  She is in understanding of this.    I would like to see her in 3 weeks with a repeat x-ray.  During this time she should wear her boot at all times when weightbearing.  If she is doing well at that visit we can transition out of the boot and employ physical therapy.    It was a pleasure seeing Heydi today.    Pasha Maravilla DO, [facility]  Primary Care Sports Medicine      This note was constructed using Dragon dictation software, please excuse any minor errors in spelling, grammar, or syntax.

## 2020-05-26 NOTE — LETTER
5/26/2020         RE: Monalisa Olguin  [address] [facility] [id number]        Dear Colleague,    Thank you for referring your patient, Monalisa Olguin, to the [facility] SPORTS AND ORTHOPAEDIC WALK IN CLINIC. Please see a copy of my visit note below.          SPORTS & ORTHOPEDIC WALK-IN VISIT 5/26/2020    Primary Care Physician: Dr. Bustos    5/4/20 - walking out of the flower store, and describes a twisting SHELLY which revealed a fracture under XR.     Most of the pain is located in the midfoot of her L foot.    Reason for visit:     What part of your body is injured / painful?  left foot    What caused the injury /pain? Fall    How long ago did your injury occur or pain begin? several weeks ago    What are your most bothersome symptoms? Pain    How would you characterize your symptom?  aching and dull    What makes your symptoms better? Rest and Wrap or brace    What makes your symptoms worse? Walking    Have you been previously seen for this problem? Yes, ED    Medical History:    Any recent changes to your medical history? No    Any new medication prescribed since last visit? No    Have you had surgery on this body part before? No      Review of Systems:    Do you have fever, chills, weight loss? No    Do you have any vision problems? No    Do you have any chest pain or edema? No    Do you have any shortness of breath or wheezing?  Yes, slightly    Do you have stomach problems? No    Do you have any numbness or focal weakness? Yes, some in hands    Do you have diabetes? No    Do you have problems with bleeding or clotting? Yes, thin blood due to aspirin    Do you have an rashes or other skin lesions? No       CHIEF COMPLAINT:  Pain of the Left Foot       HISTORY OF PRESENT ILLNESS  Ms. Olguin is a pleasant 54 year old female who presents to clinic today with a foot fracture.  Heydi originally injured her foot 3 weeks ago.  She was walking out of a flower shop when she twisted her foot, this was  an inversion mechanism injury.  She felt immediate pain throughout her midfoot and lateral foot.  She was seen in the emergency department where she was diagnosed with multiple fractures of her foot.  She was given a Cam walker.  She is feeling about 50% better overall, she still does have pain at times, mostly with ambulation although she is almost pain-free in the boot.  Most of the pain is in her midfoot region.    Of note, Heydi does have a history of alcoholic liver cirrhosis.  She is being worked up for a possible liver transplant currently.      Additional history: as documented    MEDICAL HISTORY  Patient Active Problem List   Diagnosis     Kidney donor     Esophageal reflux     Moderate Depression [296.32]     HYPERLIPIDEMIA LDL GOAL <100     Hypertension goal BP (blood pressure) < 130/80     Anxiety     Alcoholic cirrhosis of liver with ascites (H)     Chronic blood loss anemia     Tobacco abuse     Non-intractable vomiting with nausea     Portal hypertension (H)     Pulmonary nodules     Hyperthyroidism     Alcoholic cirrhosis of liver without ascites (H) - per Hepatology consult Nov 2017     Splenomegaly     Epistaxis     Other iron deficiency anemia     Other pancytopenia (H)     Solitary kidney, acquired     Macrocytic anemia     CKD (chronic kidney disease) stage 3, GFR 30-59 ml/min (H)     Confusion     Hepatic encephalopathy (H)     Alcohol dependence in remission (H)     Abnormal LFTs (liver function tests)       Current Outpatient Medications   Medication Sig Dispense Refill     acamprosate (CAMPRAL) 333 MG EC tablet TAKE TWO TABLETS BY MOUTH THREE TIMES A  tablet 3     ARIPiprazole (ABILIFY) 5 MG tablet Take 1 tablet (5 mg) by mouth At Bedtime 90 tablet 3     busPIRone (BUSPAR) 15 MG tablet Take 1 tablet (15 mg) by mouth 2 times daily 180 tablet 3     ciprofloxacin (CIPRO) 250 MG tablet Take 1 tablet (250 mg) by mouth every 24 hours 30 tablet 0     FLUoxetine (PROZAC) 20 MG capsule TAKE  "THREE CAPSULES BY MOUTH ONCE DAILY 90 capsule 3     folic acid (FOLVITE) 1 MG tablet Take 1 tablet (1 mg) by mouth daily 90 tablet 3     furosemide (LASIX) 20 MG tablet Take 20 mg by mouth daily       gabapentin (NEURONTIN) 100 MG capsule Take 3 capsules (300 mg) by mouth At Bedtime 270 capsule 3     lactulose (CHRONULAC) 10 GM/15ML solution Take 22.5 mLs (15 g) by mouth 3 times daily as needed for constipation Start with 15 ml daily, titrate as needed for 2-3 BM's daily. 1000 mL 5     midodrine (PROAMATINE) 5 MG tablet Take 1 tablet (5 mg) by mouth 3 times daily (with meals) 270 tablet 3     [START ON 6/2/2020] nicotine (NICODERM CQ) 14 MG/24HR 24 hr patch Place 1 patch onto the skin every 24 hours for 14 days 14 patch 0     nicotine (NICODERM CQ) 21 MG/24HR 24 hr patch Place 1 patch onto the skin every 24 hours for 14 days 14 patch 0     [START ON 6/16/2020] nicotine (NICODERM CQ) 7 MG/24HR 24 hr patch Place 1 patch onto the skin every 24 hours 30 patch 1     omeprazole (PRILOSEC) 20 MG DR capsule Take 1 capsule (20 mg) by mouth daily 90 capsule 3     ondansetron (ZOFRAN) 4 MG tablet Take 1 tablet (4 mg) by mouth every 6 hours as needed for nausea       potassium chloride ER (K-DUR/KLOR-CON M) 10 MEQ CR tablet Take 1 tablet (10 mEq) by mouth daily 90 tablet 3     rifaximin (XIFAXAN) 550 MG TABS tablet Take 1 tablet (550 mg) by mouth 2 times daily 60 tablet 0     spironolactone (ALDACTONE) 50 MG tablet Take 1 tablet (50 mg) by mouth daily 90 tablet 3     traZODone (DESYREL) 50 MG tablet Take 2 tablets (100 mg) by mouth nightly as needed for sleep 30 tablet 0     ursodiol (ACTIGALL) 500 MG tablet Take 1 tablet (500 mg) by mouth 2 times daily 180 tablet 3     vitamin B1 (VITAMIN B-1) 100 MG tablet Take 1 tablet (100 mg) by mouth daily         Allergies   Allergen Reactions     Albuterol      Tongue \"hardened and painful\"       Family History   Problem Relation Age of Onset     Hypertension Mother         80 years old "     Heart Disease Paternal Grandmother      Heart Disease Paternal Grandfather      Cerebrovascular Disease Maternal Grandmother      Cerebrovascular Disease Maternal Grandfather      Alcohol/Drug Maternal Grandfather      Substance Abuse Maternal Grandfather      Heart Disease Father         Silent MI stents x 2     Diabetes Sister 17        older sister also had kidney and pancreas transplant     Heart Disease Sister         MI secondary to diabetes     Genitourinary Problems Sister 43        kidney transplant from renal failure     Other - See Comments Sister         older     Other - See Comments Sister         younger     Diabetes Sister 9        youngest, juvenile type I (onset age 9 with no complications)     Cancer Paternal Aunt         ?kind       Additional medical/Social/Surgical histories reviewed in [facility] and updated as appropriate.        PHYSICAL EXAM    Vitals:    05/26/20 1459   Weight: (P) 61.2 kg (135 lb)   Height: (P) 1.524 m (5')     General  - jaundiced  HEENT  - scleral icterus  CV  - normal pulses at posterior tib and dorsalis pedis  Pulm  - normal respiratory pattern, non-labored  Musculoskeletal - left foot  - stance: gait favors left side  - inspection: no swelling or effusion, normal bone and joint alignment, no obvious deformity  - palpation: tender base of MT 1-2, MT5, distal fibula   - ROM: normal active and passive ROM of great and lesser toes, no pain with MT translation  - strength: 5/5 in all planes  Neuro  - no sensory or motor deficit, grossly normal coordination, normal muscle tone  Skin  - jaundiced, as above   Psych  - interactive, appropriate, normal mood and affect           ASSESSMENT & PLAN  Ms. Olguin is a 54 year old female who presents to clinic today with multiple fractures of her left foot.    I ordered and reviewed AP, lateral, and oblique xrays of her foot, these reveal fractures of her proximal first MT, proximal 2nd MT, proximal 5th MT, and distal fibula.  These  are non-displaced with exception of the fibular fracture, which is minimally displaced.    Heydi is doing surprisingly well given her multiple fractures.  I did explain to her that it may take another few weeks for her fractures to heal, this is complicated given her history of cirrhosis and multiple other medical problems that may contribute to delayed healing.  She is in understanding of this.    I would like to see her in 3 weeks with a repeat x-ray.  During this time she should wear her boot at all times when weightbearing.  If she is doing well at that visit we can transition out of the boot and employ physical therapy.    It was a pleasure seeing Heydi today.    Pasha Maravilla DO, [facility]  Primary Care Sports Medicine      This note was constructed using Dragon dictation software, please excuse any minor errors in spelling, grammar, or syntax.      Again, thank you for allowing me to participate in the care of your patient.        Sincerely,        Pasha Maravilla DO

## 2020-05-27 NOTE — LETTER
"2020     RE: Monalisa Olguin  : 1965   MRN: [id number]      Dear Colleague,    Thank you for referring your patient, Monalisa Olguin, to the [facility] at [facility]. Please see a copy of my visit note below.    Pt was seen for combination telephone and video neuropsychological evaluation at the request of Dr. Joey Kwong for the purposes of diagnostic clarification and treatment planning. 253 minutes of test administration and scoring were provided by this writer. Please see Florencio Correa PhD report for a full interpretation of the findings.      Monalisa Olguin is a 54 year old female who is being evaluated via a billable video visit.      The patient has been notified of following:     \"This video visit will be conducted via a call between you and your physician/provider. We have found that certain health care needs can be provided without the need for an in-person physical exam.  This service lets us provide the care you need with a video conversation.  If a prescription is necessary we can send it directly to your pharmacy.  If lab work is needed we can place an order for that and you can then stop by our lab to have the test done at a later time.    Video visits are billed at different rates depending on your insurance coverage.  Please reach out to your insurance provider with any questions.    If during the course of the call the physician/provider feels a video visit is not appropriate, you will not be charged for this service.\"    Patient has given verbal consent for Video visit? yes    Patient would like the video invitation sent by:[facility]    Name: Monalisa Olguin  MR#: [id number]  YOB: 1965  Dates of Exam: 2020, 2020      Neuropsychology Laboratory  [facility]  [phone]    TELEHEALTH NEUROPSYCHOLOGICAL EVALUATION    IDENTIFYING INFORMATION  Monalisa Olguin is a 54 year old, right handed, " disabled , with 14 years of formal education. I did not have a collateral source during the interview.     The interview and cognitive testing portions of this evaluation were completed over the telephone on 05/27/2020. MMPI-2RF administration was completed on 05/29/2020 with video proctoring via [employer]. The bulk of MMPI-2RF proctoring was completed by a psychometrist, but this writer (GOLDY) proctored the final 20 minutes of MMPI-2RF completion.     BACKGROUND INFORMATION / INTERVIEW FINDINGS    Records indicate that Ms. Olguin was diagnosed with alcoholic cirrhosis of the liver in 11/2017. She has had hepatic encephalopathy and ascites. She is being evaluated for liver transplant. She has reportedly been sober since September, 2019. A CT scan of her head on May 4, 2020 documented no acute intracranial pathology, but did note mild diffuse cerebral atrophy that was felt not disproportionate to her age. Her other medical history includes past alcohol dependence, anxiety, depression, abnormal liver function tests, chronic kidney disease stage III, prior kidney donation, macrocytic anemia, iron deficiency anemia, splenomegaly, epistaxis, tobacco abuse, portal hypertension, pulmonary nodules, hyperthyroidism, hypertension, hyperlipidemia, and esophageal reflux. She also recently suffered a metatarsal fracture due to mechanical fall. Concerns have been expressed about her cognition in the context of the workup for the liver transplant. There is apparently mentioned in her outside chart that there has been concern for Wernicke's encephalopathy as far back as 2015. The current evaluation was requested by Chau Castillo PA-C, in this context.    Of note, I see mention in the patient's chart that she may have completed a neuropsychology exam through the [facility]. There is a brief mention that this evaluation, which I believe was completed in early 2018, documented cognitive changes in the  "setting of depression. Unfortunately, I do not have a copy of the report from this evaluation.    Ms. Olguin was noted to be a questionable historian. The information derived from this interview should be viewed with caution.    On interview, Mr. Olguin reported that she was diagnosed with liver disease somewhere between three and six months ago. She reported that she had \"belly swelling, and air in her liver.  She stated that she also had liquid and fluid in her liver in the past. She reported that she has had two or three episodes of confusion in the last six months. Regarding cognition, the patient reported that in the last six months, she sometimes gets lost when navigating or driving around in the car. She reported that her  now drives. She reported that this issue seems to be worsening. She otherwise denied having identified cognitive changes or difficulties, even when I presented a list of specific cognitive domains to her.    With respect to mental health, Ms. Olguin reported that she is sad. She stated that she is stuck at home, and is unable to get out. She stated that she was diagnosed with depression many years ago. She reported that she is prescribed an antidepressant medication now, and has taken several medications over the years. She reported that the prescription is given to her by her primary care doctor. She reported that she saw a psychiatrist once in the past when she was in treatment. She denied having ever seen a therapist. She denied prior psychiatric hospitalization. She reported that for the last couple of weeks, she sometimes hears things. When I queried her further, she stated that she sometimes hears voices on occasion. She denied having ever had hallucinations like this in the past. She stated that she has never spoken to anyone about it. She denied suicidal ideation or past suicide attempts.    With regard to other medical background, Ms. Olguin denied prior TBI, stroke, or " "seizure. She reported that she sleeps a lot, and sleeps about six and half hours at night, and may also nap between four and six hours during the day. She stated that she had a normal night of sleep before the exam. She reported that she has a little bit of pain in her foot, but otherwise denied pain. Per records, her current medications include acamprosate, aripiprazole, buspirone, ciprofloxacin, fluoxetine, folic acid, furosemide, gabapentin, lactulose, midodrine, nicotine patch, omeprazole, ondansetron, potassium chloride, rifaximin, spironolactone, trazodone, ursodiol, and vitamin B1. She stated that she stopped drinking in September, 2019. She indicated that she had stopped drinking after being prompted to do so by her doctor. She reported that she was drinking 1 liter of whiskey per day. She stated that she drank this quantity for approximately six months. Reported that she had been drinking consistently for several years before that, but David also stated that she only started drinking a couple of years ago. She stated that she went to treatment six times, and had treatment in [place]. She reported that when she discontinued drinking, she would shake. She noted that she still has shaking that comes and goes. She otherwise denied withdrawal symptoms. She denied tobacco or illicit drug use.    With respect to the transplant, Ms. Olguin reported that she has spoken with family and friends about the transplant, and they are supportive. She did note that she donated a kidney to her sister about 20 years ago. She stated that her  would be able to support her recovery during the night, and her 24-year-old daughter, who was a nursing student, could be with her during the day. When I asked her about the risks of surgery, she stated that there would be risk during the procedure. She then went on to state that she \"might not receive her memory back at the end.\" Should then indicated that she is going through the " "process of a liver transplant in a good mood.    Ms. Olguin lives at home with her , or 24-year-old daughter, and her 15-year-old son. She manages her own basic daily activities, her own medications, and reportedly also manages their finances and meal preparation. She indicated that she no longer drives. She stated that she had two total DUIs, with the last DUI being last summer.    By way of background, Ms. Olguin and her  have been  for 32 years. They have a 30-year-old daughter, a 24-year-old daughter, and a 15-year-old son. Regarding educational background, she graduated from high school with reported good grades. 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 She then worked in a . 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 She then worked as a  for 10 years. She reported that she last worked last summer.     BEHAVIORAL OBSERVATIONS  This cognitive portions of this evaluation were completed as a telephone based telehealth visit. As such, my ability to make fine-grained determinations about her behaviors is limited.     During the interview, Ms. Olguin was noted to have a flat affect, and have extremely rapid forgetting. There were times when she would answer questions, lose her train of thought, and then completely forget what she was talking about. I then had to reorient her to the topic of discussion. This forgetting happened numerous times during the interview. It is also worth noting that the patient was unable to figure out how to download an loc to her phone in order to complete a video visit. She had to ask her 15-year-old son to help her download the loc. We then tried to send an email link to the patient in order to complete personality testing. The patient reported that she \"has troubles\" with her email. She later reported that " she forgot the password to her email. We then emailed the link to the questionnaire to the patient's daughter, and her daughter had to open the link for the patient. Additionally, the personality questionnaire, which typically takes patients anywhere between 30 and 45 minutes to complete, took Ms. Olguin approximately 2 hours and 10 minutes to complete.    During testing, Ms. Olguin was polite and cooperative with the exam. She worked slowly. Her gait was not observed. She engaged in limited spontaneous conversation. Her speech was noted to be slowed and with mild dysfluency. Her comprehension was impaired, as she required repetition of task instructions frequently. Her thought processes were notable for distractibility, rapid forgetting, and mild slowing. Her mood was depressed with flat affect. Her effort was good. The current results are felt to be an accurate reflection of her cognitive functioning.       RESULTS OF EXAM  Her performances on standardized measures of neuropsychological functioning were as follows. Some caution should be taken when viewing these interpretations, as many of the tests were not normed in a telehealth context.     She was severely disoriented to time (stated that the date was June 25, 2001), misstated her age (reported that  her age was 02/23/1903), but was otherwise oriented to various aspects of personal information. She was oriented to place. Vocabulary was low average. Auditory attention for digits was impaired. Mental calculations were impaired. Learning of words in a list format was impaired. Delayed recall of list words was impaired. Percent retention of list words was borderline impaired. Delayed recognition of list words was impaired, and with five false positive errors. Learning and delayed recall of short stories were both impaired. She was unable recollect any story details following a delay. Comprehension of phrases and short stories was impaired. Verbal associative  fluency was borderline impaired. Semantic verbal fluency was impaired. Verbal abstract reasoning was average. Speeded verbal sequencing of numbers was impaired. A similar measure with a divided attention component was impaired, and discontinued by the patient after only eight seconds. Measures of speeded attention and tracking were impaired. She committed 10 errors on this task, which is also an impaired range performance. She obtained a moderate (moderately impaired) score on a measure real-world decision-making and problem-solving.    She endorsed items consistent with mild symptoms of depression, and mild symptoms of anxiety on self-report measures. On a longer measure of personality and emotional functioning, she responded in a manner that is suggestive of a fixed response style, and a tendency to respond false to items. Unfortunately, the results from this measure are invalid, and cannot be interpreted.    IMPRESSIONS  As has been noted in this report, this evaluation was completed via telehealth (largely over the telephone). As such, there are multiple limitations (including the neuropsychological battery that was able to be completed, and in normative comparisons). In the setting of these limitations, the following impressions are offered:     Ms. Olguin demonstrated a pattern of deficits that is consistent with dysfunction of frontal, subcortical, and temporal lobe brain networks. 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 In the current evaluation, she has profound deficits in executive functioning, memory, attention, verbal comprehension, and aspects of expressive language. Given that the cognitive portions of this evaluation were completed over the telephone, we were unable to assess visually mediated processing, although I would hypothesize that she has deficits in this domain as well. She also has deficits in the insight and orientation. Relative  preservation was noted in some aspects of verbal reasoning. While she is reporting mild symptoms of depression and anxiety, I do not think that psychological factors can explain the severity of her cognitive dysfunction.    With respect to the referral question, I have a number of concerns with respect to her candidacy for a liver transplant from a neuropsychological perspective. She has a dementia syndrome that is most likely attributable to her past alcoholism. In particular, her constellation of cognitive deficits, in memory, comprehension, executive functioning, and insight, could lead to difficulties with managing her needs post-transplant. Second, she has a limited appreciation of the potential risks of the surgery. Third, she misunderstands the potential benefits she could derive from a liver transplant. All of that said, she has been sober for several months. She has decent family support. However, taken together, I think that Ms. Olguin is a suboptimal candidate for a liver transplant from a neuropsychological perspective. Ultimately, the final decision as to whether she is a candidate for a liver transplant is deferred to the surgical team.    RECOMMENDATIONS  Preliminary results and recommendations were provided to the patient over the telephone on 05/29/2020, and all questions were addressed.     1. As noted above, I think that Ms. Clau marc is a suboptimal candidate for a liver transplant from a neuropsychological perspective.    2. In spite of treatment, she remains depressed and anxious. If medically indicated, consideration could be given to modified treatment of her mental health. Further, consultation with a psychiatrist could be considered. She reported that she has been experiencing auditory hallucinations for the past couple of weeks.     3. She will benefit from increased support and supervision of her daily activities. She reported to me that she is managing her own medications and their  finances. I do not think she is capable of these tasks from cognitive perspective.     4. She should continue to refrain from driving.    5. Her young age, healthy appearance, and lack of insight belie the severity of her cognitive deficits. Those caring for the patient should be aware of her cognitive deficits, and take steps to ensure that she receives appropriate support.    6. Her memory could benefit for routine use of a memory notebook or other assistive device.    7. In-clinic follow-up neuropsychological evaluation is recommended in 1 year in order to assess and update recommendations as appropriate. The current results can be used as a baseline at that time.    Florencio Correa, Ph.D., L.P., ABPP-CN   / Licensed Psychologist [id number]  Department of Rehabilitation Medicine  Division of Adult Neuropsychology  [facility]    Time spent: One unit (70 minutes) neurobehavioral status exam including interview, clinical assessment of thinking, reasoning, and judgment by licensed and board-certified neuropsychologist (CPT 87255). One unit (60 minutes) neuropsychological testing evaluation by licensed and board-certified neuropsychologist, including integration of patient data, interpretation of standardized test results and clinical data, clinical decision-making, treatment planning, report, and interactive feedback to the patient, first hour (CPT 51903). Three units (170 minutes) of neuropsychological testing evaluation by licensed and board-certified neuropsychologist, including integration of patient data, interpretation of standardized test results and clinical data, clinical decision-making, treatment planning, report, and interactive feedback to the patient, subsequent hours (CPT 30329). One unit (30 minutes) of psychological and neuropsychological test administration and scoring by technician, first 30 minutes (CPT 02235). Seven units (223 minutes) psychological or  neuropsychological test administration and scoring by technician, subsequent 30 minutes (CPT 18427). Diagnoses: F10.97, R41.844, F06.8, F33.0, F41.9.    Video-Visit Details    Telephone 05/27/2020: 172 minutes    Type of service:  Video Visit    05/29/2020  Video Start Time:8:00 am  Video End Time: 10:20 am    Originating Location home  Distant Location (provider location):  [facility]     Platform used for Video Visit: [employer]    Again, thank you for allowing me to participate in the care of your patient.      Sincerely,    Florencio Correa, PhD LP

## 2020-05-27 NOTE — TELEPHONE ENCOUNTER
Reason for Call:  Other call back    Detailed comments: Paulina calling with [facility], to let Denisecaren and his team know she is going to be there for her through out her transplant, she is her care coordinator if there is anything she can help to support her that is what she is here for. Please reach out if they is any need.     Phone Number Patient can be reached at: Other phone number: [phone]    Best Time: anytime     Can we leave a detailed message on this number? YES    Call taken on 5/27/2020 at 1:55 PM by Miranda Seipiel Vaccari

## 2020-05-30 NOTE — PROGRESS NOTES
"Monalisa Olguin is a 54 year old female who is being evaluated via a billable video visit.      The patient has been notified of following:     \"This video visit will be conducted via a call between you and your physician/provider. We have found that certain health care needs can be provided without the need for an in-person physical exam.  This service lets us provide the care you need with a video conversation.  If a prescription is necessary we can send it directly to your pharmacy.  If lab work is needed we can place an order for that and you can then stop by our lab to have the test done at a later time.    Video visits are billed at different rates depending on your insurance coverage.  Please reach out to your insurance provider with any questions.    If during the course of the call the physician/provider feels a video visit is not appropriate, you will not be charged for this service.\"    Patient has given verbal consent for Video visit? yes    Patient would like the video invitation sent by:[facility]    Name: Monalisa Olguin  MR#: [id number]  YOB: 1965  Dates of Exam: 05/27/2020, 05/29/2020      Neuropsychology Laboratory  [facility]  [phone]    TELEHEALTH NEUROPSYCHOLOGICAL EVALUATION    IDENTIFYING INFORMATION  Monalisa Olguin is a 54 year old, right handed, disabled , with 14 years of formal education. I did not have a collateral source during the interview.     The interview and cognitive testing portions of this evaluation were completed over the telephone on 05/27/2020. MMPI-2RF administration was completed on 05/29/2020 with video proctoring via [employer]. The bulk of MMPI-2RF proctoring was completed by a psychometrist, but this writer (GOLDY) proctored the final 20 minutes of MMPI-2RF completion.     BACKGROUND INFORMATION / INTERVIEW FINDINGS    Records indicate that Ms. Olguin was diagnosed with alcoholic cirrhosis of the liver in 11/2017. She has " "had hepatic encephalopathy and ascites. She is being evaluated for liver transplant. She has reportedly been sober since September, 2019. A CT scan of her head on May 4, 2020 documented no acute intracranial pathology, but did note mild diffuse cerebral atrophy that was felt not disproportionate to her age. Her other medical history includes past alcohol dependence, anxiety, depression, abnormal liver function tests, chronic kidney disease stage III, prior kidney donation, macrocytic anemia, iron deficiency anemia, splenomegaly, epistaxis, tobacco abuse, portal hypertension, pulmonary nodules, hyperthyroidism, hypertension, hyperlipidemia, and esophageal reflux. She also recently suffered a metatarsal fracture due to mechanical fall. Concerns have been expressed about her cognition in the context of the workup for the liver transplant. There is apparently mentioned in her outside chart that there has been concern for Wernicke's encephalopathy as far back as 2015. The current evaluation was requested by Chau Castillo PA-C, in this context.    Of note, I see mention in the patient's chart that she may have completed a neuropsychology exam through the [facility]. There is a brief mention that this evaluation, which I believe was completed in early 2018, documented cognitive changes in the setting of depression. Unfortunately, I do not have a copy of the report from this evaluation.    Ms. Olguin was noted to be a questionable historian. The information derived from this interview should be viewed with caution.    On interview, Mr. Olguin reported that she was diagnosed with liver disease somewhere between three and six months ago. She reported that she had \"belly swelling, and air in her liver.  She stated that she also had liquid and fluid in her liver in the past. She reported that she has had two or three episodes of confusion in the last six months. Regarding cognition, the patient reported that " in the last six months, she sometimes gets lost when navigating or driving around in the car. She reported that her  now drives. She reported that this issue seems to be worsening. She otherwise denied having identified cognitive changes or difficulties, even when I presented a list of specific cognitive domains to her.    With respect to mental health, Ms. Olguin reported that she is sad. She stated that she is stuck at home, and is unable to get out. She stated that she was diagnosed with depression many years ago. She reported that she is prescribed an antidepressant medication now, and has taken several medications over the years. She reported that the prescription is given to her by her primary care doctor. She reported that she saw a psychiatrist once in the past when she was in treatment. She denied having ever seen a therapist. She denied prior psychiatric hospitalization. She reported that for the last couple of weeks, she sometimes hears things. When I queried her further, she stated that she sometimes hears voices on occasion. She denied having ever had hallucinations like this in the past. She stated that she has never spoken to anyone about it. She denied suicidal ideation or past suicide attempts.    With regard to other medical background, Ms. Olguin denied prior TBI, stroke, or seizure. She reported that she sleeps a lot, and sleeps about six and half hours at night, and may also nap between four and six hours during the day. She stated that she had a normal night of sleep before the exam. She reported that she has a little bit of pain in her foot, but otherwise denied pain. Per records, her current medications include acamprosate, aripiprazole, buspirone, ciprofloxacin, fluoxetine, folic acid, furosemide, gabapentin, lactulose, midodrine, nicotine patch, omeprazole, ondansetron, potassium chloride, rifaximin, spironolactone, trazodone, ursodiol, and vitamin B1. She stated that she stopped  "drinking in September, 2019. She indicated that she had stopped drinking after being prompted to do so by her doctor. She reported that she was drinking 1 liter of whiskey per day. She stated that she drank this quantity for approximately six months. Reported that she had been drinking consistently for several years before that, but David also stated that she only started drinking a couple of years ago. She stated that she went to treatment six times, and had treatment in [place]. She reported that when she discontinued drinking, she would shake. She noted that she still has shaking that comes and goes. She otherwise denied withdrawal symptoms. She denied tobacco or illicit drug use.    With respect to the transplant, Ms. Olguin reported that she has spoken with family and friends about the transplant, and they are supportive. She did note that she donated a kidney to her sister about 20 years ago. She stated that her  would be able to support her recovery during the night, and her 24-year-old daughter, who was a nursing student, could be with her during the day. When I asked her about the risks of surgery, she stated that there would be risk during the procedure. She then went on to state that she \"might not receive her memory back at the end.\" Should then indicated that she is going through the process of a liver transplant in a good mood.    Ms. Olguin lives at home with her , or 24-year-old daughter, and her 15-year-old son. She manages her own basic daily activities, her own medications, and reportedly also manages their finances and meal preparation. She indicated that she no longer drives. She stated that she had two total DUIs, with the last DUI being last summer.    By way of background, Ms. Olguin and her  have been  for 32 years. They have a 30-year-old daughter, a 24-year-old daughter, and a 15-year-old son. Regarding educational background, she graduated from high school with " "reported good grades. 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 She then worked in a . 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 She then worked as a  for 10 years. She reported that she last worked last summer.     BEHAVIORAL OBSERVATIONS  This cognitive portions of this evaluation were completed as a telephone based telehealth visit. As such, my ability to make fine-grained determinations about her behaviors is limited.     During the interview, Ms. Olguin was noted to have a flat affect, and have extremely rapid forgetting. There were times when she would answer questions, lose her train of thought, and then completely forget what she was talking about. I then had to reorient her to the topic of discussion. This forgetting happened numerous times during the interview. It is also worth noting that the patient was unable to figure out how to download an loc to her phone in order to complete a video visit. She had to ask her 15-year-old son to help her download the lco. We then tried to send an email link to the patient in order to complete personality testing. The patient reported that she \"has troubles\" with her email. She later reported that she forgot the password to her email. We then emailed the link to the questionnaire to the patient's daughter, and her daughter had to open the link for the patient. Additionally, the personality questionnaire, which typically takes patients anywhere between 30 and 45 minutes to complete, took Ms. Olguin approximately 2 hours and 10 minutes to complete.    During testing, Ms. Olguin was polite and cooperative with the exam. She worked slowly. Her gait was not observed. She engaged in limited spontaneous conversation. Her speech was noted to be slowed and with mild dysfluency. Her comprehension was impaired, as she " required repetition of task instructions frequently. Her thought processes were notable for distractibility, rapid forgetting, and mild slowing. Her mood was depressed with flat affect. Her effort was good. The current results are felt to be an accurate reflection of her cognitive functioning.       RESULTS OF EXAM  Her performances on standardized measures of neuropsychological functioning were as follows. Some caution should be taken when viewing these interpretations, as many of the tests were not normed in a telehealth context.     She was severely disoriented to time (stated that the date was June 25, 2001), misstated her age (reported that  her age was 02/23/1903), but was otherwise oriented to various aspects of personal information. She was oriented to place. Vocabulary was low average. Auditory attention for digits was impaired. Mental calculations were impaired. Learning of words in a list format was impaired. Delayed recall of list words was impaired. Percent retention of list words was borderline impaired. Delayed recognition of list words was impaired, and with five false positive errors. Learning and delayed recall of short stories were both impaired. She was unable recollect any story details following a delay. Comprehension of phrases and short stories was impaired. Verbal associative fluency was borderline impaired. Semantic verbal fluency was impaired. Verbal abstract reasoning was average. Speeded verbal sequencing of numbers was impaired. A similar measure with a divided attention component was impaired, and discontinued by the patient after only eight seconds. Measures of speeded attention and tracking were impaired. She committed 10 errors on this task, which is also an impaired range performance. She obtained a moderate (moderately impaired) score on a measure real-world decision-making and problem-solving.    She endorsed items consistent with mild symptoms of depression, and mild symptoms of  anxiety on self-report measures. On a longer measure of personality and emotional functioning, she responded in a manner that is suggestive of a fixed response style, and a tendency to respond false to items. Unfortunately, the results from this measure are invalid, and cannot be interpreted.    IMPRESSIONS  As has been noted in this report, this evaluation was completed via telehealth (largely over the telephone). As such, there are multiple limitations (including the neuropsychological battery that was able to be completed, and in normative comparisons). In the setting of these limitations, the following impressions are offered:     Ms. Olguin demonstrated a pattern of deficits that is consistent with dysfunction of frontal, subcortical, and temporal lobe brain networks. 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 In the current evaluation, she has profound deficits in executive functioning, memory, attention, verbal comprehension, and aspects of expressive language. Given that the cognitive portions of this evaluation were completed over the telephone, we were unable to assess visually mediated processing, although I would hypothesize that she has deficits in this domain as well. She also has deficits in the insight and orientation. Relative preservation was noted in some aspects of verbal reasoning. While she is reporting mild symptoms of depression and anxiety, I do not think that psychological factors can explain the severity of her cognitive dysfunction.    With respect to the referral question, I have a number of concerns with respect to her candidacy for a liver transplant from a neuropsychological perspective. She has a dementia syndrome that is most likely attributable to her past alcoholism. In particular, her constellation of cognitive deficits, in memory, comprehension, executive functioning, and insight, could lead to difficulties with managing her  needs post-transplant. Second, she has a limited appreciation of the potential risks of the surgery. Third, she misunderstands the potential benefits she could derive from a liver transplant. All of that said, she has been sober for several months. She has decent family support. However, taken together, I think that Ms. Olguin is a suboptimal candidate for a liver transplant from a neuropsychological perspective. Ultimately, the final decision as to whether she is a candidate for a liver transplant is deferred to the surgical team.    RECOMMENDATIONS  Preliminary results and recommendations were provided to the patient over the telephone on 05/29/2020, and all questions were addressed.     1. As noted above, I think that Ms. Clau marc is a suboptimal candidate for a liver transplant from a neuropsychological perspective.    2. In spite of treatment, she remains depressed and anxious. If medically indicated, consideration could be given to modified treatment of her mental health. Further, consultation with a psychiatrist could be considered. She reported that she has been experiencing auditory hallucinations for the past couple of weeks.     3. She will benefit from increased support and supervision of her daily activities. She reported to me that she is managing her own medications and their finances. I do not think she is capable of these tasks from cognitive perspective.     4. She should continue to refrain from driving.    5. Her young age, healthy appearance, and lack of insight belie the severity of her cognitive deficits. Those caring for the patient should be aware of her cognitive deficits, and take steps to ensure that she receives appropriate support.    6. Her memory could benefit for routine use of a memory notebook or other assistive device.    7. In-clinic follow-up neuropsychological evaluation is recommended in 1 year in order to assess and update recommendations as appropriate. The current results  can be used as a baseline at that time.    Florencio Correa, Ph.D., L.P., ABPP-CN   / Licensed Psychologist [id number]  Department of Rehabilitation Medicine  Division of Adult Neuropsychology  [facility]    Time spent: One unit (70 minutes) neurobehavioral status exam including interview, clinical assessment of thinking, reasoning, and judgment by licensed and board-certified neuropsychologist (CPT 87114). One unit (60 minutes) neuropsychological testing evaluation by licensed and board-certified neuropsychologist, including integration of patient data, interpretation of standardized test results and clinical data, clinical decision-making, treatment planning, report, and interactive feedback to the patient, first hour (CPT 28090). Three units (170 minutes) of neuropsychological testing evaluation by licensed and board-certified neuropsychologist, including integration of patient data, interpretation of standardized test results and clinical data, clinical decision-making, treatment planning, report, and interactive feedback to the patient, subsequent hours (CPT 99427). One unit (30 minutes) of psychological and neuropsychological test administration and scoring by technician, first 30 minutes (CPT 46072). Seven units (223 minutes) psychological or neuropsychological test administration and scoring by technician, subsequent 30 minutes (CPT 78724). Diagnoses: F10.97, R41.844, F06.8, F33.0, F41.9.    Video-Visit Details    Telephone 05/27/2020: 172 minutes    Type of service:  Video Visit    05/29/2020  Video Start Time:8:00 am  Video End Time: 10:20 am    Originating Location home  Distant Location (provider location):  [facility]     Platform used for Video Visit: [employer]

## 2020-05-30 NOTE — PROGRESS NOTES
Name: Monalisa Olguin MRN: [id number]  : 1965 SCHMIDT: 2020  Staff: LOU Tech: RALPH Age: 54  Sex: Female Hand: Right Educ: 13-15    ORIENTATION     Time  -68     Place       Personal info     3 /4     Presidents     WAIS-IV     Raw SSa     Similarities  26 10     Vocabulary  26 7     Digit Span  12 2      Arithmetic  6 3    COWAT (FAS)   Raw: 23   T: 32  Animals   Raw:  6  T-score:  12    COMPLEX IDEATIONAL MATERIAL   Raw:   T: 13    Oral TRAILS  Raw  Err Z-Score    A  9  0 -2.89   B  Pt quit after 8      TSAT   Raw Z-Score   Total Time 205 -3.12   Total Errors 10 -3.08      ILS Health and Safety Questionnaire     Total: 31 Classification:Moderate    RBANS Story Memory Raw Z-Score          Total Immediate 4 -4.58     Total Recall  0 -4.81    HVLT-R     Trial 1 2 3 Total      2 4 4  10  Raw T     Total Learning (1-3) 10 ?20     Delayed Recall  3 ?20     Percent Retention 75 35     Recognition Hits/FP 10/5 ?20     PHQ-9  Raw:  7  Interpretation: Mild    MONA-7  Raw:  5  Interpretation: Mild    MMPI-2-RF

## 2020-06-01 NOTE — PROGRESS NOTES
This RN was going to call patient again to follow up with getting colonoscopy scheduled, however it appears she will be coming to ED for HE. Neuropsych concerning for dementia and CD recommendation include:complete intensive output MICD program, 2 weekly support groups, female sponsor and work with therapist. She is still in need of colonoscopy.

## 2020-06-01 NOTE — TELEPHONE ENCOUNTER
Liver disease/ High ammonia levels possibly.   calls in to find out if he can take his wife with confusion to the [facility] or should he take her to the [facility] where she is being testing for liver transplant  and has been for care for liver disease.  He would like to take her there and not have medical tranport when he can get her there himself.  He was concerned if he could get here there with the protests in [place] and if [facility] was open.  It is open and he should be able to get her there.  He will bring her there as that is the level of care she needs.  She was just hospitalized 3 weeks ago for a week for similar symptoms and they told him they could not care for her at [facility].  He agrees to this plan.     Additional Information    Patient sounds very sick or weak to the triager    Negative: Alcohol use, abuse or dependence: question or problem related to    Negative: Drug abuse or dependence: question or problem related to    Negative: Difficult to awaken or acting confused (disoriented, slurred speech) and has diabetes    Negative: Difficult to awaken or acting confused (disoriented, slurred speech) and new onset    Negative: Weakness of the face, arm, or leg on one side of the body and new onset    Negative: Numbness of the face, arm, or leg on one side of the body and new onset    Negative: Loss of speech or garbled speech and new onset    Negative: Difficulty breathing and bluish (or gray) lips or face    Negative: Shock suspected (e.g., cold/pale/clammy skin, too weak to stand, low BP, rapid pulse)    Negative: Seeing or hearing or feeling things that are not there (i.e., auditory, visual, or tactile hallucinations)    Negative: Followed a head injury    Negative: Drug overdose suspected    Negative: Sounds like a life-threatening emergency to the triager    Negative: Headache or vomiting    Negative: Stiff neck (can't touch chin to chest)    Negative:  Very strange or paranoid behavior    Protocols used: CONFUSION - DELIRIUM-A-OH

## 2020-06-02 PROBLEM — G93.40 ENCEPHALOPATHY: Status: ACTIVE | Noted: 2020-01-01

## 2020-06-02 NOTE — H&P
[facility], [place]    History and Physical - [place]oon 2 Service        Date of Admission:  6/2/2020    Assessment & Plan   Monalisa Olguin is a 54 year old female with history of ACD, decompensated liver cirrhosis, CKD, hyperparathyroidism who was brought to ED by her  with AMS.     Encephalopathy  Likely etiology is toxic metabolic and/or hepatic encephalopathy. Ammonia is normal. BUN is elevated. Both severe hypercalcemia and moderate hyponatremia are likely contributing. Afebrile, hemodynamically stable, without leukocytosis. No recent cough or urinary symptoms. Low suspicion for SBP as patient without abdominal pain and on ciprofloxacin prophylaxis. Will defer treatment for acute infectious process.   - [facility] protocol  - Rifaximin 550mg BID   - Delirium precautions  - Avoid narcotics   - Treat ALFREDO, hyponatremia, and hypercalcemia as noted below     Alcoholic liver cirrhosis (hx of ascites and HE)  Thrombocytopenia   Follows with Dr. Kovacs of Hepatology. Currently undergoing evaluation for transplant. Denies recent alcohol use. Reports compliance with medications. Ascites and encephalopathy addressed above. No recent evidence of GI bleed. Meld 36 as noted below.   - Hold acamprosate in setting of acute kidney injury   - Cont pta ciprofloxacin, folic acid, vitamin B1, and rifaximin as above  - [facility] protocol as above   - Hold lasix and spironolactone  - Consider liver transplant consult if meld worsening     MELD-Na score: 36 at 6/2/2020 10:12 AM  MELD score: 34 at 6/2/2020 10:12 AM  Calculated from:  Serum Creatinine: 3.18 mg/dL at 6/2/2020 10:12 AM  Serum Sodium: 129 mmol/L at 6/2/2020 10:12 AM  Total Bilirubin: 15.7 mg/dL at 6/2/2020 10:12 AM  INR(ratio): 1.65 at 6/2/2020 10:12 AM  Age: 54 years 7 months    ALFREDO on CKD stage IV  Unilateral kidney (s/p nephrectomy)  Multifactorial in etiology and secondary prerenal azotemia due to hypovolemia vs HRS, hypercalcemia,  and/or ATN. Received 2L IVF in the ED. Will continue fluid resuscitation at NS 125ml/hr. Monitor volume status, Cr, and electrolytes closely.   - Urine Na, Cr, and osmols   - Hold lasix and spironolactone   - NS 125mL/hr x an additional L  - BMP, Ca q6hrs     Hypercalcemia, severe  Hx of hyperparathyroidism but could also be due to dehydration. Received 2L of IVF in the ED. Will repeat labs. PTH ordered.  - PTH  - Calcitonin 4iu/kg, repeat calcium, if response repeat 4iu/kg q8hrs x48hrs  - BMP, Ca q6hrs    - Renal consult    Hyponatremia  Euvolemic to hypovolemic hyponatremia. Serum osmols elevated likely due to elevated BUN as serum osmolar gap normal. Patient received 2L of fluids (1L of LR and 1L of NS in the ED).    - Urine Na, Urine Cr   - Repeat BMP  - Renal consult   - Na recheck at 2000, 0000, and 0600     Chronic macrocytic anemia   Multifactorial in etiology and secondary to ESLD and CKD +/- vitamin/mineral deficiency.   - CTM    Orthostatic hypotension  - Cont midodrine 5mg daily     Depression and anxiety  - Cont abilify, buspar, and fluoxetine        Diet: Na restriction <2g, fluid restriction <2L   Fluids: Albumin   DVT Prophylaxis: Pneumatic Compression Devices  Gonzalez Catheter: not present  Code Status: Full, patient encephalopathic and  not answering phone          Disposition Plan   Expected discharge: 4-5 days pending improvement in encephalopathy and ALFREDO  Entered: Edgardo Price MD 06/02/2020, 12:16 PM       The patient's care was discussed with the attending physician Dr. Suero.     Edgardo Price MD  [address]  Pager: [phone]  Please see sticky note for cross cover information    Pt was seen and examined by me on the afternoon of admission.  She was mildly confused, A&O times two, but answered questions appropriately with prompting. I confirmed exam findings of scleral icterus, distended but nontender abdomen,  no asterixis. I agree with the detailed A/P documentation above.  AMS likely due to hypercalcemia, hyponatremia, less likely hepatic encephalopathy but still possible with normal ammonia.  ALFREDO likely prerenal with AMS and poor po intake.  Appreciate Nephrology recommendations. Will correct these electrolyte disturbances and monitor for additional complications such as bleeding or infection.     Stella Suero MD  ______________________________________________________________________    Chief Complaint   Altered Mental Status      History is obtained from the patient and chart review. Patient history limited secondary to AMS.     History of Present Illness   Monalisa Olguni is a 54 year old female with history of ACD, decompensated liver cirrhosis, CKD, hyperparathyroidism who was brought to ED by her  with AMS.      reports the patient is confused, generally weak, and has nausea/vomiting. No hematemesis. He states the patient has been taking her medications as prescribed. The patient says she has been feeling confused and vomiting but is unsure of the duration. She denies falls or head trauma. She denies alcohol use. She denies abdominal pain or change in bowel movements. She denies fevers/chills. She denies chest pain or shortness of breath.  She does report pain in her foot that has been ongoing since she fell and injured it a couple weeks ago.     History is limited secondary to AMS.      Review of Systems    ROS is limited secondary to AMS    Past Medical History    I have reviewed this patient's medical history and updated it with pertinent information if needed.   Past Medical History:   Diagnosis Date     Acute alcoholic intoxication in alcoholism (H) 3/27/2018     Acute renal failure (H) 11/11/2019     Alcohol abuse 5/2/2013     Alcohol dependence 5/25/2013     Alcohol withdrawal 5/2/2013     Alcoholic cirrhosis (H)      Anxiety 10/10/2011     CKD (chronic kidney disease) stage 3, GFR  30-59 ml/min (H)     last GFR was 42     CKD (chronic kidney disease) stage 3, GFR 30-59 ml/min (H) 2011     CKD (chronic kidney disease) stage 5, GFR less than 15 ml/min (H) 2020     Depressive disorder      Esophageal reflux 10/7/2002     Hematochezia-patient reported 2019     Heme positive stool 3/27/2018     Hepatic encephalopathy (H) 2019     History of blood transfusion      [facility]     Hypertension goal BP (blood pressure) < 130/80 2011     Hyponatremia 2019     Moderate Depression [296.32] 2009    stable on wellbutrin     Other internal derangement of knee(717.89)     ACL Internal derangement, knee/ original injury in 5th grade, torn cartilage     Pap smear     no abnormals, due for paps q 2-3 yrs     SBP (spontaneous bacterial peritonitis) (H) 2019     Thyroid disease      Unspecified essential hypertension        Past Surgical History   I have reviewed this patient's surgical history and updated it with pertinent information if needed.  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     COLONOSCOPY N/A 2017    Procedure: COLONOSCOPY;  Colonoscopy;  Surgeon: Sai Johnston MD;  Location: [facility]     ESOPHAGOSCOPY, GASTROSCOPY, DUODENOSCOPY (EGD), COMBINED N/A 2017    Procedure: COMBINED ESOPHAGOSCOPY, GASTROSCOPY, DUODENOSCOPY (EGD), BIOPSY SINGLE OR MULTIPLE;  ESOPHAGOSCOPY, GASTROSCOPY, DUODENOSCOPY (EGD) with biopsies;  Surgeon: Sai Johnston MD;  Location: [facility]     ESOPHAGOSCOPY, GASTROSCOPY, DUODENOSCOPY (EGD), COMBINED N/A 2019    Procedure: ESOPHAGOGASTRODUODENOSCOPY, WITH BIOPSY;  Surgeon: Edgardo Scott DO;  Location: [facility]     HC KNEE SCOPE, DIAGNOSTIC  01    Arthroscopy, Lt Knee     LAPAROSCOPIC CHOLECYSTECTOMY N/A 2017    Procedure: LAPAROSCOPIC CHOLECYSTECTOMY;   laparoscopic cholecystectomy;  Surgeon: Romeo Pastor MD;  Location: [facility]     OPEN REDUCTION INTERNAL FIXATION WRIST Right 11/29/2017    Procedure: OPEN REDUCTION INTERNAL FIXATION WRIST;  OPEN REDUCTION INTERNAL FIXATION RIGHT WRIST;  Surgeon: Edgardo Almendarez MD;  Location: [facility]     TRANSPLANT      Donated Left Kidney in 2000      Social History   I have reviewed this patient's social history and updated it with pertinent information if needed. Monalisa Olguin  reports that she has been smoking cigarettes. She has a 5.00 pack-year smoking history. She has never used smokeless tobacco. She reports previous alcohol use. She reports previous drug use. Drug: Marijuana.    Family History   I have reviewed this patient's family history and updated it with pertinent information if needed.   Family History   Problem Relation Age of Onset     Hypertension Mother         80 years old     Heart Disease Paternal Grandmother      Heart Disease Paternal Grandfather      Cerebrovascular Disease Maternal Grandmother      Cerebrovascular Disease Maternal Grandfather      Alcohol/Drug Maternal Grandfather      Substance Abuse Maternal Grandfather      Heart Disease Father         Silent MI stents x 2     Diabetes Sister 17        older sister also had kidney and pancreas transplant     Heart Disease Sister         MI secondary to diabetes     Genitourinary Problems Sister 43        kidney transplant from renal failure     Other - See Comments Sister         older     Other - See Comments Sister         younger     Diabetes Sister 9        youngest, juvenile type I (onset age 9 with no complications)     Cancer Paternal Aunt         ?kind       Prior to Admission Medications   Prior to Admission Medications   Prescriptions Last Dose Informant Patient Reported? Taking?   ARIPiprazole (ABILIFY) 5 MG tablet   No No   Sig: Take 1 tablet (5 mg) by mouth At Bedtime   CONSTULOSE 10 GM/15ML solution   No No   Sig: TAKE 22.5  MLS BY MOUTH THREE TIMES A DAY AS NEEDED FOR CONSTIPATION - START WITH 15 MLS DAILY, TITRATE AS NEEDED FOR 2-3 BM'S DAILY   FLUoxetine (PROZAC) 20 MG capsule   No No   Sig: TAKE THREE CAPSULES BY MOUTH ONCE DAILY   acamprosate (CAMPRAL) 333 MG EC tablet   No No   Sig: TAKE TWO TABLETS BY MOUTH THREE TIMES A DAY   busPIRone (BUSPAR) 15 MG tablet   No No   Sig: Take 1 tablet (15 mg) by mouth 2 times daily   ciprofloxacin (CIPRO) 250 MG tablet   No No   Sig: Take 1 tablet (250 mg) by mouth every 24 hours   folic acid (FOLVITE) 1 MG tablet   No No   Sig: Take 1 tablet (1 mg) by mouth daily   furosemide (LASIX) 20 MG tablet   Yes No   Sig: Take 20 mg by mouth daily   gabapentin (NEURONTIN) 100 MG capsule   No No   Sig: Take 3 capsules (300 mg) by mouth At Bedtime   midodrine (PROAMATINE) 5 MG tablet   No No   Sig: Take 1 tablet (5 mg) by mouth 3 times daily (with meals)   nicotine (NICODERM CQ) 14 MG/24HR 24 hr patch   No No   Sig: Place 1 patch onto the skin every 24 hours for 14 days   nicotine (NICODERM CQ) 21 MG/24HR 24 hr patch   No No   Sig: Place 1 patch onto the skin every 24 hours for 14 days   nicotine (NICODERM CQ) 7 MG/24HR 24 hr patch   No No   Sig: Place 1 patch onto the skin every 24 hours   omeprazole (PRILOSEC) 20 MG DR capsule   No No   Sig: Take 1 capsule (20 mg) by mouth daily   ondansetron (ZOFRAN) 4 MG tablet   Yes No   Sig: Take 1 tablet (4 mg) by mouth every 6 hours as needed for nausea   potassium chloride ER (K-DUR/KLOR-CON M) 10 MEQ CR tablet   No No   Sig: Take 1 tablet (10 mEq) by mouth daily   rifaximin (XIFAXAN) 550 MG TABS tablet   No No   Sig: Take 1 tablet (550 mg) by mouth 2 times daily   spironolactone (ALDACTONE) 50 MG tablet   No No   Sig: Take 1 tablet (50 mg) by mouth daily   traZODone (DESYREL) 50 MG tablet   No No   Sig: Take 2 tablets (100 mg) by mouth nightly as needed for sleep   ursodiol (ACTIGALL) 500 MG tablet   No No   Sig: Take 1 tablet (500 mg) by mouth 2 times daily  "  vitamin B1 (VITAMIN B-1) 100 MG tablet   Yes No   Sig: Take 1 tablet (100 mg) by mouth daily      Facility-Administered Medications: None     Allergies   Allergies   Allergen Reactions     Albuterol      Tongue \"hardened and painful\"       Physical Exam   Vital Signs: Temp: 98.3  F (36.8  C) Temp src: Oral BP: 114/46 Pulse: 90   Resp: 18 SpO2: 97 % O2 Device: None (Room air)    Weight: 117 lbs 4.8 oz    General: Sitting upright, jaundice, alert and oriented to person and place but not time  HEENT: PERRL, horizontal nystagmus, scleral icterus, oropharynx clear and moist   Respiratory: Bibasilar crackles, normal respiratory effort   CV: Normal rate and rhythm, no murmurs/rubs/gallops  GI:  Abdomen distended but soft, non-tender to palpation, BS present   Skin: Warm, dry.  No rashes or petechiae  Musculoskeletal: No peripheral edema or calf tenderness  Neuro: Alert and oriented to person/place/time  Psychiatric: Normal affect    Data   Data reviewed today: I reviewed all medications, new labs and imaging results over the last 24 hours. I personally reviewed the following imaging:     CXR: AP portable chest radiograph. Trachea is midline. Cardiac silhouette  size is within normal limits. Improved basilar predominant  interstitial opacities. No pneumothorax or effusion. Horizontal linear  opacity in the left lung base is favored to represent atelectasis. No  focal airspace consolidation. Abdominal surgical clips. No acute  osseous abnormality.    ECG: Normal rate. Regular rhythm. No acute ST/T wave changes.       Recent Labs   Lab 06/02/20  1012   WBC 10.2   HGB 9.3*   *   PLT 83*   INR 1.65*   *   POTASSIUM 5.4*   CHLORIDE 108   CO2 16*   BUN 72*   CR 3.18*   ANIONGAP 6   ELSIE 13.3*   GLC 83   ALBUMIN 3.0*   PROTTOTAL 5.8*   BILITOTAL 15.7*   ALKPHOS 285*   ALT 24   AST 47*     "

## 2020-06-02 NOTE — COMMITTEE REVIEW
Abdominal Patient Discussion Note Transplant Coordinator: Gabbie Portillo  Transplant Surgeon:       Referring Physician: Sai Johnston    Committee Review Members:  Nurse Practitioner Joseline Bonilla, NP   Nutrition Diane Kelly, RD   Pharmacy Heath Cruz, [facility]    - Clinical Cresencio Yeager, [facility], Jaimee Garibay, [facility]   Transplant Poornima Ann, RN, Alivia Frazier, DANY, Edgardo Kovacs MD, Savannah Suero MD, Bridget Peterson, APRN CNP, Gabbie Portillo, RN, Toy Mcfarland MD, Vincent Hong MD, Thomas M. Leventhal, MD, Cyn Casey MD, MD, Cornelius Gupta MD       Additional Discussion Notes and Findings:   Reviewed neuropsych testing-concern for dementia, cognitive dysfunction negatively impacting ability to comply and comprehend.    Was unable to schedule colonoscopy.

## 2020-06-02 NOTE — ED NOTES
"[facility]   ED Nurse to Floor Handoff     Monalisa Olguin is a 54 year old female who speaks English and lives with family members,  in a home  They arrived in the ED by car from home    ED Chief Complaint: Altered Mental Status    ED Dx;   Final diagnoses:   None         Needed?: No    Allergies:   Allergies   Allergen Reactions     Albuterol      Tongue \"hardened and painful\"   .  Past Medical Hx:   Past Medical History:   Diagnosis Date     Acute alcoholic intoxication in alcoholism (H) 3/27/2018     Acute renal failure (H) 11/11/2019     Alcohol abuse 5/2/2013     Alcohol dependence 5/25/2013     Alcohol withdrawal 5/2/2013     Alcoholic cirrhosis (H)      Anxiety 10/10/2011     CKD (chronic kidney disease) stage 3, GFR 30-59 ml/min (H) 2006    last GFR was 42     CKD (chronic kidney disease) stage 3, GFR 30-59 ml/min (H) 2/22/2011     CKD (chronic kidney disease) stage 5, GFR less than 15 ml/min (H) 4/14/2020     Depressive disorder      Esophageal reflux 10/7/2002     Hematochezia-patient reported 11/11/2019     Heme positive stool 3/27/2018     Hepatic encephalopathy (H) 11/11/2019     History of blood transfusion     2020 [facility]     Hypertension goal BP (blood pressure) < 130/80 7/12/2011     Hyponatremia 11/11/2019     Moderate Depression [296.32] 12/22/2009    stable on wellbutrin     Other internal derangement of knee(717.89)     ACL Internal derangement, knee/ original injury in 5th grade, torn cartilage     Pap smear 2011    no abnormals, due for paps q 2-3 yrs     SBP (spontaneous bacterial peritonitis) (H) 11/12/2019     Thyroid disease      Unspecified essential hypertension       Baseline Mental status: other liver brain? unsure  Current Mental Status changes: other confused    Infection present or suspected this encounter: no  Sepsis suspected: No  Isolation type: No active isolations     Activity level - Baseline/Home:  Stand with Assist  Activity " Level - Current:   Stand with Assist    Bariatric equipment needed?: No    In the ED these meds were given:   Medications   lactulose (CHRONULAC) solution 20 g (20 g Oral Given 6/2/20 1132)   0.9% sodium chloride BOLUS (1,000 mLs Intravenous New Bag 6/2/20 1132)   ondansetron (ZOFRAN) injection 4 mg (4 mg Intravenous Given 6/2/20 1223)       Drips running?  Yes    Home pump  No    Current LDAs  Peripheral IV 06/02/20 Left Upper forearm (Active)   Site Assessment WDL 06/02/20 1013   Line Status Saline locked 06/02/20 1013   Phlebitis Scale 0-->no symptoms 06/02/20 1013   Number of days: 0       Labs results:   Labs Ordered and Resulted from Time of ED Arrival Up to the Time of Departure from the ED   CBC WITH PLATELETS DIFFERENTIAL - Abnormal; Notable for the following components:       Result Value    RBC Count 2.79 (*)     Hemoglobin 9.3 (*)     Hematocrit 29.4 (*)      (*)     MCH 33.3 (*)     RDW 22.9 (*)     Platelet Count 83 (*)     All other components within normal limits   COMPREHENSIVE METABOLIC PANEL - Abnormal; Notable for the following components:    Sodium 129 (*)     Potassium 5.4 (*)     Carbon Dioxide 16 (*)     Urea Nitrogen 72 (*)     Creatinine 3.18 (*)     GFR Estimate 16 (*)     GFR Estimate If Black 18 (*)     Calcium 13.3 (*)     Bilirubin Total 15.7 (*)     Albumin 3.0 (*)     Protein Total 5.8 (*)     Alkaline Phosphatase 285 (*)     AST 47 (*)     All other components within normal limits   BILIRUBIN DIRECT - Abnormal; Notable for the following components:    Bilirubin Direct 12.2 (*)     All other components within normal limits   INR - Abnormal; Notable for the following components:    INR 1.65 (*)     All other components within normal limits   AMMONIA   OSMOLALITY   UA MACROSCOPIC WITH REFLEX TO MICRO AND CULTURE   SODIUM RANDOM URINE   CREATININE RANDOM URINE   OSMOLALITY URINE   PERIPHERAL IV CATHETER   STRICT INTAKE AND OUTPUT   BLOOD CULTURE   BLOOD CULTURE       Imaging  Studies:   Recent Results (from the past 24 hour(s))   POC US GUIDE FOR PARACENTESIS    Impression    Limited Bedside Abdominal Ultrasound, performed and interpreted by me.     Indication: suspected SBP. Evaluate for Free fluid.     With the patient in Trendelenburg, the RUQ, LUQ, (including the paracolic gutters) views were examined for free fluid. With the patient in reverse Trendelenburg, the super pubic view was examined for free fluid.     Findings: small volume free fluid present     IMPRESSION: Free fluid present as noted above. Only fluid pockets are low in pelvis below hypogastric vessels.      XR Chest Port 1 View    Narrative    EXAM: XR CHEST PORT 1 VW  6/2/2020 11:36 AM     HISTORY:  ams       COMPARISON:  5/4/2020    FINDINGS:   AP portable chest radiograph. Trachea is midline. Cardiac silhouette  size is within normal limits. Improved basilar predominant  interstitial opacities. No pneumothorax or effusion. Horizontal linear  opacity in the left lung base is favored to represent atelectasis. No  focal airspace consolidation. Abdominal surgical clips. No acute  osseous abnormality.      Impression    IMPRESSION: Decreased findings of pulmonary edema versus atypical  infection seen on 5/4/2020. No new focal airspace opacity.    I have personally reviewed the examination and initial interpretation  and I agree with the findings.    COLTEN WOLF, DO       Recent vital signs:   /52   Pulse 96   Temp 98.3  F (36.8  C) (Oral)   Resp 18   Ht 1.524 m (5')   Wt 53.2 kg (117 lb 4.8 oz)   LMP 05/16/2012   SpO2 99%   BMI 22.91 kg/m      Anay Coma Scale Score: 14 (06/02/20 1012)       Cardiac Rhythm: Normal Sinus  Pt needs tele? No  Skin/wound Issues: None    Code Status: Full Code    Pain control: pt had none    Nausea control: fair    Abnormal labs/tests/findings requiring intervention: Bilirubin    Family present during ED course? No   Family Comments/Social Situation comments:  dropped  pt off    Tasks needing completion: urine sample  Pt arrived with weakness and AMS. Hx liver cirrhosis. Bilirubin elevated. Lactulose given. Pt originally confused with aphasia and word-searching. Speech and mentation have improved.       Supriya Novak RN  [phone] [facility]

## 2020-06-02 NOTE — PHARMACY-CONSULT NOTE
Pharmacy Delirium Chart Review    Upon chart review, the following medications may contribute to possible patient delirium:   Acamporsate, aripiprazole, buspirone, fluoxetine, gabapentin, and trazodone all have CNS involvement and contribute to altered mental status especially if there is drug accumulation. Renal function appears to be much worse than baseline and thus would recommend decreasing gabapentin for the time being.  Patient is not on any medications that are classified as high risk medications for development of delirium.  Please consult unit pharmacist with further questions.    Gretchen Lancaster, LatanyaD

## 2020-06-02 NOTE — PHARMACY-ADMISSION MEDICATION HISTORY
"Admission medication history interview status for the 6/2/2020 admission is complete. See Epic admission navigator for allergy information, pharmacy, prior to admission medications and immunization status.     Medication history interview sources:  [facility] pharmacy note 5/20, chart review, payor info, brief conversation w/ pt limited by AMS, pt's  Eron    Changes made to PTA medication list (reason)  Added: none  Deleted: none  Changed: none    Additional medication history information (including reliability of information, actions taken by pharmacist):  -pt was confused and not a reliable historian during telephone interview  -recent [facility] pharmacy visit provided reliable documentation, Heydi indicated that no medication changes had taken place since then, and this was confirmed via chart review and pharmacy fill information  -pt was unable to confirm whether she took her AM meds before presenting to ED, but  stated that \"she tried to take most of them, but then she began hurling, and everything came back up\"        Prior to Admission medications    Medication Sig Last Dose Taking? Auth Provider   acamprosate (CAMPRAL) 333 MG EC tablet TAKE TWO TABLETS BY MOUTH THREE TIMES A DAY 6/1/2020 at Unknown time Yes Efren Bustos MD   ARIPiprazole (ABILIFY) 5 MG tablet Take 1 tablet (5 mg) by mouth At Bedtime 6/1/2020 at Unknown time Yes Efren Bustos MD   busPIRone (BUSPAR) 15 MG tablet Take 1 tablet (15 mg) by mouth 2 times daily 6/1/2020 at Unknown time Yes Efren Bustos MD   ciprofloxacin (CIPRO) 250 MG tablet Take 1 tablet (250 mg) by mouth every 24 hours 6/1/2020 at Unknown time Yes Joey Kwong MD   CONSTULOSE 10 GM/15ML solution TAKE 22.5 MLS BY MOUTH THREE TIMES A DAY AS NEEDED FOR CONSTIPATION - START WITH 15 MLS DAILY, TITRATE AS NEEDED FOR 2-3 BM'S DAILY 6/1/2020 at Unknown time Yes Edgardo Kovacs MD   FLUoxetine (PROZAC) 20 MG capsule TAKE THREE CAPSULES BY MOUTH ONCE DAILY 6/1/2020 " at Unknown time Yes Efren Bustos MD   folic acid (FOLVITE) 1 MG tablet Take 1 tablet (1 mg) by mouth daily 6/1/2020 at Unknown time Yes Efren Bustos MD   furosemide (LASIX) 20 MG tablet Take 20 mg by mouth daily 6/1/2020 at Unknown time Yes Unknown, Entered By History   gabapentin (NEURONTIN) 100 MG capsule Take 3 capsules (300 mg) by mouth At Bedtime 6/1/2020 at Unknown time Yes Efren Bustos MD   midodrine (PROAMATINE) 5 MG tablet Take 1 tablet (5 mg) by mouth 3 times daily (with meals) 6/1/2020 at Unknown time Yes Efren Bustos MD   nicotine (NICODERM CQ) 14 MG/24HR 24 hr patch Place 1 patch onto the skin every 24 hours for 14 days Past Month at Unknown time Yes Efren Bustos MD   nicotine (NICODERM CQ) 21 MG/24HR 24 hr patch Place 1 patch onto the skin every 24 hours for 14 days Past Month at Unknown time Yes Efren Bustos MD   nicotine (NICODERM CQ) 7 MG/24HR 24 hr patch Place 1 patch onto the skin every 24 hours Past Month at Unknown time Yes Efren Bustos MD   omeprazole (PRILOSEC) 20 MG DR capsule Take 1 capsule (20 mg) by mouth daily 6/1/2020 at Unknown time Yes Efren Bustos MD   ondansetron (ZOFRAN) 4 MG tablet Take 1 tablet (4 mg) by mouth every 6 hours as needed for nausea 6/1/2020 at Unknown time Yes    potassium chloride ER (K-DUR/KLOR-CON M) 10 MEQ CR tablet Take 1 tablet (10 mEq) by mouth daily 6/1/2020 at Unknown time Yes Efren Bustos MD   rifaximin (XIFAXAN) 550 MG TABS tablet Take 1 tablet (550 mg) by mouth 2 times daily 6/1/2020 at Unknown time Yes Shanta Pineda MD   spironolactone (ALDACTONE) 50 MG tablet Take 1 tablet (50 mg) by mouth daily 6/1/2020 at Unknown time Yes Edgardo Kovacs MD   traZODone (DESYREL) 50 MG tablet Take 2 tablets (100 mg) by mouth nightly as needed for sleep 6/1/2020 at Unknown time Yes Shanta Pineda MD   ursodiol (ACTIGALL) 500 MG tablet Take 1 tablet (500 mg) by mouth 2 times daily 6/1/2020 at Unknown time Yes  Efren Bustos MD   vitamin B1 (VITAMIN B-1) 100 MG tablet Take 1 tablet (100 mg) by mouth daily 6/1/2020 at Unknown time Yes        Medication history completed by:   Pawan Villasenor, LatanyaD, BCPS

## 2020-06-02 NOTE — ED PROVIDER NOTES
"    [facility] EMERGENCY DEPARTMENT ([facility])  6/02/20    History     Chief Complaint   Patient presents with     Altered Mental Status     The history is provided by the patient and medical records.     Monalisa Olguin is a 54 year old female with a past medical history significant for alcoholic cirrhosis c/b hepatic encephalopathy, ascites, and portal HTN, hyperparathyroidism, CKD stage V s/p nephrectomy (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), s/p cholecystectomy, orthostatic hypotension (On midodrine), depression, and anxiety who presents here to the Emergency Department due to AMS.  Patient was dropped off by the patient's  who stated \"she is here for the same thing as 3 weeks ago\".   reports patient is confused, generally weak, and vomiting.  Patient reports she has been taking her medications as prescribed including her lactulose.  She states her symptoms are consistent with high ammonia levels.  States she is having difficulty speaking due to confusion which is a typical symptom when her ammonia levels are high.  Denies recent falls or head trauma.  Denies alcohol use.  States she is currently on the transplant wait list. Denies dysuria, blood in her stools, fever, chest pain. Thinks she may have some abdominal pain.    Further history is limited by patient's mild encephalopathy.      I have reviewed the Medications, Allergies, Past Medical and Surgical History, and Social History in the [employer] system.  PAST MEDICAL HISTORY:   Past Medical History:   Diagnosis Date     Acute alcoholic intoxication in alcoholism (H) 3/27/2018     Acute renal failure (H) 11/11/2019     Alcohol abuse 5/2/2013     Alcohol dependence 5/25/2013     Alcohol withdrawal 5/2/2013     Alcoholic cirrhosis (H)      Anxiety 10/10/2011     CKD (chronic kidney disease) stage 3, GFR 30-59 ml/min (H) 2006    last GFR was 42     CKD (chronic kidney disease) stage 3, GFR 30-59 ml/min (H) 2/22/2011     CKD (chronic kidney disease) " stage 5, GFR less than 15 ml/min (H) 2020     Depressive disorder      Esophageal reflux 10/7/2002     Hematochezia-patient reported 2019     Heme positive stool 3/27/2018     Hepatic encephalopathy (H) 2019     History of blood transfusion      [facility]     Hypertension goal BP (blood pressure) < 130/80 2011     Hyponatremia 2019     Moderate Depression [296.32] 2009    stable on wellbutrin     Other internal derangement of knee(717.89)     ACL Internal derangement, knee/ original injury in 5th grade, torn cartilage     Pap smear     no abnormals, due for paps q 2-3 yrs     SBP (spontaneous bacterial peritonitis) (H) 2019     Thyroid disease      Unspecified essential hypertension        PAST SURGICAL HISTORY:   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     COLONOSCOPY N/A 2017    Procedure: COLONOSCOPY;  Colonoscopy;  Surgeon: Sai Johnston MD;  Location:  GI     ESOPHAGOSCOPY, GASTROSCOPY, DUODENOSCOPY (EGD), COMBINED N/A 2017    Procedure: COMBINED ESOPHAGOSCOPY, GASTROSCOPY, DUODENOSCOPY (EGD), BIOPSY SINGLE OR MULTIPLE;  ESOPHAGOSCOPY, GASTROSCOPY, DUODENOSCOPY (EGD) with biopsies;  Surgeon: Sai Johnston MD;  Location: [facility] GI     ESOPHAGOSCOPY, GASTROSCOPY, DUODENOSCOPY (EGD), COMBINED N/A 2019    Procedure: ESOPHAGOGASTRODUODENOSCOPY, WITH BIOPSY;  Surgeon: Edgardo Scott DO;  Location: [facility] GI     HC KNEE SCOPE, DIAGNOSTIC  01    Arthroscopy, Lt Knee     LAPAROSCOPIC CHOLECYSTECTOMY N/A 2017    Procedure: LAPAROSCOPIC CHOLECYSTECTOMY;  laparoscopic cholecystectomy;  Surgeon: Romeo Pastor MD;  Location: [facility] OR     OPEN REDUCTION INTERNAL FIXATION WRIST Right 2017    Procedure: OPEN REDUCTION INTERNAL FIXATION WRIST;  OPEN REDUCTION INTERNAL FIXATION RIGHT WRIST;  Surgeon:  Edgardo Almendarez MD;  Location: [facility] OR     TRANSPLANT      Donated Left Kidney in 2000       Past medical history, past surgical history, medications, and allergies were reviewed with the patient. Additional pertinent items: None    FAMILY HISTORY:   Family History   Problem Relation Age of Onset     Hypertension Mother         80 years old     Heart Disease Paternal Grandmother      Heart Disease Paternal Grandfather      Cerebrovascular Disease Maternal Grandmother      Cerebrovascular Disease Maternal Grandfather      Alcohol/Drug Maternal Grandfather      Substance Abuse Maternal Grandfather      Heart Disease Father         Silent MI stents x 2     Diabetes Sister 17        older sister also had kidney and pancreas transplant     Heart Disease Sister         MI secondary to diabetes     Genitourinary Problems Sister 43        kidney transplant from renal failure     Other - See Comments Sister         older     Other - See Comments Sister         younger     Diabetes Sister 9        youngest, juvenile type I (onset age 9 with no complications)     Cancer Paternal Aunt         ?kind       SOCIAL HISTORY:   Social History     Tobacco Use     Smoking status: Current Every Day Smoker     Packs/day: 0.50     Years: 10.00     Pack years: 5.00     Types: Cigarettes     Smokeless tobacco: Never Used     Tobacco comment: pt quit 1992 after smoking for 8 yrs, started again in 2004   Substance Use Topics     Alcohol use: Not Currently     Alcohol/week: 0.0 standard drinks     Comment: Quit drinking 9/2019     Social history was reviewed with the patient. Additional pertinent items: None      Patient's Medications   New Prescriptions    No medications on file   Previous Medications    ACAMPROSATE (CAMPRAL) 333 MG EC TABLET    TAKE TWO TABLETS BY MOUTH THREE TIMES A DAY    ARIPIPRAZOLE (ABILIFY) 5 MG TABLET    Take 1 tablet (5 mg) by mouth At Bedtime    BUSPIRONE (BUSPAR) 15 MG TABLET    Take 1 tablet (15 mg) by mouth 2  times daily    CIPROFLOXACIN (CIPRO) 250 MG TABLET    Take 1 tablet (250 mg) by mouth every 24 hours    FLUOXETINE (PROZAC) 20 MG CAPSULE    TAKE THREE CAPSULES BY MOUTH ONCE DAILY    FOLIC ACID (FOLVITE) 1 MG TABLET    Take 1 tablet (1 mg) by mouth daily    FUROSEMIDE (LASIX) 20 MG TABLET    Take 20 mg by mouth daily    GABAPENTIN (NEURONTIN) 100 MG CAPSULE    Take 3 capsules (300 mg) by mouth At Bedtime    MIDODRINE (PROAMATINE) 5 MG TABLET    Take 1 tablet (5 mg) by mouth 3 times daily (with meals)    NICOTINE (NICODERM CQ) 14 MG/24HR 24 HR PATCH    Place 1 patch onto the skin every 24 hours for 14 days    NICOTINE (NICODERM CQ) 21 MG/24HR 24 HR PATCH    Place 1 patch onto the skin every 24 hours for 14 days    NICOTINE (NICODERM CQ) 7 MG/24HR 24 HR PATCH    Place 1 patch onto the skin every 24 hours    OMEPRAZOLE (PRILOSEC) 20 MG DR CAPSULE    Take 1 capsule (20 mg) by mouth daily    ONDANSETRON (ZOFRAN) 4 MG TABLET    Take 1 tablet (4 mg) by mouth every 6 hours as needed for nausea    POTASSIUM CHLORIDE ER (K-DUR/KLOR-CON M) 10 MEQ CR TABLET    Take 1 tablet (10 mEq) by mouth daily    RIFAXIMIN (XIFAXAN) 550 MG TABS TABLET    Take 1 tablet (550 mg) by mouth 2 times daily    SPIRONOLACTONE (ALDACTONE) 50 MG TABLET    Take 1 tablet (50 mg) by mouth daily    TRAZODONE (DESYREL) 50 MG TABLET    Take 2 tablets (100 mg) by mouth nightly as needed for sleep    URSODIOL (ACTIGALL) 500 MG TABLET    Take 1 tablet (500 mg) by mouth 2 times daily    VITAMIN B1 (VITAMIN B-1) 100 MG TABLET    Take 1 tablet (100 mg) by mouth daily   Modified Medications    Modified Medication Previous Medication    CONSTULOSE 10 GM/15ML SOLUTION lactulose (CHRONULAC) 10 GM/15ML solution       TAKE 22.5 MLS BY MOUTH THREE TIMES A DAY AS NEEDED FOR CONSTIPATION - START WITH 15 MLS DAILY, TITRATE AS NEEDED FOR 2-3 BM'S DAILY    Take 22.5 mLs (15 g) by mouth 3 times daily as needed for constipation Start with 15 ml daily, titrate as needed for  "2-3 BM's daily.   Discontinued Medications    No medications on file          Allergies   Allergen Reactions     Albuterol      Tongue \"hardened and painful\"        Review of Systems   Unable to perform ROS: Mental status change       Physical Exam   BP: 127/50  Pulse: 104  Temp: 98.3  F (36.8  C)  Resp: 18  Height: 152.4 cm (5')  Weight: 56.7 kg (125 lb)  SpO2: 98 %      Physical Exam  Vitals signs and nursing note reviewed.   Constitutional:       General: She is not in acute distress.     Appearance: She is well-developed. She is not diaphoretic.   HENT:      Head: Normocephalic and atraumatic.      Nose: Nose normal. No rhinorrhea.      Mouth/Throat:      Mouth: Mucous membranes are dry.   Eyes:      General: Scleral icterus present.      Conjunctiva/sclera: Conjunctivae normal.   Neck:      Musculoskeletal: Normal range of motion and neck supple. No neck rigidity.   Cardiovascular:      Rate and Rhythm: Normal rate.   Pulmonary:      Effort: Pulmonary effort is normal. No respiratory distress.      Breath sounds: No stridor.   Abdominal:      General: Abdomen is protuberant. There is distension.      Palpations: Abdomen is soft. There is fluid wave.      Tenderness: There is no abdominal tenderness. There is no guarding or rebound.   Musculoskeletal: Normal range of motion.         General: Signs of injury present. No tenderness or deformity.      Comments: CAM boot on RLE   Skin:     General: Skin is warm and dry.      Coloration: Skin is jaundiced.   Neurological:      General: No focal deficit present.      Mental Status: She is alert and oriented to person, place, and time. She is confused.      Cranial Nerves: No dysarthria or facial asymmetry.      Sensory: Sensation is intact.      Motor: Motor function is intact. No weakness.      Comments: Answers some questions appropriately though mumbling. Responds to other questions with nonsensical responses.  Positive asterixis.    Psychiatric:         Attention " and Perception: She is inattentive.         Mood and Affect: Mood normal.         Behavior: Behavior is slowed and withdrawn.         Cognition and Memory: Cognition is impaired.       ED Course        Procedures  Results for orders placed during the hospital encounter of 06/02/20   POC US GUIDE FOR PARACENTESIS    Impression Limited Bedside Abdominal Ultrasound, performed and interpreted by me.     Indication: suspected SBP. Evaluate for Free fluid.     With the patient in Trendelenburg, the RUQ, LUQ, (including the paracolic gutters) views were examined for free fluid. With the patient in reverse Trendelenburg, the super pubic view was examined for free fluid.     Findings: small volume free fluid present     IMPRESSION: Free fluid present as noted above. Only fluid pockets are low in pelvis below inferior epigastric vessels.                EKG Interpretation:      Interpreted by Mary Carmen Encarnacion MD  Time reviewed: 1128  Symptoms at time of EKG: AMS   Rhythm: normal sinus   Rate: normal  Axis: normal  Ectopy: none  Conduction: normal  ST Segments/ T Waves: No ST-T wave changes  Q Waves: none  Comparison to prior: Unchanged    Clinical Impression: normal EKG                        Results for orders placed or performed during the hospital encounter of 06/02/20 (from the past 24 hour(s))   CBC with platelets differential   Result Value Ref Range    WBC 10.2 4.0 - 11.0 10e9/L    RBC Count 2.79 (L) 3.8 - 5.2 10e12/L    Hemoglobin 9.3 (L) 11.7 - 15.7 g/dL    Hematocrit 29.4 (L) 35.0 - 47.0 %     (H) 78 - 100 fl    MCH 33.3 (H) 26.5 - 33.0 pg    MCHC 31.6 31.5 - 36.5 g/dL    RDW 22.9 (H) 10.0 - 15.0 %    Platelet Count 83 (L) 150 - 450 10e9/L    Diff Method Automated Method     % Neutrophils 76.1 %    % Lymphocytes 8.7 %    % Monocytes 9.4 %    % Eosinophils 4.4 %    % Basophils 0.4 %    % Immature Granulocytes 1.0 %    Nucleated RBCs 0 0 /100    Absolute Neutrophil 7.7 1.6 - 8.3 10e9/L    Absolute Lymphocytes 0.9  0.8 - 5.3 10e9/L    Absolute Monocytes 1.0 0.0 - 1.3 10e9/L    Absolute Eosinophils 0.5 0.0 - 0.7 10e9/L    Absolute Basophils 0.0 0.0 - 0.2 10e9/L    Abs Immature Granulocytes 0.1 0 - 0.4 10e9/L    Absolute Nucleated RBC 0.0    Comprehensive metabolic panel   Result Value Ref Range    Sodium 129 (L) 133 - 144 mmol/L    Potassium 5.4 (H) 3.4 - 5.3 mmol/L    Chloride 108 94 - 109 mmol/L    Carbon Dioxide 16 (L) 20 - 32 mmol/L    Anion Gap 6 3 - 14 mmol/L    Glucose 83 70 - 99 mg/dL    Urea Nitrogen 72 (H) 7 - 30 mg/dL    Creatinine 3.18 (H) 0.52 - 1.04 mg/dL    GFR Estimate 16 (L) >60 mL/min/[1.73_m2]    GFR Estimate If Black 18 (L) >60 mL/min/[1.73_m2]    Calcium 13.3 (H) 8.5 - 10.1 mg/dL    Bilirubin Total 15.7 (HH) 0.2 - 1.3 mg/dL    Albumin 3.0 (L) 3.4 - 5.0 g/dL    Protein Total 5.8 (L) 6.8 - 8.8 g/dL    Alkaline Phosphatase 285 (H) 40 - 150 U/L    ALT 24 0 - 50 U/L    AST 47 (H) 0 - 45 U/L   Ammonia   Result Value Ref Range    Ammonia 25 10 - 50 umol/L   Bilirubin direct   Result Value Ref Range    Bilirubin Direct 12.2 (H) 0.0 - 0.2 mg/dL   INR   Result Value Ref Range    INR 1.65 (H) 0.86 - 1.14   Osmolality   Result Value Ref Range    Osmolality 297 (H) 275 - 295 mmol/kg   POC US GUIDE FOR PARACENTESIS    Impression    Limited Bedside Abdominal Ultrasound, performed and interpreted by me.     Indication: suspected SBP. Evaluate for Free fluid.     With the patient in Trendelenburg, the RUQ, LUQ, (including the paracolic gutters) views were examined for free fluid. With the patient in reverse Trendelenburg, the super pubic view was examined for free fluid.     Findings: small volume free fluid present     IMPRESSION: Free fluid present as noted above. Only fluid pockets are low in pelvis below inferior epigastric vessels.      EKG 12-lead, tracing only   Result Value Ref Range    Interpretation ECG Click View Image link to view waveform and result    XR Chest Port 1 View    Narrative    EXAM: XR CHEST PORT 1  VW  6/2/2020 11:36 AM     HISTORY:  ams       COMPARISON:  5/4/2020    FINDINGS:   AP portable chest radiograph. Trachea is midline. Cardiac silhouette  size is within normal limits. Improved basilar predominant  interstitial opacities. No pneumothorax or effusion. Horizontal linear  opacity in the left lung base is favored to represent atelectasis. No  focal airspace consolidation. Abdominal surgical clips. No acute  osseous abnormality.      Impression    IMPRESSION: Decreased findings of pulmonary edema versus atypical  infection seen on 5/4/2020. No new focal airspace opacity.    I have personally reviewed the examination and initial interpretation  and I agree with the findings.    COLTEN WOLF, DO     Medications   cefOXitin (MEFOXIN) 2 g vial to attach to  mL bag (has no administration in time range)   lactulose (CHRONULAC) solution 20 g (20 g Oral Given 6/2/20 1132)   0.9% sodium chloride BOLUS (1,000 mLs Intravenous New Bag 6/2/20 1132)   ondansetron (ZOFRAN) injection 4 mg (4 mg Intravenous Given 6/2/20 1223)             Assessments & Plan (with Medical Decision Making)   Monalisa Olguin is a 54 year old female with a past medical history significant for alcoholic cirrhosis c/b hepatic encephalopathy, ascites, and portal HTN, hyperparathyroidism, CKD stage V s/p nephrectomy (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), s/p cholecystectomy, orthostatic hypotension (On midodrine), depression, and anxiety who presents here to the Emergency Department due to AMS.    Ddx: hepatic encephalopathy, UTI, PNA, toxic/metabolic encephalopathy    Patient with positive asterixis.  Altered mental status is consistent with a hepatic encephalopathy.  Ammonia level was normal but will give a dose of lactulose empirically.  Labs are most notable for new hyponatremia and mild hyperkalemia with significant acute on chronic kidney injury.  Creatinine 3.18 from 1.65 last month.  Given IV normal saline.  EKG does not show changes of  hyperkalemia.  Hyponatremia may also be a cause for patient's altered mental status.  Possible etiology of kidney dysfunction is hepatorenal syndrome overdiuresis.     Point-of-care ultrasound of the abdomen shows small amount of intraperitoneal ascites but no easily drainable fluid collection without risk of injury to the inferior epigastric vessels.  Will empirically cover for SBP with cefoxitin.    Discussed with GI hepatology who will follow patient in the hospital.  MELD-Na score: 36 at 6/2/2020 10:12 AM  MELD score: 34 at 6/2/2020 10:12 AM  Calculated from:  Serum Creatinine: 3.18 mg/dL at 6/2/2020 10:12 AM  Serum Sodium: 129 mmol/L at 6/2/2020 10:12 AM  Total Bilirubin: 15.7 mg/dL at 6/2/2020 10:12 AM  INR(ratio): 1.65 at 6/2/2020 10:12 AM  Age: 54 years 7 months      Admitted to medicine for further care.    I have reviewed the nursing notes.    I have reviewed the findings, diagnosis, plan and need for follow up with the patient.    New Prescriptions    No medications on file       Final diagnoses:   Hyponatremia   Altered mental status, unspecified altered mental status type     I, Yohan Berumen, am serving as a trained medical scribe to document services personally performed by Mary Carmen Encarnacion MD, based on the provider's statements to me.   I, Mary Carmen Encarnacion MD, was physically present and have reviewed and verified the accuracy of this note documented by Yohan Berumen.    6/2/2020   [facility], [place], EMERGENCY DEPARTMENT     Mary Carmen Encarnacion MD  06/02/20 1449       Mary Carmen Encarnacion MD  06/02/20 1456       Mary Carmen Encarnacion MD  06/02/20 0996

## 2020-06-03 NOTE — PLAN OF CARE
VSS.  Alert but disoriented x 3.  Bed alarm on.  Up to commode with assist of 1.  Stage one [place].  Lactulose started.  Has had 2 stools mixed with urine.  She is confused and unable to answer admission questions.  Tolerated regular diet.  Denies pain.  Admitted from ED at 1730 with encephalopathy.  Plan to monitor closely for AMS.

## 2020-06-03 NOTE — PROVIDER NOTIFICATION
"Writer paged MD:    \"Pt is still confused gave 20g of lactulose.\"    MD called back and is aware.    Rosa Herron RN on 6/3/2020 at 4:02 AM    "

## 2020-06-03 NOTE — PLAN OF CARE
PT Cancel: PT orders acknowledged and appreciated. Pt unable to keep eyes open this AM and speaking nonsense with therapist. Will check back later today as able. Pt going to CT at second attempt to be seen. Will reschedule for 6/4

## 2020-06-03 NOTE — PROGRESS NOTES
Care Coordinator Progress Note    Admission Date/Time:  6/2/2020  Attending MD:  Stella Christiansen*    Data  Chart reviewed, discussed with interdisciplinary team.   Patient was admitted for:    Hyponatremia  Altered mental status, unspecified altered mental status type  Asterixis.    Concerns with insurance coverage for discharge needs: None.  Current Living Situation: Patient lives with family.  Support System: Supportive and Involved  Services Involved: Home Care  Transportation at Discharge: TBD, family provided transportation at previous discharge.  Transportation to Medical Appointments:   - Family assists.   Barriers to Discharge: Medical stability, safe discharge dispo.      Assessment  Patient is a 54yr olf female with a prior medical history of decompensated liver cirrhosis, CKD, hyperparathyroidism who was brought in due to altered mental status. Per chart review, patient lives locally w/her spouse and older children. Patient is currently open to [facility] services, they are aware of her admission. Patient admitted w/AMS and altered mobility, PT/OT consults pending. Will place home care resumption orders if planning for discharge to home. RNCC will continue to follow for discharge planning.      [facility]  Phone  [phone]  Fax  [phone]     Plan  Anticipated Discharge Date:  TBD  Anticipated Discharge Plan:  TBD    Lay Worthington RNCC, BSN    [facility]    Medicine Group  [address] [place] [id number]    [email or web address]    Office: [phone] Pager: [phone]  To contact the weekend RNCC, page [phone].

## 2020-06-03 NOTE — PROGRESS NOTES
[facility], [place]    Progress Note - [place] 2 Service        Date of Admission:  6/2/2020    Assessment and Plan:   Monalisa Olguin is a 54 year old female with history of ACD, decompensated liver cirrhosis, CKD, hyperparathyroidism who was brought to ED by her  with AMS.      Encephalopathy  Likely etiology is toxic metabolic and/or hepatic encephalopathy. Ammonia is normal. BUN is elevated. On admission patient with severe hypercalcemia and moderate hyponatremia, which were likely contributing and improved with IVF resuscitation. Patient is afebrile, hemodynamically stable, without leukocytosis. No recent cough or urinary symptoms. Low suspicion for SBP as patient without abdominal pain and on ciprofloxacin prophylaxis. Will defer treatment for acute infectious process. Head CT ordered to evaluate for acute intracranial as patient remains confused despite improving electrolytes.    - [place] protocol  - Rifaximin 550mg BID   - Delirium precautions  - Avoid narcotics   - Treat ALFREDO, hyponatremia, and hypercalcemia as noted below   - Head CT without contrast    Alcoholic liver cirrhosis (hx of ascites and HE)  Thrombocytopenia   Follows with Dr. Kovacs of Hepatology. Currently undergoing evaluation for transplant but is likely a poor candidate given recent neuropsychiatric evaluation that raises concern for underlying dementia most likely secondary to chronic EtOH. Patient denies recent alcohol use on admission. Ascites and encephalopathy addressed above. No recent evidence of GI bleed.   - Hold acamprosate in setting of acute kidney injury   - Cont pta ciprofloxacin, folic acid, vitamin B1, and rifaximin as above  - [place] protocol as above   - Hold lasix and spironolactone  - Consider liver transplant consult if meld worsening     MELD-Na score: 33 at 6/3/2020  4:59 AM  MELD score: 32 at 6/3/2020  4:59 AM  Calculated from:  Serum Creatinine: 2.44 mg/dL at 6/3/2020   4:59 AM  Serum Sodium: 134 mmol/L at 6/3/2020  4:59 AM  Total Bilirubin: 13.0 mg/dL at 6/3/2020  4:59 AM  INR(ratio): 1.87 at 6/3/2020  4:59 AM  Age: 54 years 7 months     ALFREDO on CKD stage IV  Unilateral kidney (s/p nephrectomy)  Multifactorial in etiology and secondary prerenal azotemia due to hypovolemia vs HRS, hypercalcemia, and/or ATN. Cretainine improving with IVF resuscitation. Will continue fluid resuscitation at LR 125ml/hr as patient developing a mild metabolic acidosis after receiving NS. Monitor volume status, Cr, and electrolytes closely.    - Hold lasix and spironolactone   - LR 125mL/hr x an additional L  - Daily BMP and Ca  - Renal consulted, appreciate recs      Hypercalcemia, severe  Hx of hyperparathyroidism but could also be due to dehydration. Improved after 3L of IVF and one dose of calcitonin. PTH ordered and low.   - PTH  - Daily BMP, Ca   - Renal consulted, appreciate recs      Hyponatremia  Hypovolemic hyponatremia. Serum osmols elevated likely due to elevated BUN as serum osmolar gap normal. Sodium normalized after fluid resuscitation.   - Urine Na, Urine Cr   - Daily BMP  - Renal consult      Chronic macrocytic anemia   Multifactorial in etiology and secondary to ESLD and CKD +/- vitamin/mineral deficiency.   - CTM     Orthostatic hypotension  - Cont midodrine 5mg daily      Depression and anxiety  - Cont abilify, buspar, and fluoxetine         Diet: Na restriction <2g, fluid restriction <2L    Fluids: Albumin   DVT Prophylaxis: Pneumatic Compression Devices  Gonzalez Catheter: not present  Code Status: Full, patient encephalopathic and  not answering phone        MD Jessica CavazosMay[facility]  [facility] [place]  Pager: [phone]  Please see sticky note for cross cover information    Pt was seen and examined by me; reviewed labs, vitals, imaging.  ALFREDO and hypercalcemia improved with fluids.  Appreciate renal recommendations.  Hepatology  is concerned about a baseline dementia.  I agree with the detailed A/P documented above.      Stella Suero MD  ______________________________________________________________________    Interval History   Ca, Na, and Cr improving overnight with IVF. NAEON. Nursing notes reviewed. Today, patient confused. Denies pain. Unable to obtain more history.     Physical Exam   Vital Signs: Temp: 95.6  F (35.3  C) Temp src: Oral BP: 99/45 Pulse: 80   Resp: 18 SpO2: 95 % O2 Device: None (Room air)    Weight: 117 lbs 6.4 oz     General: Sitting upright, jaundice, wakens to voice but falls asleep, answers some questions appropriately   Respiratory: Bibasilar crackles, normal respiratory effort   CV: Normal rate and rhythm, no murmurs/rubs/gallops  GI:  Abdomen distended but soft, non-tender to palpation, BS present   Skin: Warm, dry.  No rashes or petechiae  Musculoskeletal: No peripheral edema.  Neuro: Wakens to voice but falls asleep, answers some questions appropriately     Data   Recent Labs   Lab 06/03/20  0459 06/02/20  2351 06/02/20 2012 06/02/20  1744 06/02/20  1012   WBC 8.3  --   --   --  10.2   HGB 7.7*  --   --   --  9.3*   *  --   --   --  105*   PLT 63*  --   --   --  83*   INR 1.87*  --   --   --  1.65*    133 131* 131* 129*   POTASSIUM 5.1 5.4*  --  5.5* 5.4*   CHLORIDE 116* 113*  --  112* 108   CO2 13* 13*  --  17* 16*   BUN 60* 62*  --  68* 72*   CR 2.44* 2.59*  --  2.92* 3.18*   ANIONGAP 5 7  --  2* 6   ELSIE 11.5* 11.8*  --  12.9* 13.3*   * 110*  --  95 83   ALBUMIN 2.5*  --   --  2.8* 3.0*   PROTTOTAL 5.0*  --   --  5.4* 5.8*   BILITOTAL 13.0*  --   --  14.8* 15.7*   ALKPHOS 244*  --   --  267* 285*   ALT 21  --   --  24 24   AST 37  --   --  42 47*

## 2020-06-03 NOTE — PLAN OF CARE
"Vital signs stable on RA. Up with 1 to commode. Pt oriented to self only. Conversation not appropriate. For example when asked if she wanted to have lunch pt stated, \"Oh that's because the boys are at the pool\". At times pt is stringing together words that do not make sense. PRN lactulose given x3 with no improvement. BM x2 today. Eating less than 25% of meals. LR bolus running. Will continue to monitor.   "

## 2020-06-03 NOTE — CONSULTS
[facility], [place]  Consult Note - Nephrology  Date of Admission:  6/2/2020  Consult Requested by: Dr Price  Reason for Consult: Hypercalcemia, hyponatremia, acute kidney injury on CKD IV     Assessment & Plan   Monalisa Olguin is a 54 year old female with a PMH of decompensated alcoholic cirrhosis and CKD IV, who presented with encephalopathy found to have hypercalcemia, acute kidney injury and hyponatremia.     #Non PTH dependant Hypercalcemia   #Acute non-oliguric ALFREDO on CKD stage IV-improving   #Solitary kidney  #Hyperkalemia-mild, improving    #Hepatic encephalopathy   #Decompensated alcoholic cirrhosis   Patient with documented history of hyperparathyroidism although per my chart review has never been overtly hypercalcemic previously. Parathyroid hormone 49 in 2010 in the setting of a normal calcium of 8.9. Calcium on presentation here 13.3 with an appropriately suppressed PTH. Differential includes vitamin D intoxication, malignancy/granulomatous disease, lytic bone lesions, hyperthyroidism, surreptitious calcium use, adrenal insufficiency, Vitamin A intoxication among others.  I suspect her ALFREDO and hyponatremia a driven by intravascular volume depletion and dehydration secondary to elevated serum calcium. Urine sodium >20 but patient on diuretics prior to admission. The cause of her decompensation not clear at this time, she denies new alcohol use and infectious work-up remains unrevealing although UA notable for pyuria and leukocyte esterase (culture pending).   -Send 25-Vit D, 1,25 vitamin D, PTH-RP, TSH, phos and AM cortisol   -Agree with second dose of calcitonin 4 units/kg, consider dose of lasix if evidence of overcorrecting volume status   -Trend creatinine daily  -Avoid nephrotoxins  -Hold off on additional imaging pending above work-up   -No indication for dialysis     #Non anion gap metabolic acidosis   Etiology unclear, may be related to GI versus renal loss.  Would check a blood gas to assess ph.   -VBG now   -Gve an additional 1 liter of D5W with 3 amps sodium bicarbonate     #Hypovolemic hyponatremia   Sodium improving with fluid resuscitation, suggestive of intravascular volume depletion likely driven by hypercalcemia. Additional fluids as above. Urine studies likely impacted by PTA diuretics.  -Monitor sodium     The patient's care was discussed with the Attending Physician, Dr. Alcocer.    Kj Parkinson MD  [facility], [place]  Pager: [phone]  Please see sticky note for cross cover information  ______________________________________________________________________    Chief Complaint   Confusion     History is obtained from the patient    History of Present Illness   Monalisa Olguin is a 54 year old female with a PMH of decompensated alcoholic cirrhosis, CKD IV, anemia and hyperparathyroidism who presented to the ED yesterday for weakness and confusion. Patient able to provided limited history, remainder obtained from chart review.     Patient acknowledges that she was more confused the day leading up to presentation, she denies any significant pain although states she has some mild generalized abdominal pain. She denies fevers or chills. She denies cough or shortness of breath. She denies pain with urination. She denies falls. She reports good medication compliance including lactulose and denies any alcohol use. She states she has been sober for almost a year. No other drug use. She does endorse 2-3 bowel movements per day. Nausea/vomiting also reported. She denies taking any calcium supplements including TUMs. She is not taking any vitamin D supplements. She is unsure if she has had high calcium in the past.     Her calcium on presentation was 13.3, also noted to have an acute kidney injury (creatinine 3.18), hyperkalemia to 5.4, Na 129 and elevated bilirubin to 15.7 (direct 12.2). MELD on admission 36. She was treated  with IV crystalloid and calcitonin (200 units) yesterday. Her calcium has improved from 13.3 to 11.5, creatinine has down trended to 2.44. PTH low at 8. She was also given one dose of antibiotics for possible SBP although there was not sufficient fluid to tap.     Review of Systems   The 10 point Review of Systems is negative other than noted in the HPI or here.     Past Medical History    I have reviewed this patient's medical history and updated it with pertinent information if needed.   Past Medical History:   Diagnosis Date     Acute alcoholic intoxication in alcoholism (H) 3/27/2018     Acute renal failure (H) 2019     Alcohol abuse 2013     Alcohol dependence 2013     Alcohol withdrawal 2013     Alcoholic cirrhosis (H)      Anxiety 10/10/2011     CKD (chronic kidney disease) stage 3, GFR 30-59 ml/min (H)     last GFR was 42     CKD (chronic kidney disease) stage 3, GFR 30-59 ml/min (H) 2011     CKD (chronic kidney disease) stage 5, GFR less than 15 ml/min (H) 2020     Depressive disorder      Esophageal reflux 10/7/2002     Hematochezia-patient reported 2019     Heme positive stool 3/27/2018     Hepatic encephalopathy (H) 2019     History of blood transfusion      [facility]     Hypertension goal BP (blood pressure) < 130/80 2011     Hyponatremia 2019     Moderate Depression [296.32] 2009    stable on wellbutrin     Other internal derangement of knee(717.89)     ACL Internal derangement, knee/ original injury in 5th grade, torn cartilage     Pap smear     no abnormals, due for paps q 2-3 yrs     SBP (spontaneous bacterial peritonitis) (H) 2019     Thyroid disease      Unspecified essential hypertension        Past Surgical History   I have reviewed this patient's surgical history and updated it with pertinent information if needed.  Past Surgical History:   Procedure Laterality Date     C  DELIVERY ONLY      , Low  Cervical     C RMV,KIDNEY,DONOR,LIVING  2000    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     COLONOSCOPY N/A 9/8/2017    Procedure: COLONOSCOPY;  Colonoscopy;  Surgeon: Sai Johnston MD;  Location: [facility]     ESOPHAGOSCOPY, GASTROSCOPY, DUODENOSCOPY (EGD), COMBINED N/A 12/27/2017    Procedure: COMBINED ESOPHAGOSCOPY, GASTROSCOPY, DUODENOSCOPY (EGD), BIOPSY SINGLE OR MULTIPLE;  ESOPHAGOSCOPY, GASTROSCOPY, DUODENOSCOPY (EGD) with biopsies;  Surgeon: Sai Johnston MD;  Location: [facility]     ESOPHAGOSCOPY, GASTROSCOPY, DUODENOSCOPY (EGD), COMBINED N/A 4/29/2019    Procedure: ESOPHAGOGASTRODUODENOSCOPY, WITH BIOPSY;  Surgeon: Edgardo Scott DO;  Location: [facility]     HC KNEE SCOPE, DIAGNOSTIC  11/14/01    Arthroscopy, Lt Knee     LAPAROSCOPIC CHOLECYSTECTOMY N/A 9/24/2017    Procedure: LAPAROSCOPIC CHOLECYSTECTOMY;  laparoscopic cholecystectomy;  Surgeon: Romeo Pastor MD;  Location: [facility]     OPEN REDUCTION INTERNAL FIXATION WRIST Right 11/29/2017    Procedure: OPEN REDUCTION INTERNAL FIXATION WRIST;  OPEN REDUCTION INTERNAL FIXATION RIGHT WRIST;  Surgeon: Edgardo Almendarez MD;  Location: [facility]     TRANSPLANT      Donated Left Kidney in 2000       Social History   I have reviewed this patient's social history and updated it with pertinent information if needed.  Social History     Tobacco Use     Smoking status: Current Every Day Smoker     Packs/day: 0.50     Years: 10.00     Pack years: 5.00     Types: Cigarettes     Smokeless tobacco: Never Used     Tobacco comment: pt quit 1992 after smoking for 8 yrs, started again in 2004   Substance Use Topics     Alcohol use: Not Currently     Alcohol/week: 0.0 standard drinks     Comment: Quit drinking 9/2019     Drug use: Not Currently     Types: Marijuana   Denies recent alcohol use   Endorses current tobacco use (unclear how much), no other drug use    Family History   I have reviewed this patient's family history and updated it with pertinent information if  needed.   Family History   Problem Relation Age of Onset     Hypertension Mother         80 years old     Heart Disease Paternal Grandmother      Heart Disease Paternal Grandfather      Cerebrovascular Disease Maternal Grandmother      Cerebrovascular Disease Maternal Grandfather      Alcohol/Drug Maternal Grandfather      Substance Abuse Maternal Grandfather      Heart Disease Father         Silent MI stents x 2     Diabetes Sister 17        older sister also had kidney and pancreas transplant     Heart Disease Sister         MI secondary to diabetes     Genitourinary Problems Sister 43        kidney transplant from renal failure     Other - See Comments Sister         older     Other - See Comments Sister         younger     Diabetes Sister 9        youngest, juvenile type I (onset age 9 with no complications)     Cancer Paternal Aunt         ?kind       Medications   Current Facility-Administered Medications   Medication     acetaminophen (TYLENOL) tablet 500 mg     [Held by provider] ARIPiprazole (ABILIFY) tablet 5 mg     [Held by provider] busPIRone (BUSPAR) tablet 15 mg     ciprofloxacin (CIPRO) tablet 250 mg     Daily 2 GRAM acetaminophen limit, unless fulminent liver failure or transaminases greater than or equal to 300 - 400, then none     FLUoxetine (PROzac) capsule 60 mg     folic acid (FOLVITE) tablet 1 mg     [Held by provider] gabapentin (NEURONTIN) capsule 300 mg     lactulose (CHRONULAC) solution 20 g    Or     lactulose (CHRONULAC) solution for enema prep 100 g     lidocaine (LMX4) cream     lidocaine 1 % 0.1-1 mL     melatonin tablet 1 mg     midodrine (PROAMATINE) tablet 5 mg     naloxone (NARCAN) injection 0.1-0.4 mg     omeprazole (priLOSEC) CR capsule 20 mg     ondansetron (ZOFRAN-ODT) ODT tab 4 mg    Or     ondansetron (ZOFRAN) injection 4 mg     prochlorperazine (COMPAZINE) injection 10 mg    Or     prochlorperazine (COMPAZINE) tablet 10 mg    Or     prochlorperazine (COMPAZINE) Suppository  "25 mg     rifaximin (XIFAXAN) tablet 550 mg     sodium chloride (PF) 0.9% PF flush 3 mL     sodium chloride (PF) 0.9% PF flush 3 mL     [Held by provider] traZODone (DESYREL) tablet 100 mg     ursodiol (ACTIGALL) tablet 500 mg     vitamin B1 (THIAMINE) tablet 100 mg       Allergies   Allergies   Allergen Reactions     Albuterol      Tongue \"hardened and painful\"       Physical Exam   Vital Signs: Temp: 95.6  F (35.3  C) Temp src: Oral BP: 99/45 Pulse: 80   Resp: 18 SpO2: 95 % O2 Device: None (Room air)    Weight: 117 lbs 6.4 oz    General Appearance: Awake, oriented to person but not place or time, appears tired and confused   Eyes: Icteric sclera, PEERL   HEENT: No thrush or oropharyngeal exudate  Respiratory: CTAB, no wheezing or rales   Cardiovascular: S1/S2, RRR. No murmurs. RP 2+ bilaterally.   GI: Soft, and non tender. Mildly distended. Bowel sounds present.   Lymph/Hematologic: No lower extremity edema  Skin: Jaundiced, dark bronzed skin noted diffusely   Musculoskeletal: Normal muscle tone on limited exam   Neurologic: Oriented to person only, confused, mumbling incoherent phrases intermittently. Asterixis noted, with jerking movements in both upper and lower extremities  Psychiatric: Oriented to person only, blunted affect     Data   Results for orders placed or performed during the hospital encounter of 06/02/20 (from the past 24 hour(s))   POC US GUIDE FOR PARACENTESIS    Impression    Limited Bedside Abdominal Ultrasound, performed and interpreted by me.     Indication: suspected SBP. Evaluate for Free fluid.     With the patient in Trendelenburg, the RUQ, LUQ, (including the paracolic gutters) views were examined for free fluid. With the patient in reverse Trendelenburg, the super pubic view was examined for free fluid.     Findings: small volume free fluid present     IMPRESSION: Free fluid present as noted above. Only fluid pockets are low in pelvis below inferior epigastric vessels.      EKG 12-lead, " tracing only   Result Value Ref Range    Interpretation ECG Click View Image link to view waveform and result    Blood culture    Specimen: Arm, Left; Blood    Left Arm   Result Value Ref Range    Specimen Description Blood Left Arm     Culture Micro No growth after 17 hours    XR Chest Port 1 View    Narrative    EXAM: XR CHEST PORT 1 VW  6/2/2020 11:36 AM     HISTORY:  ams       COMPARISON:  5/4/2020    FINDINGS:   AP portable chest radiograph. Trachea is midline. Cardiac silhouette  size is within normal limits. Improved basilar predominant  interstitial opacities. No pneumothorax or effusion. Horizontal linear  opacity in the left lung base is favored to represent atelectasis. No  focal airspace consolidation. Abdominal surgical clips. No acute  osseous abnormality.      Impression    IMPRESSION: Decreased findings of pulmonary edema versus atypical  infection seen on 5/4/2020. No new focal airspace opacity.    I have personally reviewed the examination and initial interpretation  and I agree with the findings.    COLTEN WOLF,    Blood culture    Specimen: Wrist, Right; Blood    R WRIST   Result Value Ref Range    Specimen Description Blood R WRIST     Culture Micro No growth after 17 hours    UA reflex to Microscopic and Culture    Specimen: Urine Midstream; Midstream Urine   Result Value Ref Range    Color Urine Dark Yellow     Appearance Urine Cloudy     Glucose Urine Negative NEG^Negative mg/dL    Bilirubin Urine Small (A) NEG^Negative    Ketones Urine Negative NEG^Negative mg/dL    Specific Gravity Urine 1.013 1.003 - 1.035    Blood Urine Moderate (A) NEG^Negative    pH Urine 5.0 5.0 - 7.0 pH    Protein Albumin Urine 10 (A) NEG^Negative mg/dL    Urobilinogen mg/dL Normal 0.0 - 2.0 mg/dL    Nitrite Urine Negative NEG^Negative    Leukocyte Esterase Urine Large (A) NEG^Negative    Source Midstream Urine     RBC Urine 21 (H) 0 - 2 /HPF    WBC Urine 30 (H) 0 - 5 /HPF    Bacteria Urine Many (A) NEG^Negative  /HPF    Squamous Epithelial /HPF Urine 2 (H) 0 - 1 /HPF    Hyaline Casts 5 (H) 0 - 2 /LPF   Sodium random urine   Result Value Ref Range    Sodium Urine mmol/L 35 mmol/L   Creatinine random urine   Result Value Ref Range    Creatinine Urine Random 73 mg/dL   Osmolality urine   Result Value Ref Range    Urine Osmolality 341 100 - 1,200 mmol/kg   Blood culture    Specimen: Blood    Right Arm   Result Value Ref Range    Specimen Description Blood Right Arm     Culture Micro No growth after 11 hours    Hepatic panel   Result Value Ref Range    Bilirubin Direct 11.5 (H) 0.0 - 0.2 mg/dL    Bilirubin Total 14.8 (H) 0.2 - 1.3 mg/dL    Albumin 2.8 (L) 3.4 - 5.0 g/dL    Protein Total 5.4 (L) 6.8 - 8.8 g/dL    Alkaline Phosphatase 267 (H) 40 - 150 U/L    ALT 24 0 - 50 U/L    AST 42 0 - 45 U/L   Basic metabolic panel   Result Value Ref Range    Sodium 131 (L) 133 - 144 mmol/L    Potassium 5.5 (H) 3.4 - 5.3 mmol/L    Chloride 112 (H) 94 - 109 mmol/L    Carbon Dioxide 17 (L) 20 - 32 mmol/L    Anion Gap 2 (L) 3 - 14 mmol/L    Glucose 95 70 - 99 mg/dL    Urea Nitrogen 68 (H) 7 - 30 mg/dL    Creatinine 2.92 (H) 0.52 - 1.04 mg/dL    GFR Estimate 18 (L) >60 mL/min/[1.73_m2]    GFR Estimate If Black 20 (L) >60 mL/min/[1.73_m2]    Calcium 12.9 (H) 8.5 - 10.1 mg/dL   Magnesium   Result Value Ref Range    Magnesium 2.1 1.6 - 2.3 mg/dL   Parathyroid Hormone Intact   Result Value Ref Range    Parathyroid Hormone Intact 8 (L) 18 - 80 pg/mL   Sodium   Result Value Ref Range    Sodium 131 (L) 133 - 144 mmol/L   Basic metabolic panel   Result Value Ref Range    Sodium 133 133 - 144 mmol/L    Potassium 5.4 (H) 3.4 - 5.3 mmol/L    Chloride 113 (H) 94 - 109 mmol/L    Carbon Dioxide 13 (L) 20 - 32 mmol/L    Anion Gap 7 3 - 14 mmol/L    Glucose 110 (H) 70 - 99 mg/dL    Urea Nitrogen 62 (H) 7 - 30 mg/dL    Creatinine 2.59 (H) 0.52 - 1.04 mg/dL    GFR Estimate 20 (L) >60 mL/min/[1.73_m2]    GFR Estimate If Black 23 (L) >60 mL/min/[1.73_m2]    Calcium  11.8 (H) 8.5 - 10.1 mg/dL   Comprehensive metabolic panel   Result Value Ref Range    Sodium 134 133 - 144 mmol/L    Potassium 5.1 3.4 - 5.3 mmol/L    Chloride 116 (H) 94 - 109 mmol/L    Carbon Dioxide 13 (L) 20 - 32 mmol/L    Anion Gap 5 3 - 14 mmol/L    Glucose 107 (H) 70 - 99 mg/dL    Urea Nitrogen 60 (H) 7 - 30 mg/dL    Creatinine 2.44 (H) 0.52 - 1.04 mg/dL    GFR Estimate 22 (L) >60 mL/min/[1.73_m2]    GFR Estimate If Black 25 (L) >60 mL/min/[1.73_m2]    Calcium 11.5 (H) 8.5 - 10.1 mg/dL    Bilirubin Total 13.0 (H) 0.2 - 1.3 mg/dL    Albumin 2.5 (L) 3.4 - 5.0 g/dL    Protein Total 5.0 (L) 6.8 - 8.8 g/dL    Alkaline Phosphatase 244 (H) 40 - 150 U/L    ALT 21 0 - 50 U/L    AST 37 0 - 45 U/L   CBC with platelets   Result Value Ref Range    WBC 8.3 4.0 - 11.0 10e9/L    RBC Count 2.33 (L) 3.8 - 5.2 10e12/L    Hemoglobin 7.7 (L) 11.7 - 15.7 g/dL    Hematocrit 24.7 (L) 35.0 - 47.0 %     (H) 78 - 100 fl    MCH 33.0 26.5 - 33.0 pg    MCHC 31.2 (L) 31.5 - 36.5 g/dL    RDW 22.9 (H) 10.0 - 15.0 %    Platelet Count 63 (L) 150 - 450 10e9/L   INR   Result Value Ref Range    INR 1.87 (H) 0.86 - 1.14   Partial thromboplastin time   Result Value Ref Range    PTT 37 22 - 37 sec   Bilirubin direct   Result Value Ref Range    Bilirubin Direct 10.1 (H) 0.0 - 0.2 mg/dL

## 2020-06-03 NOTE — SUMMARY OF CARE
Patient belongings include cellphone, cam boot, 2 gold color rings, one tennis shoe, a pair of jeans, white socks,purse, wallet, cream color, gray shirt, brown t-shirt, cellphone .

## 2020-06-03 NOTE — PROGRESS NOTES
ASSUMED CARE: [id number]  NEURO: Pt is alert but is confused and not oriented to time or situation  ACTIVITY: Assist of one  PAIN: Denies  CARDIAC: WNL  RESPIRATORY: WNL  GI/: Pt had one BM during shift  SKIN: Intact, jaundiced  LDA: PIV saline locked  DIET: 2 g sodium diet    CHANGES IN SHIFT: After administering third dose of lactulose writer  assessed pt mental status and was still confused, lactulose 2 g was given MD aware. Pt was confused and was unable to answer admission questions.   POC: Continue to monitor and update MD with any changes.    Rosa Herron RN on 6/3/2020 at 7:44 AM

## 2020-06-03 NOTE — PROGRESS NOTES
[facility]   Patient is currently open to home care services with [facility]. The patient is currently receiving RN services. [facility]  and team have been notified of patient admission. [facility] liaison will continue to follow patient during stay. If appropriate provide orders to resume home care at time of discharge.    Destiny Huff RN  [facility] Liaison  [phone]

## 2020-06-03 NOTE — PROVIDER NOTIFICATION
Dr Price sent an FYI page that [facility] protocol was started. Pt is stage 1 encephalopathy. Oriented to self only. Speaking to people who are not there. Using inappropriate words and rambling. Will continue to monitor.

## 2020-06-04 NOTE — PLAN OF CARE
Status: Admitted for AMS (found to be encephalopathic), hx liver cirrhosis   Vitals: VSS on RA, can have soft BP's  Neuros: Alert most of shift, orientated to self. Mild to moderate aphasia, confused. 4/5 throughout. Garbled illogical speech, inappropriate words  Lactulose given per stage 1 order, no change in neuro status   IV: 2 PIV SL   Resp/trach: Clear   Diet: Regular, no intake today. Took bite of breakfast and lunch and had emesis afterward, MD notified and nutrition consult to be placed. 2 gram NA restriction and 2L fluid restriction, 1250 ml intake this shift. Assist with feeding, Zofran given x1  Bowel status: BM x1, incontinent   : Incontinent of urine this shift   Skin: Very jaundice in skin and eyes  Pain: Denied   Activity: A 2 GB pivot   Labs: Critical platelet level, MD was notified by night RN, no new orders.   Plan: Continue to monitor and follow POC

## 2020-06-04 NOTE — PLAN OF CARE
PT Cancel/HOLD: Check in on pt in AM, pt covered with vomit over gown. Pt nauseous this AM, will check back as able.    Addendum 15:00: Per discussion with OT. Pt very confused and with limited command following at this time. PT will hold until pt able to participate in both therapies.

## 2020-06-04 NOTE — PLAN OF CARE
OT/5B:  Discharge Planner OT   Patient plan for discharge: unstated   Current status: Eval complete. Pt lethargic requiring mod A for simple seated oral/facial hygiene. Pt with limited command following   Barriers to return to prior living situation: medical status, strength, cognition   Recommendations for discharge: TCU  Rationale for recommendations: to progress ADL I       Entered by: Malini Bishop 06/04/2020 3:05 PM

## 2020-06-04 NOTE — PROGRESS NOTES
[facility], [place]    Progress Note - Nephrology Service        Date of Admission:  6/2/2020    Assessment & Plan   Monalisa Olguin is a 54 year old female with a PMH of decompensated alcoholic cirrhosis and CKD IV, who presented with encephalopathy found to have hypercalcemia, acute kidney injury and hyponatremia.      #Non PTH dependant Hypercalcemia   #Acute non-oliguric ALFREDO on CKD stage IV-improving   #Solitary kidney  #Hyperkalemia-resolved  #Hepatic encephalopathy   #Decompensated alcoholic cirrhosis   Patient with documented history of hyperparathyroidism although per my chart review has never been overtly hypercalcemic previously. Parathyroid hormone 49 in 2010 in the setting of a normal calcium of 8.9. Calcium on presentation here 13.3 with an appropriately suppressed PTH. Differential includes vitamin D intoxication, malignancy/granulomatous disease, lytic bone lesions, hyperthyroidism, surreptitious calcium use, adrenal insufficiency, Vitamin A intoxication among others. Her work-up includes a normal TSH, am cortisol and low phosphorus. Her 1,25 vitamin D is low, we are awaiting 25-vitamin D and PTH-RP, chest x-ray was negative and CT head did not demonstrate any lytic bone lesions. Low phosphorus could indicate a PTH-RP dependant process although may be simply a reflection of her poor nutrition status. Overall her calcium has improved (11.6 corrected) with fluids and calcitonin, at this point would hold further calcitonin given it's waning efficacy after 48 hours, stop fluids and monitor. If her calcium continues to rise we would need to consider additional imaging, although defer for now pending current work-up. Her renal function continues to improve fitting with a prerenal picture, recommend holding fluids today and can consider resumption of diuretics in the coming days.   -Send 25-Vit D (pending) 1,25 vitamin D (low), PTH-RP (pending)  -Send SPEP, serum  immunofixation and serum free light chains (ordered)  -Stop IVFs today, hold calcitonin. Continue to trend calcium.   -Avoid nephrotoxins, trend creatinine daily   -Hold off on additional imaging pending above work-up   -No indication for dialysis   -If additional fluids needed consider albumin      #Non anion gap metabolic acidosis   Etiology unclear, may be related to GI versus renal loss. VBG with ph of 7.29, CO2 33 consistent with metabolic acidosis. Corrected with bicarbonate drip. Discontinue today and monitor.   -Discontinue bicarbonate infusion      #Hypovolemic hyponatremia   Corrected appropriately with fluids, improved form 129 to 135 over 48 hours. Stop IVFs today as above and monitor.     Kj Parkinson MD  Nephrology Service  [facility], [place]  Pager: [phone]  Please see sticky note for cross cover information  ______________________________________________________________________    Interval History   No acute events overnight. Patient continues to be confused. Able to start her name and that the day is Thursday. Unable to state month or year. When asked questions she often repeats her name. She denies pain or fevers. Appetite is poor.     Data reviewed today: I reviewed all medications, new labs and imaging results over the last 24 hours. I personally reviewed no images or EKG's today.    Physical Exam   Vital Signs: Temp: 96.7  F (35.9  C) Temp src: Oral BP: 106/46 Pulse: 76   Resp: 18 SpO2: 94 % O2 Device: None (Room air)    Weight: 117 lbs 1.6 oz  General Appearance: Chronically ill appearing, jaundiced, oriented to person only   Respiratory: CTAB, no wheezing or rales   Cardiovascular: S1/S2, no murmur appreciated. RP 2+ bilaterally.   GI: Soft, distended but non tender. Bowel sounds present.   Skin: Jaundiced, no rash   Other: No lower extremity edema     Data   Recent Labs   Lab 06/04/20  0536 06/03/20  1737 06/03/20  0459 06/02/20  2351  06/02/20  1012    WBC 3.3*  --  8.3  --   --  10.2   HGB 7.2*  --  7.7*  --   --  9.3*   *  --  106*  --   --  105*   PLT 41*  --  63*  --   --  83*   INR 1.91*  --  1.87*  --   --  1.65*     --  134 133   < > 129*   POTASSIUM 3.9  --  5.1 5.4*   < > 5.4*   CHLORIDE 108  --  116* 113*   < > 108   CO2 23  --  13* 13*   < > 16*   BUN 53*  --  60* 62*   < > 72*   CR 1.84*  --  2.44* 2.59*   < > 3.18*   ANIONGAP 4  --  5 7   < > 6   ELSIE 10.2* 10.9* 11.5* 11.8*   < > 13.3*   *  --  107* 110*   < > 83   ALBUMIN 2.3* 2.5* 2.5*  --    < > 3.0*   PROTTOTAL 4.6*  --  5.0*  --    < > 5.8*   BILITOTAL 10.5*  --  13.0*  --    < > 15.7*   ALKPHOS 211*  --  244*  --    < > 285*   ALT 22  --  21  --    < > 24   AST 38  --  37  --    < > 47*    < > = values in this interval not displayed.

## 2020-06-04 NOTE — PROGRESS NOTES
ASSUMED CARE: [id number]  NEURO: Pt is alert but is confused and not oriented to time or situation  ACTIVITY: Assist of 1  PAIN: Denies  CARDIAC: WNL  RESPIRATORY: WNL  GI/: Pt had one BM during shift  SKIN: Intact, jaundiced  LDA: PIV saline locked. Sodium bicarbonate running at 150 ml/hr  DIET: 2 g sodium diet     CHANGES IN SHIFT: Assessed pt mental status, stage 1 lactulose 2 g was given. Pt became restless and sat at the nursing station for 1 hour and went back to sleep.   POC: Continue to monitor and update MD with any changes.    Rosa Herron, RN on 6/4/2020 at 8:50 AM      .

## 2020-06-04 NOTE — PLAN OF CARE
D/I  Uneventful shift.  VSS.  Patient alert but disoriented x 3.  Bed alarm  remains on when in room.   Up to commode with assist of 1. Out at nurses station x 1 hour in W/C.  On Stage one of [place].  Lactulose given xtra x 1.  1 stool mixed with urine.  Remains confused and babbles intermittently and non sensical most of the time. Tolerated regular diet but bites only, extremely poor appetite.   Denies pain.  Will continue to monitore per POC and notify MD with any acute changes.

## 2020-06-04 NOTE — PROGRESS NOTES
[facility], [place]    Progress Note - [place] 2 Service        Date of Admission:  6/2/2020    Assessment and Plan:   Monalisa Olguin is a 54 year old female with history of ACD, decompensated liver cirrhosis, CKD, hyperparathyroidism who was brought to ED by her  with AMS.     Today:  - Continue [place] protocol   - Follow up on pending studies and nephrology recs   - Discontinue IVF   - Start high dose IV thiamine  - PETH testing  - Neurology consult in AM if AMS not improved    Encephalopathy  Likely etiology is toxic metabolic and/or hepatic encephalopathy. Ammonia is normal. BUN is elevated. On admission patient with severe hypercalcemia and moderate hyponatremia, which were likely contributing and improved with IVF resuscitation. Patient is afebrile, hemodynamically stable, without leukocytosis. No recent cough or urinary symptoms. Low suspicion for SBP as patient without abdominal pain and on ciprofloxacin prophylaxis. Will defer treatment for acute infectious process. Head CT ordered to evaluate for acute intracranial as patient remains confused despite improving electrolytes.    - [place] protocol  - Rifaximin 550mg BID   - Delirium precautions  - Avoid narcotics   - Treat ALFREDO, hyponatremia, and hypercalcemia as noted below   - Head CT without contrast    Alcoholic liver cirrhosis (hx of ascites and HE)  Thrombocytopenia   Follows with Dr. Kovacs of Hepatology. Currently undergoing evaluation for transplant but is likely a poor candidate given recent neuropsychiatric evaluation that raises concern for underlying dementia most likely secondary to chronic EtOH. Patient denies recent alcohol use on admission. Ascites and encephalopathy addressed above. No recent evidence of GI bleed.   - Hold acamprosate in setting of acute kidney injury   - Cont pta ciprofloxacin, folic acid, vitamin B1, and rifaximin as above  - [place] protocol as above   - Hold lasix and  spironolactone  - Consider liver transplant consult if meld worsening     MELD-Na score: 29 at 6/4/2020  5:36 AM  MELD score: 28 at 6/4/2020  5:36 AM  Calculated from:  Serum Creatinine: 1.84 mg/dL at 6/4/2020  5:36 AM  Serum Sodium: 135 mmol/L at 6/4/2020  5:36 AM  Total Bilirubin: 10.5 mg/dL at 6/4/2020  5:36 AM  INR(ratio): 1.91 at 6/4/2020  5:36 AM  Age: 54 years 7 months     ALFREDO on CKD stage IV  Unilateral kidney (s/p nephrectomy)  Multifactorial in etiology and secondary prerenal azotemia due to hypovolemia vs HRS, hypercalcemia, and/or ATN. Cretainine improving with IVF resuscitation. Patient developed hyperchloremic metabolic acidosis after multiple L of NS, resolved with bicarb guided by renal. Will continue to monitor volume status, Cr, and electrolytes closely.    - Hold lasix and spironolactone L  - Daily BMP and Ca  - Renal consulted, appreciate recs      Hypercalcemia, severe  Improving with IVF, calcitonin, and bicarbonate. PTH appropriately low. RenVitamin D studies, PTHrP, and pos ordered and pending.   - Follow up on pending studies   - Daily BMP, Ca  - Renal consulted, appreciate recs      Hyponatremia, resolved   Hypovolemic hyponatremia. Serum osmols elevated likely due to elevated BUN as serum osmolar gap normal. Sodium normalized after fluid resuscitation.      Chronic macrocytic anemia   Multifactorial in etiology and secondary to ESLD and CKD +/- vitamin/mineral deficiency.   - CTM     Orthostatic hypotension  - Cont midodrine 5mg daily      Depression and anxiety  - Cont abilify, buspar, and fluoxetine         Diet: Na restriction <2g, fluid restriction <2L    Fluids: Albumin   DVT Prophylaxis: Pneumatic Compression Devices  Gonzalez Catheter: not present  Code Status: Full, patient encephalopathic and  not answering phone        MD Jessica CavazosA[facility] 2 Service  [facility], [place]  Pager: [phone]  Please see sticky note for cross cover  information    Pt was seen and examined by me; still quite confused, but alert and oriented times two. Minimal po intake noted.  I agree with the detailed A/P documentation above; will trial IV thiamine for potential deficiency state contributing to mental status.  Continue fluid hydration. Appreciate Nephrology input.    Stella Suero MD    ______________________________________________________________________    Interval History   NAEON. Nursing notes reviewed. Today, patient more alert. Denies pain or shortness of breath. Unable to obtain more history secondary to confusion.     Physical Exam   Vital Signs: Temp: 96.7  F (35.9  C) Temp src: Oral BP: 106/46 Pulse: 76   Resp: 18 SpO2: 94 % O2 Device: None (Room air)    Weight: 117 lbs 1.6 oz     General: Lying flat, breathing comfortably on RA, jaundice, alert, answers some questions appropriately    Respiratory: Bibasilar crackles, normal respiratory effort   CV: Normal rate and rhythm, no murmurs/rubs/gallops  GI:  Abdomen distended but soft, non-tender to palpation, BS present   Skin: Warm, dry.  No rashes or petechiae  Musculoskeletal: No peripheral edema.  Neuro: Alert and oriented to person/place, answers some questions appropriately     Data   Recent Labs   Lab 06/04/20  0536 06/03/20  1737 06/03/20  0459 06/02/20  2351  06/02/20  1012   WBC 3.3*  --  8.3  --   --  10.2   HGB 7.2*  --  7.7*  --   --  9.3*   *  --  106*  --   --  105*   PLT 41*  --  63*  --   --  83*   INR 1.91*  --  1.87*  --   --  1.65*     --  134 133   < > 129*   POTASSIUM 3.9  --  5.1 5.4*   < > 5.4*   CHLORIDE 108  --  116* 113*   < > 108   CO2 23  --  13* 13*   < > 16*   BUN 53*  --  60* 62*   < > 72*   CR 1.84*  --  2.44* 2.59*   < > 3.18*   ANIONGAP 4  --  5 7   < > 6   ELSIE 10.2* 10.9* 11.5* 11.8*   < > 13.3*   *  --  107* 110*   < > 83   ALBUMIN 2.3* 2.5* 2.5*  --    < > 3.0*   PROTTOTAL 4.6*  --  5.0*  --    < > 5.8*   BILITOTAL 10.5*  --  13.0*  --    < >  15.7*   ALKPHOS 211*  --  244*  --    < > 285*   ALT 22  --  21  --    < > 24   AST 38  --  37  --    < > 47*    < > = values in this interval not displayed.

## 2020-06-04 NOTE — PROGRESS NOTES
"   06/04/20 1133   Quick Adds   Type of Visit Initial Occupational Therapy Evaluation   Living Environment   Lives With child(lit), adult;child(lit), dependent;spouse   Living Arrangements house   Home Accessibility stairs to enter home   Number of Stairs, Main Entrance 3   Transportation Anticipated family or friend will provide   Living Environment Comment Pt unable to provide any information for eval, information gleamed from chart   Self-Care   Usual Activity Tolerance moderate   Current Activity Tolerance poor   Regular Exercise No   Equipment Currently Used at Home none   Activity/Exercise/Self-Care Comment Per chart, pt ind PTA   Functional Level   Ambulation 0-->independent   Transferring 0-->independent   Toileting 0-->independent   Bathing 0-->independent   Dressing 0-->independent   Eating 0-->independent   Communication 0-->understands/communicates without difficulty   Swallowing 0-->swallows foods/liquids without difficulty   Cognition 0 - no cognition issues reported   Fall history within last six months yes   Number of times patient has fallen within last six months 3   Which of the above functional risks had a recent onset or change? ambulation;transferring;toileting;eating;cognition;dressing;bathing;communication/speech;swallowing   General Information   Onset of Illness/Injury or Date of Surgery - Date 06/02/20   Referring Physician Edgardo Price MD    Patient/Family Goals Statement Pt would like to go home   Additional Occupational Profile Info/Pertinent History of Current Problem Per chart: \"Monalisa Olguin is a 54 year old female with history of ACD, decompensated liver cirrhosis, CKD, hyperparathyroidism who was brought to ED by her  with AMS\"   Precautions/Limitations fall precautions   Cognitive Status Examination   Orientation person   Level of Consciousness lethargic/somnolent;confused   Follows Commands (Cognition) does not follow one step commands   Memory impaired "   Cognitive Comment Pt would benefit from continued monitoring and assessment of cognition   Visual Perception   Visual Perception Wears glasses;No deficits were identified   Sensory Examination   Sensory Quick Adds No deficits were identified   Integumentary/Edema   Integumentary/Edema no deficits were identifed   Range of Motion (ROM)   ROM Comment UE WFL   Strength   Strength Comments pt with generalized deconditioning   Muscle Tone Assessment   Muscle Tone Quick Adds No deficits were identified   Coordination   Coordination Comments pt with tremors and decreased cognition impacting assessment of coordination   Mobility   Bed Mobility Comments mod A   Lower Body Dressing   Level of [place]: Dress Lower Body maximum assist (25% patients effort)   Instrumental Activities of Daily Living (IADL)   IADL Comments unknown prior responsibilities   Activities of Daily Living Analysis   Impairments Contributing to Impaired Activities of Daily Living balance impaired;cognition impaired;fear and anxiety;pain;strength decreased   General Therapy Interventions   Planned Therapy Interventions ADL retraining;IADL retraining;balance training;bed mobility training;cognition;risk factor education;progressive activity/exercise;home program guidelines;transfer training;strengthening   Clinical Impression   Criteria for Skilled Therapeutic Interventions Met yes, treatment indicated   OT Diagnosis decreased ADL I    Influenced by the following impairments cognition, strength, medical status   Assessment of Occupational Performance 5 or more Performance Deficits   Identified Performance Deficits dressing, bathing, toileting, transfers, mobility,    Clinical Decision Making (Complexity) Low complexity   Therapy Frequency 5x/week   Predicted Duration of Therapy Intervention (days/wks) 3 weeks   Anticipated Equipment Needs at Discharge   (TBD)   Anticipated Discharge Disposition Transitional Care Facility   Risks and Benefits of  "Treatment have been explained. Yes   Patient, Family & other staff in agreement with plan of care Yes   [facility] TM \"6 Clicks\"   2016, Trustees of [facility], under license to [employer].  All rights reserved.   6 Clicks Short Forms Daily Activity Inpatient Short Form   [facility]  \"6 Clicks\" Daily Activity Inpatient Short Form   1. Putting on and taking off regular lower body clothing? 2 - A Lot   2. Bathing (including washing, rinsing, drying)? 2 - A Lot   3. Toileting, which includes using toilet, bedpan or urinal? 2 - A Lot   4. Putting on and taking off regular upper body clothing? 2 - A Lot   5. Taking care of personal grooming such as brushing teeth? 2 - A Lot   6. Eating meals? 2 - A Lot   Daily Activity Raw Score (Score out of 24.Lower scores equate to lower levels of function) 12   Total Evaluation Time   Total Evaluation Time (Minutes) 5     "

## 2020-06-04 NOTE — PROVIDER NOTIFICATION
"Writer pageeloy SPENCER:    \"Lab called about a critical value.\"    Rosa Herron RN on 6/4/2020 at 6:25 AM    "

## 2020-06-05 NOTE — PROGRESS NOTES
[facility], [place]    Progress Note - Ter[facility] 2 Service        Date of Admission:  6/2/2020    Assessment and Plan:   Monalisa Olguin is a 54 year old female with history of ACD, decompensated liver cirrhosis, CKD, hyperparathyroidism who was brought to ED by her  with AMS.     Today:  - Hypotensive today received crystalloid/colloid resuscitation  - Continue [place] protocol   - Worsening ALFREDO - renal ultrasound ordered, repeat UA, follow up renal recs   - 1 NS, 25g albumin for hypotension  - 1 unit PRBCS   - PTHrP, Vitamin D, SPEP/UPEP pending  - Neurology consult today    Encephalopathy  Likely etiology is toxic metabolic and/or hepatic encephalopathy. Ammonia is normal. BUN is elevated. On admission patient with severe hypercalcemia and moderate hyponatremia, which were likely contributing and improved with IVF resuscitation. Patient is afebrile, hemodynamically stable, without leukocytosis or systemic symptoms concerning for infection. Low suspicion for SBP as patient without abdominal pain and on ciprofloxacin prophylaxis. Head CT unremarkable. Continued mild encephalopathy despite correction of hyponatremia and hypercalcemia. Neuropysch testing on 5/27 was concerning for dementia-like symptoms related to past alcoholism.  - Neurology consult to rule out other potential causes of continued encephalopathy  - [place] protocol  - Rifaximin 550mg BID   - Delirium precautions  - Avoid narcotics     Alcoholic liver cirrhosis (hx of ascites and HE)  Thrombocytopenia   Follows with Dr. Kovacs of Hepatology. Currently undergoing evaluation for transplant but is likely a poor candidate given recent neuropsychiatric evaluation that raises concern for underlying dementia most likely secondary to chronic EtOH. Patient denies recent alcohol use on admission. Ascites and encephalopathy addressed above. No recent evidence of GI bleed.   - Hold acamprosate in setting of acute kidney  injury   - Cont pta ciprofloxacin, folic acid, vitamin B1, and rifaximin as above  - [facility] protocol as above   - Hold lasix and spironolactone  - Consider liver transplant consult if meld worsening     MELD-Na score: 34 at 6/5/2020  5:30 AM  MELD score: 34 at 6/5/2020  5:30 AM  Calculated from:  Serum Creatinine: 2.91 mg/dL at 6/5/2020  5:30 AM  Serum Sodium: 136 mmol/L at 6/5/2020  5:30 AM  Total Bilirubin: 9.2 mg/dL at 6/5/2020  5:30 AM  INR(ratio): 2.24 at 6/5/2020  5:30 AM  Age: 54 years 7 months       Hypotension, improving  The patient was notably hypotensive to 60s/30s on 6/5/20, with MAPs in 50s. No dizziness, LOC, or other systemic symptoms, but was more lethargic. 1L NS bolus administered, followed by 25g albumin x 1. BP improved to low /40-50s with MAP in 70s. Patient also received blood products.  - Closely monitor vitals q6H    ALFREDO on CKD stage IV  Unilateral kidney (s/p nephrectomy)  Multifactorial in etiology and secondary prerenal azotemia due to hypovolemia vs HRS, hypercalcemia, and/or ATN. Initially improved with IVF. Patient developed hyperchloremic metabolic acidosis after multiple L of NS, resolved with bicarb guided by renal. Continues to have worsening renal function.  - Hold lasix and spironolactone  - Daily BMP and Ca, recheck BMP at 4 pm following volume resuscitation  - Renal consulted, appreciate recs      Hypercalcemia, severe, resolved  Improving with IVF, calcitonin, and bicarbonate. PTH appropriately low.  - 1,25/25-Vitamin D studies, PTHrP, SPEP, serum free light chains ordered    - Renal ultrasound, repeat UA  - Follow up on pending studies   - Daily BMP, Ca  - Renal consulted, appreciate recs      Hyponatremia, resolved   Hypovolemic hyponatremia. Serum osmols elevated likely due to elevated BUN as serum osmolar gap normal. Sodium normalized after fluid resuscitation.      Acute on chronic macrocytic anemia   Multifactorial in etiology and secondary to ESLD and CKD +/-  vitamin/mineral deficiency. Hgb on admission 9.3, has gradually declined to 6.9 this AM. Reticulocyte count is elevated. Concern for acute bleed vs hemodilutional effect in setting of high volume fluids. Hemolysis labs ordered. Majority direct hyperbilirubinemia, normal LDH, normal fibrinogen.  - peripheral smear pending  - 1 unit PRBCs ordered  - Recheck Hgb this PM  - CTM     Orthostatic hypotension  - Cont midodrine 5 mg daily      Depression and anxiety  - Cont abilify, buspar, and fluoxetine         Diet: Na restriction <2g, fluid restriction <2L    Fluids: Albumin, 1 L NS  DVT Prophylaxis: Pneumatic Compression Devices  Gonzalez Catheter: not present  Code Status: Full, patient encephalopathic       Angélica Hernandez MD  PGY-3 Medicine-Dermatology  Pager [phone]    Pt was seen and examined by me.  I agree with the detailed A/P documentation above.  Concern for worsening renal function, poor po intake, hypotension.  Suspect prerenal vs hepatorenal.  Mental status not improving, appreciate Neurology recommendations and EEG.      Stella Suero MD      ______________________________________________________________    Interval History   NNR, patient notably hypotensive this AM to 60s/30s, MAPs in low 50s. She was more lethargic than normal, but arousable to stimulation. She was started on IV NS bolus, and 25g albumin for hypotension. PICC ordered, 1 unit of blood ordered for Hgb 6.7. Patient denies any pain, shortness of breath, chest pain. Satting well on room air. MAPs improved to 70s with resuscitation.    Physical Exam   Vital Signs: Temp: 95.5  F (35.3  C) Temp src: Oral BP: (!) 88/44 Pulse: 70   Resp: 18 SpO2: 91 % O2 Device: None (Room air)    Weight: 123 lbs 6.4 oz     General: Lying flat, breathing comfortably on RA, jaundice, lethargic  Respiratory: normal respiratory effort   CV: Normal rate and rhythm, no murmurs/rubs/gallops  GI:  Abdomen distended but soft, non-tender to palpation, BS present    Skin: Warm, dry. Severely jaundiced  Musculoskeletal: No peripheral edema.  Neuro: Alert and oriented to person/place, answers some questions appropriately     Data   Recent Labs   Lab 06/05/20  0530 06/04/20 2020 06/04/20  0536  06/03/20  0459   WBC 6.5  --  3.3*  --  8.3   HGB 6.7*  --  7.2*  --  7.7*   *  --  103*  --  106*   PLT 37*  --  41*  --  63*   INR 2.24*  --  1.91*  --  1.87*    132* 135  --  134   POTASSIUM 4.0 4.2 3.9  --  5.1   CHLORIDE 109 105 108  --  116*   CO2 22 22 23  --  13*   BUN 59* 55* 53*  --  60*   CR 2.91* 2.46* 1.84*  --  2.44*   ANIONGAP 4 4 4  --  5   ELSIE 9.8 9.6 10.2*   < > 11.5*   GLC 97 108* 109*  --  107*   ALBUMIN 2.0*  --  2.3*   < > 2.5*   PROTTOTAL 4.1*  --  4.6*  --  5.0*   BILITOTAL 9.2*  --  10.5*  --  13.0*   ALKPHOS 193*  --  211*  --  244*   ALT 22  --  22  --  21   AST 38  --  38  --  37    < > = values in this interval not displayed.

## 2020-06-05 NOTE — PROVIDER NOTIFICATION
"Writer pageeloy SPENCER:    \"Pt BP is 88/44, what interventions should be done?\"    Rosa Herron RN on 6/5/2020 at 6:34 AM    "

## 2020-06-05 NOTE — PLAN OF CARE
Writer assumed cares from [phone]. Pt alert and oriented to self only. HE=1 prn lactulose given X3. HE continues to score=1, oncoming RN will page MD to determine whether to give more prn lactulose after 700ml watery stool output today or hold lactulose. Large bore IV placed in R AC.  1 unit PRBC given for hgb=6.7, recheck=8.1. Albumin=2.0, albumin given. Hypotensive beginning of shift (see flowsheet), BP improved after scheduled midodrine and 1L NS bolus. Renal ultrasound completed. UA sent. Strict I&Os. Bed alarm for safety. 1 assist to commode, green watery stools. EEG completed at bedside. Cont to monitor.

## 2020-06-05 NOTE — PROVIDER NOTIFICATION
"Writer paged MD:    \"Are we doing anything for the low platelet count?\"    Rosa Herron RN on 6/5/2020 at 5:25 AM    "

## 2020-06-05 NOTE — PROVIDER NOTIFICATION
Paged M2 resident at [address] [place] [address] [phone] MOLINA Roger RN [phone] FYI pt voided and urine samples sent.

## 2020-06-05 NOTE — PROGRESS NOTES
ASSUMED CARE: [id number]  NEURO: Pt is alert but is confused and not oriented to time or situation  ACTIVITY: Assist of 1  PAIN: Denies  CARDIAC: WNL  RESPIRATORY: WNL  GI/: Pt had one BM during shift  SKIN: Intact, jaundiced  LDA: PIV saline locked. Sodium bicarbonate running at 150 ml/hr  DIET: 2 g sodium diet     CHANGES IN SHIFT: Assessed pt mental status, stage 1, lactulose was given. Pt had 1 BM. Pt hgb is 6.7 paged MD. Pt blood pressure was 88/44, paged doctor. Did a recheck of BP and it went down to 65/30. MD aware and ordered 0.9 NS bolus and a unit of blood. Pt stated slight lightheadedness.   POC: Continue to monitor and update MD with any changes.    Rosa Herron RN on 6/5/2020 at 8:03 AM

## 2020-06-05 NOTE — PROVIDER NOTIFICATION
Paged M2 Resident at [phone]    LOW  [phone] MOLINA Roger Rn [phone] do you still want NS @250ml/h continuous?  MD discontinued MIVF. Pt only received 1L bolus at 0700 then no other IVF today.

## 2020-06-05 NOTE — PROGRESS NOTES
EEG CLINICAL NEUROPHYSIOLOGY PRELIMINARY REPORT    First 90 minutes of video-EEG reviewed. Moderate to signfiicant amounts of irregular diffuse delta. EEG is reactive with desynchronization and some reduction of delta. Sleep spindles during sleep. No periodic activity, no epileptiform discharges, no seizures.    Study consistent with moderate diffuse encephalopathy. EEG is reactive and there are some normal sleep features. Thus far no seizures and no evidence of seizure tendency. Will continue video-EEG monitoring. Full report to follow.    Prosper Marin MD  Pager [phone]

## 2020-06-05 NOTE — PLAN OF CARE
Cares [id number]. Pt alert, oriented to self only. Garbled/illogical speech. VSS on RA. Pt denies pain/nausea this shift. Up x1 assist with GB to commode, voiding adequately. 2 PIV SL. Tolerating 2 L FR, NA diet. Continue to monitor.

## 2020-06-05 NOTE — PROGRESS NOTES
[facility], [place]    Progress Note - Nephrology Service        Date of Admission:  6/2/2020    Assessment & Plan   Monalisa Olguin is a 54 year old female with a PMH of decompensated alcoholic cirrhosis and CKD IV, who presented with encephalopathy found to have hypercalcemia, acute kidney injury and hyponatremia, course complicated by recurrent ALFREDO in the setting of hypotension of unclear etiology (infection, bleeding, hypovolemia).     #Non PTH dependant Hypercalcemia   #Acute non-oliguric ALFREDO on CKD stage IV-worsening   #Solitary kidney  #Hyperkalemia-resolved  #Hepatic encephalopathy   #Decompensated alcoholic cirrhosis   #Hypotension  Acute decline in renal function overnight, her creatinine had improved from 3.18 to 1.84 yesterday which which point IVFs were stopped. Over the past 24 hours her creatinine has risen back to 2.91, no recorded urine output although per nursing staff likely had a small amount of urine output overnight mixed with stool. Modest improvement in calcium now corrected to 11.4 from 11.9 yesterday, etiology remains unclear. Awaiting further work-up including PTH-RP, Vitamin D and SPEP. Her low vitamin D and phosphorus make consideration of an occult malignant process possible, may need more definitive imaging depending on her clinical course although would allow her to stabilize today. In terms of her new kidney injury differential includes prerenal insult and hypotension resulting in poor renal perfusion, agree with resumption of IVFs. Agree with ruling out obstruction as well. Finally hypotensive induced ATN is a consideration. Plan to fluid resuscitate, and evaluate for obstruction while repeating urine studies. Her mental status has not improved. She may be a dialysis candidate, she has been sober for nearly one year and had begun the transplant work-up although no indication for urgent dialysis today.   -Send 25-Vit D (low) 1,25 vitamin D (low),  PTH-RP (pending), SPEP (pending)   -Start albumin challenge 1 g/kg x3 days, agree with blood/NS bolus   -Increase midodrine dose to 10 mg TID   -Continue to monitor calcium for now  -Renal US pending   -Repeat UA and urine sodium, measure urine output closely  -Agree with neurology consult  -Continue to evaluate for causes of hypotension (blood loss, SBP, sepsis)      #Non anion gap metabolic acidosis-resolved   No active issues, sodium bicarbonate infusion discontinued.     #Hypovolemic hyponatremia   Agree with resumption of IVFs with albumin and crystalloid, ALFREDO likely secondary to hypotension. Sodium had trended back to normal with fluid resuscitation.     The patient's care was discussed with the Attending Physician, Dr. Alcocer.    Kj Parkinson MD  Nephrology Service  [facility] [place]  Pager: [phone]  Please see sticky note for cross cover information  ______________________________________________________________________    Interval History   Patient persistently more hypotensive overnight. No evidence of bleeding or melena per nursing staff. Patient denies new pain, shortness of breath or fevers. She is oriented to self but otherwise remains confused. States she is in [place], unsure of the month. Per nursing staff she did likely produce some urine mixed with stool overnight. Otherwise unable to obtain additional ROS due to patient mental status.     Data reviewed today: I reviewed all medications, new labs and imaging results over the last 24 hours. I personally reviewed no images or EKG's today.    Physical Exam   Vital Signs: Temp: 95.8  F (35.4  C) Temp src: Oral BP: 100/50 Pulse: 71   Resp: 18 SpO2: 95 % O2 Device: None (Room air)    Weight: 123 lbs 6.4 oz  General Appearance: Oriented to person only, chronically ill appearing   Respiratory: CTAB, no wheezing or rales   Cardiovascular: S1/S2, RRR. No murmurs. RP 2+ bilaterally.   GI: Soft, NT but  distended. Bowel sounds present  Skin: Jaundiced   Other: No pitting edema of the lower extremities    Data   Recent Labs   Lab 06/05/20  0729 06/05/20  0530 06/04/20 2020 06/04/20  0536  06/03/20  0459   WBC 6.6 6.5  --  3.3*  --  8.3   HGB 7.2* 6.7*  --  7.2*  --  7.7*   * 105*  --  103*  --  106*   PLT 42* 37*  --  41*  --  63*   INR  --  2.24*  --  1.91*  --  1.87*   NA  --  136 132* 135  --  134   POTASSIUM  --  4.0 4.2 3.9  --  5.1   CHLORIDE  --  109 105 108  --  116*   CO2  --  22 22 23  --  13*   BUN  --  59* 55* 53*  --  60*   CR  --  2.91* 2.46* 1.84*  --  2.44*   ANIONGAP  --  4 4 4  --  5   ELSIE  --  9.8 9.6 10.2*   < > 11.5*   GLC  --  97 108* 109*  --  107*   ALBUMIN  --  2.0*  --  2.3*   < > 2.5*   PROTTOTAL  --  4.1*  --  4.6*  --  5.0*   BILITOTAL  --  9.2*  --  10.5*  --  13.0*   ALKPHOS  --  193*  --  211*  --  244*   ALT  --  22  --  22  --  21   AST  --  38  --  38  --  37    < > = values in this interval not displayed.     No results found for this or any previous visit (from the past 24 hour(s)).  Medications     - MEDICATION INSTRUCTIONS -       sodium chloride 500 mL/hr at 06/05/20 0800       albumin human  25 g Intravenous Once     [Held by provider] ARIPiprazole  5 mg Oral At Bedtime     [Held by provider] busPIRone  15 mg Oral BID     ciprofloxacin  250 mg Oral Q24H MATT     FLUoxetine  60 mg Oral Daily     folic acid  1 mg Oral Daily     [Held by provider] gabapentin  300 mg Oral At Bedtime     midodrine  5 mg Oral TID w/meals     omeprazole  20 mg Oral Daily     rifaximin  550 mg Oral BID     sodium chloride (PF)  3 mL Intracatheter Q8H     thiamine  500 mg Intravenous TID    Followed by     [START ON 6/7/2020] thiamine  250 mg Intravenous Daily    Followed by     [START ON 6/12/2020] vitamin B1  100 mg Oral Daily     ursodiol  500 mg Oral BID

## 2020-06-05 NOTE — CONSULTS
[facility]  Neurology Consult[facility]    Patient Name:  Monalisa Olguin  MRN:  [id number]    :  1965  Date of Service:  2020  Primary care provider:  Efren Bustos      Neurology consultation service was asked to see Monalisa Olguin by Dr. Morgan to evaluate neuro etiologies of encephalopathy.    History of Present Illness:   54 year old female h/o decompensated alcoholic cirrhosis c/b hepatic encephalopathy & ascites, ALFREDO on CKD 4, solitary kidney s/p donation in  who presented from home on 6/3 with AMS found to have hypercalcemia, mild hyponatremia, hyperkalemia and HE 2/2 non-compliance with lactulose.     History obtained from chart review and , Eron who I spoke to today.    Her , Eron is unable to tell me the patient's baseline mentation as she is in and out of hospitals over the last couple of months. Prior to that, she had a few admissions in 2019 and was drinking before that. At baseline, she is able to heat up food but unable to cook a meal using the stove. She is otherwise able to dress herself and perform ADLs. The  states that himself and his daughter, make sure that the patient takes her lactulose 3 times/day however, he doesn't know if she is really having at least 3 bowel movements a day. He states that the pt's sleep pattern is very erratic and the family is unable to confirm if she is indeed having BM's although pt reports that she is having BM's. He was able to speak to the patient today and feels like she is much more clearer today than when he brought her in a couple of days ago.     Today, the patient feels more awake and doesn't recall how she got to the hospital. She denies any pain. Denies any h/o stroke, seizures, or neuromuscular disorders.     ROS  A 10-point ROS was performed as per HPI.     PMH  Past Medical History:   Diagnosis Date     Acute alcoholic intoxication in alcoholism (H) 3/27/2018     Acute  renal failure (H) 2019     Alcohol abuse 2013     Alcohol dependence 2013     Alcohol withdrawal 2013     Alcoholic cirrhosis (H)      Anxiety 10/10/2011     CKD (chronic kidney disease) stage 3, GFR 30-59 ml/min (H)     last GFR was 42     CKD (chronic kidney disease) stage 3, GFR 30-59 ml/min (H) 2011     CKD (chronic kidney disease) stage 5, GFR less than 15 ml/min (H) 2020     Depressive disorder      Esophageal reflux 10/7/2002     Hematochezia-patient reported 2019     Heme positive stool 3/27/2018     Hepatic encephalopathy (H) 2019     History of blood transfusion      [facility]     Hypertension goal BP (blood pressure) < 130/80 2011     Hyponatremia 2019     Moderate Depression [296.32] 2009    stable on wellbutrin     Other internal derangement of knee(717.89)     ACL Internal derangement, knee/ original injury in 5th grade, torn cartilage     Pap smear     no abnormals, due for paps q 2-3 yrs     SBP (spontaneous bacterial peritonitis) (H) 2019     Thyroid disease      Unspecified essential hypertension      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     COLONOSCOPY N/A 2017    Procedure: COLONOSCOPY;  Colonoscopy;  Surgeon: Sai Johnston MD;  Location: [facility]     ESOPHAGOSCOPY, GASTROSCOPY, DUODENOSCOPY (EGD), COMBINED N/A 2017    Procedure: COMBINED ESOPHAGOSCOPY, GASTROSCOPY, DUODENOSCOPY (EGD), BIOPSY SINGLE OR MULTIPLE;  ESOPHAGOSCOPY, GASTROSCOPY, DUODENOSCOPY (EGD) with biopsies;  Surgeon: Sai Johnston MD;  Location: [facility]     ESOPHAGOSCOPY, GASTROSCOPY, DUODENOSCOPY (EGD), COMBINED N/A 2019    Procedure: ESOPHAGOGASTRODUODENOSCOPY, WITH BIOPSY;  Surgeon: Edgardo Scott DO;  Location: [facility]     HC KNEE SCOPE, DIAGNOSTIC  01    Arthroscopy, Lt Knee      LAPAROSCOPIC CHOLECYSTECTOMY N/A 2017    Procedure: LAPAROSCOPIC CHOLECYSTECTOMY;  laparoscopic cholecystectomy;  Surgeon: Romeo Pastor MD;  Location: [facility]     OPEN REDUCTION INTERNAL FIXATION WRIST Right 2017    Procedure: OPEN REDUCTION INTERNAL FIXATION WRIST;  OPEN REDUCTION INTERNAL FIXATION RIGHT WRIST;  Surgeon: Edgardo Almendarez MD;  Location: [facility]     TRANSPLANT      Donated Left Kidney in        Medications   Medications Prior to Admission   Medication Sig Dispense Refill Last Dose     acamprosate (CAMPRAL) 333 MG EC tablet TAKE TWO TABLETS BY MOUTH THREE TIMES A  tablet 3 2020 at Unknown time     ARIPiprazole (ABILIFY) 5 MG tablet Take 1 tablet (5 mg) by mouth At Bedtime 90 tablet 3 2020 at Unknown time     busPIRone (BUSPAR) 15 MG tablet Take 1 tablet (15 mg) by mouth 2 times daily 180 tablet 3 2020 at Unknown time     ciprofloxacin (CIPRO) 250 MG tablet Take 1 tablet (250 mg) by mouth every 24 hours 30 tablet 0 2020 at Unknown time     CONSTULOSE 10 GM/15ML solution TAKE 22.5 MLS BY MOUTH THREE TIMES A DAY AS NEEDED FOR CONSTIPATION - START WITH 15 MLS DAILY, TITRATE AS NEEDED FOR 2-3 BM'S DAILY 1892 mL 4 2020 at Unknown time     FLUoxetine (PROZAC) 20 MG capsule TAKE THREE CAPSULES BY MOUTH ONCE DAILY 90 capsule 3 2020 at Unknown time     folic acid (FOLVITE) 1 MG tablet Take 1 tablet (1 mg) by mouth daily 90 tablet 3 2020 at Unknown time     furosemide (LASIX) 20 MG tablet Take 20 mg by mouth daily   2020 at Unknown time     gabapentin (NEURONTIN) 100 MG capsule Take 3 capsules (300 mg) by mouth At Bedtime 270 capsule 3 2020 at Unknown time     midodrine (PROAMATINE) 5 MG tablet Take 1 tablet (5 mg) by mouth 3 times daily (with meals) 270 tablet 3 2020 at Unknown time     nicotine (NICODERM CQ) 14 MG/24HR 24 hr patch Place 1 patch onto the skin every 24 hours for 14 days 14 patch 0 Past Month at Unknown time     []  "nicotine (NICODERM CQ) 21 MG/24HR 24 hr patch Place 1 patch onto the skin every 24 hours for 14 days 14 patch 0 Past Month at Unknown time     [START ON 6/16/2020] nicotine (NICODERM CQ) 7 MG/24HR 24 hr patch Place 1 patch onto the skin every 24 hours 30 patch 1 Past Month at Unknown time     omeprazole (PRILOSEC) 20 MG DR capsule Take 1 capsule (20 mg) by mouth daily 90 capsule 3 6/1/2020 at Unknown time     ondansetron (ZOFRAN) 4 MG tablet Take 1 tablet (4 mg) by mouth every 6 hours as needed for nausea   6/1/2020 at Unknown time     potassium chloride ER (K-DUR/KLOR-CON M) 10 MEQ CR tablet Take 1 tablet (10 mEq) by mouth daily 90 tablet 3 6/1/2020 at Unknown time     rifaximin (XIFAXAN) 550 MG TABS tablet Take 1 tablet (550 mg) by mouth 2 times daily 60 tablet 0 6/1/2020 at Unknown time     spironolactone (ALDACTONE) 50 MG tablet Take 1 tablet (50 mg) by mouth daily 90 tablet 3 6/1/2020 at Unknown time     traZODone (DESYREL) 50 MG tablet Take 2 tablets (100 mg) by mouth nightly as needed for sleep 30 tablet 0 6/1/2020 at Unknown time     ursodiol (ACTIGALL) 500 MG tablet Take 1 tablet (500 mg) by mouth 2 times daily 180 tablet 3 6/1/2020 at Unknown time     vitamin B1 (VITAMIN B-1) 100 MG tablet Take 1 tablet (100 mg) by mouth daily   6/1/2020 at Unknown time       Allergies  Allergies   Allergen Reactions     Albuterol      Tongue \"hardened and painful\"       Social History  I have reviewed this patient's social history  Please see medicine H&P.     Family History    Denies family hx with of strokes, seizures or neuromuscular disorders.    Physical Examination   Vitals: /48 (BP Location: Left arm)   Pulse 70   Temp 95.8  F (35.4  C) (Oral)   Resp 16   Ht 1.524 m (5')   Wt 56 kg (123 lb 6.4 oz)   LMP 05/16/2012   SpO2 95%   BMI 24.10 kg/m    General: Adult, appears stated age, in NAD, sleepy but arousable  HEENT: NC/AT, scleral icterus present, op pink and moist  Cardiac: RRR, Distal pulses " intact  Chest: CTAB, no respiratory distress  Abdomen: S/NT/ND  Extremities: No LE swelling.  Skin: No rash or lesion.   Psych: Mood pleasant  Neuro:  Mental status: Sleepy but arousable, alert later during lunch time, and oriented to person, [facility], states that it is year 2000, month of June, and Jose as the president. . Normal spontaneous speech. Comprehension intact.   Cranial nerves: Eyes conjugate, PERRL, EOMI, visual fields unable to assess d/t inability to follow commands, fundus not assessed, visual acuity intact, facial sensation intact, symmetric facial movements, shoulder shrug strong, tongue/uvula midline. No evidence of dysarthria.  Motor: Normal proximal and distal strength in all 4 limbs. 5/5 strength in all 4 extremities. Normal muscle bulk and tone. No tremor, myoclonus or other abnormal movements. No pronator drift. No asterixis.   Reflexes: 3+ reflexic and symmetric biceps, brachioradialis, patellar, and achilles. Toes down-going.  Sensory: Intact to light touch  Coordination: FNF no dysmetria  Gait: Not assessed d/t to patient being sleepy    Investigations   Head CT non-con on 6/3/2020:  Findings:    No intracranial hemorrhage, mass effect, or midline shift. The  ventricles are proportionate to the cerebral sulci. The gray to white  matter differentiation of the cerebral hemispheres is preserved. The  basal cisterns are patent. Mild diffuse generalized cerebral  parenchymal volume loss, normal for age. Mild periventricular white  matter hypoattenuation in both cerebral hemispheres, consistent with  chronic small vessel ischemic disease and unchanged.  The visualized paranasal sinuses are clear. The mastoid air cells are  clear.                                                                  Impression: No acute intracranial pathology.    Impression  54 year old female h/o decompensated alcoholic cirrhosis c/b hepatic encephalopathy & ascites, ALFREDO on CKD 4, solitary kidney s/p  donation in 2000 who presented from home on 6/3 with AMS found to have encephalopathy in the setting of hypercalcemia, mild hyponatremia, hyperkalemia and HE 2/2 non-compliance with lactulose.     From a neurological stand-point, causes of primary neurologic encephalopathy include seizures (no prior seizure history, however, patients with hepatic encephalopathy are more likely to have seizures than the general popn), stroke/tumor/bleed (less likely given no acute findings on imaging), and less likely PRES (no h/o severe HTN or visual disturbances) or meningitis (given no fever, leukocytosis). She has NOT had a transplant in past so less likely tx-induced encephalopathy.     It appears the patient is much better this afternoon and appears more awake and alert compared to this AM so performing an LP would be low yield at this time.      Her encephalopathy is likely driven by non-neurologic causes such as hypercalcemia (resolved now) and hepatic encephalopathy which takes time to clear up. Of note, she had a recent neuropsych testing as part of her transplant work-up and they noted possible underlying cognitive impairment 2/2 alcohol use. It is difficult to determine the severity of her cognitive impairements at this time given the acute encephalopathy from above causes. For now, it is not unreasonable to obtain a video EEG to r/o non-convulsive seizures.     Recommendations  - video EEG (ordered for you) for overnight.  - we will follow-up on vEEG results and communicate the results with primary team tomorrow.       Neurology will continue to follow. Recs communicated to team via phone.     Thank you for involving neurology in the care of Monalisa Olguin.  Please do not hesitate to call with questions/concerns (consult pager [phone]).      Patient was seen and discussed with Dr. Schwab.    Valerio Thomas, PGY-2  Neurology Rotation  Internal Medicine Resident    Attending physician: Dr. Pio Owen of the medicine  service requested neurologic consultation for Ms. Heydi Olguin for opinion regarding etiology of encephalopathy.    I saw and evaluated the patient with the resident team and I agree with the findings and plan of care as per. Dr. Thomas above, with the following additions.     The patient is a 54 year old woman with end-stage liver disease secondary to alcoholic cirrhosis who is currently admitted with worsened encephalopathy in the setting of multiple metabolic derangements including anemia, transient hypotension, and uremia.    CT head shows no acute intracranial process.    In this context, it is highly likely that the patient's encephalopathy is toxic-metabolic in nature. Further, on my examination of the patient she appeared quite alert and oriented to person, place, and month (although gave year as 2000) Due to increased cortical irritability in end stage liver disease and some waxing and waning of mental status, however, will monitor EEG overnight. (Prelim read complete and demonstrates no epileptiform abnormality.)    Do not see any clear indication for MRI at present; also do not suspect infection and would not pursue LP in light of thrombocytopenia and auto-anticoagulation.    Will follow up EEG results tomorrow.    Sedrick Schwab MD   of Neurology

## 2020-06-05 NOTE — PLAN OF CARE
OT 5B: cancel, pt with echo this AM upon attempt, unable to check back in with schedule demands, will reschedule.

## 2020-06-05 NOTE — PROVIDER NOTIFICATION
Marce SPENCER at [id number]      [address] [phone] [place] [id number] please consent pt for blood, consent at bedside.

## 2020-06-05 NOTE — PROVIDER NOTIFICATION
"Writer pageeloy SPENCER:    \"Lab called with critical lab value.\"    Rosa Herron RN on 6/5/2020 at 6:18 AM    "

## 2020-06-06 NOTE — PROGRESS NOTES
[facility], [place]    Progress Note - Terrence 2 Service        Date of Admission:  6/2/2020    Assessment and Plan:   Monalisa Olguin is a 54 year old female with history of ACD, decompensated liver cirrhosis, CKD, hyperparathyroidism who was brought to ED by her  with AMS.     Today:  - Hepatology consult  - Continue albumin challenge  - Schedule lactulose 20mg TID with additional prn   - MAP goal >55 with systolic BP >90  - F/u PTHrP    Encephalopathy  Likely etiology is toxic metabolic and/or hepatic encephalopathy. Ammonia is normal. BUN is elevated. On admission patient with severe hypercalcemia and moderate hyponatremia, which were likely contributing and improved with IVF resuscitation. Patient is afebrile, hemodynamically stable, without leukocytosis or systemic symptoms concerning for infection. Low suspicion for SBP as patient without abdominal pain and on ciprofloxacin prophylaxis. Head CT unremarkable. Continued mild encephalopathy despite correction of hyponatremia and hypercalcemia. Neuropysch testing on 5/27 was concerning for dementia-like symptoms related to past alcoholism. Neurology consulted with 24hr vide EEG 6/5 - 6/6. Quite possible patient has baseline confusion and [place] protocol resulting in too much stool output likely contributing to ALFREDO.   - Neurology consult, appreciate recs   - Lactulose 20mg TID with 20mg PRN x2 (goal 3-5 bm/day)  - Rifaximin 550mg BID   - Delirium precautions  - Avoid narcotics     Alcoholic liver cirrhosis (hx of ascites and HE)  Thrombocytopenia   Follows with Dr. Kovacs of Hepatology. Currently undergoing evaluation for transplant but is likely a poor candidate given recent neuropsychiatric evaluation that raises concern for underlying dementia most likely secondary to chronic EtOH. Patient denies recent alcohol use on admission. Ascites and encephalopathy addressed above. No recent evidence of GI bleed.   - Hold  acamprosate in setting of acute kidney injury   - Cont pta ciprofloxacin, folic acid, vitamin B1, and rifaximin as above  - Lactulose as above   - Hold lasix and spironolactone  - Hepatology consult given persistent HE and concern for HRS     MELD-Na score: 33 at 6/6/2020  4:41 AM  MELD score: 33 at 6/6/2020  4:41 AM  Calculated from:  Serum Creatinine: 2.83 mg/dL at 6/6/2020  4:41 AM  Serum Sodium: 136 mmol/L at 6/6/2020  4:41 AM  Total Bilirubin: 9.2 mg/dL at 6/5/2020  5:30 AM  INR(ratio): 2.09 at 6/6/2020  4:41 AM  Age: 54 years 7 months       Hypotension, improving  The patient was notably hypotensive to 60s/30s on 6/5/20, with MAPs in 50s. No dizziness, LOC, or other systemic symptoms, but was more lethargic. 1L NS bolus administered, followed by 25g albumin x 1. BP improved to low /40-50s with MAP in 70s. Patient also received blood products.  - Closely monitor vitals q6H  - MAP goal >55 with systolic BP >90    ALFREDO on CKD stage IV  Unilateral kidney (s/p nephrectomy)  Multifactorial in etiology and secondary prerenal azotemia d/t hypovolemia vs HRS, hypercalcemia, and/or ATN. Initially improved with IVF. Patient developed hyperchloremic metabolic acidosis after multiple L of NS, resolved with bicarb guided by renal. Renal US unremarkable. Continues to have worsening renal function.  - Hold lasix and spironolactone  - Daily BMP and Ca, recheck BMP at 4 pm following volume resuscitation  - Renal consulted, appreciate recs   - Albumin 1g/kg x72hrs      Hypercalcemia, severe, resolved  Improving with IVF, calcitonin, and bicarbonate. PTH appropriately low. Vitamin D, SPEP, and Lc labs unremarkable. PTHrP pending.   - Follow up on pending studies   - Daily BMP, Ca  - Renal consulted, appreciate recs      Hyponatremia, resolved   Hypovolemic hyponatremia. Serum osmols elevated likely due to elevated BUN as serum osmolar gap normal. Sodium normalized after fluid resuscitation.      Acute on chronic macrocytic  anemia   Multifactorial in etiology and secondary to ESLD and CKD +/- vitamin/mineral deficiency. Hgb on admission 9.3, has gradually declined to 6.9 this AM. Reticulocyte count is elevated. Concern for acute bleed vs hemodilutional effect in setting of high volume fluids. Hemolysis labs ordered. Majority direct hyperbilirubinemia, normal LDH, normal fibrinogen.  - Peripheral smear pending  - 1 unit PRBCs ordered  - Recheck Hgb this PM  - CTM     Orthostatic hypotension  - Cont midodrine 5 mg daily      Depression and anxiety  - Cont abilify, buspar, and fluoxetine         Diet: Na restriction <2g, fluid restriction <2L    Fluids: Albumin, 1 L NS  DVT Prophylaxis: Pneumatic Compression Devices  Gonzalez Catheter: Not present  Code Status: Full, patient encephalopathic       Edgardo Price MD  Internal Medicine, PGY1  [phone]    Pt was seen and examined by me. Reviewed labs, vitals, imaging.  I agree with the detailed A/P documentation above.  Will obtain hepatology input, appreciate Neuro recommendations.      Stella Suero MD    ______________________________________________________________    Interval History   Nursing notes reviewed. NAEON. This AM, patient is confused but alert and pleasant. She denies symptoms. No pain complaints. No shortness of breath. No other concerns at this time.     Physical Exam   Vital Signs: Temp: 97.8  F (36.6  C) Temp src: Oral BP: 97/44 Pulse: 72   Resp: 18 SpO2: 92 % O2 Device: None (Room air)    Weight: 134 lbs 0 oz     General: Sitting upright, breathing comfortably on RA, jaundice  Respiratory: normal respiratory effort   CV: Normal rate and rhythm, no murmurs/rubs/gallops  GI:  Abdomen distended but soft, non-tender to palpation, BS present   Skin: Warm, dry. Severely jaundiced  Musculoskeletal: No peripheral edema  Neuro: Alert and oriented to person/place, answers some questions appropriately     Data   Recent Labs   Lab 06/06/20  0441 06/05/20  1611 06/05/20  1130  06/05/20  0729 06/05/20  0530 06/04/20  0536   WBC 6.6  --   --  6.6 6.5  --  3.3*   HGB 7.6*  --  8.1* 7.2* 6.7*  --  7.2*   *  --   --  105* 105*  --  103*   PLT 36*  --   --  42* 37*  --  41*   INR 2.09*  --   --   --  2.24*  --  1.91*    134  --   --  136   < > 135   POTASSIUM 3.5 3.4  --   --  4.0   < > 3.9   CHLORIDE 112* 108  --   --  109   < > 108   CO2 19* 22  --   --  22   < > 23   BUN 57* 55*  --   --  59*   < > 53*   CR 2.83* 2.80*  --   --  2.91*   < > 1.84*   ANIONGAP 6 4  --   --  4   < > 4   ELSIE 9.6 9.5  --   --  9.8   < > 10.2*   GLC 90 108*  --   --  97   < > 109*   ALBUMIN  --   --   --   --  2.0*  --  2.3*   PROTTOTAL  --   --   --   --  4.1*  --  4.6*   BILITOTAL  --   --   --   --  9.2*  --  10.5*   ALKPHOS  --   --   --   --  193*  --  211*   ALT  --   --   --   --  22  --  22   AST  --   --   --   --  38  --  38    < > = values in this interval not displayed.

## 2020-06-06 NOTE — PROGRESS NOTES
[facility], [place]  Date of Admission:  6/2/2020    Progress Note 6/6/2020 - Nephrology Service   Assessment & Plan      Monalisa Ogluin is a 54 year old female with a PMH of decompensated alcoholic cirrhosis and CKD IV, who presented with encephalopathy found to have hypercalcemia, acute kidney injury and hyponatremia, course complicated by recurrent ALFREDO in the setting of hypotension of unclear etiology (infection, bleeding, hypovolemia).     #Acute non-oliguric ALFREDO on CKD stage IV-worsening   #Solitary kidney  #Hepatic encephalopathy   #Decompensated alcoholic cirrhosis   Baseline Crt 1.6  ---  ALFREDO felt to be hemodynamic related to hypovolemia and hypercalcemia.   ---  Renal function had been improving, but hypotension yesterday with worsening of creatinine to 2.8 from 2.4 mg/dL. Suspect ongoing hemodynamic injury along with hypercalcemia. Agree with additional Albumin today. Will continue to follow closely.     #Non PTH dependant Hypercalcemia   Modest improvement in calcium now corrected to 11.2. Etiology remains unclear - SPEP/KL Ratio neg. Vit D Low. 8AM cortisol acceptable. Awaiting further work-up including PTH-RP. Hypercalcemia related to cirrhosis is also a rare, but potentially etiology. Will continue to follow closely, may consider bisphosphonate (low dose). Agree with ongoing volume resuscitation given hypotension.     #Non anion gap metabolic acidosis  No active issues, serum bicarbonate acceptable at 19.    The patient's care was discussed with the Attending Physician, Dr. Rogers.    CAMILO Garsia MD  Neph Fellow  [phone]  ______________________________________________    Interval History   Patient BPs improved this AM. UOP increasing, 300cc voided at time of my encounter. Patient denies new pain, shortness of breath or fevers. She is oriented to self and place and year, but not time. Per RN, some improvement in mental status. Having regular BMs.      Data reviewed  today: I reviewed all medications, new labs and imaging results over the last 24 hours. I personally reviewed imaging.    Physical Exam   Vital Signs: Temp: 97.8  F (36.6  C) Temp src: Oral BP: 97/44 Pulse: 72   Resp: 18 SpO2: 92 % O2 Device: None (Room air)    Weight: 134 lbs 0 oz  General Appearance: Alert, comf  Respiratory: CTAB, no wheezing or rales   Cardiovascular: RRR. No rub  GI: Soft, NT but distended. Bowel sounds present  Skin: Jaundiced   MSK: No pitting edema of the lower extremities    Data   Recent Labs   Lab 06/06/20  0441 06/05/20  1611 06/05/20  1130 06/05/20  0729 06/05/20  0530  06/04/20  0536   WBC 6.6  --   --  6.6 6.5  --  3.3*   HGB 7.6*  --  8.1* 7.2* 6.7*  --  7.2*   *  --   --  105* 105*  --  103*   PLT 36*  --   --  42* 37*  --  41*   INR 2.09*  --   --   --  2.24*  --  1.91*    134  --   --  136   < > 135   POTASSIUM 3.5 3.4  --   --  4.0   < > 3.9   CHLORIDE 112* 108  --   --  109   < > 108   CO2 19* 22  --   --  22   < > 23   BUN 57* 55*  --   --  59*   < > 53*   CR 2.83* 2.80*  --   --  2.91*   < > 1.84*   ANIONGAP 6 4  --   --  4   < > 4   ELSIE 9.6 9.5  --   --  9.8   < > 10.2*   GLC 90 108*  --   --  97   < > 109*   ALBUMIN  --   --   --   --  2.0*  --  2.3*   PROTTOTAL  --   --   --   --  4.1*  --  4.6*   BILITOTAL  --   --   --   --  9.2*  --  10.5*   ALKPHOS  --   --   --   --  193*  --  211*   ALT  --   --   --   --  22  --  22   AST  --   --   --   --  38  --  38    < > = values in this interval not displayed.     Recent Results (from the past 24 hour(s))   US Renal Complete    Narrative    EXAMINATION: US RENAL COMPLETE 6/5/2020 10:36 AM     COMPARISON: Abdominal ultrasound dated 5/5/2020    HISTORY: Worsening acute kidney injury. Rule out obstruction.    TECHNIQUE: The kidneys and bladder were scanned in the standard  fashion with specialized ultrasound transducer(s) using both gray  scale and limited color/spectral Doppler techniques.    FINDINGS:    Right  kidney: Measures 11.1 cm in length. Parenchyma is of normal  thickness and echogenicity. No focal mass. No hydronephrosis. There is  ascites present.    Left kidney: The left kidney is surgically absent.    Bladder: The bladder is partially distended and unremarkable where  visualized.      Impression    IMPRESSION:  1.  Normal grayscale ultrasound evaluation of the right kidney.  2.  Left nephrectomy.  3.  Ascites    I have personally reviewed the examination and initial interpretation  and I agree with the findings.    BASSAM MANN MD     Medications     - MEDICATION INSTRUCTIONS -         albumin human  50 g Intravenous Daily     [Held by provider] ARIPiprazole  5 mg Oral At Bedtime     [Held by provider] busPIRone  15 mg Oral BID     ciprofloxacin  250 mg Oral Q24H MATT     FLUoxetine  60 mg Oral Daily     folic acid  1 mg Oral Daily     [Held by provider] gabapentin  300 mg Oral At Bedtime     midodrine  10 mg Oral TID w/meals     omeprazole  20 mg Oral Daily     rifaximin  550 mg Oral BID     sodium chloride (PF)  3 mL Intracatheter Q8H     thiamine  500 mg Intravenous TID    Followed by     [START ON 6/7/2020] thiamine  250 mg Intravenous Daily    Followed by     [START ON 6/12/2020] vitamin B1  100 mg Oral Daily     ursodiol  500 mg Oral BID

## 2020-06-06 NOTE — PLAN OF CARE
Writer assumed cares from [id number]. Pt is disoriented to time. Pt forgets things quickly and has difficulty remembering what writer just said. AVSS on RA.  Green watery stool output= 1100ml this shift.  HE=1 but prn lactulose not given per M2 MD during rounds. Changed prn lactulose to scheduled since prn lactulose does not lower HE score despite >1000ml loose stool yesterday. UOP= 450ml dark margie. EEG leads removed today. Pt calls appropriately, but bed alarm is on for safety. Poor appetite. Platelets=36. Up with SBA to commode. No signs of bleeding. Cont to enc oral intake and with poc.

## 2020-06-06 NOTE — PROGRESS NOTES
EEG CLINICAL NEUROPHYSIOLOGY PRELIMINARY REPORT    EEG through approx 10 AM today reviewed. Excessive diffuse slowing. Sleep spindles over night. EEG is reactive with posterior dominant rhythm this morning. No epileptiform discharges or seizures.    Varying degrees of encephalopathy over duration of recording, moderate yesterday, mild to moderate this morning. EEG is reactive with clear distinction between sleep and wake. No indication of seizures or seizure tendency. .Full report to follow.    Prosper Marin MD  Pager [phone]

## 2020-06-06 NOTE — PLAN OF CARE
Assumed cares [phone]. AxO to self and situation; disoriented to time and place. VSS on RA w/ soft BPs overnight; MD informed writer to notify if pt MAP is <50. HE of 1 throughout night; lactulose given per protocol. On 2g Na and 2L FR; pt had no appetite, writer encouraged fluids but pt refused except when given lactulose. Voiding spontaneously with minimal UOP. Bmx2. No acute events. Continue to monitor and follow plan of care.

## 2020-06-06 NOTE — PLAN OF CARE
5B  Discharge Planner PT   Patient plan for discharge: home   Current status: Able to move short distances with SBA-Min A for safety.   Barriers to return to prior living situation: medical status; availability of family  Recommendations for discharge: TCU currently, although would potentially be safe to discharge home if she has family support 24 hours/day.  Rationale for recommendations: Patient is below her baseline level of assist and needs physical assistance for safety. She would benefit from a continued therapy program.        Entered by: Mandie Ho 06/06/2020 11:04 AM

## 2020-06-06 NOTE — PROVIDER NOTIFICATION
CC MD paged about pt BP of 95/38. Informed that bolus given on day shift for low BP, asked to advise.    MD called and advised to check pt BP again after 1-2 hrs.

## 2020-06-06 NOTE — PROGRESS NOTES
"[facility], [place]   Neurology Daily Note  Monalisa Olguin  [id number]  06/06/2020    Subjective:  No acute events overnight. Feels \"OK\" this morning. States that her \"liver is unhappy\" and that she feels confused because of it. Believes she has had symptoms similar to this which improved over time with lactulose.     Objective   Physical Examination   Vitals: BP 97/44 (BP Location: Right arm)   Pulse 72   Temp 97.8  F (36.6  C) (Oral)   Resp 18   Ht 1.524 m (5')   Wt 60.8 kg (134 lb)   LMP 05/16/2012   SpO2 92%   BMI 26.17 kg/m    General: Adult female patient, lying in bed, NAD  HEENT: Normocephalic/atraumatic, severe scleral icterus, oropharynx pink and moist  Chest: non-labored on RA  Abdomen: Distended, firm  Extremities: No lower extremity swelling  Skin: No rash or lesion   Psych: Mood pleasant  Neuro:  Mental status: Awake, alert, oriented to self, place, and circumstance. Language is fluent and coherent with intact comprehension of complex commands, naming and repetition. Spells WORLD correctly forward, but incorrectly backward. Incorrectly calculates number of quarters in $1.75. Impairment of attention and/or short-term memory evident, cannot repeat back 3 words immediately (therefore could not test delayed recall). Makes a few illogical statements.   Cranial nerves: VFF, PERRL, conjugate gaze, EOMI, facial sensation intact, face symmetric, shoulder shrug strong, tongue/uvula midline, no dysarthria.   Motor: Normal bulk and tone. No abnormal movements. 5/5 strength in 4/4 extremities.   Reflexes: Hyperreflexic and symmetric biceps, brachioradialis, triceps, patellae, and achilles.  Sensory: Intact to light touch in bilateral upper and lower extremities.   Coordination: FNF and HS without ataxia or dysmetria. Rapid alternating movements intact.   Gait: Deferred due to weakness.     Investigations      BMP Results   Lab Test 06/06/20  0441 06/05/20  1611    134 " "  POTASSIUM 3.5 3.4   CHLORIDE 112* 108   CO2 19* 22   ANIONGAP 6 4   GLC 90 108*   BUN 57* 55*   CR 2.83* 2.80*   ELSIE 9.6 9.5     EEG CLINICAL NEUROPHYSIOLOGY PRELIMINARY REPORT  6/5/2020   \"First 90 minutes of video-EEG reviewed. Moderate to signfiicant amounts of irregular diffuse delta. EEG is reactive with desynchronization and some reduction of delta. Sleep spindles during sleep. No periodic activity, no epileptiform discharges, no seizures. Study consistent with moderate diffuse encephalopathy. EEG is reactive and there are some normal sleep features. Thus far no seizures and no evidence of seizure tendency.\"    Assessment and Plan   Monalisa Olguin is a 54 year old female with PMHx of decompensated alcoholic cirrhosis c/b hepatic encephalopathy & ascites, ALFREDO on CKD stage 4, and solitary kidney s/p donation (2000) who presented from home on 6/3 with AMS. Found to have encephalopathy in the setting of hypercalcemia, mild hyponatremia, hyperkalemia and HE 2/2 non-compliance with lactulose. EEG negative for seizures. Exam is notable for ongoing encephalopathy not out of proportion to gross metabolic abnormalities which appear secondary to end-stage liver disease, particularly in the setting of non-compliance with lactulose. Patient has been resumed on lactulose, though improvement of altered mental status will likely be gradual (on the order of days to weeks, based on reported duration of non-compliance) and may lag behind normalization of lab values.     Neurology will sign off at this time. Thank you for involving Neurology in the care of Ms. Olguin.    Patient was seen and discussed with Dr. Logan.    Nicole Lucas MD  Neurology PGY-2    Attending physician: I saw and evaluated the patient apart from the resident. I agree with her findings and plan of care as outlined above, with the following additions.    EEG revealed no epileptiform activity; patient's encephalopathy is presumably due to multiple " metabolic derangements in the setting of end stage liver disease.    We will stop video EEG monitoring at this time. Please call with questions.    Sedrick Schwab MD   of Neurology

## 2020-06-06 NOTE — PROVIDER NOTIFICATION
CC MD notified for pt BP 91/40 after MD asked for recheck of BP after 1-2 hrs. Also informed MD of pt persistent confusion despite prn lactulose on day/michael/night shifts.

## 2020-06-06 NOTE — PROGRESS NOTES
06/06/20 1055   Quick Adds   Type of Visit Initial PT Evaluation   Living Environment   Lives With child(lit), adult;child(lit), dependent;spouse   Living Arrangements house   Home Accessibility stairs to enter home   Number of Stairs, Main Entrance 3   Stair Railings, Main Entrance railing on left side (ascending)   Number of Stairs, Within Home, Primary other (see comments)   Transportation Anticipated family or friend will provide   Living Environment Comment information copied from chart   Self-Care   Usual Activity Tolerance moderate   Current Activity Tolerance fair   Regular Exercise No   Equipment Currently Used at Home none   Functional Level Prior   Ambulation 0-->independent   Transferring 0-->independent   Toileting 0-->independent   Bathing 0-->independent   Communication 0-->understands/communicates without difficulty   Swallowing 0-->swallows foods/liquids without difficulty   Cognition 0 - no cognition issues reported   Fall history within last six months yes   Number of times patient has fallen within last six months 3   Which of the above functional risks had a recent onset or change? ambulation;transferring;toileting   General Information   Onset of Illness/Injury or Date of Surgery - Date 06/02/20   Referring Physician Edgardo Price MD   Patient/Family Goals Statement to go home.    Pertinent History of Current Problem (include personal factors and/or comorbidities that impact the POC) ACD, decompensated liver cirrhosis, CKD, hyperparathyroidism who was brought to ED by her  with AMS.    Precautions/Limitations fall precautions   Weight-Bearing Status - LLE weight-bearing as tolerated   General Observations CAM boot   Cognitive Status Examination   Orientation person;place   Level of Consciousness alert   Follows Commands and Answers Questions 100% of the time   Personal Safety and Judgment impaired   Memory impaired   Cognitive Comment some confusion and nonsensical comments  "throughout therapy session   Pain Assessment   Patient Currently in Pain No   Integumentary/Edema   Integumentary/Edema no deficits were identifed   Posture    Posture Forward head position;Protracted shoulders;Kyphosis   Range of Motion (ROM)   ROM Comment grossly WFL   Strength   Strength Comments Grossly WFL   Bed Mobility   Bed Mobility Comments Mod I   Transfer Skills   Transfer Comments SBA; impulsive and stands before all lines are in place   Gait   Gait Comments Unsteady with boot on; occasional LOB but able to self correct   General Therapy Interventions   Planned Therapy Interventions balance training;bed mobility training;gait training;neuromuscular re-education;strengthening;transfer training;home program guidelines;progressive activity/exercise   Clinical Impression   Criteria for Skilled Therapeutic Intervention yes, treatment indicated   PT Diagnosis Impaired functional mobility   Influenced by the following impairments impaired strength   Functional limitations due to impairments balance, transfers and gait   Clinical Presentation Stable/Uncomplicated   Clinical Presentation Rationale clinical judgement   Clinical Decision Making (Complexity) Low complexity   Therapy Frequency 5x/week   Predicted Duration of Therapy Intervention (days/wks) 2 weeks   Anticipated Discharge Disposition Transitional Care Facility   Risk & Benefits of therapy have been explained Yes   Patient, Family & other staff in agreement with plan of care Yes   Clinical Impression Comments Ability to discharge home with depend on level of assist family is able to provide   [facility] TM \"6 Clicks\"   2016, Trustees of [facility], under license to [employer].  All rights reserved.   6 Clicks Short Forms Basic Mobility Inpatient Short Form   [facility]  \"6 Clicks\" V.2 Basic Mobility Inpatient Short Form   1. Turning from your back to your side while in a flat bed without using bedrails? 4 - None "   2. Moving from lying on your back to sitting on the side of a flat bed without using bedrails? 3 - A Little   3. Moving to and from a bed to a chair (including a wheelchair)? 3 - A Little   4. Standing up from a chair using your arms (e.g., wheelchair, or bedside chair)? 3 - A Little   5. To walk in hospital room? 3 - A Little   6. Climbing 3-5 steps with a railing? 3 - A Little   Basic Mobility Raw Score (Score out of 24.Lower scores equate to lower levels of function) 19   Total Evaluation Time   Total Evaluation Time (Minutes) 10

## 2020-06-07 NOTE — PLAN OF CARE
Writer assumed cares from [phone]. Disoriented to time. Pt is very jaundice, HE=1, scheduled lactulose given. 1/2 bag of albumin given in addition to scheduled albumin.  Continues to have more energy everyday.  Appetite still poor but improving. Up in the chair for meals with chair alarm. Pt calls appropriately but bed alarm is on for safety. Abd ultrasound completed. Pt making urine, 1 unsaved void. No stool charted this shift. Unsure if another staff member emptied commode and forgot to chart output. No signs of bleeding. Cont to monitor hgb, trending down.

## 2020-06-07 NOTE — PLAN OF CARE
Writer assumed cares from [phone]. Pt is disoriented to time, but more alert and awake today. AVSS on RA.  HE=1, scheduled lactulose given.  Pt calls appropriately, but bed alarm is on for safety. Up to chair with chair alarm on. Poor appetite, but improving. Platelets=36. Up with SBA to commode. No signs of bleeding. Cont to enc oral intake and with poc.

## 2020-06-07 NOTE — PROVIDER NOTIFICATION
Paged G10 MD at 2036     HIGH [phone] [phone] [place] [phone] still want pt to receive 1 unit PRBCs? consent still at bedside. please call RN.

## 2020-06-07 NOTE — PLAN OF CARE
Assumed cares [phone]. AxO to self, place and situation; disoriented to time. VSS on RA. Denies pain and nausea. On 2g Na diet and 2L FR and tolerating well. Voiding adequately and spontaneously, no BM. HE stage 1 overnight; no prn doses of lactulose given as MD has pt on scheduled doses. No acute events. Continue to monitor and follow plan of care.

## 2020-06-07 NOTE — PROGRESS NOTES
[facility], [place]  Date of Admission:  6/2/2020    Progress Note 6/7/2020 - Nephrology Service     Assessment & Plan    Monalisa Olguin is a 54 year old female with a PMH of decompensated alcoholic cirrhosis and CKD IV, who presented with encephalopathy found to have hypercalcemia, acute kidney injury and hyponatremia, course complicated by recurrent ALFREDO in the setting of hypotension of unclear etiology (infection, bleeding, hypovolemia).     #Acute non-oliguric ALFREDO on CKD stage IV-worsening   #Solitary kidney  #Hepatic encephalopathy   #Decompensated alcoholic cirrhosis   Baseline Crt 1.6  ---  ALFREDO felt to be hemodynamic related to hypovolemia and hypercalcemia.   ---  Renal function with improved creatinine back to 2.4 mg/dL. Improving UOP. BP remain stable, but on the soft side.     Recommend ongoing Albumin and would also give crystalloid (500-750cc) to replace stool output.     Liver U/S with Doppler pending - plan for para if + ascites. Will continue to follow closely.     #Non PTH dependant Hypercalcemia   Improvement in calcium, ical minimally elevated at 5.3. Etiology remains unclear - SPEP/KL Ratio neg. Vit D Low. 8AM cortisol acceptable. Awaiting PTH-RP. Hypercalcemia related to cirrhosis is also a rare, but potentially etiology.     Will continue to follow closely, may consider bisphosphonate (low dose) if hypercalcemia rebounds. Recommend ongoing volume resuscitation given hypotension as above.    #Non anion gap metabolic acidosis  No active issues, serum bicarbonate acceptable at 18. Likely low due to large stool output.    The patient's care was discussed with the Attending Physician, Dr. Rogers.    CAMILO Garsia MD  Neph Fellow  [phone]  ______________________________________________    Interval History   Patient overall more alert and more energy today. Continues to be non oliguric. Large stool volume. Patient denies new pain, shortness of breath or fevers. She is  oriented to self and place and year. Sat in chair yesterday, did well per RN.    Data reviewed today: I reviewed all medications, new labs and imaging results over the last 24 hours. I personally reviewed imaging.    Physical Exam   Vital Signs: Temp: 97.4  F (36.3  C) Temp src: Oral BP: 99/43 Pulse: 75   Resp: 16 SpO2: 95 % O2 Device: None (Room air)    Weight: 134 lbs 0 oz  General Appearance: Alert, comf  Respiratory: CTAB, no wheezing or rales   Cardiovascular: RRR. No rub  GI: Soft, NT but distended. Bowel sounds present  Skin: Jaundiced   MSK: No pitting edema of the lower extremities    Data   Recent Labs   Lab 06/07/20  0524 06/06/20  0441 06/05/20  1611 06/05/20  1130 06/05/20  0729 06/05/20  0530   WBC 6.6 6.6  --   --  6.6 6.5   HGB 7.7* 7.6*  --  8.1* 7.2* 6.7*   * 104*  --   --  105* 105*   PLT 33* 36*  --   --  42* 37*   INR 2.32* 2.09*  --   --   --  2.24*    136 134  --   --  136   POTASSIUM 3.6 3.5 3.4  --   --  4.0   CHLORIDE 112* 112* 108  --   --  109   CO2 18* 19* 22  --   --  22   BUN 55* 57* 55*  --   --  59*   CR 2.47* 2.83* 2.80*  --   --  2.91*   ANIONGAP 5 6 4  --   --  4   ELSIE 9.6 9.6 9.5  --   --  9.8   GLC 94 90 108*  --   --  97   ALBUMIN 3.2*  --   --   --   --  2.0*   PROTTOTAL 4.9*  --   --   --   --  4.1*   BILITOTAL 9.4*  --   --   --   --  9.2*   ALKPHOS 172*  --   --   --   --  193*   ALT 18  --   --   --   --  22   AST 26  --   --   --   --  38     No results found for this or any previous visit (from the past 24 hour(s)).  Medications     - MEDICATION INSTRUCTIONS -         [Held by provider] ARIPiprazole  5 mg Oral At Bedtime     [Held by provider] busPIRone  15 mg Oral BID     ciprofloxacin  250 mg Oral Q24H MATT     FLUoxetine  60 mg Oral Daily     folic acid  1 mg Oral Daily     [Held by provider] gabapentin  300 mg Oral At Bedtime     lactulose  20 g Oral TID     midodrine  10 mg Oral TID w/meals     omeprazole  20 mg Oral Daily     rifaximin  550 mg Oral BID      sodium chloride (PF)  3 mL Intracatheter Q8H     thiamine  250 mg Intravenous Daily    Followed by     [START ON 6/12/2020] vitamin B1  100 mg Oral Daily     ursodiol  500 mg Oral BID

## 2020-06-07 NOTE — PROGRESS NOTES
[facility], [place]    Progress Note - Terrence 2 Service        Date of Admission:  6/2/2020    ADDENDUM:  - peripheral smear with some RBC frag schistocytes. Hgb has stabilized, LDH/haptoglobin/fibrinogen from 6/5 within normal limits. Discussed with Heme Onc, will plan to repeat all hemolysis labs, peripheral smear, coags. If evidence of consumption, will page Heme Onc for emergent intervention    Assessment and Plan:   Monalisa Olguin is a 54 year old female with history of ACD, decompensated liver cirrhosis, CKD, hyperparathyroidism who was brought to ED by her  with AMS.     Today:  - Abd US with doppler today +/- paracentesis pending result  - Last day of albumin challenge  - Schedule lactulose 20mg TID with additional prn   - MAP goal >55 with systolic BP >90  - F/u PTHrP, PIP  - May consider additional fluids in PM pending Cr and stool output    Encephalopathy  Likely etiology is toxic metabolic and/or hepatic encephalopathy. Ammonia is normal. BUN is elevated. On admission patient with severe hypercalcemia and moderate hyponatremia, which were likely contributing and improved with IVF resuscitation. Patient is afebrile, hemodynamically stable, without leukocytosis or systemic symptoms concerning for infection. Low suspicion for SBP as patient without abdominal pain and on ciprofloxacin prophylaxis. Head CT unremarkable. Continued mild encephalopathy despite correction of hyponatremia and hypercalcemia. Neuropysch testing on 5/27 was concerning for dementia-like symptoms related to past alcoholism. Neurology consulted with 24hr vide EEG 6/5 - 6/6. Quite possible patient has baseline confusion and [place] protocol resulting in too much stool output likely contributing to ALFREDO.   - Neurology consult, appreciate recs - vEEG prelim read shows no signs of seizure  - Lactulose 20mg TID with 20mg PRN x2 (goal 3-4 bm/day)  - Rifaximin 550mg BID   - Delirium precautions  -  Avoid narcotics     Alcoholic liver cirrhosis (hx of ascites and HE)  Thrombocytopenia   Follows with Dr. Kovacs of Hepatology. Currently undergoing evaluation for transplant but is likely a poor candidate given recent neuropsychiatric evaluation that raises concern for underlying dementia most likely secondary to chronic EtOH. Patient denies recent alcohol use on admission. Ascites and encephalopathy addressed above. No recent evidence of GI bleed.   - Hold acamprosate in setting of acute kidney injury   - Cont pta ciprofloxacin, folic acid, vitamin B1, and rifaximin as above  - Lactulose as above   - Hold lasix and spironolactone  - Hepatology consult given persistent HE and concern for HRS     MELD-Na score: 33 at 6/7/2020  5:24 AM  MELD score: 33 at 6/7/2020  5:24 AM  Calculated from:  Serum Creatinine: 2.47 mg/dL at 6/7/2020  5:24 AM  Serum Sodium: 135 mmol/L at 6/7/2020  5:24 AM  Total Bilirubin: 9.4 mg/dL at 6/7/2020  5:24 AM  INR(ratio): 2.32 at 6/7/2020  5:24 AM  Age: 54 years 7 months       Hypotension, improving  The patient was notably hypotensive to 60s/30s on 6/5/20, with MAPs in 50s. No dizziness, LOC, or other systemic symptoms, but was more lethargic. 1L NS bolus administered, followed by 25g albumin x 1. BP improved to low /40-50s with MAP in 70s. Patient also received blood products.  - Closely monitor vitals q6H  - MAP goal >55 with systolic BP >90    ALFREDO on CKD stage IV  Unilateral kidney (s/p nephrectomy)  Multifactorial in etiology and secondary prerenal azotemia d/t hypovolemia vs HRS, hypercalcemia, and/or ATN. Initially improved with IVF. Patient developed hyperchloremic metabolic acidosis after multiple L of NS, resolved with bicarb guided by renal. Renal US unremarkable. Continues to have worsening renal function, likely due to hypoperfusion.  - Hold lasix and spironolactone  - Daily BMP and Ca  - Renal consulted, appreciate recs   - Albumin 1g/kg x72hrs (6/5-6/7)     Hypercalcemia,  severe, resolved  Likely secondary to chronic liver disease. Additional workup was performed. Improving with IVF, calcitonin, and bicarbonate. PTH appropriately low. Vitamin D, SPEP, and Lc labs unremarkable. PTHrP pending.   - Follow up on pending studies   - Daily BMP, Ca  - Renal consulted, appreciate recs      Hyponatremia, resolved   Hypovolemic hyponatremia. Serum osmols elevated likely due to elevated BUN as serum osmolar gap normal. Sodium normalized after fluid resuscitation.      Acute on chronic macrocytic anemia   Multifactorial in etiology and secondary to ESLD and CKD +/- vitamin/mineral deficiency. Hgb on admission 9.3, has gradually declined to 6.9 this AM. Reticulocyte count is elevated. Concern for acute bleed vs hemodilutional effect in setting of high volume fluids. Hemolysis labs were unremarkable, though peripheral smear showed rare RBC fragments.  - Peripheral smear - rare to occasional RBC fragments, ranging from helmet cells to schistocytes, however too few to consider as definitive evidence of MAHA. Could be confounded by prior RBC transfusion. Other hemolysis labs (LDH, haptoglobin, fibrinogen) have been within normal limits.  - CTM     Orthostatic hypotension  - Cont midodrine 10 mg TID      Depression and anxiety  - Cont abilify, buspar, and fluoxetine         Diet: Na restriction <2g, fluid restriction <2L    Fluids: Albumin challenge  DVT Prophylaxis: Pneumatic Compression Devices  Gonzalez Catheter: Not present  Code Status: Full, patient encephalopathic       Angélica Hernandez MD  PGY-3 Medicine-Dermatology  Pager [phone]    Pt was seen and examined by me; mental status slightly improved compared to previous.  Reviewed labs, vitals, imaging.  I agree with the detailed A/P documentation above including evaluation for possible TTP.    Stella Suero MD      ______________________________________________________________    Interval History   Nursing notes reviewed. VSS, no acute  events. Patient sitting up in chair, is feeling better today. AO x 4    Physical Exam   Vital Signs: Temp: 97.4  F (36.3  C) Temp src: Oral BP: 99/43 Pulse: 75   Resp: 16 SpO2: 95 % O2 Device: None (Room air)    Weight: 134 lbs 0 oz     General: Sitting upright, breathing comfortably on RA, jaundiced  Respiratory: normal respiratory effort   GI:  Abdomen distended but soft, non-tender to palpation, BS present   Skin: Warm, dry. Severely jaundiced  Musculoskeletal: No peripheral edema  Neuro: Alert and oriented to person/place/time/location    Data   Recent Labs   Lab 06/07/20  0524 06/06/20  0441 06/05/20  1611 06/05/20  1130 06/05/20  0729 06/05/20  0530   WBC 6.6 6.6  --   --  6.6 6.5   HGB 7.7* 7.6*  --  8.1* 7.2* 6.7*   * 104*  --   --  105* 105*   PLT 33* 36*  --   --  42* 37*   INR 2.32* 2.09*  --   --   --  2.24*    136 134  --   --  136   POTASSIUM 3.6 3.5 3.4  --   --  4.0   CHLORIDE 112* 112* 108  --   --  109   CO2 18* 19* 22  --   --  22   BUN 55* 57* 55*  --   --  59*   CR 2.47* 2.83* 2.80*  --   --  2.91*   ANIONGAP 5 6 4  --   --  4   ELSIE 9.6 9.6 9.5  --   --  9.8   GLC 94 90 108*  --   --  97   ALBUMIN 3.2*  --   --   --   --  2.0*   PROTTOTAL 4.9*  --   --   --   --  4.1*   BILITOTAL 9.4*  --   --   --   --  9.2*   ALKPHOS 172*  --   --   --   --  193*   ALT 18  --   --   --   --  22   AST 26  --   --   --   --  38

## 2020-06-07 NOTE — PROGRESS NOTES
Hematology brief note    Called about peripheral smear showing rare schistocytes.    Pt is a 55 y/o F with h/o alcohol-related decompensated liver cirrhosis, CKD, h/o hepatic encephalopathy who is currently admitted for AMS.    Hematology consulted d/t progressive thrombocytopenia and anemia. Peripheral smear from 6/5 showing rare schistocytes.     CBC shows worsening anemia with Hb from from 9.3 on 6/2 to 6.7 on 6/5 prompting unit of RBCs. Prior to transfusion, peripheral smear was sent. Plts also decreasing from baseline 60's to 37 on 6/5 and 33 today.     Other notable labs:  6/5  Tbili 9.2 (today 9.4)  Dbili 7.6    Hapto 55    INR 2.24 --> 2.09 (6/6) --> 2.32 today  Fibrinogen 249      Discussed with primary team and Dr. Noel. Rare schistocytes in setting of liver failure could be DIC-related but TTP is on the differential. Arguing against clinically significant MAHA like TTP would be the normal LDH and haptoglobin as well as the downtrending bilirubin. More likely that this is some mild DIC related to liver failure.     Recommended recheck of hemolysis and DIC labs immediately to trend for any changes.    Placed orders for: CBC, retics, LDH, hapto, hepatic panel, PT/INR, PTT, Thrombin time,  fibrinogen, d-dimer and a repeat peripheral smear    Primary team is to contact Hematology consult if any of these labs are trending worse. If consistent with TTP, will need urgent plasmapheresis overnight. Full consult note to follow in AM.    Discussed with Dr. Bert Rankin MD   Hematology / Oncology Fellow  P: [phone]

## 2020-06-07 NOTE — PLAN OF CARE
Discharge Planner PT   Patient plan for discharge: home.   Current status: pt willing to work with PT today, pt needing one step commands to follow. Pt needing V.c to chela CAM on LLE for all out of bed activities. Pt needing SBA for all bed mob, and sit to stand with standing support from IV pole. Pt amb up to100'x 1 with CGA. No large LOB but mild instability mostly with turns and backward stepping. Pt up in chair at end of session with chair alarm on. Call light in reach  Barriers to return to prior living situation: weakness, mild confusion.   Recommendations for discharge: PT - per plan established by the Physical Therapist, according to functional mobility the  discharge recommendation is TCU vs home if 24/7 assist/ supervision   Rationale for recommendations: pt is progress, limited cognitions for safety.        Entered by: Rory Pearson 06/07/2020 9:47 AM

## 2020-06-08 NOTE — PROGRESS NOTES
[facility]  HEPATOLOGY PROGRESS NOTE  Monalisa Olguin [id number]       CC: confusion  SUBJECTIVE:  Reports improvement in her mentation. Continues to have abdominal distention and denies fevers, melena, hematochezia or significant abdominal pain.     ROS:  A 10-point review of systems was negative.    OBJECTIVE:  VS: BP (!) 92/37 (BP Location: Right arm)   Pulse 76   Temp 98.5  F (36.9  C) (Oral)   Resp 16   Ht 1.524 m (5')   Wt 61.1 kg (134 lb 9.6 oz)   LMP 05/16/2012   SpO2 92%   BMI 26.29 kg/m     Date 06/08/20 0700 - 06/09/20 0659   Shift [id number] 1899-5950 [id number] 24 Hour Total   INTAKE   P.O. 120   120   Shift Total(mL/kg) 120(1.97)   120(1.97)   OUTPUT   Shift Total(mL/kg)       Weight (kg) 61.05 61.05 61.05 61.05     General: In no acute distress, mild facial muscle wasting  Neuro: AOx3, No asterixis  Lymph: No cervical lymphadenopathy  HEENT:  Moderate scleral icterus, Nooral lesions  CV: S1/S2 with gr 2/6 murmurs. Skin warm and dry  Lungs: clear to auscultation Respirations even and nonlabored on room air  Abd: nontender, mildly distended. +BS  Extrem: Noperipheral edema  Skin: moderate jaundice  Psych: pleasant    MEDICATIONS:  Current Facility-Administered Medications   Medication     acetaminophen (TYLENOL) tablet 500 mg     [Held by provider] ARIPiprazole (ABILIFY) tablet 5 mg     [Held by provider] busPIRone (BUSPAR) tablet 15 mg     ciprofloxacin (CIPRO) tablet 250 mg     Daily 2 GRAM acetaminophen limit, unless fulminent liver failure or transaminases greater than or equal to 300 - 400, then none     FLUoxetine (PROzac) capsule 60 mg     folic acid (FOLVITE) tablet 1 mg     [Held by provider] gabapentin (NEURONTIN) capsule 300 mg     heparin lock flush 10 UNIT/ML injection 2-5 mL     lactulose (CHRONULAC) solution 20 g     lactulose (CHRONULAC) solution 20 g     lidocaine (LMX4) cream     lidocaine (LMX4) cream     lidocaine 1 % 0.1-1 mL     lidocaine 1 % 0.1-1 mL     melatonin  tablet 1 mg     midodrine (PROAMATINE) tablet 10 mg     naloxone (NARCAN) injection 0.1-0.4 mg     omeprazole (priLOSEC) CR capsule 20 mg     ondansetron (ZOFRAN-ODT) ODT tab 4 mg    Or     ondansetron (ZOFRAN) injection 4 mg     prochlorperazine (COMPAZINE) injection 10 mg    Or     prochlorperazine (COMPAZINE) tablet 10 mg    Or     prochlorperazine (COMPAZINE) Suppository 25 mg     rifaximin (XIFAXAN) tablet 550 mg     sodium chloride (PF) 0.9% PF flush 3 mL     sodium chloride (PF) 0.9% PF flush 3 mL     sodium chloride (PF) 0.9% PF flush 5-50 mL     thiamine (B-1) 250 mg in sodium chloride 0.9 % 50 mL intermittent infusion    Followed by     [START ON 6/12/2020] vitamin B1 (THIAMINE) tablet 100 mg     [Held by provider] traZODone (DESYREL) tablet 100 mg     ursodiol (ACTIGALL) tablet 500 mg       REVIEW OF LABORATORY, PATHOLOGY AND IMAGING RESULTS:  BMP  Recent Labs   Lab 06/08/20  0505 06/07/20  1413 06/07/20  0524 06/06/20  0441    137 135 136   POTASSIUM 3.4 3.6 3.6 3.5   CHLORIDE 112* 109 112* 112*   ELSIE 9.6 9.9 9.6 9.6   CO2 18* 19* 18* 19*   BUN 52* 54* 55* 57*   CR 2.08* 2.28* 2.47* 2.83*   * 125* 94 90     CBC  Recent Labs   Lab 06/08/20  0505 06/07/20  2024 06/07/20  1545 06/07/20  0524   WBC 5.7 6.4 6.0 6.6   RBC 2.17* 2.22* 2.22* 2.36*   HGB 7.2* 7.3* 7.3* 7.7*   HCT 23.1* 23.6* 23.2* 24.5*   * 106* 105* 104*   MCH 33.2* 32.9 32.9 32.6   MCHC 31.2* 30.9* 31.5 31.4*   RDW 24.4* 24.3* 24.2* 24.0*   PLT 31* 33* 41* 33*     INR  Recent Labs   Lab 06/08/20  0505 06/07/20  1545 06/07/20  0524 06/06/20  0441   INR 2.50* 2.17* 2.32* 2.09*     LFTs  Recent Labs   Lab 06/08/20  0505 06/07/20  1545 06/07/20  0524 06/05/20  0530   ALKPHOS 154* 157* 172* 193*   AST 24 24 26 38   ALT 16 17 18 22   BILITOTAL 9.6* 9.4* 9.4* 9.2*   PROTTOTAL 5.2* 5.7* 4.9* 4.1*   ALBUMIN 3.5 4.0 3.2* 2.0*      PANCNo lab results found in last 7 days.   MELD-Na score: 32 at 6/8/2020  5:05 AM  MELD score: 32 at  6/8/2020  5:05 AM  Calculated from:  Serum Creatinine: 2.08 mg/dL at 6/8/2020  5:05 AM  Serum Sodium: 137 mmol/L at 6/8/2020  5:05 AM  Total Bilirubin: 9.6 mg/dL at 6/8/2020  5:05 AM  INR(ratio): 2.50 at 6/8/2020  5:05 AM  Age: 54 years 8 months      Imaging Results:  US abd w doppler 6/7/20  Impression:   1.  Hepatic cirrhosis with moderate ascites.  2.  Patent Doppler evaluation.  3.  Splenomegaly.    IMPRESSION:  Monalisa Olguin is a 54 year old female with a history of alcohol cirrhosis abstinent since 9/2019 complicated by ascites, history SBP, ALFREDO,  nephrectomy (donor 2000), hepatic encephalopathy, concerns for metabolic cognitive changes related to alcohol, possible dementia who was admitted with hepatic encephalopathy likely related to medication nonadherence.    RECOMMENDATIONS:  1.  Hepatic encephalopathy  -Has had improvement in hepatic encephalopathy and remains on lactulose and rifaximin.  Continue with goal stool output 3-5 BMs per day.  With her possible metabolic changes versus dementia, she may need more assistance at home as per neuropsych testing to help manage her medications.  -Diagnostic paracentesis today to complete infectious work-up.  Blood cultures negative.  -Electrolyte changes can also precipitate EGD, with her chloride, calcium, mild sodium increase, would remove free water restriction.    2.  Alcohol cirrhosis  -Currently in liver transplant EVALUATION.  She did undergo CD evaluation with recommendations for treatment program.  Neuropsych testing was concerning for nonreversible causes of altered mental status.  She will follow-up with her primary hepatologist for continued transplant evaluation.  -Reinforced with patient that she is currently not listed for liver transplant and above results of testing are concerning that she may not be a candidate.  -MELD 32, ABO O  -Ultrasound 6/7 without evidence of HCC    3.  Ascites  4.  History of SBP  -Continue ciprofloxacin 250 mg  daily  -Diagnostic para today diuretics held given ALFREDO    5.  Esophageal varices  -Most recent EGD 4/2019 with no varices    Patient was discussed with attending physician, Dr. Leventhal    Next follow up appointment: Dr. Kovacs 7/27    Thank you for the opportunity to be involved with  Monalisa Olguin [facility]. Please call with any questions or concerns.    IVETTE Suarez, CNP  Inpatient Hepatology [facility]  [phone]  Text Link

## 2020-06-08 NOTE — PROGRESS NOTES
[facility], [place]    Progress Note - Terrence 2 Service        Date of Admission:  6/2/2020    Assessment and Plan:   Monalisa Olguin is a 54 year old female with history of ACD, decompensated liver cirrhosis, CKD, hyperparathyroidism who was brought to ED by her  with AMS.     Today:  - Follow up on GI, renal, and hemo/onc recs   - Cont lactulose 20mg TID with additional prn   - 1L of IVF   - MAP goal >55 with systolic BP >90  - F/u PTHrP, PIP    Encephalopathy  Likely etiology is toxic metabolic and/or hepatic encephalopathy. Ammonia is normal. BUN is elevated. On admission patient with severe hypercalcemia and moderate hyponatremia, which were likely contributing and improved with IVF resuscitation. Patient is afebrile, hemodynamically stable, without leukocytosis or systemic symptoms concerning for infection. Low suspicion for SBP as patient without abdominal pain and on ciprofloxacin prophylaxis. Head CT unremarkable. Continued mild encephalopathy despite correction of hyponatremia and hypercalcemia. Neuropysch testing on 5/27 was concerning for dementia-like symptoms related to past alcoholism. Neurology consulted with 24hr vide EEG 6/5 - 6/6. Quite possible patient has baseline confusion and [place] protocol resulting in too much stool output likely contributing to ALFREDO.   - Neurology consult, appreciate recs - vEEG prelim read shows no signs of seizure  - Lactulose 20mg TID with 20mg PRN x2 (goal 3-4 bm/day)  - Rifaximin 550mg BID   - Delirium precautions  - Avoid narcotics     Alcoholic liver cirrhosis (hx of ascites and HE)  Thrombocytopenia   Follows with Dr. Kovacs of Hepatology. Currently undergoing evaluation for transplant but is likely a poor candidate given recent neuropsychiatric evaluation that raises concern for underlying dementia most likely secondary to chronic EtOH. Patient denies recent alcohol use on admission. Ascites and encephalopathy addressed  above. No recent evidence of GI bleed.   - Hold acamprosate in setting of acute kidney injury   - Cont pta ciprofloxacin, folic acid, vitamin B1, and rifaximin as above  - Lactulose as above   - Hold lasix and spironolactone  - Hepatology consult given persistent HE and concern for HRS     MELD-Na score: 32 at 6/8/2020  5:05 AM  MELD score: 32 at 6/8/2020  5:05 AM  Calculated from:  Serum Creatinine: 2.08 mg/dL at 6/8/2020  5:05 AM  Serum Sodium: 137 mmol/L at 6/8/2020  5:05 AM  Total Bilirubin: 9.6 mg/dL at 6/8/2020  5:05 AM  INR(ratio): 2.50 at 6/8/2020  5:05 AM  Age: 54 years 8 months       Hypotension, improving  The patient was notably hypotensive to 60s/30s on 6/5/20, with MAPs in 50s. No dizziness, LOC, or other systemic symptoms, but was more lethargic. 1L NS bolus administered, followed by 25g albumin x 1. BP improved to low /40-50s with MAP in 70s. Patient also received blood products.  - Closely monitor vitals q6H  - MAP goal >55 with systolic BP >90    ALFREDO on CKD stage IV  Unilateral kidney (s/p nephrectomy)  Multifactorial in etiology and secondary prerenal azotemia d/t hypovolemia vs HRS, hypercalcemia, and/or ATN. Initially improved with IVF. Patient developed hyperchloremic metabolic acidosis after multiple L of NS, resolved with bicarb guided by renal. Renal US unremarkable. Continues to have worsening renal function, likely due to hypoperfusion. Albumin 1g/kg x72hrs (6/5-6/7) with improved creatinine.   - Hold lasix and spironolactone  - Daily BMP and Ca  - Renal consulted, appreciate recs      Hypercalcemia, severe, resolved  Likely secondary to chronic liver disease. Additional workup was performed. Improving with IVF, calcitonin, and bicarbonate. PTH appropriately low. Vitamin D, SPEP, and Lc labs unremarkable. PTHrP pending.   - Follow up on pending studies   - Daily BMP, Ca  - Renal consulted, appreciate recs      Hyponatremia, resolved   Hypovolemic hyponatremia. Serum osmols elevated  likely due to elevated BUN as serum osmolar gap normal. Sodium normalized after fluid resuscitation.      Acute on chronic macrocytic anemia   Multifactorial in etiology and 2/2 ESLD and CKD. Hgb on admission 9.3 down to 6.9 on am of 6/5. No evidence for GIB. Most likely hemodilution from fluid resuscitation. Hemolysis labs were unremarkable but peripheral smear showed RBC fragments raising concern for TTP vs DIC. Hem/Onc was consulted with repeat hemolysis labs ordered. Etiology most likely hemolysis related to ESLD.   - CTM     Orthostatic hypotension  - Cont midodrine 10 mg TID      Depression and anxiety  - Cont abilify, buspar, and fluoxetine         Diet: Na restriction <2g, fluid restriction <2L    Fluids: Albumin challenge  DVT Prophylaxis: Pneumatic Compression Devices  Gonzalez Catheter: Not present  Code Status: Full, patient encephalopathic       Edgardo Price MD  Internal Medicine, PGY1  [phone]    Pt was seen and examined by me; mental status waxes and wanes, but overall, improving.  Was up walking up around the peng today, and sitting in the sunroom for some time.  Nearing potential discharge once ALFREDO resolves.  I agree with the detailed A/P documentation above.    Stella Suero MD    _____________________________________________________________    Interval History   Nursing notes reviewed. NAEON. Patient sitting up in bed. Alert, following commands, and answering questions. AOx2.    Physical Exam   Vital Signs: Temp: 98.5  F (36.9  C) Temp src: Oral BP: (!) 92/37 Pulse: 76   Resp: 16 SpO2: 92 % O2 Device: None (Room air)    Weight: 134 lbs 9.6 oz     General: Sitting upright, breathing comfortably on RA, jaundiced  Respiratory: normal respiratory effort   GI:  Abdomen distended but soft, non-tender to palpation, BS present   Skin: Warm, dry. Severely jaundiced  Musculoskeletal: No peripheral edema  Neuro: Alert and oriented to person/place/time/location    Data   Recent Labs   Lab  06/08/20  0505 06/07/20 2024 06/07/20  1545 06/07/20  1413 06/07/20  0524   WBC 5.7 6.4 6.0  --  6.6   HGB 7.2* 7.3* 7.3*  --  7.7*   * 106* 105*  --  104*   PLT 31* 33* 41*  --  33*   INR 2.50*  --  2.17*  --  2.32*     --   --  137 135   POTASSIUM 3.4  --   --  3.6 3.6   CHLORIDE 112*  --   --  109 112*   CO2 18*  --   --  19* 18*   BUN 52*  --   --  54* 55*   CR 2.08*  --   --  2.28* 2.47*   ANIONGAP 7  --   --  9 5   ELSIE 9.6  --   --  9.9 9.6   *  --   --  125* 94   ALBUMIN 3.5  --  4.0  --  3.2*   PROTTOTAL 5.2*  --  5.7*  --  4.9*   BILITOTAL 9.6*  --  9.4*  --  9.4*   ALKPHOS 154*  --  157*  --  172*   ALT 16  --  17  --  18   AST 24  --  24  --  26

## 2020-06-08 NOTE — PLAN OF CARE
Assumed cares [id number]. AxO to self, place, and situation; disoriented to time. VSS on RA except slightly hypotensive. Denies pain and nausea. Voiding adequately and spontaneously, Bmx2. On 2 g Na diet and 2L FR, tolerating well. Consistently HE stage 1. Ax1 to the bathroom. BA in place. No acute events. Continue to monitor and follow plan of care.

## 2020-06-08 NOTE — PLAN OF CARE
Assumed cares 0700 to 1500. Disoriented to time. Up with 1 assist. Bed and chair alarm for safety. Scheduled Lactulose given. Denies pain. Low BP. 1L LR bolus started. Lung sounds coarse. Denies SOB. Pt currently in IR for paracentesis.

## 2020-06-08 NOTE — PROGRESS NOTES
[facility], [place]    Progress Note - Nephrology Service        Date of Admission:  6/2/2020    Assessment & Plan   Monalisa Olguin is a 54 year old female with a PMH of decompensated alcoholic cirrhosis and CKD IV, who presented with encephalopathy found to have hypercalcemia, acute kidney injury and hyponatremia, course complicated by recurrent ALFREDO in the setting of hypotension of unclear etiology (infection, bleeding, hypovolemia).      #Acute non-oliguric ALFREDO on CKD stage IV-improving   #Solitary kidney  #Hepatic encephalopathy   #Hypervolemia   #Vitamin D deficiency   Renal function continues to improve (2.47-->2.28-->2.08), urine output not clearly documented but seems to be picking up. BP remains soft but stable. Completed albumin challenge, agree with ongoing albumin and crystalloid administration. Abdominal US with moderate ascites, plan for diagnostic paracentesis today, will follow-up results. Overall mentation appears to be improving as well. Likely decompensated due to hypercalcemia and non compliance.   -Plan for paracentesis today  -Additional albumin/crystalloid as needed   -Trend creatinine daily, avoid nephrotoxins   -Continue to hold diuretics   -Hepatology following-under evaluation for transplant      #Non PTH dependant Hypercalcemia   Improvement in calcium, ical minimally elevated at 5.3. Etiology remains unclear - SPEP/KL Ratio neg. Vit D Low. 8AM cortisol acceptable. Awaiting PTH-RP. Will continue to follow closely, may consider bisphosphonate (low dose) if hypercalcemia rebounds. Recommend ongoing volume resuscitation given hypotension as able. May need to obtain additional imaging if cause cannot be found.      #Non anion gap metabolic acidosis  No active issues, serum bicarbonate acceptable at 18. Likely low due to large stool output.     The patient's care was discussed with the Attending Physician, Dr. Shane.    Kj Parkinson MD  Nephrology  Service  [facility], [place]  Pager: [phone]  Please see sticky note for cross cover information  ______________________________________________________________________    Interval History   No acute events overnight. Denies new pain, fevers, chills or edema. Oriented to person, and day, not month. Overall more alert. Awaiting diagnostic paracentesis. 4 point ROS otherwise negative.     Data reviewed today: I reviewed all medications, new labs and imaging results over the last 24 hours. I personally reviewed the abdominal US image(s) showing moderate ascites, patenet hepatic vasculature.    Physical Exam   Vital Signs: Temp: 98.5  F (36.9  C) Temp src: Oral BP: (!) 92/37 Pulse: 76   Resp: 16 SpO2: 92 % O2 Device: None (Room air)    Weight: 134 lbs 9.6 oz  General Appearance: More alert appearing, sitting up in bed, oriented to person, day but not month  Respiratory: CTAB, no wheezing or rales   Cardiovascular: S1/S2, RRR. No murmur appreciated.   GI: Soft, distended but non tender. BS present.   Skin: No peripheral edema   Other: Jaundiced      Data   Recent Labs   Lab 06/08/20  0505 06/07/20  2024 06/07/20  1545 06/07/20  1413 06/07/20  0524   WBC 5.7 6.4 6.0  --  6.6   HGB 7.2* 7.3* 7.3*  --  7.7*   * 106* 105*  --  104*   PLT 31* 33* 41*  --  33*   INR 2.50*  --  2.17*  --  2.32*     --   --  137 135   POTASSIUM 3.4  --   --  3.6 3.6   CHLORIDE 112*  --   --  109 112*   CO2 18*  --   --  19* 18*   BUN 52*  --   --  54* 55*   CR 2.08*  --   --  2.28* 2.47*   ANIONGAP 7  --   --  9 5   ELSIE 9.6  --   --  9.9 9.6   *  --   --  125* 94   ALBUMIN 3.5  --  4.0  --  3.2*   PROTTOTAL 5.2*  --  5.7*  --  4.9*   BILITOTAL 9.6*  --  9.4*  --  9.4*   ALKPHOS 154*  --  157*  --  172*   ALT 16  --  17  --  18   AST 24  --  24  --  26     No results found for this or any previous visit (from the past 24 hour(s)).

## 2020-06-08 NOTE — PROGRESS NOTES
Interventional Radiology Intra-procedural Nursing Note    Patient Name: Monalisa Olguin  Medical Record Number: [id number]  Today's Date: June 8, 2020       Start Time: 1500  End of procedure time: 1520  Procedure: Image guided paracentesis, diagnostic  Fire Safety Score: 1    Consent Review/Timeout Performed by: Ld Eddy PA-C  Procedure Performed By: Ld GHOSH-C    Procedure start time: 1500  Puncture time: 1502  Procedure end time: 1520    Report given to: Tabitha SAENZ  Time pt departs: 1530  : PHILLIP    Other Notes:  Alert femlae transported via cart from inpatient room to IR Procedure Room 4 for planned intervention.  ID band confirmed and patient acknowledges understanding of planned procedure. Patient repositioned to procedure table via hover-mat and positioned supine.  Patient prepped and draped per policy see VS flowsheet, MAR for further information.       Right abdomen site identified using image guidance.  A total of 3000 cc of margie colored fluid obtained via aspiration.    Patient condition post procedure is stable.   Patient returned to inpatient room for post-procedure monitoring.    Diane Holloway RN

## 2020-06-08 NOTE — PROCEDURES
Interventional Radiology Brief Post Procedure Note    Procedure: IR PARACENTESIS    Proceduralist: Ld Eddy PA-C    Assistant: None    Time Out: Prior to the start of the procedure and with procedural staff participation, I verbally confirmed the patient s identity using two indicators, relevant allergies, that the procedure was appropriate and matched the consent or emergent situation, and that the correct equipment/implants were available. Immediately prior to starting the procedure I conducted the Time Out with the procedural staff and re-confirmed the patient s name, procedure, and site/side. (The Joint Commission universal protocol was followed.)  Yes    Medications   Medication Event Details Admin User Admin Time       Sedation: None. Local Anesthestic used    Findings: Completed ultrasound guided diagnostic and therapeutic paracentesis. A total of 3000 ml of clear dark yellow fluid was removed without difficulty. A sample of fluid was sent to lab for diagnostic testing. Patient tolerated the procedure well.     Estimated Blood Loss: Minimal    SPECIMENS: Fluid and/or tissue for laboratory analysis    Complications: 1. None     Condition: Stable    Plan: Follow-up per primary team.     Comments: See dictated procedure note for full details.    Ld Eddy PA-C

## 2020-06-08 NOTE — CONSULTS
A collaboration between [facility] and [facility]  Experts in minimally invasive, targeted treatments performed using imaging guidance    Interventional Radiology Consult Service Note    Patient Name:  Monalisa Olguin   YOB: 1965  Medical Record Number (MRN):  [id number]  Age:  54 year old female    -----    Reason for Consult  diagnostic paracentesis      Ordered By    6/7/2020  1:56 PM  Angélica Hernandez MD - [id number]  Call back #  [phone]    Stella Christiansen MD - [id number]  Attending     INR - 2.5  Plts - 31    copied: [54 year old female with history of ACD, decompensated liver cirrhosis, CKD, hyperparathyroidism who was brought to ED by her  with AMS. ]    US 6/7/20 shows ascites.        Diagnostics ordered on ascites.     -----    PLAN:     Request, patient data, and imaging reviewed.     IR will place on procedure list as able. May be done today or tomorrow depending on workflow.     [phone] (IR RN control desk)  [phone] ([place] IR call pager)    BRISA Diaz, PA-C  Physician Assistant - Certified  Interventional Radiology

## 2020-06-08 NOTE — PLAN OF CARE
PT 5B: Up with SBA using gait belt and FWW. CAM boot on L LE and tennis shoe for R LE    Discharge Planner PT   Patient plan for discharge: Did not discuss  Current status: Engaged pt in bed mobility at A, donning CAM with min A, sit <> stand transfers at CGA, gait without FWW ~300ft at CGA with multiple LOB including a LOB requiring mod A to prevent fall. FWW placed in room for use with nursing.   Barriers to return to prior living situation: medical status, cognition, supervision available at home  Recommendations for discharge: TCU  Rationale for recommendations: Pt is below her baseline for mobility and will benefit from continued rehab to return to PLOF. She is limited by impairments to strength, balance, and endurance.        Entered by: Lilliam Menjivar 06/08/2020 2:37 PM

## 2020-06-08 NOTE — CONSULTS
Hematology consult note    Date of service:  June 8, 2020    Reason for consultation: RBC fragmentation on peripheral blood smear concerning for MAHA     History of present illness:  Ms. Olguin is a 54 years old female with a history of alcohol induced cirrhosis (ascites and hepatic encephalopathy), CKD (BL Cr 1.6-2.5), s/p nephrectomy who was hospitalized for AMS on 6/2/20.    Her admission labs were significant for elevated calcium 13.3, mild hyponatremia 129 and acute on CKD (cr 3.18) besides possible hepatic encephalopathy. Her condition has been improved with aggressive hydration as well as hepatic encephalopathy directed therapy.    Upon admission, she had PLT 83K and Hb 9.3 which actually appeared higher than her baseline (PLT 40-60K and Hb 7-8). TCP and anemia got worse gradually than her baseline. Peripheral blood smear on 6/5/20 revealed rare to occasional RBC fragmentation. This finding in light of AMS and possible worsening TCP/anemia raised a concern of possible microangiopathic hemolytic anemia.    PEx:  /45 (BP Location: Left arm)   Pulse 76   Temp 98.4  F (36.9  C) (Oral)   Resp 20   Ht 1.524 m (5')   Wt 58.2 kg (128 lb 3.2 oz)   LMP 05/16/2012   SpO2 97%   BMI 25.04 kg/m    General:  no acute distress. Thin lady with dark skin  HEENT: NCAT. Icteric sclera.   Neck: Neck supple.   Respiratory: Non-labored breathing, good air exchange, lungs clear to auscultation bilaterally.  Cardiovascular: Regular rate and rhythm. No murmur or rub.   Abdomen: Normoactive bowel sounds. Abdomen soft, mildly distended, and non-tender. Splenomegaly presnet.  Extremities: grossly normal, non-tender, no edema. Good strength and ROM.    Laboratory findings:     5/11/2020 17:28 6/2/2020 10:12 6/5/2020 07:29 6/7/2020 15:45   Lactate Dehydrogenase   146 118   Haptoglobin   55 41   Bilirubin Direct 12.3 (H) 12.2 (H)  7.0 (H)   Hemoglobin 8.8 (L) 9.3 (L) 7.2 (L) 7.3 (L)   Platelet Count 62 (L) 83 (L) 42 (LL) 41  (LL)   Absolute Retic   90.7 86.8      6/2/2020 10:12 6/5/2020 05:30 6/6/2020 04:41 6/7/2020 14:13 6/8/2020 05:05   Creatinine 3.18 (H) 2.91 (H) 2.83 (H) 2.28 (H) 2.08 (H)     Assessment:  # Moderate macrocytic normochromic anemia  # Moderate thrombocytopenia  # Long standing decompensated cirrhosis  With peripheral blood smear finding in the setting of initial confounding presentation (elevated bilirubin, worsening renal function and AMS), MAHA was suspected but both normal LDH and haptoglobin basically ruled out hemolysis process.Her anemia and thrombocytopenia are most likely from her underlying decompensated cirrhosis.    Her clinical condition seems improving appropriately in response to conservative treatments.    Plans:  - No hemolysis nor microangiopathy  - Will sign off. Please call us if any questions.    The patient was seen and care plans discussed with Dr. Noel.    Se mary Padron MD  [facility]  Hematology oncology and transplantation fellow  Pager [phone]

## 2020-06-08 NOTE — PLAN OF CARE
Discharge Planner OT   Patient plan for discharge: Did not discuss today   Current status: Therapist administered MoCA (version 8.2) to assess cog skills for independent living. Pt scored 7/30 with MIS score of 3/15. Pt demo'd difficulty with sustaining attention, visuospatial, conceptual thinking, language, abstraction, and memory. Educated compensatory strategies of environmental clues for orientation, pt verbalized understanding.   Barriers to return to prior living situation: medical status, strength, cognition   Recommendations for discharge: TCU  Rationale for recommendations: to maximize independence in ADLs        Entered by: Blanca Bone 06/08/2020 2:31 PM

## 2020-06-09 NOTE — PLAN OF CARE
PT 5B: Up with CGA + gait belt + FWW + boot on L LE.     Discharge Planner PT   Patient plan for discharge: Home  Current status: Engaged pt in bed mobility IND, sit <> stand at SBA, gait with FWW at CGA with LOB during turning requiring min-mod A to prevent a fall, ascending/descending 3 steps with B hand railings at SBA.  Barriers to return to prior living situation: medical status,  works during the day, cognition  Recommendations for discharge: TCU  Rationale for recommendations: Pt is still below her baseline for mobility and will benefit from continued rehab to improve strength, balance, and endurance. Pt at increased risk for falls.        Entered by: Lilliam Menjivar 06/09/2020 9:30 AM

## 2020-06-09 NOTE — PROGRESS NOTES
Social Work: Assessment with Discharge Plan    Patient Name:  Monalisa Olguin  :  1965  Age:  54 year old  MRN:  [id number]  Risk/Complexity Score:  Filed Complexity Screen Score: 10  Completed assessment with:  Pt via phone, RNCC and chart review     Presenting Information   Reason for Referral:  Discharge plan  Date of Intake:  2020  Referral Source:  Chart Review  Decision Maker:  Pt when able   Alternate Decision Maker:  NOK - Pt's spouse   Health Care Directive:  Patient considering completing and Provided education  Living Situation:  House - pt lives in a home with her  and two children (24 and 15).   Previous Functional Status: Pt reports that she receives assistance with things such as bathing/showering and grabbing hard to reach items in the home from her family. Family assists in transportation as pt does not drive.   Patient and family understanding of hospitalization:  Unable to assess   Cultural/Language/Spiritual Considerations:  Pt identifies with [employer] of [employer] and her primary language is English  Adjustment to Illness:  Fair     Physical Health  Reason for Admission:    1. Hyponatremia    2. Altered mental status, unspecified altered mental status type    3. Asterixis    4. Confusion      Services Needed/Recommended:  TCU    Mental Health/Chemical Dependency  Diagnosis:  Alcohol use, marijuana, tobacco, anxiety, depression  Support/Services in Place:  Previous substance use treatment and medication management for mental health   Services Needed/Recommended:  None    Support System  Significant relationship at present time:  Pt's spouse  Family of origin is available for support:  Pt's three children   Other support available:  Three sisters   Gaps in support system:  None identified   Patient is caregiver to:  Child:  Pt has two children living in the home      Provider Information   Primary Care Physician:  Efren Bustos   [phone]   Clinic:  [address] [place] [address] /  [facility] [address]      :  None     Financial   Income Source:  Did not discuss   Financial Concerns:  No financial concerns reported   Insurance:    Payor/Plan Subscriber Name Rel Member # Group #   [employer]O [place]  [id number]      [address]       Discharge Plan   Patient and family discharge goal:  Pt indicated she spoke with her  and would like to return home   Provided education on discharge plan:  YES - Pt declining   Patient agreeable to discharge plan:  NO  A list of Medicare Certified Facilities was provided to the patient and/or family to encourage patient choice. Patient's choices for facility are:  Pt was provided with the Medicare Compare list for SNF.  Discussed associated Medicare star ratings to assist with choice for referrals/discharge planning Yes    Education was given to pt/family that star ratings are updated/maintained by Medicare and can be reviewed by visiting www.medicare.gov Yes  Will NH provide Skilled rehabilitation or complex medical:  YES  General information regarding anticipated insurance coverage and possible out of pocket cost was discussed. Patient and patient's family are aware patient may incur the cost of transportation to the facility, pending insurance payment: YES  Barriers to discharge:  Safe discharge plan     Discharge Recommendations   Anticipated Disposition:  TBD   Transportation Needs:  Other:  TBD   Name of Transportation Company and Phone:  TBD     Additional comments   Pt is a 54 year old female with history of ACD, decompensated liver cirrhosis, CKD, hyperparathyroidism who was brought to ED by her  with AMS.     CAROLINE called pt at bedside and introduced self and role. CAROLINE obtained social history and she indicated that she did not have any major changes in the last few months. CAROLINE provided education regarding recommendation for TCU. She reported that she spoke with her  and would like to return home. She  indicated that her daughter is home during the day and her  is home in the evenings.     SW received a VM indicated that pt is now agreeable for TCU and requested assistance with placement.     SW called pt who reported she is now open to TCU. Pt agreeable to two closet TCUs which are as follows:     - [place] [facility] [place] (ph: [phone] f:[phone]) - SW called and left a VM requesting a call back  - [facility] [place] (ph: [phone] f:[phone]) - SW called and left a VM     SW to follow up as needed    CATALINO Reyes, [facility]  Acute Care Float   [facility]  Pager: [phone]

## 2020-06-09 NOTE — PLAN OF CARE
Discharge Planner OT   Patient plan for discharge: planning to go home tomorrow     Current status:  /60 pre ax. CGA for sit <> stand transfer with FWW, slightly unsteady at times. Pt ambulated x 250 ft with CGA and FWW. Pt needed max verbal cues to find objects including pts room. Confused at times and makes odd statements. BP post ax 112/56. G/h tasks completed in standing with CGA- SBA as pt unsteady when distracted. SBA for toilet transfer. CGA for pericares. SBA to don L cam boot.     Barriers to return to prior living situation: medical status, strength, cognition     Recommendations for discharge: TCU    Rationale for recommendations: Pt would benefit from continued skilled OT services to improve independence and safety with ADL's and functional transfers as pt is not at baseline. However pt reports she is discharging home tomorrow and will have 24/7 supervision and A from family. Pt reports daughter A with medications. Pt does not drive at baseline. Would rec HHOT if pt does discharge home.        Entered by: Agnes Quiles 06/09/2020 10:19 AM

## 2020-06-09 NOTE — PROGRESS NOTES
[facility], [place]    Progress Note - [place] 2 Service        Date of Admission:  6/2/2020    Assessment and Plan:   Monalisa Olguin is a 54 year old female with history of ACD, decompensated liver cirrhosis, CKD, hyperparathyroidism who was brought to ED by her  with AMS.     Today:  - Recheck HGB at 1000  - Cont lactulose 20mg TID with additional prn   - 1L of IVF   - MAP goal >55 with systolic BP >90  - F/u PTHrP  - Stable for discharge to TCU, discussed with patient and      Encephalopathy  Likely etiology is toxic metabolic and/or hepatic encephalopathy. Ammonia is normal. BUN is elevated. On admission patient with severe hypercalcemia and moderate hyponatremia, which were likely contributing and improved with IVF resuscitation. Patient is afebrile, hemodynamically stable, without leukocytosis or systemic symptoms concerning for infection. Low suspicion for SBP as patient without abdominal pain and on ciprofloxacin prophylaxis. Head CT unremarkable. Continued mild encephalopathy despite correction of hyponatremia and hypercalcemia. Neuropysch testing on 5/27 was concerning for dementia-like symptoms related to past alcoholism. Neurology consulted with 24hr vide EEG 6/5 - 6/6. Quite possible patient has baseline confusion and [place] protocol resulting in too much stool output likely contributing to ALFREDO.   - Neurology consult, appreciate recs - vEEG prelim read shows no signs of seizure  - Lactulose 20mg TID with 20mg PRN x2 (goal 3-4 bm/day)  - Rifaximin 550mg BID   - Delirium precautions  - Avoid narcotics     Alcoholic liver cirrhosis (hx of ascites and HE)  Thrombocytopenia   Follows with Dr. Kovacs of Hepatology. Currently undergoing evaluation for transplant but is likely a poor candidate given recent neuropsychiatric evaluation that raises concern for underlying dementia most likely secondary to chronic EtOH. Patient denies recent alcohol use on admission.  Ascites and encephalopathy addressed above. No recent evidence of GI bleed.   - Hold acamprosate in setting of acute kidney injury   - Cont pta ciprofloxacin, folic acid, vitamin B1, and rifaximin as above  - Lactulose as above   - Hold lasix and spironolactone  - Hepatology consult given persistent HE and concern for HRS     MELD-Na score: 31 at 6/9/2020  4:58 AM  MELD score: 31 at 6/9/2020  4:58 AM  Calculated from:  Serum Creatinine: 1.76 mg/dL at 6/9/2020  4:58 AM  Serum Sodium: 137 mmol/L at 6/9/2020  4:58 AM  Total Bilirubin: 10.0 mg/dL at 6/9/2020  4:58 AM  INR(ratio): 2.41 at 6/9/2020  4:58 AM  Age: 54 years 8 months    Hypotension, improving  The patient was notably hypotensive to 60s/30s on 6/5/20, with MAPs in 50s. No dizziness, LOC, or other systemic symptoms, but was more lethargic. 1L NS bolus administered, followed by 25g albumin x 1. BP improved to low /40-50s with MAP in 70s. Patient also received blood products.  - Closely monitor vitals q6H  - MAP goal >55 with systolic BP >90    ALFREDO on CKD stage IV  Unilateral kidney (s/p nephrectomy)  Multifactorial in etiology and secondary prerenal azotemia d/t hypovolemia vs HRS, hypercalcemia, and/or ATN. Initially improved with IVF. Patient developed hyperchloremic metabolic acidosis after multiple L of NS, resolved with bicarb guided by renal. Renal US unremarkable. Continues to have worsening renal function, likely due to hypoperfusion. Albumin 1g/kg x72hrs (6/5-6/7) with improved creatinine.   - Hold lasix and spironolactone  - Daily BMP and Ca  - Renal consulted, appreciate recs      Hypercalcemia, severe, resolved  Likely secondary to chronic liver disease. Additional workup was performed. Improving with IVF, calcitonin, and bicarbonate. PTH appropriately low. Vitamin D, SPEP, and Lc labs unremarkable. PTHrP pending.   - Follow up on pending studies   - Daily BMP, Ca  - Renal consulted, appreciate recs      Hyponatremia, resolved   Hypovolemic  hyponatremia. Serum osmols elevated likely due to elevated BUN as serum osmolar gap normal. Sodium normalized after fluid resuscitation.      Acute on chronic macrocytic anemia   Multifactorial in etiology and 2/2 ESLD and CKD. Hgb on admission 9.3 down to 6.9 on am of 6/5. No evidence for GIB. Most likely hemodilution from fluid resuscitation. Hemolysis labs were unremarkable but peripheral smear showed RBC fragments raising concern for TTP vs DIC. Hem/Onc was consulted with repeat hemolysis labs ordered. Etiology most likely hemolysis related to ESLD.   - CTM  - Recheck hgb at 1000 for am level 6.9     Orthostatic hypotension  - Cont midodrine 10 mg TID      Depression and anxiety  - Cont abilify, buspar, and fluoxetine         Diet: Na restriction <2g  Fluids: Albumin challenge  DVT Prophylaxis: Pneumatic Compression Devices  Gonzalez Catheter: Not present  Code Status: Full, patient encephalopathic       Edgardo rPice MD  Internal Medicine, PGY1  [phone]    _____________________________________________________________    Interval History   Nursing notes reviewed. NAEON. Patient sitting up in bed. Alert, following commands, and answering questions. AOx3. No complaints.     Physical Exam   Vital Signs: Temp: 98.2  F (36.8  C) Temp src: Oral BP: 95/40 Pulse: 76 Heart Rate: 73 Resp: 16 SpO2: 95 % O2 Device: None (Room air)    Weight: 126 lbs 4.8 oz     General: Sitting upright, breathing comfortably on RA, jaundiced  Respiratory: normal respiratory effort, crackles bilateral lung bases   GI:  Abdomen distended but soft, non-tender to palpation, BS present   Skin: Warm, dry. Severely jaundiced  Musculoskeletal: No peripheral edema  Neuro: Alert and oriented to person/place/time/location    Data   Recent Labs   Lab 06/09/20  0458 06/08/20  0505 06/07/20  2024 06/07/20  1545 06/07/20  1413   WBC 5.2 5.7 6.4 6.0  --    HGB 6.9* 7.2* 7.3* 7.3*  --    * 107* 106* 105*  --    PLT 29* 31* 33* 41*  --    INR 2.41*  2.50*  --  2.17*  --     137  --   --  137   POTASSIUM 3.6 3.4  --   --  3.6   CHLORIDE 114* 112*  --   --  109   CO2 17* 18*  --   --  19*   BUN 45* 52*  --   --  54*   CR 1.76* 2.08*  --   --  2.28*   ANIONGAP 5 7  --   --  9   ELSIE 9.5 9.6  --   --  9.9   * 110*  --   --  125*   ALBUMIN 3.6 3.5  --  4.0  --    PROTTOTAL 5.0* 5.2*  --  5.7*  --    BILITOTAL 10.0* 9.6*  --  9.4*  --    ALKPHOS 150 154*  --  157*  --    ALT 15 16  --  17  --    AST 22 24  --  24  --      Internal Medicine Staff Addendum  Date of Service: 6/9/2020  I have seen and examined Ms. Martins, reviewed the data and discussed the plan of care with the patient and the care team on P&FC Rounds.  I agree with the above documentation     I discussed pt's care with bedside RN, case management/social work today.  I personally reviewed labs, medications and past 24 hr notes.  Assessment/Plan/Diagnoses: plan/dx as above, which contains my edits and reflects our joint medical decision-making.     Yohan Molina MD  Internal Medicine/Pediatrics Hospitalist & Staff Physician   of Internal Medicine and Pediatrics  [facility]  Pager: [phone]

## 2020-06-09 NOTE — PROVIDER NOTIFICATION
High. 5B. M2. 5235-2. B.S. Re: critical lab value. Please call LEONEL Oh RN. [phone]. [phone].    Text page sent to Terrence Azar MD via [employer] regarding critical Hgb of 6.9.    Call received from Dr. Carlson. MD stated that she would let the day team know. Day team will be starting in the next few minutes.    Dr. Price called to say that they would recheck the Hgb in a couple of hours.

## 2020-06-09 NOTE — PROGRESS NOTES
Care Coordinator - Discharge Planning    Admission Date/Time:  6/2/2020  Attending MD:  Yohan Molina MD     Data  Date of initial CC assessment:  6/3/2020  Chart reviewed, discussed with interdisciplinary team.   Patient was admitted for:   1. Hyponatremia    2. Altered mental status, unspecified altered mental status type    3. Asterixis    4. Confusion         Assessment   Full assessment completed in previous note    Coordination of Care and Referrals:   Per discussion with unit , patient is declining TCU placement. Patient contacted via hospital phone to further discuss discharge planning. Patient confirms that she declines TCU placement and would prefer to discharge to home. Patient confirms that she has 24hr assistance from family if needed. Patient is open to [facility] for RN/PT/POT, resumption orders in. Patient voiced no further discharge needs. Writer updated provide on patient's preference to discharge to home. Provider noted concern regarding patient's decisional capacity and will reach out to patient's spouse to discuss discharge planning, recommendation for 24hr assist if discharging to home, cognition status and overall plan of care. RNCC will continue to follow for discharge planning.     [facility]  Phone  [phone]  Fax  [phone] Addendum:  Per , provider spoke w/patient and her spouse, she is now agreeable to TCU placement. HC updated, RNCC will continue to follow.     Plan  Anticipated Discharge Date:  6/9 or 6/10?  Anticipated Discharge Plan:  Home w/24hr assist vs TCU    DEMETRIA Funes, BSN    [facility]  [address] [place] [id number]    [email or web address]    Office: [phone] Pager: [phone]  To contact the weekend RNCC, page [phone].

## 2020-06-09 NOTE — PLAN OF CARE
Assumed cares 1500 to 2330    /51 (BP Location: Left arm)   Pulse 76   Temp 98.2  F (36.8  C) (Oral)   Resp 18   Ht 1.524 m (5')   Wt 57.3 kg (126 lb 4.8 oz)   LMP 05/16/2012   SpO2 96%   BMI 24.67 kg/m      Pt is alert, some confusion, disoriented to time, stated it was 2010. Pt went to IR for paracentesis today, 3 L fluid removed, dressing on R abd C/D/I. Received albumin & LR. Left PIV SL. Pt is on scheduled midodrine for soft BPs & scheduled lactulose. Her [place] criteria has been Stage 1 at baseline for confusion. Has jaundiced skin & yellow sclera. SBA to bathroom, had 1 small diarrhea. Adequate urine output. Has hemorrhoids and blanchable redness on coccyx, barrier paste applied. On 2 gm Na+ diet w/ 2L fluid restriction, ate 75% of dinner. 96% o2 stats on RA. Coarse crackles LS. Denies pain/nausea/GI discomfort. VS q6h, no BG checks. On bed alarms.     Continue to monitor pt and follow POC.

## 2020-06-09 NOTE — PLAN OF CARE
Assumed cares 0700 to 1500. Alert and disoriented to situation. Denies pain. VSS on RA. Scheduled lactulose given. No BM this shift. Hgb recheck 8.1.

## 2020-06-10 NOTE — PROGRESS NOTES
[facility], [place]    Progress Note - Nephrology Service        Date of Admission:  6/2/2020    Assessment & Plan   Monalisa Olguin is a 54 year old female with a PMH of decompensated alcoholic cirrhosis and CKD IV, who presented with encephalopathy found to have hypercalcemia, acute kidney injury and hyponatremia, course complicated by recurrent ALFREDO in the setting of hypotension which has resolved.     #Acute non-oliguric ALFREDO on CKD stage IV-resolved  #Solitary kidney  Renal function continues to improve (2.47-->2.28-->2.08-->1.59), urine output not clearly documented but seems to be picking up. BP remains soft but stable. Completed albumin challenge, agree with ongoing albumin and crystalloid administration. Abdominal US without evidence of SBP. Agree with gentle resumption of aldactone.      #Non PTH dependant Hypercalcemia   #Vitamin D deficiency  Improvement in calcium, ical minimally elevated at 5.3. Etiology remains unclear - SPEP/KL Ratio neg. Vit D Low. 8AM cortisol acceptable. Awaiting PTH-RP. No further intervention, follow closely as an outpatient. If recurs or PTH-RP abnormal will need to consider additional imaging.      #Non anion gap metabolic acidosis  Bicarbonate trending down, likely due to both GI and renal losses. Would initiate bicarbonate supplement.   -Start sodium bicarbonate 650 mg BID      The patient's care was discussed with the Attending Physician, Dr. Shane.    Kj Parkinson MD  Nephrology Service  [facility], [place]  Pager: [phone]  Please see sticky note for cross cover information  ______________________________________________________________________  Attestation:  In brief:  Monalisa Olguin is a 54 year old female with ESLD and ALFREDO which is resolving    I personally reviewed major complaints, physical findings, investigations, medications, lab values, vital signs, I/O's and the overall management plan.       My key findings:  1. ALFREDO  Most likely secondary to an ATN  2. Hypotension: continue the midodrine  3.  Hypokalemia: consider low dose spironolactone to maintain potassium  4. Acidosis: Should eventually improve on own but until then may initiate sodium bicarbonate BID    Stella Shane MD MS FNKF  Date of service (date I saw patient) Estrellita 10.2020      Interval History   No acute events overnight. Feeling well this morning. Denies pain. No fevers or chills. Making urine. Anticipating discharge to TCU.     Data reviewed today: I reviewed all medications, new labs and imaging results over the last 24 hours. I personally reviewed no images or EKG's today.    Physical Exam   Vital Signs: Temp: 96.9  F (36.1  C) Temp src: Axillary BP: 96/41 Pulse: 71 Heart Rate: 73 Resp: 16 SpO2: 95 % O2 Device: None (Room air)    Weight: 126 lbs 4.8 oz  General Appearance: Alert, oriented x3, sitting up in chair   Respiratory: CTAB, no wheezing or rales   Cardiovascular: S1/S2, RRR. No murmurs.   GI: Soft, mildly distended abdomen. Non tender.   Skin: No rash, jaundiced     Data   Recent Labs   Lab 06/10/20  0503 06/09/20  1054 06/09/20  0458 06/08/20  0505   WBC 6.1  --  5.2 5.7   HGB 7.4* 8.1* 6.9* 7.2*   *  --  104* 107*   PLT 34*  --  29* 31*   INR 2.17*  --  2.41* 2.50*     --  137 137   POTASSIUM 3.7  --  3.6 3.4   CHLORIDE 115*  --  114* 112*   CO2 17*  --  17* 18*   BUN 38*  --  45* 52*   CR 1.59*  --  1.76* 2.08*   ANIONGAP 7  --  5 7   ELSIE 9.4  --  9.5 9.6   *  --  107* 110*   ALBUMIN 3.3*  --  3.6 3.5   PROTTOTAL 4.8*  --  5.0* 5.2*   BILITOTAL 11.3*  --  10.0* 9.6*   ALKPHOS 161*  --  150 154*   ALT 16  --  15 16   AST 22  --  22 24     No results found for this or any previous visit (from the past 24 hour(s)).

## 2020-06-10 NOTE — PROGRESS NOTES
CLINICAL NUTRITION SERVICES - ASSESSMENT NOTE     Nutrition Prescription    RECOMMENDATIONS FOR MDs/PROVIDERS TO ORDER:  If anticipated LOS > 72 hours, please order calorie counts to quantify adequacy of PO intake.     Recommend consideration of an appetite stimulant (Remeron, Marinol, or Megace) to assist with improvement of PO intake if no contraindications.    Consider re-checking phos level (was 2.2 on 6/3 when last checked).     Malnutrition Status:    Unable to determine due to unable to obtain all parameters d/t conserving PPE and social distancing during Covid-19 pandemic.     Recommendations already ordered by Registered Dietitian (RD):  Supplements - adjusted so pt will receive Boost supplements BID between meals    Future/Additional Recommendations:  Monitor adequacy of PO. If continues to be inadequate (<50% nutritionally adequate meals/supplements TID), recommend:   --Nutrition education as appropriate  --Adjust supplements per pt's preference  --Initiate enteral nutrition if pt agreeable     REASON FOR ASSESSMENT  Monalisa Olguin is a/an 54 year old female assessed by the dietitian for LOS.    Chart Review:   PMH significant for tobacco abuse, anxiety, depression, ACD, decompensated alcoholic liver cirrhosis (hx of ascites and HE), CKD IV, hyperparathyroidism. Admitted 6/2/20 with AMS. Currently at baseline mental status. C/f underlying dementia most likely 2/2 chronic EtOH. Plan for discharge to TCU pending placement.    NUTRITION HISTORY  Pt known to Clinical Nutrition. Last assessed by outpatient RD on 5/12/20 via phone for liver transplant evaluation. At this time pt reported following a regular diet without any Na+ restrictions with the following recall:   Breakfast Toast or cereal    Lunch popsicles or TV dinner    Dinner 1/3 burger    Snacks Cookies, pretzels, cheese sticks    Beverages Ice, water/flavored water, some juice    Dining out Unknown    She noted poor appetite and vomiting after  "eating some foods. Low Na+ diet education was provided, protein sources discussed, and post transplant diet guidelines were briefly reviewed.     Unable to obtain in-person nutrition history or nutrition focused physical assessment (NFPA) from patient as the number of staff going into rooms is restricted to limit exposure and to minimize use of PPE. Attempted to speak with pt via phone, however no answer on multiple attempts.     CURRENT NUTRITION ORDERS  Diet: 2 g Sodium, Room Service Not Appropriate (half portions per pt request), Supplements: Boost Plus with meals     Intake/Tolerance: Per flowsheets, pt with variable intake consuming 25-75% of ~2 meals daily. Per RN notes, poor appetite but was noted to be improving on 6/6 and 6/7. Spoke with pt's RN who reports that pt's appetite has been poor-fair. She reports that pt never refuses meals, but she typically only eats small amounts (for example at lunch today only ate a few bites of pasta).     GI: Per intake/output, last BM documented today with 2x occurrences. Abdomen noted to be distended but soft and non-tender with bowel sounds present.     LABS  Labs reviewed  Na+ 139 (WNL), K+ 3.7 (WNL)  Glucose Trends 101 <- 107  T Bili 11.3 (H)    Renal: ALFREDO (resolved) on CKD IV. Unilateral kidney s/p nephrectomy (donated kidney). Nephrology following.   BUN 38 (H), Cr 1.59 (H) - trending down    MEDICATIONS  Medications reviewed  Folic acid 1 mg, Lactulose, Thiamine 250 mg, Ursodiol    ANTHROPOMETRICS  Height: 152.4 cm (5' 0\")  Most Recent Weight: 57.3 kg (126 lb 4.8 oz)    IBW: 45 kg (127% IBW)  BMI: Normal BMI  Weight History:   Wt Readings from Last 20 Encounters:   06/08/20 57.3 kg (126 lb 4.8 oz)   05/26/20 (P) 61.2 kg (135 lb)   05/07/20 61.3 kg (135 lb 3.2 oz)   04/28/20 62.1 kg (137 lb)   04/28/20 61.2 kg (135 lb)   04/24/20 60.2 kg (132 lb 12.8 oz)   04/21/20 58 kg (127 lb 13.9 oz)   01/27/20 56 kg (123 lb 8 oz)   11/25/19 58.4 kg (128 lb 11.2 oz)   11/21/19 " 55.8 kg (123 lb)   11/18/19 56.2 kg (123 lb 14.4 oz)   09/18/19 58.3 kg (128 lb 9.6 oz)   09/17/19 58.1 kg (128 lb)   08/12/19 64 kg (141 lb 3.2 oz)   07/12/19 55.4 kg (122 lb 3.2 oz)   07/11/19 55.3 kg (122 lb)   06/27/19 56.9 kg (125 lb 6.4 oz)   03/20/19 61.1 kg (134 lb 12.8 oz)   01/03/19 60.4 kg (133 lb 3.2 oz)   10/03/18 57.5 kg (126 lb 11.2 oz)   Over the past year, weight has fluctuated between 55.3 - 64 kg. Cannot r/o shifts in fluid status. Sicne 5/26/20, pt has lost 3.9 kg (6% body weight), which is clinically significant if all true weight loss.     Per previous RD assessment, pt reported stable weight in the range of 120-135 lbs with some muscle loss in her upper body (unknown degree).     Dosing Weight: 53 kg (actual) - based on lowest documented wt so far this adm (53.1 kg on 6/3) and IBW of 45 kg    ASSESSED NUTRITION NEEDS  Estimated Energy Needs: 1325 - 1590 kcals/day (25 - 30 kcals/kg)  Justification: Maintenance  Estimated Protein Needs: 64 - 80 grams protein/day (1.2 - 1.5 grams of pro/kg)  Justification: Increased needs (pending renal function, may need to aim for lower end)  Estimated Fluid Needs: 1 mL/kcal  Justification: Maintenance or Per provider pending fluid status    PHYSICAL FINDINGS  See malnutrition section below.  Skin: severely jaundiced per chart review.    MALNUTRITION  % Intake: < 75% for > 7 days (non-severe) - at least  % Weight Loss: Difficult to interpret d/t suspected confounding fluid  Subcutaneous Fat Loss: Unable to assess  Muscle Loss: Unable to assess  Fluid Accumulation/Edema: None noted per chart review  Malnutrition Diagnosis: Unable to determine due to unable to obtain all parameters d/t conserving PPE and social distancing during Covid-19 pandemic.     NUTRITION DIAGNOSIS  Inadequate oral intake related to lack of appetite inhibiting ability to meet nutrition needs PO as evidenced by nursing documentation showing variable intake; pt consuming 25-75% of ~2 meals  daily.      INTERVENTIONS  Implementation   Collaboration with other providers - discussed nutrition POC with RN. RN will encourage pt to eat meals and consume Boost supplements as tolerated.     Medical food supplement therapy - continue    Goals  Patient to consume % of nutritionally adequate meal trays TID, or the equivalent with supplements/snacks.     Monitoring/Evaluation  Progress toward goals will be monitored and evaluated per protocol.    Hema Stapleton MS, RD, LD  5A/5B floor pager [phone]

## 2020-06-10 NOTE — PLAN OF CARE
Discharge Planner OT   Patient plan for discharge: TCU  Current status: Pt required Min A and vc's to don/doff LLE CAM boot and shoe. Pt completed transfer supine<->seated EOB with CGA and vc's. Pt required CGA, vc's and FWW for transfer sit<->standing ambulation ~150ft x3. Pt completed toilet transfer and cares with CGA/Min A and vc's. Pt tolerated therapy session well. VSS.   Barriers to return to prior living situation: Decreased independence with functional transfers/ADLs. Decreased strength/endurance, impaired cognition.   Recommendations for discharge: TCU  Rationale for recommendations: Pt will benefit from continued therapy to address barriers above and to maximize functional independence and safety.        Entered by: Ld Mendoza 06/10/2020 6:13 PM

## 2020-06-10 NOTE — PROGRESS NOTES
[facility], [place]    Progress Note - Terrence 2 Service        Date of Admission:  6/2/2020    Assessment and Plan:   Monalisa Olguin is a 54 year old female with history of ACD, decompensated liver cirrhosis, CKD, hyperparathyroidism who was brought to ED by her  with AMS.     Today:  - Restart spironolactone at 25 mg (home dose 50mg)  - Restart gabapentin at bedtime   - TCU placement pending     Encephalopathy  Likely etiology is toxic metabolic and/or hepatic encephalopathy. Ammonia is normal. BUN is elevated. On admission patient with severe hypercalcemia and moderate hyponatremia, which were likely contributing and improved with IVF resuscitation. Patient is afebrile, hemodynamically stable, without leukocytosis or systemic symptoms concerning for infection. Low suspicion for SBP as patient without abdominal pain and on ciprofloxacin prophylaxis. Head CT unremarkable. Continued mild encephalopathy despite correction of hyponatremia and hypercalcemia. Neuropysch testing on 5/27 was concerning for dementia-like symptoms related to past alcoholism. Neurology consulted with 24hr vide EEG 6/5 - 6/6 without signs of seizure. Quite possible patient has baseline confusion and Westhaven protocol resulting in too much stool output likely contributing to ALFREDO. Mental status currently at baseline.   - Lactulose 20mg TID with 20mg PRN x2 (goal 3-4 bm/day)  - Rifaximin 550mg BID   - Delirium precautions  - Avoid narcotics     Alcoholic liver cirrhosis (hx of ascites and HE)  Thrombocytopenia   Follows with Dr. Kovacs of Hepatology. Currently undergoing evaluation for transplant but is likely a poor candidate given recent neuropsychiatric evaluation that raises concern for underlying dementia most likely secondary to chronic EtOH. Patient denies recent alcohol use on admission. Ascites and encephalopathy addressed above. No recent evidence of GI bleed.   - Hold acamprosate in setting of  acute kidney injury   - Cont pta ciprofloxacin, folic acid, vitamin B1, and rifaximin as above  - Lactulose as above   - Restart spironolactone today   - Continue to hold lasix     MELD-Na score: 29 at 6/10/2020  5:03 AM  MELD score: 29 at 6/10/2020  5:03 AM  Calculated from:  Serum Creatinine: 1.59 mg/dL at 6/10/2020  5:03 AM  Serum Sodium: 139 mmol/L (Rounded to 137 mmol/L) at 6/10/2020  5:03 AM  Total Bilirubin: 11.3 mg/dL at 6/10/2020  5:03 AM  INR(ratio): 2.17 at 6/10/2020  5:03 AM  Age: 54 years 8 months    Hypotension, improving  The patient was notably hypotensive to 60s/30s on 6/5/20, with MAPs in 50s. No dizziness, LOC, or other systemic symptoms, but was more lethargic. 1L NS bolus administered, followed by 25g albumin x 1. BP improved to low /40-50s with MAP in 70s. Patient also received blood products. BP stable.   - Midodrine 10mg tid   - MAP goal >55 with systolic BP >90    ALFREDO on CKD stage IV  Unilateral kidney (s/p nephrectomy)  Multifactorial in etiology and secondary prerenal azotemia d/t hypovolemia vs HRS, hypercalcemia, and/or ATN. Initially improved with IVF. Patient developed hyperchloremic metabolic acidosis after multiple L of NS, resolved with bicarb guided by renal. Renal US unremarkable. Continues to have worsening renal function, likely due to hypoperfusion. Creatinine improved with albumin and IVF.   - Restart spironolactone  - Continue to hold lasix   - Daily BMP and Ca     Hypercalcemia, severe, resolved  Likely secondary to chronic liver disease. Additional workup was performed. Improved with IVF, calcitonin, and bicarbonate. PTH appropriately low. Vitamin D, SPEP, and Lc labs unremarkable. PTHrP pending.   - Follow up on pending studies   - Daily BMP, Ca     Hyponatremia, resolved   Hypovolemic hyponatremia. Serum osmols elevated likely due to elevated BUN as serum osmolar gap normal. Sodium normalized after fluid resuscitation.      Acute on chronic macrocytic anemia    Multifactorial in etiology and 2/2 ESLD and CKD. Hgb on admission 9.3 down to 6.9 on am of 6/5. No evidence for GIB. Most likely hemodilution from fluid resuscitation. Hemolysis labs were unremarkable but peripheral smear showed RBC fragments raising concern for TTP vs DIC. Hem/Onc was consulted with repeat hemolysis labs ordered. Etiology most likely hemolysis related to ESLD.   - CTM     Orthostatic hypotension  - Cont midodrine 10 mg TID      Depression and anxiety  - Cont fluoxetine   - Hold buspar and Abilify      Diet: Na restriction <2g  Fluids: PO  DVT Prophylaxis: Pneumatic Compression Devices  Gonzalez Catheter: Not present  Code Status: Full, patient encephalopathic    Patient discussed with attending physician Dr. Lyons.        Edgardo Price MD  Internal Medicine, PGY1  [phone]    _____________________________________________________________    Interval History   Nursing notes reviewed. NAEON. Patient sitting up in bed. Alert, following commands, and answering questions. AOx3. No complaints.     Physical Exam   Vital Signs: Temp: 96.9  F (36.1  C) Temp src: Axillary BP: 96/41 Pulse: 71 Heart Rate: 73 Resp: 16 SpO2: 95 % O2 Device: None (Room air)    Weight: 126 lbs 4.8 oz     General: Sitting upright, breathing comfortably on RA, jaundiced  Respiratory: normal respiratory effort, crackles bilateral lung bases   GI:  Abdomen distended but soft, non-tender to palpation, BS present   Skin: Warm, dry. Severely jaundiced  Musculoskeletal: No peripheral edema  Neuro: Alert and oriented to person/place/time/location    Data   Reviewed.

## 2020-06-10 NOTE — PLAN OF CARE
PT - Ax1 with FWW  Discharge Planner PT   Patient plan for discharge: rehab  Current status: Pt is SBA for bed mobility and sit<>stand transfers. L CAM boot donned for ambulation. Pt ambulates in hallway 600ft with FWW and close SBA, ascends/descends 3 steps x2 with rail support and close SBA  Barriers to return to prior living situation: medical status, cognition, falls risk  Recommendations for discharge: TCU  Rationale for recommendations: Pt will benefit from continued skilled PT to progress pt's activity tolerance, strength, balance, gait and IND/safety with functional mobility  Entered by: Lay Shipman 06/10/2020 1:25 PM

## 2020-06-10 NOTE — PLAN OF CARE
Disoriented to time (thinks it's 2010) & place. 1 Loose BM & tea-colored UO. Denies pain/HA/SOB. On bed alarms, SBA to bathroom. Flat affect, withdrawn. Used call light multiple times this shift. [place] Encephalopathy was Stage 1 for confusion, on scheduled lactulose. PIV SL. Continue to monitor pt.

## 2020-06-10 NOTE — PLAN OF CARE
Assumed cares 0700 to 1500. Alert and oriented with some confusion. VSS on RA. Denies pain. New orders for Spironolactone. 1 BM this shift. Good UO. Ate few bites of lunch today. Continue with POC.

## 2020-06-10 NOTE — PROGRESS NOTES
Social Work Services Progress Note    Hospital Day: 8  Date of Initial Social Work Evaluation:  6/9/2020 - Please see for details   Collaborated with: TCUs, pt via phone, Terrence 2 and chart review    Data:  SW following for TCU placement. SW called pt at bedside and reported that we have one other option for TCU. SW listed off additional TCUs base on distance from her home. Pt agreeable to additional referrals.     Referrals pending   - [facility] (ph: [phone] f:[phone]) - SW received a call indicating that they have submitted auth for pt. SW received a VM from [facility] indicating that they received. SW called [facility] who indicated that today is to late for admission and can do tomorrow before 1500.   - [facility] (ph:[phone] f:[phone]) - [facility]   - [facility] (ph: [phone] f:[phone]) - Called and requested nursing and PT notes. SW sent requested information.   - [facility] (ph:[phone] f:[phone])  - [facility] (ph:[phone] f:[phone])  - [facility] (ph:[phone] f:[phone])    Discontinued referrals   - [facility] (ph: [phone] f:[phone]) - Declined. No bed availability     SW called pt via phone who is agreeable to an admission to [facility] in [place] tomorrow. She voiced understanding that they are not able to admit today. She reported she will call her daughter and spouse to schedule transport.     SW updated primary team re: accepting facility. Provider indicated he will have orders in the AM completed.     [id number]    Intervention:  Discharge planning/coordination of care     Assessment:  Pt remains hospitalized     Plan:    Anticipated Disposition:  [facility]     Barriers to d/c plan:  Bed availability for 6/11    Follow Up:  SW to follow up as needed    Tasha Castillo, MSW, [facility] CenterSW  Acute Care [facility]   [facility]  [place]  Pager: [phone]

## 2020-06-10 NOTE — PLAN OF CARE
Alert and oriented x3. Westhaven score 1. Bowel movement and urinated, walked to bathroom with SBA. Did not sleep well, asked for melatonin. Slept for only about 1.5 hours. Pleasant and looking forward to discharging to TCU. Continue with current plan of nursing care, and update MD with concerns as needed.

## 2020-06-11 NOTE — PROGRESS NOTES
"[facility]  HEPATOLOGY PROGRESS NOTE  Monalisa Olguin [id number]       CC: confusion  SUBJECTIVE:  Denies abdominal pain, fevers, nausea, vomiting. She is falling asleep frequently because she states she is \"board\".     ROS:  A 10-point review of systems was negative.    OBJECTIVE:  VS: /48 (BP Location: Left arm)   Pulse 74   Temp 97.8  F (36.6  C) (Oral)   Resp 16   Ht 1.524 m (5')   Wt 59.5 kg (131 lb 1.6 oz)   LMP 05/16/2012   SpO2 97%   BMI 25.60 kg/m     No intake or output data in the 24 hours ending 06/11/20 1127  General: In no acute distress, mild facial muscle wasting  Neuro: AOx3, No asterixis  Lymph: No cervical lymphadenopathy  HEENT:  Moderate scleral icterus, Nooral lesions  CV: . Skin warm and dry  Lungs:  Respirations even and nonlabored on room air  Abd: nontender, mildly distended. +BS  Extrem: No peripheral edema  Skin: moderate jaundice  Psych: pleasant    MEDICATIONS:  Current Facility-Administered Medications   Medication     acetaminophen (TYLENOL) tablet 500 mg     ARIPiprazole (ABILIFY) tablet 5 mg     [Held by provider] busPIRone (BUSPAR) tablet 15 mg     calcium carbonate (TUMS) chewable tablet 500 mg     ciprofloxacin (CIPRO) tablet 250 mg     Daily 2 GRAM acetaminophen limit, unless fulminent liver failure or transaminases greater than or equal to 300 - 400, then none     FLUoxetine (PROzac) capsule 60 mg     folic acid (FOLVITE) tablet 1 mg     gabapentin (NEURONTIN) capsule 300 mg     heparin lock flush 10 UNIT/ML injection 2-5 mL     lactulose (CHRONULAC) solution 20 g     lactulose (CHRONULAC) solution 20 g     lidocaine (LMX4) cream     lidocaine (LMX4) cream     lidocaine 1 % 0.1-1 mL     lidocaine 1 % 0.1-1 mL     melatonin tablet 1 mg     midodrine (PROAMATINE) tablet 10 mg     naloxone (NARCAN) injection 0.1-0.4 mg     omeprazole (priLOSEC) CR capsule 20 mg     ondansetron (ZOFRAN-ODT) ODT tab 4 mg    Or     ondansetron (ZOFRAN) injection 4 mg     " phytonadione (AQUA-MEPHYTON) 10 mg in sodium chloride 0.9 % 50 mL intermittent infusion     prochlorperazine (COMPAZINE) injection 10 mg    Or     prochlorperazine (COMPAZINE) tablet 10 mg    Or     prochlorperazine (COMPAZINE) Suppository 25 mg     rifaximin (XIFAXAN) tablet 550 mg     sodium bicarbonate tablet 650 mg     sodium chloride (PF) 0.9% PF flush 3 mL     sodium chloride (PF) 0.9% PF flush 3 mL     sodium chloride (PF) 0.9% PF flush 5-50 mL     [Held by provider] spironolactone (ALDACTONE) tablet 25 mg     [Held by provider] traZODone (DESYREL) tablet 100 mg     ursodiol (ACTIGALL) tablet 500 mg     [START ON 6/12/2020] vitamin B1 (THIAMINE) tablet 100 mg       REVIEW OF LABORATORY, PATHOLOGY AND IMAGING RESULTS:  BMP  Recent Labs   Lab 06/11/20  0545 06/10/20  0503 06/09/20  0458 06/08/20  0505    139 137 137   POTASSIUM 4.6 3.7 3.6 3.4   CHLORIDE 116* 115* 114* 112*   ELSIE 9.0 9.4 9.5 9.6   CO2 18* 17* 17* 18*   BUN 38* 38* 45* 52*   CR 1.66* 1.59* 1.76* 2.08*   * 101* 107* 110*     CBC  Recent Labs   Lab 06/11/20  0545 06/10/20  0503  06/09/20  0458 06/08/20  0505   WBC 6.7 6.1  --  5.2 5.7   RBC 2.15* 2.31*  --  2.12* 2.17*   HGB 7.2* 7.4*   < > 6.9* 7.2*   HCT 22.5* 24.6*  --  22.0* 23.1*   * 107*  --  104* 107*   MCH 33.5* 32.0  --  32.5 33.2*   MCHC 32.0 30.1*  --  31.4* 31.2*   RDW 24.5* 24.6*  --  24.1* 24.4*   PLT 38* 34*  --  29* 31*    < > = values in this interval not displayed.     INR  Recent Labs   Lab 06/11/20  0545 06/10/20  0503 06/09/20  0458 06/08/20  0505   INR 2.41* 2.17* 2.41* 2.50*     LFTs  Recent Labs   Lab 06/11/20  0545 06/10/20  0503 06/09/20  0458 06/08/20  0505   ALKPHOS 158* 161* 150 154*   AST 26 22 22 24   ALT 16 16 15 16   BILITOTAL 12.0* 11.3* 10.0* 9.6*   PROTTOTAL 4.8* 4.8* 5.0* 5.2*   ALBUMIN 3.1* 3.3* 3.6 3.5      PANCNo lab results found in last 7 days.   MELD-Na score: 31 at 6/11/2020  5:45 AM  MELD score: 31 at 6/11/2020  5:45 AM  Calculated  from:  Serum Creatinine: 1.66 mg/dL at 6/11/2020  5:45 AM  Serum Sodium: 140 mmol/L (Rounded to 137 mmol/L) at 6/11/2020  5:45 AM  Total Bilirubin: 12.0 mg/dL at 6/11/2020  5:45 AM  INR(ratio): 2.41 at 6/11/2020  5:45 AM  Age: 54 years 8 months      Imaging Results:  US abd w doppler 6/7/20  Impression:   1.  Hepatic cirrhosis with moderate ascites.  2.  Patent Doppler evaluation.  3.  Splenomegaly.    IMPRESSION:  Monalisa Olguin is a 54 year old female with a history of alcohol cirrhosis abstinent since 9/2019 complicated by ascites, history SBP, ALFREDO,  nephrectomy (donor 2000), hepatic encephalopathy, concerns for metabolic cognitive changes related to alcohol, possible dementia who was admitted with hepatic encephalopathy likely related to medication nonadherence.    RECOMMENDATIONS:  1.  Hepatic encephalopathy  -Has had improvement in mentation and remains on lactulose and rifaximin.  Continue with goal stool output 3-5 BMs per day.  With her possible metabolic changes versus dementia, she may need more assistance at home as per neuropsych testing to help manage her medications.  - Infectious work up has been negative.     2.  Alcohol cirrhosis  -Currently in liver transplant EVALUATION.  She did undergo CD evaluation with recommendations for treatment program.  Neuropsych testing was concerning for nonreversible causes of altered mental status.  She will follow-up with her primary hepatologist for continued transplant evaluation.  -Reinforced with patient that she is currently not listed for liver transplant and above results of testing are concerning that she may not be a candidate. If committee declines her for transplant, she is able to look for another evaluation at any transplant program.   -MELD 31, ABO O  -Ultrasound 6/7 without evidence of HCC    3. ALFREDO/CKD  - solitary kidney with baseline mild CKD. She does loss of muscle mass concerning that her creatinine is under representing her kidney function.    - Slight bump in creatinine today. With slight increase in sodium and chloride, agree with holding diuretics and giving some fluids.   - Encouraged her to increase her free water intake.     4.  Ascites  5.  History of SBP  -Continue ciprofloxacin 250 mg daily for SBP ppx  - Last para 6/8 without evidence of SBP    6.  Esophageal varices  -Most recent EGD 4/2019 with no varices    Patient was discussed with attending physician, Dr. Leventhal    Next follow up appointment: Dr. Kovacs 7/27    Thank you for the opportunity to be involved with  [facility]. Please call with any questions or concerns.    IVETTE Suarez, CNP  Inpatient Hepatology [facility]  [phone]  Text Link

## 2020-06-11 NOTE — PROGRESS NOTES
[facility]    Progress Note - Terrence 2 Service        Date of Admission:  6/2/2020    Assessment and Plan:   Monalisa Olguin is a 54 year old female with history of ACD, decompensated liver cirrhosis, CKD, hyperparathyroidism who was brought to ED by her  with AMS.     Today:  - Hold spironolactone  - Vitamin K challenge  - NS 500cc bolus  - Discuss candidacy for liver transplant with hepatology  - Consider palliative consult pending above discussion  - Calorie counts, boost shakes     Encephalopathy  Likely etiology is toxic metabolic and/or hepatic encephalopathy. Ammonia is normal. BUN is elevated. On admission patient with severe hypercalcemia and moderate hyponatremia, which were likely contributing and improved with IVF resuscitation. Patient is afebrile, hemodynamically stable, without leukocytosis or systemic symptoms concerning for infection. Low suspicion for SBP as patient without abdominal pain and on ciprofloxacin prophylaxis. Head CT unremarkable. Continued mild encephalopathy despite correction of hyponatremia and hypercalcemia. Neuropysch testing on 5/27 was concerning for dementia-like symptoms related to past alcoholism. Neurology consulted with 24hr vide EEG 6/5 - 6/6 without signs of seizure. Quite possible patient has baseline confusion and [place] protocol resulting in too much stool output likely contributing to ALFREDO. Mental status currently at baseline.   - Lactulose 20mg TID with 20mg PRN x2 (goal 3-4 bm/day)  - Rifaximin 550mg BID   - Delirium precautions  - Avoid narcotics     Alcoholic liver cirrhosis (hx of ascites and HE)  Thrombocytopenia   Follows with Dr. Kovacs of Hepatology. Currently undergoing evaluation for transplant but is likely a poor candidate given recent neuropsychiatric evaluation that raises concern for underlying dementia most likely secondary to chronic EtOH. Patient denies recent alcohol use on admission. Ascites and  encephalopathy addressed above. No recent evidence of GI bleed. Discuss with hepatology regarding candidacy for liver transplant.   - Hold acamprosate in setting of acute kidney injury   - Cont pta ciprofloxacin, folic acid, vitamin B1, and rifaximin as above  - Lactulose as above   - Restart spironolactone today   - Continue to hold lasix   - Discuss with hepatology regarding candidacy for liver transplant    MELD-Na score: 31 at 6/11/2020  5:45 AM  MELD score: 31 at 6/11/2020  5:45 AM  Calculated from:  Serum Creatinine: 1.66 mg/dL at 6/11/2020  5:45 AM  Serum Sodium: 140 mmol/L (Rounded to 137 mmol/L) at 6/11/2020  5:45 AM  Total Bilirubin: 12.0 mg/dL at 6/11/2020  5:45 AM  INR(ratio): 2.41 at 6/11/2020  5:45 AM  Age: 54 years 8 months    Hypotension, improving  The patient was notably hypotensive to 60s/30s on 6/5/20, with MAPs in 50s. No dizziness, LOC, or other systemic symptoms, but was more lethargic. 1L NS bolus administered, followed by 25g albumin x 1. BP improved to low /40-50s with MAP in 70s. Patient also received blood products. BP stable.   - Midodrine 10mg tid   - MAP goal >55 with systolic BP >90    ALFREDO on CKD stage IV  Unilateral kidney (s/p nephrectomy)  Multifactorial in etiology and secondary prerenal azotemia d/t hypovolemia vs HRS, hypercalcemia, and/or ATN. Initially improved with IVF. Patient developed hyperchloremic metabolic acidosis after multiple L of NS, resolved with bicarb guided by renal. Renal US unremarkable. Continues to have worsening renal function, likely due to hypoperfusion. Creatinine improved with albumin and IVF. Creatinine bump following re-initation of spironolactone.   - Continue to hold lasix and spironolactone   - Daily BMP and Ca  - NS 500cc bolus      Hypercalcemia, severe, resolved  Likely secondary to chronic liver disease. Additional workup was performed. Improved with IVF, calcitonin, and bicarbonate. PTH appropriately low. Vitamin D, SPEP, and Lc labs  unremarkable. PTHrP pending.   - Follow up on pending studies   - Daily BMP, Ca     Hyponatremia, resolved   Hypovolemic hyponatremia. Serum osmols elevated likely due to elevated BUN as serum osmolar gap normal. Sodium normalized after fluid resuscitation.      Acute on chronic macrocytic anemia   Multifactorial in etiology and 2/2 ESLD and CKD. Hgb on admission 9.3 down to 6.9 on am of 6/5. No evidence for GIB. Most likely hemodilution from fluid resuscitation. Hemolysis labs were unremarkable but peripheral smear showed RBC fragments raising concern for TTP vs DIC. Hem/Onc was consulted with repeat hemolysis labs ordered. Etiology most likely hemolysis related to ESLD.   - CTM    Malnutrition  Concern for malnutrition and inadequate appetite and hypoalbuminemia. Phos added to labs. Nutrition consulted and recommending calorie counts and boost shakes.   - Nutrition consult   - Calorie counting  - Boost shakes      Orthostatic hypotension  - Cont midodrine 10 mg TID      Depression and anxiety  - Cont fluoxetine   - Hold buspar and Abilify      Diet: Na restriction <2g  Fluids: PO  DVT Prophylaxis: Pneumatic Compression Devices  Gonzalez Catheter: Not present  Code Status: Full, patient encephalopathic    Patient discussed with attending physician Dr. Lyons.        Edgardo Price MD  Internal Medicine, PGY1  [phone]    _____________________________________________________________    Interval History   Nursing notes reviewed. NAEON. Patient sitting up in bed. Alert, following commands, and answering questions. AOx3. No complaints. Original plan for patient to be discharge to TCU today. However, creatinine bump after starting spironolactone. There has been some discussion that patient is not appropriate for liver transplant given likely underlying alcohol related dementia. Feel hepatology needs to have further discussion with patient and family regarding this with possibility of palliative care consult should  patient not be a candidate for liver transplant.     Physical Exam   Vital Signs: Temp: 97.8  F (36.6  C) Temp src: Oral BP: 100/48 Pulse: 74 Heart Rate: 78 Resp: 16 SpO2: 97 % O2 Device: None (Room air)    Weight: 131 lbs 1.6 oz     General: Sitting upright, breathing comfortably on RA, jaundiced  Respiratory: normal respiratory effort, crackles bilateral lung bases   GI:  Abdomen distended but soft, non-tender to palpation, BS present   Skin: Warm, dry. Severely jaundiced  Musculoskeletal: No peripheral edema  Neuro: Alert and oriented to person/place/time/location    Data   Reviewed.

## 2020-06-11 NOTE — PROGRESS NOTES
Social Work Services Discharge Note      Patient Name:  Monalisa Olguin     Anticipated Discharge Date:  6/11/20    Discharge Disposition:   TCU:  [facility]    Following MD: Luh Lyons MD     Pre-Admission Screening (PAS) online form has been completed.  The Level of Care (LOC) is:  Determined  Confirmation Code is:  [id number]  Patient/caregiver informed of referral to [facility] for Pre-Admission Screening for skilled nursing facility (SNF) placement and to expect a phone call post discharge from SNF.     Additional Services/Equipment Arranged:  Daughter will provide transport     Patient / Family response to discharge plan:  Patient in agreement with discharge plan.      Persons notified of above discharge plan:  Patient, TCU, Terrence Team    Staff Discharge Instructions:  Please fax discharge orders and signed hard scripts for any controlled substances.  Please print a packet and send with patient. f:[phone]  Please call ph: [phone] for Nurse to Nurse report     CTS Handoff completed:  NO    Medicare Notice of Rights provided to the patient/family:  NO      Addendum 0955: Per Medicine Team, the patient is not ready to go today. I updated TCU and they report her pre-auth is good for two weeks. They will plan for discharge tomorrow unless they hear otherwise.      Tracy BAE. [facility].  Clinical   [facility]    Pager: [phone] Mon-Sat 9 am - 4:30 pm  On-call/after hours pager [phone]

## 2020-06-11 NOTE — DISCHARGE SUMMARY
[facility], [place]  Discharge Summary - Medicine & Pediatrics       Date of Admission:  6/2/2020  Date of Discharge:  6/11/2020  Discharging Provider: Dr. Luh Lyons  Discharge Service: Ter[facility] 2    Discharge Diagnoses   Hepatic encephalopathy  Toxic metabolic encephalopathy  CKD s/p nephrectomy  ALFREDO  Liver cirrhosis, alcohol related  Hypotension  Hypercalcemia  Hyponatremia   Acute on chronic anemia     Follow-ups Needed After Discharge    - TCU provider   - Repeat BMP, Ca, and CBC in 2 days   - Furosemide and spironolactone are being held, can be restarted if signs of hypervolemia and/or when renal function at baseline    Unresulted Labs Ordered in the Past 30 Days of this Admission     Date and Time Order Name Status Description    6/7/2020 1250 fluid culture - Aerobic Bacterial Preliminary     6/3/2020 1219 PTH Related Peptide Test In process       These results will be followed up by TCU provider     Discharge Disposition   Discharged to short-term care facility  Condition at discharge: Stable  Patient ready to discharge to a skilled nursing facility as soon as possible in order to create capacity for patients related to the COVID-19 pandemic.    Hospital Course   Monalisa Olguin was admitted on 6/2/2020 for altered mental status.  The following problems were addressed during her hospitalization:    Encephalopathy  Etiology is toxic metabolic and/or hepatic encephalopathy. On admission patient had severe hypercalcemia and moderate hyponatremia, which were likely contributing and improved with IVF resuscitation. Also noted to have asterixis on initial exam despite normal ammonia. Patient was otherwise afebrile, hemodynamically stable, without leukocytosis or systemic symptoms concerning for infection. Low suspicion for SBP as patient without abdominal pain and on ciprofloxacin prophylaxis. Head CT unremarkable. Patient continued to have mild encephalopathy despite correction of  hyponatremia, hypercalcemia, and treatment of HE. Review of previous recors showed that neuropysch testing on 5/27 was concerning for dementia-like symptoms related to past alcoholism. Neurology consulted with 24hr vide EEG 6/5 - 6/6 without signs of seizure. Mental status improved to baseline with continued supportive therapy.   - Recheck BMP and Ca in 2 days   - Follow up on pending PTHrP, if normal, consider additional imaging to evaluate for occult malignancy      Alcoholic liver cirrhosis (hx of ascites and HE)  Thrombocytopenia   Follows with Dr. Kovacs of Hepatology. Currently undergoing evaluation for transplant but likely a poor candidate given recent neuropsychiatric evaluation that raises concern for underlying dementia, most likely secondary to chronic EtOH. Patient denies recent alcohol use on admission. Ascites and encephalopathy addressed above. No recent evidence of GI bleed. Meld 31.   - Hold acamprosate in setting of acute kidney injury   - Cont pta ciprofloxacin, folic acid, vitamin B1, and rifaximin as above  - Lactulose as above   - Restart spironolactone today   - Continue to hold lasix      Hypotension, improving  Resolved with fluid resuscitation and increase of midodrine from 5 mg tid to 10 mg tid. MAP goal >55 with systolic BP >90     ALFREDO on CKD stage IV  Unilateral kidney (s/p nephrectomy)  Multifactorial in etiology and secondary prerenal azotemia d/t hypovolemia vs HRS, hypercalcemia, and/or ATN. Initially improved with IVF. Patient developed hyperchloremic metabolic acidosis after multiple L of NS, resolved with bicarb guided by renal. Renal US unremarkable. Creatinine improved with albumin and IVF.   - Hold spironolactone and lasix   - Follow up  BMP and Ca in 2 days      Hypercalcemia, severe, resolved  Likely secondary to chronic liver disease. Additional workup was performed. Improved with IVF, calcitonin, and bicarbonate. PTH appropriately low. Vitamin D, SPEP, and Lc labs  unremarkable. PTHrP pending. If PTHrP normal could consider additional imaging to evaluate for occult malignancy.   - Follow up PTHrP  - Follow up  BMP and Ca in 2 days      Hyponatremia, resolved   Hypovolemic hyponatremia. Serum osmols elevated likely due to elevated BUN as serum osmolar gap normal. Sodium normalized after fluid resuscitation.      Acute on chronic macrocytic anemia   Multifactorial in etiology and 2/2 ESLD and CKD. Hgb on admission 9.3 down to 6.9 on am of 6/5. No evidence for GIB. Most likely hemodilution from fluid resuscitation. Hemolysis labs were unremarkable but peripheral smear showed RBC fragments raising concern for TTP vs DIC. Hem/Onc was consulted with repeat hemolysis labs ordered. Etiology most likely hemolysis related to ESLD.      Orthostatic hypotension  Cont midodrine 10 mg TID      Consultations This Hospital Stay   MEDICATION HISTORY IP PHARMACY CONSULT  NEPHROLOGY GENERAL ADULT IP CONSULT  PHYSICAL THERAPY ADULT IP CONSULT  OCCUPATIONAL THERAPY ADULT IP CONSULT  PHYSICAL THERAPY ADULT IP CONSULT  OCCUPATIONAL THERAPY ADULT IP CONSULT  PHARMACY IP CONSULT  NEPHROLOGY GENERAL ADULT IP CONSULT  VASCULAR ACCESS CARE ADULT IP CONSULT  VASCULAR ACCESS CARE ADULT IP CONSULT  VASCULAR ACCESS ADULT IP CONSULT  VASCULAR ACCESS ADULT IP CONSULT  NEUROLOGY GENERAL ADULT IP CONSULT  GI HEPATOLOGY ADULT IP CONSULT  INTERNAL MEDICINE PROCEDURE TEAM ADULT IP CONSULT [facility] [place] - PARACENTESIS  INTERVENTIONAL RADIOLOGY ADULT/PEDS IP CONSULT  HEMATOLOGY ADULT IP CONSULT  PHYSICAL THERAPY ADULT IP CONSULT  OCCUPATIONAL THERAPY ADULT IP CONSULT    Code Status   Full Code       The patient was discussed with Dr. Lyons.     Edgardo Price MD  [place] [address] Service  [facility]  Pager: [phone]  ______________________________________________________________________    Physical Exam   Vital Signs: Temp: 97.8  F (36.6  C) Temp src: Oral BP: 100/48 Pulse: 74  Heart Rate: 78 Resp: 16 SpO2: 97 % O2 Device: None (Room air)    Weight: 131 lbs 1.6 oz    General: Sitting upright, breathing comfortably on RA, jaundiced  Respiratory: normal respiratory effort, crackles bilateral lung bases   GI:  Abdomen distended but soft, non-tender to palpation, BS present   Skin: Warm, dry. Severely jaundiced  Musculoskeletal: No peripheral edema  Neuro: Alert and oriented to person/place/time/location     Primary Care Physician   Efren Bustos MD    Discharge Orders     Significant Results and Procedures   Results for orders placed or performed during the hospital encounter of 06/02/20   XR Chest Port 1 View    Narrative    EXAM: XR CHEST PORT 1 VW  6/2/2020 11:36 AM     HISTORY:  ams       COMPARISON:  5/4/2020    FINDINGS:   AP portable chest radiograph. Trachea is midline. Cardiac silhouette  size is within normal limits. Improved basilar predominant  interstitial opacities. No pneumothorax or effusion. Horizontal linear  opacity in the left lung base is favored to represent atelectasis. No  focal airspace consolidation. Abdominal surgical clips. No acute  osseous abnormality.      Impression    IMPRESSION: Decreased findings of pulmonary edema versus atypical  infection seen on 5/4/2020. No new focal airspace opacity.    I have personally reviewed the examination and initial interpretation  and I agree with the findings.    COLTEN WOLF, DO   POC US GUIDE FOR PARACENTESIS    Impression    Limited Bedside Abdominal Ultrasound, performed and interpreted by me.     Indication: suspected SBP. Evaluate for Free fluid.     With the patient in Trendelenburg, the RUQ, LUQ, (including the paracolic gutters) views were examined for free fluid. With the patient in reverse Trendelenburg, the super pubic view was examined for free fluid.     Findings: small volume free fluid present     IMPRESSION: Free fluid present as noted above. Only fluid pockets are low in pelvis below inferior  epigastric vessels.      CT Head w/o Contrast    Narrative    CT HEAD W/O CONTRAST 6/3/2020 10:42 AM    Provided History: Altered level of consciousness (LOC), unexplained    Comparison: Head CT 5/4/2020. Brain MRI 11/6/2017.    Technique: Using multidetector thin collimation helical acquisition  technique, axial, coronal and sagittal CT images from the skull base  to the vertex were obtained without intravenous contrast.     Findings:    No intracranial hemorrhage, mass effect, or midline shift. The  ventricles are proportionate to the cerebral sulci. The gray to white  matter differentiation of the cerebral hemispheres is preserved. The  basal cisterns are patent. Mild diffuse generalized cerebral  parenchymal volume loss, normal for age. Mild periventricular white  matter hypoattenuation in both cerebral hemispheres, consistent with  chronic small vessel ischemic disease and unchanged.    The visualized paranasal sinuses are clear. The mastoid air cells are  clear.       Impression    Impression: No acute intracranial pathology.    I have personally reviewed the examination and initial interpretation  and I agree with the findings.    ANGEL JO MD   US Renal Complete    Narrative    EXAMINATION: US RENAL COMPLETE 6/5/2020 10:36 AM     COMPARISON: Abdominal ultrasound dated 5/5/2020    HISTORY: Worsening acute kidney injury. Rule out obstruction.    TECHNIQUE: The kidneys and bladder were scanned in the standard  fashion with specialized ultrasound transducer(s) using both gray  scale and limited color/spectral Doppler techniques.    FINDINGS:    Right kidney: Measures 11.1 cm in length. Parenchyma is of normal  thickness and echogenicity. No focal mass. No hydronephrosis. There is  ascites present.    Left kidney: The left kidney is surgically absent.    Bladder: The bladder is partially distended and unremarkable where  visualized.      Impression    IMPRESSION:  1.  Normal grayscale ultrasound evaluation  of the right kidney.  2.  Left nephrectomy.  3.  Ascites    I have personally reviewed the examination and initial interpretation  and I agree with the findings.    BASSAM MANN MD   US Abdomen Complete w Doppler Complete    Narrative    EXAMINATION: US ABDOMEN COMPLETE WITH DOPPLER COMPLETE 6/7/2020 9:35  AM     COMPARISON: Ultrasound 4/21/2020    HISTORY: History of decompensated liver cirrhosis, evaluate for DVT  and ascites    TECHNIQUE: The abdomen was scanned in standard fashion with  specialized ultrasound transducer(s) using both gray-scale, color  Doppler, and spectral flow techniques.    Findings:  Liver: The liver coarse nodular hyperechogenic parenchyma. No evidence  of a focal hepatic mass.     Extrahepatic portal vein flow is antegrade at 24.  Right portal vein flow is antegrade, measuring 36.  Left portal vein flow is antegrade, measuring 26.    Flow in the hepatic artery is towards the liver and:  66 peak systolic  0.72 resistive index.     The splenic vein is patent and flow is towards the liver.  The left,  middle, and right hepatic veins are patent with flow towards the IVC.  The IVC is patent with flow towards the heart.   The visualized aorta  is not dilated.    Gallbladder: Surgically absent.    Bile Ducts: Both the intra- and extrahepatic biliary system are of  normal caliber.  The common bile duct measures 5 mm.    Pancreas: Visualized portions of the head and body of the pancreas are  unremarkable.     Kidneys:  The right kidney demonstrates normal echotexture without  focal mass or evidence of hydronephrosis. Right renal length 10.2 cm.  Left kidney not well-visualized.    Spleen: The spleen measures 13.9 cm in length.    Fluid: Moderate ascites.      Impression    Impression:   1.  Hepatic cirrhosis with moderate ascites.  2.  Patent Doppler evaluation.  3.  Splenomegaly.    I have personally reviewed the examination and initial interpretation  and I agree with the  findings.    BELLA HILLS MD   IR Paracentesis    Narrative    PRE-PROCEDURE DIAGNOSIS: Ascites    POST-PROCEDURE DIAGNOSIS: Same    PROCEDURE: Paracentesis    Impression    IMPRESSION: Completed ultrasound-guided therapeutic paracentesis. A  total of 3000 mL clear yellow fluid aspirated from the abdomen. No  immediate complication.      ----------    CLINICAL HISTORY: Ultrasound-guided paracentesis requested for  ascites.    PERFORMED BY: Dane Eddy PA-C    CONSENT: Written informed consent was obtained and is documented in  the patient record.    MEDICATIONS: 1% lidocaine for local anesthesia. Intravenous albumin  was available (see administration record).      DESCRIPTION: Ascites was identified on limited abdominal ultrasound  exam and picture is documented in the patient's record. The abdomen  was prepped and draped in the usual sterile fashion.  Local anesthesia  was achieved. Under ultrasound guidance, a 5-French straight centesis  needle/catheter was advanced into the peritoneal space and needle was  removed.  The catheter was connected to drainage and ascites was  aspirated. Catheter was removed on completion of drainage and sterile  dressing was applied.    COMPLICATIONS: No immediate concerns, the patient remained stable  throughout the procedure and tolerated it well.    ESTIMATED BLOOD LOSS: Minimal    SPECIMENS: None    DANE EDDY PA-C       Discharge Medications   Current Discharge Medication List      START taking these medications    Details   acetaminophen (TYLENOL) 500 MG tablet Take 1 tablet (500 mg) by mouth every 6 hours as needed for mild pain  Qty: 30 tablet, Refills: 0    Associated Diagnoses: Pain      sodium bicarbonate 650 MG tablet Take 1 tablet (650 mg) by mouth 2 times daily  Qty: 60 tablet, Refills: 0    Associated Diagnoses: CKD (chronic kidney disease) stage 3, GFR 30-59 ml/min (H)      !! vitamin B1 (THIAMINE) 100 MG tablet Take 1 tablet (100 mg) by mouth daily  Qty: 30  tablet, Refills: 0    Associated Diagnoses: Encephalopathy       !! - Potential duplicate medications found. Please discuss with provider.      CONTINUE these medications which have CHANGED    Details   ciprofloxacin (CIPRO) 250 MG tablet Take 1 tablet (250 mg) by mouth every 24 hours  Qty: 30 tablet, Refills: 0    Associated Diagnoses: Alcoholic cirrhosis of liver with ascites (H)      midodrine (PROAMATINE) 10 MG tablet Take 1 tablet (10 mg) by mouth 3 times daily (with meals)  Qty: 90 tablet, Refills: 0    Associated Diagnoses: Hypotension, unspecified hypotension type      nicotine (NICODERM CQ) 21 MG/24HR 24 hr patch Place 1 patch onto the skin every 24 hours  Qty: 14 patch, Refills: 0    Associated Diagnoses: Tobacco abuse         CONTINUE these medications which have NOT CHANGED    Details   acamprosate (CAMPRAL) 333 MG EC tablet TAKE TWO TABLETS BY MOUTH THREE TIMES A DAY  Qty: 540 tablet, Refills: 3    Associated Diagnoses: Alcoholic cirrhosis of liver with ascites (H)      ARIPiprazole (ABILIFY) 5 MG tablet Take 1 tablet (5 mg) by mouth At Bedtime  Qty: 90 tablet, Refills: 3    Associated Diagnoses: Major depressive disorder, recurrent episode, moderate (H)      busPIRone (BUSPAR) 15 MG tablet Take 1 tablet (15 mg) by mouth 2 times daily  Qty: 180 tablet, Refills: 3    Associated Diagnoses: MONA (generalized anxiety disorder)      CONSTULOSE 10 GM/15ML solution TAKE 22.5 MLS BY MOUTH THREE TIMES A DAY AS NEEDED FOR CONSTIPATION - START WITH 15 MLS DAILY, TITRATE AS NEEDED FOR 2-3 BM'S DAILY  Qty: 1892 mL, Refills: 4    Associated Diagnoses: Hepatic encephalopathy (H)      FLUoxetine (PROZAC) 20 MG capsule TAKE THREE CAPSULES BY MOUTH ONCE DAILY  Qty: 90 capsule, Refills: 3    Associated Diagnoses: Anxiety; Major depressive disorder, recurrent episode, moderate (H)      folic acid (FOLVITE) 1 MG tablet Take 1 tablet (1 mg) by mouth daily  Qty: 90 tablet, Refills: 3    Associated Diagnoses: Alcoholic  "cirrhosis of liver with ascites (H)      gabapentin (NEURONTIN) 100 MG capsule Take 3 capsules (300 mg) by mouth At Bedtime  Qty: 270 capsule, Refills: 3    Associated Diagnoses: Neuropathy      omeprazole (PRILOSEC) 20 MG DR capsule Take 1 capsule (20 mg) by mouth daily  Qty: 90 capsule, Refills: 3    Associated Diagnoses: Gastroesophageal reflux disease with esophagitis      ondansetron (ZOFRAN) 4 MG tablet Take 1 tablet (4 mg) by mouth every 6 hours as needed for nausea      potassium chloride ER (K-DUR/KLOR-CON M) 10 MEQ CR tablet Take 1 tablet (10 mEq) by mouth daily  Qty: 90 tablet, Refills: 3    Associated Diagnoses: Hypokalemia      rifaximin (XIFAXAN) 550 MG TABS tablet Take 1 tablet (550 mg) by mouth 2 times daily  Qty: 60 tablet, Refills: 0    Associated Diagnoses: Alcoholic cirrhosis of liver without ascites (H); Hepatic encephalopathy (H)      traZODone (DESYREL) 50 MG tablet Take 2 tablets (100 mg) by mouth nightly as needed for sleep  Qty: 30 tablet, Refills: 0    Associated Diagnoses: Other insomnia      ursodiol (ACTIGALL) 500 MG tablet Take 1 tablet (500 mg) by mouth 2 times daily  Qty: 180 tablet, Refills: 3    Associated Diagnoses: Alcoholic cirrhosis of liver with ascites (H)      !! vitamin B1 (VITAMIN B-1) 100 MG tablet Take 1 tablet (100 mg) by mouth daily  Qty:         !! - Potential duplicate medications found. Please discuss with provider.      STOP taking these medications       furosemide (LASIX) 20 MG tablet Comments:   Reason for Stopping:         nicotine (NICODERM CQ) 14 MG/24HR 24 hr patch Comments:   Reason for Stopping:         nicotine (NICODERM CQ) 7 MG/24HR 24 hr patch Comments:   Reason for Stopping:         spironolactone (ALDACTONE) 50 MG tablet Comments:   Reason for Stopping:             Allergies   Allergies   Allergen Reactions     Albuterol      Tongue \"hardened and painful\"     "

## 2020-06-11 NOTE — PLAN OF CARE
Assumed cares  2300 to 0730    /48 (BP Location: Left arm)   Pulse 74   Temp 97.8  F (36.6  C) (Oral)   Resp 16   Ht 1.524 m (5')   Wt 59.5 kg (131 lb 1.6 oz)   LMP 05/16/2012   SpO2 97%   BMI 25.60 kg/m      Neuros: Alert, confused, disoriented to time (stated the year was 2010, month accurate) & place (accurate to city, not to hospital); [place] Stage 1  Cardiac: Soft BPs (lowest was 90/36)  Resp: 97% o2 RA, no SOB observed  GI/: 1 formed BM, ambulates to bathroom  Nutrition: On 2 gm Na diet, strict I&Os.  IV/Drains: L PIV SL  Pain: Denies  Skin: Yellow, no new deficits noted.  Labs: Reviewed  Activity: Independent, on bed alarms    Events this shift: BP was 90/36 early on shift, improved to 115/58 after giving pt some cheerios/milk & orange juice.      Plan of care: TCU pending, maintain delirium precautions (up in chair or ambulate at least BID or TID as appropriate, promote lights on/shades open during the day, Encourage use of sensory aides)

## 2020-06-11 NOTE — PLAN OF CARE
Discharge Planner OT   Patient plan for discharge: TCU  Current status: Pt ambulated x 400 ft with CGA and FWW, slightly unsteady, specifically when distracted. BP at end of session 107/56. Min A to don L cam boot. Pt ambulated to bathroom with CGA and FWW. Pt disoriented and thinking it is saturday. Pt stood at sink with SBA for g/h tasks x 6 minutes, good ax tolerance noted.   Barriers to return to prior living situation: Decreased independence with functional transfers/ADLs. Decreased strength/endurance, impaired cognition.   Recommendations for discharge: TCU  Rationale for recommendations: Pt would benefit from continued skilled OT services to improve independence and safety with ADL's and functional transfers as pt is not at baseline.

## 2020-06-11 NOTE — PLAN OF CARE
Patient denies pain. Alert and oriented x4. Was excited to discharge today to TCU and to be picked up by daughter but MDs wanted patient to stay one more day to watch Creatinine and LFTs.  Patient has been quiet and resting all day. Good appetite with meals. On Calorie counts. Ambulated in bess with therapy. Has not gotten up to go to the bathroom since early this morning. Will continue to monitor and follow plan of care.

## 2020-06-11 NOTE — PLAN OF CARE
Writer assumed cares from [id number]. AVSS on RA. Pt is d/o to time which has been her baseline. Up to commode with 1 assist. Denies pain. HE=1. Writer did not assist pt to commode but other nursing staff may have helped her to the commode and did not record the output. P: TCU.

## 2020-06-12 NOTE — PLAN OF CARE
Physical Therapy Discharge Summary    Reason for therapy discharge:    Discharged to transitional care facility.    Progress towards therapy goal(s). See goals on Care Plan in [facility] electronic health record for goal details.  Goals partially met.  Barriers to achieving goals:   limited tolerance for therapy and discharge from facility.    Therapy recommendation(s):    Continued therapy is recommended.  Rationale/Recommendations:  pt will benefit from continued skilled PT in order to progress strength, activity tolerance, gait and safety with functional mobility and ADLs/IADLs.

## 2020-06-12 NOTE — DISCHARGE SUMMARY
[facility], [place]  Discharge Summary - Medicine & Pediatrics       Date of Admission:  6/2/2020  Date of Discharge:  6/12/2020  Discharging Provider: Luh Lyons MD   [facility]    Discharge Diagnoses   Hepatic encephalopathy   Alcoholic liver cirrhosis (hx of ascites and HE)  Thrombocytopenia   Hypotension   ALFREDO on CKD stage IV  Unilateral kidney (s/p nephrectomy)  Severe hypercalcemia, resolved   Malnutrition (unable to determine severity)     Follow-ups Needed After Discharge   Follow-up Appointments     Follow Up and recommended labs and tests      Follow up with [facility] physician. The following labs/tests are   recommended: CMP, INR, CBC within 1 week. Results to be followed by Dr. Kovacs of GI/Hepatology. Ultrasound guided paracentesis scheduled for 6/22   at [facility]. Patient has an outpatient follow up visit with Dr. Cruz on 7/27/2020.           Unresulted Labs Ordered in the Past 30 Days of this Admission     Date and Time Order Name Status Description    6/7/2020 1250 fluid culture - Aerobic Bacterial Preliminary       These results will be followed up by hepatology     Discharge Disposition   Discharged to rehabilitation facility  Condition at discharge: Stable      Hospital Course   Monalisa Olguin is a 54 year old female with history of ACD, decompensated liver cirrhosis, CKD, hyperparathyroidism who was brought to ED by her  with confusion and admitted 6/2/20.        Encephalopathy  Likely etiology was toxic metabolic and/or hepatic encephalopathy. On admission patient with severe hypercalcemia and moderate hyponatremia, which were likely contributing and improved with IVF resuscitation. Patient was afebrile, hemodynamically stable, without leukocytosis or systemic symptoms concerning for infection. Low suspicion for SBP as patient without abdominal pain and on ciprofloxacin prophylaxis. Head CT unremarkable. Continued mild  encephalopathy despite correction of hyponatremia and hypercalcemia. Neuropysch testing on 5/27 was concerning for dementia-like symptoms related to past alcoholism. Neurology consulted with 24hr vide EEG 6/5 - 6/6 without signs of seizure. She was at her baseline mental status at discharge, and will follow-up with Hepatology for further evaluation for liver transplant if her cognitive status is in question.    - Lactulose 20mg TID with 20mg PRN x2 (goal 3-4 bm/day)  - Rifaximin 550mg BID   - Avoid narcotics      Alcoholic liver cirrhosis (hx of ascites and HE)  Thrombocytopenia   Follows with Dr. Kovacs of Hepatology. Currently undergoing evaluation for transplant but is likely a poor candidate given recent neuropsychiatric evaluation that raises concern for underlying dementia most likely secondary to chronic EtOH. Patient denies recent alcohol use on admission. Ascites and encephalopathy addressed above. No recent evidence of GI bleed. Will have ongoing outpatient discussions with hepatology regarding candidacy for liver transplant.   MELD 29 at discharge.   - Cont pta ciprofloxacin, folic acid, vitamin B1, lactulose and rifaximin as above  - hold acamprosate at discharge   - Lactulose as above   - Diuretics as below   - arranged for outpatient paracentesis 6/27      ALFREDO on CKD stage IV  Unilateral kidney (s/p nephrectomy in 2000)  Multifactorial in etiology and secondary prerenal azotemia d/t hypovolemia vs HRS, hypercalcemia, and/or ATN. Initially improved with IVF. Patient developed hyperchloremic metabolic acidosis after multiple L of NS, resolved with bicarb guided by renal. Renal US unremarkable. Continues to have fluctuating renal function, likely due to hypoperfusion and her decompensated liver disease. Her diuretics were restarted at 40 lasix and 25 spironolactone, and will require further titration in the outpatient setting by Dr. Kovacs.   - follow-up BMP next week      Hypercalcemia, severe,  resolved  Likely secondary to chronic liver disease and CKD, dehydration. It was improved with IVF, calcitonin, and bicarbonate. PTH appropriately low. Vitamin D, SPEP, and PTHrP labs unremarkable.  Calcium had normalized before discharge.   - follow up lab next week      Hyponatremia, resolved   Hypovolemic hyponatremia. Serum osmols elevated likely due to elevated BUN as serum osmolar gap normal. Sodium normalized after fluid resuscitation.      Acute on chronic macrocytic anemia   Multifactorial in etiology and 2/2 ESLD and CKD. Hgb on admission 9.3 down to 6.9 on am of 6/5. No evidence for GIB. Most likely hemodilution from fluid resuscitation. Hemolysis labs were unremarkable but peripheral smear showed RBC fragments raising concern for TTP vs DIC but after extensive work-up this was determined unlikely and the etiology most likely chronic ESLD.      Malnutrition (unable to determine severity)   Concern for malnutrition and inadequate appetite and hypoalbuminemia. Nutrition consulted and recommending calorie counts and boost shakes.      Hypotension  The patient was notably hypotensive to 60s/30s on 6/5/20, with MAPs in 50s at admission. No dizziness, LOC, or other systemic symptoms, but was more lethargic.She was volume resuscitated with some improvement and midodrine titrated up to 10 TID with stablely low BPs at discharge      Depression and anxiety  - Cont fluoxetine and abilify, hold buspar due to liver dysfunction     Consultations This Hospital Stay   MEDICATION HISTORY IP PHARMACY CONSULT  NEPHROLOGY GENERAL ADULT IP CONSULT  PHYSICAL THERAPY ADULT IP CONSULT  OCCUPATIONAL THERAPY ADULT IP CONSULT  PHYSICAL THERAPY ADULT IP CONSULT  OCCUPATIONAL THERAPY ADULT IP CONSULT  PHARMACY IP CONSULT  NEPHROLOGY GENERAL ADULT IP CONSULT  VASCULAR ACCESS CARE ADULT IP CONSULT  VASCULAR ACCESS CARE ADULT IP CONSULT  VASCULAR ACCESS ADULT IP CONSULT  VASCULAR ACCESS ADULT IP CONSULT  NEUROLOGY GENERAL ADULT IP  CONSULT  GI HEPATOLOGY ADULT IP CONSULT  INTERNAL MEDICINE PROCEDURE TEAM ADULT IP CONSULT [facility] - PARACENTESIS  INTERVENTIONAL RADIOLOGY ADULT/PEDS IP CONSULT  HEMATOLOGY ADULT IP CONSULT  PHYSICAL THERAPY ADULT IP CONSULT  OCCUPATIONAL THERAPY ADULT IP CONSULT    Code Status   Full Code       MD [facility] [place] [facility] 2 Service  [facility], [place]  Pager: [phone]  ______________________________________________________________________    Physical Exam   Vital Signs: Temp: 98.5  F (36.9  C) Temp src: Oral BP: 113/47 Pulse: 83 Heart Rate: 71 Resp: 18 SpO2: 96 % O2 Device: None (Room air)    Weight: 135 lbs 4.8 oz   Gen: alert, interactive and pleasant, lying in bed in no acute distress, diffuse dark jaundice   HEENT: Normocephalic/atraumatic, sclera icteric, oropharynx with moist mucous membranes    Resp: fair air movement bilaterally, easy work of breathing on room air, crackles in bases   CV: Regular rate and rhythm, no murmurs noted   Abd: distended, soft, non-tender   Ext: no lower extremity edema, warm and well-perfused with brisk capillary refill, L boot   Neuro: Alert and oriented x3, no asterixis, grossly non-focal, moving all extremities equally       Primary Care Physician   Efren Bustos MD    Discharge Orders      US Paracentesis    Please give 12.5 gms of 25% albumin for every 1 Liter removed, max of 50 gms.  Limit para amount to 4 Liters due to kidney function.     Comprehensive metabolic panel     INR     CBC with platelets    Last Lab Result: Hemoglobin (g/dL)       Date                     Value                 06/12/2020               7.2 (L)          ----------     Medication Therapy Management Referral      General info for SNF    Length of Stay Estimate: Short Term Care: Estimated # of Days <30  Condition at Discharge: Stable  Level of care:skilled   Rehabilitation Potential: Fair  Admission H&P remains valid and up-to-date: Yes  Recent Chemotherapy: N/A  Use  Nursing Home Standing Orders: N/A     Reason for your hospital stay    Patient was admitted with altered mental status secondary to hepatic encephalopathy, acute kidney injury, hypercalcemia, and hyponatremia. Mental status improved with correction of electrolytes, treatment of ALFREDO, and treatment of hepatic encephalopathy. Patient is currently at her baseline mental status.     Activity - Up with nursing assistance     Follow Up and recommended labs and tests    Follow up with long term physician. The following labs/tests are recommended: CMP, INR, CBC within 1 week. Results to be followed by Dr. Kovacs of GI/Hepatology. Ultrasound guided paracentesis scheduled for 6/22 at [facility]. Patient has an outpatient follow up visit with Dr. Cruz on 7/27/2020.     Full Code     Physical Therapy Adult Consult    Evaluate and treat as clinically indicated.    Reason:  Deconditioning and unsteadiness on feet.     Occupational Therapy Adult Consult    Evaluate and treat as clinically indicated.    Reason:  Activities of daily living.     Fall precautions     Advance Diet as Tolerated    Follow this diet upon discharge: Orders Placed This Encounter      Room Service      Snacks/Supplements Adult: Boost Plus; Between Meals      2 Gram Sodium Diet       Significant Results and Procedures   Most Recent 3 CBC's:  Recent Labs   Lab Test 06/12/20  0616 06/11/20  0545 06/10/20  0503   WBC 7.9 6.7 6.1   HGB 7.2* 7.2* 7.4*   * 105* 107*   PLT 51* 38* 34*     Most Recent 3 BMP's:  Recent Labs   Lab Test 06/12/20  0616 06/11/20  0545 06/10/20  0503    140 139   POTASSIUM 4.9 4.6 3.7   CHLORIDE 114* 116* 115*   CO2 17* 18* 17*   BUN 38* 38* 38*   CR 1.76* 1.66* 1.59*   ANIONGAP 6 5 7   ELSIE 8.7 9.0 9.4   * 106* 101*     Most Recent 2 LFT's:  Recent Labs   Lab Test 06/12/20  0616 06/11/20  0545   AST 26 26   ALT 16 16   ALKPHOS 173* 158*   BILITOTAL 12.8* 12.0*     Most Recent 3 INR's:  Recent Labs   Lab Test  06/12/20  0616 06/11/20  0545 06/10/20  0503   INR 1.96* 2.41* 2.17*     Most Recent ESR & CRP:  Recent Labs   Lab Test 04/21/20  1136 09/22/17  0120   SED  --  30   CRP 55.8* 26.9*   ,   Results for orders placed or performed during the hospital encounter of 06/02/20   XR Chest Port 1 View    Narrative    EXAM: XR CHEST PORT 1 VW  6/2/2020 11:36 AM     HISTORY:  ams       COMPARISON:  5/4/2020    FINDINGS:   AP portable chest radiograph. Trachea is midline. Cardiac silhouette  size is within normal limits. Improved basilar predominant  interstitial opacities. No pneumothorax or effusion. Horizontal linear  opacity in the left lung base is favored to represent atelectasis. No  focal airspace consolidation. Abdominal surgical clips. No acute  osseous abnormality.      Impression    IMPRESSION: Decreased findings of pulmonary edema versus atypical  infection seen on 5/4/2020. No new focal airspace opacity.    I have personally reviewed the examination and initial interpretation  and I agree with the findings.    COLTEN WOLF, DO   POC US GUIDE FOR PARACENTESIS    Impression    Limited Bedside Abdominal Ultrasound, performed and interpreted by me.     Indication: suspected SBP. Evaluate for Free fluid.     With the patient in Trendelenburg, the RUQ, LUQ, (including the paracolic gutters) views were examined for free fluid. With the patient in reverse Trendelenburg, the super pubic view was examined for free fluid.     Findings: small volume free fluid present     IMPRESSION: Free fluid present as noted above. Only fluid pockets are low in pelvis below inferior epigastric vessels.      CT Head w/o Contrast    Narrative    CT HEAD W/O CONTRAST 6/3/2020 10:42 AM    Provided History: Altered level of consciousness (LOC), unexplained    Comparison: Head CT 5/4/2020. Brain MRI 11/6/2017.    Technique: Using multidetector thin collimation helical acquisition  technique, axial, coronal and sagittal CT images from the skull  base  to the vertex were obtained without intravenous contrast.     Findings:    No intracranial hemorrhage, mass effect, or midline shift. The  ventricles are proportionate to the cerebral sulci. The gray to white  matter differentiation of the cerebral hemispheres is preserved. The  basal cisterns are patent. Mild diffuse generalized cerebral  parenchymal volume loss, normal for age. Mild periventricular white  matter hypoattenuation in both cerebral hemispheres, consistent with  chronic small vessel ischemic disease and unchanged.    The visualized paranasal sinuses are clear. The mastoid air cells are  clear.       Impression    Impression: No acute intracranial pathology.    I have personally reviewed the examination and initial interpretation  and I agree with the findings.    ANGEL JO MD   US Renal Complete    Narrative    EXAMINATION: US RENAL COMPLETE 6/5/2020 10:36 AM     COMPARISON: Abdominal ultrasound dated 5/5/2020    HISTORY: Worsening acute kidney injury. Rule out obstruction.    TECHNIQUE: The kidneys and bladder were scanned in the standard  fashion with specialized ultrasound transducer(s) using both gray  scale and limited color/spectral Doppler techniques.    FINDINGS:    Right kidney: Measures 11.1 cm in length. Parenchyma is of normal  thickness and echogenicity. No focal mass. No hydronephrosis. There is  ascites present.    Left kidney: The left kidney is surgically absent.    Bladder: The bladder is partially distended and unremarkable where  visualized.      Impression    IMPRESSION:  1.  Normal grayscale ultrasound evaluation of the right kidney.  2.  Left nephrectomy.  3.  Ascites    I have personally reviewed the examination and initial interpretation  and I agree with the findings.    BASSAM MANN MD   US Abdomen Complete w Doppler Complete    Narrative    EXAMINATION: US ABDOMEN COMPLETE WITH DOPPLER COMPLETE 6/7/2020 9:35  AM     COMPARISON: Ultrasound  4/21/2020    HISTORY: History of decompensated liver cirrhosis, evaluate for DVT  and ascites    TECHNIQUE: The abdomen was scanned in standard fashion with  specialized ultrasound transducer(s) using both gray-scale, color  Doppler, and spectral flow techniques.    Findings:  Liver: The liver coarse nodular hyperechogenic parenchyma. No evidence  of a focal hepatic mass.     Extrahepatic portal vein flow is antegrade at 24.  Right portal vein flow is antegrade, measuring 36.  Left portal vein flow is antegrade, measuring 26.    Flow in the hepatic artery is towards the liver and:  66 peak systolic  0.72 resistive index.     The splenic vein is patent and flow is towards the liver.  The left,  middle, and right hepatic veins are patent with flow towards the IVC.  The IVC is patent with flow towards the heart.   The visualized aorta  is not dilated.    Gallbladder: Surgically absent.    Bile Ducts: Both the intra- and extrahepatic biliary system are of  normal caliber.  The common bile duct measures 5 mm.    Pancreas: Visualized portions of the head and body of the pancreas are  unremarkable.     Kidneys:  The right kidney demonstrates normal echotexture without  focal mass or evidence of hydronephrosis. Right renal length 10.2 cm.  Left kidney not well-visualized.    Spleen: The spleen measures 13.9 cm in length.    Fluid: Moderate ascites.      Impression    Impression:   1.  Hepatic cirrhosis with moderate ascites.  2.  Patent Doppler evaluation.  3.  Splenomegaly.    I have personally reviewed the examination and initial interpretation  and I agree with the findings.    BELLA HILLS MD   IR Paracentesis    Narrative    PRE-PROCEDURE DIAGNOSIS: Ascites    POST-PROCEDURE DIAGNOSIS: Same    PROCEDURE: Paracentesis    Impression    IMPRESSION: Completed ultrasound-guided therapeutic paracentesis. A  total of 3000 mL clear yellow fluid aspirated from the abdomen. No  immediate complication.       ----------    CLINICAL HISTORY: Ultrasound-guided paracentesis requested for  ascites.    PERFORMED BY: Dane Eddy PA-C    CONSENT: Written informed consent was obtained and is documented in  the patient record.    MEDICATIONS: 1% lidocaine for local anesthesia. Intravenous albumin  was available (see administration record).      DESCRIPTION: Ascites was identified on limited abdominal ultrasound  exam and picture is documented in the patient's record. The abdomen  was prepped and draped in the usual sterile fashion.  Local anesthesia  was achieved. Under ultrasound guidance, a 5-French straight centesis  needle/catheter was advanced into the peritoneal space and needle was  removed.  The catheter was connected to drainage and ascites was  aspirated. Catheter was removed on completion of drainage and sterile  dressing was applied.    COMPLICATIONS: No immediate concerns, the patient remained stable  throughout the procedure and tolerated it well.    ESTIMATED BLOOD LOSS: Minimal    SPECIMENS: None    DANE EDDY PA-C       Discharge Medications   Current Discharge Medication List      START taking these medications    Details   acetaminophen (TYLENOL) 500 MG tablet Take 1 tablet (500 mg) by mouth every 6 hours as needed for mild pain  Qty: 30 tablet, Refills: 0    Associated Diagnoses: Pain      phytonadione (MEPHYTON) 5 MG tablet Take 1 tablet (5 mg) by mouth daily for 2 doses  Qty: 2 tablet, Refills: 0    Associated Diagnoses: Alcoholic cirrhosis of liver with ascites (H)      sodium bicarbonate 650 MG tablet Take 1 tablet (650 mg) by mouth 2 times daily  Qty: 60 tablet, Refills: 0    Associated Diagnoses: CKD (chronic kidney disease) stage 3, GFR 30-59 ml/min (H)      !! vitamin B1 (THIAMINE) 100 MG tablet Take 1 tablet (100 mg) by mouth daily  Qty: 30 tablet, Refills: 0    Associated Diagnoses: Encephalopathy       !! - Potential duplicate medications found. Please discuss with provider.      CONTINUE  these medications which have CHANGED    Details   ciprofloxacin (CIPRO) 250 MG tablet Take 1 tablet (250 mg) by mouth every 24 hours  Qty: 30 tablet, Refills: 0    Associated Diagnoses: Alcoholic cirrhosis of liver with ascites (H)      furosemide (LASIX) 40 MG tablet Take 1 tablet (40 mg) by mouth daily  Qty: 30 tablet, Refills: 0    Associated Diagnoses: Alcoholic cirrhosis of liver with ascites (H)      midodrine (PROAMATINE) 10 MG tablet Take 1 tablet (10 mg) by mouth 3 times daily (with meals)  Qty: 90 tablet, Refills: 0    Associated Diagnoses: Hypotension, unspecified hypotension type      nicotine (NICODERM CQ) 21 MG/24HR 24 hr patch Place 1 patch onto the skin every 24 hours  Qty: 14 patch, Refills: 0    Associated Diagnoses: Tobacco abuse      spironolactone (ALDACTONE) 25 MG tablet Take 1 tablet (25 mg) by mouth daily  Qty: 30 tablet, Refills: 0    Associated Diagnoses: Alcoholic cirrhosis of liver with ascites (H)         CONTINUE these medications which have NOT CHANGED    Details   ARIPiprazole (ABILIFY) 5 MG tablet Take 1 tablet (5 mg) by mouth At Bedtime  Qty: 90 tablet, Refills: 3    Associated Diagnoses: Major depressive disorder, recurrent episode, moderate (H)      busPIRone (BUSPAR) 15 MG tablet Take 1 tablet (15 mg) by mouth 2 times daily  Qty: 180 tablet, Refills: 3    Associated Diagnoses: MONA (generalized anxiety disorder)      CONSTULOSE 10 GM/15ML solution TAKE 22.5 MLS BY MOUTH THREE TIMES A DAY AS NEEDED FOR CONSTIPATION - START WITH 15 MLS DAILY, TITRATE AS NEEDED FOR 2-3 BM'S DAILY  Qty: 1892 mL, Refills: 4    Associated Diagnoses: Hepatic encephalopathy (H)      FLUoxetine (PROZAC) 20 MG capsule TAKE THREE CAPSULES BY MOUTH ONCE DAILY  Qty: 90 capsule, Refills: 3    Associated Diagnoses: Anxiety; Major depressive disorder, recurrent episode, moderate (H)      folic acid (FOLVITE) 1 MG tablet Take 1 tablet (1 mg) by mouth daily  Qty: 90 tablet, Refills: 3    Associated Diagnoses:  "Alcoholic cirrhosis of liver with ascites (H)      gabapentin (NEURONTIN) 100 MG capsule Take 3 capsules (300 mg) by mouth At Bedtime  Qty: 270 capsule, Refills: 3    Associated Diagnoses: Neuropathy      omeprazole (PRILOSEC) 20 MG DR capsule Take 1 capsule (20 mg) by mouth daily  Qty: 90 capsule, Refills: 3    Associated Diagnoses: Gastroesophageal reflux disease with esophagitis      ondansetron (ZOFRAN) 4 MG tablet Take 1 tablet (4 mg) by mouth every 6 hours as needed for nausea      potassium chloride ER (K-DUR/KLOR-CON M) 10 MEQ CR tablet Take 1 tablet (10 mEq) by mouth daily  Qty: 90 tablet, Refills: 3    Associated Diagnoses: Hypokalemia      rifaximin (XIFAXAN) 550 MG TABS tablet Take 1 tablet (550 mg) by mouth 2 times daily  Qty: 60 tablet, Refills: 0    Associated Diagnoses: Alcoholic cirrhosis of liver without ascites (H); Hepatic encephalopathy (H)      traZODone (DESYREL) 50 MG tablet Take 2 tablets (100 mg) by mouth nightly as needed for sleep  Qty: 30 tablet, Refills: 0    Associated Diagnoses: Other insomnia      ursodiol (ACTIGALL) 500 MG tablet Take 1 tablet (500 mg) by mouth 2 times daily  Qty: 180 tablet, Refills: 3    Associated Diagnoses: Alcoholic cirrhosis of liver with ascites (H)      !! vitamin B1 (VITAMIN B-1) 100 MG tablet Take 1 tablet (100 mg) by mouth daily  Qty:         !! - Potential duplicate medications found. Please discuss with provider.      STOP taking these medications       acamprosate (CAMPRAL) 333 MG EC tablet Comments:   Reason for Stopping:         nicotine (NICODERM CQ) 14 MG/24HR 24 hr patch Comments:   Reason for Stopping:         nicotine (NICODERM CQ) 7 MG/24HR 24 hr patch Comments:   Reason for Stopping:             Allergies   Allergies   Allergen Reactions     Albuterol      Tongue \"hardened and painful\"     "

## 2020-06-12 NOTE — PROGRESS NOTES
[facility]  HEPATOLOGY PROGRESS NOTE  Monalisa Olguin [id number]       CC: confusion  SUBJECTIVE:   States she feels lonely due to prolonged hospital stay. She denies fevers, abdominal pain, abdominal distention. She has been able to eat without difficulty.     ROS:  A 10-point review of systems was negative.    OBJECTIVE:  VS: /47 (BP Location: Left arm)   Pulse 83   Temp 98.5  F (36.9  C) (Oral)   Resp 18   Ht 1.524 m (5')   Wt 61.4 kg (135 lb 4.8 oz)   LMP 05/16/2012   SpO2 96%   BMI 26.42 kg/m       Intake/Output Summary (Last 24 hours) at 6/12/2020 1257  Last data filed at 6/12/2020 0900  Gross per 24 hour   Intake 370 ml   Output 1000 ml   Net -630 ml     General: In no acute distress, mild facial muscle wasting  Neuro: AOx3, No asterixis  Lymph: No cervical lymphadenopathy  HEENT:  Moderate scleral icterus, Nooral lesions  CV: . Skin warm and dry  Lungs:  Respirations even and nonlabored on room air  Abd: nontender, mildly distended. +BS  Extrem: No peripheral edema  Skin: moderate jaundice  Psych: pleasant    MEDICATIONS:  Current Facility-Administered Medications   Medication     acetaminophen (TYLENOL) tablet 500 mg     ARIPiprazole (ABILIFY) tablet 5 mg     [Held by provider] busPIRone (BUSPAR) tablet 15 mg     calcium carbonate (TUMS) chewable tablet 500 mg     ciprofloxacin (CIPRO) tablet 250 mg     Daily 2 GRAM acetaminophen limit, unless fulminent liver failure or transaminases greater than or equal to 300 - 400, then none     FLUoxetine (PROzac) capsule 60 mg     folic acid (FOLVITE) tablet 1 mg     furosemide (LASIX) tablet 40 mg     gabapentin (NEURONTIN) capsule 300 mg     heparin lock flush 10 UNIT/ML injection 2-5 mL     lactulose (CHRONULAC) solution 20 g     lactulose (CHRONULAC) solution 20 g     lidocaine (LMX4) cream     lidocaine (LMX4) cream     lidocaine 1 % 0.1-1 mL     lidocaine 1 % 0.1-1 mL     melatonin tablet 1 mg     midodrine (PROAMATINE) tablet 10 mg      naloxone (NARCAN) injection 0.1-0.4 mg     omeprazole (priLOSEC) CR capsule 20 mg     ondansetron (ZOFRAN-ODT) ODT tab 4 mg    Or     ondansetron (ZOFRAN) injection 4 mg     phytonadione (AQUA-MEPHYTON) 10 mg in sodium chloride 0.9 % 50 mL intermittent infusion     prochlorperazine (COMPAZINE) injection 10 mg    Or     prochlorperazine (COMPAZINE) tablet 10 mg    Or     prochlorperazine (COMPAZINE) Suppository 25 mg     rifaximin (XIFAXAN) tablet 550 mg     sodium bicarbonate tablet 650 mg     sodium chloride (PF) 0.9% PF flush 3 mL     sodium chloride (PF) 0.9% PF flush 3 mL     sodium chloride (PF) 0.9% PF flush 5-50 mL     spironolactone (ALDACTONE) tablet 25 mg     [Held by provider] traZODone (DESYREL) tablet 100 mg     ursodiol (ACTIGALL) tablet 500 mg     vitamin B1 (THIAMINE) tablet 100 mg       REVIEW OF LABORATORY, PATHOLOGY AND IMAGING RESULTS:  BMP  Recent Labs   Lab 06/12/20  0616 06/11/20  0545 06/10/20  0503 06/09/20  0458    140 139 137   POTASSIUM 4.9 4.6 3.7 3.6   CHLORIDE 114* 116* 115* 114*   ELSIE 8.7 9.0 9.4 9.5   CO2 17* 18* 17* 17*   BUN 38* 38* 38* 45*   CR 1.76* 1.66* 1.59* 1.76*   * 106* 101* 107*     CBC  Recent Labs   Lab 06/12/20  0616 06/11/20  0545 06/10/20  0503  06/09/20  0458   WBC 7.9 6.7 6.1  --  5.2   RBC 2.18* 2.15* 2.31*  --  2.12*   HGB 7.2* 7.2* 7.4*   < > 6.9*   HCT 22.7* 22.5* 24.6*  --  22.0*   * 105* 107*  --  104*   MCH 33.0 33.5* 32.0  --  32.5   MCHC 31.7 32.0 30.1*  --  31.4*   RDW 24.6* 24.5* 24.6*  --  24.1*   PLT 51* 38* 34*  --  29*    < > = values in this interval not displayed.     INR  Recent Labs   Lab 06/12/20  0616 06/11/20  0545 06/10/20  0503 06/09/20  0458   INR 1.96* 2.41* 2.17* 2.41*     LFTs  Recent Labs   Lab 06/12/20  0616 06/11/20  0545 06/10/20  0503 06/09/20  0458   ALKPHOS 173* 158* 161* 150   AST 26 26 22 22   ALT 16 16 16 15   BILITOTAL 12.8* 12.0* 11.3* 10.0*   PROTTOTAL 4.7* 4.8* 4.8* 5.0*   ALBUMIN 3.0* 3.1* 3.3* 3.6       PANCNo lab results found in last 7 days.   MELD-Na score: 29 at 6/12/2020  6:16 AM  MELD score: 29 at 6/12/2020  6:16 AM  Calculated from:  Serum Creatinine: 1.76 mg/dL at 6/12/2020  6:16 AM  Serum Sodium: 136 mmol/L at 6/12/2020  6:16 AM  Total Bilirubin: 12.8 mg/dL at 6/12/2020  6:16 AM  INR(ratio): 1.96 at 6/12/2020  6:16 AM  Age: 54 years 8 months      Imaging Results:  US abd w doppler 6/7/20  Impression:   1.  Hepatic cirrhosis with moderate ascites.  2.  Patent Doppler evaluation.  3.  Splenomegaly.    IMPRESSION:  Monalisa Olguin is a 54 year old female with a history of alcohol cirrhosis abstinent since 9/2019 complicated by ascites, history SBP, ALFREDO,  nephrectomy (donor 2000), hepatic encephalopathy, concerns for metabolic cognitive changes related to alcohol, possible dementia who was admitted with hepatic encephalopathy likely related to medication nonadherence.    RECOMMENDATIONS:  1.  Hepatic encephalopathy  -Has had improvement in mentation and remains on lactulose and rifaximin.  Continue with goal stool output 3-5 BMs per day.  With her possible metabolic changes versus dementia, she may need more assistance at home as per neuropsych testing to help manage her medications. Plan for TCU placement  - Infectious work up has been negative.     2.  Alcohol cirrhosis  -Currently in liver transplant EVALUATION.  She did undergo CD evaluation with recommendations for treatment program but has had multiple admissions.  Neuropsych testing was concerning for nonreversible causes of altered mental status with dementia.  She will follow-up with her primary hepatologist for continued transplant evaluation and possible discuss at transplant conference.   -Reinforced with patient that she is currently not listed for liver transplant and above results of testing are concerning that she may not be a candidate. If committee declines her for transplant, she is able to look for another evaluation at any transplant  program.   -MELD 29, ABO O  -Ultrasound 6/7 without evidence of HCC    3. ALFREDO/CKD  - solitary kidney with baseline mild CKD. She does loss of muscle mass concerning that her creatinine is under representing her kidney function.   - Slight bump in creatinine today. With slight increase in sodium and chloride, agree with holding diuretics and giving some fluids- prefer albumin rather than IVF.   - Encouraged her to increase her free water intake.     4.  Ascites  5.  History of SBP  -Continue ciprofloxacin 250 mg daily for SBP ppx  - Last para 6/8 without evidence of SBP. Holding diuretics for now.     6.  Esophageal varices  -Most recent EGD 4/2019 with no varices    Patient was discussed with attending physician, Dr. Leventhal    Next follow up appointment: Dr. Kovacs 7/27    Thank you for the opportunity to be involved with  Monalisa Olguin [facility]. Please call with any questions or concerns.    IVETTE Suarez, CNP  Inpatient Hepatology [facility]  [phone]  Text Link

## 2020-06-12 NOTE — PLAN OF CARE
Patient A & O X 3, forgetful and her Baseline . Patient VSS no respiratory distress noted. Patient received scheduled meds as scheduled. Patient appetite fair . Received Bolus NS 1L per MD order . Patient has discharge order to go to TCU. Patient /Family aware and agreeable with Plan. Patient family to provide ride . Patient left the unit at 14:30, via wheelchair by [facility] to [facility] and her  a waiting at [facility]. Patient belonging and discharge Packet sent with. Try to call TCU x 2 and no answer it goes to voice mail will try  to call.([phone])

## 2020-06-12 NOTE — PLAN OF CARE
Discharge Planner OT   Patient plan for discharge: TCU today  Current status: pt Mellisa to don boot, indep with R shoe. Pt ambulated 400' SBA, with 2 LOB requiring assist to recover.  Toilet transfer/task SBA.  Pt oriented to day, date.   Barriers to return to prior living situation: falls risk, weakness, impaired cognition, current level of assist  Recommendations for discharge: TCU  Rationale for recommendations: pt below baseline, and would benefit from continued therapy to increase safety and independence with ADLs       Entered by: Shellie Arnold 06/12/2020 9:51 AM

## 2020-06-12 NOTE — PROGRESS NOTES
Social Work Services Discharge Note      Patient Name:  Monalisa Olguin     Anticipated Discharge Date:  6/11/20    Discharge Disposition:   TCU:  [facility]    Following MD: Luh Lyons MD     Pre-Admission Screening (PAS) online form has been completed.  The Level of Care (LOC) is:  Determined  Confirmation Code is:  [id number]  Patient/caregiver informed of referral to [facility] for Pre-Admission Screening for skilled nursing facility (SNF) placement and to expect a phone call post discharge from SNF.     Additional Services/Equipment Arranged:  Daughter is not available for transport.  Eron is coming to pick her up     Patient / Family response to discharge plan: Patient in agreement with discharge plan.     Persons notified of above discharge plan:  Patient, TCU, [facility] Team    Staff Discharge Instructions:  Please fax discharge orders and signed hard scripts for any controlled substances.  Please print a packet and send with patient. Fax [phone]  Please call ph: [phone] or [phone] for Nurse to Nurse report     CTS Handoff completed:  NO    Medicare Notice of Rights provided to the patient/family:  NO    Tracy BAE. MIYA.  Clinical   [facility] [place]    Pager: [phone] Mon-Sat 9 am - 4:30 pm  On-call/after hours pager [phone]

## 2020-06-12 NOTE — PLAN OF CARE
Assumed cares 2300 to 0730. Alert, disoriented to year & hospital name, oriented to self, city, & month. Follows commands. VSS on RA. Soft BPs improved this morning, at 113/47. Denies pain/nausea/constipation. Soft, tender distended abdomen. No BM this shift. Ate ice cream & melanie crackers at night. On 2g Na restriction. L PIV SL. Uses call light to vocalize needs. Plans to discharge to TCU at [place] today if labs are improved. Daughter will provide transport. Messaged team to place discharge orders.

## 2020-06-12 NOTE — PLAN OF CARE
Occupational Therapy Discharge Summary    Reason for therapy discharge:    Discharged to transitional care facility.    Progress towards therapy goal(s). See goals on Care Plan in [facility] electronic health record for goal details.  Goals not met.  Barriers to achieving goals:   discharge from facility.    Therapy recommendation(s):    Continued therapy is recommended.  Rationale/Recommendations:  maximize safety and independence with ADLs.

## 2020-06-12 NOTE — PROGRESS NOTES
Brief Care Coordination Note    Request received to schedule OP paracentesis w/US at [facility].     [facility] [place] in [place]  Paracentesis / US dept   Phone [phone]  Fax [phone]    Para scheduled for 6/22 at 9am, 8:45 arrival time, provider updated. Contact number for [facility] given to , as patient will need an updated COVID swab prior to the procedure. Provider updated.     Lay Worthington, RNCC, BSN    [facility]    Medicine Group  [address] [place] [id number]    [email or web address]    Office: [phone] Pager: [phone]  To contact the weekend RNCC, page [phone].

## 2020-06-12 NOTE — PLAN OF CARE
Assumed cares 1500 to 2300. Alert and oriented with some forgetfulness. Able to make needs known. Denies pain. VSS on RA. 1 BM this shift. Plan for possible discharge tomorrow.

## 2020-06-15 PROBLEM — G31.2 ALCOHOLIC ENCEPHALOPATHY (H): Status: ACTIVE | Noted: 2020-01-01

## 2020-06-15 PROBLEM — K70.31 ALCOHOLIC CIRRHOSIS OF LIVER WITH ASCITES (H): Status: ACTIVE | Noted: 2017-03-26

## 2020-06-15 NOTE — LETTER
"    6/15/2020        RE: Monalisa Olguin  [address] [facility] [address]         [facility]  Monalisa Olguin is being evaluated via a billable video visit due to facility request that providers do not come into the building at this time.   The patient has been notified of following:  \"This video visit will be conducted via a call between you and your provider. We have found that certain health care needs can be provided without the need for an in-person physical exam.  This service lets us provide the care you need with a video conversation. If during the course of the call the provider feels a video visit is not appropriate, you will not be charged for this service.\"   The provider has received verbal consent for a Video Visit from the patient and or first contact? Yes  Patient/facility staff would like the video invitation sent by: N/A   Video Start Time: 1555  Which Facility the Patient is at during the time of visit: [facility]   PRIMARY CARE PROVIDER AND CLINIC:  Efren Bustos MD, MD, [address] [facility] / [facility] [address]  Chief Complaint   Patient presents with     Video Visit     Hospital F/U     [place] Medical Record Number:  [id number]  Monalisa Olguin  is a 54 year old  (1965), admitted to the above facility from  [facility]. Hospital stay 6/2/20 through 6/12/20..  Admitted to this facility for  rehab, medical management and nursing care.  HPI:    HPI information obtained from: facility chart records, facility staff, patient report and [facility] chart review.   Brief Summary of Hospital Course:   Patient is a 54-year-old woman with PMH of decompensated liver cirrhosis, CKD, chronic pancytopenia, hyperparathyroidism, depression, anxiety, GERD, anemia, solitary kidney status post nephrectomy in 2000 who presented to the ED with her  due to increasing confusion, thought likely 2/2 HE and/or toxic metabolic " encephalopathy.  She was found to have severe hyperkalemia and moderate hyponatremia which improved with IVF.  There were no signs of sepsis nor SBP.  Head CT was unremarkable.  Due to continued mild confusion despite correction of metabolic etiology neuropsych testing on 5/27 was concerning for dementia-like symptoms related to past EtOH use.  EEG without evidence of seizure.  Patient was recommended to follow-up with hepatology for evaluation of liver transplant however this may be in question due to recent neuropsych testing now.  Hospitalization was complicated by ALFREDO on CKD which also improved with IVF and HCO3.  Renal ultrasound was unremarkable.  Anemia was also worsened although thought was likely delusional but she did get 1 unit RBC for Hgb 6.9.  She had HoTN which was treated with increase in PTA midodrine and her diuretics were adjusted (increase in Lasix, decrease in spironolactone).    She was deemed appropriate for TCU stay and was discharged to TCU on Friday 6/12/20.    Unfortunately patient never arrived to TCU as family took her home.  She arrived today (Monday) with a suitcase and pillow, amble to ambulate on her own while in TCU.  Decision made by SNF Medical Director with OK to admit patient with stat labs, COVID NP test.    Med Changes:   - Hosp: (new) Tylenol 500 mg q 6 hours PRN, phytonadione 5 mg po daily x 2 doses, HCo3 650 mg BID, thiamine 100 gm daily.  - Hosp: (changed) Lasix increased to 40 mg daily, midodrine increased to 10 mg TID, nicotine patch ordered, spironolactone decreased to 25 mg daily.       Updates on Status Since Skilled nursing Admission:   Patient is met today in her TCU room where she reports no pain, SOB, CP, HA, lightheadedness, heartburn, upset stomach.  Patient endorses constipation.  She says she has been taking ehr lactulose but in another statement says that she usually has 2-4 BMs/day when she takes it.    She appears a bit confused, but this may be her baseline  cognition.    It is unclear at this time when she was discharged on Friday from the hospital and did not arrive to TCU until Monday.    MD from [facility] was contacted and he did not feel comfortable signing new orders for admission since she was gone for 3 days.     CODE STATUS/ADVANCE DIRECTIVES DISCUSSION:   CPR/Full code   Patient's living condition: lives with spouse  ALLERGIES: Albuterol  PAST MEDICAL HISTORY:  has a past medical history of Acute alcoholic intoxication in alcoholism (H) (3/27/2018), Acute renal failure (H) (11/11/2019), Alcohol abuse (5/2/2013), Alcohol dependence (5/25/2013), Alcohol withdrawal (5/2/2013), Alcoholic cirrhosis (H), Anxiety (10/10/2011), CKD (chronic kidney disease) stage 3, GFR 30-59 ml/min (H) (2006), CKD (chronic kidney disease) stage 3, GFR 30-59 ml/min (H) (2/22/2011), CKD (chronic kidney disease) stage 5, GFR less than 15 ml/min (H) (4/14/2020), Depressive disorder, Esophageal reflux (10/7/2002), Hematochezia-patient reported (11/11/2019), Heme positive stool (3/27/2018), Hepatic encephalopathy (H) (11/11/2019), History of blood transfusion, Hypertension goal BP (blood pressure) < 130/80 (7/12/2011), Hyponatremia (11/11/2019), Moderate Depression [296.32] (12/22/2009), Other internal derangement of knee(717.89), Pap smear (2011), SBP (spontaneous bacterial peritonitis) (H) (11/12/2019), Thyroid disease, and Unspecified essential hypertension. She also has no past medical history of Arthritis, Asymptomatic human immunodeficiency virus (HIV) infection status (H), Cancer (H), Cerebral infarction (H), Congestive heart failure (H), COPD (chronic obstructive pulmonary disease) (H), Coronary artery disease, Diabetes (H), Hepatitis, Infectious mononucleosis, Tuberculosis, or Uncomplicated asthma.  PAST SURGICAL HISTORY:   has a past surgical history that includes RMV,KIDNEY,DONOR,LIVING (2000); KNEE SCOPE,DIAGNOSTIC (11/14/01); Laparoscopic cholecystectomy (N/A, 9/24/2017); Open  reduction internal fixation wrist (Right, 2017); Esophagoscopy, gastroscopy, duodenoscopy (EGD), combined (N/A, 2017); Esophagoscopy, gastroscopy, duodenoscopy (EGD), combined (N/A, 2019);  DELIVERY ONLY (); Colonoscopy (N/A, 2017); transplant; and IR Paracentesis (2020).  FAMILY HISTORY: family history includes Alcohol/Drug in her maternal grandfather; Cancer in her paternal aunt; Cerebrovascular Disease in her maternal grandfather and maternal grandmother; Diabetes (age of onset: 17) in her sister; Diabetes (age of onset: 9) in her sister; Genitourinary Problems (age of onset: 43) in her sister; Heart Disease in her father, paternal grandfather, paternal grandmother, and sister; Hypertension in her mother; Other - See Comments in her sister and sister; Substance Abuse in her maternal grandfather.  SOCIAL HISTORY:   reports that she has been smoking cigarettes. She has a 5.00 pack-year smoking history. She has never used smokeless tobacco. She reports previous alcohol use. She reports previous drug use. Drug: Marijuana.  Current Outpatient Medications   Medication Sig Dispense Refill     acetaminophen (TYLENOL) 500 MG tablet Take 1 tablet (500 mg) by mouth every 6 hours as needed for mild pain 30 tablet 0     ARIPiprazole (ABILIFY) 5 MG tablet Take 1 tablet (5 mg) by mouth At Bedtime 90 tablet 3     busPIRone (BUSPAR) 15 MG tablet Take 1 tablet (15 mg) by mouth 2 times daily 180 tablet 3     ciprofloxacin (CIPRO) 250 MG tablet Take 1 tablet (250 mg) by mouth every 24 hours 30 tablet 0     CONSTULOSE 10 GM/15ML solution TAKE 22.5 MLS BY MOUTH THREE TIMES A DAY AS NEEDED FOR CONSTIPATION - START WITH 15 MLS DAILY, TITRATE AS NEEDED FOR 2-3 BM'S DAILY 1892 mL 4     FLUoxetine (PROZAC) 20 MG capsule TAKE THREE CAPSULES BY MOUTH ONCE DAILY 90 capsule 3     folic acid (FOLVITE) 1 MG tablet Take 1 tablet (1 mg) by mouth daily 90 tablet 3     furosemide (LASIX) 40 MG tablet Take 1  tablet (40 mg) by mouth daily 30 tablet 0     gabapentin (NEURONTIN) 100 MG capsule Take 3 capsules (300 mg) by mouth At Bedtime 270 capsule 3     midodrine (PROAMATINE) 10 MG tablet Take 1 tablet (10 mg) by mouth 3 times daily (with meals) 90 tablet 0     nicotine (NICODERM CQ) 21 MG/24HR 24 hr patch Place 1 patch onto the skin every 24 hours 14 patch 0     omeprazole (PRILOSEC) 20 MG DR capsule Take 1 capsule (20 mg) by mouth daily 90 capsule 3     ondansetron (ZOFRAN) 4 MG tablet Take 1 tablet (4 mg) by mouth every 6 hours as needed for nausea       potassium chloride ER (K-DUR/KLOR-CON M) 10 MEQ CR tablet Take 1 tablet (10 mEq) by mouth daily 90 tablet 3     rifaximin (XIFAXAN) 550 MG TABS tablet Take 1 tablet (550 mg) by mouth 2 times daily 60 tablet 0     sodium bicarbonate 650 MG tablet Take 1 tablet (650 mg) by mouth 2 times daily 60 tablet 0     spironolactone (ALDACTONE) 25 MG tablet Take 1 tablet (25 mg) by mouth daily 30 tablet 0     traZODone (DESYREL) 50 MG tablet Take 2 tablets (100 mg) by mouth nightly as needed for sleep 30 tablet 0     ursodiol (ACTIGALL) 500 MG tablet Take 1 tablet (500 mg) by mouth 2 times daily 180 tablet 3     vitamin B1 (THIAMINE) 100 MG tablet Take 1 tablet (100 mg) by mouth daily 30 tablet 0     vitamin B1 (VITAMIN B-1) 100 MG tablet Take 1 tablet (100 mg) by mouth daily        ROS: Limited secondary to cognitive impairment but today pt reports 10 point ROS of systems including Constitutional, Eyes, Respiratory, Cardiovascular, Gastroenterology, Genitourinary, Integumentary, Musculoskeletal, Psychiatric were all negative except for pertinent positives noted in my HPI.  Vitals:LMP 05/16/2012    Limited Visit Exam done given COVID-19 precautions:  CBC RESULTS:   Recent Labs   Lab Test 06/12/20  0616 06/11/20  0545   WBC 7.9 6.7   RBC 2.18* 2.15*   HGB 7.2* 7.2*   HCT 22.7* 22.5*   * 105*   MCH 33.0 33.5*   MCHC 31.7 32.0   RDW 24.6* 24.5*   PLT 51* 38*       Last Basic  Metabolic Panel:  Recent Labs   Lab Test 06/12/20  0616 06/11/20  0545    140   POTASSIUM 4.9 4.6   CHLORIDE 114* 116*   ELSIE 8.7 9.0   CO2 17* 18*   BUN 38* 38*   CR 1.76* 1.66*   * 106*       Liver Function Studies -   Recent Labs   Lab Test 06/12/20  0616 06/11/20  0545   PROTTOTAL 4.7* 4.8*   ALBUMIN 3.0* 3.1*   BILITOTAL 12.8* 12.0*   ALKPHOS 173* 158*   AST 26 26   ALT 16 16       TSH   Date Value Ref Range Status   06/04/2020 0.68 0.40 - 4.00 mU/L Final   04/14/2020 0.52 0.40 - 4.00 mU/L Final   ]    Lab Results   Component Value Date    A1C Canceled, Test credited 09/24/2017       Lab/Diagnostic data:  CBC RESULTS:   Recent Labs   Lab Test 06/12/20 0616 06/11/20  0545   WBC 7.9 6.7   RBC 2.18* 2.15*   HGB 7.2* 7.2*   HCT 22.7* 22.5*   * 105*   MCH 33.0 33.5*   MCHC 31.7 32.0   RDW 24.6* 24.5*   PLT 51* 38*       Last Basic Metabolic Panel:  Recent Labs   Lab Test 06/12/20  0616 06/11/20  0545    140   POTASSIUM 4.9 4.6   CHLORIDE 114* 116*   ELSIE 8.7 9.0   CO2 17* 18*   BUN 38* 38*   CR 1.76* 1.66*   * 106*       Liver Function Studies -   Recent Labs   Lab Test 06/12/20 0616 06/11/20  0545   PROTTOTAL 4.7* 4.8*   ALBUMIN 3.0* 3.1*   BILITOTAL 12.8* 12.0*   ALKPHOS 173* 158*   AST 26 26   ALT 16 16       TSH   Date Value Ref Range Status   06/04/2020 0.68 0.40 - 4.00 mU/L Final   04/14/2020 0.52 0.40 - 4.00 mU/L Final   ]    Lab Results   Component Value Date    A1C Canceled, Test credited 09/24/2017         ASSESSMENT/PLAN:  Encephalopathy  Alcoholic cirrhosis of liver with ascites (H)  Portal hypertension (H)  Presented with increased confusion - hyponatremic, hypercalcemic, ALFREDO on CKD - all thought 2/2 decompensated liver disease  Ammonia level not elevated this admission   F/b Dr Kovacs, [facility]  MELD 29 at discharge    On (new) phytonadione 5 mg daily x 2 doses  On (new) thimaine 100 mg daily  On PTA Ciprofloxacin 250 mg daily for SBP PPX  On PTA Lactulose - can  clarify order for 2- gm TID and monitor for titration needs (goal 3-4 BM/sday)  On PTA rifaximin 550 mg BID, ursodiol 500 mg daily  PTA acamprostate DC'd d/t ALFREDO  (Changed) Lasix 20 mg daily --> now 40 mg daily  (changed) Spirolactone 50 mg daily --> now 25 mg daily     History of paracentesis for ascites - next scheduled for 6/22 in [place]   Lab Results   Component Value Date    INR 1.96 06/12/2020    INR 2.41 06/11/2020     Liver Function Studies -   Recent Labs   Lab Test 06/12/20  0616   PROTTOTAL 4.7*   ALBUMIN 3.0*   BILITOTAL 12.8*   ALKPHOS 173*   AST 26   ALT 16       PLAN:  - regimen as written above  - monitor for HE  - paracentesis scheduled for 6/22/20  - f/u with Dr Kovacs 7/27/20   - CMP today with results to go to Dr Kovacs (likely also needs to be re-drawn in about a week for monitoring)     Thrombocytopenia (H)  Platelet Count   Date Value Ref Range Status   06/12/2020 51 (L) 150 - 450 10e9/L Final     2/2 EToH cirrhosis   Resending today for monitoring.     Acute on chronic anemia  hgb 8.1 --> 6.9 s/p 1 pRBC --> 7.2 by discharge.   Thought 2/2 EToH cirrhosis and dilutional from fluid resuscitation.    No evidence of GIB (although does have history of portal HTN)  On PTA folic acid, Vitamin B1 (and 2 doses of vitamin K1)     PLAN:  - monitor for bleeding  - Hgb pending for today     Acute renal failure superimposed on stage 3 chronic kidney disease, unspecified acute renal failure type (H)  Solitary kidney, acquired s/p nephrectomy in 2000  Dehydration upon admission --> treated with IVF  Developed hyperchloremic metabolic acidosis after IVF, resolved with HCO3 via U/S.   Renal U/S negative  Diuretics adjusted (increase in Lasix, decrease in spironolactone).   Is on Gabapentin; no NSAIDs   (stopped) PTA acamprosate d/t ALFREDO     Last few BMPs:   6/2/20: BUN 68, Creat 2.92, GFR 18 (K 5.5, Na 131, Ca 12.9)  6/11/20: BUN 38, Creat 1.66, GFR 35  6/12/20: BUN 38, Creat 1.76, GFR 32 (K 4.9, Na 136, Ca 8.7)      PLAN:  - (increased) Lasix  - (decreased) spironolactone  - (new) HCO3 650 mg BID   - Will avoid nephrotoxic agents (such as ACEI/ARB/NSAIDs, IV contrast) and mindful prescribing with renal dosing.     Hypercalcemia  Ca 12.9 on admit  2/2 decompensated liver disease and ALFREDO on CKD   Treated with IVF, calcitonin, HCO3  All other labs unremarkable.   Ca 8.7 upon discharge.     PLAN:  - Ca pending for today    Hyponatremia  Na 131 on admit  2/2 meds (SSRIs), liver disease, ALFREDO on CKD,   Treated with IVF  Na 136 on discharge    Patient has baseline confusion, HoTN; EEG done without evidence of seizure.     PLAN:  - NA pending for today       Malnutrition, unspecified type (H)  2/2 liver disease/hypoalbuminemia, anorexia  Nutrition consulted and Boost BID recommended.   See LFTs above    PLAN:  - regular diet  - supplements BID between meals  - monitor weights.     Other specified hypotension  HoTN 60s/30s while inpatient (MAps 50s)  Treated with IVF  PTA Midodrine 5 mg TID --> 10 mg TID now  (Changed) Lasix 20 mg daily --> now 40 mg daily  (changed) Spirolactone 50 mg daily --> now 25 mg daily     BP Readings from Last 3 Encounters:   06/12/20 113/47   05/08/20 90/40   04/24/20 104/59     Pulse Readings from Last 4 Encounters:   06/12/20 83   05/08/20 75   04/28/20 75   04/24/20 93      Patient denies CP, HA, lightheadedness today (may be poor historian)    PLAN  - (changed Lasix and Spironolactone  - (increased) Midodrine 10 mg TID now  - monitor for titration needs     Tobacco abuse  On (changed) Nicotine patch 21 mg daily  Monitor     Anxiety  Moderate Depression [296.32]  Cognitive Impairment   On PTA buspar 15 mg BID, Abilify 5 mg q HS, fluoxetine 60 mg daily, Trazodone PRN  Is also on Gabapentin 300 mg q HS (for neuropathy)    PLAN  - monitor for need to consult in-house psych  - continue PTA Buspar, Abilify, fluoxetine, gabapentin  - add 14 day qualifier for PRN Trazodone   - Na level pending for today   -  monitor for lethargy, titrate as needed        Orders written by provider at facility  1. add to PRN Trazodone -  x 14 days. Update NP in 12 days with usage and effectiveness.   2. Scheduled Lactulose 20 gm po TID. Goal 3-4 BMs/day. Dx: Liver disease  3. Stat Labs: CMP, INR, CBC, Tox Screen Dx: Liver disease, Encephalopathy, ALFREDO on CKD,   4. COVID-19 NP swab stat: Dx: r/o COVID for admission to TCU.       Electronically signed by:  IVETTE Norman CNP     Video-Visit Details  Type of service:  Video Visit  Video End Time (time video stopped): 1604  Distant Location (provider location):  [facility]                 Sincerely,        IVETTE Norman CNP

## 2020-06-15 NOTE — LETTER
Transition Communication Hand-off for Care Transitions to Next Level of Care Provider    Name: Monalisa Olguin  : 1965  MRN #: [id number]  Primary Care Provider: Efren Bustos MD  Primary Care MD Name: Dr. Efren Bustos  Primary Clinic: [address] [place] [address] DR STEPHEN ALLEN [id number]  [facility]  Reason for Hospitalization:  Hepatic encephalopathy (H) [K72.90]  Anemia in other chronic diseases classified elsewhere [D63.8]  Admit Date/Time: 6/15/2020  9:24 PM  Discharge Date: 20  Payor Source: Payor: [employer] / Plan: [employer] OPEN ACCESS / Product Type: HMO /     Readmission Assessment Measure (MERCEDEZ) Risk Score/category: High         Reason for Communication Hand-off Referral: Fragility    Discharge Plan: CHANGE ** from previous handoff - patient discharging HOME - no home needs - passed physical therapy     Concern for non-adherence with plan of care:   Y/N : yes    Discharge Needs Assessment:  Needs      Most Recent Value   Anticipated Changes Related to Illness  none   Equipment Currently Used at Home  walker, rolling   # of Referrals Placed by [facility]  Internal Clinic Care Coordination, Post Acute Facilities   Skilled Nursing Facility  [facility] [phone], Fax: [phone]        Follow-up plan:    Future Appointments   Date Time Provider Department Center   2020  9:00 AM KATTY CHO [facility]   2020  2:30 PM [facility]   2020  3:00 PM Edgardo Kovacs MD [facility]     Key Recommendations:  Patient is a HIGH risk for re-admission.  She will need a follow up appt scheduled with her PCP within 3 days of hospital discharge.      TATY Segura  [facility] [phone]/ [place] [phone]    AVS/Discharge Summary is the source of truth; this is a helpful guide for improved communication of patient story

## 2020-06-15 NOTE — LETTER
Transition Communication Hand-off for Care Transitions to Next Level of Care Provider    Name: Monalisa Olguin  : 1965  MRN #: [id number]  Primary Care Provider: Efren Bustos MD  Primary Care MD Name: Dr. Efren Bustos  Primary Clinic: [address] [place] [address] DR STEPHEN ALLEN [id number]  Primary Care Clinic Name: [facility]  Reason for Hospitalization:  Hepatic encephalopathy (H) [K72.90]  Anemia in other chronic diseases classified elsewhere [D63.8]  Admit Date/Time: 6/15/2020  9:24 PM  Discharge Date: 20  Payor Source: Payor: [employer] / Plan: [employer] OPEN ACCESS / Product Type: HMO /     Readmission Assessment Measure (MERCEDEZ) Risk Score/category: High         Reason for Communication Hand-off Referral: Fragility    Discharge Plan: [facility] (Main Phone: [phone] Admissions Phone: [phone] Fax: [phone])     Concern for non-adherence with plan of care:   Y/N : No    Discharge Needs Assessment:  Needs      Most Recent Value   Anticipated Changes Related to Illness  none   # of Referrals Placed by [facility]  Internal Clinic Care Coordination, Post Acute Facilities   Skilled Nursing Facility  [facility] [phone], Fax: [phone]        Follow-up plan:    Future Appointments   Date Time Provider Department Center   2020  4:20 PM Pasha Maravilla DO [facility]   2020  9:00 AM [facility]   2020  2:30 PM [facility]   2020  3:00 PM Edgardo Kovacs MD [facility]     TATY Segura  [facility] [phone]/ [place] [phone]    AVS/Discharge Summary is the source of truth; this is a helpful guide for improved communication of patient story

## 2020-06-15 NOTE — PROGRESS NOTES
" [facility]  Monalisa Olguin is being evaluated via a billable video visit due to facility request that providers do not come into the building at this time.   The patient has been notified of following:  \"This video visit will be conducted via a call between you and your provider. We have found that certain health care needs can be provided without the need for an in-person physical exam.  This service lets us provide the care you need with a video conversation. If during the course of the call the provider feels a video visit is not appropriate, you will not be charged for this service.\"   The provider has received verbal consent for a Video Visit from the patient and or first contact? Yes  Patient/facility staff would like the video invitation sent by: N/A   Video Start Time: 1555  Which Facility the Patient is at during the time of visit: [facility]   PRIMARY CARE PROVIDER AND CLINIC:  Efren Bustos MD, MD, [address] [facility] / [place] [id number]  Chief Complaint   Patient presents with     Video Visit     Hospital F/U     [facility] Record Number:  [id number]  Monalisa Olguin  is a 54 year old  (1965), admitted to the above facility from  [facility]. Hospital stay 6/2/20 through 6/12/20..  Admitted to this facility for  rehab, medical management and nursing care.  HPI:    HPI information obtained from: facility chart records, facility staff, patient report and [facility] chart review.   Brief Summary of Hospital Course:   Patient is a 54-year-old woman with PMH of decompensated liver cirrhosis, CKD, chronic pancytopenia, hyperparathyroidism, depression, anxiety, GERD, anemia, solitary kidney status post nephrectomy in 2000 who presented to the ED with her  due to increasing confusion, thought likely 2/2 HE and/or toxic metabolic encephalopathy.  She was found to have severe hyperkalemia and moderate hyponatremia which improved " with IVF.  There were no signs of sepsis nor SBP.  Head CT was unremarkable.  Due to continued mild confusion despite correction of metabolic etiology neuropsych testing on 5/27 was concerning for dementia-like symptoms related to past EtOH use.  EEG without evidence of seizure.  Patient was recommended to follow-up with hepatology for evaluation of liver transplant however this may be in question due to recent neuropsych testing now.  Hospitalization was complicated by ALFREDO on CKD which also improved with IVF and HCO3.  Renal ultrasound was unremarkable.  Anemia was also worsened although thought was likely delusional but she did get 1 unit RBC for Hgb 6.9.  She had HoTN which was treated with increase in PTA midodrine and her diuretics were adjusted (increase in Lasix, decrease in spironolactone).    She was deemed appropriate for TCU stay and was discharged to TCU on Friday 6/12/20.    Unfortunately patient never arrived to TCU as family took her home.  She arrived today (Monday) with a suitcase and pillow, amble to ambulate on her own while in TCU.  Decision made by [facility] Medical Director with OK to admit patient with stat labs, COVID NP test.    Med Changes:   - Hosp: (new) Tylenol 500 mg q 6 hours PRN, phytonadione 5 mg po daily x 2 doses, HCo3 650 mg BID, thiamine 100 gm daily.  - Hosp: (changed) Lasix increased to 40 mg daily, midodrine increased to 10 mg TID, nicotine patch ordered, spironolactone decreased to 25 mg daily.       Updates on Status Since Skilled nursing Admission:   Patient is met today in her TCU room where she reports no pain, SOB, CP, HA, lightheadedness, heartburn, upset stomach.  Patient endorses constipation.  She says she has been taking ehr lactulose but in another statement says that she usually has 2-4 BMs/day when she takes it.    She appears a bit confused, but this may be her baseline cognition.    It is unclear at this time when she was discharged on Friday from the hospital and  did not arrive to TCU until Monday.    MD from [facility] was contacted and he did not feel comfortable signing new orders for admission since she was gone for 3 days.     CODE STATUS/ADVANCE DIRECTIVES DISCUSSION:   CPR/Full code   Patient's living condition: lives with spouse  ALLERGIES: Albuterol  PAST MEDICAL HISTORY:  has a past medical history of Acute alcoholic intoxication in alcoholism (H) (3/27/2018), Acute renal failure (H) (11/11/2019), Alcohol abuse (5/2/2013), Alcohol dependence (5/25/2013), Alcohol withdrawal (5/2/2013), Alcoholic cirrhosis (H), Anxiety (10/10/2011), CKD (chronic kidney disease) stage 3, GFR 30-59 ml/min (H) (2006), CKD (chronic kidney disease) stage 3, GFR 30-59 ml/min (H) (2/22/2011), CKD (chronic kidney disease) stage 5, GFR less than 15 ml/min (H) (4/14/2020), Depressive disorder, Esophageal reflux (10/7/2002), Hematochezia-patient reported (11/11/2019), Heme positive stool (3/27/2018), Hepatic encephalopathy (H) (11/11/2019), History of blood transfusion, Hypertension goal BP (blood pressure) < 130/80 (7/12/2011), Hyponatremia (11/11/2019), Moderate Depression [296.32] (12/22/2009), Other internal derangement of knee(717.89), Pap smear (2011), SBP (spontaneous bacterial peritonitis) (H) (11/12/2019), Thyroid disease, and Unspecified essential hypertension. She also has no past medical history of Arthritis, Asymptomatic human immunodeficiency virus (HIV) infection status (H), Cancer (H), Cerebral infarction (H), Congestive heart failure (H), COPD (chronic obstructive pulmonary disease) (H), Coronary artery disease, Diabetes (H), Hepatitis, Infectious mononucleosis, Tuberculosis, or Uncomplicated asthma.  PAST SURGICAL HISTORY:   has a past surgical history that includes RMV,KIDNEY,DONOR,LIVING (2000); KNEE SCOPE,DIAGNOSTIC (11/14/01); Laparoscopic cholecystectomy (N/A, 9/24/2017); Open reduction internal fixation wrist (Right, 11/29/2017); Esophagoscopy, gastroscopy, duodenoscopy (EGD),  combined (N/A, 2017); Esophagoscopy, gastroscopy, duodenoscopy (EGD), combined (N/A, 2019);  DELIVERY ONLY (); Colonoscopy (N/A, 2017); transplant; and IR Paracentesis (2020).  FAMILY HISTORY: family history includes Alcohol/Drug in her maternal grandfather; Cancer in her paternal aunt; Cerebrovascular Disease in her maternal grandfather and maternal grandmother; Diabetes (age of onset: 17) in her sister; Diabetes (age of onset: 9) in her sister; Genitourinary Problems (age of onset: 43) in her sister; Heart Disease in her father, paternal grandfather, paternal grandmother, and sister; Hypertension in her mother; Other - See Comments in her sister and sister; Substance Abuse in her maternal grandfather.  SOCIAL HISTORY:   reports that she has been smoking cigarettes. She has a 5.00 pack-year smoking history. She has never used smokeless tobacco. She reports previous alcohol use. She reports previous drug use. Drug: Marijuana.  Current Outpatient Medications   Medication Sig Dispense Refill     acetaminophen (TYLENOL) 500 MG tablet Take 1 tablet (500 mg) by mouth every 6 hours as needed for mild pain 30 tablet 0     ARIPiprazole (ABILIFY) 5 MG tablet Take 1 tablet (5 mg) by mouth At Bedtime 30 tablet 0     busPIRone (BUSPAR) 15 MG tablet Take 1 tablet (15 mg) by mouth 2 times daily 60 tablet 0     ciprofloxacin (CIPRO) 250 MG tablet Take 1 tablet (250 mg) by mouth every 24 hours 30 tablet 0     FLUoxetine (PROZAC) 20 MG capsule Take 3 capsules (60 mg) by mouth daily 90 capsule 0     folic acid (FOLVITE) 1 MG tablet Take 1 tablet (1 mg) by mouth daily 30 tablet 0     furosemide (LASIX) 40 MG tablet Take 1 tablet (40 mg) by mouth daily 30 tablet 0     gabapentin (NEURONTIN) 100 MG capsule Take 3 capsules (300 mg) by mouth At Bedtime 90 capsule 0     lactulose (CONSTULOSE) 10 GM/15ML solution Take 30 mLs (20 g) by mouth 3 times daily 2700 mL 0     midodrine (PROAMATINE) 10 MG tablet Take 1  tablet (10 mg) by mouth 3 times daily (with meals) 90 tablet 0     nicotine (NICODERM CQ) 21 MG/24HR 24 hr patch Place 1 patch onto the skin every 24 hours 30 patch 0     omeprazole (PRILOSEC) 20 MG DR capsule Take 1 capsule (20 mg) by mouth daily 30 capsule 0     ondansetron (ZOFRAN) 4 MG tablet Take 1 tablet (4 mg) by mouth every 6 hours as needed for nausea 20 tablet 0     rifaximin (XIFAXAN) 550 MG TABS tablet Take 1 tablet (550 mg) by mouth 2 times daily 60 tablet 0     sodium bicarbonate 650 MG tablet Take 1 tablet (650 mg) by mouth 2 times daily 60 tablet 0     spironolactone (ALDACTONE) 25 MG tablet Take 1 tablet (25 mg) by mouth daily 30 tablet 0     traZODone (DESYREL) 50 MG tablet Take 2 tablets (100 mg) by mouth nightly as needed for sleep 30 tablet 0     ursodiol (ACTIGALL) 500 MG tablet Take 1 tablet (500 mg) by mouth 2 times daily 60 tablet 0     vitamin B1 (THIAMINE) 100 MG tablet Take 1 tablet (100 mg) by mouth daily 30 tablet 0      ROS: Limited secondary to cognitive impairment but today pt reports 10 point ROS of systems including Constitutional, Eyes, Respiratory, Cardiovascular, Gastroenterology, Genitourinary, Integumentary, Musculoskeletal, Psychiatric were all negative except for pertinent positives noted in my HPI.  Vitals:LMP 05/16/2012    Limited Visit Exam done given COVID-19 precautions:  CBC RESULTS:   Recent Labs   Lab Test 06/12/20  0616 06/11/20  0545   WBC 7.9 6.7   RBC 2.18* 2.15*   HGB 7.2* 7.2*   HCT 22.7* 22.5*   * 105*   MCH 33.0 33.5*   MCHC 31.7 32.0   RDW 24.6* 24.5*   PLT 51* 38*       Last Basic Metabolic Panel:  Recent Labs   Lab Test 06/12/20  0616 06/11/20  0545    140   POTASSIUM 4.9 4.6   CHLORIDE 114* 116*   ELSIE 8.7 9.0   CO2 17* 18*   BUN 38* 38*   CR 1.76* 1.66*   * 106*       Liver Function Studies -   Recent Labs   Lab Test 06/12/20  0616 06/11/20  0545   PROTTOTAL 4.7* 4.8*   ALBUMIN 3.0* 3.1*   BILITOTAL 12.8* 12.0*   ALKPHOS 173* 158*    AST 26 26   ALT 16 16       TSH   Date Value Ref Range Status   06/04/2020 0.68 0.40 - 4.00 mU/L Final   04/14/2020 0.52 0.40 - 4.00 mU/L Final   ]    Lab Results   Component Value Date    A1C Canceled, Test credited 09/24/2017       Lab/Diagnostic data:  CBC RESULTS:   Recent Labs   Lab Test 06/12/20  0616 06/11/20  0545   WBC 7.9 6.7   RBC 2.18* 2.15*   HGB 7.2* 7.2*   HCT 22.7* 22.5*   * 105*   MCH 33.0 33.5*   MCHC 31.7 32.0   RDW 24.6* 24.5*   PLT 51* 38*       Last Basic Metabolic Panel:  Recent Labs   Lab Test 06/12/20  0616 06/11/20  0545    140   POTASSIUM 4.9 4.6   CHLORIDE 114* 116*   ELSIE 8.7 9.0   CO2 17* 18*   BUN 38* 38*   CR 1.76* 1.66*   * 106*       Liver Function Studies -   Recent Labs   Lab Test 06/12/20  0616 06/11/20  0545   PROTTOTAL 4.7* 4.8*   ALBUMIN 3.0* 3.1*   BILITOTAL 12.8* 12.0*   ALKPHOS 173* 158*   AST 26 26   ALT 16 16       TSH   Date Value Ref Range Status   06/04/2020 0.68 0.40 - 4.00 mU/L Final   04/14/2020 0.52 0.40 - 4.00 mU/L Final   ]    Lab Results   Component Value Date    A1C Canceled, Test credited 09/24/2017         ASSESSMENT/PLAN:  Hyponatremia  Na 131 on admit  2/2 meds (SSRIs), liver disease, ALFREDO on CKD,   Treated with IVF  Na 136 on discharge    Patient has baseline confusion, HoTN; EEG done without evidence of seizure.     PLAN:  - NA pending for today     Encephalopathy  Alcoholic cirrhosis of liver with ascites (H)  Portal hypertension (H)  Presented with increased confusion - hyponatremic, hypercalcemic, ALFREDO on CKD - all thought 2/2 decompensated liver disease  Ammonia level not elevated this admission   F/b Dr Kovacs, [facility]  MELD 29 at discharge    On (new) phytonadione 5 mg daily x 2 doses  On (new) thimaine 100 mg daily  On PTA Ciprofloxacin 250 mg daily for SBP PPX  On PTA Lactulose - can clarify order for 2- gm TID and monitor for titration needs (goal 3-4 BM/sday)  On PTA rifaximin 550 mg BID, ursodiol 500 mg daily  PTA  acamprostate DC'd d/t ALFREDO  (Changed) Lasix 20 mg daily --> now 40 mg daily  (changed) Spirolactone 50 mg daily --> now 25 mg daily     History of paracentesis for ascites - next scheduled for 6/22 in [place]   Lab Results   Component Value Date    INR 1.96 06/12/2020    INR 2.41 06/11/2020     Liver Function Studies -   Recent Labs   Lab Test 06/12/20  0616   PROTTOTAL 4.7*   ALBUMIN 3.0*   BILITOTAL 12.8*   ALKPHOS 173*   AST 26   ALT 16       PLAN:  - regimen as written above  - monitor for HE  - paracentesis scheduled for 6/22/20  - f/u with Dr Kovacs 7/27/20   - CMP today with results to go to Dr Kovacs (likely also needs to be re-drawn in about a week for monitoring)     Thrombocytopenia (H)  Platelet Count   Date Value Ref Range Status   06/16/2020 50 (L) 150 - 450 10e9/L Final     2/2 EToH cirrhosis   Resending today for monitoring.     Acute on chronic anemia  hgb 8.1 --> 6.9 s/p 1 pRBC --> 7.2 by discharge.   Thought 2/2 EToH cirrhosis and dilutional from fluid resuscitation.    No evidence of GIB (although does have history of portal HTN)  On PTA folic acid, Vitamin B1 (and 2 doses of vitamin K1)     PLAN:  - monitor for bleeding  - Hgb pending for today     Acute renal failure superimposed on stage 3 chronic kidney disease, unspecified acute renal failure type (H)  Solitary kidney, acquired s/p nephrectomy in 2000  Dehydration upon admission --> treated with IVF  Developed hyperchloremic metabolic acidosis after IVF, resolved with HCO3 via U/S.   Renal U/S negative  Diuretics adjusted (increase in Lasix, decrease in spironolactone).   Is on Gabapentin; no NSAIDs   (stopped) PTA acamprosate d/t ALFREDO     Last few BMPs:   6/2/20: BUN 68, Creat 2.92, GFR 18 (K 5.5, Na 131, Ca 12.9)  6/11/20: BUN 38, Creat 1.66, GFR 35  6/12/20: BUN 38, Creat 1.76, GFR 32 (K 4.9, Na 136, Ca 8.7)     PLAN:  - (increased) Lasix  - (decreased) spironolactone  - (new) HCO3 650 mg BID   - Will avoid nephrotoxic agents (such as  ACEI/ARB/NSAIDs, IV contrast) and mindful prescribing with renal dosing.     Hypercalcemia  Ca 12.9 on admit  2/2 decompensated liver disease and ALFREDO on CKD   Treated with IVF, calcitonin, HCO3  All other labs unremarkable.   Ca 8.7 upon discharge.     PLAN:  - Ca pending for today    Malnutrition, unspecified type (H)  2/2 liver disease/hypoalbuminemia, anorexia  Nutrition consulted and Boost BID recommended.   See LFTs above    PLAN:  - regular diet  - supplements BID between meals  - monitor weights.     Other specified hypotension  HoTN 60s/30s while inpatient (MAps 50s)  Treated with IVF  PTA Midodrine 5 mg TID --> 10 mg TID now  (Changed) Lasix 20 mg daily --> now 40 mg daily  (changed) Spirolactone 50 mg daily --> now 25 mg daily     BP Readings from Last 3 Encounters:   06/16/20 98/47   06/12/20 113/47   05/08/20 90/40     Pulse Readings from Last 4 Encounters:   06/16/20 99   06/12/20 83   05/08/20 75   04/28/20 75      Patient denies CP, HA, lightheadedness today (may be poor historian)    PLAN  - (changed Lasix and Spironolactone  - (increased) Midodrine 10 mg TID now  - monitor for titration needs     Tobacco abuse  On (changed) Nicotine patch 21 mg daily  Monitor     Anxiety  Moderate Depression [296.32]  Cognitive Impairment   On PTA buspar 15 mg BID, Abilify 5 mg q HS, fluoxetine 60 mg daily, Trazodone PRN  Is also on Gabapentin 300 mg q HS (for neuropathy)    PLAN  - monitor for need to consult in-house psych  - continue PTA Buspar, Abilify, fluoxetine, gabapentin  - add 14 day qualifier for PRN Trazodone   - Na level pending for today   - monitor for lethargy, titrate as needed        Orders written by provider at facility  1. add to PRN Trazodone -  x 14 days. Update NP in 12 days with usage and effectiveness.   2. Scheduled Lactulose 20 gm po TID. Goal 3-4 BMs/day. Dx: Liver disease  3. Stat Labs: CMP, INR, CBC, Tox Screen Dx: Liver disease, Encephalopathy, ALFREDO on CKD,   4. COVID-19 NP swab stat:  Dx: r/o COVID for admission to TCU.       Electronically signed by:  IVETTE Norman CNP     Video-Visit Details  Type of service:  Video Visit  Video End Time (time video stopped): 1604  Distant Location (provider location):  [facility]

## 2020-06-16 NOTE — ED PROVIDER NOTES
"  History     Chief Complaint   Patient presents with     Abnormal Labs     HPI  Monalisa Olguin is a 54 year old female who presents after being sent from the nursing home with concern of abnormal labs and increased confusion.  [facility] and found to be hyponatremic and somewhat dehydrated and anemic.  Patient has end-stage liver disease.  Patient was supposed to go to rehab Friday night but did not show up until today.  At the nursing home they check some labs and the nurse practitioner for some reason got concerned about these labs and wanted patient sent to the emergency department.  Patient states that she took all of her medications over the weekend.  Denies any drug or alcohol use over the weekend.  She states she was very sleepy earlier but right now she feels fine.  Denies any new pain anywhere.  She continues to have diarrhea but this is normal because of her lactulose.  Denies any dysuria.  Patient denies any fevers or chills.    Allergies:  Allergies   Allergen Reactions     Albuterol      Tongue \"hardened and painful\"       Problem List:    Patient Active Problem List    Diagnosis Date Noted     Encephalopathy 06/02/2020     Priority: Medium     Alcohol dependence in remission (H) 05/12/2020     Priority: Medium     Abnormal LFTs (liver function tests) 05/12/2020     Priority: Medium     Hepatic encephalopathy (H) 05/04/2020     Priority: Medium     Confusion 04/21/2020     Priority: Medium     CKD (chronic kidney disease) stage 3, GFR 30-59 ml/min (H) 04/14/2020     Priority: Medium     Solitary kidney, acquired 11/11/2019     Priority: Medium     Macrocytic anemia 11/11/2019     Priority: Medium     Other pancytopenia (H) 07/11/2019     Priority: Medium     Other iron deficiency anemia 04/11/2018     Priority: Medium     Alcoholic cirrhosis of liver without ascites (H) - per Hepatology consult Nov 2017 03/28/2018     Priority: Medium     Splenomegaly " 03/28/2018     Priority: Medium     Epistaxis 03/28/2018     Priority: Medium     Alcoholic cirrhosis of liver with ascites (H) 03/26/2017     Priority: Medium     Chronic blood loss anemia 03/26/2017     Priority: Medium     Tobacco abuse 03/26/2017     Priority: Medium     Non-intractable vomiting with nausea 03/26/2017     Priority: Medium     Portal hypertension (H) 03/26/2017     Priority: Medium     Pulmonary nodules 03/26/2017     Priority: Medium     Hyperthyroidism 03/26/2017     Priority: Medium     Anxiety 10/10/2011     Priority: Medium     Hypertension goal BP (blood pressure) < 130/80 07/12/2011     Priority: Medium     HYPERLIPIDEMIA LDL GOAL <100 10/31/2010     Priority: Medium     Moderate Depression [296.32] 12/22/2009     Priority: Medium     Esophageal reflux 10/07/2002     Priority: Medium     Kidney donor 12/04/2001     Priority: Medium        Past Medical History:    Past Medical History:   Diagnosis Date     Acute alcoholic intoxication in alcoholism (H) 3/27/2018     Acute renal failure (H) 11/11/2019     Alcohol abuse 5/2/2013     Alcohol dependence 5/25/2013     Alcohol withdrawal 5/2/2013     Alcoholic cirrhosis (H)      Anxiety 10/10/2011     CKD (chronic kidney disease) stage 3, GFR 30-59 ml/min (H) 2006     CKD (chronic kidney disease) stage 3, GFR 30-59 ml/min (H) 2/22/2011     CKD (chronic kidney disease) stage 5, GFR less than 15 ml/min (H) 4/14/2020     Depressive disorder      Esophageal reflux 10/7/2002     Hematochezia-patient reported 11/11/2019     Heme positive stool 3/27/2018     Hepatic encephalopathy (H) 11/11/2019     History of blood transfusion      Hypertension goal BP (blood pressure) < 130/80 7/12/2011     Hyponatremia 11/11/2019     Moderate Depression [296.32] 12/22/2009     Other internal derangement of knee(717.89)      Pap smear 2011     SBP (spontaneous bacterial peritonitis) (H) 11/12/2019     Thyroid disease      Unspecified essential hypertension        Past  Surgical History:    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     COLONOSCOPY N/A 2017    Procedure: COLONOSCOPY;  Colonoscopy;  Surgeon: Sai Johnston MD;  Location: [facility]     ESOPHAGOSCOPY, GASTROSCOPY, DUODENOSCOPY (EGD), COMBINED N/A 2017    Procedure: COMBINED ESOPHAGOSCOPY, GASTROSCOPY, DUODENOSCOPY (EGD), BIOPSY SINGLE OR MULTIPLE;  ESOPHAGOSCOPY, GASTROSCOPY, DUODENOSCOPY (EGD) with biopsies;  Surgeon: Sai Johnston MD;  Location: [facility]     ESOPHAGOSCOPY, GASTROSCOPY, DUODENOSCOPY (EGD), COMBINED N/A 2019    Procedure: ESOPHAGOGASTRODUODENOSCOPY, WITH BIOPSY;  Surgeon: Edgardo Scott DO;  Location: [facility]     HC KNEE SCOPE, DIAGNOSTIC  01    Arthroscopy, Lt Knee     IR PARACENTESIS  2020     LAPAROSCOPIC CHOLECYSTECTOMY N/A 2017    Procedure: LAPAROSCOPIC CHOLECYSTECTOMY;  laparoscopic cholecystectomy;  Surgeon: Romeo Pastor MD;  Location: [facility]     OPEN REDUCTION INTERNAL FIXATION WRIST Right 2017    Procedure: OPEN REDUCTION INTERNAL FIXATION WRIST;  OPEN REDUCTION INTERNAL FIXATION RIGHT WRIST;  Surgeon: Edgardo Almendarez MD;  Location: [facility]     TRANSPLANT      Donated Left Kidney in        Family History:    Family History   Problem Relation Age of Onset     Hypertension Mother         80 years old     Heart Disease Paternal Grandmother      Heart Disease Paternal Grandfather      Cerebrovascular Disease Maternal Grandmother      Cerebrovascular Disease Maternal Grandfather      Alcohol/Drug Maternal Grandfather      Substance Abuse Maternal Grandfather      Heart Disease Father         Silent MI stents x 2     Diabetes Sister 17        older sister also had kidney and pancreas transplant     Heart Disease Sister         MI secondary to diabetes     Genitourinary Problems Sister 43        kidney  transplant from renal failure     Other - See Comments Sister         older     Other - See Comments Sister         younger     Diabetes Sister 9        youngest, juvenile type I (onset age 9 with no complications)     Cancer Paternal Aunt         ?kind       Social History:  Marital Status:   [2]  Social History     Tobacco Use     Smoking status: Current Every Day Smoker     Packs/day: 0.50     Years: 10.00     Pack years: 5.00     Types: Cigarettes     Smokeless tobacco: Never Used     Tobacco comment: pt quit 1992 after smoking for 8 yrs, started again in 2004   Substance Use Topics     Alcohol use: Not Currently     Alcohol/week: 0.0 standard drinks     Comment: Quit drinking 9/2019     Drug use: Not Currently     Types: Marijuana        Medications:    acetaminophen (TYLENOL) 500 MG tablet  ARIPiprazole (ABILIFY) 5 MG tablet  busPIRone (BUSPAR) 15 MG tablet  ciprofloxacin (CIPRO) 250 MG tablet  FLUoxetine (PROZAC) 20 MG capsule  folic acid (FOLVITE) 1 MG tablet  furosemide (LASIX) 40 MG tablet  gabapentin (NEURONTIN) 100 MG capsule  lactulose (CONSTULOSE) 10 GM/15ML solution  midodrine (PROAMATINE) 10 MG tablet  nicotine (NICODERM CQ) 21 MG/24HR 24 hr patch  omeprazole (PRILOSEC) 20 MG DR capsule  ondansetron (ZOFRAN) 4 MG tablet  potassium chloride ER (K-DUR/KLOR-CON M) 10 MEQ CR tablet  rifaximin (XIFAXAN) 550 MG TABS tablet  sodium bicarbonate 650 MG tablet  spironolactone (ALDACTONE) 25 MG tablet  traZODone (DESYREL) 50 MG tablet  ursodiol (ACTIGALL) 500 MG tablet  vitamin B1 (THIAMINE) 100 MG tablet  vitamin B1 (VITAMIN B-1) 100 MG tablet          Review of Systems   All other systems reviewed and are negative.      Physical Exam   BP: 121/72  Heart Rate: 106  Temp: 98.5  F (36.9  C)  Resp: 22  Height: 152.4 cm (5')  Weight: 56.2 kg (124 lb)  SpO2: 100 %      Physical Exam  Vitals signs and nursing note reviewed.   Constitutional:       General: She is not in acute distress.     Appearance: She is  well-developed. She is not diaphoretic.   HENT:      Head: Normocephalic and atraumatic.      Nose: Nose normal.      Mouth/Throat:      Pharynx: No oropharyngeal exudate.   Eyes:      Conjunctiva/sclera: Conjunctivae normal.   Neck:      Musculoskeletal: Normal range of motion and neck supple.   Cardiovascular:      Rate and Rhythm: Normal rate and regular rhythm.      Heart sounds: Normal heart sounds. No murmur. No friction rub.   Pulmonary:      Effort: Pulmonary effort is normal. No respiratory distress.      Breath sounds: Normal breath sounds. No stridor. No wheezing or rales.   Abdominal:      General: Bowel sounds are normal. There is distension (asities, mild).      Palpations: Abdomen is soft. There is no mass.      Tenderness: There is no abdominal tenderness. There is no guarding.   Musculoskeletal: Normal range of motion.         General: No tenderness.      Right lower leg: No edema.      Left lower leg: No edema.   Skin:     General: Skin is warm and dry.      Capillary Refill: Capillary refill takes less than 2 seconds.      Coloration: Skin is jaundiced.      Findings: No erythema.   Neurological:      Mental Status: She is alert and oriented to person, place, and time.   Psychiatric:         Judgment: Judgment normal.         ED Course  The abnormal labs noted earlier today was a hemoglobin that was down to 6 5.  Patient denies any recent melena.  Patient's chloride was also slightly elevated.  Patient also has increased liver function test but there about at the patient's baseline.  Patient is total bili is 14 but that is about where it was when she was discharged.        Procedures        Results for orders placed or performed during the hospital encounter of 06/15/20 (from the past 24 hour(s))   CBC with platelets differential   Result Value Ref Range    WBC 7.5 4.0 - 11.0 10e9/L    RBC Count 2.10 (L) 3.8 - 5.2 10e12/L    Hemoglobin 6.9 (LL) 11.7 - 15.7 g/dL    Hematocrit 21.4 (L) 35.0 - 47.0 %      (H) 78 - 100 fl    MCH 32.9 26.5 - 33.0 pg    MCHC 32.2 31.5 - 36.5 g/dL    RDW 24.5 (H) 10.0 - 15.0 %    Platelet Count 65 (L) 150 - 450 10e9/L    Diff Method Automated Method     % Neutrophils 70.7 %    % Lymphocytes 14.9 %    % Monocytes 9.7 %    % Eosinophils 2.5 %    % Basophils 0.5 %    % Immature Granulocytes 1.7 %    Nucleated RBCs 0 0 /100    Absolute Neutrophil 5.3 1.6 - 8.3 10e9/L    Absolute Lymphocytes 1.1 0.8 - 5.3 10e9/L    Absolute Monocytes 0.7 0.0 - 1.3 10e9/L    Absolute Basophils 0.0 0.0 - 0.2 10e9/L    Abs Immature Granulocytes 0.1 0 - 0.4 10e9/L    Absolute Nucleated RBC 0.0    Ammonia   Result Value Ref Range    Ammonia 130 (HH) 10 - 50 umol/L   Basic metabolic panel   Result Value Ref Range    Sodium 141 133 - 144 mmol/L    Potassium 5.3 3.4 - 5.3 mmol/L    Chloride 113 (H) 94 - 109 mmol/L    Carbon Dioxide 17 (L) 20 - 32 mmol/L    Anion Gap 11 3 - 14 mmol/L    Glucose 98 70 - 99 mg/dL    Urea Nitrogen 36 (H) 7 - 30 mg/dL    Creatinine 1.52 (H) 0.52 - 1.04 mg/dL    GFR Estimate 39 (L) >60 mL/min/[1.73_m2]    GFR Estimate If Black 45 (L) >60 mL/min/[1.73_m2]    Calcium 8.7 8.5 - 10.1 mg/dL   Alcohol breath test POCT   Result Value Ref Range    Alcohol Breath Test 0.000 0.00 - 0.01       Medications   sodium chloride 0.9% infusion (has no administration in time range)     This is a 54-year-old female with known end-stage liver disease presents with concerns of increasing sedation and confusion and abnormal labs.  Regarding her labs, her liver function testing, these are actually at her baseline I am not too worried about those.  Her hemoglobin has dropped a little again, I think this is more of anemia of chronic disease.  We will go ahead and give her another unit of blood but this does not appear to be from an active bleed or anything unstable.  The more concerning thing, her ammonia level was significantly high at 130.  I think this explains her excessive sedation and increased  confusion.  I suspect that maybe she did not take her lactulose over the weekend like she thinks she did.  She also states she has not had it today at all.  We will plan on admitting the patient to the hospital here tonight for observation.  Restart her back on the lactulose and see if her confusion and sedation does clear.  Discussed the case with the hospitalist and will accept the patient.    Assessments & Plan (with Medical Decision Making)  Hepatic encephalopathy, anemia of chronic disease     I have reviewed the nursing notes.    I have reviewed the findings, diagnosis, plan and need for follow up with the patient.              6/15/2020   [facility] EMERGENCY DEPARTMENT     Joel Gary MD  06/15/20 [id number]

## 2020-06-16 NOTE — PROGRESS NOTES
Patient completed physical therapy evaluation.  Physical therapy recommendation is home.  No further physical therapy needs.  Discussed recommendation with patient.  Patient is in agreement.  Patient will call her daughter for a ride home.  Writer updated [place].      Angelica Boateng [facility] [phone]/ [facility] [phone]

## 2020-06-16 NOTE — ASSESSMENT & PLAN NOTE
Hemoglobin low at 6.9, recent history of been as low as 6.5 transfused 1 unit  Anemia likely due to alcoholism with underlying thrombocytopenia setting her up for chronic blood loss, likely GI  1 unit transfusion ordered, recheck in a.m.

## 2020-06-16 NOTE — LETTER
July 29, 2020    Monalisa Olguin  [address]  [place] [address]      Dear Ms. Olguin,   The purpose of this letter is to let you know that on 6/16/2020 the [facility] Multi-Disciplinary Selection Team reviewed the results of your transplant evaluation.  Based on the results of your evaluation and the selection criteria used by our program , the decision was made to not list you on the liver transplant list.  This is because of frailty and impaired cognition.  Important things you should know:    If you would like to discuss the decision, or if your medical status changes you may schedule a return visits with your doctor by calling [phone] and asking to speak to your transplant coordinator.    We recommend that you continue to follow up with your primary care doctor in order to manage your health concerns.  Enclosed is a letter from [facility] which describes the services offered to patients by [facility] and the Organ Procurement and Transplantation Network.  Thank you for allowing us to participate in your care.  We wish you well.  Sincerely,        Solid Organ Transplant  [facility]    Enclosures: OS Letter  cc: Care Team            The Organ Procurement and Transplantation Network  Toll-free patient services line:     Your resource for organ transplant information    If you have a question regarding your own medical care, you always should call your transplant hospital first. However, for general organ transplant-related information, you can call the Organ Procurement and Transplantation Network (OPTN) toll-free patient services line at [phone]. Anyone, including potential transplant candidates, candidates, recipients, family members, friends, living donors, and donor family members, can call this number to:          Talk about organ donation, living donation, the transplant process, the donation  process, and transplant policies.    Get a free patient information kit with helpful booklets, waiting list and transplant information, and a list of all transplant hospitals.    Ask questions about the OPTN website (https://optn.transplant.hrsa.gov/), the [employer] for Organ Sharing s (UNOS) website (https://unos.org/), or the UNOS website for living donors and transplant recipients. (https://www.transplantliving.org/).    Learn how the OPTN can help you.    Talk about any concerns that you may have with a transplant hospital.    The [facility] transplant system, the OPTN, is managed under federal contract by the [employer] Organ Sharing (UNOS), which is a non-profit charitable organization. The OPTN helps create and define organ sharing policies that make the best use of donated organs. This process continuously evaluating new advances and discoveries so policies can be adapted to best serve patients waiting for transplants. To do so, the OPTN works closely with transplant professionals, transplant patients, transplant candidates, donor families, living donors, and the public. All transplant programs and organ procurement organizations throughout the country are OPTN members and are obligated to follow the policies the OPTN creates for allocating organs.    The OPTN also is responsible for:      Providing educational material for patients, the public, and professionals.    Raising awareness of the need for donated organs and tissue.    Coordinating organ procurement, matching, and placement.    Collecting information about every organ transplant and donation that occurs in the [place].    Remember, you should contact your transplant hospital directly if you have questions or concerns about your own medical care including medical records, work-up progress, and test results.    We are not your transplant hospital, and our staff will not be able to answer questions about your case, so please keep  your transplant hospital s phone number handy.    However, while you research your transplant needs and learn as much as you can about transplantation and donation, we welcome your call to our toll-free patient services line at [phone].          Updated 4/1/2019

## 2020-06-16 NOTE — H&P
[facility]    History and Physical - Hospitalist Service       Date of Admission:  6/15/2020    Assessment & Plan   Monalisa Olguin is a 54 year old female admitted on 6/15/2020. She presents from [facility] [place] with increased confusion and abnormal labs.  Patient has history of alcoholic cirrhosis and had been discharged from [facility] on June 12.  She was to be going straight to rehab, but went home and got admitted today.  Concern was that at the nursing home she is more confused and lab work somewhat abnormal a low hemoglobin and ongoing liver function abnormalities.  Has been directed to the ER for evaluation.  Patient states she has been taking her medications, has not been drinking.  Lab work in the emergency room was notable for a elevated ammonia level of 130 and a hemoglobin low at 6.9.  Other liver function tests are chronically elevated and at baseline.  There is no signs of any infectious etiology at this point.  Patient will be registered observation and brought to the hospital to receive 1 unit of blood.  We will plan to restart her lactulose in order to get 2-3 loose stools daily and will recheck levels tomorrow.  When she is back to baseline mental status, plan to discharge back to nursing home for rehab with ongoing outpatient management by hepatology, Dr. Kovacs.    Hepatic encephalopathy (H)  Presenting from nursing home with increased confusion  Known history of alcoholic cirrhosis with secondary encephalopathy  Elevated ammonia level, not clear if patient has been taking prescribed doses of lactulose over the past several days  No infectious etiology present  Restart her home dosing of lactulose, recheck ammonia level tomorrow  When back to baseline mental status, get her back to the nursing home for ongoing rehab    Chronic blood loss anemia  Hemoglobin low at 6.9, recent history of been as low as 6.5 transfused 1 unit  Anemia likely due to  alcoholism with underlying thrombocytopenia setting her up for chronic blood loss, likely GI  1 unit transfusion ordered, recheck in a.m.    Alcoholic cirrhosis of liver with ascites (H)  Ongoing problem, followed by GI medicine  Scheduled for paracentesis next week with Dr. Kovacs  Continue home meds with Lasix/Aldactone, rifaximin, lactulose, vitamins.    CKD (chronic kidney disease) stage 3, GFR 30-59 ml/min (H)  Currently at baseline, history of donating kidney  Follow    Thrombocytopenia (H)  Chronically low, will likely due to alcoholism  Monitor    Moderate Depression [296.32]  Currently stable  Continue home meds with Abilify, Prozac, trazodone BuSpar    Tobacco abuse  Smoking, trying to stop  Continue nicotine replacement         Diet: Advance as tolerated  DVT Prophylaxis: Observation status  Gonzalez Catheter: not present  Code Status: Full code  Rule Out COVID-19 Handoff:  Monalisa is a LOW SUSPICION PUI.  Follow these instructions:    If COVID test positive -> continue isolation precautions    If COVID test negative -> discontinue COVID-specific isolation precautions       Disposition Plan   Expected discharge: Tomorrow, recommended to prior living arrangement once mental status at baseline, safe disposition plan/ TCU bed available and Ammonia level improved.  Entered: Brandon Cummings MD 06/15/2020, 11:15 PM     The patient's care was discussed with the Patient.    Brandon Cummings MD  [facility]    ______________________________________________________________________    Chief Complaint   54-year-old female with alcoholic liver disease presenting with increased confusion    History is obtained from the patient, electronic health record and emergency department physician    History of Present Illness   Monalisa Olguin is a 54 year old female who presents from nursing home with increased confusion.  Patient has known alcoholic liver disease with alcoholic  encephalopathy with been treated [facility] from June 2 through June 12.  She was be discharged to rehab at the [facility] on June 12, but she did not go there and instead presented this morning for admission.  She was evaluated by the nurse practitioner with a virtual visit today.  Lab work was done and after being admitted, was noted to be increasingly confused.  She was then directed to the emergency room for evaluation.  Patient has not been drinking over the weekend she says.  She is had some nausea when she vomited she notes a small amount of blood in the emesis.  She denies fever, chills, dizziness.  She has left foot pain that makes it difficult to walk.  Denies shortness of breath or cough.  States she took her medicines over the weekend, but seems somewhat confused about whether she took the lactulose and states she has had not any today.  Currently scheduled to have a paracentesis by Dr. Kovacs next week with follow-up with him in the clinic sometime within the next month.    Review of Systems    The 10 point Review of Systems is negative other than noted in the HPI or here.     Past Medical History    I have reviewed this patient's medical history and updated it with pertinent information if needed.   Past Medical History:   Diagnosis Date     Acute alcoholic intoxication in alcoholism (H) 3/27/2018     Acute renal failure (H) 11/11/2019     Alcohol abuse 5/2/2013     Alcohol dependence 5/25/2013     Alcohol withdrawal 5/2/2013     Alcoholic cirrhosis (H)      Anxiety 10/10/2011     CKD (chronic kidney disease) stage 3, GFR 30-59 ml/min (H) 2006    last GFR was 42     CKD (chronic kidney disease) stage 3, GFR 30-59 ml/min (H) 2/22/2011     CKD (chronic kidney disease) stage 5, GFR less than 15 ml/min (H) 4/14/2020     Depressive disorder      Esophageal reflux 10/7/2002     Hematochezia-patient reported 11/11/2019     Heme positive stool 3/27/2018     Hepatic encephalopathy (H)  2019     History of blood transfusion      [facility]     Hypertension goal BP (blood pressure) < 130/80 2011     Hyponatremia 2019     Moderate Depression [296.32] 2009    stable on wellbutrin     Other internal derangement of knee(717.89)     ACL Internal derangement, knee/ original injury in 5th grade, torn cartilage     Pap smear     no abnormals, due for paps q 2-3 yrs     SBP (spontaneous bacterial peritonitis) (H) 2019     Thyroid disease      Unspecified essential hypertension        Past Surgical History   I have reviewed this patient's surgical history and updated it with pertinent information if needed.  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     COLONOSCOPY N/A 2017    Procedure: COLONOSCOPY;  Colonoscopy;  Surgeon: Sai Johnston MD;  Location:  GI     ESOPHAGOSCOPY, GASTROSCOPY, DUODENOSCOPY (EGD), COMBINED N/A 2017    Procedure: COMBINED ESOPHAGOSCOPY, GASTROSCOPY, DUODENOSCOPY (EGD), BIOPSY SINGLE OR MULTIPLE;  ESOPHAGOSCOPY, GASTROSCOPY, DUODENOSCOPY (EGD) with biopsies;  Surgeon: Sai Johnston MD;  Location:  GI     ESOPHAGOSCOPY, GASTROSCOPY, DUODENOSCOPY (EGD), COMBINED N/A 2019    Procedure: ESOPHAGOGASTRODUODENOSCOPY, WITH BIOPSY;  Surgeon: Edgardo Scott DO;  Location:  GI     HC KNEE SCOPE, DIAGNOSTIC  01    Arthroscopy, Lt Knee     IR PARACENTESIS  2020     LAPAROSCOPIC CHOLECYSTECTOMY N/A 2017    Procedure: LAPAROSCOPIC CHOLECYSTECTOMY;  laparoscopic cholecystectomy;  Surgeon: Romeo Pastor MD;  Location: [facility] OR     OPEN REDUCTION INTERNAL FIXATION WRIST Right 2017    Procedure: OPEN REDUCTION INTERNAL FIXATION WRIST;  OPEN REDUCTION INTERNAL FIXATION RIGHT WRIST;  Surgeon: Edgardo Almendarez MD;  Location:  OR     TRANSPLANT      Donated Left Kidney in  2000       Social History   I have reviewed this patient's social history and updated it with pertinent information if needed.  Social History     Tobacco Use     Smoking status: Current Every Day Smoker     Packs/day: 0.50     Years: 10.00     Pack years: 5.00     Types: Cigarettes     Smokeless tobacco: Never Used     Tobacco comment: pt quit 1992 after smoking for 8 yrs, started again in 2004   Substance Use Topics     Alcohol use: Not Currently     Alcohol/week: 0.0 standard drinks     Comment: Quit drinking 9/2019     Drug use: Not Currently     Types: Marijuana       Family History   I have reviewed this patient's family history and updated it with pertinent information if needed.   Family History   Problem Relation Age of Onset     Hypertension Mother         80 years old     Heart Disease Paternal Grandmother      Heart Disease Paternal Grandfather      Cerebrovascular Disease Maternal Grandmother      Cerebrovascular Disease Maternal Grandfather      Alcohol/Drug Maternal Grandfather      Substance Abuse Maternal Grandfather      Heart Disease Father         Silent MI stents x 2     Diabetes Sister 17        older sister also had kidney and pancreas transplant     Heart Disease Sister         MI secondary to diabetes     Genitourinary Problems Sister 43        kidney transplant from renal failure     Other - See Comments Sister         older     Other - See Comments Sister         younger     Diabetes Sister 9        youngest, juvenile type I (onset age 9 with no complications)     Cancer Paternal Aunt         ?kind       Prior to Admission Medications   Prior to Admission Medications   Prescriptions Last Dose Informant Patient Reported? Taking?   ARIPiprazole (ABILIFY) 5 MG tablet 6/15/2020 at 1919  No Yes   Sig: Take 1 tablet (5 mg) by mouth At Bedtime   FLUoxetine (PROZAC) 20 MG capsule   No No   Sig: TAKE THREE CAPSULES BY MOUTH ONCE DAILY   acetaminophen (TYLENOL) 500 MG tablet Past Month at Unknown  time  No Yes   Sig: Take 1 tablet (500 mg) by mouth every 6 hours as needed for mild pain   busPIRone (BUSPAR) 15 MG tablet   No No   Sig: Take 1 tablet (15 mg) by mouth 2 times daily   ciprofloxacin (CIPRO) 250 MG tablet   No No   Sig: Take 1 tablet (250 mg) by mouth every 24 hours   folic acid (FOLVITE) 1 MG tablet   No No   Sig: Take 1 tablet (1 mg) by mouth daily   furosemide (LASIX) 40 MG tablet   No No   Sig: Take 1 tablet (40 mg) by mouth daily   gabapentin (NEURONTIN) 100 MG capsule 6/15/2020 at 1919  No Yes   Sig: Take 3 capsules (300 mg) by mouth At Bedtime   lactulose (CONSTULOSE) 10 GM/15ML solution Past Week at Unknown time  No Yes   Sig: Take 30 mLs (20 g) by mouth 3 times daily   midodrine (PROAMATINE) 10 MG tablet   No No   Sig: Take 1 tablet (10 mg) by mouth 3 times daily (with meals)   nicotine (NICODERM CQ) 21 MG/24HR 24 hr patch   No No   Sig: Place 1 patch onto the skin every 24 hours   omeprazole (PRILOSEC) 20 MG DR capsule   No No   Sig: Take 1 capsule (20 mg) by mouth daily   ondansetron (ZOFRAN) 4 MG tablet Past Month at Unknown time  Yes Yes   Sig: Take 1 tablet (4 mg) by mouth every 6 hours as needed for nausea   phytonadione (MEPHYTON) 5 MG tablet   No No   Sig: Take 1 tablet (5 mg) by mouth daily for 2 doses   potassium chloride ER (K-DUR/KLOR-CON M) 10 MEQ CR tablet   No No   Sig: Take 1 tablet (10 mEq) by mouth daily   rifaximin (XIFAXAN) 550 MG TABS tablet   No No   Sig: Take 1 tablet (550 mg) by mouth 2 times daily   sodium bicarbonate 650 MG tablet 6/15/2020 at 1919  No Yes   Sig: Take 1 tablet (650 mg) by mouth 2 times daily   spironolactone (ALDACTONE) 25 MG tablet   No No   Sig: Take 1 tablet (25 mg) by mouth daily   traZODone (DESYREL) 50 MG tablet Past Month at Unknown time  No Yes   Sig: Take 2 tablets (100 mg) by mouth nightly as needed for sleep   ursodiol (ACTIGALL) 500 MG tablet 6/15/2020 at 1919  No Yes   Sig: Take 1 tablet (500 mg) by mouth 2 times daily   vitamin B1  "(THIAMINE) 100 MG tablet   No No   Sig: Take 1 tablet (100 mg) by mouth daily   vitamin B1 (VITAMIN B-1) 100 MG tablet   Yes No   Sig: Take 1 tablet (100 mg) by mouth daily      Facility-Administered Medications: None     Allergies   Allergies   Allergen Reactions     Albuterol      Tongue \"hardened and painful\"       Physical Exam   Vital Signs: Temp: 98.5  F (36.9  C) Temp src: Oral BP: 99/50 Pulse: 100 Heart Rate: 106 Resp: 22 SpO2: 98 % O2 Device: None (Room air)    Weight: 124 lbs 0 oz    Constitutional: awake, alert, cooperative, no apparent distress, and appears stated age and icteric  Eyes: Lids and lashes normal, pupils equal, round and reactive to light, extra ocular muscles intact, sclera clear, conjunctiva normal  ENT: Normocephalic, without obvious abnormality, atraumatic, sinuses nontender on palpation, external ears without lesions, oral pharynx with moist mucous membranes, tonsils without erythema or exudates, gums normal and good dentition.  Hematologic / Lymphatic: no cervical lymphadenopathy and no supraclavicular lymphadenopathy  Respiratory: No increased work of breathing, good air exchange, clear to auscultation bilaterally, no crackles or wheezing  Cardiovascular: regular rate and rhythm, no murmur noted and no edema  GI: normal bowel sounds, soft, non-distended, non-tender, voluntary guarding absent and ascites present   Skin: jaundice noted  Musculoskeletal: There is no redness, warmth, or swelling of the joints.  Full range of motion noted.  Motor strength is 5 out of 5 all extremities bilaterally.  Tone is normal.  Neurologic: Awake, alert, oriented to name, place and time.  Cranial nerves II-XII are grossly intact.  Motor is 5 out of 5 bilaterally.      Data   Data reviewed today: I reviewed all medications, new labs and imaging results over the last 24 hours. I personally reviewed no images or EKG's today.    Results for orders placed or performed during the hospital encounter of " 06/15/20 (from the past 24 hour(s))   CBC with platelets differential   Result Value Ref Range    WBC 7.5 4.0 - 11.0 10e9/L    RBC Count 2.10 (L) 3.8 - 5.2 10e12/L    Hemoglobin 6.9 (LL) 11.7 - 15.7 g/dL    Hematocrit 21.4 (L) 35.0 - 47.0 %     (H) 78 - 100 fl    MCH 32.9 26.5 - 33.0 pg    MCHC 32.2 31.5 - 36.5 g/dL    RDW 24.5 (H) 10.0 - 15.0 %    Platelet Count 65 (L) 150 - 450 10e9/L    Diff Method Automated Method     % Neutrophils 70.7 %    % Lymphocytes 14.9 %    % Monocytes 9.7 %    % Eosinophils 2.5 %    % Basophils 0.5 %    % Immature Granulocytes 1.7 %    Nucleated RBCs 0 0 /100    Absolute Neutrophil 5.3 1.6 - 8.3 10e9/L    Absolute Lymphocytes 1.1 0.8 - 5.3 10e9/L    Absolute Monocytes 0.7 0.0 - 1.3 10e9/L    Absolute Basophils 0.0 0.0 - 0.2 10e9/L    Abs Immature Granulocytes 0.1 0 - 0.4 10e9/L    Absolute Nucleated RBC 0.0    ABO/Rh type and screen   Result Value Ref Range    Units Ordered 1     ABO O     RH(D) Pos     Antibody Screen Neg     Test Valid Only At [facility]        Specimen Expires 06/18/2020     Crossmatch Red Blood Cells    Ammonia   Result Value Ref Range    Ammonia 130 (HH) 10 - 50 umol/L   Basic metabolic panel   Result Value Ref Range    Sodium 141 133 - 144 mmol/L    Potassium 5.3 3.4 - 5.3 mmol/L    Chloride 113 (H) 94 - 109 mmol/L    Carbon Dioxide 17 (L) 20 - 32 mmol/L    Anion Gap 11 3 - 14 mmol/L    Glucose 98 70 - 99 mg/dL    Urea Nitrogen 36 (H) 7 - 30 mg/dL    Creatinine 1.52 (H) 0.52 - 1.04 mg/dL    GFR Estimate 39 (L) >60 mL/min/[1.73_m2]    GFR Estimate If Black 45 (L) >60 mL/min/[1.73_m2]    Calcium 8.7 8.5 - 10.1 mg/dL   Blood component   Result Value Ref Range    Unit Number [id number]     Blood Component Type Red Blood Cells Leukocyte Reduced     Division Number 00     Status of Unit Released to care unit 06/15/2020 2312     Blood Product Code [id number]     Unit Status ISS    Alcohol breath test POCT   Result Value Ref Range    Alcohol  Breath Test 0.000 0.00 - 0.01

## 2020-06-16 NOTE — PROGRESS NOTES
Name: Monalisa Olguin    Admit Date and Time: 6/15/2020  9:24 PM    MRN#: [id number]    Reason for Hospitalization: Hepatic encephalopathy (H) [K72.90]  Anemia in other chronic diseases classified elsewhere [D63.8]    Discharge Date: 6/16/2020    Patient / Family response to discharge plan: in agreement    Other Providers (Care Coordinator, County Services, PCA services etc): No    CTS Hand Off Completed: Yes    PAS #: N/A    MERCEDEZ Score: High    Future Appointments:   Future Appointments   Date Time Provider Department Center   6/16/2020  4:20 PM Pasha Maravilla DO [facility]   6/22/2020  9:00 AM [id number] [facility]   7/27/2020  2:30 PM  LAB [facility]   7/27/2020  3:00 PM Edgardo Kovacs MD [facility]       Discharge Disposition: transitional care unit - return to [facility] (Main Phone: [phone] Admissions Phone: [phone] Fax: [phone]).  Family transport.    Discharge Planner   Discharge Plans in progress: Return to [facility]  Barriers to discharge plan: None  Follow up plan: PCP    TATY Segura [phone]/ Nancy [phone]         Entered by: Angelica Boateng 06/16/2020 12:01 PM

## 2020-06-16 NOTE — PROGRESS NOTES
06/16/20 1412   Quick Adds   Type of Visit Initial PT Evaluation   Living Environment   Lives With child(lit), adult;child(lit), dependent;spouse   Living Arrangements house   Home Accessibility stairs to enter home   Number of Stairs, Main Entrance 3   Stair Railings, Main Entrance railing on left side (ascending)   Transportation Anticipated family or friend will provide   Living Environment Comment Patient lives with son who often assists her at home, her  who is also able to assist when he is home as well as receive assistance from her daughter who is an LPN.  Patient reports her daughter and  work alternate hours so at least one of them is home with her as well as her son.   Self-Care   Usual Activity Tolerance moderate   Current Activity Tolerance moderate   Regular Exercise No   Equipment Currently Used at Home walker, rolling   Activity/Exercise/Self-Care Comment Patient reports having a 4WW without a seat and without brakes.   Functional Level Prior   Ambulation 1-->assistive equipment   Transferring 0-->independent   Toileting 0-->independent   Bathing 0-->independent   Communication 0-->understands/communicates without difficulty   Swallowing 0-->swallows foods/liquids without difficulty   Cognition 0 - no cognition issues reported   Fall history within last six months yes   Which of the above functional risks had a recent onset or change? none   General Information   Onset of Illness/Injury or Date of Surgery - Date 06/16/20   Referring Physician Brandon Cummings MD   Patient/Family Goals Statement To get to rehab as quickly as possible   Pertinent History of Current Problem (include personal factors and/or comorbidities that impact the POC) Patient is a 54 year old female with a pmhx of ACD, decompensated liver cirrhosis, CKD, hyperparathyroidism who was brought to the [facility] ED by her  with AMS. Rodolfo was admitted to [facility] observation [facility] with hepatic  encephalopathy along with anemia from a chronic condition.  Patient reports she has a broken left foot as well as a painful right foot.  Patient previously had a CAM walker however this was left at home.   Precautions/Limitations fall precautions   Weight-Bearing Status - LLE weight-bearing as tolerated   General Observations Jaundice evident in her eyes as well as ascites visible in her abdomen.   Cognitive Status Examination   Orientation orientation to person, place and time   Level of Consciousness alert   Follows Commands and Answers Questions 100% of the time   Personal Safety and Judgment intact   Memory intact   Cognitive Comment No evidence of confusion as previously documented in prior therapy notes.   Pain Assessment   Patient Currently in Pain No   Integumentary/Edema   Integumentary/Edema other (describe)   Integumentary/Edema Comments Visible ascites/abdominal edema. Patient reports having LE stockings for edema management. Patient also visible has yellow colored eyes presenting similarly to jaundice.   Posture    Posture Forward head position;Protracted shoulders   Range of Motion (ROM)   ROM Comment WFL   Strength   Strength Comments 3+/5 hip flexion, 4/5 hip abduction, 4/5 knee extension, 4/5 knee flexion and 4/5 ankle DF   Bed Mobility   Bed Mobility Comments Independent   Transfer Skills   Transfer Comments Independent   Gait   Gait Comments Steady with mild path deviation while using FWW. Able to ambulate 400+ feet   Balance   Balance Comments Able to perfom SLS bilaterally for 10 seconds.   Sensory Examination   Sensory Perception no deficits were identified   Coordination   Coordination no deficits were identified   Muscle Tone   Muscle Tone no deficits were identified   General Therapy Interventions   Intervention Comments No need for inpatient/observation skilled therapy. Evaluation only   Clinical Impression   Criteria for Skilled Therapeutic Intervention evaluation only   PT Diagnosis Lower  "Extremity Weakness   Influenced by the following impairments Reduced weakness in hips and knees   Functional limitations due to impairments Patient has mild path deviation when ambulating due to reduced lower extremity weakness bilaterally.   Clinical Presentation Stable/Uncomplicated   Clinical Presentation Rationale Based on observation, evaluation and clinical reasoning   Clinical Decision Making (Complexity) Low complexity   Therapy Frequency Other (see comments)   Predicted Duration of Therapy Intervention (days/wks) Evaluation Only   Anticipated Discharge Disposition Home with Outpatient Therapy   Risk & Benefits of therapy have been explained Yes   Patient, Family & other staff in agreement with plan of care Yes   Clinical Impression Comments Able to discharge home with assistance from family and outpatient physical therapy for LE weakness.   [facility] [employer] TM \"6 Clicks\"   2016, Trustees of [facility], under license to [employer].  All rights reserved.   6 Clicks Short Forms Basic Mobility Inpatient Short Form   [facility] [employer]  \"6 Clicks\" V.2 Basic Mobility Inpatient Short Form   1. Turning from your back to your side while in a flat bed without using bedrails? 4 - None   2. Moving from lying on your back to sitting on the side of a flat bed without using bedrails? 4 - None   3. Moving to and from a bed to a chair (including a wheelchair)? 4 - None   4. Standing up from a chair using your arms (e.g., wheelchair, or bedside chair)? 4 - None   5. To walk in hospital room? 4 - None   6. Climbing 3-5 steps with a railing? 4 - None   Basic Mobility Raw Score (Score out of 24.Lower scores equate to lower levels of function) 24   Total Evaluation Time   Total Evaluation Time (Minutes) 24     "

## 2020-06-16 NOTE — COMMITTEE REVIEW
Abdominal Committee Review Note     Evaluation Date: 5/4/2020  Committee Review Date: 6/16/2020    Organ being evaluated for: Liver    Transplant Phase: Evaluation  Transplant Status: Active    Transplant Coordinator: Gabbie Portillo  Transplant Surgeon:       Referring Physician: Sai Johnston    Primary Diagnosis: Alcoholic Cirrhosis  Secondary Diagnosis:     Committee Review Members:  Nurse Practitioner Joseline Bonilla, NP   Nutrition Diane Kelly, RD   Pharmacy Heath Cruz, [facility]    - Clinical Cresencio Yeager, [place], Jaimee Garibay, [facility]   Transplant Mary Wright MD, Edgardo Kovacs MD, Jr Geovanny Rangel, DANY, Savannah Suero MD, Bridget Peterson, APRN CNP, Gabbie Portillo RN, Toy Mcfarland MD, Vincent Hong MD, Thomas M. Leventhal, MD, Gucci Camacho MD, Cornelius Gupta MD       Transplant Eligibility: Cirrhosis with MELD, ETOH    Committee Review Decision: Declined    Relative Contraindications: None    Absolute Contraindications: Other    Committee Chair Leventhal, Thomas Michael, MD verbally attested to the committee's decision.    Committee Discussion Details:   -Current cognitive status; neuropsychological testing reviewed with concern of dementia, non reversible.  -Will follow up with Dr. Kovacs in clinic to discuss decision.

## 2020-06-16 NOTE — PROGRESS NOTES
Physical Therapy Discharge Summary    Reason for therapy discharge:    All goals and outcomes met, no further needs identified.    Progress towards therapy goal(s). See goals on Care Plan in [facility] electronic health record for goal details.  Goals met    Therapy recommendation(s):    No further therapy is needed while admitted.  Patient would benefit from outpatient physical therapy following discharge home.

## 2020-06-16 NOTE — ASSESSMENT & PLAN NOTE
Ongoing problem, followed by GI medicine  Scheduled for paracentesis next week with Dr. Kovacs  Continue home meds with Lasix/Aldactone, rifaximin, lactulose, vitamins.

## 2020-06-16 NOTE — ED NOTES
ED Nursing criteria listed below was addressed during verbal handoff:     Abnormal vitals: Yes  Abnormal results: Yes  Med Reconciliation completed: Yes, completed to the best of nursing ability according to [facility] home records.  Pt unable to assist with medications.   Meds given in ED: Yes  Any Overdue Meds: N/A  Core Measures: N/A  Isolation: Yes  Special needs: Yes, blood infusing on transfer.   Skin assessment: Yes    Observation Patient  Education provided: Yes, pt watched the video and given the handout.     Report given to Cynthia SAENZ.

## 2020-06-16 NOTE — DISCHARGE SUMMARY
[facility]    Discharge Summary  Hospitalist    Date of Admission:  6/15/2020  Date of Discharge:  6/16/2020  Discharging Provider: Jennifer Mohr  Date of Service (when I saw the patient): discreetly at the top of all notes except attestations-------     Discharge Diagnoses   Principal Problem:    Hepatic encephalopathy (H) (5/4/2020)  Active Problems:    Moderate Depression [296.32] (12/22/2009)    Alcoholic cirrhosis of liver with ascites (H) (3/26/2017)    Chronic blood loss anemia (3/26/2017)    Thrombocytopenia (H) (3/26/2017)    Tobacco abuse (3/26/2017)    CKD (chronic kidney disease) stage 3, GFR 30-59 ml/min (H) (4/14/2020)    Alcoholic encephalopathy (H) (6/15/2020)       Hospital Course   Monalisa Olguin is a 54 year old female admitted on 6/15/2020. She presents from [facility] [place] with increased confusion and abnormal labs.  Patient has history of alcoholic cirrhosis and had been discharged from [facility] on June 12.  She was to be going straight to rehab, but went home and got admitted on 6/15/20 .  Concern was that at the nursing home she is more confused and lab work somewhat abnormal a low hemoglobin and ongoing liver function abnormalities.  Has been directed to the ER for evaluation.  Patient states she has been taking her medications, has not been drinking.  Lab work in the emergency room was notable for a elevated ammonia level of 130 and a hemoglobin low at 6.9.  Other liver function tests are chronically elevated and at baseline.  There is no signs of any infectious etiology at this point.. 1 unit of blood is given and she also got lactulose, hb is up to 7.4 and ammonia down to 83. Pt si alert and oriented this AM, her K is up at 5.4, she is on k supplement and spironolactone, due to that K supplement is discontinue on discharge.   pt is planned to be discharge to nursing home again today. Recheck cbc and cmp in 2-3  days    ADDENDUM:   pt is planned to be discharge to nursing home again initially but apparently PT eval show pt is save to be discharge home.  Pt will need follow up with pcp in 3-5 days with cbc and cmp then .         Jennifer Mohr MD    Significant Results and Procedures   Recent Results (from the past 168 hour(s))   Comprehensive metabolic panel    Collection Time: 06/10/20  5:03 AM   Result Value Ref Range    Sodium 139 133 - 144 mmol/L    Potassium 3.7 3.4 - 5.3 mmol/L    Chloride 115 (H) 94 - 109 mmol/L    Carbon Dioxide 17 (L) 20 - 32 mmol/L    Anion Gap 7 3 - 14 mmol/L    Glucose 101 (H) 70 - 99 mg/dL    Urea Nitrogen 38 (H) 7 - 30 mg/dL    Creatinine 1.59 (H) 0.52 - 1.04 mg/dL    GFR Estimate 37 (L) >60 mL/min/[1.73_m2]    GFR Estimate If Black 42 (L) >60 mL/min/[1.73_m2]    Calcium 9.4 8.5 - 10.1 mg/dL    Bilirubin Total 11.3 (H) 0.2 - 1.3 mg/dL    Albumin 3.3 (L) 3.4 - 5.0 g/dL    Protein Total 4.8 (L) 6.8 - 8.8 g/dL    Alkaline Phosphatase 161 (H) 40 - 150 U/L    ALT 16 0 - 50 U/L    AST 22 0 - 45 U/L   CBC with platelets    Collection Time: 06/10/20  5:03 AM   Result Value Ref Range    WBC 6.1 4.0 - 11.0 10e9/L    RBC Count 2.31 (L) 3.8 - 5.2 10e12/L    Hemoglobin 7.4 (L) 11.7 - 15.7 g/dL    Hematocrit 24.6 (L) 35.0 - 47.0 %     (H) 78 - 100 fl    MCH 32.0 26.5 - 33.0 pg    MCHC 30.1 (L) 31.5 - 36.5 g/dL    RDW 24.6 (H) 10.0 - 15.0 %    Platelet Count 34 (LL) 150 - 450 10e9/L   INR    Collection Time: 06/10/20  5:03 AM   Result Value Ref Range    INR 2.17 (H) 0.86 - 1.14   Comprehensive metabolic panel    Collection Time: 06/11/20  5:45 AM   Result Value Ref Range    Sodium 140 133 - 144 mmol/L    Potassium 4.6 3.4 - 5.3 mmol/L    Chloride 116 (H) 94 - 109 mmol/L    Carbon Dioxide 18 (L) 20 - 32 mmol/L    Anion Gap 5 3 - 14 mmol/L    Glucose 106 (H) 70 - 99 mg/dL    Urea Nitrogen 38 (H) 7 - 30 mg/dL    Creatinine 1.66 (H) 0.52 - 1.04 mg/dL    GFR Estimate 35 (L) >60 mL/min/[1.73_m2]    GFR  Estimate If Black 40 (L) >60 mL/min/[1.73_m2]    Calcium 9.0 8.5 - 10.1 mg/dL    Bilirubin Total 12.0 (H) 0.2 - 1.3 mg/dL    Albumin 3.1 (L) 3.4 - 5.0 g/dL    Protein Total 4.8 (L) 6.8 - 8.8 g/dL    Alkaline Phosphatase 158 (H) 40 - 150 U/L    ALT 16 0 - 50 U/L    AST 26 0 - 45 U/L   CBC with platelets    Collection Time: 06/11/20  5:45 AM   Result Value Ref Range    WBC 6.7 4.0 - 11.0 10e9/L    RBC Count 2.15 (L) 3.8 - 5.2 10e12/L    Hemoglobin 7.2 (L) 11.7 - 15.7 g/dL    Hematocrit 22.5 (L) 35.0 - 47.0 %     (H) 78 - 100 fl    MCH 33.5 (H) 26.5 - 33.0 pg    MCHC 32.0 31.5 - 36.5 g/dL    RDW 24.5 (H) 10.0 - 15.0 %    Platelet Count 38 (LL) 150 - 450 10e9/L   INR    Collection Time: 06/11/20  5:45 AM   Result Value Ref Range    INR 2.41 (H) 0.86 - 1.14   Phosphorus    Collection Time: 06/11/20  5:45 AM   Result Value Ref Range    Phosphorus 2.2 (L) 2.5 - 4.5 mg/dL   Comprehensive metabolic panel    Collection Time: 06/12/20  6:16 AM   Result Value Ref Range    Sodium 136 133 - 144 mmol/L    Potassium 4.9 3.4 - 5.3 mmol/L    Chloride 114 (H) 94 - 109 mmol/L    Carbon Dioxide 17 (L) 20 - 32 mmol/L    Anion Gap 6 3 - 14 mmol/L    Glucose 103 (H) 70 - 99 mg/dL    Urea Nitrogen 38 (H) 7 - 30 mg/dL    Creatinine 1.76 (H) 0.52 - 1.04 mg/dL    GFR Estimate 32 (L) >60 mL/min/[1.73_m2]    GFR Estimate If Black 37 (L) >60 mL/min/[1.73_m2]    Calcium 8.7 8.5 - 10.1 mg/dL    Bilirubin Total 12.8 (H) 0.2 - 1.3 mg/dL    Albumin 3.0 (L) 3.4 - 5.0 g/dL    Protein Total 4.7 (L) 6.8 - 8.8 g/dL    Alkaline Phosphatase 173 (H) 40 - 150 U/L    ALT 16 0 - 50 U/L    AST 26 0 - 45 U/L   CBC with platelets    Collection Time: 06/12/20  6:16 AM   Result Value Ref Range    WBC 7.9 4.0 - 11.0 10e9/L    RBC Count 2.18 (L) 3.8 - 5.2 10e12/L    Hemoglobin 7.2 (L) 11.7 - 15.7 g/dL    Hematocrit 22.7 (L) 35.0 - 47.0 %     (H) 78 - 100 fl    MCH 33.0 26.5 - 33.0 pg    MCHC 31.7 31.5 - 36.5 g/dL    RDW 24.6 (H) 10.0 - 15.0 %    Platelet  Count 51 (L) 150 - 450 10e9/L   INR    Collection Time: 06/12/20  6:16 AM   Result Value Ref Range    INR 1.96 (H) 0.86 - 1.14   CBC with platelets    Collection Time: 06/15/20  7:21 PM   Result Value Ref Range    WBC 6.6 4.0 - 11.0 10e9/L    RBC Count 1.96 (L) 3.8 - 5.2 10e12/L    Hemoglobin 6.5 (LL) 11.7 - 15.7 g/dL    Hematocrit 20.2 (L) 35.0 - 47.0 %     (H) 78 - 100 fl    MCH 33.2 (H) 26.5 - 33.0 pg    MCHC 32.2 31.5 - 36.5 g/dL    RDW 24.7 (H) 10.0 - 15.0 %    Platelet Count 46 (LL) 150 - 450 10e9/L   Comprehensive metabolic panel    Collection Time: 06/15/20  7:21 PM   Result Value Ref Range    Sodium 142 133 - 144 mmol/L    Potassium 5.3 3.4 - 5.3 mmol/L    Chloride 117 (H) 94 - 109 mmol/L    Carbon Dioxide 17 (L) 20 - 32 mmol/L    Anion Gap 8 3 - 14 mmol/L    Glucose 108 (H) 70 - 99 mg/dL    Urea Nitrogen 37 (H) 7 - 30 mg/dL    Creatinine 1.61 (H) 0.52 - 1.04 mg/dL    GFR Estimate 36 (L) >60 mL/min/[1.73_m2]    GFR Estimate If Black 42 (L) >60 mL/min/[1.73_m2]    Calcium 8.3 (L) 8.5 - 10.1 mg/dL    Bilirubin Total 14.0 (H) 0.2 - 1.3 mg/dL    Albumin 3.1 (L) 3.4 - 5.0 g/dL    Protein Total 4.9 (L) 6.8 - 8.8 g/dL    Alkaline Phosphatase 212 (H) 40 - 150 U/L    ALT 16 0 - 50 U/L    AST 29 0 - 45 U/L   INR    Collection Time: 06/15/20  7:21 PM   Result Value Ref Range    INR 1.60 (H) 0.86 - 1.14   Urine Drugs of Abuse Screen Panel 13    Collection Time: 06/15/20  7:21 PM   Result Value Ref Range    Cannabinoids (96-cyx-3-carboxy-9-THC) Detected, Abnormal Result (A) NDET^Not Detected ng/mL    Phencyclidine (Phencyclidine) Not Detected NDET^Not Detected ng/mL    Cocaine (Benzoylecgonine) Not Detected NDET^Not Detected ng/mL    Methamphetamine (d-Methamphetamine) Not Detected NDET^Not Detected ng/mL    Opiates (Morphine) Not Detected NDET^Not Detected ng/mL    Amphetamine (d-Amphetamine) Not Detected NDET^Not Detected ng/mL    Benzodiazepines (Nordiazepam) Detected, Abnormal Result (A) NDET^Not Detected  ng/mL    Tricyclic Antidepressants (Desipramine) Not Detected NDET^Not Detected ng/mL    Methadone (Methadone) Not Detected NDET^Not Detected ng/mL    Barbiturates (Butalbital) Not Detected NDET^Not Detected ng/mL    Oxycodone (Oxycodone) Not Detected NDET^Not Detected ng/mL    Propoxyphene (Norpropoxyphene) Not Detected NDET^Not Detected ng/mL    Buprenorphine (Buprenorphine) Not Detected NDET^Not Detected ng/mL   COVID-19 Virus (Coronavirus) by PCR    Collection Time: 06/15/20  7:21 PM    Specimen: Nasopharyngeal   Result Value Ref Range    COVID-19 Virus PCR to [facility] - Source Nasopharyngeal     COVID-19 Virus PCR to [facility] - Result       Test received-See reflex to IDDL test SARS CoV2 (COVID-19) Virus RT-PCR   Alcohol ethyl    Collection Time: 06/15/20  7:21 PM   Result Value Ref Range    Ethanol g/dL <0.01 <0.01 g/dL   SARS-CoV-2 COVID-19 Virus (Coronavirus) RT-PCR Nasopharyngeal    Collection Time: 06/15/20  7:21 PM    Specimen: Nasopharyngeal   Result Value Ref Range    SARS-CoV-2 Virus Specimen Source Nasopharyngeal     SARS-CoV-2 PCR Result NEGATIVE     SARS-CoV-2 PCR Comment       The [employer]plex[employer] COVID-19 direct PCR assay by [employer] on the [employer] instrument has been   given Emergency Use Authorization (EUA) for the in vitro qualitative detection of RNA from   the SARS-CoV2 virus in nasopharyngeal swabs in viral transport medium from patients with   signs and symptoms of infection who are suspected of COVID-19. Performance is unknown in   asymptomatic patients.     CBC with platelets differential    Collection Time: 06/15/20  9:47 PM   Result Value Ref Range    WBC 7.5 4.0 - 11.0 10e9/L    RBC Count 2.10 (L) 3.8 - 5.2 10e12/L    Hemoglobin 6.9 (LL) 11.7 - 15.7 g/dL    Hematocrit 21.4 (L) 35.0 - 47.0 %     (H) 78 - 100 fl    MCH 32.9 26.5 - 33.0 pg    MCHC 32.2 31.5 - 36.5 g/dL    RDW 24.5 (H) 10.0 - 15.0 %    Platelet Count 65 (L) 150 - 450 10e9/L    Diff Method Automated Method     %  Neutrophils 70.7 %    % Lymphocytes 14.9 %    % Monocytes 9.7 %    % Eosinophils 2.5 %    % Basophils 0.5 %    % Immature Granulocytes 1.7 %    Nucleated RBCs 0 0 /100    Absolute Neutrophil 5.3 1.6 - 8.3 10e9/L    Absolute Lymphocytes 1.1 0.8 - 5.3 10e9/L    Absolute Monocytes 0.7 0.0 - 1.3 10e9/L    Absolute Basophils 0.0 0.0 - 0.2 10e9/L    Abs Immature Granulocytes 0.1 0 - 0.4 10e9/L    Absolute Nucleated RBC 0.0    ABO/Rh type and screen    Collection Time: 06/15/20  9:47 PM   Result Value Ref Range    Units Ordered 1     ABO O     RH(D) Pos     Antibody Screen Neg     Test Valid Only At [facility]        Specimen Expires 06/18/2020     Crossmatch Red Blood Cells    Ammonia    Collection Time: 06/15/20  9:47 PM   Result Value Ref Range    Ammonia 130 (HH) 10 - 50 umol/L   Basic metabolic panel    Collection Time: 06/15/20  9:47 PM   Result Value Ref Range    Sodium 141 133 - 144 mmol/L    Potassium 5.3 3.4 - 5.3 mmol/L    Chloride 113 (H) 94 - 109 mmol/L    Carbon Dioxide 17 (L) 20 - 32 mmol/L    Anion Gap 11 3 - 14 mmol/L    Glucose 98 70 - 99 mg/dL    Urea Nitrogen 36 (H) 7 - 30 mg/dL    Creatinine 1.52 (H) 0.52 - 1.04 mg/dL    GFR Estimate 39 (L) >60 mL/min/[1.73_m2]    GFR Estimate If Black 45 (L) >60 mL/min/[1.73_m2]    Calcium 8.7 8.5 - 10.1 mg/dL   Blood component    Collection Time: 06/15/20  9:47 PM   Result Value Ref Range    Unit Number [id number]     Blood Component Type Red Blood Cells Leukocyte Reduced     Division Number 00     Status of Unit Released to care unit     Blood Product Code [id number]     Unit Status ISS    Alcohol breath test POCT    Collection Time: 06/15/20  9:50 PM   Result Value Ref Range    Alcohol Breath Test 0.000 0.00 - 0.01   Comprehensive metabolic panel    Collection Time: 06/16/20  5:38 AM   Result Value Ref Range    Sodium 141 133 - 144 mmol/L    Potassium 5.4 (H) 3.4 - 5.3 mmol/L    Chloride 115 (H) 94 - 109 mmol/L    Carbon Dioxide 19 (L) 20 - 32  mmol/L    Anion Gap 7 3 - 14 mmol/L    Glucose 115 (H) 70 - 99 mg/dL    Urea Nitrogen 34 (H) 7 - 30 mg/dL    Creatinine 1.49 (H) 0.52 - 1.04 mg/dL    GFR Estimate 39 (L) >60 mL/min/[1.73_m2]    GFR Estimate If Black 46 (L) >60 mL/min/[1.73_m2]    Calcium 8.4 (L) 8.5 - 10.1 mg/dL    Bilirubin Total 14.5 (H) 0.2 - 1.3 mg/dL    Albumin 3.1 (L) 3.4 - 5.0 g/dL    Protein Total 4.8 (L) 6.8 - 8.8 g/dL    Alkaline Phosphatase 197 (H) 40 - 150 U/L    ALT 16 0 - 50 U/L    AST 31 0 - 45 U/L   CBC with platelets    Collection Time: 06/16/20  5:38 AM   Result Value Ref Range    WBC 6.7 4.0 - 11.0 10e9/L    RBC Count 2.24 (L) 3.8 - 5.2 10e12/L    Hemoglobin 7.4 (L) 11.7 - 15.7 g/dL    Hematocrit 22.2 (L) 35.0 - 47.0 %    MCV 99 78 - 100 fl    MCH 33.0 26.5 - 33.0 pg    MCHC 33.3 31.5 - 36.5 g/dL    RDW 23.6 (H) 10.0 - 15.0 %    Platelet Count 50 (L) 150 - 450 10e9/L   Ammonia    Collection Time: 06/16/20  5:38 AM   Result Value Ref Range    Ammonia 83 (H) 10 - 50 umol/L        Pending Results   These results will be followed up by nursing home director  Unresulted Labs Ordered in the Past 30 Days of this Admission     No orders found for last 31 day(s).          Code Status   Full Code       Primary Care Physician   Efren Bustos MD    Physical Exam   Temp: 98.3  F (36.8  C) Temp src: Oral BP: 105/52 Pulse: 97 Heart Rate: 97 Resp: 16 SpO2: 99 % O2 Device: None (Room air)    Vitals:    06/15/20 2124 06/16/20 0100   Weight: 56.2 kg (124 lb) 58.5 kg (129 lb)     Vital Signs with Ranges  Temp:  [97.5  F (36.4  C)-98.7  F (37.1  C)] 98.3  F (36.8  C)  Pulse:  [] 97  Heart Rate:  [] 97  Resp:  [16-22] 16  BP: ()/(41-72) 105/52  SpO2:  [95 %-100 %] 99 %  No intake/output data recorded.    Constitutional: alert, oriented, minimal distress  Eyes: PERRLA, sclera icteric noted  ENT: ENT is within normal limit  Respiratory: clear to auscultation bilaterally  Cardiovascular: regular rate and rhythm  GI: suple, non  tender, non distended,   Lymph/Hematologic: no lymph node enlargement  Genitourinary: not examined  Skin: icteric noted  Musculoskeletal: no leg edema  Neurologic: alert, oriented, normal speech, no abnormal movement  Neuropsychiatric: affect normal    Discharge Disposition   Discharged to nursing home  Condition at discharge: Stable    Consultations This Hospital Stay   CARE TRANSITION RN/SW IP CONSULT    Time Spent on this Encounter   IJennifer MD, personally saw the patient today and spent greater than 30 minutes discharging this patient.    Discharge Orders      Reason for your hospital stay    hyperammonea     Follow-up and recommended labs and tests     Follow up with primary care provider, Efren Bustos MD, within 7 days to evaluate medication change in nursing home.  The following labs/tests are recommended: cbc, cmp  .     General info for SNF    Length of Stay Estimate: Short Term Care: Estimated # of Days <30  Condition at Discharge: Improving  Level of care:skilled   Rehabilitation Potential: Good  Admission H&P remains valid and up-to-date: Yes  Recent Chemotherapy: N/A  Use Nursing Home Standing Orders: Yes     Mantoux instructions    Give two-step Mantoux (PPD) Per Facility Policy Yes     Activity    Your activity upon discharge: activity as tolerated     Follow Up (TCM)    Follow up with primary care provider, Efren Bustos MD or nursing home director within 2-3 days to evaluate medication change.  The following labs/tests are recommended: cbc, cmp within 2-3 days.     Full Code     Diet    Follow this diet upon discharge: Orders Placed This Encounter      Room Service      Advance Diet as Tolerated: Regular Diet Adult     Discharge Medications   Current Discharge Medication List      CONTINUE these medications which have CHANGED    Details   acetaminophen (TYLENOL) 500 MG tablet Take 1 tablet (500 mg) by mouth every 6 hours as needed for mild pain  Qty: 30 tablet, Refills: 0     Associated Diagnoses: Pain      ARIPiprazole (ABILIFY) 5 MG tablet Take 1 tablet (5 mg) by mouth At Bedtime  Qty: 30 tablet, Refills: 0    Associated Diagnoses: Major depressive disorder, recurrent episode, moderate (H)      busPIRone (BUSPAR) 15 MG tablet Take 1 tablet (15 mg) by mouth 2 times daily  Qty: 60 tablet, Refills: 0    Associated Diagnoses: MONA (generalized anxiety disorder)      ciprofloxacin (CIPRO) 250 MG tablet Take 1 tablet (250 mg) by mouth every 24 hours  Qty: 30 tablet, Refills: 0    Associated Diagnoses: Alcoholic cirrhosis of liver with ascites (H)      FLUoxetine (PROZAC) 20 MG capsule Take 3 capsules (60 mg) by mouth daily  Qty: 90 capsule, Refills: 0    Associated Diagnoses: Anxiety; Major depressive disorder, recurrent episode, moderate (H)      folic acid (FOLVITE) 1 MG tablet Take 1 tablet (1 mg) by mouth daily  Qty: 30 tablet, Refills: 0    Associated Diagnoses: Alcoholic cirrhosis of liver with ascites (H)      furosemide (LASIX) 40 MG tablet Take 1 tablet (40 mg) by mouth daily  Qty: 30 tablet, Refills: 0    Associated Diagnoses: Alcoholic cirrhosis of liver with ascites (H)      gabapentin (NEURONTIN) 100 MG capsule Take 3 capsules (300 mg) by mouth At Bedtime  Qty: 90 capsule, Refills: 0    Associated Diagnoses: Neuropathy      lactulose (CONSTULOSE) 10 GM/15ML solution Take 30 mLs (20 g) by mouth 3 times daily  Qty: 2700 mL, Refills: 0    Associated Diagnoses: Hepatic encephalopathy (H)      midodrine (PROAMATINE) 10 MG tablet Take 1 tablet (10 mg) by mouth 3 times daily (with meals)  Qty: 90 tablet, Refills: 0    Associated Diagnoses: Hypotension, unspecified hypotension type      nicotine (NICODERM CQ) 21 MG/24HR 24 hr patch Place 1 patch onto the skin every 24 hours  Qty: 30 patch, Refills: 0    Associated Diagnoses: Tobacco abuse      omeprazole (PRILOSEC) 20 MG DR capsule Take 1 capsule (20 mg) by mouth daily  Qty: 30 capsule, Refills: 0    Associated Diagnoses:  "Gastroesophageal reflux disease with esophagitis      ondansetron (ZOFRAN) 4 MG tablet Take 1 tablet (4 mg) by mouth every 6 hours as needed for nausea  Qty: 20 tablet, Refills: 0    Associated Diagnoses: Alcoholic cirrhosis of liver with ascites (H)      rifaximin (XIFAXAN) 550 MG TABS tablet Take 1 tablet (550 mg) by mouth 2 times daily  Qty: 60 tablet, Refills: 0    Associated Diagnoses: Alcoholic cirrhosis of liver without ascites (H); Hepatic encephalopathy (H)      sodium bicarbonate 650 MG tablet Take 1 tablet (650 mg) by mouth 2 times daily  Qty: 60 tablet, Refills: 0    Associated Diagnoses: CKD (chronic kidney disease) stage 3, GFR 30-59 ml/min (H)      spironolactone (ALDACTONE) 25 MG tablet Take 1 tablet (25 mg) by mouth daily  Qty: 30 tablet, Refills: 0    Associated Diagnoses: Alcoholic cirrhosis of liver with ascites (H)      traZODone (DESYREL) 50 MG tablet Take 2 tablets (100 mg) by mouth nightly as needed for sleep  Qty: 30 tablet, Refills: 0    Associated Diagnoses: Other insomnia      ursodiol (ACTIGALL) 500 MG tablet Take 1 tablet (500 mg) by mouth 2 times daily  Qty: 60 tablet, Refills: 0    Associated Diagnoses: Alcoholic cirrhosis of liver with ascites (H)      vitamin B1 (THIAMINE) 100 MG tablet Take 1 tablet (100 mg) by mouth daily  Qty: 30 tablet, Refills: 0    Associated Diagnoses: Alcoholic cirrhosis of liver with ascites (H)         STOP taking these medications       phytonadione (MEPHYTON) 5 MG tablet Comments:   Reason for Stopping:         potassium chloride ER (K-DUR/KLOR-CON M) 10 MEQ CR tablet Comments:   Reason for Stopping:             Allergies   Allergies   Allergen Reactions     Albuterol      Tongue \"hardened and painful\"     Data   Recent Results (from the past 168 hour(s))   Comprehensive metabolic panel    Collection Time: 06/10/20  5:03 AM   Result Value Ref Range    Sodium 139 133 - 144 mmol/L    Potassium 3.7 3.4 - 5.3 mmol/L    Chloride 115 (H) 94 - 109 mmol/L    " Carbon Dioxide 17 (L) 20 - 32 mmol/L    Anion Gap 7 3 - 14 mmol/L    Glucose 101 (H) 70 - 99 mg/dL    Urea Nitrogen 38 (H) 7 - 30 mg/dL    Creatinine 1.59 (H) 0.52 - 1.04 mg/dL    GFR Estimate 37 (L) >60 mL/min/[1.73_m2]    GFR Estimate If Black 42 (L) >60 mL/min/[1.73_m2]    Calcium 9.4 8.5 - 10.1 mg/dL    Bilirubin Total 11.3 (H) 0.2 - 1.3 mg/dL    Albumin 3.3 (L) 3.4 - 5.0 g/dL    Protein Total 4.8 (L) 6.8 - 8.8 g/dL    Alkaline Phosphatase 161 (H) 40 - 150 U/L    ALT 16 0 - 50 U/L    AST 22 0 - 45 U/L   CBC with platelets    Collection Time: 06/10/20  5:03 AM   Result Value Ref Range    WBC 6.1 4.0 - 11.0 10e9/L    RBC Count 2.31 (L) 3.8 - 5.2 10e12/L    Hemoglobin 7.4 (L) 11.7 - 15.7 g/dL    Hematocrit 24.6 (L) 35.0 - 47.0 %     (H) 78 - 100 fl    MCH 32.0 26.5 - 33.0 pg    MCHC 30.1 (L) 31.5 - 36.5 g/dL    RDW 24.6 (H) 10.0 - 15.0 %    Platelet Count 34 (LL) 150 - 450 10e9/L   INR    Collection Time: 06/10/20  5:03 AM   Result Value Ref Range    INR 2.17 (H) 0.86 - 1.14   Comprehensive metabolic panel    Collection Time: 06/11/20  5:45 AM   Result Value Ref Range    Sodium 140 133 - 144 mmol/L    Potassium 4.6 3.4 - 5.3 mmol/L    Chloride 116 (H) 94 - 109 mmol/L    Carbon Dioxide 18 (L) 20 - 32 mmol/L    Anion Gap 5 3 - 14 mmol/L    Glucose 106 (H) 70 - 99 mg/dL    Urea Nitrogen 38 (H) 7 - 30 mg/dL    Creatinine 1.66 (H) 0.52 - 1.04 mg/dL    GFR Estimate 35 (L) >60 mL/min/[1.73_m2]    GFR Estimate If Black 40 (L) >60 mL/min/[1.73_m2]    Calcium 9.0 8.5 - 10.1 mg/dL    Bilirubin Total 12.0 (H) 0.2 - 1.3 mg/dL    Albumin 3.1 (L) 3.4 - 5.0 g/dL    Protein Total 4.8 (L) 6.8 - 8.8 g/dL    Alkaline Phosphatase 158 (H) 40 - 150 U/L    ALT 16 0 - 50 U/L    AST 26 0 - 45 U/L   CBC with platelets    Collection Time: 06/11/20  5:45 AM   Result Value Ref Range    WBC 6.7 4.0 - 11.0 10e9/L    RBC Count 2.15 (L) 3.8 - 5.2 10e12/L    Hemoglobin 7.2 (L) 11.7 - 15.7 g/dL    Hematocrit 22.5 (L) 35.0 - 47.0 %      (H) 78 - 100 fl    MCH 33.5 (H) 26.5 - 33.0 pg    MCHC 32.0 31.5 - 36.5 g/dL    RDW 24.5 (H) 10.0 - 15.0 %    Platelet Count 38 (LL) 150 - 450 10e9/L   INR    Collection Time: 06/11/20  5:45 AM   Result Value Ref Range    INR 2.41 (H) 0.86 - 1.14   Phosphorus    Collection Time: 06/11/20  5:45 AM   Result Value Ref Range    Phosphorus 2.2 (L) 2.5 - 4.5 mg/dL   Comprehensive metabolic panel    Collection Time: 06/12/20  6:16 AM   Result Value Ref Range    Sodium 136 133 - 144 mmol/L    Potassium 4.9 3.4 - 5.3 mmol/L    Chloride 114 (H) 94 - 109 mmol/L    Carbon Dioxide 17 (L) 20 - 32 mmol/L    Anion Gap 6 3 - 14 mmol/L    Glucose 103 (H) 70 - 99 mg/dL    Urea Nitrogen 38 (H) 7 - 30 mg/dL    Creatinine 1.76 (H) 0.52 - 1.04 mg/dL    GFR Estimate 32 (L) >60 mL/min/[1.73_m2]    GFR Estimate If Black 37 (L) >60 mL/min/[1.73_m2]    Calcium 8.7 8.5 - 10.1 mg/dL    Bilirubin Total 12.8 (H) 0.2 - 1.3 mg/dL    Albumin 3.0 (L) 3.4 - 5.0 g/dL    Protein Total 4.7 (L) 6.8 - 8.8 g/dL    Alkaline Phosphatase 173 (H) 40 - 150 U/L    ALT 16 0 - 50 U/L    AST 26 0 - 45 U/L   CBC with platelets    Collection Time: 06/12/20  6:16 AM   Result Value Ref Range    WBC 7.9 4.0 - 11.0 10e9/L    RBC Count 2.18 (L) 3.8 - 5.2 10e12/L    Hemoglobin 7.2 (L) 11.7 - 15.7 g/dL    Hematocrit 22.7 (L) 35.0 - 47.0 %     (H) 78 - 100 fl    MCH 33.0 26.5 - 33.0 pg    MCHC 31.7 31.5 - 36.5 g/dL    RDW 24.6 (H) 10.0 - 15.0 %    Platelet Count 51 (L) 150 - 450 10e9/L   INR    Collection Time: 06/12/20  6:16 AM   Result Value Ref Range    INR 1.96 (H) 0.86 - 1.14   CBC with platelets    Collection Time: 06/15/20  7:21 PM   Result Value Ref Range    WBC 6.6 4.0 - 11.0 10e9/L    RBC Count 1.96 (L) 3.8 - 5.2 10e12/L    Hemoglobin 6.5 (LL) 11.7 - 15.7 g/dL    Hematocrit 20.2 (L) 35.0 - 47.0 %     (H) 78 - 100 fl    MCH 33.2 (H) 26.5 - 33.0 pg    MCHC 32.2 31.5 - 36.5 g/dL    RDW 24.7 (H) 10.0 - 15.0 %    Platelet Count 46 (LL) 150 - 450 10e9/L    Comprehensive metabolic panel    Collection Time: 06/15/20  7:21 PM   Result Value Ref Range    Sodium 142 133 - 144 mmol/L    Potassium 5.3 3.4 - 5.3 mmol/L    Chloride 117 (H) 94 - 109 mmol/L    Carbon Dioxide 17 (L) 20 - 32 mmol/L    Anion Gap 8 3 - 14 mmol/L    Glucose 108 (H) 70 - 99 mg/dL    Urea Nitrogen 37 (H) 7 - 30 mg/dL    Creatinine 1.61 (H) 0.52 - 1.04 mg/dL    GFR Estimate 36 (L) >60 mL/min/[1.73_m2]    GFR Estimate If Black 42 (L) >60 mL/min/[1.73_m2]    Calcium 8.3 (L) 8.5 - 10.1 mg/dL    Bilirubin Total 14.0 (H) 0.2 - 1.3 mg/dL    Albumin 3.1 (L) 3.4 - 5.0 g/dL    Protein Total 4.9 (L) 6.8 - 8.8 g/dL    Alkaline Phosphatase 212 (H) 40 - 150 U/L    ALT 16 0 - 50 U/L    AST 29 0 - 45 U/L   INR    Collection Time: 06/15/20  7:21 PM   Result Value Ref Range    INR 1.60 (H) 0.86 - 1.14   Urine Drugs of Abuse Screen Panel 13    Collection Time: 06/15/20  7:21 PM   Result Value Ref Range    Cannabinoids (78-yll-6-carboxy-9-THC) Detected, Abnormal Result (A) NDET^Not Detected ng/mL    Phencyclidine (Phencyclidine) Not Detected NDET^Not Detected ng/mL    Cocaine (Benzoylecgonine) Not Detected NDET^Not Detected ng/mL    Methamphetamine (d-Methamphetamine) Not Detected NDET^Not Detected ng/mL    Opiates (Morphine) Not Detected NDET^Not Detected ng/mL    Amphetamine (d-Amphetamine) Not Detected NDET^Not Detected ng/mL    Benzodiazepines (Nordiazepam) Detected, Abnormal Result (A) NDET^Not Detected ng/mL    Tricyclic Antidepressants (Desipramine) Not Detected NDET^Not Detected ng/mL    Methadone (Methadone) Not Detected NDET^Not Detected ng/mL    Barbiturates (Butalbital) Not Detected NDET^Not Detected ng/mL    Oxycodone (Oxycodone) Not Detected NDET^Not Detected ng/mL    Propoxyphene (Norpropoxyphene) Not Detected NDET^Not Detected ng/mL    Buprenorphine (Buprenorphine) Not Detected NDET^Not Detected ng/mL   COVID-19 Virus (Coronavirus) by PCR    Collection Time: 06/15/20  7:21 PM    Specimen: Nasopharyngeal    Result Value Ref Range    COVID-19 Virus PCR to [facility] - Source Nasopharyngeal     COVID-19 Virus PCR to [facility] - Result       Test received-See reflex to IDDL test SARS CoV2 (COVID-19) Virus RT-PCR   Alcohol ethyl    Collection Time: 06/15/20  7:21 PM   Result Value Ref Range    Ethanol g/dL <0.01 <0.01 g/dL   SARS-CoV-2 COVID-19 Virus (Coronavirus) RT-PCR Nasopharyngeal    Collection Time: 06/15/20  7:21 PM    Specimen: Nasopharyngeal   Result Value Ref Range    SARS-CoV-2 Virus Specimen Source Nasopharyngeal     SARS-CoV-2 PCR Result NEGATIVE     SARS-CoV-2 PCR Comment       The Simplexa COVID-19 direct PCR assay by [employer] on the Muse instrument has been   given Emergency Use Authorization (EUA) for the in vitro qualitative detection of RNA from   the SARS-CoV2 virus in nasopharyngeal swabs in viral transport medium from patients with   signs and symptoms of infection who are suspected of COVID-19. Performance is unknown in   asymptomatic patients.     CBC with platelets differential    Collection Time: 06/15/20  9:47 PM   Result Value Ref Range    WBC 7.5 4.0 - 11.0 10e9/L    RBC Count 2.10 (L) 3.8 - 5.2 10e12/L    Hemoglobin 6.9 (LL) 11.7 - 15.7 g/dL    Hematocrit 21.4 (L) 35.0 - 47.0 %     (H) 78 - 100 fl    MCH 32.9 26.5 - 33.0 pg    MCHC 32.2 31.5 - 36.5 g/dL    RDW 24.5 (H) 10.0 - 15.0 %    Platelet Count 65 (L) 150 - 450 10e9/L    Diff Method Automated Method     % Neutrophils 70.7 %    % Lymphocytes 14.9 %    % Monocytes 9.7 %    % Eosinophils 2.5 %    % Basophils 0.5 %    % Immature Granulocytes 1.7 %    Nucleated RBCs 0 0 /100    Absolute Neutrophil 5.3 1.6 - 8.3 10e9/L    Absolute Lymphocytes 1.1 0.8 - 5.3 10e9/L    Absolute Monocytes 0.7 0.0 - 1.3 10e9/L    Absolute Basophils 0.0 0.0 - 0.2 10e9/L    Abs Immature Granulocytes 0.1 0 - 0.4 10e9/L    Absolute Nucleated RBC 0.0    ABO/Rh type and screen    Collection Time: 06/15/20  9:47 PM   Result Value Ref Range    Units Ordered 1      ABO O     RH(D) Pos     Antibody Screen Neg     Test Valid Only At [facility]        Specimen Expires 06/18/2020     Crossmatch Red Blood Cells    Ammonia    Collection Time: 06/15/20  9:47 PM   Result Value Ref Range    Ammonia 130 (HH) 10 - 50 umol/L   Basic metabolic panel    Collection Time: 06/15/20  9:47 PM   Result Value Ref Range    Sodium 141 133 - 144 mmol/L    Potassium 5.3 3.4 - 5.3 mmol/L    Chloride 113 (H) 94 - 109 mmol/L    Carbon Dioxide 17 (L) 20 - 32 mmol/L    Anion Gap 11 3 - 14 mmol/L    Glucose 98 70 - 99 mg/dL    Urea Nitrogen 36 (H) 7 - 30 mg/dL    Creatinine 1.52 (H) 0.52 - 1.04 mg/dL    GFR Estimate 39 (L) >60 mL/min/[1.73_m2]    GFR Estimate If Black 45 (L) >60 mL/min/[1.73_m2]    Calcium 8.7 8.5 - 10.1 mg/dL   Blood component    Collection Time: 06/15/20  9:47 PM   Result Value Ref Range    Unit Number [id number]     Blood Component Type Red Blood Cells Leukocyte Reduced     Division Number 00     Status of Unit Released to care unit     Blood Product Code [id number]     Unit Status ISS    Alcohol breath test POCT    Collection Time: 06/15/20  9:50 PM   Result Value Ref Range    Alcohol Breath Test 0.000 0.00 - 0.01   Comprehensive metabolic panel    Collection Time: 06/16/20  5:38 AM   Result Value Ref Range    Sodium 141 133 - 144 mmol/L    Potassium 5.4 (H) 3.4 - 5.3 mmol/L    Chloride 115 (H) 94 - 109 mmol/L    Carbon Dioxide 19 (L) 20 - 32 mmol/L    Anion Gap 7 3 - 14 mmol/L    Glucose 115 (H) 70 - 99 mg/dL    Urea Nitrogen 34 (H) 7 - 30 mg/dL    Creatinine 1.49 (H) 0.52 - 1.04 mg/dL    GFR Estimate 39 (L) >60 mL/min/[1.73_m2]    GFR Estimate If Black 46 (L) >60 mL/min/[1.73_m2]    Calcium 8.4 (L) 8.5 - 10.1 mg/dL    Bilirubin Total 14.5 (H) 0.2 - 1.3 mg/dL    Albumin 3.1 (L) 3.4 - 5.0 g/dL    Protein Total 4.8 (L) 6.8 - 8.8 g/dL    Alkaline Phosphatase 197 (H) 40 - 150 U/L    ALT 16 0 - 50 U/L    AST 31 0 - 45 U/L   CBC with platelets    Collection Time:  06/16/20  5:38 AM   Result Value Ref Range    WBC 6.7 4.0 - 11.0 10e9/L    RBC Count 2.24 (L) 3.8 - 5.2 10e12/L    Hemoglobin 7.4 (L) 11.7 - 15.7 g/dL    Hematocrit 22.2 (L) 35.0 - 47.0 %    MCV 99 78 - 100 fl    MCH 33.0 26.5 - 33.0 pg    MCHC 33.3 31.5 - 36.5 g/dL    RDW 23.6 (H) 10.0 - 15.0 %    Platelet Count 50 (L) 150 - 450 10e9/L   Ammonia    Collection Time: 06/16/20  5:38 AM   Result Value Ref Range    Ammonia 83 (H) 10 - 50 umol/L

## 2020-06-16 NOTE — TELEPHONE ENCOUNTER
Spoke to Eron and Heydi may be discharged to home or to nursing home. Tenatively set up appointment with Dr. Kovacs tomorrow at 1400. Will confirm tomorrow.

## 2020-06-16 NOTE — PROGRESS NOTES
S-(situation): Patient discharged to home via w/c with      B-(background): Observation goals met     A-(assessment): alert and oriented.  Steady gait, ambulated halls with physical therapy.  Denies pain.  BP 96/49   Pulse 90   Temp 97  F (36.1  C) (Oral)   Resp 16   Ht 1.524 m (5')   Wt 58.5 kg (129 lb)   LMP 05/16/2012   SpO2 98%   BMI 25.19 kg/m    Tolerating diet with no nausea.  hgb-7.4 this am.  Ammonia down to 83 this am.      R-(recommendations): Discharge instructions reviewed with pt, denies questions or concerns. Listed belongings gathered and returned to patient.  Patient Education resolved: Yes  New medications-Pt. Has been educated about reason of use and side effects NA  Home and hospital acquired medications returned to patient NA  Medication Bin checked and emptied on discharge Yes

## 2020-06-16 NOTE — ED TRIAGE NOTES
Pt was just admitted to [facility] today and had labs done . Results back concerning so sent to ED

## 2020-06-16 NOTE — PROGRESS NOTES
Clinic Care Coordination Contact  Care Coordination Transition Communication    Clinical Data: Patient was hospitalized at [facility].  Reason for Hospitalization:  Hepatic encephalopathy (H) [K72.90]  Anemia in other chronic diseases classified elsewhere [D63.8]  Admit Date/Time: 6/15/2020  9:24 PM  Discharge Date: 6/16/20    Transition to Facility:              Facility Name: [facility] (Main Phone: [phone] Admissions Phone: [phone] Fax: [phone])                Contact name and phone number/fax: Cynthia VELAZQUEZ    Plan: RN/SW Care Coordinator will await notification from facility staff informing RN/SW Care Coordinator of patient's discharge plans/needs. RN/SW Care Coordinator will review chart and outreach to facility staff every 4 weeks and as needed.       Inés GOLDSTEINN, RN, PHN, CCM  Primary Clinic Care Coordination    [facility]  [email or web address]   Office: [phone]  Employed by [employer]

## 2020-06-16 NOTE — PROGRESS NOTES
Per Alon, at [place], [facility] is requiring a new prior authorization due to patient being hospitalized.  [place] states they clarified with [facility] that new eval is required even though patient is OBS.  Writer contacted Camryn in P/T.  Physical therapy will evaluate at 1415.    TATY Segura  [facility] [phone]/ [facility] [phone]

## 2020-06-16 NOTE — CONSULTS
CARE TRANSITION SOCIAL WORK INITIAL ASSESSMENT:      Met with: Family    DATA  Principal Problem:    Hepatic encephalopathy (H)  Active Problems:    Moderate Depression [296.32]    Alcoholic cirrhosis of liver with ascites (H)    Chronic blood loss anemia    Thrombocytopenia (H)    Tobacco abuse    CKD (chronic kidney disease) stage 3, GFR 30-59 ml/min (H)    Alcoholic encephalopathy (H)       Primary Care Clinic Name: [facility]  Primary Care MD Name: Dr. Efren Bustos    ASSESSMENT  Cognitive Status: awake, alert and oriented.       Resources List: Skilled Nursing Facility       Insurance Concerns: No Insurance issues identified     This writer met with pt's , Eron, by phone, introduced self and role. Discussed discharge planning.  Patient comes in from [facility] (Main Phone: [phone] Admissions Phone: [phone] Fax: [phone]).  She just admitted to [facility] today.  Eron states he was not aware that patient was in the hospital.  Discussed that patient will be ready for discharge today.  Eron states that his daughter, Connie, will transport when patient is ready for discharge.    PLAN    Return to [facility].  Family transport.    TATY Segura  [facility] [phone]/ [facility] [phone]

## 2020-06-16 NOTE — PLAN OF CARE
Discharge Planner PT   Patient plan for discharge: [place] - TCU   Current status: Patient is a 54 year old female admitted to [facility] status with hepatic encephalopathy along with Anemia.  Patient was discharged from [facility] for rehabilitation at [place] on 6/12/20, however she stayed at home for the weekend prior to going to [place] - T[facility].  The patient then went to the TCU on 6/15/20, however due to increased confusion the patient was sent to the [place] ED and then admitted for hepatic encephalopathy. While at home, patient reports she was able to safely ambulate around [place] with her  while using only a cart of assistance and lokin reports having no complaints with this.  While admitted on observation status, the patient performed bed mobility, transfers, ambulation 400 feet with a FWW with mild occasional path deviation with no LOB, and stair ambulation of 3 steps using bilaterally rails with CGA for safety only. Patient demonstrates 3+/5 hip flexion strength, 4/5 quad strength, 4/5 HS strength, 4/5 glute medius strength and 4/5 DF strength.  Patient demonstrates the ability to standing in single leg stance without assistance for 10+ seconds bilaterally. Patient reports no pain in her body during any functional tasks. Patient also reports that her family is able to assist her with her son regularly assisting her, her  regularly assisting her and her daughter, who is a LPN, also regularly assisting her.   Barriers to return to prior living situation: Lower extremity weakness, medical status  Recommendations for discharge: Home with family assistance, family transport and outpatient physical therapy.  Rationale for recommendations: Although the patient was previously referred to TCU, patient demonstrates no functional deficits that indicate she requires the advanced care / rehabilitation offered by a transitional care unit. Given her independence with bed mobility, transfers, ambulation  and stair ambulation that meets the functional level needed to return home as well as with the support she receives from her family when she discharges home, the rational for the recommendation is that she can receive all the necessary care she needs to rehabilitate from her family and with outpatient physical therapy services.       Entered by: Duc Martinez 06/16/2020 2:48 PM

## 2020-06-16 NOTE — PROGRESS NOTES
S-(situation): Patient registered to Observation. Patient arrived to room 248 via cart from ED    B-(background): pt to ed from [facility] with increased confusion and abnormal labs.     A-(assessment): VSS. Afebrile. Blood transfusing upon admission. LS clear throughout. Denies any pain. Denies any nausea/vomiting. Pt alert and oriented but forgetful and sleepy. Pt repeats questions. No complaints at this time.     R-(recommendations): Orders and observation goals reviewed with patient    Nursing Observation criteria listed below was met:    Skin issues/needs documented:Yes  Isolation needs addressed and Signage up: Yes  Fall Prevention: Education given and documented: Yes  Education Assessment documented:Yes  Education Documented: Yes  OBS video/handout Reviewed & Documented: Yes  Allergies Reviewed: Yes  Medication Reconciliation Complete: Yes  New medication patient education completed and documented (Possible Side Effects of Common Medications handout): Yes  Home medications if not able to send immediately home with family stored here: NA  Reminder note placed in discharge instructions: NA  Patient has discharge needs (If yes, please explain): No

## 2020-06-16 NOTE — DISCHARGE INSTRUCTIONS
Dr. Bustos's team will be calling you to set up your hospital follow up.   If you do not hear from them within 48hrs please call the clinic.  [phone]

## 2020-06-16 NOTE — ASSESSMENT & PLAN NOTE
Presenting from nursing home with increased confusion  Known history of alcoholic cirrhosis with secondary encephalopathy  Elevated ammonia level, not clear if patient has been taking prescribed doses of lactulose over the past several days  No infectious etiology present  Restart her home dosing of lactulose, recheck ammonia level tomorrow  When back to baseline mental status, get her back to the nursing home for ongoing rehab

## 2020-06-17 NOTE — TELEPHONE ENCOUNTER
Spoke with the patient and confirmed Hepatology appointments on 6/18/20.  Informed patient an itinerary can be accessed on [facility].

## 2020-06-17 NOTE — PLAN OF CARE
Incomplete                S-(situation): Patient discharged to home via w/c with family     B-(background): Observation goals met      A-(assessment): alert and oriented.  Steady gait, ambulated halls with physical therapy.  Denies pain.  BP 96/49   Pulse 90   Temp 97  F (36.1  C) (Oral)   Resp 16   Ht 1.524 m (5')   Wt 58.5 kg (129 lb)   LMP 05/16/2012   SpO2 98%   BMI 25.19 kg/m    Tolerating diet with no nausea.  hgb-7.4 this am.  Ammonia down to 83 this am.       R-(recommendations): Discharge instructions reviewed with pt, denies questions or concerns. Listed belongings gathered and returned to patient.  Patient Education resolved: Yes  New medications-Pt. Has been educated about reason of use and side effects NA  Home and hospital acquired medications returned to patient NA  Medication Bin checked and emptied on discharge Yes

## 2020-06-17 NOTE — LETTER
[facility]  Complex Care Plan  About Me:    Patient Name:  Monalisa Wellington    YOB: 1965  Age:         54 year old   [facility] MRN:    [id number] Telephone Information:  Home Phone [phone]   Mobile [phone]       Address:  [address] [address] [place] [address] Email address:  [email or web address]      Emergency Contact(s)    Name Relationship Lgl Grd Work Phone Home Phone Mobile Phone   1. MICHAEL WELLINGTON Spouse   [phone]   2. BHUPENDRA WELLINGTON Daughter    [phone]           Primary language:  English     needed? No   [facility] [employer]:  [phone] op. 1  Other communication barriers: None  Preferred Method of Communication:  Mail  Current living arrangement: I live in a private home with family  Mobility Status/ Medical Equipment: Independent    Health Maintenance  Health Maintenance Reviewed: Not assessed    My Access Plan  Medical Emergency 911   Primary Clinic Line [facility] - [phone]   24 Hour Appointment Line [phone] or  [phone]) (toll-free)   24 Hour Nurse Line [phone] (toll-free)   Preferred Urgent Care     Preferred Hospital [facility]  [phone]   Preferred Pharmacy [facility] [address]      Behavioral Health Crisis Line The National Suicide Prevention Lifeline at 1-744.441.6662 or 911             My Care Team Members  Patient Care Team       Relationship Specialty Notifications Start End    Efren Bustos MD PCP - General   8/22/03     Phone: [phone] Fax: [phone] [address] [place] [address]    Andrea Escalante MD Referring Physician General Surgery  7/31/17     Referring to General Surgery    Phone: [phone] Fax: [phone]         [address] [place] [address]    Romeo Pastor MD MD Surgery  7/31/17     Phone:  [phone] Fax: [phone]         [address] [place] [address]    Edgardo Kovacs MD MD Gastroenterology  11/24/17     Phone: [phone] Fax: [phone]         [address] [place] [address]    Lester Do, RN Registered Nurse Hematology & Oncology Admissions 5/3/18     Phone: [phone]         Usman Lee MD Specialty Provider Hematology & Oncology Admissions 5/3/18     Phone: [phone] Fax: [phone] [address] [place] [address]    Rboert Doan [facility] Pharmacist Pharmacist Clinician- Clinical Pharmacy Specialist  8/14/19     Phone: [phone] Fax: [phone] [address] [facility] [address] [facility] [address]    Efren Bustos MD Assigned PCP   9/29/19     Phone: [phone] Fax: [phone]         [address] [place] [address]    [facility]  5/7/20     Phone: [phone]         Nadia Day [facility] Pharmacist Pharmacist  5/20/20     Phone: [phone]          [address] [place] [address]    Gabbie Portillo, RN Nurse Coordinator Transplant All results, Admissions 6/1/20     Zenaida Huff, RN Clinic Care Coordinator Primary Care - [facility] Admissions 6/16/20     Phone: [phone]         Zenaida Huff RN Lead Care Coordinator Primary Care - [facility] Admissions 6/17/20     Phone: [phone]         Agnes Vigil MA Community Health Worker  Admissions 6/17/20             My Care Plans  Self Management and Treatment Plan  Goals and (Comments)      General    Taking Medication     Goal Statement: I will be able to take my pills without vomiting them up.   Date Goal set: 6/17/2020  Barriers: unknown  Strengths: motivated and engaged  Date to Achieve By: 7/17/2020  Patient expressed understanding of goal: yes  Action steps to achieve this goal:  1. I will make sure I eat before taking pills  2. I will drink with a full glass of water with medications  3. I will review  medications with Medication Managment             Action Plans on File:   Advance Care Plans/Directives Type:        My Medical and Care Information  Problem List   Patient Active Problem List   Diagnosis     Kidney donor     Esophageal reflux     Moderate Depression [296.32]     HYPERLIPIDEMIA LDL GOAL <100     Hypertension goal BP (blood pressure) < 130/80     Anxiety     Alcoholic cirrhosis of liver with ascites (H)     Chronic blood loss anemia     Thrombocytopenia (H)     Tobacco abuse     Non-intractable vomiting with nausea     Portal hypertension (H)     Pulmonary nodules     Hyperthyroidism     Alcoholic cirrhosis of liver without ascites (H) - per Hepatology consult Nov 2017     Splenomegaly     Epistaxis     Other iron deficiency anemia     Other pancytopenia (H)     Solitary kidney, acquired     Macrocytic anemia     CKD (chronic kidney disease) stage 3, GFR 30-59 ml/min (H)     Confusion     Hepatic encephalopathy (H)     Alcohol dependence in remission (H)     Abnormal LFTs (liver function tests)     Encephalopathy     Alcoholic encephalopathy (H)      Current Medications and Allergies:  See printed Medication Report.    Care Coordination Start Date: 6/17/2020   Frequency of Care Coordination: 2 weeks   Form Last Updated: 06/17/2020

## 2020-06-17 NOTE — TELEPHONE ENCOUNTER
Eron working today. Heydi is at home. Virtual appointment for tomorrow and Eron agrees to be present.

## 2020-06-17 NOTE — TELEPHONE ENCOUNTER
This patient is due for MTM follow-up. I called the patient to schedule an appointment and left a message with the clinic phone number for the patient to call to schedule.    Francis Doan, LatanyaD, [facility]  Medication Therapy Management Pharmacist  Pager: [phone]

## 2020-06-17 NOTE — PROGRESS NOTES
Clinic Care Coordination Contact  Care Team Conversations    Please call and schedule pt for hospital follow up appt.   Thank you,     Inés NASH, RN, PHN, CCM  Primary Clinic Care Coordination    [facility]  [email or web address]   Office: [phone]  Employed by [facility]

## 2020-06-17 NOTE — LETTER
[facility] CARE COORDINATION  [address]   [address]  Tel. [phone] / Fax [phone]    June 17, 2020    Monalisa Olguin  [address] [facility] [address]      Dear Monalisa,    It was nice talking to you today.  Below is a description of clinic care coordination and how I can further assist you.      The clinic care coordination team is made up of a registered nurse,  and community health worker who understand the health care system. The goal of clinic care coordination is to help you manage your health and improve access to the health care system in the most efficient manner. The team can assist you in meeting your health care goals by providing education, coordinating services, strengthening the communication among your providers and supporting you with any resource needs.    Please feel free to contact me at [phone] with any questions or concerns. We are focused on providing you with the highest-quality healthcare experience possible and that all starts with you.     Sincerely,     Inés NASH, RN, PHN, [facility]  Primary Clinic Care Coordination    [facility]  [email or web address]   Office: [phone]  Employed by [employer]      Enclosed: I have enclosed a copy of the Complex Care Plan. This has helpful information and goals that we have talked about. Please keep this in an easy to access place to use as needed.

## 2020-06-17 NOTE — PROGRESS NOTES
Clinic Care Coordination Contact    Clinic Care Coordination Contact  OUTREACH    Referral Information:  Referral Source: IP Handoff    Primary Diagnosis: GI Disorders    Chief Complaint   Patient presents with     Clinic Care Coordination - Post Hospital     RN assessment     Universal Utilization:   Clinic Utilization  Difficulty keeping appointments:: No  Compliance Concerns: No  No-Show Concerns: No  No PCP office visit in Past Year: No  Utilization    Last refreshed: 6/17/2020 11:22 AM:  Hospital Admissions 5           Last refreshed: 6/17/2020 11:22 AM:  ED Visits 6           Last refreshed: 6/17/2020 11:22 AM:  No Show Count (past year) 5              Current as of: 6/17/2020 11:22 AM            Clinical Concerns:  Current Medical Concerns:  Called and spoke with pt, introduced self and role.  Patient was recently hospitalized for confusion abnormal labs.  Patient states she is doing pretty well.  She was surprised that she passed physical therapy and that she was able to go home.  Patient states she has been getting up and moving around and has her 24-year-old daughter there to help her along with her .  States her biggest concern right now is not being able to keep her medications down.  States she will take some and they will typically come right back up.  Patient was scheduled to meet with MTM and go over medications however had rescheduled that appointment.  Strongly encouraged patient and daughter to be available to meet with MTM via phone to review medications and perhaps spread out times for other suggestions regarding nausea and vomiting.  Current Behavioral Concerns: no current concerns    Education Provided to patient: Advised to eat prior to taking medications and drink a full glass of water to ensure medications go down.  Pain  Pain (GOAL):: No  Health Maintenance Reviewed: Not assessed  Clinical Pathway: None    Medication Management:  Patient will be talking with MTM to review her  meds.  Since 24-year-old daughter sets up all her medications.    Functional Status:  Dependent ADLs:: Independent  Dependent IADLs:: Independent  Bed or wheelchair confined:: No  Mobility Status: Independent  Fallen 2 or more times in the past year?: No  Any fall with injury in the past year?: No    Living Situation:  Current living arrangement:: I live in a private home with family    Lifestyle & Psychosocial Needs:  Lifestyle     Physical activity     Days per week: 0 days     Minutes per session: 0 min     Stress: Rather much     Social Needs     Financial resource strain: Not very hard     Food insecurity     Worry: Never true     Inability: Never true     Transportation needs     Medical: No     Non-medical: No     Diet:: Regular  Inadequate nutrition (GOAL):: No  Tube Feeding: No  Inadequate activity/exercise (GOAL):: No  Significant changes in sleep pattern (GOAL): No  Transportation means:: Regular car     Mental health DX:: Yes  Mental health DX how managed:: Medication  Mental health management concern (GOAL):: No  Informal Support system:: Children   Socioeconomic History     Marital status:      Spouse name: Eron     Number of children: 3     Years of education: 14     Highest education level: Not on file   Occupational History     Occupation: Homemaker     Tobacco Use     Smoking status: Current Every Day Smoker     Packs/day: 0.50     Years: 10.00     Pack years: 5.00     Types: Cigarettes     Smokeless tobacco: Never Used     Tobacco comment: pt quit 1992 after smoking for 8 yrs, started again in 2004   Substance and Sexual Activity     Alcohol use: Not Currently     Alcohol/week: 0.0 standard drinks     Comment: Quit drinking 9/2019     Drug use: Not Currently     Types: Marijuana     Sexual activity: Yes     Partners: Male     Birth control/protection: Surgical     Comment:  had vasectomy        Resources and Interventions:  Current Resources:      Community Resources: None  Supplies  used at home:: None  Equipment Currently Used at Home: walker, rolling, none    Advance Care Plan/Directive  Advanced Care Plans/Directives on file:: No  Advanced Care Plan/Directive Status: Not Applicable    Referrals Placed: None     Goals        General    Taking Medication     Goal Statement: I will be able to take my pills without vomiting them up.   Date Goal set: 6/17/2020  Barriers: unknown  Strengths: motivated and engaged  Date to Achieve By: 7/17/2020  Patient expressed understanding of goal: yes  Action steps to achieve this goal:  1. I will make sure I eat before taking pills  2. I will review medications with MTM  3. I will drink with a full glass of water.            Patient/Caregiver understanding: Pt verbalizes understanding of follow up instructions and when scheduled appt date and times.       Outreach Frequency: 2 weeks  Future Appointments              In 5 days [place] RAD; [place] N[facility]SE[facility]    In 1 month  LAB [facility] Lab, [facility]    In 1 month Edgardo Kovacs MD [facility] Hepatology, [facility]          Plan:   Send message to scheduling to schedule patient for hospital follow-up.    RN CC will follow-up in 2 weeks      Inés GOLDSTEINN, RN, PHN, CCM  Primary Clinic Care Coordination    [facility]  [email or web address]   Office: [phone]  Employed by [facility]

## 2020-06-18 NOTE — LETTER
"    6/18/2020         RE: Monalisa Olguin  [address] [facility] [address]        Dear Colleague,    Thank you for referring your patient, Monalisa Olguin, to the [facility] HEPATOLOGY. Please see a copy of my visit note below.    Monalisa Olguin is a 54 year old female who is being evaluated via a billable telephone visit.      The patient has been notified of following:     \"This telephone visit will be conducted via a call between you and your physician/provider. We have found that certain health care needs can be provided without the need for a physical exam.  This service lets us provide the care you need with a short phone conversation.  If a prescription is necessary we can send it directly to your pharmacy.  If lab work is needed we can place an order for that and you can then stop by our lab to have the test done at a later time.    Telephone visits are billed at different rates depending on your insurance coverage. During this emergency period, for some insurers they may be billed the same as an in-person visit.  Please reach out to your insurance provider with any questions.    If during the course of the call the physician/provider feels a telephone visit is not appropriate, you will not be charged for this service.\"    Patient has given verbal consent for Telephone visit?  Yes    What phone number would you like to be contacted at? [phone]    How would you like to obtain your AVS? Ajay    I had the pleasure of seeing Heydi Olguin for followup in the Liver Clinic at [facility] on June 18, 2020.  Ms. Olguin returns for followup of alcoholic cirrhosis with ascites.      I have actually not seen her in the clinic for almost 5 months.  During this time she is been hospitalized frequently with hepatic encephalopathy.  We did manage to finish her transplant evaluation but currently we have serious concerns regarding her frailty and neuropsych testing raise " the issue of an organic brain syndrome.  She reports that she has not had any alcohol since September.  Her last large volume paracentesis was 8 days ago while an inpatient.       She still does complain of some increased abdominal girth with occasional abdominal discomfort.  She does complain of itching, largely related to dry skin.  She does have a moderate amount of fatigue.  She has no lower extremity edema.  She has not had any gastrointestinal bleeding but does have anemia.  She was recently transfused in the hospital.  She currently denies any overt signs of hepatic encephalopathy.      She denies any fevers or chills,or  Cough.she did note some shortness of breath last night.  She does complain of nausea and occasional vomiting.  She is having at least 2 bowel movements per day.  Her appetite has been poor and she is working hard on trying to maintain her nutritional intake.    Her family reports that she is quite thin.        Current Outpatient Medications   Medication     acetaminophen (TYLENOL) 500 MG tablet     ARIPiprazole (ABILIFY) 5 MG tablet     busPIRone (BUSPAR) 15 MG tablet     ciprofloxacin (CIPRO) 250 MG tablet     FLUoxetine (PROZAC) 20 MG capsule     folic acid (FOLVITE) 1 MG tablet     furosemide (LASIX) 40 MG tablet     gabapentin (NEURONTIN) 100 MG capsule     lactulose (CONSTULOSE) 10 GM/15ML solution     midodrine (PROAMATINE) 10 MG tablet     nicotine (NICODERM CQ) 21 MG/24HR 24 hr patch     omeprazole (PRILOSEC) 20 MG DR capsule     ondansetron (ZOFRAN) 4 MG tablet     rifaximin (XIFAXAN) 550 MG TABS tablet     sodium bicarbonate 650 MG tablet     spironolactone (ALDACTONE) 25 MG tablet     traZODone (DESYREL) 50 MG tablet     ursodiol (ACTIGALL) 500 MG tablet     vitamin B1 (THIAMINE) 100 MG tablet     No current facility-administered medications for this visit.      On physical examination, she actually sounds well.  Her affect is appropriate.    Recent Results (from the past 168  hour(s))   CBC with platelets    Collection Time: 06/15/20  7:21 PM   Result Value Ref Range    WBC 6.6 4.0 - 11.0 10e9/L    RBC Count 1.96 (L) 3.8 - 5.2 10e12/L    Hemoglobin 6.5 (LL) 11.7 - 15.7 g/dL    Hematocrit 20.2 (L) 35.0 - 47.0 %     (H) 78 - 100 fl    MCH 33.2 (H) 26.5 - 33.0 pg    MCHC 32.2 31.5 - 36.5 g/dL    RDW 24.7 (H) 10.0 - 15.0 %    Platelet Count 46 (LL) 150 - 450 10e9/L   Comprehensive metabolic panel    Collection Time: 06/15/20  7:21 PM   Result Value Ref Range    Sodium 142 133 - 144 mmol/L    Potassium 5.3 3.4 - 5.3 mmol/L    Chloride 117 (H) 94 - 109 mmol/L    Carbon Dioxide 17 (L) 20 - 32 mmol/L    Anion Gap 8 3 - 14 mmol/L    Glucose 108 (H) 70 - 99 mg/dL    Urea Nitrogen 37 (H) 7 - 30 mg/dL    Creatinine 1.61 (H) 0.52 - 1.04 mg/dL    GFR Estimate 36 (L) >60 mL/min/[1.73_m2]    GFR Estimate If Black 42 (L) >60 mL/min/[1.73_m2]    Calcium 8.3 (L) 8.5 - 10.1 mg/dL    Bilirubin Total 14.0 (H) 0.2 - 1.3 mg/dL    Albumin 3.1 (L) 3.4 - 5.0 g/dL    Protein Total 4.9 (L) 6.8 - 8.8 g/dL    Alkaline Phosphatase 212 (H) 40 - 150 U/L    ALT 16 0 - 50 U/L    AST 29 0 - 45 U/L   INR    Collection Time: 06/15/20  7:21 PM   Result Value Ref Range    INR 1.60 (H) 0.86 - 1.14   Urine Drugs of Abuse Screen Panel 13    Collection Time: 06/15/20  7:21 PM   Result Value Ref Range    Cannabinoids (03-gsg-2-carboxy-9-THC) Detected, Abnormal Result (A) NDET^Not Detected ng/mL    Phencyclidine (Phencyclidine) Not Detected NDET^Not Detected ng/mL    Cocaine (Benzoylecgonine) Not Detected NDET^Not Detected ng/mL    Methamphetamine (d-Methamphetamine) Not Detected NDET^Not Detected ng/mL    Opiates (Morphine) Not Detected NDET^Not Detected ng/mL    Amphetamine (d-Amphetamine) Not Detected NDET^Not Detected ng/mL    Benzodiazepines (Nordiazepam) Detected, Abnormal Result (A) NDET^Not Detected ng/mL    Tricyclic Antidepressants (Desipramine) Not Detected NDET^Not Detected ng/mL    Methadone (Methadone) Not  Detected NDET^Not Detected ng/mL    Barbiturates (Butalbital) Not Detected NDET^Not Detected ng/mL    Oxycodone (Oxycodone) Not Detected NDET^Not Detected ng/mL    Propoxyphene (Norpropoxyphene) Not Detected NDET^Not Detected ng/mL    Buprenorphine (Buprenorphine) Not Detected NDET^Not Detected ng/mL   COVID-19 Virus (Coronavirus) by PCR    Collection Time: 06/15/20  7:21 PM    Specimen: Nasopharyngeal   Result Value Ref Range    COVID-19 Virus PCR to [facility] - Source Nasopharyngeal     COVID-19 Virus PCR to [facility] - Result       Test received-See reflex to IDDL test SARS CoV2 (COVID-19) Virus RT-PCR   Alcohol ethyl    Collection Time: 06/15/20  7:21 PM   Result Value Ref Range    Ethanol g/dL <0.01 <0.01 g/dL   SARS-CoV-2 COVID-19 Virus (Coronavirus) RT-PCR Nasopharyngeal    Collection Time: 06/15/20  7:21 PM    Specimen: Nasopharyngeal   Result Value Ref Range    SARS-CoV-2 Virus Specimen Source Nasopharyngeal     SARS-CoV-2 PCR Result NEGATIVE     SARS-CoV-2 PCR Comment       The Simplexa COVID-19 direct PCR assay by [employer] on the [employer] instrument has been   given Emergency Use Authorization (EUA) for the in vitro qualitative detection of RNA from   the SARS-CoV2 virus in nasopharyngeal swabs in viral transport medium from patients with   signs and symptoms of infection who are suspected of COVID-19. Performance is unknown in   asymptomatic patients.     CBC with platelets differential    Collection Time: 06/15/20  9:47 PM   Result Value Ref Range    WBC 7.5 4.0 - 11.0 10e9/L    RBC Count 2.10 (L) 3.8 - 5.2 10e12/L    Hemoglobin 6.9 (LL) 11.7 - 15.7 g/dL    Hematocrit 21.4 (L) 35.0 - 47.0 %     (H) 78 - 100 fl    MCH 32.9 26.5 - 33.0 pg    MCHC 32.2 31.5 - 36.5 g/dL    RDW 24.5 (H) 10.0 - 15.0 %    Platelet Count 65 (L) 150 - 450 10e9/L    Diff Method Automated Method     % Neutrophils 70.7 %    % Lymphocytes 14.9 %    % Monocytes 9.7 %    % Eosinophils 2.5 %    % Basophils 0.5 %    % Immature  Granulocytes 1.7 %    Nucleated RBCs 0 0 /100    Absolute Neutrophil 5.3 1.6 - 8.3 10e9/L    Absolute Lymphocytes 1.1 0.8 - 5.3 10e9/L    Absolute Monocytes 0.7 0.0 - 1.3 10e9/L    Absolute Basophils 0.0 0.0 - 0.2 10e9/L    Abs Immature Granulocytes 0.1 0 - 0.4 10e9/L    Absolute Nucleated RBC 0.0    ABO/Rh type and screen    Collection Time: 06/15/20  9:47 PM   Result Value Ref Range    Units Ordered 1     ABO O     RH(D) Pos     Antibody Screen Neg     Test Valid Only At [facility]        Specimen Expires 06/18/2020     Crossmatch Red Blood Cells    Ammonia    Collection Time: 06/15/20  9:47 PM   Result Value Ref Range    Ammonia 130 (HH) 10 - 50 umol/L   Basic metabolic panel    Collection Time: 06/15/20  9:47 PM   Result Value Ref Range    Sodium 141 133 - 144 mmol/L    Potassium 5.3 3.4 - 5.3 mmol/L    Chloride 113 (H) 94 - 109 mmol/L    Carbon Dioxide 17 (L) 20 - 32 mmol/L    Anion Gap 11 3 - 14 mmol/L    Glucose 98 70 - 99 mg/dL    Urea Nitrogen 36 (H) 7 - 30 mg/dL    Creatinine 1.52 (H) 0.52 - 1.04 mg/dL    GFR Estimate 39 (L) >60 mL/min/[1.73_m2]    GFR Estimate If Black 45 (L) >60 mL/min/[1.73_m2]    Calcium 8.7 8.5 - 10.1 mg/dL   Blood component    Collection Time: 06/15/20  9:47 PM   Result Value Ref Range    Unit Number [id number]     Blood Component Type Red Blood Cells Leukocyte Reduced     Division Number 00     Status of Unit Released to care unit     Blood Product Code [id number]     Unit Status ISS    Alcohol breath test POCT    Collection Time: 06/15/20  9:50 PM   Result Value Ref Range    Alcohol Breath Test 0.000 0.00 - 0.01   Comprehensive metabolic panel    Collection Time: 06/16/20  5:38 AM   Result Value Ref Range    Sodium 141 133 - 144 mmol/L    Potassium 5.4 (H) 3.4 - 5.3 mmol/L    Chloride 115 (H) 94 - 109 mmol/L    Carbon Dioxide 19 (L) 20 - 32 mmol/L    Anion Gap 7 3 - 14 mmol/L    Glucose 115 (H) 70 - 99 mg/dL    Urea Nitrogen 34 (H) 7 - 30 mg/dL    Creatinine 1.49  (H) 0.52 - 1.04 mg/dL    GFR Estimate 39 (L) >60 mL/min/[1.73_m2]    GFR Estimate If Black 46 (L) >60 mL/min/[1.73_m2]    Calcium 8.4 (L) 8.5 - 10.1 mg/dL    Bilirubin Total 14.5 (H) 0.2 - 1.3 mg/dL    Albumin 3.1 (L) 3.4 - 5.0 g/dL    Protein Total 4.8 (L) 6.8 - 8.8 g/dL    Alkaline Phosphatase 197 (H) 40 - 150 U/L    ALT 16 0 - 50 U/L    AST 31 0 - 45 U/L   CBC with platelets    Collection Time: 06/16/20  5:38 AM   Result Value Ref Range    WBC 6.7 4.0 - 11.0 10e9/L    RBC Count 2.24 (L) 3.8 - 5.2 10e12/L    Hemoglobin 7.4 (L) 11.7 - 15.7 g/dL    Hematocrit 22.2 (L) 35.0 - 47.0 %    MCV 99 78 - 100 fl    MCH 33.0 26.5 - 33.0 pg    MCHC 33.3 31.5 - 36.5 g/dL    RDW 23.6 (H) 10.0 - 15.0 %    Platelet Count 50 (L) 150 - 450 10e9/L   Ammonia    Collection Time: 06/16/20  5:38 AM   Result Value Ref Range    Ammonia 83 (H) 10 - 50 umol/L      My impression is that Ms. Olguin has advanced alcoholic cirrhosis.  Her disease is quite decompensated at this point in time.  She has completed her evaluation for liver transplantation but is currently not considered a candidate because of frailty and she had a very poor assessment based on neuropsych testing during 1 of her inpatient stays.  He had applied very significant cognitive deficits that were not thought to be reversible.  However by phone today, she sounds much better and much brighter than when I had seen her during her inpatient stays.  It likely will be worth getting her reevaluated as an outpatient.  I also have strongly encouraged her to follow through on physical therapy to try to build her self up.  We will also get her scheduled for weekly large-volume paracentesis.    We remain concerned about the possibility of alcohol relapse post transplant as well.  My plan will be to see the patient back in the clinic in 1 month.    Thank you very much for allowing me to participate in the care of this patient.  If you have any questions regarding my recommendations,  please do not hesitate to contact me.         Edgardo Kovacs MD      Professor of Medicine  [employer] [employer]      Executive Medical Director, Solid Organ Transplant Program  [facility]    Phone call duration: 15 minutes with 10 minutes for documentation.

## 2020-06-18 NOTE — PROGRESS NOTES
"Monalisa Olguin is a 54 year old female who is being evaluated via a billable telephone visit.      The patient has been notified of following:     \"This telephone visit will be conducted via a call between you and your physician/provider. We have found that certain health care needs can be provided without the need for a physical exam.  This service lets us provide the care you need with a short phone conversation.  If a prescription is necessary we can send it directly to your pharmacy.  If lab work is needed we can place an order for that and you can then stop by our lab to have the test done at a later time.    Telephone visits are billed at different rates depending on your insurance coverage. During this emergency period, for some insurers they may be billed the same as an in-person visit.  Please reach out to your insurance provider with any questions.    If during the course of the call the physician/provider feels a telephone visit is not appropriate, you will not be charged for this service.\"    Patient has given verbal consent for Telephone visit?  Yes    What phone number would you like to be contacted at? [phone]    How would you like to obtain your AVS? Ajay    I had the pleasure of seeing Heydi Olguin for followup in the Liver Clinic at the [facility] on June 18, 2020.  Ms. Olguin returns for followup of alcoholic cirrhosis with ascites.      I have actually not seen her in the clinic for almost 5 months.  During this time she is been hospitalized frequently with hepatic encephalopathy.  We did manage to finish her transplant evaluation but currently we have serious concerns regarding her frailty and neuropsych testing raise the issue of an organic brain syndrome.  She reports that she has not had any alcohol since September.  Her last large volume paracentesis was 8 days ago while an inpatient.       She still does complain of some increased abdominal girth with " occasional abdominal discomfort.  She does complain of itching, largely related to dry skin.  She does have a moderate amount of fatigue.  She has no lower extremity edema.  She has not had any gastrointestinal bleeding but does have anemia.  She was recently transfused in the hospital.  She currently denies any overt signs of hepatic encephalopathy.      She denies any fevers or chills,or  Cough.she did note some shortness of breath last night.  She does complain of nausea and occasional vomiting.  She is having at least 2 bowel movements per day.  Her appetite has been poor and she is working hard on trying to maintain her nutritional intake.    Her family reports that she is quite thin.        Current Outpatient Medications   Medication     acetaminophen (TYLENOL) 500 MG tablet     ARIPiprazole (ABILIFY) 5 MG tablet     busPIRone (BUSPAR) 15 MG tablet     ciprofloxacin (CIPRO) 250 MG tablet     FLUoxetine (PROZAC) 20 MG capsule     folic acid (FOLVITE) 1 MG tablet     furosemide (LASIX) 40 MG tablet     gabapentin (NEURONTIN) 100 MG capsule     lactulose (CONSTULOSE) 10 GM/15ML solution     midodrine (PROAMATINE) 10 MG tablet     nicotine (NICODERM CQ) 21 MG/24HR 24 hr patch     omeprazole (PRILOSEC) 20 MG DR capsule     ondansetron (ZOFRAN) 4 MG tablet     rifaximin (XIFAXAN) 550 MG TABS tablet     sodium bicarbonate 650 MG tablet     spironolactone (ALDACTONE) 25 MG tablet     traZODone (DESYREL) 50 MG tablet     ursodiol (ACTIGALL) 500 MG tablet     vitamin B1 (THIAMINE) 100 MG tablet     No current facility-administered medications for this visit.      On physical examination, she actually sounds well.  Her affect is appropriate.    Recent Results (from the past 168 hour(s))   CBC with platelets    Collection Time: 06/15/20  7:21 PM   Result Value Ref Range    WBC 6.6 4.0 - 11.0 10e9/L    RBC Count 1.96 (L) 3.8 - 5.2 10e12/L    Hemoglobin 6.5 (LL) 11.7 - 15.7 g/dL    Hematocrit 20.2 (L) 35.0 - 47.0 %    MCV  103 (H) 78 - 100 fl    MCH 33.2 (H) 26.5 - 33.0 pg    MCHC 32.2 31.5 - 36.5 g/dL    RDW 24.7 (H) 10.0 - 15.0 %    Platelet Count 46 (LL) 150 - 450 10e9/L   Comprehensive metabolic panel    Collection Time: 06/15/20  7:21 PM   Result Value Ref Range    Sodium 142 133 - 144 mmol/L    Potassium 5.3 3.4 - 5.3 mmol/L    Chloride 117 (H) 94 - 109 mmol/L    Carbon Dioxide 17 (L) 20 - 32 mmol/L    Anion Gap 8 3 - 14 mmol/L    Glucose 108 (H) 70 - 99 mg/dL    Urea Nitrogen 37 (H) 7 - 30 mg/dL    Creatinine 1.61 (H) 0.52 - 1.04 mg/dL    GFR Estimate 36 (L) >60 mL/min/[1.73_m2]    GFR Estimate If Black 42 (L) >60 mL/min/[1.73_m2]    Calcium 8.3 (L) 8.5 - 10.1 mg/dL    Bilirubin Total 14.0 (H) 0.2 - 1.3 mg/dL    Albumin 3.1 (L) 3.4 - 5.0 g/dL    Protein Total 4.9 (L) 6.8 - 8.8 g/dL    Alkaline Phosphatase 212 (H) 40 - 150 U/L    ALT 16 0 - 50 U/L    AST 29 0 - 45 U/L   INR    Collection Time: 06/15/20  7:21 PM   Result Value Ref Range    INR 1.60 (H) 0.86 - 1.14   Urine Drugs of Abuse Screen Panel 13    Collection Time: 06/15/20  7:21 PM   Result Value Ref Range    Cannabinoids (10-oio-3-carboxy-9-THC) Detected, Abnormal Result (A) NDET^Not Detected ng/mL    Phencyclidine (Phencyclidine) Not Detected NDET^Not Detected ng/mL    Cocaine (Benzoylecgonine) Not Detected NDET^Not Detected ng/mL    Methamphetamine (d-Methamphetamine) Not Detected NDET^Not Detected ng/mL    Opiates (Morphine) Not Detected NDET^Not Detected ng/mL    Amphetamine (d-Amphetamine) Not Detected NDET^Not Detected ng/mL    Benzodiazepines (Nordiazepam) Detected, Abnormal Result (A) NDET^Not Detected ng/mL    Tricyclic Antidepressants (Desipramine) Not Detected NDET^Not Detected ng/mL    Methadone (Methadone) Not Detected NDET^Not Detected ng/mL    Barbiturates (Butalbital) Not Detected NDET^Not Detected ng/mL    Oxycodone (Oxycodone) Not Detected NDET^Not Detected ng/mL    Propoxyphene (Norpropoxyphene) Not Detected NDET^Not Detected ng/mL    Buprenorphine  (Buprenorphine) Not Detected NDET^Not Detected ng/mL   COVID-19 Virus (Coronavirus) by PCR    Collection Time: 06/15/20  7:21 PM    Specimen: Nasopharyngeal   Result Value Ref Range    COVID-19 Virus PCR to [facility] - Source Nasopharyngeal     COVID-19 Virus PCR to [facility] - Result       Test received-See reflex to IDDL test SARS CoV2 (COVID-19) Virus RT-PCR   Alcohol ethyl    Collection Time: 06/15/20  7:21 PM   Result Value Ref Range    Ethanol g/dL <0.01 <0.01 g/dL   SARS-CoV-2 COVID-19 Virus (Coronavirus) RT-PCR Nasopharyngeal    Collection Time: 06/15/20  7:21 PM    Specimen: Nasopharyngeal   Result Value Ref Range    SARS-CoV-2 Virus Specimen Source Nasopharyngeal     SARS-CoV-2 PCR Result NEGATIVE     SARS-CoV-2 PCR Comment       The Simplexa COVID-19 direct PCR assay by [employer] on the [employer] instrument has been   given Emergency Use Authorization (EUA) for the in vitro qualitative detection of RNA from   the SARS-CoV2 virus in nasopharyngeal swabs in viral transport medium from patients with   signs and symptoms of infection who are suspected of COVID-19. Performance is unknown in   asymptomatic patients.     CBC with platelets differential    Collection Time: 06/15/20  9:47 PM   Result Value Ref Range    WBC 7.5 4.0 - 11.0 10e9/L    RBC Count 2.10 (L) 3.8 - 5.2 10e12/L    Hemoglobin 6.9 (LL) 11.7 - 15.7 g/dL    Hematocrit 21.4 (L) 35.0 - 47.0 %     (H) 78 - 100 fl    MCH 32.9 26.5 - 33.0 pg    MCHC 32.2 31.5 - 36.5 g/dL    RDW 24.5 (H) 10.0 - 15.0 %    Platelet Count 65 (L) 150 - 450 10e9/L    Diff Method Automated Method     % Neutrophils 70.7 %    % Lymphocytes 14.9 %    % Monocytes 9.7 %    % Eosinophils 2.5 %    % Basophils 0.5 %    % Immature Granulocytes 1.7 %    Nucleated RBCs 0 0 /100    Absolute Neutrophil 5.3 1.6 - 8.3 10e9/L    Absolute Lymphocytes 1.1 0.8 - 5.3 10e9/L    Absolute Monocytes 0.7 0.0 - 1.3 10e9/L    Absolute Basophils 0.0 0.0 - 0.2 10e9/L    Abs Immature Granulocytes  0.1 0 - 0.4 10e9/L    Absolute Nucleated RBC 0.0    ABO/Rh type and screen    Collection Time: 06/15/20  9:47 PM   Result Value Ref Range    Units Ordered 1     ABO O     RH(D) Pos     Antibody Screen Neg     Test Valid Only At [facility]        Specimen Expires 06/18/2020     Crossmatch Red Blood Cells    Ammonia    Collection Time: 06/15/20  9:47 PM   Result Value Ref Range    Ammonia 130 (HH) 10 - 50 umol/L   Basic metabolic panel    Collection Time: 06/15/20  9:47 PM   Result Value Ref Range    Sodium 141 133 - 144 mmol/L    Potassium 5.3 3.4 - 5.3 mmol/L    Chloride 113 (H) 94 - 109 mmol/L    Carbon Dioxide 17 (L) 20 - 32 mmol/L    Anion Gap 11 3 - 14 mmol/L    Glucose 98 70 - 99 mg/dL    Urea Nitrogen 36 (H) 7 - 30 mg/dL    Creatinine 1.52 (H) 0.52 - 1.04 mg/dL    GFR Estimate 39 (L) >60 mL/min/[1.73_m2]    GFR Estimate If Black 45 (L) >60 mL/min/[1.73_m2]    Calcium 8.7 8.5 - 10.1 mg/dL   Blood component    Collection Time: 06/15/20  9:47 PM   Result Value Ref Range    Unit Number [id number]     Blood Component Type Red Blood Cells Leukocyte Reduced     Division Number 00     Status of Unit Released to care unit     Blood Product Code [id number]     Unit Status ISS    Alcohol breath test POCT    Collection Time: 06/15/20  9:50 PM   Result Value Ref Range    Alcohol Breath Test 0.000 0.00 - 0.01   Comprehensive metabolic panel    Collection Time: 06/16/20  5:38 AM   Result Value Ref Range    Sodium 141 133 - 144 mmol/L    Potassium 5.4 (H) 3.4 - 5.3 mmol/L    Chloride 115 (H) 94 - 109 mmol/L    Carbon Dioxide 19 (L) 20 - 32 mmol/L    Anion Gap 7 3 - 14 mmol/L    Glucose 115 (H) 70 - 99 mg/dL    Urea Nitrogen 34 (H) 7 - 30 mg/dL    Creatinine 1.49 (H) 0.52 - 1.04 mg/dL    GFR Estimate 39 (L) >60 mL/min/[1.73_m2]    GFR Estimate If Black 46 (L) >60 mL/min/[1.73_m2]    Calcium 8.4 (L) 8.5 - 10.1 mg/dL    Bilirubin Total 14.5 (H) 0.2 - 1.3 mg/dL    Albumin 3.1 (L) 3.4 - 5.0 g/dL    Protein Total  4.8 (L) 6.8 - 8.8 g/dL    Alkaline Phosphatase 197 (H) 40 - 150 U/L    ALT 16 0 - 50 U/L    AST 31 0 - 45 U/L   CBC with platelets    Collection Time: 06/16/20  5:38 AM   Result Value Ref Range    WBC 6.7 4.0 - 11.0 10e9/L    RBC Count 2.24 (L) 3.8 - 5.2 10e12/L    Hemoglobin 7.4 (L) 11.7 - 15.7 g/dL    Hematocrit 22.2 (L) 35.0 - 47.0 %    MCV 99 78 - 100 fl    MCH 33.0 26.5 - 33.0 pg    MCHC 33.3 31.5 - 36.5 g/dL    RDW 23.6 (H) 10.0 - 15.0 %    Platelet Count 50 (L) 150 - 450 10e9/L   Ammonia    Collection Time: 06/16/20  5:38 AM   Result Value Ref Range    Ammonia 83 (H) 10 - 50 umol/L      My impression is that Ms. Olguin has advanced alcoholic cirrhosis.  Her disease is quite decompensated at this point in time.  She has completed her evaluation for liver transplantation but is currently not considered a candidate because of frailty and she had a very poor assessment based on neuropsych testing during 1 of her inpatient stays.  He had applied very significant cognitive deficits that were not thought to be reversible.  However by phone today, she sounds much better and much brighter than when I had seen her during her inpatient stays.  It likely will be worth getting her reevaluated as an outpatient.  I also have strongly encouraged her to follow through on physical therapy to try to build her self up.  We will also get her scheduled for weekly large-volume paracentesis.    We remain concerned about the possibility of alcohol relapse post transplant as well.  My plan will be to see the patient back in the clinic in 1 month.    Thank you very much for allowing me to participate in the care of this patient.  If you have any questions regarding my recommendations, please do not hesitate to contact me.         Edgardo Kovacs MD      Professor of Medicine  [employer]      Executive Medical Director, Solid Organ Transplant Program  [facility]    Phone call  duration: 15 minutes with 10 minutes for documentation.

## 2020-06-19 NOTE — TELEPHONE ENCOUNTER
Pre-Procedure Pending COVID Test   Collected 6/19/20  COVID Screening  Due to the inability to confirm the patient's COVID status (positive or negative), the patient was screened for COVID symptoms     Patient reports the following:  Fever? No   Cough? No   Shortness of breath? No   Skin rash? No        Patient Information  Patient informed of the no visitor policy  Patient instructed to continue to self-quarantine prior to procedure  Patient informed to contact the ordering provider if the following symptoms develop prior to procedure:   Fever  Cough  Shortness of Breath  Sore throat   Runny or stuffy nose  Muscle or body aches  Headaches  Fatigue  Vomiting or diarrhea   Rash    Bria Melo RN    Pt given appt time and location.

## 2020-06-19 NOTE — TELEPHONE ENCOUNTER
Patient Call: Voicemail  Date/Time: 6/19/20 @ 3:50 PM  Reason for call: patient called to Speak to coordinator

## 2020-06-22 NOTE — PROGRESS NOTES
"Monalisa Olguin is a 54 year old female who is being evaluated via a billable telephone visit.      The patient has been notified of following:     \"This telephone visit will be conducted via a call between you and your physician/provider. We have found that certain health care needs can be provided without the need for a physical exam.  This service lets us provide the care you need with a short phone conversation.  If a prescription is necessary we can send it directly to your pharmacy.  If lab work is needed we can place an order for that and you can then stop by our lab to have the test done at a later time.    Telephone visits are billed at different rates depending on your insurance coverage. During this emergency period, for some insurers they may be billed the same as an in-person visit.  Please reach out to your insurance provider with any questions.    If during the course of the call the physician/provider feels a telephone visit is not appropriate, you will not be charged for this service.\"    Patient has given verbal consent for Telephone visit?  Yes    What phone number would you like to be contacted at? [phone]    How would you like to obtain your AVS? MyChart    Subjective     Monalisa Olguin is a 54 year old female who presents via phone visit today for the following health issues:    Subjective: Monalisa requested a phone consultation today because of concerns regarding  Hospital followup. Due to the current covid-19 coronavirus epidemic we are managing much of our patients' concerns remotely when possible.    She was hospitalized 1 week ago at [facility] for anemia and confusion due to hepatic encephalopathy. She had  A high ammonia level- 120- and fell to 83 by discharge. Also she was severely anemic - hgb 6.9   And she received blood trnasfusion. She was discharged to home. She was followed up 4 days later on 6/18 by the GI clinic at [facility] who follows her chronically- Dr. Kovacs.  Dr." "Fermín told her she needs colonoscopy before she can be added to the liver transplant list.  She would prefer to set that up through Dr. Kovacs's office and is asking for that now. Otherwise she reports feeling more lucid. She is staying at her own home currently. She feels less weak than before she was admitted with anemia but she knows she is still anemic.    The past medical history and medications and allergies have been reviewed today by me.  .  Past Medical History:   Diagnosis Date     Acute alcoholic intoxication in alcoholism (H) 3/27/2018     Acute renal failure (H) 11/11/2019     Alcohol abuse 5/2/2013     Alcohol dependence 5/25/2013     Alcohol withdrawal 5/2/2013     Alcoholic cirrhosis (H)      Anxiety 10/10/2011     CKD (chronic kidney disease) stage 3, GFR 30-59 ml/min (H) 2006    last GFR was 42     CKD (chronic kidney disease) stage 3, GFR 30-59 ml/min (H) 2/22/2011     CKD (chronic kidney disease) stage 5, GFR less than 15 ml/min (H) 4/14/2020     Depressive disorder      Esophageal reflux 10/7/2002     Hematochezia-patient reported 11/11/2019     Heme positive stool 3/27/2018     Hepatic encephalopathy (H) 11/11/2019     History of blood transfusion     2020 [facility]     Hypertension goal BP (blood pressure) < 130/80 7/12/2011     Hyponatremia 11/11/2019     Moderate Depression [296.32] 12/22/2009    stable on wellbutrin     Other internal derangement of knee(717.89)     ACL Internal derangement, knee/ original injury in 5th grade, torn cartilage     Pap smear 2011    no abnormals, due for paps q 2-3 yrs     SBP (spontaneous bacterial peritonitis) (H) 11/12/2019     Thyroid disease      Unspecified essential hypertension      Allergies   Allergen Reactions     Albuterol      Tongue \"hardened and painful\"     Current Outpatient Medications   Medication Sig Dispense Refill     acetaminophen (TYLENOL) 500 MG tablet Take 1 tablet (500 mg) by mouth every 6 hours as needed for mild pain 30 tablet 0     " ARIPiprazole (ABILIFY) 5 MG tablet Take 1 tablet (5 mg) by mouth At Bedtime 30 tablet 0     busPIRone (BUSPAR) 15 MG tablet Take 1 tablet (15 mg) by mouth 2 times daily 60 tablet 0     FLUoxetine (PROZAC) 20 MG capsule Take 3 capsules (60 mg) by mouth daily 90 capsule 0     folic acid (FOLVITE) 1 MG tablet Take 1 tablet (1 mg) by mouth daily 30 tablet 0     furosemide (LASIX) 40 MG tablet Take 1 tablet (40 mg) by mouth daily 30 tablet 0     gabapentin (NEURONTIN) 100 MG capsule Take 3 capsules (300 mg) by mouth At Bedtime 90 capsule 0     lactulose (CONSTULOSE) 10 GM/15ML solution Take 30 mLs (20 g) by mouth 3 times daily 2700 mL 0     midodrine (PROAMATINE) 10 MG tablet Take 1 tablet (10 mg) by mouth 3 times daily (with meals) 90 tablet 0     nicotine (NICODERM CQ) 21 MG/24HR 24 hr patch Place 1 patch onto the skin every 24 hours 30 patch 0     omeprazole (PRILOSEC) 20 MG DR capsule Take 1 capsule (20 mg) by mouth daily 30 capsule 0     ondansetron (ZOFRAN) 4 MG tablet Take 1 tablet (4 mg) by mouth every 6 hours as needed for nausea 20 tablet 0     rifaximin (XIFAXAN) 550 MG TABS tablet Take 1 tablet (550 mg) by mouth 2 times daily 60 tablet 0     sodium bicarbonate 650 MG tablet Take 1 tablet (650 mg) by mouth 2 times daily 60 tablet 0     spironolactone (ALDACTONE) 25 MG tablet Take 1 tablet (25 mg) by mouth daily 30 tablet 0     traZODone (DESYREL) 50 MG tablet Take 2 tablets (100 mg) by mouth nightly as needed for sleep 30 tablet 0     ursodiol (ACTIGALL) 500 MG tablet Take 1 tablet (500 mg) by mouth 2 times daily 60 tablet 0     vitamin B1 (THIAMINE) 100 MG tablet Take 1 tablet (100 mg) by mouth daily 30 tablet 0       Assessment/Plan:  Hepatic encephalopathy from alcoholic cirrhosis.  Seven anemia, ascites, electrolyte disturbances and thrombocytopenia- all consequences of above. She is hoping to be added to the liver transplant list. She is asking for arrangements to be made to see Dr. Kovacs's team for a  colonoscopy as a pre-requisite to surgery.  I did suggest that we check another ammonia level and cbc and basic panel today since she is a week out from hospitalization. I expect the lab results to be abnormal but it is a matter of degree -we will have to put the results in context to compare with her baseline.    See lab orders. Dr. Kovacs's office will be contacted. We will call her with lab results.      Phone call duration was   9  minutes.     PETORNA Cadena MD

## 2020-06-22 NOTE — TELEPHONE ENCOUNTER
Spoke with patient and her daughter. Heydi could not recall the conversation with Dr. Kovacs until this RN brought up the cognitive concerns. Connie, daughter, said that when the pill reminder goes off her mom will take the pills out but then forget to take them.   We reviewed how Heydi needs help with medications, including titration of lactulose and checking if she is taking it.  Also, gave number to set up colonoscopy.    Communicated to PCP home benefit for home RN and PT. Explaining concerns for transplant candidacy with frailty and impaired cognitive function.  If patient can improve mentally may benefit from a follow up with neuropsych.

## 2020-06-23 NOTE — TELEPHONE ENCOUNTER
Call placed to Francisca SAENZ with [facility], message from Dr. Ruiz given.     Kenyetta Sinclair RN

## 2020-06-23 NOTE — TELEPHONE ENCOUNTER
Please call [facility] and give verbal orders to resume home care for Efren Bustos   Electronically signed by Rob Ruiz MD

## 2020-06-23 NOTE — TELEPHONE ENCOUNTER
Reason for Call: Request for an order or referral:    Order or referral being requested: Verbal orders to resume homecare. Please advise.     Date needed: as soon as possible    Has the patient been seen by the PCP for this problem? YES    Additional comments:     Phone number Patient can be reached at:      Best Time:      Can we leave a detailed message on this number?  YES    Call taken on 6/23/2020 at 8:41 AM by Donna Prater

## 2020-06-24 NOTE — LETTER
[address] [id number] [phone]        July 15, 2020    Monalisa Olguin  [address] [facility] [address]

## 2020-06-24 NOTE — LETTER
COLONOSCOPY INSTRUCTIONS   For patients with CHF, cardiomyopathy,   kidney and/or renal disease or over the age of 75    (with GoLytely/NuLytely/CoLyte)    Patient Name Monalisa Olguin     Procedure Date 8/17/2020 Arrival Time 12:00pm Procedure Time 1:00pm   Physician Dr. Nunes    Location   [facility] Same Day [facility]   Other Instructions  Occasionally procedure times need to be changed.   In the event your procedure time needs to be changed, you will receive a telephone call from a nurse informing you of the change as well as any other instructions.     Review the preparation schedule below for the five days preceding your procedure.     If you have any questions, please call [phone].  YOU WILL NEED TO PURCHASE   - Bisacodyl/Dulcolax tablets  5 mg (4 ORAL tablets) (No prescription needed)  - Fill your prescription for GoLytely/NuLytely/Colyte  - A packet of non-red or non-purple Crystal Light (Optional--to improve taste of prep solution)   BEFORE THE PROCEDURE   - You will receive a phone call 1 to 2 days prior to your procedure. Information will be collected to pre-admit you, which will assist us in giving you the best care possible or you can call [phone].  - If you are diabetic, consult your physician for additional instruction.  - Check with your insurance carrier about coverage (phone number on the back of your insurance card).  - You will need to make arrangements to have someone drive you home. You will not be able to drive the day of your examination. You can expect to be here approximately 2 hours; your  needs to be at [facility] 2 hours after your arrival time.  - You will not be able to return to work the day of your procedure.  - If using iron supplements, stop taking those five days before your procedure.  - Three days before your procedure, begin a low-fiber diet. Avoid raw fruits, vegetables, whole wheat, nuts, popcorn, Metamucil, Fibercon, bran or  bulking agents. Meats and cooked vegetables are fine.  - Two days before your procedure, increase your water intake (8 glasses of water is recommended).   DAY BEFORE PROCEDURE   - IF YOU ARE SCHEDULED TO ARRIVE AT 11 AM OR BEFORE FOLLOW THE INSTRUCTIONS BELOW  - You will need to be on a clear liquid diet the entire day. No solid foods.  - In the morning, mix the GoLytely/NuLytely/Colyte powder with water until completely dissolved and then refrigerate.  - At 9 am take four tablets of Bisacodyl.  -  At 3 pm start drinking the prep solution. You will need to have access to the bathroom once you start drinking the prep solution. Some people prefer to drink the solution at room temperature. You may take it out of the refrigerator 1-2 hours prior to drinking it. You may also add a packet of non-red or non-purple Crystal Light to improve the taste. It is best to drink an 8 oz glassful every 15 minutes.    - It will take about 4-6 hours to drink the solution.  Continue drinking clear liquids after you have finished the prep solution.   - If you experience bloating, cramping, nausea, or vomiting, take a 15-30 minute break and then start drinking prep solution again.     IF YOU ARE SCHEDULED TO ARRIVE 11:00 AM OR LATER:  DAY BEFORE PROCEDURE   - You will need to be on a clear liquid diet the entire day (SEE BELOW). No solid foods.  - In the morning, mix the GoLytely/NuLytely/Colyte powder with water until completely dissolved and then refrigerate.  - At 9 am take four tablets of Bisacodyl.  - At 3 pm you will start drinking your prep solution. You will only be drinking half of the solution today, it should take you about 2 - 3 hours to drink half. Some people prefer to drink the solution at room temperature. You may take it out of the refrigerator 1-2 hours prior to drinking it. You may also add a packet of non-red or non-purple Crystal Light to improve the taste. It is best to drink an 8 oz glassful every 15 minutes.    -   Put the remainder of the prep solution back in the refrigerator for procedure morning.   - If you experience bloating, cramping, nausea, or vomiting, take a 15-30 minute break and then start drinking the prep solution again.   PROCEDURE DAY    - THIS STEP ONLY FOR THOSE WHO DRANK HALF OF PREP DAY BEFORE  - Start drinking your last half of prep solution at 6 am.  Drink an 8 oz glass every 15 minutes until prep solution is gone.  It will take about 2 to 3 hours to finish solution.   - No eating or drinking 3 hours prior to your procedure.  - If you experience bloating, cramping, nausea, or vomiting take a 15 to 30 minute break and then start drinking prep solution again.   Dress comfortably.You may have clear liquids until three hours before your procedure. Please do not wear any perfume or cologne.    Please check with your provider regarding holding any medications. Please bring a list of your current medications with you. If you have any questions regarding these instructions, please call us at [phone].        CLEAR LIQUID DIET  The clear liquid diet are foods that are free of fat and fiber. They will liquefy to clear liquid at room temperature. No red or purple colored juices or Jell-O s, dairy products, or alcoholic beverages.  TYPE OF FOOD USE ONLY THESE FOODS   Beverages Coffee      Tea      Decaffeinated coffee  Carbonated beverages (pop)  Water  Sport drinks (except red or purple)   Desserts Jell-O (except red or purple)  Fruit ice  Popsicle   Fruit and Fruit Juices Citrus juices, strained  Fruit nectars, strained  Apple juice  Fruit drinks (except red or purple)   Soups Broth  Bouillon  Consommé  Meat stock, strained  Vegetable broth, strained   Sweets Hard candy, non-red or non-purple  Sugar   Miscellaneous Flavorings  Salt           [place] [facility]    [place]:   Take the [address]nd [facility] [address] off of [address] (on the south side of [place]).  Turn left on  [address]  Turn left at the first stoplight (at [facility]).  The hospital and clinic is the third building on the left.     From the South:  Follow [address] north to the first [place] exit.  Exit on [address] following it to the right.  Turn left at the first stoplight.  The hospital and clinic is the third building on the left.    From the East:     Following [address] west, turn left at the stoplight onto [address] Follow [address] through [place].  Take a right at the light on [address] (at [facility]).  The hospital and clinic is the third building on the left.    From the West:    Following [address] going east, turn south on [address].  Take the [address] exit off of [address] (on the south side of [place]).  Follow [address] through [place] to the stoplight on [address] (at [facility]). Take a left.  The hospital and clinic is the third building on the left.    Colonoscopy  What you should know    What is a colonoscopy?  A colonoscopy lets the doctor look inside your large intestine (colon). The doctor guides a long, flexible, lighted tube through the entire colon. The exam is used to look for early signs of cancer. It is also used to find the cause of a change in bowel habits. The doctor is able to see any inflamed tissue, polyps, ulcers, bleeding or muscle spasms.    How do I prepare for the exam?  See the guidelines for cleaning out your colon. The steps to prepare your colon begin four days before the exam.    Please arrive with someone who can drive you home after the exam. The medicines used in the exam will make you sleepy.  You will not be able to drive. You cannot take a bus or taxi by yourself.  You cannot walk home.    How do I prepare the day of the exam?    *Dress in comfortable, loose clothes.  *Leave your purse, billfold, credit cards, etc. at home.  *Bring your insurance card.  *Bring a list of your medications.  *Plan to arrive on time.  *We do our best  to stay on time, but there may be a delay. Please bring something to pass the time, such as a newspaper, book or magazine.    What happens when I arrive?    *You will do some paper work.  *We will take you to the admission area. Your family may stay with you during this time.  *A nurse will check your records and ask you questions.  *You will change into a hospital gown without underwear.   *You will sign a consent form.  *We will start an IV (intravenous). We will use it to give you medicines during the exam.    What happens during the exam?    *We will take you on a cart to the exam room.   *You can ask questions of the doctor who is doing your exam.  *You will lie on your left side on a table.    *You will receive medicines through your IV to relax you and for pain.    *The doctor will insert a thin, lighted tube (scope) into your rectum. Then the doctor will slowly guide it into your colon. The scope bends, so he or she can move it around the curves of your colon.    *The scope sends an image of the inside of the colon. The image allows the doctor to examine the lining of the colon.    *We may ask you to change positions to help the doctor move the scope.    *The scope also blows air into your colon. This enlarges the colon and helps the doctor see the lining.  *You should feel little pain during the exam.   *You may feel full and have cramps. Breathe slowly and evenly to help your body relax. Tell your doctor or nurse if you have any pain.    If we see a polyp, we will remove a piece of it (polypectomy). That tissue (biopsy) will be tested in the lab. Most polyps are benign (not cancer). We remove most polyps because they can cause bleeding or turn into cancer.     Risks of the exam are bleeding and piercing or tearing the colon. But this is rare.    What happens after the exam?    *We will return you to your room. We will watch you for one hour or until most of the pain medicine has worn off.  *You may feel  bloated or have cramping for a short time because of the air injected during the exam. You will pass the rest of the air from your colon in the next couple hours.  *We will remove the IV.   *Your first meal should be light. Slowly return to normal meals, unless your doctor gives you other orders.

## 2020-06-24 NOTE — TELEPHONE ENCOUNTER
----- Message from Andrea Cadena MD sent at 6/24/2020  9:33 AM CDT -----  team, please inform this patient that the results are improved- her hgb is higher than in the hospital and her ammonia level is lower-please make sure she was set up to have her colonoscopy at the [facility] with her GI team from the -I believe Daniela george or Mary Carmen were working on that- thanks- rm  Thanks. Albert Cadena

## 2020-06-24 NOTE — TELEPHONE ENCOUNTER
Spoke with patient and daughter Connie and informed of message below. Patient understood and her appointment was already made for her colonoscopy on 7/16/2020.     Aminata Sánchez MA

## 2020-06-24 NOTE — LETTER
[address] [address] [phone]        June 24, 2020    Monalisa Olguin  [address] [facility] [address]

## 2020-06-24 NOTE — TELEPHONE ENCOUNTER
CKd listed in problem list, patient denies Kidney Failure.  Can you please confirm diagnosis, so I can send correct colon prep. Miralax or Golytely?     Thanks!

## 2020-06-24 NOTE — TELEPHONE ENCOUNTER
Date of colonoscopy/EGD: 7/16  Surgeon: Dr. Salinas  Prep:Miralax  Packet:Colonoscopy/EGD instructions mailed to patient's home address.   Date: 6/24/2020      Surgery Scheduler

## 2020-06-25 NOTE — TELEPHONE ENCOUNTER
[facility] now requests orders and shares plan of care/discharge summaries for some patients through [employer].  Please REPLY TO THIS MESSAGE OR ROUTE BACK TO THE AUTHOR in order to give authorization for orders when needed.  This is considered a verbal order, you will still receive a faxed copy of orders for signature.  Thank you for your assistance in improving collaboration for our patients.    Requesting orders    Pt was seen today for TUCKER She was hospitalized 1 week ago at [facility] for anemia and confusion due to hepatic encephalopathy. She had  A high ammonia level- 120- and fell to 83 by discharge. Also she was severely anemic - hgb 6.9   And she received blood trnasfusion. She was discharged to home. She followed up with GI will be having colonoscopy and cognition testing. Once this is done she will be put back on transplant list. Pt continues to struggle with constipation, pain, poor appetite, non compliance on use of walker. Pt continues to be weak and d/t upper and lower body weakness and need of shower chair pt is requestion PT and OT every 10 days to assist, Is requesting SW for long term planning to see if she qualifes for a waiver for longterm home care services, is in need of HHA d/t weakness.    Requesting orders SN 2 week 1, 1 week 1 2 prn-- Recert will be needed  PT and OT 1 every 10 days  HHA 2 week 1, 1 week 1  SW 1 every 60 days    Dede Gaming RN   [phone]      Agree with above documented care plan for Dr Bustos in his absence     Tien Zhang MD

## 2020-06-25 NOTE — TELEPHONE ENCOUNTER
Patient Call: Voicemail  Date/Time: 6/24/2020 4850  Reason for call: Patient has question about cognitive testing. Requests a call back to discuss.

## 2020-06-25 NOTE — TELEPHONE ENCOUNTER
The pharmacy called and for the rx for Dulcolax it says to take 3 tabs but she got a total of 4 tabs so they want to make sure that the pt needs to take .  Ericka Conn MA

## 2020-06-25 NOTE — TELEPHONE ENCOUNTER
Instructions say to take 4(four) tablets pharmacy was aware of will close encounter     Daniela PAGE

## 2020-06-26 NOTE — PROGRESS NOTES
"MT ENCOUNTER  SUBJECTIVE/OBJECTIVE:                           Monalisa Olguin is a 54 year old female called for a follow-up visit. She was referred to me from her [employer] insurance plan.  Today's visit is a follow-up MTM visit from 5/20/20.    Patient consented to a telehealth visit: yes  Telemedicine Visit Details  Type of service:  Telephone visit  Start Time: 2:30 PM  End Time: 2:55 PM  Originating Location (pt. Location): Home  Distant Location (provider location):  [facility] MTM  Mode of Communication:  Telephone    Chief Complaint: Med Review.    Allergies/ADRs: Reviewed in EHR  Tobacco:  reports that she has been smoking cigarettes. She has a 5.00 pack-year smoking history. She has never used smokeless tobacco. - has not started patch yet, but is still working down on cigarettes  Alcohol: none since sept 2019.  Caffeine: once in a while energy drinks/day.  Activity: none because of broken foot.  PMH: Reviewed in Epic    Medication Adherence/Access: Patient uses pill box(es).  Reports her daughter fills her med box for her.  Pt is now using alarms to help with medication administration and she is very rarely taking things late.   Patient takes medications 3 time(s) per day.   Per patient, misses medication 0 times per week.   Medication barriers: none.   The patient fills medications at [facility]: YES. And [facility].      Hepatic encephalopathy/alcoholic cirrhosis of the liver with ascites: Reports takes ursodiol 500 mg twice daily, ciprofloxacin 250 mg once daily, Xifaxan 550 mg twice daily, and lactulose 20 grams three times daily (states she takes over an extended period since if she takes in one go she will \"have an accident\"). Have 2-3 loose stools per day. Pt is taking furosemide 40 mg daily and spironolactone 25 mg daily. Pt has some swelling in legs, but is \"9 months pregnant\" in terms of her abdominal swelling. She is scheduled for paracentesis.  Memory/confusion is stable - " "fairly clear on call today. She recently did poorly on \"aptitude\" test and is retaking again.     Hypokalemia: Pt is taking takes potassium chloride 10 M EQ's once daily, without difficulty. Spironolactone recently decreased.   Potassium   Date Value Ref Range Status   06/16/2020 5.4 (H) 3.4 - 5.3 mmol/L Final     Orthostatic hypotension: Pt is taking midodrine 10 mg by mouth 3 times daily.   Reports no orthostatic hypotension symptoms. Denies any known issues.     Depression/anxiety/insomnia: Pt takes Abilify 5 mg at bedtime, buspirone 15 mg, twice daily, fluoxetine 60 mg once per day, and trazodone 100 mg at bedtime for sleep.  Reports depression, anxiety, and insomnia symptoms are stable.  PHQ 9/17/2019 4/27/2020 5/6/2020   PHQ-9 Total Score 10 6 16   Q9: Thoughts of better off dead/self-harm past 2 weeks Not at all Not at all Not at all     GERD: Pt takes omeprazole 20 mg by mouth daily in the morning.  Reports no GERD symptoms.    Nausea: Pt takes ondansetron 4 mg, as needed.  Reports does not take often.  Reports nausea is stable.    CKD/Supplements: Reports takes folic acid 1 mg once a day, vitamin B1 100 mg once daily. Is taking sodium bicarbonate 650 mg twice daily.Denies any issues.    Neuropathy/numbness: Reports takes gabapentin 300 mg by mouth at bedtime.  Feels this helps.    Today's Vitals: LMP 05/16/2012  - telephone encounter, no vitals      ASSESSMENT:                              Medication Adherence: good, no issues identified    Hepatic encephalopathy/alcoholic cirrhosis of the liver with ascites: Improved per patient.     Hypokalemia: Stable - potassium not rechecked since recent change.     Orthostatic hypotension: Stable.    Depression/anxiety/insomnia: Stable per patient.     GERD: Stable.     Nausea: Stable.     CKD/Supplements: Stable.    Neuropathy/numbness: Stable.     PLAN:                          Post Discharge Medication Reconciliation Status: discharge medications reconciled, " continue medications without change.    1. Continue current therapy.     I spent 25 minutes with this patient today. A copy of the visit note was provided to the patient's primary care provider.    Will follow up in 6 months or sooner if needed.    The patient was sent via [employer] a summary of these recommendations.     Francis Doan, LatanyaD, [facility] Hospital[facility]  Medication Therapy Management Pharmacist  Pager: [phone]

## 2020-06-26 NOTE — TELEPHONE ENCOUNTER
[facility] now requests orders and shares plan of care/discharge summaries for some patients through [employer].  Please REPLY TO THIS MESSAGE OR ROUTE BACK TO THE AUTHOR in order to give authorization for orders when needed.  This is considered a verbal order, you will still receive a faxed copy of orders for signature.  Thank you for your assistance in improving collaboration for our patients.    Is it ok for home care orders requested 6/25/20 ?  Dede Gaming RN   [phone]    Agree with above orders     Tien Zhang MD

## 2020-06-29 NOTE — TELEPHONE ENCOUNTER
Ask that patient and daughter to return call to this RN. Will verify: colonoscopy date, if home PT/RN started, medication compliance-checking amount gone in lactulose container, and current smoking status.

## 2020-07-01 NOTE — TELEPHONE ENCOUNTER
----- Message from Efren Bustos MD sent at 7/1/2020 10:17 AM CDT -----  Regarding: FW: update  Can someone make this referral for me while I am gone?      Electronically signed by:  Efren Bustos M.D.  7/1/2020  ----- Message -----  From: Gabbie Portillo RN  Sent: 6/22/2020   9:54 AM CDT  To: Efren Bustos MD  Subject: update                                           The liver team has some concerns after her neuropsychologic testing for significantly impaired cognitive function,  dementia, non reversible. She is not deemed a transplant candidate at this time.     I have spoken to her daughter and hopefully she can help with medication management.     I was wondering if you could order home RN and PT. Home RN could help set up medication and check on compliance. Ensuring she is taking lactulose and to titrate up if needed for 3-4 bowel movements a day. Also, to check on her nutritional status and review protein intake, need for supplement.     If she does seem to improve cognitively, then we would set up a follow up with the neuropsychologist.    Thank you for your support,    Gabbie Portillo, CHARITY,RN  Adult Liver Coordinator  [phone]

## 2020-07-07 NOTE — TELEPHONE ENCOUNTER
"Fluoxetine  Last Written Prescription Date:  06/16/2020  Last Fill Quantity: 90,  # refills: 0   Last office visit: 06/22/2020 with prescribing provider:  Tammi   Future Office Visit:  None  Routing refill request to provider for review/approval because:  Elevated PHQ 9 score.     Requested Prescriptions   Pending Prescriptions Disp Refills     FLUoxetine (PROZAC) 20 MG capsule [Pharmacy Med Name: FLUOXETINE HCL 20MG CAPS] 90 capsule 3     Sig: TAKE THREE CAPSULES BY MOUTH ONCE DAILY       SSRIs Protocol Failed - 7/7/2020 10:33 AM        Failed - PHQ-9 score less than 5 in past 6 months     Please review last PHQ-9 score.         Passed - Medication is active on med list        Passed - Patient is age 18 or older        Passed - No active pregnancy on record        Passed - No positive pregnancy test in last 12 months        Passed - Recent (6 mo) or future (30 days) visit within the authorizing provider's specialty     Patient had office visit in the last 6 months or has a visit in the next 30 days with authorizing provider or within the authorizing provider's specialty.  See \"Patient Info\" tab in inbasket, or \"Choose Columns\" in Meds & Orders section of the refill encounter.           PHQ-9 score:    PHQ 5/6/2020   PHQ-9 Total Score 16   Q9: Thoughts of better off dead/self-harm past 2 weeks Not at all     Kenyetta Sinclair RN   "

## 2020-07-08 NOTE — PROGRESS NOTES
Pt's daughter, Connie, confirmed that she sets up pt's pill box and has an alarm that goes off 3 times per day to remind pt to take medications. Per Connie, pt has been taking furosemide 20 mg daily and spironolactone 50 mg daily (medication list updated). Reviewed with Connie that pt should be measuring out lactulose dose each time and should titrate lactulose to achieve 3-5 BMs/day. Discussed hepatology follow up scheduled on 7/27 and that

## 2020-07-09 NOTE — LETTER
[place] CARE COORDINATION  [address]   [address]  Tel. [phone] / Fax [phone]    July 15, 2020    Monalisa Olguin  [address] [facility] [address]      Dear Monalisa,    I have been unsuccessful in reaching you since our last contact. At this time the Care Coordination team will make no further attempts to reach you, however this does not change your ability to continue receiving care from your providers at your primary care clinic. If you need additional support from a care coordinator in the future please contact [place] community health worker at [phone].    All of us at [facility] are invested in your health and are here to assist you in meeting your goals.     Sincerely,        Inés GOLDSTEINN, RN, PHN, [facility]  Primary Clinic Care Coordination    [facility]  [email or web address]   Office: [phone]  Employed by Health system

## 2020-07-09 NOTE — PROGRESS NOTES
Reviewed pt's diuretic regimen with Dr. Kovacs, and per Dr. Kovacs, pt should be taking furosemide 40 mg daily and spironolactone 25 mg daily (as directed on discharge instructions from hospitalization on 6/16). Reviewed pt's medication changes with pt's daughter, Connie. Updated prescriptions sent to pt's preferred pharmacy. Connie verbalized understanding and is agreeable to plan.

## 2020-07-09 NOTE — PROGRESS NOTES
Clinic Care Coordination Contact  [facility]/Voicemail       Clinical Data: Care Coordinator Outreach  Outreach attempted x 1.  Left message on patient's voicemail with call back information and requested return call.  Plan: Care Coordinator will try to reach patient again in 1-2 business days.      Inés GOLDSTEINN, RN, PHN, [facility]  Primary Clinic Care Coordination    [facility]  [email or web address]   Office: [phone]  Employed by [facility]

## 2020-07-11 NOTE — TELEPHONE ENCOUNTER
Writer got permission from Heydi to speak to daughter Connie about her health.  Calling about Covid-19 test prior to paracentesis bring done on Monday 7/13. In preparation for Heydi's other two paracentesis procedures Heydi was given the Covid-19 test.  Writer transfer daughter to scheduling to get test scheduled.  Poornima Lui RN, [facility]       Additional Information    Health Information question, no triage required and triager able to answer question    Protocols used: INFORMATION ONLY CALL-A-

## 2020-07-15 NOTE — PROGRESS NOTES
Clinic Care Coordination Contact  [facility]/Voicemail       Clinical Data: Care Coordinator Outreach  Outreach attempted x 2.  We will to leave message voicemail is full   will send disenrollment letter with care coordinator contact information via [employer]. Care Coordinator will do no further outreaches at this time.      Inés NASH, RN, PHN, CCM  Primary Clinic Care Coordination    [facility]  [email or web address]   Office: [phone]  Employed by [employer]              Inés NASH, RN, PHN, [facility]  Primary Clinic Care Coordination    [facility]  [email or web address]   Office: [phone]  Employed by [employer]

## 2020-07-15 NOTE — TELEPHONE ENCOUNTER
Patients scope was cancelled by Dr. Salinas tomorrow and rescheduled for 8/17 with Dr. Nunes.  She did open her Golytely to start prep this morning- She may need a new Rx sent in. Not sure it will be good being she opened?  Can you please let patient know?

## 2020-07-15 NOTE — TELEPHONE ENCOUNTER
Please send prescription for lactulose with new directions for Heydi.  Our current prescription is 22.5ml three times daily.  Patient is out of med and titrates as needed to 3 bowel movements per day.  I was not able to get the amount of medication she is taking daily- I kept being told she titrates to what she needs.  She is 7 days early on insurance and needs today please.  We last filled 1892 ml that was picked up on 7/3/20    Thank you,  Gianna Stallworth [facility]  [phone]

## 2020-07-21 NOTE — TELEPHONE ENCOUNTER
Patient Called is asking to get scheduled for a Paracentesis  Tuesday, July 28th, 2020      Call back needed? Yes    Return Call Needed  Same as documented in contacts section  When to return call?: Same day: Route [facility]

## 2020-07-21 NOTE — PROGRESS NOTES
"Called pt to check in and review symptoms. Pt reported feeling \"extremely full\" of abdominal fluid. No future paracentesis appointment scheduled and writer asked whether pt needed assistance with scheduling a paracentesis. Pt unable to answer the question and struggled to remain on topic. Pt stated that she felt more confused today and only had 1 BM so far. Discussed lactulose dosing and pt stated that she is taking lactulose daily but  but is not measuring out dose as instructed. Reiterated the importance of keeping track of lactulose dosing and frequency of BMs.     Spoke with pt's daughter, Connie, who agreed that pt was more confused today. Connie stated that she encourages pt to take lactulose but she cannot force her. Connie stated that she is not sure why pt called clinic to get scheduled for a paracentesis and Connie will schedule one for pt. Reviewed with Connie that pt likely called due to confusion and needs to take another dose of lactulose as soon as possible. Emphasized the importance of titrating lactulose to achieve 3-5 BMs and that pt needs to measure out dose and keep track of number of BMs per day.      Connie confirmed that pt is taking diuretics as prescribed: furosemide 40 mg daily and spironolactone 25 mg daily. Reminded pt and Connie of hepatology appointment on 7/27 with Dr. Kovacs. Pt will need labs prior to appointment and may be able to have labs done at next paracentesis appointment (depending on timing). Encouraged pt and Connie to call with any questions and/or concerns. Pt and Connie verbalized understanding and are agreeable to plan.     "

## 2020-07-24 NOTE — PROGRESS NOTES
Social Work Intervention  [facility]    Data/Intervention:    Patient Name:  Monalisa Olguin  /Age:  1965 (54 year old)    Visit Type: telephone  Referral Source: DANY Javier in Hepatology Clinic   Reason for Referral:  Assistance at home     Collaborated With:    - Patient & daughter Connie     Patient Concerns/Issues:   Received request to reach out to pt and daughter, who reported concerns about pt needing more care/assistance at home, as daughter will be going back to school soon. Called pt and daughter, Connie, answered. Asked that she put pt on the phone, as well, so I could make sure pt was agreeable to SW speaking with her. Continued call with both pt and daughter on speakerphone. Daughter reported that pt lives at home with her, her  and 16yo son Daughter is 23yo and will be going back to school two hours away in a few weeks. Daughter has concerns about pt being home alone without her there. She reported that the main areas she needs assistance are setting up/administering meds and making sure pt eats. Pt has had home care in the past, but these services have ended.  works long hours almost every day. Interested in possible assisted living placement or adult day center. Pt is agreeable to these options. Also discussed private duty home health/PCA services. Daughter was not sure about the financial implications of these options when I mentioned that none of these options would likely be covered by insurance, as pt is on a commercial/employer plan (rather than MA). Daughter said that pt's  would have to look at the options. Provided SW contact info for her to give  so he can call when it's convenient for him and then also discussed looking into whether [facility] services their area ([place]) and people younger than 55 and if so, could make a referral if they're interested. Obtained daughter's email address to send information to  them that she could also pass along to her dad/pt's .     Confirmed that [facility] does cover the [place] area and that there are some assisted living facilities that can accommodate younger pts, but it's not as common and pt may be better served starting with an adult day center until she is able to go to assisted living.    Emailed daughter/pt links to [facility] (specific liaison profile for their area with direct contact info) to get assistance with assisted living/adult day program search, as well as [facility] for different private duty services they could hire to provide assistance for pt at home.     Intervention/Education/Resources Provided:  - Brief needs/resources assessment   - Education on assisted living, adult day & private duty home health services     Assessment/Plan:  Pt was minimally involved in the conversation but agreeable. Unclear how much she understood given identified cognitive concerns. Daughter seeking support in anticipation of leaving the home fpr school and being unable to provide care for pt as she has been. Will await call from pt's  to further discuss options and financial implications.    Provided patient/family with contact information and availability.    CATALINO Hanna, [facility]  Clinical   Outpatient Speciality Clinics   [facility]  Ph. [phone]    NO LETTER

## 2020-07-24 NOTE — TELEPHONE ENCOUNTER
Routing refill request to provider for review/approval because:  Drug not active on patient's medication list    Kenyetta Sinclair RN

## 2020-07-24 NOTE — PROGRESS NOTES
Called pt to check in. Pt alert and oriented and did sound confused. Pt stated that she was on her way to [place] to stay with her sister for the weekend (pt riding with daughter Connie). Pt and Connie confirmed pt has medications with. Pt has been measuring out lactulose and has a measuring cup packed for the weekend. Connie will not be with pt during the weekend but stated that pt's sister is a nurse and knows to give pt lactulose if pt becomes more confused.     Pt reported throwing up while in car. Connie stated that pt threw up some medications and lactulose. Denied hematemesis. Pt stated that she has been vomiting almost every day. Encouraged pt to take as needed zofran.    Notified pt and Connie that Dr. Kovacs will see pt in person on 7/27. Pt has a paracentesis appointment in the morning on 7/27 and writer will see if [facility]bury can draw labs while pt is at paracentesis appointment. Writer will update Dr. Kovacs on pt's nausea and vomiting (prior to 7/27 appointment). Pt and Connie verbalized understanding and agreeable to plan.

## 2020-07-26 PROBLEM — R41.82 AMS (ALTERED MENTAL STATUS): Status: ACTIVE | Noted: 2020-01-01

## 2020-07-26 NOTE — ED NOTES
"[facility]   ED Nurse to Floor Handoff     Monalisa Olguin is a 54 year old female who speaks English and lives with family members,  in a home  They arrived in the ED by car from home    ED Chief Complaint: Altered Mental Status    ED Dx;   Final diagnoses:   Hepatic encephalopathy (H)   Acute renal failure, unspecified acute renal failure type (H)         Needed?: No    Allergies:   Allergies   Allergen Reactions     Albuterol      Tongue \"hardened and painful\"   .  Past Medical Hx:   Past Medical History:   Diagnosis Date     Acute alcoholic intoxication in alcoholism (H) 3/27/2018     Acute renal failure (H) 11/11/2019     Alcohol abuse 5/2/2013     Alcohol dependence 5/25/2013     Alcohol withdrawal 5/2/2013     Alcoholic cirrhosis (H)      Anxiety 10/10/2011     CKD (chronic kidney disease) stage 3, GFR 30-59 ml/min (H) 2006    last GFR was 42     CKD (chronic kidney disease) stage 3, GFR 30-59 ml/min (H) 2/22/2011     CKD (chronic kidney disease) stage 5, GFR less than 15 ml/min (H) 4/14/2020     Depressive disorder      Esophageal reflux 10/7/2002     Hematochezia-patient reported 11/11/2019     Heme positive stool 3/27/2018     Hepatic encephalopathy (H) 11/11/2019     History of blood transfusion     2020 [facility]     Hypertension goal BP (blood pressure) < 130/80 7/12/2011     Hyponatremia 11/11/2019     Moderate Depression [296.32] 12/22/2009    stable on wellbutrin     Other internal derangement of knee(717.89)     ACL Internal derangement, knee/ original injury in 5th grade, torn cartilage     Pap smear 2011    no abnormals, due for paps q 2-3 yrs     SBP (spontaneous bacterial peritonitis) (H) 11/12/2019     Thyroid disease      Unspecified essential hypertension       Baseline Mental status: other Hx of episodes of HE. Poor attention at baseline per sister, Edwin.   Current Mental Status changes: other Somnolent, oriented x1 on arrival to ED    Infection " present or suspected this encounter: no  Sepsis suspected: No  Isolation type: No active isolations     Activity level - Baseline/Home:  Independent  Activity Level - Current:   Stand with assist x2    Bariatric equipment needed?: No    In the ED these meds were given:   Medications   ondansetron (ZOFRAN) injection 4 mg (4 mg Intravenous Given 7/26/20 1256)   0.9% sodium chloride BOLUS (1,000 mLs Intravenous New Bag 7/26/20 1334)     Followed by   sodium chloride 0.9% infusion (has no administration in time range)   lactulose (CHRONULAC) solution 20 g (20 g Oral Given 7/26/20 1303)       Drips running?  No. IV bolus and MIVF.     Home pump  No    Current LDAs  Peripheral IV 07/26/20 Right Lower forearm (Active)   Site Assessment WDL 07/26/20 1231   Line Status Saline locked 07/26/20 1231   Number of days: 0       Labs results:   Labs Ordered and Resulted from Time of ED Arrival Up to the Time of Departure from the ED   CBC WITH PLATELETS DIFFERENTIAL - Abnormal; Notable for the following components:       Result Value    RBC Count 2.39 (*)     Hemoglobin 7.9 (*)     Hematocrit 24.5 (*)      (*)     MCH 33.1 (*)     RDW 21.4 (*)     Platelet Count 74 (*)     Absolute Lymphocytes 0.7 (*)     All other components within normal limits   INR - Abnormal; Notable for the following components:    INR 1.26 (*)     All other components within normal limits   COMPREHENSIVE METABOLIC PANEL - Abnormal; Notable for the following components:    Sodium 127 (*)     Carbon Dioxide 17 (*)     Glucose 112 (*)     Urea Nitrogen 86 (*)     Creatinine 4.19 (*)     GFR Estimate 11 (*)     GFR Estimate If Black 13 (*)     Bilirubin Total 12.6 (*)     Albumin 2.6 (*)     Protein Total 5.5 (*)     Alkaline Phosphatase 340 (*)     AST 68 (*)     All other components within normal limits   AMMONIA - Abnormal; Notable for the following components:    Ammonia 83 (*)     All other components within normal limits   PARTIAL THROMBOPLASTIN  "TIME   LIPASE   ROUTINE UA WITH MICROSCOPIC   PERIPHERAL IV CATHETER       Imaging Studies: No results found for this or any previous visit (from the past 24 hour(s)).    Recent vital signs:   /57   Pulse 90   Ht 1.549 m (5' 1\")   LMP 05/16/2012   SpO2 100%   BMI 23.62 kg/m      Anay Coma Scale Score: 13 (07/26/20 1258)       Cardiac Rhythm: Normal Sinus  Pt needs tele? No  Skin/wound Issues: Fragile skin, multiple bruising, scabbing    Code Status: Awaiting orders    Pain control: pt had none    Nausea control: pt had none    Abnormal labs/tests/findings requiring intervention: See epic    Family present during ED course? Yes   Family Comments/Social Situation comments: Sister Edwin at bedside, attentive and supportive to patient. Phone numbers for her and spouse, Eron, updated in chart.     Tasks needing completion: Need UA    Sarah King, RN  ascom -- [phone] [address] ED  [phone] [facility] ED      "

## 2020-07-26 NOTE — ED TRIAGE NOTES
Pt arrives with sister c/o AMS that significantly started yesterday. Sister saw pt on Friday in which was she was walking and talking normal.

## 2020-07-26 NOTE — LETTER
Health Information Management Services               Recipient:  [facility]         Sender:    CATALINO Sanders LGSW Float   [facility]  Pager: [phone]          Date: July 31, 2020  Patient Name:  Monalisa Olguin  Routing Message:    Please see the referral attached for TCU and let us know if you are able to accommodate.     CATALINO Sanders LGSW Float   [facility]  Pager: [phone]  Phone: [phone]        The documents accompanying this notice contain confidential information belonging to the sender.  This information is intended only for the use of the individual or entity named above.  The authorized recipient of this information is prohibited from disclosing this information to any other party and is required to destroy the information after its stated need has been fulfilled, unless otherwise required by state law.      If you are not the intended recipient, you are hereby notified that any disclosure, copy, distribution or action taken in reliance on the contents of these documents is strictly prohibited.  If you have received this document in error, please return it by fax to [phone] with a note on the cover sheet explaining why you are returning it (e.g. not your patient, not your provider, etc.).  If you need further assistance, please call [facility] Centralized Transcription at [phone].  Documents may also be returned by mail to [employer], , [facility] [address], [address] [place], [place] [address].

## 2020-07-26 NOTE — H&P
[facility], [place]    History and Physical - Hospitalist Service, Gold 6       Date of Admission: 7/26/2020    Assessment & Plan    Monalisa Olguin is a 54 year old female with history of decompensated etoh cirrhosis c/b ascites with SBP, HE, portal HTN, CKD s/p nephrectomy (gave kidney to sister), chronic anemia and thrombocytopenia, GERD, and chronic pain who was admitted to [facility] 7/26 with encephalopathy    Toxic metabolic encephalopathy: Presented with significant AMS. Asterixis on exam. Ammonia 83. BUN 86. K and glucose stable. Na as below. No hypoxia. CXR 7/26 diffuse bilateral interstitial and alveolar opacities due to pulmonary edema vs infiltrate. Treated with lactulose in the ED. Etiology likely multifactorial including 2/2 HE, possible uremia, possible CAP. Unclear if hyponatremia contributing. Cannot rule out SBP at this time. Other etiology to include viral (althought COVID negative 7/24). VSS on room air   - IR consult for para-- limited to 3L given ALFREDO   - Start Ceftriaxone/Azithro for SBP and CAP coverage   - High dose thiamine   - UA/UC, BCx x2  - Follow COVID   - [place] protocol   - Continue PTA Rifaximin, folic acid  - NPO except meds for now  - Low threshold for head CT     Decompensated etoh cirrhosis: Sober since 2019. C/b ascites with SBP, HE, thrombocytopenia. PTA on cipro, lasix, lactulose, midodrine, rifaximin. ALP slightly up from BL, other LFTs near BL. Unable to communicate pain, but no RUQ tenderness on exam. MELD 35  - GI consult, ok to see in am   - Trend MELD labs  - Continue PTA regimen     ALFREDO on CKD III-IV: BL Cr labile, ranging around 1.7-2.4. H/o nephrectomy (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). Cr elevated to 4.19, BUN 86 on presentation. Etiology prerenal vs HRS vs other  - Nephrology consult, they are aware of patient and will see in am   - UA/UC  - Hold diuretics overnight   - Urine Na, serum osm, urine osm, FEUrea   - Renal US   - Albumin challenge    - Strict I&Os, daily weights     Hyponatremia: Na 127, normal at 141 6/16. Etiology possible hypovolemia 2/2 poor oral intake, GI losses. Other etiology 2/2 diuretic use, worsening cirrhosis, nephrotic syndrome    - Nephrology consult as above   - Repeat BMP now, want to avoid rapid correction  - Urine studies as above   - Na q6h    Depression: Continue PTA Abilify, Buspar, fluoxetine. Hold Trazodone given AMS    Chronic anemia, thrombocytopenia: BL plts previously 30s-40s, improved to 60s lately. BL hgb 6-7. Hgb 7.9 7/26. Continue PTA iron, folic acid     GERD: Continue PPI       Diet: Regular diet   DVT Prophylaxis: Pneumatic Compression Devices  Gonzalez Catheter: not present  Code Status: Full Code         Disposition Plan   Expected discharge: several days, recommended to home vs other once workup complete, medically stable, etc.  Entered: Lucie Gaitan PA-C 07/26/2020, 2:21 PM     The patient's care was discussed with the patient, nephrology, ED, and attending physician, Dr. Joy Gaitan PA-C  [facility]  Pager: [phone]  Please see sticky note for cross cover information  ______________________________________________________________________    Chief Complaint   AMS    History is obtained from the patient and medical record     History of Present Illness   Monalisa Olguin is a 54 year old female with history of decompensated etoh cirrhosis c/b ascites with SBP, HE, portal HTN, CKD s/p nephrectomy (gave kidney to sister), chronic anemia and thrombocytopenia, GERD, and chronic pain who was admitted to [facility] 7/26 with encephalopathy    Patient unable to give reliable history due to AMS. She denies pain. She knows who she is and that she is in [place]. Per patient's , patient vomited lactulose dose sometime around yesterday. She has been declining over the past few days    Review of Systems    The 10 point Review of Systems is negative  other than noted in the HPI or here.     Past Medical History    I have reviewed this patient's medical history and updated it with pertinent information if needed.   Past Medical History:   Diagnosis Date     Acute alcoholic intoxication in alcoholism (H) 3/27/2018     Acute renal failure (H) 2019     Alcohol abuse 2013     Alcohol dependence 2013     Alcohol withdrawal 2013     Alcoholic cirrhosis (H)      Anxiety 10/10/2011     CKD (chronic kidney disease) stage 3, GFR 30-59 ml/min (H)     last GFR was 42     CKD (chronic kidney disease) stage 3, GFR 30-59 ml/min (H) 2011     CKD (chronic kidney disease) stage 5, GFR less than 15 ml/min (H) 2020     Depressive disorder      Esophageal reflux 10/7/2002     Hematochezia-patient reported 2019     Heme positive stool 3/27/2018     Hepatic encephalopathy (H) 2019     History of blood transfusion      [facility]     Hypertension goal BP (blood pressure) < 130/80 2011     Hyponatremia 2019     Moderate Depression [296.32] 2009    stable on wellbutrin     Other internal derangement of knee(717.89)     ACL Internal derangement, knee/ original injury in 5th grade, torn cartilage     Pap smear     no abnormals, due for paps q 2-3 yrs     SBP (spontaneous bacterial peritonitis) (H) 2019     Thyroid disease      Unspecified essential hypertension        Past Surgical History   I have reviewed this patient's surgical history and updated it with pertinent information if needed.  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     COLONOSCOPY N/A 2017    Procedure: COLONOSCOPY;  Colonoscopy;  Surgeon: Sai Johnston MD;  Location:  GI     ESOPHAGOSCOPY, GASTROSCOPY, DUODENOSCOPY (EGD), COMBINED N/A 2017    Procedure: COMBINED ESOPHAGOSCOPY, GASTROSCOPY, DUODENOSCOPY  (EGD), BIOPSY SINGLE OR MULTIPLE;  ESOPHAGOSCOPY, GASTROSCOPY, DUODENOSCOPY (EGD) with biopsies;  Surgeon: Sai Johnston MD;  Location: [facility]     ESOPHAGOSCOPY, GASTROSCOPY, DUODENOSCOPY (EGD), COMBINED N/A 4/29/2019    Procedure: ESOPHAGOGASTRODUODENOSCOPY, WITH BIOPSY;  Surgeon: Edgardo Scott DO;  Location: [facility] GI     HC KNEE SCOPE, DIAGNOSTIC  11/14/01    Arthroscopy, Lt Knee     IR PARACENTESIS  6/8/2020     LAPAROSCOPIC CHOLECYSTECTOMY N/A 9/24/2017    Procedure: LAPAROSCOPIC CHOLECYSTECTOMY;  laparoscopic cholecystectomy;  Surgeon: Romeo Pastor MD;  Location: [facility] OR     OPEN REDUCTION INTERNAL FIXATION WRIST Right 11/29/2017    Procedure: OPEN REDUCTION INTERNAL FIXATION WRIST;  OPEN REDUCTION INTERNAL FIXATION RIGHT WRIST;  Surgeon: Edgardo Almendarez MD;  Location: [facility] OR     TRANSPLANT      Donated Left Kidney in 2000       Social History   I have reviewed this patient's social history and updated it with pertinent information if needed.      Family History   I have reviewed this patient's family history and updated it with pertinent information if needed.  Family History   Problem Relation Age of Onset     Hypertension Mother         80 years old     Heart Disease Paternal Grandmother      Heart Disease Paternal Grandfather      Cerebrovascular Disease Maternal Grandmother      Cerebrovascular Disease Maternal Grandfather      Alcohol/Drug Maternal Grandfather      Substance Abuse Maternal Grandfather      Heart Disease Father         Silent MI stents x 2     Diabetes Sister 17        older sister also had kidney and pancreas transplant     Heart Disease Sister         MI secondary to diabetes     Genitourinary Problems Sister 43        kidney transplant from renal failure     Other - See Comments Sister         older     Other - See Comments Sister         younger     Diabetes Sister 9        youngest, juvenile type I (onset age 9 with no complications)     Cancer Paternal Aunt  "        ?kind       Prior to Admission Medications   Prior to Admission Medications   Prescriptions Last Dose Informant Patient Reported? Taking?   ARIPiprazole (ABILIFY) 5 MG tablet   No No   Sig: Take 1 tablet (5 mg) by mouth At Bedtime   FLUoxetine (PROZAC) 20 MG capsule   No No   Sig: TAKE THREE CAPSULES BY MOUTH ONCE DAILY   Ferrous Sulfate 324 (65 Fe) MG TBEC   No No   Sig: TAKE ONE TABLET BY MOUTH TWICE A DAY   acetaminophen (TYLENOL) 500 MG tablet   No No   Sig: Take 1 tablet (500 mg) by mouth every 6 hours as needed for mild pain   bisacodyl (DULCOLAX) 5 MG EC tablet   No No   Sig: Take 3 tablets (15 mg) by mouth once for 1 dose Refer to \"Getting Ready for a Colonoscopy\" instruction handout   busPIRone (BUSPAR) 15 MG tablet   No No   Sig: Take 1 tablet (15 mg) by mouth 2 times daily   ciprofloxacin (CIPRO) 250 MG tablet   No No   Sig: Take 1 tablet (250 mg) by mouth daily   folic acid (FOLVITE) 1 MG tablet   No No   Sig: Take 1 tablet (1 mg) by mouth daily   furosemide (LASIX) 40 MG tablet   No No   Sig: Take 1 tablet (40 mg) by mouth daily   gabapentin (NEURONTIN) 100 MG capsule   No No   Sig: Take 3 capsules (300 mg) by mouth At Bedtime   lactulose (CONSTULOSE) 10 GM/15ML solution   No No   Sig: Take 30 mLs (20 g) by mouth 3 times daily   midodrine (PROAMATINE) 10 MG tablet   No No   Sig: Take 1 tablet (10 mg) by mouth 3 times daily (with meals)   nicotine (NICODERM CQ) 21 MG/24HR 24 hr patch   No No   Sig: Place 1 patch onto the skin every 24 hours   Patient not taking: Reported on 6/26/2020   omeprazole (PRILOSEC) 20 MG DR capsule   No No   Sig: Take 1 capsule (20 mg) by mouth daily   ondansetron (ZOFRAN) 4 MG tablet   No No   Sig: Take 1 tablet (4 mg) by mouth every 6 hours as needed for nausea   polyethylene glycol (GOLYTELY) 236 g suspension   No No   Sig: Take 4,000 mLs (4 L) by mouth once for 1 dose Refer to \"Getting Ready for a Colonoscopy\" instruction handout   rifaximin (XIFAXAN) 550 MG TABS " "tablet   No No   Sig: Take 1 tablet (550 mg) by mouth 2 times daily   sodium bicarbonate 650 MG tablet   No No   Sig: Take 1 tablet (650 mg) by mouth 2 times daily   spironolactone (ALDACTONE) 25 MG tablet   No No   Sig: Take 1 tablet (25 mg) by mouth daily   traZODone (DESYREL) 50 MG tablet   No No   Sig: Take 2 tablets (100 mg) by mouth nightly as needed for sleep   ursodiol (ACTIGALL) 500 MG tablet   No No   Sig: Take 1 tablet (500 mg) by mouth 2 times daily   vitamin B1 (THIAMINE) 100 MG tablet   No No   Sig: Take 1 tablet (100 mg) by mouth daily      Facility-Administered Medications: None     Allergies   Allergies   Allergen Reactions     Albuterol      Tongue \"hardened and painful\"       Physical Exam   Vital Signs:     BP: 106/57 Pulse: 90 Heart Rate: 91   SpO2: 100 % O2 Device: None (Room air)    Weight: 0 lbs 0 oz    General Appearance: Alert. Oriented to person and \"[place]\" but not a hospital or to time  HEENT: Icterus, MMM  Respiratory: Breathing comfortably on room air, lungs CTAB without wheezing or crackles   Cardiovascular: RRR, S1, S2. III/VI systolic murmur noted  GI: Abdomen soft, does not elicit pain on exam. Significant distention. Bowel sounds active. No guarding or rebound   Lymph/Hematologic: BLE peripheral edema, distal pulses palpable   Skin: Jaundice  Musculoskeletal: Moves all extremities   Neurologic: Asterixis on exam. Unable to participate with complete neuro exam       Data   Data reviewed today: I reviewed all medications, new labs and imaging results over the last 24 hours    Recent Labs   Lab 07/26/20  1232   WBC 8.8   HGB 7.9*   *   PLT 74*   INR 1.26*   *   POTASSIUM 4.5   CHLORIDE 100   CO2 17*   BUN 86*   CR 4.19*   ANIONGAP 11   ELSIE 9.9   *   ALBUMIN 2.6*   PROTTOTAL 5.5*   BILITOTAL 12.6*   ALKPHOS 340*   ALT 36   AST 68*   LIPASE 132     Recent Results (from the past 24 hour(s))   XR Chest Port 1 View    Narrative    Single view chest    Comparisons: " 6/2/2020    HISTORY: Shortness of air    FINDINGS: Lung volumes are low. There are diffuse interstitial and  patchy alveolar opacities. Cardiac silhouette is unchanged. Pulmonary  vascularity is indistinct. Pleural spaces are clear. Surgical clips in  the upper abdomen.      Impression    IMPRESSION: Diffuse interstitial and alveolar opacities in both lungs  may represent pulmonary edema or infection.    LEXIS ROBLES MD

## 2020-07-26 NOTE — ED PROVIDER NOTES
"    [place] EMERGENCY DEPARTMENT ([facility])  July 26, 2020  History     Chief Complaint   Patient presents with     Altered Mental Status     The history is provided by the patient and medical records.     Monalisa Olguin is a 54 year old female with a history of alcoholic cirrhosis c/b hepatic encephalopathy, ascites, portal HTN, CKD stage V s/p nephrectomy (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), and orthostatic hypotension who presents to the Emergency Department with altered mental status.  Patient sister reports patient was able to walk, talk, and shower herself on Friday, 7/24/2020.  She states patient still seemed fine on Saturday morning, however, later in the day she began falling asleep and \"things went downhill.\"  Patient sister reports patient was incontinent during the night.  She reports patient vomited on both 7/24 and 7/25 after taking her morning pills.  Patient sister reports patient has had similar symptoms in the past.  Specifically when she was admitted in June with encephalopathy.  Patient denies fever, abdominal pain, or headache.  No blood in stool, recent falls.  Of note, patient had a negative COVID test on 7/24/2020 and is scheduled for a paracentesis tomorrow, 7/27/2020.    PAST MEDICAL HISTORY:   Past Medical History:   Diagnosis Date     Acute alcoholic intoxication in alcoholism (H) 3/27/2018     Acute renal failure (H) 11/11/2019     Alcohol abuse 5/2/2013     Alcohol dependence 5/25/2013     Alcohol withdrawal 5/2/2013     Alcoholic cirrhosis (H)      Anxiety 10/10/2011     CKD (chronic kidney disease) stage 3, GFR 30-59 ml/min (H) 2006    last GFR was 42     CKD (chronic kidney disease) stage 3, GFR 30-59 ml/min (H) 2/22/2011     CKD (chronic kidney disease) stage 5, GFR less than 15 ml/min (H) 4/14/2020     Depressive disorder      Esophageal reflux 10/7/2002     Hematochezia-patient reported 11/11/2019     Heme positive stool 3/27/2018     Hepatic encephalopathy (H) 11/11/2019     " History of blood transfusion      [facility]     Hypertension goal BP (blood pressure) < 130/80 2011     Hyponatremia 2019     Moderate Depression [296.32] 2009    stable on wellbutrin     Other internal derangement of knee(717.89)     ACL Internal derangement, knee/ original injury in 5th grade, torn cartilage     Pap smear     no abnormals, due for paps q 2-3 yrs     SBP (spontaneous bacterial peritonitis) (H) 2019     Thyroid disease      Unspecified essential hypertension        PAST SURGICAL HISTORY:   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     COLONOSCOPY N/A 2017    Procedure: COLONOSCOPY;  Colonoscopy;  Surgeon: Sai Johnston MD;  Location:  GI     ESOPHAGOSCOPY, GASTROSCOPY, DUODENOSCOPY (EGD), COMBINED N/A 2017    Procedure: COMBINED ESOPHAGOSCOPY, GASTROSCOPY, DUODENOSCOPY (EGD), BIOPSY SINGLE OR MULTIPLE;  ESOPHAGOSCOPY, GASTROSCOPY, DUODENOSCOPY (EGD) with biopsies;  Surgeon: Sai Johnston MD;  Location:  GI     ESOPHAGOSCOPY, GASTROSCOPY, DUODENOSCOPY (EGD), COMBINED N/A 2019    Procedure: ESOPHAGOGASTRODUODENOSCOPY, WITH BIOPSY;  Surgeon: Edgardo Scott DO;  Location:  GI     HC KNEE SCOPE, DIAGNOSTIC  01    Arthroscopy, Lt Knee     IR PARACENTESIS  2020     LAPAROSCOPIC CHOLECYSTECTOMY N/A 2017    Procedure: LAPAROSCOPIC CHOLECYSTECTOMY;  laparoscopic cholecystectomy;  Surgeon: Romeo Pastor MD;  Location: [facility] OR     OPEN REDUCTION INTERNAL FIXATION WRIST Right 2017    Procedure: OPEN REDUCTION INTERNAL FIXATION WRIST;  OPEN REDUCTION INTERNAL FIXATION RIGHT WRIST;  Surgeon: Edgardo Almendarez MD;  Location:  OR     TRANSPLANT      Donated Left Kidney in        Past medical history, past surgical history, medications, and allergies were reviewed with the patient.  Additional pertinent items: None    FAMILY HISTORY:   Family History   Problem Relation Age of Onset     Hypertension Mother         80 years old     Heart Disease Paternal Grandmother      Heart Disease Paternal Grandfather      Cerebrovascular Disease Maternal Grandmother      Cerebrovascular Disease Maternal Grandfather      Alcohol/Drug Maternal Grandfather      Substance Abuse Maternal Grandfather      Heart Disease Father         Silent MI stents x 2     Diabetes Sister 17        older sister also had kidney and pancreas transplant     Heart Disease Sister         MI secondary to diabetes     Genitourinary Problems Sister 43        kidney transplant from renal failure     Other - See Comments Sister         older     Other - See Comments Sister         younger     Diabetes Sister 9        youngest, juvenile type I (onset age 9 with no complications)     Cancer Paternal Aunt         ?kind       SOCIAL HISTORY:   Social History     Tobacco Use     Smoking status: Current Every Day Smoker     Packs/day: 0.50     Years: 10.00     Pack years: 5.00     Types: Cigarettes     Smokeless tobacco: Never Used     Tobacco comment: pt quit 1992 after smoking for 8 yrs, started again in 2004   Substance Use Topics     Alcohol use: Not Currently     Alcohol/week: 0.0 standard drinks     Comment: Quit drinking 9/2019     Social history was reviewed with the patient. Additional pertinent items: None      Patient's Medications   New Prescriptions    No medications on file   Previous Medications    ACETAMINOPHEN (TYLENOL) 500 MG TABLET    Take 1 tablet (500 mg) by mouth every 6 hours as needed for mild pain    ARIPIPRAZOLE (ABILIFY) 5 MG TABLET    Take 1 tablet (5 mg) by mouth At Bedtime    BUSPIRONE (BUSPAR) 15 MG TABLET    Take 1 tablet (15 mg) by mouth 2 times daily    CIPROFLOXACIN (CIPRO) 250 MG TABLET    Take 1 tablet (250 mg) by mouth daily    FERROUS SULFATE 324 (65 FE) MG TBEC    TAKE ONE TABLET BY MOUTH TWICE A DAY     "FLUOXETINE (PROZAC) 20 MG CAPSULE    TAKE THREE CAPSULES BY MOUTH ONCE DAILY    FOLIC ACID (FOLVITE) 1 MG TABLET    Take 1 tablet (1 mg) by mouth daily    FUROSEMIDE (LASIX) 40 MG TABLET    Take 1 tablet (40 mg) by mouth daily    GABAPENTIN (NEURONTIN) 100 MG CAPSULE    Take 3 capsules (300 mg) by mouth At Bedtime    LACTULOSE (CONSTULOSE) 10 GM/15ML SOLUTION    Take 30 mLs (20 g) by mouth 3 times daily    NICOTINE (NICODERM CQ) 21 MG/24HR 24 HR PATCH    Place 1 patch onto the skin every 24 hours    OMEPRAZOLE (PRILOSEC) 20 MG DR CAPSULE    Take 1 capsule (20 mg) by mouth daily    ONDANSETRON (ZOFRAN) 4 MG TABLET    Take 1 tablet (4 mg) by mouth every 6 hours as needed for nausea    RIFAXIMIN (XIFAXAN) 550 MG TABS TABLET    Take 1 tablet (550 mg) by mouth 2 times daily    SODIUM BICARBONATE 650 MG TABLET    Take 1 tablet (650 mg) by mouth 2 times daily    SPIRONOLACTONE (ALDACTONE) 25 MG TABLET    Take 1 tablet (25 mg) by mouth daily    TRAZODONE (DESYREL) 50 MG TABLET    Take 2 tablets (100 mg) by mouth nightly as needed for sleep    URSODIOL (ACTIGALL) 500 MG TABLET    Take 1 tablet (500 mg) by mouth 2 times daily    VITAMIN B1 (THIAMINE) 100 MG TABLET    Take 1 tablet (100 mg) by mouth daily   Modified Medications    No medications on file   Discontinued Medications    No medications on file          Allergies   Allergen Reactions     Albuterol      Tongue \"hardened and painful\"        Review of Systems   Constitutional: Negative for fever.   Gastrointestinal: Positive for vomiting. Negative for abdominal pain.   Genitourinary: Positive for enuresis.   Neurological: Negative for headaches.     A complete review of systems was performed with pertinent positives and negatives noted in the HPI, and all other systems negative.    Physical Exam   BP: 92/50  Pulse: 91  Heart Rate: 91  Resp: 8  Height: 154.9 cm (5' 1\")  SpO2: 98 %      Physical Exam  Vitals signs and nursing note reviewed.   Constitutional:       " General: She is awake.   HENT:      Head: Atraumatic.      Mouth/Throat:      Mouth: Mucous membranes are dry.   Eyes:      General: No scleral icterus.     Extraocular Movements: Extraocular movements intact.   Neck:      Musculoskeletal: Neck supple.   Cardiovascular:      Rate and Rhythm: Normal rate and regular rhythm.      Heart sounds: Normal heart sounds.   Pulmonary:      Effort: Pulmonary effort is normal. No respiratory distress.   Chest:      Chest wall: No tenderness.   Abdominal:      General: There is distension.      Palpations: Abdomen is soft. There is fluid wave.      Tenderness: There is no abdominal tenderness. There is no guarding or rebound.   Musculoskeletal:      Right lower leg: No edema.      Left lower leg: No edema.      Comments: No bony tenderness or signs of trauma other than bruise on left forearm   Skin:     General: Skin is warm.      Coloration: Skin is not pale.   Neurological:      General: No focal deficit present.      Mental Status: She is easily aroused. She is lethargic.      Comments: Significant asterixis.  No lateralizing deficit         ED Course   12:19 PM  The patient was seen and examined by Alon Carroll MD in Room ED17.        Procedures                           Results for orders placed or performed during the hospital encounter of 07/26/20 (from the past 24 hour(s))   CBC with platelets differential   Result Value Ref Range    WBC 8.8 4.0 - 11.0 10e9/L    RBC Count 2.39 (L) 3.8 - 5.2 10e12/L    Hemoglobin 7.9 (L) 11.7 - 15.7 g/dL    Hematocrit 24.5 (L) 35.0 - 47.0 %     (H) 78 - 100 fl    MCH 33.1 (H) 26.5 - 33.0 pg    MCHC 32.2 31.5 - 36.5 g/dL    RDW 21.4 (H) 10.0 - 15.0 %    Platelet Count 74 (L) 150 - 450 10e9/L    Diff Method Automated Method     % Neutrophils 79.7 %    % Lymphocytes 8.4 %    % Monocytes 6.1 %    % Eosinophils 1.8 %    % Basophils 0.2 %    % Immature Granulocytes 3.8 %    Nucleated RBCs 0 0 /100    Absolute Neutrophil 7.0 1.6 -  8.3 10e9/L    Absolute Lymphocytes 0.7 (L) 0.8 - 5.3 10e9/L    Absolute Monocytes 0.5 0.0 - 1.3 10e9/L    Absolute Eosinophils 0.2 0.0 - 0.7 10e9/L    Absolute Basophils 0.0 0.0 - 0.2 10e9/L    Abs Immature Granulocytes 0.3 0 - 0.4 10e9/L    Absolute Nucleated RBC 0.0    INR   Result Value Ref Range    INR 1.26 (H) 0.86 - 1.14   Partial thromboplastin time   Result Value Ref Range    PTT 29 22 - 37 sec   Comprehensive metabolic panel   Result Value Ref Range    Sodium 127 (L) 133 - 144 mmol/L    Potassium 4.5 3.4 - 5.3 mmol/L    Chloride 100 94 - 109 mmol/L    Carbon Dioxide 17 (L) 20 - 32 mmol/L    Anion Gap 11 3 - 14 mmol/L    Glucose 112 (H) 70 - 99 mg/dL    Urea Nitrogen 86 (H) 7 - 30 mg/dL    Creatinine 4.19 (H) 0.52 - 1.04 mg/dL    GFR Estimate 11 (L) >60 mL/min/[1.73_m2]    GFR Estimate If Black 13 (L) >60 mL/min/[1.73_m2]    Calcium 9.9 8.5 - 10.1 mg/dL    Bilirubin Total 12.6 (H) 0.2 - 1.3 mg/dL    Albumin 2.6 (L) 3.4 - 5.0 g/dL    Protein Total 5.5 (L) 6.8 - 8.8 g/dL    Alkaline Phosphatase 340 (H) 40 - 150 U/L    ALT 36 0 - 50 U/L    AST 68 (H) 0 - 45 U/L   Lipase   Result Value Ref Range    Lipase 132 73 - 393 U/L   Ammonia   Result Value Ref Range    Ammonia 83 (H) 10 - 50 umol/L   XR Chest Port 1 View    Narrative    Single view chest    Comparisons: 6/2/2020    HISTORY: Shortness of air    FINDINGS: Lung volumes are low. There are diffuse interstitial and  patchy alveolar opacities. Cardiac silhouette is unchanged. Pulmonary  vascularity is indistinct. Pleural spaces are clear. Surgical clips in  the upper abdomen.      Impression    IMPRESSION: Diffuse interstitial and alveolar opacities in both lungs  may represent pulmonary edema or infection.    LEXIS MARISSA-HAJDER, MD   Basic metabolic panel   Result Value Ref Range    Sodium 131 (L) 133 - 144 mmol/L    Potassium 4.3 3.4 - 5.3 mmol/L    Chloride 105 94 - 109 mmol/L    Carbon Dioxide 14 (L) 20 - 32 mmol/L    Anion Gap 12 3 - 14 mmol/L    Glucose  104 (H) 70 - 99 mg/dL    Urea Nitrogen 81 (H) 7 - 30 mg/dL    Creatinine 3.88 (H) 0.52 - 1.04 mg/dL    GFR Estimate 12 (L) >60 mL/min/[1.73_m2]    GFR Estimate If Black 14 (L) >60 mL/min/[1.73_m2]    Calcium 8.9 8.5 - 10.1 mg/dL     Medications   ondansetron (ZOFRAN) injection 4 mg (4 mg Intravenous Given 7/26/20 1256)   0.9% sodium chloride BOLUS (0 mLs Intravenous Stopped 7/26/20 1439)     Followed by   sodium chloride 0.9% infusion ( Intravenous New Bag 7/26/20 1441)   cefTRIAXone (ROCEPHIN) 2 g vial to attach to  ml bag for ADULTS or NS 50 ml bag for PEDS (2 g Intravenous New Bag 7/26/20 1535)   azithromycin (ZITHROMAX) 500 mg in sodium chloride 0.9 % 250 mL intermittent infusion (has no administration in time range)   lactulose (CHRONULAC) solution 20 g (20 g Oral Given 7/26/20 1303)   lactulose (CHRONULAC) solution 20 g (20 g Oral Given 7/26/20 1527)             Assessments & Plan (with Medical Decision Making)   The patient is here with her sister who is a nurse.  She has been getting her lactulose but has been intermittently vomiting, especially yesterday.  She has clear signs of lethargy with asterixis, likely hepatic encephalopathy.  She also appears dehydrated.  She was found to have an elevated ammonia to 83, although not as highly elevated as a month ago when she presented with similar symptoms.  She was also found to have a creatinine of 4.2 consistent with new acute renal failure from her baseline of 1.5.  She does have significant ascites and will need paracentesis although at this point has no tenderness to suggest SBP and has no respiratory distress.    I have reviewed the nursing notes.    I have reviewed the findings, diagnosis, plan and need for follow up with the patient.    New Prescriptions    No medications on file       Final diagnoses:   Hepatic encephalopathy (H)   Acute renal failure, unspecified acute renal failure type (H)     I, Blanca Franco, am serving as a trained medical  scribe to document services personally performed by Alon Carroll MD, based on the provider's statements to me.      I, Alon Carroll MD, was physically present and have reviewed and verified the accuracy of this note documented by Blanca Franco.     7/26/2020   [facility], [place], EMERGENCY DEPARTMENT     Alon Carroll MD  07/26/20 8673

## 2020-07-26 NOTE — CONSULTS
INTERVENTIONAL RADIOLOGY CONSULT NOTE    Request, patient data, and imaging are being reviewed. The request for prarcentesis has been placed in our procedure request que for review by the staff radiologist(s) to review and appoint to the schedule if approved.    Labs WNL for procedure.     No NPO required.  Consent will be done prior to procedure.     Platelet Count   Date Value Ref Range Status   07/26/2020 74 (L) 150 - 450 10e9/L Final     INR   Date Value Ref Range Status   07/26/2020 1.26 (H) 0.86 - 1.14 Final        Please contact the IR charge RN at [phone] for estimated time of procedure.     Case discussed with Dr YAMILET Arellano and plan conveyed to Lucy Shaver PA-C.    Kenny Atkinson MD  Fellow, Department of Vascular and Interventional Radiology  July 26, 2020  Beeper:  [phone] After Hours

## 2020-07-26 NOTE — LETTER
Health Information Management Services               Recipient: [facility] liaison- admissions        Sender: Smith mora  Ph: [phone]          Date: August 3, 2020  Patient Name:  Monalisa Olguin  Routing Message:  MAR attached          The documents accompanying this notice contain confidential information belonging to the sender.  This information is intended only for the use of the individual or entity named above.  The authorized recipient of this information is prohibited from disclosing this information to any other party and is required to destroy the information after its stated need has been fulfilled, unless otherwise required by state law.      If you are not the intended recipient, you are hereby notified that any disclosure, copy, distribution or action taken in reliance on the contents of these documents is strictly prohibited.  If you have received this document in error, please return it by fax to [phone] with a note on the cover sheet explaining why you are returning it (e.g. not your patient, not your provider, etc.).  If you need further assistance, please call [facility] Centralized Transcription at [phone].  Documents may also be returned by mail to [facility], , [facility] [address], [place], [place] [address].

## 2020-07-26 NOTE — PROGRESS NOTES
Arrived from:  ED  Belongings/meds:  Cellphone and , mask  2 RN Skin Assessment Completed by:   Rey WOODARD RN, Eros ACOSTA RN  Non-intact findings documented (yes/no/NA): Generalized bruising on arms legs, scabbing on L arm, bruising to R hip and lower back, lethargic but arousable, unable to keep eyes open for long, disoriented to situation and time, BUE 2/5, BLE 2/5, denied N/T

## 2020-07-26 NOTE — LETTER
Health Information Management Services               Recipient:  Emely        Sender:  CAROLINE Contreras: [phone]        Date: August 3, 2020  Patient Name:  Monalisa Olguin  Routing Message:  Attached orders- MD is completing discharge summary asap, but advised could be later this evening.          The documents accompanying this notice contain confidential information belonging to the sender.  This information is intended only for the use of the individual or entity named above.  The authorized recipient of this information is prohibited from disclosing this information to any other party and is required to destroy the information after its stated need has been fulfilled, unless otherwise required by state law.      If you are not the intended recipient, you are hereby notified that any disclosure, copy, distribution or action taken in reliance on the contents of these documents is strictly prohibited.  If you have received this document in error, please return it by fax to [phone] with a note on the cover sheet explaining why you are returning it (e.g. not your patient, not your provider, etc.).  If you need further assistance, please call [place] Centralized Transcription at [phone].  Documents may also be returned by mail to [facility], , [facility] [address], [place], [place] [address].

## 2020-07-27 NOTE — PLAN OF CARE
Status: Pt is admitted for altered mental status, likely encephalopathy  Vitals: VSS on RA  Neuros: Difficult to assess, pt is somnolent and only able to stay awake for very short periods of time, disoriented to time and situation, moderate  and dorsi/plantar, strengths difficult to assess d/t pt falling back asleep before giving adequate push/pull/leg movement, Lactulose PRN protocol initiated due to pt's level of somnolence, 2/3 doses given  IV: PIV is infusing NS @ 125 mL/hr, other PIV is SL  Resp/trach: Lung sounds are clear  Diet: NPO  Bowel status: Bowel sounds are active, 2 small watery stools  : Pt unable to void, scanned for 550-600, straight cathed for 350   Skin: Bruising and scabbing throughout  Pain: Denied  Activity: Pt not OOB, likely assist x2  Social: No visitors or phone calls for the pt this shift  Plan: Will continue to monitor and follow POC, plan for paracentesis tomorrow

## 2020-07-27 NOTE — PROGRESS NOTES
[facility], [facility] Progress Note - Hospitalist Service, Gold 6       Date of Admission:  7/26/2020    Assessment & Plan   Monalisa Olguin is a 54 year old female with history of decompensated etoh cirrhosis c/b ascites with SBP, HE, portal HTN, CKD s/p nephrectomy (gave kidney to sister), chronic anemia and thrombocytopenia, GERD, and chronic pain who was admitted to [facility] 7/26 with encephalopathy.     #Toxic metabolic encephalopathy   Presented with significant AMS. Asterixis on exam. Ammonia 83. BUN 86. K and glucose stable. Hyponatremia as noted below, but now resolved. No hypoxia. CXR 7/26 diffuse bilateral interstitial and alveolar opacities due to pulmonary edema vs infiltrate. Treated with lactulose in the ED. Etiology likely multifactorial including 2/2 HE, possible uremia, possible infectious process (CAP). Despite improvement in hyponatremia encephalopathy persists. Procalcitonin 0.65, but in setting of ALFREDO.  UA bland.  Diagnostic paracentesis not consistent with SBP (). Other etiology to include viral (althought COVID negative 7/24). VSS on room air   - Continue Ceftriaxone/Azithro CAP coverage (Day 2).   -Legionella urinary antigen, strep pneumon antigen, and sputum cx   - High dose thiamine   - F/u blood cultures   - Negative COVID 7/24. Repeat COVID 19 on 7/27   - [place] protocol   - Continue PTA Rifaximin 550 mg BID, folic acid  - NPO except meds for now, advance diet as patient awakes  - Obtain CT head   - Hold sedating medications including gabapentin, buspar, and abilify     #Decompensated etoh cirrhosis: Sober since 2019. C/b ascites with SBP, HE, thrombocytopenia. PTA on cipro 250 mg daily, lasix 40 mg daily, lactulose 30 mL TID, midodrine 10 mg TID, rifaximin 550 mg BID. Alk phos elevation slightly up from baseline, other LFTs near baseline. No obvious RUQ tenderness on exam. MELD 35. Paracentesis with IR removed 2800cc fluid, with analysis  not consistent with SBP.   - GI consulted, appreciate recs  - Trend MELD labs (CMP, INR)   - Hold lasix and spironolactone  - Hold PTA cipro while on antibiotics   - Continue PTA Midodrine   - Continue home Rifaxamin and lactulose, Treat for HE as noted above   - Abdominal US with doppler  - IV Vit K 10 mg daily x3  - PEth    #ALFREDO on CKD III-IV: BL Cr labile, ranging around 1.7-2.4. H/o nephrectomy (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). Cr elevated to 4.19, BUN 86 on presentation. Etiology prerenal vs HRS. Urinalysis with hyaline casts.   - Nephrology consulted, awaiting recommendations   - Hold diuretics overnight   - Urine Na, serum osm, urine osm, FEUrea (ordered, not collected)   - Renal US   - Albumin challenge with 50 g daily x3 days   - Strict I&Os, daily weights     #Acute on chronic anemia  Baseline hemoglobin 7.0s-8.0s. Dropped to 6.2. No si/sx of bleeding.    -Transfuse 2 Units pRBC  -Check post transfusion hemoglobin  -Stool occult   -Obtain Fe, TIBC, Ferritin, B12, and folate  -Continue PTA iron and folic acid     #Hyponatremia, resolved : Na 127, normal at 141 6/16. Etiology possible hypovolemia 2/2 poor oral intake, GI losses. Other etiology 2/2 diuretic use, worsening cirrhosis, nephrotic syndrome. Resolved after receiving IVF.   - Nephrology consult as above   - Urine studies as above   - BMP daily     #Depression: Continue PTA fluoxetine 60 mg daily.  Hold PTA Abilify 5 mg daily, Buspar 15 mg BID, and Trazodone 100 mg at bedtime PRN given AMS    #Chronic thrombocytopenia: BL plts previously 30s-40s, improved to 60s lately. Trend.     #GERD: Continue omeprazole 20 mg daily       Diet: NPO for Medical/Clinical Reasons Except for: Meds    DVT Prophylaxis: Pneumatic Compression Devices  Gonzalez Catheter: not present  Code Status: Full Code    Rule Out COVID-19 Handoff:  Monalisa is NOT A LOW SUSPICION PUI (needs further investigation).    Follow these instructions:    If COVID test is positive -> continue isolation  "precautions    If 1st COVID test is negative -> continue isolation precautions    -  Order a repeat COVID test to be done 72 hours after the 1st test  -  Place \"PUI Isolation\" nurse communication order  -  Consider ID consult    If 2nd COVID test performed after 72 hours is negative -> consider discontinuing COVID-specific isolation precautions if clinical course atypical for COVID and/or an alternative diagnosis emerges       Disposition Plan   Expected discharge: 4 - 7 days, recommended to Prior living situation vs TCU depending on progression once hemoglobin stable, mental status at baseline and renal function improved.  Entered: Palmira Bullock PA-C 07/27/2020, 2:56 PM       The patient's care was discussed with the Attending Physician, Dr. Arden Hamilton, Bedside Nurse and Patient.    Palmira Bullock PA-C  Hospitalist Service, [address], [place]  Pager: [phone]  Please see sticky note for cross cover information  ______________________________________________________________________    Interval History   No overnight events. Patient had just come back from abdominal ultrasound and paracentesis.     Poor historian. Very lethargic. Denies any pain. States that it is August 2020. When asked any other questions, patient falls asleep or states \"My sister Luciana\".     Data reviewed today: I reviewed all medications, new labs and imaging results over the last 24 hours.     Physical Exam   Vital Signs: Temp: 95.8  F (35.4  C) Temp src: Oral BP: 97/51 Pulse: 101 Heart Rate: 94 Resp: 18 SpO2: 97 % O2 Device: None (Room air)    Weight: 0 lbs 0 oz  GENERAL: Lethargic. Awakes to voice but quickly falls asleep. NAD. Chronically ill appearing.   HEENT: Icterus sclera. Moist mucous membranes.   NECK: Trachea midline.   CARDIOVASCULAR: RRR. S1, S2. + systolic murmurs. No rubs, or gallops.   RESPIRATORY: Effort normal on RA. Bibasilar rales with remaining lung fields CTA, no use of " accessory muscles, no retractions, respirations unlabored   GI: Abdomen distended but soft, non-tender abdomen without rebound or guarding, no hepatosplenomegaly, no palpable masses, normoactive bowel sounds present  MUSCULOSKELETAL: Mild peripheral edema. No calf asymmetry, erythema, or tenderness.   NEUROLOGICAL: Unable to participate if full exam. Asterixis on exam. Moving extremities symmetrically. PERRLA.   SKIN: Intact. Warm and dry. No rashes or lesions. Jaundice     Data   Recent Labs   Lab 07/27/20  1051 07/27/20  0432 07/26/20  2215 07/26/20  1534 07/26/20  1232   WBC  --  6.1  --   --  8.8   HGB  --  6.2*  --   --  7.9*   MCV  --  102*  --   --  103*   PLT  --  45*  --   --  74*   INR  --  1.88*  --   --  1.26*    134  134 133 131* 127*   POTASSIUM  --  4.0  --  4.3 4.5   CHLORIDE  --  108  --  105 100   CO2  --  14*  --  14* 17*   BUN  --  77*  --  81* 86*   CR  --  3.92*  --  3.88* 4.19*   ANIONGAP  --  12  --  12 11   ELSIE  --  9.0  --  8.9 9.9   GLC  --  84  --  104* 112*   ALBUMIN  --  2.9*  --   --  2.6*   PROTTOTAL  --  5.0*  --   --  5.5*   BILITOTAL  --  10.4*  --   --  12.6*   ALKPHOS  --  248*  --   --  340*   ALT  --  25  --   --  36   AST  --  48*  --   --  68*   LIPASE  --   --   --   --  132     Recent Results (from the past 24 hour(s))   IR Paracentesis    Narrative    PROCEDURES:  1. Ultrasound guided paracentesis.    CLINICAL HISTORY: There is a patient with ascites. Diagnostic and  therapeutic paracentesis requested.    Comparisons: 7/13/2020.    Staff Radiologist: IMANI Thacker MD    Medications: The patient was placed on continuous monitoring. No  intravenous sedation was administered. The patient remained stable  throughout the procedure.    PROCEDURE: The patient understood the limitations, alternatives, and  risks of the procedure and requested the procedure be performed. Both  written and oral consent were obtained.    The right lower quadrant was prepped and draped in the usual  sterile  fashion. Ten to one volume mixture of 1% lidocaine solution was used  for local anesthesia. Under ultrasound guidance, a 5 [place] BetterPetesis needle catheter was advanced into the peritoneal space. Image  documenting position was entered into the patient's permanent record.    Catheter to vacuum drainage. 2800 cc ascites aspirated. Catheter  removed. Sterile dressing applied. No immediate complication.      Impression    IMPRESSION: Ultrasound guided paracentesis. 2800 cc ascites aspirated.    PLAN:  Fluid sent to lab for analysis.    I, NEGRA JESUS MD, attest that I was present in the procedure room  for the entire procedure.    NEGRA JESUS MD     Medications     - MEDICATION INSTRUCTIONS -         albumin human  50 g Intravenous Daily     [Held by provider] ARIPiprazole  5 mg Oral At Bedtime     azithromycin  250 mg Oral Daily     [Held by provider] busPIRone  15 mg Oral BID     cefTRIAXone  1 g Intravenous Q24H     ferrous sulfate  325 mg Oral BID     FLUoxetine  60 mg Oral Daily     folic acid  1 mg Oral Daily     lactulose  20 g Oral TID     midodrine  10 mg Oral TID w/meals     omeprazole  20 mg Oral Daily     rifaximin  550 mg Oral BID     sodium bicarbonate  650 mg Oral BID     sodium chloride (PF)  3 mL Intracatheter Q8H     thiamine  250 mg Intravenous Daily     ursodiol  500 mg Oral BID

## 2020-07-27 NOTE — PLAN OF CARE
PT: Pt in IR, also presenting with low hemoglobin. OT to assess pt first when medically appropriate. Uncertain if pt will have PT needs per chart review. Will hold until further determined.

## 2020-07-27 NOTE — PLAN OF CARE
Med gold crosscover paged regarding critical lab values of HBG 6.2, platelets 45.   Continuing to monitor & follow POC.

## 2020-07-27 NOTE — PLAN OF CARE
"Status: Pt on 6a with AMS, hepatic encephalopathy. See MD note for medical history.   Vitals: BP 95/46 (BP Location: Left arm)   Pulse 87   Temp 97.1  F (36.2  C) (Oral)   Resp 12   Ht 1.549 m (5' 1\")   LMP 05/16/2012   SpO2 99%   BMI 23.62 kg/m    Neuros: Somnolent, improving at 0400 assessment but still drifts off to sleep mid-sentence. Disoriented to time. Difficult to assess. Speech garbled. Swallows normal but requires prompting. BUE 4, BLE 3. PRN lactulose given x1 (third dose of sequence started on evening shift).  IV: PIV infusing NS @ 125 ml/hr. Second PIV SL.   Resp/trach: On RA.   Diet: NPO ex meds.   Bowel status: Large BM x2. Abdomen distended.   : Bladder scanned for 375 at 0500, not straight cath'd at this time d/t difficulty getting accurate reading d/t ascites. Recheck 575  Skin: Skin alexander, yellowed. Sclera yellow.   Pain: Denies.   Activity: Up 2/GB/pivot to commode. Very unsteady- jerked forward repeatedly when pivoting.   Social: See MD notes.   Plan: Paracentesis today. Continue to monitor and follow POC.     "

## 2020-07-27 NOTE — PLAN OF CARE
Arrived from  at 1445. Intermittently alert, oriented to self and place. VSS on RA, except for soft BPs. Up A1 to BSC. Straight cath x1 for 300ml and voided spontaneously x1. Had 1 loose BM this shift. NPO except for meds. L and R PIV infusing intermittent abx. Had 2 units of blood this shift for a hgb of 6.2. Given PRN lactulose x1 for [place] of stage 3. COVID came back negative and MD removed precautions. Will continue to monitor and follow POC.

## 2020-07-27 NOTE — PROCEDURES
[facility], [place]    Procedure: IR Procedure Note    Date/Time: 7/27/2020 9:49 AM  Performed by: Joe Thacker MD  Authorized by: Joe Thacker MD     UNIVERSAL PROTOCOL   Site Marked: NA  Prior Images Obtained and Reviewed:  Yes  Required items: Required blood products, implants, devices and special equipment available    Patient identity confirmed:  Verbally with patient, arm band, provided demographic data and hospital-assigned identification number  Patient was reevaluated immediately before administering moderate or deep sedation or anesthesia  Confirmation Checklist:  Patient's identity using two indicators, relevant allergies, procedure was appropriate and matched the consent or emergent situation and correct equipment/implants were available  Time out: Immediately prior to the procedure a time out was called    Universal Protocol: the Joint Commission Universal Protocol was followed    Preparation: Patient was prepped and draped in usual sterile fashion           ANESTHESIA    Anesthesia: Local infiltration  Local Anesthetic:  Lidocaine 1% without epinephrine    See dictated procedure note for full details.  Findings: -Successful paracentesis.     Specimens: fluid and/or tissue for gram stain and culture    Complications: None    Condition: Stable    Plan: -Patient to return PRN.     PROCEDURE   Patient Tolerance:  Patient tolerated the procedure well with no immediate complications    Length of time physician/provider present for 1:1 monitoring during sedation: 0

## 2020-07-27 NOTE — CONSULTS
HEPATOLOGY CONSULTATION      Date of Admission:  7/26/2020          ASSESSMENT AND RECOMMENDATIONS:     54 year old female with a medical history as documented below, but pertinent for alcoholic cirrhosis, decompensated mainly due to hepatic encephalopathy and ascites, nonadherence to medication, and chronic kidney disease s/p nephrectomy; admitted into the hospital for altered mental status; and being seen by hepatology service for same.      #. Altered mental status:  Likely HE (improvement after some lactulose). Likely precipitants include poor compliance, dehydration, infection. Also concerned for some contribution from uremia (BUN 77). Will need head CT today.     #. Acute kidney injury  Pre-renal based on urinalysis, will need infectious work up.    #. Cirrhosis:  Decompensated disease, based on the presence of HE, ascited  Etiology: Alcohol  MELD-Na of  35 (>60% 90-day mortality)    Ascites: Noted on examination/ultrasound    Esophageal/Gastric varices: Last EGD was done 4/2019 with no EoV/GoV  Hepatocellular carcinoma: Last USS was done 6/2020 and no HCC/PVT  Transplant: She was last seen by Dr. Kovacs in June 2020 [phone visit] he expresses concern for alcohol relapse post transplant.  She has also undergone evaluation however she is a liver candidate based on her frailty and poor neuropsych testing.        RECOMMENDATIONS:  -- Rifaximin 550 mg BID  -- Lactulose PO, titrate to 3-4 BMs per day  -- Check PETH  -- Obtain CT of the head  -- Obtain ultrasound with dopplers  -- Ensure sodium restriction to 2000 mg per day  -- Hold diuretics  -- Albumin challenge with 1g/kg of albumin (max 100g) daily for 2 days  -- Infectious diseases work up   - Blood cultures   - CXR   - Diagnostic paracentesis with cell count, gram stain/culture, cytology, albumin, protein   - Urine culture  -- Monitor UOP, BMP, I/Os closely  -- Avoid nephrotoxins, NSAIDs  -- IV Vitamin K 10 mg daily for 3 days  -- Adequate protein diet (1.2  - 1.5g/kg/day)   - Recommend multiple meals during the day, Snacks and shakes during the day/night   - Can use Ensure/Boost drinks TID     Thank you for involving us in this patient's care. Please do not hesitate to contact the GI service with any questions or concerns.     Patient care plan discussed with Dr. Suero, GI staff physician.    Laure Villa MD  Transplant Hepatology Fellow  PGY 6 ([phone])            Chief Complaint:   We were asked by Lucie Bowie of Medicine to evaluate this patient with cirrhosis  History is obtained from the patient and the medical record.          History of Present Illness:   Monalisa Olguin is a 54 year old female with a medical history as documented below, but pertinent for alcoholic cirrhosis, decompensated mainly due to hepatic encephalopathy and ascites, nonadherence to medication, and chronic kidney disease s/p nephrectomy; admitted into the hospital for altered mental status; and being seen by hepatology service for same.   At time of evaluation, patient is unable to provide reliable provide a history but states that she knows that she is in the hospital.  She denies any abdominal pain, and has not noticed any blood in her stools.  She denies any vomiting, does not have any fevers.  Per notes, her  states that she vomited her lactulose yesterday and has had worsening now for the last few days.  Last ultrasound was 6/2020 and it was negative for portal venous thrombosis and HCC.  He had an upper endoscopy 4/2019 that was negative for esophageal or gastric varices.  She was last seen by Dr. Kovacs in June 2020 [phone visit] he expresses concern for alcohol relapse post transplant.  She has also undergone evaluation however she is a liver candidate based on her frailty and poor neuropsych testing.               Past Medical History:   Reviewed and edited as appropriate  Past Medical History:   Diagnosis Date     Acute alcoholic intoxication in alcoholism  (H) 3/27/2018     Acute renal failure (H) 2019     Alcohol abuse 2013     Alcohol dependence 2013     Alcohol withdrawal 2013     Alcoholic cirrhosis (H)      Anxiety 10/10/2011     CKD (chronic kidney disease) stage 3, GFR 30-59 ml/min (H)     last GFR was 42     CKD (chronic kidney disease) stage 3, GFR 30-59 ml/min (H) 2011     CKD (chronic kidney disease) stage 5, GFR less than 15 ml/min (H) 2020     Depressive disorder      Esophageal reflux 10/7/2002     Hematochezia-patient reported 2019     Heme positive stool 3/27/2018     Hepatic encephalopathy (H) 2019     History of blood transfusion      [facility]     Hypertension goal BP (blood pressure) < 130/80 2011     Hyponatremia 2019     Moderate Depression [296.32] 2009    stable on wellbutrin     Other internal derangement of knee(717.89)     ACL Internal derangement, knee/ original injury in 5th grade, torn cartilage     Pap smear     no abnormals, due for paps q 2-3 yrs     SBP (spontaneous bacterial peritonitis) (H) 2019     Thyroid disease      Unspecified essential hypertension             Past Surgical History:   Reviewed and edited as appropriate   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     COLONOSCOPY N/A 2017    Procedure: COLONOSCOPY;  Colonoscopy;  Surgeon: Sai Johnston MD;  Location: [facility]     ESOPHAGOSCOPY, GASTROSCOPY, DUODENOSCOPY (EGD), COMBINED N/A 2017    Procedure: COMBINED ESOPHAGOSCOPY, GASTROSCOPY, DUODENOSCOPY (EGD), BIOPSY SINGLE OR MULTIPLE;  ESOPHAGOSCOPY, GASTROSCOPY, DUODENOSCOPY (EGD) with biopsies;  Surgeon: Sai Johnston MD;  Location: [facility]     ESOPHAGOSCOPY, GASTROSCOPY, DUODENOSCOPY (EGD), COMBINED N/A 2019    Procedure: ESOPHAGOGASTRODUODENOSCOPY, WITH BIOPSY;  Surgeon: Edgardo Scott,  DO;  Location: [facility]     HC KNEE SCOPE, DIAGNOSTIC  11/14/01    Arthroscopy, Lt Knee     IR PARACENTESIS  6/8/2020     IR PARACENTESIS  7/27/2020     LAPAROSCOPIC CHOLECYSTECTOMY N/A 9/24/2017    Procedure: LAPAROSCOPIC CHOLECYSTECTOMY;  laparoscopic cholecystectomy;  Surgeon: Romeo Pastor MD;  Location: [facility]     OPEN REDUCTION INTERNAL FIXATION WRIST Right 11/29/2017    Procedure: OPEN REDUCTION INTERNAL FIXATION WRIST;  OPEN REDUCTION INTERNAL FIXATION RIGHT WRIST;  Surgeon: Edgardo Almendarez MD;  Location: [facility]     TRANSPLANT      Donated Left Kidney in 2000            Previous Endoscopy:     Results for orders placed or performed during the hospital encounter of 09/08/17   COLONOSCOPY   Result Value Ref Range    [place] [address] [phone]     Endoscopy Department  _______________________________________________________________________________  Patient Name: Monalisa Olguin          Procedure Date: 9/8/2017 2:39 PM  MRN: [id number]                       Account Number: [id number]  YOB: 1965              Admit Type: Outpatient  Age: 51                               Room: Room 1  Gender: Female                        Note Status: Finalized  Attending MD: Sai Johnston MD      Total Sedation Time:   _______________________________________________________________________________     Procedure:           Colonoscopy  Indications:         Screening for colorectal malignant neoplasm, This is the                        patient's first colonoscopy  Providers:           Sai Johnston MD  Referring MD:        Efren Bustos MD  Medicines:           Midazolam 5 mg IV, Fentanyl 50 micrograms IV  Complications:       No immedia te complications.  _______________________________________________________________________________  Procedure:           Pre-Anesthesia Assessment:                       - ASA Grade  Assessment: I - A normal, healthy patient.                       After obtaining informed consent, the colonoscope was                        passed under direct vision. Throughout the procedure,                        the patient's blood pressure, pulse, and oxygen                        saturations were monitored continuously. The Colonoscope                        was introduced through the anus with the intention of                        advancing to the cecum. The scope was advanced to the                        transverse colon before the procedure was aborted.                        Medications were given. The patient tolerated the                        procedure well. The quality of the bowel preparation was                        excellent.                                                                                    Findings:       Internal hemorrhoids were found during retroflexion. The hemorrhoids        were Grade I (internal hemorrhoids that do not prolapse).       The proximal transverse colon and the hepatic flexure were significantly        angulated and produced significant looping of the colonoscope despite        different maneuvers, positions, etc. The procedure was aborted due to        patient discomfort (additional medications were given to the point of        vital sgn tolerence and despite this patient discomfort continued).       No recurrent neoplastic or malignant processes were noted during this        study to the extent it was visualized.                                                                                   Moderate Sedation:       Moderate (conscious) sedation was administered by the endoscopy nurse        and supervised by the endoscopist. The following parameters were        monitored: oxygen saturation, heart rate, blood pressure, res piratory        rate, EKG, adequacy of pulmonary ventilation, and response to care.        Total physician intraservice time was  16 minutes.  Impression:          - Internal hemorrhoids.                       - There was significant looping of the colon.                       - No specimens collected.                       - The exam was suboptimal due to patient discomfort.  Recommendation:      - Discharge patient to home.                       - Reschedule colonoscopy with MAC                       - This patient may now resume his/her routine                        activities, diet, and medications.                       - Patient has a contact number available for                        emergencies. The signs and symptoms of potential delayed                        complications were discussed with the patient. Return to                        normal activities tomorrow. Written discharge                        instructions were provided to the patient.                                                                                      _________________  Sai Johnston MD  9/8/2017 3:15:28 PM  I was physically present for the entire viewing portion of the exam.Sai Johnston MD  Number of Addenda: 0    Note Initiated On: 9/8/2017 2:39 PM            Social History:   Reviewed and edited as appropriate  Social History     Socioeconomic History     Marital status:      Spouse name: Eron     Number of children: 3     Years of education: 14     Highest education level: Not on file   Occupational History     Occupation: Homemaker   Social Needs     Financial resource strain: Not very hard     Food insecurity     Worry: Never true     Inability: Never true     Transportation needs     Medical: No     Non-medical: No   Tobacco Use     Smoking status: Current Every Day Smoker     Packs/day: 0.50     Years: 10.00     Pack years: 5.00     Types: Cigarettes     Smokeless tobacco: Never Used     Tobacco comment: pt quit 1992 after smoking for 8 yrs, started again in 2004   Substance and Sexual Activity     Alcohol use: Not Currently      "Alcohol/week: 0.0 standard drinks     Comment: Quit drinking 9/2019     Drug use: Not Currently     Types: Marijuana     Sexual activity: Yes     Partners: Male     Birth control/protection: Surgical     Comment:  had vasectomy   Lifestyle     Physical activity     Days per week: 0 days     Minutes per session: 0 min     Stress: Rather much   Relationships     Social connections     Talks on phone: Not on file     Gets together: Not on file     Attends Zoroastrianism service: Not on file     Active member of club or organization: Not on file     Attends meetings of clubs or organizations: Not on file     Relationship status: Not on file     Intimate partner violence     Fear of current or ex partner: Not on file     Emotionally abused: Not on file     Physically abused: Not on file     Forced sexual activity: Not on file   Other Topics Concern      Service No     Blood Transfusions No     Caffeine Concern No     Occupational Exposure No     Hobby Hazards No     Sleep Concern No     Stress Concern No     Weight Concern No     Special Diet No     Back Care No     Exercise No     Bike Helmet No     Seat Belt Yes     Self-Exams No     Parent/sibling w/ CABG, MI or angioplasty before 65F 55M? Yes   Social History Narrative     and lives at home with her  and her son, her two daughters are out of the house          Family History:   Reviewed and edited as appropriate  No known history of gastrointestinal/liver disease or  gastrointestinal malignancies       Allergies:   Reviewed and edited as appropriate     Allergies   Allergen Reactions     Albuterol      Tongue \"hardened and painful\"          Medications:     Current Facility-Administered Medications   Medication     acetaminophen (TYLENOL) tablet 650 mg     albumin human 25 % injection 50 g     ARIPiprazole (ABILIFY) tablet 5 mg     azithromycin (ZITHROMAX) tablet 250 mg     busPIRone (BUSPAR) tablet 15 mg     cefTRIAXone (ROCEPHIN) 2 g vial to " "attach to  ml bag for ADULTS or NS 50 ml bag for PEDS     Daily 2 GRAM acetaminophen limit, unless fulminent liver failure or transaminases greater than or equal to 300 - 400, then none     ferrous sulfate (FEROSUL) tablet 325 mg     FLUoxetine (PROzac) capsule 60 mg     folic acid (FOLVITE) tablet 1 mg     lactulose (CHRONULAC) solution 20 g    Or     lactulose (CHRONULAC) solution for enema prep 100 g     lactulose (CHRONULAC) solution 20 g     lidocaine (LMX4) cream     lidocaine 1 % 0.1-1 mL     melatonin tablet 1 mg     midodrine (PROAMATINE) tablet 10 mg     naloxone (NARCAN) injection 0.1-0.4 mg     omeprazole (priLOSEC) CR capsule 20 mg     ondansetron (ZOFRAN-ODT) ODT tab 4 mg    Or     ondansetron (ZOFRAN) injection 4 mg     polyethylene glycol (MIRALAX) Packet 17 g     rifaximin (XIFAXAN) tablet 550 mg     senna-docusate (SENOKOT-S/PERICOLACE) 8.6-50 MG per tablet 1 tablet    Or     senna-docusate (SENOKOT-S/PERICOLACE) 8.6-50 MG per tablet 2 tablet     sodium bicarbonate tablet 650 mg     sodium chloride (PF) 0.9% PF flush 3 mL     sodium chloride (PF) 0.9% PF flush 3 mL     thiamine (B-1) 250 mg in sodium chloride 0.9 % 50 mL intermittent infusion     ursodiol (ACTIGALL) tablet 500 mg             Review of Systems:     A complete review of systems was performed and is negative except as noted in the HPI           Physical Exam:   BP 97/51   Pulse 101   Temp 95.8  F (35.4  C) (Oral)   Resp 18   Ht 1.549 m (5' 1\")   LMP 05/16/2012   SpO2 97%   BMI 23.62 kg/m    Wt:   Wt Readings from Last 2 Encounters:   06/22/20 56.7 kg (125 lb)   06/16/20 58.5 kg (129 lb)      Constitutional: cooperative, pleasant, not dyspneic   Eyes: Sclera icteric   Ears/nose/mouth/throat: Normal oropharynx without ulcers or exudate   Neck: supple  CV: Mild edema  Respiratory: Unlabored breathing  Lymph: NAD  Abdomen: Distended, +bs, no hepatosplenomegaly, nontender, no peritoneal signs  Skin: warm, perfused   Neuro: " Oriented to place, asterixis  Psych: Lisbet  MSK: In bed         Data:   Labs and imaging below were independently reviewed and interpreted  MELD-Na score: 35 at 7/27/2020 10:51 AM  MELD score: 35 at 7/27/2020  4:32 AM  Calculated from:  Serum Creatinine: 3.92 mg/dL at 7/27/2020  4:32 AM  Serum Sodium: 136 mmol/L at 7/27/2020 10:51 AM  Total Bilirubin: 10.4 mg/dL at 7/27/2020  4:32 AM  INR(ratio): 1.88 at 7/27/2020  4:32 AM  Age: 54 years 9 months     BMP  Recent Labs   Lab 07/27/20  1051 07/27/20  0432 07/26/20  2215 07/26/20  1534 07/26/20  1232    134  134 133 131* 127*   POTASSIUM  --  4.0  --  4.3 4.5   CHLORIDE  --  108  --  105 100   ELSIE  --  9.0  --  8.9 9.9   CO2  --  14*  --  14* 17*   BUN  --  77*  --  81* 86*   CR  --  3.92*  --  3.88* 4.19*   GLC  --  84  --  104* 112*     CBC  Recent Labs   Lab 07/27/20  0432 07/26/20  1232   WBC 6.1 8.8   RBC 1.90* 2.39*   HGB 6.2* 7.9*   HCT 19.4* 24.5*   * 103*   MCH 32.6 33.1*   MCHC 32.0 32.2   RDW 21.5* 21.4*   PLT 45* 74*     INR  Recent Labs   Lab 07/27/20  0432 07/26/20  1232   INR 1.88* 1.26*     LFTs  Recent Labs   Lab 07/27/20  0432 07/26/20  1232   ALKPHOS 248* 340*   AST 48* 68*   ALT 25 36   BILITOTAL 10.4* 12.6*   PROTTOTAL 5.0* 5.5*   ALBUMIN 2.9* 2.6*      PANC  Recent Labs   Lab 07/26/20  1232   LIPASE 132    ATTENDING NOTE, GASTROENTEROLOGY/HEPATOLOGY    I saw and discussed this patient with the fellow and participated in the decision making. I agree with the fellow's note. Savannah Suero MD

## 2020-07-27 NOTE — CONSULTS
INTERVENTIONAL RADIOLOGY CONSULT    Monalisa Olguin is a 54 year old female with decompensated alcoholic cirrhosis with ascites. IR has been consulted for diagnostic and therapeutic paracentesis with 3 L drain limit due to ALFREDO. Patient is on IR schedule today for a diagnostic and therapeutic paracentesis. Labs WNL for procedure. No NPO required. Consent will be done prior to procedure.     Manoj Ko PA-C  Interventional Radiology  [phone]

## 2020-07-27 NOTE — PLAN OF CARE
Status: Admitted with altered mental status.Had paracentesis this AM, then abdominal ultrasound. MD is calling family for consent to give blood.   Vitals: BP runs very low 93/50 most recently.  Neuros: Very sleepy, arouses to voice, but falls back to sleep easily.Was able to take pills with stimulation to stay awake and then did stay awake for a couple mins after that.   IV: Bhavik PIV's L IV infusing at 125 ml/hr. R SL'd.  Resp/trach: rales in bases per MD.  Diet: NPO x meds  Bowel status: No BM's this shift. Had 2 watery BM's on nights.  : No void. Was cathed at 730 am for 275cc.  Skin: yellow  Pain: denies.  Activity: turns self in bed, was up to commode on night shift with 2 assist.  Plan: Symptomatic for covid, so transferring to  once able.Rapid covid test sent

## 2020-07-28 NOTE — PLAN OF CARE
"Time: 3906-6362    Reason for admission: Hepatic encephalopathy (H) [K72.90]  Acute renal failure, unspecified acute renal failure type (H) [N17.9]  Vitals: /59 (BP Location: Right arm)   Pulse 92   Temp 96.5  F (35.8  C) (Oral)   Resp 16   Ht 1.549 m (5' 1\")   LMP 05/16/2012   SpO2 96%   BMI 23.62 kg/m      Activity: A1 in room, pt walked around unit with PT using walker   Diet: Regular, good appetite   Pain: Denies  Respiratory: Non-labored breathing on RA. Continuous pulse oximetry, stating high 90s. LS clear   Cardiovascular: Heart murmur detected   GI: Loose stools x2 this shift   : WNL, using commode  Skin: Fragile, jaundice in eyes and skin, bruising   Neurologic: Pt disoriented to place, time, situation. Lethargic, follows commands. [place] q2h. Lactulose administered x2.   Labs: Platelets 30,000, Hbg 8.0. COVID negative   Lines: R and L PIV, saline locked        New changes this shift: Abdominal US this AM. Plan of care discussed with daughter. She has been in contact with social work but would like to discuss more long term options in terms of a living facility. CIWA-Ar scale ordered every shift. PT/OT consult.     Plan: Continue abx, daily thiamine and albumin infusions, keep daughter informed of care, monitor labs for kidney function, monitor mental status.   Expected discharge: 4 - 7 days, recommended to transitional care unit once hemoglobin stable, mental status at baseline and renal function improved.    "

## 2020-07-28 NOTE — CONSULTS
Nephrology Initial Consult  July 27, 2020    Monalisa Olguin MRN:[id number] YOB: 1965  Date of Admission:7/26/2020  Primary care provider: Efren Bustos  Requesting physician: Arden Hamilton    ASSESSMENT AND RECOMMENDATIONS:  ALFREDO on CKD3  Solitary kidney  Concern for hepatorenal syndrome, however patient could also have hepatorenal physiology with more of a pre-renal picture in the setting of her AMS and vomiting PTA. Urinalysis with hyaline casts, otherwise bland. Renal US normal. Agree with albumin challenge and should start octreotide as well, patient already on midodrine PTA  - obtain FENA (ordered)  - please started subcutaneous octreotide 100 mcg TID    Hyponatremia, likely hypovolemic  Improved with fluids    Metabolic acidosis, likely in the setting of ALFREDO and CKD as above  - increase Na bicarb to 1300 mg BID    Anemia  Obtain iron studies (ordered)  Transfusion per primary team    Recommendations were communicated to primary team via this note    Seen and discussed with Dr. Patti Bosch MD   Renal Fellow  [phone]    REASON FOR CONSULT: ALFREDO    HISTORY OF PRESENT ILLNESS:  Admitting provider and nursing notes reviewed  Monalisa Olguin is a 54 year old female with history of decompensated etoh cirrhosis c/b ascites with SBP, HE, portal HTN, CKD s/p nephrectomy (gave kidney to sister), chronic anemia and thrombocytopenia, GERD, and chronic pain who was admitted to [facility] 7/26 with encephalopathy. Noted to have Cr of 4.2 on admission, baseline 1.5-1.6.    PAST MEDICAL HISTORY:  Past Medical History:   Diagnosis Date     Acute alcoholic intoxication in alcoholism (H) 3/27/2018     Acute renal failure (H) 11/11/2019     Alcohol abuse 5/2/2013     Alcohol dependence 5/25/2013     Alcohol withdrawal 5/2/2013     Alcoholic cirrhosis (H)      Anxiety 10/10/2011     CKD (chronic kidney disease) stage 3, GFR 30-59 ml/min (H) 2006    last GFR was 42     CKD (chronic kidney  disease) stage 3, GFR 30-59 ml/min (H) 2011     CKD (chronic kidney disease) stage 5, GFR less than 15 ml/min (H) 2020     Depressive disorder      Esophageal reflux 10/7/2002     Hematochezia-patient reported 2019     Heme positive stool 3/27/2018     Hepatic encephalopathy (H) 2019     History of blood transfusion      [facility]     Hypertension goal BP (blood pressure) < 130/80 2011     Hyponatremia 2019     Moderate Depression [296.32] 2009    stable on wellbutrin     Other internal derangement of knee(717.89)     ACL Internal derangement, knee/ original injury in 5th grade, torn cartilage     Pap smear     no abnormals, due for paps q 2-3 yrs     SBP (spontaneous bacterial peritonitis) (H) 2019     Thyroid disease      Unspecified essential hypertension        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     COLONOSCOPY N/A 2017    Procedure: COLONOSCOPY;  Colonoscopy;  Surgeon: Sai Johnston MD;  Location: [facility]     ESOPHAGOSCOPY, GASTROSCOPY, DUODENOSCOPY (EGD), COMBINED N/A 2017    Procedure: COMBINED ESOPHAGOSCOPY, GASTROSCOPY, DUODENOSCOPY (EGD), BIOPSY SINGLE OR MULTIPLE;  ESOPHAGOSCOPY, GASTROSCOPY, DUODENOSCOPY (EGD) with biopsies;  Surgeon: Sai Johnston MD;  Location:  GI     ESOPHAGOSCOPY, GASTROSCOPY, DUODENOSCOPY (EGD), COMBINED N/A 2019    Procedure: ESOPHAGOGASTRODUODENOSCOPY, WITH BIOPSY;  Surgeon: Edagrdo Scott DO;  Location: [facility]     HC KNEE SCOPE, DIAGNOSTIC  01    Arthroscopy, Lt Knee     IR PARACENTESIS  2020     IR PARACENTESIS  2020     LAPAROSCOPIC CHOLECYSTECTOMY N/A 2017    Procedure: LAPAROSCOPIC CHOLECYSTECTOMY;  laparoscopic cholecystectomy;  Surgeon: Romeo Pastor MD;  Location: [facility]     OPEN REDUCTION INTERNAL FIXATION WRIST Right  11/29/2017    Procedure: OPEN REDUCTION INTERNAL FIXATION WRIST;  OPEN REDUCTION INTERNAL FIXATION RIGHT WRIST;  Surgeon: Edgardo Almendarez MD;  Location: [facility] OR     TRANSPLANT      Donated Left Kidney in 2000      MEDICATIONS:  PTA Meds  Prior to Admission medications    Medication Sig Last Dose Taking? Auth Provider   acetaminophen (TYLENOL) 500 MG tablet Take 1 tablet (500 mg) by mouth every 6 hours as needed for mild pain   Jennifer Mohr MD   ARIPiprazole (ABILIFY) 5 MG tablet Take 1 tablet (5 mg) by mouth At Bedtime   Jennifer Mohr MD   busPIRone (BUSPAR) 15 MG tablet Take 1 tablet (15 mg) by mouth 2 times daily   Jennifer Mohr MD   ciprofloxacin (CIPRO) 250 MG tablet Take 1 tablet (250 mg) by mouth daily   Edgardo Kovacs MD   Ferrous Sulfate 324 (65 Fe) MG TBEC TAKE ONE TABLET BY MOUTH TWICE A DAY   Efren Bustos MD   FLUoxetine (PROZAC) 20 MG capsule TAKE THREE CAPSULES BY MOUTH ONCE DAILY   Schoen, Gregory G, MD   folic acid (FOLVITE) 1 MG tablet Take 1 tablet (1 mg) by mouth daily   Jennifer Mohr MD   furosemide (LASIX) 40 MG tablet Take 1 tablet (40 mg) by mouth daily   Edgardo Kovacs MD   gabapentin (NEURONTIN) 100 MG capsule Take 3 capsules (300 mg) by mouth At Bedtime   Jennifer Mohr MD   lactulose (CONSTULOSE) 10 GM/15ML solution Take 30 mLs (20 g) by mouth 3 times daily   Edgardo Kovacs MD   nicotine (NICODERM CQ) 21 MG/24HR 24 hr patch Place 1 patch onto the skin every 24 hours  Patient not taking: Reported on 6/26/2020   Jennifer Mohr MD   omeprazole (PRILOSEC) 20 MG DR capsule Take 1 capsule (20 mg) by mouth daily   Jennifer Mohr MD   ondansetron (ZOFRAN) 4 MG tablet Take 1 tablet (4 mg) by mouth every 6 hours as needed for nausea   Jennifer Mohr MD   rifaximin (XIFAXAN) 550 MG TABS tablet Take 1 tablet (550 mg) by mouth 2 times daily   Jennifer Mohr MD   sodium bicarbonate 650 MG tablet Take 1 tablet (650 mg) by  "mouth 2 times daily   Jennifer Mohr MD   spironolactone (ALDACTONE) 25 MG tablet Take 1 tablet (25 mg) by mouth daily   Edgardo Kovacs MD   traZODone (DESYREL) 50 MG tablet Take 2 tablets (100 mg) by mouth nightly as needed for sleep   Jennifer Mohr MD   ursodiol (ACTIGALL) 500 MG tablet Take 1 tablet (500 mg) by mouth 2 times daily   Jennifer Mohr MD   vitamin B1 (THIAMINE) 100 MG tablet Take 1 tablet (100 mg) by mouth daily   Jennifer Mohr MD      Current Meds    albumin human  50 g Intravenous Daily     [Held by provider] ARIPiprazole  5 mg Oral At Bedtime     azithromycin  250 mg Oral Daily     [Held by provider] busPIRone  15 mg Oral BID     cefTRIAXone  1 g Intravenous Q24H     ferrous sulfate  325 mg Oral BID     FLUoxetine  60 mg Oral Daily     folic acid  1 mg Oral Daily     lactulose  20 g Oral TID     midodrine  10 mg Oral TID w/meals     omeprazole  20 mg Oral Daily     phytonadione  10 mg Intravenous Daily     rifaximin  550 mg Oral BID     sodium bicarbonate  650 mg Oral BID     sodium chloride (PF)  3 mL Intracatheter Q8H     thiamine  250 mg Intravenous Daily     ursodiol  500 mg Oral BID     Infusion Meds    - MEDICATION INSTRUCTIONS -       ALLERGIES:   Allergies   Allergen Reactions     Albuterol      Tongue \"hardened and painful\"     REVIEW OF SYSTEMS:  Patient not examined due to COVID PUI    SOCIAL HISTORY:   Social History     Socioeconomic History     Marital status:      Spouse name: Eron     Number of children: 3     Years of education: 14     Highest education level: Not on file   Occupational History     Occupation: Homemaker   Social Needs     Financial resource strain: Not very hard     Food insecurity     Worry: Never true     Inability: Never true     Transportation needs     Medical: No     Non-medical: No   Tobacco Use     Smoking status: Current Every Day Smoker     Packs/day: 0.50     Years: 10.00     Pack years: 5.00     Types: Cigarettes "     Smokeless tobacco: Never Used     Tobacco comment: pt quit 1992 after smoking for 8 yrs, started again in 2004   Substance and Sexual Activity     Alcohol use: Not Currently     Alcohol/week: 0.0 standard drinks     Comment: Quit drinking 9/2019     Drug use: Not Currently     Types: Marijuana     Sexual activity: Yes     Partners: Male     Birth control/protection: Surgical     Comment:  had vasectomy   Lifestyle     Physical activity     Days per week: 0 days     Minutes per session: 0 min     Stress: Rather much   Relationships     Social connections     Talks on phone: Not on file     Gets together: Not on file     Attends Jew service: Not on file     Active member of club or organization: Not on file     Attends meetings of clubs or organizations: Not on file     Relationship status: Not on file     Intimate partner violence     Fear of current or ex partner: Not on file     Emotionally abused: Not on file     Physically abused: Not on file     Forced sexual activity: Not on file   Other Topics Concern      Service No     Blood Transfusions No     Caffeine Concern No     Occupational Exposure No     Hobby Hazards No     Sleep Concern No     Stress Concern No     Weight Concern No     Special Diet No     Back Care No     Exercise No     Bike Helmet No     Seat Belt Yes     Self-Exams No     Parent/sibling w/ CABG, MI or angioplasty before 65F 55M? Yes   Social History Narrative     and lives at home with her  and her son, her two daughters are out of the house       FAMILY MEDICAL HISTORY:   Family History   Problem Relation Age of Onset     Hypertension Mother         80 years old     Heart Disease Paternal Grandmother      Heart Disease Paternal Grandfather      Cerebrovascular Disease Maternal Grandmother      Cerebrovascular Disease Maternal Grandfather      Alcohol/Drug Maternal Grandfather      Substance Abuse Maternal Grandfather      Heart Disease Father          "Silent MI stents x 2     Diabetes Sister 17        older sister also had kidney and pancreas transplant     Heart Disease Sister         MI secondary to diabetes     Genitourinary Problems Sister 43        kidney transplant from renal failure     Other - See Comments Sister         older     Other - See Comments Sister         younger     Diabetes Sister 9        youngest, juvenile type I (onset age 9 with no complications)     Cancer Paternal Aunt         ?kind     PHYSICAL EXAM:   Temp  Av.1  F (35.6  C)  Min: 95.1  F (35.1  C)  Max: 97.1  F (36.2  C)      Pulse  Av.1  Min: 87  Max: 101 Resp  Avg: 15.1  Min: 8  Max: 19  SpO2  Av.6 %  Min: 90 %  Max: 100 %       /55 (BP Location: Left arm)   Pulse 92   Temp 95.4  F (35.2  C) (Oral)   Resp 18   Ht 1.549 m (5' 1\")   LMP 2012   SpO2 97%   BMI 23.62 kg/m     Date 20 0700 - 20 0659   Shift 9525-3552 2540-7222 2837-1256 24 Hour Total   INTAKE   Colloid 200   200   Blood Components  300  300   Shift Total 200 300  500   OUTPUT   Urine 275 300  575   Shift Total 275 300  575   Weight (kg)          Patient not examined due to COVID DEB    LABS:   CMP  Recent Labs   Lab 20  1051 20  0432 20  2215 20  1534 20  1232    134  134 133 131* 127*   POTASSIUM  --  4.0  --  4.3 4.5   CHLORIDE  --  108  --  105 100   CO2  --  14*  --  14* 17*   ANIONGAP  --  12  --  12 11   GLC  --  84  --  104* 112*   BUN  --  77*  --  81* 86*   CR  --  3.92*  --  3.88* 4.19*   GFRESTIMATED  --  12*  --  12* 11*   GFRESTBLACK  --  14*  --  14* 13*   ELSIE  --  9.0  --  8.9 9.9   PROTTOTAL  --  5.0*  --   --  5.5*   ALBUMIN  --  2.9*  --   --  2.6*   BILITOTAL  --  10.4*  --   --  12.6*   ALKPHOS  --  248*  --   --  340*   AST  --  48*  --   --  68*   ALT  --  25  --   --  36     CBC  Recent Labs   Lab 20  1232   HGB 8.6* 6.2* 7.9*   WBC  --  6.1 8.8   RBC  --  1.90* 2.39*   HCT  --  " 19.4* 24.5*   MCV  --  102* 103*   MCH  --  32.6 33.1*   MCHC  --  32.0 32.2   RDW  --  21.5* 21.4*   PLT  --  45* 74*     INR  Recent Labs   Lab 07/27/20  0432 07/26/20  1232   INR 1.88* 1.26*   PTT  --  29     ABGNo lab results found in last 7 days.   URINE STUDIES  Recent Labs   Lab Test 07/26/20  1546 06/05/20  1445 06/02/20  1535 05/04/20  1109 04/22/20  1933  03/17/15  0941 06/25/12  1528   COLOR Yellow Dark Yellow Dark Yellow Yellow Dark Yellow   < > Orange Yellow   APPEARANCE Clear Slightly Cloudy Cloudy Clear Clear   < > Clear Cloudy   URINEGLC Negative Negative Negative Negative Negative   < > Negative Negative   URINEBILI Small* Moderate* Small* Small* Small*   < > Large  This is an unconfirmed screening test result. A positive result may be false.  * Negative   URINEKETONE Negative Negative Negative Negative Negative   < > 40* Negative   SG 1.010 1.015 1.013 1.008 1.014   < > 1.025 >1.030   UBLD Negative Trace* Moderate* Negative Negative   < > Trace* Moderate*   URINEPH 5.5 5.5 5.0 6.0 5.5   < > 5.5 6.0   PROTEIN Negative 10* 10* Negative Negative   < > 100* 100*   UROBILINOGEN  --   --   --   --   --   --  1.0 0.2   NITRITE Negative Negative Negative Negative Negative   < > Positive* Negative   LEUKEST Negative Large* Large* Negative Trace*   < > Trace* Large*   RBCU <1 4* 21*  --  2   < > O - 2  UNCONCENTRATED URINE   O - 2   WBCU 3 150* 30*  --  4   < > O - 2  UNCONCENTRATED URINE   >100*    < > = values in this interval not displayed.     Recent Labs   Lab Test 11/28/17  0940   UTPG 0.08     PTH  Recent Labs   Lab Test 06/02/20  1744   PTHI 8*     IRON STUDIES  Recent Labs   Lab Test 07/27/20  0432 06/27/19  1435 01/03/19  1442 10/03/18  1524 07/31/18  1723 07/05/18  0945 03/21/18  1331 12/20/17  1403 03/26/17  1840 05/05/13  1318   IRON 143 213* 123 37 71 33* 27* 38 122 42   * 218* 180* 174* 134* 263 410 400 221* 267   IRONSAT 94* 98* 68* 21 53* 13* 7* 10* 55* 16   GABI 896* 575* 287* 382*  1,146* 86  --  11 349* 230     IMAGING:    EXAMINATION: US RENAL COMPLETE, 7/27/2020 11:39 AM      COMPARISON: None.     HISTORY: ALFREDO on CKD. Solitary kidney secondary to prior donation.     TECHNIQUE: The kidneys and bladder were scanned in the standard  fashion with specialized ultrasound transducer(s) using both gray  scale and limited color/spectral Doppler techniques.     FINDINGS:  Right kidney: Measures 10.3 cm in length. Parenchyma is of normal  thickness and echogenicity. No focal mass. No hydronephrosis.     Left kidney: Surgically absent. Bladder: Unremarkable.     Incidental note was made of heterogeneous echotexture of the liver, in  keeping with the patient's known cirrhosis. There is small to moderate  amount of ascites present in the visualized abdomen. No evidence of a  focal hepatic mass.                                                                      IMPRESSION:  1.  Normal size and appearance of right kidney. No hydronephrosis.  2.  Cirrhotic liver.  3.  Ascites.    James Bosch MD

## 2020-07-28 NOTE — PLAN OF CARE
Time: 4025-5719    Reason for admission: Encephalopathy  Vitals: VSS on RA except soft BPs  Activity:  Ax2 to C  Pain: Denies  Neuro: Lethargic, arouses to voice. [place] Stage 3 at 1730, first lactulose PRN dose given then. Scheduled dose given at 2000. 2/3 PRN dose given orally at 2130 c/b coughing, not appropriate to finish remaining second half of dose. 3/3 PRN dose given rectally at 2350 as pt has only had 1 BM on 7/27/20. [place] at 0200 Stage 2, MD notified, ammonia level ordered and resulted 51, 2nd enema not given. Disoriented to time (says it's 2012), place ([facility] - does not know in hospital), and situation. Able to speak more clearly, however cannot pass BM. Pt has had poor appetite, however and has not had much to eat.   Cardiac:  No acute issues.  Respiratory: Denies SOB. LS clear.  GI/:  Voids spontaneously.   Diet: Regular, poor appetite. NPO at midnight for abdominal US in AM.   Lines:  L PIV infusing TKO. R PIV SL.  Skin/Wounds: Jaundiced, generalized bruising.   Labs/imaging: Hgb 6.2, recheck 8.6. COVID negative.      New changes this shift: Finished giving 1 U RBCs for Hgb 6.2 with recheck 8.6. PRN lactulose given x 3 in addition to scheduled lactulose. Remains disoriented x 3, speech improving overnight but no BM.     Plan: Abd US. [place] q2h. PO azithromax and IV rocephin. Daily thiamine and albumin infusions.

## 2020-07-28 NOTE — PROGRESS NOTES
"  Nephrology Progress Note  07/28/2020         Assessment & Recommendations:   Monalisa Olguin is a 54 year old female with history of decompensated etoh cirrhosis c/b ascites with SBP, HE, portal HTN, CKD s/p nephrectomy (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), chronic anemia and thrombocytopenia, GERD, and chronic pain who was admitted on 7/26 with encephalopathy. Noted ALFREDO on CKD, prompting nephrology consult.    ALFREDO on CKD3  Solitary kidney  Concern for hepatorenal syndrome, however patient could also have hepatorenal physiology with more of a pre-renal picture in the setting of her AMS and vomiting PTA. Urinalysis with hyaline casts, otherwise bland. Renal US normal. Agree with albumin challenge and octreotide, patient already on midodrine PTA  - obtain FENA (ordered)  - started subcutaneous octreotide 100 mcg TID    Hyponatremia, likely hypovolemic  Improved with fluids    Metabolic acidosis, likely in the setting of ALFREDO and CKD as above  - increased Na bicarb to 1300 mg BID     Anemia  Iron studies without evidence of [facility]  Transfusion per primary team     Recommendations were communicated to primary team via this note     Seen and discussed with Dr. Patti Bosch MD   Renal fellow  [phone]    Interval History :   Nursing and provider notes from last 24 hours reviewed.  In the last 24 hours Monalisa Olguin remains altered but clinically stable. UOP somewhat improved    Review of Systems:   I reviewed the following systems:  GI: No nausea or vomiting. Notes loose stools  Neuro:  Confused  Constitutional:  no fever or chills  CV: no dyspnea or edema.  no chest pain.    Physical Exam:   I/O last 3 completed shifts:  In: 300   Out: 2300 [Urine:300; Other:2000]   /59 (BP Location: Right arm)   Pulse 92   Temp 96.5  F (35.8  C) (Oral)   Resp 16   Ht 1.549 m (5' 1\")   LMP 05/16/2012   SpO2 96%   BMI 23.62 kg/m       GENERAL APPEARANCE: Lying in bed, somnolent, in no distress  EYES:  Scleral " icterus, pupils equal, EOMI  HENT: mouth without ulcers or lesions  PULM: lungs clear to auscultation bilaterally, equal air movement  CV: regular rhythm, normal rate, no rub     -edema trace  GI: soft, non-tender, distended, bowel sounds are normal  INTEGUMENT: no rash on exposed surfaces  NEURO:  Somnolent, arouses to verbal stimuli, following commands    Labs:   All labs reviewed by me  Electrolytes/Renal -   Recent Labs   Lab Test 07/28/20  0617 07/27/20  1051 07/27/20  0432  07/26/20  1534  06/11/20  0545  06/03/20  1737  06/02/20  1744  11/11/19  1643    136 134  134   < > 131*   < > 140   < >  --    < > 131*   < >  --    POTASSIUM 3.9  --  4.0  --  4.3   < > 4.6   < >  --    < > 5.5*   < >  --    CHLORIDE 118*  --  108  --  105   < > 116*   < >  --    < > 112*   < >  --    CO2 14*  --  14*  --  14*   < > 18*   < >  --    < > 17*   < >  --    BUN 73*  --  77*  --  81*   < > 38*   < >  --    < > 68*   < >  --    CR 3.64*  --  3.92*  --  3.88*   < > 1.66*   < >  --    < > 2.92*   < >  --    *  --  84  --  104*   < > 106*   < >  --    < > 95   < >  --    ELSIE 9.6  --  9.0  --  8.9   < > 9.0   < > 10.9*   < > 12.9*   < >  --    MAG 2.4*  --   --   --   --   --   --   --   --   --  2.1  --  1.9   PHOS  --   --   --   --   --   --  2.2*  --  2.2*  --   --   --  3.0    < > = values in this interval not displayed.       CBC -   Recent Labs   Lab Test 07/28/20  0617 07/27/20  2006 07/27/20  0432 07/26/20  1232   WBC 5.2  --  6.1 8.8   HGB 8.0* 8.6* 6.2* 7.9*   PLT 30*  --  45* 74*       LFTs -   Recent Labs   Lab Test 07/28/20 0617 07/27/20  0432 07/26/20  1232   ALKPHOS 217* 248* 340*   BILITOTAL 12.0* 10.4* 12.6*   ALT 22 25 36   AST 43 48* 68*   PROTTOTAL 5.0* 5.0* 5.5*   ALBUMIN 3.2* 2.9* 2.6*       Iron Panel -   Recent Labs   Lab Test 07/28/20 0617 07/27/20  0432 06/27/19  1435   IRON 136 143 213*   IRONSAT 102* 94* 98*   GABI 853* 896* 575*       Imaging:  All imaging studies reviewed by me.      Current Medications:    [Held by provider] ARIPiprazole  5 mg Oral At Bedtime     azithromycin  250 mg Oral Daily     [Held by provider] busPIRone  15 mg Oral BID     cefTRIAXone  1 g Intravenous Q24H     ferrous sulfate  325 mg Oral BID     FLUoxetine  60 mg Oral Daily     folic acid  1 mg Oral Daily     lactulose  20 g Oral TID     midodrine  10 mg Oral TID w/meals     octreotide  100 mcg Subcutaneous TID     omeprazole  20 mg Oral Daily     phytonadione  10 mg Intravenous Daily     rifaximin  550 mg Oral BID     sodium bicarbonate  1,300 mg Oral BID     sodium chloride (PF)  3 mL Intracatheter Q8H     thiamine  250 mg Intravenous Daily     ursodiol  500 mg Oral BID       - MEDICATION INSTRUCTIONS -       James Bosch MD

## 2020-07-28 NOTE — PROGRESS NOTES
07/28/20 1200   Quick Adds   Type of Visit Initial Occupational Therapy Evaluation   Living Environment   Lives With spouse;parent(s)   Living Arrangements house   Home Accessibility stairs to enter home   Number of Stairs, Main Entrance 3   Transportation Anticipated car, drives self;family or friend will provide   Living Environment Comment Pt reports that she lives with her parents but unsure of accuracy in statements 2/2 cognition deficits, per chart pt has a , likely lives with him.    Self-Care   Usual Activity Tolerance good   Current Activity Tolerance fair   Equipment Currently Used at Home none   Activity/Exercise/Self-Care Comment Pt does not report using any AE at baseline.    Functional Level   Ambulation 0-->independent   Transferring 0-->independent   Toileting 0-->independent   Bathing 0-->independent   Dressing 0-->independent   Eating 0-->independent   Communication 0-->understands/communicates without difficulty   Swallowing 0-->swallows foods/liquids without difficulty   Cognition 1 - attention or memory deficits   Fall history within last six months no   Which of the above functional risks had a recent onset or change? ambulation;transferring;toileting;bathing;dressing;cognition   Prior Functional Level Comment Pt reports she is independent in all functional mobility and ADLs at baseline, no falls reported but recent cognition changes noted.    General Information   Onset of Illness/Injury or Date of Surgery - Date 07/26/20   Referring Physician Lucie Gaitan PA-C    Patient/Family Goals Statement return home   Additional Occupational Profile Info/Pertinent History of Current Problem Per chart: Monalisa Olguin is a 54 year old female with history of decompensated etoh cirrhosis c/b ascites with SBP, HE, portal HTN, CKD s/p nephrectomy (gave kidney to sister), chronic anemia and thrombocytopenia, GERD, and chronic pain who was admitted to [facility] 7/26 with encephalopathy.     Precautions/Limitations fall precautions   Weight-Bearing Status - LUE full weight-bearing   Weight-Bearing Status - RUE full weight-bearing   Weight-Bearing Status - LLE full weight-bearing   Weight-Bearing Status - RLE full weight-bearing   General Info Comments Activity: up ad sundar   Cognitive Status Examination   Orientation person;place   Level of Consciousness lethargic/somnolent   Follows Commands (Cognition) delayed response/completion;increased processing time needed;initiation impaired;physical/tactile prompts required   Memory impaired   Attention Distractible during evaluation;Quiet environment required   Cognitive Comment Significant encephalopathy and cognition changes noted, pt would benefit from further cognitive testing.    Visual Perception   Visual Perception Wears glasses   Sensory Examination   Sensory Quick Adds No deficits were identified   Sensory Comments No numbness/tingling reported.    Integumentary/Edema   Integumentary/Edema no deficits were identifed   Posture   Posture not impaired   Range of Motion (ROM)   ROM Comment BUE ROM WFL with multiple cues/prompts    Strength   Strength Comments BUE strength WFL but diminished, 3/5    Hand Strength   Hand Strength Comments Bilateral  strength diminished but functional.    Coordination   Upper Extremity Coordination No deficits were identified   Gross Motor Coordination Impaired    Fine Motor Coordination Tremor noted in hands and BUEs.    Mobility   Bed Mobility Comments CGA    Transfer Skill: Sit to Stand   Level of Weber: Sit/Stand contact guard   Balance   Balance Comments Pt shaky and unsteady on feet, increased falls risk.    Instrumental Activities of Daily Living (IADL)   IADL Comments Pt was likely completing IADLs independently at baseline, unsure about PLOF 2/2 pt being a poor historian.    Activities of Daily Living Analysis   Impairments Contributing to Impaired Activities of Daily Living balance impaired;cognition  "impaired;coordination impaired;strength decreased  (fatigue)   General Therapy Interventions   Planned Therapy Interventions ADL retraining;IADL retraining;cognition;progressive activity/exercise   Clinical Impression   Criteria for Skilled Therapeutic Interventions Met yes, treatment indicated   OT Diagnosis Decreased ADL-I   Influenced by the following impairments general deconditioning, fatigue, balance and coordination deficits, and cognition deficits   Assessment of Occupational Performance 5 or more Performance Deficits   Identified Performance Deficits ambulation, transferring, toileting, bathing, dressing    Clinical Decision Making (Complexity) Low complexity   Therapy Frequency 6x/week   Predicted Duration of Therapy Intervention (days/wks) 1 week   Anticipated Discharge Disposition Transitional Care Facility   Risks and Benefits of Treatment have been explained. Yes   Patient, Family & other staff in agreement with plan of care Yes   Clinical Impression Comments Pt presents to OT today with general deconditioning, fatigue, balance and coordination deficits, and cognition deficits, all leading to decreased ADL-I. Pt to benefit from skilled OT services to address the following problem list.    [facility] AM-PAC  \"6 Clicks\" Daily Activity Inpatient Short Form   1. Putting on and taking off regular lower body clothing? 2 - A Lot   2. Bathing (including washing, rinsing, drying)? 2 - A Lot   3. Toileting, which includes using toilet, bedpan or urinal? 2 - A Lot   4. Putting on and taking off regular upper body clothing? 3 - A Little   5. Taking care of personal grooming such as brushing teeth? 3 - A Little   6. Eating meals? 4 - None   Daily Activity Raw Score (Score out of 24.Lower scores equate to lower levels of function) 16   Total Evaluation Time   Total Evaluation Time (Minutes) 4     "

## 2020-07-28 NOTE — PROGRESS NOTES
07/28/20 1317   Quick Adds   Type of Visit Initial PT Evaluation       Present no   Living Environment   Lives With spouse   Living Arrangements house   Home Accessibility stairs to enter home   Number of Stairs, Main Entrance 2   Stair Railings, Main Entrance railing on right side (ascending)   Transportation Anticipated family or friend will provide   Living Environment Comment Pt reports living with her  who she relies on for transportation. Does not elaborate further on home environment.   Self-Care   Usual Activity Tolerance good   Current Activity Tolerance fair   Regular Exercise No   Equipment Currently Used at Home none   Activity/Exercise/Self-Care Comment Pt does not report using any DME at baseline or having any in her home   Functional Level Prior   Ambulation 0-->independent   Transferring 0-->independent   Toileting 0-->independent   Bathing 0-->independent   Communication 0-->understands/communicates without difficulty   Swallowing 0-->swallows foods/liquids without difficulty   Cognition 1 - attention or memory deficits   Fall history within last six months no   Which of the above functional risks had a recent onset or change? ambulation;transferring;toileting;cognition;communication/speech   Prior Functional Level Comment Per patient, she is IND in all home mobility/ADLs at baseline   General Information   Onset of Illness/Injury or Date of Surgery - Date 07/26/20   Referring Physician Lucie Gaitan PA-C   Patient/Family Goals Statement Improve function   Pertinent History of Current Problem (include personal factors and/or comorbidities that impact the POC) Per chart: Monalisa Olguin is a 54 year old female with history of decompensated etoh cirrhosis c/b ascites with SBP, HE, portal HTN, CKD s/p nephrectomy (gave kidney to sister), chronic anemia and thrombocytopenia, GERD, and chronic pain who was admitted to [facility] 7/26 with encephalopathy    Precautions/Limitations fall precautions   General Observations PIV   General Info Comments Activity: Up ad sundar   Cognitive Status Examination   Orientation person;place;time   Level of Consciousness lethargic/somnolent   Follows Commands and Answers Questions 75% of the time   Personal Safety and Judgment impulsive   Memory impaired   Pain Assessment   Patient Currently in Pain No   Integumentary/Edema   Integumentary/Edema no deficits were identifed   Posture    Posture Forward head position   Range of Motion (ROM)   ROM Comment wfl   Strength   Strength Comments Bilateral LE strength 3+/5 per observation   Bed Mobility   Bed Mobility Comments CGA for bed mobility   Transfer Skills   Transfer Comments CGA for sit<>stand   Gait   Gait Comments CGA, poor object recognition   Balance   Balance Comments Instability/shaking in standing   General Therapy Interventions   Planned Therapy Interventions balance training;gait training;neuromuscular re-education;strengthening   Clinical Impression   Criteria for Skilled Therapeutic Intervention yes, treatment indicated   PT Diagnosis Impaired functional mobility    Influenced by the following impairments Weakness, instability    Functional limitations due to impairments Decreased independence in gait   Clinical Presentation Stable/Uncomplicated   Clinical Presentation Rationale pt presentation, clinical reasoning   Clinical Decision Making (Complexity) Low complexity   Therapy Frequency 5x/week   Predicted Duration of Therapy Intervention (days/wks) 2 weeks   Anticipated Equipment Needs at Discharge   (tbd)   Anticipated Discharge Disposition Transitional Care Facility   Risk & Benefits of therapy have been explained Yes   Patient, Family & other staff in agreement with plan of care Yes   Clinical Impression Comments Pt is below baseline level of function and requires additional services to improve functional mobility and cognitive awareness.   [facility] AM-PAC TM  "\"6 Clicks\"   2016, Trustees of [facility], under license to [employer].  All rights reserved.   6 Clicks Short Forms Basic Mobility Inpatient Short Form   [facility] AM-PAC  \"6 Clicks\" V.2 Basic Mobility Inpatient Short Form   1. Turning from your back to your side while in a flat bed without using bedrails? 4 - None   2. Moving from lying on your back to sitting on the side of a flat bed without using bedrails? 3 - A Little   3. Moving to and from a bed to a chair (including a wheelchair)? 3 - A Little   4. Standing up from a chair using your arms (e.g., wheelchair, or bedside chair)? 4 - None   5. To walk in hospital room? 3 - A Little   6. Climbing 3-5 steps with a railing? 2 - A Lot   Basic Mobility Raw Score (Score out of 24.Lower scores equate to lower levels of function) 19   Total Evaluation Time   Total Evaluation Time (Minutes) 10     "

## 2020-07-28 NOTE — PROGRESS NOTES
"Social Work Follow-Up  [facility]    Data/Intervention:  Patient Name:  Monalisa Olguin  /Age:  1965 (54 year old)    Reason for Follow-Up:  Call from      Received call from pt's  requesting to discuss assisted living for pt. Reviewed different possible options including assisted living, adult day centers, private duty home health services, noting lack of insurance coverage given pt's commercial/employer insurance plan.  reported that pt is on SSDI and so was going to become eligible for Medicare soon. Explained that Medicare does not cover the services above either. Inquired further about their income to see if they may be eligible for MA or other programs.  reported that pt receives $1200/month and didn't disclose his income, but works full time. Explained that they're likely over income for [place] assistance. Discussed referral to or  directly calling [facility] to get assistance navigating assisted living and adult day options. Noted the age restrictions for some assisted living facilities. Discussed conservative/rough estimates of private pay home health services;  laughed and said, \"Well, if it's that expensive, we'd be better off with me just staying home.\" Encouraged him to call [facility] (he wasn't interested in me making a referral) and seeing if they can help him find something that would work financially.  asked that I email him the information, as well, so forwarded him the email I sent to daughter's email address.     After call with , realized pt was in the hospital during time of call. Provided handoff to Kaylie VELAZQUEZ on the unit pt was currently, noting information already provided to family.     Collaborated With:    - Pt's    - Kaylie Yuen     Intervention/Education/Resources Provided:  - Brief needs/resources assessment  - Education on assisted living, adult day centers & " private duty home health services  - Collaboration with care team     Assessment/Plan:  Pt's  was quiet and reserved during conversation, seemed like he was possibly at work, so not forthcoming with information. He seemed to understand the information provided and has SW contact info for further questions. Pt likely in need to TCU stay following hospitalization before returning home or another setting. Inpt SW to coordinate final discharge plans with pt/family; encourage family to seek services from [facility] for assistance with assisted living search if they request this.     Previously provided patient/family with writer's contact information and availability.     CATALINO Hanna, [facility]  Clinical   Outpatient Speciality Clinics   [facility]  Ph. [phone]    NO LETTER

## 2020-07-28 NOTE — PROGRESS NOTES
[facility] [place] Progress Note - Hospitalist Service, Gold 6       Date of Admission:  7/26/2020    Assessment & Plan   Monalisa Olguin is a 54 year old female with history of decompensated etoh cirrhosis c/b ascites with SBP, HE, portal HTN, CKD s/p nephrectomy (gave kidney to sister), chronic anemia and thrombocytopenia, GERD, and chronic pain who was admitted to [facility] 7/26 with encephalopathy.     #Toxic metabolic encephalopathy  Presented with significant AMS in setting of N/V unable to take oral lactulose. Previous admission with similar presentation due to HE that improved with lactulose. Patient likely has underlying dementia per previous neuro psych testing on 5/27. CT head without acute process. Currently has Asterixis on exam with elevated ammonia so possibly due to HE. Could also have a component of uremia with ALFREDO and elevated BUN 86. Glucose and electrolytes stable despite hyponatremia as noted below, but now resolved. Infectious work up with CXR 7/26 diffuse bilateral interstitial and alveolar opacities due to pulmonary edema vs infiltrate. Legionella and strep pneumoniae urinary antigen negative.  Procalcitonin 0.65, but also in setting of ALFREDO. UA bland. Diagnostic paracentesis not consistent with SBP (). COVID 19 PCR negative x2 (7/24 and 7/27).  VSS on room air and afebrile. Mental status improving slowly with lactulose.   - Continue Ceftriaxone/Azithro CAP coverage (Day 3/5).   -sputum culture ordered (not yet obtained)   - High dose thiamine   - F/u blood cultures, NGTD  - Lactulose per [place] protocol   - Continue PTA Rifaximin 550 mg BID, folic acid  - Hold sedating medications including gabapentin, buspar, and Abilify  - Resting tremors in LE, not clinically consistent with withdrawals. If MS not improving will discuss with neuro   - Advance diet as tolerated, monitor for N/V    #Decompensated etoh cirrhosis  Followed by Dr. Kovacs of  Hepatology. Sober since 2019. C/b ascites with SBP on ppx cipro, HE, thrombocytopenia. PTA on cipro 250 mg daily, lasix 40 mg daily, lactulose 30 mL TID, midodrine 10 mg TID, rifaximin 550 mg BID. Alk phos elevation slightly up from baseline, other LFTs near baseline (T. Bili ranges 9.0s-14.0s)  No obvious RUQ tenderness on exam. MELD currently 35. Paracentesis with IR removed 2800cc fluid, with analysis not consistent with SBP.   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 H/o nephrectomy (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). Cr elevated to 4.19, BUN 86 on presentation, which is slowly down trending to 3.64. Etiology prerenal vs HRS. Urinalysis with hyaline casts, otherwise bland. Renal US normal.   - Nephrology consulted, awaiting recommendations   - Continue to hold diuretics   - FEUrea/FENa  (ordered, not collected)   - Albumin challenge with 50 g daily (3/3 days)   - Start Subcutaneous octreotide 100 mcg TID  - Sodium bicarb 1300 mg BID  - Strict I&Os, daily weights     #Acute on chronic anemia  Baseline hemoglobin 7.0s-8.0s. Dropped to 6.2, but improved to 8.0s after 2 units of pRBC transfusion. No si/sx of bleeding.  Iron studies suggestive of anemia of chronic disease (elevated ferritin, low normal TIBC)   -Stool occult   -Continue PTA iron and folic acid   -Trend CBC daily  -Transfuse for hemoglobin goal > 7.0     #Hyponatremia, resolved  Na 127 on admission, previously normal last month. Etiology possible hypovolemia 2/2 poor oral intake, GI losses, and Resolved after receiving IVF and holding diuretics.   - Nephrology consult as  above   - BMP daily     #Depression: Continue PTA fluoxetine 60 mg daily.  Hold PTA Abilify 5 mg daily, Buspar 15 mg BID, and Trazodone 100 mg at bedtime PRN given AMS    #Chronic thrombocytopenia: Baseline. plts previously 30s-60s. No signs or symptoms of bleeding. Trend.    #GERD: Continue omeprazole 20 mg daily       Diet: Advance Diet as Tolerated: Regular Diet Adult    DVT Prophylaxis: Pneumatic Compression Devices  Gonzalez Catheter: not present  Code Status: Full Code         Disposition Plan   Expected discharge: 4 - 7 days, recommended to transitional care unit once hemoglobin stable, mental status at baseline and renal function improved.  Entered: Palmira Bullock PA-C 07/28/2020, 1:11 PM       The patient's care was discussed with the Attending Physician, Dr. Jorge Alberto Gutierrez, Bedside Nurse and Patient.    Palmira Bullock PA-C  Hospitalist Service, [address], [place]  Pager: [phone]  Please see sticky note for cross cover information  ______________________________________________________________________    Interval History   No overnight events.     Poor historian. Continues to be lethargic, with some improvement compared to yesterday. Able to state that she is in a hospital in [place], [place], 2020.  Denies any pain, including chest pain or abdominal pain, SOB or N/V.     Data reviewed today: I reviewed all medications, new labs and imaging results over the last 24 hours.     Physical Exam   Vital Signs: Temp: 95.8  F (35.4  C) Temp src: Oral BP: 93/47 Pulse: 92 Heart Rate: 88 Resp: 16 SpO2: 97 % O2 Device: None (Room air)    Weight: 0 lbs 0 oz  GENERAL: Lethargic, but more alert than yesterday. A&Ox 2-3 (self, hospital, city, and year) NAD. Chronically ill appearing.   HEENT: Icterus sclera. Moist mucous membranes.   NECK: Trachea midline.   CARDIOVASCULAR: RRR. S1, S2. + systolic murmurs. No rubs, or gallops.   RESPIRATORY: Effort normal on RA. Lung CTA, no  use of accessory muscles, no retractions, respirations unlabored   GI: Abdomen distended but soft, non-tender abdomen without rebound or guarding, no hepatosplenomegaly, no palpable masses, normoactive bowel sounds present  MUSCULOSKELETAL: Minimal peripheral edema. No calf asymmetry, erythema, or tenderness.   NEUROLOGICAL: CN grossly intact. Asterixis on exam. Moving extremities symmetrically. Mild tremor in LE at rest that resolve with movement.   SKIN: Intact. Warm and dry. No rashes or lesions. Jaundice     Data   Recent Labs   Lab 07/28/20  0617 07/27/20 2006 07/27/20  1051 07/27/20  0432  07/26/20  1534 07/26/20  1232   WBC 5.2  --   --  6.1  --   --  8.8   HGB 8.0* 8.6*  --  6.2*  --   --  7.9*   *  --   --  102*  --   --  103*   PLT 30*  --   --  45*  --   --  74*   INR  --   --   --  1.88*  --   --  1.26*     --  136 134  134   < > 131* 127*   POTASSIUM 3.9  --   --  4.0  --  4.3 4.5   CHLORIDE 118*  --   --  108  --  105 100   CO2 14*  --   --  14*  --  14* 17*   BUN 73*  --   --  77*  --  81* 86*   CR 3.64*  --   --  3.92*  --  3.88* 4.19*   ANIONGAP 12  --   --  12  --  12 11   ELSIE 9.6  --   --  9.0  --  8.9 9.9   *  --   --  84  --  104* 112*   ALBUMIN 3.2*  --   --  2.9*  --   --  2.6*   PROTTOTAL 5.0*  --   --  5.0*  --   --  5.5*   BILITOTAL 12.0*  --   --  10.4*  --   --  12.6*   ALKPHOS 217*  --   --  248*  --   --  340*   ALT 22  --   --  25  --   --  36   AST 43  --   --  48*  --   --  68*   LIPASE  --   --   --   --   --   --  132    < > = values in this interval not displayed.     Recent Results (from the past 24 hour(s))   CT Head w/o Contrast    Narrative    CT HEAD W/O CONTRAST 7/27/2020 2:50 PM    History: Altered level of consciousness (LOC), unexplained   ICD-10:    Comparison: CT head 6/3/2020.    Technique: Using multidetector thin collimation helical acquisition  technique, axial, coronal and sagittal CT images from the skull base  to the vertex were obtained  without intravenous contrast.    Findings: There is no intracranial hemorrhage, mass effect, or midline  shift. Gray/white matter differentiation in both cerebral hemispheres  is preserved. Mild generalized cerebral atrophy. Periventricular and  subcortical white matter hypoattenuation is nonspecific but likely  represent chronic small vessel ischemic disease and is unchanged  compared to prior. Ventricles are proportionate to the cerebral sulci.  The basal cisterns are clear.    The bony calvaria and the bones of the skull base are normal. The  visualized portions of the paranasal sinuses and mastoid air cells are  clear.       Impression    Impression:  No acute intracranial pathology.          PRATIK CARNEY MD     Medications     - MEDICATION INSTRUCTIONS -         [Held by provider] ARIPiprazole  5 mg Oral At Bedtime     azithromycin  250 mg Oral Daily     [Held by provider] busPIRone  15 mg Oral BID     cefTRIAXone  1 g Intravenous Q24H     ferrous sulfate  325 mg Oral BID     FLUoxetine  60 mg Oral Daily     folic acid  1 mg Oral Daily     lactulose  20 g Oral TID     midodrine  10 mg Oral TID w/meals     octreotide  100 mcg Subcutaneous TID     omeprazole  20 mg Oral Daily     phytonadione  10 mg Intravenous Daily     rifaximin  550 mg Oral BID     sodium bicarbonate  1,300 mg Oral BID     sodium chloride (PF)  3 mL Intracatheter Q8H     thiamine  250 mg Intravenous Daily     ursodiol  500 mg Oral BID

## 2020-07-28 NOTE — PLAN OF CARE
5A    Discharge Planner PT   Patient plan for discharge: Home   Current status: Eval completed and treatment initiated. Pt lethargic and not alert at beginning of session. Pt more aware when move to sitting EOB. Completed bed mobility and sit<>stand with CGA, demonstrated impulsive behavior with tasks. Pt has whole body tremor in sitting and standing. Able to ambulate ~200', demonstrates poor object awareness. Needs max verbal cues with 1 step commands for pathway navigation in hallway. VSS throughout.  Barriers to return to prior living situation: Medical condition, cognitive status, impulsivity  Recommendations for discharge: TCU  Rationale for recommendations: Pt is below baseline level of function and requires additional services to improve functional mobility and cognitive awareness.       Entered by: Elijah Morton 07/28/2020 1:45 PM

## 2020-07-29 NOTE — PLAN OF CARE
Discharge Planner PT   Patient plan for discharge: not discussed today     Current status: PT A & O to self and city only. Pt performs bed mobility and supine>sit with HOB elevated and supervision. STSs with UE support and CGA. Performed toilet transfer with grab bars and min A/guidance for hips down to seat. A for set up + supervision for self cares. She ambulated ~200' with L UE support on IV pole and CGA. Intermittent unsteadiness but no overt LOB. To promote improvement in functional stability, pt completed dynamic balance drills - compensated appropriately with UE support on IV pole and CGA. AVSS on RA.    Barriers to return to prior living situation: medical needs, current mobility, level of A  Recommendations for discharge: TCU, if dc home will likely need 24/7 A dt cognition.     Rationale for recommendations: Pt has made progress with therapy. Would continue to benefit from ongoing therapy to promote progress toward baseline, improve tolerance and stability/strength and maximize functional independence.        Entered by: Sunitha Cavanaugh 07/29/2020 10:59 AM

## 2020-07-29 NOTE — PLAN OF CARE
Time  2420-5265    Pt alert, oriented to self. Disoriented otherwise. Bristol score stage II. Pt received 2 PRN doses of lactulose this shift. Pt has had a total of 3 large watery stools today. No sign of overt bleed. Mot recent stool dark green in color. Sample sent. Pt is voiding WNL. Pt continues on IV abx for CAP. Denies pain and nausea. Pt continues to reviewed subcutaneous octreotide. Pt still needs urine sample collected. Has been difficult to obtain with watery stools contaminating. Plan for continued IV abx, PRN lactulose, and monitoring of mental status.

## 2020-07-29 NOTE — PLAN OF CARE
"/56 (BP Location: Left arm)   Pulse 81   Temp 95.1  F (35.1  C) (Oral)   Resp 16   Ht 1.549 m (5' 1\")   Wt 59 kg (130 lb 1.1 oz)   LMP 05/16/2012   SpO2 96%   BMI 24.58 kg/m    Admitted/transferred from:   2 RN full   skin assessment completed by Jamee Rodriguez, RN and Camryn Gonzalez RN.  Skin assessment finding: issues found purple bruise on coccyx and L buttocks. Scatted bruising on all 4 extremities, 2 small circular scabs on L elbow    Interventions/actions: Encourage ambulation and fluids. Assist with repositioning.       Will continue to monitor.    Activity: up SBA. Bed alarm on.   Neuros: Orientation fluctuating. Oriented to self, place. Intermittently oriented to time (year only) and situation. Lethargic at beginning of shift. Improvement after PRN lactulose. [place] scores 2, 1, 1, 1  Cardiac: WDL, VSS ex soft BP  Respiratory: sating well on RA, continuous pulse ox on  GI/: Loose/watery BM x1 mixed with urine.   Diet: Regular, assistance with feeding d/t tremors.   Lines: PIV SL  Labs: Platelets 30 (baseline 30s-60s per MD note), Hgb 8.0  Pain/nausea: Denies  Plan:   Continue POC    "

## 2020-07-29 NOTE — PLAN OF CARE
"Care from 2488-7730.    Status: Admitted with encephalopathy. BA on for safety.  Vitals: /61 (BP Location: Left arm)   Pulse 79   Temp 96  F (35.6  C) (Oral)   Resp 18   Ht 1.549 m (5' 1\")   Wt 59 kg (130 lb 1.1 oz)   LMP 05/16/2012   SpO2 96%   BMI 24.58 kg/m  . RA.  Neuros: Alert, disoriented to time and situation. Patient was oriented to self, place, year (not month or day). CIWA: 1, scheduled lactulose maintained. Bronze/jaundice skin, yellow sclera. Patient reports needing glasses from her purse to read, purse is not in the room. 5/5 upper strengths. 4/5 lower strengths.  IV: L PIV x2: SL and TKO. R PIV infiltrated and was removed.  Resp/trach: LS clear & equal.   Diet: Regular diet, fair appetite for lunch. Patient had little to no appetite for breakfast. Drinks provided.  Bowel status: BS+, loose/watery BM's (lactulose protocol) using bedside commode.  : Voiding spontaneously using bedside commode.  Skin: Scabs to L elbow MARGARITA. UTV purple discoloration to buttocks. Patient has what appears to be a skin tear on right lower arm, noticed after R PIV infiltrated and was removed. There is a primapore on the skin tear.  Pain: Denies pain.  Activity: Ax1 w/GB & walker. Patient worked with therapy today. Ambulated to the chair and in the halls.  Social: Patient frequently mentioned sister visiting (did not happen) and wanted help calling  and daughter.  Plan: One time dose of Albumin given. Will continue to monitor and follow POC.  "

## 2020-07-29 NOTE — PROGRESS NOTES
"[facility]    Hepatology Follow-up    CC: AMS    Dx: Hepatic encephalopathy   Decompensated alcoholic cirrhosis   ALFREDO on CKD    24 hour events:   No new issues, improving mental status    Subjective:  No new issues.  Feels like she is more awake and less foggy today  She is hungry, has no abdominal pain.  Patient denies fevers, sweats or chills.    Medications  Current Facility-Administered Medications   Medication Dose Route Frequency     [Held by provider] ARIPiprazole  5 mg Oral At Bedtime     azithromycin  250 mg Oral Daily     [Held by provider] busPIRone  15 mg Oral BID     cefTRIAXone  1 g Intravenous Q24H     ferrous sulfate  325 mg Oral BID     FLUoxetine  60 mg Oral Daily     folic acid  1 mg Oral Daily     lactulose  20 g Oral TID     midodrine  10 mg Oral TID w/meals     octreotide  100 mcg Subcutaneous TID     omeprazole  20 mg Oral Daily     rifaximin  550 mg Oral BID     sodium bicarbonate  1,300 mg Oral BID     sodium chloride (PF)  3 mL Intracatheter Q8H     thiamine  250 mg Intravenous Daily     ursodiol  500 mg Oral BID       Review of systems  A 10-point review of systems was negative, unless stated above    Examination  /61 (BP Location: Left arm)   Pulse 79   Temp 96  F (35.6  C) (Oral)   Resp 18   Ht 1.549 m (5' 1\")   Wt 59 kg (130 lb 1.1 oz)   LMP 05/16/2012   SpO2 96%   BMI 24.58 kg/m      Intake/Output Summary (Last 24 hours) at 7/29/2020 1241  Last data filed at 7/29/2020 1000  Gross per 24 hour   Intake 300 ml   Output 1500 ml   Net -1200 ml       General: Afebrile, not in respiratory distress  CVS: RRR  Resp: CTA  Abdomen: Soft, not tender  Extremities: mild edema  Neuro: AAO X 3, mildly confused, asterixis absent    Laboratory  Lab Results   Component Value Date     07/29/2020    POTASSIUM 3.7 07/29/2020    CHLORIDE 114 07/29/2020    CO2 13 07/29/2020    BUN 71 07/29/2020    CR 3.66 07/29/2020     Lab Results   Component Value Date    " BILITOTAL 12.4 07/29/2020    ALT 21 07/29/2020    AST 34 07/29/2020    ALKPHOS 208 07/29/2020       Lab Results   Component Value Date    WBC 5.2 07/29/2020    HGB 7.9 07/29/2020     07/29/2020    PLT 29 07/29/2020       Lab Results   Component Value Date    INR 1.69 07/29/2020       MELD-Na score: 34 at 7/29/2020 10:06 AM  MELD score: 34 at 7/29/2020 10:06 AM  Calculated from:  Serum Creatinine: 3.66 mg/dL at 7/29/2020  6:37 AM  Serum Sodium: 139 mmol/L (Rounded to 137 mmol/L) at 7/29/2020  6:37 AM  Total Bilirubin: 12.4 mg/dL at 7/29/2020  6:37 AM  INR(ratio): 1.69 at 7/29/2020 10:06 AM  Age: 54 years 9 months       Assessment  54 year old female with decompensated alcohol cirrhosis (HE, ascites), as well as CKD; admitted for hepatic encephalopathy. Significant improvement in the last 24 hours. No evidence of infection. Worsening kidney function - Nephrology following.   Up to date with EV and HCC surveillance. Endorses sobriety for ~2 years; PETh pending.  Not a transplant candidate (frailty, poor neuropsych testing).         RECOMMENDATIONS:  -- Continue Rifaximin 550 mg BID and Lactulose PO, titrate to 3-4 BMs per day  -- Follow up on PETH   -- Ensure sodium restriction to 2000 mg per day  -- Nephrology following - defer diuretic use to them   -- Avoid nephrotoxins, NSAIDs   -- Adequate protein diet (1.2 - 1.5g/kg/day)                  - Recommend multiple meals during the day, Snacks and shakes during the day/night                  - Can use Ensure/Boost drinks TID      Patient seen and discussed with attending physician, Dr. Pio Villa MD  PGY 6  Transplant Hepatology Fellow  [phone]    ATTENDING NOTE, GASTROENTEROLOGY/HEPATOLOGY    I saw and discussed this patient with the fellow and participated in the decision making. I agree with the fellow's note. Savannah Suero MD

## 2020-07-29 NOTE — PROGRESS NOTES
[facility], [facility] Progress Note - Hospitalist Service, Gold 6       Date of Admission:  7/26/2020    Assessment & Plan   Monalisa Olguin is a 54 year old female with history of decompensated etoh cirrhosis c/b ascites with SBP, HE, portal HTN, CKD s/p nephrectomy (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), chronic anemia and thrombocytopenia, GERD, and chronic pain who was admitted to [facility] 7/26 with encephalopathy.     #Toxic metabolic encephalopathy  Presented with significant AMS in setting of N/V unable to take oral lactulose. Previous admission with similar presentation due to HE that improved with lactulose. Patient likely has underlying dementia per previous neuro psych testing on 5/27. CT head without acute process. Presented with asterixis on exam with elevated ammonia so possibly due to HE. Could also have a component of uremia with ALFREDO and elevated BUN 86. Glucose and electrolytes stable despite hyponatremia as noted below, but now resolved. Infectious work up with CXR 7/26 diffuse bilateral interstitial and alveolar opacities due to pulmonary edema vs infiltrate. Legionella and strep pneumoniae urinary antigen negative.  Procalcitonin 0.65, but also in setting of ALFREDO. UA bland. Diagnostic paracentesis not consistent with SBP (). COVID 19 PCR negative x2 (7/24 and 7/27).  VSS on room air and afebrile. Mental status improving with lactulose with significant improvement today.   - Continue Ceftriaxone/Azithro CAP coverage (Day 4/5).  - Completed high dose thiamine, continue thiamine 100 mg daily   - F/u blood cultures, NGTD  - Lactulose per [place] protocol   - Continue PTA Rifaximin 550 mg BID, folic acid    #Decompensated etoh cirrhosis  Followed by Dr. Kovacs of Hepatology. Sober since 2019. C/b ascites with SBP on ppx cipro, HE, thrombocytopenia. PTA on cipro 250 mg daily, lasix 40 mg daily, lactulose 30 mL TID, midodrine 10 mg TID, rifaximin 550 mg BID. Alk phos  elevation slightly up from baseline, other LFTs near baseline (T. Bili ranges 9.0s-14.0s)  No RUQ tenderness on exam. MELD currently 34. Paracentesis with IR removed 2800cc fluid, with analysis not consistent with SBP. Abd US with patent vasculature.   - GI consulted, appreciate recs  - Trend MELD labs (CMP, INR)   - Hold lasix and spironolactone especially in setting of ALFREDO   - Hold PTA cipro while on antibiotics as noted above  - Continue PTA Midodrine   - Continue home Rifaxamin and lactulose, Treat for HE as noted above   - Completed IV Vit K 10 mg daily (3/3)   - F/u PEth    MELD-Na score: 34 at 7/29/2020 10:06 AM  MELD score: 34 at 7/29/2020 10:06 AM  Calculated from:  Serum Creatinine: 3.66 mg/dL at 7/29/2020  6:37 AM  Serum Sodium: 139 mmol/L (Rounded to 137 mmol/L) at 7/29/2020  6:37 AM  Total Bilirubin: 12.4 mg/dL at 7/29/2020  6:37 AM  INR(ratio): 1.69 at 7/29/2020 10:06 AM  Age: 54 years 9 months    #ALFREDO on CKD III-IV  #Metabolic acidosis   Baseline Cr labile, ranging around 1.7-2.4. H/o nephrectomy (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). Cr elevated to 4.19, BUN 86 on presentation, which is slowly down trending to 3.66. Etiology prerenal vs HRS. Urinalysis with hyaline casts, otherwise bland. Renal US normal.   - Nephrology consulted, awaiting recommendations   - Continue to hold diuretics   - FEUrea/FENa  (ordered, not collected)   - Albumin challenge with 50 g daily (4/4 days)   - Continue Subcutaneous octreotide 100 mcg TID  - Sodium bicarb 1300 mg BID  - Strict I&Os, daily weights     #Acute on chronic anemia  Baseline hemoglobin 7.0s-8.0s. Dropped to 6.2, but improved to 8.0s after 2 units of pRBC transfusion. No si/sx of bleeding.  Iron studies suggestive of anemia of chronic disease (elevated ferritin, low normal TIBC). Positive occult stool, but stool brown w/o gross signs of melena/BRBPR and on oral iron.   -Continue PTA iron and folic acid   -Trend CBC daily  -Transfuse for hemoglobin goal > 7.0   - IF  H/H drops again, will discuss with GI     #Hyponatremia, resolved  Na 127 on admission, previously normal last month. Etiology possible hypovolemia 2/2 poor oral intake, GI losses, and Resolved after receiving IVF and holding diuretics.   - Nephrology consult as above   - BMP daily     #Depression: Continue PTA fluoxetine 60 mg daily, Abilify 5 mg daily, Buspar 15 mg BID. Hold Trazodone 100 mg at bedtime PRN     #Chronic thrombocytopenia: Baseline. plts previously 30s-60s. No signs or symptoms of bleeding. Trend.    #GERD: Continue omeprazole 20 mg daily       Diet: Advance Diet as Tolerated: Regular Diet Adult    DVT Prophylaxis: Pneumatic Compression Devices  Gonzalez Catheter: not present  Code Status: Full Code         Disposition Plan   Expected discharge: 2 - 3 days, recommended to transitional care unit once hemoglobin stable, mental status at baseline and renal function improved.  Entered: Palmira Bullock PA-C 07/29/2020, 8:33 AM       The patient's care was discussed with the Attending Physician, Dr. Jorge Alberto Gutierrez, Bedside Nurse and Patient.    Palmira Bullock PA-C  Hospitalist Service, [address]  Pager: [phone]  Please see sticky note for cross cover information  ______________________________________________________________________    Interval History   No overnight events.     Patient states she is doing well. Asking why she is here.  Denies any F/C, CP, SOB, N/V/D, abdominal pain, or dysuria. Feels abdomen is more distended.     Data reviewed today: I reviewed all medications, new labs and imaging results over the last 24 hours.     Physical Exam   Vital Signs: Temp: 95.1  F (35.1  C) Temp src: Oral BP: 106/56 Pulse: 81 Heart Rate: 78 Resp: 16 SpO2: 96 % O2 Device: None (Room air)    Weight: 130 lbs 1.14 oz  GENERAL: A&O x3 to self, place, time.  NAD. Chronically ill appearing.   HEENT: Icterus sclera. Moist mucous membranes.   NECK: Trachea  midline.   CARDIOVASCULAR: RRR. S1, S2. + systolic murmurs. No rubs, or gallops.   RESPIRATORY: Effort normal on RA. Lung CTA, no use of accessory muscles, no retractions, respirations unlabored   GI: Abdomen distended but soft, non-tender abdomen without rebound or guarding, no hepatosplenomegaly, no palpable masses, normoactive bowel sounds present  MUSCULOSKELETAL: Minimal peripheral edema. No calf asymmetry, erythema, or tenderness.   NEUROLOGICAL: CN grossly intact. Mild asterixis. Moving extremities symmetrically. Non-tremulous.   SKIN: Intact. Warm and dry. No rashes or lesions. Jaundice     Data   Recent Labs   Lab 07/29/20  0637 07/28/20  0617 07/27/20 2006 07/27/20  1051 07/27/20  0432  07/26/20  1232   WBC 5.2 5.2  --   --  6.1  --  8.8   HGB 7.9* 8.0* 8.6*  --  6.2*  --  7.9*    101*  --   --  102*  --  103*   PLT 29* 30*  --   --  45*  --  74*   INR  --   --   --   --  1.88*  --  1.26*    143  --  136 134  134   < > 127*   POTASSIUM 3.7 3.9  --   --  4.0   < > 4.5   CHLORIDE 114* 118*  --   --  108   < > 100   CO2 13* 14*  --   --  14*   < > 17*   BUN 71* 73*  --   --  77*   < > 86*   CR 3.66* 3.64*  --   --  3.92*   < > 4.19*   ANIONGAP 12 12  --   --  12   < > 11   ELSIE 9.9 9.6  --   --  9.0   < > 9.9   * 101*  --   --  84   < > 112*   ALBUMIN 3.6 3.2*  --   --  2.9*  --  2.6*   PROTTOTAL 5.4* 5.0*  --   --  5.0*  --  5.5*   BILITOTAL 12.4* 12.0*  --   --  10.4*  --  12.6*   ALKPHOS 208* 217*  --   --  248*  --  340*   ALT 21 22  --   --  25  --  36   AST 34 43  --   --  48*  --  68*   LIPASE  --   --   --   --   --   --  132    < > = values in this interval not displayed.     Recent Results (from the past 24 hour(s))   US Abdomen Limited w Abd/Pelvis Duplex Complete    Narrative    EXAMINATION: US ABDOMEN LIMITED WITH DOPPLER COMPLETE 7/28/2020 11:10  AM     COMPARISON: Ultrasound 7/27/2020; ultrasound 6/7/2020; chest x-ray  7/26/2020.    HISTORY: Decompensated  cirrhosis.    TECHNIQUE: The abdomen was scanned in standard fashion with  specialized ultrasound transducer(s) using both gray-scale, color  Doppler, and spectral flow techniques.    Findings:  Liver: The liver demonstrates homogenously increased echogenicity.  Nodularity of the liver contour. No evidence of a focal hepatic mass.     Extrahepatic portal vein flow is antegrade at 21 cm/sec.  Right portal vein flow is antegrade, measuring 21 cm/sec.  Left portal vein flow is antegrade, measuring 19 cm/sec.    Flow in the hepatic artery is towards the liver and:  86 peak systolic  0.84 resistive index.     The left, middle, and right hepatic veins are patent with flow towards  the IVC. The IVC is patent with flow towards the heart.      Gallbladder: There is no wall thickening, pericholecystic fluid,  positive sonographic Gaston's sign or evidence for cholelithiasis.    Bile Ducts: Both the intra- and extrahepatic biliary system are of  normal caliber.  The common bile duct measures 6 mm.    Pancreas: Visualized portions of the head and body of the pancreas are  unremarkable.     Kidneys: The right kidney are of normal echotexture, without mass or  hydronephrosis.   Renal lengths: right- 10.4 cm,    Fluid: Moderate ascites.       Impression    Impression:   1.  Cirrhotic liver morphology.  2.  Moderate ascites.  3.  Patent Doppler evaluation.    I have personally reviewed the examination and initial interpretation  and I agree with the findings.    LEEANN SMITH MD     Medications     - MEDICATION INSTRUCTIONS -         albumin human  50 g Intravenous Once     [Held by provider] ARIPiprazole  5 mg Oral At Bedtime     azithromycin  250 mg Oral Daily     [Held by provider] busPIRone  15 mg Oral BID     cefTRIAXone  1 g Intravenous Q24H     ferrous sulfate  325 mg Oral BID     FLUoxetine  60 mg Oral Daily     folic acid  1 mg Oral Daily     lactulose  20 g Oral TID     midodrine  10 mg Oral TID w/meals      octreotide  100 mcg Subcutaneous TID     omeprazole  20 mg Oral Daily     phytonadione  10 mg Intravenous Daily     rifaximin  550 mg Oral BID     sodium bicarbonate  1,300 mg Oral BID     sodium chloride (PF)  3 mL Intracatheter Q8H     thiamine  250 mg Intravenous Daily     ursodiol  500 mg Oral BID

## 2020-07-29 NOTE — PLAN OF CARE
Discharge Planner OT   Patient plan for discharge: Not discussed this session  Current status: CGA supine to sit EOB. CGA pivot transfer bed>commode>chair>bed. Attempted MoCA, unable to obtain standardized scores, see flow sheet for details. Pt unsteady on feet. Some impulsivity, requiring v/c for safety.   Barriers to return to prior living situation: fatigue, balance, cognition  Recommendations for discharge: TCU  Rationale for recommendations: Pt below functional baseline for ADLs/IADLs and mobility, would benefit from skilled therapy to address above deficits.        Entered by: Na Stanley 07/29/2020 9:30 AM

## 2020-07-29 NOTE — PROGRESS NOTES
"SPIRITUAL HEALTH SERVICES: Tele-Encounter  Patient Location ([facility], [place], Unit): [facility], [place], [address]  Spoke with (patient, family relationship): Patient, Monalisa THOMSON \"Heydi\" Clau      Referral Source: Pt request for a hospital  at admission     If applicable: patient was appropriately screened for telechaplaincy support with bedside nurse prior to visit (e.g. Mental Health and Addiction contexts). See call details below.    DATA:    Summary: Introduced self to Heydi. Pt said she was doing \"pretty good.\" When asked what has helped her feel this way she said \"plenty of oswald, and I have my sisters to help me.\"     Illness narrative: Not discussed       Coping: Pt said her oswald helps with her \"medical arrangement\" and asked me to pray for her oswald.     Distress: After we prayed pt said she wants \"peace\" and shared that her sister  a year ago. She began to cry as she spoke of her sister and said that they were \"best friends, they lived together and would ride horses together.\" She said \"her sister  the same way.\" She was very apologetic of her emotions, but I normalized and validated her grief as filled with many of these hard moments. She then shared that her dad also , but declined further conversation by saying \"I'm fine, I'm fine.\"     Meaning Making: Not discussed     PLAN:  Made plans to follow up with pt later in the week. Spiritual Health remains available.     JUAN MartinezDiv   Intern  Voicemail:  [phone]    ______________________________    Type of service:  Telephone Visit     has received verbal consent for a TelephoneVisit from the patient? Yes    Distance Provider Location: designated [place] office or home office (secure setting)    Mode of Communication: telephone (via [place] phone or [facility] tele-call-number ([phone]))    * [facility] remains available  for emergent requests/referrals, either by having the switchboard page the on-call  " or by entering an ASAP/STAT consult in Epic (this will also page the on-call ). Routine Epic consults receive an initial response within 24 hours.*

## 2020-07-29 NOTE — PROGRESS NOTES
"  Nephrology Progress Note  07/29/2020         Assessment & Recommendations:   Monalisa Olguin is a 54 year old female with history of decompensated etoh cirrhosis c/b ascites with SBP, HE, portal HTN, CKD s/p nephrectomy (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), chronic anemia and thrombocytopenia, GERD, and chronic pain who was admitted on 7/26 with encephalopathy. Noted ALFREDO on CKD, prompting nephrology consult.     ALFREDO on CKD3  Solitary kidney  Concern for hepatorenal syndrome, however patient likely has hepatorenal physiology with more of a pre-renal picture in the setting of her AMS and vomiting PTA. Urinalysis with hyaline casts, otherwise bland. Renal US normal. Agree with albumin challenge and octreotide, patient already on midodrine PTA. Cr stable today.  - unable to obtain FENA as patient has mixed urine/stool output     Hyponatremia, likely hypovolemic  Improved with fluids    Metabolic acidosis, likely in the setting of ALFREDO and CKD as above  - increased Na bicarb to 1300 mg BID     Anemia  Iron studies without evidence of [facility]  Transfusion per primary team     Recommendations were communicated to primary team via this note     Seen and discussed with Dr. Patti Bosch MD   Renal fellow  [phone]    Interval History :   Nursing and provider notes from last 24 hours reviewed.  In the last 24 hours Monalisa Olguin remains clinically stable. More interactive today and denies any concerns.    Review of Systems:   I reviewed the following systems:  GI: Some appetite. no nausea or vomiting  Neuro:  some confusion  Constitutional:  no fever or chills  CV: no dyspnea or chest pain    Physical Exam:  I/O last 3 completed shifts:  In: 940 [P.O.:640; I.V.:300]  Out: 1500 [Other:500; Stool:1000]   /53 (BP Location: Left arm)   Pulse 77   Temp 96.6  F (35.9  C) (Oral)   Resp 18   Ht 1.549 m (5' 1\")   Wt 59 kg (130 lb 1.1 oz)   LMP 05/16/2012   SpO2 94%   BMI 24.58 kg/m       GENERAL APPEARANCE: " alert, awake  EYES:  scleral icterus present, pupils equal  PULM: lungs clear to auscultation bilaterally, equal air movement  CV: regular rhythm, normal rate, no rub     -edema trace  GI: soft, non-tender, distended, bowel sounds are normal  INTEGUMENT: no rash  NEURO:  Alert, disoriented, speech normal, following commands    Labs:   All labs reviewed by me  Electrolytes/Renal -   Recent Labs   Lab Test 07/29/20 0637 07/28/20  0617 07/27/20  1051 07/27/20  0432  06/11/20  0545  06/03/20  1737  06/02/20  1744  11/11/19  1643    143 136 134  134   < > 140   < >  --    < > 131*   < >  --    POTASSIUM 3.7 3.9  --  4.0   < > 4.6   < >  --    < > 5.5*   < >  --    CHLORIDE 114* 118*  --  108   < > 116*   < >  --    < > 112*   < >  --    CO2 13* 14*  --  14*   < > 18*   < >  --    < > 17*   < >  --    BUN 71* 73*  --  77*   < > 38*   < >  --    < > 68*   < >  --    CR 3.66* 3.64*  --  3.92*   < > 1.66*   < >  --    < > 2.92*   < >  --    * 101*  --  84   < > 106*   < >  --    < > 95   < >  --    ELSIE 9.9 9.6  --  9.0   < > 9.0   < > 10.9*   < > 12.9*   < >  --    MAG  --  2.4*  --   --   --   --   --   --   --  2.1  --  1.9   PHOS  --   --   --   --   --  2.2*  --  2.2*  --   --   --  3.0    < > = values in this interval not displayed.       CBC -   Recent Labs   Lab Test 07/29/20 0637 07/28/20 0617 07/27/20  2006 07/27/20  0432   WBC 5.2 5.2  --  6.1   HGB 7.9* 8.0* 8.6* 6.2*   PLT 29* 30*  --  45*       LFTs -   Recent Labs   Lab Test 07/29/20  0637 07/28/20  0617 07/27/20  0432   ALKPHOS 208* 217* 248*   BILITOTAL 12.4* 12.0* 10.4*   ALT 21 22 25   AST 34 43 48*   PROTTOTAL 5.4* 5.0* 5.0*   ALBUMIN 3.6 3.2* 2.9*       Iron Panel -   Recent Labs   Lab Test 07/28/20  0617 07/27/20  0432 06/27/19  1435   IRON 136 143 213*   IRONSAT 102* 94* 98*   GABI 853* 896* 575*       Imaging:  All imaging studies reviewed by me.     Current Medications:    ARIPiprazole  5 mg Oral At Bedtime     azithromycin  250 mg  Oral Daily     busPIRone  15 mg Oral BID     cefTRIAXone  1 g Intravenous Q24H     ferrous sulfate  325 mg Oral BID     FLUoxetine  60 mg Oral Daily     folic acid  1 mg Oral Daily     lactulose  20 g Oral TID     midodrine  10 mg Oral TID w/meals     octreotide  100 mcg Subcutaneous TID     omeprazole  20 mg Oral Daily     rifaximin  550 mg Oral BID     sodium bicarbonate  1,300 mg Oral BID     sodium chloride (PF)  3 mL Intracatheter Q8H     [START ON 7/30/2020] vitamin B1  100 mg Oral Daily     ursodiol  500 mg Oral BID       - MEDICATION INSTRUCTIONS -       James Bosch MD

## 2020-07-29 NOTE — PROGRESS NOTES
"/66 (BP Location: Left arm)   Pulse 92   Temp 96.7  F (35.9  C) (Oral)   Resp 16   Ht 1.549 m (5' 1\")   Wt 59 kg (130 lb 1.1 oz)   LMP 05/16/2012   SpO2 95%   BMI 24.58 kg/m        Pt arrived from  around 2230. Vitally stable, very lethargic and oriented to self, place, and time, but not to situation. Continue to monitor and follow POC.  "

## 2020-07-30 NOTE — PLAN OF CARE
"/53 (BP Location: Left arm)   Pulse 77   Temp 96.6  F (35.9  C) (Oral)   Resp 18   Ht 1.549 m (5' 1\")   Wt 58.6 kg (129 lb 3 oz)   LMP 05/16/2012   SpO2 94%   BMI 24.41 kg/m      Status: AMS r/t ETOH cirrhosis  Activity: Ax1 +gb/walker  Neuros: Drowsy but arouses to voice. Oriented to self and place, sometimes oriented to time/situation.  Cardiac: WDL, denies chest pain.  Respiratory: WDL on RA, denies SOB.  GI/: +BS, distended abdomen, +watery BM this shift. AUOP, voids spont.  Diet: Tolerating regular diet, good appetite.  Skin/Incisions: Jaundice throughout, including bilat scleras. Bruising throughout, particularly on buttocks, MARGARITA, unchanged. Scabs and skin tear on arms CDI, unchanged.  Lines/Drains: L PIV x2 SL.  Pain/Nausea: Denies.  New Changes: Pt more alert and talkative, less shaky than at start of shift.  Plan: Continue to monitor mental status and follow POC.    "

## 2020-07-30 NOTE — PROGRESS NOTES
Discharge Planner PT   Patient plan for discharge: pt is unsure of dc plans     Current status: Pt performed bed mobility and supine>sit from flat bed with supervision. Completed STS with minor UE use and CGA. Ambulated > 300' without AD, CGA. Dec speed and step length. No overt LOB. Completed 1 x 4 steps with merritt hand rail and CGA - step over gait pattern. Performed DGI - pt scoring 18/24 - see detailed results below. AVSS on RA.     Barriers to return to prior living situation: medical needs, current mobility, falls risk     Recommendations for discharge: TCU vs home with 24/7 A and HH PT/OT- pending OT cog eval     Rationale for recommendations: Pt continues to make progress with functional mobility, however continues to display cognitive deficits throughout session. She would continue to benefit from IP PT to maintain and progress strength, activity tolerance, and higher level balance to promote safe dc.        Entered by: Sunitha Sierra 07/30/2020 8:52 AM            07/30/20 0800   Signing Clinician's Name / Credentials   Signing clinician's name / credentials Sunitha Sierra, PT, DPT    Dynamic Gait Index (Anthony and Bui [place], 1995)   Gait Level Surface 2  (slower speed )   Change in Gait Speed 2  (unale to acheieve significant change in velocity )   Gait and Horizontal Head Turns 3   Gait with Vertical Head Turns 2  (change in velocity )   Gait and Pivot Turns 2  (> 3 sec )   Step Over Obstacle 2  (must slow down and adjust )   Step Around Obstacles 3   Steps 2  (must use rail)   Total Dynamic Gait Index Score  (A score of 19 or less has been correlated to an increased risk of falls in community dwelling older adults, patients with vestibular disorders, and patients with MS.)   Total Score (out of 24) 18     Dynamic Gait Index (DGI):The DGI is a measure of balance during gait that is reliable and valid for the elderly and individuals with Parkinson's disease, MS, vestibular disorders, or s/p stroke.  Gait assistive device used: None    Patient score: 18/24  Scores ?19/24 indicate an increased risk for falls according to Danitza et al 2000  Minimal Detectable Change = 2.9 in community dwelling elderly according to Renny et al 2011    Assessment (rationale for performing, application to patient s function & care plan): falls risk, need for AD

## 2020-07-30 NOTE — PLAN OF CARE
"Vital signs:  Temp: 98  F (36.7  C) Temp src: Oral BP: 107/53 Pulse: 77   Resp: 18 SpO2: 97 % O2 Device: None (Room air)   Height: 154.9 cm (5' 1\") Weight: 58.6 kg (129 lb 3 oz)  Activity: up SBA. Bed alarm on.   Neuros: Orientation fluctuating. Oriented to self, place. Intermittently oriented to time (year only) and situation. Lethargic at beginning of shift. Improvement after PRN lactulose. [place] scores 1, 1, 1, 1  Cardiac: WDL, VSS  Respiratory: sating well on RA, continuous pulse ox on  GI/: Loose/watery BM x1 mixed with urine.   Diet: Regular, assistance with feeding d/t tremors.   Lines: PIV SL  Labs: Platelets 29 (baseline 30s-60s per MD note), Hgb 7.9  Pain/nausea: Denies  Plan:   Continue POC  "

## 2020-07-30 NOTE — PROGRESS NOTES
[facility], [facility] Progress Note - Hospitalist Service, Gold 6       Date of Admission:  7/26/2020    Assessment & Plan   Monalisa Olguin is a 54 year old female with history of decompensated etoh cirrhosis c/b ascites with SBP, HE, portal HTN, CKD s/p nephrectomy (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), chronic anemia and thrombocytopenia, GERD, and chronic pain who was admitted to [facility] 7/26 with encephalopathy and acute kidney injury.     #Toxic metabolic encephalopathy  Presented with significant AMS in setting of N/V unable to take oral lactulose. Previous admission with similar presentation due to HE that improved with lactulose. Patient likely has underlying dementia per previous neuro psych testing on 5/27. CT head without acute process. Presented with asterixis on exam with elevated ammonia so possibly due to HE. Could also have a component of uremia with ALFREDO and elevated BUN 86. Glucose and electrolytes stable despite hyponatremia as noted below, but now resolved. Infectious work up with CXR 7/26 diffuse bilateral interstitial and alveolar opacities due to pulmonary edema vs infiltrate. Legionella and strep pneumoniae urinary antigen negative.  Procalcitonin 0.65, but also in setting of ALFREDO. UA bland. Diagnostic paracentesis not consistent with SBP (). COVID 19 PCR negative x2 (7/24 and 7/27).  VSS on room air and afebrile. Mental status improving with lactulose.  - Continue Ceftriaxone/Azithro CAP coverage (Day 5/5).  - Completed high dose thiamine, continue thiamine 100 mg daily   - F/u blood cultures, NGTD  - Lactulose per [facility] protocol   - Continue PTA Rifaximin 550 mg BID, folic acid  - Discontinue Buspar, decrease Abilify to 2 mg daily     #Decompensated etoh cirrhosis  Followed by Dr. Kovacs of Hepatology. Sober since 2019. C/b ascites with SBP on ppx cipro, HE, thrombocytopenia. PTA on cipro 250 mg daily, lasix 40 mg daily, lactulose 30 mL TID, midodrine 10  mg TID, rifaximin 550 mg BID. Alk phos elevation slightly up from baseline, other LFTs near baseline (T. Bili ranges 9.0s-14.0s)  No RUQ tenderness on exam. MELD currently 35. Paracentesis with IR removed 2800cc fluid, with analysis not consistent with SBP. Abd US with patent vasculature.  Per Hepatology, patient is not a transplant candidate with underlying baseline cognitive impairment.   - GI consulted, appreciate recs  - Trend MELD labs (CMP, INR)   - Hold lasix and spironolactone in setting of ALFREDO  - Hold PTA cipro while on antibiotics as noted above  - Continue PTA Midodrine   - Continue home Rifaxamin and lactulose, Treat for HE as noted above   - Completed IV Vit K 10 mg daily (3/3)   - F/u PEt    MELD-Na score: 35 at 7/30/2020  6:38 AM  MELD score: 35 at 7/30/2020  6:38 AM  Calculated from:  Serum Creatinine: 3.77 mg/dL at 7/30/2020  6:38 AM  Serum Sodium: 140 mmol/L (Rounded to 137 mmol/L) at 7/30/2020  6:38 AM  Total Bilirubin: 12.8 mg/dL at 7/30/2020  6:38 AM  INR(ratio): 1.84 at 7/30/2020  6:38 AM  Age: 54 years 9 months    #ALFREDO on CKD III-IV, possible progression to CKD stage V  #Metabolic acidosis   Baseline Cr labile, ranging around 1.7-2.4. H/o nephrectomy (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). Cr elevated to 4.19, BUN 86 on presentation, which is down trending slightly to 3.7 with GFR 13. Urinalysis with hyaline casts, otherwise bland. Renal US normal. FENa 0.4. Nephrology feels this could have been persistent pre-renal vs ATN vs HRS. Concerning with minimal improvement this could be patient's new baseline. IF HRS, unfortunately patient is not a liver transplant candidate. Currently making urine and no indications to start HD at this time.   - Nephrology consulted, appreciate recommendations   - Continue to hold diuretics, per nephrology   - Albumin challenge with 50 g daily (4/4 days)   - Continue Subcutaneous octreotide 100 mcg TID  - Sodium bicarb 1300 mg BID  - Strict I&Os, daily weights     #Acute on  chronic anemia  Baseline hemoglobin 7.0s-8.0s. Dropped to 6.2, but improved to 8.0s after 2 units of pRBC transfusion. No si/sx of bleeding.  Iron studies suggestive of anemia of chronic disease (elevated ferritin, low normal TIBC). Positive occult stool, but stool brown w/o gross signs of melena/BRBPR and on oral iron. H/H trended and stable.   -Continue PTA iron and folic acid   -Trend CBC daily  -Transfuse for hemoglobin goal > 7.0   - IF H/H drops again, will discuss with GI     #Hyponatremia, resolved  Na 127 on admission, previously normal last month. Etiology possible hypovolemia 2/2 poor oral intake, GI losses, and Resolved after receiving IVF and holding diuretics.   - Nephrology consult as above   - BMP daily     #Depression: Continue PTA fluoxetine 60 mg daily. Decrease Abilify 2 mg daily. Discontinue Buspar which is contraindicated in ESRD.  Hold Trazodone 100 mg at bedtime PRN     #Chronic thrombocytopenia: Baseline. plts previously 30s-60s. No signs or symptoms of bleeding. Trend.    #GERD: Continue omeprazole 20 mg daily       Diet: Advance Diet as Tolerated: Regular Diet Adult  Snacks/Supplements Adult: Boost Plus; Between Meals  Room Service    DVT Prophylaxis: Pneumatic Compression Devices  Gonzalez Catheter: not present  Code Status: Full Code         Disposition Plan   Expected discharge: 2 - 3 days, recommended to transitional care unit once hemoglobin stable, mental status at baseline and renal function improved.  Entered: Palmira Bullock PA-C 07/30/2020, 3:42 PM       The patient's care was discussed with the Attending Physician, Dr. Jorge Alberto Gutierrez, Bedside Nurse and Patient.    Palmira Bullock PA-C  Hospitalist Service, [address]  Pager: [phone]  Please see sticky note for cross cover information  ______________________________________________________________________    Interval History   No overnight events.     No complaints. Sleepy  during interview. Ordered breakfast with nurses help. Per nursing, patient is nearly complete assist to eat. Patient denies any F/C, CP, SOB, N/V/D, abdominal pain, or dysuria.    Data reviewed today: I reviewed all medications, new labs and imaging results over the last 24 hours.     Physical Exam   Vital Signs: Temp: 97.6  F (36.4  C) Temp src: Oral BP: 115/63 Pulse: 77 Heart Rate: 81 Resp: 14 SpO2: 99 % O2 Device: None (Room air)    Weight: 129 lbs 3.03 oz  GENERAL: A&O x3 to self, place, and year. Thinks its November. NAD. Chronically ill appearing.   HEENT: Icterus sclera. Moist mucous membranes.   NECK: Trachea midline.   CARDIOVASCULAR: RRR. S1, S2. + systolic murmurs. No rubs, or gallops.   RESPIRATORY: Effort normal on RA. Bibasilar rales, with remaining lung fields CTA, no use of accessory muscles, no retractions, respirations unlabored   GI: Abdomen distended but soft, non-tender abdomen without rebound or guarding, no hepatosplenomegaly, no palpable masses, normoactive bowel sounds present  MUSCULOSKELETAL: No edema. No calf asymmetry, erythema, or tenderness.   NEUROLOGICAL: CN grossly intact. Mild asterixis. Moving extremities symmetrically. Non-tremulous.   SKIN: Intact. Warm and dry. No rashes or lesions. Jaundice     Data   Recent Labs   Lab 07/30/20  0638 07/29/20  1831 07/29/20  1006 07/29/20  0637 07/28/20  0617  07/27/20  0432  07/26/20  1232   WBC 6.3  --   --  5.2 5.2  --  6.1  --  8.8   HGB 8.1*  --   --  7.9* 8.0*   < > 6.2*  --  7.9*   *  --   --  100 101*  --  102*  --  103*   PLT 36*  --   --  29* 30*  --  45*  --  74*   INR 1.84*  --  1.69*  --   --   --  1.88*  --  1.26*    137  --  139 143   < > 134  134   < > 127*   POTASSIUM 3.8  --   --  3.7 3.9  --  4.0   < > 4.5   CHLORIDE 113*  --   --  114* 118*  --  108   < > 100   CO2 15*  --   --  13* 14*  --  14*   < > 17*   BUN 70*  --   --  71* 73*  --  77*   < > 86*   CR 3.77* 3.70*  --  3.66* 3.64*  --  3.92*   < > 4.19*    ANIONGAP 11  --   --  12 12  --  12   < > 11   ELSIE 10.2*  --   --  9.9 9.6  --  9.0   < > 9.9   *  --   --  125* 101*  --  84   < > 112*   ALBUMIN 3.8  --   --  3.6 3.2*  --  2.9*  --  2.6*   PROTTOTAL 5.6*  --   --  5.4* 5.0*  --  5.0*  --  5.5*   BILITOTAL 12.8*  --   --  12.4* 12.0*  --  10.4*  --  12.6*   ALKPHOS 200*  --   --  208* 217*  --  248*  --  340*   ALT 18  --   --  21 22  --  25  --  36   AST 30  --   --  34 43  --  48*  --  68*   LIPASE  --   --   --   --   --   --   --   --  132    < > = values in this interval not displayed.     No results found for this or any previous visit (from the past 24 hour(s)).  Medications     - MEDICATION INSTRUCTIONS -         ARIPiprazole  2 mg Oral At Bedtime     azithromycin  250 mg Oral Daily     cefTRIAXone  1 g Intravenous Q24H     ferrous sulfate  325 mg Oral BID     FLUoxetine  60 mg Oral Daily     folic acid  1 mg Oral Daily     lactulose  20 g Oral TID     midodrine  10 mg Oral TID w/meals     octreotide  100 mcg Subcutaneous TID     omeprazole  20 mg Oral Daily     rifaximin  550 mg Oral BID     sodium bicarbonate  1,300 mg Oral BID     sodium chloride (PF)  3 mL Intracatheter Q8H     vitamin B1  100 mg Oral Daily     ursodiol  500 mg Oral BID

## 2020-07-30 NOTE — PLAN OF CARE
"/63 (BP Location: Right arm)   Pulse 77   Temp 97.6  F (36.4  C) (Oral)   Resp 14   Ht 1.549 m (5' 1\")   Wt 58.6 kg (129 lb 3 oz)   LMP 05/16/2012   SpO2 99%   BMI 24.41 kg/m      Reason for admission: Encephalopathy  Neuro: Disoriented to time and situation; [place] 1 and 1 - scheduled lactulose given; cooperative with cares; bed alarm on at all times  Cardiac: WNL; denies cardiac chest pain  Respiratory: 99% RA; denies SOB  GI/: Voiding hesitant - nephrology consulted; +BS; LBM 7/30  Skin: Yellow; scabs/scattered bruising/skin tears on forearms; buttock bruise MARGARITA  Labs: Hgb 8.1; platelet 36  Pain: Denies  LDA: L PIV SL  Activity: Ax1 when OOB  Diet/Appetite: Reg diet; needs help ordering food  Plan: Continue with POC    "

## 2020-07-30 NOTE — PROGRESS NOTES
Nephrology Progress Note  07/30/2020     Assessment & Recommendations:   Monalisa Olguin is a 54 year old female with history of decompensated etoh cirrhosis c/b ascites with SBP, HE, portal HTN, CKD s/p nephrectomy (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), chronic anemia and thrombocytopenia, GERD, and chronic pain who was admitted on 7/26 with encephalopathy. Noted ALFREDO on CKD, prompting nephrology consult.    ALFREDO on CKD3  Solitary kidney  Concern for hepatorenal syndrome, however patient likely has hepatorenal physiology with more of a pre-renal picture in the setting of her AMS and vomiting PTA. Urinalysis with hyaline casts, otherwise bland. Renal US normal. S/p albumin challenge and octreotide, PTA midodrine continued. GFR now stable. FeNa is low.  - Follow-up in Nephrology clinic in 2-3 weeks after discharge  - Okay to continue octreotide with midodrine during admission given improvement in BP with regimen, would resume midodrine at discharge.    Hyponatremia, likely hypovolemic  Improved with fluids  Hypercalcemia - could be related to prolonged immobility, no obvious contributing medications. Would continue to monitor for now.    BP/volume  Ascites with likely intravascular volume depletion  Patient continues to have ascites but appears intravascularly deplete with continued subopitmal PO intake even though encephalopathy is improved.   Would hold off on restarting diuretic therapy at this time and ensure close follow-up at discharge for continued assessment of volume status with consideration of diuretic resumption as an outpatient.    Metabolic acidosis, likely in the setting of ALFREDO and CKD as above  - Continue bicarb 1300 mg BID    Anemia  Iron studies without evidence of [facility]  Transfusion per primary team    Recommendations were communicated to primary team verbally via this note.    Nephrology will sign off, please page with questions.     Seen and discussed with Dr. Patti Bosch MD   Renal  "fellow  [phone]    Interval History :   Nursing and provider notes from last 24 hours reviewed.  In the last 24 hours Monailsa Olguin remained clinically stable. Remains altered and less interactive today, offered no complaints.    Review of Systems:   I reviewed the following systems:  GI: No nausea or vomiting or diarrhea.   Neuro:  Some confusion  Constitutional:  no fever or chills  CV: no dyspnea or edema.  no chest pain.    Physical Exam:   I/O last 3 completed shifts:  In: 600 [P.O.:600]  Out: 1200 [Other:1200]   /63 (BP Location: Right arm)   Pulse 77   Temp 97.6  F (36.4  C) (Oral)   Resp 14   Ht 1.549 m (5' 1\")   Wt 58.6 kg (129 lb 3 oz)   LMP 05/16/2012   SpO2 99%   BMI 24.41 kg/m       GENERAL APPEARANCE: somnolent, in no distress  EYES:  scleral icterus present, pupils equal  PULM: lungs clear to auscultation bilaterally, equal air movement  CV: regular rhythm, normal rate     -edema trace  GI: soft, non-tender, distended, bowel sounds are normal  INTEGUMENT: no rash on exposed surfaces  NEURO: Somnolent but easily aroused, disoriented, speech normal, following commands    Labs:   All labs reviewed by me  Electrolytes/Renal -   Recent Labs   Lab Test 07/30/20  0638 07/29/20  1831 07/29/20  0637 07/28/20  0617  06/11/20  0545  06/03/20  1737  06/02/20  1744  11/11/19  1643    137 139 143   < > 140   < >  --    < > 131*   < >  --    POTASSIUM 3.8  --  3.7 3.9   < > 4.6   < >  --    < > 5.5*   < >  --    CHLORIDE 113*  --  114* 118*   < > 116*   < >  --    < > 112*   < >  --    CO2 15*  --  13* 14*   < > 18*   < >  --    < > 17*   < >  --    BUN 70*  --  71* 73*   < > 38*   < >  --    < > 68*   < >  --    CR 3.77* 3.70* 3.66* 3.64*   < > 1.66*   < >  --    < > 2.92*   < >  --    *  --  125* 101*   < > 106*   < >  --    < > 95   < >  --    ELSIE 10.2*  --  9.9 9.6   < > 9.0   < > 10.9*   < > 12.9*   < >  --    MAG 2.3  --   --  2.4*  --   --   --   --   --  2.1  --  1.9   PHOS  " --   --   --   --   --  2.2*  --  2.2*  --   --   --  3.0    < > = values in this interval not displayed.       CBC -   Recent Labs   Lab Test 07/30/20  0638 07/29/20  0637 07/28/20  0617   WBC 6.3 5.2 5.2   HGB 8.1* 7.9* 8.0*   PLT 36* 29* 30*       LFTs -   Recent Labs   Lab Test 07/30/20  0638 07/29/20  0637 07/28/20  0617   ALKPHOS 200* 208* 217*   BILITOTAL 12.8* 12.4* 12.0*   ALT 18 21 22   AST 30 34 43   PROTTOTAL 5.6* 5.4* 5.0*   ALBUMIN 3.8 3.6 3.2*       Iron Panel -   Recent Labs   Lab Test 07/28/20  0617 07/27/20  0432 06/27/19  1435   IRON 136 143 213*   IRONSAT 102* 94* 98*   GABI 853* 896* 575*       Imaging:  All imaging studies reviewed by me.     Current Medications:    ARIPiprazole  2 mg Oral At Bedtime     azithromycin  250 mg Oral Daily     cefTRIAXone  1 g Intravenous Q24H     ferrous sulfate  325 mg Oral BID     FLUoxetine  60 mg Oral Daily     folic acid  1 mg Oral Daily     lactulose  20 g Oral TID     midodrine  10 mg Oral TID w/meals     octreotide  100 mcg Subcutaneous TID     omeprazole  20 mg Oral Daily     rifaximin  550 mg Oral BID     sodium bicarbonate  1,300 mg Oral BID     sodium chloride (PF)  3 mL Intracatheter Q8H     vitamin B1  100 mg Oral Daily     ursodiol  500 mg Oral BID       - MEDICATION INSTRUCTIONS -       James Bosch MD

## 2020-07-31 NOTE — PLAN OF CARE
"Time of Care 3099-2575  BP 93/49 (BP Location: Left arm)   Pulse 61   Temp 96.9  F (36.1  C) (Oral)   Resp 16   Ht 1.549 m (5' 1\")   Wt 58.6 kg (129 lb 3 oz)   LMP 05/16/2012   SpO2 95%   BMI 24.41 kg/m    Neuros: Alert to self and place. Disoriented to time and situation. Bed Alarm. [place] Score 1, unchanged  Cardiac: Soft BP's, patient on midodrine. Denies chest pain.   Respiratory: Sating well on RA. Denies SOB.   Diet: Regular diet. Poor appetite. Encouraged intake.   Activity: SBA, up with PT in the hallways this afternoon.   GI/: Voiding without difficulty. 3 loose BM's today mixed with urine. Scheduled lactulose.   Skin: Scattered bruises, jaundiced.   Lines: PIV SL.  Labs: Hgb 8.4. Low platelets but per MD note from 7/31 chronic for patient (30-60's). Cr.2.89 trending down  Pain: Denies  Plan: Continue to monitor mental status. TCU for discharge.      "

## 2020-07-31 NOTE — PROGRESS NOTES
[facility], [facility] Progress Note - Hospitalist Service, Gold 6       Date of Admission:  7/26/2020    Assessment & Plan   Monalisa Olguin is a 54 year old female with history of decompensated etoh cirrhosis c/b ascites with SBP, HE, portal HTN, CKD s/p nephrectomy (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), chronic anemia and thrombocytopenia, GERD, and chronic pain who was admitted to [facility] 7/26 with encephalopathy and acute kidney injury.     #Toxic metabolic encephalopathy  Presented with significant AMS in setting of N/V unable to take oral lactulose. Previous admission with similar presentation due to HE that improved with lactulose. Patient likely has underlying dementia per previous neuro psych testing on 5/27. CT head without acute process. Presented with asterixis on exam with elevated ammonia so possibly due to HE. Could also have a component of uremia with ALFREDO and elevated BUN 86. Glucose and electrolytes stable except for hyponatremia as noted below, but now resolved. Infectious work up with CXR 7/26 diffuse bilateral interstitial and alveolar opacities due to pulmonary edema vs infiltrate. Legionella and strep pneumoniae urinary antigen negative.  Procalcitonin 0.65, but also in setting of ALFREDO. Patient completed 5 day course of Ceftriaxone/Azithromycin for CAP coverage. Blood cultures NGTD. UA bland. Diagnostic paracentesis not consistent with SBP (). COVID 19 PCR negative x2 (7/24 and 7/27).  Treated with high dose of thiamine. VSS on room air and afebrile. Mental status improved with lactulose per Silt protocol. Nearing baseline mental status.   - Continue thiamine 100 mg daily   - F/u blood cultures, NGTD  - Lactulose per Silt protocol   - Continue PTA Rifaximin 550 mg BID, folic acid  - Discontinue Buspar and Abilify. Avoid delirogenic medications   - PT/OT consulted and recommending TCU     #Decompensated etoh cirrhosis  Followed by Dr. Kovacs of  Hepatology. Sober since 2019. C/b ascites with SBP on ppx cipro, HE, thrombocytopenia. PTA on cipro 250 mg daily, lasix 40 mg daily, spironolactone 25 mg daily, lactulose 30 mL TID, midodrine 10 mg TID, rifaximin 550 mg BID. Alk phos elevation slightly up from baseline, other LFTs near baseline (T. Bili ranges 9.0s-14.0s). FEth negative.  No RUQ tenderness on exam.  Paracentesis with IR removed 2800cc fluid, with analysis not consistent with SBP. Abd US with patent vasculature. Completed IV Vitamin K x 3 days. Per Hepatology, patient is not a transplant candidate with underlying baseline cognitive impairment. MELD currently 33.   - Hepatology consulted, appreciate recs  - Trend MELD labs (CMP, INR)   - Hold lasix and spironolactone in setting of ALFREDO, and still appears dry   - Resume PTA Cipro 250 mg daily   - Continue PTA Midodrine   - Continue home Rifaxamin and lactulose, Treat for HE as noted above   - Discuss goals of care since pt not a candidate for transplant and with worsening renal failure as noted below     MELD-Na score: 33 at 7/31/2020  7:04 AM  MELD score: 33 at 7/31/2020  7:04 AM  Calculated from:  Serum Creatinine: 2.89 mg/dL at 7/31/2020  7:04 AM  Serum Sodium: 138 mmol/L (Rounded to 137 mmol/L) at 7/31/2020  7:04 AM  Total Bilirubin: 13.6 mg/dL at 7/31/2020  7:04 AM  INR(ratio): 1.82 at 7/31/2020  7:04 AM  Age: 54 years 9 months    #ALFREDO on CKD III-IV, possible progression to CKD stage V  #Metabolic acidosis   Baseline Cr labile, ranging around 1.7-2.4. H/o nephrectomy (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). Cr elevated to 4.19, BUN 86 on presentation, which is down trending slightly to 3.7 with GFR 13. Urinalysis with hyaline casts, otherwise bland. Renal US normal. FENa 0.4. Nephrology feels this could have been persistent pre-renal vs ATN vs HRS. Concerning with minimal improvement this could be patient's new baseline. IF HRS, unfortunately patient is not a liver transplant candidate. Currently making urine  and no indications to start HD at this time.   - Nephrology consulted, appreciate recommendations   - Continue to hold diuretics, per nephrology    - Albumin challenge with 50 g daily (4/4 days)   - Continue Subcutaneous octreotide 100 mcg TID  - Sodium bicarb 1300 mg BID  - Strict I&Os, daily weights   - Follow up with nephrology in 2-3 weeks after discharge     #Hypercalcemia   Calcium slowly up trending. Mild hypercalcemia to 10.2 (corrected to 10.9). Likely due to prolonged immobility while here in the hospital. Unlikely contributing to mental status.   -Encourage PO hydrating   -Trend     #Acute on chronic anemia  Baseline hemoglobin 7.0s-8.0s. Dropped to 6.2, but improved to 8.0s after 2 units of pRBC transfusion. No si/sx of bleeding.  Iron studies suggestive of anemia of chronic disease (elevated ferritin, low normal TIBC). Positive occult stool, but stool brown w/o gross signs of melena/BRBPR and on oral iron. H/H trended and stable.   -Continue PTA iron and folic acid   -Trend CBC daily  -Transfuse for hemoglobin goal > 7.0   - IF H/H drops again, will discuss with GI     #Hyponatremia, resolved  Na 127 on admission, previously normal last month. Etiology possible hypovolemia 2/2 poor oral intake, GI losses, and resolved after receiving IVF and holding diuretics.   - Nephrology consult as above   - BMP daily     #Depression: Continue PTA fluoxetine 60 mg daily. Discontinue Abilify with confusion. Discontinue Buspar which is contraindicated in ESRD.  Hold Trazodone 100 mg at bedtime PRN     #Chronic thrombocytopenia: Baseline. plts previously 30s-60s. No signs or symptoms of bleeding. Trend.    #GERD: Continue omeprazole 20 mg daily       Diet: Advance Diet as Tolerated: Regular Diet Adult  Snacks/Supplements Adult: Boost Plus; Between Meals  Room Service    DVT Prophylaxis: Pneumatic Compression Devices  Gonzalez Catheter: not present  Code Status: Full Code         Disposition Plan   Expected discharge: 2 -  3 days, recommended to transitional care unit once hemoglobin stable, mental status at baseline and renal function improved.  Entered: Palmira Bullock PA-C 07/31/2020, 2:42 PM       The patient's care was discussed with the Attending Physician, Dr. Jorge Alberto Gutierrez, Bedside Nurse and Patient.    Palmira Bullock PA-C  Hospitalist Service, [address], [place]  Pager: [phone]  Please see sticky note for cross cover information  ______________________________________________________________________    Interval History   No overnight events.     No complaints. Patient denies any F/C, CP, cough, SOB, N/V/D, abdominal pain, or dysuria. States she is urinating normally. A&O to self, place, time (year only). States she is here because she is sick. Thinkdanny Reynolds is the president. Wants to call her  on her cell phone, but not able to navigate her phone to call him.     Spoke with patient's  Eron. Discussed that patient has end stage cirrhosis and per hepatology we have optimized the patient as best we can and she is not a candidate for transplant (with fragility and poor Neuropsych testing). With her renal status, patient kidney function has improve slightly, but still have significant CKD. Nephrology and hepatology have nothing else to offer at this time. Discussed options if patient were to worsen including HD. Also discussed with poor function of multiple organs whether they would consider hospice if Heydi were to get sick.  was surprise by this news, and would want to talk to Heydi's sister and let us know. Currently the plan would be to discharge to TCU in hopes that she would improve.     Data reviewed today: I reviewed all medications, new labs and imaging results over the last 24 hours.     Physical Exam   Vital Signs: Temp: 97.4  F (36.3  C) Temp src: Oral BP: 113/61 Pulse: 82 Heart Rate: 81 Resp: 16 SpO2: 97 % O2 Device: None (Room air)    Weight: 129 lbs  3.03 oz  GENERAL: A&O x2-3 as noted above.  NAD. Chronically ill appearing.   HEENT: Icteric sclera. Moist mucous membranes.   NECK: Trachea midline.   CARDIOVASCULAR: RRR. S1, S2. + systolic murmur. No rubs, or gallops.   RESPIRATORY: Effort normal on RA. Lungs CTA, without any wheezing, rales, or rhonchi. no use of accessory muscles, no retractions, respirations unlabored   GI: Abdomen distended but soft, non-tender abdomen without rebound or guarding, no hepatosplenomegaly, no palpable masses, normoactive bowel sounds present  MUSCULOSKELETAL: No edema. No calf asymmetry, erythema, or tenderness.   NEUROLOGICAL: CN grossly intact. Mild asterixis. Moving extremities symmetrically. Non-tremulous.   SKIN: Intact. Warm and dry. No rashes or lesions. Jaundice     Data   Recent Labs   Lab 07/31/20  0704 07/30/20  0638 07/29/20  1831 07/29/20  1006 07/29/20  0637  07/26/20  1232   WBC 5.0 6.3  --   --  5.2   < > 8.8   HGB 8.4* 8.1*  --   --  7.9*   < > 7.9*   * 101*  --   --  100   < > 103*   PLT 30* 36*  --   --  29*   < > 74*   INR 1.82* 1.84*  --  1.69*  --    < > 1.26*    140 137  --  139   < > 127*   POTASSIUM 4.0 3.8  --   --  3.7   < > 4.5   CHLORIDE 111* 113*  --   --  114*   < > 100   CO2 14* 15*  --   --  13*   < > 17*   BUN 68* 70*  --   --  71*   < > 86*   CR 2.89* 3.77* 3.70*  --  3.66*   < > 4.19*   ANIONGAP 12 11  --   --  12   < > 11   ELSIE 10.2* 10.2*  --   --  9.9   < > 9.9   GLC 95 108*  --   --  125*   < > 112*   ALBUMIN 3.3* 3.8  --   --  3.6   < > 2.6*   PROTTOTAL 5.1* 5.6*  --   --  5.4*   < > 5.5*   BILITOTAL 13.6* 12.8*  --   --  12.4*   < > 12.6*   ALKPHOS 208* 200*  --   --  208*   < > 340*   ALT 18 18  --   --  21   < > 36   AST 27 30  --   --  34   < > 68*   LIPASE  --   --   --   --   --   --  132    < > = values in this interval not displayed.     No results found for this or any previous visit (from the past 24 hour(s)).  Medications     - MEDICATION INSTRUCTIONS -          ciprofloxacin  250 mg Oral Q24H MATT     ferrous sulfate  325 mg Oral BID     FLUoxetine  60 mg Oral Daily     folic acid  1 mg Oral Daily     lactulose  20 g Oral TID     midodrine  10 mg Oral TID w/meals     octreotide  100 mcg Subcutaneous TID     omeprazole  20 mg Oral Daily     rifaximin  550 mg Oral BID     sodium bicarbonate  1,300 mg Oral BID     sodium chloride (PF)  3 mL Intracatheter Q8H     vitamin B1  100 mg Oral Daily     ursodiol  500 mg Oral BID

## 2020-07-31 NOTE — PROGRESS NOTES
SPIRITUAL HEALTH SERVICES: Tele-Encounter  Patient Location ([facility], [place], Unit): [facility], [place], [address]    Referral Source: Follow up to previous conversation     If applicable: patient was appropriately screened for telechaplaincy support with bedside nurse prior to visit (e.g. Mental Health and Addiction contexts). See call details below.    DATA: -initated visit attempt via phone. Patient was not available/did not answer. SHS will follow up as appropriate in consultation with patient's bedside nurse. Another progress note will be entered in the chart when/if I am able to make contact with the patient.     PLAN:  Spiritual Health remains available.    JUAN MartinezDiv   Intern  Voicemail:  [phone]    ______________________________    Type of service:  Telephone Visit     has received verbal consent for a TelephoneVisit from the patient? No    Distance Provider Location: designated [place] office or home office (secure setting)    Mode of Communication: telephone (via Yipit phone or [employer] tele-call-number ([phone]))    * [facility] remains available 24/7 for emergent requests/referrals, either by having the switchboard page the on-call  or by entering an ASAP/STAT consult in Epic (this will also page the on-call ). Routine Epic consults receive an initial response within 24 hours.*

## 2020-07-31 NOTE — PLAN OF CARE
Vitals: Temp: 96.9  F (36.1  C) Temp src: Oral BP: 93/49 Pulse: 61 Heart Rate: 81 Resp: 16 SpO2: 95 % O2 Device: None (Room air)       Time: 9636-5322  Reason for admission: AMS  Writer had pt in AM for one hour, float nurse administered medications in AM and then writer handed off pt and gave report to DANY Roche. Pt A&O x2, VSS, ate 25% of breakfast. WE 1.      Continue to monitor and follow POC

## 2020-07-31 NOTE — PLAN OF CARE
"/51 (BP Location: Left arm)   Pulse 77   Temp 97.9  F (36.6  C) (Oral)   Resp 16   Ht 1.549 m (5' 1\")   Wt 58.6 kg (129 lb 3 oz)   LMP 05/16/2012   SpO2 100%   BMI 24.41 kg/m       Neuros: Disoriented to time and situation. [place] protocol, scored 1. Bed alarm on. Cooperative with cares.   Activity: AX1.   Cardiac: WNL. Deneis cardiac chest pain.  Respiratory: Sating well on RA. Fine crackles heard in upper lobes. Denies SOB.   Diet: Regular diet. Iván counts. Assist with ordering meals.  GI/Gu: Voiding without difficulty. 1 loose BM Skin/Incisions: Jaundice. Scattered bruising.   LDA:PIV SL  Pain: Denies   PRN: No PRNs this shift.   Changes: No acute changes this shift.   Plan: Continue POC    HENRIQUE SNOW, RN on 7/31/2020 at 2:37 AM     "

## 2020-07-31 NOTE — PLAN OF CARE
Discharge Planner OT   Patient plan for discharge: Pt unable to identify discharge plans  Current status: Pt oriented to person only. Pt SBA to don/doff socks while seated EOB. SBA to STS and ambulate to commode and then sink to complete ADLs. Requiring multiple v/c to sequence task. VSS on RA.   Barriers to return to prior living situation: cognition, decreased ADL/IADL independence  Recommendations for discharge: TCU  Rationale for recommendations:  Skilled therapy recommended to improve cognition and tolerance to increase ind with ADLs. Pts lack of awareness and cognitive deficits present as safety risk. If discharged home, pt will require 24/7 supervision for safety.       Entered by: Na Stanley 07/31/2020 9:56 AM      ADDENDUM: Received page from MD to complete MOCA to reassess cognition. Completed alternate version (8.2), pt scoring 13/30 indicating moderate/severe impairment indicating significant deficits that would impact safety. Based on score would highly recommend TCU stay as pt would require 24/7 supervision for safety if pt were to discharge home.

## 2020-07-31 NOTE — PLAN OF CARE
7B    Discharge Planner PT   Patient plan for discharge: Pt unable to state discharge plans at this time  Current status: Pt oriented to person and place, cognition wandered throughout session. Pt IND in bed mobility and sit<>stand transfer. Pt able to ambulate ~500' without assistive device and CGA, frequent lateral sway/instability noted. Pt ascended/descended 12 total stairs with SBA. Trialed the NuStep for 5 minutes on level 4, mild fatigue/dizziness following.  Barriers to return to prior living situation: Medical condition, cognition  Recommendations for discharge: TCU  Rationale for recommendations: Pt requires additional services to improve cognition and activity tolerance in order to return home safely.        Entered by: Elijah Morton 07/31/2020 12:33 PM

## 2020-07-31 NOTE — PLAN OF CARE
NEURO: Disoriented to time and situation. [place] protocol score of 1, scheduled Lactulose given. Calm/cooperative. Bed alarm on for safety.   RESPIRATORY: LS crackles in upper lobes, diminished in bases. Satting well on RA. Denies SOB   CARDIAC: WDL, denies chest pain   GI/: Voided without issues this shift, some hesitancy noted previously-nephrology consulted today. +BS, passing flatus. LBM 7/30  DIET: Regular diet, on calorie counts. Needs help ordering   PAIN/NAUSEA: Denies pain and nausea this shift   SKIN/INCISION/DRAINS: Jaundiced. Scattered bruising/skin tears on forearms.   IV ACCESS: L PIV SL   ACTIVITY: Up with assist of 1  LAB: Platelets 36, hgb 8.1, creatinine 3.77, bilirubin 12.8, INR 1.84  PLAN: Bed alarm at all times, [place] protocol, continue with POC

## 2020-07-31 NOTE — PROGRESS NOTES
Social Work: Assessment with Discharge Plan    Patient Name:  Monalisa Olguin  :  1965  Age:  54 year old  MRN:  [id number]  Risk/Complexity Score:     Completed assessment with:  Chart Review, Pts  Eron. Pt was able to nod when SW asked if SW could talk with pts  re: Care. Pt unable to verbalize much.     Presenting Information   Reason for Referral:  Discharge plan  Date of Intake:  2020  Referral Source:  Chart Review  Decision Maker:  Pt usually at this time per team pt unable to even dial spouses phone number.   Alternate Decision Maker:  Pts  Eron   Health Care Directive:  None on file.   Living Situation:  House  Previous Functional Status:  Assistance with Other:  Some assistance as needed  Patient and family understanding of hospitalization:  Appropraite  Cultural/Language/Spiritual Considerations:  Pt is a 54 year old female who identifies as [employer].   Adjustment to Illness:  Appropriate    Physical Health  Reason for Admission:    1. Hepatic encephalopathy (H)    2. Acute renal failure, unspecified acute renal failure type (H)    3. COVID-19 ruled out      Services Needed/Recommended:  TCU    Mental Health/Chemical Dependency  Diagnosis:  Per husbands report, pt had been taking depression/anxiety medication, however, RN took off those medications due to not helping kidneys or mental clarity. Pt was smoking cigarettes and a little weed and reports pt has been done drinking for 1-1.5 years.   Support/Services in Place:  N/a - pt was taking medications.   Services Needed/Recommended:  n/a    Support System  Significant relationship at present time:  Eron -  - reports he goes to work daily.   Family of origin is available for support:  Yes, there is a daughter who has been able to help out, however, daughter will be going back to school. 15 year old son who could help some.   Other support available:   reports pt has sister, one is a nurse who  "dropped pt off last Sunday - sister Edwin dropped pt off at [facility].  Edwin is on visitor list - though unsure how much day to day help could provide.   Gaps in support system:  Not much support per pts .   Patient is caregiver to:  Child:  15 year old son.      Provider Information   Primary Care Physician:  Efren Bustos   [phone]   Clinic:  [address] [place] [address] / [place] [address]      :  n/a    Financial   Income Source:  Pts  is working and pt is getting benefits SSDI.   Financial Concerns:  Pts  reports, \" we are scraping by\"   Insurance:    Payor/Plan Subscriber Name Rel Member # Group #   [employer]* LAZARO WELLINGTON  [id number]       [id number]       Discharge Plan   Patient and family discharge goal: Get pt as strong as possible to get home if possible at this point and figure out as go along.   Provided education on discharge plan:  YES  Patient agreeable to discharge plan:  YES stating \"as long as insurance covers that\"   A list of Medicare Certified Facilities was provided to the patient and/or family to encourage patient choice. Patient's choices for facility are:  SW reviewed Medicare.gov list with pts  and the following options were selected and referrals sent in order of preference due to location:     1. [facility] [phone]  2. [facility] - [phone]  3. [facility] - [phone]     Will NH provide Skilled rehabilitation or complex medical:  YES  General information regarding anticipated insurance coverage and possible out of pocket cost was discussed. Patient and patient's family are aware patient may incur the cost of transportation to the facility, pending insurance payment: YES - Family would like to provide transport if possible.   Barriers to discharge:  Accepting facility     Discharge Recommendations   Anticipated Disposition:  Facility:  TBD - TCU  Transportation Needs:  Family:  Family " indicates likely will transport.   Name of Transportation Company and Phone:  TBD    Additional comments     SW spoke with pts  re patient and TCU - pts  explained previous experience was poor experience. Pts  Eron expressed pt was doing much better the in a long time for the past 10 days and then pt had hospitalization.   Pts  did not have any questions at this time, SW provided SW contact information should any questions arise.     CATALINO Sanders, [facility]   [facility]  Pager: [phone]

## 2020-08-01 NOTE — PLAN OF CARE
Patient is d/o with time, does not use call light. [place] - 1. Denies SOB, course LS, stable on RA. +BS, soft distended abdomen, denies pain, denies nausea. 1x large watery BM. Voiding spont. Bed alarm activated at all times. Continue poc.   Vitals:    07/31/20 1523 07/31/20 1834 07/31/20 2244 08/01/20 0614   BP: 93/49  105/49 110/59   BP Location: Left arm  Right arm Left arm   Pulse: 61  71 70   Resp: 16  16 16   Temp: 96.9  F (36.1  C)  97.5  F (36.4  C) 97.9  F (36.6  C)   TempSrc: Oral  Oral Oral   SpO2: 95%  96% 99%   Weight:  59.1 kg (130 lb 4.7 oz)     Height:

## 2020-08-01 NOTE — PLAN OF CARE
Vitals:    07/31/20 1834 07/31/20 2244 08/01/20 0614 08/01/20 0812   BP:  105/49 110/59 102/61   BP Location:  Right arm Left arm Right arm   Pulse:  71 70    Resp:  16 16 12   Temp:  97.5  F (36.4  C) 97.9  F (36.6  C) 97.1  F (36.2  C)   TempSrc:  Oral Oral Oral   SpO2:  96% 99% 99%   Weight: 59.1 kg (130 lb 4.7 oz)      Height:       AVSS.    Neuro: Alert and oriented to self. Forgetful.     GI/: Abdomen rounded. Active BS. Had BM x1 and voided x1.    Diet: regular diet. On calorie counts. Fair po intake. No c/o pain or nausea..    Incisions/Drains: none.    Labs: PLt=26, inr=1.82,hgb=8.6,     Activity: SBA.     Pain: No c/o pain.    New changes this shift: none.    Plan: Continue with current POC.

## 2020-08-01 NOTE — PROGRESS NOTES
Social Work Services Progress Note    Hospital Day: 7  Date of Initial Social Work Evaluation:  7/31/2020  Collaborated with:  Bedside Team, EMR, TCU Referrals    Data:  SW assisting pt with discharge planning    Intervention:  SW reviewed pt chart for discharge plan. Pt referred to three facilities:    1.[facility]: No admissions staff on weekends  2.[facility]: Left voicemail for Kristine in admissions to return call once referral reviewed  3.[facility]: Agnes requesting updated notes from weekend be faxed Monday to [phone], financials will also be reviewed Monday for admissions decision.    SW spoke with Gold 6 about referral updates. Team understands placement decisions still pending.    Assessment:  See RN, PT/OT, medical team notes    Plan:    Anticipated Disposition:  Facility:  TBD    Barriers to d/c plan:  Accepting facility, medical stability    Follow Up:  SW will continue to follow for discharge needs.    Cameron POSEY  Weekend CAROLINE  Pager: [phone]  On Call Pager: [phone] 4pm - Midnight

## 2020-08-02 NOTE — DISCHARGE SUMMARY
[facility], [place]  Hospitalist Discharge Summary      Date of Admission:  7/26/2020  Date of Discharge:  8/3/2020  Discharging Provider: Lucy Shaevr PA-c  Discharge Team: Hospitalist Service, Gold 6    Discharge Diagnoses     Metabolic encephalopathy   Hepatic encephalopathy 2/2 Decompensated ESLD  Acute kidney injury on CKD stage III  Non-anion gap metabolic acidosis   Decompensated alcohol cirrhosis   Mild hypercalcemia  Acute on chronic anemia  Hyponatremia  (resolved)  Thrombocytopenia  Hypotension  History of EtOH abuse  History of tobacco use disorder      Follow-ups Needed After Discharge   PCP follow up in 5-7 days  Nephrology follow up in 2-3 weeks  Hepatology follow up in 2-3 weeks       Unresulted Labs Ordered in the Past 30 Days of this Admission     No orders found from 6/26/2020 to 7/27/2020.      These results will be followed up by your PCP    Discharge Disposition   Discharged to short-term care facility  Condition at discharge: Stable      Hospital Course    In brief:    Monalisa Olguin is a 54 year old female with history of decompensated etoh cirrhosis c/b ascites with SBP, HE, portal HTN, CKD s/p nephrectomy (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), chronic anemia and thrombocytopenia, GERD, and chronic pain who was admitted to [facility] 7/26/2020 with encephalopathy and acute kidney injury.   She was started on [place] protocol, and neurologic workup as well as infectious workup and was started on thiamine/supplementation.  She had an elevated procalcitonin and CXR findings concerning for CAP and was started on azithromycin and ceftriaxone, later narrowed to ciprofloxacin.  She had hypotension that improved with discontinuing spironolactone and and lasix and starting midodrine. Her mental status improved during the course of her hospitalization and per PT/OT assessments she met criteria for TCU on discharge.  She was able to discharge to TCU on 8/3/20.     Extended,  problem-based course:     #Toxic metabolic encephalopathy  Hepatic encephalopathy 2/2 ESLD  Uremia  Presented 7/26/20 with significant AMS in setting of N/V unable to take oral PTA lactulose. Previous admission with similar presentation due to HE that improved with lactulose. We continue to suspect a concomitant likely dementia has underlying dementia per previous neuro psych testing on 5/27. CT head without acute process. Presented with asterixis on exam with elevated ammonia so possibly due to HE and may have had a component of uremia with ALFREDO and elevated BUN 86. Glucose and electrolytes stable except for hyponatremia as noted below, which resolved with fluids. Infectious work up with CXR 7/26 diffuse bilateral interstitial and alveolar opacities due to pulmonary edema vs infiltrate. Legionella and strep pneumoniae urinary antigen negative.  Procalcitonin 0.65, but also in setting of ALFREDO. Patient completed 5 day course of Ceftriaxone/Azithromycin for CAP coverage. Blood cultures no growth. UA bland. Diagnostic paracentesis not consistent with SBP (). COVID 19 PCR negative x2 (7/24 and 7/27).  Treated with high dose of thiamine. VSS on room air and afebrile. Mental status improved with lactulose per [place] protocol. Near baseline mental status.   - Continue thiamine 100 mg daily and folate   - Continue PTA lactulose 30mL 3x daily  - Continue PTA Rifaximin 550 mg BID, folic acid  - Discontinue Buspar and Abilify. Avoid delirogenic medications   - discharging to TCU    #Decompensated etoh cirrhosis  Followed by Dr. Kovacs of Hepatology. Sober since 2019. C/b ascites with SBP on ppx cipro, HE, thrombocytopenia. She was continued Subcutaneous octreotide 100 mcg TID while hospitalized PTA on cipro 250 mg daily, lasix 40 mg daily, spironolactone 25 mg daily, lactulose 30 mL TID, midodrine 10 mg TID, rifaximin 550 mg BID. Alk phos elevation slightly up from baseline, other LFTs near baseline (T. Bili ranges  9.0s-14.0s). FEth negative.  No RUQ tenderness on exam, with Abd US with patent vasculature. Paracentesis with IR removed 2800cc fluid, with analysis not consistent with SBP.  Completed IV Vitamin K x 3 days. Hepatology was consulted and we appreciate their input. Per Hepatology, patient is not a transplant candidate with underlying baseline cognitive impairment. MELD currently 26 at discharge.   - Hepatology follow up outpatient   - Hold lasix and spironolactone given ALFREDO, hypotension and volume status  - Continue PTA Cipro 250 mg daily x 5 more days  - Continue PTA Midodrine   - Continue home Rifaxamin and lactulose, Treat for HE as noted above   - Discussed goals of care during this admission and patient is not a candidate for transplant. Patient's  wants to continue restorative care with getting patient to TCU, but if patient were to get sick in the future, he may consider hospice at that time (not currently discharging on hospice)    #ALFREDO on CKD III-IV  #Metabolic acidosis   Baseline Cr labile, ranging around 1.7-2.4. H/o nephrectomy (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). Cr elevated to 4.19, BUN 86 on presentation. Urinalysis with hyaline casts, otherwise bland. Renal US normal. FENa 0.4. She completed an Albumin challenge 50 g dailyx 4 days. Nephrology was consulted and we appreciate their input. Nephrology feels this could have been persistent pre-renal vs ATN vs HRS. IF HRS, unfortunately patient is not a liver transplant candidate. Currently making urine and no indications to start HD at this time.  Creatinine returned to baseline. Cr 1.62 and GFR 36. On discharge Cr 1.4.  -continue to hold lasix and spironolactone as per above  - Sodium bicarb dose was decreased to 650 mg BID with normalized bicarb 20  -  Monitor weight and I&O at TCU  - Follow up with nephrology in 2-3 weeks after discharge     #Hypercalcemia   Calcium slowly up trending. Mild hypercalcemia to 10.5, stable this admission otherwise.  Likely  due to prolonged immobility while here in the hospital. Unlikely contributing to mental status. Work up on last admission in June with elevated Ca with suppressed PTH, normal Vit D, SPS and PTH-rP.   -Encourage PO hydration and ambulation   -Repeat BMP ~1 week at TCU  Follow up with nephrology in 2-3 weeks after discharge     #Acute on chronic anemia  Baseline hemoglobin 7.0s-8.0s. Dropped to 6.2, but improved to 8.0s after 2 units of pRBC transfusion. No si/sx of bleeding.  Iron studies suggestive of anemia of chronic disease (elevated ferritin, low normal TIBC). Positive occult stool, but stool brown w/o gross signs of melena/BRBPR and on oral iron. H/H trended and stable.   -Continue PTA iron and folic acid   -Trend CBC daily    #Hyponatremia, resolved    Na 127 on admission, previously normal last month. Etiology possible hypovolemia 2/2 poor oral intake, GI losses, and resolved after receiving IVF and holding diuretics.    - Nephrology follow up   - BMP in 1 week     #Depression  Continue PTA fluoxetine 60 mg daily.   -Discontinue Abilify and trazodone with confusion.   -Discontinue Buspar which is contraindicated with severe renal impairment.     #Chronic thrombocytopenia   Baseline platelets range 30s-60s. No signs or symptoms of bleeding. Currently 25. Not on any heparin agents. She did not require platelets or FFP during this admission.  Platelets stable at 33 on discharge, no clinical signs of bleeding       #GERD  Continue omeprazole 20 mg daily      Consultations This Hospital Stay   INTERVENTIONAL RADIOLOGY ADULT/PEDS IP CONSULT  INTERVENTIONAL RADIOLOGY ADULT/PEDS IP CONSULT  NEPHROLOGY GENERAL ADULT IP CONSULT  INTERVENTIONAL RADIOLOGY ADULT/PEDS IP CONSULT  PHYSICAL THERAPY ADULT IP CONSULT  OCCUPATIONAL THERAPY ADULT IP CONSULT  GI HEPATOLOGY ADULT IP CONSULT  NEPHROLOGY ESRD ADULT IP CONSULT  PHYSICAL THERAPY ADULT IP CONSULT  OCCUPATIONAL THERAPY ADULT IP CONSULT  VASCULAR ACCESS CARE ADULT IP  CONSULT    Code Status   Full Code    Time Spent on this Encounter   I, Lucy Shaver PA-C, personally saw the patient today and spent greater than 30 minutes discharging this patient.       Pager: [phone]  [facility], [place]  ______________________________________________________________________    Physical Exam   Vital Signs: Temp: 96.6  F (35.9  C) Temp src: Oral BP: 134/71 Pulse: 70   Resp: 16 SpO2: 98 % O2 Device: None (Room air)    Weight: 125 lbs 4.8 oz  GENERAL: Alert and oriented x 3. NAD. Ambulatory. Cooperative.   HEENT:+markedly icteric sclera. Mucous membranes moist. NC. AT. EOMI. Pupils equal and round  CV: RRR. S1, S2. No murmurs appreciated.   RESPIRATORY: Effort normal on RA. Lungs CTAB with no wheezing, rales, rhonchi.   GI: Abdomen + moderate distention c/w ascites, soft and with normoactive bowel sounds present in all quadrants. No tenderness, rebound, guarding.   NEUROLOGICAL: No focal deficits. Moves all extremities.  CN 2-12 grossly intact.  EXTREMITIES: No peripheral edema. Intact bilateral pedal pulses.   SKIN: + diffuse marked jaundice. Small bruises on bilateral legs and forearm. No rashes.  BACK: no CVA tenderness, no spinous tenderness         Primary Care Physician   Efren Bustos MD    Discharge Orders       Significant Results and Procedures   Most Recent 3 CBC's:  Recent Labs   Lab Test 08/03/20  0610 08/02/20  0659 08/01/20  0739   WBC 4.5 4.6 4.6   HGB 8.3* 8.2* 8.6*   * 100 102*   PLT 33* 25* 26*     Most Recent 3 BMP's:  Recent Labs   Lab Test 08/03/20  0610 08/02/20  0659 08/01/20  0739    139 139   POTASSIUM 4.8 4.6 4.2   CHLORIDE 114* 113* 113*   CO2 22 20 17*   BUN 36* 46* 54*   CR 1.40* 1.62* 2.13*   ANIONGAP 5 6 8   ELSIE 10.8* 10.6* 10.5*   GLC 98 89 98     Lab Results   Component Value Date    AST 23 08/03/2020     Lab Results   Component Value Date    ALT 13 08/03/2020     Lab Results   Component Value Date    RODRIGO  0.0 06/04/2013      Lab Results   Component Value Date    BILITOTAL 13.3 08/03/2020     Lab Results   Component Value Date    ALBUMIN 2.9 08/03/2020     Lab Results   Component Value Date    PROTTOTAL 4.6 08/03/2020      Lab Results   Component Value Date    ALKPHOS 193 08/03/2020       Discharge Medications   Current Discharge Medication List      CONTINUE these medications which have NOT CHANGED    Details   acetaminophen (TYLENOL) 500 MG tablet Take 1 tablet (500 mg) by mouth every 6 hours as needed for mild pain  Qty: 30 tablet, Refills: 0    Associated Diagnoses: Pain      ARIPiprazole (ABILIFY) 5 MG tablet Take 1 tablet (5 mg) by mouth At Bedtime  Qty: 30 tablet, Refills: 0    Associated Diagnoses: Major depressive disorder, recurrent episode, moderate (H)      busPIRone (BUSPAR) 15 MG tablet Take 1 tablet (15 mg) by mouth 2 times daily  Qty: 60 tablet, Refills: 0    Associated Diagnoses: MONA (generalized anxiety disorder)      ciprofloxacin (CIPRO) 250 MG tablet Take 1 tablet (250 mg) by mouth daily  Qty: 30 tablet, Refills: 11    Associated Diagnoses: SBP (spontaneous bacterial peritonitis) (H)      Ferrous Sulfate 324 (65 Fe) MG TBEC TAKE ONE TABLET BY MOUTH TWICE A DAY  Qty: 180 tablet, Refills: 3    Associated Diagnoses: Other iron deficiency anemia      FLUoxetine (PROZAC) 20 MG capsule TAKE THREE CAPSULES BY MOUTH ONCE DAILY  Qty: 90 capsule, Refills: 3    Associated Diagnoses: Anxiety; Major depressive disorder, recurrent episode, moderate (H)      folic acid (FOLVITE) 1 MG tablet Take 1 tablet (1 mg) by mouth daily  Qty: 30 tablet, Refills: 0    Associated Diagnoses: Alcoholic cirrhosis of liver with ascites (H)      furosemide (LASIX) 40 MG tablet Take 1 tablet (40 mg) by mouth daily  Qty: 90 tablet, Refills: 3    Associated Diagnoses: Alcoholic cirrhosis of liver with ascites (H)      gabapentin (NEURONTIN) 100 MG capsule Take 3 capsules (300 mg) by mouth At Bedtime  Qty: 90 capsule, Refills: 0     Associated Diagnoses: Neuropathy      lactulose (CONSTULOSE) 10 GM/15ML solution Take 30 mLs (20 g) by mouth 3 times daily  Qty: 2700 mL, Refills: 0    Associated Diagnoses: Hepatic encephalopathy (H)      nicotine (NICODERM CQ) 21 MG/24HR 24 hr patch Place 1 patch onto the skin every 24 hours  Qty: 30 patch, Refills: 0    Associated Diagnoses: Tobacco abuse      omeprazole (PRILOSEC) 20 MG DR capsule Take 1 capsule (20 mg) by mouth daily  Qty: 30 capsule, Refills: 0    Associated Diagnoses: Gastroesophageal reflux disease with esophagitis      ondansetron (ZOFRAN) 4 MG tablet Take 1 tablet (4 mg) by mouth every 6 hours as needed for nausea  Qty: 20 tablet, Refills: 0    Associated Diagnoses: Alcoholic cirrhosis of liver with ascites (H)      rifaximin (XIFAXAN) 550 MG TABS tablet Take 1 tablet (550 mg) by mouth 2 times daily  Qty: 60 tablet, Refills: 0    Associated Diagnoses: Alcoholic cirrhosis of liver without ascites (H); Hepatic encephalopathy (H)      sodium bicarbonate 650 MG tablet Take 1 tablet (650 mg) by mouth 2 times daily  Qty: 60 tablet, Refills: 0    Associated Diagnoses: CKD (chronic kidney disease) stage 3, GFR 30-59 ml/min (H)      spironolactone (ALDACTONE) 25 MG tablet Take 1 tablet (25 mg) by mouth daily  Qty: 90 tablet, Refills: 3    Associated Diagnoses: Alcoholic cirrhosis of liver with ascites (H)      traZODone (DESYREL) 50 MG tablet Take 2 tablets (100 mg) by mouth nightly as needed for sleep  Qty: 30 tablet, Refills: 0    Associated Diagnoses: Other insomnia      ursodiol (ACTIGALL) 500 MG tablet Take 1 tablet (500 mg) by mouth 2 times daily  Qty: 60 tablet, Refills: 0    Associated Diagnoses: Alcoholic cirrhosis of liver with ascites (H)      vitamin B1 (THIAMINE) 100 MG tablet Take 1 tablet (100 mg) by mouth daily  Qty: 30 tablet, Refills: 0    Associated Diagnoses: Alcoholic cirrhosis of liver with ascites (H)         STOP taking these medications       bisacodyl (DULCOLAX) 5 MG EC  "tablet Comments:   Reason for Stopping:         midodrine (PROAMATINE) 10 MG tablet Comments:   Reason for Stopping:         polyethylene glycol (GOLYTELY) 236 g suspension Comments:   Reason for Stopping:             Allergies   Allergies   Allergen Reactions     Albuterol      Tongue \"hardened and painful\"     "

## 2020-08-02 NOTE — PROGRESS NOTES
[facility], [facility] Progress Note - Hospitalist Service, Gold 6       Date of Admission:  7/26/2020    Assessment & Plan   Monalisa Olguin is a 54 year old female with history of decompensated etoh cirrhosis c/b ascites with SBP, HE, portal HTN, CKD s/p nephrectomy (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), chronic anemia and thrombocytopenia, GERD, and chronic pain who was admitted to [facility] 7/26/2020 with encephalopathy and acute kidney injury.     #Toxic metabolic encephalopathy  Presented with significant AMS in setting of N/V unable to take oral lactulose. Previous admission with similar presentation due to HE that improved with lactulose. Patient likely has underlying dementia per previous neuro psych testing on 5/27. CT head without acute process. Presented with asterixis on exam with elevated ammonia so possibly due to HE. Could also have a component of uremia with ALFREDO and elevated BUN 86. Glucose and electrolytes stable except for hyponatremia as noted below, which resolved with fluids. Infectious work up with CXR 7/26 diffuse bilateral interstitial and alveolar opacities due to pulmonary edema vs infiltrate. Legionella and strep pneumoniae urinary antigen negative.  Procalcitonin 0.65, but also in setting of ALFREDO. Patient completed 5 day course of Ceftriaxone/Azithromycin for CAP coverage. Blood cultures no growth. UA bland. Diagnostic paracentesis not consistent with SBP (). COVID 19 PCR negative x2 (7/24 and 7/27).  Treated with high dose of thiamine. VSS on room air and afebrile. Mental status improved with lactulose per [place] protocol. Near baseline mental status.   - Continue thiamine 100 mg daily   - Lactulose per [place] protocol   - Continue PTA Rifaximin 550 mg BID, folic acid  - Discontinue Buspar and Abilify. Avoid delirogenic medications   - PT/OT consulted and recommending TCU, awaiting bed, per SW will likely not happen until tomorrow on Monday      #Decompensated etoh cirrhosis  Followed by Dr. Kovacs of Hepatology. Sober since 2019. C/b ascites with SBP on ppx cipro, HE, thrombocytopenia. PTA on cipro 250 mg daily, lasix 40 mg daily, spironolactone 25 mg daily, lactulose 30 mL TID, midodrine 10 mg TID, rifaximin 550 mg BID. Alk phos elevation slightly up from baseline, other LFTs near baseline (T. Bili ranges 9.0s-14.0s). FEth negative.  No RUQ tenderness on exam, with Abd US with patent vasculature. Paracentesis with IR removed 2800cc fluid, with analysis not consistent with SBP.  Completed IV Vitamin K x 3 days. Per Hepatology, patient is not a transplant candidate with underlying baseline cognitive impairment. MELD currently 27.   - Hepatology consulted, appreciate recs  - Trend MELD labs (CMP, INR)   - Hold lasix and spironolactone in setting of ALFREDO, will evaluate volume status daily   - Continue PTA Cipro 250 mg daily   - Continue PTA Midodrine   - Continue home Rifaxamin and lactulose, Treat for HE as noted above   - Discussed goals of care since pt not a candidate for transplant and renal failure as noted below. Patient's  wants to continue restorative care with getting patient to TCU, but if patient were to get sick in the future, he may consider hospice at that time     MELD-Na score: 27 at 8/2/2020  6:59 AM  MELD score: 27 at 8/2/2020  6:59 AM  Calculated from:  Serum Creatinine: 1.62 mg/dL at 8/2/2020  6:59 AM  Serum Sodium: 139 mmol/L (Rounded to 137 mmol/L) at 8/2/2020  6:59 AM  Total Bilirubin: 13.2 mg/dL at 8/2/2020  6:59 AM  INR(ratio): 1.82 at 8/1/2020  7:39 AM  Age: 54 years 9 months    #ALFREDO on CKD III-IV, possible progression to CKD stage V  #Metabolic acidosis   Baseline Cr labile, ranging around 1.7-2.4. H/o nephrectomy (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). Cr elevated to 4.19, BUN 86 on presentation. Urinalysis with hyaline casts, otherwise bland. Renal US normal. FENa 0.4. Nephrology feels this could have been persistent pre-renal vs ATN  vs HRS. IF HRS, unfortunately patient is not a liver transplant candidate. Currently making urine and no indications to start HD at this time.  Creatinine returned to baseline. Cr 1.62 and GFR 36.   - Nephrology consulted, appreciate recommendations   - Continue to hold diuretics, per nephrology and assess fluid status daily to determine when to resume  - Completed Albumin challenge 50 g daily (4/4 days)   - Continue Subcutaneous octreotide 100 mcg TID while hospitalized   - Decrease Sodium bicarb to 650 mg BID with normalized bicarb 20  - Strict I&Os, daily weights   - Follow up with nephrology in 2-3 weeks after discharge     #Hypercalcemia   Calcium slowly up trending. Mild hypercalcemia to 10.5.  Likely due to prolonged immobility while here in the hospital. Unlikely contributing to mental status. Work up on last admission in June with elevated Ca with suppressed PTH, normal Vit D, SPS and PTH-rP.   -Encourage PO hydration and ambulation   -Trend     #Acute on chronic anemia  Baseline hemoglobin 7.0s-8.0s. Dropped to 6.2, but improved to 8.0s after 2 units of pRBC transfusion. No si/sx of bleeding.  Iron studies suggestive of anemia of chronic disease (elevated ferritin, low normal TIBC). Positive occult stool, but stool brown w/o gross signs of melena/BRBPR and on oral iron. H/H trended and stable.   -Continue PTA iron and folic acid   -Trend CBC daily  -Transfuse for hemoglobin goal > 7.0   - IF H/H drops again, will discuss with GI     #Hyponatremia, resolved    Na 127 on admission, previously normal last month. Etiology possible hypovolemia 2/2 poor oral intake, GI losses, and resolved after receiving IVF and holding diuretics.   - Nephrology consult as above   - BMP daily     #Depression  Continue PTA fluoxetine 60 mg daily. Discontinue Abilify and trazodone with confusion. Discontinue Buspar which is contraindicated with severe renal impairment.     #Chronic thrombocytopenia   Baseline platelets range  30s-60s. No signs or symptoms of bleeding. Currently 25. Not on any heparin agents.  -Trend  -Transfuse for goal platelets >10 in setting of no bleeding     #GERD  Continue omeprazole 20 mg daily       Diet: Advance Diet as Tolerated: Regular Diet Adult  Snacks/Supplements Adult: Boost Plus; Between Meals  Room Service    DVT Prophylaxis: Pneumatic Compression Devices  Gonzalez Catheter: not present  Code Status: Full Code         Disposition Plan   Expected discharge: Tomorrow, recommended to transitional care unit once safe disposition plan/ TCU bed available.  Entered: Palmira Bullock PA-C 08/02/2020, 4:26 PM       The patient's care was discussed with the Attending Physician, Dr. Jorge Alberto Gutierrez, Bedside Nurse and Patient.    Palmira Bullokc PA-C  Hospitalist Service, [address], [place]  Pager: [phone]  Please see sticky note for cross cover information  ______________________________________________________________________    Interval History   No overnight events.     No complaints. Finished eating breakfast with eggs and toast.  Denies any F/C, N/V, abdominal pain, CP, SOB, orthopnea, or dysuria. States she is urinating normally. Endorses abdominal distension but no discomfort.       Data reviewed today: I reviewed all medications, new labs and imaging results over the last 24 hours.     Physical Exam   Vital Signs: Temp: 96.6  F (35.9  C) Temp src: Oral BP: 134/71 Pulse: 70   Resp: 16 SpO2: 98 % O2 Device: None (Room air)    Weight: 125 lbs 4.8 oz  GENERAL: A&O to self, place, time (year, month, and day of the week, and [facility] NAD. Chronically ill appearing.   HEENT: Icteric sclera. Moist mucous membranes.   NECK: Trachea midline.   CARDIOVASCULAR: RRR. S1, S2. + systolic murmur. No rubs, or gallops.   RESPIRATORY: Effort normal on RA.  Lungs CTA, no wheezing, rales or rhonchi.  no use of accessory muscles, no retractions, respirations unlabored   GI:  Abdomen distended but soft, non-tender abdomen without rebound or guarding, no hepatosplenomegaly, no palpable masses, normoactive bowel sounds present  MUSCULOSKELETAL: No edema of LE bilaterally.  No calf asymmetry, erythema, or tenderness.   NEUROLOGICAL: CN grossly intact. No asterixis. Moving extremities symmetrically. Non-tremulous.    SKIN: Intact. Warm and dry. No rashes or lesions. Jaundice     Data   Recent Labs   Lab 08/02/20  0659 08/01/20  0739 07/31/20  0704 07/30/20  0638   WBC 4.6 4.6 5.0 6.3   HGB 8.2* 8.6* 8.4* 8.1*    102* 101* 101*   PLT 25* 26* 30* 36*   INR  --  1.82* 1.82* 1.84*    139 138 140   POTASSIUM 4.6 4.2 4.0 3.8   CHLORIDE 113* 113* 111* 113*   CO2 20 17* 14* 15*   BUN 46* 54* 68* 70*   CR 1.62* 2.13* 2.89* 3.77*   ANIONGAP 6 8 12 11   ELSIE 10.6* 10.5* 10.2* 10.2*   GLC 89 98 95 108*   ALBUMIN 2.9* 3.2* 3.3* 3.8   PROTTOTAL 4.6* 4.8* 5.1* 5.6*   BILITOTAL 13.2* 13.9* 13.6* 12.8*   ALKPHOS 192* 202* 208* 200*   ALT 14 16 18 18   AST 20 24 27 30     No results found for this or any previous visit (from the past 24 hour(s)).  Medications     - MEDICATION INSTRUCTIONS -         ciprofloxacin  250 mg Oral Q24H [facility]     ferrous sulfate  325 mg Oral BID     FLUoxetine  60 mg Oral Daily     folic acid  1 mg Oral Daily     lactulose  20 g Oral TID     midodrine  10 mg Oral TID w/meals     octreotide  100 mcg Subcutaneous TID     omeprazole  20 mg Oral Daily     rifaximin  550 mg Oral BID     sodium bicarbonate  650 mg Oral BID     sodium chloride (PF)  3 mL Intracatheter Q8H     vitamin B1  100 mg Oral Daily     ursodiol  500 mg Oral BID

## 2020-08-02 NOTE — PLAN OF CARE
Vitals:    08/01/20 1530 08/01/20 1705 08/01/20 2222 08/02/20 0735   BP: 118/63  99/47 109/65   BP Location: Right arm  Left arm Right arm   Pulse:   72 72   Resp: 16  16 16   Temp: 98.3  F (36.8  C)  97.7  F (36.5  C) 97.2  F (36.2  C)   TempSrc: Oral  Oral Oral   SpO2: 98%  96% 97%   Weight:  56.8 kg (125 lb 4.8 oz)     Height:       AVSS.    Neuro: Alert and oriented to person. Forgetful.    GI/: Abdomen soft. Good uop mixed with stools x 2.    Diet: Regular. Eating 25-50% of meals.     Incisions/Drains: None.    IV Access: PIV - SL.    Labs: plt=25.8, hgb = 8.2, wbc=4.6.    Activity: OOB walking to BR and walking on unit with family.    Pain: No /o pain.    New changes this shift: More awake this afternoon.    Plan: Discharge to TCU when bed is available.

## 2020-08-02 NOTE — PLAN OF CARE
Patient denies SOB, diminished/course LS, stable on RA. Calm and cooperative. +BS, soft distended abdomen, denies pain, denies nausea. 700 ml mix urine and stool in commode. Assist of 1 to transfer. Bed alarm on, pt is confused at times. sleeps this shift.  Continue poc.

## 2020-08-02 NOTE — PLAN OF CARE
[id number]  Neuros: Alert to self, place. Disoriented to time and situation. Bed Alarm. [place] Score 1, unchanged  Cardiac: Soft BP's at times, patient on midodrine. Denies chest pain.   Respiratory: Sating well on RA. Denies SOB.   Diet: Regular diet. Poor appetite. Encouraged intake.   Activity: SBA, walked in hallway with sister for 30 min this shift.  GI/: Voiding without difficulty. 3 total loose BM's today mixed with urine. Scheduled lactulose.    Skin: Scattered bruises, jaundiced.   Lines: PIV SL, both flush well.  Labs: Cr 2.13. Hgb 8.6.  Pain: Denies  Plan: Continue to monitor mental status and encourage intake. TCU for discharge.

## 2020-08-02 NOTE — PLAN OF CARE
Neuros: Alert to self, place. Disoriented to situation and time. Bed Alarm. [place] Score 1, unchanged  Cardiac: Soft BP's at times, patient on midodrine. Denies chest pain.   Respiratory: Sating well on RA. Denies SOB.   Diet: Regular diet. Poor appetite. Encouraged intake.   Activity: SBA, walked in hallways this shift.  GI/: Voiding without difficulty. 3 total BM's today. AUOP. Scheduled lactulose.    Skin: Scattered bruises, jaundiced.   Lines: PIV SL  Labs: Cr 1.62 Hgb 8.2. Platelets 25,000  Pain: Denies  Plan: 500 ml emesis, zofran given with relief. Continue to monitor mental status and encourage intake. TCU for discharge.

## 2020-08-03 NOTE — PLAN OF CARE
Occupational Therapy Discharge Summary    Reason for therapy discharge:    Discharged to transitional care facility.    Progress towards therapy goal(s). See goals on Care Plan in [facility] electronic health record for goal details.  Goals partially met.  Barriers to achieving goals:   discharge from facility.    Therapy recommendation(s):    Continued therapy is recommended.  Rationale/Recommendations:  Skilled therapy recommended to improve cognition and tolerance for increase ind with ADLs. Pts lack of awareness and cognitive deficits present as safety risk. If discharged home, pt will require 24/7 supervision for safety..

## 2020-08-03 NOTE — PROGRESS NOTES
Report given to receiving nurse at [facility] @ 2181. Discharge packet was sent with pt.

## 2020-08-03 NOTE — PLAN OF CARE
Physical Therapy Discharge Summary    Reason for therapy discharge:    Discharged to transitional care facility.    Progress towards therapy goal(s). See goals on Care Plan in [facility] electronic health record for goal details.  Goals partially met.  Barriers to achieving goals:   discharge from facility.    Therapy recommendation(s):    Continued therapy is recommended.  Rationale/Recommendations:  Continue to progress functional mobility in TCU setting.

## 2020-08-03 NOTE — PROGRESS NOTES
8/3/2020:    Social Work Services Progress Note    Hospital Day: 9  Date of Initial Social Work Evaluation:  7/31/20  Collaborated with:  Bedside RN, medical team, chart review, TCU facilities    Data:  Monalisa Olguin is a 54 year old female patient admitted to [facility] on 7/26/20 due to AMS.     She is ready to discharge to TCU.     Intervention:    1.[facility]: No admissions staff on weekends  [facility]  PH: [phone]   F: [phone]  ---message left with request for return call  [facility]    2.[facility]: Left voicemail for [place] in admissions to return call once referral reviewed  [facility]  PH: [phone]  F: [phone]  --potential bed, reviewing, will call back today with update    3.[facility]  PH: [phone]  F: [phone]  --Agnes requesting updated notes from weekend be faxed Monday to [phone], financials will also be reviewed Monday for admissions decision.  --updated nursing notes faxed, called admissions msg left  --[facility] is reviewing, there is a bed availabile  -faxed MAR to [facility] [phone]  ACCEPTED      Assessment:  Patient is ready for discharge, referrals to TCU pending review.     Plan:    Anticipated Disposition:  Facility:  TCU, referrals pending    Barriers to d/c plan:  TCU acceptance and bed availability    Follow Up:  SW following.     CATALINO Welsh, LGSW  [facility]  Casual   7b phone: [phone]  7b page: [phone]

## 2020-08-03 NOTE — PLAN OF CARE
Vitals:    08/02/20 1535 08/02/20 1703 08/02/20 2243 08/03/20 0602   BP: 134/71  112/60 97/48   BP Location: Right arm  Right arm Left arm   Pulse: 70  63    Resp: 16  16 16   Temp: 96.6  F (35.9  C)  98.6  F (37  C) 97.3  F (36.3  C)   TempSrc: Oral  Oral Oral   SpO2: 98%  99% 99%   Weight:  57.1 kg (125 lb 14.4 oz)     Height:       AVSS.    Neuro: Alert and oriented to person and family only.     GI/: WDL. Using BSC.     Diet: regular good po intake.    IV Access: PIV taken out.    Labs: wbc=4.5, hgb=8.3.    Activity: OOB with SBA.    Pain: No c/o pain.    New changes this shift: Discharge to TCU. Pt was picked up by family at 1400.    Plan: Called [facility]CU- [facility] at 1400. Was on hold for a long time. Will reattempt calling again at 1430.

## 2020-08-03 NOTE — PLAN OF CARE
Discharge Planner OT   Patient plan for discharge: Not discussed this session  Current status: Pt oriented to person and place. Pt SBA for bed mobility and ambulation to the bathroom for shower task. Pt able to stand ~12 minutes to complete shower with assistance for set-up. Stood an additional ~6 minutes to complete ADL tasks at sink. Requiring min v/c to remain on task and sequence appropriately. Returned to bed with alarm engaged.   Barriers to return to prior living situation: Cognition, activity tolerance, level of assist  Recommendations for discharge: TCU  Rationale for recommendations: Skilled therapy recommended to improve cognition and tolerance for increase ind with ADLs. Pts lack of awareness and cognitive deficits present as safety risk. If discharged home, pt will require 24/7 supervision for safety.       Entered by: Na Stanley 08/03/2020 12:30 PM

## 2020-08-03 NOTE — PLAN OF CARE
"[id number]    BP 97/48 (BP Location: Left arm)   Pulse 63   Temp 97.3  F (36.3  C) (Oral)   Resp 16   Ht 1.549 m (5' 1\")   Wt 57.1 kg (125 lb 14.4 oz)   LMP 05/16/2012   SpO2 99%   BMI 23.79 kg/m        Neuros: Alert to self and situation. Disoriented to place and time. Bed Alarm. [place] Score 1, unchanged.  Cardiac: Soft BP's at times, patient on midodrine. Denies chest pain.   Respiratory: Sating well on RA. Denies SOB.   Diet: Regular diet. Poor appetite. Encouraged intake.   Activity: SBA.  GI/: Voiding without difficulty. 1 BM this shift. AUOP. PRN lactulose given x 1.    Skin: Scattered bruises, jaundiced.   Lines: PIV SL  Labs: Cr 1.62 Hgb 8.2. Platelets 25,000  Pain: Denies  Plan: Lactulose PRN x 1 for [place] score of 1, BM x 1.  Slight improvement with mental status, pt able to state correct year and month. Continue to monitor mental status and encourage intake. TCU for discharge.  "

## 2020-08-03 NOTE — PROGRESS NOTES
8/3/2020;    Social Work Services Discharge Note      Patient Name:  Monalisa Olguin     Anticipated Discharge Date:  8/3/2020     Discharge Disposition:   TCU:  [facility]    Following MD:  JOCY Gutierrez MD.     Pre-Admission Screening (PAS) online form has been completed.  The Level of Care (LOC) is:  Determined  Confirmation Code is: [id number]    Patient/caregiver informed of referral to [facility] for Pre-Admission Screening for skilled nursing facility (SNF) placement and to expect a phone call post discharge from SNF.     Additional Services/Equipment Arranged:  Family providing ride.      Patient / Family response to discharge plan:  Family agreeable and helping to coordinate transportation.     Persons notified of above discharge plan:  Spouse, daughter, MD team, RN, patients     Staff Discharge Instructions:  Please fax discharge orders and signed hard scripts for any controlled substances. SW to fax orders: F: [phone]  Please print a packet and send with patient.   RN to RN call: Tien [phone] ext [phone]      CTS Handoff completed:  YES    Medicare Notice of Rights provided to the patient/family:  N/a    CATALINO Welsh, LGSW  [facility]  Casual   7b phone: [phone]  Pager [phone].

## 2020-08-05 NOTE — PROGRESS NOTES
Hepatology post hospital discharge RNCC assessment    Post hospital discharge follow up:      1. Admission diagnosis:  Altered mental status   2. Discharge diagnosis:  Metabolic encephalopathy, hepatic encephalopathy, acute kidney injury on CKD stage III, hyponatremia  3. Admitted on: 7/26/2020  4. Discharged on:  8/3/2020    Medication Review    1. Medication review completed.    2. Discharge medication changes reviewed.   3. Patient did not have new medications prescribed in hospital.    Follow Up Post Discharge    1. Reviewed discharge instructions with patient. Follow up appointments reviewed and transportation to appointments confirmed.   2. Patient able to verbalized understanding of discharge instructions including medications and follow up.      3. Care coordinator role and contact information reviewed, and pt encouraged to call with questions or concerns.     Symptom Review    1.  Ascites:  Pt reported feeling very distended. Pt has a paracentesis scheduled for 8/11.  2.  Edema:  Pt and pt's nurse, Stefanie, denied lower extremity edema. Pt is currently taking furosemide 40 mg daily and spironolactone 25 mg daily.  3.  Encephalopathy: Pt was alert and oriented throughout conversation. Pt's nurse, Stefanie, reported that pt has had intermittent confusion but is easily redirectable. Pt is taking lactulose 30 ml 3 times daily and xifaxan 550 mg 2 times daily. Pt reports having 1 BM/day. Per Stefanie, pt is ambulating to bathroom independently and staff is documenting bowel movements per pt report. PRN order for lactulose faxed to nursing home (fax:   ) to give in addition to schedule doses if pt is experiencing increased confusion.   4.  Nausea:  Pt continues to have nausea but feels it has improved. Pt reported vomiting x 1 since discharge from the hospital. Pt has minimal appetite but is t    Plan    1. Pt has a paracentesis scheduled for 8/11.  2. Pt has a hospital follow up appointment with hepatology on 8/13.        Patient was given an opportunity to ask questions and have those questions answered to her satisfaction.  Patient verbalized understanding of instructions provided and agreed to plan of care.

## 2020-08-06 NOTE — TELEPHONE ENCOUNTER
Health Call Center    Phone Message    May a detailed message be left on voicemail: yes     Reason for Call: Medication Question or concern regarding medication   Prescription Clarification  Name of Medication: ciprofloxacin (CIPRO) 250 MG tablet  Prescribing Provider: Dr. Edgardo Kovacs    Pharmacy: n/a   What on the order needs clarification? Discharge order indicates 250 MG daily X 5 more days. Question: Is that the stop date? Or does the pt need to be on this medication continously?  Please call nurse back. Thank you.          Action Taken: Message routed to:  [facility]  Hepatology    Travel Screening: Not Applicable

## 2020-08-07 NOTE — TELEPHONE ENCOUNTER
Returned Lucho's call and discussed ciprofloxacin prescription. Notified [facility] that pt should be taking ciprofloxacin 250 mg daily for SBP prophylaxis. [facility]

## 2020-08-07 NOTE — TELEPHONE ENCOUNTER
RECORDS RECEIVED FROM: Internal - hospital follow up   Appt Date: 08.13.2020   NOTES STATUS DETAILS   OFFICE NOTE from referring provider Internal 07.26.2020  Ephraim Gutierrez MD   OFFICE NOTES from other specialists Internal 06.22.2020 Andrea Cadena MD    06.18.2020  06.15.2020  06.02.2020   DISCHARGE SUMMARY from hospital Internal 07.26.2020  06.15.2020   MEDICATION LIST InternaI / CE    LIVER BIOSPY (IF APPLICABLE)      PATHOLOGY REPORTS  N/A    IMAGING     ENDOSCOPY (IF AVAILABLE) Internal 04.29.2019 12.27.2017   COLONOSCOPY (IF AVAILABLE) Internal 09.08.2017   ULTRASOUND LIVER Internal 07.28.2020 US ABDOMEN LIMITED WITH DOPPLER COMPLETE     07.13.2020, 06.29.2020 ULTRASOUND PARACENTESIS     06.07.2020 , 04.21.2020, US ABDOMEN COMPLETE WITH DOPPLER COMPLETE     03.20.2020 ULTRASOUND ABDOMEN LIMITED     More in Epic Imaging   CT OF ABDOMEN Internal 09.12.2018 CT ABDOMEN AND PELVIS WITH CONTRAST     MRI OF LIVER N/A    FIBROSCAN, US ELASTOGRAPHY, FIBROSIS SCAN, MR ELASTOGRAPHY N/A    LABS     HEPATIC PANEL (LIVER PANEL) Future  Internal 07.10.2020    06.02.2020   BASIC METABOLIC PANEL Internal 07.26.2020   COMPLETE METABOLIC PANEL Internal 08.03.2020   COMPLETE BLOOD COUNT (CBC) Internal 08.03.2020   INTERNATIONAL NORMALIZED RATIO (INR) Internal 08.01.2020   HEPATITIS C ANTIBODY Care Everywhere 03.21.2018   HEPATITIS C VIRAL LOAD/PCR N/A    HEPATITIS C GENOTYPE N/A    HEPATITIS B SURFACE ANTIGEN Care Everywhere 03.21.2018   HEPATITIS B SURFACE ANTIBODY Care Everywhere 03.21.2018   HEPATITIS B DNA QUANT LEVEL N/A    HEPATITIS B CORE ANTIBODY N/A

## 2020-08-10 NOTE — PROGRESS NOTES
Received a call from Agnes, nurse manager at pt's TCU, notifying writer that pt is currently at [facility] for increased confusion. Per Agnes, pt was more confused this morning and continued to be altered even after receiving an extra dose of lactulose. Pt has been taking lactulose 30 ml 3 times daily and having 1-2 BMs/day.     Writer able to see pt's hospitalization in Care Everywhere. Pt's labs notable for creatinine 2.91, ammonia 75, potassium 5.9,  And direct bilirubin 8.8. Will continue to follow plan of care and notify Dr. Kovacs of pt's hospitalization.

## 2020-08-11 NOTE — TELEPHONE ENCOUNTER
Connected with AUGIE Dowell with [facility]. AUGIE Dowell reports pt already had the EGD. Plan is to stabilize patient to go home, then have pt follow-up with Dr. Kovacs. AUGIE Dowell had no additional question for Dr. Kovacs at this time.    Mai Yañez LPN  Hepatology Clinic  ------  Edgardo Kovacs MD Beckenbach, Shannon, LPN   Caller: Unspecified (2 days ago,  1:43 PM)     Needs an EGD.     Maikel   --------  [facility]    Phone Message    May a detailed message be left on voicemail: yes     Reason for Call: Magalys GHOSH, is reporting that pt is currently admitted at [facility] due to hepatic encephalopathy. Pt has chronic anemia. Pt's hemoglobin is currently at 7.  The last EGD was 1 year ago.  The hospital is on the fence whether or not the EDG should be repeated. Would Dr. Kovacs please weigh in on this and have Dr. Kovacs/Care team return call to Magalys. Thank you.      Action Taken: Message routed to:  [facility]  Hepatology    Travel Screening: Not Applicable

## 2020-08-17 NOTE — PROGRESS NOTES
Pt hospitalized at [facility] from 8/10-8/16 for hepatic encephalopathy and acute kidney injury. Per chart review, pt discharge to home with hospice ([facility]).     Attempted to reach patient's daughter, Connie, to see if pt and family needed any assistance from hepatology clinic, no answer, message left, number provided. Will attempt to reach pt's spouse if not heard from pt's daughter.

## 2020-08-18 NOTE — TELEPHONE ENCOUNTER
Returned daughters call and also spoke with patients  per Connie's request. Hospice is set up. Colonoscopy was done while inpatient and is normal. Discussed the two main ongoing concerns of frailty and impaired cognition. Daughter has moved out. If, at any time, the family feels patient is more mentally clear can get an updated neuropsych exam. They verbally acknowledge low likelihood of that happening.

## 2020-08-19 NOTE — ED TRIAGE NOTES
Patient is having ab pain. Has liver issues and stomach is distend. Causing patient back issues. Last paracentesis was a month ago.

## 2020-08-19 NOTE — PROGRESS NOTES
Per chart review, pt's daughter (Connie) spoke with Gabbie Portillo and confirmed that hospice is set up. Gabbie also discussed with Connie that pt can get an updated neuropsych evaluation if pt were to mentally clear and family acknowledged the low likelihood of this happening. Care coordination will sign off of pt's care team at this time.

## 2020-08-19 NOTE — ED AVS SNAPSHOT
[facility] Emergency Department  [address] [place] [address]  Phone:  [phone]  Fax:  [phone]                                    Monalisa Olguin   MRN: [id number]    Department:  [facility] Emergency Department   Date of Visit:  8/19/2020           After Visit Summary Signature Page    I have received my discharge instructions, and my questions have been answered. I have discussed any challenges I see with this plan with the nurse or doctor.    ..........................................................................................................................................  Patient/Patient Representative Signature      ..........................................................................................................................................  Patient Representative Print Name and Relationship to Patient    ..................................................               ................................................  Date                                   Time    ..........................................................................................................................................  Reviewed by Signature/Title    ...................................................              ..............................................  Date                                               Time          22EPIC Rev 08/18

## 2020-08-19 NOTE — TELEPHONE ENCOUNTER
Reason for Call:  Other FYI     Detailed comments: Just an FYI that patient now has  Hospice care.     Phone Number Patient can be reached at: Other phone number: [phone]    Best Time: any     Can we leave a detailed message on this number? YES    Call taken on 8/19/2020 at 10:11 AM by Denise Cavazos

## 2020-08-20 NOTE — PROGRESS NOTES
Clinic Care Coordination Contact    Situation: Patient chart reviewed by care coordinator.    Background: She was on the  ED discharge list as high risk.    Assessment: Has advanced liver disease and ascites who presented to the ED for out paracentesis due to the fact she is going out of town.  Patient has been in and out of hospital due to her liver disease noncompliance.  Family has opted for hospice for patient.  Patient was admitted to [facility] hospice yesterday.    Plan/Recommendations: Further out reach by care coordination patient has entered hospice.      Inés NASH, RN, PHN, [facility]  Primary Clinic Care Coordination    [facility]  [email or web address]   Office: [phone]  Employed by [facility] [facility]

## 2020-08-20 NOTE — ED PROVIDER NOTES
"  History     Chief Complaint   Patient presents with     Abdominal Pain     HPI  Monalisa Olguin is a 54 year old female who presents asking if she can have her paracentesis done tonight as she is heading to [place] tomorrow to go to a hotel and brewery and would like to have this done for convenience due to some abdominal discomfort.  She denies any fever.  She denies any vomiting.  She is able to take oral fluids.  No shortness of air.  She already has paracentesis scheduled for 1 week from today.  She tried calling radiology but they said they could not work her in sooner.  Allergies:  Allergies   Allergen Reactions     Albuterol      Tongue \"hardened and painful\"       Problem List:    Patient Active Problem List    Diagnosis Date Noted     AMS (altered mental status) 07/26/2020     Priority: Medium     Alcoholic encephalopathy (H) 06/15/2020     Priority: Medium     Encephalopathy 06/02/2020     Priority: Medium     Alcohol dependence in remission (H) 05/12/2020     Priority: Medium     Abnormal LFTs (liver function tests) 05/12/2020     Priority: Medium     Hepatic encephalopathy (H) 05/04/2020     Priority: Medium     Confusion 04/21/2020     Priority: Medium     CKD (chronic kidney disease) stage 3, GFR 30-59 ml/min (H) 04/14/2020     Priority: Medium     Solitary kidney, acquired 11/11/2019     Priority: Medium     Macrocytic anemia 11/11/2019     Priority: Medium     Other pancytopenia (H) 07/11/2019     Priority: Medium     Other iron deficiency anemia 04/11/2018     Priority: Medium     Alcoholic cirrhosis of liver without ascites (H) - per Hepatology consult Nov 2017 03/28/2018     Priority: Medium     Splenomegaly 03/28/2018     Priority: Medium     Epistaxis 03/28/2018     Priority: Medium     Alcoholic cirrhosis of liver with ascites (H) 03/26/2017     Priority: Medium     Chronic blood loss anemia 03/26/2017     Priority: Medium     Thrombocytopenia (H) 03/26/2017     Priority: Medium     Tobacco " abuse 2017     Priority: Medium     Non-intractable vomiting with nausea 2017     Priority: Medium     Portal hypertension (H) 2017     Priority: Medium     Pulmonary nodules 2017     Priority: Medium     Hyperthyroidism 2017     Priority: Medium     Anxiety 10/10/2011     Priority: Medium     Hypertension goal BP (blood pressure) < 130/80 2011     Priority: Medium     HYPERLIPIDEMIA LDL GOAL <100 10/31/2010     Priority: Medium     Moderate Depression [296.32] 2009     Priority: Medium     Esophageal reflux 10/07/2002     Priority: Medium     Kidney donor 2001     Priority: Medium        Past Medical History:    Past Medical History:   Diagnosis Date     Acute alcoholic intoxication in alcoholism (H) 3/27/2018     Acute renal failure (H) 2019     Alcohol abuse 2013     Alcohol dependence 2013     Alcohol withdrawal 2013     Alcoholic cirrhosis (H)      Anxiety 10/10/2011     CKD (chronic kidney disease) stage 3, GFR 30-59 ml/min (H)      CKD (chronic kidney disease) stage 3, GFR 30-59 ml/min (H) 2011     CKD (chronic kidney disease) stage 5, GFR less than 15 ml/min (H) 2020     Depressive disorder      Esophageal reflux 10/7/2002     Hematochezia-patient reported 2019     Heme positive stool 3/27/2018     Hepatic encephalopathy (H) 2019     History of blood transfusion      Hypertension goal BP (blood pressure) < 130/80 2011     Hyponatremia 2019     Moderate Depression [296.32] 2009     Other internal derangement of knee(717.89)      Pap smear      SBP (spontaneous bacterial peritonitis) (H) 2019     Thyroid disease      Unspecified essential hypertension        Past Surgical History:    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     COLONOSCOPY N/A 2017     Procedure: COLONOSCOPY;  Colonoscopy;  Surgeon: Sai Johnston MD;  Location: [facility]     ESOPHAGOSCOPY, GASTROSCOPY, DUODENOSCOPY (EGD), COMBINED N/A 12/27/2017    Procedure: COMBINED ESOPHAGOSCOPY, GASTROSCOPY, DUODENOSCOPY (EGD), BIOPSY SINGLE OR MULTIPLE;  ESOPHAGOSCOPY, GASTROSCOPY, DUODENOSCOPY (EGD) with biopsies;  Surgeon: Sai Johnston MD;  Location: [facility]     ESOPHAGOSCOPY, GASTROSCOPY, DUODENOSCOPY (EGD), COMBINED N/A 4/29/2019    Procedure: ESOPHAGOGASTRODUODENOSCOPY, WITH BIOPSY;  Surgeon: Edgardo Scott DO;  Location: [facility]     HC KNEE SCOPE, DIAGNOSTIC  11/14/01    Arthroscopy, Lt Knee     IR PARACENTESIS  6/8/2020     IR PARACENTESIS  7/27/2020     LAPAROSCOPIC CHOLECYSTECTOMY N/A 9/24/2017    Procedure: LAPAROSCOPIC CHOLECYSTECTOMY;  laparoscopic cholecystectomy;  Surgeon: Romeo Pastor MD;  Location: [facility]     OPEN REDUCTION INTERNAL FIXATION WRIST Right 11/29/2017    Procedure: OPEN REDUCTION INTERNAL FIXATION WRIST;  OPEN REDUCTION INTERNAL FIXATION RIGHT WRIST;  Surgeon: Edgardo Almendarez MD;  Location: [facility]     TRANSPLANT      Donated Left Kidney in 2000       Family History:    Family History   Problem Relation Age of Onset     Hypertension Mother         80 years old     Heart Disease Paternal Grandmother      Heart Disease Paternal Grandfather      Cerebrovascular Disease Maternal Grandmother      Cerebrovascular Disease Maternal Grandfather      Alcohol/Drug Maternal Grandfather      Substance Abuse Maternal Grandfather      Heart Disease Father         Silent MI stents x 2     Diabetes Sister 17        older sister also had kidney and pancreas transplant     Heart Disease Sister         MI secondary to diabetes     Genitourinary Problems Sister 43        kidney transplant from renal failure     Other - See Comments Sister         older     Other - See Comments Sister         younger     Diabetes Sister 9        youngest, juvenile type I (onset age 9 with no  complications)     Cancer Paternal Aunt         ?kind       Social History:  Marital Status:   [2]  Social History     Tobacco Use     Smoking status: Current Every Day Smoker     Packs/day: 0.50     Years: 10.00     Pack years: 5.00     Types: Cigarettes     Smokeless tobacco: Never Used     Tobacco comment: pt quit 1992 after smoking for 8 yrs, started again in 2004   Substance Use Topics     Alcohol use: Not Currently     Alcohol/week: 0.0 standard drinks     Comment: Quit drinking 9/2019     Drug use: Not Currently     Types: Marijuana        Medications:    acetaminophen (TYLENOL) 500 MG tablet  ciprofloxacin (CIPRO) 250 MG tablet  Ferrous Sulfate 324 (65 Fe) MG TBEC  FLUoxetine (PROZAC) 20 MG capsule  folic acid (FOLVITE) 1 MG tablet  gabapentin (NEURONTIN) 100 MG capsule  lactulose (CONSTULOSE) 10 GM/15ML solution  lactulose 20 GM/30ML SOLN  midodrine (PROAMATINE) 10 MG tablet  nicotine (NICODERM CQ) 21 MG/24HR 24 hr patch  nicotine (NICODERM CQ) 21 MG/24HR 24 hr patch  omeprazole (PRILOSEC) 20 MG DR capsule  ondansetron (ZOFRAN) 4 MG tablet  rifaximin (XIFAXAN) 550 MG TABS tablet  sodium bicarbonate 650 MG tablet  ursodiol (ACTIGALL) 500 MG tablet  vitamin B1 (THIAMINE) 100 MG tablet          Review of Systems  All other systems are reviewed and are negative    Physical Exam   BP: 124/65  Pulse: 83  Temp: 97.9  F (36.6  C)  Resp: 18  SpO2: 99 %      Physical Exam  Vitals signs and nursing note reviewed.   Constitutional:       General: She is not in acute distress.     Appearance: She is not diaphoretic.      Comments: Cachectic with scattered areas of ecchymosis and significant abdominal distention from ascites.   HENT:      Head: Normocephalic and atraumatic.   Eyes:      General: Scleral icterus present.      Pupils: Pupils are equal, round, and reactive to light.   Neck:      Musculoskeletal: Normal range of motion and neck supple.   Cardiovascular:      Rate and Rhythm: Normal rate and regular  rhythm.      Heart sounds: Normal heart sounds. No murmur.   Pulmonary:      Effort: No respiratory distress.      Breath sounds: No stridor. No wheezing or rales.   Abdominal:      Palpations: Abdomen is soft.      Tenderness: There is no abdominal tenderness. There is no guarding or rebound.      Comments: Abdomen reveals no tenderness.  There is some distention consistent with ascites and a fluid wave.   Musculoskeletal:         General: No tenderness.   Skin:     General: Skin is warm and dry.      Coloration: Skin is not pale.      Findings: No erythema or rash.   Neurological:      Mental Status: She is alert.         ED Course        Procedures               Critical Care time:  none               No results found for this or any previous visit (from the past 24 hour(s)).    Medications - No data to display    Assessments & Plan (with Medical Decision Making)  54-year-old female with known advanced liver disease and ascites who presents here asking if she can have her paracentesis done sooner as she has going on vacation tomorrow to [facility].  she has had no fever, no shortness of air and no abdominal specific pain.  Exam is benign.  I did discuss the case with radiology.  They can get her in as soon as Monday they thought and they would put in a work and message.  No indication for emergent paracentesis at this point as I explained to her.  She should follow-up urgently if fever increasing abdominal pain or other concern.     I have reviewed the nursing notes.    I have reviewed the findings, diagnosis, plan and need for follow up with the patient.       Discharge Medication List as of 8/19/2020  6:26 PM          Final diagnoses:   Alcoholic hepatitis with ascites       8/19/2020   [facility] EMERGENCY DEPARTMENT     Tommy Goetz MD  08/19/20 2742

## 2020-08-21 NOTE — LETTER
8/21/2020       RE: Monalisa Olguin  [address] [facility] [address]     Dear Colleague,    Thank you for referring your patient, Monalisa Olguin, to the [facility] NEPHROLOGY at [facility]. Please see a copy of my visit note below.      Nephrology Clinic    Telephone Visit    SUBJECTIVE:   Monalisa Olguin is a 54 year old year old female with alcoholic cirrhosis, recurrent ALFREDO on suspected CKD (with solitary kidney), recent hypercalcemia, and multiple resent hospital admissions including one 8/10-8/16/20 here for: ALFREDO and electrolyte follow-up after hospitalization    The patient was admitted repeatedly over the summer. Last admission in [facility] system ended 8/3/20 and she was then admitted in [facility] system (see care-everywhere) from 8/10 to 8.16. She had ALFREDO during both admissions and had hyperkalemia during the most recent admission. Her electrolytes and creatinine improved with BP-raising meds and IVFs per the discharge summary and confirmed by her labs. She notes severe abdominal and bilat LE swelling since this recent discharge. She has a paracentesis in 3 days time. She was taken off the water pills. She stopped Lasix and Aldactone in early August due to hypotension. SBP in the 90s were recorded and the patient recalls feeling lightheaded.     She states she is going to get a paracentesis in 3 days.     The patient drinks 4-5 glass of liquids per day. She often feels thirsty. Last sodium on day of discharge 5 days ago was 141. Creatinin was 1.6mg/dl (down from peak of 2.9).     She donated one of her kidneys to her sister around 1995. Her sister had ESKD from DM the patient states.     Home BPs: not currently measuring but has cuff. She states she is still interested in assessing her BP and will measure it.     The patient states she has decided to enroll in Hospice as a result of her most recent admission.       Review of systems:  10 point ROS  negative except as noted above.    Past Medical History:   Diagnosis Date     Acute alcoholic intoxication in alcoholism (H) 3/27/2018     Acute renal failure (H) 11/11/2019     Alcohol abuse 5/2/2013     Alcohol dependence 5/25/2013     Alcohol withdrawal 5/2/2013     Alcoholic cirrhosis (H)      Anxiety 10/10/2011     CKD (chronic kidney disease) stage 3, GFR 30-59 ml/min (H) 2006    last GFR was 42     CKD (chronic kidney disease) stage 3, GFR 30-59 ml/min (H) 2/22/2011     CKD (chronic kidney disease) stage 5, GFR less than 15 ml/min (H) 4/14/2020     Depressive disorder      Esophageal reflux 10/7/2002     Hematochezia-patient reported 11/11/2019     Heme positive stool 3/27/2018     Hepatic encephalopathy (H) 11/11/2019     History of blood transfusion     2020 [facility]     Hypertension goal BP (blood pressure) < 130/80 7/12/2011     Hyponatremia 11/11/2019     Moderate Depression [296.32] 12/22/2009    stable on wellbutrin     Other internal derangement of knee(717.89)     ACL Internal derangement, knee/ original injury in 5th grade, torn cartilage     Pap smear 2011    no abnormals, due for paps q 2-3 yrs     SBP (spontaneous bacterial peritonitis) (H) 11/12/2019     Thyroid disease      Unspecified essential hypertension         ciprofloxacin (CIPRO) 250 MG tablet, Take 1 tablet (250 mg) by mouth daily  Ferrous Sulfate 324 (65 Fe) MG TBEC, TAKE ONE TABLET BY MOUTH TWICE A DAY  FLUoxetine (PROZAC) 20 MG capsule, TAKE THREE CAPSULES BY MOUTH ONCE DAILY  folic acid (FOLVITE) 1 MG tablet, Take 1 tablet (1 mg) by mouth daily  gabapentin (NEURONTIN) 100 MG capsule, Take 3 capsules (300 mg) by mouth At Bedtime  lactulose (CONSTULOSE) 10 GM/15ML solution, Take 30 mLs (20 g) by mouth 3 times daily  lactulose 20 GM/30ML SOLN, Take 30 mLs (20 g) by mouth daily as needed (Give in addition to scheduled doses if pt has less than 3 BMs/day and/or pt appears more confused)  midodrine (PROAMATINE) 10 MG tablet, Take 1 tablet  (10 mg) by mouth 3 times daily (with meals)  omeprazole (PRILOSEC) 20 MG DR capsule, Take 1 capsule (20 mg) by mouth daily  ondansetron (ZOFRAN) 4 MG tablet, Take 1 tablet (4 mg) by mouth every 6 hours as needed for nausea  rifaximin (XIFAXAN) 550 MG TABS tablet, Take 1 tablet (550 mg) by mouth 2 times daily  sodium bicarbonate 650 MG tablet, Take 1 tablet (650 mg) by mouth 2 times daily  ursodiol (ACTIGALL) 500 MG tablet, Take 1 tablet (500 mg) by mouth 2 times daily  vitamin B1 (THIAMINE) 100 MG tablet, Take 1 tablet (100 mg) by mouth daily  acetaminophen (TYLENOL) 500 MG tablet, Take 1 tablet (500 mg) by mouth every 6 hours as needed for mild pain (Patient not taking: Reported on 8/21/2020)  nicotine (NICODERM CQ) 21 MG/24HR 24 hr patch, Place 1 patch onto the skin every 24 hours (Patient not taking: Reported on 8/21/2020)  nicotine (NICODERM CQ) 21 MG/24HR 24 hr patch, Place 1 patch onto the skin every 24 hours (Patient not taking: Reported on 6/26/2020)    No current facility-administered medications on file prior to visit.        OBJECTIVE:  Vitals and exam deferred due to telephone visit.     Labs reviewed in [facility] system and care-everywhere.     Recent Labs   Lab Test 08/16/20 08/03/20  0610 08/02/20  0659   NA  --  141 139   POTASSIUM 3.7 4.8 4.6   CHLORIDE  --  114* 113*   CO2  --  22 20   ANIONGAP  --  5 6   GLC 95 98 89   BUN  --  36* 46*   CR 1.58* 1.40* 1.62*   ELSIE  --  10.8* 10.6*       Lab Results   Component Value Date    WBC 4.5 08/03/2020     Lab Results   Component Value Date    RBC 2.55 08/03/2020     Lab Results   Component Value Date    HGB 8.3 08/03/2020     Lab Results   Component Value Date    HCT 26.0 08/03/2020     No components found for: MCT  Lab Results   Component Value Date     08/03/2020     Lab Results   Component Value Date    MCH 32.5 08/03/2020     Lab Results   Component Value Date    MCHC 31.9 08/03/2020     Lab Results   Component Value Date    RDW 20.7  08/03/2020     Lab Results   Component Value Date    PLT 33 08/03/2020       Color Urine (no units)   Date Value   07/26/2020 Yellow     Appearance Urine (no units)   Date Value   07/26/2020 Clear     Glucose Urine (mg/dL)   Date Value   07/26/2020 Negative     Bilirubin Urine (no units)   Date Value   07/26/2020 Small (A)     Ketones Urine (mg/dL)   Date Value   07/26/2020 Negative     Specific Gravity Urine (no units)   Date Value   07/26/2020 1.010     pH Urine (pH)   Date Value   07/26/2020 5.5     Protein Albumin Urine (mg/dL)   Date Value   07/26/2020 Negative     Urobilinogen Urine (EU/dL)   Date Value   03/17/2015 1.0     Nitrite Urine (no units)   Date Value   07/26/2020 Negative     Leukocyte Esterase Urine (no units)   Date Value   07/26/2020 Negative           ASSESSMENT and PLAN:   Monalisa was seen today for recheck.    Diagnoses and all orders for this visit:    Acute kidney injury superimposed on CKD (H)  The patient has enrolled in Hospice. As such I offered our continued services to her but tailored to meet her needs. She states she is still interested in following her kidney function and blood pressure and may pursue treatment for reversible conditions. She agree to repeat labs, thusly, to follow-up her recent electrolyte disturbances with ALFREDO.   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 Suspected hepatorenal syndrome underlying as well. Previously without proteinuria, will not repeat as not going to guide management for this patient enrolled in Hospice.   -     Renal panel; Future  -     Vitamin D Deficiency; Future    Hypervolemia  Recent hyperkalemia  Offered to resume Lasix +/- Aldactone given her edema. Low BP previously may have contributed to ALFREDO but with Hospice enrollment treating edema may outweigh renal function (and electrolytes, thus uncertain if would use Aldactone - reason to consider it is hypokalemia can precipitate ammonia synthesis). Patient would like to defer starting those now and  see how her symptoms evolve with a planned paracentesis in 3 days. Informed patient she can call us or talk to her Hospice/palliative care MD for Lasix prescription if needed.   Recheck lytes now as would favor Lasix alone if K has become elevated again.     Recent hypercalcemia  The patient discharged from the hospital 5 days ago. Her calcium had normalized and was potentially due to immobilization and/or hypovolemia from prior inpt workup. Repeat calcium now (and check vitamin D, not previously checked and while not on her med list the patient seemed uncertain of her current meds). Will not recheck PTH given not going to affect management in Hospice patient.     Anemia  Patient without symptoms, thus appearing inappropriate to workup or treat with ESAs with Hospice enrollment.     Metabolic acidosis  Repeat BMP post-discharge. Unclear if still on bicarb tabs with Hospice enrollment - likely not providing comfort and can stop.      Return to clinic in 2 months. Discussed PRN follow-up with patient pending her needs in Hospice program but pt preferring to have pre-planned follow-up.     Joey Gaston MD  Instructor  Nephrology  Pager: [phone]

## 2020-08-21 NOTE — PROGRESS NOTES
"Monalisa Olguin is a 54 year old female who is being evaluated via a billable video visit.      The patient has been notified of following:     \"This video visit will be conducted via a call between you and your physician/provider. We have found that certain health care needs can be provided without the need for an in-person physical exam.  This service lets us provide the care you need with a video conversation.  If a prescription is necessary we can send it directly to your pharmacy.  If lab work is needed we can place an order for that and you can then stop by our lab to have the test done at a later time.    Video visits are billed at different rates depending on your insurance coverage.  Please reach out to your insurance provider with any questions.    If during the course of the call the physician/provider feels a video visit is not appropriate, you will not be charged for this service.\"    Patient has given verbal consent for Video visit? Yes  How would you like to obtain your AVS? MyChart  If you are dropped from the video visit, the video invite should be resent to: Text to cell phone: [phone]  Will anyone else be joining your video visit? No  {If patient encounters technical issues they should call [phone] :[id number]}      Video-Visit Details    Type of service:  Video Visit    Video Start Time: {video visit start/end time:152948}  Video End Time: {video visit start/end time:152948}    Originating Location (pt. Location): {video visit patient location:764892::\"Home\"}    Distant Location (provider location):  [facility] NEPHROLOGY     Platform used for Video Visit: {Virtual Visit Platforms:689192::\"Enomaly\"}    {signature options:843683}        "

## 2020-08-21 NOTE — PROGRESS NOTES
Nephrology Clinic    Telephone Visit    SUBJECTIVE:   Monalisa Olguin is a 54 year old year old female with alcoholic cirrhosis, recurrent ALFREDO on suspected CKD (with solitary kidney), recent hypercalcemia, and multiple resent hospital admissions including one 8/10-8/16/20 here for: ALFREDO and electrolyte follow-up after hospitalization    The patient was admitted repeatedly over the summer. Last admission in [facility] system ended 8/3/20 and she was then admitted in [facility] system (see care-everywhere) from 8/10 to 8.16. She had ALFREDO during both admissions and had hyperkalemia during the most recent admission. Her electrolytes and creatinine improved with BP-raising meds and IVFs per the discharge summary and confirmed by her labs. She notes severe abdominal and bilat LE swelling since this recent discharge. She has a paracentesis in 3 days time. She was taken off the water pills. She stopped Lasix and Aldactone in early August due to hypotension. SBP in the 90s were recorded and the patient recalls feeling lightheaded.     She states she is going to get a paracentesis in 3 days.     The patient drinks 4-5 glass of liquids per day. She often feels thirsty. Last sodium on day of discharge 5 days ago was 141. Creatinin was 1.6mg/dl (down from peak of 2.9).     She donated one of her kidneys to her sister around 1995. Her sister had ESKD from DM the patient states.     Home BPs: not currently measuring but has cuff. She states she is still interested in assessing her BP and will measure it.     The patient states she has decided to enroll in Hospice as a result of her most recent admission.       Review of systems:  10 point ROS negative except as noted above.    Past Medical History:   Diagnosis Date     Acute alcoholic intoxication in alcoholism (H) 3/27/2018     Acute renal failure (H) 11/11/2019     Alcohol abuse 5/2/2013     Alcohol dependence 5/25/2013     Alcohol withdrawal 5/2/2013     Alcoholic cirrhosis (H)       Anxiety 10/10/2011     CKD (chronic kidney disease) stage 3, GFR 30-59 ml/min (H) 2006    last GFR was 42     CKD (chronic kidney disease) stage 3, GFR 30-59 ml/min (H) 2/22/2011     CKD (chronic kidney disease) stage 5, GFR less than 15 ml/min (H) 4/14/2020     Depressive disorder      Esophageal reflux 10/7/2002     Hematochezia-patient reported 11/11/2019     Heme positive stool 3/27/2018     Hepatic encephalopathy (H) 11/11/2019     History of blood transfusion     2020 [facility]     Hypertension goal BP (blood pressure) < 130/80 7/12/2011     Hyponatremia 11/11/2019     Moderate Depression [296.32] 12/22/2009    stable on wellbutrin     Other internal derangement of knee(717.89)     ACL Internal derangement, knee/ original injury in 5th grade, torn cartilage     Pap smear 2011    no abnormals, due for paps q 2-3 yrs     SBP (spontaneous bacterial peritonitis) (H) 11/12/2019     Thyroid disease      Unspecified essential hypertension         ciprofloxacin (CIPRO) 250 MG tablet, Take 1 tablet (250 mg) by mouth daily  Ferrous Sulfate 324 (65 Fe) MG TBEC, TAKE ONE TABLET BY MOUTH TWICE A DAY  FLUoxetine (PROZAC) 20 MG capsule, TAKE THREE CAPSULES BY MOUTH ONCE DAILY  folic acid (FOLVITE) 1 MG tablet, Take 1 tablet (1 mg) by mouth daily  gabapentin (NEURONTIN) 100 MG capsule, Take 3 capsules (300 mg) by mouth At Bedtime  lactulose (CONSTULOSE) 10 GM/15ML solution, Take 30 mLs (20 g) by mouth 3 times daily  lactulose 20 GM/30ML SOLN, Take 30 mLs (20 g) by mouth daily as needed (Give in addition to scheduled doses if pt has less than 3 BMs/day and/or pt appears more confused)  midodrine (PROAMATINE) 10 MG tablet, Take 1 tablet (10 mg) by mouth 3 times daily (with meals)  omeprazole (PRILOSEC) 20 MG DR capsule, Take 1 capsule (20 mg) by mouth daily  ondansetron (ZOFRAN) 4 MG tablet, Take 1 tablet (4 mg) by mouth every 6 hours as needed for nausea  rifaximin (XIFAXAN) 550 MG TABS tablet, Take 1 tablet (550 mg) by mouth 2  times daily  sodium bicarbonate 650 MG tablet, Take 1 tablet (650 mg) by mouth 2 times daily  ursodiol (ACTIGALL) 500 MG tablet, Take 1 tablet (500 mg) by mouth 2 times daily  vitamin B1 (THIAMINE) 100 MG tablet, Take 1 tablet (100 mg) by mouth daily  acetaminophen (TYLENOL) 500 MG tablet, Take 1 tablet (500 mg) by mouth every 6 hours as needed for mild pain (Patient not taking: Reported on 8/21/2020)  nicotine (NICODERM CQ) 21 MG/24HR 24 hr patch, Place 1 patch onto the skin every 24 hours (Patient not taking: Reported on 8/21/2020)  nicotine (NICODERM CQ) 21 MG/24HR 24 hr patch, Place 1 patch onto the skin every 24 hours (Patient not taking: Reported on 6/26/2020)    No current facility-administered medications on file prior to visit.        OBJECTIVE:  Vitals and exam deferred due to telephone visit.     Labs reviewed in [facility] system and care-everywhere.     Recent Labs   Lab Test 08/16/20 08/03/20  0610 08/02/20  0659   NA  --  141 139   POTASSIUM 3.7 4.8 4.6   CHLORIDE  --  114* 113*   CO2  --  22 20   ANIONGAP  --  5 6   GLC 95 98 89   BUN  --  36* 46*   CR 1.58* 1.40* 1.62*   ELSIE  --  10.8* 10.6*       Lab Results   Component Value Date    WBC 4.5 08/03/2020     Lab Results   Component Value Date    RBC 2.55 08/03/2020     Lab Results   Component Value Date    HGB 8.3 08/03/2020     Lab Results   Component Value Date    HCT 26.0 08/03/2020     No components found for: MCT  Lab Results   Component Value Date     08/03/2020     Lab Results   Component Value Date    MCH 32.5 08/03/2020     Lab Results   Component Value Date    MCHC 31.9 08/03/2020     Lab Results   Component Value Date    RDW 20.7 08/03/2020     Lab Results   Component Value Date    PLT 33 08/03/2020       Color Urine (no units)   Date Value   07/26/2020 Yellow     Appearance Urine (no units)   Date Value   07/26/2020 Clear     Glucose Urine (mg/dL)   Date Value   07/26/2020 Negative     Bilirubin Urine (no units)   Date Value    07/26/2020 Small (A)     Ketones Urine (mg/dL)   Date Value   07/26/2020 Negative     Specific Gravity Urine (no units)   Date Value   07/26/2020 1.010     pH Urine (pH)   Date Value   07/26/2020 5.5     Protein Albumin Urine (mg/dL)   Date Value   07/26/2020 Negative     Urobilinogen Urine (EU/dL)   Date Value   03/17/2015 1.0     Nitrite Urine (no units)   Date Value   07/26/2020 Negative     Leukocyte Esterase Urine (no units)   Date Value   07/26/2020 Negative           ASSESSMENT and PLAN:   Monalisa was seen today for recheck.    Diagnoses and all orders for this visit:    Acute kidney injury superimposed on CKD (H)  The patient has enrolled in Hospice. As such I offered our continued services to her but tailored to meet her needs. She states she is still interested in following her kidney function and blood pressure and may pursue treatment for reversible conditions. She agree to repeat labs, thusly, to follow-up her recent electrolyte disturbances with ALFREDO.   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 Suspected hepatorenal syndrome underlying as well. Previously without proteinuria, will not repeat as not going to guide management for this patient enrolled in Hospice.   -     Renal panel; Future  -     Vitamin D Deficiency; Future    Hypervolemia  Recent hyperkalemia  Offered to resume Lasix +/- Aldactone given her edema. Low BP previously may have contributed to ALFREDO but with Hospice enrollment treating edema may outweigh renal function (and electrolytes, thus uncertain if would use Aldactone - reason to consider it is hypokalemia can precipitate ammonia synthesis). Patient would like to defer starting those now and see how her symptoms evolve with a planned paracentesis in 3 days. Informed patient she can call us or talk to her Hospice/palliative care MD for Lasix prescription if needed.   Recheck lytes now as would favor Lasix alone if K has become elevated again.     Recent hypercalcemia  The patient  discharged from the hospital 5 days ago. Her calcium had normalized and was potentially due to immobilization and/or hypovolemia from prior inpt workup. Repeat calcium now (and check vitamin D, not previously checked and while not on her med list the patient seemed uncertain of her current meds). Will not recheck PTH given not going to affect management in Hospice patient.     Anemia  Patient without symptoms, thus appearing inappropriate to workup or treat with ESAs with Hospice enrollment.     Metabolic acidosis  Repeat BMP post-discharge. Unclear if still on bicarb tabs with Hospice enrollment - likely not providing comfort and can stop.      Return to clinic in 2 months. Discussed PRN follow-up with patient pending her needs in Hospice program but pt preferring to have pre-planned follow-up.     Joey Gaston MD  Instructor  Nephrology  Pager: [phone]

## 2020-08-21 NOTE — PROGRESS NOTES
"Monalisa Olguin is a 54 year old female who is being evaluated via a billable telephone visit.      The patient has been notified of following:     \"This telephone visit will be conducted via a call between you and your physician/provider. We have found that certain health care needs can be provided without the need for a physical exam.  This service lets us provide the care you need with a short phone conversation.  If a prescription is necessary we can send it directly to your pharmacy.  If lab work is needed we can place an order for that and you can then stop by our lab to have the test done at a later time.    Telephone visits are billed at different rates depending on your insurance coverage. During this emergency period, for some insurers they may be billed the same as an in-person visit.  Please reach out to your insurance provider with any questions.    If during the course of the call the physician/provider feels a telephone visit is not appropriate, you will not be charged for this service.\"    Patient has given verbal consent for Telephone visit?  Yes    What phone number would you like to be contacted at? [phone]    How would you like to obtain your AVS? Ajay    Phone call duration: 28 minutes (11:04am to 11:32am)    Joey Gaston MD      "

## 2020-09-08 ENCOUNTER — MEDICAL CORRESPONDENCE (OUTPATIENT)
Dept: HEALTH INFORMATION MANAGEMENT | Facility: CLINIC | Age: 55
End: 2020-09-08

## 2020-09-09 ENCOUNTER — TELEPHONE (OUTPATIENT)
Dept: FAMILY MEDICINE | Facility: CLINIC | Age: 55
End: 2020-09-09

## 2020-09-09 NOTE — TELEPHONE ENCOUNTER
Reason for Call:  Other call back    Detailed comments: Spouse is calling stating he is needing a diagnosis date for her liver disease. He is needing it for her life insurance policy. Can you please call him back with this information as soon as possible?     Phone Number Patient can be reached at: Other phone number:  [phone]    Best Time: any    Can we leave a detailed message on this number? YES    Call taken on 9/9/2020 at 12:32 PM by Denise Ruiz CNA

## 2020-12-19 NOTE — PLAN OF CARE
Assumed care 1500 to 2330. Vital signs stable on room air. BP soft. Pt is alert and oriented can be slightly forgetful. Wells: 0. Scheduled Lactulose with no PRN given. Pt denies Pain. Pt denies nausea and SOB. Lung sounds clear. Cardiac WDL. Bowel Sounds present pt had 5 bowel movements this shift.. Pt voiding adequately, clear yellow urine. Pt Assist of 1 with transfers, Gait belt and walker Cam on when ambulating. Currently off while in bed. Skin clean, dry, and intact  Jaundiced. Bruising. Blanchable redness on buttocks. RPIV saline locked. Plan: Continue to monitor mentation. Rehab. Hepatology following.   HPI:  12/18/20, Time: 8:30 PM MEKA Campos is a 30 y.o. male presenting to the ED for left testicular pain beginning 2 days ago. He describes it as an aching pain in his left testicle which has been constant. Symptoms are moderate in severity. No exacerbating or relieving factors. He did not take anything for his symptoms. He denies associated symptoms of abdominal pain, fevers, penile discharge, dysuria, or hematuria. He states he was last sexually active a few weeks ago. No known exposures to sexually transmitted infections. No trauma to his groin. He denies nausea, vomiting, and diarrhea. Review of Systems:   Pertinent positives and negatives are stated within HPI, all other systems reviewed and are negative.          --------------------------------------------- PAST HISTORY ---------------------------------------------  Past Medical History:  has no past medical history on file. Past Surgical History:  has no past surgical history on file. Social History:  reports that he has been smoking cigarettes. He has a 7.00 pack-year smoking history. He uses smokeless tobacco. He reports current alcohol use. He reports that he does not use drugs. Family History: family history is not on file. The patients home medications have been reviewed.     Allergies: Pcn [penicillins]    -------------------------------------------------- RESULTS -------------------------------------------------  All laboratory and radiology results have been personally reviewed by myself   LABS:  Results for orders placed or performed during the hospital encounter of 12/18/20   C.trachomatis N.gonorrhoeae DNA, Urine    Specimen: Urine   Result Value Ref Range    Source Urine    Urinalysis, reflex to microscopic   Result Value Ref Range    Color, UA Yellow Straw/Yellow    Clarity, UA Clear Clear    Glucose, Ur Negative Negative mg/dL    Bilirubin Urine Negative Negative    Ketones, Urine Negative Negative mg/dL    Specific Gravity, UA 1.020 1.005 - 1.030    Blood, Urine Negative Negative    pH, UA 7.0 5.0 - 9.0    Protein, UA Negative Negative mg/dL    Urobilinogen, Urine 0.2 <2.0 E.U./dL    Nitrite, Urine Negative Negative    Leukocyte Esterase, Urine TRACE (A) Negative   Microscopic Urinalysis   Result Value Ref Range    WBC, UA >20 (A) 0 - 5 /HPF    RBC, UA 2-5 0 - 2 /HPF    Bacteria, UA FEW (A) None Seen /HPF       RADIOLOGY:  Interpreted by Radiologist.  US SCROTUM AND TESTICLES   Final Result   Sonographically normal testicles. Enlarged hyperemic left epididymis consistent with epididymitis. Bilateral hydroceles right greater than left. US DUP ABD PEL RETRO SCROT COMPLETE   Final Result   Normal arterial and venous flow to both testicles with no evidence for   testicular torsion. Enlarged hyperemic left epididymis consistent with epididymitis. Bilateral hydroceles. ------------------------- NURSING NOTES AND VITALS REVIEWED ---------------------------   The nursing notes within the ED encounter and vital signs as below have been reviewed. BP (!) 145/82   Pulse 81   Temp 97.1 °F (36.2 °C)   Resp 18   Ht 5' 8\" (1.727 m)   Wt 190 lb (86.2 kg)   SpO2 99%   BMI 28.89 kg/m²   Oxygen Saturation Interpretation: Normal      ---------------------------------------------------PHYSICAL EXAM--------------------------------------      Constitutional/General: Alert and oriented x3, well appearing, non toxic in NAD  Head: Normocephalic and atraumatic  Eyes: PERRL, EOMI  Mouth: Oropharynx clear, handling secretions, no trismus  Neck: Supple, full ROM,   Pulmonary: Lungs clear to auscultation bilaterally, no wheezes, rales, or rhonchi. Not in respiratory distress  Cardiovascular:  Regular rate and rhythm, no murmurs, gallops, or rubs. 2+ distal pulses  Abdomen: Soft, non tender, non distended,   : RN chaperone, Isrrael, present.  Left testicular tenderness, no crepitus, no lesions, no penile discharge, normal cremasteric reflexes bilaterally. Extremities: Moves all extremities x 4. Warm and well perfused  Skin: warm and dry without rash  Neurologic: GCS 15, no focal motor or sensory deficits   Psych: Normal Affect      ------------------------------ ED COURSE/MEDICAL DECISION MAKING----------------------  Medications   azithromycin (ZITHROMAX) 250 MG tablet (  Canceled Entry 12/18/20 2221)   cefTRIAXone (ROCEPHIN) injection 250 mg (250 mg Intramuscular Given 12/18/20 2213)   azithromycin (ZITHROMAX) tablet 1,000 mg (1,000 mg Oral Given 12/18/20 2213)       Medical Decision Making/ED COURSE:   Patient is a 78-year-old male presenting with left testicular pain. In the ED, patient was hemodynamically stable and afebrile. On exam, she was nontoxic with a benign abdominal exam.  He had left testicular tenderness with normal cremasteric reflexes and no evidence of Caron's.  UA, GC/CL and scrotal US obtained. Patient administered Rocephin and azithromycin for empiric treatment for sexually transmitted infections. I reviewed and interpreted labs. Urinalysis suggestive of a sexually transmitted infection given large amount of white cells and few bacteria. US showed bilateral hydroceles and left epididymitis was normal flow bilaterally. Discussed findings including incidental findings. Patient will be treated with doxycycline. Given PCP referral.  Advised to abstain from sexual intercourse until final GC/CL results have returned. Supportive care instructions and strict ED return precautions discussed. Patient remained hemodynamically stable throughout ED course. Counseling: The emergency provider has spoken with the patient and discussed todays results, in addition to providing specific details for the plan of care and counseling regarding the diagnosis and prognosis.   Questions are answered at this time and they are agreeable with the plan.      --------------------------------- IMPRESSION AND DISPOSITION ---------------------------------    IMPRESSION  1. Epididymitis    2. Hydrocele in adult        DISPOSITION  Disposition: Discharge to home  Patient condition is stable      NOTE: This report was transcribed using voice recognition software.  Every effort was made to ensure accuracy; however, inadvertent computerized transcription errors may be present    I, Conard Oppenheim, MD, am the primary provider of this record       Conard Oppenheim, MD  12/18/20 4109

## 2021-05-24 ENCOUNTER — RECORDS - HEALTHEAST (OUTPATIENT)
Dept: ADMINISTRATIVE | Facility: CLINIC | Age: 56
End: 2021-05-24

## 2021-05-25 ENCOUNTER — RECORDS - HEALTHEAST (OUTPATIENT)
Dept: ADMINISTRATIVE | Facility: CLINIC | Age: 56
End: 2021-05-25

## 2022-08-11 NOTE — ED TRIAGE NOTES
"Patient presents to triage with c/o altered mental status. Patient dropped off per  who stated \"she is here for the same thing as three weeks ago.\"  reported patient is confused, weak, and vomiting. Patient alert and oriented to self and month. Patient states she has been taking medications as prescribed.   " full weight-bearing

## 2022-11-11 NOTE — TELEPHONE ENCOUNTER
"Monalisa Olguin is a 51 year old female who calls with yellow eyes.  Patient reports that she as a history of alcoholism and kidney disease, reporting only having one kidney.  Patient was slurring some words over the phone, and was vague with details.  Patient reports that her  told her that her eyes were yellow in color, denies any yellowing of the skin.  Reports that she has been getting sick over the past week, and feels weaker and weaker everyday.  Reports that she is unable to eat or drink anything.  Reports that she has not eaten for 5 days, and is unable to keep down fluids.  Reporting vomiting after fluids.     NURSING ASSESSMENT:  Description:  Increase weakness.   Onset/duration:  Past week.   Precip. factors:  Alcoholism.   Associated symptoms:  Yellow eyes, weakness, dehydration.   Improves/worsens symptoms:  Not improving.   Pain scale (0-10)   3/10  Last exam/Treatment:  03/14/2017  Allergies:   Allergies   Allergen Reactions     Albuterol      Tongue \"hardened and painful\"     NURSING PLAN: Nursing advice to patient .    RECOMMENDED DISPOSITION:  To ED/UC for evaluation, another person to drive - due to decrease in oral intake and possible dehydration, patient was encouraged to be seen in ED.    Will comply with recommendation: Yes  If further questions/concerns or if symptoms do not improve, worsen or new symptoms develop, call your PCP or [facility] as soon as possible.    Guideline used:  Nausea / Vomiting  Telephone Triage Protocols for Nurses, Fifth Edition, Savannah Sinclair RN  "
Detail Level: Zone
Render In Strict Bullet Format?: No
Continue Regimen: Efudex apply twice daily for two weeks then stop
Continue Regimen: Metronidazole apply daily to areas daily or when needed
Continue Regimen: Efudex apply to scalp daily for two weeks then stop

## 2022-11-24 NOTE — PROGRESS NOTES
Pt was seen for combination telephone and video neuropsychological evaluation at the request of Dr. Joey Kwong for the purposes of diagnostic clarification and treatment planning. 253 minutes of test administration and scoring were provided by this writer. Please see Florencio Correa PhD report for a full interpretation of the findings.     Discharged

## 2022-12-06 NOTE — TELEPHONE ENCOUNTER
Patient scheduled.    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 [de-identified] : Right foot incision site dehiscence with an open wound down to skin, subcutaneous tissue, and fat, healing [de-identified] : Loss of light touch sensation bilaterally [FreeTextEntry1] : Hallux Amp Site [FreeTextEntry2] : 1.6 [FreeTextEntry3] : 0.7 [FreeTextEntry4] : 0.5 [de-identified] : serosanguineous [de-identified] : callus [de-identified] : 100% [de-identified] : Medihoney [de-identified] : Mechanically cleansed with 0.9%normal saline and sterile gauze\par \par Post debridement measurements:\par \par  [FreeTextEntry7] : Foot 2nd Digit- Dry eschar [de-identified] : No treatment required  [de-identified] : Mechanically cleansed with 0.9%normal saline and sterile gauze\par  [TWNoteComboBox1] : Right [TWNoteComboBox4] : Small [TWNoteComboBox5] : No [TWNoteComboBox6] : Surgical [de-identified] : No [de-identified] : other [de-identified] : None [de-identified] : None [de-identified] : None [de-identified] : Yes [de-identified] : False [de-identified] : False [de-identified] : 3x Weekly [de-identified] : Primary Dressing [TWNoteComboBox9] : Right

## 2023-08-02 NOTE — PLAN OF CARE
Discharge Planner OT   Patient plan for discharge: not stated  Current status: Eval completed and tx initiated. Pt performed bed mobility with Sarah. Pt can be mildly impulsive at times, started to stand but responded to VCs to wait for assistance. Pt completed 3 sit<>stand transfers with CGA, able to take steps up and down EOB with Sarah for balance support. Pt shaky and unsteady on feet, Sarah for return back to supine. Pt also tolerated AAROM of BUEs in all planes, x15 reps of each.   Barriers to return to prior living situation: deconditioning, fatigue, balance and coordination deficits, cognition deficits   Recommendations for discharge: TCU  Rationale for recommendations: Pt is well below baseline and would benefit from further therapy to progress independence with ADLs/IADLs and mobility.        Entered by: Autumn Cook 07/28/2020 1:43 PM        not applicable

## 2024-01-10 NOTE — TELEPHONE ENCOUNTER
Addended by: KULWINDER SAAVEDRA on: 1/10/2024 11:48 AM     Modules accepted: Level of Service     Called patient gave message below and scheduled patient for an appointment in [place] on 9/15/17

## 2024-04-12 NOTE — PROVIDER NOTIFICATION
.DATE:  11/16/2019   TIME OF RECEIPT FROM LAB:  0559  LAB TEST:  Platelet Count   LAB VALUE:  37  RESULTS GIVEN WITH READ-BACK TO (PROVIDER):  Dr. Almanza  TIME LAB VALUE REPORTED TO PROVIDER:   0601   Patient discharged  to home in stable condition via private car.  Discharge instructions and prescriptions reviewed with patient.   Patient verbalized understanding.   Patient advised not to drive, drink ETOH or operate machinery while taking pain medications at home.  Patient verbalized understanding.   All belongings in tow including discharge paperwork.

## 2024-09-20 NOTE — LETTER
Expiration Note    Determination of Death:Pulseless, non-breathing, and pupils fixed and dilated    Date of death: 2024  Time of death: 520  Pronounced by (name): Dr. Cunha  Next of kin, family/significant other, guardian notified (name): Bonnie Campos at bedside  Support services notified: [facility]    Home notified date/time:  Home Unknown        April 29, 2020      Monalisa Olguin  [address] [facility] [address]        Dear ,    We are writing to inform you of your test results.    Your labs have improved; your kidney function is better. Your blood counts are slowly trending up, which is a good thing. Please schedule a lab visit to do repeat labs on 5/11/2020 before your appointment with Dr. Bustos on 5/12/2020.    Resulted Orders   **Basic metabolic panel FUTURE anytime   Result Value Ref Range    Sodium 141 133 - 144 mmol/L    Potassium 3.4 3.4 - 5.3 mmol/L    Chloride 113 (H) 94 - 109 mmol/L    Carbon Dioxide 21 20 - 32 mmol/L    Anion Gap 7 3 - 14 mmol/L    Glucose 119 (H) 70 - 99 mg/dL    Urea Nitrogen 26 7 - 30 mg/dL    Creatinine 1.62 (H) 0.52 - 1.04 mg/dL    GFR Estimate 36 (L) >60 mL/min/[1.73_m2]      Comment:      Non  GFR Calc  Starting 12/18/2018, serum creatinine based estimated GFR (eGFR) will be   calculated using the Chronic Kidney Disease Epidemiology Collaboration   (CKD-EPI) equation.      GFR Estimate If Black 41 (L) >60 mL/min/[1.73_m2]      Comment:       GFR Calc  Starting 12/18/2018, serum creatinine based estimated GFR (eGFR) will be   calculated using the Chronic Kidney Disease Epidemiology Collaboration   (CKD-EPI) equation.      Calcium 8.1 (L) 8.5 - 10.1 mg/dL   **CBC with platelets FUTURE anytime   Result Value Ref Range    WBC 6.2 4.0 - 11.0 10e9/L    RBC Count 2.77 (L) 3.8 - 5.2 10e12/L    Hemoglobin 8.9 (L) 11.7 - 15.7 g/dL    Hematocrit 29.1 (L) 35.0 - 47.0 %     (H) 78 - 100 fl    MCH 32.1 26.5 - 33.0 pg    MCHC 30.6 (L) 31.5 - 36.5 g/dL    RDW 20.1 (H) 10.0 - 15.0 %    Platelet Count 44 (LL) 150 - 450 10e9/L      Comment:      This result has been called to [facility] [employer]ULTS by Lidia Gage on 04 27 2020   at 1652, and has been read back. not called due to previous critcal         If you have any questions or concerns, please call the clinic at the number  listed above.       Sincerely,        Hi Humphrey, NP/ac

## 2024-10-08 NOTE — PROGRESS NOTES
[address] [address]  [phone]  Dept: [phone]    PRE-OP EVALUATION:  Today's date: 2017    Monalisa Olguin (: 1965) presents for pre-operative evaluation assessment as requested by Dr. Johnston.  She requires evaluation and anesthesia risk assessment prior to undergoing surgery/procedure for treatment of EGD .  Proposed procedure: Combined esophagoscopy, gastroscopy, duodenoscopy    Date of Surgery/ Procedure: 2017  Time of Surgery/ Procedure: 1:00pm   Hospital/Surgical Facility: [facility]    Primary Physician: Efren Bustos  Type of Anesthesia Anticipated: to be determined    Patient has a Health Care Directive or Living Will:  NO    Preop Questions 2017   1.  Do you have a history of heart attack, stroke, stent, bypass or surgery on an artery in the head, neck, heart or legs? No   2.  Do you ever have any pain or discomfort in your chest? No   3.  Do you have a history of  Heart Failure? YES - father  from a heart attack   4.   Are you troubled by shortness of breath when:  walking on a level surface, or up a slight hill, or at night? No   5.  Do you currently have a cold, bronchitis or other respiratory infection? YES - states that she has a cold right now   6.  Do you have a cough, shortness of breath, or wheezing? YES - cough   7.  Do you sometimes get pains in the calves of your legs when you walk? YES - when she is walking for long periods of time   8. Do you or anyone in your family have previous history of blood clots? No   9.  Do you or does anyone in your family have a serious bleeding problem such as prolonged bleeding following surgeries or cuts? No   10. Have you ever had problems with anemia or been told to take iron pills? No   11. Have you had any abnormal blood loss such as black, tarry or bloody stools, or abnormal vaginal bleeding? No   12. Have you ever had a blood transfusion? No   13. Have you or  any of your relatives ever had problems with anesthesia? YES - self when she has colonoscopy was unable to be put under with the medication they used   14. Do you have sleep apnea, excessive snoring or daytime drowsiness? No   15. Do you have any prosthetic heart valves? No   16. Do you have prosthetic joints? No   17. Is there any chance that you may be pregnant? No           HPI:                                                      Brief HPI related to upcoming procedure:     Monalisa is here today for pre-op physical for upper endoscopy for history of alcoholism and GERD/gastritis.  It expected be the same day procedure with MAC/general anesthesia. There is no personal or family history of anesthesia complication. There is no family or personal history of pre-matured CAD or MI. She has a long hx of alcohol abuse; been sobered for 4 months.  Also on multiple medication for depression/anxiety and is doing well.  As not been on steroid orally in the last 6 months.  She does not take Aspirin or other form of blood thinner.  She takes Aleve rarely in the last pill was weeks ago.  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  Stated that she was well informed about the procedure and is ready to have the procedure done.    She generally is doing well and has no concern today.  She is getting over a cold.  No headache or dizziness. No running nose, nasal congestion, ST, coughing, fever or chill.  No chest pain or SOB.  No N/V/D/C and denies of having problem with urination.  She smokes about 1/2 ppd but denies of having breathing problem.  She is menopausal, her last menstrual period was 10 years ago.    MEDICAL HISTORY:                                                    Patient Active Problem List    Diagnosis Date Noted     Alcoholic hepatitis with ascites 03/26/2017     Priority: Medium     Anemia 03/26/2017     Priority: Medium     Thrombocytopenia (H) 03/26/2017     Priority: Medium     Tobacco  abuse 2017     Priority: Medium     Portal hypertension (H) 2017     Priority: Medium     Pulmonary nodules 2017     Priority: Medium     Hyperthyroidism 2017     Priority: Medium     Alcoholism in recovery (H) 2016     Priority: Medium     Anxiety 10/10/2011     Priority: Medium     Hypertension goal BP (blood pressure) < 130/80 2011     Priority: Medium     HYPERLIPIDEMIA LDL GOAL <100 10/31/2010     Priority: Medium     Moderate Depression [296.32] 2009     Priority: Medium     Esophageal reflux 10/07/2002     Priority: Medium     Kidney donor 2001     Priority: Medium      Past Medical History:   Diagnosis Date     Alcohol abuse 2013     Alcohol dependence 2013     Alcohol withdrawal 2013     Anxiety 10/10/2011     CKD (chronic kidney disease) stage 3, GFR 30-59 ml/min     last GFR was 42     CKD (chronic kidney disease) stage 3, GFR 30-59 ml/min 2011     Depressive disorder      Esophageal reflux 10/7/2002     Hypertension goal BP (blood pressure) < 130/80 2011     Moderate Depression [296.32] 2009    stable on wellbutrin     Other internal derangement of knee(717.89)     ACL Internal derangement, knee/ original injury in 5th grade, torn cartilage     Pap smear     no abnormals, due for paps q 2-3 yrs     Unspecified essential hypertension      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     COLONOSCOPY N/A 2017    Procedure: COLONOSCOPY;  Colonoscopy;  Surgeon: Sai Johnston MD;  Location: [facility]     HC KNEE SCOPE, DIAGNOSTIC  01    Arthroscopy, Lt Knee     LAPAROSCOPIC CHOLECYSTECTOMY N/A 2017    Procedure: LAPAROSCOPIC CHOLECYSTECTOMY;  laparoscopic cholecystectomy;  Surgeon: Romeo Psator MD;  Location: [facility] OR     OPEN REDUCTION INTERNAL FIXATION WRIST Right 2017  "   Procedure: OPEN REDUCTION INTERNAL FIXATION WRIST;  OPEN REDUCTION INTERNAL FIXATION RIGHT WRIST;  Surgeon: Edgardo Almendarez MD;  Location: [facility] OR     Current Outpatient Prescriptions   Medication Sig Dispense Refill     alendronate (FOSAMAX) 35 MG tablet Take 1 tablet (35 mg) by mouth with 8oz water every Monday (once every 7 days) 30 minutes before breakfast and remain upright during this time. 12 tablet 3     FLUoxetine (PROZAC) 20 MG capsule Take 3 capsules (60 mg) by mouth daily 180 capsule 3     temazepam (RESTORIL) 15 MG capsule        sertraline (ZOLOFT) 100 MG tablet        Naproxen Sodium (ALEVE PO) Take 220 mg by mouth 2 times daily as needed for moderate pain       phosphorus tablet 250 mg (VIRT-PHOS 250 NEUTRAL) 250 MG per tablet TAKE TWO TABLETS BY MOUTH TWICE A  tablet 3     naltrexone (DEPADE;REVIA) 50 MG tablet TAKE ONE-HALF TABLET BY MOUTH EVERY DAY FOR ONE WEEK THEN TAKE ONE TABLET DAILY AFTER 90 tablet 3     propranolol (INDERAL) 10 MG tablet Take 1 tablet (10 mg) by mouth 2 times daily 180 tablet 3     TL RICARDO RX 2.2-25-1 MG TABS TAKE ONE TABLET BY MOUTH EVERY DAY 90 tablet 3     VITAMIN B-1 100 MG tablet TAKE ONE TABLET BY MOUTH EVERY DAY 90 tablet 3     busPIRone (BUSPAR) 15 MG tablet Take 1 tablet (15 mg) by mouth 2 times daily 180 tablet 3     buPROPion (WELLBUTRIN XL) 300 MG 24 hr tablet Take 1 tablet (300 mg) by mouth every morning 90 tablet 3     omeprazole (PRILOSEC) 20 MG CR capsule Take 1 capsule (20 mg) by mouth daily 90 capsule 3     OTC products: None, except as noted above    Allergies   Allergen Reactions     Albuterol      Tongue \"hardened and painful\"      Latex Allergy: NO    Social History   Substance Use Topics     Smoking status: Current Every Day Smoker     Packs/day: 0.50     Years: 10.00     Smokeless tobacco: Never Used      Comment: pt quit 1992 after smoking for 8 yrs, started again in 2004     Alcohol use No      Comment: stopped drinking 8/17     History "   Drug Use No       REVIEW OF SYSTEMS:                                                    Constitutional, neuro, ENT, endocrine, pulmonary, cardiac, gastrointestinal, genitourinary, musculoskeletal, integument and psychiatric systems are negative, except as otherwise noted.      EXAM:                                                    /64 (BP Location: Right arm, Patient Position: Chair, Cuff Size: Adult Regular)  Pulse 91  Temp 97.3  F (36.3  C) (Temporal)  Wt 128 lb 6.4 oz (58.2 kg)  LMP 05/16/2012  SpO2 99%  BMI 24.26 kg/m2      GENERAL APPEARANCE: healthy, alert and no distress     EYES: EOMI,- PERRL     HENT: ear canals and TM's normal and nose and mouth without ulcers or lesions. Nares are non-congested. Oropharynx is pink and moist. No tender with palpation to the sinuses.     NECK: no adenopathy, no asymmetry and thyroid normal to palpation.  No tender with palpation to the cervical spine and its para-spinous muscle bilaterally.     RESP: lungs clear to auscultation - no rales, rhonchi or wheezes     CV: regular rates and rhythm and no murmur.     ABDOMEN:  soft, nontender, no palpable masses and bowel sounds normal     MS: extremities normal- no gross deformities noted.  No edema.  All 4 extremities  are equally in strength.     NEURO: Normal strength and tone,  mentation intact and speech normal     PSYCH: mentation appears normal. and affect normal/bright     LYMPHATICS: No cervical adenopathy    DIAGNOSTICS:                                                      EKG: Not indicated due to non-vascular surgery and last ekg on 11/2017 showed normal showed normal sinus rhythm with no change in ST segments are T-wave (within 30 days for CAD history or last year for cardiac risk factors)     Labs Resulted Today:  none      Recent Labs   Lab Test  11/02/17   1222  10/17/17   2245  09/27/17   1337  09/25/17   0821  09/24/17   0630   HGB   --   8.1*  8.4*  7.9*  7.8*   PLT   --   100*  99*  91*  88*    INR   --    --    --   1.25*  1.33*   NA  145*  142  142  144  141   POTASSIUM  3.7  3.8  3.7  3.8  3.7   CR  0.91  1.06*  0.88  0.88  1.04   A1C   --    --    --    --   Canceled, Test credited     No pregnancy test was needed because he is post menopause with the last LMP was 10 years ago    IMPRESSION:                                                    Reason for surgery/procedure: History of alcohol abuse with GERD/gastritis  Diagnosis/reason for consult: pre-op physical to evaluate for anesthesia and its kera-operative risks.    The proposed surgical procedure is considered INTERMEDIATE risk.    REVISED CARDIAC RISK INDEX  The patient has the following serious cardiovascular risks for perioperative complications such as (MI, PE, VFib and 3  AV Block):  No serious cardiac risks  INTERPRETATION: 1 risks: Class II (low risk - 0.9% complication rate)    The patient has the following additional risks for perioperative complications:  No identified additional risks      ICD-10-CM    1. Preop general physical exam Z01.818    2. Alcoholism in recovery (H) F10.20    3. Gastroesophageal reflux disease, esophagitis presence not specified K21.9        RECOMMENDATIONS:                                                      APPROVAL GIVEN to proceed with proposed procedure, without further diagnostic evaluation.    Monalisa is overall doing well.  She is clear for the procedure as scheduled.  No further work up is needed - recent EKG and labs were normal.  Instructed her to fast at least 8 hrs before the procedure time. Not take any blood thinner.   I recommend to stay away from ASA and NSAIDs for 7 days before the procedure. I went over her medication - may take all of prescribed medications as prescribed. A written instruction was given as well. Recommended appropriate DVT prophylactic during and after the surgery per hospital's protocol.  All of her questions were answered.         Signed Electronically by: Alexis Mae Mai  MD    Copy of this evaluation report is provided to requesting physician.    [facility] Preop Guidelines   20

## 2024-11-10 NOTE — PROGRESS NOTES
B/P looks good   
[facility] now requests orders and shares plan of care/discharge summaries for some patients through [employer].  Please REPLY TO THIS MESSAGE OR ROUTE BACK TO THE AUTHOR in order to give authorization for orders when needed.  This is considered a verbal order, you will still receive a faxed copy of orders for signature.  Thank you for your assistance in improving collaboration for our patients.    Just an FYI/update on BP on today's visit 108/68    Dede Gaming RN   [phone]    
There are no Wet Read(s) to document.

## 2024-12-11 NOTE — PLAN OF CARE
Discharge Planner OT   Patient plan for discharge: Did not discuss  Current status: Pt was given components of the Cognistat to assess cognition. Pt severely impaired in areas of orientation, memory, attention, and judgment, and moderately impaired in comprehension. Pt showed no insight into deficits. Pt up with SBA to chair and commode and back to bed with mod v/c. Pt mod-max A with LB dressing. Pt pleasant and agreeable to treatment.   Barriers to return to prior living situation: Cognition, decreased ind in ADLs, deconditioning   Recommendations for discharge: TCU  Rationale for recommendations: Skilled therapy recommended to improve cognition and tolerance to increase ind with ADLs. Pt safety awareness d/t lack of cognition is a concern for return to home.        Entered by: Rani Varner 07/30/2020 3:52 PM        Per Ty at the facility. Please fax forms to [phone].     Staff did fax from office.

## 2025-02-26 NOTE — TELEPHONE ENCOUNTER
Patient called to schedule an appointment for a hospital follow-up or appeared on a report showing that they were recently discharged from the hospital.    Patient was admitted to [facility]  Discharged date: 06/16/20  Reason for hospital admission:  Hepatic encephalopathy   Does patient have future appointment scheduled with provider? No  Date of future appointment:        This information will be used to help the care team plan for the patients upcoming visit.  The triage RN may determine that a follow up call is necessary and reach out to the patient via the phone number listed in the chart.     Please route this message on routine priority to the Triage RN pool.      No

## 2025-05-13 NOTE — PATIENT INSTRUCTIONS
Pt to return on 08/15/18 for day 2 albumin. Copies of medication list and upcoming appointments given prior to discharge.      severe

## (undated) DEVICE — SOL NACL 0.9% IRRIG 1000ML BOTTLE 07138-09

## (undated) DEVICE — SU VICRYL 0 CT-2 27" J334H

## (undated) DEVICE — SOL WATER IRRIG 1000ML BOTTLE 2F7114

## (undated) DEVICE — PREP CHLORAPREP 26ML TINTED ORANGE  260815

## (undated) DEVICE — ENDO TROCAR BLUNT 100MM W/THRD ANCHOR BLUNTPORT BPT12STS

## (undated) DEVICE — CLIP APPLIER ENDO 05MM MED/LG 176630

## (undated) DEVICE — GOWN IMPERVIOUS BREATHABLE 2XL/XLONG

## (undated) DEVICE — ENDO POUCH GOLD 10MM ECATCH 173050G

## (undated) DEVICE — GLOVE ESTEEM BLUE W/NEU-THERA 6.5  2D73PB65

## (undated) DEVICE — NDL INSUFFLATION 14GA STEP S100000

## (undated) DEVICE — ESU PENCIL W/COATED BLADE E2450H

## (undated) DEVICE — DRAPE C-ARM MINI 5423

## (undated) DEVICE — SU MONOCRYL 4-0 PS-2 18" UND Y496G

## (undated) DEVICE — DRSG XEROFORM 1X8"

## (undated) DEVICE — SOL ISOPROPYL ALCOHOL USP 70% 16OZ  NDC10565-002-16 D0022

## (undated) DEVICE — DEVICE SUTURE GRASPER TROCAR CLOSURE 14GA PMITCSG

## (undated) DEVICE — BNDG ESMARK 4" STERILE

## (undated) DEVICE — SU SILK 0 TIE 6X30" A306H

## (undated) DEVICE — CAST PLASTER SPLINT 3X15" EXTRA FAST

## (undated) DEVICE — BLADE KNIFE SURG 15 371115

## (undated) DEVICE — LINEN GOWN XLG 5407

## (undated) DEVICE — NDL ECLIPSE 22GA 1.5"

## (undated) DEVICE — Device

## (undated) DEVICE — LINEN TOWEL PACK X5 5464

## (undated) DEVICE — SOL WATER IRRIG 1000ML BOTTLE 07139-09

## (undated) DEVICE — GLOVE PROTEXIS W/NEU-THERA 6.5  2D73TE65

## (undated) DEVICE — ADH LIQUID MASTISOL TOPICAL VIAL 2-3ML 0523-48

## (undated) DEVICE — DRAPE CONVERTORS U-DRAPE 60X72" 8476

## (undated) DEVICE — CAST PADDING 3" WEBRIL UNSTERILE

## (undated) DEVICE — ANTIFOG SOLUTION W/FOAM PAD 31142527

## (undated) DEVICE — DRSG STERI STRIP 1/2X4" R1547

## (undated) DEVICE — GLOVE PROTEXIS W/NEU-THERA 8.0  2D73TE80

## (undated) DEVICE — ESU GROUND PAD UNIVERSAL W/O CORD

## (undated) DEVICE — ENDO TROCAR 05MM VERSASTEP VS101005

## (undated) DEVICE — DRSG GAUZE 4X4" TRAY

## (undated) DEVICE — SU ETHILON 3-0 PS-2 18" 1669H

## (undated) DEVICE — SOL NACL 0.9% INJ 1000ML BAG 07983-09

## (undated) DEVICE — GLOVE PROTEXIS POWDER FREE SMT 7.5  2D72PT75X

## (undated) DEVICE — NDL INSUFFLATION 120MM VERRES 172015

## (undated) DEVICE — TUBING SUCTION 12"X1/4" N612

## (undated) DEVICE — ENDO SHEARS 5MM 176643

## (undated) DEVICE — PACK HAND WRIST FOREARM CUSTOM

## (undated) DEVICE — GLOVE PROTEXIS W/NEU-THERA 8.5  2D73TE85

## (undated) DEVICE — ESU GROUND PAD ADULT W/CORD E7507

## (undated) DEVICE — LINEN TOWEL PACK X6 WHITE 5487

## (undated) DEVICE — DRAPE SHEET REV FOLD 3/4 9349

## (undated) DEVICE — SU VICRYL 2-0 CP-2 27" UND J869H

## (undated) RX ORDER — LIDOCAINE HYDROCHLORIDE 20 MG/ML
INJECTION, SOLUTION EPIDURAL; INFILTRATION; INTRACAUDAL; PERINEURAL
Status: DISPENSED
Start: 2017-09-24

## (undated) RX ORDER — DEXAMETHASONE SODIUM PHOSPHATE 10 MG/ML
INJECTION INTRAMUSCULAR; INTRAVENOUS
Status: DISPENSED
Start: 2017-11-29

## (undated) RX ORDER — DIPHENHYDRAMINE HYDROCHLORIDE 50 MG/ML
INJECTION INTRAMUSCULAR; INTRAVENOUS
Status: DISPENSED
Start: 2019-04-29

## (undated) RX ORDER — LIDOCAINE HYDROCHLORIDE 20 MG/ML
INJECTION, SOLUTION INFILTRATION; PERINEURAL
Status: DISPENSED
Start: 2017-12-27

## (undated) RX ORDER — PROPOFOL 10 MG/ML
INJECTION, EMULSION INTRAVENOUS
Status: DISPENSED
Start: 2017-11-29

## (undated) RX ORDER — ATROPINE SULFATE/0.9 %SOD CHLR 1.2 MG/3ML
SYRINGE (ML) INTRAVENOUS
Status: DISPENSED
Start: 2020-01-01

## (undated) RX ORDER — METOPROLOL TARTRATE 1 MG/ML
INJECTION, SOLUTION INTRAVENOUS
Status: DISPENSED
Start: 2020-01-01

## (undated) RX ORDER — ONDANSETRON 2 MG/ML
INJECTION INTRAMUSCULAR; INTRAVENOUS
Status: DISPENSED
Start: 2017-09-24

## (undated) RX ORDER — FENTANYL CITRATE 50 UG/ML
INJECTION, SOLUTION INTRAMUSCULAR; INTRAVENOUS
Status: DISPENSED
Start: 2017-09-24

## (undated) RX ORDER — DOBUTAMINE HYDROCHLORIDE 200 MG/100ML
INJECTION INTRAVENOUS
Status: DISPENSED
Start: 2020-01-01

## (undated) RX ORDER — BUPIVACAINE HYDROCHLORIDE 5 MG/ML
INJECTION, SOLUTION EPIDURAL; INTRACAUDAL
Status: DISPENSED
Start: 2017-11-29

## (undated) RX ORDER — FENTANYL CITRATE 50 UG/ML
INJECTION, SOLUTION INTRAMUSCULAR; INTRAVENOUS
Status: DISPENSED
Start: 2017-09-08

## (undated) RX ORDER — LIDOCAINE HYDROCHLORIDE 20 MG/ML
INJECTION, SOLUTION EPIDURAL; INFILTRATION; INTRACAUDAL; PERINEURAL
Status: DISPENSED
Start: 2017-11-29

## (undated) RX ORDER — GLYCOPYRROLATE 0.2 MG/ML
INJECTION INTRAMUSCULAR; INTRAVENOUS
Status: DISPENSED
Start: 2017-11-29

## (undated) RX ORDER — FENTANYL CITRATE 50 UG/ML
INJECTION, SOLUTION INTRAMUSCULAR; INTRAVENOUS
Status: DISPENSED
Start: 2019-04-29

## (undated) RX ORDER — ONDANSETRON 2 MG/ML
INJECTION INTRAMUSCULAR; INTRAVENOUS
Status: DISPENSED
Start: 2017-11-29

## (undated) RX ORDER — CEFAZOLIN SODIUM 1 G/3ML
INJECTION, POWDER, FOR SOLUTION INTRAMUSCULAR; INTRAVENOUS
Status: DISPENSED
Start: 2017-09-24

## (undated) RX ORDER — NEOSTIGMINE METHYLSULFATE 1 MG/ML
VIAL (ML) INJECTION
Status: DISPENSED
Start: 2017-11-29

## (undated) RX ORDER — LIDOCAINE HYDROCHLORIDE 10 MG/ML
INJECTION, SOLUTION EPIDURAL; INFILTRATION; INTRACAUDAL; PERINEURAL
Status: DISPENSED
Start: 2020-01-01

## (undated) RX ORDER — DEXAMETHASONE SODIUM PHOSPHATE 4 MG/ML
INJECTION, SOLUTION INTRA-ARTICULAR; INTRALESIONAL; INTRAMUSCULAR; INTRAVENOUS; SOFT TISSUE
Status: DISPENSED
Start: 2017-09-24

## (undated) RX ORDER — LABETALOL HYDROCHLORIDE 5 MG/ML
INJECTION, SOLUTION INTRAVENOUS
Status: DISPENSED
Start: 2017-09-24

## (undated) RX ORDER — BACITRACIN 50000 [IU]/1
INJECTION, POWDER, FOR SOLUTION INTRAMUSCULAR
Status: DISPENSED
Start: 2017-11-29

## (undated) RX ORDER — PROPOFOL 10 MG/ML
INJECTION, EMULSION INTRAVENOUS
Status: DISPENSED
Start: 2017-12-27

## (undated) RX ORDER — FENTANYL CITRATE 50 UG/ML
INJECTION, SOLUTION INTRAMUSCULAR; INTRAVENOUS
Status: DISPENSED
Start: 2017-11-29

## (undated) RX ORDER — HYDROMORPHONE HYDROCHLORIDE 1 MG/ML
INJECTION, SOLUTION INTRAMUSCULAR; INTRAVENOUS; SUBCUTANEOUS
Status: DISPENSED
Start: 2017-11-29

## (undated) RX ORDER — PROPOFOL 10 MG/ML
INJECTION, EMULSION INTRAVENOUS
Status: DISPENSED
Start: 2017-09-24

## (undated) RX ORDER — SODIUM CHLORIDE, SODIUM LACTATE, POTASSIUM CHLORIDE, CALCIUM CHLORIDE 600; 310; 30; 20 MG/100ML; MG/100ML; MG/100ML; MG/100ML
INJECTION, SOLUTION INTRAVENOUS
Status: DISPENSED
Start: 2017-09-24